# Patient Record
Sex: MALE | Race: WHITE | Employment: OTHER | URBAN - NONMETROPOLITAN AREA
[De-identification: names, ages, dates, MRNs, and addresses within clinical notes are randomized per-mention and may not be internally consistent; named-entity substitution may affect disease eponyms.]

---

## 2017-03-16 ENCOUNTER — HOSPITAL ENCOUNTER (OUTPATIENT)
Dept: CARDIAC REHAB | Age: 82
Setting detail: THERAPIES SERIES
Discharge: HOME | End: 2017-03-16
Payer: MEDICARE

## 2017-09-15 ENCOUNTER — HOSPITAL ENCOUNTER (EMERGENCY)
Age: 82
Discharge: HOME | End: 2017-09-15
Attending: EMERGENCY MEDICINE
Payer: MEDICARE

## 2017-09-15 ENCOUNTER — APPOINTMENT (OUTPATIENT)
Dept: GENERAL RADIOLOGY | Age: 82
End: 2017-09-15
Payer: MEDICARE

## 2017-09-15 VITALS
HEIGHT: 71 IN | WEIGHT: 250 LBS | RESPIRATION RATE: 16 BRPM | DIASTOLIC BLOOD PRESSURE: 76 MMHG | BODY MASS INDEX: 35 KG/M2 | HEART RATE: 61 BPM | SYSTOLIC BLOOD PRESSURE: 121 MMHG | OXYGEN SATURATION: 90 % | TEMPERATURE: 99.2 F

## 2017-09-15 DIAGNOSIS — J40 BRONCHITIS: Primary | ICD-10-CM

## 2017-09-15 LAB
-: ABNORMAL
ABSOLUTE EOS #: 0.5 K/UL (ref 0–0.4)
ABSOLUTE LYMPH #: 1.5 K/UL (ref 1–4.8)
ABSOLUTE MONO #: 1.5 K/UL (ref 0–1)
ALLEN TEST: NORMAL
AMORPHOUS: ABNORMAL
ANION GAP SERPL CALCULATED.3IONS-SCNC: 12 MMOL/L (ref 9–17)
BACTERIA: ABNORMAL
BASOPHILS # BLD: 0 %
BASOPHILS ABSOLUTE: 0 K/UL (ref 0–0.2)
BILIRUBIN URINE: NEGATIVE
BNP INTERPRETATION: NORMAL
BUN BLDV-MCNC: 17 MG/DL (ref 8–23)
BUN/CREAT BLD: 17 (ref 9–20)
CALCIUM SERPL-MCNC: 9.2 MG/DL (ref 8.6–10.4)
CARBOXYHEMOGLOBIN: NORMAL % (ref 0–5)
CASTS UA: ABNORMAL /LPF
CHLORIDE BLD-SCNC: 95 MMOL/L (ref 98–107)
CO2: 25 MMOL/L (ref 20–31)
COLOR: YELLOW
COMMENT UA: ABNORMAL
CREAT SERPL-MCNC: 1 MG/DL (ref 0.7–1.2)
CRYSTALS, UA: ABNORMAL /HPF
DIFFERENTIAL TYPE: ABNORMAL
EOSINOPHILS RELATIVE PERCENT: 5 %
EPITHELIAL CELLS UA: ABNORMAL /HPF (ref 0–5)
FIO2: NORMAL
GFR AFRICAN AMERICAN: >60 ML/MIN
GFR NON-AFRICAN AMERICAN: >60 ML/MIN
GFR SERPL CREATININE-BSD FRML MDRD: ABNORMAL ML/MIN/{1.73_M2}
GFR SERPL CREATININE-BSD FRML MDRD: ABNORMAL ML/MIN/{1.73_M2}
GLUCOSE BLD-MCNC: 179 MG/DL (ref 70–99)
GLUCOSE URINE: NEGATIVE
HCO3 VENOUS: 24.4 MMOL/L (ref 24–30)
HCT VFR BLD CALC: 39.2 % (ref 41–53)
HEMOGLOBIN: 12.9 G/DL (ref 13.5–17)
KETONES, URINE: NEGATIVE
LEUKOCYTE ESTERASE, URINE: NEGATIVE
LYMPHOCYTES # BLD: 15 %
MCH RBC QN AUTO: 29.2 PG (ref 26–34)
MCHC RBC AUTO-ENTMCNC: 32.8 G/DL (ref 31–37)
MCV RBC AUTO: 89.1 FL (ref 80–100)
METHEMOGLOBIN: NORMAL % (ref 0–1.9)
MODE: NORMAL
MONOCYTES # BLD: 15 %
MUCUS: ABNORMAL
NEGATIVE BASE EXCESS, VEN: 1.2 MMOL/L (ref 0–2)
NITRITE, URINE: NEGATIVE
NOTIFICATION TIME: NORMAL
NOTIFICATION: NORMAL
O2 DEVICE/FLOW/%: NORMAL
O2 SAT, VEN: 73.3 % (ref 60–85)
OTHER OBSERVATIONS UA: ABNORMAL
OXYHEMOGLOBIN: NORMAL % (ref 95–98)
PATIENT TEMP: 37
PCO2, VEN, TEMP ADJ: NORMAL MMHG (ref 39–55)
PCO2, VEN: 44 (ref 39–55)
PDW BLD-RTO: 14.4 % (ref 12.1–15.2)
PEEP/CPAP: NORMAL
PH UA: 6 (ref 5–9)
PH VENOUS: 7.36 (ref 7.32–7.42)
PH, VEN, TEMP ADJ: NORMAL (ref 7.32–7.42)
PLATELET # BLD: 206 K/UL (ref 140–450)
PLATELET ESTIMATE: ABNORMAL
PMV BLD AUTO: 9 FL (ref 6–12)
PO2, VEN, TEMP ADJ: NORMAL MMHG (ref 30–50)
PO2, VEN: 40.3 (ref 30–50)
POSITIVE BASE EXCESS, VEN: NORMAL MMOL/L (ref 0–2)
POTASSIUM SERPL-SCNC: 4.8 MMOL/L (ref 3.7–5.3)
PRO-BNP: 216 PG/ML
PROTEIN UA: ABNORMAL
PSV: NORMAL
PT. POSITION: NORMAL
RBC # BLD: 4.4 M/UL (ref 4.5–5.9)
RBC # BLD: ABNORMAL 10*6/UL
RBC UA: ABNORMAL /HPF (ref 0–2)
RENAL EPITHELIAL, UA: ABNORMAL /HPF
RESPIRATORY RATE: NORMAL
SAMPLE SITE: NORMAL
SEG NEUTROPHILS: 65 %
SEGMENTED NEUTROPHILS ABSOLUTE COUNT: 6.5 K/UL (ref 1.8–7.7)
SET RATE: NORMAL
SODIUM BLD-SCNC: 132 MMOL/L (ref 135–144)
SPECIFIC GRAVITY UA: 1.02 (ref 1.01–1.02)
TEXT FOR RESPIRATORY: NORMAL
TOTAL HB: NORMAL G/DL (ref 12–16)
TOTAL RATE: NORMAL
TRICHOMONAS: ABNORMAL
TROPONIN INTERP: NORMAL
TROPONIN T: <0.03 NG/ML
TURBIDITY: CLEAR
URINE HGB: NEGATIVE
UROBILINOGEN, URINE: NORMAL
VT: NORMAL
WBC # BLD: 9.9 K/UL (ref 3.5–11)
WBC # BLD: ABNORMAL 10*3/UL
WBC UA: ABNORMAL /HPF (ref 0–5)
YEAST: ABNORMAL

## 2017-09-15 PROCEDURE — 6370000000 HC RX 637 (ALT 250 FOR IP): Performed by: EMERGENCY MEDICINE

## 2017-09-15 PROCEDURE — 83880 ASSAY OF NATRIURETIC PEPTIDE: CPT

## 2017-09-15 PROCEDURE — 84484 ASSAY OF TROPONIN QUANT: CPT

## 2017-09-15 PROCEDURE — 71020 XR CHEST STANDARD TWO VW: CPT

## 2017-09-15 PROCEDURE — 93005 ELECTROCARDIOGRAM TRACING: CPT

## 2017-09-15 PROCEDURE — 82805 BLOOD GASES W/O2 SATURATION: CPT

## 2017-09-15 PROCEDURE — 99285 EMERGENCY DEPT VISIT HI MDM: CPT

## 2017-09-15 PROCEDURE — 81001 URINALYSIS AUTO W/SCOPE: CPT

## 2017-09-15 PROCEDURE — 80048 BASIC METABOLIC PNL TOTAL CA: CPT

## 2017-09-15 PROCEDURE — 6360000002 HC RX W HCPCS: Performed by: EMERGENCY MEDICINE

## 2017-09-15 PROCEDURE — 36415 COLL VENOUS BLD VENIPUNCTURE: CPT

## 2017-09-15 PROCEDURE — 85025 COMPLETE CBC W/AUTO DIFF WBC: CPT

## 2017-09-15 PROCEDURE — 87040 BLOOD CULTURE FOR BACTERIA: CPT

## 2017-09-15 PROCEDURE — 96374 THER/PROPH/DIAG INJ IV PUSH: CPT

## 2017-09-15 PROCEDURE — 94640 AIRWAY INHALATION TREATMENT: CPT

## 2017-09-15 RX ORDER — CETIRIZINE HYDROCHLORIDE 10 MG/1
10 TABLET ORAL DAILY
COMMUNITY
End: 2018-01-03

## 2017-09-15 RX ORDER — BENZONATATE 100 MG/1
200 CAPSULE ORAL ONCE
Status: COMPLETED | OUTPATIENT
Start: 2017-09-15 | End: 2017-09-15

## 2017-09-15 RX ORDER — IPRATROPIUM BROMIDE AND ALBUTEROL SULFATE 2.5; .5 MG/3ML; MG/3ML
1 SOLUTION RESPIRATORY (INHALATION) EVERY 4 HOURS PRN
Qty: 30 VIAL | Refills: 1 | Status: SHIPPED | OUTPATIENT
Start: 2017-09-15 | End: 2017-10-05 | Stop reason: SDUPTHER

## 2017-09-15 RX ORDER — DEXTROMETHORPHAN POLISTIREX 30 MG/5ML
30 SUSPENSION ORAL 2 TIMES DAILY PRN
COMMUNITY
End: 2018-04-18 | Stop reason: ALTCHOICE

## 2017-09-15 RX ORDER — METHYLPREDNISOLONE SODIUM SUCCINATE 125 MG/2ML
125 INJECTION, POWDER, LYOPHILIZED, FOR SOLUTION INTRAMUSCULAR; INTRAVENOUS ONCE
Status: COMPLETED | OUTPATIENT
Start: 2017-09-15 | End: 2017-09-15

## 2017-09-15 RX ORDER — BENZONATATE 100 MG/1
100 CAPSULE ORAL EVERY 4 HOURS PRN
Qty: 30 CAPSULE | Refills: 0 | Status: SHIPPED | OUTPATIENT
Start: 2017-09-15 | End: 2017-09-22

## 2017-09-15 RX ORDER — ACETAMINOPHEN AND CODEINE PHOSPHATE 300; 30 MG/1; MG/1
1 TABLET ORAL EVERY 12 HOURS PRN
Qty: 30 TABLET | Refills: 0 | Status: SHIPPED | OUTPATIENT
Start: 2017-09-15 | End: 2017-10-03 | Stop reason: ALTCHOICE

## 2017-09-15 RX ORDER — M-VIT,TX,IRON,MINS/CALC/FOLIC 27MG-0.4MG
1 TABLET ORAL DAILY
COMMUNITY

## 2017-09-15 RX ORDER — IPRATROPIUM BROMIDE AND ALBUTEROL SULFATE 2.5; .5 MG/3ML; MG/3ML
1 SOLUTION RESPIRATORY (INHALATION) ONCE
Status: COMPLETED | OUTPATIENT
Start: 2017-09-15 | End: 2017-09-15

## 2017-09-15 RX ORDER — OMEGA-3 FATTY ACIDS CAP DELAYED RELEASE 1000 MG 1000 MG
1000 CAPSULE DELAYED RELEASE ORAL 2 TIMES DAILY
COMMUNITY
End: 2018-01-03

## 2017-09-15 RX ORDER — ACETAMINOPHEN AND CODEINE PHOSPHATE 300; 30 MG/1; MG/1
1 TABLET ORAL ONCE
Status: COMPLETED | OUTPATIENT
Start: 2017-09-15 | End: 2017-09-15

## 2017-09-15 RX ORDER — CEPHALEXIN 500 MG/1
500 CAPSULE ORAL 3 TIMES DAILY
Qty: 30 CAPSULE | Refills: 0 | Status: SHIPPED | OUTPATIENT
Start: 2017-09-15 | End: 2017-09-19

## 2017-09-15 RX ORDER — LISINOPRIL 2.5 MG/1
2.5 TABLET ORAL DAILY
Status: ON HOLD | COMMUNITY
End: 2017-09-21 | Stop reason: HOSPADM

## 2017-09-15 RX ADMIN — BENZONATATE 200 MG: 100 CAPSULE ORAL at 04:40

## 2017-09-15 RX ADMIN — ACETAMINOPHEN AND CODEINE PHOSPHATE 1 TABLET: 300; 30 TABLET ORAL at 04:40

## 2017-09-15 RX ADMIN — IPRATROPIUM BROMIDE AND ALBUTEROL SULFATE 1 AMPULE: .5; 3 SOLUTION RESPIRATORY (INHALATION) at 04:32

## 2017-09-15 RX ADMIN — METHYLPREDNISOLONE SODIUM SUCCINATE 125 MG: 125 INJECTION, POWDER, FOR SOLUTION INTRAMUSCULAR; INTRAVENOUS at 04:40

## 2017-09-15 ASSESSMENT — ENCOUNTER SYMPTOMS
ABDOMINAL DISTENTION: 0
VOICE CHANGE: 0
ABDOMINAL PAIN: 0
WHEEZING: 1
SINUS PRESSURE: 0
EYE PAIN: 0
CONSTIPATION: 0
DIARRHEA: 0
SHORTNESS OF BREATH: 1
VOMITING: 0
FACIAL SWELLING: 0
COLOR CHANGE: 0
CHEST TIGHTNESS: 0
SORE THROAT: 0
EYE DISCHARGE: 0
TROUBLE SWALLOWING: 0
BACK PAIN: 0
COUGH: 1
NAUSEA: 0
BLOOD IN STOOL: 0

## 2017-09-15 ASSESSMENT — PAIN SCALES - GENERAL: PAINLEVEL_OUTOF10: 4

## 2017-09-18 LAB
EKG ATRIAL RATE: 70 BPM
EKG P AXIS: 27 DEGREES
EKG P-R INTERVAL: 208 MS
EKG Q-T INTERVAL: 396 MS
EKG QRS DURATION: 108 MS
EKG QTC CALCULATION (BAZETT): 427 MS
EKG R AXIS: -9 DEGREES
EKG T AXIS: 50 DEGREES
EKG VENTRICULAR RATE: 70 BPM

## 2017-09-19 ENCOUNTER — HOSPITAL ENCOUNTER (EMERGENCY)
Age: 82
Discharge: HOME | End: 2017-09-19
Payer: MEDICARE

## 2017-09-19 ENCOUNTER — APPOINTMENT (OUTPATIENT)
Dept: CT IMAGING | Age: 82
End: 2017-09-19
Payer: MEDICARE

## 2017-09-19 ENCOUNTER — APPOINTMENT (OUTPATIENT)
Dept: GENERAL RADIOLOGY | Age: 82
End: 2017-09-19
Payer: MEDICARE

## 2017-09-19 ENCOUNTER — OFFICE VISIT (OUTPATIENT)
Dept: PRIMARY CARE CLINIC | Age: 82
End: 2017-09-19
Payer: MEDICARE

## 2017-09-19 ENCOUNTER — HOSPITAL ENCOUNTER (OUTPATIENT)
Age: 82
Setting detail: OBSERVATION
LOS: 1 days | Discharge: HOME | End: 2017-09-21
Attending: EMERGENCY MEDICINE | Admitting: INTERNAL MEDICINE
Payer: MEDICARE

## 2017-09-19 VITALS
HEART RATE: 78 BPM | DIASTOLIC BLOOD PRESSURE: 78 MMHG | OXYGEN SATURATION: 93 % | RESPIRATION RATE: 18 BRPM | TEMPERATURE: 99.6 F | SYSTOLIC BLOOD PRESSURE: 174 MMHG

## 2017-09-19 VITALS
SYSTOLIC BLOOD PRESSURE: 141 MMHG | HEART RATE: 74 BPM | OXYGEN SATURATION: 91 % | WEIGHT: 255.7 LBS | TEMPERATURE: 98.7 F | DIASTOLIC BLOOD PRESSURE: 74 MMHG | RESPIRATION RATE: 22 BRPM | BODY MASS INDEX: 35.66 KG/M2

## 2017-09-19 DIAGNOSIS — R09.89 ABNORMAL LUNG SOUNDS: ICD-10-CM

## 2017-09-19 DIAGNOSIS — J40 BRONCHITIS: Primary | ICD-10-CM

## 2017-09-19 DIAGNOSIS — R06.02 SOB (SHORTNESS OF BREATH): Primary | ICD-10-CM

## 2017-09-19 DIAGNOSIS — R60.9 EDEMA, UNSPECIFIED TYPE: ICD-10-CM

## 2017-09-19 DIAGNOSIS — R05.9 COUGH: Primary | ICD-10-CM

## 2017-09-19 DIAGNOSIS — R91.1 PULMONARY NODULE: ICD-10-CM

## 2017-09-19 DIAGNOSIS — R06.2 WHEEZING: ICD-10-CM

## 2017-09-19 DIAGNOSIS — R09.02 HYPOXIA: ICD-10-CM

## 2017-09-19 DIAGNOSIS — R79.81 LOW OXYGEN SATURATION: ICD-10-CM

## 2017-09-19 LAB
-: NORMAL
ABSOLUTE EOS #: 0.08 K/UL (ref 0–0.4)
ABSOLUTE EOS #: 0.4 K/UL (ref 0–0.4)
ABSOLUTE LYMPH #: 1.2 K/UL (ref 1–4.8)
ABSOLUTE LYMPH #: 1.93 K/UL (ref 1–4.8)
ABSOLUTE MONO #: 1.09 K/UL (ref 0–1)
ABSOLUTE MONO #: 1.3 K/UL (ref 0–1)
ALBUMIN SERPL-MCNC: 4.1 G/DL (ref 3.5–5.2)
ALBUMIN/GLOBULIN RATIO: 1.3 (ref 1–2.5)
ALLEN TEST: ABNORMAL
ALP BLD-CCNC: 98 U/L (ref 40–129)
ALT SERPL-CCNC: 48 U/L (ref 5–41)
AMORPHOUS: NORMAL
ANION GAP SERPL CALCULATED.3IONS-SCNC: 11 MMOL/L (ref 9–17)
ANION GAP SERPL CALCULATED.3IONS-SCNC: 12 MMOL/L (ref 9–17)
AST SERPL-CCNC: 31 U/L
BACTERIA: NORMAL
BASOPHILS # BLD: 0 %
BASOPHILS # BLD: 0 %
BASOPHILS ABSOLUTE: 0 K/UL (ref 0–0.2)
BASOPHILS ABSOLUTE: 0 K/UL (ref 0–0.2)
BILIRUB SERPL-MCNC: 0.26 MG/DL (ref 0.3–1.2)
BILIRUBIN URINE: NEGATIVE
BNP INTERPRETATION: ABNORMAL
BUN BLDV-MCNC: 11 MG/DL (ref 8–23)
BUN BLDV-MCNC: 14 MG/DL (ref 8–23)
BUN/CREAT BLD: 14 (ref 9–20)
BUN/CREAT BLD: 15 (ref 9–20)
CALCIUM SERPL-MCNC: 8.7 MG/DL (ref 8.6–10.4)
CALCIUM SERPL-MCNC: 9 MG/DL (ref 8.6–10.4)
CARBOXYHEMOGLOBIN: ABNORMAL % (ref 0–5)
CASTS UA: NORMAL /LPF
CHLORIDE BLD-SCNC: 92 MMOL/L (ref 98–107)
CHLORIDE BLD-SCNC: 94 MMOL/L (ref 98–107)
CO2: 25 MMOL/L (ref 20–31)
CO2: 27 MMOL/L (ref 20–31)
COLOR: YELLOW
COMMENT UA: ABNORMAL
CREAT SERPL-MCNC: 0.81 MG/DL (ref 0.7–1.2)
CREAT SERPL-MCNC: 0.91 MG/DL (ref 0.7–1.2)
CRYSTALS, UA: NORMAL /HPF
DIFFERENTIAL TYPE: ABNORMAL
DIFFERENTIAL TYPE: ABNORMAL
EOSINOPHILS RELATIVE PERCENT: 1 %
EOSINOPHILS RELATIVE PERCENT: 4 %
EPITHELIAL CELLS UA: NORMAL /HPF (ref 0–5)
FIO2: ABNORMAL
GFR AFRICAN AMERICAN: >60 ML/MIN
GFR AFRICAN AMERICAN: >60 ML/MIN
GFR NON-AFRICAN AMERICAN: >60 ML/MIN
GFR NON-AFRICAN AMERICAN: >60 ML/MIN
GFR SERPL CREATININE-BSD FRML MDRD: ABNORMAL ML/MIN/{1.73_M2}
GLUCOSE BLD-MCNC: 148 MG/DL (ref 70–99)
GLUCOSE BLD-MCNC: 209 MG/DL (ref 70–99)
GLUCOSE URINE: NEGATIVE
HCO3 ARTERIAL: 28.4 MMOL/L (ref 22–26)
HCT VFR BLD CALC: 38.2 % (ref 41–53)
HCT VFR BLD CALC: 38.6 % (ref 41–53)
HEMOGLOBIN: 12.7 G/DL (ref 13.5–17)
HEMOGLOBIN: 12.9 G/DL (ref 13.5–17)
INR BLD: 1.1 (ref 0.9–1.2)
KETONES, URINE: NEGATIVE
LACTIC ACID: 1.6 MMOL/L (ref 0.5–2.2)
LEUKOCYTE ESTERASE, URINE: NEGATIVE
LYMPHOCYTES # BLD: 13 %
LYMPHOCYTES # BLD: 23 %
MCH RBC QN AUTO: 29.6 PG (ref 26–34)
MCH RBC QN AUTO: 29.7 PG (ref 26–34)
MCHC RBC AUTO-ENTMCNC: 33.3 G/DL (ref 31–37)
MCHC RBC AUTO-ENTMCNC: 33.3 G/DL (ref 31–37)
MCV RBC AUTO: 89 FL (ref 80–100)
MCV RBC AUTO: 89.4 FL (ref 80–100)
METHEMOGLOBIN: ABNORMAL % (ref 0–1.9)
MODE: ABNORMAL
MONOCYTES # BLD: 13 %
MONOCYTES # BLD: 14 %
MORPHOLOGY: NORMAL
MUCUS: NORMAL
NEGATIVE BASE EXCESS, ART: ABNORMAL MMOL/L (ref 0–2)
NITRITE, URINE: NEGATIVE
NOTIFICATION TIME: ABNORMAL
NOTIFICATION: ABNORMAL
O2 DEVICE/FLOW/%: ABNORMAL
O2 SAT, ARTERIAL: 90.1 % (ref 95–98)
OTHER OBSERVATIONS UA: NORMAL
OXYHEMOGLOBIN: ABNORMAL % (ref 95–98)
PARTIAL THROMBOPLASTIN TIME: 27.1 SEC (ref 23.2–34.4)
PATIENT TEMP: 37
PCO2 ARTERIAL: 53.8 MMHG (ref 35–45)
PCO2, ART, TEMP ADJ: ABNORMAL
PDW BLD-RTO: 13.8 % (ref 12.1–15.2)
PDW BLD-RTO: 14 % (ref 12.1–15.2)
PEEP/CPAP: ABNORMAL
PH ARTERIAL: 7.34 (ref 7.35–7.45)
PH UA: 6 (ref 5–9)
PH, ART, TEMP ADJ: ABNORMAL (ref 7.35–7.45)
PLATELET # BLD: 223 K/UL (ref 140–450)
PLATELET # BLD: 239 K/UL (ref 140–450)
PLATELET ESTIMATE: ABNORMAL
PLATELET ESTIMATE: ABNORMAL
PMV BLD AUTO: 8.6 FL (ref 6–12)
PMV BLD AUTO: 8.7 FL (ref 6–12)
PO2 ARTERIAL: 62 MMHG (ref 80–100)
PO2, ART, TEMP ADJ: ABNORMAL MMHG (ref 80–100)
POSITIVE BASE EXCESS, ART: 1.4 MMOL/L (ref 0–2)
POTASSIUM SERPL-SCNC: 4.3 MMOL/L (ref 3.7–5.3)
POTASSIUM SERPL-SCNC: 4.5 MMOL/L (ref 3.7–5.3)
PRO-BNP: 302 PG/ML
PROTEIN UA: NEGATIVE
PROTHROMBIN TIME: 10.9 SEC (ref 9.7–12.2)
PSV: ABNORMAL
PT. POSITION: ABNORMAL
RBC # BLD: 4.29 M/UL (ref 4.5–5.9)
RBC # BLD: 4.32 M/UL (ref 4.5–5.9)
RBC # BLD: ABNORMAL 10*6/UL
RBC # BLD: ABNORMAL 10*6/UL
RBC UA: NORMAL /HPF (ref 0–2)
RENAL EPITHELIAL, UA: NORMAL /HPF
RESPIRATORY RATE: 24
SAMPLE SITE: ABNORMAL
SEG NEUTROPHILS: 63 %
SEG NEUTROPHILS: 69 %
SEGMENTED NEUTROPHILS ABSOLUTE COUNT: 5.3 K/UL (ref 1.8–7.7)
SEGMENTED NEUTROPHILS ABSOLUTE COUNT: 6.1 K/UL (ref 1.8–7.7)
SET RATE: ABNORMAL
SODIUM BLD-SCNC: 128 MMOL/L (ref 135–144)
SODIUM BLD-SCNC: 133 MMOL/L (ref 135–144)
SPECIFIC GRAVITY UA: <1.005 (ref 1.01–1.02)
TEXT FOR RESPIRATORY: ABNORMAL
TOTAL HB: ABNORMAL G/DL (ref 12–16)
TOTAL PROTEIN: 7.3 G/DL (ref 6.4–8.3)
TOTAL RATE: ABNORMAL
TRICHOMONAS: NORMAL
TROPONIN INTERP: NORMAL
TROPONIN INTERP: NORMAL
TROPONIN T: <0.03 NG/ML
TROPONIN T: <0.03 NG/ML
TURBIDITY: CLEAR
URINE HGB: ABNORMAL
UROBILINOGEN, URINE: NORMAL
VT: ABNORMAL
WBC # BLD: 8.4 K/UL (ref 3.5–11)
WBC # BLD: 9 K/UL (ref 3.5–11)
WBC # BLD: ABNORMAL 10*3/UL
WBC # BLD: ABNORMAL 10*3/UL
WBC UA: NORMAL /HPF (ref 0–5)
YEAST: NORMAL

## 2017-09-19 PROCEDURE — 84484 ASSAY OF TROPONIN QUANT: CPT

## 2017-09-19 PROCEDURE — 6360000002 HC RX W HCPCS: Performed by: EMERGENCY MEDICINE

## 2017-09-19 PROCEDURE — 6370000000 HC RX 637 (ALT 250 FOR IP): Performed by: PHYSICIAN ASSISTANT

## 2017-09-19 PROCEDURE — 99285 EMERGENCY DEPT VISIT HI MDM: CPT

## 2017-09-19 PROCEDURE — 80048 BASIC METABOLIC PNL TOTAL CA: CPT

## 2017-09-19 PROCEDURE — 71010 XR CHEST PORTABLE: CPT

## 2017-09-19 PROCEDURE — 85610 PROTHROMBIN TIME: CPT

## 2017-09-19 PROCEDURE — 87040 BLOOD CULTURE FOR BACTERIA: CPT

## 2017-09-19 PROCEDURE — 83036 HEMOGLOBIN GLYCOSYLATED A1C: CPT

## 2017-09-19 PROCEDURE — 71020 XR CHEST STANDARD TWO VW: CPT

## 2017-09-19 PROCEDURE — 93005 ELECTROCARDIOGRAM TRACING: CPT

## 2017-09-19 PROCEDURE — 82805 BLOOD GASES W/O2 SATURATION: CPT

## 2017-09-19 PROCEDURE — 83880 ASSAY OF NATRIURETIC PEPTIDE: CPT

## 2017-09-19 PROCEDURE — 94664 DEMO&/EVAL PT USE INHALER: CPT

## 2017-09-19 PROCEDURE — 85730 THROMBOPLASTIN TIME PARTIAL: CPT

## 2017-09-19 PROCEDURE — 83605 ASSAY OF LACTIC ACID: CPT

## 2017-09-19 PROCEDURE — 94640 AIRWAY INHALATION TREATMENT: CPT

## 2017-09-19 PROCEDURE — 36600 WITHDRAWAL OF ARTERIAL BLOOD: CPT

## 2017-09-19 PROCEDURE — 99213 OFFICE O/P EST LOW 20 MIN: CPT | Performed by: NURSE PRACTITIONER

## 2017-09-19 PROCEDURE — 85025 COMPLETE CBC W/AUTO DIFF WBC: CPT

## 2017-09-19 PROCEDURE — 80053 COMPREHEN METABOLIC PANEL: CPT

## 2017-09-19 PROCEDURE — 81001 URINALYSIS AUTO W/SCOPE: CPT

## 2017-09-19 PROCEDURE — 71275 CT ANGIOGRAPHY CHEST: CPT

## 2017-09-19 RX ORDER — IPRATROPIUM BROMIDE AND ALBUTEROL SULFATE 2.5; .5 MG/3ML; MG/3ML
1 SOLUTION RESPIRATORY (INHALATION) ONCE
Status: COMPLETED | OUTPATIENT
Start: 2017-09-19 | End: 2017-09-19

## 2017-09-19 RX ORDER — AZITHROMYCIN 250 MG/1
250 TABLET, FILM COATED ORAL DAILY
Qty: 4 TABLET | Refills: 0 | Status: ON HOLD | OUTPATIENT
Start: 2017-09-19 | End: 2017-09-21 | Stop reason: HOSPADM

## 2017-09-19 RX ORDER — AZITHROMYCIN 250 MG/1
500 TABLET, FILM COATED ORAL ONCE
Status: COMPLETED | OUTPATIENT
Start: 2017-09-19 | End: 2017-09-19

## 2017-09-19 RX ADMIN — AZITHROMYCIN 500 MG: 250 TABLET, FILM COATED ORAL at 20:44

## 2017-09-19 RX ADMIN — IPRATROPIUM BROMIDE AND ALBUTEROL SULFATE 1 AMPULE: .5; 3 SOLUTION RESPIRATORY (INHALATION) at 22:52

## 2017-09-19 RX ADMIN — IPRATROPIUM BROMIDE AND ALBUTEROL SULFATE 1 AMPULE: .5; 3 SOLUTION RESPIRATORY (INHALATION) at 18:37

## 2017-09-19 RX ADMIN — ALBUTEROL SULFATE 5 MG: 2.5 SOLUTION RESPIRATORY (INHALATION) at 23:45

## 2017-09-19 ASSESSMENT — ENCOUNTER SYMPTOMS
TROUBLE SWALLOWING: 0
ABDOMINAL PAIN: 0
NAUSEA: 0
HEMOPTYSIS: 0
EYES NEGATIVE: 1
VOMITING: 0
EYE PAIN: 0
EYE DISCHARGE: 0
VOMITING: 0
RHINORRHEA: 0
WHEEZING: 1
COUGH: 1
RHINORRHEA: 0
SINUS PRESSURE: 0
SINUS PRESSURE: 0
NAUSEA: 0
GASTROINTESTINAL NEGATIVE: 1
SINUS PRESSURE: 0
DIARRHEA: 0
CHEST TIGHTNESS: 0
EYE DISCHARGE: 0
COUGH: 1
TROUBLE SWALLOWING: 0
SHORTNESS OF BREATH: 1
EYE PAIN: 0
ABDOMINAL PAIN: 0
WHEEZING: 1
BACK PAIN: 0
BACK PAIN: 0
COUGH: 1
DIARRHEA: 0
WHEEZING: 0
TROUBLE SWALLOWING: 0

## 2017-09-20 PROBLEM — E87.1 HYPONATREMIA: Status: ACTIVE | Noted: 2017-09-20

## 2017-09-20 PROBLEM — J44.1 COPD EXACERBATION (HCC): Status: ACTIVE | Noted: 2017-09-20

## 2017-09-20 LAB
ABSOLUTE EOS #: 0.1 K/UL (ref 0–0.4)
ABSOLUTE LYMPH #: 0.29 K/UL (ref 1–4.8)
ABSOLUTE MONO #: 0.19 K/UL (ref 0–1)
ANION GAP SERPL CALCULATED.3IONS-SCNC: 11 MMOL/L (ref 9–17)
BASOPHILS # BLD: 0 %
BASOPHILS ABSOLUTE: 0 K/UL (ref 0–0.2)
BUN BLDV-MCNC: 13 MG/DL (ref 8–23)
BUN/CREAT BLD: 17 (ref 9–20)
CALCIUM SERPL-MCNC: 8.3 MG/DL (ref 8.6–10.4)
CHLORIDE BLD-SCNC: 93 MMOL/L (ref 98–107)
CO2: 24 MMOL/L (ref 20–31)
CREAT SERPL-MCNC: 0.76 MG/DL (ref 0.7–1.2)
CULTURE: NORMAL
DIFFERENTIAL TYPE: ABNORMAL
EOSINOPHILS RELATIVE PERCENT: 1 %
ESTIMATED AVERAGE GLUCOSE: 171 MG/DL
GFR AFRICAN AMERICAN: >60 ML/MIN
GFR NON-AFRICAN AMERICAN: >60 ML/MIN
GFR SERPL CREATININE-BSD FRML MDRD: ABNORMAL ML/MIN/{1.73_M2}
GFR SERPL CREATININE-BSD FRML MDRD: ABNORMAL ML/MIN/{1.73_M2}
GLUCOSE BLD-MCNC: 205 MG/DL (ref 74–100)
GLUCOSE BLD-MCNC: 215 MG/DL (ref 74–100)
GLUCOSE BLD-MCNC: 231 MG/DL (ref 70–99)
GLUCOSE BLD-MCNC: 262 MG/DL (ref 74–100)
HBA1C MFR BLD: 7.6 % (ref 4.8–5.9)
HCT VFR BLD CALC: 37.1 % (ref 41–53)
HEMOGLOBIN: 12.1 G/DL (ref 13.5–17)
LV EF: 43 %
LVEF MODALITY: NORMAL
LYMPHOCYTES # BLD: 3 %
Lab: NORMAL
Lab: NORMAL
MCH RBC QN AUTO: 29.2 PG (ref 26–34)
MCHC RBC AUTO-ENTMCNC: 32.6 G/DL (ref 31–37)
MCV RBC AUTO: 89.5 FL (ref 80–100)
MONOCYTES # BLD: 2 %
MORPHOLOGY: ABNORMAL
PDW BLD-RTO: 13.9 % (ref 12.1–15.2)
PLATELET # BLD: 213 K/UL (ref 140–450)
PLATELET ESTIMATE: ABNORMAL
PMV BLD AUTO: 8.9 FL (ref 6–12)
POTASSIUM SERPL-SCNC: 4.9 MMOL/L (ref 3.7–5.3)
RBC # BLD: 4.14 M/UL (ref 4.5–5.9)
RBC # BLD: ABNORMAL 10*6/UL
SEG NEUTROPHILS: 94 %
SEGMENTED NEUTROPHILS ABSOLUTE COUNT: 9.12 K/UL (ref 1.8–7.7)
SODIUM BLD-SCNC: 128 MMOL/L (ref 135–144)
SPECIMEN DESCRIPTION: NORMAL
SPECIMEN DESCRIPTION: NORMAL
STATUS: NORMAL
STATUS: NORMAL
WBC # BLD: 9.7 K/UL (ref 3.5–11)
WBC # BLD: ABNORMAL 10*3/UL

## 2017-09-20 PROCEDURE — G0378 HOSPITAL OBSERVATION PER HR: HCPCS

## 2017-09-20 PROCEDURE — 94640 AIRWAY INHALATION TREATMENT: CPT

## 2017-09-20 PROCEDURE — 96374 THER/PROPH/DIAG INJ IV PUSH: CPT

## 2017-09-20 PROCEDURE — 96372 THER/PROPH/DIAG INJ SC/IM: CPT

## 2017-09-20 PROCEDURE — 94664 DEMO&/EVAL PT USE INHALER: CPT

## 2017-09-20 PROCEDURE — 6370000000 HC RX 637 (ALT 250 FOR IP): Performed by: INTERNAL MEDICINE

## 2017-09-20 PROCEDURE — 93306 TTE W/DOPPLER COMPLETE: CPT

## 2017-09-20 PROCEDURE — 94760 N-INVAS EAR/PLS OXIMETRY 1: CPT

## 2017-09-20 PROCEDURE — 82947 ASSAY GLUCOSE BLOOD QUANT: CPT

## 2017-09-20 PROCEDURE — 2580000003 HC RX 258: Performed by: INTERNAL MEDICINE

## 2017-09-20 PROCEDURE — 36415 COLL VENOUS BLD VENIPUNCTURE: CPT

## 2017-09-20 PROCEDURE — C8929 TTE W OR WO FOL WCON,DOPPLER: HCPCS

## 2017-09-20 PROCEDURE — 80048 BASIC METABOLIC PNL TOTAL CA: CPT

## 2017-09-20 PROCEDURE — 6360000002 HC RX W HCPCS: Performed by: EMERGENCY MEDICINE

## 2017-09-20 PROCEDURE — 71275 CT ANGIOGRAPHY CHEST: CPT

## 2017-09-20 PROCEDURE — 85025 COMPLETE CBC W/AUTO DIFF WBC: CPT

## 2017-09-20 PROCEDURE — 6360000004 HC RX CONTRAST MEDICATION: Performed by: EMERGENCY MEDICINE

## 2017-09-20 PROCEDURE — 94667 MNPJ CHEST WALL 1ST: CPT

## 2017-09-20 PROCEDURE — 6360000002 HC RX W HCPCS: Performed by: INTERNAL MEDICINE

## 2017-09-20 PROCEDURE — 6370000000 HC RX 637 (ALT 250 FOR IP): Performed by: EMERGENCY MEDICINE

## 2017-09-20 PROCEDURE — 1200000000 HC SEMI PRIVATE

## 2017-09-20 PROCEDURE — 99204 OFFICE O/P NEW MOD 45 MIN: CPT | Performed by: INTERNAL MEDICINE

## 2017-09-20 PROCEDURE — 96375 TX/PRO/DX INJ NEW DRUG ADDON: CPT

## 2017-09-20 PROCEDURE — 94668 MNPJ CHEST WALL SBSQ: CPT

## 2017-09-20 RX ORDER — NICOTINE POLACRILEX 4 MG
15 LOZENGE BUCCAL PRN
Status: DISCONTINUED | OUTPATIENT
Start: 2017-09-20 | End: 2017-09-21 | Stop reason: HOSPADM

## 2017-09-20 RX ORDER — CETIRIZINE HYDROCHLORIDE 10 MG/1
10 TABLET ORAL DAILY
Status: DISCONTINUED | OUTPATIENT
Start: 2017-09-20 | End: 2017-09-21 | Stop reason: HOSPADM

## 2017-09-20 RX ORDER — METFORMIN HYDROCHLORIDE 500 MG/1
2000 TABLET, EXTENDED RELEASE ORAL
Status: DISCONTINUED | OUTPATIENT
Start: 2017-09-20 | End: 2017-09-21 | Stop reason: HOSPADM

## 2017-09-20 RX ORDER — ACETAMINOPHEN 325 MG/1
650 TABLET ORAL EVERY 4 HOURS PRN
Status: DISCONTINUED | OUTPATIENT
Start: 2017-09-20 | End: 2017-09-21 | Stop reason: HOSPADM

## 2017-09-20 RX ORDER — SODIUM CHLORIDE 0.9 % (FLUSH) 0.9 %
10 SYRINGE (ML) INJECTION EVERY 12 HOURS SCHEDULED
Status: DISCONTINUED | OUTPATIENT
Start: 2017-09-20 | End: 2017-09-21 | Stop reason: HOSPADM

## 2017-09-20 RX ORDER — METHYLPREDNISOLONE SODIUM SUCCINATE 125 MG/2ML
125 INJECTION, POWDER, LYOPHILIZED, FOR SOLUTION INTRAMUSCULAR; INTRAVENOUS ONCE
Status: COMPLETED | OUTPATIENT
Start: 2017-09-20 | End: 2017-09-20

## 2017-09-20 RX ORDER — M-VIT,TX,IRON,MINS/CALC/FOLIC 27MG-0.4MG
1 TABLET ORAL DAILY
Status: DISCONTINUED | OUTPATIENT
Start: 2017-09-20 | End: 2017-09-21 | Stop reason: HOSPADM

## 2017-09-20 RX ORDER — LOSARTAN POTASSIUM 25 MG/1
25 TABLET ORAL DAILY
Status: DISCONTINUED | OUTPATIENT
Start: 2017-09-20 | End: 2017-09-20

## 2017-09-20 RX ORDER — DEXTROMETHORPHAN POLISTIREX 30 MG/5ML
60 SUSPENSION ORAL 2 TIMES DAILY PRN
Status: DISCONTINUED | OUTPATIENT
Start: 2017-09-20 | End: 2017-09-21 | Stop reason: HOSPADM

## 2017-09-20 RX ORDER — SODIUM CHLORIDE 0.9 % (FLUSH) 0.9 %
10 SYRINGE (ML) INJECTION PRN
Status: DISCONTINUED | OUTPATIENT
Start: 2017-09-20 | End: 2017-09-21 | Stop reason: HOSPADM

## 2017-09-20 RX ORDER — IPRATROPIUM BROMIDE AND ALBUTEROL SULFATE 2.5; .5 MG/3ML; MG/3ML
1 SOLUTION RESPIRATORY (INHALATION) EVERY 4 HOURS PRN
Status: DISCONTINUED | OUTPATIENT
Start: 2017-09-20 | End: 2017-09-20

## 2017-09-20 RX ORDER — PROPRANOLOL HYDROCHLORIDE 10 MG/1
80 TABLET ORAL 3 TIMES DAILY
Status: DISCONTINUED | OUTPATIENT
Start: 2017-09-20 | End: 2017-09-21 | Stop reason: HOSPADM

## 2017-09-20 RX ORDER — ALBUTEROL SULFATE 2.5 MG/3ML
2.5 SOLUTION RESPIRATORY (INHALATION) EVERY 4 HOURS PRN
Status: DISCONTINUED | OUTPATIENT
Start: 2017-09-20 | End: 2017-09-21 | Stop reason: HOSPADM

## 2017-09-20 RX ORDER — CLOPIDOGREL BISULFATE 75 MG/1
75 TABLET ORAL DAILY
Status: DISCONTINUED | OUTPATIENT
Start: 2017-09-20 | End: 2017-09-21 | Stop reason: HOSPADM

## 2017-09-20 RX ORDER — LISINOPRIL 2.5 MG/1
2.5 TABLET ORAL DAILY
Status: DISCONTINUED | OUTPATIENT
Start: 2017-09-20 | End: 2017-09-20

## 2017-09-20 RX ORDER — PREDNISONE 20 MG/1
20 TABLET ORAL 2 TIMES DAILY
Status: DISCONTINUED | OUTPATIENT
Start: 2017-09-20 | End: 2017-09-21 | Stop reason: HOSPADM

## 2017-09-20 RX ORDER — FAMOTIDINE 20 MG/1
20 TABLET, FILM COATED ORAL 2 TIMES DAILY
Status: DISCONTINUED | OUTPATIENT
Start: 2017-09-20 | End: 2017-09-21 | Stop reason: HOSPADM

## 2017-09-20 RX ORDER — ASPIRIN 81 MG/1
81 TABLET, CHEWABLE ORAL DAILY
Status: DISCONTINUED | OUTPATIENT
Start: 2017-09-20 | End: 2017-09-21 | Stop reason: HOSPADM

## 2017-09-20 RX ORDER — BENZONATATE 100 MG/1
100 CAPSULE ORAL EVERY 4 HOURS PRN
Status: DISCONTINUED | OUTPATIENT
Start: 2017-09-20 | End: 2017-09-21 | Stop reason: HOSPADM

## 2017-09-20 RX ORDER — ACETAMINOPHEN AND CODEINE PHOSPHATE 300; 30 MG/1; MG/1
1 TABLET ORAL EVERY 4 HOURS PRN
Status: DISCONTINUED | OUTPATIENT
Start: 2017-09-20 | End: 2017-09-21 | Stop reason: HOSPADM

## 2017-09-20 RX ORDER — ATORVASTATIN CALCIUM 40 MG/1
40 TABLET, FILM COATED ORAL NIGHTLY
Status: DISCONTINUED | OUTPATIENT
Start: 2017-09-20 | End: 2017-09-21 | Stop reason: HOSPADM

## 2017-09-20 RX ORDER — LOSARTAN POTASSIUM 25 MG/1
25 TABLET ORAL DAILY
Status: DISCONTINUED | OUTPATIENT
Start: 2017-09-21 | End: 2017-09-21 | Stop reason: HOSPADM

## 2017-09-20 RX ORDER — DEXTROSE MONOHYDRATE 25 G/50ML
12.5 INJECTION, SOLUTION INTRAVENOUS PRN
Status: DISCONTINUED | OUTPATIENT
Start: 2017-09-20 | End: 2017-09-21 | Stop reason: HOSPADM

## 2017-09-20 RX ORDER — LEVOTHYROXINE SODIUM 0.1 MG/1
100 TABLET ORAL DAILY
Status: DISCONTINUED | OUTPATIENT
Start: 2017-09-20 | End: 2017-09-21 | Stop reason: HOSPADM

## 2017-09-20 RX ORDER — DEXTROSE MONOHYDRATE 50 MG/ML
100 INJECTION, SOLUTION INTRAVENOUS PRN
Status: DISCONTINUED | OUTPATIENT
Start: 2017-09-20 | End: 2017-09-21 | Stop reason: HOSPADM

## 2017-09-20 RX ORDER — PRIMIDONE 50 MG/1
100 TABLET ORAL 3 TIMES DAILY
Status: DISCONTINUED | OUTPATIENT
Start: 2017-09-20 | End: 2017-09-21 | Stop reason: HOSPADM

## 2017-09-20 RX ORDER — ONDANSETRON 2 MG/ML
4 INJECTION INTRAMUSCULAR; INTRAVENOUS EVERY 6 HOURS PRN
Status: DISCONTINUED | OUTPATIENT
Start: 2017-09-20 | End: 2017-09-21 | Stop reason: HOSPADM

## 2017-09-20 RX ORDER — IPRATROPIUM BROMIDE AND ALBUTEROL SULFATE 2.5; .5 MG/3ML; MG/3ML
1 SOLUTION RESPIRATORY (INHALATION) ONCE
Status: COMPLETED | OUTPATIENT
Start: 2017-09-20 | End: 2017-09-20

## 2017-09-20 RX ORDER — SODIUM CHLORIDE 9 MG/ML
INJECTION, SOLUTION INTRAVENOUS CONTINUOUS
Status: DISCONTINUED | OUTPATIENT
Start: 2017-09-20 | End: 2017-09-20

## 2017-09-20 RX ORDER — PANTOPRAZOLE SODIUM 40 MG/1
40 TABLET, DELAYED RELEASE ORAL
Status: DISCONTINUED | OUTPATIENT
Start: 2017-09-21 | End: 2017-09-21 | Stop reason: HOSPADM

## 2017-09-20 RX ORDER — IPRATROPIUM BROMIDE AND ALBUTEROL SULFATE 2.5; .5 MG/3ML; MG/3ML
1 SOLUTION RESPIRATORY (INHALATION) 4 TIMES DAILY
Status: DISCONTINUED | OUTPATIENT
Start: 2017-09-20 | End: 2017-09-21 | Stop reason: HOSPADM

## 2017-09-20 RX ORDER — OMEGA-3-ACID ETHYL ESTERS 1 G/1
1000 CAPSULE, LIQUID FILLED ORAL 2 TIMES DAILY
Status: DISCONTINUED | OUTPATIENT
Start: 2017-09-20 | End: 2017-09-21 | Stop reason: HOSPADM

## 2017-09-20 RX ADMIN — FAMOTIDINE 20 MG: 20 TABLET, FILM COATED ORAL at 22:23

## 2017-09-20 RX ADMIN — LEVOTHYROXINE SODIUM 100 MCG: 100 TABLET ORAL at 07:17

## 2017-09-20 RX ADMIN — OMEGA-3-ACID ETHYL ESTERS 1000 MG: 900 CAPSULE, LIQUID FILLED ORAL at 22:23

## 2017-09-20 RX ADMIN — INSULIN LISPRO 6 UNITS: 100 INJECTION, SOLUTION INTRAVENOUS; SUBCUTANEOUS at 09:25

## 2017-09-20 RX ADMIN — PREDNISONE 20 MG: 20 TABLET ORAL at 02:52

## 2017-09-20 RX ADMIN — ALBUTEROL SULFATE 5 MG: 2.5 SOLUTION RESPIRATORY (INHALATION) at 01:13

## 2017-09-20 RX ADMIN — SODIUM CHLORIDE: 9 INJECTION, SOLUTION INTRAVENOUS at 02:56

## 2017-09-20 RX ADMIN — PRIMIDONE 100 MG: 50 TABLET ORAL at 09:18

## 2017-09-20 RX ADMIN — MULTIPLE VITAMINS W/ MINERALS TAB 1 TABLET: TAB at 09:19

## 2017-09-20 RX ADMIN — BENZONATATE 100 MG: 100 CAPSULE ORAL at 02:52

## 2017-09-20 RX ADMIN — INSULIN LISPRO 2 UNITS: 100 INJECTION, SOLUTION INTRAVENOUS; SUBCUTANEOUS at 22:36

## 2017-09-20 RX ADMIN — CEFTRIAXONE 1 G: 1 INJECTION, POWDER, FOR SOLUTION INTRAMUSCULAR; INTRAVENOUS at 02:52

## 2017-09-20 RX ADMIN — METHYLPREDNISOLONE SODIUM SUCCINATE 125 MG: 125 INJECTION, POWDER, FOR SOLUTION INTRAMUSCULAR; INTRAVENOUS at 01:10

## 2017-09-20 RX ADMIN — IOPAMIDOL 100 ML: 612 INJECTION, SOLUTION INTRAVENOUS at 00:10

## 2017-09-20 RX ADMIN — PRIMIDONE 100 MG: 50 TABLET ORAL at 14:08

## 2017-09-20 RX ADMIN — IPRATROPIUM BROMIDE AND ALBUTEROL SULFATE 3 ML: .5; 3 SOLUTION RESPIRATORY (INHALATION) at 05:33

## 2017-09-20 RX ADMIN — FAMOTIDINE 20 MG: 20 TABLET, FILM COATED ORAL at 09:19

## 2017-09-20 RX ADMIN — ATORVASTATIN CALCIUM 40 MG: 40 TABLET, FILM COATED ORAL at 22:24

## 2017-09-20 RX ADMIN — IPRATROPIUM BROMIDE AND ALBUTEROL SULFATE 1 AMPULE: .5; 3 SOLUTION RESPIRATORY (INHALATION) at 01:13

## 2017-09-20 RX ADMIN — BENZONATATE 100 MG: 100 CAPSULE ORAL at 22:22

## 2017-09-20 RX ADMIN — OMEGA-3-ACID ETHYL ESTERS 1000 MG: 900 CAPSULE, LIQUID FILLED ORAL at 09:19

## 2017-09-20 RX ADMIN — ONDANSETRON 4 MG: 2 INJECTION INTRAMUSCULAR; INTRAVENOUS at 17:25

## 2017-09-20 RX ADMIN — PRIMIDONE 100 MG: 50 TABLET ORAL at 22:22

## 2017-09-20 RX ADMIN — CLOPIDOGREL BISULFATE 75 MG: 75 TABLET ORAL at 22:23

## 2017-09-20 RX ADMIN — IPRATROPIUM BROMIDE AND ALBUTEROL SULFATE 3 ML: .5; 3 SOLUTION RESPIRATORY (INHALATION) at 15:53

## 2017-09-20 RX ADMIN — PREDNISONE 20 MG: 20 TABLET ORAL at 09:19

## 2017-09-20 RX ADMIN — LISINOPRIL 2.5 MG: 2.5 TABLET ORAL at 09:18

## 2017-09-20 RX ADMIN — ENOXAPARIN SODIUM 40 MG: 40 INJECTION SUBCUTANEOUS at 09:19

## 2017-09-20 RX ADMIN — Medication 10 ML: at 22:24

## 2017-09-20 RX ADMIN — PROPRANOLOL HYDROCHLORIDE 80 MG: 10 TABLET ORAL at 09:18

## 2017-09-20 RX ADMIN — ASPIRIN 81 MG 81 MG: 81 TABLET ORAL at 09:19

## 2017-09-20 RX ADMIN — PROPRANOLOL HYDROCHLORIDE 80 MG: 10 TABLET ORAL at 14:08

## 2017-09-20 RX ADMIN — IPRATROPIUM BROMIDE AND ALBUTEROL SULFATE 3 ML: .5; 3 SOLUTION RESPIRATORY (INHALATION) at 19:23

## 2017-09-20 RX ADMIN — SODIUM CHLORIDE: 9 INJECTION, SOLUTION INTRAVENOUS at 14:15

## 2017-09-20 RX ADMIN — DEXTROMETHORPHAN 60 MG: 30 SUSPENSION, EXTENDED RELEASE ORAL at 14:10

## 2017-09-20 RX ADMIN — PROPRANOLOL HYDROCHLORIDE 80 MG: 10 TABLET ORAL at 22:22

## 2017-09-20 RX ADMIN — INSULIN LISPRO 4 UNITS: 100 INJECTION, SOLUTION INTRAVENOUS; SUBCUTANEOUS at 11:54

## 2017-09-20 RX ADMIN — CETIRIZINE HYDROCHLORIDE 10 MG: 10 TABLET, FILM COATED ORAL at 09:19

## 2017-09-20 RX ADMIN — PREDNISONE 20 MG: 20 TABLET ORAL at 22:23

## 2017-09-20 RX ADMIN — IPRATROPIUM BROMIDE AND ALBUTEROL SULFATE 3 ML: .5; 3 SOLUTION RESPIRATORY (INHALATION) at 10:51

## 2017-09-21 ENCOUNTER — TELEPHONE (OUTPATIENT)
Dept: PRIMARY CARE CLINIC | Age: 82
End: 2017-09-21

## 2017-09-21 VITALS
HEIGHT: 71 IN | BODY MASS INDEX: 34.98 KG/M2 | OXYGEN SATURATION: 91 % | HEART RATE: 73 BPM | RESPIRATION RATE: 17 BRPM | TEMPERATURE: 98 F | WEIGHT: 249.9 LBS | DIASTOLIC BLOOD PRESSURE: 68 MMHG | SYSTOLIC BLOOD PRESSURE: 128 MMHG

## 2017-09-21 PROBLEM — I50.42 CHRONIC COMBINED SYSTOLIC AND DIASTOLIC CHF (CONGESTIVE HEART FAILURE) (HCC): Chronic | Status: ACTIVE | Noted: 2017-09-21

## 2017-09-21 PROBLEM — J96.01 ACUTE RESPIRATORY FAILURE WITH HYPOXIA AND HYPERCARBIA (HCC): Status: ACTIVE | Noted: 2017-09-20

## 2017-09-21 PROBLEM — J96.02 ACUTE RESPIRATORY FAILURE WITH HYPOXIA AND HYPERCARBIA (HCC): Status: ACTIVE | Noted: 2017-09-20

## 2017-09-21 LAB
ABSOLUTE EOS #: 0.3 K/UL (ref 0–0.4)
ABSOLUTE LYMPH #: 1.9 K/UL (ref 1–4.8)
ABSOLUTE MONO #: 1.3 K/UL (ref 0–1)
BASOPHILS # BLD: 0 %
BASOPHILS ABSOLUTE: 0 K/UL (ref 0–0.2)
CHOLESTEROL/HDL RATIO: 4
CHOLESTEROL: 184 MG/DL
DIFFERENTIAL TYPE: ABNORMAL
EOSINOPHILS RELATIVE PERCENT: 2 %
GLUCOSE BLD-MCNC: 162 MG/DL (ref 74–100)
GLUCOSE BLD-MCNC: 195 MG/DL (ref 74–100)
HCT VFR BLD CALC: 35.8 % (ref 41–53)
HDLC SERPL-MCNC: 46 MG/DL
HEMOGLOBIN: 12.2 G/DL (ref 13.5–17)
LDL CHOLESTEROL: 113 MG/DL (ref 0–130)
LYMPHOCYTES # BLD: 16 %
MCH RBC QN AUTO: 30.4 PG (ref 26–34)
MCHC RBC AUTO-ENTMCNC: 34 G/DL (ref 31–37)
MCV RBC AUTO: 89.1 FL (ref 80–100)
MONOCYTES # BLD: 12 %
PDW BLD-RTO: 13.8 % (ref 12.1–15.2)
PLATELET # BLD: 235 K/UL (ref 140–450)
PLATELET ESTIMATE: ABNORMAL
PMV BLD AUTO: 8.6 FL (ref 6–12)
RBC # BLD: 4.02 M/UL (ref 4.5–5.9)
RBC # BLD: ABNORMAL 10*6/UL
SEG NEUTROPHILS: 70 %
SEGMENTED NEUTROPHILS ABSOLUTE COUNT: 8.1 K/UL (ref 1.8–7.7)
TRIGL SERPL-MCNC: 127 MG/DL
VLDLC SERPL CALC-MCNC: NORMAL MG/DL (ref 1–30)
WBC # BLD: 11.7 K/UL (ref 3.5–11)
WBC # BLD: ABNORMAL 10*3/UL

## 2017-09-21 PROCEDURE — 6370000000 HC RX 637 (ALT 250 FOR IP): Performed by: INTERNAL MEDICINE

## 2017-09-21 PROCEDURE — G0378 HOSPITAL OBSERVATION PER HR: HCPCS

## 2017-09-21 PROCEDURE — 94640 AIRWAY INHALATION TREATMENT: CPT

## 2017-09-21 PROCEDURE — 36415 COLL VENOUS BLD VENIPUNCTURE: CPT

## 2017-09-21 PROCEDURE — 96376 TX/PRO/DX INJ SAME DRUG ADON: CPT

## 2017-09-21 PROCEDURE — 94668 MNPJ CHEST WALL SBSQ: CPT

## 2017-09-21 PROCEDURE — 2580000003 HC RX 258: Performed by: INTERNAL MEDICINE

## 2017-09-21 PROCEDURE — 85025 COMPLETE CBC W/AUTO DIFF WBC: CPT

## 2017-09-21 PROCEDURE — 80061 LIPID PANEL: CPT

## 2017-09-21 PROCEDURE — 82947 ASSAY GLUCOSE BLOOD QUANT: CPT

## 2017-09-21 PROCEDURE — 96372 THER/PROPH/DIAG INJ SC/IM: CPT

## 2017-09-21 PROCEDURE — 6360000002 HC RX W HCPCS: Performed by: INTERNAL MEDICINE

## 2017-09-21 RX ORDER — PREDNISONE 10 MG/1
TABLET ORAL
Qty: 28 TABLET | Refills: 0 | Status: SHIPPED | OUTPATIENT
Start: 2017-09-21 | End: 2017-10-03 | Stop reason: ALTCHOICE

## 2017-09-21 RX ORDER — ATORVASTATIN CALCIUM 40 MG/1
40 TABLET, FILM COATED ORAL NIGHTLY
Qty: 30 TABLET | Refills: 0 | Status: SHIPPED | OUTPATIENT
Start: 2017-09-21 | End: 2017-10-20 | Stop reason: SDUPTHER

## 2017-09-21 RX ORDER — CLOPIDOGREL BISULFATE 75 MG/1
75 TABLET ORAL DAILY
Qty: 30 TABLET | Refills: 0 | Status: SHIPPED | OUTPATIENT
Start: 2017-09-21 | End: 2018-07-10 | Stop reason: ALTCHOICE

## 2017-09-21 RX ORDER — AMOXICILLIN AND CLAVULANATE POTASSIUM 875; 125 MG/1; MG/1
1 TABLET, FILM COATED ORAL 2 TIMES DAILY
Qty: 14 TABLET | Refills: 0 | Status: SHIPPED | OUTPATIENT
Start: 2017-09-21 | End: 2017-09-28

## 2017-09-21 RX ORDER — LOSARTAN POTASSIUM 25 MG/1
25 TABLET ORAL DAILY
Qty: 30 TABLET | Refills: 0 | Status: SHIPPED | OUTPATIENT
Start: 2017-09-21 | End: 2017-10-20 | Stop reason: SDUPTHER

## 2017-09-21 RX ADMIN — Medication 10 ML: at 10:02

## 2017-09-21 RX ADMIN — PANTOPRAZOLE SODIUM 40 MG: 40 TABLET, DELAYED RELEASE ORAL at 07:28

## 2017-09-21 RX ADMIN — ENOXAPARIN SODIUM 40 MG: 40 INJECTION SUBCUTANEOUS at 10:01

## 2017-09-21 RX ADMIN — INSULIN LISPRO 2 UNITS: 100 INJECTION, SOLUTION INTRAVENOUS; SUBCUTANEOUS at 12:15

## 2017-09-21 RX ADMIN — MULTIPLE VITAMINS W/ MINERALS TAB 1 TABLET: TAB at 10:02

## 2017-09-21 RX ADMIN — CETIRIZINE HYDROCHLORIDE 10 MG: 10 TABLET, FILM COATED ORAL at 10:02

## 2017-09-21 RX ADMIN — PRIMIDONE 100 MG: 50 TABLET ORAL at 10:00

## 2017-09-21 RX ADMIN — IPRATROPIUM BROMIDE AND ALBUTEROL SULFATE 3 ML: .5; 3 SOLUTION RESPIRATORY (INHALATION) at 08:53

## 2017-09-21 RX ADMIN — CEFTRIAXONE 1 G: 1 INJECTION, POWDER, FOR SOLUTION INTRAMUSCULAR; INTRAVENOUS at 10:03

## 2017-09-21 RX ADMIN — INSULIN LISPRO 2 UNITS: 100 INJECTION, SOLUTION INTRAVENOUS; SUBCUTANEOUS at 07:56

## 2017-09-21 RX ADMIN — PREDNISONE 20 MG: 20 TABLET ORAL at 10:02

## 2017-09-21 RX ADMIN — CLOPIDOGREL BISULFATE 75 MG: 75 TABLET ORAL at 10:02

## 2017-09-21 RX ADMIN — PROPRANOLOL HYDROCHLORIDE 80 MG: 10 TABLET ORAL at 10:43

## 2017-09-21 RX ADMIN — OMEGA-3-ACID ETHYL ESTERS 1000 MG: 900 CAPSULE, LIQUID FILLED ORAL at 10:01

## 2017-09-21 RX ADMIN — FAMOTIDINE 20 MG: 20 TABLET, FILM COATED ORAL at 10:01

## 2017-09-21 RX ADMIN — LOSARTAN POTASSIUM 25 MG: 25 TABLET ORAL at 10:02

## 2017-09-21 RX ADMIN — ASPIRIN 81 MG 81 MG: 81 TABLET ORAL at 10:01

## 2017-09-21 RX ADMIN — LEVOTHYROXINE SODIUM 100 MCG: 100 TABLET ORAL at 07:28

## 2017-09-24 LAB
CULTURE: NORMAL
Lab: NORMAL
SPECIMEN DESCRIPTION: NORMAL
STATUS: NORMAL

## 2017-10-03 ENCOUNTER — HOSPITAL ENCOUNTER (EMERGENCY)
Age: 82
Discharge: HOME | End: 2017-10-03
Attending: EMERGENCY MEDICINE
Payer: MEDICARE

## 2017-10-03 ENCOUNTER — APPOINTMENT (OUTPATIENT)
Dept: GENERAL RADIOLOGY | Age: 82
End: 2017-10-03
Payer: MEDICARE

## 2017-10-03 VITALS
TEMPERATURE: 97.4 F | BODY MASS INDEX: 34.3 KG/M2 | OXYGEN SATURATION: 99 % | HEIGHT: 71 IN | DIASTOLIC BLOOD PRESSURE: 86 MMHG | WEIGHT: 245 LBS | RESPIRATION RATE: 24 BRPM | HEART RATE: 68 BPM | SYSTOLIC BLOOD PRESSURE: 148 MMHG

## 2017-10-03 DIAGNOSIS — J44.1 CHRONIC OBSTRUCTIVE PULMONARY DISEASE WITH ACUTE EXACERBATION (HCC): ICD-10-CM

## 2017-10-03 DIAGNOSIS — J40 BRONCHITIS: Primary | ICD-10-CM

## 2017-10-03 LAB
ABSOLUTE EOS #: 1.4 K/UL (ref 0–0.4)
ABSOLUTE LYMPH #: 1.3 K/UL (ref 1–4.8)
ABSOLUTE MONO #: 1.4 K/UL (ref 0–1)
ALBUMIN SERPL-MCNC: 3.8 G/DL (ref 3.5–5.2)
ALBUMIN/GLOBULIN RATIO: 1.2 (ref 1–2.5)
ALP BLD-CCNC: 147 U/L (ref 40–129)
ALT SERPL-CCNC: 101 U/L (ref 5–41)
ANION GAP SERPL CALCULATED.3IONS-SCNC: 10 MMOL/L (ref 9–17)
AST SERPL-CCNC: 53 U/L
BASOPHILS # BLD: 1 %
BASOPHILS ABSOLUTE: 0.1 K/UL (ref 0–0.2)
BILIRUB SERPL-MCNC: 0.43 MG/DL (ref 0.3–1.2)
BNP INTERPRETATION: ABNORMAL
BUN BLDV-MCNC: 13 MG/DL (ref 8–23)
BUN/CREAT BLD: 16 (ref 9–20)
CALCIUM SERPL-MCNC: 8.6 MG/DL (ref 8.6–10.4)
CHLORIDE BLD-SCNC: 94 MMOL/L (ref 98–107)
CO2: 27 MMOL/L (ref 20–31)
CREAT SERPL-MCNC: 0.8 MG/DL (ref 0.7–1.2)
DIFFERENTIAL TYPE: ABNORMAL
EOSINOPHILS RELATIVE PERCENT: 11 %
GFR AFRICAN AMERICAN: >60 ML/MIN
GFR NON-AFRICAN AMERICAN: >60 ML/MIN
GFR SERPL CREATININE-BSD FRML MDRD: ABNORMAL ML/MIN/{1.73_M2}
GFR SERPL CREATININE-BSD FRML MDRD: ABNORMAL ML/MIN/{1.73_M2}
GLUCOSE BLD-MCNC: 194 MG/DL (ref 70–99)
HCT VFR BLD CALC: 39.7 % (ref 41–53)
HEMOGLOBIN: 12.8 G/DL (ref 13.5–17)
LYMPHOCYTES # BLD: 10 %
MCH RBC QN AUTO: 29.4 PG (ref 26–34)
MCHC RBC AUTO-ENTMCNC: 32.3 G/DL (ref 31–37)
MCV RBC AUTO: 91.2 FL (ref 80–100)
MONOCYTES # BLD: 11 %
PDW BLD-RTO: 14 % (ref 12.1–15.2)
PLATELET # BLD: 262 K/UL (ref 140–450)
PLATELET ESTIMATE: ABNORMAL
PMV BLD AUTO: 8.6 FL (ref 6–12)
POTASSIUM SERPL-SCNC: 4.7 MMOL/L (ref 3.7–5.3)
PRO-BNP: 339 PG/ML
RBC # BLD: 4.35 M/UL (ref 4.5–5.9)
RBC # BLD: ABNORMAL 10*6/UL
SEG NEUTROPHILS: 67 %
SEGMENTED NEUTROPHILS ABSOLUTE COUNT: 8.9 K/UL (ref 1.8–7.7)
SODIUM BLD-SCNC: 131 MMOL/L (ref 135–144)
TOTAL PROTEIN: 7 G/DL (ref 6.4–8.3)
TROPONIN INTERP: NORMAL
TROPONIN T: <0.03 NG/ML
WBC # BLD: 13.1 K/UL (ref 3.5–11)
WBC # BLD: ABNORMAL 10*3/UL

## 2017-10-03 PROCEDURE — 94664 DEMO&/EVAL PT USE INHALER: CPT

## 2017-10-03 PROCEDURE — 93005 ELECTROCARDIOGRAM TRACING: CPT

## 2017-10-03 PROCEDURE — 99285 EMERGENCY DEPT VISIT HI MDM: CPT

## 2017-10-03 PROCEDURE — 71020 XR CHEST STANDARD TWO VW: CPT

## 2017-10-03 PROCEDURE — 84484 ASSAY OF TROPONIN QUANT: CPT

## 2017-10-03 PROCEDURE — 85025 COMPLETE CBC W/AUTO DIFF WBC: CPT

## 2017-10-03 PROCEDURE — 80053 COMPREHEN METABOLIC PANEL: CPT

## 2017-10-03 PROCEDURE — 96374 THER/PROPH/DIAG INJ IV PUSH: CPT

## 2017-10-03 PROCEDURE — 36415 COLL VENOUS BLD VENIPUNCTURE: CPT

## 2017-10-03 PROCEDURE — 94640 AIRWAY INHALATION TREATMENT: CPT

## 2017-10-03 PROCEDURE — 6370000000 HC RX 637 (ALT 250 FOR IP): Performed by: EMERGENCY MEDICINE

## 2017-10-03 PROCEDURE — 6360000002 HC RX W HCPCS: Performed by: EMERGENCY MEDICINE

## 2017-10-03 PROCEDURE — 83880 ASSAY OF NATRIURETIC PEPTIDE: CPT

## 2017-10-03 RX ORDER — IPRATROPIUM BROMIDE AND ALBUTEROL SULFATE 2.5; .5 MG/3ML; MG/3ML
1 SOLUTION RESPIRATORY (INHALATION) ONCE
Status: COMPLETED | OUTPATIENT
Start: 2017-10-03 | End: 2017-10-03

## 2017-10-03 RX ORDER — AMOXICILLIN AND CLAVULANATE POTASSIUM 562.5; 437.5; 62.5 MG/1; MG/1; MG/1
1 TABLET, FILM COATED, EXTENDED RELEASE ORAL 2 TIMES DAILY
Qty: 20 TABLET | Refills: 0 | Status: SHIPPED | OUTPATIENT
Start: 2017-10-03 | End: 2017-10-06 | Stop reason: ALTCHOICE

## 2017-10-03 RX ORDER — METHYLPREDNISOLONE SODIUM SUCCINATE 125 MG/2ML
125 INJECTION, POWDER, LYOPHILIZED, FOR SOLUTION INTRAMUSCULAR; INTRAVENOUS ONCE
Status: COMPLETED | OUTPATIENT
Start: 2017-10-03 | End: 2017-10-03

## 2017-10-03 RX ORDER — ALBUTEROL SULFATE 90 UG/1
2 AEROSOL, METERED RESPIRATORY (INHALATION) EVERY 4 HOURS PRN
COMMUNITY
End: 2017-10-06 | Stop reason: SDUPTHER

## 2017-10-03 RX ORDER — BENZONATATE 100 MG/1
100 CAPSULE ORAL 3 TIMES DAILY PRN
Qty: 30 CAPSULE | Refills: 0 | Status: SHIPPED | OUTPATIENT
Start: 2017-10-03 | End: 2017-10-10

## 2017-10-03 RX ORDER — PREDNISONE 50 MG/1
50 TABLET ORAL DAILY
Qty: 4 TABLET | Refills: 0 | Status: SHIPPED | OUTPATIENT
Start: 2017-10-03 | End: 2017-10-12 | Stop reason: ALTCHOICE

## 2017-10-03 RX ADMIN — IPRATROPIUM BROMIDE AND ALBUTEROL SULFATE 1 AMPULE: .5; 3 SOLUTION RESPIRATORY (INHALATION) at 09:57

## 2017-10-03 RX ADMIN — METHYLPREDNISOLONE SODIUM SUCCINATE 125 MG: 125 INJECTION, POWDER, FOR SOLUTION INTRAMUSCULAR; INTRAVENOUS at 09:56

## 2017-10-03 RX ADMIN — IPRATROPIUM BROMIDE AND ALBUTEROL SULFATE 1 AMPULE: .5; 3 SOLUTION RESPIRATORY (INHALATION) at 11:05

## 2017-10-03 ASSESSMENT — ENCOUNTER SYMPTOMS
ABDOMINAL PAIN: 0
DIARRHEA: 0
VOICE CHANGE: 0
BLOOD IN STOOL: 0
SORE THROAT: 0
VOMITING: 0
NAUSEA: 0
BACK PAIN: 0
CHEST TIGHTNESS: 0
PHOTOPHOBIA: 0
FACIAL SWELLING: 0
COUGH: 1
WHEEZING: 0
TROUBLE SWALLOWING: 0
SHORTNESS OF BREATH: 1

## 2017-10-03 NOTE — ED NOTES
Pt sitting on side of bed for comfort. Has been updated on plan of care. Respirations are easy and unlabored.        Gus Echeverria RN  10/03/17 1011

## 2017-10-03 NOTE — ED PROVIDER NOTES
by mouth nightly    CETIRIZINE (ZYRTEC) 10 MG TABLET    Take 10 mg by mouth daily    CLOPIDOGREL (PLAVIX) 75 MG TABLET    Take 1 tablet by mouth daily    DEXTROMETHORPHAN (DELSYM) 30 MG/5ML EXTENDED RELEASE LIQUID    Take 30 mg by mouth 2 times daily as needed for Cough     IPRATROPIUM-ALBUTEROL (DUONEB) 0.5-2.5 (3) MG/3ML SOLN NEBULIZER SOLUTION    Inhale 3 mLs into the lungs every 4 hours as needed for Shortness of Breath    LEVOTHYROXINE (SYNTHROID) 75 MCG TABLET    Take 100 mcg by mouth Daily     LOSARTAN (COZAAR) 25 MG TABLET    Take 1 tablet by mouth daily    METFORMIN (GLUCOPHAGE) 500 MG TABLET    Take 2,000 mg by mouth daily (with breakfast)     MULTIPLE VITAMINS-MINERALS (THERAPEUTIC MULTIVITAMIN-MINERALS) TABLET    Take 1 tablet by mouth daily    OMEGA-3 FATTY ACIDS (FISH OIL) 1000 MG CPDR    Take 1,000 mg by mouth 2 times daily    PRIMIDONE (MYSOLINE) 50 MG TABLET    Take 100 mg by mouth 3 times daily     PROPRANOLOL (INDERAL) 80 MG TABLET    Take 80 mg by mouth 2 times daily     TIOTROPIUM (SPIRIVA) 18 MCG INHALATION CAPSULE    Inhale 18 mcg into the lungs daily       ALLERGIES     Review of patient's allergies indicates no known allergies. FAMILY HISTORY       Family History   Problem Relation Age of Onset    Cancer Mother 47     liver ca    Diabetes Father     Heart Disease Father         SOCIAL HISTORY       Social History     Social History    Marital status:      Spouse name: N/A    Number of children: N/A    Years of education: N/A     Social History Main Topics    Smoking status: Former Smoker    Smokeless tobacco: Never Used    Alcohol use Yes      Comment: occ    Drug use: No    Sexual activity: Not Asked     Other Topics Concern    None     Social History Narrative       REVIEW OF SYSTEMS       Review of Systems   Constitutional: Negative for chills, diaphoresis and fever. HENT: Negative for facial swelling, sore throat, trouble swallowing and voice change.     Eyes: Negative for photophobia and visual disturbance. Respiratory: Positive for cough and shortness of breath. Negative for chest tightness and wheezing. Cardiovascular: Negative for chest pain, palpitations and leg swelling. Gastrointestinal: Negative for abdominal pain, blood in stool, diarrhea, nausea and vomiting. Endocrine: Negative for polydipsia and polyuria. Genitourinary: Negative for dysuria, frequency, hematuria and urgency. Musculoskeletal: Negative for back pain, neck pain and neck stiffness. Skin: Negative for pallor and rash. Neurological: Negative for seizures, speech difficulty, weakness, numbness and headaches. Hematological: Does not bruise/bleed easily. Psychiatric/Behavioral: Negative for confusion. The patient is not nervous/anxious. Except as noted above the remainder of the review of systems was reviewed and is negative. PHYSICAL EXAM    (up to 7 for level 4, 8 or more for level 5)   ED Triage Vitals   BP Temp Temp Source Pulse Resp SpO2 Height Weight   10/03/17 0847 10/03/17 0847 10/03/17 0847 10/03/17 0847 10/03/17 0847 10/03/17 0847 10/03/17 0847 10/03/17 0847   191/106 97.4 °F (36.3 °C) Tympanic 76 24 92 % 5' 11\" (1.803 m) 245 lb (111.1 kg)       Physical Exam   Constitutional: He is oriented to person, place, and time. Vital signs are normal. He appears well-developed and well-nourished. Non-toxic appearance. He does not appear ill. No distress. HENT:   Head: Normocephalic and atraumatic. Mouth/Throat: Oropharynx is clear and moist.   Eyes: Conjunctivae and EOM are normal. Pupils are equal, round, and reactive to light. Right conjunctiva is not injected. Left conjunctiva is not injected. No scleral icterus. Neck: Normal range of motion. Neck supple. No tracheal deviation present. No thyromegaly present. Cardiovascular: Normal rate, regular rhythm, normal heart sounds and intact distal pulses. Exam reveals no gallop and no friction rub.     No murmur Sodium 131 (*)     Chloride 94 (*)     Alkaline Phosphatase 147 (*)      (*)     AST 53 (*)     All other components within normal limits   BRAIN NATRIURETIC PEPTIDE - Abnormal; Notable for the following:     Pro- (*)     All other components within normal limits   TROPONIN       All other labs were within normal range or not returned as of this dictation. EMERGENCY DEPARTMENT COURSE and Medical Decision Making:     [facility]/  ED Course     10:45 patient states he feels markedly better, however on exam continues to have fairly significant wheezing and minimal tachypnea. We'll continue with bronchodilators  Patient's previous workup reviewed. He had a CTA chest that was done week and a half ago, and therefore I doubt a development of a PE in the meantime. Patient was monitored in emergency department for several hours. During that time, he stated he felt significantly better from her surgery perspective. He did have scattered wheezes but sounded much improved. He was ambulated around the emergency room several times, sat was 90%, he was not significantly tachypneic. Discussed admission to the hospital for continued pulmonary toilet but the patient stated he wants to be discharged home and does not want to be admitted. We'll therefore start him on antibiotics, cough medications, as per his request, as well as prednisone, refer him to pulmonology for follow-up as well as primary care provider. Strict return precautions and follow up instructions were discussed with the patient with which the patient agrees    CONSULTS: (None if blank)  None    Procedures: (None if blank)       CLINICAL IMPRESSION      1. Bronchitis    2.  Chronic obstructive pulmonary disease with acute exacerbation [facility])          DISPOSITION/PLAN   DISPOSITION Decision to Discharge    PATIENT REFERRED TO:  Anny Menjivar NP  [address] [place] [address]  [phone]    In 2 days      Mirna Dsouza MD  [address] [place] [address]

## 2017-10-03 NOTE — ED AVS SNAPSHOT
Commonly known as:  SYNTHROID   Take 100 mcg by mouth Daily       losartan 25 MG tablet   Commonly known as:  COZAAR   Take 1 tablet by mouth daily       metFORMIN 500 MG tablet   Commonly known as:  GLUCOPHAGE   Take 2,000 mg by mouth daily (with breakfast)       primidone 50 MG tablet   Commonly known as: MYSOLINE   Take 100 mg by mouth 3 times daily       propranolol 80 MG tablet   Commonly known as:  INDERAL   Take 80 mg by mouth 2 times daily       therapeutic multivitamin-minerals tablet   Take 1 tablet by mouth daily       tiotropium 18 MCG inhalation capsule   Commonly known as:  SPIRIVA   Inhale 18 mcg into the lungs daily            Where to Get Your Medications      You can get these medications from any pharmacy     Bring a paper prescription for each of these medications     amoxicillin-clavulanate 1000-62.5 MG per extended release tablet    benzonatate 100 MG capsule    predniSONE 50 MG tablet               Allergies as of 10/3/2017     No Known Allergies      Immunizations as of 10/3/2017     No immunizations on file. After Visit Summary    This summary was created for you. Thank you for entrusting your care to us. The following information includes details about your hospital/visit stay along with steps you should take to help with your recovery once you leave the hospital.  In this packet, you will find information about the topics listed below:    · Instructions about your medications including a list of your home medications  · A summary of your hospital visit  · Follow-up appointments once you have left the hospital  · Your care plan at home      You may receive a survey regarding the care you received during your stay. Your input is valuable to us. We encourage you to complete and return your survey in the envelope provided. We hope you will choose us in the future for your healthcare needs.           Patient Information     Patient Name ANATOLIY Lazo 1935 Care Provided at:     Name Address Phone       [address] [place] [phone]            Your Visit    Here you will find information about your visit, including the reason for your visit. Please take this sheet with you when you visit your doctor or other health care provider in the future. It will help determine the best possible medical care for you at that time. If you have any questions once you leave the hospital, please call the department phone number listed below. Diagnoses this visit     Your diagnoses were BRONCHITIS and CHRONIC OBSTRUCTIVE PULMONARY DISEASE WITH ACUTE EXACERBATION ([address] Visit Information     Date of Visit Department Dept Phone    10/3/2017 [facility] [phone]      You were seen by     You were seen by Malena Edwards DO. Follow-up Appointments    Below is a list of your follow-up and future appointments. This may not be a complete list as you may have made appointments directly with providers that we are not aware of or your providers may have made some for you. Please call your providers to confirm appointments. It is important to keep your appointments. Please bring your current insurance card, photo ID, co-pay, and all medication bottles to your appointment. If self-pay, payment is expected at the time of service. Follow-up Information     Follow up with Paco Ortiz NP In 2 days. Specialty:  Certified Nurse Practitioner    Contact information:    [address] [place] [address]  [phone]          Follow up with Yany Braun MD In 2 days.     Specialties:  Pulmonology, Pulmonary Disease, Critical Care    Contact information:    [address] [address]  [phone]        Future Appointments     10/6/2017 9:00 AM     Appointment with Jackie Best MD at [address] [place] [address] ([phone]) Please follow up with your primary care and lung  doctor in 1-2 days.

## 2017-10-03 NOTE — ED NOTES
Patient ambulated in [place] SpO2 dropped to 90% on room air. Patient did have some labored breathing but was able to talk. Did complain of shortness of breath during walk. When returned to room patient back up to 92% on room air.      Francoise Danielle RN  10/03/17 8552

## 2017-10-04 ENCOUNTER — CARE COORDINATION (OUTPATIENT)
Dept: CARE COORDINATION | Age: 82
End: 2017-10-04

## 2017-10-04 LAB
EKG ATRIAL RATE: 72 BPM
EKG P AXIS: 36 DEGREES
EKG P-R INTERVAL: 236 MS
EKG Q-T INTERVAL: 384 MS
EKG QRS DURATION: 108 MS
EKG QTC CALCULATION (BAZETT): 420 MS
EKG R AXIS: 3 DEGREES
EKG T AXIS: 48 DEGREES
EKG VENTRICULAR RATE: 72 BPM

## 2017-10-05 ENCOUNTER — OFFICE VISIT (OUTPATIENT)
Dept: PULMONOLOGY | Age: 82
End: 2017-10-05
Payer: MEDICARE

## 2017-10-05 VITALS
WEIGHT: 243 LBS | HEART RATE: 73 BPM | SYSTOLIC BLOOD PRESSURE: 142 MMHG | TEMPERATURE: 98.3 F | RESPIRATION RATE: 20 BRPM | DIASTOLIC BLOOD PRESSURE: 84 MMHG | HEIGHT: 71 IN | BODY MASS INDEX: 34.02 KG/M2 | OXYGEN SATURATION: 92 %

## 2017-10-05 DIAGNOSIS — R05.9 COUGH: ICD-10-CM

## 2017-10-05 DIAGNOSIS — J41.8 MIXED SIMPLE AND MUCOPURULENT CHRONIC BRONCHITIS (HCC): Primary | ICD-10-CM

## 2017-10-05 DIAGNOSIS — J47.9 BRONCHIECTASIS WITHOUT COMPLICATION (HCC): ICD-10-CM

## 2017-10-05 DIAGNOSIS — I50.42 CHRONIC COMBINED SYSTOLIC AND DIASTOLIC CONGESTIVE HEART FAILURE (HCC): ICD-10-CM

## 2017-10-05 PROCEDURE — 99205 OFFICE O/P NEW HI 60 MIN: CPT | Performed by: INTERNAL MEDICINE

## 2017-10-05 RX ORDER — AZITHROMYCIN 250 MG/1
TABLET, FILM COATED ORAL
Qty: 1 PACKET | Refills: 0 | Status: SHIPPED | OUTPATIENT
Start: 2017-10-05 | End: 2017-10-12 | Stop reason: ALTCHOICE

## 2017-10-05 RX ORDER — IPRATROPIUM BROMIDE AND ALBUTEROL SULFATE 2.5; .5 MG/3ML; MG/3ML
1 SOLUTION RESPIRATORY (INHALATION) 4 TIMES DAILY
Qty: 30 VIAL | Refills: 5 | Status: SHIPPED | OUTPATIENT
Start: 2017-10-05 | End: 2018-01-05 | Stop reason: SDUPTHER

## 2017-10-05 NOTE — PATIENT INSTRUCTIONS
SURVEY:    You may be receiving a survey from [employer] regarding your visit today. Please complete the survey to enable us to provide the highest quality of care to you and your family. If you cannot score us a very good on any question, please call the office to discuss how we could of made your experience a very good one. Thank you. Learning About COPD and Clearing Your Lungs  How does clearing your lungs affect COPD? When you have COPD, you may have too much mucus in your lungs. Learning to clear your lungs may help you save energy and improves your breathing. It may also help prevent lung infections. There are three ways to clear your lungs:  · Controlled coughing  · Postural drainage  · Chest percussion  How do you do controlled coughing? Coughing is how your body tries to get rid of mucus. But the kind of coughing you cannot control makes things worse. It causes your airways to close. It also traps the mucus in your lungs. Controlled coughing comes from deep in your lungs. It loosens mucus and moves it though your airways. It is best to do it after you use your inhaler or other medicine. 1. Sit on the edge of a chair. Keep both feet on the floor. 2. Lean forward a little. Relax. 3. Breathe in slowly through your nose. Fold your arms over your belly. 4. Lean forward as you breathe out. Push your arms against your belly. 5. Cough 2 or 3 times as you exhale with your mouth slightly open. Make the coughs short and sharp. Push on your belly with your arms as you cough. The first cough brings the mucus through the lung airways. The next coughs bring it up and out. 6. Inhale again, but do it slowly and gently through your nose. Do not take quick or deep breaths through your mouth. It can block the mucus coming out of the lungs. It also can cause uncontrolled coughing. 7. Rest, and repeat if you need to. How do you do postural drainage?   Postural drainage means lying down in different

## 2017-10-05 NOTE — MR AVS SNAPSHOT
After Visit Summary             Siddhartha Carrington   10/5/2017 10:15 AM   Office Visit    Description:  Male : 1935   Provider:  Yany Braun MD   Department:  [facility] [place]              Your Follow-Up and Future Appointments         Below is a list of your follow-up and future appointments. This may not be a complete list as you may have made appointments directly with providers that we are not aware of or your providers may have made some for you. Please call your providers to confirm appointments. It is important to keep your appointments. Please bring your current insurance card, photo ID, co-pay, and all medication bottles to your appointment. If self-pay, payment is expected at the time of service. Your To-Do List     Future Appointments Provider Department Dept Phone    10/6/2017 9:00 AM Jackie Best MD [facility] CARDIOLOGY [phone]    Please arrive 15 minutes prior to appointment time, bring insurance card and photo ID.     10/12/2017 3:00 PM Paco Ortiz NP [facility] [phone]    Please arrive 15 minutes prior to scheduled appointment time to complete paper work, bring photo ID and insurance cards. 2017 12:15 PM Yany Braun MD [facility] [place] [phone]    Please arrive 15 minutes prior to appointment time, bring insurance card and photo ID. Follow-Up    Return in about 5 weeks (around 2017).          Information from Your Visit        Department     Name Address Phone Fax    [address]ist [address] [place], [place] [address]e [facility]ly [facility], [facility] [phone]      You Were Seen for:         Comments    Mixed simple and mucopurulent chronic bronchitis [facility])   [[id number]]         Vital Signs     Blood Pressure Pulse Temperature Respirations Height Weight    142/84 73 98.3 °F (36.8 °C) 20 5' 11\" (1.803 m) 243 lb (110.2 kg)    Oxygen Saturation Body Mass Index Smoking Status These medications were sent to [facility] ([facility]), [address] [place] [address]     Phone:  [phone]     azithromycin 250 MG tablet               Your Current Medications Are              azithromycin (ZITHROMAX) 250 MG tablet Take 2 tabs (500 mg) on Day 1, and take 1 tab (250 mg) on days 2 through 5.    tiotropium (SPIRIVA) 18 MCG inhalation capsule Inhale 18 mcg into the lungs daily    albuterol sulfate  (90 Base) MCG/ACT inhaler Inhale 2 puffs into the lungs every 4 hours as needed for Wheezing or Shortness of Breath    predniSONE (DELTASONE) 50 MG tablet Take 1 tablet by mouth daily    amoxicillin-clavulanate (AUGMENTIN XR) 1000-62.5 MG per extended release tablet Take 1 tablet by mouth 2 times daily for 10 days    benzonatate (TESSALON PERLES) 100 MG capsule Take 1 capsule by mouth 3 times daily as needed for Cough    atorvastatin (LIPITOR) 40 MG tablet Take 1 tablet by mouth nightly    losartan (COZAAR) 25 MG tablet Take 1 tablet by mouth daily    clopidogrel (PLAVIX) 75 MG tablet Take 1 tablet by mouth daily    Omega-3 Fatty Acids (FISH OIL) 1000 MG CPDR Take 1,000 mg by mouth 2 times daily    dextromethorphan (DELSYM) 30 MG/5ML extended release liquid Take 30 mg by mouth 2 times daily as needed for Cough     aspirin 81 MG tablet Take 81 mg by mouth daily    Multiple Vitamins-Minerals (THERAPEUTIC MULTIVITAMIN-MINERALS) tablet Take 1 tablet by mouth daily    cetirizine (ZYRTEC) 10 MG tablet Take 10 mg by mouth daily    ipratropium-albuterol (DUONEB) 0.5-2.5 (3) MG/3ML SOLN nebulizer solution Inhale 3 mLs into the lungs every 4 hours as needed for Shortness of Breath    metFORMIN (GLUCOPHAGE) 500 MG tablet Take 2,000 mg by mouth daily (with breakfast)     levothyroxine (SYNTHROID) 75 MCG tablet Take 100 mcg by mouth Daily     propranolol (INDERAL) 80 MG tablet Take 80 mg by mouth 2 times daily (alissa/barby/yyyy) as indicated and click Submit. You will be taken to the next sign-up page. 5. Create a [employer] ID. This will be your [employer] login ID and cannot be changed, so think of one that is secure and easy to remember. 6. Create a [employer] password. You can change your password at any time. 7. Enter your Password Reset Question and Answer. This can be used at a later time if you forget your password. 8. Enter your e-mail address. You will receive e-mail notification when new information is available in [address] 9. Click Sign Up. You can now view your medical record. Additional Information  If you have questions, please contact the physician practice where you receive care. Remember, [employer] is NOT to be used for urgent needs. For medical emergencies, dial 911. For questions regarding your [employer] account call [phone]. If you have a clinical question, please call your doctor's office.

## 2017-10-05 NOTE — PROGRESS NOTES
OUTPATIENT PULMONARY CONSULT NOTE      Patient:  Eri Rodriguez  MRN: [id number]    Consulting Physician: Seth Beasley  Reason for Consult: Acute cough  Primacy Care Physician: Louisa Hsu NP    HISTORY OF PRESENT ILLNESS:   The patient is a 80 y.o. male     C/O Cough for 2-3 weeks started sep 14 and mostly dry and sometime clear sputum  He is wheezing also intermittently but no daily wheezing  Seen in ER  Initially and presented again then admitted to hospital and discharged and then presented to ER again for the same with cough which is persistent and non resolving  He is currently on Augmentin for last 7-10 days since he was discharged from the hospital   He was also started on ARBs but per patient after cough started  He is taking duoneb 4-6 hours and on prednisone 50 mg for 4 days since he is discahrged from hospital and does not think made any difference and also started on spiriva  Coughing all the time and denies fever or chills, difficult to sleep because of cough  He is on cough medicines and per patient prednisone or cough medicines does not help but does gets some relief with duoneb  Denies reflux and dysphagia and chocking  No h/o asthma, or copd and no chronic cough and no post nasal drip but does have allergy and on Zyrtec  No fever no loss of apettite, no night sweats  Smoked 40 years and stopped 25 yaers ago and worked as   He had CXR no acute changes and CTA chest negative for PE and showed LLL bronchiectasis    He has h/o CHF, CAD and h/o stent placement, positive orthopnea and PND and also pedal edema not on any diuretics    Past Medical History:        Diagnosis Date    COPD (chronic obstructive pulmonary disease) ([address]     [facility] [facility]     [facility]    Hyperlipidemia     Hypothyroidism     [facility]myocardial infarction) [facility] [address] [facility]     Thyroid disease     Type II or unspecified type diabetes mellitus without mention of complication, not stated as uncontrolled        Past Surgical History:        Procedure Laterality Date    CARDIAC SURGERY  02/24/2017    Stent placed  Dr Estefanía Cruz         Allergies:    No Known Allergies      Home Meds:   Outpatient Encounter Prescriptions as of 10/5/2017   Medication Sig Dispense Refill    azithromycin (ZITHROMAX) 250 MG tablet Take 2 tabs (500 mg) on Day 1, and take 1 tab (250 mg) on days 2 through 5. 1 packet 0    tiotropium (SPIRIVA) 18 MCG inhalation capsule Inhale 18 mcg into the lungs daily      albuterol sulfate  (90 Base) MCG/ACT inhaler Inhale 2 puffs into the lungs every 4 hours as needed for Wheezing or Shortness of Breath      predniSONE (DELTASONE) 50 MG tablet Take 1 tablet by mouth daily 4 tablet 0    amoxicillin-clavulanate (AUGMENTIN XR) 1000-62.5 MG per extended release tablet Take 1 tablet by mouth 2 times daily for 10 days 20 tablet 0    benzonatate (TESSALON PERLES) 100 MG capsule Take 1 capsule by mouth 3 times daily as needed for Cough 30 capsule 0    atorvastatin (LIPITOR) 40 MG tablet Take 1 tablet by mouth nightly 30 tablet 0    losartan (COZAAR) 25 MG tablet Take 1 tablet by mouth daily 30 tablet 0    clopidogrel (PLAVIX) 75 MG tablet Take 1 tablet by mouth daily 30 tablet 0    Omega-3 Fatty Acids (FISH OIL) 1000 MG CPDR Take 1,000 mg by mouth 2 times daily      dextromethorphan (DELSYM) 30 MG/5ML extended release liquid Take 30 mg by mouth 2 times daily as needed for Cough       aspirin 81 MG tablet Take 81 mg by mouth daily      Multiple Vitamins-Minerals (THERAPEUTIC MULTIVITAMIN-MINERALS) tablet Take 1 tablet by mouth daily      cetirizine (ZYRTEC) 10 MG tablet Take 10 mg by mouth daily      ipratropium-albuterol (DUONEB) 0.5-2.5 (3) MG/3ML SOLN nebulizer solution Inhale 3 mLs into the lungs every 4 hours as needed for Shortness of Breath 30 vial 1    metFORMIN (GLUCOPHAGE) 500 MG tablet Take 2,000 mg by mouth daily (with breakfast)       levothyroxine (SYNTHROID) 75 MCG tablet Take 100 mcg by mouth Daily       propranolol (INDERAL) 80 MG tablet Take 80 mg by mouth 2 times daily       primidone (MYSOLINE) 50 MG tablet Take 100 mg by mouth 3 times daily        No facility-administered encounter medications on file as of 10/5/2017. Social History:   TOBACCO:   reports that he has quit smoking. He has never used smokeless tobacco.  ETOH:   reports that he drinks alcohol.   OCCUPATION:      Family History:       Problem Relation Age of Onset    Cancer Mother 47     liver ca    Diabetes Father     Heart Disease Father        Immunizations:    Immunization History   Administered Date(s) Administered    Influenza Virus Vaccine 09/25/2017         REVIEW OF SYSTEMS:  CONSTITUTIONAL:  negative  EYES:  negative  HEENT:  negative for  hearing loss, tinnitus, ear drainage, earaches, nasal congestion, epistaxis, sore mouth, sore throat, hoarseness and voice change  RESPIRATORY:  positive for  dry cough, cough with sputum, dyspnea and wheezing  negative for  hemoptysis, chest pain, pleuritic pain and cyanosis  CARDIOVASCULAR:  positive for  dyspnea, orthopnea, PND, edema  negative for  palpitations, exertional chest pressure/discomfort, fatigue, early saiety, syncope  GASTROINTESTINAL:  negative for vomiting, change in bowel habits, diarrhea, constipation, abdominal pain, pruritus, abdominal mass, abdominal distention, jaundice, dysphagia, reflux, regurgitation, odynophagia, hematemesis and hemtochezia  GENITOURINARY:  negative for frequency, dysuria, nocturia, urinary incontinence, hesitancy, decreased stream and hematuria  HEMATOLOGIC/LYMPHATIC:  negative for easy bruising, bleeding, lymphadenopathy and petechiae  ALLERGIC/IMMUNOLOGIC:  negative for recurrent infections, urticaria, hay fever, angioedema, anaphylaxis and drug reactions  ENDOCRINE:  negative for heat intolerance, cold intolerance, tremor, weight Hemoglobin   Date Value Ref Range Status   10/03/2017 12.8 (L) 13.5 - 17.0 g/dL Final   09/21/2017 12.2 (L) 13.5 - 17.0 g/dL Final   09/20/2017 12.1 (L) 13.5 - 17.0 g/dL Final     Platelets   Date Value Ref Range Status   10/03/2017 262 140 - 450 k/uL Final   09/21/2017 235 140 - 450 k/uL Final   09/20/2017 213 140 - 450 k/uL Final     BMP:   Sodium   Date Value Ref Range Status   10/03/2017 131 (L) 135 - 144 mmol/L Final   09/20/2017 128 (L) 135 - 144 mmol/L Final   09/19/2017 128 (L) 135 - 144 mmol/L Final     Potassium   Date Value Ref Range Status   10/03/2017 4.7 3.7 - 5.3 mmol/L Final   09/20/2017 4.9 3.7 - 5.3 mmol/L Final   09/19/2017 4.5 3.7 - 5.3 mmol/L Final     Chloride   Date Value Ref Range Status   10/03/2017 94 (L) 98 - 107 mmol/L Final   09/20/2017 93 (L) 98 - 107 mmol/L Final   09/19/2017 92 (L) 98 - 107 mmol/L Final     CO2   Date Value Ref Range Status   10/03/2017 27 20 - 31 mmol/L Final   09/20/2017 24 20 - 31 mmol/L Final   09/19/2017 25 20 - 31 mmol/L Final     BUN   Date Value Ref Range Status   10/03/2017 13 8 - 23 mg/dL Final   09/20/2017 13 8 - 23 mg/dL Final   09/19/2017 11 8 - 23 mg/dL Final     CREATININE   Date Value Ref Range Status   10/03/2017 0.80 0.70 - 1.20 mg/dL Final   09/20/2017 0.76 0.70 - 1.20 mg/dL Final   09/19/2017 0.81 0.70 - 1.20 mg/dL Final     Glucose   Date Value Ref Range Status   10/03/2017 194 (H) 70 - 99 mg/dL Final   09/20/2017 231 (H) 70 - 99 mg/dL Final   09/19/2017 209 (H) 70 - 99 mg/dL Final     Hepatic:   AST   Date Value Ref Range Status   10/03/2017 53 (H) <40 U/L Final   09/19/2017 31 <40 U/L Final   10/15/2014 16 <40 U/L Final     ALT   Date Value Ref Range Status   10/03/2017 101 (H) 5 - 41 U/L Final   09/19/2017 48 (H) 5 - 41 U/L Final   10/15/2014 23 5 - 41 U/L Final     Total Bilirubin   Date Value Ref Range Status   10/03/2017 0.43 0.3 - 1.2 mg/dL Final   09/19/2017 0.26 (L) 0.3 - 1.2 mg/dL Final   10/15/2014 0.18 (L) 0.3 - 1.2 mg/dL Final

## 2017-10-06 ENCOUNTER — OFFICE VISIT (OUTPATIENT)
Dept: CARDIOLOGY | Age: 82
End: 2017-10-06
Payer: MEDICARE

## 2017-10-06 ENCOUNTER — HOSPITAL ENCOUNTER (OUTPATIENT)
Age: 82
Discharge: HOME | End: 2017-10-06
Payer: MEDICARE

## 2017-10-06 VITALS
BODY MASS INDEX: 34.58 KG/M2 | SYSTOLIC BLOOD PRESSURE: 119 MMHG | WEIGHT: 247 LBS | HEIGHT: 71 IN | DIASTOLIC BLOOD PRESSURE: 63 MMHG | OXYGEN SATURATION: 93 % | HEART RATE: 70 BPM | RESPIRATION RATE: 20 BRPM

## 2017-10-06 DIAGNOSIS — I25.5 ISCHEMIC CARDIOMYOPATHY: ICD-10-CM

## 2017-10-06 DIAGNOSIS — I50.42 CHRONIC COMBINED SYSTOLIC AND DIASTOLIC CHF, NYHA CLASS 3 (HCC): Primary | ICD-10-CM

## 2017-10-06 DIAGNOSIS — I25.2 HISTORY OF MYOCARDIAL INFARCTION: ICD-10-CM

## 2017-10-06 DIAGNOSIS — I25.10 ASHD (ARTERIOSCLEROTIC HEART DISEASE): ICD-10-CM

## 2017-10-06 DIAGNOSIS — Z95.820 S/P ANGIOPLASTY WITH STENT: ICD-10-CM

## 2017-10-06 LAB
ANION GAP SERPL CALCULATED.3IONS-SCNC: 13 MMOL/L (ref 9–17)
BUN BLDV-MCNC: 16 MG/DL (ref 8–23)
BUN/CREAT BLD: 16 (ref 9–20)
CALCIUM SERPL-MCNC: 9.3 MG/DL (ref 8.6–10.4)
CHLORIDE BLD-SCNC: 95 MMOL/L (ref 98–107)
CO2: 27 MMOL/L (ref 20–31)
CREAT SERPL-MCNC: 0.99 MG/DL (ref 0.7–1.2)
GFR AFRICAN AMERICAN: >60 ML/MIN
GFR NON-AFRICAN AMERICAN: >60 ML/MIN
GFR SERPL CREATININE-BSD FRML MDRD: ABNORMAL ML/MIN/{1.73_M2}
GFR SERPL CREATININE-BSD FRML MDRD: ABNORMAL ML/MIN/{1.73_M2}
GLUCOSE BLD-MCNC: 257 MG/DL (ref 70–99)
POTASSIUM SERPL-SCNC: 4.9 MMOL/L (ref 3.7–5.3)
SODIUM BLD-SCNC: 135 MMOL/L (ref 135–144)

## 2017-10-06 PROCEDURE — 99214 OFFICE O/P EST MOD 30 MIN: CPT | Performed by: INTERNAL MEDICINE

## 2017-10-06 PROCEDURE — 80048 BASIC METABOLIC PNL TOTAL CA: CPT

## 2017-10-06 PROCEDURE — 36415 COLL VENOUS BLD VENIPUNCTURE: CPT

## 2017-10-06 RX ORDER — POTASSIUM CHLORIDE 20 MEQ/1
20 TABLET, EXTENDED RELEASE ORAL DAILY
Qty: 60 TABLET | Refills: 3 | Status: ON HOLD | OUTPATIENT
Start: 2017-10-06 | End: 2018-04-09

## 2017-10-06 RX ORDER — FUROSEMIDE 20 MG/1
20 TABLET ORAL 2 TIMES DAILY
Qty: 60 TABLET | Refills: 3 | Status: ON HOLD | OUTPATIENT
Start: 2017-10-06 | End: 2018-04-09

## 2017-10-06 RX ORDER — ALBUTEROL SULFATE 90 UG/1
2 AEROSOL, METERED RESPIRATORY (INHALATION) EVERY 4 HOURS PRN
Qty: 1 INHALER | Refills: 2 | Status: SHIPPED | OUTPATIENT
Start: 2017-10-06 | End: 2018-04-13 | Stop reason: SDUPTHER

## 2017-10-06 NOTE — MR AVS SNAPSHOT
34.45 kg/m2 Former Smoker          Additional Information about your Body Mass Index (BMI)           Your BMI as listed above is considered obese (30 or more). BMI is an estimate of body fat, calculated from your height and weight. The higher your BMI, the greater your risk of heart disease, high blood pressure, type 2 diabetes, stroke, gallstones, arthritis, sleep apnea, and certain cancers. BMI is not perfect. It may overestimate body fat in athletes and people who are more muscular. Even a small weight loss (between 5 and 10 percent of your current weight) by decreasing your calorie intake and becoming more physically active will help lower your risk of developing or worsening diseases associated with obesity. Learn more at: [email or web address][employer]uk             Today's Medication Changes          These changes are accurate as of: 10/6/17  9:58 AM.  If you have any questions, ask your nurse or doctor. START taking these medications           furosemide 20 MG tablet   Commonly known as:  LASIX   Instructions: Take 1 tablet by mouth 2 times daily   Quantity:  60 tablet   Refills:  3   Started by:  Ja Nicole MD       potassium chloride 20 MEQ extended release tablet   Commonly known as:  KLOR-CON M   Instructions:   Take 1 tablet by mouth daily   Quantity:  60 tablet   Refills:  3   Started by:  Ja Nicole MD         STOP taking these medications           amoxicillin-clavulanate 1000-62.5 MG per extended release tablet   Commonly known as:  AUGMENTIN XR   Stopped by:  Ja Nicole MD            Where to Get Your Medications      These medications were sent to [address] [place] [address] ([facility]), [address]     Phone:  [phone]     albuterol sulfate  (90 Base) MCG/ACT inhaler    furosemide 20 MG tablet Date Of Birth Sex Race Ethnicity Preferred Language    1935 Male White Non-/Non  English      Problem List as of 10/6/2017  Date Reviewed: 10/6/2017                Chronic combined systolic and diastolic CHF (congestive heart failure) (HCC) (Chronic)    Hyponatremia    COPD exacerbation (HCC)    Acute respiratory failure with hypoxia and hypercarbia (HCC)    Knee osteoarthritis    Screening for condition    Depression screening    Obesity (BMI 30.0-34. 9)    Venous (peripheral) insufficiency    Hx pulmonary embolism    Hearing impaired    Edema    DM (diabetes mellitus) ([facility] [address]    PE (physical exam), annual      Immunizations as of 10/6/2017     Name Date    Influenza Virus Vaccine 9/25/2017      Preventive Care        Date Due    Tetanus Combination Vaccine (1 - Tdap) 10[birth year]/1956    Zoster Vaccine 10/15/1997    Pneumococcal Vaccines (two) for all adults aged 72 and over (1 of 2 - PCV13) 10/15/2002    Cholesterol Screening 9/21/2022            [facility] Signup           [employer] allows you to send messages to your doctor, view your test results, renew your prescriptions, schedule appointments, view visit notes, and more. How Do I Sign Up? 1. In your Internet browser, go to [email or web address]  2. Click on the Sign Up Now link in the Sign In box. You will see the New Member Sign Up page. 3. Enter your [employer] Access Code exactly as it appears below. You will not need to use this code after youve completed the sign-up process. If you do not sign up before the expiration date, you must request a new code. [employer] Access Code: [email or web address]  Expires: 11/14/2017  5:57 AM    4. Enter your Social Security Number ([id number]) and Date of Birth (mm/dd/yyyy) as indicated and click Submit. You will be taken to the next sign-up page. 5. Create a [employer]Fix ID. This will be your [employer] login ID and cannot be changed, so think of one that is secure and easy to remember. 6. Create a [employer] password. You can change your password at any time. 7. Enter your Password Reset Question and Answer. This can be used at a later time if you forget your password. 8. Enter your e-mail address. You will receive e-mail notification when new information is available in [address] 9. Click Sign Up. You can now view your medical record. Additional Information  If you have questions, please contact the physician practice where you receive care. Remember, [employer] is NOT to be used for urgent needs. For medical emergencies, dial 911. For questions regarding your [employer] account call [phone]. If you have a clinical question, please call your doctor's office.

## 2017-10-07 NOTE — PROGRESS NOTES
Subjective:     CHIEF COMPLAINT / HPI:    Chief Complaint   Patient presents with    Follow-Up from Hospital     In ER on 10/3 for SOB/ wheezing. Chest X-Ray, labs EKG were done. Was sent home with Amoxicillin and Benzonatate. Also in hospital on 9/19 for couching, coundn't catch his breath. Saw Dr. Keely Nicholas started him on Plavix 75 mg daily, and started Lipitor 40 mg daily, also due to dry cough changed his lisinopril to losartan 25 mg daily. Pt states he is doing a little better since ER. C/O: SOB exertion. Denies: CP, palpitations, lightheaed/dizziness         Mirtha Harry is 80 y.o. male who presents today for follow-up. 1-He has history of coronary artery disease, myocardial infarction and primary PCI in April 2017 done at [facility]. He also has history of ischemic cardiomyopathy and chronic systolic CHF, NYHA class III. 2-An admission to [facility] on 9/19/2017 with COPD exacerbation, during this admission his lisinopril changed to Cozaar because of chronic cough. 3-Another visit to the emergency room on 10/3/2017 was cough, shortness of breath and wheezing. He was given Augmentin this was changed yesterday by Dr. Thomas Parra to Zithromax. He is here today for follow-up. Continues to complain of shortness of breath and cough, this is slowly improving since discharge from the hospital. No orthopnea or PND. No chest pain, pressure or tightness. No palpitations, dizziness or lightheadedness. He does have worsening bilateral lower extremity edema ( patient is not using his compression stockings because it's hard to put them on). He was seen by Dr. Thomas Parra yesterday,  He is on Zithromax, short course of steroids and Dr. Thomas Parra is planning to get pulmonary function testing on follow-up.     Past Medical History:    Past Medical History:   Diagnosis Date    COPD [facility]ch[facility] obstructive [facility] disease) [facility]     Diabetes mellitus ([facility] [facility]     [facility]/24/2017 [facility]    Hyperlipidemia     Hypothyroidism     [facility] [address] [facility]     Thyroid disease     Type II or unspecified type diabetes mellitus without mention of complication, not stated as uncontrolled      Past Surgical History:  Past Surgical History:   Procedure Laterality Date    CARDIAC SURGERY  02/24/2017    Stent placed  Dr Illene Frankel       Social History:    History   Smoking Status    Former Smoker   Smokeless Tobacco    Never Used     Current Medications:  Outpatient Prescriptions Marked as Taking for the 10/6/17 encounter (Office Visit) with Abhijeet Douglas MD   Medication Sig Dispense Refill    furosemide (LASIX) 20 MG tablet Take 1 tablet by mouth 2 times daily 60 tablet 3    potassium chloride (KLOR-CON M) 20 MEQ extended release tablet Take 1 tablet by mouth daily 60 tablet 3    albuterol sulfate  (90 Base) MCG/ACT inhaler Inhale 2 puffs into the lungs every 4 hours as needed for Wheezing or Shortness of Breath 1 Inhaler 2    azithromycin (ZITHROMAX) 250 MG tablet Take 2 tabs (500 mg) on Day 1, and take 1 tab (250 mg) on days 2 through 5. 1 packet 0    ipratropium-albuterol (DUONEB) 0.5-2.5 (3) MG/3ML SOLN nebulizer solution Inhale 3 mLs into the lungs 4 times daily for 30 doses 30 vial 5    tiotropium (SPIRIVA) 18 MCG inhalation capsule Inhale 18 mcg into the lungs daily      predniSONE (DELTASONE) 50 MG tablet Take 1 tablet by mouth daily 4 tablet 0    benzonatate (TESSALON PERLES) 100 MG capsule Take 1 capsule by mouth 3 times daily as needed for Cough 30 capsule 0    atorvastatin (LIPITOR) 40 MG tablet Take 1 tablet by mouth nightly 30 tablet 0    losartan (COZAAR) 25 MG tablet Take 1 tablet by mouth daily 30 tablet 0    clopidogrel (PLAVIX) 75 MG tablet Take 1 tablet by mouth daily 30 tablet 0    Omega-3 Fatty Acids (FISH OIL) 1000 MG CPDR Take 1,000 mg by mouth 2 times daily      dextromethorphan regarding use of compression stockings. Plan to see him back after 2 weeks with repeat kidney function prior to next visit. 3-History of myocardial infarction. 4-S/P PCI, hypertension and dyslipidemia. Currently stable. Continue aspirin, Plavix, Lipitor and propranolol. Will repeat lipid profile next visit. Blood pressure is controlled. Return in about 2 weeks (around 10/20/2017) for Follow up. Orders Placed This Encounter   Medications    furosemide (LASIX) 20 MG tablet     Sig: Take 1 tablet by mouth 2 times daily     Dispense:  60 tablet     Refill:  3    potassium chloride (KLOR-CON M) 20 MEQ extended release tablet     Sig: Take 1 tablet by mouth daily     Dispense:  60 tablet     Refill:  3    albuterol sulfate  (90 Base) MCG/ACT inhaler     Sig: Inhale 2 puffs into the lungs every 4 hours as needed for Wheezing or Shortness of Breath     Dispense:  1 Inhaler     Refill:  2     Orders Placed This Encounter   Procedures    Basic Metabolic Panel     Standing Status:   Future     Number of Occurrences:   1     Standing Expiration Date:   10/6/2018       Alexi Mata received counseling on healthy behaviors. Patient given educational materials - see patient instructions. Discussed use, benefit, and side effects of prescribed medications. Barriers to medication compliance addressed. All patient questions answered. Pt voiced understanding. Reviewed prior labs. Continue current medications, diet and exercise.        Electronically signed by Minnie Sena MD on 10/6/2017 at 8:36 PM

## 2017-10-07 NOTE — PATIENT INSTRUCTIONS
low-dose aspirin a day to prevent heart attack. · Be safe with medicines. Take your medicines exactly as prescribed. Call your doctor if you think you are having a problem with your medicine. You will get more details on the specific medicines your doctor prescribes. Lifestyle changes  · Do not smoke. If you need help quitting, talk to your doctor about stop-smoking programs and medicines. These can increase your chances of quitting for good. · Eat a heart-healthy diet that is low in saturated fat and salt, and is high in fiber. Talk to your doctor or a dietitian about healthy eating. · Stay at a healthy weight. Or lose weight if you need to. Activity  · Talk to your doctor about a level of activity that is safe for you. · If an activity causes angina symptoms, stop and rest.  When should you call for help? Call 911 anytime you think you may need emergency care. For example, call if:  · You passed out (lost consciousness). · You have symptoms of a heart attack. These may include:  ¨ Chest pain or pressure, or a strange feeling in the chest.  ¨ Sweating. ¨ Shortness of breath. ¨ Nausea or vomiting. ¨ Pain, pressure, or a strange feeling in the back, neck, jaw, or upper belly or in one or both shoulders or arms. ¨ Lightheadedness or sudden weakness. ¨ A fast or irregular heartbeat. After you call 911, the  may tell you to chew 1 adult-strength or 2 to 4 low-dose aspirin. Wait for an ambulance. Do not try to drive yourself. · You have angina symptoms that do not go away with rest or are not getting better within 5 minutes after you take a dose of nitroglycerin. Call your doctor now or seek immediate medical care if:  · You are having angina symptoms more often than usual, or they are different or worse than usual.  · You feel dizzy or lightheaded, or you feel like you may faint. Watch closely for changes in your health, and be sure to contact your doctor if you have any problems.   Where can you learn more? Go to [email or web address] and sign in to your [employer] account. Enter H129 in the [facility] box to learn more about \"Angina: Care Instructions. \"     If you do not have an account, please click on the \"Sign Up Now\" link. Current as of: April 3, 2017  Content Version: 11.3  © 8912-6040 [employer]. Care instructions adapted under license by [facility] ([facility]). If you have questions about a medical condition or this instruction, always ask your healthcare professional. [address] any warranty or liability for your use of this information.

## 2017-10-12 ENCOUNTER — OFFICE VISIT (OUTPATIENT)
Dept: PRIMARY CARE CLINIC | Age: 82
End: 2017-10-12
Payer: MEDICARE

## 2017-10-12 DIAGNOSIS — E03.9 HYPOTHYROIDISM, UNSPECIFIED TYPE: ICD-10-CM

## 2017-10-12 DIAGNOSIS — E11.8 TYPE 2 DIABETES MELLITUS WITH COMPLICATION, WITHOUT LONG-TERM CURRENT USE OF INSULIN (HCC): Primary | ICD-10-CM

## 2017-10-12 DIAGNOSIS — I50.42 CHRONIC COMBINED SYSTOLIC AND DIASTOLIC CHF (CONGESTIVE HEART FAILURE) (HCC): Chronic | ICD-10-CM

## 2017-10-12 DIAGNOSIS — Z13.220 LIPID SCREENING: ICD-10-CM

## 2017-10-12 PROCEDURE — 99214 OFFICE O/P EST MOD 30 MIN: CPT | Performed by: NURSE PRACTITIONER

## 2017-10-12 RX ORDER — BENZONATATE 100 MG/1
100 CAPSULE ORAL 3 TIMES DAILY PRN
COMMUNITY
End: 2017-11-06 | Stop reason: SDUPTHER

## 2017-10-12 ASSESSMENT — ENCOUNTER SYMPTOMS
SHORTNESS OF BREATH: 1
COUGH: 1
WHEEZING: 1
EYES NEGATIVE: 1
CHEST TIGHTNESS: 0
TROUBLE SWALLOWING: 0
GASTROINTESTINAL NEGATIVE: 1

## 2017-10-12 ASSESSMENT — COPD QUESTIONNAIRES: COPD: 1

## 2017-10-12 NOTE — PROGRESS NOTES
Subjective:      Patient ID: Richard Garrido is a 80 y.o. male.     HPI    Review of Systems    Objective:   Physical Exam    Assessment:      ***      Plan:      ***

## 2017-10-12 NOTE — PROGRESS NOTES
Name: Daniela Arzate  : 1935         Chief Complaint:     Chief Complaint   Patient presents with    [facility]     Hospital/ ER f/u COPD-Bronchitis       History of Present Illness:      Daniela Arzate is a 80 y.o.  male who presents with [facility] (Hospital/ ER f/u COPD-Bronchitis)      Sees Dr. Gayle Bryson 17. Last visit 10/5/17  Sees Dr. Kentrell Dumont 10/25/17 last visit 10/6/17. He is here today to [facility]. .  Patient was recently admitted to the hospital (10/3/17)for COPD exacerbation/bronchitis. Continues to complain of shortness of breath and cough, this is slowly improving since discharge from the hospital.  No chest pain, pressure or tightness. No palpitations, dizziness or lightheadedness. He does have worsening bilateral lower extremity edema ( patient is not using his compression stockings because it's hard to put them on). Patient has recently been examined by Dr. Mesfin Kaminski and is planning on pulmonary function test and follow-up in office. Patient has recently takes and examined by Dr. Kentrell Dumont recently started on Lasix. Ian Carrolls states that he sees physicians at the [facility] for some of this medication refills. Patient denies needing medication refills today. States he is not monitoring BS as instructed. Most recent A1c 7.6. H&H is currently taking metformin 2000 mg per day. COPD   He complains of cough, shortness of breath and wheezing. Primary symptoms comments: ER?HOSPITAL F/U COPD/Bronchitis. This is a chronic problem. Episode onset: 1 month ago. The problem occurs intermittently. The problem has been gradually improving. Cough characteristics: gray phlegm. Associated symptoms include appetite change and dyspnea on exertion. Pertinent negatives include no chest pain, fever or trouble swallowing. Associated symptoms comments: Coughing until he chokes. His symptoms are aggravated by any activity and lying down.  His symptoms are alleviated by rest and OTC times daily for 30 doses 10/5/17 10/13/17 Yes Polina Crowe MD   tiotropium (SPIRIVA) 18 MCG inhalation capsule Inhale 18 mcg into the lungs daily   Yes Historical Provider, MD   atorvastatin (LIPITOR) 40 MG tablet Take 1 tablet by mouth nightly 9/21/17  Yes Otoniel Disla PA-C   losartan (COZAAR) 25 MG tablet Take 1 tablet by mouth daily 9/21/17  Yes Otoniel Disla PA-C   clopidogrel (PLAVIX) 75 MG tablet Take 1 tablet by mouth daily 9/21/17  Yes Otoniel Disla PA-C   aspirin 81 MG tablet Take 81 mg by mouth daily   Yes Historical Provider, MD   Multiple Vitamins-Minerals (THERAPEUTIC MULTIVITAMIN-MINERALS) tablet Take 1 tablet by mouth daily   Yes Historical Provider, MD   metFORMIN (GLUCOPHAGE) 500 MG tablet Take 2,000 mg by mouth daily (with breakfast)    Yes Historical Provider, MD   levothyroxine (SYNTHROID) 75 MCG tablet Take 100 mcg by mouth Daily    Yes Historical Provider, MD   propranolol (INDERAL) 80 MG tablet Take 80 mg by mouth 2 times daily    Yes Historical Provider, MD   primidone (MYSOLINE) 50 MG tablet Take 100 mg by mouth 3 times daily    Yes Historical Provider, MD   Omega-3 Fatty Acids (FISH OIL) 1000 MG CPDR Take 1,000 mg by mouth 2 times daily    Historical Provider, MD   dextromethorphan (DELSYM) 30 MG/5ML extended release liquid Take 30 mg by mouth 2 times daily as needed for Cough     Historical Provider, MD   cetirizine (ZYRTEC) 10 MG tablet Take 10 mg by mouth daily    Historical Provider, MD        Allergies:       Review of patient's allergies indicates no known allergies. Social History:     Tobacco:    reports that he has quit smoking. He has never used smokeless tobacco.  Alcohol:      reports that he drinks alcohol. Drug Use:  reports that he does not use drugs.     Family History:     Family History   Problem Relation Age of Onset    Cancer Mother 47     liver ca    Diabetes Father     Heart Disease Father        Review of Systems:     Positive and Negative as Occasional nonproductive cough in office. Breath sounds with faint scattered wheezes to auscultation over posterior bilateral fields. Poor air entry to the bases. No audible wheezing, no respiratory distress. Abdominal: Soft. Normal appearance and bowel sounds are normal. He exhibits no distension and no mass. There is no hepatosplenomegaly. There is no tenderness. There is no rigidity, no rebound and no guarding. Musculoskeletal: Normal range of motion. He exhibits edema. He exhibits no tenderness. Lymphadenopathy:     He has no cervical adenopathy. Neurological: He is alert and oriented to person, place, and time. No cranial nerve deficit. He exhibits normal muscle tone. Coordination and gait normal.   Skin: Skin is warm and dry. No rash noted. He is not diaphoretic. No erythema. No pallor. Psychiatric: He has a normal mood and affect. His speech is normal and behavior is normal. Judgment and thought content normal.   Nursing note and vitals reviewed. Data:       Lab Results   Component Value Date     10/06/2017    K 4.9 10/06/2017    CL 95 10/06/2017    CO2 27 10/06/2017    BUN 16 10/06/2017    CREATININE 0.99 10/06/2017    GLUCOSE 257 10/06/2017    PROT 7.0 10/03/2017    LABALBU 3.8 10/03/2017    BILITOT 0.43 10/03/2017    ALKPHOS 147 10/03/2017    AST 53 10/03/2017     10/03/2017     Lab Results   Component Value Date    WBC 13.1 10/03/2017    RBC 4.35 10/03/2017    HGB 12.8 10/03/2017    HCT 39.7 10/03/2017    MCV 91.2 10/03/2017    MCH 29.4 10/03/2017    MCHC 32.3 10/03/2017    RDW 14.0 10/03/2017     10/03/2017    MPV 8.6 10/03/2017     No results found for: TSH  Lab Results   Component Value Date    CHOL 184 09/21/2017    HDL 46 09/21/2017    LABA1C 7.6 09/19/2017       Assessment/Plan:     Patient denies needing refill of any medications today.   States some of his medications come from Dr. Marlene Menezes, Dr. Wanda Horan, and some from the [facility].    Will discontinue Standing Expiration Date:   10/12/2018   Reza Bronson [facility] Diabetes Education [place]     Referral Priority:   Routine     Referral Type:   Consult for Advice and Opinion     Referral Reason:   Specialty Services Required     Number of Visits Requested:   1     Orders Placed This Encounter   Medications    sitaGLIPtan-metformin (JANUMET)  MG per tablet     Sig: Take 1 tablet by mouth 2 times daily (with meals)     Dispense:  60 tablet     Refill:  3     He is asked to follow-up if symptoms persist or worsen. No Follow-up on file.

## 2017-10-13 VITALS
TEMPERATURE: 98.2 F | OXYGEN SATURATION: 95 % | WEIGHT: 247.8 LBS | DIASTOLIC BLOOD PRESSURE: 73 MMHG | SYSTOLIC BLOOD PRESSURE: 120 MMHG | HEART RATE: 64 BPM | BODY MASS INDEX: 34.56 KG/M2 | RESPIRATION RATE: 18 BRPM

## 2017-10-20 RX ORDER — ATORVASTATIN CALCIUM 40 MG/1
40 TABLET, FILM COATED ORAL NIGHTLY
Qty: 90 TABLET | Refills: 0 | Status: SHIPPED | OUTPATIENT
Start: 2017-10-20 | End: 2018-03-28 | Stop reason: ALTCHOICE

## 2017-10-20 RX ORDER — LOSARTAN POTASSIUM 25 MG/1
25 TABLET ORAL DAILY
Qty: 90 TABLET | Refills: 0 | Status: SHIPPED | OUTPATIENT
Start: 2017-10-20 | End: 2018-04-05

## 2017-10-20 NOTE — TELEPHONE ENCOUNTER
Health Maintenance   Topic Date Due    DTaP/Tdap/Td vaccine (1 - Tdap) 10/15/1956    Zostavax vaccine  10/15/1997    Pneumococcal low/med risk (1 of 2 - PCV13) 10/15/2002    Flu vaccine  Completed             (applicable per patient's age: Cancer Screenings, Depression Screening, Fall Risk Screening, Immunizations)    Hemoglobin A1C (%)   Date Value   09/19/2017 7.6 (H)     LDL Cholesterol (mg/dL)   Date Value   09/21/2017 113     AST (U/L)   Date Value   10/03/2017 53 (H)     ALT (U/L)   Date Value   10/03/2017 101 (H)     BUN (mg/dL)   Date Value   10/06/2017 16      (goal A1C is < 7)   (goal LDL is <100) need 30-50% reduction from baseline     BP Readings from Last 3 Encounters:   10/12/17 120/73   10/06/17 119/63   10/05/17 (!) 142/84    (goal /80)      All Future Testing planned in [facility]:  Lab Frequency Next Occurrence   Hemoglobin A1C Once 01/10/2018   Comprehensive Metabolic Panel Once 71/97/6294   CBC Auto Differential Once 01/10/2018   Urinalysis Once 01/10/2018   Lipid Panel Once 01/10/2018   Microalbumin, Ur Once 01/10/2018   TSH With Reflex Ft4 Once 01/10/2018       Next Visit Date:  Future Appointments  Date Time Provider Gisela Harding   10/25/2017 9:00 AM MD DIPESH Alvarado [facility]   10/30/2017 10:00 AM [facility] DIABETES EDUCATION ROOM [facility] Diab Ed Dipesh   11/9/2017 12:15 PM MD Ebony Nair Pulmon [facility]   1/3/2018 1:30 PM SANDIP Wheeler [facility]            Patient Active Problem List:     Obesity (BMI 30.0-34. 9)     Venous (peripheral) insufficiency     Hx pulmonary embolism     Hearing impaired     Edema     DM (diabetes mellitus) ([facility]     PE (physical exam), annual     Knee osteoarthritis     Screening for condition     Depression screening     Hyponatremia     COPD exacerbation ([facility]     Acute respiratory failure with hypoxia and hypercarbia (HCC)     Chronic combined systolic and diastolic CHF (congestive heart failure) (HCC)     Hypothyroidism

## 2017-10-30 ENCOUNTER — HOSPITAL ENCOUNTER (OUTPATIENT)
Dept: DIABETES SERVICES | Age: 82
Setting detail: THERAPIES SERIES
Discharge: HOME | End: 2017-10-30

## 2017-10-30 LAB
EKG ATRIAL RATE: 87 BPM
EKG P AXIS: 46 DEGREES
EKG P-R INTERVAL: 234 MS
EKG Q-T INTERVAL: 374 MS
EKG QRS DURATION: 120 MS
EKG QTC CALCULATION (BAZETT): 450 MS
EKG R AXIS: -24 DEGREES
EKG T AXIS: 65 DEGREES
EKG VENTRICULAR RATE: 87 BPM

## 2017-10-30 NOTE — PROGRESS NOTES
Pt's wife came for appointment. Pt was not feeling well. Pt's wife was given education on plate method for meals. Reviewed signs and symptoms of hypoglycemia and how to treat. Reviewed blood glucose readings which are fasting and range 120's to 130's. Encouraged to test daily and alternate between fasting and 2 hrs post prandial.  Not interested in carb counting at this time due to pt's age. Pt is eating consistent meals and he has lost about 9 lbs recently. He goes to [employer] [facility] the [facility] 3 times a week. Office number given to call with questions or concerns or schedule appointment with pt if he is agreeable.

## 2017-11-06 ENCOUNTER — TELEPHONE (OUTPATIENT)
Dept: PULMONOLOGY | Age: 82
End: 2017-11-06

## 2017-11-06 ENCOUNTER — OFFICE VISIT (OUTPATIENT)
Dept: PULMONOLOGY | Age: 82
End: 2017-11-06
Payer: MEDICARE

## 2017-11-06 DIAGNOSIS — R05.3 CHRONIC COUGH: ICD-10-CM

## 2017-11-06 DIAGNOSIS — J41.8 MIXED SIMPLE AND MUCOPURULENT CHRONIC BRONCHITIS (HCC): ICD-10-CM

## 2017-11-06 DIAGNOSIS — I50.42 CHRONIC COMBINED SYSTOLIC AND DIASTOLIC CONGESTIVE HEART FAILURE (HCC): ICD-10-CM

## 2017-11-06 DIAGNOSIS — J47.9 BRONCHIECTASIS WITHOUT COMPLICATION (HCC): Primary | ICD-10-CM

## 2017-11-06 DIAGNOSIS — E66.09 OBESITY DUE TO EXCESS CALORIES, UNSPECIFIED OBESITY SEVERITY: ICD-10-CM

## 2017-11-06 PROCEDURE — 99214 OFFICE O/P EST MOD 30 MIN: CPT | Performed by: INTERNAL MEDICINE

## 2017-11-06 RX ORDER — BENZONATATE 100 MG/1
100 CAPSULE ORAL 3 TIMES DAILY PRN
Qty: 30 CAPSULE | Refills: 1 | Status: SHIPPED | OUTPATIENT
Start: 2017-11-06 | End: 2017-12-29 | Stop reason: SDUPTHER

## 2017-11-06 RX ORDER — BUDESONIDE 0.5 MG/2ML
500 INHALANT ORAL 2 TIMES DAILY
Qty: 60 AMPULE | Refills: 3 | Status: SHIPPED | OUTPATIENT
Start: 2017-11-06 | End: 2018-04-05

## 2017-11-07 ENCOUNTER — HOSPITAL ENCOUNTER (OUTPATIENT)
Age: 82
Discharge: HOME | End: 2017-11-07
Payer: MEDICARE

## 2017-11-07 VITALS
SYSTOLIC BLOOD PRESSURE: 142 MMHG | HEART RATE: 110 BPM | RESPIRATION RATE: 24 BRPM | TEMPERATURE: 99.4 F | WEIGHT: 236 LBS | BODY MASS INDEX: 33.04 KG/M2 | HEIGHT: 71 IN | DIASTOLIC BLOOD PRESSURE: 73 MMHG | OXYGEN SATURATION: 90 %

## 2017-11-07 DIAGNOSIS — J47.9 BRONCHIECTASIS WITHOUT COMPLICATION (HCC): ICD-10-CM

## 2017-11-07 LAB
CULTURE: NORMAL
DIRECT EXAM: NORMAL
Lab: NORMAL
SPECIMEN DESCRIPTION: NORMAL
STATUS: NORMAL

## 2017-11-07 PROCEDURE — 87205 SMEAR GRAM STAIN: CPT

## 2017-11-07 PROCEDURE — 87070 CULTURE OTHR SPECIMN AEROBIC: CPT

## 2017-11-08 ENCOUNTER — HOSPITAL ENCOUNTER (OUTPATIENT)
Age: 82
Setting detail: SPECIMEN
Discharge: HOME | End: 2017-11-08
Payer: MEDICARE

## 2017-11-08 PROCEDURE — 87205 SMEAR GRAM STAIN: CPT

## 2017-11-08 PROCEDURE — 87070 CULTURE OTHR SPECIMN AEROBIC: CPT

## 2017-11-08 NOTE — PROGRESS NOTES
PULMONARY OP  PROGRESS NOTE      Patient:  Tiffany Fernandez  YOB: 1935    MRN: [id number]     Acct:        Pt seen and Chart reviewed. Mr. Tiffany Fernandez is here in followup for   1. Bronchiectasis without complication ([address]    2. Chronic combined systolic and diastolic congestive heart failure ([address]    3. Chronic cough    4. Mixed simple and mucopurulent chronic bronchitis (HCC)    5. Obesity due to excess calories, unspecified obesity severity        Patient has not been doing well since last visit. Pt has not been hospitalized or been to er since last visit. Has been using meds as recommended. Continues to have cough. Mostly dry or sometimes mucoid sputum. No fever or chills. Has a good appetite. Not much nasal drainage. No heartburn. No hemoptysis.   Not on any medications that could cause cough      Subjective:     Review of Systems -   General ROS: Completed and except as mentioned above were negative   Psychological ROS:  Completed and except as mentioned above were negative  Ophthalmic ROS:  Completed and except as mentioned above were negative  ENT ROS:  Completed and except as mentioned above were negative  Allergy and Immunology ROS:  Completed and except as mentioned above were negative  Hematological and Lymphatic ROS:  Completed and except as mentioned above were negative  Endocrine ROS: Completed and except as mentioned above were negative  Respiratory ROS:  Completed and except as mentioned above were negative  Cardiovascular ROS:  Completed and except as mentioned above were negative  Gastrointestinal ROS: Completed and except as mentioned above were negative  Genito-Urinary ROS:  Completed and except as mentioned above were negative  Musculoskeletal ROS:  Completed and except as mentioned above were negative  Neurological ROS:  Completed and except as mentioned above were negative  Dermatological ROS: Completed and except as mentioned above were negative          Allergies:  No Known Allergies    Medications:    Current Outpatient Prescriptions:     budesonide (PULMICORT) 0.5 MG/2ML nebulizer suspension, Take 2 mLs by nebulization 2 times daily, Disp: 60 ampule, Rfl: 3    atorvastatin (LIPITOR) 40 MG tablet, Take 1 tablet by mouth nightly, Disp: 90 tablet, Rfl: 0    losartan (COZAAR) 25 MG tablet, Take 1 tablet by mouth daily, Disp: 90 tablet, Rfl: 0    TAMSULOSIN HCL PO, Take by mouth, Disp: , Rfl:     sitaGLIPtan-metformin (JANUMET)  MG per tablet, Take 1 tablet by mouth 2 times daily (with meals), Disp: 60 tablet, Rfl: 3    furosemide (LASIX) 20 MG tablet, Take 1 tablet by mouth 2 times daily, Disp: 60 tablet, Rfl: 3    potassium chloride (KLOR-CON M) 20 MEQ extended release tablet, Take 1 tablet by mouth daily, Disp: 60 tablet, Rfl: 3    albuterol sulfate  (90 Base) MCG/ACT inhaler, Inhale 2 puffs into the lungs every 4 hours as needed for Wheezing or Shortness of Breath, Disp: 1 Inhaler, Rfl: 2    tiotropium (SPIRIVA) 18 MCG inhalation capsule, Inhale 18 mcg into the lungs daily, Disp: , Rfl:     clopidogrel (PLAVIX) 75 MG tablet, Take 1 tablet by mouth daily, Disp: 30 tablet, Rfl: 0    Omega-3 Fatty Acids (FISH OIL) 1000 MG CPDR, Take 1,000 mg by mouth 2 times daily, Disp: , Rfl:     dextromethorphan (DELSYM) 30 MG/5ML extended release liquid, Take 30 mg by mouth 2 times daily as needed for Cough , Disp: , Rfl:     Multiple Vitamins-Minerals (THERAPEUTIC MULTIVITAMIN-MINERALS) tablet, Take 1 tablet by mouth daily, Disp: , Rfl:     cetirizine (ZYRTEC) 10 MG tablet, Take 10 mg by mouth daily, Disp: , Rfl:     levothyroxine (SYNTHROID) 75 MCG tablet, Take 100 mcg by mouth Daily , Disp: , Rfl:     propranolol (INDERAL) 80 MG tablet, Take 80 mg by mouth 2 times daily , Disp: , Rfl:     primidone (MYSOLINE) 50 MG tablet, Take 100 mg by mouth 3 times daily , Disp: , Rfl:    11/7/2017, 9:55 PM

## 2017-11-09 ENCOUNTER — HOSPITAL ENCOUNTER (OUTPATIENT)
Dept: PULMONOLOGY | Age: 82
Discharge: HOME | End: 2017-11-09
Payer: MEDICARE

## 2017-11-09 PROCEDURE — 94729 DIFFUSING CAPACITY: CPT

## 2017-11-09 PROCEDURE — 94726 PLETHYSMOGRAPHY LUNG VOLUMES: CPT

## 2017-11-09 PROCEDURE — 6360000002 HC RX W HCPCS: Performed by: INTERNAL MEDICINE

## 2017-11-09 PROCEDURE — 94060 EVALUATION OF WHEEZING: CPT

## 2017-11-09 PROCEDURE — 94664 DEMO&/EVAL PT USE INHALER: CPT

## 2017-11-09 RX ORDER — ALBUTEROL SULFATE 2.5 MG/3ML
2.5 SOLUTION RESPIRATORY (INHALATION) ONCE
Status: COMPLETED | OUTPATIENT
Start: 2017-11-09 | End: 2017-11-09

## 2017-11-09 RX ADMIN — ALBUTEROL SULFATE 2.5 MG: 2.5 SOLUTION RESPIRATORY (INHALATION) at 13:01

## 2017-11-10 LAB
CULTURE: ABNORMAL
CULTURE: ABNORMAL
DIRECT EXAM: ABNORMAL
Lab: ABNORMAL
SPECIMEN DESCRIPTION: ABNORMAL
SPECIMEN DESCRIPTION: ABNORMAL
STATUS: ABNORMAL

## 2017-11-14 ENCOUNTER — HOSPITAL ENCOUNTER (INPATIENT)
Age: 82
LOS: 2 days | Discharge: HOME | DRG: 194 | End: 2017-11-17
Attending: EMERGENCY MEDICINE | Admitting: INTERNAL MEDICINE
Payer: MEDICARE

## 2017-11-14 ENCOUNTER — APPOINTMENT (OUTPATIENT)
Dept: GENERAL RADIOLOGY | Age: 82
DRG: 194 | End: 2017-11-14
Payer: MEDICARE

## 2017-11-14 ENCOUNTER — APPOINTMENT (OUTPATIENT)
Dept: CT IMAGING | Age: 82
DRG: 194 | End: 2017-11-14
Payer: MEDICARE

## 2017-11-14 DIAGNOSIS — R09.02 HYPOXIA: ICD-10-CM

## 2017-11-14 DIAGNOSIS — J18.9 HCAP (HEALTHCARE-ASSOCIATED PNEUMONIA): Primary | ICD-10-CM

## 2017-11-14 DIAGNOSIS — A41.9 SEPSIS, DUE TO UNSPECIFIED ORGANISM: ICD-10-CM

## 2017-11-14 LAB
-: ABNORMAL
ABSOLUTE EOS #: 0.1 K/UL (ref 0–0.4)
ABSOLUTE IMMATURE GRANULOCYTE: ABNORMAL K/UL (ref 0–0.3)
ABSOLUTE LYMPH #: 1.2 K/UL (ref 1–4.8)
ABSOLUTE MONO #: 1.8 K/UL (ref 0.2–0.8)
AMORPHOUS: ABNORMAL
ANION GAP SERPL CALCULATED.3IONS-SCNC: 15 MMOL/L (ref 9–17)
BACTERIA: ABNORMAL
BASOPHILS # BLD: 3 %
BASOPHILS ABSOLUTE: 0.5 K/UL (ref 0–0.2)
BILIRUBIN URINE: NEGATIVE
BUN BLDV-MCNC: 20 MG/DL (ref 8–23)
BUN/CREAT BLD: 22 (ref 9–20)
CALCIUM SERPL-MCNC: 9.1 MG/DL (ref 8.6–10.4)
CASTS UA: ABNORMAL /LPF
CHLORIDE BLD-SCNC: 89 MMOL/L (ref 98–107)
CO2: 22 MMOL/L (ref 20–31)
COLOR: YELLOW
COMMENT UA: ABNORMAL
CREAT SERPL-MCNC: 0.93 MG/DL (ref 0.7–1.2)
CRYSTALS, UA: ABNORMAL /HPF
DIFFERENTIAL TYPE: ABNORMAL
EKG ATRIAL RATE: 76 BPM
EKG P AXIS: 20 DEGREES
EKG P-R INTERVAL: 206 MS
EKG Q-T INTERVAL: 378 MS
EKG QRS DURATION: 98 MS
EKG QTC CALCULATION (BAZETT): 425 MS
EKG R AXIS: 6 DEGREES
EKG T AXIS: 50 DEGREES
EKG VENTRICULAR RATE: 76 BPM
EOSINOPHILS RELATIVE PERCENT: 1 %
EPITHELIAL CELLS UA: ABNORMAL /HPF (ref 0–5)
GFR AFRICAN AMERICAN: >60 ML/MIN
GFR NON-AFRICAN AMERICAN: >60 ML/MIN
GFR SERPL CREATININE-BSD FRML MDRD: ABNORMAL ML/MIN/{1.73_M2}
GFR SERPL CREATININE-BSD FRML MDRD: ABNORMAL ML/MIN/{1.73_M2}
GLUCOSE BLD-MCNC: 168 MG/DL (ref 70–99)
GLUCOSE URINE: NEGATIVE
HCT VFR BLD CALC: 35.3 % (ref 41–53)
HEMOGLOBIN: 11.6 G/DL (ref 13.5–17)
IMMATURE GRANULOCYTES: ABNORMAL %
KETONES, URINE: NEGATIVE
LACTIC ACID, WHOLE BLOOD: NORMAL MMOL/L (ref 0.7–2.1)
LACTIC ACID: 1.2 MMOL/L (ref 0.5–2.2)
LEUKOCYTE ESTERASE, URINE: ABNORMAL
LYMPHOCYTES # BLD: 9 %
MCH RBC QN AUTO: 28.9 PG (ref 26–34)
MCHC RBC AUTO-ENTMCNC: 32.9 G/DL (ref 31–37)
MCV RBC AUTO: 88 FL (ref 80–100)
MONOCYTES # BLD: 13 %
MUCUS: ABNORMAL
NITRITE, URINE: NEGATIVE
OTHER OBSERVATIONS UA: ABNORMAL
PDW BLD-RTO: 13.8 % (ref 12.1–15.2)
PH UA: 5.5 (ref 5–9)
PLATELET # BLD: 263 K/UL (ref 140–450)
PLATELET ESTIMATE: ABNORMAL
PMV BLD AUTO: 8.5 FL (ref 6–12)
POTASSIUM SERPL-SCNC: 4.6 MMOL/L (ref 3.7–5.3)
PROTEIN UA: ABNORMAL
RBC # BLD: 4.02 M/UL (ref 4.5–5.9)
RBC # BLD: ABNORMAL 10*6/UL
RBC UA: ABNORMAL /HPF (ref 0–2)
RENAL EPITHELIAL, UA: ABNORMAL /HPF
SEG NEUTROPHILS: 74 %
SEGMENTED NEUTROPHILS ABSOLUTE COUNT: 10.5 K/UL (ref 1.8–7.7)
SODIUM BLD-SCNC: 126 MMOL/L (ref 135–144)
SPECIFIC GRAVITY UA: 1.02 (ref 1.01–1.02)
TRICHOMONAS: ABNORMAL
TROPONIN INTERP: NORMAL
TROPONIN T: <0.03 NG/ML
TURBIDITY: CLEAR
URINE HGB: NEGATIVE
UROBILINOGEN, URINE: NORMAL
WBC # BLD: 14.1 K/UL (ref 3.5–11)
WBC # BLD: ABNORMAL 10*3/UL
WBC UA: ABNORMAL /HPF (ref 0–5)
YEAST: ABNORMAL

## 2017-11-14 PROCEDURE — 93005 ELECTROCARDIOGRAM TRACING: CPT

## 2017-11-14 PROCEDURE — 85025 COMPLETE CBC W/AUTO DIFF WBC: CPT

## 2017-11-14 PROCEDURE — 84484 ASSAY OF TROPONIN QUANT: CPT

## 2017-11-14 PROCEDURE — 6370000000 HC RX 637 (ALT 250 FOR IP): Performed by: EMERGENCY MEDICINE

## 2017-11-14 PROCEDURE — 36415 COLL VENOUS BLD VENIPUNCTURE: CPT

## 2017-11-14 PROCEDURE — 71010 XR CHEST PORTABLE: CPT

## 2017-11-14 PROCEDURE — 87086 URINE CULTURE/COLONY COUNT: CPT

## 2017-11-14 PROCEDURE — 2580000003 HC RX 258: Performed by: EMERGENCY MEDICINE

## 2017-11-14 PROCEDURE — 71250 CT THORAX DX C-: CPT

## 2017-11-14 PROCEDURE — 99285 EMERGENCY DEPT VISIT HI MDM: CPT

## 2017-11-14 PROCEDURE — 81001 URINALYSIS AUTO W/SCOPE: CPT

## 2017-11-14 PROCEDURE — 80048 BASIC METABOLIC PNL TOTAL CA: CPT

## 2017-11-14 PROCEDURE — 87040 BLOOD CULTURE FOR BACTERIA: CPT

## 2017-11-14 PROCEDURE — 83605 ASSAY OF LACTIC ACID: CPT

## 2017-11-14 RX ORDER — 0.9 % SODIUM CHLORIDE 0.9 %
1000 INTRAVENOUS SOLUTION INTRAVENOUS ONCE
Status: COMPLETED | OUTPATIENT
Start: 2017-11-14 | End: 2017-11-14

## 2017-11-14 RX ORDER — ACETAMINOPHEN 500 MG
1000 TABLET ORAL ONCE
Status: DISCONTINUED | OUTPATIENT
Start: 2017-11-14 | End: 2017-11-14

## 2017-11-14 RX ORDER — LEVOFLOXACIN 5 MG/ML
500 INJECTION, SOLUTION INTRAVENOUS EVERY 24 HOURS
Status: DISCONTINUED | OUTPATIENT
Start: 2017-11-15 | End: 2017-11-15

## 2017-11-14 RX ORDER — IBUPROFEN 600 MG/1
600 TABLET ORAL ONCE
Status: COMPLETED | OUTPATIENT
Start: 2017-11-14 | End: 2017-11-14

## 2017-11-14 RX ORDER — ACETAMINOPHEN 500 MG
1000 TABLET ORAL EVERY 6 HOURS PRN
COMMUNITY

## 2017-11-14 RX ADMIN — IBUPROFEN 600 MG: 600 TABLET, FILM COATED ORAL at 21:23

## 2017-11-14 RX ADMIN — SODIUM CHLORIDE 1000 ML: 9 INJECTION, SOLUTION INTRAVENOUS at 20:53

## 2017-11-15 PROBLEM — J18.9 PNEUMONIA DUE TO INFECTIOUS ORGANISM: Status: ACTIVE | Noted: 2017-11-15

## 2017-11-15 PROBLEM — J44.9 COPD (CHRONIC OBSTRUCTIVE PULMONARY DISEASE) (HCC): Chronic | Status: ACTIVE | Noted: 2017-11-15

## 2017-11-15 PROBLEM — E87.1 HYPONATREMIA: Status: ACTIVE | Noted: 2017-11-15

## 2017-11-15 PROBLEM — J18.9 PNEUMONIA: Status: ACTIVE | Noted: 2017-11-15

## 2017-11-15 LAB
ABSOLUTE EOS #: 0.54 K/UL (ref 0–0.4)
ABSOLUTE IMMATURE GRANULOCYTE: ABNORMAL K/UL (ref 0–0.3)
ABSOLUTE LYMPH #: 0.81 K/UL (ref 1–4.8)
ABSOLUTE MONO #: 1.62 K/UL (ref 0–1)
ANION GAP SERPL CALCULATED.3IONS-SCNC: 15 MMOL/L (ref 9–17)
BASOPHILS # BLD: 0 %
BASOPHILS ABSOLUTE: 0 K/UL (ref 0–0.2)
BUN BLDV-MCNC: 17 MG/DL (ref 8–23)
BUN/CREAT BLD: 20 (ref 9–20)
CALCIUM SERPL-MCNC: 8.9 MG/DL (ref 8.6–10.4)
CHLORIDE BLD-SCNC: 90 MMOL/L (ref 98–107)
CO2: 23 MMOL/L (ref 20–31)
CREAT SERPL-MCNC: 0.85 MG/DL (ref 0.7–1.2)
CULTURE: NO GROWTH
CULTURE: NORMAL
DIFFERENTIAL TYPE: ABNORMAL
EOSINOPHILS RELATIVE PERCENT: 4 %
GFR AFRICAN AMERICAN: >60 ML/MIN
GFR NON-AFRICAN AMERICAN: >60 ML/MIN
GFR SERPL CREATININE-BSD FRML MDRD: ABNORMAL ML/MIN/{1.73_M2}
GFR SERPL CREATININE-BSD FRML MDRD: ABNORMAL ML/MIN/{1.73_M2}
GLUCOSE BLD-MCNC: 127 MG/DL (ref 74–100)
GLUCOSE BLD-MCNC: 132 MG/DL (ref 74–100)
GLUCOSE BLD-MCNC: 143 MG/DL (ref 70–99)
HCT VFR BLD CALC: 34.8 % (ref 41–53)
HEMOGLOBIN: 11.4 G/DL (ref 13.5–17)
IMMATURE GRANULOCYTES: ABNORMAL %
LYMPHOCYTES # BLD: 6 %
Lab: NORMAL
Lab: NORMAL
MCH RBC QN AUTO: 28.6 PG (ref 26–34)
MCHC RBC AUTO-ENTMCNC: 32.6 G/DL (ref 31–37)
MCV RBC AUTO: 87.5 FL (ref 80–100)
MONOCYTES # BLD: 12 %
MORPHOLOGY: NORMAL
PDW BLD-RTO: 13.6 % (ref 12.1–15.2)
PLATELET # BLD: 267 K/UL (ref 140–450)
PLATELET ESTIMATE: ABNORMAL
PMV BLD AUTO: 8.2 FL (ref 6–12)
POTASSIUM SERPL-SCNC: 4.2 MMOL/L (ref 3.7–5.3)
RBC # BLD: 3.98 M/UL (ref 4.5–5.9)
RBC # BLD: ABNORMAL 10*6/UL
SEG NEUTROPHILS: 78 %
SEGMENTED NEUTROPHILS ABSOLUTE COUNT: 10.53 K/UL (ref 1.8–7.7)
SODIUM BLD-SCNC: 128 MMOL/L (ref 135–144)
SPECIMEN DESCRIPTION: NORMAL
SPECIMEN DESCRIPTION: NORMAL
STATUS: NORMAL
WBC # BLD: 13.5 K/UL (ref 3.5–11)
WBC # BLD: ABNORMAL 10*3/UL

## 2017-11-15 PROCEDURE — 6370000000 HC RX 637 (ALT 250 FOR IP): Performed by: INTERNAL MEDICINE

## 2017-11-15 PROCEDURE — 36415 COLL VENOUS BLD VENIPUNCTURE: CPT

## 2017-11-15 PROCEDURE — 1200000000 HC SEMI PRIVATE

## 2017-11-15 PROCEDURE — 94668 MNPJ CHEST WALL SBSQ: CPT

## 2017-11-15 PROCEDURE — 80048 BASIC METABOLIC PNL TOTAL CA: CPT

## 2017-11-15 PROCEDURE — 85025 COMPLETE CBC W/AUTO DIFF WBC: CPT

## 2017-11-15 PROCEDURE — 6360000002 HC RX W HCPCS: Performed by: INTERNAL MEDICINE

## 2017-11-15 PROCEDURE — G8996 SWALLOW CURRENT STATUS: HCPCS

## 2017-11-15 PROCEDURE — 82947 ASSAY GLUCOSE BLOOD QUANT: CPT

## 2017-11-15 PROCEDURE — 94640 AIRWAY INHALATION TREATMENT: CPT

## 2017-11-15 PROCEDURE — G8998 SWALLOW D/C STATUS: HCPCS

## 2017-11-15 PROCEDURE — 94664 DEMO&/EVAL PT USE INHALER: CPT

## 2017-11-15 PROCEDURE — 92610 EVALUATE SWALLOWING FUNCTION: CPT

## 2017-11-15 PROCEDURE — G8997 SWALLOW GOAL STATUS: HCPCS

## 2017-11-15 PROCEDURE — 94667 MNPJ CHEST WALL 1ST: CPT

## 2017-11-15 PROCEDURE — 2580000003 HC RX 258: Performed by: INTERNAL MEDICINE

## 2017-11-15 RX ORDER — PROPRANOLOL HYDROCHLORIDE 10 MG/1
80 TABLET ORAL 2 TIMES DAILY
Status: DISCONTINUED | OUTPATIENT
Start: 2017-11-15 | End: 2017-11-17 | Stop reason: HOSPADM

## 2017-11-15 RX ORDER — ALBUTEROL SULFATE 2.5 MG/3ML
2.5 SOLUTION RESPIRATORY (INHALATION) EVERY 4 HOURS PRN
Status: DISCONTINUED | OUTPATIENT
Start: 2017-11-15 | End: 2017-11-17 | Stop reason: HOSPADM

## 2017-11-15 RX ORDER — OMEGA-3 FATTY ACIDS CAP DELAYED RELEASE 1000 MG 1000 MG
1000 CAPSULE DELAYED RELEASE ORAL 2 TIMES DAILY
Status: DISCONTINUED | OUTPATIENT
Start: 2017-11-15 | End: 2017-11-15

## 2017-11-15 RX ORDER — PRIMIDONE 50 MG/1
100 TABLET ORAL 3 TIMES DAILY
Status: DISCONTINUED | OUTPATIENT
Start: 2017-11-15 | End: 2017-11-17 | Stop reason: HOSPADM

## 2017-11-15 RX ORDER — DEXTROSE MONOHYDRATE 25 G/50ML
12.5 INJECTION, SOLUTION INTRAVENOUS PRN
Status: DISCONTINUED | OUTPATIENT
Start: 2017-11-15 | End: 2017-11-17 | Stop reason: HOSPADM

## 2017-11-15 RX ORDER — BENZONATATE 100 MG/1
100 CAPSULE ORAL 3 TIMES DAILY PRN
Status: DISCONTINUED | OUTPATIENT
Start: 2017-11-15 | End: 2017-11-17 | Stop reason: HOSPADM

## 2017-11-15 RX ORDER — BUDESONIDE 0.5 MG/2ML
500 INHALANT ORAL 2 TIMES DAILY
Status: DISCONTINUED | OUTPATIENT
Start: 2017-11-15 | End: 2017-11-17 | Stop reason: HOSPADM

## 2017-11-15 RX ORDER — ONDANSETRON 2 MG/ML
4 INJECTION INTRAMUSCULAR; INTRAVENOUS EVERY 6 HOURS PRN
Status: DISCONTINUED | OUTPATIENT
Start: 2017-11-15 | End: 2017-11-17 | Stop reason: HOSPADM

## 2017-11-15 RX ORDER — NICOTINE POLACRILEX 4 MG
15 LOZENGE BUCCAL PRN
Status: DISCONTINUED | OUTPATIENT
Start: 2017-11-15 | End: 2017-11-17 | Stop reason: HOSPADM

## 2017-11-15 RX ORDER — SODIUM CHLORIDE 0.9 % (FLUSH) 0.9 %
10 SYRINGE (ML) INJECTION EVERY 12 HOURS SCHEDULED
Status: DISCONTINUED | OUTPATIENT
Start: 2017-11-15 | End: 2017-11-17 | Stop reason: HOSPADM

## 2017-11-15 RX ORDER — FAMOTIDINE 20 MG/1
20 TABLET, FILM COATED ORAL 2 TIMES DAILY
Status: DISCONTINUED | OUTPATIENT
Start: 2017-11-15 | End: 2017-11-17 | Stop reason: HOSPADM

## 2017-11-15 RX ORDER — ATORVASTATIN CALCIUM 40 MG/1
40 TABLET, FILM COATED ORAL NIGHTLY
Status: DISCONTINUED | OUTPATIENT
Start: 2017-11-15 | End: 2017-11-17 | Stop reason: HOSPADM

## 2017-11-15 RX ORDER — DEXTROSE MONOHYDRATE 50 MG/ML
100 INJECTION, SOLUTION INTRAVENOUS PRN
Status: DISCONTINUED | OUTPATIENT
Start: 2017-11-15 | End: 2017-11-17 | Stop reason: HOSPADM

## 2017-11-15 RX ORDER — LOSARTAN POTASSIUM 25 MG/1
25 TABLET ORAL DAILY
Status: DISCONTINUED | OUTPATIENT
Start: 2017-11-15 | End: 2017-11-17 | Stop reason: HOSPADM

## 2017-11-15 RX ORDER — SODIUM CHLORIDE 9 MG/ML
INJECTION, SOLUTION INTRAVENOUS CONTINUOUS
Status: DISCONTINUED | OUTPATIENT
Start: 2017-11-15 | End: 2017-11-16

## 2017-11-15 RX ORDER — TAMSULOSIN HYDROCHLORIDE 0.4 MG/1
0.4 CAPSULE ORAL DAILY
Status: DISCONTINUED | OUTPATIENT
Start: 2017-11-15 | End: 2017-11-17 | Stop reason: HOSPADM

## 2017-11-15 RX ORDER — SODIUM CHLORIDE 0.9 % (FLUSH) 0.9 %
10 SYRINGE (ML) INJECTION PRN
Status: DISCONTINUED | OUTPATIENT
Start: 2017-11-15 | End: 2017-11-17 | Stop reason: HOSPADM

## 2017-11-15 RX ORDER — IPRATROPIUM BROMIDE AND ALBUTEROL SULFATE 2.5; .5 MG/3ML; MG/3ML
1 SOLUTION RESPIRATORY (INHALATION) 4 TIMES DAILY
Status: DISCONTINUED | OUTPATIENT
Start: 2017-11-15 | End: 2017-11-17 | Stop reason: HOSPADM

## 2017-11-15 RX ORDER — ACETAMINOPHEN 325 MG/1
650 TABLET ORAL EVERY 4 HOURS PRN
Status: DISCONTINUED | OUTPATIENT
Start: 2017-11-15 | End: 2017-11-17 | Stop reason: HOSPADM

## 2017-11-15 RX ORDER — DEXTROMETHORPHAN POLISTIREX 30 MG/5ML
30 SUSPENSION ORAL 2 TIMES DAILY PRN
Status: DISCONTINUED | OUTPATIENT
Start: 2017-11-15 | End: 2017-11-17 | Stop reason: HOSPADM

## 2017-11-15 RX ORDER — METFORMIN HYDROCHLORIDE 500 MG/1
2000 TABLET, EXTENDED RELEASE ORAL
Status: DISCONTINUED | OUTPATIENT
Start: 2017-11-15 | End: 2017-11-17 | Stop reason: HOSPADM

## 2017-11-15 RX ORDER — LEVOTHYROXINE SODIUM 0.1 MG/1
100 TABLET ORAL DAILY
Status: DISCONTINUED | OUTPATIENT
Start: 2017-11-15 | End: 2017-11-17 | Stop reason: HOSPADM

## 2017-11-15 RX ORDER — FUROSEMIDE 10 MG/ML
40 INJECTION INTRAMUSCULAR; INTRAVENOUS 2 TIMES DAILY
Status: DISCONTINUED | OUTPATIENT
Start: 2017-11-15 | End: 2017-11-17 | Stop reason: HOSPADM

## 2017-11-15 RX ORDER — CLOPIDOGREL BISULFATE 75 MG/1
75 TABLET ORAL DAILY
Status: DISCONTINUED | OUTPATIENT
Start: 2017-11-15 | End: 2017-11-17 | Stop reason: HOSPADM

## 2017-11-15 RX ORDER — ASPIRIN 81 MG/1
81 TABLET ORAL DAILY
Status: DISCONTINUED | OUTPATIENT
Start: 2017-11-15 | End: 2017-11-15

## 2017-11-15 RX ADMIN — IPRATROPIUM BROMIDE AND ALBUTEROL SULFATE 3 ML: .5; 3 SOLUTION RESPIRATORY (INHALATION) at 11:25

## 2017-11-15 RX ADMIN — BUDESONIDE 500 MCG: 0.5 SUSPENSION RESPIRATORY (INHALATION) at 07:54

## 2017-11-15 RX ADMIN — TAMSULOSIN HYDROCHLORIDE 0.4 MG: 0.4 CAPSULE ORAL at 09:09

## 2017-11-15 RX ADMIN — PRIMIDONE 100 MG: 50 TABLET ORAL at 14:02

## 2017-11-15 RX ADMIN — BENZONATATE 100 MG: 100 CAPSULE ORAL at 20:33

## 2017-11-15 RX ADMIN — LOSARTAN POTASSIUM 25 MG: 25 TABLET ORAL at 09:09

## 2017-11-15 RX ADMIN — FAMOTIDINE 20 MG: 20 TABLET, FILM COATED ORAL at 20:28

## 2017-11-15 RX ADMIN — PROPRANOLOL HYDROCHLORIDE 80 MG: 10 TABLET ORAL at 20:28

## 2017-11-15 RX ADMIN — METFORMIN HYDROCHLORIDE 2000 MG: 500 TABLET, EXTENDED RELEASE ORAL at 07:15

## 2017-11-15 RX ADMIN — PRIMIDONE 100 MG: 50 TABLET ORAL at 09:09

## 2017-11-15 RX ADMIN — ATORVASTATIN CALCIUM 40 MG: 40 TABLET, FILM COATED ORAL at 20:29

## 2017-11-15 RX ADMIN — DEXTROMETHORPHAN 30 MG: 30 SUSPENSION, EXTENDED RELEASE ORAL at 09:10

## 2017-11-15 RX ADMIN — IPRATROPIUM BROMIDE AND ALBUTEROL SULFATE 3 ML: .5; 3 SOLUTION RESPIRATORY (INHALATION) at 21:36

## 2017-11-15 RX ADMIN — CEFTRIAXONE 1 G: 1 INJECTION, POWDER, FOR SOLUTION INTRAMUSCULAR; INTRAVENOUS at 01:25

## 2017-11-15 RX ADMIN — FAMOTIDINE 20 MG: 20 TABLET, FILM COATED ORAL at 09:09

## 2017-11-15 RX ADMIN — ACETAMINOPHEN 650 MG: 325 TABLET, FILM COATED ORAL at 15:39

## 2017-11-15 RX ADMIN — IPRATROPIUM BROMIDE AND ALBUTEROL SULFATE 3 ML: .5; 3 SOLUTION RESPIRATORY (INHALATION) at 16:38

## 2017-11-15 RX ADMIN — PROPRANOLOL HYDROCHLORIDE 80 MG: 10 TABLET ORAL at 09:09

## 2017-11-15 RX ADMIN — SODIUM CHLORIDE: 9 INJECTION, SOLUTION INTRAVENOUS at 15:40

## 2017-11-15 RX ADMIN — BUDESONIDE 500 MCG: 0.5 SUSPENSION RESPIRATORY (INHALATION) at 21:37

## 2017-11-15 RX ADMIN — ENOXAPARIN SODIUM 40 MG: 40 INJECTION SUBCUTANEOUS at 09:10

## 2017-11-15 RX ADMIN — AZITHROMYCIN MONOHYDRATE 500 MG: 500 INJECTION, POWDER, LYOPHILIZED, FOR SOLUTION INTRAVENOUS at 01:32

## 2017-11-15 RX ADMIN — FUROSEMIDE 40 MG: 10 INJECTION, SOLUTION INTRAMUSCULAR; INTRAVENOUS at 17:16

## 2017-11-15 RX ADMIN — FUROSEMIDE 40 MG: 10 INJECTION, SOLUTION INTRAMUSCULAR; INTRAVENOUS at 09:10

## 2017-11-15 RX ADMIN — CLOPIDOGREL BISULFATE 75 MG: 75 TABLET ORAL at 09:09

## 2017-11-15 RX ADMIN — LEVOTHYROXINE SODIUM 100 MCG: 100 TABLET ORAL at 07:15

## 2017-11-15 RX ADMIN — SODIUM CHLORIDE: 9 INJECTION, SOLUTION INTRAVENOUS at 01:25

## 2017-11-15 RX ADMIN — PRIMIDONE 100 MG: 50 TABLET ORAL at 20:28

## 2017-11-15 RX ADMIN — IPRATROPIUM BROMIDE AND ALBUTEROL SULFATE 3 ML: .5; 3 SOLUTION RESPIRATORY (INHALATION) at 07:53

## 2017-11-15 ASSESSMENT — ENCOUNTER SYMPTOMS
FACIAL SWELLING: 0
BLOOD IN STOOL: 0
EYE PAIN: 0
WHEEZING: 0
COUGH: 1
DIARRHEA: 0
ABDOMINAL PAIN: 0
SINUS PRESSURE: 0
EYE REDNESS: 0
VOMITING: 0
GASTROINTESTINAL NEGATIVE: 1
SHORTNESS OF BREATH: 0
CHEST TIGHTNESS: 0
CONSTIPATION: 0
EYES NEGATIVE: 1

## 2017-11-15 ASSESSMENT — PAIN SCALES - GENERAL
PAINLEVEL_OUTOF10: 0

## 2017-11-15 NOTE — ED NOTES
Patient has not been able to obtain urine specimen, patient drinking PO fluids. Verbalized understanding to notify staff when one can be obtained.       Martha Kurtz RN  11/14/17 [id number]

## 2017-11-15 NOTE — PROGRESS NOTES
PHARMACY NOTE  Eri Rodriguez was ordered Fish oil. Per the [address], this medication is non-formulary and not stocked by pharmacy. The medication can be reordered at discharge.      Fiona Bustillos [facility] 11/15/2017, 6:58 AM

## 2017-11-15 NOTE — PROGRESS NOTES
Pt is an [de-identified]year old male admitted with pneumonia. He is alert and oriented. He lives with his spouse in a single story home with 3 steps to enter. He is independent with ADL's at home, using no DME. He manages meals well and medications with a med planner. He does not drive but his spouse is able to - he has 3 children who live out of the area. His PCP is Alejandro Mendez, he has no financial barriers to medications.      [facility] CORINA Palacios  11/15/2017

## 2017-11-15 NOTE — PROGRESS NOTES
Speech Language Pathology  Facility/Department: [facility] MED SURG   BEDSIDE SWALLOW EVALUATION    NAME: Richard Garrido  : 1935  MRN: [id number]    ADMISSION DATE: 2017  ADMITTING DIAGNOSIS: has Obesity (BMI 30.0-34.9); Venous (peripheral) insufficiency; Hx pulmonary embolism; Hearing impaired; Edema; DM (diabetes mellitus) ([address] PE (physical exam), annual; Knee osteoarthritis; Screening for condition; Depression screening; Hyponatremia; COPD exacerbation ([address] Acute respiratory failure with hypoxia and hypercarbia ([address] Chronic combined systolic and diastolic CHF (congestive heart failure) ([facility] Hypothyroidism; Pneumonia due to infectious organism; and Pneumonia on his problem list.  ONSET DATE:  BSE ordered on 11/15/2017    Recent Chest Xray/CT of Chest: 2017  Impression:     Borderline cardiomegaly, nonspecific pulmonary interstitial changes, and mild bibasilar atelectasis. Date of Eval: 11/15/2017  Evaluating Therapist: Nola Eisenmenger    Current Diet level:  Current Diet : Regular  Current Liquid Diet : Thin      Primary Complaint  Patient Complaint: Pt w/no c/o swallowing fxn. Pain: Administered by PHYLICIA Lu  Pain Assessment  Patient Currently in Pain: No  Pain Assessment: 0-10  Pain Level: 0    Reason for Referral  Chantell Vargas was referred for a bedside swallow evaluation to assess the efficiency of his swallow function, rule out aspiration and make recommendations regarding safe dietary consistencies, effective compensatory strategies, and safe eating environment. Impression  Dysphagia Diagnosis: Swallow function appears grossly intact  Dysphagia Outcome Severity Scale: Level 6: Within functional limits/Modified independence     Treatment Plan  Requires SLP Intervention: No  Duration/Frequency of Treatment: N/A  D/C Recommendations:  To be determined  Referral To: OT    Recommended Diet and Intervention  Diet Solids Recommendation: Regular  Liquid Consistency (): At least 1 percent but less than 20 percent impaired, limited or restricted  Swallow Goal Status (): At least 1 percent but less than 20 percent impaired, limited or restricted  Swallow Discharge Status (): At least 1 percent but less than 20 percent impaired, limited or restricted         Therapy Time  SLP Individual Minutes  Time In: 9379  Time Out: 3746  Minutes: 30        BSE completed this date. Pt in chair, pleasant/compliant and A&O x4. Wife present for eval. Pt presents w/tremors, however wife said it is baseline. Pt also c/o cough that has been occurring for \"2 months. \" Demo'd adequate oral-motor structures and fxn. Consumed thin via straw x5 w/no overt s/s of aspiration/penetration. Consumed regular solids (chicken breast), soft solids (pears, green beans) and thin dessert (jell-o cup) w/no overt s/s of aspiration/penetration. Pt presenting w/difficulty getting utensils to mouth d/t increased tremors. Referral to OT recommended. Rec pt consumes reg solid/thin liquid diet. Compensatory strategies reviewed include: upright for all intake and 30-45 mins post meal, eat/drink slowly, alternating foods and liquids, small bites/sips in order to reduce aspiration risk. Pt and spouse aware of SLP recs.       Giovani Lai M.S., CF-SLP  11/15/2017 1:19 PM

## 2017-11-15 NOTE — PROGRESS NOTES
Nutrition Assessment    Type and Reason for Visit: Initial    Nutrition Recommendations:  Encourage PO >75%    Malnutrition Assessment:  · Malnutrition Status: At risk for malnutrition  · Context: Acute illness or injury  · Findings of the 6 clinical characteristics of malnutrition (Minimum of 2 out of 6 clinical characteristics is required to make the diagnosis of moderate or severe Protein Calorie Malnutrition based on AND/ASPEN Guidelines):  1. Energy Intake-Greater than 75% (per interview),      2. Weight Loss-No significant weight loss,    3. Fat Loss-No significant subcutaneous fat loss,    4. Muscle Loss-No significant muscle mass loss,    5. Fluid Accumulation-Severe fluid accumulation, Extremities  6.  Strength-Not measured    Nutrition Diagnosis:   · Problem: Altered nutrition-related lab values  · Etiology: related to Endocrine dysfunction     Signs and symptoms:  as evidenced by Lab values (glucose, with diabetes and stressed state)    Nutrition Assessment:  · Subjective Assessment: Reports that his weight has been coming down from 260# with eating smaller meals and having snack in between.    · Wound Type:  None  · Current Nutrition Therapies:  · Oral Diet Orders: Carb Control 4 Carbs/Meal   · Oral Diet intake: Unable to assess (No record of intakes, denies appetite concerns)  · Oral Nutrition Supplement (ONS) Orders: None  · Anthropometric Measures:  · Ht: 5' 11\" (180.3 cm)   · Current Body Wt: 244 lb 14.4 oz (111.1 kg)  · Admission Body Wt: 237 lb (107.5 kg)  · Usual Body Wt:  (236-255# in last 5 months)  · BMI Classification: BMI 30.0 - 34.9 Obese Class I    Lab Results   Component Value Date     (L) 11/14/2017    K 4.6 11/14/2017    CL 89 (L) 11/14/2017    CO2 22 11/14/2017    BUN 20 11/14/2017    CREATININE 0.93 11/14/2017    GLUCOSE 168 (H) 11/14/2017    CALCIUM 9.1 11/14/2017    PROT 7.0 10/03/2017    LABALBU 3.8 10/03/2017    BILITOT 0.43 10/03/2017    ALKPHOS 147 (H) 10/03/2017    AST 53 (H) 10/03/2017     (H) 10/03/2017    LABGLOM >60 11/14/2017    GFRAA >60 11/14/2017     Lab Results   Component Value Date    LABA1C 7.6 (H) 09/19/2017     Lab Results   Component Value Date     09/19/2017     No results for input(s): POCGLU in the last 72 hours. No results found for: VITD25    Estimated Intake vs Estimated Needs: Insufficient Data    Nutrition Risk Level: Moderate    Reportedly intentional weight declines over time, currently elevated above prior lows. Fair glycemia chronically, which can be appropriate for his age. Denies education needs for diabetes. Discussed that he could order snacks for between meals with kitchen staff, when taking his order, to mimic his home diet.      Nutrition Interventions:   Continue current diet  Continued Inpatient Monitoring, Coordination of Care, Education declined    Nutrition Evaluation:   · Evaluation: Goals set   · Goals: PO >75% meals     · Monitoring: Meal Intake, Pertinent Labs, Ascites/Edema, Weight    Electronically signed by Mat Vasquez RD, LD on 11/15/17 at 9:34 AM    Contact Number: [phone]

## 2017-11-15 NOTE — H&P
Hospital Medicine  History and Physical    Patient:  Peter Hanks  MRN: [id number]    Chief Complaint:  Cough, fever    History Obtained From:  patient  PCP: Meg Hood NP    History of Present Illness: The patient is a [de-identified] y.o. male who presents with a 6 week history of cough, SOB, . Has apparently had several courses of antibiotics. Over the last 6 days has had increased cough, fever, chills, increased SOB. Presented to the ER and had a LLL infiltrate on CT of chest. Admitted for therapy of pneumonia. Past Medical History:        Diagnosis Date    [facility]     Diabetes mellitus ([facility]     Hx of echocardi[facility] 02/24/2017    [facility]    Hyperlipidemia     Hypothyroidism     MI (myocardial infarction)     Thyroid disease     Type II or unspecified type diabetes mellitus without mention of complication, not stated as uncontrolled        Past Surgical History:        Procedure Laterality Date    CARDIAC SURGERY  02/24/2017    Stent placed  Dr Rafat Meneses         Medications Prior to Admission:    Prior to Admission medications    Medication Sig Start Date End Date Taking?  Authorizing Provider   metFORMIN (GLUCOPHAGE) 500 MG tablet Take 2,000 mg by mouth daily (with breakfast)   Yes Historical Provider, MD   acetaminophen (TYLENOL) 500 MG tablet Take 1,000 mg by mouth every 6 hours as needed for Pain   Yes Historical Provider, MD   budesonide (PULMICORT) 0.5 MG/2ML nebulizer suspension Take 2 mLs by nebulization 2 times daily 11/6/17  Yes Gregorio Ibarra MD   benzonatate (TESSALON) 100 MG capsule Take 1 capsule by mouth 3 times daily as needed for Cough 11/6/17  Yes Ivelisse Jacobo MD   atorvastatin (LIPITOR) 40 MG tablet Take 1 tablet by mouth nightly 10/20/17  Yes Brie Snider CNP   losartan (COZAAR) 25 MG tablet Take 1 tablet by mouth daily 10/20/17  Yes Brie Snider CNP   TAMSULOSIN HCL PO Take by mouth   Yes Historical Provider, MD   furosemide (LASIX) 20 MG tablet Take 1 tablet by mouth 2 times daily 10/6/17  Yes Uriel Malin MD   potassium chloride (KLOR-CON M) 20 MEQ extended release tablet Take 1 tablet by mouth daily 10/6/17  Yes Uriel Malin MD   albuterol sulfate  (90 Base) MCG/ACT inhaler Inhale 2 puffs into the lungs every 4 hours as needed for Wheezing or Shortness of Breath 10/6/17  Yes Uriel Malin MD   tiotropium (SPIRIVA) 18 MCG inhalation capsule Inhale 18 mcg into the lungs daily   Yes Historical Provider, MD   clopidogrel (PLAVIX) 75 MG tablet Take 1 tablet by mouth daily 9/21/17  Yes Otoniel Disla PA-C   Omega-3 Fatty Acids (FISH OIL) 1000 MG CPDR Take 1,000 mg by mouth 2 times daily   Yes Historical Provider, MD   dextromethorphan (DELSYM) 30 MG/5ML extended release liquid Take 30 mg by mouth 2 times daily as needed for Cough    Yes Historical Provider, MD   Multiple Vitamins-Minerals (THERAPEUTIC MULTIVITAMIN-MINERALS) tablet Take 1 tablet by mouth daily   Yes Historical Provider, MD   cetirizine (ZYRTEC) 10 MG tablet Take 10 mg by mouth daily   Yes Historical Provider, MD   levothyroxine (SYNTHROID) 75 MCG tablet Take 100 mcg by mouth Daily    Yes Historical Provider, MD   propranolol (INDERAL) 80 MG tablet Take 80 mg by mouth 2 times daily    Yes Historical Provider, MD   primidone (MYSOLINE) 50 MG tablet Take 100 mg by mouth 3 times daily    Yes Historical Provider, MD   sitaGLIPtan-metformin (JANUMET)  MG per tablet Take 1 tablet by mouth 2 times daily (with meals) 10/12/17   Ghulam Johnson NP   ipratropium-albuterol (DUONEB) 0.5-2.5 (3) MG/3ML SOLN nebulizer solution Inhale 3 mLs into the lungs 4 times daily for 30 doses 10/5/17 10/13/17  Mirna Dsouza MD   aspirin 81 MG tablet Take 81 mg by mouth daily    Historical Provider, MD       Allergies:  Review of patient's allergies indicates no known allergies.     Social History:   TOBACCO:   reports that he

## 2017-11-15 NOTE — PROGRESS NOTES
[]  Persistentwheezes and, or absent BBS 3   Cough []  Strong, effective, & non-prod. [x]  Effective & prod. Less than 25 ml (2 TBSP) over past 24 hrs []  Ineffective & non-prod to less than 25 ML over past 24 hrs []  Ineffective and, or greater than 25 ml sputum prod. past 24 hrs. []  Nonspon- taneous; Requires suctioning 1   Pulmonary History  (PULM HX) []  No smoking and no chronic pulmonaryhistory []  Former smoker. Quit over 12 mos. ago []  Current smoker or quit w/ in 12 mos []  Pulm. History and, or 20 pk/yr smoking hx [x]  Admitted w/ acute pulm. dx and, or has been admitted w/ pulm. dx 2 or more times over past 12 mos 4   Surgical History this Admit  (SURG HX) [x]  No surgery []  General surgery []  Lower abdominal []  Thoracic or upper abdominal   []  Thoracic w/ pulm. disease 0   Chest X-Ray (CXR)/CT Scan []  Clear or not applicable []  Not available []  Atelect- asis or pleural effusions [x]  Localized infiltrate or pulm. edema []  Con-solidated Infiltrates, bilateral, or in more than 1 lobe 3   Slow or Forced VC, FEV1 OR PEFR (PULM FXN)  [x]  80% or greater, or not indicated []  Pt. unable to perform []  FEV1 or PEFR or VC 51-79%. []  FEV1 or PEFR or VC  30-49%   []  FEV1 or PEFR or VC less than 30%   0   TOTAL ACUITY: 11       CARE PLAN    If Acuity Level is 2, 3, or 4 in any of the following:    [x] BILATERAL BREATH SOUNDS (BBS)     [x] PULMONARY HISTORY (PULM HX)  [] PULMONARY FUNCTION (PULM FX)    Goal: Improve respiratory functions in patients with airway disease and decrease WOB    [x] AEROSOL PROTOCOL    Total Acuity:   16-32  []  Secondary Assessment in 24 hrs Total Acuity:  9-15  [x]  Secondary Assessment in 24 hrs Total Acuity:  4-8  []  Secondary Assessment in 48 hrs Total Acuity:  0-3  []  Secondary Assessment in 72 hrs   HHN AEROSOL THERAPY with  [physician-ordered bronchodilator(s)] q 4 & Albuterol PRN q2 hrs. Breath-Actuated Neb if BBS Acuity = 4, and pt. can use MP.  Notify physician if condition deteriorates. HHN AEROSOL THERAPY with  [physician-ordered bronchodilator(s)]  QID and Albuterol PRN q4 hrs. Breath-Actuated Neb if BBS Acuity = 4, and pt. can use MP. Notify physician if condition deteriorates. MDI THERAPY with  2 actuations of [physician-ordered bronchodilator(s)] via spacer TID Albuterol and PRNq4 hrs. If unable to utilize MDI: HHN [physician-ordered bronchodilator(s)] TID and Albuterol PRN q4 hrs. Notify physician if condition deteriorates. MDI THERAPY with  [physician-ordered bronchodilator(s)] via spacer TID PRN. If unable to utilize MDI: HHN [physician-ordered bronchodilator(s)] TID PRN. Notify physician if condition deteriorates. If Acuity Level is 2, 3, or 4 in any of the following:    [] COUGH     [] SURGICAL HISTORY (SURG HX)  [x] CHEST XRAY (CXR)    Goal: Improvement in sputum mobilization in patients with ineffective airway clearance. Reverse atelectasis. [x] Bronchopulmonary Hygiene Protocol    Total Acuity:   16-32  []  Secondary Assessment in 24 hrs Total Acuity:  9-15  [x]  Secondary Assessment in 24 hrs Total Acuity:  4-8  []  Secondary Assessment in 48 hrs Total Acuity:  0-3  []  Secondary Assessment in 72 hrs   METANEB QID with [physician-ordered bronchodilator(s)] if CXR Acuity = 4; otherwise:  PD&P, PEP, or Vest QID & PRN  NT Sxn PRN for ineffective cough  METANEB QID with [physician-ordered bronchodilator(s)] if CXR Acuity = 4; otherwise:  PD&P, PEP, or Vest TID & PRN  NT Sxn PRN for ineffective cough  Instruct patient to self-perform IS q1hr WA   Directed Cough self-performed q1hr WA     If Acuity Level is 2 or above in the following:    [] PULMONARY HISTORY (PULM HX)    Goal: Assist patient in quitting smoking to slow or stop the progression of lung disease.     [] Smoking Cessation Protocol    SMOKING CESSATION EDUCATION provided according to policy VS_952: (karson with an X)  ____Yes    ____ No     ____ NA    Smoking Cessation Booklet given:  ____Yes  ____No ____Patient Glennda Lennox

## 2017-11-15 NOTE — ED PROVIDER NOTES
[facility] ED  eMERGENCY dEPARTMENT eNCOUnter      Pt Name: Fe Miranda  MRN: [id number]  [place] 1935  Date of evaluation: 11/14/2017  [facility] N[facility] Santa MD[facility]       Chief Complaint   Patient presents with    Fever     onset this afternoon, 102 at home    Cough     ongoing x1 month       HISTORY OF PRESENT ILLNESS    Fe Miranda is a [de-identified] y.o. male who presents to the emergency department From home for cough and fever. States this cough has been persistent for the past couple of months. He has been to the ED for times, with one admission for COPD exacerbation, and discharged every time with antibiotics. However, his cough has continued. Today he developed a fever of 102F at home. He has been feeling weak. His appetite is poor. The cough is dry. Patient denies headache, visual changes, neck pain, chest pain, lightheadedness, shortness of breath, abdominal pain, nausea, vomiting, diarrhea, or dysuria. Triage notes and Nursing notes were reviewed by myself. Any discrepancies are addressed above.     PAST MEDICAL HISTORY     Past Medical History:   Diagnosis Date    [facility]) [facility] [address] [facility]     Diabetes mellitus ([facility] [address]     [facility] of [facility]24[facility]2017    [facility]    Hyperlipidemia     Hypothyroidism     MI (myocardial infarction)     Thyroid disease     Type II or unspecified type diabetes mellitus without mention of complication, not stated as uncontrolled        SURGICAL HISTORY       Past Surgical History:   Procedure Laterality Date    CARDIAC SURGERY  02/24/2017    Stent placed  Dr Jacquie Singh       Previous Medications    ACETAMINOPHEN (TYLENOL) 500 MG TABLET    Take 1,000 mg by mouth every 6 hours as needed for Pain    ALBUTEROL SULFATE  (90 BASE) MCG/ACT INHALER    Inhale 2 puffs into the lungs every 4 hours as needed for Wheezing or Shortness of Breath    ASPIRIN 81 MG TABLET    Take 81 mg by mouth daily    ATORVASTATIN (LIPITOR) 40 MG TABLET    Take 1 tablet by mouth nightly    BENZONATATE (TESSALON) 100 MG CAPSULE    Take 1 capsule by mouth 3 times daily as needed for Cough    BUDESONIDE (PULMICORT) 0.5 MG/2ML NEBULIZER SUSPENSION    Take 2 mLs by nebulization 2 times daily    CETIRIZINE (ZYRTEC) 10 MG TABLET    Take 10 mg by mouth daily    CLOPIDOGREL (PLAVIX) 75 MG TABLET    Take 1 tablet by mouth daily    DEXTROMETHORPHAN (DELSYM) 30 MG/5ML EXTENDED RELEASE LIQUID    Take 30 mg by mouth 2 times daily as needed for Cough     FUROSEMIDE (LASIX) 20 MG TABLET    Take 1 tablet by mouth 2 times daily    IPRATROPIUM-ALBUTEROL (DUONEB) 0.5-2.5 (3) MG/3ML SOLN NEBULIZER SOLUTION    Inhale 3 mLs into the lungs 4 times daily for 30 doses    LEVOTHYROXINE (SYNTHROID) 75 MCG TABLET    Take 100 mcg by mouth Daily     LOSARTAN (COZAAR) 25 MG TABLET    Take 1 tablet by mouth daily    METFORMIN (GLUCOPHAGE) 500 MG TABLET    Take 2,000 mg by mouth daily (with breakfast)    MULTIPLE VITAMINS-MINERALS (THERAPEUTIC MULTIVITAMIN-MINERALS) TABLET    Take 1 tablet by mouth daily    OMEGA-3 FATTY ACIDS (FISH OIL) 1000 MG CPDR    Take 1,000 mg by mouth 2 times daily    POTASSIUM CHLORIDE (KLOR-CON M) 20 MEQ EXTENDED RELEASE TABLET    Take 1 tablet by mouth daily    PRIMIDONE (MYSOLINE) 50 MG TABLET    Take 100 mg by mouth 3 times daily     PROPRANOLOL (INDERAL) 80 MG TABLET    Take 80 mg by mouth 2 times daily     SITAGLIPTAN-METFORMIN (JANUMET)  MG PER TABLET    Take 1 tablet by mouth 2 times daily (with meals)    TAMSULOSIN HCL PO    Take by mouth    TIOTROPIUM (SPIRIVA) 18 MCG INHALATION CAPSULE    Inhale 18 mcg into the lungs daily       ALLERGIES     Review of patient's allergies indicates no known allergies.     FAMILY HISTORY       Family History   Problem Relation Age of Onset    Cancer Mother 47     liver ca    Diabetes Father    [facility] Heart Disease Father         SOCIAL HISTORY       Social History     Social History    Marital status:      Spouse name: N/A    Number of children: N/A    Years of education: N/A     Social History Main Topics    Smoking status: Former Smoker    Smokeless tobacco: Never Used    Alcohol use Yes      Comment: occ    Drug use: No    Sexual activity: Not Asked     Other Topics Concern    None     Social History Narrative    None       REVIEW OF SYSTEMS       Review of Systems   Constitutional: Positive for chills and fever. HENT: Negative. Negative for congestion, facial swelling and sinus pressure. Eyes: Negative. Negative for pain and redness. Respiratory: Positive for cough. Negative for chest tightness, shortness of breath and wheezing. Cardiovascular: Positive for leg swelling. Negative for chest pain. Gastrointestinal: Negative. Negative for abdominal pain, blood in stool, constipation, diarrhea and vomiting. Genitourinary: Negative. Negative for dysuria and hematuria. Musculoskeletal: Negative. Negative for arthralgias and neck pain. Skin: Negative. Negative for pallor and wound. Neurological: Positive for weakness. Negative for dizziness, light-headedness, numbness and headaches. Psychiatric/Behavioral: Negative. Negative for suicidal ideas. All other systems reviewed and are negative. Except as noted above the remainder of the review of systems was reviewed and is negative. PHYSICAL EXAM    (up to 7 for level 4, 8 or more for level 5)     ED Triage Vitals [11/14/17 2001]   BP Temp Temp Source Pulse Resp SpO2 Height Weight   (!) 123/58 101.1 °F (38.4 °C) Tympanic 79 20 91 % 5' 11\" (1.803 m) 237 lb (107.5 kg)       Physical Exam   Constitutional: He is oriented to person, place, and time. He appears well-developed and well-nourished. No distress. HENT:   Head: Normocephalic and atraumatic.    Mouth/Throat: Oropharynx is clear and moist.   Eyes: Conjunctivae and EOM are normal. Pupils are equal, round, and reactive to light. Neck: Normal range of motion. Neck supple. No meningismus   Cardiovascular: Normal rate, regular rhythm and normal heart sounds. No murmur heard. Pulmonary/Chest: Effort normal and breath sounds normal. No respiratory distress. He has no wheezes. He exhibits no tenderness. Abdominal: Soft. Bowel sounds are normal. There is no tenderness. There is no rebound and no guarding. Musculoskeletal: Normal range of motion. He exhibits edema (chronic 2+ pitting edema lower extremities). He exhibits no tenderness or deformity. Neurological: He is alert and oriented to person, place, and time. No cranial nerve deficit. No focal neurological deficits   Skin:   Warm to touch   Nursing note and vitals reviewed. DIAGNOSTIC RESULTS     EKG: (none if blank)  All EKG's are interpreted by the Emergency Department Physician who either signs or Co-signs this chart in the absence of a cardiologist.    Sinus rhythm at 76 bpm, normal axis, occasional PVC, no intervals, no acute ischemic findings. RADIOLOGY: (none if blank)   Interpretation per the Radiologist below, if available at the time of this note:    CT CHEST WO CONTRAST   Final Result   Impression:     Left lower lobe pulmonary infiltrate most consistent with pneumonia          Electronically Signed By: Yessenia Quiroga   on  11/14/2017  23:41      XR Chest Portable   Final Result   Impression:     Borderline cardiomegaly, nonspecific pulmonary interstitial changes, and mild bibasilar atelectasis.          Electronically Signed By: Francisco Javier Greer   on  11/14/2017  20:57          LABS:  Labs Reviewed   CBC WITH AUTO DIFFERENTIAL - Abnormal; Notable for the following:        Result Value    WBC 14.1 (*)     RBC 4.02 (*)     Hemoglobin 11.6 (*)     Hematocrit 35.3 (*)     Segs Absolute 10.50 (*)     Absolute Mono # 1.80 (*)     Basophils # 0.50 (*)     All other components within normal limits BASIC METABOLIC PANEL - Abnormal; Notable for the following:     Glucose 168 (*)     Bun/Cre Ratio 22 (*)     Sodium 126 (*)     Chloride 89 (*)     All other components within normal limits   URINALYSIS - Abnormal; Notable for the following:     Protein, UA TRACE (*)     Leukocyte Esterase, Urine TRACE (*)     All other components within normal limits   MICROSCOPIC URINALYSIS - Abnormal; Notable for the following:     Bacteria, UA TRACE (*)     Mucus, UA 1+ (*)     All other components within normal limits   CULTURE BLOOD #1   CULTURE BLOOD #1   URINE CULTURE   TROPONIN   LACTIC ACID, PLASMA       All other labs were within normal range or not returned as of this dictation. EMERGENCY DEPARTMENT COURSE and Medical Decision Making:     MDM/  ED Course      Patient presents for cough and fever. The patient is febrile, Slightly hypoxic at 91% on room air, but otherwise hemodynamically stable. Placed on supplemental oxygen. Physical exam above with no signs of distress. Patient was worked up for his symptoms. He was given Motrin for the fever. Laboratory studies remarkable for leukocytosis of 14, normal renal function, normal lactate, negative troponin. Blood and urine cultures pending. Chest x-ray unremarkable. However, given the persistence of his cough and fever, CT of the chest was obtained which was positive for left lower lobe pneumonia. At this time, given failure of outpatient antibiotics, the patient will be admitted. He was last admitted within the past 2 months, so he'll be treated as healthcare associated pneumonia with vancomycin, Zosyn, Levaquin. Patient is stable for the medical floor. Spoke to Dr. Isaac Cartwright who accepted admission. Strict return precautions and follow up instructions were discussed with the patient with which the patient agrees    CONSULTS: (None if blank)  None    Procedures: (None if blank)       CLINICAL IMPRESSION      1. HCAP (healthcare-associated pneumonia)    2. Sepsis, due to unspecified organism ([facility] [address]    3. Hypoxia          DISPOSITION/PLAN   DISPOSITION admission    PATIENT REFERRED TO:  No follow-up provider specified.     DISCHARGE MEDICATIONS:  New Prescriptions    No medications on file              (Please note that portions of this note were completed with a voice recognition program.  Efforts were made to edit the dictations but occasionally words are mis-transcribed.)      Cooper Livingston MD, [facility] (electronically signed)  Attending Emergency Physician          Cooper Livingston MD  11/15/17 1671       Cooper Livingston MD  11/15/17 0028

## 2017-11-16 LAB
ABSOLUTE BANDS #: 0.49 K/UL (ref 0–1)
ABSOLUTE EOS #: 0 K/UL (ref 0–0.4)
ABSOLUTE IMMATURE GRANULOCYTE: ABNORMAL K/UL (ref 0–0.3)
ABSOLUTE LYMPH #: 1.23 K/UL (ref 1–4.8)
ABSOLUTE MONO #: 0.86 K/UL (ref 0–1)
ANION GAP SERPL CALCULATED.3IONS-SCNC: 16 MMOL/L (ref 9–17)
BANDS: 4 %
BASOPHILS # BLD: 0 %
BASOPHILS ABSOLUTE: 0 K/UL (ref 0–0.2)
BUN BLDV-MCNC: 15 MG/DL (ref 8–23)
BUN/CREAT BLD: 17 (ref 9–20)
CALCIUM SERPL-MCNC: 8.4 MG/DL (ref 8.6–10.4)
CHLORIDE BLD-SCNC: 92 MMOL/L (ref 98–107)
CO2: 21 MMOL/L (ref 20–31)
CREAT SERPL-MCNC: 0.87 MG/DL (ref 0.7–1.2)
DATE, STOOL #1: NORMAL
DATE, STOOL #2: NORMAL
DATE, STOOL #3: NORMAL
DIFFERENTIAL TYPE: ABNORMAL
EOSINOPHILS RELATIVE PERCENT: 0 %
GFR AFRICAN AMERICAN: >60 ML/MIN
GFR NON-AFRICAN AMERICAN: >60 ML/MIN
GFR SERPL CREATININE-BSD FRML MDRD: ABNORMAL ML/MIN/{1.73_M2}
GFR SERPL CREATININE-BSD FRML MDRD: ABNORMAL ML/MIN/{1.73_M2}
GLUCOSE BLD-MCNC: 117 MG/DL (ref 74–100)
GLUCOSE BLD-MCNC: 120 MG/DL (ref 70–99)
GLUCOSE BLD-MCNC: 127 MG/DL (ref 74–100)
GLUCOSE BLD-MCNC: 131 MG/DL (ref 74–100)
GLUCOSE BLD-MCNC: 173 MG/DL (ref 74–100)
HCT VFR BLD CALC: 32.7 % (ref 41–53)
HEMOCCULT SP1 STL QL: NEGATIVE
HEMOCCULT SP2 STL QL: NORMAL
HEMOCCULT SP3 STL QL: NORMAL
HEMOGLOBIN: 10.6 G/DL (ref 13.5–17)
IMMATURE GRANULOCYTES: ABNORMAL %
LYMPHOCYTES # BLD: 10 %
MCH RBC QN AUTO: 28.6 PG (ref 26–34)
MCHC RBC AUTO-ENTMCNC: 32.5 G/DL (ref 31–37)
MCV RBC AUTO: 87.7 FL (ref 80–100)
MONOCYTES # BLD: 7 %
MORPHOLOGY: NORMAL
PDW BLD-RTO: 14.7 % (ref 12.1–15.2)
PLATELET # BLD: 275 K/UL (ref 140–450)
PLATELET ESTIMATE: ABNORMAL
PMV BLD AUTO: 8.6 FL (ref 6–12)
POTASSIUM SERPL-SCNC: 3.8 MMOL/L (ref 3.7–5.3)
RBC # BLD: 3.73 M/UL (ref 4.5–5.9)
RBC # BLD: ABNORMAL 10*6/UL
SEG NEUTROPHILS: 79 %
SEGMENTED NEUTROPHILS ABSOLUTE COUNT: 9.72 K/UL (ref 1.8–7.7)
SODIUM BLD-SCNC: 129 MMOL/L (ref 135–144)
TIME, STOOL #1: 1020
TIME, STOOL #2: NORMAL
TIME, STOOL #3: NORMAL
WBC # BLD: 12.3 K/UL (ref 3.5–11)
WBC # BLD: ABNORMAL 10*3/UL

## 2017-11-16 PROCEDURE — 94761 N-INVAS EAR/PLS OXIMETRY MLT: CPT

## 2017-11-16 PROCEDURE — 80048 BASIC METABOLIC PNL TOTAL CA: CPT

## 2017-11-16 PROCEDURE — 6370000000 HC RX 637 (ALT 250 FOR IP): Performed by: INTERNAL MEDICINE

## 2017-11-16 PROCEDURE — 6360000002 HC RX W HCPCS: Performed by: INTERNAL MEDICINE

## 2017-11-16 PROCEDURE — G8979 MOBILITY GOAL STATUS: HCPCS

## 2017-11-16 PROCEDURE — 36415 COLL VENOUS BLD VENIPUNCTURE: CPT

## 2017-11-16 PROCEDURE — 82272 OCCULT BLD FECES 1-3 TESTS: CPT

## 2017-11-16 PROCEDURE — 94668 MNPJ CHEST WALL SBSQ: CPT

## 2017-11-16 PROCEDURE — G8987 SELF CARE CURRENT STATUS: HCPCS

## 2017-11-16 PROCEDURE — 6370000000 HC RX 637 (ALT 250 FOR IP): Performed by: PHYSICIAN ASSISTANT

## 2017-11-16 PROCEDURE — 2580000003 HC RX 258: Performed by: INTERNAL MEDICINE

## 2017-11-16 PROCEDURE — 94640 AIRWAY INHALATION TREATMENT: CPT

## 2017-11-16 PROCEDURE — 82947 ASSAY GLUCOSE BLOOD QUANT: CPT

## 2017-11-16 PROCEDURE — 94664 DEMO&/EVAL PT USE INHALER: CPT

## 2017-11-16 PROCEDURE — 1200000000 HC SEMI PRIVATE

## 2017-11-16 PROCEDURE — G8988 SELF CARE GOAL STATUS: HCPCS

## 2017-11-16 PROCEDURE — 97161 PT EVAL LOW COMPLEX 20 MIN: CPT

## 2017-11-16 PROCEDURE — 85025 COMPLETE CBC W/AUTO DIFF WBC: CPT

## 2017-11-16 PROCEDURE — G8978 MOBILITY CURRENT STATUS: HCPCS

## 2017-11-16 RX ORDER — POLYETHYLENE GLYCOL 3350 17 G/17G
17 POWDER, FOR SOLUTION ORAL DAILY PRN
Status: DISCONTINUED | OUTPATIENT
Start: 2017-11-16 | End: 2017-11-17 | Stop reason: HOSPADM

## 2017-11-16 RX ADMIN — FUROSEMIDE 40 MG: 10 INJECTION, SOLUTION INTRAMUSCULAR; INTRAVENOUS at 10:55

## 2017-11-16 RX ADMIN — AZITHROMYCIN MONOHYDRATE 500 MG: 500 INJECTION, POWDER, LYOPHILIZED, FOR SOLUTION INTRAVENOUS at 00:59

## 2017-11-16 RX ADMIN — PRIMIDONE 100 MG: 50 TABLET ORAL at 13:40

## 2017-11-16 RX ADMIN — DEXTROMETHORPHAN 30 MG: 30 SUSPENSION, EXTENDED RELEASE ORAL at 06:58

## 2017-11-16 RX ADMIN — CLOPIDOGREL BISULFATE 75 MG: 75 TABLET ORAL at 09:25

## 2017-11-16 RX ADMIN — PRIMIDONE 100 MG: 50 TABLET ORAL at 09:25

## 2017-11-16 RX ADMIN — PROPRANOLOL HYDROCHLORIDE 80 MG: 10 TABLET ORAL at 09:24

## 2017-11-16 RX ADMIN — SODIUM CHLORIDE: 9 INJECTION, SOLUTION INTRAVENOUS at 06:43

## 2017-11-16 RX ADMIN — PROPRANOLOL HYDROCHLORIDE 80 MG: 10 TABLET ORAL at 21:18

## 2017-11-16 RX ADMIN — FAMOTIDINE 20 MG: 20 TABLET, FILM COATED ORAL at 09:25

## 2017-11-16 RX ADMIN — IPRATROPIUM BROMIDE AND ALBUTEROL SULFATE 3 ML: .5; 3 SOLUTION RESPIRATORY (INHALATION) at 06:25

## 2017-11-16 RX ADMIN — LOSARTAN POTASSIUM 25 MG: 25 TABLET ORAL at 09:25

## 2017-11-16 RX ADMIN — IPRATROPIUM BROMIDE AND ALBUTEROL SULFATE 3 ML: .5; 3 SOLUTION RESPIRATORY (INHALATION) at 16:50

## 2017-11-16 RX ADMIN — METFORMIN HYDROCHLORIDE 2000 MG: 500 TABLET, EXTENDED RELEASE ORAL at 09:30

## 2017-11-16 RX ADMIN — CEFTRIAXONE 1 G: 1 INJECTION, POWDER, FOR SOLUTION INTRAMUSCULAR; INTRAVENOUS at 02:01

## 2017-11-16 RX ADMIN — ENOXAPARIN SODIUM 40 MG: 40 INJECTION SUBCUTANEOUS at 09:25

## 2017-11-16 RX ADMIN — PRIMIDONE 100 MG: 50 TABLET ORAL at 21:18

## 2017-11-16 RX ADMIN — IPRATROPIUM BROMIDE AND ALBUTEROL SULFATE 3 ML: .5; 3 SOLUTION RESPIRATORY (INHALATION) at 12:12

## 2017-11-16 RX ADMIN — IPRATROPIUM BROMIDE AND ALBUTEROL SULFATE 3 ML: .5; 3 SOLUTION RESPIRATORY (INHALATION) at 20:11

## 2017-11-16 RX ADMIN — TAMSULOSIN HYDROCHLORIDE 0.4 MG: 0.4 CAPSULE ORAL at 09:25

## 2017-11-16 RX ADMIN — FUROSEMIDE 40 MG: 10 INJECTION, SOLUTION INTRAMUSCULAR; INTRAVENOUS at 17:23

## 2017-11-16 RX ADMIN — INSULIN LISPRO 1 UNITS: 100 INJECTION, SOLUTION INTRAVENOUS; SUBCUTANEOUS at 12:08

## 2017-11-16 RX ADMIN — LEVOTHYROXINE SODIUM 100 MCG: 100 TABLET ORAL at 06:56

## 2017-11-16 RX ADMIN — BUDESONIDE 500 MCG: 0.5 SUSPENSION RESPIRATORY (INHALATION) at 12:12

## 2017-11-16 RX ADMIN — BUDESONIDE 500 MCG: 0.5 SUSPENSION RESPIRATORY (INHALATION) at 20:11

## 2017-11-16 RX ADMIN — FAMOTIDINE 20 MG: 20 TABLET, FILM COATED ORAL at 21:18

## 2017-11-16 RX ADMIN — ATORVASTATIN CALCIUM 40 MG: 40 TABLET, FILM COATED ORAL at 21:18

## 2017-11-16 ASSESSMENT — PAIN SCALES - GENERAL
PAINLEVEL_OUTOF10: 0
PAINLEVEL_OUTOF10: 0

## 2017-11-16 NOTE — PROGRESS NOTES
Physical Therapy    Facility/Department: [facility] AT THE [facility] MED SURG  Initial Assessment    NAME: Daniela Arzate  : 1935  MRN: [id number]    Date of Service: 2017    Patient Diagnosis(es): The primary encounter diagnosis was HCAP (healthcare-associated pneumonia). Diagnoses of Sepsis, due to unspecified organism [facility]) and Hypoxia were also pertinent to this visit. has a past medical history of COPD (chronic obstructive pulmonary disease) ([facility] [address] Diabetes mellitus ([facility] [address] Hx of echocardiogram; Hyperlipidemia; Hypothyroidism; MI (myocardial infarction); Thyroid disease; and Type II or unspecified type diabetes mellitus without mention of complication, not stated as uncontrolled. has a past surgical history that includes hernia repair; fracture surgery; and Cardiac surgery (2017).     Restrictions  Restrictions/Precautions  Restrictions/Precautions: General Precautions, Fall Risk  Vision/Hearing  Vision: Within Functional Limits  Hearing: Within functional limits     Subjective  General  Chart Reviewed: Yes  Patient assessed for rehabilitation services?: Yes  Pain Screening  Patient Currently in Pain: Denies  Vital Signs  Patient Currently in Pain: Denies     Social/Functional History  Social/Functional History  Lives With: Spouse  Type of Home: House  Home Layout: One level  Home Access: Stairs to enter with rails  Entrance Stairs - Number of Steps: 3  Entrance Stairs - Rails: Both  Bathroom Shower/Tub: Walk-in shower  Bathroom Toilet: Handicap height  Bathroom Equipment: Grab bars in shower  Bathroom Accessibility: Accessible  Home Equipment: [employer] Help From: Family  ADL Assistance: Independent  Homemaking Assistance: Independent  Homemaking Responsibilities: Yes  Ambulation Assistance: Independent  Transfer Assistance: Independent  Active : Yes  Mode of Transportation: Car  Occupation: Retired  Leisure & Hobbies: TV  Additional Comments: patient stated he is independent with ADLs and utilizes SC only when outdoors   Objective  PROM RLE (degrees)  RLE PROM: WFL  AROM RLE (degrees)  RLE AROM: WFL  PROM LLE (degrees)  LLE PROM: WFL  AROM LLE (degrees)  LLE AROM : WFL  Strength RLE  Comment: grossly 4-/5   Strength LLE  Comment: grossly 4-/5            Transfers  Sit to Stand: Stand by assistance  Stand to sit: Stand by assistance  Ambulation  Ambulation?: Yes  Ambulation 1  Surface: level tile  Device: No Device  Assistance: Contact guard assistance  Quality of Gait: patient demonstrated flexed forward posture and antalgic gait   Distance: 10ftx2   Comments: pt refused to ambulate in hallway this session but agreed to walk in patient's room due to being tired      Balance  Sitting - Static: Good  Sitting - Dynamic: Good  Standing - Static: Fair;+  Standing - Dynamic: Fair        Assessment   Body structures, Functions, Activity limitations: Decreased functional mobility ; Decreased strength;Decreased endurance;Decreased balance  Treatment Diagnosis: general debility   Prognosis: Good  Decision Making: Low Complexity  Patient Education: educated patient on PT POC with good understanding   REQUIRES PT FOLLOW UP: Yes  Activity Tolerance  Activity Tolerance: Patient Tolerated treatment well     Discharge Recommendations:  Continue to assess pending progress      Plan   Plan  Times per week: 7x/week   Times per day: Twice a day  Current Treatment Recommendations: Strengthening, Balance Training, Functional Mobility Training, Transfer Training, Endurance Training, Gait Training, Neuromuscular Re-education, Stair training, Home Exercise Program, Safety Education & Training, Patient/Caregiver Education & Training, Equipment Evaluation, Education, & procurement  Safety Devices  Type of devices: Left in chair, Call light within reach (pt was not on chair alarm prior to therapist arriving )    G-Code  PT G-Codes  Functional Limitation: Mobility: Walking and moving around  Mobility: Walking and Moving Around Current Status (): At least 40 percent but less than 60 percent impaired, limited or restricted  Mobility: Walking and Moving Around Goal Status ():  At least 1 percent but less than 20 percent impaired, limited or restricted    Goals  Short term goals  Time Frame for Short term goals: 14 visits   Short term goal 1: Patient will demonstrate the ability to ambulate community distances with LRAD with supervision assistance in order to return to PLOF   Short term goal 2: Patient will demonstrate fair (+) dynamic standing balance in order to reduce fall risk   Short term goal 3: Patient will demonstrate B LE strength grossly 4+/5 in order to ease ambulation   Short term goal 4: Patient will tolerate 35-45' ther-ex in order to increase endurance and ease ADLs        Therapy Time   Individual Concurrent Group Co-treatment   Time In 1459         Time Out 1510         Minutes 11         Timed Code Treatment Minutes: [address], PT, DPT

## 2017-11-16 NOTE — PROGRESS NOTES
Limits  Hearing: Within functional limits    Orientation  Overall Orientation Status: Within Normal Limits     Balance  Sitting Balance: Independent  Standing Balance: Stand by assistance  Standing Balance  Sit to stand: Stand by assistance  Stand to sit: Stand by assistance  Functional Mobility  Functional - Mobility Device: No device  Toilet Transfers  Toilet Transfer: Stand by assistance  Shower Transfers  Shower Transfers: Not tested  ADL  Feeding: Independent  Grooming: Setup  UE Bathing: Setup  LE Bathing: Setup  UE Dressing: Setup  LE Dressing: Setup  Toileting: Supervision           Transfers  Sit to stand: Stand by assistance  Stand to sit: Stand by assistance  Vision - Basic Assessment  Prior Vision: Wears glasses all the time  Cognition  Overall Cognitive Status: WNL  Perception  Overall Perceptual Status: WFL     Sensation  Overall Sensation Status: WFL        LUE AROM (degrees)  LUE AROM : WFL  RUE AROM (degrees)  RUE AROM : WFL  LUE Strength  Gross LUE Strength: WFL  RUE Strength  Gross RUE Strength: WFL                  Assessment   Performance deficits / Impairments: Decreased functional mobility ; Decreased ADL status; Decreased safe awareness;Decreased endurance  Treatment Diagnosis: pneumonia  Prognosis: Good  Decision Making: Medium Complexity  Assistance / Modification: SBA  Discharge Recommendations: Home with assist PRN  REQUIRES OT FOLLOW UP: Yes  Activity Tolerance  Activity Tolerance: Patient Tolerated treatment well  Safety Devices  Safety Devices in place: Yes  Type of devices: Call light within reach  OT Equipment Recommendations  Equipment Needed: No        Discharge Recommendations:  Home with assist PRN     Plan   Plan  Times per day: Daily  Current Treatment Recommendations: Strengthening, Functional Mobility Training, Endurance Training, Safety Education & Training, Self-Care / ADL    G-Code  OT G-codes  Functional Limitation: Self care  Self Care Current Status ():  At least 20

## 2017-11-16 NOTE — PROGRESS NOTES
RESPIRATORY ASSESSMENT PROTOCOL                                                                                              Patient Name: Radha Oconnor Room#: 8387/3400-07 : 1935     Admitting diagnosis: Pneumonia [J18.9]       Medical History:   Past Medical History:   Diagnosis Date    [facility]     Diabetes mellitus ([facility]     [facility] of [facility]ogram 2017    [facility]    Hyperlipidemia     Hypothyroidism     MI (myocardial infarction)     Thyroid disease     Type II or unspecified type diabetes mellitus without mention of complication, not stated as uncontrolled        PATIENT ASSESSMENT    LABORATORY DATA  Hematology:   Lab Results   Component Value Date    WBC 12.3 2017    RBC 3.73 2017    HGB 10.6 2017    HCT 32.7 2017     2017     Chemistry:    Lab Results   Component Value Date    PHART 7.340 2017    GLG6XJB 53.8 2017    PO2ART 62.0 2017    U3WQFJBC 90.1 2017    ADZ8XOK 28.4 2017    PBEA 1.4 2017       VITALS  Pulse: 77   Resp: 18  BP: 133/85  SpO2: 96 % O2 Device: None (Room air)  Temp: 98.3 °F (36.8 °C)    SKIN COLOR  [x] Normal  [] Pale  [] Dusky  [] Cyanotic    RESPIRATORY PATTERN  [x] Normal  [] Dyspnea  [] Cheyne-Veras  [] Kussmaul  [] Biots    AMBULATORY  [x] Yes  [] No  [] With Assistance      Patient Acuity 0 1 2 3 4 Score   Level of Concious (LOC) [x]  Alert & Oriented or Pt normal LOC []  Confused;follows directions []  Confused & uncooper-ative []  Obtunded []  Comatose 0   Respiratory Rate  (RR) [x]  Reg. rate & pattern. 12 - 20 bpm  []  Increased RR.  Greater than 20 bpm   []  SOB w/ exertion or RR greater than 24 bpm []  Access- ory muscle use at rest. Abn.  resp. []  SOB at rest.   0   Bilateral Breath Sounds (BBS) []  Clear []  Diminish-ed bases  [x]  Diminish-ed t/o, or rales   []  Sporadic, scattered wheezes or rhonchi []  Persistentwheezes and, or absent BBS 2   Cough []  Strong, effective, & non-prod. [x]  Effective & prod. Less than 25 ml (2 TBSP) over past 24 hrs []  Ineffective & non-prod to less than 25 ML over past 24 hrs []  Ineffective and, or greater than 25 ml sputum prod. past 24 hrs. []  Nonspon- taneous; Requires suctioning 1   Pulmonary History  (PULM HX) []  No smoking and no chronic pulmonaryhistory []  Former smoker. Quit over 12 mos. ago []  Current smoker or quit w/ in 12 mos []  Pulm. History and, or 20 pk/yr smoking hx [x]  Admitted w/ acute pulm. dx and, or has been admitted w/ pulm. dx 2 or more times over past 12 mos 4   Surgical History this Admit  (SURG HX) [x]  No surgery []  General surgery []  Lower abdominal []  Thoracic or upper abdominal   []  Thoracic w/ pulm. disease 0   Chest X-Ray (CXR)/CT Scan []  Clear or not applicable []  Not available []  Atelect- asis or pleural effusions [x]  Localized infiltrate or pulm. edema []  Con-solidated Infiltrates, bilateral, or in more than 1 lobe 3   Slow or Forced VC, FEV1 OR PEFR (PULM FXN)  [x]  80% or greater, or not indicated []  Pt. unable to perform []  FEV1 or PEFR or VC 51-79%. []  FEV1 or PEFR or VC  30-49%   []  FEV1 or PEFR or VC less than 30%   0   TOTAL ACUITY: 10       CARE PLAN    If Acuity Level is 2, 3, or 4 in any of the following:    [] BILATERAL BREATH SOUNDS (BBS)     [] PULMONARY HISTORY (PULM HX)  [] PULMONARY FUNCTION (PULM FX)    Goal: Improve respiratory functions in patients with airway disease and decrease WOB    [x] AEROSOL PROTOCOL    Total Acuity:   16-32  []  Secondary Assessment in 24 hrs Total Acuity:  9-15  [x]  Secondary Assessment in 24 hrs Total Acuity:  4-8  []  Secondary Assessment in 48 hrs Total Acuity:  0-3  []  Secondary Assessment in 72 hrs   HHN AEROSOL THERAPY with  [physician-ordered bronchodilator(s)] q 4 & Albuterol PRN q2 hrs. Breath-Actuated Neb if BBS Acuity = 4, and pt. can use MP.  Notify physician if condition deteriorates. HHN AEROSOL THERAPY with  [physician-ordered bronchodilator(s)]  QID and Albuterol PRN q4 hrs. Breath-Actuated Neb if BBS Acuity = 4, and pt. can use MP. Notify physician if condition deteriorates. MDI THERAPY with  2 actuations of [physician-ordered bronchodilator(s)] via spacer TID Albuterol and PRNq4 hrs. If unable to utilize MDI: HHN [physician-ordered bronchodilator(s)] TID and Albuterol PRN q4 hrs. Notify physician if condition deteriorates. MDI THERAPY with  [physician-ordered bronchodilator(s)] via spacer TID PRN. If unable to utilize MDI: HHN [physician-ordered bronchodilator(s)] TID PRN. Notify physician if condition deteriorates. If Acuity Level is 2, 3, or 4 in any of the following:    [] COUGH     [] SURGICAL HISTORY (SURG HX)  [] CHEST XRAY (CXR)    Goal: Improvement in sputum mobilization in patients with ineffective airway clearance. Reverse atelectasis. [] Bronchopulmonary Hygiene Protocol    Total Acuity:   16-32  []  Secondary Assessment in 24 hrs Total Acuity:  9-15  []  Secondary Assessment in 24 hrs Total Acuity:  4-8  []  Secondary Assessment in 48 hrs Total Acuity:  0-3  []  Secondary Assessment in 72 hrs   METANEB QID with [physician-ordered bronchodilator(s)] if CXR Acuity = 4; otherwise:  PD&P, PEP, or Vest QID & PRN  NT Sxn PRN for ineffective cough  METANEB QID with [physician-ordered bronchodilator(s)] if CXR Acuity = 4; otherwise:  PD&P, PEP, or Vest TID & PRN  NT Sxn PRN for ineffective cough  Instruct patient to self-perform IS q1hr WA   Directed Cough self-performed q1hr WA     If Acuity Level is 2 or above in the following:    [] PULMONARY HISTORY (PULM HX)    Goal: Assist patient in quitting smoking to slow or stop the progression of lung disease.     [] Smoking Cessation Protocol    SMOKING CESSATION EDUCATION provided according to policy [id number]_184: (karson with an X)  ____Yes    ____ No     ____ NA    Smoking Cessation Booklet

## 2017-11-16 NOTE — PLAN OF CARE
Problem: Falls - Risk of  Goal: Absence of falls  Outcome: Ongoing  Patient is alert and oriented and has demonstrated the use of using the call light for assistance before getting up. Education has been provided to defer the use of the bed alarm and/or restraint free alarm as the patient has shown competency in calling for assistance and verbalizing the risk. Problem: Airway Clearance - Ineffective:  Goal: Clear lung sounds  Clear lung sounds   Outcome: Ongoing  Lungs sound clear with some expiratory wheeze. Will continue to monitor    Problem: Fluid Volume - Deficit:  Goal: Achieves intake and output within specified parameters  Achieves intake and output within specified parameters   Outcome: Ongoing  Monitoring intake and output. Normal saline infusing at 75 ml/hr. Will continue to monitor. Problem: Hyperthermia:  Goal: Ability to maintain a body temperature in the normal range will improve  Ability to maintain a body temperature in the normal range will improve   Outcome: Ongoing  Vitals are stable. Will continue to monitor.

## 2017-11-16 NOTE — PROGRESS NOTES
Hospitalist Progress Note    SUBJECTIVE/INTERVAL HISTORY:    Patient seen in follow up for CHF, hyponatremia, COPD, LLL PNA, DM. He is feeling better than on admission. Breathing easier, but still SOB. Has productive cough, but states he \"doesn't look at it. \" Denies fever and chills. T max 100.6F. Hb decreased to 10.6. Heme stool negative. OBJECTIVE:    Vitals:   Temp: 98.3 °F (36.8 °C)  BP: 133/85  Resp: 18  Pulse: 77  SpO2: 96 %  24HR INTAKE/OUTPUT:    Intake/Output Summary (Last 24 hours) at 11/16/17 1207  Last data filed at 11/16/17 0658   Gross per 24 hour   Intake          3124.71 ml   Output             2350 ml   Net           774.71 ml       -----------------------------------------------------------------  Review of Systems:  Constitutional:negative  for fevers, and negative for chills. Eyes: negative for visual disturbance   ENT: negative for sore throat, negative nasal congestion, and negative for earache  Respiratory: positive for shortness of breath, positive for cough, and negative for wheezing  Cardiovascular: negative for chest pain, negative for palpitations, and negative for syncope  Gastrointestinal: negative for abdominal pain, negative for nausea,negative for vomiting, negative for diarrhea, negative for constipation, and negative for hematochezia or melena  Genitourinary: negative for dysuria, negative for urinary urgency, negative for urinary frequency, and negative for hematuria  Skin: negative for skin rash, and negative for skin lesions  Neurological: negative for unilateral weakness, numbness or tingling.     Exam:  GEN:  alert and oriented to person, place and time, well-developed and well-nourished, in no acute distress  EYES: PERRL  NECK: normal, supple,  no carotid bruits  PULM: diminished bilaterally- no wheezes, rales or rhonchi,  no respiratory distress  COR: regular rate & rhythm and no murmurs  ABD:  Obese, soft, non-tender, non-distended, normal bowel sounds, no masses or Pneumonia due to infectious organism   IV abx    Obesity (BMI 30.0-34. 9)    DM    Home med   SSI    Chronic combined systolic and diastolic CHF     Pneumonia    Hyponatremia   Improved   Trend labs    IVF    COPD    Nebs   spiriva    PT/OT    Discharge planning    · High risk medication: none    JOSE J Wells PA-C  11/16/2017, 12:07 PM

## 2017-11-17 VITALS
DIASTOLIC BLOOD PRESSURE: 78 MMHG | HEART RATE: 66 BPM | OXYGEN SATURATION: 97 % | WEIGHT: 244.9 LBS | TEMPERATURE: 98.3 F | HEIGHT: 71 IN | BODY MASS INDEX: 34.28 KG/M2 | RESPIRATION RATE: 15 BRPM | SYSTOLIC BLOOD PRESSURE: 129 MMHG

## 2017-11-17 LAB
ABSOLUTE EOS #: 0.1 K/UL (ref 0–0.4)
ABSOLUTE IMMATURE GRANULOCYTE: ABNORMAL K/UL (ref 0–0.3)
ABSOLUTE LYMPH #: 1.55 K/UL (ref 1–4.8)
ABSOLUTE MONO #: 1.46 K/UL (ref 0–1)
ANION GAP SERPL CALCULATED.3IONS-SCNC: 15 MMOL/L (ref 9–17)
BASOPHILS # BLD: 1 %
BASOPHILS ABSOLUTE: 0.1 K/UL (ref 0–0.2)
BUN BLDV-MCNC: 15 MG/DL (ref 8–23)
BUN/CREAT BLD: 18 (ref 9–20)
CALCIUM SERPL-MCNC: 8.4 MG/DL (ref 8.6–10.4)
CHLORIDE BLD-SCNC: 94 MMOL/L (ref 98–107)
CO2: 23 MMOL/L (ref 20–31)
CREAT SERPL-MCNC: 0.83 MG/DL (ref 0.7–1.2)
DIFFERENTIAL TYPE: ABNORMAL
EOSINOPHILS RELATIVE PERCENT: 1 %
GFR AFRICAN AMERICAN: >60 ML/MIN
GFR NON-AFRICAN AMERICAN: >60 ML/MIN
GFR SERPL CREATININE-BSD FRML MDRD: ABNORMAL ML/MIN/{1.73_M2}
GFR SERPL CREATININE-BSD FRML MDRD: ABNORMAL ML/MIN/{1.73_M2}
GLUCOSE BLD-MCNC: 102 MG/DL (ref 74–100)
GLUCOSE BLD-MCNC: 112 MG/DL (ref 70–99)
HCT VFR BLD CALC: 32.7 % (ref 41–53)
HEMOGLOBIN: 10.9 G/DL (ref 13.5–17)
IMMATURE GRANULOCYTES: ABNORMAL %
LYMPHOCYTES # BLD: 16 %
MCH RBC QN AUTO: 28.9 PG (ref 26–34)
MCHC RBC AUTO-ENTMCNC: 33.3 G/DL (ref 31–37)
MCV RBC AUTO: 86.8 FL (ref 80–100)
MONOCYTES # BLD: 15 %
MORPHOLOGY: NORMAL
PDW BLD-RTO: 14.7 % (ref 12.1–15.2)
PLATELET # BLD: 277 K/UL (ref 140–450)
PLATELET ESTIMATE: ABNORMAL
PMV BLD AUTO: 9 FL (ref 6–12)
POTASSIUM SERPL-SCNC: 3.5 MMOL/L (ref 3.7–5.3)
RBC # BLD: 3.77 M/UL (ref 4.5–5.9)
RBC # BLD: ABNORMAL 10*6/UL
SEG NEUTROPHILS: 67 %
SEGMENTED NEUTROPHILS ABSOLUTE COUNT: 6.49 K/UL (ref 1.8–7.7)
SODIUM BLD-SCNC: 132 MMOL/L (ref 135–144)
WBC # BLD: 9.7 K/UL (ref 3.5–11)
WBC # BLD: ABNORMAL 10*3/UL

## 2017-11-17 PROCEDURE — 2580000003 HC RX 258: Performed by: INTERNAL MEDICINE

## 2017-11-17 PROCEDURE — 6370000000 HC RX 637 (ALT 250 FOR IP): Performed by: INTERNAL MEDICINE

## 2017-11-17 PROCEDURE — 36415 COLL VENOUS BLD VENIPUNCTURE: CPT

## 2017-11-17 PROCEDURE — 85025 COMPLETE CBC W/AUTO DIFF WBC: CPT

## 2017-11-17 PROCEDURE — 94640 AIRWAY INHALATION TREATMENT: CPT

## 2017-11-17 PROCEDURE — 80048 BASIC METABOLIC PNL TOTAL CA: CPT

## 2017-11-17 PROCEDURE — 82947 ASSAY GLUCOSE BLOOD QUANT: CPT

## 2017-11-17 PROCEDURE — 6360000002 HC RX W HCPCS: Performed by: INTERNAL MEDICINE

## 2017-11-17 RX ORDER — LEVOFLOXACIN 500 MG/1
500 TABLET, FILM COATED ORAL DAILY
Qty: 7 TABLET | Refills: 0 | Status: SHIPPED | OUTPATIENT
Start: 2017-11-17 | End: 2017-11-24

## 2017-11-17 RX ADMIN — IPRATROPIUM BROMIDE AND ALBUTEROL SULFATE 3 ML: .5; 3 SOLUTION RESPIRATORY (INHALATION) at 06:12

## 2017-11-17 RX ADMIN — PROPRANOLOL HYDROCHLORIDE 80 MG: 10 TABLET ORAL at 08:37

## 2017-11-17 RX ADMIN — CEFTRIAXONE 1 G: 1 INJECTION, POWDER, FOR SOLUTION INTRAMUSCULAR; INTRAVENOUS at 01:18

## 2017-11-17 RX ADMIN — CLOPIDOGREL BISULFATE 75 MG: 75 TABLET ORAL at 08:37

## 2017-11-17 RX ADMIN — Medication 10 ML: at 08:38

## 2017-11-17 RX ADMIN — LOSARTAN POTASSIUM 25 MG: 25 TABLET ORAL at 08:37

## 2017-11-17 RX ADMIN — LEVOTHYROXINE SODIUM 100 MCG: 100 TABLET ORAL at 07:25

## 2017-11-17 RX ADMIN — FUROSEMIDE 40 MG: 10 INJECTION, SOLUTION INTRAMUSCULAR; INTRAVENOUS at 08:37

## 2017-11-17 RX ADMIN — METFORMIN HYDROCHLORIDE 2000 MG: 500 TABLET, EXTENDED RELEASE ORAL at 07:25

## 2017-11-17 RX ADMIN — ENOXAPARIN SODIUM 40 MG: 40 INJECTION SUBCUTANEOUS at 08:37

## 2017-11-17 RX ADMIN — FAMOTIDINE 20 MG: 20 TABLET, FILM COATED ORAL at 08:37

## 2017-11-17 RX ADMIN — PRIMIDONE 100 MG: 50 TABLET ORAL at 08:37

## 2017-11-17 RX ADMIN — AZITHROMYCIN MONOHYDRATE 500 MG: 500 INJECTION, POWDER, LYOPHILIZED, FOR SOLUTION INTRAVENOUS at 01:18

## 2017-11-17 RX ADMIN — TAMSULOSIN HYDROCHLORIDE 0.4 MG: 0.4 CAPSULE ORAL at 08:37

## 2017-11-17 ASSESSMENT — PAIN SCALES - GENERAL: PAINLEVEL_OUTOF10: 0

## 2017-11-17 NOTE — PLAN OF CARE
Discharge instructions reviewed with the patient. Patient agrees with discharge and follow up care. Patient denies further needs and is stable for discharge. Patient waiting for ride home. Will continue to monitor patient.   Electronically signed by Sherri Nelson RN on 11/17/2017 at 9:41 AM

## 2017-11-17 NOTE — PROGRESS NOTES
[facility]  OCCUPATIONAL THERAPY  No Visit Note    [] ICU    [x] Acute   Patient: Richard Garrido  Room: 7678/4749-29      Chantell Vargas not seen on 11/17/2017. Pt not seen this date d/t pt discharged per MD's note.       Signature: SHAHRAM Oliveira/MIAN

## 2017-11-17 NOTE — PLAN OF CARE
Wife arrives to transport patient home. Patient stable for discharge.   Electronically signed by Serenity Livingston RN on 11/17/2017 at 10:47 AM

## 2017-11-17 NOTE — DISCHARGE SUMMARY
Physician Discharge Summary       Patient ID:  Dakota Hsu  [id number]  1935    Admission date: 11/14/2017    Discharge date: 11/17/2017     Admitting Physician: Amanda Yoon MD     Primary Care Physician: Barbra Vyas NP     Primary Discharge Diagnoses:   Patient Active Problem List    Diagnosis Date Noted    Pneumonia due to infectious organism 11/15/2017     Priority: High     Class: Acute    Pneumonia 11/15/2017    Hyponatremia 11/15/2017    [facility] 11/15/2017    Hypothyroidism 10/12/2017    Chronic combined systolic and diastolic CHF (congestive heart failure) ([facility] 09/21/2017    Hyponatremia 09/20/2017    COPD exacerbation ([facility] 09/20/2017    Acute respiratory failure with hypoxia and hypercarbia ([facility] 09/20/2017    Knee osteoarthritis 06/11/2015    Screening for condition 06/11/2015    Depression screening 06/11/2015    Obesity (BMI 30.0-34.9) 02/05/2015    Venous (peripheral) insufficiency 02/05/2015    Hx pulmonary embolism 02/05/2015    Hearing impaired 02/05/2015    Edema 02/05/2015    DM (diabetes mellitus) ([facility] 02/05/2015    PE (physical exam), annual 02/05/2015       Additional Diagnoses:       Diagnosis Date    [facility]) ([facility]     Diabetes mellitus ([facility]     [facility]24/2017    [facility]    Hyperlipidemia     Hypothyroidism     MI (myocardial infarction)     Thyroid disease     Type II or unspecified type diabetes mellitus without mention of complication, not stated as uncontrolled        Physical exam:      -----------------------------------------------------------------  Exam:  GEN:   A & O x3, no apparent distress  EYES: No gross abnormalities.   NECK: normal,  no carotid bruits  PULM: clear to auscultation bilaterally- no wheezes, rales or rhonchi, normal air movement, no respiratory distress  COR: regular rate & rhythm and no murmurs  ABD:  soft, >60 >60 mL/min    GFR African American >60 >60 mL/min    GFR Comment          GFR Staging         Lactic Acid, Plasma    Collection Time: 11/14/17  8:47 PM   Result Value Ref Range    Lactic Acid 1.2 0.5 - 2.2 mmol/L    Lactic Acid, Whole Blood NOT REPORTED 0.7 - 2.1 mmol/L   Culture Blood #1    Collection Time: 11/14/17  8:47 PM   Result Value Ref Range    Specimen Description . BLOOD     Special Requests R AC 20 ML     Culture NO GROWTH 10 HOURS     Culture       Performed at [address]    Culture  [phone]     Status Pending    Culture Blood #1    Collection Time: 11/14/17  9:27 PM   Result Value Ref Range    Specimen Description . BLOOD     Special Requests  L HAND 20 ML     Culture NO GROWTH 9 HOURS     Culture       Performed at [address]    Culture  [phone]     Status Pending    Urinalysis    Collection Time: 11/14/17 10:03 PM   Result Value Ref Range    Color, UA YELLOW YEL    Turbidity UA CLEAR CLEAR    Glucose, Ur NEGATIVE NEG    Bilirubin Urine NEGATIVE NEG    Ketones, Urine NEGATIVE NEG    Specific Gravity, UA 1.020 1.010 - 1.020    Urine Hgb NEGATIVE NEG    pH, UA 5.5 5.0 - 9.0    Protein, UA TRACE (A) NEG    Urobilinogen, Urine Normal NORM    Nitrite, Urine NEGATIVE NEG    Leukocyte Esterase, Urine TRACE (A) NEG    Urinalysis Comments NOT REPORTED    Urine Culture    Collection Time: 11/14/17 10:03 PM   Result Value Ref Range    Specimen Description       . URINE Performed at [facility] [address]    Specimen Description  [id number]  [phone]     Special Requests       URINAL Performed at [facility] [address]    Special Requests  [id number]  [phone]     Culture NO GROWTH     Culture       Performed at [facility] [id number] [place] [address], [address] [place] [address] [phone]    Status FINAL 11/15/2017    Microscopic Morphology Normal    Glucose, Whole Blood    Collection Time: 11/15/17  3:55 PM   Result Value Ref Range    POC Glucose 127 (H) 74 - 100 mg/dL   Glucose, Whole Blood    Collection Time: 11/15/17  8:16 PM   Result Value Ref Range    POC Glucose 132 (H) 74 - 100 mg/dL   Basic metabolic panel    Collection Time: 11/16/17  6:05 AM   Result Value Ref Range    Glucose 120 (H) 70 - 99 mg/dL    BUN 15 8 - 23 mg/dL    CREATININE 0.87 0.70 - 1.20 mg/dL    Bun/Cre Ratio 17 9 - 20    Calcium 8.4 (L) 8.6 - 10.4 mg/dL    Sodium 129 (L) 135 - 144 mmol/L    Potassium 3.8 3.7 - 5.3 mmol/L    Chloride 92 (L) 98 - 107 mmol/L    CO2 21 20 - 31 mmol/L    Anion Gap 16 9 - 17 mmol/L    GFR Non-African American >60 >60 mL/min    GFR African American >60 >60 mL/min    GFR Comment          GFR Staging         CBC auto differential    Collection Time: 11/16/17  6:05 AM   Result Value Ref Range    WBC 12.3 (H) 3.5 - 11.0 k/uL    RBC 3.73 (L) 4.5 - 5.9 m/uL    Hemoglobin 10.6 (L) 13.5 - 17.0 g/dL    Hematocrit 32.7 (L) 41 - 53 %    MCV 87.7 80 - 100 fL    MCH 28.6 26 - 34 pg    MCHC 32.5 31 - 37 g/dL    RDW 14.7 12.1 - 15.2 %    Platelets 090 239 - 510 k/uL    MPV 8.6 6.0 - 12.0 fL    Differential Type NOT REPORTED     Immature Granulocytes NOT REPORTED 0 %    Absolute Immature Granulocyte NOT REPORTED 0.00 - 0.30 k/uL    WBC Morphology NOT REPORTED     RBC Morphology NOT REPORTED     Platelet Estimate NOT REPORTED     Seg Neutrophils 79 %    Lymphocytes 10 %    Monocytes 7 %    Eosinophils % 0 %    Basophils 0 %    Bands 4 %    Segs Absolute 9.72 (H) 1.8 - 7.7 k/uL    Absolute Lymph # 1.23 1.0 - 4.8 k/uL    Absolute Mono # 0.86 0.0 - 1.0 k/uL    Absolute Eos # 0.00 0.0 - 0.4 k/uL    Basophils # 0.00 0.0 - 0.2 k/uL    Absolute Bands # 0.49 0.0 - 1.0 k/uL    Morphology Normal    Glucose, Whole Blood    Collection Time: 11/16/17  7:06 AM   Result Value Ref Range    POC Glucose 127 (H) 74 - 100 mg/dL   Blood occult stool #1    Collection Time: 11/16/17 10:20 AM   Result Value Ref Range    Occult Blood, Stool #1 NEGATIVE NEG    Date, Stool #1 11,162,017     Time, Stool #1 1,020     Occult Blood, Stool #2 NOT REPORTED NEG    Date, Stool #2 NOT REPORTED     Time, Stool #2 NOT REPORTED     Occult Blood, Stool #3 NOT REPORTED NEG    Date, Stool #3 NOT REPORTED     Time, Stool #3 NOT REPORTED    Glucose, Whole Blood    Collection Time: 11/16/17 11:16 AM   Result Value Ref Range    POC Glucose 173 (H) 74 - 100 mg/dL   Glucose, Whole Blood    Collection Time: 11/16/17  4:39 PM   Result Value Ref Range    POC Glucose 117 (H) 74 - 100 mg/dL   Glucose, Whole Blood    Collection Time: 11/16/17  9:22 PM   Result Value Ref Range    POC Glucose 131 (H) 74 - 100 mg/dL     ·     Discharge Condition:   · good    Disposition:   · home    Discharge Medications:     Nathalie Kimball   Home Medication Instructions [id number]    Printed on:11/17/17 6745   Medication Information                      acetaminophen (TYLENOL) 500 MG tablet  Take 1,000 mg by mouth every 6 hours as needed for Pain             albuterol sulfate  (90 Base) MCG/ACT inhaler  Inhale 2 puffs into the lungs every 4 hours as needed for Wheezing or Shortness of Breath             aspirin 81 MG tablet  Take 81 mg by mouth daily             atorvastatin (LIPITOR) 40 MG tablet  Take 1 tablet by mouth nightly             benzonatate (TESSALON) 100 MG capsule  Take 1 capsule by mouth 3 times daily as needed for Cough             budesonide (PULMICORT) 0.5 MG/2ML nebulizer suspension  Take 2 mLs by nebulization 2 times daily             cetirizine (ZYRTEC) 10 MG tablet  Take 10 mg by mouth daily             clopidogrel (PLAVIX) 75 MG tablet  Take 1 tablet by mouth daily             dextromethorphan (DELSYM) 30 MG/5ML extended release liquid  Take 30 mg by mouth 2 times daily as needed for Cough              furosemide (LASIX) 20 MG tablet  Take 1 tablet by mouth 2 times daily ipratropium-albuterol (DUONEB) 0.5-2.5 (3) MG/3ML SOLN nebulizer solution  Inhale 3 mLs into the lungs 4 times daily for 30 doses             levofloxacin (LEVAQUIN) 500 MG tablet  Take 1 tablet by mouth daily for 7 days             levothyroxine (SYNTHROID) 75 MCG tablet  Take 100 mcg by mouth Daily              losartan (COZAAR) 25 MG tablet  Take 1 tablet by mouth daily             metFORMIN (GLUCOPHAGE) 500 MG tablet  Take 2,000 mg by mouth daily (with breakfast)             Multiple Vitamins-Minerals (THERAPEUTIC MULTIVITAMIN-MINERALS) tablet  Take 1 tablet by mouth daily             Omega-3 Fatty Acids (FISH OIL) 1000 MG CPDR  Take 1,000 mg by mouth 2 times daily             potassium chloride (KLOR-CON M) 20 MEQ extended release tablet  Take 1 tablet by mouth daily             primidone (MYSOLINE) 50 MG tablet  Take 100 mg by mouth 3 times daily              propranolol (INDERAL) 80 MG tablet  Take 80 mg by mouth 2 times daily              TAMSULOSIN HCL PO  Take by mouth             tiotropium (SPIRIVA) 18 MCG inhalation capsule  Inhale 18 mcg into the lungs daily                 · Resume all home medications unless otherwise directed  · Add levaquin  ·     Patient Instructions:   · Activity: activity as tolerated  · Diet: cardiac diet and diabetic diet  · Wound Care: none needed  · Other:   · Follow up with Dr Kentrell Dumont and Thaddeus Serna in 1 week as directed    CORE MEASURES on Discharge (if applicable)      Total time spent on discharge services: 25 minutes  Including the following activities:  · Evaluation and Management of patient  · Discussion with patient and/or surrogate about current care plan  · Coordination with Case Management and/or   · Coordination of care with Consultants (if applicable)   · Coordination of care with Receiving Facility Physician (if applicable)  · Completion of DME forms (if applicable)  · Preparation of Discharge Summary  · Preparation of Medication Reconciliation  · Preparation of Discharge Prescriptions        Signed:  Jaycee Romero M.D.  11/17/2017  6:33 AM

## 2017-11-17 NOTE — PLAN OF CARE
Problem: Falls - Risk of  Goal: Absence of falls  Outcome: Ongoing  Patient is alert and oriented and has demonstrated the use of using the call light for assistance before getting up. Education has been provided to defer the use of the bed alarm and/or restraint free alarm as the patient has shown competency in calling for assistance and verbalizing the risk.         Problem: Airway Clearance - Ineffective:  Goal: Clear lung sounds  Clear lung sounds   Outcome: Ongoing  Lungs diminished    Problem: Fluid Volume - Deficit:  Goal: Achieves intake and output within specified parameters  Achieves intake and output within specified parameters   Outcome: Ongoing  Currently tracking I/0    Problem: Hyperthermia:  Goal: Ability to maintain a body temperature in the normal range will improve  Ability to maintain a body temperature in the normal range will improve   Outcome: Ongoing  Temperature stable

## 2017-11-19 LAB
CULTURE: NORMAL
Lab: NORMAL
Lab: NORMAL
SPECIMEN DESCRIPTION: NORMAL
SPECIMEN DESCRIPTION: NORMAL
STATUS: NORMAL
STATUS: NORMAL

## 2017-11-20 ENCOUNTER — CARE COORDINATION (OUTPATIENT)
Dept: CARE COORDINATION | Age: 82
End: 2017-11-20

## 2017-11-21 ENCOUNTER — OFFICE VISIT (OUTPATIENT)
Dept: PRIMARY CARE CLINIC | Age: 82
End: 2017-11-21
Payer: MEDICARE

## 2017-11-21 ENCOUNTER — TELEPHONE (OUTPATIENT)
Dept: PRIMARY CARE CLINIC | Age: 82
End: 2017-11-21

## 2017-11-21 VITALS
BODY MASS INDEX: 33.72 KG/M2 | OXYGEN SATURATION: 93 % | TEMPERATURE: 98.4 F | WEIGHT: 241.8 LBS | DIASTOLIC BLOOD PRESSURE: 68 MMHG | SYSTOLIC BLOOD PRESSURE: 118 MMHG | RESPIRATION RATE: 20 BRPM | HEART RATE: 72 BPM

## 2017-11-21 DIAGNOSIS — E11.8 TYPE 2 DIABETES MELLITUS WITH COMPLICATION, WITHOUT LONG-TERM CURRENT USE OF INSULIN (HCC): ICD-10-CM

## 2017-11-21 DIAGNOSIS — D50.9 IRON DEFICIENCY ANEMIA, UNSPECIFIED IRON DEFICIENCY ANEMIA TYPE: ICD-10-CM

## 2017-11-21 DIAGNOSIS — J18.9 PNEUMONIA DUE TO INFECTIOUS ORGANISM, UNSPECIFIED LATERALITY, UNSPECIFIED PART OF LUNG: Primary | ICD-10-CM

## 2017-11-21 LAB — HBA1C MFR BLD: 7.6 %

## 2017-11-21 PROCEDURE — 83036 HEMOGLOBIN GLYCOSYLATED A1C: CPT | Performed by: NURSE PRACTITIONER

## 2017-11-21 PROCEDURE — 99213 OFFICE O/P EST LOW 20 MIN: CPT | Performed by: NURSE PRACTITIONER

## 2017-11-21 ASSESSMENT — ENCOUNTER SYMPTOMS
WHEEZING: 0
SHORTNESS OF BREATH: 1
COUGH: 1
HEMOPTYSIS: 0
CHEST TIGHTNESS: 0

## 2017-11-21 NOTE — TELEPHONE ENCOUNTER
Please contact Philippe Nicole to make sure he has an appointment with Dr. Jaime Cordon. He was to return to see him after his last appointment in 2 weeks and did not schedule.

## 2017-11-21 NOTE — PROGRESS NOTES
Name: Matthew Arnold  : 1935         Chief Complaint:     Chief Complaint   Patient presents with    Follow-Up from Hospital    Pneumonia       History of Present Illness:      Matthew Arnold is a [de-identified] y.o.  male who presents with Follow-Up from Hospital and Pneumonia    Transition Care Office Visit    Date of Face-to-Face: 2017    Persons at visit: spouse    Here for follow after hospitalization for: Pneumonia    Activity: activity as tolerated    Any medication changes since post-hospitalization phone call? Yes antibiotic, medications changed by pulmonologist    Any treatment changes since post-hospitalization phone call? Yes     Any further education needed on medications/treatment plan?  No      All Active Meds in Chart - may or may not be currently taking post-hospital  Current Outpatient Prescriptions   Medication Sig Dispense Refill    levofloxacin (LEVAQUIN) 500 MG tablet Take 1 tablet by mouth daily for 7 days 7 tablet 0    metFORMIN (GLUCOPHAGE) 500 MG tablet Take 2,000 mg by mouth daily (with breakfast)      acetaminophen (TYLENOL) 500 MG tablet Take 1,000 mg by mouth every 6 hours as needed for Pain      budesonide (PULMICORT) 0.5 MG/2ML nebulizer suspension Take 2 mLs by nebulization 2 times daily 60 ampule 3    benzonatate (TESSALON) 100 MG capsule Take 1 capsule by mouth 3 times daily as needed for Cough 30 capsule 1    atorvastatin (LIPITOR) 40 MG tablet Take 1 tablet by mouth nightly 90 tablet 0    losartan (COZAAR) 25 MG tablet Take 1 tablet by mouth daily 90 tablet 0    TAMSULOSIN HCL PO Take by mouth      furosemide (LASIX) 20 MG tablet Take 1 tablet by mouth 2 times daily 60 tablet 3    potassium chloride (KLOR-CON M) 20 MEQ extended release tablet Take 1 tablet by mouth daily 60 tablet 3    albuterol sulfate  (90 Base) MCG/ACT inhaler Inhale 2 puffs into the lungs every 4 hours as needed for Wheezing or Shortness of Breath 1 Inhaler 2    ipratropium-albuterol (DUONEB) 0.5-2.5 (3) MG/3ML SOLN nebulizer solution Inhale 3 mLs into the lungs 4 times daily for 30 doses 30 vial 5    tiotropium (SPIRIVA) 18 MCG inhalation capsule Inhale 18 mcg into the lungs daily      clopidogrel (PLAVIX) 75 MG tablet Take 1 tablet by mouth daily 30 tablet 0    Omega-3 Fatty Acids (FISH OIL) 1000 MG CPDR Take 1,000 mg by mouth 2 times daily      dextromethorphan (DELSYM) 30 MG/5ML extended release liquid Take 30 mg by mouth 2 times daily as needed for Cough       aspirin 81 MG tablet Take 81 mg by mouth daily      Multiple Vitamins-Minerals (THERAPEUTIC MULTIVITAMIN-MINERALS) tablet Take 1 tablet by mouth daily      cetirizine (ZYRTEC) 10 MG tablet Take 10 mg by mouth daily      levothyroxine (SYNTHROID) 75 MCG tablet Take 100 mcg by mouth Daily       propranolol (INDERAL) 80 MG tablet Take 80 mg by mouth 2 times daily       primidone (MYSOLINE) 50 MG tablet Take 100 mg by mouth 3 times daily        No current facility-administered medications for this visit. Current Medications (meds in record marked as taking per [facility] phone call)  No outpatient prescriptions have been marked as taking for the 11/21/17 encounter (Appointment) with Mono Glez NP. Medication Reconciliation  Provider has reviewed medication record and it has been modified as necessary to accurately depict current medications since hospitalization. Philippe Nicole has been instructed on these changes. See transitional care telephone note.      Social History     Social History    Marital status:      Spouse name: N/A    Number of children: N/A    Years of education: N/A     Social History Main Topics    Smoking status: Former Smoker    Smokeless tobacco: Never Used    Alcohol use Yes      Comment: occ    Drug use: No    Sexual activity: Not on file     Other Topics Concern    Not on file     Social History Narrative    No narrative on file       Past Medical History: requirements and ordered appropriate tests  Health Maintenance Due   Topic Date Due    DTaP/Tdap/Td vaccine (1 - Tdap) 01/02/2003       Past Surgical History:     Past Surgical History:   Procedure Laterality Date    CARDIAC SURGERY  02/24/2017    Stent placed  Dr Adenike Lee          Medications:       Prior to Admission medications    Medication Sig Start Date End Date Taking?  Authorizing Provider   levofloxacin (LEVAQUIN) 500 MG tablet Take 1 tablet by mouth daily for 7 days 11/17/17 11/24/17  Topher Dean MD   metFORMIN (GLUCOPHAGE) 500 MG tablet Take 2,000 mg by mouth daily (with breakfast)    Historical Provider, MD   acetaminophen (TYLENOL) 500 MG tablet Take 1,000 mg by mouth every 6 hours as needed for Pain    Historical Provider, MD   budesonide (PULMICORT) 0.5 MG/2ML nebulizer suspension Take 2 mLs by nebulization 2 times daily 11/6/17   Laura Calderon MD   benzonatate (TESSALON) 100 MG capsule Take 1 capsule by mouth 3 times daily as needed for Cough 11/6/17   Mirna Dsouza MD   atorvastatin (LIPITOR) 40 MG tablet Take 1 tablet by mouth nightly 10/20/17   Bessy Snider CNP   losartan (COZAAR) 25 MG tablet Take 1 tablet by mouth daily 10/20/17   Bessy Snider CNP   TAMSULOSIN HCL PO Take by mouth    Historical Provider, MD   furosemide (LASIX) 20 MG tablet Take 1 tablet by mouth 2 times daily 10/6/17   Uriel Malin MD   potassium chloride (KLOR-CON M) 20 MEQ extended release tablet Take 1 tablet by mouth daily 10/6/17   Uriel Malin MD   albuterol sulfate  (90 Base) MCG/ACT inhaler Inhale 2 puffs into the lungs every 4 hours as needed for Wheezing or Shortness of Breath 10/6/17   Uriel Malin MD   ipratropium-albuterol (DUONEB) 0.5-2.5 (3) MG/3ML SOLN nebulizer solution Inhale 3 mLs into the lungs 4 times daily for 30 doses 10/5/17 10/13/17  Mirna Dsouza MD   tiotropium (SPIRIVA) 18 MCG inhalation capsule Inhale 18 mcg into the lungs daily (chronic). Negative for chest pain and palpitations. Gastrointestinal: Negative. Negative for abdominal distention, abdominal pain, blood in stool and constipation. Genitourinary: Negative. Skin: Negative. Neurological: Negative. Negative for dizziness, light-headedness and headaches. Psychiatric/Behavioral: Negative. Physical Exam:   Vitals: There were no vitals taken for this visit. Physical Exam   Constitutional: He is oriented to person, place, and time. Vital signs are normal. He appears well-developed and well-nourished. He is cooperative. Non-toxic appearance. He does not appear ill. No distress. Appears well hydrated and non toxic. Sitting upright chair without distress. Respirations are regular, non labored and quiet. HENT:   Head: Normocephalic and atraumatic. Nose: Nose normal.   Mouth/Throat: Uvula is midline, oropharynx is clear and moist and mucous membranes are normal. No oropharyngeal exudate. Eyes: Conjunctivae and EOM are normal. Pupils are equal, round, and reactive to light. Neck: Normal range of motion. Neck supple. No tracheal deviation present. No thyromegaly present. Cardiovascular: Normal rate, regular rhythm, normal heart sounds and intact distal pulses. Exam reveals no gallop and no friction rub. No murmur heard. Pulses:       Radial pulses are 2+ on the left side. Bilateral 2-3+ pitting edema to both lower extremities   Pulmonary/Chest: Effort normal and breath sounds normal. No accessory muscle usage. No tachypnea. No respiratory distress. He has no decreased breath sounds. He has no wheezes. He has no rhonchi. He has no rales. No cough, no wheeze, no distress  Breath sounds are clear to auscultation over posterior bilateral fields. Chest expansion is symmetrical with non-restricted air movement. Abdominal: He exhibits no distension and no mass. Musculoskeletal: Normal range of motion. He exhibits edema.  He exhibits no tenderness. Lymphadenopathy:     He has no cervical adenopathy. Neurological: He is alert and oriented to person, place, and time. No cranial nerve deficit. He exhibits normal muscle tone. Coordination normal.   Skin: Skin is warm and dry. No rash noted. He is not diaphoretic. No erythema. Psychiatric: He has a normal mood and affect. His speech is normal and behavior is normal. Judgment and thought content normal.   Nursing note and vitals reviewed. Data:     1. Pneumonia due to infectious organism, unspecified laterality, unspecified part of lung      hospital follow up 11/14/17   2. Iron deficiency anemia, unspecified iron deficiency anemia type  [address] MD, Hematology/Oncology [place]   3. Type 2 diabetes mellitus with complication, without long-term current use of insulin ([facility])  POCT glycosylated hemoglobin (Hb A1C)    SITagliptin (JANUVIA) 100 MG tablet   POCT A1C 7.6    Lab Results   Component Value Date     11/17/2017    K 3.5 11/17/2017    CL 94 11/17/2017    CO2 23 11/17/2017    BUN 15 11/17/2017    CREATININE 0.83 11/17/2017    GLUCOSE 112 11/17/2017    PROT 7.0 10/03/2017    LABALBU 3.8 10/03/2017    BILITOT 0.43 10/03/2017    ALKPHOS 147 10/03/2017    AST 53 10/03/2017     10/03/2017     Lab Results   Component Value Date    WBC 9.7 11/17/2017    RBC 3.77 11/17/2017    HGB 10.9 11/17/2017    HCT 32.7 11/17/2017    MCV 86.8 11/17/2017    MCH 28.9 11/17/2017    MCHC 33.3 11/17/2017    RDW 14.7 11/17/2017     11/17/2017    MPV 9.0 11/17/2017     No results found for: TSH  Lab Results   Component Value Date    CHOL 184 09/21/2017    HDL 46 09/21/2017    LABA1C 7.6 09/19/2017       Assessment/Plan:   A1C in office today 7.6. Will start Januvia.       Orders Placed This Encounter   Procedures   [address] Mo MD, Hematology/Oncology [place]     Referral Priority:   Routine     Referral Type:   Consult for Advice and Opinion     Referral Reason:   Specialty Services Required     Referred to Provider:   Angelika Pagan MD     Requested Specialty:   Oncology     Number of Visits Requested:   1    POCT glycosylated hemoglobin (Hb A1C)     Orders Placed This Encounter   Medications    SITagliptin (JANUVIA) 100 MG tablet     Sig: Take 1 tablet by mouth daily     Dispense:  30 tablet     Refill:  3     Caregiver informed today if any severe or worsening of symptoms, spikes in fever, difficulty swallowing, respiratory distress to go to ED. Caregiver verbalizes understanding. He is asked to follow-up if symptoms persist or worsen. No diagnosis found. 1.  Kyra Wiley received counseling on the following healthy behaviors: nutrition, exercise and medication adherence  2. Patient given educational materials - see patient instructions  3. Was a self-tracking handout given in paper form or via Health: [place]? No  If yes, see orders or list here. 4.  Discussed use, benefit, and side effects of prescribed medications. Barriers to medication compliance addressed. All patient questions answered. Pt voiced understanding. 5.  Reviewed prior labs and health maintenance  6. Continue current medications, diet and exercise. Completed Refills   Requested Prescriptions      No prescriptions requested or ordered in this encounter         No Follow-up on file.

## 2017-11-22 ENCOUNTER — CARE COORDINATION (OUTPATIENT)
Dept: CARE COORDINATION | Age: 82
End: 2017-11-22

## 2017-11-22 ASSESSMENT — ENCOUNTER SYMPTOMS
EYES NEGATIVE: 1
SORE THROAT: 0
CHEST TIGHTNESS: 0
ABDOMINAL PAIN: 0
BLOOD IN STOOL: 0
GASTROINTESTINAL NEGATIVE: 1
CONSTIPATION: 0
ABDOMINAL DISTENTION: 0
TROUBLE SWALLOWING: 0

## 2017-11-27 ENCOUNTER — CARE COORDINATION (OUTPATIENT)
Dept: CARE COORDINATION | Age: 82
End: 2017-11-27

## 2017-11-27 NOTE — CARE COORDINATION
[facility] Transitions Follow Up Call    2017    Patient: Emily Hayes  Patient : 1935   MRN: [id number]  Reason for Admission: There are no discharge diagnoses documented for the most recent discharge. Discharge Date: 17 RARS: Risk Score: 24.75       Care Transitions Subsequent and Final Call    Subsequent and Final Calls  Care Transitions Interventions  Other Interventions: Follow Up  Unsuccessful attempt to reach this patient. Left contact information on Voice Mail and requested that the patient return my call at his earliest convenience. Will continue to follow this patient.     Delio Feliciano RN BSN  Ambulatory Care Coordinator  [phone]  Future Appointments  Date Time Provider Gisela Harding   2017 2:15 PM MD Ebony Garcia TPP   1/3/2018 1:30 PM SANDIP [facility]   2018 2:00 PM Martín Hatfield MD [facility]       Lisa Maria RN

## 2017-11-28 ENCOUNTER — CARE COORDINATION (OUTPATIENT)
Dept: MEDSURG UNIT | Age: 82
End: 2017-11-28

## 2017-11-29 ENCOUNTER — CARE COORDINATION (OUTPATIENT)
Dept: CARE COORDINATION | Age: 82
End: 2017-11-29

## 2017-11-29 NOTE — CARE COORDINATION
[facility] Transitions Follow Up Call    2017    Patient: Sayda Moe  Patient : 1935   MRN: [id number]  Reason for Admission: There are no discharge diagnoses documented for the most recent discharge. Discharge Date: 17 RARS: Risk Score: 24.75         Care Transitions Subsequent and Final Call    Subsequent and Final Calls  Care Transitions Interventions  Other Interventions: Follow Up  Attempt to reach this patient unsuccessful. Contact information left on the patient's voicemail with request for the patient to return my call at his earliest convenience.      Stacy Brock RN BSN  Ambulatory Care Coordinator  [phone]  Future Appointments  Date Time Provider Gisela Harding   2017 2:15 PM MD Ebony Jenkins [facility]   1/3/2018 1:30 PM Arsh Ortiz NP [place] [facility]   2018 2:00 PM Fred Maloney [facility] [place] [facility]       Binh Alberto RN

## 2017-12-01 ENCOUNTER — CARE COORDINATION (OUTPATIENT)
Dept: CARE COORDINATION | Age: 82
End: 2017-12-01

## 2017-12-04 ENCOUNTER — CARE COORDINATION (OUTPATIENT)
Dept: CARE COORDINATION | Age: 82
End: 2017-12-04

## 2017-12-04 NOTE — CARE COORDINATION
[facility] Transitions Follow Up Call    2017    Patient: Emily Hayes  Patient : 1935   MRN: [id number]  Reason for Admission: There are no discharge diagnoses documented for the most recent discharge. Discharge Date: 17 RARS: Risk Score: 24.75         Care Transitions Subsequent and Final Call    Subsequent and Final Calls  Care Transitions Interventions  Other Interventions: Follow Up  Unsuccessful attempt to reach patient for final transitions call. Left contact information on his voicemail and asked him to return my call at his earliest convenience today. Left instructions to contact his PCP for any further concerns, issues, or problems.     Delio Feliciano RN BSN  Ambulatory Care Coordinator  [phone]    Future Appointments  Date Time Provider Gisela Harding   2017 2:15 PM MD Ebony Garcia TPP   1/3/2018 1:30 PM Quique Elena NP [facility]   2018 2:00 PM Martín Hatfield MD [facility]       Lisa Maria RN

## 2017-12-11 ENCOUNTER — OFFICE VISIT (OUTPATIENT)
Dept: PULMONOLOGY | Age: 82
End: 2017-12-11
Payer: MEDICARE

## 2017-12-11 VITALS
SYSTOLIC BLOOD PRESSURE: 113 MMHG | DIASTOLIC BLOOD PRESSURE: 52 MMHG | OXYGEN SATURATION: 98 % | RESPIRATION RATE: 16 BRPM | HEIGHT: 71 IN | WEIGHT: 229 LBS | HEART RATE: 70 BPM | TEMPERATURE: 97.8 F | BODY MASS INDEX: 32.06 KG/M2

## 2017-12-11 DIAGNOSIS — J41.8 MIXED SIMPLE AND MUCOPURULENT CHRONIC BRONCHITIS (HCC): ICD-10-CM

## 2017-12-11 DIAGNOSIS — E66.09 OBESITY DUE TO EXCESS CALORIES, UNSPECIFIED OBESITY SEVERITY: ICD-10-CM

## 2017-12-11 DIAGNOSIS — J47.9 BRONCHIECTASIS WITHOUT COMPLICATION (HCC): Primary | ICD-10-CM

## 2017-12-11 DIAGNOSIS — I50.42 CHRONIC COMBINED SYSTOLIC AND DIASTOLIC CONGESTIVE HEART FAILURE (HCC): ICD-10-CM

## 2017-12-11 DIAGNOSIS — R05.3 CHRONIC COUGH: ICD-10-CM

## 2017-12-11 PROCEDURE — 99215 OFFICE O/P EST HI 40 MIN: CPT | Performed by: INTERNAL MEDICINE

## 2017-12-12 NOTE — PROGRESS NOTES
PULMONARY OP  PROGRESS NOTE      Patient:  Fe Miranad  YOB: 1935    MRN: [id number]     Acct:        Pt seen and Chart reviewed. . Fe Miranda is here in followup for   1. Bronchiectasis without complication ([address]    2. Chronic combined systolic and diastolic congestive heart failure ([address]    3. Chronic cough    4. Mixed simple and mucopurulent chronic bronchitis (HCC)    5. Obesity due to excess calories, unspecified obesity severity        Patient has been doing well since last visit. Pt has not been hospitalized or been to er since last visit. Has been using meds as recommended. Cough is improved. Mostly dry or sometimes mucoid sputum. No fever or chills. Has a good appetite. Not much nasal drainage. No heartburn. No hemoptysis.   Not on any medications that could cause cough      Subjective:     Review of Systems -   General ROS: Completed and except as mentioned above were negative   Psychological ROS:  Completed and except as mentioned above were negative  Ophthalmic ROS:  Completed and except as mentioned above were negative  ENT ROS:  Completed and except as mentioned above were negative  Allergy and Immunology ROS:  Completed and except as mentioned above were negative  Hematological and Lymphatic ROS:  Completed and except as mentioned above were negative  Endocrine ROS: Completed and except as mentioned above were negative  Respiratory ROS:  Completed and except as mentioned above were negative  Cardiovascular ROS:  Completed and except as mentioned above were negative  Gastrointestinal ROS: Completed and except as mentioned above were negative  Genito-Urinary ROS:  Completed and except as mentioned above were negative  Musculoskeletal ROS:  Completed and except as mentioned above were negative  Neurological ROS:  Completed and except as mentioned above were negative  Dermatological ROS: Disp: , Rfl:     cetirizine (ZYRTEC) 10 MG tablet, Take 10 mg by mouth daily, Disp: , Rfl:     levothyroxine (SYNTHROID) 75 MCG tablet, Take 100 mcg by mouth Daily , Disp: , Rfl:     propranolol (INDERAL) 80 MG tablet, Take 80 mg by mouth 2 times daily , Disp: , Rfl:     primidone (MYSOLINE) 50 MG tablet, Take 100 mg by mouth 3 times daily , Disp: , Rfl:     ipratropium-albuterol (DUONEB) 0.5-2.5 (3) MG/3ML SOLN nebulizer solution, Inhale 3 mLs into the lungs 4 times daily for 30 doses, Disp: 30 vial, Rfl: 5      Objective:    Physical Exam:  Vitals: BP (!) 113/52   Pulse 70   Temp 97.8 °F (36.6 °C)   Resp 16   Ht 5' 11\" (1.803 m)   Wt 229 lb (103.9 kg)   SpO2 98%   BMI 31.94 kg/m²   Last 3 weights: Wt Readings from Last 3 Encounters:   12/11/17 229 lb (103.9 kg)   11/21/17 241 lb 12.8 oz (109.7 kg)   11/17/17 244 lb 14.4 oz (111.1 kg)     Body mass index is 31.94 kg/m². Physical Examination:   PHYSICAL EXAMINATION:  Vitals:    12/11/17 1413   BP: (!) 113/52   Pulse: 70   Resp: 16   Temp: 97.8 °F (36.6 °C)   SpO2: 98%   Weight: 229 lb (103.9 kg)   Height: 5' 11\" (1.803 m)     Constitutional: This is a well developed, well nourished, 30-34.9 - Obesity Grade I [de-identified]y.o. year old male who is alert, oriented, cooperative and in no apparent distress. Head:normocephalic and atraumatic. EENT:   SINTIA. No conjunctival injections. Septum was midline, mucosa was without erythema, exudates or cobblestoning. No thrush was noted. Mallampati II (soft palate, uvula, fauces visible)  Neck: Supple without thyromegaly. No elevated JVP. Trachea was midline. Respiratory: Chest was symmetrical without dullness to percussion. Breath sounds bilaterally were clear to auscultation. There were no wheezes, rhonchi or rales. There is no intercostal retraction or use of accessory muscles. No egophony noted. Cardiovascular: Regular without murmur, clicks, gallops or rubs.    Abdomen: Slightly rounded and soft without organomegaly. No rebound tenderness, rigidity or guarding was appreciated. Lymphatic: No lymphadenopathy. Musculoskeletal: Normal curvature of the spine. No gross muscle weakness. Extremities:  Has bilateral lower extremity edema, no ulcerations, tenderness, varicosities or erythema. Muscle size, tone and strength are normal.  No involuntary movements are noted. Skin:  Warm and dry. Good color, turgor and pigmentation. No lesions or scars. Neurological/Psychiatric: The patient's general behavior, level of consciousness, thought content and emotional status is normal.       Labs:    Sputum Gram stain culture report was not available    Radiological reports:  CT of the chest showed evidence of bronchiectasis bilaterally and a right lower lobe lung nodule measuring 5 mm. There was also diverticulosis and horseshoe kidney, seen and mild atelectasis of both bases of the lungs        Assessment:    1. Bronchiectasis without complication ([address]    2. Chronic combined systolic and diastolic congestive heart failure ([address]    3. Chronic cough    4. Mixed simple and mucopurulent chronic bronchitis (HCC)    5. Obesity due to excess calories, unspecified obesity severity          PLAN:    Recommended increasing activity as tolerated and push himself slightly more by about 15 seconds  Refills were provided -None  Educated and clarified the medication use. Recommended using cough medications containing dextromethorphan for cough suppression during sleep. Recommend flu vaccination in the fall annually. Patient had flu vaccination for the season  Recommendations given regarding pneumococcal vaccinations. Patient is up-to-date with vaccinations from pulmonary perspective. Maintain an active lifestyle. Questions  Patient had were answered to his satisfaction. Home O2 evaluation to be done.   Supplemental oxygen was not needed as patient's oxygen saturation was more than 88%  Smoking cessation was to be continued. 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 Please call us if you have any questions or concerns.       Salma Lopez MD             12/11/2017, 10:36 PM

## 2017-12-29 RX ORDER — BENZONATATE 100 MG/1
100 CAPSULE ORAL 3 TIMES DAILY PRN
Qty: 15 CAPSULE | Refills: 0 | Status: SHIPPED | OUTPATIENT
Start: 2017-12-29 | End: 2018-01-12 | Stop reason: SDUPTHER

## 2018-01-03 ENCOUNTER — OFFICE VISIT (OUTPATIENT)
Dept: PRIMARY CARE CLINIC | Age: 83
End: 2018-01-03
Payer: MEDICARE

## 2018-01-03 VITALS
HEART RATE: 70 BPM | TEMPERATURE: 98.4 F | DIASTOLIC BLOOD PRESSURE: 69 MMHG | RESPIRATION RATE: 20 BRPM | OXYGEN SATURATION: 90 % | WEIGHT: 234.5 LBS | SYSTOLIC BLOOD PRESSURE: 125 MMHG | BODY MASS INDEX: 32.71 KG/M2

## 2018-01-03 DIAGNOSIS — J44.1 COPD EXACERBATION (HCC): ICD-10-CM

## 2018-01-03 DIAGNOSIS — E11.8 TYPE 2 DIABETES MELLITUS WITH COMPLICATION, WITHOUT LONG-TERM CURRENT USE OF INSULIN (HCC): Primary | ICD-10-CM

## 2018-01-03 DIAGNOSIS — E03.9 HYPOTHYROIDISM, UNSPECIFIED TYPE: ICD-10-CM

## 2018-01-03 PROCEDURE — 99213 OFFICE O/P EST LOW 20 MIN: CPT | Performed by: NURSE PRACTITIONER

## 2018-01-03 RX ORDER — AZITHROMYCIN 250 MG/1
TABLET, FILM COATED ORAL
Qty: 1 PACKET | Refills: 0 | Status: SHIPPED | OUTPATIENT
Start: 2018-01-03 | End: 2018-01-13

## 2018-01-03 RX ORDER — PREDNISONE 20 MG/1
40 TABLET ORAL DAILY
Qty: 10 TABLET | Refills: 0 | Status: SHIPPED | OUTPATIENT
Start: 2018-01-03 | End: 2018-01-08

## 2018-01-03 ASSESSMENT — PATIENT HEALTH QUESTIONNAIRE - PHQ9
2. FEELING DOWN, DEPRESSED OR HOPELESS: 0
SUM OF ALL RESPONSES TO PHQ QUESTIONS 1-9: 0
SUM OF ALL RESPONSES TO PHQ9 QUESTIONS 1 & 2: 0
1. LITTLE INTEREST OR PLEASURE IN DOING THINGS: 0

## 2018-01-03 ASSESSMENT — ENCOUNTER SYMPTOMS
BLURRED VISION: 0
SHORTNESS OF BREATH: 1
COUGH: 1
GASTROINTESTINAL NEGATIVE: 1
TROUBLE SWALLOWING: 0
WHEEZING: 1

## 2018-01-03 ASSESSMENT — COPD QUESTIONNAIRES: COPD: 1

## 2018-01-03 NOTE — PROGRESS NOTES
mouth 2 times daily as needed for Cough    Yes Historical Provider, MD   Multiple Vitamins-Minerals (THERAPEUTIC MULTIVITAMIN-MINERALS) tablet Take 1 tablet by mouth daily   Yes Historical Provider, MD   levothyroxine (SYNTHROID) 75 MCG tablet Take 100 mcg by mouth Daily    Yes Historical Provider, MD   propranolol (INDERAL) 80 MG tablet Take 80 mg by mouth 2 times daily    Yes Historical Provider, MD   primidone (MYSOLINE) 50 MG tablet Take 100 mg by mouth 3 times daily    Yes Historical Provider, MD   SITagliptin (JANUVIA) 100 MG tablet Take 1 tablet by mouth daily 11/21/17   Ulyses Lesches Rosipko, NP   acetaminophen (TYLENOL) 500 MG tablet Take 1,000 mg by mouth every 6 hours as needed for Pain    Historical Provider, MD   ipratropium-albuterol (DUONEB) 0.5-2.5 (3) MG/3ML SOLN nebulizer solution Inhale 3 mLs into the lungs 4 times daily for 30 doses 10/5/17 10/13/17  Yossi Johansen MD   Omega-3 Fatty Acids (FISH OIL) 1000 MG CPDR Take 1,000 mg by mouth 2 times daily    Historical Provider, MD   aspirin 81 MG tablet Take 81 mg by mouth daily    Historical Provider, MD   cetirizine (ZYRTEC) 10 MG tablet Take 10 mg by mouth daily    Historical Provider, MD        Allergies:       Review of patient's allergies indicates no known allergies. Social History:     Tobacco:    reports that he has quit smoking. He has never used smokeless tobacco.  Alcohol:      reports that he drinks alcohol. Drug Use:  reports that he does not use drugs. Family History:     Family History   Problem Relation Age of Onset    Cancer Mother 47     liver ca    Diabetes Father     Heart Disease Father        Review of Systems:     Positive and Negative as described in HPI    Review of Systems   Constitutional: Negative. Negative for activity change, appetite change, fever and unexpected weight change. HENT: Negative. Negative for trouble swallowing. Eyes: Negative for blurred vision and visual disturbance.    Respiratory: Positive for movement. No rhonchi, no rales  Speaks full sentences without SOB, no distress    Abdominal: Soft. Bowel sounds are normal.   Musculoskeletal: Normal range of motion. He exhibits no edema or tenderness. Lymphadenopathy:     He has no cervical adenopathy. Neurological: He is alert and oriented to person, place, and time. No cranial nerve deficit. He exhibits normal muscle tone. Coordination normal.   Skin: Skin is warm and dry. No rash noted. He is not diaphoretic. No erythema. Psychiatric: He has a normal mood and affect. His speech is normal and behavior is normal. Judgment and thought content normal.   Nursing note and vitals reviewed. Data:     1. Type 2 diabetes mellitus with complication, without long-term current use of insulin ([facility])  Comprehensive Metabolic Panel    Hemoglobin A1C    CBC Auto Differential    Urinalysis    Microalbumin, Ur   2. Hypothyroidism, unspecified type  TSH With Reflex Ft4   3.  COPD exacerbation ([facility])  azithromycin (ZITHROMAX) 250 MG tablet    predniSONE (DELTASONE) 20 MG tablet       Lab Results   Component Value Date     11/17/2017    K 3.5 11/17/2017    CL 94 11/17/2017    CO2 23 11/17/2017    BUN 15 11/17/2017    CREATININE 0.83 11/17/2017    GLUCOSE 112 11/17/2017    PROT 7.0 10/03/2017    LABALBU 3.8 10/03/2017    BILITOT 0.43 10/03/2017    ALKPHOS 147 10/03/2017    AST 53 10/03/2017     10/03/2017     Lab Results   Component Value Date    WBC 9.7 11/17/2017    RBC 3.77 11/17/2017    HGB 10.9 11/17/2017    HCT 32.7 11/17/2017    MCV 86.8 11/17/2017    MCH 28.9 11/17/2017    MCHC 33.3 11/17/2017    RDW 14.7 11/17/2017     11/17/2017    MPV 9.0 11/17/2017     No results found for: TSH  Lab Results   Component Value Date    CHOL 184 09/21/2017    HDL 46 09/21/2017    LABA1C 7.6 11/21/2017       Assessment/Plan:     Patient informed to continue follow-up as directed with hematology, pulmonology, cardiology and specialty areas at the [facility]. adherence  2. Patient given educational materials - see patient instructions  3. Was a self-tracking handout given in paper form or via [employer]? No  If yes, see orders or list here. 4.  Discussed use, benefit, and side effects of prescribed medications. Barriers to medication compliance addressed. All patient questions answered. Pt voiced understanding. 5.  Reviewed prior labs and health maintenance  6. Continue current medications, diet and exercise. Completed Refills   Requested Prescriptions      No prescriptions requested or ordered in this encounter         No Follow-up on file.

## 2018-01-05 RX ORDER — IPRATROPIUM BROMIDE AND ALBUTEROL SULFATE 2.5; .5 MG/3ML; MG/3ML
1 SOLUTION RESPIRATORY (INHALATION) 4 TIMES DAILY
Qty: 30 VIAL | Refills: 5 | OUTPATIENT
Start: 2018-01-05 | End: 2018-01-13

## 2018-01-05 RX ORDER — IPRATROPIUM BROMIDE AND ALBUTEROL SULFATE 2.5; .5 MG/3ML; MG/3ML
1 SOLUTION RESPIRATORY (INHALATION) 4 TIMES DAILY
Qty: 30 VIAL | Refills: 0 | Status: ON HOLD | OUTPATIENT
Start: 2018-01-05 | End: 2018-04-09

## 2018-01-05 NOTE — TELEPHONE ENCOUNTER
Patient will run out over the weekend and Dr. Feli Jay office is not in to authorize a refill today. Can some be called in to hold the patient over.
(BMI 30.0-34. 9)     Venous (peripheral) insufficiency     Hx pulmonary embolism     Hearing impaired     Edema     DM (diabetes mellitus) ([facility]     PE (physical exam), annual     Knee osteoarthritis     Screening for condition     Depression screening     Hyponatremia     COPD exacerbation ([facility]     Acute respiratory failure with hypoxia and hypercarbia (HCC)     Chronic combined systolic and diastolic CHF (congestive heart failure) ([facility]     Hypothyroidism     Pneumonia due to infectious organism     Pneumonia     Hyponatremia     COPD (chronic obstructive pulmonary disease) ([facility]

## 2018-01-11 ENCOUNTER — OFFICE VISIT (OUTPATIENT)
Dept: PULMONOLOGY | Age: 83
End: 2018-01-11
Payer: MEDICARE

## 2018-01-11 VITALS
TEMPERATURE: 98.9 F | DIASTOLIC BLOOD PRESSURE: 61 MMHG | RESPIRATION RATE: 16 BRPM | WEIGHT: 230 LBS | SYSTOLIC BLOOD PRESSURE: 106 MMHG | HEART RATE: 72 BPM | HEIGHT: 71 IN | BODY MASS INDEX: 32.2 KG/M2 | OXYGEN SATURATION: 94 %

## 2018-01-11 DIAGNOSIS — I50.42 CHRONIC COMBINED SYSTOLIC AND DIASTOLIC CHF (CONGESTIVE HEART FAILURE) (HCC): ICD-10-CM

## 2018-01-11 DIAGNOSIS — J41.8 MIXED SIMPLE AND MUCOPURULENT CHRONIC BRONCHITIS (HCC): ICD-10-CM

## 2018-01-11 DIAGNOSIS — R05.3 CHRONIC COUGH: ICD-10-CM

## 2018-01-11 DIAGNOSIS — J47.9 BRONCHIECTASIS WITHOUT COMPLICATION (HCC): Primary | ICD-10-CM

## 2018-01-11 DIAGNOSIS — E66.09 OBESITY DUE TO EXCESS CALORIES WITHOUT SERIOUS COMORBIDITY, UNSPECIFIED CLASSIFICATION: ICD-10-CM

## 2018-01-11 PROCEDURE — 99213 OFFICE O/P EST LOW 20 MIN: CPT | Performed by: NURSE PRACTITIONER

## 2018-01-11 NOTE — PROGRESS NOTES
Readings from Last 3 Encounters:   01/11/18 230 lb (104.3 kg)   01/03/18 234 lb 8 oz (106.4 kg)   12/11/17 229 lb (103.9 kg)     Body mass index is 32.08 kg/m². Physical Examination:   Constitutional: Appears well, no distress; generalized tremor evident   EENT: PERRLA,  sclera clear, anicteric, oropharynx clear, no lesions, neck supple with midline trachea. Neck: Supple, symmetrical, trachea midline, no adenopathy, no goiter, no jvd skin normal  Respiratory: clear to auscultation, no wheezes or rales and unlabored breathing. No intercostal tenderness  Cardiovascular: regular rate and rhythm, normal S1, S2, no murmur noted  Abdomen: soft, nontender, nondistended, no masses or organomegaly  Extremities:  no pedal edema, no clubbing or cyanosis    Assessment:    1. Bronchiectasis without complication ([address]     2. Chronic combined systolic and diastolic CHF (congestive heart failure) ([address]     3. Chronic cough     4. Mixed simple and mucopurulent chronic bronchitis (HCC)     5. Obesity due to excess calories without serious comorbidity, unspecified classification           PLAN:    Pt instructed to complete antibiotic and steroid taper as prescribed. Call the office if symptoms do not completely resolve or return. Refills were provided - none  Educated and clarified the medication use. Recommend flu vaccination in the fall annually. Recommendations given regarding pneumococcal vaccinations. Patient is up-to-date with vaccinations from pulmonary perspective. Maintain an active lifestyle. Questions  Patient had were answered to his/her satisfaction. We'll see the patient back in June for previously scheduled appt with Dr. Damon Calderon.        Evens Dorado, GUERITA            1/11/2018, 4:45 PM

## 2018-01-12 RX ORDER — BENZONATATE 100 MG/1
100 CAPSULE ORAL 3 TIMES DAILY PRN
Qty: 15 CAPSULE | Refills: 0 | Status: SHIPPED | OUTPATIENT
Start: 2018-01-12 | End: 2018-03-28 | Stop reason: ALTCHOICE

## 2018-01-24 ENCOUNTER — TELEPHONE (OUTPATIENT)
Dept: PRIMARY CARE CLINIC | Age: 83
End: 2018-01-24

## 2018-01-24 ENCOUNTER — TELEPHONE (OUTPATIENT)
Dept: ONCOLOGY | Age: 83
End: 2018-01-24

## 2018-03-28 ENCOUNTER — OFFICE VISIT (OUTPATIENT)
Dept: PRIMARY CARE CLINIC | Age: 83
End: 2018-03-28
Payer: MEDICARE

## 2018-03-28 VITALS
TEMPERATURE: 98.7 F | SYSTOLIC BLOOD PRESSURE: 125 MMHG | DIASTOLIC BLOOD PRESSURE: 65 MMHG | HEART RATE: 66 BPM | BODY MASS INDEX: 34.28 KG/M2 | WEIGHT: 245.8 LBS | RESPIRATION RATE: 16 BRPM

## 2018-03-28 DIAGNOSIS — J30.2 SEASONAL ALLERGIC RHINITIS, UNSPECIFIED CHRONICITY, UNSPECIFIED TRIGGER: ICD-10-CM

## 2018-03-28 DIAGNOSIS — J44.0 ACUTE BRONCHITIS WITH CHRONIC OBSTRUCTIVE PULMONARY DISEASE (COPD) (HCC): Primary | ICD-10-CM

## 2018-03-28 DIAGNOSIS — J20.9 ACUTE BRONCHITIS WITH CHRONIC OBSTRUCTIVE PULMONARY DISEASE (COPD) (HCC): Primary | ICD-10-CM

## 2018-03-28 PROCEDURE — 99213 OFFICE O/P EST LOW 20 MIN: CPT | Performed by: NURSE PRACTITIONER

## 2018-03-28 RX ORDER — GUAIFENESIN AND CODEINE PHOSPHATE 100; 10 MG/5ML; MG/5ML
5 SOLUTION ORAL EVERY 4 HOURS PRN
Qty: 120 ML | Refills: 0 | Status: SHIPPED | OUTPATIENT
Start: 2018-03-28 | End: 2018-04-04

## 2018-03-28 RX ORDER — PREDNISONE 20 MG/1
40 TABLET ORAL DAILY
Qty: 10 TABLET | Refills: 0 | Status: SHIPPED | OUTPATIENT
Start: 2018-03-28 | End: 2018-04-02

## 2018-03-28 RX ORDER — AZITHROMYCIN 250 MG/1
TABLET, FILM COATED ORAL
Qty: 1 PACKET | Refills: 0 | Status: SHIPPED | OUTPATIENT
Start: 2018-03-28 | End: 2018-04-05

## 2018-03-28 RX ORDER — PRAVASTATIN SODIUM 20 MG
20 TABLET ORAL DAILY
COMMUNITY
End: 2018-08-09 | Stop reason: SDUPTHER

## 2018-03-28 ASSESSMENT — ENCOUNTER SYMPTOMS
EYE PAIN: 0
DIARRHEA: 0
WHEEZING: 0
VOMITING: 0
SINUS PRESSURE: 1
SORE THROAT: 1
EYE ITCHING: 1
NAUSEA: 0
COUGH: 1
RHINORRHEA: 1
SHORTNESS OF BREATH: 0
CHEST TIGHTNESS: 0
SINUS PAIN: 0

## 2018-03-28 NOTE — PROGRESS NOTES
[facility] PHYSIC[facility]ANS  [facility]  [id number]  Dept: [phone]  Dept Fax: [phone]    Gregorio Nicholson is a [de-identified] y.o. male who presents to the [facility] in Care today for his medical conditions/complaints as noted below. Gregorio Nicholson is c/o of URI (X 2-3 days. )      HPI:     Tamiko Tellez is a [de-identified]year old male her for a walk in visit     Patient states that over the past 2 days his cough has gotten worse and he has increased the use of his albuterol inhaler. His cough is waking him up in the middle of the night. He has tried using over-the-counter Mucinex with no relief. His cough is associated with yellow productive sputum. He also notes that he has had increased congestion and itchy eyes. URI    This is a new problem. The current episode started in the past 7 days. The problem has been gradually worsening. There has been no fever. Associated symptoms include congestion, coughing, rhinorrhea, sneezing and a sore throat. Pertinent negatives include no chest pain, diarrhea, ear pain, headaches, nausea, sinus pain, vomiting or wheezing. He has tried antihistamine, inhaler use and decongestant ([address]) for the symptoms. The treatment provided no relief.        Past Medical History:   Diagnosis Date    COPD [facility] obstructive [facility] disease) [facility]     Diabetes mellitus ([facility]     [facility] of echocardi[facility]/24/2017    [facility]    Hyperlipidemia     Hypothyroidism     MI (myocardial infarction)     Thyroid disease     Type II or unspecified type diabetes mellitus without mention of complication, not stated as uncontrolled         Current Outpatient Prescriptions   Medication Sig Dispense Refill    pravastatin (PRAVACHOL) 20 MG tablet Take 20 mg by mouth daily      predniSONE (DELTASONE) 20 MG tablet Take 2 tablets by mouth daily for 5 days 10 tablet 0    azithromycin (ZITHROMAX) 250 MG tablet Take 2 tabs (500 mg) on Day 1, and take 1 tab (250 mg) on days 2 through 5. 1 packet 0    guaiFENesin-codeine (CHERATUSSIN AC) 100-10 MG/5ML syrup Take 5 mLs by mouth every 4 hours as needed for Cough for up to 7 days. 120 mL 0    metFORMIN (GLUCOPHAGE) 500 MG tablet Take 2,000 mg by mouth daily (with breakfast)      acetaminophen (TYLENOL) 500 MG tablet Take 1,000 mg by mouth every 6 hours as needed for Pain      budesonide (PULMICORT) 0.5 MG/2ML nebulizer suspension Take 2 mLs by nebulization 2 times daily 60 ampule 3    TAMSULOSIN HCL PO Take by mouth      furosemide (LASIX) 20 MG tablet Take 1 tablet by mouth 2 times daily 60 tablet 3    albuterol sulfate  (90 Base) MCG/ACT inhaler Inhale 2 puffs into the lungs every 4 hours as needed for Wheezing or Shortness of Breath 1 Inhaler 2    tiotropium (SPIRIVA) 18 MCG inhalation capsule Inhale 18 mcg into the lungs daily      clopidogrel (PLAVIX) 75 MG tablet Take 1 tablet by mouth daily 30 tablet 0    Multiple Vitamins-Minerals (THERAPEUTIC MULTIVITAMIN-MINERALS) tablet Take 1 tablet by mouth daily      levothyroxine (SYNTHROID) 75 MCG tablet Take 100 mcg by mouth Daily       propranolol (INDERAL) 80 MG tablet Take 80 mg by mouth 2 times daily       primidone (MYSOLINE) 50 MG tablet Take 100 mg by mouth 3 times daily       ipratropium-albuterol (DUONEB) 0.5-2.5 (3) MG/3ML SOLN nebulizer solution Inhale 3 mLs into the lungs 4 times daily for 30 doses 30 vial 0    SITagliptin (JANUVIA) 100 MG tablet Take 1 tablet by mouth daily 30 tablet 3    losartan (COZAAR) 25 MG tablet Take 1 tablet by mouth daily 90 tablet 0    potassium chloride (KLOR-CON M) 20 MEQ extended release tablet Take 1 tablet by mouth daily 60 tablet 3    dextromethorphan (DELSYM) 30 MG/5ML extended release liquid Take 30 mg by mouth 2 times daily as needed for Cough       aspirin 81 MG tablet Take 81 mg by mouth daily       No current facility-administered medications for this visit.       No Known Plan:             Discussed exam, POCT findings, plan of care (including prescriptive and supportive as listed below) and follow-up at length with patient and or Patient. Reviewed all prescribed and recommended medications, administration and side effects. Encouraged to return to [address] [place] [address] for no improvement and or worsening of symptoms. To ER or call 911 if any difficulty breathing, shortness of breath, inability to swallow, hives or temp greater than 103 degrees. Questions answered. They verbalized understanding and agreement. Return if symptoms worsen or fail to improve. Orders Placed This Encounter   Medications    predniSONE (DELTASONE) 20 MG tablet     Sig: Take 2 tablets by mouth daily for 5 days     Dispense:  10 tablet     Refill:  0    azithromycin (ZITHROMAX) 250 MG tablet     Sig: Take 2 tabs (500 mg) on Day 1, and take 1 tab (250 mg) on days 2 through 5. Dispense:  1 packet     Refill:  0    guaiFENesin-codeine (CHERATUSSIN AC) 100-10 MG/5ML syrup     Sig: Take 5 mLs by mouth every 4 hours as needed for Cough for up to 7 days. Dispense:  120 mL     Refill:  0          All patient questions answered. Pt voiced understanding.       Electronically signed by [address] [place] [address] Road, CNP on 3/28/2018 at 4:20 PM

## 2018-04-05 ENCOUNTER — OFFICE VISIT (OUTPATIENT)
Dept: PULMONOLOGY | Age: 83
End: 2018-04-05
Payer: MEDICARE

## 2018-04-05 ENCOUNTER — HOSPITAL ENCOUNTER (EMERGENCY)
Age: 83
Discharge: HOME | End: 2018-04-05
Payer: MEDICARE

## 2018-04-05 ENCOUNTER — APPOINTMENT (OUTPATIENT)
Dept: GENERAL RADIOLOGY | Age: 83
End: 2018-04-05
Payer: MEDICARE

## 2018-04-05 VITALS
RESPIRATION RATE: 20 BRPM | WEIGHT: 240 LBS | HEIGHT: 71 IN | SYSTOLIC BLOOD PRESSURE: 136 MMHG | BODY MASS INDEX: 33.6 KG/M2 | TEMPERATURE: 98.3 F | DIASTOLIC BLOOD PRESSURE: 78 MMHG | OXYGEN SATURATION: 89 % | HEART RATE: 69 BPM

## 2018-04-05 VITALS
RESPIRATION RATE: 16 BRPM | HEIGHT: 71 IN | OXYGEN SATURATION: 81 % | TEMPERATURE: 98.2 F | BODY MASS INDEX: 33.6 KG/M2 | HEART RATE: 77 BPM | DIASTOLIC BLOOD PRESSURE: 88 MMHG | SYSTOLIC BLOOD PRESSURE: 149 MMHG | WEIGHT: 240 LBS

## 2018-04-05 DIAGNOSIS — J44.1 COPD EXACERBATION (HCC): ICD-10-CM

## 2018-04-05 DIAGNOSIS — J43.2 CENTRILOBULAR EMPHYSEMA (HCC): Primary | ICD-10-CM

## 2018-04-05 DIAGNOSIS — J45.40 MODERATE PERSISTENT ASTHMATIC BRONCHITIS WITHOUT COMPLICATION: ICD-10-CM

## 2018-04-05 DIAGNOSIS — R60.0 PEDAL EDEMA: ICD-10-CM

## 2018-04-05 DIAGNOSIS — J44.1 COPD EXACERBATION (HCC): Primary | ICD-10-CM

## 2018-04-05 DIAGNOSIS — R09.02 HYPOXIA: ICD-10-CM

## 2018-04-05 DIAGNOSIS — I50.42 CHRONIC COMBINED SYSTOLIC AND DIASTOLIC CONGESTIVE HEART FAILURE (HCC): ICD-10-CM

## 2018-04-05 LAB
ABSOLUTE EOS #: 1.06 K/UL (ref 0–0.44)
ABSOLUTE IMMATURE GRANULOCYTE: 0.07 K/UL (ref 0–0.3)
ABSOLUTE LYMPH #: 1.45 K/UL (ref 1.1–3.7)
ABSOLUTE MONO #: 1.27 K/UL (ref 0.1–1.2)
ALBUMIN SERPL-MCNC: 4.1 G/DL (ref 3.5–5.2)
ALBUMIN/GLOBULIN RATIO: 1.4 (ref 1–2.5)
ALP BLD-CCNC: 108 U/L (ref 40–129)
ALT SERPL-CCNC: 33 U/L (ref 5–41)
ANION GAP SERPL CALCULATED.3IONS-SCNC: 10 MMOL/L (ref 9–17)
AST SERPL-CCNC: 20 U/L
BASOPHILS # BLD: 1 % (ref 0–2)
BASOPHILS ABSOLUTE: 0.05 K/UL (ref 0–0.2)
BILIRUB SERPL-MCNC: 0.4 MG/DL (ref 0.3–1.2)
BNP INTERPRETATION: ABNORMAL
BUN BLDV-MCNC: 11 MG/DL (ref 8–23)
BUN/CREAT BLD: 16 (ref 9–20)
CALCIUM SERPL-MCNC: 9.3 MG/DL (ref 8.6–10.4)
CHLORIDE BLD-SCNC: 88 MMOL/L (ref 98–107)
CO2: 29 MMOL/L (ref 20–31)
CREAT SERPL-MCNC: 0.7 MG/DL (ref 0.7–1.2)
DIFFERENTIAL TYPE: ABNORMAL
EKG ATRIAL RATE: 66 BPM
EKG P AXIS: 0 DEGREES
EKG P-R INTERVAL: 206 MS
EKG Q-T INTERVAL: 412 MS
EKG QRS DURATION: 96 MS
EKG QTC CALCULATION (BAZETT): 431 MS
EKG R AXIS: 12 DEGREES
EKG T AXIS: 63 DEGREES
EKG VENTRICULAR RATE: 66 BPM
EOSINOPHILS RELATIVE PERCENT: 10 % (ref 1–4)
GFR AFRICAN AMERICAN: >60 ML/MIN
GFR NON-AFRICAN AMERICAN: >60 ML/MIN
GFR SERPL CREATININE-BSD FRML MDRD: ABNORMAL ML/MIN/{1.73_M2}
GFR SERPL CREATININE-BSD FRML MDRD: ABNORMAL ML/MIN/{1.73_M2}
GLUCOSE BLD-MCNC: 143 MG/DL (ref 70–99)
HCT VFR BLD CALC: 44.5 % (ref 40.7–50.3)
HEMOGLOBIN: 14.4 G/DL (ref 13–17)
IMMATURE GRANULOCYTES: 1 %
LYMPHOCYTES # BLD: 14 % (ref 24–43)
MCH RBC QN AUTO: 29.3 PG (ref 25.2–33.5)
MCHC RBC AUTO-ENTMCNC: 32.4 G/DL (ref 28.4–34.8)
MCV RBC AUTO: 90.4 FL (ref 82.6–102.9)
MONOCYTES # BLD: 13 % (ref 3–12)
NRBC AUTOMATED: 0 PER 100 WBC
PDW BLD-RTO: 13.8 % (ref 11.8–14.4)
PLATELET # BLD: 248 K/UL (ref 138–453)
PLATELET ESTIMATE: ABNORMAL
PMV BLD AUTO: 9.8 FL (ref 8.1–13.5)
POTASSIUM SERPL-SCNC: 4.5 MMOL/L (ref 3.7–5.3)
PRO-BNP: 517 PG/ML
RBC # BLD: 4.92 M/UL (ref 4.21–5.77)
RBC # BLD: ABNORMAL 10*6/UL
SEG NEUTROPHILS: 62 % (ref 36–65)
SEGMENTED NEUTROPHILS ABSOLUTE COUNT: 6.29 K/UL (ref 1.5–8.1)
SODIUM BLD-SCNC: 127 MMOL/L (ref 135–144)
TOTAL PROTEIN: 7 G/DL (ref 6.4–8.3)
TROPONIN INTERP: NORMAL
TROPONIN T: <0.03 NG/ML
WBC # BLD: 10.2 K/UL (ref 3.5–11.3)
WBC # BLD: ABNORMAL 10*3/UL

## 2018-04-05 PROCEDURE — 83880 ASSAY OF NATRIURETIC PEPTIDE: CPT

## 2018-04-05 PROCEDURE — 84484 ASSAY OF TROPONIN QUANT: CPT

## 2018-04-05 PROCEDURE — 85025 COMPLETE CBC W/AUTO DIFF WBC: CPT

## 2018-04-05 PROCEDURE — 80053 COMPREHEN METABOLIC PANEL: CPT

## 2018-04-05 PROCEDURE — 36415 COLL VENOUS BLD VENIPUNCTURE: CPT

## 2018-04-05 PROCEDURE — 99215 OFFICE O/P EST HI 40 MIN: CPT | Performed by: INTERNAL MEDICINE

## 2018-04-05 PROCEDURE — 99285 EMERGENCY DEPT VISIT HI MDM: CPT

## 2018-04-05 PROCEDURE — 6370000000 HC RX 637 (ALT 250 FOR IP)

## 2018-04-05 PROCEDURE — 71045 X-RAY EXAM CHEST 1 VIEW: CPT

## 2018-04-05 PROCEDURE — 6370000000 HC RX 637 (ALT 250 FOR IP): Performed by: PHYSICIAN ASSISTANT

## 2018-04-05 PROCEDURE — 6360000002 HC RX W HCPCS: Performed by: PHYSICIAN ASSISTANT

## 2018-04-05 PROCEDURE — 96374 THER/PROPH/DIAG INJ IV PUSH: CPT

## 2018-04-05 PROCEDURE — 94664 DEMO&/EVAL PT USE INHALER: CPT

## 2018-04-05 PROCEDURE — 93005 ELECTROCARDIOGRAM TRACING: CPT

## 2018-04-05 RX ORDER — IPRATROPIUM BROMIDE AND ALBUTEROL SULFATE 2.5; .5 MG/3ML; MG/3ML
1 SOLUTION RESPIRATORY (INHALATION)
Status: DISCONTINUED | OUTPATIENT
Start: 2018-04-05 | End: 2018-04-05 | Stop reason: HOSPADM

## 2018-04-05 RX ORDER — PREDNISONE 10 MG/1
TABLET ORAL
Qty: 30 TABLET | Refills: 0 | Status: ON HOLD | OUTPATIENT
Start: 2018-04-05 | End: 2018-04-09 | Stop reason: HOSPADM

## 2018-04-05 RX ORDER — IPRATROPIUM BROMIDE AND ALBUTEROL SULFATE 2.5; .5 MG/3ML; MG/3ML
SOLUTION RESPIRATORY (INHALATION)
Status: COMPLETED
Start: 2018-04-05 | End: 2018-04-05

## 2018-04-05 RX ORDER — FUROSEMIDE 10 MG/ML
40 INJECTION INTRAMUSCULAR; INTRAVENOUS ONCE
Status: COMPLETED | OUTPATIENT
Start: 2018-04-05 | End: 2018-04-05

## 2018-04-05 RX ADMIN — NITROGLYCERIN 1 INCH: 20 OINTMENT TOPICAL at 15:26

## 2018-04-05 RX ADMIN — IPRATROPIUM BROMIDE AND ALBUTEROL SULFATE 3 ML: .5; 3 SOLUTION RESPIRATORY (INHALATION) at 17:02

## 2018-04-05 RX ADMIN — FUROSEMIDE 40 MG: 10 INJECTION, SOLUTION INTRAMUSCULAR; INTRAVENOUS at 15:26

## 2018-04-05 ASSESSMENT — ENCOUNTER SYMPTOMS
SHORTNESS OF BREATH: 1
VOMITING: 0
WHEEZING: 1
COUGH: 1
NAUSEA: 0

## 2018-04-06 ENCOUNTER — APPOINTMENT (OUTPATIENT)
Dept: NON INVASIVE DIAGNOSTICS | Age: 83
DRG: 190 | End: 2018-04-06
Attending: FAMILY MEDICINE
Payer: MEDICARE

## 2018-04-06 ENCOUNTER — APPOINTMENT (OUTPATIENT)
Dept: CT IMAGING | Age: 83
DRG: 190 | End: 2018-04-06
Attending: FAMILY MEDICINE
Payer: MEDICARE

## 2018-04-06 ENCOUNTER — OFFICE VISIT (OUTPATIENT)
Dept: CARDIOLOGY | Age: 83
End: 2018-04-06
Payer: MEDICARE

## 2018-04-06 ENCOUNTER — APPOINTMENT (OUTPATIENT)
Dept: GENERAL RADIOLOGY | Age: 83
DRG: 190 | End: 2018-04-06
Attending: FAMILY MEDICINE
Payer: MEDICARE

## 2018-04-06 ENCOUNTER — HOSPITAL ENCOUNTER (INPATIENT)
Age: 83
LOS: 3 days | Discharge: HOME | DRG: 190 | End: 2018-04-09
Attending: FAMILY MEDICINE | Admitting: FAMILY MEDICINE
Payer: MEDICARE

## 2018-04-06 VITALS
HEART RATE: 66 BPM | BODY MASS INDEX: 33.6 KG/M2 | WEIGHT: 240 LBS | HEIGHT: 71 IN | DIASTOLIC BLOOD PRESSURE: 81 MMHG | OXYGEN SATURATION: 92 % | SYSTOLIC BLOOD PRESSURE: 149 MMHG

## 2018-04-06 DIAGNOSIS — E87.1 HYPONATREMIA: ICD-10-CM

## 2018-04-06 DIAGNOSIS — I25.2 HISTORY OF MYOCARDIAL INFARCTION: ICD-10-CM

## 2018-04-06 DIAGNOSIS — I50.43 ACUTE ON CHRONIC COMBINED SYSTOLIC AND DIASTOLIC CHF, NYHA CLASS 4 (HCC): Primary | ICD-10-CM

## 2018-04-06 DIAGNOSIS — J44.9 CHRONIC OBSTRUCTIVE PULMONARY DISEASE, UNSPECIFIED COPD TYPE (HCC): Primary | Chronic | ICD-10-CM

## 2018-04-06 DIAGNOSIS — I50.41 ACUTE COMBINED SYSTOLIC AND DIASTOLIC CHF, NYHA CLASS 3 (HCC): ICD-10-CM

## 2018-04-06 DIAGNOSIS — J44.1 COPD EXACERBATION (HCC): ICD-10-CM

## 2018-04-06 DIAGNOSIS — E78.5 DYSLIPIDEMIA: ICD-10-CM

## 2018-04-06 DIAGNOSIS — I25.5 ISCHEMIC CARDIOMYOPATHY: ICD-10-CM

## 2018-04-06 DIAGNOSIS — I10 ESSENTIAL HYPERTENSION: ICD-10-CM

## 2018-04-06 DIAGNOSIS — Z95.820 S/P ANGIOPLASTY WITH STENT: ICD-10-CM

## 2018-04-06 LAB
-: ABNORMAL
ALBUMIN SERPL-MCNC: 3.9 G/DL (ref 3.5–5.2)
ALBUMIN/GLOBULIN RATIO: 1.4 (ref 1–2.5)
ALP BLD-CCNC: 97 U/L (ref 40–129)
ALT SERPL-CCNC: 25 U/L (ref 5–41)
AMORPHOUS: ABNORMAL
ANION GAP SERPL CALCULATED.3IONS-SCNC: 10 MMOL/L (ref 9–17)
AST SERPL-CCNC: 17 U/L
BACTERIA: ABNORMAL
BILIRUB SERPL-MCNC: 0.49 MG/DL (ref 0.3–1.2)
BILIRUBIN URINE: NEGATIVE
BNP INTERPRETATION: ABNORMAL
BUN BLDV-MCNC: 12 MG/DL (ref 8–23)
BUN/CREAT BLD: 18 (ref 9–20)
CALCIUM SERPL-MCNC: 8.8 MG/DL (ref 8.6–10.4)
CASTS UA: ABNORMAL /LPF
CHLORIDE BLD-SCNC: 83 MMOL/L (ref 98–107)
CO2: 29 MMOL/L (ref 20–31)
COLOR: YELLOW
COMMENT UA: ABNORMAL
CREAT SERPL-MCNC: 0.68 MG/DL (ref 0.7–1.2)
CRYSTALS, UA: ABNORMAL /HPF
DIRECT EXAM: NORMAL
EKG ATRIAL RATE: 66 BPM
EKG P AXIS: 3 DEGREES
EKG P-R INTERVAL: 208 MS
EKG Q-T INTERVAL: 408 MS
EKG QRS DURATION: 104 MS
EKG QTC CALCULATION (BAZETT): 427 MS
EKG R AXIS: -4 DEGREES
EKG T AXIS: 46 DEGREES
EKG VENTRICULAR RATE: 66 BPM
EPITHELIAL CELLS UA: ABNORMAL /HPF (ref 0–5)
GFR AFRICAN AMERICAN: >60 ML/MIN
GFR NON-AFRICAN AMERICAN: >60 ML/MIN
GFR SERPL CREATININE-BSD FRML MDRD: ABNORMAL ML/MIN/{1.73_M2}
GFR SERPL CREATININE-BSD FRML MDRD: ABNORMAL ML/MIN/{1.73_M2}
GLUCOSE BLD-MCNC: 145 MG/DL (ref 70–99)
GLUCOSE BLD-MCNC: 164 MG/DL (ref 74–100)
GLUCOSE BLD-MCNC: 202 MG/DL (ref 74–100)
GLUCOSE URINE: NEGATIVE
HCT VFR BLD CALC: 42.3 % (ref 40.7–50.3)
HEMOGLOBIN: 13.6 G/DL (ref 13–17)
KETONES, URINE: NEGATIVE
LEUKOCYTE ESTERASE, URINE: NEGATIVE
LV EF: 40 %
LVEF MODALITY: NORMAL
Lab: NORMAL
MAGNESIUM: 1.6 MG/DL (ref 1.6–2.6)
MCH RBC QN AUTO: 29.1 PG (ref 25.2–33.5)
MCHC RBC AUTO-ENTMCNC: 32.2 G/DL (ref 28.4–34.8)
MCV RBC AUTO: 90.4 FL (ref 82.6–102.9)
MUCUS: ABNORMAL
NITRITE, URINE: NEGATIVE
NRBC AUTOMATED: 0 PER 100 WBC
OTHER OBSERVATIONS UA: ABNORMAL
PDW BLD-RTO: 13.4 % (ref 11.8–14.4)
PH UA: 6.5 (ref 5–9)
PLATELET # BLD: 238 K/UL (ref 138–453)
PMV BLD AUTO: 10.1 FL (ref 8.1–13.5)
POTASSIUM SERPL-SCNC: 4.2 MMOL/L (ref 3.7–5.3)
PRO-BNP: 827 PG/ML
PROTEIN UA: ABNORMAL
RBC # BLD: 4.68 M/UL (ref 4.21–5.77)
RBC UA: ABNORMAL /HPF (ref 0–2)
RENAL EPITHELIAL, UA: ABNORMAL /HPF
SODIUM BLD-SCNC: 122 MMOL/L (ref 135–144)
SPECIFIC GRAVITY UA: 1.01 (ref 1.01–1.02)
SPECIMEN DESCRIPTION: NORMAL
STATUS: NORMAL
TOTAL PROTEIN: 6.7 G/DL (ref 6.4–8.3)
TRICHOMONAS: ABNORMAL
TROPONIN INTERP: NORMAL
TROPONIN INTERP: NORMAL
TROPONIN T: <0.03 NG/ML
TROPONIN T: <0.03 NG/ML
TURBIDITY: CLEAR
URINE HGB: NEGATIVE
UROBILINOGEN, URINE: NORMAL
WBC # BLD: 9.2 K/UL (ref 3.5–11.3)
WBC UA: ABNORMAL /HPF (ref 0–5)
YEAST: ABNORMAL

## 2018-04-06 PROCEDURE — 6370000000 HC RX 637 (ALT 250 FOR IP): Performed by: PHYSICIAN ASSISTANT

## 2018-04-06 PROCEDURE — 80053 COMPREHEN METABOLIC PANEL: CPT

## 2018-04-06 PROCEDURE — G8988 SELF CARE GOAL STATUS: HCPCS

## 2018-04-06 PROCEDURE — 1200000000 HC SEMI PRIVATE

## 2018-04-06 PROCEDURE — G8987 SELF CARE CURRENT STATUS: HCPCS

## 2018-04-06 PROCEDURE — 93005 ELECTROCARDIOGRAM TRACING: CPT

## 2018-04-06 PROCEDURE — 84484 ASSAY OF TROPONIN QUANT: CPT

## 2018-04-06 PROCEDURE — 94664 DEMO&/EVAL PT USE INHALER: CPT

## 2018-04-06 PROCEDURE — 93306 TTE W/DOPPLER COMPLETE: CPT

## 2018-04-06 PROCEDURE — 83880 ASSAY OF NATRIURETIC PEPTIDE: CPT

## 2018-04-06 PROCEDURE — 81001 URINALYSIS AUTO W/SCOPE: CPT

## 2018-04-06 PROCEDURE — 87804 INFLUENZA ASSAY W/OPTIC: CPT

## 2018-04-06 PROCEDURE — 85027 COMPLETE CBC AUTOMATED: CPT

## 2018-04-06 PROCEDURE — 6360000002 HC RX W HCPCS: Performed by: PHYSICIAN ASSISTANT

## 2018-04-06 PROCEDURE — 6370000000 HC RX 637 (ALT 250 FOR IP): Performed by: FAMILY MEDICINE

## 2018-04-06 PROCEDURE — 94640 AIRWAY INHALATION TREATMENT: CPT

## 2018-04-06 PROCEDURE — 87205 SMEAR GRAM STAIN: CPT

## 2018-04-06 PROCEDURE — 97166 OT EVAL MOD COMPLEX 45 MIN: CPT

## 2018-04-06 PROCEDURE — 2580000003 HC RX 258: Performed by: PHYSICIAN ASSISTANT

## 2018-04-06 PROCEDURE — 82947 ASSAY GLUCOSE BLOOD QUANT: CPT

## 2018-04-06 PROCEDURE — 99214 OFFICE O/P EST MOD 30 MIN: CPT | Performed by: INTERNAL MEDICINE

## 2018-04-06 PROCEDURE — 83735 ASSAY OF MAGNESIUM: CPT

## 2018-04-06 PROCEDURE — 87040 BLOOD CULTURE FOR BACTERIA: CPT

## 2018-04-06 PROCEDURE — 71250 CT THORAX DX C-: CPT

## 2018-04-06 PROCEDURE — 71046 X-RAY EXAM CHEST 2 VIEWS: CPT

## 2018-04-06 PROCEDURE — 36415 COLL VENOUS BLD VENIPUNCTURE: CPT

## 2018-04-06 RX ORDER — PROPRANOLOL HYDROCHLORIDE 10 MG/1
80 TABLET ORAL 2 TIMES DAILY
Status: DISCONTINUED | OUTPATIENT
Start: 2018-04-06 | End: 2018-04-06

## 2018-04-06 RX ORDER — PROPRANOLOL HYDROCHLORIDE 10 MG/1
80 TABLET ORAL
Status: DISCONTINUED | OUTPATIENT
Start: 2018-04-07 | End: 2018-04-10 | Stop reason: HOSPADM

## 2018-04-06 RX ORDER — DEXTROSE MONOHYDRATE 50 MG/ML
100 INJECTION, SOLUTION INTRAVENOUS PRN
Status: DISCONTINUED | OUTPATIENT
Start: 2018-04-06 | End: 2018-04-10 | Stop reason: HOSPADM

## 2018-04-06 RX ORDER — IPRATROPIUM BROMIDE AND ALBUTEROL SULFATE 2.5; .5 MG/3ML; MG/3ML
1 SOLUTION RESPIRATORY (INHALATION) 4 TIMES DAILY
Status: DISCONTINUED | OUTPATIENT
Start: 2018-04-06 | End: 2018-04-06

## 2018-04-06 RX ORDER — TAMSULOSIN HYDROCHLORIDE 0.4 MG/1
0.4 CAPSULE ORAL
Status: DISCONTINUED | OUTPATIENT
Start: 2018-04-06 | End: 2018-04-10 | Stop reason: HOSPADM

## 2018-04-06 RX ORDER — LEVOTHYROXINE SODIUM 0.1 MG/1
100 TABLET ORAL DAILY
Status: DISCONTINUED | OUTPATIENT
Start: 2018-04-07 | End: 2018-04-10 | Stop reason: HOSPADM

## 2018-04-06 RX ORDER — SODIUM CHLORIDE 0.9 % (FLUSH) 0.9 %
10 SYRINGE (ML) INJECTION PRN
Status: DISCONTINUED | OUTPATIENT
Start: 2018-04-06 | End: 2018-04-10 | Stop reason: HOSPADM

## 2018-04-06 RX ORDER — ACETAMINOPHEN 325 MG/1
650 TABLET ORAL EVERY 6 HOURS PRN
Status: DISCONTINUED | OUTPATIENT
Start: 2018-04-06 | End: 2018-04-10 | Stop reason: HOSPADM

## 2018-04-06 RX ORDER — IPRATROPIUM BROMIDE AND ALBUTEROL SULFATE 2.5; .5 MG/3ML; MG/3ML
1 SOLUTION RESPIRATORY (INHALATION) 3 TIMES DAILY
Status: DISCONTINUED | OUTPATIENT
Start: 2018-04-06 | End: 2018-04-10 | Stop reason: HOSPADM

## 2018-04-06 RX ORDER — DEXTROMETHORPHAN POLISTIREX 30 MG/5ML
30 SUSPENSION ORAL 2 TIMES DAILY PRN
Status: DISCONTINUED | OUTPATIENT
Start: 2018-04-06 | End: 2018-04-06

## 2018-04-06 RX ORDER — CLOPIDOGREL BISULFATE 75 MG/1
75 TABLET ORAL DAILY
Status: DISCONTINUED | OUTPATIENT
Start: 2018-04-07 | End: 2018-04-10 | Stop reason: HOSPADM

## 2018-04-06 RX ORDER — PRIMIDONE 50 MG/1
100 TABLET ORAL 2 TIMES DAILY
Status: DISCONTINUED | OUTPATIENT
Start: 2018-04-06 | End: 2018-04-10 | Stop reason: HOSPADM

## 2018-04-06 RX ORDER — FUROSEMIDE 10 MG/ML
40 INJECTION INTRAMUSCULAR; INTRAVENOUS 2 TIMES DAILY
Status: DISCONTINUED | OUTPATIENT
Start: 2018-04-06 | End: 2018-04-10 | Stop reason: HOSPADM

## 2018-04-06 RX ORDER — POTASSIUM CHLORIDE 20 MEQ/1
20 TABLET, EXTENDED RELEASE ORAL DAILY
Status: DISCONTINUED | OUTPATIENT
Start: 2018-04-06 | End: 2018-04-10 | Stop reason: HOSPADM

## 2018-04-06 RX ORDER — METHYLPREDNISOLONE SODIUM SUCCINATE 40 MG/ML
40 INJECTION, POWDER, LYOPHILIZED, FOR SOLUTION INTRAMUSCULAR; INTRAVENOUS EVERY 12 HOURS
Status: DISCONTINUED | OUTPATIENT
Start: 2018-04-06 | End: 2018-04-10 | Stop reason: HOSPADM

## 2018-04-06 RX ORDER — M-VIT,TX,IRON,MINS/CALC/FOLIC 27MG-0.4MG
1 TABLET ORAL DAILY
Status: DISCONTINUED | OUTPATIENT
Start: 2018-04-07 | End: 2018-04-10 | Stop reason: HOSPADM

## 2018-04-06 RX ORDER — ASPIRIN 81 MG/1
81 TABLET ORAL DAILY
Status: DISCONTINUED | OUTPATIENT
Start: 2018-04-06 | End: 2018-04-06

## 2018-04-06 RX ORDER — IPRATROPIUM BROMIDE AND ALBUTEROL SULFATE 2.5; .5 MG/3ML; MG/3ML
SOLUTION RESPIRATORY (INHALATION)
Status: DISPENSED
Start: 2018-04-06 | End: 2018-04-07

## 2018-04-06 RX ORDER — PRIMIDONE 50 MG/1
100 TABLET ORAL 3 TIMES DAILY
Status: DISCONTINUED | OUTPATIENT
Start: 2018-04-06 | End: 2018-04-06

## 2018-04-06 RX ORDER — SODIUM CHLORIDE 0.9 % (FLUSH) 0.9 %
10 SYRINGE (ML) INJECTION EVERY 12 HOURS SCHEDULED
Status: DISCONTINUED | OUTPATIENT
Start: 2018-04-06 | End: 2018-04-10 | Stop reason: HOSPADM

## 2018-04-06 RX ORDER — DEXTROSE MONOHYDRATE 25 G/50ML
12.5 INJECTION, SOLUTION INTRAVENOUS PRN
Status: DISCONTINUED | OUTPATIENT
Start: 2018-04-06 | End: 2018-04-10 | Stop reason: HOSPADM

## 2018-04-06 RX ORDER — ONDANSETRON 2 MG/ML
4 INJECTION INTRAMUSCULAR; INTRAVENOUS EVERY 6 HOURS PRN
Status: DISCONTINUED | OUTPATIENT
Start: 2018-04-06 | End: 2018-04-10 | Stop reason: HOSPADM

## 2018-04-06 RX ORDER — IPRATROPIUM BROMIDE AND ALBUTEROL SULFATE 2.5; .5 MG/3ML; MG/3ML
1 SOLUTION RESPIRATORY (INHALATION) EVERY 4 HOURS PRN
Status: DISCONTINUED | OUTPATIENT
Start: 2018-04-06 | End: 2018-04-07

## 2018-04-06 RX ORDER — NICOTINE POLACRILEX 4 MG
15 LOZENGE BUCCAL PRN
Status: DISCONTINUED | OUTPATIENT
Start: 2018-04-06 | End: 2018-04-10 | Stop reason: HOSPADM

## 2018-04-06 RX ORDER — PRAVASTATIN SODIUM 20 MG
20 TABLET ORAL DAILY
Status: DISCONTINUED | OUTPATIENT
Start: 2018-04-07 | End: 2018-04-10 | Stop reason: HOSPADM

## 2018-04-06 RX ORDER — FAMOTIDINE 20 MG/1
20 TABLET, FILM COATED ORAL 2 TIMES DAILY
Status: DISCONTINUED | OUTPATIENT
Start: 2018-04-06 | End: 2018-04-10 | Stop reason: HOSPADM

## 2018-04-06 RX ADMIN — ENOXAPARIN SODIUM 40 MG: 40 INJECTION SUBCUTANEOUS at 15:10

## 2018-04-06 RX ADMIN — METHYLPREDNISOLONE SODIUM SUCCINATE 40 MG: 40 INJECTION, POWDER, FOR SOLUTION INTRAMUSCULAR; INTRAVENOUS at 15:11

## 2018-04-06 RX ADMIN — Medication 10 ML: at 20:05

## 2018-04-06 RX ADMIN — PRIMIDONE 100 MG: 50 TABLET ORAL at 20:04

## 2018-04-06 RX ADMIN — TAMSULOSIN HYDROCHLORIDE 0.4 MG: 0.4 CAPSULE ORAL at 17:33

## 2018-04-06 RX ADMIN — INSULIN LISPRO 2 UNITS: 100 INJECTION, SOLUTION INTRAVENOUS; SUBCUTANEOUS at 20:10

## 2018-04-06 RX ADMIN — IPRATROPIUM BROMIDE AND ALBUTEROL SULFATE 3 ML: .5; 3 SOLUTION RESPIRATORY (INHALATION) at 17:23

## 2018-04-06 RX ADMIN — IPRATROPIUM BROMIDE AND ALBUTEROL SULFATE 3 ML: .5; 3 SOLUTION RESPIRATORY (INHALATION) at 19:34

## 2018-04-06 RX ADMIN — FUROSEMIDE 40 MG: 10 INJECTION, SOLUTION INTRAMUSCULAR; INTRAVENOUS at 20:03

## 2018-04-06 RX ADMIN — FAMOTIDINE 20 MG: 20 TABLET, FILM COATED ORAL at 15:11

## 2018-04-06 RX ADMIN — FUROSEMIDE 40 MG: 10 INJECTION, SOLUTION INTRAMUSCULAR; INTRAVENOUS at 15:11

## 2018-04-06 RX ADMIN — POTASSIUM CHLORIDE 20 MEQ: 1500 TABLET, EXTENDED RELEASE ORAL at 15:10

## 2018-04-06 RX ADMIN — INSULIN LISPRO 2 UNITS: 100 INJECTION, SOLUTION INTRAVENOUS; SUBCUTANEOUS at 17:38

## 2018-04-06 RX ADMIN — FAMOTIDINE 20 MG: 20 TABLET, FILM COATED ORAL at 20:01

## 2018-04-06 RX ADMIN — Medication 10 ML: at 15:11

## 2018-04-07 LAB
ALBUMIN SERPL-MCNC: 3.5 G/DL (ref 3.5–5.2)
ALBUMIN/GLOBULIN RATIO: 1.3 (ref 1–2.5)
ALP BLD-CCNC: 89 U/L (ref 40–129)
ALT SERPL-CCNC: 22 U/L (ref 5–41)
ANION GAP SERPL CALCULATED.3IONS-SCNC: 10 MMOL/L (ref 9–17)
AST SERPL-CCNC: 17 U/L
BILIRUB SERPL-MCNC: 0.41 MG/DL (ref 0.3–1.2)
BUN BLDV-MCNC: 15 MG/DL (ref 8–23)
BUN/CREAT BLD: 17 (ref 9–20)
CALCIUM SERPL-MCNC: 8.5 MG/DL (ref 8.6–10.4)
CHLORIDE BLD-SCNC: 82 MMOL/L (ref 98–107)
CO2: 29 MMOL/L (ref 20–31)
CREAT SERPL-MCNC: 0.88 MG/DL (ref 0.7–1.2)
GFR AFRICAN AMERICAN: >60 ML/MIN
GFR NON-AFRICAN AMERICAN: >60 ML/MIN
GFR SERPL CREATININE-BSD FRML MDRD: ABNORMAL ML/MIN/{1.73_M2}
GFR SERPL CREATININE-BSD FRML MDRD: ABNORMAL ML/MIN/{1.73_M2}
GLUCOSE BLD-MCNC: 141 MG/DL (ref 74–100)
GLUCOSE BLD-MCNC: 147 MG/DL (ref 74–100)
GLUCOSE BLD-MCNC: 152 MG/DL (ref 74–100)
GLUCOSE BLD-MCNC: 156 MG/DL (ref 70–99)
GLUCOSE BLD-MCNC: 217 MG/DL (ref 74–100)
HCT VFR BLD CALC: 39.5 % (ref 40.7–50.3)
HEMOGLOBIN: 13.1 G/DL (ref 13–17)
MCH RBC QN AUTO: 29.1 PG (ref 25.2–33.5)
MCHC RBC AUTO-ENTMCNC: 33.2 G/DL (ref 28.4–34.8)
MCV RBC AUTO: 87.8 FL (ref 82.6–102.9)
NRBC AUTOMATED: 0 PER 100 WBC
PDW BLD-RTO: 13.2 % (ref 11.8–14.4)
PLATELET # BLD: 213 K/UL (ref 138–453)
PMV BLD AUTO: 9.9 FL (ref 8.1–13.5)
POTASSIUM SERPL-SCNC: 4 MMOL/L (ref 3.7–5.3)
RBC # BLD: 4.5 M/UL (ref 4.21–5.77)
SODIUM BLD-SCNC: 121 MMOL/L (ref 135–144)
TOTAL PROTEIN: 6.3 G/DL (ref 6.4–8.3)
TROPONIN INTERP: NORMAL
TROPONIN T: <0.03 NG/ML
WBC # BLD: 8.2 K/UL (ref 3.5–11.3)

## 2018-04-07 PROCEDURE — 97530 THERAPEUTIC ACTIVITIES: CPT

## 2018-04-07 PROCEDURE — 1200000000 HC SEMI PRIVATE

## 2018-04-07 PROCEDURE — 2580000003 HC RX 258: Performed by: PHYSICIAN ASSISTANT

## 2018-04-07 PROCEDURE — G8979 MOBILITY GOAL STATUS: HCPCS

## 2018-04-07 PROCEDURE — 87070 CULTURE OTHR SPECIMN AEROBIC: CPT

## 2018-04-07 PROCEDURE — 6370000000 HC RX 637 (ALT 250 FOR IP): Performed by: FAMILY MEDICINE

## 2018-04-07 PROCEDURE — 97535 SELF CARE MNGMENT TRAINING: CPT

## 2018-04-07 PROCEDURE — 85027 COMPLETE CBC AUTOMATED: CPT

## 2018-04-07 PROCEDURE — 84484 ASSAY OF TROPONIN QUANT: CPT

## 2018-04-07 PROCEDURE — 94640 AIRWAY INHALATION TREATMENT: CPT

## 2018-04-07 PROCEDURE — 6360000002 HC RX W HCPCS: Performed by: PHYSICIAN ASSISTANT

## 2018-04-07 PROCEDURE — 6370000000 HC RX 637 (ALT 250 FOR IP): Performed by: PHYSICIAN ASSISTANT

## 2018-04-07 PROCEDURE — 80053 COMPREHEN METABOLIC PANEL: CPT

## 2018-04-07 PROCEDURE — 87205 SMEAR GRAM STAIN: CPT

## 2018-04-07 PROCEDURE — 6360000002 HC RX W HCPCS: Performed by: FAMILY MEDICINE

## 2018-04-07 PROCEDURE — G8978 MOBILITY CURRENT STATUS: HCPCS

## 2018-04-07 PROCEDURE — 36415 COLL VENOUS BLD VENIPUNCTURE: CPT

## 2018-04-07 PROCEDURE — 82947 ASSAY GLUCOSE BLOOD QUANT: CPT

## 2018-04-07 PROCEDURE — 2580000003 HC RX 258: Performed by: FAMILY MEDICINE

## 2018-04-07 RX ORDER — ALBUTEROL SULFATE 2.5 MG/3ML
2.5 SOLUTION RESPIRATORY (INHALATION) EVERY 4 HOURS PRN
Status: DISCONTINUED | OUTPATIENT
Start: 2018-04-07 | End: 2018-04-10 | Stop reason: HOSPADM

## 2018-04-07 RX ADMIN — PRIMIDONE 100 MG: 50 TABLET ORAL at 20:25

## 2018-04-07 RX ADMIN — INSULIN LISPRO 1 UNITS: 100 INJECTION, SOLUTION INTRAVENOUS; SUBCUTANEOUS at 20:26

## 2018-04-07 RX ADMIN — POTASSIUM CHLORIDE 20 MEQ: 1500 TABLET, EXTENDED RELEASE ORAL at 08:16

## 2018-04-07 RX ADMIN — IPRATROPIUM BROMIDE AND ALBUTEROL SULFATE 3 ML: .5; 3 SOLUTION RESPIRATORY (INHALATION) at 09:45

## 2018-04-07 RX ADMIN — IPRATROPIUM BROMIDE AND ALBUTEROL SULFATE 3 ML: .5; 3 SOLUTION RESPIRATORY (INHALATION) at 20:06

## 2018-04-07 RX ADMIN — METHYLPREDNISOLONE SODIUM SUCCINATE 40 MG: 40 INJECTION, POWDER, FOR SOLUTION INTRAMUSCULAR; INTRAVENOUS at 00:31

## 2018-04-07 RX ADMIN — CEFTRIAXONE 1 G: 1 INJECTION, POWDER, FOR SOLUTION INTRAMUSCULAR; INTRAVENOUS at 11:59

## 2018-04-07 RX ADMIN — Medication 1 SPRAY: at 20:26

## 2018-04-07 RX ADMIN — INSULIN LISPRO 2 UNITS: 100 INJECTION, SOLUTION INTRAVENOUS; SUBCUTANEOUS at 08:18

## 2018-04-07 RX ADMIN — FAMOTIDINE 20 MG: 20 TABLET, FILM COATED ORAL at 20:25

## 2018-04-07 RX ADMIN — Medication 10 ML: at 20:26

## 2018-04-07 RX ADMIN — PRAVASTATIN SODIUM 20 MG: 20 TABLET ORAL at 08:16

## 2018-04-07 RX ADMIN — METHYLPREDNISOLONE SODIUM SUCCINATE 40 MG: 40 INJECTION, POWDER, FOR SOLUTION INTRAMUSCULAR; INTRAVENOUS at 11:59

## 2018-04-07 RX ADMIN — Medication 10 ML: at 08:16

## 2018-04-07 RX ADMIN — PROPRANOLOL HYDROCHLORIDE 80 MG: 10 TABLET ORAL at 17:32

## 2018-04-07 RX ADMIN — ENOXAPARIN SODIUM 40 MG: 40 INJECTION SUBCUTANEOUS at 08:16

## 2018-04-07 RX ADMIN — METFORMIN HYDROCHLORIDE 2000 MG: 500 TABLET, FILM COATED ORAL at 08:15

## 2018-04-07 RX ADMIN — PRIMIDONE 100 MG: 50 TABLET ORAL at 08:15

## 2018-04-07 RX ADMIN — IPRATROPIUM BROMIDE AND ALBUTEROL SULFATE 3 ML: .5; 3 SOLUTION RESPIRATORY (INHALATION) at 14:29

## 2018-04-07 RX ADMIN — LEVOTHYROXINE SODIUM 100 MCG: 100 TABLET ORAL at 07:06

## 2018-04-07 RX ADMIN — INSULIN LISPRO 4 UNITS: 100 INJECTION, SOLUTION INTRAVENOUS; SUBCUTANEOUS at 12:05

## 2018-04-07 RX ADMIN — FUROSEMIDE 40 MG: 10 INJECTION, SOLUTION INTRAMUSCULAR; INTRAVENOUS at 17:32

## 2018-04-07 RX ADMIN — FAMOTIDINE 20 MG: 20 TABLET, FILM COATED ORAL at 08:16

## 2018-04-07 RX ADMIN — TAMSULOSIN HYDROCHLORIDE 0.4 MG: 0.4 CAPSULE ORAL at 17:32

## 2018-04-07 RX ADMIN — CLOPIDOGREL 75 MG: 75 TABLET, FILM COATED ORAL at 08:16

## 2018-04-07 RX ADMIN — FUROSEMIDE 40 MG: 10 INJECTION, SOLUTION INTRAMUSCULAR; INTRAVENOUS at 08:16

## 2018-04-07 RX ADMIN — INSULIN LISPRO 2 UNITS: 100 INJECTION, SOLUTION INTRAVENOUS; SUBCUTANEOUS at 17:31

## 2018-04-08 LAB
ALBUMIN SERPL-MCNC: 3.6 G/DL (ref 3.5–5.2)
ALBUMIN/GLOBULIN RATIO: 1.4 (ref 1–2.5)
ALP BLD-CCNC: 91 U/L (ref 40–129)
ALT SERPL-CCNC: 22 U/L (ref 5–41)
ANION GAP SERPL CALCULATED.3IONS-SCNC: 10 MMOL/L (ref 9–17)
AST SERPL-CCNC: 17 U/L
BILIRUB SERPL-MCNC: 0.33 MG/DL (ref 0.3–1.2)
BUN BLDV-MCNC: 21 MG/DL (ref 8–23)
BUN/CREAT BLD: 22 (ref 9–20)
CALCIUM SERPL-MCNC: 8.6 MG/DL (ref 8.6–10.4)
CHLORIDE BLD-SCNC: 86 MMOL/L (ref 98–107)
CO2: 30 MMOL/L (ref 20–31)
CREAT SERPL-MCNC: 0.94 MG/DL (ref 0.7–1.2)
CULTURE: 340
CULTURE: ABNORMAL
GFR AFRICAN AMERICAN: >60 ML/MIN
GFR NON-AFRICAN AMERICAN: >60 ML/MIN
GFR SERPL CREATININE-BSD FRML MDRD: ABNORMAL ML/MIN/{1.73_M2}
GFR SERPL CREATININE-BSD FRML MDRD: ABNORMAL ML/MIN/{1.73_M2}
GLUCOSE BLD-MCNC: 122 MG/DL (ref 74–100)
GLUCOSE BLD-MCNC: 127 MG/DL (ref 74–100)
GLUCOSE BLD-MCNC: 134 MG/DL (ref 70–99)
GLUCOSE BLD-MCNC: 139 MG/DL (ref 74–100)
GLUCOSE BLD-MCNC: 183 MG/DL (ref 74–100)
HCT VFR BLD CALC: 39.7 % (ref 40.7–50.3)
HEMOGLOBIN: 13.1 G/DL (ref 13–17)
Lab: ABNORMAL
Lab: ABNORMAL
MCH RBC QN AUTO: 29 PG (ref 25.2–33.5)
MCHC RBC AUTO-ENTMCNC: 33 G/DL (ref 28.4–34.8)
MCV RBC AUTO: 87.8 FL (ref 82.6–102.9)
NRBC AUTOMATED: 0 PER 100 WBC
PDW BLD-RTO: 13.5 % (ref 11.8–14.4)
PLATELET # BLD: 226 K/UL (ref 138–453)
PMV BLD AUTO: 10.1 FL (ref 8.1–13.5)
POTASSIUM SERPL-SCNC: 4.2 MMOL/L (ref 3.7–5.3)
RBC # BLD: 4.52 M/UL (ref 4.21–5.77)
SODIUM BLD-SCNC: 126 MMOL/L (ref 135–144)
SPECIMEN DESCRIPTION: ABNORMAL
SPECIMEN DESCRIPTION: ABNORMAL
STATUS: ABNORMAL
TOTAL PROTEIN: 6.2 G/DL (ref 6.4–8.3)
WBC # BLD: 10.8 K/UL (ref 3.5–11.3)

## 2018-04-08 PROCEDURE — 6360000002 HC RX W HCPCS: Performed by: PHYSICIAN ASSISTANT

## 2018-04-08 PROCEDURE — 1200000000 HC SEMI PRIVATE

## 2018-04-08 PROCEDURE — 2580000003 HC RX 258: Performed by: PHYSICIAN ASSISTANT

## 2018-04-08 PROCEDURE — 6370000000 HC RX 637 (ALT 250 FOR IP): Performed by: PHYSICIAN ASSISTANT

## 2018-04-08 PROCEDURE — 97116 GAIT TRAINING THERAPY: CPT

## 2018-04-08 PROCEDURE — 82947 ASSAY GLUCOSE BLOOD QUANT: CPT

## 2018-04-08 PROCEDURE — 94760 N-INVAS EAR/PLS OXIMETRY 1: CPT

## 2018-04-08 PROCEDURE — 94664 DEMO&/EVAL PT USE INHALER: CPT

## 2018-04-08 PROCEDURE — 36415 COLL VENOUS BLD VENIPUNCTURE: CPT

## 2018-04-08 PROCEDURE — 97110 THERAPEUTIC EXERCISES: CPT

## 2018-04-08 PROCEDURE — 6360000002 HC RX W HCPCS: Performed by: FAMILY MEDICINE

## 2018-04-08 PROCEDURE — 80053 COMPREHEN METABOLIC PANEL: CPT

## 2018-04-08 PROCEDURE — 6370000000 HC RX 637 (ALT 250 FOR IP): Performed by: FAMILY MEDICINE

## 2018-04-08 PROCEDURE — 2580000003 HC RX 258: Performed by: FAMILY MEDICINE

## 2018-04-08 PROCEDURE — 85027 COMPLETE CBC AUTOMATED: CPT

## 2018-04-08 PROCEDURE — 94640 AIRWAY INHALATION TREATMENT: CPT

## 2018-04-08 RX ADMIN — TAMSULOSIN HYDROCHLORIDE 0.4 MG: 0.4 CAPSULE ORAL at 16:37

## 2018-04-08 RX ADMIN — Medication 1 SPRAY: at 21:02

## 2018-04-08 RX ADMIN — FAMOTIDINE 20 MG: 20 TABLET, FILM COATED ORAL at 07:54

## 2018-04-08 RX ADMIN — INSULIN LISPRO 1 UNITS: 100 INJECTION, SOLUTION INTRAVENOUS; SUBCUTANEOUS at 20:59

## 2018-04-08 RX ADMIN — FUROSEMIDE 40 MG: 10 INJECTION, SOLUTION INTRAMUSCULAR; INTRAVENOUS at 16:38

## 2018-04-08 RX ADMIN — ENOXAPARIN SODIUM 40 MG: 40 INJECTION SUBCUTANEOUS at 07:54

## 2018-04-08 RX ADMIN — MULTIPLE VITAMINS W/ MINERALS TAB 1 TABLET: TAB at 07:54

## 2018-04-08 RX ADMIN — PRAVASTATIN SODIUM 20 MG: 20 TABLET ORAL at 07:54

## 2018-04-08 RX ADMIN — LEVOTHYROXINE SODIUM 100 MCG: 100 TABLET ORAL at 07:17

## 2018-04-08 RX ADMIN — POTASSIUM CHLORIDE 20 MEQ: 1500 TABLET, EXTENDED RELEASE ORAL at 07:54

## 2018-04-08 RX ADMIN — IPRATROPIUM BROMIDE AND ALBUTEROL SULFATE 3 ML: .5; 3 SOLUTION RESPIRATORY (INHALATION) at 14:30

## 2018-04-08 RX ADMIN — METHYLPREDNISOLONE SODIUM SUCCINATE 40 MG: 40 INJECTION, POWDER, FOR SOLUTION INTRAMUSCULAR; INTRAVENOUS at 11:54

## 2018-04-08 RX ADMIN — Medication 10 ML: at 16:38

## 2018-04-08 RX ADMIN — CLOPIDOGREL 75 MG: 75 TABLET, FILM COATED ORAL at 07:54

## 2018-04-08 RX ADMIN — Medication 10 ML: at 11:54

## 2018-04-08 RX ADMIN — Medication 10 ML: at 07:54

## 2018-04-08 RX ADMIN — FUROSEMIDE 40 MG: 10 INJECTION, SOLUTION INTRAMUSCULAR; INTRAVENOUS at 07:54

## 2018-04-08 RX ADMIN — METHYLPREDNISOLONE SODIUM SUCCINATE 40 MG: 40 INJECTION, POWDER, FOR SOLUTION INTRAMUSCULAR; INTRAVENOUS at 00:33

## 2018-04-08 RX ADMIN — PROPRANOLOL HYDROCHLORIDE 80 MG: 10 TABLET ORAL at 16:37

## 2018-04-08 RX ADMIN — FAMOTIDINE 20 MG: 20 TABLET, FILM COATED ORAL at 20:58

## 2018-04-08 RX ADMIN — CEFTRIAXONE 1 G: 1 INJECTION, POWDER, FOR SOLUTION INTRAMUSCULAR; INTRAVENOUS at 11:54

## 2018-04-08 RX ADMIN — ACETAMINOPHEN 650 MG: 325 TABLET ORAL at 22:50

## 2018-04-08 RX ADMIN — IPRATROPIUM BROMIDE AND ALBUTEROL SULFATE 3 ML: .5; 3 SOLUTION RESPIRATORY (INHALATION) at 07:02

## 2018-04-08 RX ADMIN — Medication 10 ML: at 20:59

## 2018-04-08 RX ADMIN — IPRATROPIUM BROMIDE AND ALBUTEROL SULFATE 3 ML: .5; 3 SOLUTION RESPIRATORY (INHALATION) at 20:45

## 2018-04-08 RX ADMIN — PRIMIDONE 100 MG: 50 TABLET ORAL at 07:54

## 2018-04-08 RX ADMIN — PRIMIDONE 100 MG: 50 TABLET ORAL at 20:58

## 2018-04-08 RX ADMIN — METFORMIN HYDROCHLORIDE 2000 MG: 500 TABLET, FILM COATED ORAL at 07:54

## 2018-04-08 ASSESSMENT — PAIN SCALES - GENERAL: PAINLEVEL_OUTOF10: 0

## 2018-04-09 VITALS
HEIGHT: 71 IN | OXYGEN SATURATION: 96 % | TEMPERATURE: 97.2 F | SYSTOLIC BLOOD PRESSURE: 121 MMHG | HEART RATE: 67 BPM | DIASTOLIC BLOOD PRESSURE: 63 MMHG | BODY MASS INDEX: 32.2 KG/M2 | RESPIRATION RATE: 16 BRPM | WEIGHT: 230 LBS

## 2018-04-09 DIAGNOSIS — I50.42 CHRONIC COMBINED SYSTOLIC AND DIASTOLIC CHF (CONGESTIVE HEART FAILURE) (HCC): Primary | Chronic | ICD-10-CM

## 2018-04-09 PROBLEM — J44.1 COPD EXACERBATION (HCC): Status: ACTIVE | Noted: 2018-04-06

## 2018-04-09 LAB
ALBUMIN SERPL-MCNC: 3.8 G/DL (ref 3.5–5.2)
ALBUMIN/GLOBULIN RATIO: 1.4 (ref 1–2.5)
ALP BLD-CCNC: 90 U/L (ref 40–129)
ALT SERPL-CCNC: 26 U/L (ref 5–41)
ANION GAP SERPL CALCULATED.3IONS-SCNC: 10 MMOL/L (ref 9–17)
AST SERPL-CCNC: 18 U/L
BILIRUB SERPL-MCNC: 0.33 MG/DL (ref 0.3–1.2)
BUN BLDV-MCNC: 23 MG/DL (ref 8–23)
BUN/CREAT BLD: 24 (ref 9–20)
CALCIUM SERPL-MCNC: 9.1 MG/DL (ref 8.6–10.4)
CHLORIDE BLD-SCNC: 89 MMOL/L (ref 98–107)
CO2: 33 MMOL/L (ref 20–31)
CREAT SERPL-MCNC: 0.97 MG/DL (ref 0.7–1.2)
CULTURE: ABNORMAL
CULTURE: ABNORMAL
DIRECT EXAM: ABNORMAL
GFR AFRICAN AMERICAN: >60 ML/MIN
GFR NON-AFRICAN AMERICAN: >60 ML/MIN
GFR SERPL CREATININE-BSD FRML MDRD: ABNORMAL ML/MIN/{1.73_M2}
GFR SERPL CREATININE-BSD FRML MDRD: ABNORMAL ML/MIN/{1.73_M2}
GLUCOSE BLD-MCNC: 136 MG/DL (ref 74–100)
GLUCOSE BLD-MCNC: 145 MG/DL (ref 74–100)
GLUCOSE BLD-MCNC: 147 MG/DL (ref 74–100)
GLUCOSE BLD-MCNC: 162 MG/DL (ref 70–99)
GLUCOSE BLD-MCNC: 164 MG/DL (ref 74–100)
HCT VFR BLD CALC: 41.9 % (ref 40.7–50.3)
HEMOGLOBIN: 13.5 G/DL (ref 13–17)
Lab: ABNORMAL
MCH RBC QN AUTO: 28.8 PG (ref 25.2–33.5)
MCHC RBC AUTO-ENTMCNC: 32.2 G/DL (ref 28.4–34.8)
MCV RBC AUTO: 89.3 FL (ref 82.6–102.9)
NRBC AUTOMATED: 0 PER 100 WBC
PDW BLD-RTO: 13.8 % (ref 11.8–14.4)
PLATELET # BLD: 230 K/UL (ref 138–453)
PMV BLD AUTO: 10.1 FL (ref 8.1–13.5)
POTASSIUM SERPL-SCNC: 4.3 MMOL/L (ref 3.7–5.3)
RBC # BLD: 4.69 M/UL (ref 4.21–5.77)
SODIUM BLD-SCNC: 132 MMOL/L (ref 135–144)
SPECIMEN DESCRIPTION: ABNORMAL
SPECIMEN DESCRIPTION: ABNORMAL
STATUS: ABNORMAL
TOTAL PROTEIN: 6.6 G/DL (ref 6.4–8.3)
WBC # BLD: 9.4 K/UL (ref 3.5–11.3)

## 2018-04-09 PROCEDURE — 80053 COMPREHEN METABOLIC PANEL: CPT

## 2018-04-09 PROCEDURE — 6370000000 HC RX 637 (ALT 250 FOR IP): Performed by: PHYSICIAN ASSISTANT

## 2018-04-09 PROCEDURE — 36415 COLL VENOUS BLD VENIPUNCTURE: CPT

## 2018-04-09 PROCEDURE — 85027 COMPLETE CBC AUTOMATED: CPT

## 2018-04-09 PROCEDURE — 6360000002 HC RX W HCPCS: Performed by: FAMILY MEDICINE

## 2018-04-09 PROCEDURE — 2580000003 HC RX 258: Performed by: FAMILY MEDICINE

## 2018-04-09 PROCEDURE — 94618 PULMONARY STRESS TESTING: CPT

## 2018-04-09 PROCEDURE — 6360000002 HC RX W HCPCS: Performed by: PHYSICIAN ASSISTANT

## 2018-04-09 PROCEDURE — 6370000000 HC RX 637 (ALT 250 FOR IP): Performed by: FAMILY MEDICINE

## 2018-04-09 PROCEDURE — 2580000003 HC RX 258: Performed by: PHYSICIAN ASSISTANT

## 2018-04-09 PROCEDURE — 99221 1ST HOSP IP/OBS SF/LOW 40: CPT | Performed by: INTERNAL MEDICINE

## 2018-04-09 PROCEDURE — 94640 AIRWAY INHALATION TREATMENT: CPT

## 2018-04-09 PROCEDURE — 82947 ASSAY GLUCOSE BLOOD QUANT: CPT

## 2018-04-09 RX ORDER — POTASSIUM CHLORIDE 20 MEQ/1
20 TABLET, EXTENDED RELEASE ORAL DAILY
Qty: 30 TABLET | Refills: 0 | Status: SHIPPED | OUTPATIENT
Start: 2018-04-09 | End: 2018-10-16

## 2018-04-09 RX ORDER — IPRATROPIUM BROMIDE AND ALBUTEROL SULFATE 2.5; .5 MG/3ML; MG/3ML
1 SOLUTION RESPIRATORY (INHALATION) 4 TIMES DAILY
Qty: 120 VIAL | Refills: 0 | Status: SHIPPED | OUTPATIENT
Start: 2018-04-09 | End: 2018-07-18 | Stop reason: ALTCHOICE

## 2018-04-09 RX ORDER — PREDNISONE 10 MG/1
TABLET ORAL
Qty: 30 TABLET | Refills: 0 | Status: SHIPPED | OUTPATIENT
Start: 2018-04-09 | End: 2018-07-10 | Stop reason: ALTCHOICE

## 2018-04-09 RX ORDER — BUDESONIDE AND FORMOTEROL FUMARATE DIHYDRATE 160; 4.5 UG/1; UG/1
2 AEROSOL RESPIRATORY (INHALATION) 2 TIMES DAILY
COMMUNITY
End: 2022-08-26

## 2018-04-09 RX ORDER — CEFDINIR 300 MG/1
300 CAPSULE ORAL 2 TIMES DAILY
Qty: 20 CAPSULE | Refills: 0 | Status: SHIPPED | OUTPATIENT
Start: 2018-04-09 | End: 2018-04-19

## 2018-04-09 RX ORDER — FUROSEMIDE 40 MG/1
40 TABLET ORAL 2 TIMES DAILY
Qty: 60 TABLET | Refills: 0 | Status: SHIPPED | OUTPATIENT
Start: 2018-04-09 | End: 2018-05-24 | Stop reason: SDUPTHER

## 2018-04-09 RX ADMIN — MULTIPLE VITAMINS W/ MINERALS TAB 1 TABLET: TAB at 11:40

## 2018-04-09 RX ADMIN — FAMOTIDINE 20 MG: 20 TABLET, FILM COATED ORAL at 09:34

## 2018-04-09 RX ADMIN — Medication 10 ML: at 01:03

## 2018-04-09 RX ADMIN — POTASSIUM CHLORIDE 20 MEQ: 1500 TABLET, EXTENDED RELEASE ORAL at 09:34

## 2018-04-09 RX ADMIN — PROPRANOLOL HYDROCHLORIDE 80 MG: 10 TABLET ORAL at 17:19

## 2018-04-09 RX ADMIN — IPRATROPIUM BROMIDE AND ALBUTEROL SULFATE 3 ML: .5; 3 SOLUTION RESPIRATORY (INHALATION) at 20:41

## 2018-04-09 RX ADMIN — METFORMIN HYDROCHLORIDE 2000 MG: 500 TABLET, FILM COATED ORAL at 07:20

## 2018-04-09 RX ADMIN — INSULIN LISPRO 1 UNITS: 100 INJECTION, SOLUTION INTRAVENOUS; SUBCUTANEOUS at 21:32

## 2018-04-09 RX ADMIN — LEVOTHYROXINE SODIUM 100 MCG: 100 TABLET ORAL at 07:20

## 2018-04-09 RX ADMIN — CLOPIDOGREL 75 MG: 75 TABLET, FILM COATED ORAL at 09:34

## 2018-04-09 RX ADMIN — INSULIN LISPRO 2 UNITS: 100 INJECTION, SOLUTION INTRAVENOUS; SUBCUTANEOUS at 11:34

## 2018-04-09 RX ADMIN — TAMSULOSIN HYDROCHLORIDE 0.4 MG: 0.4 CAPSULE ORAL at 17:21

## 2018-04-09 RX ADMIN — IPRATROPIUM BROMIDE AND ALBUTEROL SULFATE 3 ML: .5; 3 SOLUTION RESPIRATORY (INHALATION) at 07:23

## 2018-04-09 RX ADMIN — PRAVASTATIN SODIUM 20 MG: 20 TABLET ORAL at 09:34

## 2018-04-09 RX ADMIN — FUROSEMIDE 40 MG: 10 INJECTION, SOLUTION INTRAMUSCULAR; INTRAVENOUS at 09:33

## 2018-04-09 RX ADMIN — METHYLPREDNISOLONE SODIUM SUCCINATE 40 MG: 40 INJECTION, POWDER, FOR SOLUTION INTRAMUSCULAR; INTRAVENOUS at 01:03

## 2018-04-09 RX ADMIN — CEFTRIAXONE 1 G: 1 INJECTION, POWDER, FOR SOLUTION INTRAMUSCULAR; INTRAVENOUS at 09:34

## 2018-04-09 RX ADMIN — FAMOTIDINE 20 MG: 20 TABLET, FILM COATED ORAL at 21:31

## 2018-04-09 RX ADMIN — Medication 10 ML: at 09:35

## 2018-04-09 RX ADMIN — ENOXAPARIN SODIUM 40 MG: 40 INJECTION SUBCUTANEOUS at 09:33

## 2018-04-09 RX ADMIN — PRIMIDONE 100 MG: 50 TABLET ORAL at 21:31

## 2018-04-09 RX ADMIN — PRIMIDONE 100 MG: 50 TABLET ORAL at 09:34

## 2018-04-09 ASSESSMENT — PAIN SCALES - GENERAL
PAINLEVEL_OUTOF10: 0

## 2018-04-10 ENCOUNTER — CARE COORDINATION (OUTPATIENT)
Dept: CASE MANAGEMENT | Age: 83
End: 2018-04-10

## 2018-04-10 DIAGNOSIS — J18.9 PNEUMONIA DUE TO INFECTIOUS ORGANISM, UNSPECIFIED LATERALITY, UNSPECIFIED PART OF LUNG: Primary | ICD-10-CM

## 2018-04-10 PROCEDURE — 1111F DSCHRG MED/CURRENT MED MERGE: CPT | Performed by: NURSE PRACTITIONER

## 2018-04-10 RX ORDER — ALBUTEROL SULFATE 2.5 MG/3ML
2.5 SOLUTION RESPIRATORY (INHALATION) EVERY 6 HOURS PRN
Qty: 120 EACH | Refills: 3 | Status: SHIPPED | OUTPATIENT
Start: 2018-04-10 | End: 2019-12-05

## 2018-04-11 LAB
CULTURE: NORMAL
Lab: NORMAL
SPECIMEN DESCRIPTION: NORMAL
STATUS: NORMAL

## 2018-04-13 ENCOUNTER — OFFICE VISIT (OUTPATIENT)
Dept: PRIMARY CARE CLINIC | Age: 83
End: 2018-04-13
Payer: MEDICARE

## 2018-04-13 ENCOUNTER — HOSPITAL ENCOUNTER (OUTPATIENT)
Age: 83
Discharge: HOME | End: 2018-04-13
Payer: MEDICARE

## 2018-04-13 VITALS
HEART RATE: 69 BPM | RESPIRATION RATE: 22 BRPM | OXYGEN SATURATION: 93 % | BODY MASS INDEX: 32.8 KG/M2 | DIASTOLIC BLOOD PRESSURE: 58 MMHG | WEIGHT: 235.2 LBS | SYSTOLIC BLOOD PRESSURE: 110 MMHG | TEMPERATURE: 99.4 F

## 2018-04-13 DIAGNOSIS — I50.41 ACUTE COMBINED SYSTOLIC AND DIASTOLIC CHF, NYHA CLASS 3 (HCC): ICD-10-CM

## 2018-04-13 DIAGNOSIS — E87.1 HYPONATREMIA: ICD-10-CM

## 2018-04-13 DIAGNOSIS — J44.9 CHRONIC OBSTRUCTIVE PULMONARY DISEASE, UNSPECIFIED COPD TYPE (HCC): Chronic | ICD-10-CM

## 2018-04-13 DIAGNOSIS — E11.8 TYPE 2 DIABETES MELLITUS WITH COMPLICATION, WITHOUT LONG-TERM CURRENT USE OF INSULIN (HCC): ICD-10-CM

## 2018-04-13 DIAGNOSIS — R73.09 ELEVATED GLUCOSE: Primary | ICD-10-CM

## 2018-04-13 LAB
ANION GAP SERPL CALCULATED.3IONS-SCNC: 11 MMOL/L (ref 9–17)
BUN BLDV-MCNC: 24 MG/DL (ref 8–23)
BUN/CREAT BLD: 23 (ref 9–20)
CALCIUM SERPL-MCNC: 8.9 MG/DL (ref 8.6–10.4)
CHLORIDE BLD-SCNC: 92 MMOL/L (ref 98–107)
CO2: 28 MMOL/L (ref 20–31)
CREAT SERPL-MCNC: 1.05 MG/DL (ref 0.7–1.2)
GFR AFRICAN AMERICAN: >60 ML/MIN
GFR NON-AFRICAN AMERICAN: >60 ML/MIN
GFR SERPL CREATININE-BSD FRML MDRD: ABNORMAL ML/MIN/{1.73_M2}
GFR SERPL CREATININE-BSD FRML MDRD: ABNORMAL ML/MIN/{1.73_M2}
GLUCOSE BLD-MCNC: 167 MG/DL (ref 70–99)
HBA1C MFR BLD: 7.1 %
POTASSIUM SERPL-SCNC: 4.8 MMOL/L (ref 3.7–5.3)
SODIUM BLD-SCNC: 131 MMOL/L (ref 135–144)

## 2018-04-13 PROCEDURE — 83036 HEMOGLOBIN GLYCOSYLATED A1C: CPT | Performed by: NURSE PRACTITIONER

## 2018-04-13 PROCEDURE — 99213 OFFICE O/P EST LOW 20 MIN: CPT | Performed by: NURSE PRACTITIONER

## 2018-04-13 PROCEDURE — 80048 BASIC METABOLIC PNL TOTAL CA: CPT

## 2018-04-13 PROCEDURE — 36415 COLL VENOUS BLD VENIPUNCTURE: CPT

## 2018-04-13 RX ORDER — ALBUTEROL SULFATE 90 UG/1
2 AEROSOL, METERED RESPIRATORY (INHALATION) EVERY 4 HOURS PRN
Qty: 1 INHALER | Refills: 2 | Status: SHIPPED | OUTPATIENT
Start: 2018-04-13

## 2018-04-13 ASSESSMENT — ENCOUNTER SYMPTOMS: SHORTNESS OF BREATH: 1

## 2018-04-18 ENCOUNTER — CARE COORDINATION (OUTPATIENT)
Dept: CASE MANAGEMENT | Age: 83
End: 2018-04-18

## 2018-04-18 ENCOUNTER — OFFICE VISIT (OUTPATIENT)
Dept: CARDIOLOGY | Age: 83
End: 2018-04-18
Payer: MEDICARE

## 2018-04-18 ENCOUNTER — COMMUNITY OUTREACH (OUTPATIENT)
Dept: CASE MANAGEMENT | Age: 83
End: 2018-04-18

## 2018-04-18 VITALS
HEIGHT: 71 IN | OXYGEN SATURATION: 94 % | SYSTOLIC BLOOD PRESSURE: 106 MMHG | WEIGHT: 229.6 LBS | HEART RATE: 68 BPM | DIASTOLIC BLOOD PRESSURE: 64 MMHG | BODY MASS INDEX: 32.14 KG/M2

## 2018-04-18 DIAGNOSIS — I25.5 ISCHEMIC CARDIOMYOPATHY: ICD-10-CM

## 2018-04-18 DIAGNOSIS — I50.42 CHRONIC COMBINED SYSTOLIC AND DIASTOLIC CHF, NYHA CLASS 3 (HCC): Primary | ICD-10-CM

## 2018-04-18 DIAGNOSIS — Z95.820 S/P ANGIOPLASTY WITH STENT: ICD-10-CM

## 2018-04-18 DIAGNOSIS — I25.10 ASHD (ARTERIOSCLEROTIC HEART DISEASE): ICD-10-CM

## 2018-04-18 DIAGNOSIS — J96.11 CHRONIC HYPOXEMIC RESPIRATORY FAILURE (HCC): ICD-10-CM

## 2018-04-18 PROCEDURE — 99214 OFFICE O/P EST MOD 30 MIN: CPT | Performed by: INTERNAL MEDICINE

## 2018-04-19 ASSESSMENT — ENCOUNTER SYMPTOMS
TROUBLE SWALLOWING: 0
CHEST TIGHTNESS: 0
WHEEZING: 0
ABDOMINAL PAIN: 0
GASTROINTESTINAL NEGATIVE: 1
EYES NEGATIVE: 1

## 2018-04-25 ENCOUNTER — HOSPITAL ENCOUNTER (OUTPATIENT)
Age: 83
Discharge: HOME | End: 2018-04-25
Payer: MEDICARE

## 2018-04-25 DIAGNOSIS — I50.42 CHRONIC COMBINED SYSTOLIC AND DIASTOLIC CHF, NYHA CLASS 3 (HCC): ICD-10-CM

## 2018-04-25 LAB
ANION GAP SERPL CALCULATED.3IONS-SCNC: 9 MMOL/L (ref 9–17)
BUN BLDV-MCNC: 15 MG/DL (ref 8–23)
BUN/CREAT BLD: 15 (ref 9–20)
CALCIUM SERPL-MCNC: 9.3 MG/DL (ref 8.6–10.4)
CHLORIDE BLD-SCNC: 98 MMOL/L (ref 98–107)
CO2: 33 MMOL/L (ref 20–31)
CREAT SERPL-MCNC: 1.03 MG/DL (ref 0.7–1.2)
GFR AFRICAN AMERICAN: >60 ML/MIN
GFR NON-AFRICAN AMERICAN: >60 ML/MIN
GFR SERPL CREATININE-BSD FRML MDRD: ABNORMAL ML/MIN/{1.73_M2}
GFR SERPL CREATININE-BSD FRML MDRD: ABNORMAL ML/MIN/{1.73_M2}
GLUCOSE BLD-MCNC: 113 MG/DL (ref 70–99)
POTASSIUM SERPL-SCNC: 4.9 MMOL/L (ref 3.7–5.3)
SODIUM BLD-SCNC: 140 MMOL/L (ref 135–144)

## 2018-04-25 PROCEDURE — 36415 COLL VENOUS BLD VENIPUNCTURE: CPT

## 2018-04-25 PROCEDURE — 80048 BASIC METABOLIC PNL TOTAL CA: CPT

## 2018-04-26 ENCOUNTER — TELEPHONE (OUTPATIENT)
Dept: CARDIOLOGY | Age: 83
End: 2018-04-26

## 2018-05-10 ENCOUNTER — HOSPITAL ENCOUNTER (OUTPATIENT)
Age: 83
Setting detail: SPECIMEN
Discharge: HOME | End: 2018-05-10
Payer: MEDICARE

## 2018-05-10 ENCOUNTER — OFFICE VISIT (OUTPATIENT)
Dept: PRIMARY CARE CLINIC | Age: 83
End: 2018-05-10
Payer: MEDICARE

## 2018-05-10 VITALS
RESPIRATION RATE: 22 BRPM | DIASTOLIC BLOOD PRESSURE: 63 MMHG | BODY MASS INDEX: 33.14 KG/M2 | HEART RATE: 60 BPM | WEIGHT: 237.6 LBS | OXYGEN SATURATION: 94 % | SYSTOLIC BLOOD PRESSURE: 116 MMHG | TEMPERATURE: 98.2 F

## 2018-05-10 DIAGNOSIS — R10.9 FLANK PAIN: Primary | ICD-10-CM

## 2018-05-10 DIAGNOSIS — R10.9 FLANK PAIN: ICD-10-CM

## 2018-05-10 LAB
BILIRUBIN URINE: NEGATIVE
BILIRUBIN, POC: ABNORMAL
BLOOD URINE, POC: ABNORMAL
CLARITY, POC: CLEAR
COLOR, POC: ABNORMAL
COLOR: YELLOW
COMMENT UA: NORMAL
GLUCOSE URINE, POC: ABNORMAL
GLUCOSE URINE: NEGATIVE
KETONES, POC: ABNORMAL
KETONES, URINE: NEGATIVE
LEUKOCYTE EST, POC: ABNORMAL
LEUKOCYTE ESTERASE, URINE: NEGATIVE
NITRITE, POC: ABNORMAL
NITRITE, URINE: NEGATIVE
PH UA: 6.5 (ref 5–9)
PH, POC: 6.5
PROTEIN UA: NEGATIVE
PROTEIN, POC: ABNORMAL
SPECIFIC GRAVITY UA: 1.01 (ref 1.01–1.02)
SPECIFIC GRAVITY, POC: 1.01
TURBIDITY: CLEAR
URINE HGB: NEGATIVE
UROBILINOGEN, POC: ABNORMAL
UROBILINOGEN, URINE: NORMAL

## 2018-05-10 PROCEDURE — 81003 URINALYSIS AUTO W/O SCOPE: CPT

## 2018-05-10 PROCEDURE — 81003 URINALYSIS AUTO W/O SCOPE: CPT | Performed by: NURSE PRACTITIONER

## 2018-05-10 PROCEDURE — 99213 OFFICE O/P EST LOW 20 MIN: CPT | Performed by: NURSE PRACTITIONER

## 2018-05-10 ASSESSMENT — ENCOUNTER SYMPTOMS
ABDOMINAL PAIN: 0
GASTROINTESTINAL NEGATIVE: 1
CONSTIPATION: 0
EYES NEGATIVE: 1
RESPIRATORY NEGATIVE: 1
TROUBLE SWALLOWING: 0

## 2018-05-11 ENCOUNTER — TELEPHONE (OUTPATIENT)
Dept: PRIMARY CARE CLINIC | Age: 83
End: 2018-05-11

## 2018-05-11 ASSESSMENT — ENCOUNTER SYMPTOMS: BOWEL INCONTINENCE: 0

## 2018-05-14 ENCOUNTER — TELEPHONE (OUTPATIENT)
Dept: PRIMARY CARE CLINIC | Age: 83
End: 2018-05-14

## 2018-05-15 ENCOUNTER — HOSPITAL ENCOUNTER (OUTPATIENT)
Age: 83
Discharge: HOME | End: 2018-05-15
Payer: MEDICARE

## 2018-05-15 ENCOUNTER — HOSPITAL ENCOUNTER (OUTPATIENT)
Dept: CT IMAGING | Age: 83
Discharge: HOME | End: 2018-05-17
Payer: MEDICARE

## 2018-05-15 ENCOUNTER — TELEPHONE (OUTPATIENT)
Dept: PRIMARY CARE CLINIC | Age: 83
End: 2018-05-15

## 2018-05-15 DIAGNOSIS — R10.9 FLANK PAIN: ICD-10-CM

## 2018-05-15 DIAGNOSIS — R10.9 RIGHT FLANK PAIN: Primary | ICD-10-CM

## 2018-05-15 DIAGNOSIS — R74.01 ELEVATED ALT MEASUREMENT: Primary | ICD-10-CM

## 2018-05-15 LAB
ABSOLUTE EOS #: 0.61 K/UL (ref 0–0.44)
ABSOLUTE IMMATURE GRANULOCYTE: 0.1 K/UL (ref 0–0.3)
ABSOLUTE LYMPH #: 1.32 K/UL (ref 1.1–3.7)
ABSOLUTE MONO #: 1.2 K/UL (ref 0.1–1.2)
ALBUMIN SERPL-MCNC: 3.9 G/DL (ref 3.5–5.2)
ALBUMIN/GLOBULIN RATIO: 1.4 (ref 1–2.5)
ALP BLD-CCNC: 98 U/L (ref 40–129)
ALT SERPL-CCNC: 50 U/L (ref 5–41)
ANION GAP SERPL CALCULATED.3IONS-SCNC: 12 MMOL/L (ref 9–17)
AST SERPL-CCNC: 28 U/L
BASOPHILS # BLD: 0 % (ref 0–2)
BASOPHILS ABSOLUTE: 0.03 K/UL (ref 0–0.2)
BILIRUB SERPL-MCNC: 0.28 MG/DL (ref 0.3–1.2)
BUN BLDV-MCNC: 16 MG/DL (ref 8–23)
BUN/CREAT BLD: 17 (ref 9–20)
CALCIUM SERPL-MCNC: 9 MG/DL (ref 8.6–10.4)
CHLORIDE BLD-SCNC: 95 MMOL/L (ref 98–107)
CO2: 29 MMOL/L (ref 20–31)
CREAT SERPL-MCNC: 0.94 MG/DL (ref 0.7–1.2)
DIFFERENTIAL TYPE: ABNORMAL
EOSINOPHILS RELATIVE PERCENT: 7 % (ref 1–4)
GFR AFRICAN AMERICAN: >60 ML/MIN
GFR NON-AFRICAN AMERICAN: >60 ML/MIN
GFR SERPL CREATININE-BSD FRML MDRD: ABNORMAL ML/MIN/{1.73_M2}
GFR SERPL CREATININE-BSD FRML MDRD: ABNORMAL ML/MIN/{1.73_M2}
GLUCOSE BLD-MCNC: 199 MG/DL (ref 70–99)
HCT VFR BLD CALC: 40.2 % (ref 40.7–50.3)
HEMOGLOBIN: 12.4 G/DL (ref 13–17)
IMMATURE GRANULOCYTES: 1 %
LYMPHOCYTES # BLD: 16 % (ref 24–43)
MCH RBC QN AUTO: 29.5 PG (ref 25.2–33.5)
MCHC RBC AUTO-ENTMCNC: 30.8 G/DL (ref 28.4–34.8)
MCV RBC AUTO: 95.7 FL (ref 82.6–102.9)
MONOCYTES # BLD: 15 % (ref 3–12)
NRBC AUTOMATED: 0 PER 100 WBC
PDW BLD-RTO: 14.5 % (ref 11.8–14.4)
PLATELET # BLD: 244 K/UL (ref 138–453)
PLATELET ESTIMATE: ABNORMAL
PMV BLD AUTO: 10.8 FL (ref 8.1–13.5)
POTASSIUM SERPL-SCNC: 4.9 MMOL/L (ref 3.7–5.3)
RBC # BLD: 4.2 M/UL (ref 4.21–5.77)
RBC # BLD: ABNORMAL 10*6/UL
SEG NEUTROPHILS: 61 % (ref 36–65)
SEGMENTED NEUTROPHILS ABSOLUTE COUNT: 4.95 K/UL (ref 1.5–8.1)
SODIUM BLD-SCNC: 136 MMOL/L (ref 135–144)
TOTAL PROTEIN: 6.6 G/DL (ref 6.4–8.3)
WBC # BLD: 8.2 K/UL (ref 3.5–11.3)
WBC # BLD: ABNORMAL 10*3/UL

## 2018-05-15 PROCEDURE — 36415 COLL VENOUS BLD VENIPUNCTURE: CPT

## 2018-05-15 PROCEDURE — 80053 COMPREHEN METABOLIC PANEL: CPT

## 2018-05-15 PROCEDURE — 74176 CT ABD & PELVIS W/O CONTRAST: CPT

## 2018-05-15 PROCEDURE — 85025 COMPLETE CBC W/AUTO DIFF WBC: CPT

## 2018-05-24 RX ORDER — FUROSEMIDE 40 MG/1
40 TABLET ORAL 2 TIMES DAILY
Qty: 60 TABLET | Refills: 3 | Status: SHIPPED | OUTPATIENT
Start: 2018-05-24 | End: 2018-09-24 | Stop reason: SDUPTHER

## 2018-06-18 ENCOUNTER — OFFICE VISIT (OUTPATIENT)
Dept: PULMONOLOGY | Age: 83
End: 2018-06-18
Payer: MEDICARE

## 2018-06-18 VITALS
TEMPERATURE: 98.9 F | HEART RATE: 68 BPM | OXYGEN SATURATION: 94 % | BODY MASS INDEX: 32.76 KG/M2 | DIASTOLIC BLOOD PRESSURE: 55 MMHG | SYSTOLIC BLOOD PRESSURE: 114 MMHG | HEIGHT: 71 IN | RESPIRATION RATE: 16 BRPM | WEIGHT: 234 LBS

## 2018-06-18 DIAGNOSIS — E66.09 OBESITY DUE TO EXCESS CALORIES WITHOUT SERIOUS COMORBIDITY, UNSPECIFIED CLASSIFICATION: ICD-10-CM

## 2018-06-18 DIAGNOSIS — J45.40 MODERATE PERSISTENT ASTHMATIC BRONCHITIS WITHOUT COMPLICATION: ICD-10-CM

## 2018-06-18 DIAGNOSIS — I50.42 CHRONIC COMBINED SYSTOLIC AND DIASTOLIC CONGESTIVE HEART FAILURE (HCC): ICD-10-CM

## 2018-06-18 DIAGNOSIS — J47.9 BRONCHIECTASIS WITHOUT COMPLICATION (HCC): ICD-10-CM

## 2018-06-18 DIAGNOSIS — J43.2 CENTRILOBULAR EMPHYSEMA (HCC): Primary | ICD-10-CM

## 2018-06-18 PROCEDURE — 99214 OFFICE O/P EST MOD 30 MIN: CPT | Performed by: INTERNAL MEDICINE

## 2018-07-10 ENCOUNTER — OFFICE VISIT (OUTPATIENT)
Dept: PRIMARY CARE CLINIC | Age: 83
End: 2018-07-10
Payer: MEDICARE

## 2018-07-10 VITALS
TEMPERATURE: 99.6 F | DIASTOLIC BLOOD PRESSURE: 59 MMHG | SYSTOLIC BLOOD PRESSURE: 103 MMHG | RESPIRATION RATE: 20 BRPM | HEIGHT: 71 IN | HEART RATE: 72 BPM | WEIGHT: 237.2 LBS | BODY MASS INDEX: 33.21 KG/M2

## 2018-07-10 DIAGNOSIS — J20.9 ACUTE BRONCHITIS WITH COPD (HCC): ICD-10-CM

## 2018-07-10 DIAGNOSIS — J01.00 ACUTE NON-RECURRENT MAXILLARY SINUSITIS: Primary | ICD-10-CM

## 2018-07-10 DIAGNOSIS — J44.0 ACUTE BRONCHITIS WITH COPD (HCC): ICD-10-CM

## 2018-07-10 PROCEDURE — 99213 OFFICE O/P EST LOW 20 MIN: CPT | Performed by: NURSE PRACTITIONER

## 2018-07-10 RX ORDER — AMOXICILLIN AND CLAVULANATE POTASSIUM 875; 125 MG/1; MG/1
1 TABLET, FILM COATED ORAL 2 TIMES DAILY
Qty: 20 TABLET | Refills: 0 | Status: SHIPPED | OUTPATIENT
Start: 2018-07-10 | End: 2018-07-20

## 2018-07-10 RX ORDER — PREDNISONE 20 MG/1
40 TABLET ORAL DAILY
Qty: 10 TABLET | Refills: 0 | Status: SHIPPED | OUTPATIENT
Start: 2018-07-10 | End: 2018-07-15

## 2018-07-10 ASSESSMENT — ENCOUNTER SYMPTOMS
RHINORRHEA: 1
SORE THROAT: 1
ABDOMINAL PAIN: 0
COUGH: 1
DIARRHEA: 0
VOMITING: 0
WHEEZING: 0
SWOLLEN GLANDS: 0
NAUSEA: 0
SINUS PAIN: 1

## 2018-07-10 NOTE — PATIENT INSTRUCTIONS
hot, wet towel or a warm gel pack on your face 3 or 4 times a day for 5 to 10 minutes each time. · Try a decongestant nasal spray like oxymetazoline (Afrin). Do not use it for more than 3 days in a row. Using it for more than 3 days can make your congestion worse. When should you call for help? Call your doctor now or seek immediate medical care if:    · You have new or worse swelling or redness in your face or around your eyes.     · You have a new or higher fever.    Watch closely for changes in your health, and be sure to contact your doctor if:    · You have new or worse facial pain.     · The mucus from your nose becomes thicker (like pus) or has new blood in it.     · You are not getting better as expected. Where can you learn more? Go to https://"[employer]"pe[employer]fish Instrumentseb.Bokee. org and sign in to your [employer] account. Enter H297 in the [employer] box to learn more about \"Sinusitis: Care Instructions. \"     If you do not have an account, please click on the \"Sign Up Now\" link. Current as of: May 12, 2017  Content Version: 11.6  © 0249-6935 [employer]. Care instructions adapted under license by [facility] ([facility]). If you have questions about a medical condition or this instruction, always ask your healthcare professional. [address] any warranty or liability for your use of this information.

## 2018-07-10 NOTE — PROGRESS NOTES
normal. No middle ear effusion. Left Ear: Tympanic membrane and ear canal normal.  No middle ear effusion. Nose: Mucosal edema present. No rhinorrhea. Right sinus exhibits maxillary sinus tenderness. Left sinus exhibits maxillary sinus tenderness. Mouth/Throat: Mucous membranes are normal. Mucous membranes are not dry. Posterior oropharyngeal erythema present. Eyes: Conjunctivae are normal.   Neck: Normal range of motion. Neck supple. Cardiovascular: Normal rate, regular rhythm and normal heart sounds. Pulmonary/Chest: Effort normal. He has no wheezes. Lymphadenopathy:     He has no cervical adenopathy. Neurological: He is alert. Skin: Skin is warm and dry. No rash noted. Nursing note and vitals reviewed. BP (!) 103/59 (Site: Left Arm, Position: Sitting, Cuff Size: Medium Adult)   Pulse 72   Temp 99.6 °F (37.6 °C) (Temporal)   Resp 20   Ht 5' 11\" (1.803 m)   Wt 237 lb 3.2 oz (107.6 kg)   BMI 33.08 kg/m²     Assessment:      Diagnosis Orders   1. Acute non-recurrent maxillary sinusitis  amoxicillin-clavulanate (AUGMENTIN) 875-125 MG per tablet   2. Acute bronchitis with COPD ([facility]  predniSONE (DELTASONE) 20 MG tablet       Plan:             Discussed exam, POCT findings, plan of care (including prescriptive and supportive as listed below) and follow-up at length with patient and or Patient. Reviewed all prescribed and recommended medications, administration and side effects. Encouraged to return to [address] [place] [address] for no improvement and or worsening of symptoms. To ER or call 911 if any difficulty breathing, shortness of breath, inability to swallow, hives or temp greater than 103 degrees. Questions answered. They verbalized understanding and agreement. No Follow-up on file.     Orders Placed This Encounter   Medications    amoxicillin-clavulanate (AUGMENTIN) 875-125 MG per tablet     Sig: Take 1 tablet by mouth 2 times daily for 10 days     Dispense:  20 tablet

## 2018-07-11 ENCOUNTER — HOSPITAL ENCOUNTER (OUTPATIENT)
Age: 83
Discharge: HOME | End: 2018-07-11
Payer: MEDICARE

## 2018-07-11 DIAGNOSIS — E11.8 TYPE 2 DIABETES MELLITUS WITH COMPLICATION, WITHOUT LONG-TERM CURRENT USE OF INSULIN (HCC): ICD-10-CM

## 2018-07-11 LAB
ABSOLUTE EOS #: 0.86 K/UL (ref 0–0.44)
ABSOLUTE IMMATURE GRANULOCYTE: 0.1 K/UL (ref 0–0.3)
ABSOLUTE LYMPH #: 1.55 K/UL (ref 1.1–3.7)
ABSOLUTE MONO #: 1.14 K/UL (ref 0.1–1.2)
ALBUMIN SERPL-MCNC: 3.8 G/DL (ref 3.5–5.2)
ALBUMIN/GLOBULIN RATIO: 1.1 (ref 1–2.5)
ALP BLD-CCNC: 105 U/L (ref 40–129)
ALT SERPL-CCNC: 42 U/L (ref 5–41)
ANION GAP SERPL CALCULATED.3IONS-SCNC: 15 MMOL/L (ref 9–17)
AST SERPL-CCNC: 29 U/L
BASOPHILS # BLD: 1 % (ref 0–2)
BASOPHILS ABSOLUTE: 0.06 K/UL (ref 0–0.2)
BILIRUB SERPL-MCNC: 0.21 MG/DL (ref 0.3–1.2)
BUN BLDV-MCNC: 16 MG/DL (ref 8–23)
BUN/CREAT BLD: 16 (ref 9–20)
CALCIUM SERPL-MCNC: 9.5 MG/DL (ref 8.6–10.4)
CHLORIDE BLD-SCNC: 95 MMOL/L (ref 98–107)
CO2: 29 MMOL/L (ref 20–31)
CREAT SERPL-MCNC: 1.03 MG/DL (ref 0.7–1.2)
DIFFERENTIAL TYPE: ABNORMAL
EOSINOPHILS RELATIVE PERCENT: 11 % (ref 1–4)
GFR AFRICAN AMERICAN: >60 ML/MIN
GFR NON-AFRICAN AMERICAN: >60 ML/MIN
GFR SERPL CREATININE-BSD FRML MDRD: ABNORMAL ML/MIN/{1.73_M2}
GFR SERPL CREATININE-BSD FRML MDRD: ABNORMAL ML/MIN/{1.73_M2}
GLUCOSE BLD-MCNC: 139 MG/DL (ref 70–99)
HCT VFR BLD CALC: 40.5 % (ref 40.7–50.3)
HEMOGLOBIN: 12.7 G/DL (ref 13–17)
IMMATURE GRANULOCYTES: 1 %
LYMPHOCYTES # BLD: 20 % (ref 24–43)
MCH RBC QN AUTO: 30.3 PG (ref 25.2–33.5)
MCHC RBC AUTO-ENTMCNC: 31.4 G/DL (ref 28.4–34.8)
MCV RBC AUTO: 96.7 FL (ref 82.6–102.9)
MONOCYTES # BLD: 14 % (ref 3–12)
NRBC AUTOMATED: 0 PER 100 WBC
PDW BLD-RTO: 13.4 % (ref 11.8–14.4)
PLATELET # BLD: 229 K/UL (ref 138–453)
PLATELET ESTIMATE: ABNORMAL
PMV BLD AUTO: 10.8 FL (ref 8.1–13.5)
POTASSIUM SERPL-SCNC: 4.3 MMOL/L (ref 3.7–5.3)
RBC # BLD: 4.19 M/UL (ref 4.21–5.77)
RBC # BLD: ABNORMAL 10*6/UL
SEG NEUTROPHILS: 53 % (ref 36–65)
SEGMENTED NEUTROPHILS ABSOLUTE COUNT: 4.18 K/UL (ref 1.5–8.1)
SODIUM BLD-SCNC: 139 MMOL/L (ref 135–144)
TOTAL PROTEIN: 7.4 G/DL (ref 6.4–8.3)
WBC # BLD: 7.9 K/UL (ref 3.5–11.3)
WBC # BLD: ABNORMAL 10*3/UL

## 2018-07-11 PROCEDURE — 85025 COMPLETE CBC W/AUTO DIFF WBC: CPT

## 2018-07-11 PROCEDURE — 80053 COMPREHEN METABOLIC PANEL: CPT

## 2018-07-11 PROCEDURE — 36415 COLL VENOUS BLD VENIPUNCTURE: CPT

## 2018-07-18 ENCOUNTER — OFFICE VISIT (OUTPATIENT)
Dept: PRIMARY CARE CLINIC | Age: 83
End: 2018-07-18
Payer: MEDICARE

## 2018-07-18 VITALS
WEIGHT: 236.6 LBS | TEMPERATURE: 97.9 F | OXYGEN SATURATION: 94 % | SYSTOLIC BLOOD PRESSURE: 98 MMHG | HEART RATE: 66 BPM | BODY MASS INDEX: 33 KG/M2 | DIASTOLIC BLOOD PRESSURE: 51 MMHG | RESPIRATION RATE: 20 BRPM

## 2018-07-18 DIAGNOSIS — E11.8 TYPE 2 DIABETES MELLITUS WITH COMPLICATION, WITHOUT LONG-TERM CURRENT USE OF INSULIN (HCC): Primary | ICD-10-CM

## 2018-07-18 DIAGNOSIS — Z13.220 LIPID SCREENING: ICD-10-CM

## 2018-07-18 DIAGNOSIS — E03.9 HYPOTHYROIDISM, UNSPECIFIED TYPE: ICD-10-CM

## 2018-07-18 DIAGNOSIS — I95.9 HYPOTENSION, UNSPECIFIED HYPOTENSION TYPE: ICD-10-CM

## 2018-07-18 PROCEDURE — 99213 OFFICE O/P EST LOW 20 MIN: CPT | Performed by: NURSE PRACTITIONER

## 2018-07-18 RX ORDER — GLUCOSAMINE HCL/CHONDROITIN SU 500-400 MG
CAPSULE ORAL
Qty: 100 STRIP | Refills: 3 | Status: SHIPPED | OUTPATIENT
Start: 2018-07-18

## 2018-07-18 RX ORDER — FUROSEMIDE 40 MG/1
40 TABLET ORAL 2 TIMES DAILY
Qty: 180 TABLET | Refills: 1 | Status: CANCELLED | OUTPATIENT
Start: 2018-07-18

## 2018-07-18 ASSESSMENT — ENCOUNTER SYMPTOMS: BLURRED VISION: 0

## 2018-07-18 NOTE — PROGRESS NOTES
[facility]) [facility]    [facility]    Name: Mary Kay Gaxiola  : 1935         Chief Complaint:     Chief Complaint   Patient presents with    Diabetes    Hypertension       History of Present Illness:      Mary Kay Gaxiola is a [de-identified] y.o.  male who presents with Diabetes and Hypertension    DIABETES and HYPERTENSION visit    BP Readings from Last 3 Encounters:   18 (!) 98/51   07/10/18 (!) 103/59   18 (!) 114/55        Hemoglobin A1C (%)   Date Value   2018 7.1   2017 7.6   2017 7.6 (H)     LDL Cholesterol (mg/dL)   Date Value   2017 113     HDL (mg/dL)   Date Value   2017 46     BUN (mg/dL)   Date Value   2018 16     CREATININE (mg/dL)   Date Value   2018 1.03     Glucose (mg/dL)   Date Value   2018 139 (H)            Have you changed or started any medications since your last visit including any over-the-counter medicines, vitamins, or herbal medicines? yes - antibiotic   Have you stopped taking any of your medications? Is so, why? -  no  Are you having any side effects from any of your medications? - no    Have you seen any other physician or provider since your last visit? yes -    Have you had any other diagnostic tests since your last visit? yes -    Have you been seen in the emergency room and/or had an admission in a hospital since we last saw you?  no   Have you had your routine dental cleaning in the past 6 months?  yes - dentures     Have you had your annual diabetic retinal (eye) exam? Yes, [facility]  (ensure copy of exam is in the chart)    Do you have an active [employer] account? If no, what is the barrier?   No: declined    Patient Care Team:  GENA Boo CNP as PCP - General (Certified Nurse Practitioner)  GENA Boo CNP as PCP - [facility] Attributed Provider    Medical History Review  Past Medical, Family, and Social History reviewed and does contribute to the patient presenting condition    Health Maintenance   Topic Date Due    TSH testing  1935    Shingles Vaccine (1 of 2 - 2 Dose Series) 10/15/1987    DTaP/Tdap/Td vaccine (1 - Tdap) 01/02/2003    Flu vaccine (1) 09/01/2018    Potassium monitoring  07/11/2019    Creatinine monitoring  07/11/2019    Pneumococcal low/med risk  Completed       27-year-old male presents to the office for a 3 month follow-up diabetes, hypertension. A1c 7.1 verbalizes he is monitoring his glucose at home and they are stable. States he's feeling well today. Patient sees Dr. Austin Urena cardiologist.    Patient has a history of COPD, patient follows with pulmonary. Patient is no longer wearing nasal O2 in office today. States he is doing well and breathing well. [facility]  Contnures with PT at the [place] for back and left hip pain       Diabetes   He presents for his follow-up diabetic visit. He has type 2 diabetes mellitus. No MedicAlert identification noted. His disease course has been stable. There are no hypoglycemic associated symptoms. Pertinent negatives for hypoglycemia include no dizziness or headaches. Pertinent negatives for diabetes include no blurred vision, no chest pain and no foot ulcerations. There are no hypoglycemic complications. Symptoms are stable. Risk factors for coronary artery disease include diabetes mellitus. Current diabetic treatment includes diet and oral agent (monotherapy). His weight is stable. He is following a generally healthy diet. Meal planning includes avoidance of concentrated sweets. He never participates in exercise. There is no change in his home blood glucose trend. (States blood sugars ranging from 104-130) ACE inhibitor/angiotensin II receptor blocker: Patient was informed to start low*  By cardiologist, patient did not start this medication. According to cardiologist notes, will hold off due to low blood pressure. Eye exam is current. glucose monitor strips 100 strip 3     Sig: accu check     He is asked to follow-up if symptoms persist or worsen. Return in about 3 months (around 10/18/2018) for DM, HTN.     Electronically signed by Electronically signed by Anisa Goodwin CNP on 7/18/18 at 10:57 AM    (Please note that portions of this note were completed with a voice recognition program.  Efforts were made to edit the dictations but occasionally words are mis-transcribed.)

## 2018-07-20 ENCOUNTER — TELEPHONE (OUTPATIENT)
Dept: PRIMARY CARE CLINIC | Age: 83
End: 2018-07-20

## 2018-07-20 NOTE — TELEPHONE ENCOUNTER
Wife called in in the afternoon after appt and states when they got home she took [facility] BP on their home machine and if was good, 120/70, she states maybe your machine needs check. Informed wife that our BP was working well other patients. Wife reminded to call cardiologist and inform of patients BP per Reny Balderrama CNP.

## 2018-07-25 ASSESSMENT — ENCOUNTER SYMPTOMS
EYES NEGATIVE: 1
GASTROINTESTINAL NEGATIVE: 1
RESPIRATORY NEGATIVE: 1
TROUBLE SWALLOWING: 0

## 2018-08-09 ENCOUNTER — OFFICE VISIT (OUTPATIENT)
Dept: CARDIOLOGY | Age: 83
End: 2018-08-09
Payer: MEDICARE

## 2018-08-09 VITALS
SYSTOLIC BLOOD PRESSURE: 97 MMHG | OXYGEN SATURATION: 96 % | WEIGHT: 236.4 LBS | BODY MASS INDEX: 32.97 KG/M2 | RESPIRATION RATE: 20 BRPM | TEMPERATURE: 97.3 F | DIASTOLIC BLOOD PRESSURE: 56 MMHG | HEART RATE: 71 BPM

## 2018-08-09 DIAGNOSIS — I25.10 CORONARY ARTERY DISEASE INVOLVING NATIVE CORONARY ARTERY OF NATIVE HEART WITHOUT ANGINA PECTORIS: ICD-10-CM

## 2018-08-09 DIAGNOSIS — I10 ESSENTIAL HYPERTENSION: ICD-10-CM

## 2018-08-09 DIAGNOSIS — G25.0 ESSENTIAL TREMOR: ICD-10-CM

## 2018-08-09 DIAGNOSIS — J44.1 COPD EXACERBATION (HCC): ICD-10-CM

## 2018-08-09 DIAGNOSIS — E78.5 DYSLIPIDEMIA: ICD-10-CM

## 2018-08-09 DIAGNOSIS — I50.42 CHRONIC COMBINED SYSTOLIC AND DIASTOLIC CHF (CONGESTIVE HEART FAILURE) (HCC): Primary | Chronic | ICD-10-CM

## 2018-08-09 PROCEDURE — 99214 OFFICE O/P EST MOD 30 MIN: CPT | Performed by: INTERNAL MEDICINE

## 2018-08-09 RX ORDER — PRAVASTATIN SODIUM 20 MG
20 TABLET ORAL DAILY
Qty: 90 TABLET | Refills: 3 | Status: SHIPPED | OUTPATIENT
Start: 2018-08-09 | End: 2019-12-05

## 2018-08-09 RX ORDER — EZETIMIBE 10 MG/1
10 TABLET ORAL DAILY
Qty: 90 TABLET | Refills: 3 | Status: CANCELLED | OUTPATIENT
Start: 2018-08-09

## 2018-08-09 NOTE — PROGRESS NOTES
palpation. EXT: Mild ankle edema. Compression stockings in place. DATA:    Lab Results   Component Value Date    ALT 42 (H) 07/11/2018    AST 29 07/11/2018    ALKPHOS 105 07/11/2018    BILITOT 0.21 (L) 07/11/2018     Lab Results   Component Value Date    CREATININE 1.03 07/11/2018    BUN 16 07/11/2018     07/11/2018    K 4.3 07/11/2018    CL 95 (L) 07/11/2018    CO2 29 07/11/2018       Lab Results   Component Value Date    WBC 7.9 07/11/2018    HGB 12.7 (L) 07/11/2018    HCT 40.5 (L) 07/11/2018    MCV 96.7 07/11/2018     07/11/2018       Lab Results   Component Value Date    TRIG 127 09/21/2017     Lab Results   Component Value Date    HDL 46 09/21/2017     Lab Results   Component Value Date    LDLCHOLESTEROL 113 09/21/2017         Assessment:      Diagnosis Orders   1. Chronic combined systolic and diastolic CHF (congestive heart failure) (HCC)  Lipid Panel   2. Coronary artery disease involving native coronary artery of native heart without angina pectoris  Lipid Panel   3. Essential hypertension  Lipid Panel   4. Dyslipidemia  Lipid Panel   5. COPD exacerbation ([address]  Lipid Panel   6. Essential tremor       Plan:     Chronic Systolic and Diastolic Heart Failure:   Beta Blocker: Continue Propranolol 120 mg in the morning and 80 mg in the evening. I also discussed the potential side effects of this medication including lightheadedness and dizziness and told him to stop the medication of this occurs and call our office if this occurs. Diuretics: Continue furosemide (lasix) 40 mg twice daily. I also discussed the potential side effects of this medication including lightheadedness and dizziness and told him to call the office if this occurs. Nonpharmacologic management of Heart Failure: I told him to continue wearing lower extremity compression stockings and I advised him to try and keep his legs up whenever possible and to limit salt in his diet.      I would like to start him on low-dose Cozaar but his blood pressure is low sometimes his systolic blood pressure is in the high 80s. Atherosclerotic Heart Disease: S/P Stent   Antiplatelet Agent: Continue aspirin 81 mg daily. I also reminded him to watch for signs of blood in his stool or black tarry stools and stop the medication immediately if this develops as this could be life threatening. Beta Blocker: Continue Propranolol 120 mg in the morning and 80 mg in the evening. Statin Therapy: Continue pravastatin 20 mg nightly. Laboratory testing: Ordered a Lipid Panel to get done. Essential Hypertension: Controlled  Diuretics: Continue furosemide (lasix) 40 mg    Beta Blocker: Continue Propranolol 120 mg in the morning and 80 mg in the evening. · Hyperlipidemia: Mixed  · Statin Therapy: Continue pravastatin 20 mg nightly. I discussed the potential benefits of statin therapy as well as the potential risks including myalgia as well as the rare but potentially serious complication of liver or kidney damage. Although rare, I told him that this could be serious and therefore told him to stop the medication immediately and call if he developed any severe muscle aches or pains and he agreed to do so. He did mention that he was not taking this medication due to his leg cramps. I gave him the choice to start Zetia however he would rather try the statin again instead. · Follow-up repeat lipid profile. Mr. Tee Raines received counseling on healthy behaviors. Discussed use, benefit, and side effects of prescribed medications. Barriers to medication compliance addressed. All patient questions answered. Pt voiced understanding. Reviewed prior labs. Continue current medications, diet and exercise. Return in about 6 months (around 2/9/2019) for Follow up. I believe that the risk of significant morbidity and mortality related to the patient's current medical conditions are: Intermediate.       The documentation recorded by the scribe,

## 2018-09-24 RX ORDER — FUROSEMIDE 40 MG/1
40 TABLET ORAL 2 TIMES DAILY
Qty: 180 TABLET | Refills: 1 | Status: SHIPPED | OUTPATIENT
Start: 2018-09-24 | End: 2019-03-25 | Stop reason: SDUPTHER

## 2018-09-24 NOTE — TELEPHONE ENCOUNTER
Patient wants a 90 day supply sent to the pharmacy. Order pended for 90 day supply.                Health Maintenance   Topic Date Due    TSH testing  1935    Shingles Vaccine (1 of 2 - 2 Dose Series) 10/15/1987    DTaP/Tdap/Td vaccine (1 - Tdap) 01/02/2003    Flu vaccine (1) 09/01/2018    Potassium monitoring  07/11/2019    Creatinine monitoring  07/11/2019    Pneumococcal low/med risk  Completed             (applicable per patient's age: Cancer Screenings, Depression Screening, Fall Risk Screening, Immunizations)    Hemoglobin A1C (%)   Date Value   04/13/2018 7.1   11/21/2017 7.6   09/19/2017 7.6 (H)     LDL Cholesterol (mg/dL)   Date Value   09/21/2017 113     AST (U/L)   Date Value   07/11/2018 29     ALT (U/L)   Date Value   07/11/2018 42 (H)     BUN (mg/dL)   Date Value   07/11/2018 16      (goal A1C is < 7)   (goal LDL is <100) need 30-50% reduction from baseline     BP Readings from Last 3 Encounters:   08/09/18 (!) 97/56   07/18/18 (!) 98/51   07/10/18 (!) 103/59    (goal /80)      All Future Testing planned in [facility]:  Lab Frequency Next Occurrence   Hemoglobin A1C Once 10/12/2018   Comprehensive Metabolic Panel Once 30/97/2984   CBC Auto Differential Once 10/12/2018   Urinalysis Once 10/12/2018   Lipid Panel Once 10/12/2018   Microalbumin, Ur Once 10/12/2018   TSH With Reflex Ft4 Once 10/12/2018   Comprehensive Metabolic Panel Once 25/43/0664   Hemoglobin A1C Once 07/18/2018   CBC Auto Differential Once 04/03/2018   Urinalysis Once 04/03/2018   Microalbumin, Ur Once 10/11/2018   XR CHEST STANDARD (2 VW) Once 07/55/6216   Basic Metabolic Panel Once 25/51/6858   Comprehensive Metabolic Panel Once 73/85/1002   CBC Auto Differential Once 10/16/2018   Hemoglobin A1C Once 10/16/2018   Lipid Panel Once 10/16/2018   TSH With Reflex Ft4 Once 10/17/2018   Lipid Panel Once 08/09/2018       Next Visit Date:  Future Appointments  Date Time Provider Gisela Harding   10/24/2018 10:00 AM [facility] MIKA JohnsonN - CNP T[facility] Prim [facility]   12/17/2018 2:15 PM MD Ebony [facility]   2/18/2019 2:00 PM MD DIPESH Headley [facility]            Patient Active Problem List:     Obesity (BMI 30.0-34. 9)     Venous (peripheral) insufficiency     Hx pulmonary embolism     Hearing impaired     Edema     Type 2 diabetes mellitus with complication, without long-term current use of insulin (HCC)     PE (physical exam), annual     Knee osteoarthritis     Screening for condition     Depression screening     Hyponatremia     COPD exacerbation ([address]     Acute respiratory failure with hypoxia and hypercarbia (HCC)     Chronic combined systolic and diastolic CHF (congestive heart failure) (HCC)     Hypothyroidism     Pneumonia due to infectious organism     Pneumonia     Hyponatremia     COPD (chronic obstructive pulmonary disease) (HCC)     Acute combined systolic and diastolic CHF, NYHA class 3 ([address]     Hyponatremia     COPD exacerbation ([address]

## 2018-10-09 ENCOUNTER — HOSPITAL ENCOUNTER (OUTPATIENT)
Age: 83
Discharge: HOME | End: 2018-10-09
Payer: MEDICARE

## 2018-10-09 DIAGNOSIS — Z13.220 LIPID SCREENING: ICD-10-CM

## 2018-10-09 DIAGNOSIS — E03.9 HYPOTHYROIDISM, UNSPECIFIED TYPE: ICD-10-CM

## 2018-10-09 DIAGNOSIS — E11.8 TYPE 2 DIABETES MELLITUS WITH COMPLICATION, WITHOUT LONG-TERM CURRENT USE OF INSULIN (HCC): ICD-10-CM

## 2018-10-09 LAB
ABSOLUTE EOS #: 0.46 K/UL (ref 0–0.44)
ABSOLUTE IMMATURE GRANULOCYTE: 0.07 K/UL (ref 0–0.3)
ABSOLUTE LYMPH #: 1.89 K/UL (ref 1.1–3.7)
ABSOLUTE MONO #: 1.15 K/UL (ref 0.1–1.2)
ALBUMIN SERPL-MCNC: 3.7 G/DL (ref 3.5–5.2)
ALBUMIN/GLOBULIN RATIO: 1.3 (ref 1–2.5)
ALP BLD-CCNC: 92 U/L (ref 40–129)
ALT SERPL-CCNC: 51 U/L (ref 5–41)
ANION GAP SERPL CALCULATED.3IONS-SCNC: 9 MMOL/L (ref 9–17)
AST SERPL-CCNC: 28 U/L
BASOPHILS # BLD: 1 % (ref 0–2)
BASOPHILS ABSOLUTE: 0.07 K/UL (ref 0–0.2)
BILIRUB SERPL-MCNC: 0.29 MG/DL (ref 0.3–1.2)
BILIRUBIN URINE: NEGATIVE
BUN BLDV-MCNC: 19 MG/DL (ref 8–23)
BUN/CREAT BLD: 15 (ref 9–20)
CALCIUM SERPL-MCNC: 9.2 MG/DL (ref 8.6–10.4)
CHLORIDE BLD-SCNC: 101 MMOL/L (ref 98–107)
CHOLESTEROL/HDL RATIO: 3.4
CHOLESTEROL: 182 MG/DL
CO2: 28 MMOL/L (ref 20–31)
COLOR: YELLOW
COMMENT UA: NORMAL
CREAT SERPL-MCNC: 1.24 MG/DL (ref 0.7–1.2)
CREATININE URINE: 24.5 MG/DL (ref 39–259)
DIFFERENTIAL TYPE: ABNORMAL
EOSINOPHILS RELATIVE PERCENT: 6 % (ref 1–4)
ESTIMATED AVERAGE GLUCOSE: 157 MG/DL
GFR AFRICAN AMERICAN: >60 ML/MIN
GFR NON-AFRICAN AMERICAN: 56 ML/MIN
GFR SERPL CREATININE-BSD FRML MDRD: ABNORMAL ML/MIN/{1.73_M2}
GFR SERPL CREATININE-BSD FRML MDRD: ABNORMAL ML/MIN/{1.73_M2}
GLUCOSE BLD-MCNC: 154 MG/DL (ref 70–99)
GLUCOSE URINE: NEGATIVE
HBA1C MFR BLD: 7.1 % (ref 4.8–5.9)
HCT VFR BLD CALC: 41.3 % (ref 40.7–50.3)
HDLC SERPL-MCNC: 54 MG/DL
HEMOGLOBIN: 13.2 G/DL (ref 13–17)
IMMATURE GRANULOCYTES: 1 %
KETONES, URINE: NEGATIVE
LDL CHOLESTEROL: 96 MG/DL (ref 0–130)
LEUKOCYTE ESTERASE, URINE: NEGATIVE
LYMPHOCYTES # BLD: 23 % (ref 24–43)
MCH RBC QN AUTO: 30.6 PG (ref 25.2–33.5)
MCHC RBC AUTO-ENTMCNC: 32 G/DL (ref 28.4–34.8)
MCV RBC AUTO: 95.6 FL (ref 82.6–102.9)
MICROALBUMIN/CREAT 24H UR: <12 MG/L
MICROALBUMIN/CREAT UR-RTO: ABNORMAL MCG/MG CREAT
MONOCYTES # BLD: 14 % (ref 3–12)
NITRITE, URINE: NEGATIVE
NRBC AUTOMATED: 0 PER 100 WBC
PDW BLD-RTO: 13.2 % (ref 11.8–14.4)
PH UA: 6.5 (ref 5–9)
PLATELET # BLD: 210 K/UL (ref 138–453)
PLATELET ESTIMATE: ABNORMAL
PMV BLD AUTO: 10 FL (ref 8.1–13.5)
POTASSIUM SERPL-SCNC: 4.3 MMOL/L (ref 3.7–5.3)
PROTEIN UA: NEGATIVE
RBC # BLD: 4.32 M/UL (ref 4.21–5.77)
RBC # BLD: ABNORMAL 10*6/UL
SEG NEUTROPHILS: 55 % (ref 36–65)
SEGMENTED NEUTROPHILS ABSOLUTE COUNT: 4.7 K/UL (ref 1.5–8.1)
SODIUM BLD-SCNC: 138 MMOL/L (ref 135–144)
SPECIFIC GRAVITY UA: 1.01 (ref 1.01–1.02)
THYROXINE, FREE: 1.6 NG/DL (ref 0.93–1.7)
TOTAL PROTEIN: 6.5 G/DL (ref 6.4–8.3)
TRIGL SERPL-MCNC: 158 MG/DL
TSH SERPL DL<=0.05 MIU/L-ACNC: 8.55 MIU/L (ref 0.3–5)
TURBIDITY: CLEAR
URINE HGB: NEGATIVE
UROBILINOGEN, URINE: NORMAL
VLDLC SERPL CALC-MCNC: ABNORMAL MG/DL (ref 1–30)
WBC # BLD: 8.3 K/UL (ref 3.5–11.3)
WBC # BLD: ABNORMAL 10*3/UL

## 2018-10-09 PROCEDURE — 85025 COMPLETE CBC W/AUTO DIFF WBC: CPT

## 2018-10-09 PROCEDURE — 83036 HEMOGLOBIN GLYCOSYLATED A1C: CPT

## 2018-10-09 PROCEDURE — 82043 UR ALBUMIN QUANTITATIVE: CPT

## 2018-10-09 PROCEDURE — 80053 COMPREHEN METABOLIC PANEL: CPT

## 2018-10-09 PROCEDURE — 81003 URINALYSIS AUTO W/O SCOPE: CPT

## 2018-10-09 PROCEDURE — 84443 ASSAY THYROID STIM HORMONE: CPT

## 2018-10-09 PROCEDURE — 82570 ASSAY OF URINE CREATININE: CPT

## 2018-10-09 PROCEDURE — 36415 COLL VENOUS BLD VENIPUNCTURE: CPT

## 2018-10-09 PROCEDURE — 80061 LIPID PANEL: CPT

## 2018-10-09 PROCEDURE — 84439 ASSAY OF FREE THYROXINE: CPT

## 2018-10-15 ENCOUNTER — TELEPHONE (OUTPATIENT)
Dept: PRIMARY CARE CLINIC | Age: 83
End: 2018-10-15

## 2018-10-15 DIAGNOSIS — R74.01 ELEVATED ALT MEASUREMENT: Primary | ICD-10-CM

## 2018-10-15 DIAGNOSIS — R74.8 ELEVATED CREATINE KINASE: ICD-10-CM

## 2018-10-15 NOTE — TELEPHONE ENCOUNTER
----- Message from GENA Merrill CNP sent at 10/15/2018  8:49 AM EDT -----  Results of testing abormal.   Elevation in thyroid. Please verify that patient sees the [facility] for thyroid regulation and medications. Mild elevation noted in liver enzymes. Ultrasound of the liver has been ordered. Mild elevation in creatinine otherwise known as declining kidney function. Repeat labs ordered for today. A1c remains stable at 7.1. Please relate to patient/parent. Thank you.    Karey Mantilla

## 2018-10-16 ENCOUNTER — OFFICE VISIT (OUTPATIENT)
Dept: PRIMARY CARE CLINIC | Age: 83
End: 2018-10-16
Payer: MEDICARE

## 2018-10-16 VITALS
SYSTOLIC BLOOD PRESSURE: 115 MMHG | BODY MASS INDEX: 33.96 KG/M2 | WEIGHT: 243.5 LBS | RESPIRATION RATE: 20 BRPM | HEART RATE: 64 BPM | DIASTOLIC BLOOD PRESSURE: 69 MMHG | TEMPERATURE: 98.1 F

## 2018-10-16 DIAGNOSIS — D35.02 ADRENAL ADENOMA, LEFT: ICD-10-CM

## 2018-10-16 DIAGNOSIS — E03.8 HYPOTHYROIDISM DUE TO HASHIMOTO'S THYROIDITIS: ICD-10-CM

## 2018-10-16 DIAGNOSIS — E06.3 HYPOTHYROIDISM DUE TO HASHIMOTO'S THYROIDITIS: ICD-10-CM

## 2018-10-16 DIAGNOSIS — E11.8 TYPE 2 DIABETES MELLITUS WITH COMPLICATION, WITHOUT LONG-TERM CURRENT USE OF INSULIN (HCC): Primary | ICD-10-CM

## 2018-10-16 DIAGNOSIS — R74.8 ELEVATED LIVER ENZYMES: ICD-10-CM

## 2018-10-16 PROCEDURE — 99214 OFFICE O/P EST MOD 30 MIN: CPT | Performed by: NURSE PRACTITIONER

## 2018-10-16 ASSESSMENT — ENCOUNTER SYMPTOMS: BLURRED VISION: 0

## 2018-10-16 NOTE — PROGRESS NOTES
Provider    Medical History Review  Past Medical, Family, and Social History reviewed and does contribute to the patient presenting condition    Health Maintenance   Topic Date Due    Shingles Vaccine (1 of 2 - 2 Dose Series) 03/01/2012    DTaP/Tdap/Td vaccine (1 - Tdap) 12/31/2018 (Originally 1/2/2003)    TSH testing  10/09/2019    Potassium monitoring  10/09/2019    Creatinine monitoring  10/09/2019    Flu vaccine  Completed    Pneumococcal low/med risk  Completed       82year old male presents to office for 3 month follow up DM and HTN. Patient states his glucose has been running between 120-140. Denies signs of low BS. B/P is controlled in office denies chest pain or dizziness. Continues follow up care with cardiology Dr. King Aquino. Elevation noted in TSH. Patient is taking levothyroxine 100 mcg managed per the [facility].    Incidentally noted benign left adrenal adenoma on CT abd 5/18.    10/15/18 noted elevated liver enzymes. US of liver ordered. Patient aware. Diabetes   He presents for his follow-up diabetic visit. He has type 2 diabetes mellitus. His disease course has been stable. There are no hypoglycemic associated symptoms. Pertinent negatives for hypoglycemia include no dizziness or headaches. There are no diabetic associated symptoms. Pertinent negatives for diabetes include no blurred vision and no chest pain. There are no hypoglycemic complications. Symptoms are stable. Risk factors for coronary artery disease include dyslipidemia, male sex and hypertension. He is compliant with treatment all of the time. His weight is stable. He is following a generally healthy diet. Meal planning includes avoidance of concentrated sweets. He has had a previous visit with a dietitian. He rarely participates in exercise. There is no change in his home blood glucose trend. (Ranging from 120-140) He does not see a podiatrist.Eye exam is current. Hypertension   This is a chronic problem.  The

## 2018-11-02 PROBLEM — R74.8 ELEVATED LIVER ENZYMES: Status: ACTIVE | Noted: 2018-11-02

## 2018-11-02 ASSESSMENT — ENCOUNTER SYMPTOMS
GASTROINTESTINAL NEGATIVE: 1
EYES NEGATIVE: 1
WHEEZING: 0
SHORTNESS OF BREATH: 0
RESPIRATORY NEGATIVE: 1
TROUBLE SWALLOWING: 0
COUGH: 0

## 2018-11-05 ENCOUNTER — TELEPHONE (OUTPATIENT)
Dept: PRIMARY CARE CLINIC | Age: 83
End: 2018-11-05

## 2018-11-05 DIAGNOSIS — E03.8 HYPOTHYROIDISM DUE TO HASHIMOTO'S THYROIDITIS: Primary | ICD-10-CM

## 2018-11-05 DIAGNOSIS — E06.3 HYPOTHYROIDISM DUE TO HASHIMOTO'S THYROIDITIS: Primary | ICD-10-CM

## 2018-11-05 PROBLEM — E03.9 HYPOTHYROIDISM: Status: RESOLVED | Noted: 2017-10-12 | Resolved: 2018-11-05

## 2018-11-05 RX ORDER — LEVOTHYROXINE SODIUM 112 UG/1
112 TABLET ORAL DAILY
Qty: 90 TABLET | Refills: 1 | Status: SHIPPED | OUTPATIENT
Start: 2018-11-05 | End: 2019-07-08 | Stop reason: SDUPTHER

## 2018-11-14 ENCOUNTER — HOSPITAL ENCOUNTER (OUTPATIENT)
Dept: LAB | Age: 83
Discharge: HOME | End: 2018-11-14
Payer: MEDICARE

## 2018-11-14 ENCOUNTER — HOSPITAL ENCOUNTER (OUTPATIENT)
Dept: ULTRASOUND IMAGING | Age: 83
Discharge: HOME | End: 2018-11-16
Payer: MEDICARE

## 2018-11-14 ENCOUNTER — TELEPHONE (OUTPATIENT)
Dept: PRIMARY CARE CLINIC | Age: 83
End: 2018-11-14

## 2018-11-14 DIAGNOSIS — R74.8 ELEVATED CREATINE KINASE: Primary | ICD-10-CM

## 2018-11-14 DIAGNOSIS — R74.8 ELEVATED CREATINE KINASE: ICD-10-CM

## 2018-11-14 DIAGNOSIS — R74.01 ELEVATED ALT MEASUREMENT: ICD-10-CM

## 2018-11-14 LAB
ALBUMIN SERPL-MCNC: 3.9 G/DL (ref 3.5–5.2)
ALBUMIN/GLOBULIN RATIO: 1.3 (ref 1–2.5)
ALP BLD-CCNC: 93 U/L (ref 40–129)
ALT SERPL-CCNC: 40 U/L (ref 5–41)
ANION GAP SERPL CALCULATED.3IONS-SCNC: 12 MMOL/L (ref 9–17)
AST SERPL-CCNC: 29 U/L
BILIRUB SERPL-MCNC: 0.26 MG/DL (ref 0.3–1.2)
BUN BLDV-MCNC: 20 MG/DL (ref 8–23)
BUN/CREAT BLD: 18 (ref 9–20)
CALCIUM SERPL-MCNC: 8.9 MG/DL (ref 8.6–10.4)
CHLORIDE BLD-SCNC: 99 MMOL/L (ref 98–107)
CO2: 28 MMOL/L (ref 20–31)
CREAT SERPL-MCNC: 1.14 MG/DL (ref 0.7–1.2)
GFR AFRICAN AMERICAN: >60 ML/MIN
GFR NON-AFRICAN AMERICAN: >60 ML/MIN
GFR SERPL CREATININE-BSD FRML MDRD: ABNORMAL ML/MIN/{1.73_M2}
GFR SERPL CREATININE-BSD FRML MDRD: ABNORMAL ML/MIN/{1.73_M2}
GLUCOSE BLD-MCNC: 175 MG/DL (ref 70–99)
POTASSIUM SERPL-SCNC: 4.7 MMOL/L (ref 3.7–5.3)
SODIUM BLD-SCNC: 139 MMOL/L (ref 135–144)
TOTAL PROTEIN: 6.9 G/DL (ref 6.4–8.3)

## 2018-11-14 PROCEDURE — 76705 ECHO EXAM OF ABDOMEN: CPT

## 2018-11-14 PROCEDURE — 80053 COMPREHEN METABOLIC PANEL: CPT

## 2018-11-14 PROCEDURE — 36415 COLL VENOUS BLD VENIPUNCTURE: CPT

## 2018-11-14 NOTE — TELEPHONE ENCOUNTER
Called pt and spoke with wife and informed that labs show possible fatter liver, no specific abnormality seen, liver enzymes normal. Verbalizes understanding. Asks about results of liver US, informed pt no results back at this time and this office will call her with results.

## 2018-11-29 ENCOUNTER — TELEPHONE (OUTPATIENT)
Dept: PRIMARY CARE CLINIC | Age: 83
End: 2018-11-29

## 2018-11-29 NOTE — TELEPHONE ENCOUNTER
Called and spoke with Shravan Khalil and informed that US liver showed possible fatty liver, no abnormalities seen. Verbalizes understanding.

## 2019-01-17 ENCOUNTER — HOSPITAL ENCOUNTER (OUTPATIENT)
Age: 84
Discharge: HOME | End: 2019-01-17
Payer: MEDICARE

## 2019-01-17 DIAGNOSIS — E06.3 HYPOTHYROIDISM DUE TO HASHIMOTO'S THYROIDITIS: ICD-10-CM

## 2019-01-17 DIAGNOSIS — E03.8 HYPOTHYROIDISM DUE TO HASHIMOTO'S THYROIDITIS: ICD-10-CM

## 2019-01-17 DIAGNOSIS — E11.8 TYPE 2 DIABETES MELLITUS WITH COMPLICATION, WITHOUT LONG-TERM CURRENT USE OF INSULIN (HCC): ICD-10-CM

## 2019-01-17 LAB
ABSOLUTE EOS #: 0.66 K/UL (ref 0–0.44)
ABSOLUTE IMMATURE GRANULOCYTE: 0 K/UL (ref 0–0.3)
ABSOLUTE LYMPH #: 2.38 K/UL (ref 1.1–3.7)
ABSOLUTE MONO #: 1.07 K/UL (ref 0.1–1.2)
ALBUMIN SERPL-MCNC: 3.5 G/DL (ref 3.5–5.2)
ALBUMIN/GLOBULIN RATIO: 1.2 (ref 1–2.5)
ALP BLD-CCNC: 94 U/L (ref 40–129)
ALT SERPL-CCNC: 38 U/L (ref 5–41)
ANION GAP SERPL CALCULATED.3IONS-SCNC: 12 MMOL/L (ref 9–17)
AST SERPL-CCNC: 20 U/L
BASOPHILS # BLD: 0 % (ref 0–2)
BASOPHILS ABSOLUTE: 0 K/UL (ref 0–0.2)
BILIRUB SERPL-MCNC: 0.18 MG/DL (ref 0.3–1.2)
BUN BLDV-MCNC: 22 MG/DL (ref 8–23)
BUN/CREAT BLD: 17 (ref 9–20)
CALCIUM SERPL-MCNC: 9.3 MG/DL (ref 8.6–10.4)
CHLORIDE BLD-SCNC: 96 MMOL/L (ref 98–107)
CO2: 28 MMOL/L (ref 20–31)
CREAT SERPL-MCNC: 1.32 MG/DL (ref 0.7–1.2)
DIFFERENTIAL TYPE: ABNORMAL
EOSINOPHILS RELATIVE PERCENT: 8 % (ref 1–4)
GFR AFRICAN AMERICAN: >60 ML/MIN
GFR NON-AFRICAN AMERICAN: 52 ML/MIN
GFR SERPL CREATININE-BSD FRML MDRD: ABNORMAL ML/MIN/{1.73_M2}
GFR SERPL CREATININE-BSD FRML MDRD: ABNORMAL ML/MIN/{1.73_M2}
GLUCOSE BLD-MCNC: 200 MG/DL (ref 70–99)
HCT VFR BLD CALC: 41.1 % (ref 40.7–50.3)
HEMOGLOBIN: 12.6 G/DL (ref 13–17)
IMMATURE GRANULOCYTES: 0 %
LYMPHOCYTES # BLD: 29 % (ref 24–43)
MCH RBC QN AUTO: 30.1 PG (ref 25.2–33.5)
MCHC RBC AUTO-ENTMCNC: 30.7 G/DL (ref 28.4–34.8)
MCV RBC AUTO: 98.1 FL (ref 82.6–102.9)
MONOCYTES # BLD: 13 % (ref 3–12)
MORPHOLOGY: NORMAL
NRBC AUTOMATED: 0 PER 100 WBC
PDW BLD-RTO: 13.8 % (ref 11.8–14.4)
PLATELET # BLD: 234 K/UL (ref 138–453)
PLATELET ESTIMATE: ABNORMAL
PMV BLD AUTO: 10.7 FL (ref 8.1–13.5)
POTASSIUM SERPL-SCNC: 4.9 MMOL/L (ref 3.7–5.3)
RBC # BLD: 4.19 M/UL (ref 4.21–5.77)
RBC # BLD: ABNORMAL 10*6/UL
SEG NEUTROPHILS: 50 % (ref 36–65)
SEGMENTED NEUTROPHILS ABSOLUTE COUNT: 4.09 K/UL (ref 1.5–8.1)
SODIUM BLD-SCNC: 136 MMOL/L (ref 135–144)
THYROXINE, FREE: 1.36 NG/DL (ref 0.93–1.7)
TOTAL PROTEIN: 6.4 G/DL (ref 6.4–8.3)
TSH SERPL DL<=0.05 MIU/L-ACNC: 7.3 MIU/L (ref 0.3–5)
WBC # BLD: 8.2 K/UL (ref 3.5–11.3)
WBC # BLD: ABNORMAL 10*3/UL

## 2019-01-17 PROCEDURE — 36415 COLL VENOUS BLD VENIPUNCTURE: CPT

## 2019-01-17 PROCEDURE — 80053 COMPREHEN METABOLIC PANEL: CPT

## 2019-01-17 PROCEDURE — 84443 ASSAY THYROID STIM HORMONE: CPT

## 2019-01-17 PROCEDURE — 85025 COMPLETE CBC W/AUTO DIFF WBC: CPT

## 2019-01-17 PROCEDURE — 84439 ASSAY OF FREE THYROXINE: CPT

## 2019-01-29 ENCOUNTER — OFFICE VISIT (OUTPATIENT)
Dept: PRIMARY CARE CLINIC | Age: 84
End: 2019-01-29
Payer: MEDICARE

## 2019-01-29 VITALS
WEIGHT: 247.3 LBS | HEIGHT: 71 IN | HEART RATE: 61 BPM | BODY MASS INDEX: 34.62 KG/M2 | TEMPERATURE: 98 F | SYSTOLIC BLOOD PRESSURE: 135 MMHG | DIASTOLIC BLOOD PRESSURE: 71 MMHG | RESPIRATION RATE: 16 BRPM

## 2019-01-29 DIAGNOSIS — J44.9 CHRONIC OBSTRUCTIVE PULMONARY DISEASE, UNSPECIFIED COPD TYPE (HCC): ICD-10-CM

## 2019-01-29 DIAGNOSIS — R59.0 SUBMANDIBULAR LYMPHADENOPATHY: ICD-10-CM

## 2019-01-29 DIAGNOSIS — E11.8 TYPE 2 DIABETES MELLITUS WITH COMPLICATION, WITHOUT LONG-TERM CURRENT USE OF INSULIN (HCC): Primary | ICD-10-CM

## 2019-01-29 DIAGNOSIS — E06.3 HYPOTHYROIDISM DUE TO HASHIMOTO'S THYROIDITIS: ICD-10-CM

## 2019-01-29 DIAGNOSIS — E03.8 HYPOTHYROIDISM DUE TO HASHIMOTO'S THYROIDITIS: ICD-10-CM

## 2019-01-29 DIAGNOSIS — I50.42 CHRONIC COMBINED SYSTOLIC AND DIASTOLIC CHF (CONGESTIVE HEART FAILURE) (HCC): ICD-10-CM

## 2019-01-29 DIAGNOSIS — K64.4 EXTERNAL HEMORRHOID: ICD-10-CM

## 2019-01-29 PROBLEM — J18.9 PNEUMONIA DUE TO INFECTIOUS ORGANISM: Status: RESOLVED | Noted: 2017-11-15 | Resolved: 2019-01-29

## 2019-01-29 PROBLEM — J96.02 ACUTE RESPIRATORY FAILURE WITH HYPOXIA AND HYPERCARBIA (HCC): Status: RESOLVED | Noted: 2017-09-20 | Resolved: 2019-01-29

## 2019-01-29 PROBLEM — E87.1 HYPONATREMIA: Status: RESOLVED | Noted: 2018-04-06 | Resolved: 2019-01-29

## 2019-01-29 PROBLEM — I50.41 ACUTE COMBINED SYSTOLIC AND DIASTOLIC CHF, NYHA CLASS 3 (HCC): Status: RESOLVED | Noted: 2018-04-06 | Resolved: 2019-01-29

## 2019-01-29 PROBLEM — J96.01 ACUTE RESPIRATORY FAILURE WITH HYPOXIA AND HYPERCARBIA (HCC): Status: RESOLVED | Noted: 2017-09-20 | Resolved: 2019-01-29

## 2019-01-29 PROBLEM — E87.1 HYPONATREMIA: Status: RESOLVED | Noted: 2017-09-20 | Resolved: 2019-01-29

## 2019-01-29 PROBLEM — E87.1 HYPONATREMIA: Status: RESOLVED | Noted: 2017-11-15 | Resolved: 2019-01-29

## 2019-01-29 PROBLEM — J44.1 COPD EXACERBATION (HCC): Status: RESOLVED | Noted: 2018-04-06 | Resolved: 2019-01-29

## 2019-01-29 PROBLEM — J18.9 PNEUMONIA: Status: RESOLVED | Noted: 2017-11-15 | Resolved: 2019-01-29

## 2019-01-29 PROBLEM — J44.1 COPD EXACERBATION (HCC): Status: RESOLVED | Noted: 2017-09-20 | Resolved: 2019-01-29

## 2019-01-29 LAB — HBA1C MFR BLD: 7.4 %

## 2019-01-29 PROCEDURE — 83036 HEMOGLOBIN GLYCOSYLATED A1C: CPT | Performed by: NURSE PRACTITIONER

## 2019-01-29 PROCEDURE — 99214 OFFICE O/P EST MOD 30 MIN: CPT | Performed by: NURSE PRACTITIONER

## 2019-01-29 ASSESSMENT — ENCOUNTER SYMPTOMS
SORE THROAT: 0
SPUTUM PRODUCTION: 0
HOARSE VOICE: 0
CONSTIPATION: 0
NAUSEA: 0
SHORTNESS OF BREATH: 1
VOMITING: 0
TROUBLE SWALLOWING: 0
DIARRHEA: 0
BLURRED VISION: 0
CHEST TIGHTNESS: 0
HEMOPTYSIS: 0
ABDOMINAL PAIN: 0
FREQUENT THROAT CLEARING: 0
COUGH: 0
WHEEZING: 0
DIFFICULTY BREATHING: 0
RHINORRHEA: 0

## 2019-01-29 ASSESSMENT — PATIENT HEALTH QUESTIONNAIRE - PHQ9
SUM OF ALL RESPONSES TO PHQ QUESTIONS 1-9: 0
1. LITTLE INTEREST OR PLEASURE IN DOING THINGS: 0
SUM OF ALL RESPONSES TO PHQ9 QUESTIONS 1 & 2: 0
2. FEELING DOWN, DEPRESSED OR HOPELESS: 0
SUM OF ALL RESPONSES TO PHQ QUESTIONS 1-9: 0

## 2019-01-29 ASSESSMENT — COPD QUESTIONNAIRES: COPD: 1

## 2019-02-19 ENCOUNTER — OFFICE VISIT (OUTPATIENT)
Dept: CARDIOLOGY | Age: 84
End: 2019-02-19
Payer: MEDICARE

## 2019-02-19 VITALS
WEIGHT: 250 LBS | OXYGEN SATURATION: 94 % | RESPIRATION RATE: 20 BRPM | DIASTOLIC BLOOD PRESSURE: 68 MMHG | HEIGHT: 71 IN | HEART RATE: 65 BPM | BODY MASS INDEX: 35 KG/M2 | SYSTOLIC BLOOD PRESSURE: 130 MMHG

## 2019-02-19 DIAGNOSIS — I10 ESSENTIAL HYPERTENSION: ICD-10-CM

## 2019-02-19 DIAGNOSIS — I25.10 CORONARY ARTERY DISEASE INVOLVING NATIVE CORONARY ARTERY OF NATIVE HEART WITHOUT ANGINA PECTORIS: ICD-10-CM

## 2019-02-19 DIAGNOSIS — I50.42 CHRONIC COMBINED SYSTOLIC AND DIASTOLIC CHF (CONGESTIVE HEART FAILURE) (HCC): Primary | Chronic | ICD-10-CM

## 2019-02-19 DIAGNOSIS — E78.2 MIXED HYPERLIPIDEMIA: ICD-10-CM

## 2019-02-19 DIAGNOSIS — J44.9 CHRONIC OBSTRUCTIVE PULMONARY DISEASE, UNSPECIFIED COPD TYPE (HCC): Chronic | ICD-10-CM

## 2019-02-19 PROCEDURE — 99214 OFFICE O/P EST MOD 30 MIN: CPT | Performed by: INTERNAL MEDICINE

## 2019-03-14 ENCOUNTER — TELEPHONE (OUTPATIENT)
Dept: PHARMACY | Facility: CLINIC | Age: 84
End: 2019-03-14

## 2019-03-18 ENCOUNTER — TELEPHONE (OUTPATIENT)
Dept: PRIMARY CARE CLINIC | Age: 84
End: 2019-03-18

## 2019-03-21 ENCOUNTER — OFFICE VISIT (OUTPATIENT)
Dept: PRIMARY CARE CLINIC | Age: 84
End: 2019-03-21
Payer: MEDICARE

## 2019-03-21 VITALS
TEMPERATURE: 97.9 F | DIASTOLIC BLOOD PRESSURE: 70 MMHG | BODY MASS INDEX: 35.34 KG/M2 | HEIGHT: 71 IN | WEIGHT: 252.4 LBS | RESPIRATION RATE: 20 BRPM | SYSTOLIC BLOOD PRESSURE: 130 MMHG | HEART RATE: 71 BPM

## 2019-03-21 DIAGNOSIS — J01.00 ACUTE NON-RECURRENT MAXILLARY SINUSITIS: Primary | ICD-10-CM

## 2019-03-21 PROCEDURE — 99213 OFFICE O/P EST LOW 20 MIN: CPT | Performed by: NURSE PRACTITIONER

## 2019-03-21 RX ORDER — AMOXICILLIN AND CLAVULANATE POTASSIUM 875; 125 MG/1; MG/1
1 TABLET, FILM COATED ORAL 2 TIMES DAILY
Qty: 14 TABLET | Refills: 0 | Status: SHIPPED | OUTPATIENT
Start: 2019-03-21 | End: 2019-03-28

## 2019-03-21 RX ORDER — PREDNISONE 20 MG/1
40 TABLET ORAL DAILY
Qty: 10 TABLET | Refills: 0 | Status: SHIPPED | OUTPATIENT
Start: 2019-03-21 | End: 2019-03-26

## 2019-03-21 ASSESSMENT — ENCOUNTER SYMPTOMS
SWOLLEN GLANDS: 0
DIARRHEA: 0
ABDOMINAL PAIN: 0
RHINORRHEA: 1
SINUS PAIN: 1
COUGH: 1
NAUSEA: 0
VOMITING: 0
WHEEZING: 0
SORE THROAT: 0

## 2019-03-25 RX ORDER — FUROSEMIDE 40 MG/1
40 TABLET ORAL 2 TIMES DAILY
Qty: 180 TABLET | Refills: 1 | Status: SHIPPED | OUTPATIENT
Start: 2019-03-25 | End: 2019-10-07 | Stop reason: SDUPTHER

## 2019-06-11 ENCOUNTER — OFFICE VISIT (OUTPATIENT)
Dept: PRIMARY CARE CLINIC | Age: 84
End: 2019-06-11
Payer: MEDICARE

## 2019-06-11 VITALS
RESPIRATION RATE: 16 BRPM | HEIGHT: 71 IN | TEMPERATURE: 98.6 F | DIASTOLIC BLOOD PRESSURE: 63 MMHG | HEART RATE: 70 BPM | BODY MASS INDEX: 35.39 KG/M2 | SYSTOLIC BLOOD PRESSURE: 112 MMHG | WEIGHT: 252.8 LBS

## 2019-06-11 DIAGNOSIS — J20.9 ACUTE BRONCHITIS WITH COPD (HCC): Primary | ICD-10-CM

## 2019-06-11 DIAGNOSIS — J44.0 ACUTE BRONCHITIS WITH COPD (HCC): Primary | ICD-10-CM

## 2019-06-11 PROCEDURE — 99213 OFFICE O/P EST LOW 20 MIN: CPT | Performed by: NURSE PRACTITIONER

## 2019-06-11 RX ORDER — PREDNISONE 20 MG/1
40 TABLET ORAL DAILY
Qty: 10 TABLET | Refills: 0 | Status: SHIPPED | OUTPATIENT
Start: 2019-06-11 | End: 2019-06-16

## 2019-06-11 RX ORDER — GUAIFENESIN AND CODEINE PHOSPHATE 100; 10 MG/5ML; MG/5ML
10 SOLUTION ORAL 4 TIMES DAILY PRN
Qty: 120 ML | Refills: 0 | Status: SHIPPED | OUTPATIENT
Start: 2019-06-11 | End: 2019-06-14

## 2019-06-11 RX ORDER — DOXYCYCLINE HYCLATE 100 MG/1
100 CAPSULE ORAL 2 TIMES DAILY
Qty: 20 CAPSULE | Refills: 0 | Status: SHIPPED | OUTPATIENT
Start: 2019-06-11 | End: 2019-06-21

## 2019-06-11 ASSESSMENT — ENCOUNTER SYMPTOMS
RHINORRHEA: 0
SHORTNESS OF BREATH: 0
COUGH: 1
SORE THROAT: 0
WHEEZING: 1

## 2019-06-11 NOTE — PATIENT INSTRUCTIONS
SURVEY:    You may be receiving a survey from [employer] regarding your visit today. Please complete the survey to enable us to provide the highest quality of care to you and your family. If you cannot score us a very good on any question, please call the office to discuss how we could have made your experience a very good one. Thank you. Patient Education        Bronchitis: Care Instructions  Your Care Instructions    Bronchitis is inflammation of the bronchial tubes, which carry air to the lungs. The tubes swell and produce mucus, or phlegm. The mucus and inflamed bronchial tubes make you cough. You may have trouble breathing. Most cases of bronchitis are caused by viruses like those that cause colds. Antibiotics usually do not help and they may be harmful. Bronchitis usually develops rapidly and lasts about 2 to 3 weeks in otherwise healthy people. Follow-up care is a key part of your treatment and safety. Be sure to make and go to all appointments, and call your doctor if you are having problems. It's also a good idea to know your test results and keep a list of the medicines you take. How can you care for yourself at home? · Take all medicines exactly as prescribed. Call your doctor if you think you are having a problem with your medicine. · Get some extra rest.  · Take an over-the-counter pain medicine, such as acetaminophen (Tylenol), ibuprofen (Advil, Motrin), or naproxen (Aleve) to reduce fever and relieve body aches. Read and follow all instructions on the label. · Do not take two or more pain medicines at the same time unless the doctor told you to. Many pain medicines have acetaminophen, which is Tylenol. Too much acetaminophen (Tylenol) can be harmful. · Take an over-the-counter cough medicine that contains dextromethorphan to help quiet a dry, hacking cough so that you can sleep. Avoid cough medicines that have more than one active ingredient.  Read and follow all instructions on the label. · Breathe moist air from a humidifier, hot shower, or sink filled with hot water. The heat and moisture will thin mucus so you can cough it out. · Do not smoke. Smoking can make bronchitis worse. If you need help quitting, talk to your doctor about stop-smoking programs and medicines. These can increase your chances of quitting for good. When should you call for help? Call 911 anytime you think you may need emergency care. For example, call if:    · You have severe trouble breathing.    Call your doctor now or seek immediate medical care if:    · You have new or worse trouble breathing.     · You cough up dark brown or bloody mucus (sputum).     · You have a new or higher fever.     · You have a new rash.    Watch closely for changes in your health, and be sure to contact your doctor if:    · You cough more deeply or more often, especially if you notice more mucus or a change in the color of your mucus.     · You are not getting better as expected. Where can you learn more? Go to https://[employer] org and sign in to your [employer] account. Enter H333 in the [employer] box to learn more about \"Bronchitis: Care Instructions. \"     If you do not have an account, please click on the \"Sign Up Now\" link. Current as of: September 5, 2018  Content Version: 12.0  © 9352-4121 Healthwise, Inc[employer]. Care instructions adapted under license by [facility] ([facility]). If you have questions about a medical condition or this instruction, always ask your healthcare professional. [place] [address] any warranty or liability for your use of this information.

## 2019-06-11 NOTE — PROGRESS NOTES
[facility] PHYSIC[facility]ANS  [facility]  [address] [address]  Dept: [phone]  Dept Fax: [phone]    Rodrigo Garcia is a 82 y.o. male who [facility]sto the [facility] today for his medical conditions/complaints as noted below. Alisa Vargas is c/o of URI (x 2 days. )      HPI:     Dm Martin is here today for a walk in care visit. Cough   This is a new problem. The current episode started in the past 7 days. The problem has been gradually worsening. The problem occurs constantly. The cough is productive of sputum. Associated symptoms include nasal congestion, postnasal drip and wheezing. Pertinent negatives include no chest pain, chills, fever, headaches, rash, rhinorrhea, sore throat or shortness of breath. The symptoms are aggravated by lying down. He has tried a beta-agonist inhaler, rest and steroid inhaler for the symptoms. The treatment provided moderate relief. His past medical history is significant for bronchitis, COPD, environmental allergies and pneumonia. Past Medical History:   Diagnosis Date    COPD (chronic obstructive [facility] disease) [facility]     Diabetes mellitus ([facility]     [facility]2017    [facility]    Hyperlipidemia     Hypothyroidism     MI (myocardial infarction) ([facility]     Thyroid disease     Type II or unspecified type diabetes mellitus without mention of complication, not stated as uncontrolled         Current Outpatient Medications   Medication Sig Dispense Refill    doxycycline hyclate (VIBRAMYCIN) 100 MG capsule Take 1 capsule by mouth 2 times daily for 10 days 20 capsule 0    predniSONE (DELTASONE) 20 MG tablet Take 2 tablets by mouth daily for 5 days 10 tablet 0    guaiFENesin-codeine (CHERATUSSIN AC) 100-10 MG/5ML syrup Take 10 mLs by mouth 4 times daily as needed for Cough for up to 3 days.  120 mL 0    furosemide (LASIX) 40 MG tablet Take 1 tablet by mouth 2 times daily 180 tablet 1  levothyroxine (SYNTHROID) 112 MCG tablet Take 1 tablet by mouth Daily 90 tablet 1    pravastatin (PRAVACHOL) 20 MG tablet Take 1 tablet by mouth daily 90 tablet 3    blood glucose monitor strips accu check 100 strip 3    aspirin 81 MG tablet Take 81 mg by mouth daily      albuterol sulfate  (90 Base) MCG/ACT inhaler Inhale 2 puffs into the lungs every 4 hours as needed for Wheezing or Shortness of Breath 1 Inhaler 2    albuterol (PROVENTIL) (2.5 MG/3ML) 0.083% nebulizer solution Take 3 mLs by nebulization every 6 hours as needed for Wheezing 120 each 3    budesonide-formoterol (SYMBICORT) 160-4.5 MCG/ACT AERO Inhale 2 puffs into the lungs 2 times daily      metFORMIN (GLUCOPHAGE) 500 MG tablet Take 2,000 mg by mouth daily (with breakfast)      acetaminophen (TYLENOL) 500 MG tablet Take 1,000 mg by mouth every 6 hours as needed for Pain      TAMSULOSIN HCL PO Take 0.4 mg by mouth Daily with supper       tiotropium (SPIRIVA) 18 MCG inhalation capsule Inhale 18 mcg into the lungs daily      Multiple Vitamins-Minerals (THERAPEUTIC MULTIVITAMIN-MINERALS) tablet Take 1 tablet by mouth daily      propranolol (INDERAL) 80 MG tablet Take 80 mg by mouth 2 times daily Takes 120 mg twice daily      primidone (MYSOLINE) 50 MG tablet Take 50 mg by mouth 2 times daily Takes 250 mg twice daily      hydrocortisone (ANUSOL-HC) 2.5 % rectal cream Place rectally 2 times daily. 1 Tube 3     No current facility-administered medications for this visit. No Known Allergies    Subjective:      Review of Systems   Constitutional: Negative for chills and fever. HENT: Positive for postnasal drip. Negative for rhinorrhea and sore throat. Respiratory: Positive for cough and wheezing. Negative for shortness of breath. Cardiovascular: Negative for chest pain. Skin: Negative for rash. Allergic/Immunologic: Positive for environmental allergies. Neurological: Negative for headaches.        Objective: Physical Exam   Constitutional: He is oriented to person, place, and time. He appears well-developed and well-nourished. No distress. HENT:   Nose: Mucosal edema present. Mouth/Throat: Mucous membranes are normal. Mucous membranes are not dry. Posterior oropharyngeal erythema present. Neck: Normal range of motion. Neck supple. Cardiovascular: Normal rate and regular rhythm. Pulmonary/Chest: Effort normal. No respiratory distress. He has decreased breath sounds. He has no wheezes. He has no rales. Lymphadenopathy:     He has no cervical adenopathy. Neurological: He is alert and oriented to person, place, and time. He has normal reflexes. Skin: Skin is warm and dry. No rash noted. /63 (Site: Left Upper Arm, Position: Sitting, Cuff Size: Large Adult)   Pulse 70   Temp 98.6 °F (37 °C) (Temporal)   Resp 16   Ht 5' 11\" (1.803 m)   Wt 252 lb 12.8 oz (114.7 kg)   BMI 35.26 kg/m²     Assessment:      Diagnosis Orders   1. Acute bronchitis with COPD (HCC)  doxycycline hyclate (VIBRAMYCIN) 100 MG capsule    predniSONE (DELTASONE) 20 MG tablet    guaiFENesin-codeine (CHERATUSSIN AC) 100-10 MG/5ML syrup       Plan:             Discussed exam, POCT findings, plan of care (including prescriptive and supportive as listed below) and follow-up atlength with patient and or Patient. Reviewed all prescribed and recommended medications, administration and side effects. Encouraged to return to [address] [place] [address] for noimprovement and or worsening of symptoms. To ER or call 911 if any difficulty breathing, shortness of breath, inability to swallow, hives or temp greater than 103 degrees. Questions answered. They verbalized understandingand agreement. Return if symptoms worsen or fail to improve.     Orders Placed This Encounter   Medications    doxycycline hyclate (VIBRAMYCIN) 100 MG capsule     Sig: Take 1 capsule by mouth 2 times daily for 10 days     Dispense:  20 capsule     Refill:  0  predniSONE (DELTASONE) 20 MG tablet     Sig: Take 2 tablets by mouth daily for 5 days     Dispense:  10 tablet     Refill:  0    guaiFENesin-codeine (CHERATUSSIN AC) 100-10 MG/5ML syrup     Sig: Take 10 mLs by mouth 4 times daily as needed for Cough for up to 3 days. Dispense:  120 mL     Refill:  0     Reduce doses taken as pain becomes manageable          All patient questions answered. Pt voiced understanding.       Electronically signed by GENA Morton CNP on 6/11/2019 at 5:04 PM

## 2019-07-03 ENCOUNTER — TELEPHONE (OUTPATIENT)
Dept: PRIMARY CARE CLINIC | Age: 84
End: 2019-07-03

## 2019-07-08 DIAGNOSIS — E03.8 HYPOTHYROIDISM DUE TO HASHIMOTO'S THYROIDITIS: ICD-10-CM

## 2019-07-08 DIAGNOSIS — E06.3 HYPOTHYROIDISM DUE TO HASHIMOTO'S THYROIDITIS: ICD-10-CM

## 2019-07-08 RX ORDER — LEVOTHYROXINE SODIUM 112 UG/1
112 TABLET ORAL DAILY
Qty: 90 TABLET | Refills: 1 | Status: SHIPPED | OUTPATIENT
Start: 2019-07-08 | End: 2019-12-31 | Stop reason: SDUPTHER

## 2019-07-08 NOTE — TELEPHONE ENCOUNTER
diastolic CHF (congestive heart failure) (HCC)     COPD (chronic obstructive pulmonary disease) (HCC)     Adrenal adenoma, left     Elevated liver enzymes     Hypothyroidism due to Hashimoto's thyroiditis

## 2019-07-24 ENCOUNTER — TELEPHONE (OUTPATIENT)
Dept: PRIMARY CARE CLINIC | Age: 84
End: 2019-07-24

## 2019-07-25 ENCOUNTER — HOSPITAL ENCOUNTER (OUTPATIENT)
Age: 84
Discharge: HOME | End: 2019-07-25
Payer: MEDICARE

## 2019-07-25 DIAGNOSIS — E03.8 HYPOTHYROIDISM DUE TO HASHIMOTO'S THYROIDITIS: ICD-10-CM

## 2019-07-25 DIAGNOSIS — E06.3 HYPOTHYROIDISM DUE TO HASHIMOTO'S THYROIDITIS: ICD-10-CM

## 2019-07-25 DIAGNOSIS — E11.8 TYPE 2 DIABETES MELLITUS WITH COMPLICATION, WITHOUT LONG-TERM CURRENT USE OF INSULIN (HCC): ICD-10-CM

## 2019-07-25 LAB
ABSOLUTE EOS #: 0.25 K/UL (ref 0–0.44)
ABSOLUTE IMMATURE GRANULOCYTE: 0.06 K/UL (ref 0–0.3)
ABSOLUTE LYMPH #: 1.7 K/UL (ref 1.1–3.7)
ABSOLUTE MONO #: 1.13 K/UL (ref 0.1–1.2)
ANION GAP SERPL CALCULATED.3IONS-SCNC: 12 MMOL/L (ref 9–17)
BASOPHILS # BLD: 1 % (ref 0–2)
BASOPHILS ABSOLUTE: 0.04 K/UL (ref 0–0.2)
BUN BLDV-MCNC: 19 MG/DL (ref 8–23)
BUN/CREAT BLD: 17 (ref 9–20)
CALCIUM SERPL-MCNC: 9.3 MG/DL (ref 8.6–10.4)
CHLORIDE BLD-SCNC: 99 MMOL/L (ref 98–107)
CHOLESTEROL/HDL RATIO: 4.5
CHOLESTEROL: 240 MG/DL
CO2: 28 MMOL/L (ref 20–31)
CREAT SERPL-MCNC: 1.11 MG/DL (ref 0.7–1.2)
CREATININE URINE: 115.8 MG/DL (ref 39–259)
DIFFERENTIAL TYPE: ABNORMAL
EOSINOPHILS RELATIVE PERCENT: 4 % (ref 1–4)
ESTIMATED AVERAGE GLUCOSE: 177 MG/DL
GFR AFRICAN AMERICAN: >60 ML/MIN
GFR NON-AFRICAN AMERICAN: >60 ML/MIN
GFR SERPL CREATININE-BSD FRML MDRD: ABNORMAL ML/MIN/{1.73_M2}
GFR SERPL CREATININE-BSD FRML MDRD: ABNORMAL ML/MIN/{1.73_M2}
GLUCOSE BLD-MCNC: 173 MG/DL (ref 70–99)
HBA1C MFR BLD: 7.8 % (ref 4.8–5.9)
HCT VFR BLD CALC: 41 % (ref 40.7–50.3)
HDLC SERPL-MCNC: 53 MG/DL
HEMOGLOBIN: 12.9 G/DL (ref 13–17)
IMMATURE GRANULOCYTES: 1 %
LDL CHOLESTEROL: 155 MG/DL (ref 0–130)
LYMPHOCYTES # BLD: 24 % (ref 24–43)
MCH RBC QN AUTO: 30 PG (ref 25.2–33.5)
MCHC RBC AUTO-ENTMCNC: 31.5 G/DL (ref 28.4–34.8)
MCV RBC AUTO: 95.3 FL (ref 82.6–102.9)
MICROALBUMIN/CREAT 24H UR: 23 MG/L
MICROALBUMIN/CREAT UR-RTO: 20 MCG/MG CREAT
MONOCYTES # BLD: 16 % (ref 3–12)
NRBC AUTOMATED: 0 PER 100 WBC
PDW BLD-RTO: 13.5 % (ref 11.8–14.4)
PLATELET # BLD: 222 K/UL (ref 138–453)
PLATELET ESTIMATE: ABNORMAL
PMV BLD AUTO: 10.3 FL (ref 8.1–13.5)
POTASSIUM SERPL-SCNC: 4.3 MMOL/L (ref 3.7–5.3)
RBC # BLD: 4.3 M/UL (ref 4.21–5.77)
RBC # BLD: ABNORMAL 10*6/UL
SEG NEUTROPHILS: 54 % (ref 36–65)
SEGMENTED NEUTROPHILS ABSOLUTE COUNT: 3.81 K/UL (ref 1.5–8.1)
SODIUM BLD-SCNC: 139 MMOL/L (ref 135–144)
THYROXINE, FREE: 1.43 NG/DL (ref 0.93–1.7)
TRIGL SERPL-MCNC: 158 MG/DL
TSH SERPL DL<=0.05 MIU/L-ACNC: 6.73 MIU/L (ref 0.3–5)
VLDLC SERPL CALC-MCNC: ABNORMAL MG/DL (ref 1–30)
WBC # BLD: 7 K/UL (ref 3.5–11.3)
WBC # BLD: ABNORMAL 10*3/UL

## 2019-07-25 PROCEDURE — 84439 ASSAY OF FREE THYROXINE: CPT

## 2019-07-25 PROCEDURE — 82043 UR ALBUMIN QUANTITATIVE: CPT

## 2019-07-25 PROCEDURE — 80048 BASIC METABOLIC PNL TOTAL CA: CPT

## 2019-07-25 PROCEDURE — 82570 ASSAY OF URINE CREATININE: CPT

## 2019-07-25 PROCEDURE — 80061 LIPID PANEL: CPT

## 2019-07-25 PROCEDURE — 84443 ASSAY THYROID STIM HORMONE: CPT

## 2019-07-25 PROCEDURE — 83036 HEMOGLOBIN GLYCOSYLATED A1C: CPT

## 2019-07-25 PROCEDURE — 36415 COLL VENOUS BLD VENIPUNCTURE: CPT

## 2019-07-25 PROCEDURE — 85025 COMPLETE CBC W/AUTO DIFF WBC: CPT

## 2019-07-29 ENCOUNTER — OFFICE VISIT (OUTPATIENT)
Dept: PRIMARY CARE CLINIC | Age: 84
End: 2019-07-29
Payer: MEDICARE

## 2019-07-29 VITALS
WEIGHT: 254.8 LBS | RESPIRATION RATE: 16 BRPM | DIASTOLIC BLOOD PRESSURE: 58 MMHG | BODY MASS INDEX: 35.67 KG/M2 | TEMPERATURE: 98.5 F | HEART RATE: 70 BPM | SYSTOLIC BLOOD PRESSURE: 116 MMHG | HEIGHT: 71 IN

## 2019-07-29 DIAGNOSIS — E06.3 HYPOTHYROIDISM DUE TO HASHIMOTO'S THYROIDITIS: ICD-10-CM

## 2019-07-29 DIAGNOSIS — E11.8 TYPE 2 DIABETES MELLITUS WITH COMPLICATION, WITHOUT LONG-TERM CURRENT USE OF INSULIN (HCC): Primary | ICD-10-CM

## 2019-07-29 DIAGNOSIS — L98.9 LEG SKIN LESION, RIGHT: ICD-10-CM

## 2019-07-29 DIAGNOSIS — E78.5 DYSLIPIDEMIA: ICD-10-CM

## 2019-07-29 DIAGNOSIS — E03.8 HYPOTHYROIDISM DUE TO HASHIMOTO'S THYROIDITIS: ICD-10-CM

## 2019-07-29 PROCEDURE — 99214 OFFICE O/P EST MOD 30 MIN: CPT | Performed by: NURSE PRACTITIONER

## 2019-07-29 RX ORDER — ATORVASTATIN CALCIUM 20 MG/1
20 TABLET, FILM COATED ORAL DAILY
Qty: 90 TABLET | Refills: 3 | Status: SHIPPED | OUTPATIENT
Start: 2019-07-29 | End: 2019-08-20

## 2019-07-29 ASSESSMENT — ENCOUNTER SYMPTOMS
RHINORRHEA: 0
DIFFICULTY BREATHING: 0
WHEEZING: 0
HOARSE VOICE: 0
HEMOPTYSIS: 0
FREQUENT THROAT CLEARING: 0
DIARRHEA: 0
CHEST TIGHTNESS: 0
SPUTUM PRODUCTION: 0
CONSTIPATION: 0
SORE THROAT: 0
TROUBLE SWALLOWING: 0
BLURRED VISION: 0
ABDOMINAL PAIN: 0
SHORTNESS OF BREATH: 1
COLOR CHANGE: 1
VOMITING: 0
COUGH: 0
NAUSEA: 0

## 2019-07-29 ASSESSMENT — COPD QUESTIONNAIRES: COPD: 1

## 2019-07-29 NOTE — PROGRESS NOTES
Name: Violet Grace  : 1935         Chief Complaint:     Chief Complaint   Patient presents with    Diabetes     Routine office visit.  Thyroid Problem       History of Present Illness:      Violet Grace is a 82 y.o.  male who presents with Diabetes (Routine office visit. ) and Thyroid Problem      Serafin Rothman is here today for a routine office visit. Hypothyroid- stable, TSH improving, feels well on current dose of levothyroxine, administering correctly    Leg lesion-patient noticed a lesion to the right posterior thigh a while ago and states it is now gotten larger. It does interfere with him sitting at times. Dyslipidemia- worsening, patient states he took himself off pravastatin due to muscle cramps. Diabetes   He presents for his follow-up diabetic visit. He has type 2 diabetes mellitus. His disease course has been stable. There are no hypoglycemic associated symptoms. Pertinent negatives for hypoglycemia include no dizziness or headaches. Associated symptoms include polyuria. Pertinent negatives for diabetes include no blurred vision, no chest pain, no fatigue, no polydipsia and no polyphagia. There are no hypoglycemic complications. Symptoms are stable. Diabetic complications include heart disease and nephropathy. Pertinent negatives for diabetic complications include no CVA or peripheral neuropathy. Risk factors for coronary artery disease include diabetes mellitus, male sex and sedentary lifestyle. Current diabetic treatment includes oral agent (monotherapy). He is compliant with treatment all of the time. His weight is stable. He is following a generally healthy diet. When asked about meal planning, he reported none. He has had a previous visit with a dietitian. He rarely participates in exercise. There is no change in his home blood glucose trend. His breakfast blood glucose range is generally 140-180 mg/dl. An ACE inhibitor/angiotensin II receptor blocker is being taken.  He does daily 3/25/19  Yes GENA Jimenez CNP   hydrocortisone (ANUSOL-HC) 2.5 % rectal cream Place rectally 2 times daily. 1/29/19  Yes GENA Jimenez CNP   blood glucose monitor strips accu check 7/18/18  Yes GENA Ashby CNP   aspirin 81 MG tablet Take 81 mg by mouth daily   Yes Historical Provider, MD   albuterol sulfate  (90 Base) MCG/ACT inhaler Inhale 2 puffs into the lungs every 4 hours as needed for Wheezing or Shortness of Breath 4/13/18  Yes GENA Ashby CNP   albuterol (PROVENTIL) (2.5 MG/3ML) 0.083% nebulizer solution Take 3 mLs by nebulization every 6 hours as needed for Wheezing 4/10/18  Yes Julio Sandoval MD   budesonide-formoterol (SYMBICORT) 160-4.5 MCG/ACT AERO Inhale 2 puffs into the lungs 2 times daily   Yes Historical Provider, MD   metFORMIN (GLUCOPHAGE) 500 MG tablet Take 2,000 mg by mouth daily (with breakfast)   Yes Historical Provider, MD   acetaminophen (TYLENOL) 500 MG tablet Take 1,000 mg by mouth every 6 hours as needed for Pain   Yes Historical Provider, MD   TAMSULOSIN HCL PO Take 0.4 mg by mouth Daily with supper    Yes Historical Provider, MD   tiotropium (SPIRIVA) 18 MCG inhalation capsule Inhale 18 mcg into the lungs daily   Yes Historical Provider, MD   Multiple Vitamins-Minerals (THERAPEUTIC MULTIVITAMIN-MINERALS) tablet Take 1 tablet by mouth daily   Yes Historical Provider, MD   propranolol (INDERAL) 80 MG tablet Take 80 mg by mouth 2 times daily Takes 120 mg twice daily   Yes Historical Provider, MD   primidone (MYSOLINE) 50 MG tablet Take 50 mg by mouth 2 times daily Takes 250 mg twice daily   Yes Historical Provider, MD   pravastatin (PRAVACHOL) 20 MG tablet Take 1 tablet by mouth daily  Patient not taking: Reported on 7/29/2019 8/9/18   Ingrid Lambert MD        Allergies:       Patient has no known allergies. Social History:     Tobacco:    reports that he quit smoking about 26 years ago.  He has never used smokeless tenderness. Obese abdomen   Musculoskeletal: He exhibits no edema (support stocking in place). Lymphadenopathy:     He has no cervical adenopathy. Neurological: He is alert and oriented to person, place, and time. Skin: Skin is warm and dry. Lesion noted. Psychiatric: He has a normal mood and affect. His behavior is normal.   Nursing note and vitals reviewed. Data:     Lab Results   Component Value Date     07/25/2019    K 4.3 07/25/2019    CL 99 07/25/2019    CO2 28 07/25/2019    BUN 19 07/25/2019    CREATININE 1.11 07/25/2019    GLUCOSE 173 07/25/2019    PROT 6.4 01/17/2019    LABALBU 3.5 01/17/2019    BILITOT 0.18 01/17/2019    ALKPHOS 94 01/17/2019    AST 20 01/17/2019    ALT 38 01/17/2019     Lab Results   Component Value Date    WBC 7.0 07/25/2019    RBC 4.30 07/25/2019    HGB 12.9 07/25/2019    HCT 41.0 07/25/2019    MCV 95.3 07/25/2019    MCH 30.0 07/25/2019    MCHC 31.5 07/25/2019    RDW 13.5 07/25/2019     07/25/2019    MPV 10.3 07/25/2019     Lab Results   Component Value Date    TSH 6.73 07/25/2019     Lab Results   Component Value Date    CHOL 240 07/25/2019    HDL 53 07/25/2019    LABA1C 7.8 07/25/2019       Assessment/Plan:      Diagnosis Orders   1. Type 2 diabetes mellitus with complication, without long-term current use of insulin (HCC)  atorvastatin (LIPITOR) 20 MG tablet    CBC Auto Differential    ALT    AST    Basic Metabolic Panel    Hemoglobin A1C   2. Dyslipidemia  atorvastatin (LIPITOR) 20 MG tablet    Lipid Panel   3. Hypothyroidism due to Hashimoto's thyroiditis  T4, Free    TSH without Reflex   4. Leg skin lesion, right  [facility] [place], , General Surgery, [place]     He will reinitiate atorvastatin at 20 mg daily. He can take half tablet if necessary. I also explained he could take 20 mg every other day if that helped. He will report progress.     1.  Lorie Lord received counseling on the following healthy behaviors: nutrition, exercise and

## 2019-07-29 NOTE — PATIENT INSTRUCTIONS
SURVEY:    You may be receiving a survey from [employer] regarding your visit today. Please complete the survey to enable us to provide the highest quality of care to you and your family. If you cannot score us a very good on any question, please call the office to discuss how we could have made your experience a very good one. Thank you. Patient Education        Counting Carbohydrates: Care Instructions  Your Care Instructions    You don't have to eat special foods when you have diabetes. You just have to be careful to eat healthy foods. Carbohydrates (carbs) raise blood sugar higher and quicker than any other nutrient. Carbs are found in desserts, breads and cereals, and fruit. They're also in starchy vegetables. These include potatoes, corn, and grains such as rice and pasta. Carbs are also in milk and yogurt. The more carbs you eat at one time, the higher your blood sugar will rise. Spreading carbs all through the day helps keep your blood sugar levels within your target range. Counting carbs is one of the best ways to keep your blood sugar under control. If you use insulin, counting carbs helps you match the right amount of insulin to the number of grams of carbs in a meal. Then you can change your diet and insulin dose as needed. Testing your blood sugar several times a day can help you learn how carbs affect your blood sugar. A registered dietitian or certified diabetes educator can help you plan meals and snacks. Follow-up care is a key part of your treatment and safety. Be sure to make and go to all appointments, and call your doctor if you are having problems. It's also a good idea to know your test results and keep a list of the medicines you take. How can you care for yourself at home?   Know your daily amount of carbohydrates  Your daily amount depends on several things, such as your weight, how active you are, which diabetes medicines you take, and what your goals are for your blood sugar levels. A registered dietitian or certified diabetes educator can help you plan how many carbs to include in each meal and snack. For most adults, a guideline for the daily amount of carbs is:  · 45 to 60 grams at each meal. That's about the same as 3 to 4 carbohydrate servings. · 15 to 20 grams at each snack. That's about the same as 1 carbohydrate serving. Count carbs  Counting carbs lets you know how much rapid-acting insulin to take before you eat. If you use an insulin pump, you get a constant rate of insulin during the day. So the pump must be programmed at meals. This gives you extra insulin to cover the rise in blood sugar after meals. If you take insulin:  · Learn your own insulin-to-carb ratio. You and your diabetes health professional will figure out the ratio. You can do this by testing your blood sugar after meals. For example, you may need a certain amount of insulin for every 15 grams of carbs. · Add up the carb grams in a meal. Then you can figure out how many units of insulin to take based on your insulin-to-carb ratio. · Exercise lowers blood sugar. You can use less insulin than you would if you were not doing exercise. Keep in mind that timing matters. If you exercise within 1 hour after a meal, your body may need less insulin for that meal than it would if you exercised 3 hours after the meal. Test your blood sugar to find out how exercise affects your need for insulin. If you do or don't take insulin:  · Look at labels on packaged foods. This can tell you how many carbs are in a serving. You can also use guides from the American Diabetes Association. · Be aware of portions, or serving sizes. If a package has two servings and you eat the whole package, you need to double the number of grams of carbohydrate listed for one serving. · Protein, fat, and fiber do not raise blood sugar as much as carbs do.  If you eat a lot of these nutrients in a meal, your blood sugar will rise more

## 2019-08-07 ENCOUNTER — OFFICE VISIT (OUTPATIENT)
Dept: SURGERY | Age: 84
End: 2019-08-07
Payer: MEDICARE

## 2019-08-07 ENCOUNTER — TELEPHONE (OUTPATIENT)
Dept: SURGERY | Age: 84
End: 2019-08-07

## 2019-08-07 VITALS
BODY MASS INDEX: 35.84 KG/M2 | SYSTOLIC BLOOD PRESSURE: 133 MMHG | RESPIRATION RATE: 22 BRPM | WEIGHT: 256 LBS | DIASTOLIC BLOOD PRESSURE: 68 MMHG | HEIGHT: 71 IN | HEART RATE: 70 BPM | TEMPERATURE: 99 F

## 2019-08-07 DIAGNOSIS — L98.9 SKIN LESION OF RIGHT LOWER EXTREMITY: Primary | ICD-10-CM

## 2019-08-07 PROCEDURE — 99201 PR OFFICE OUTPATIENT NEW 10 MINUTES: CPT | Performed by: SURGERY

## 2019-08-07 SDOH — SOCIAL STABILITY: SOCIAL NETWORK: ARE YOU MARRIED, WIDOWED, DIVORCED, SEPARATED, NEVER MARRIED, OR LIVING WITH A PARTNER?: MARRIED

## 2019-08-07 NOTE — TELEPHONE ENCOUNTER
Dr. Harvey Alarcon, Dr. Kaye Vail is planning a shave biopsy of a skin lesion to South's right upper posterior thigh near the buttock and is asking if he can be off his Aspirin 7 days prior to the procedure. Blanca Duckworth states he has an appointment scheduled with you on Aug 20.

## 2019-08-07 NOTE — PROGRESS NOTES
levothyroxine (SYNTHROID) 112 MCG tablet, Take 1 tablet by mouth Daily, Disp: 90 tablet, Rfl: 1    furosemide (LASIX) 40 MG tablet, Take 1 tablet by mouth 2 times daily, Disp: 180 tablet, Rfl: 1    pravastatin (PRAVACHOL) 20 MG tablet, Take 1 tablet by mouth daily, Disp: 90 tablet, Rfl: 3    aspirin 81 MG tablet, Take 81 mg by mouth daily, Disp: , Rfl:     albuterol sulfate  (90 Base) MCG/ACT inhaler, Inhale 2 puffs into the lungs every 4 hours as needed for Wheezing or Shortness of Breath, Disp: 1 Inhaler, Rfl: 2    albuterol (PROVENTIL) (2.5 MG/3ML) 0.083% nebulizer solution, Take 3 mLs by nebulization every 6 hours as needed for Wheezing, Disp: 120 each, Rfl: 3    budesonide-formoterol (SYMBICORT) 160-4.5 MCG/ACT AERO, Inhale 2 puffs into the lungs 2 times daily, Disp: , Rfl:     metFORMIN (GLUCOPHAGE) 500 MG tablet, Take 2,000 mg by mouth daily (with breakfast), Disp: , Rfl:     acetaminophen (TYLENOL) 500 MG tablet, Take 1,000 mg by mouth every 6 hours as needed for Pain, Disp: , Rfl:     TAMSULOSIN HCL PO, Take 0.4 mg by mouth Daily with supper , Disp: , Rfl:     Multiple Vitamins-Minerals (THERAPEUTIC MULTIVITAMIN-MINERALS) tablet, Take 1 tablet by mouth daily, Disp: , Rfl:     propranolol (INDERAL) 80 MG tablet, Take 80 mg by mouth 2 times daily Takes 120 mg twice daily, Disp: , Rfl:     primidone (MYSOLINE) 50 MG tablet, Take 50 mg by mouth 2 times daily Takes 250 mg twice daily, Disp: , Rfl:     hydrocortisone (ANUSOL-HC) 2.5 % rectal cream, Place rectally 2 times daily. (Patient not taking: Reported on 8/7/2019), Disp: 1 Tube, Rfl: 3    blood glucose monitor strips, accu check, Disp: 100 strip, Rfl: 3    tiotropium (SPIRIVA) 18 MCG inhalation capsule, Inhale 18 mcg into the lungs daily, Disp: , Rfl:     Physical Exam:  Vital signs and Nurse's note reviewed  Gen:  A&Ox3, NAD. Pleasant and cooperative.   HEENT: CHRISTIANNE EOMI, no scleral icterus  CVS: Regular rate  Resp: Good bilateral

## 2019-08-16 ENCOUNTER — PROCEDURE VISIT (OUTPATIENT)
Dept: SURGERY | Age: 84
End: 2019-08-16
Payer: MEDICARE

## 2019-08-16 ENCOUNTER — HOSPITAL ENCOUNTER (OUTPATIENT)
Age: 84
Setting detail: SPECIMEN
Discharge: HOME | End: 2019-08-16
Payer: MEDICARE

## 2019-08-16 VITALS
RESPIRATION RATE: 22 BRPM | BODY MASS INDEX: 35.84 KG/M2 | DIASTOLIC BLOOD PRESSURE: 67 MMHG | TEMPERATURE: 98.5 F | HEART RATE: 71 BPM | HEIGHT: 71 IN | SYSTOLIC BLOOD PRESSURE: 120 MMHG | WEIGHT: 256 LBS

## 2019-08-16 DIAGNOSIS — L98.9 SKIN LESION OF RIGHT LOWER EXTREMITY: Primary | ICD-10-CM

## 2019-08-16 PROCEDURE — 88305 TISSUE EXAM BY PATHOLOGIST: CPT

## 2019-08-16 PROCEDURE — 11302 SHAVE SKIN LESION 1.1-2.0 CM: CPT | Performed by: SURGERY

## 2019-08-20 ENCOUNTER — OFFICE VISIT (OUTPATIENT)
Dept: CARDIOLOGY | Age: 84
End: 2019-08-20
Payer: MEDICARE

## 2019-08-20 VITALS
DIASTOLIC BLOOD PRESSURE: 69 MMHG | HEART RATE: 73 BPM | WEIGHT: 251 LBS | RESPIRATION RATE: 18 BRPM | SYSTOLIC BLOOD PRESSURE: 129 MMHG | BODY MASS INDEX: 35.14 KG/M2 | HEIGHT: 71 IN | OXYGEN SATURATION: 94 %

## 2019-08-20 DIAGNOSIS — I10 ESSENTIAL HYPERTENSION: ICD-10-CM

## 2019-08-20 DIAGNOSIS — E78.2 MIXED HYPERLIPIDEMIA: ICD-10-CM

## 2019-08-20 DIAGNOSIS — Z95.820 S/P ANGIOPLASTY WITH STENT: ICD-10-CM

## 2019-08-20 DIAGNOSIS — I25.10 CORONARY ARTERY DISEASE INVOLVING NATIVE CORONARY ARTERY OF NATIVE HEART WITHOUT ANGINA PECTORIS: ICD-10-CM

## 2019-08-20 DIAGNOSIS — E66.9 OBESITY, CLASS II, BMI 35-39.9: ICD-10-CM

## 2019-08-20 DIAGNOSIS — I50.42 CHRONIC COMBINED SYSTOLIC AND DIASTOLIC CHF (CONGESTIVE HEART FAILURE) (HCC): Primary | Chronic | ICD-10-CM

## 2019-08-20 LAB — DERMATOLOGY PATHOLOGY REPORT: NORMAL

## 2019-08-20 PROCEDURE — 93000 ELECTROCARDIOGRAM COMPLETE: CPT | Performed by: INTERNAL MEDICINE

## 2019-08-20 PROCEDURE — 99214 OFFICE O/P EST MOD 30 MIN: CPT | Performed by: INTERNAL MEDICINE

## 2019-08-20 NOTE — PROGRESS NOTES
Giorgi Stoddard am scribing for and in the presence of Tierney Cuello MD, F.A.C.C..    Subjective:     [facility] / HPI:    Chief Complaint   Patient presents with    6 Month Follow-Up     HX: CHF, CAD, HTN, Hyplipidemia. Pt states he is doing ok. C/o: SOB with over doing something. [place]: CP, Palpitiatons, Lighteaded/dizziness. Darvin Clarke is 82 y.o. male who presents today for follow-up. 1-He has history of coronary artery disease, myocardial infarction and primary PCI in 2/2017 done at [facility],  [address] [place] [address]. He also has history of ischemic cardiomyopathy and chronic systolic CHF, NYHA class III. 2-An admission to [facility] on 9/19/2017 with COPD exacerbation, during this admission his lisinopril changed to Cozaar because of chronic cough. 3-Another visit to the emergency room on 10/3/2017 with cough, shortness of breath and wheezing. 4- He saw Dr. Nicole Morris at [facility] in November 2018, no changes in medication done. 5-An admission to [facility] on 4/6/2018 with acute on chronic CHF. 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    Mr. Mariajose Davis is here for follow up today. He is feeling pretty good. Chronic, stable shortness of breath but without orthopnea or PND. No fever. Uses compression stockings. No significant chest pain, pressure or tightness. No palpitations, dizziness or lightheadedness. His exercises is limited due to his tremors and he uses a cane to walk around. He is sleeping well at night. He is trying to get back into exercises with the machines. He stopped taking his cholesterol medicine because of muscle pain, recently restarted on Pravachol. His LDL on 7/25/2019 was 153 mg/dl. His most recent hemoglobin A1c 7.8%    EKG done in office (8/20/2019)- Sinus Rhythm with 1st degree AV block.  71 bpm.    Past Medical History:    Past Medical History:   Diagnosis Date    [facility]     [facility]     H[facility] of echocardiogram 2017    [facility]    Hyperlipidemia     Hypothyroidism     [facility] infarction[facility] ([facility]     Thyroid disease     Type II or unspecified type diabetes mellitus without mention of complication, not stated as uncontrolled      Past Surgical History:  Past Surgical History:   Procedure Laterality Date    CARDIAC SURGERY  2017    Stent placed  Dr Miguelito Dickson      right ankle    HERNIA REPAIR       Social History:    Social History     Tobacco Use   Smoking Status Former Smoker    Last attempt to quit:     Years since quittin.6   Smokeless Tobacco Never Used     Current Medications:  Outpatient Medications Marked as Taking for the 19 encounter (Office Visit) with Keesha Rosa MD   Medication Sig Dispense Refill    levothyroxine (SYNTHROID) 112 MCG tablet Take 1 tablet by mouth Daily 90 tablet 1    furosemide (LASIX) 40 MG tablet Take 1 tablet by mouth 2 times daily 180 tablet 1    pravastatin (PRAVACHOL) 20 MG tablet Take 1 tablet by mouth daily 90 tablet 3    blood glucose monitor strips accu check 100 strip 3    aspirin 81 MG tablet Take 81 mg by mouth daily      albuterol sulfate  (90 Base) MCG/ACT inhaler Inhale 2 puffs into the lungs every 4 hours as needed for Wheezing or Shortness of Breath 1 Inhaler 2    albuterol (PROVENTIL) (2.5 MG/3ML) 0.083% nebulizer solution Take 3 mLs by nebulization every 6 hours as needed for Wheezing 120 each 3    budesonide-formoterol (SYMBICORT) 160-4.5 MCG/ACT AERO Inhale 2 puffs into the lungs 2 times daily      metFORMIN (GLUCOPHAGE) 500 MG tablet Take 2,000 mg by mouth daily (with breakfast)      acetaminophen (TYLENOL) 500 MG tablet Take 1,000 mg by mouth every 6 hours as needed for Pain      TAMSULOSIN HCL PO Take 0.4 mg by mouth Daily with supper       tiotropium (SPIRIVA) 18

## 2019-08-20 NOTE — PATIENT INSTRUCTIONS
SURVEY:    You may be receiving a survey from [employer] regarding your visit today. Please complete the survey to enable us to provide the highest quality of care to you and your family. If you cannot score us a very good on any question, please call the office to discuss how we could have made your experience a very good one. Thank you.

## 2019-08-23 ENCOUNTER — OFFICE VISIT (OUTPATIENT)
Dept: SURGERY | Age: 84
End: 2019-08-23
Payer: MEDICARE

## 2019-08-23 VITALS
DIASTOLIC BLOOD PRESSURE: 70 MMHG | HEIGHT: 71 IN | BODY MASS INDEX: 35.14 KG/M2 | SYSTOLIC BLOOD PRESSURE: 122 MMHG | WEIGHT: 251 LBS | TEMPERATURE: 98.4 F | HEART RATE: 68 BPM | RESPIRATION RATE: 22 BRPM

## 2019-08-23 DIAGNOSIS — L98.9 SKIN LESION OF RIGHT LOWER EXTREMITY: Primary | ICD-10-CM

## 2019-08-23 PROCEDURE — 99212 OFFICE O/P EST SF 10 MIN: CPT | Performed by: SURGERY

## 2019-08-23 NOTE — Clinical Note
Jeffy Wilson- I changed my mind after thinking about things. Please call him or his wife. Try the Compound W for the finger wart. If no improvement, I am happy to try a local anesthetic procedure to shave it off with silver nitrate cautery sticks. No need to hold any blood thinners or aspirin. He should try the Compound W as directed. He needs to understand it is usually 3 months of treatment recommended. If he wants something done sooner then ok to set up office procedure under local after trying at least 3 weeks of compound W.

## 2019-08-27 ENCOUNTER — TELEPHONE (OUTPATIENT)
Dept: SURGERY | Age: 84
End: 2019-08-27

## 2019-09-09 ENCOUNTER — HOSPITAL ENCOUNTER (OUTPATIENT)
Dept: NON INVASIVE DIAGNOSTICS | Age: 84
Discharge: HOME | End: 2019-09-09
Payer: MEDICARE

## 2019-09-09 DIAGNOSIS — I25.10 CORONARY ARTERY DISEASE INVOLVING NATIVE CORONARY ARTERY OF NATIVE HEART WITHOUT ANGINA PECTORIS: ICD-10-CM

## 2019-09-09 DIAGNOSIS — I50.42 CHRONIC COMBINED SYSTOLIC AND DIASTOLIC CHF (CONGESTIVE HEART FAILURE) (HCC): Chronic | ICD-10-CM

## 2019-09-09 LAB
LV EF: 53 %
LVEF MODALITY: NORMAL

## 2019-09-09 PROCEDURE — 93306 TTE W/DOPPLER COMPLETE: CPT

## 2019-09-10 ENCOUNTER — TELEPHONE (OUTPATIENT)
Dept: CARDIOLOGY | Age: 84
End: 2019-09-10

## 2019-10-07 RX ORDER — FUROSEMIDE 40 MG/1
TABLET ORAL
Qty: 180 TABLET | Refills: 1 | Status: SHIPPED | OUTPATIENT
Start: 2019-10-07 | End: 2020-03-31 | Stop reason: SDUPTHER

## 2019-11-05 ENCOUNTER — OFFICE VISIT (OUTPATIENT)
Dept: PRIMARY CARE CLINIC | Age: 84
End: 2019-11-05
Payer: MEDICARE

## 2019-11-05 VITALS
OXYGEN SATURATION: 97 % | SYSTOLIC BLOOD PRESSURE: 112 MMHG | TEMPERATURE: 98.7 F | BODY MASS INDEX: 34.52 KG/M2 | RESPIRATION RATE: 20 BRPM | HEART RATE: 67 BPM | WEIGHT: 247.5 LBS | DIASTOLIC BLOOD PRESSURE: 68 MMHG

## 2019-11-05 DIAGNOSIS — J44.1 COPD EXACERBATION (HCC): Primary | ICD-10-CM

## 2019-11-05 PROCEDURE — 99213 OFFICE O/P EST LOW 20 MIN: CPT | Performed by: NURSE PRACTITIONER

## 2019-11-05 RX ORDER — AZITHROMYCIN 250 MG/1
250 TABLET, FILM COATED ORAL SEE ADMIN INSTRUCTIONS
Qty: 6 TABLET | Refills: 0 | Status: SHIPPED | OUTPATIENT
Start: 2019-11-05 | End: 2019-11-10

## 2019-11-05 RX ORDER — BENZONATATE 100 MG/1
100 CAPSULE ORAL 3 TIMES DAILY PRN
Qty: 15 CAPSULE | Refills: 0 | Status: SHIPPED | OUTPATIENT
Start: 2019-11-05 | End: 2019-11-10

## 2019-11-05 RX ORDER — PREDNISONE 20 MG/1
40 TABLET ORAL DAILY
Qty: 10 TABLET | Refills: 0 | Status: SHIPPED | OUTPATIENT
Start: 2019-11-05 | End: 2019-11-10

## 2019-11-05 ASSESSMENT — ENCOUNTER SYMPTOMS
RHINORRHEA: 1
SHORTNESS OF BREATH: 1
HEARTBURN: 0
SORE THROAT: 0
DIARRHEA: 0
SINUS PRESSURE: 0
SINUS PAIN: 0
PHOTOPHOBIA: 0
EYE REDNESS: 0
VOMITING: 0
NAUSEA: 0
HEMOPTYSIS: 0
WHEEZING: 1
COUGH: 1

## 2019-12-09 ENCOUNTER — OFFICE VISIT (OUTPATIENT)
Dept: PULMONOLOGY | Age: 84
End: 2019-12-09
Payer: MEDICARE

## 2019-12-09 VITALS
SYSTOLIC BLOOD PRESSURE: 131 MMHG | BODY MASS INDEX: 34.3 KG/M2 | RESPIRATION RATE: 16 BRPM | HEART RATE: 62 BPM | DIASTOLIC BLOOD PRESSURE: 71 MMHG | TEMPERATURE: 98.7 F | WEIGHT: 245 LBS | HEIGHT: 71 IN | OXYGEN SATURATION: 94 %

## 2019-12-09 DIAGNOSIS — J44.9 CHRONIC OBSTRUCTIVE PULMONARY DISEASE, UNSPECIFIED COPD TYPE (HCC): Primary | ICD-10-CM

## 2019-12-09 DIAGNOSIS — J45.40 MODERATE PERSISTENT ASTHMA WITHOUT COMPLICATION: ICD-10-CM

## 2019-12-09 DIAGNOSIS — J47.9 BRONCHIECTASIS WITHOUT COMPLICATION (HCC): ICD-10-CM

## 2019-12-09 DIAGNOSIS — I50.42 CHRONIC COMBINED SYSTOLIC AND DIASTOLIC CHF (CONGESTIVE HEART FAILURE) (HCC): ICD-10-CM

## 2019-12-09 DIAGNOSIS — E66.09 OBESITY DUE TO EXCESS CALORIES, UNSPECIFIED OBESITY SEVERITY: ICD-10-CM

## 2019-12-09 PROCEDURE — 99214 OFFICE O/P EST MOD 30 MIN: CPT | Performed by: INTERNAL MEDICINE

## 2019-12-31 DIAGNOSIS — E03.8 HYPOTHYROIDISM DUE TO HASHIMOTO'S THYROIDITIS: ICD-10-CM

## 2019-12-31 DIAGNOSIS — E06.3 HYPOTHYROIDISM DUE TO HASHIMOTO'S THYROIDITIS: ICD-10-CM

## 2019-12-31 RX ORDER — LEVOTHYROXINE SODIUM 112 UG/1
112 TABLET ORAL DAILY
Qty: 90 TABLET | Refills: 1 | Status: SHIPPED | OUTPATIENT
Start: 2019-12-31 | End: 2020-07-06 | Stop reason: SDUPTHER

## 2020-01-20 ENCOUNTER — TELEPHONE (OUTPATIENT)
Dept: PRIMARY CARE CLINIC | Age: 85
End: 2020-01-20

## 2020-02-24 ENCOUNTER — OFFICE VISIT (OUTPATIENT)
Dept: PRIMARY CARE CLINIC | Age: 85
End: 2020-02-24
Payer: MEDICARE

## 2020-02-24 VITALS
TEMPERATURE: 97.6 F | HEART RATE: 68 BPM | SYSTOLIC BLOOD PRESSURE: 99 MMHG | BODY MASS INDEX: 34.53 KG/M2 | WEIGHT: 247.6 LBS | RESPIRATION RATE: 24 BRPM | DIASTOLIC BLOOD PRESSURE: 65 MMHG

## 2020-02-24 PROCEDURE — 99213 OFFICE O/P EST LOW 20 MIN: CPT | Performed by: NURSE PRACTITIONER

## 2020-02-24 RX ORDER — BROMPHENIRAMINE MALEATE, PSEUDOEPHEDRINE HYDROCHLORIDE, AND DEXTROMETHORPHAN HYDROBROMIDE 2; 30; 10 MG/5ML; MG/5ML; MG/5ML
5 SYRUP ORAL 4 TIMES DAILY PRN
Qty: 120 ML | Refills: 1 | Status: SHIPPED | OUTPATIENT
Start: 2020-02-24 | End: 2020-02-28

## 2020-02-24 RX ORDER — ALOGLIPTIN 25 MG/1
25 TABLET, FILM COATED ORAL DAILY
COMMUNITY

## 2020-02-24 ASSESSMENT — ENCOUNTER SYMPTOMS
WHEEZING: 1
GASTROINTESTINAL NEGATIVE: 1
COUGH: 1
RHINORRHEA: 1
EYES NEGATIVE: 1
SHORTNESS OF BREATH: 1
CHEST TIGHTNESS: 0

## 2020-02-24 ASSESSMENT — PATIENT HEALTH QUESTIONNAIRE - PHQ9
SUM OF ALL RESPONSES TO PHQ9 QUESTIONS 1 & 2: 0
2. FEELING DOWN, DEPRESSED OR HOPELESS: 0
SUM OF ALL RESPONSES TO PHQ QUESTIONS 1-9: 0
SUM OF ALL RESPONSES TO PHQ QUESTIONS 1-9: 0
1. LITTLE INTEREST OR PLEASURE IN DOING THINGS: 0

## 2020-02-24 NOTE — PROGRESS NOTES
[facility] PHYSICIANS  [facility] PRIMARY CARE [place] [address] [address] [facility] [place]  Dept: [phone]  Dept Fax: [phone]    Jorge Luis Khan is a 82 y.o. male who presents to the [facility] in Care today for his medical conditions/complaints as noted below. Jorge Luis Khan is c/o of Cough (X 2 days) and Wheezing      HPI:    Jorge Luis Khan is a 82 y.o. male who presents with  Cough   This is a new problem. Episode onset: 3 days  The problem has been gradually worsening. The cough is productive of sputum (states \"bringing a little bit up\"). Associated symptoms include nasal congestion, rhinorrhea, shortness of breath (\"little bit\") and wheezing. Pertinent negatives include no ear pain or fever. He has tried nothing (Using his Albuterol and Symbicort) for the symptoms. His past medical history is significant for COPD and environmental allergies. Wheezing    Associated symptoms include coughing, rhinorrhea and shortness of breath (\"little bit\"). Pertinent negatives include no ear pain or fever. His past medical history is significant for COPD. Wife states they are going out of town today and she wants him to have something.     Past Medical History:   Diagnosis Date    [facility]     [facility]     [facility]24/2017    [facility]    Hyperlipidemia     Hypothyroidism     [facility]myocardial infarction) [facility]     Thyroid disease     Type II or unspecified type diabetes mellitus without mention of complication, not stated as uncontrolled         Current Outpatient Medications   Medication Sig Dispense Refill    alogliptin (NESINA) 25 MG TABS tablet Take 25 mg by mouth daily      brompheniramine-pseudoephedrine-DM 2-30-10 MG/5ML syrup Take 5 mLs by mouth 4 times daily as needed for Congestion or Cough 120 mL 1    levothyroxine (SYNTHROID) 112 MCG tablet Take 1 tablet by mouth Daily 90 tablet 1    cough and sore throat PRN  · Patient instructions given for upper respiratory infection. · To ER or call 911 if any difficulty breathing, shortness of breath, inability to swallow, hives, rash, facial/tongue swelling or temp greater than 103 degrees. · Follow up with PCP or Walk in Care as needed if symptoms worsen or do not improve. No follow-ups on file.     Orders Placed This Encounter   Medications    brompheniramine-pseudoephedrine-DM 2-30-10 MG/5ML syrup     Sig: Take 5 mLs by mouth 4 times daily as needed for Congestion or Cough     Dispense:  120 mL     Refill:  1        Electronically signed by GENA Hutchinson CNP on 2/24/2020 at 12:21 PM

## 2020-02-28 ENCOUNTER — OFFICE VISIT (OUTPATIENT)
Dept: PRIMARY CARE CLINIC | Age: 85
End: 2020-02-28
Payer: MEDICARE

## 2020-02-28 VITALS
TEMPERATURE: 97.8 F | SYSTOLIC BLOOD PRESSURE: 138 MMHG | DIASTOLIC BLOOD PRESSURE: 76 MMHG | OXYGEN SATURATION: 96 % | HEART RATE: 76 BPM | RESPIRATION RATE: 24 BRPM

## 2020-02-28 PROCEDURE — 99213 OFFICE O/P EST LOW 20 MIN: CPT | Performed by: NURSE PRACTITIONER

## 2020-02-28 RX ORDER — PREDNISONE 20 MG/1
40 TABLET ORAL DAILY
Qty: 10 TABLET | Refills: 0 | Status: SHIPPED | OUTPATIENT
Start: 2020-02-28 | End: 2020-03-04

## 2020-02-28 RX ORDER — GUAIFENESIN AND CODEINE PHOSPHATE 100; 10 MG/5ML; MG/5ML
5 SOLUTION ORAL EVERY 4 HOURS PRN
Qty: 100 ML | Refills: 0 | Status: SHIPPED | OUTPATIENT
Start: 2020-02-28 | End: 2020-03-04

## 2020-02-28 RX ORDER — AZITHROMYCIN 250 MG/1
250 TABLET, FILM COATED ORAL SEE ADMIN INSTRUCTIONS
Qty: 6 TABLET | Refills: 0 | Status: SHIPPED | OUTPATIENT
Start: 2020-02-28 | End: 2020-03-04

## 2020-02-28 RX ORDER — ALBUTEROL SULFATE 2.5 MG/3ML
2.5 SOLUTION RESPIRATORY (INHALATION) EVERY 4 HOURS PRN
Qty: 120 EACH | Refills: 3 | Status: SHIPPED | OUTPATIENT
Start: 2020-02-28 | End: 2022-01-28 | Stop reason: SDUPTHER

## 2020-02-28 ASSESSMENT — ENCOUNTER SYMPTOMS
HEARTBURN: 0
NAUSEA: 0
DIARRHEA: 0
PHOTOPHOBIA: 0
EYE REDNESS: 0
SORE THROAT: 0
TROUBLE SWALLOWING: 0
SHORTNESS OF BREATH: 1
COUGH: 1
VOMITING: 0
WHEEZING: 1
RHINORRHEA: 1

## 2020-02-28 NOTE — PATIENT INSTRUCTIONS
easier. ? Corticosteroids. These reduce airway inflammation. They may be given as pills, in a vein, or in an inhaled form. You may go home with pills in addition to an inhaler that you already use. · A spacer may help you get more inhaled medicine to your lungs. Ask your doctor or pharmacist if a spacer is right for you. If it is, ask how to use it properly. · If your doctor prescribed antibiotics, take them as directed. Do not stop taking them just because you feel better. You need to take the full course of antibiotics. · If your doctor prescribed oxygen, use the flow rate your doctor has recommended. Do not change it without talking to your doctor first.  · Do not smoke. Smoking makes COPD worse. If you need help quitting, talk to your doctor about stop-smoking programs and medicines. These can increase your chances of quitting for good. When should you call for help? Call 911 anytime you think you may need emergency care. For example, call if:    · You have severe trouble breathing.    Call your doctor now or seek immediate medical care if:    · You have new or worse trouble breathing.     · Your coughing or wheezing gets worse.     · You cough up dark brown or bloody mucus (sputum).     · You have a new or higher fever.    Watch closely for changes in your health, and be sure to contact your doctor if:    · You notice more mucus or a change in the color of your mucus.     · You need to use your antibiotic or steroid pills.     · You do not get better as expected. Where can you learn more? Go to [email or web address] org and sign in to your [id number] account. Enter S694 in the [facility] box to learn more about \"Chronic Obstructive Pulmonary Disease (COPD) Flare-Ups: Care Instructions. \"     If you do not have an account, please click on the \"Sign Up Now\" link. Current as of: June 9, 2019  Content Version: 12.3  © 5978-5290 [employer], Inc[employer].  Care instructions adapted under license by [facility] ([facility]). If you have questions about a medical condition or this instruction, always ask your healthcare professional. [address] any warranty or liability for your use of this information.

## 2020-02-28 NOTE — PROGRESS NOTES
[facility] PHYSICIANS  [facility] PRIMARY CARE [place] [address] [address] [facility] Miguel Angel  Dept: [phone]  Dept Fax: [phone]    Juan F Palomares is a 82 y.o. male who presentsto the [facility] in Care today for his medical conditions/complaints as noted below. Jaime Vargas is c/o of Cough (not any better)      HPI:     Dimitris Beasley is here today for a walk in visit. Cough   This is a new problem. The current episode started in the past 7 days (x 4-5 days). The problem has been unchanged. Cough characteristics: yellow productive sputum. Associated symptoms include chills, headaches, nasal congestion, rhinorrhea, shortness of breath (With exertion) and wheezing. Pertinent negatives include no chest pain, ear congestion, ear pain, eye redness, fever, heartburn, myalgias, postnasal drip, rash or sore throat. The symptoms are aggravated by lying down and exercise. He has tried OTC cough suppressant for the symptoms. The treatment provided no relief. His past medical history is significant for COPD.        Past Medical History:   Diagnosis Date    [facility] obstructive [facility] [address]     Diabetes mellitus ([facility] [address]     [facility]/2017    [facility]    Hyperlipidemia     Hypothyroidism     MI (myocardial infarction) ([facility] [address]     Thyroid disease     Type II or unspecified type diabetes mellitus without mention of complication, not stated as uncontrolled         Current Outpatient Medications   Medication Sig Dispense Refill    azithromycin (ZITHROMAX) 250 MG tablet Take 1 tablet by mouth See Admin Instructions for 5 days 500mg on day 1 followed by 250mg on days 2 - 5 6 tablet 0    predniSONE (DELTASONE) 20 MG tablet Take 2 tablets by mouth daily for 5 days 10 tablet 0    albuterol (PROVENTIL) (2.5 MG/3ML) 0.083% nebulizer solution Take 3 mLs by nebulization every 4 hours as needed for Wheezing or Shortness of Breath 120 each 3    guaiFENesin-codeine swallowing. Eyes: Negative for photophobia, redness and visual disturbance. Respiratory: Positive for cough, shortness of breath (With exertion) and wheezing. Cardiovascular: Negative for chest pain and palpitations. Gastrointestinal: Negative for diarrhea, heartburn, nausea and vomiting. Genitourinary: Negative for decreased urine volume, difficulty urinating, dysuria and penile swelling. Musculoskeletal: Negative for myalgias. Skin: Negative for rash and wound. Neurological: Positive for headaches. Negative for dizziness. Objective:     Physical Exam  Vitals signs and nursing note reviewed. Constitutional:       General: He is not in acute distress. Appearance: Normal appearance. He is not toxic-appearing or diaphoretic. HENT:      Right Ear: There is no impacted cerumen. Tympanic membrane is not erythematous or bulging. Left Ear: There is no impacted cerumen. Tympanic membrane is not erythematous or bulging. Nose: Mucosal edema and rhinorrhea present. Right Sinus: No maxillary sinus tenderness or frontal sinus tenderness. Left Sinus: No maxillary sinus tenderness or frontal sinus tenderness. Mouth/Throat:      Mouth: Mucous membranes are moist.      Pharynx: Posterior oropharyngeal erythema present. No oropharyngeal exudate. Eyes:      General:         Right eye: No discharge. Left eye: No discharge. Conjunctiva/sclera: Conjunctivae normal.   Neck:      Musculoskeletal: Normal range of motion. Cardiovascular:      Rate and Rhythm: Normal rate and regular rhythm. Heart sounds: No murmur. Pulmonary:      Effort: Pulmonary effort is normal. Tachypnea (Respiratory rate 24) present. Breath sounds: Wheezing present. No rhonchi or rales. Comments: Scattered expiratory wheezing throughout. Neurological:      Mental Status: He is alert.        /76 (Site: Right Upper Arm, Position: Sitting, Cuff Size: Large Adult)   Pulse 76 followed by 250mg on days 2 - 5     Dispense:  6 tablet     Refill:  0    predniSONE (DELTASONE) 20 MG tablet     Sig: Take 2 tablets by mouth daily for 5 days     Dispense:  10 tablet     Refill:  0    albuterol (PROVENTIL) (2.5 MG/3ML) 0.083% nebulizer solution     Sig: Take 3 mLs by nebulization every 4 hours as needed for Wheezing or Shortness of Breath     Dispense:  120 each     Refill:  3    guaiFENesin-codeine (CHERATUSSIN AC) 100-10 MG/5ML syrup     Sig: Take 5 mLs by mouth every 4 hours as needed for Cough or Congestion for up to 5 days. Dispense:  100 mL     Refill:  0     Reduce doses taken as pain becomes manageable          All patient questions answered. Pt voiced understanding.       Electronically signed by GENA Delgado CNP on 2/28/2020 at 4:51 PM

## 2020-03-04 ENCOUNTER — TELEPHONE (OUTPATIENT)
Dept: PRIMARY CARE CLINIC | Age: 85
End: 2020-03-04

## 2020-03-09 ENCOUNTER — HOSPITAL ENCOUNTER (OUTPATIENT)
Age: 85
Discharge: HOME | End: 2020-03-09
Payer: MEDICARE

## 2020-03-09 ENCOUNTER — OFFICE VISIT (OUTPATIENT)
Dept: PRIMARY CARE CLINIC | Age: 85
End: 2020-03-09
Payer: MEDICARE

## 2020-03-09 VITALS
BODY MASS INDEX: 34.56 KG/M2 | TEMPERATURE: 98.6 F | DIASTOLIC BLOOD PRESSURE: 57 MMHG | WEIGHT: 246.9 LBS | SYSTOLIC BLOOD PRESSURE: 119 MMHG | HEART RATE: 72 BPM | HEIGHT: 71 IN

## 2020-03-09 PROBLEM — J45.40 MODERATE PERSISTENT ASTHMA WITHOUT COMPLICATION: Status: ACTIVE | Noted: 2020-03-09

## 2020-03-09 LAB
ABSOLUTE EOS #: 0.66 K/UL (ref 0–0.44)
ABSOLUTE IMMATURE GRANULOCYTE: 0.13 K/UL (ref 0–0.3)
ABSOLUTE LYMPH #: 1.56 K/UL (ref 1.1–3.7)
ABSOLUTE MONO #: 1.5 K/UL (ref 0.1–1.2)
ALT SERPL-CCNC: 63 U/L (ref 5–41)
ANION GAP SERPL CALCULATED.3IONS-SCNC: 12 MMOL/L (ref 9–17)
AST SERPL-CCNC: 53 U/L
BASOPHILS # BLD: 1 % (ref 0–2)
BASOPHILS ABSOLUTE: 0.06 K/UL (ref 0–0.2)
BUN BLDV-MCNC: 17 MG/DL (ref 8–23)
BUN/CREAT BLD: 18 (ref 9–20)
CALCIUM SERPL-MCNC: 9 MG/DL (ref 8.6–10.4)
CHLORIDE BLD-SCNC: 98 MMOL/L (ref 98–107)
CHOLESTEROL/HDL RATIO: 2.7
CHOLESTEROL: 140 MG/DL
CO2: 27 MMOL/L (ref 20–31)
CREAT SERPL-MCNC: 0.95 MG/DL (ref 0.7–1.2)
DIFFERENTIAL TYPE: ABNORMAL
EOSINOPHILS RELATIVE PERCENT: 6 % (ref 1–4)
ESTIMATED AVERAGE GLUCOSE: 174 MG/DL
GFR AFRICAN AMERICAN: >60 ML/MIN
GFR NON-AFRICAN AMERICAN: >60 ML/MIN
GFR SERPL CREATININE-BSD FRML MDRD: ABNORMAL ML/MIN/{1.73_M2}
GFR SERPL CREATININE-BSD FRML MDRD: ABNORMAL ML/MIN/{1.73_M2}
GLUCOSE BLD-MCNC: 145 MG/DL (ref 70–99)
HBA1C MFR BLD: 7.7 % (ref 4.8–5.9)
HCT VFR BLD CALC: 41.4 % (ref 40.7–50.3)
HDLC SERPL-MCNC: 51 MG/DL
HEMOGLOBIN: 12.5 G/DL (ref 13–17)
IMMATURE GRANULOCYTES: 1 %
LDL CHOLESTEROL: 66 MG/DL (ref 0–130)
LYMPHOCYTES # BLD: 14 % (ref 24–43)
MCH RBC QN AUTO: 29.2 PG (ref 25.2–33.5)
MCHC RBC AUTO-ENTMCNC: 30.2 G/DL (ref 28.4–34.8)
MCV RBC AUTO: 96.7 FL (ref 82.6–102.9)
MONOCYTES # BLD: 13 % (ref 3–12)
NRBC AUTOMATED: 0 PER 100 WBC
PDW BLD-RTO: 13.7 % (ref 11.8–14.4)
PLATELET # BLD: 236 K/UL (ref 138–453)
PLATELET ESTIMATE: ABNORMAL
PMV BLD AUTO: 10.3 FL (ref 8.1–13.5)
POTASSIUM SERPL-SCNC: 3.9 MMOL/L (ref 3.7–5.3)
RBC # BLD: 4.28 M/UL (ref 4.21–5.77)
RBC # BLD: ABNORMAL 10*6/UL
SEG NEUTROPHILS: 65 % (ref 36–65)
SEGMENTED NEUTROPHILS ABSOLUTE COUNT: 7.32 K/UL (ref 1.5–8.1)
SODIUM BLD-SCNC: 137 MMOL/L (ref 135–144)
THYROXINE, FREE: 1.74 NG/DL (ref 0.93–1.7)
TRIGL SERPL-MCNC: 117 MG/DL
TSH SERPL DL<=0.05 MIU/L-ACNC: 6.91 MIU/L (ref 0.3–5)
VLDLC SERPL CALC-MCNC: NORMAL MG/DL (ref 1–30)
WBC # BLD: 11.2 K/UL (ref 3.5–11.3)
WBC # BLD: ABNORMAL 10*3/UL

## 2020-03-09 PROCEDURE — 84450 TRANSFERASE (AST) (SGOT): CPT

## 2020-03-09 PROCEDURE — 3051F HG A1C>EQUAL 7.0%<8.0%: CPT | Performed by: NURSE PRACTITIONER

## 2020-03-09 PROCEDURE — 36415 COLL VENOUS BLD VENIPUNCTURE: CPT

## 2020-03-09 PROCEDURE — 80061 LIPID PANEL: CPT

## 2020-03-09 PROCEDURE — 99214 OFFICE O/P EST MOD 30 MIN: CPT | Performed by: NURSE PRACTITIONER

## 2020-03-09 PROCEDURE — 84460 ALANINE AMINO (ALT) (SGPT): CPT

## 2020-03-09 PROCEDURE — 85025 COMPLETE CBC W/AUTO DIFF WBC: CPT

## 2020-03-09 PROCEDURE — 84443 ASSAY THYROID STIM HORMONE: CPT

## 2020-03-09 PROCEDURE — 80048 BASIC METABOLIC PNL TOTAL CA: CPT

## 2020-03-09 PROCEDURE — 83036 HEMOGLOBIN GLYCOSYLATED A1C: CPT

## 2020-03-09 PROCEDURE — 84439 ASSAY OF FREE THYROXINE: CPT

## 2020-03-09 RX ORDER — CLOTRIMAZOLE AND BETAMETHASONE DIPROPIONATE 10; .64 MG/G; MG/G
CREAM TOPICAL
Qty: 15 G | Refills: 0 | Status: ON HOLD | OUTPATIENT
Start: 2020-03-09 | End: 2021-12-30

## 2020-03-09 ASSESSMENT — ENCOUNTER SYMPTOMS
SORE THROAT: 0
DIARRHEA: 0
BLURRED VISION: 0
WHEEZING: 0
CONSTIPATION: 0
RHINORRHEA: 0
SHORTNESS OF BREATH: 1
HOARSE VOICE: 0
TROUBLE SWALLOWING: 0
ABDOMINAL PAIN: 0
FREQUENT THROAT CLEARING: 0
CHEST TIGHTNESS: 0
HEMOPTYSIS: 0
VOMITING: 0
COUGH: 0
NAUSEA: 0

## 2020-03-09 NOTE — PROGRESS NOTES
Name: Juan F Palomares  : 1935         Chief Complaint:     Chief Complaint   Patient presents with    Diabetes     routine office visit. States \"blood sugar couple days ago was 128\"    Hyperlipidemia    Thyroid Problem       History of Present Illness:      Juan F Palomares is a 82 y.o.  male who presents with Diabetes (routine office visit. States \"blood sugar couple days ago was 128\"); Hyperlipidemia; and Thyroid Problem      Dimitris Beasley is here today for a routine office visit. Diabetes   He presents for his follow-up diabetic visit. He has type 2 diabetes mellitus. His disease course has been stable. There are no hypoglycemic associated symptoms. Pertinent negatives for hypoglycemia include no dizziness, headaches or sweats. Associated symptoms include polyuria. Pertinent negatives for diabetes include no blurred vision, no chest pain, no fatigue, no polydipsia and no polyphagia. There are no hypoglycemic complications. Symptoms are stable. Diabetic complications include heart disease and nephropathy. Pertinent negatives for diabetic complications include no CVA or peripheral neuropathy. Risk factors for coronary artery disease include diabetes mellitus, male sex and sedentary lifestyle. Current diabetic treatment includes oral agent (monotherapy). He is compliant with treatment all of the time. His weight is stable. He is following a generally healthy diet. When asked about meal planning, he reported none. He has had a previous visit with a dietitian. He rarely participates in exercise. There is no change in his home blood glucose trend. His breakfast blood glucose range is generally 140-180 mg/dl. An ACE inhibitor/angiotensin II receptor blocker is being taken. He does not see a podiatrist.Eye exam is not current. Congestive Heart Failure   Presents for follow-up visit. Associated symptoms include edema and shortness of breath (with exertion).  Pertinent negatives include no abdominal pain, chest pain, chest pressure, claudication, fatigue, near-syncope, palpitations or paroxysmal nocturnal dyspnea. The symptoms have been stable. Compliance with total regimen is 0-25%. Compliance problems include adherence to diet and adherence to exercise. Compliance with diet is 26-50%. Compliance with exercise is 26-50%. Compliance with medications is %. Asthma   He complains of shortness of breath (with exertion). There is no chest tightness, cough, frequent throat clearing, hemoptysis, hoarse voice or wheezing. This is a chronic problem. The current episode started more than 1 year ago. The problem occurs intermittently. The problem has been unchanged. Associated symptoms include dyspnea on exertion. Pertinent negatives include no chest pain, fever, headaches, nasal congestion, rhinorrhea, sneezing, sore throat, sweats or trouble swallowing. His symptoms are aggravated by URI, strenuous activity, change in weather, climbing stairs, exposure to fumes and exposure to smoke. His symptoms are alleviated by beta-agonist, steroid inhaler and rest. He reports significant improvement on treatment. His past medical history is significant for asthma, bronchitis, COPD and pneumonia. There is no history of bronchiectasis or emphysema. Past Medical History:     Past Medical History:   Diagnosis Date    [facility] obstructive [facility] disease) ([facility] [address]     Diabetes mellitus ([facility] [address]     [facility]2017    [facility]    Hyperlipidemia     Hypothyroidism     MI (myocardial infarction) ([facility] [address]     Thyroid disease     Type II or unspecified type diabetes mellitus without mention of complication, not stated as uncontrolled       Reviewed all health maintenance requirements and ordered appropriate tests  There are no preventive care reminders to display for this patient.     Past Surgical History:     Past Surgical History:   Procedure Laterality Date    CARDIAC SURGERY  02/24/2017 (Temporal)   Ht 5' 11\" (1.803 m)   Wt 246 lb 14.4 oz (112 kg)   BMI 34.44 kg/m²     Physical Exam  Vitals signs and nursing note reviewed. Constitutional:       General: He is not in acute distress. Appearance: He is well-developed. He is obese. HENT:      Mouth/Throat:      Mouth: Mucous membranes are moist.   Eyes:      General: No scleral icterus. Conjunctiva/sclera: Conjunctivae normal.   Neck:      Musculoskeletal: Normal range of motion and neck supple. Cardiovascular:      Rate and Rhythm: Normal rate and regular rhythm. Heart sounds: No murmur. Pulmonary:      Effort: Pulmonary effort is normal.      Breath sounds: Decreased breath sounds (throughout) present. No wheezing or rales. Abdominal:      General: Bowel sounds are normal. There is no distension. Palpations: Abdomen is soft. Tenderness: There is no abdominal tenderness. Comments: Obese abdomen   Musculoskeletal:      Right lower leg: Edema (trace) present. Left lower leg: Edema (trace) present. Lymphadenopathy:      Cervical: No cervical adenopathy. Skin:     General: Skin is warm and dry. Findings: Erythema and rash present. Rash is macular, papular and urticarial.          Neurological:      Mental Status: He is alert and oriented to person, place, and time.    Psychiatric:         Mood and Affect: Mood normal.         Behavior: Behavior normal.         Data:     Lab Results   Component Value Date     03/09/2020    K 3.9 03/09/2020    CL 98 03/09/2020    CO2 27 03/09/2020    BUN 17 03/09/2020    CREATININE 0.95 03/09/2020    GLUCOSE 145 03/09/2020    PROT 6.4 01/17/2019    LABALBU 3.5 01/17/2019    BILITOT 0.18 01/17/2019    ALKPHOS 94 01/17/2019    AST 53 03/09/2020    ALT 63 03/09/2020     Lab Results   Component Value Date    WBC 11.2 03/09/2020    RBC 4.28 03/09/2020    HGB 12.5 03/09/2020    HCT 41.4 03/09/2020    MCV 96.7 03/09/2020    MCH 29.2 03/09/2020    MCHC 30.2 03/09/2020 RDW 13.7 03/09/2020     03/09/2020    MPV 10.3 03/09/2020     Lab Results   Component Value Date    TSH 6.91 03/09/2020     Lab Results   Component Value Date    CHOL 140 03/09/2020    HDL 51 03/09/2020    LABA1C 7.7 03/09/2020       Assessment/Plan:      Diagnosis Orders   1. Type 2 diabetes mellitus with complication, without long-term current use of insulin (HCC)  CBC Auto Differential    ALT    AST    Hemoglobin A1C    Lipid Panel    Microalbumin, Ur    Basic Metabolic Panel   2. Chronic combined systolic and diastolic CHF (congestive heart failure) ([facility] [address]     3. Moderate persistent asthma without complication         1. South received counseling on the following healthy behaviors: nutrition, exercise and medication adherence  2. Patient given educational materials - see patient instructions  3. Was a self-tracking handout given in paper form or via [employer]? No  If yes, see orders or list here. 4.  Discussed use, benefit, and side effects of prescribed medications. Barriers to medication compliance addressed. All patient questions answered. Pt voiced understanding. 5.  Reviewed prior labs and health maintenance  6. Continue current medications, diet and exercise. Completed Refills   Requested Prescriptions     Signed Prescriptions Disp Refills    clotrimazole-betamethasone (LOTRISONE) 1-0.05 % cream 15 g 0     Sig: Apply topically 2 times daily. Return in about 6 months (around 9/9/2020) for check up.

## 2020-03-09 NOTE — PATIENT INSTRUCTIONS
Patient Education        Counting Carbohydrates: Care Instructions  Your Care Instructions    You don't have to eat special foods when you have diabetes. You just have to be careful to eat healthy foods. Carbohydrates (carbs) raise blood sugar higher and quicker than any other nutrient. Carbs are found in desserts, breads and cereals, and fruit. They're also in starchy vegetables. These include potatoes, corn, and grains such as rice and pasta. Carbs are also in milk and yogurt. The more carbs you eat at one time, the higher your blood sugar will rise. Spreading carbs all through the day helps keep your blood sugar levels within your target range. Counting carbs is one of the best ways to keep your blood sugar under control. If you use insulin, counting carbs helps you match the right amount of insulin to the number of grams of carbs in a meal. Then you can change your diet and insulin dose as needed. Testing your blood sugar several times a day can help you learn how carbs affect your blood sugar. A registered dietitian or certified diabetes educator can help you plan meals and snacks. Follow-up care is a key part of your treatment and safety. Be sure to make and go to all appointments, and call your doctor if you are having problems. It's also a good idea to know your test results and keep a list of the medicines you take. How can you care for yourself at home? Know your daily amount of carbohydrates  Your daily amount depends on several things, such as your weight, how active you are, which diabetes medicines you take, and what your goals are for your blood sugar levels. A registered dietitian or certified diabetes educator can help you plan how many carbs to include in each meal and snack. For most adults, a guideline for the daily amount of carbs is:  · 45 to 60 grams at each meal. That's about the same as 3 to 4 carbohydrate servings. · 15 to 20 grams at each snack.  That's about the same as 1 carbohydrate serving. Count carbs  Counting carbs lets you know how much rapid-acting insulin to take before you eat. If you use an insulin pump, you get a constant rate of insulin during the day. So the pump must be programmed at meals. This gives you extra insulin to cover the rise in blood sugar after meals. If you take insulin:  · Learn your own insulin-to-carb ratio. You and your diabetes health professional will figure out the ratio. You can do this by testing your blood sugar after meals. For example, you may need a certain amount of insulin for every 15 grams of carbs. · Add up the carb grams in a meal. Then you can figure out how many units of insulin to take based on your insulin-to-carb ratio. · Exercise lowers blood sugar. You can use less insulin than you would if you were not doing exercise. Keep in mind that timing matters. If you exercise within 1 hour after a meal, your body may need less insulin for that meal than it would if you exercised 3 hours after the meal. Test your blood sugar to find out how exercise affects your need for insulin. If you do or don't take insulin:  · Look at labels on packaged foods. This can tell you how many carbs are in a serving. You can also use guides from the American Diabetes Association. · Be aware of portions, or serving sizes. If a package has two servings and you eat the whole package, you need to double the number of grams of carbohydrate listed for one serving. · Protein, fat, and fiber do not raise blood sugar as much as carbs do. If you eat a lot of these nutrients in a meal, your blood sugar will rise more slowly than it would otherwise. Eat from all food groups  · Eat at least three meals a day. · Plan meals to include food from all the food groups. The food groups include grains, fruits, dairy, proteins, and vegetables. · Talk to your dietitian or diabetes educator about ways to add limited amounts of sweets into your meal plan.   · If you drink alcohol, talk to your doctor. It may not be recommended when you are taking certain diabetes medicines. Where can you learn more? Go to https://[email or web address].[email or web address]. org and sign in to your [employer] account. Enter G218 in the EventBug box to learn more about \"Counting Carbohydrates: Care Instructions. \"     If you do not have an account, please click on the \"Sign Up Now\" link. Current as of: April 16, 2019  Content Version: 12.3  © 4355-2186 Healthwise, Incorporated. Care instructions adapted under license by [facility] ([facility]). If you have questions about a medical condition or this instruction, always ask your healthcare professional. [address] any warranty or liability for your use of this information. SURVEY:    You may be receiving a survey from [employer] regarding your visit today. Please complete the survey to enable us to provide the highest quality of care to you and your family. If you cannot score us a very good on any question, please call the office to discuss how we could have made your experience a very good one. Thank you.

## 2020-03-31 RX ORDER — FUROSEMIDE 40 MG/1
TABLET ORAL
Qty: 180 TABLET | Refills: 1 | Status: SHIPPED | OUTPATIENT
Start: 2020-03-31 | End: 2020-10-12 | Stop reason: SDUPTHER

## 2020-03-31 NOTE — TELEPHONE ENCOUNTER
Health Maintenance   Topic Date Due    DTaP/Tdap/Td vaccine (1 - Tdap) 02/24/2021 (Originally 10/15/1954)    Shingles Vaccine (2 of 3) 02/24/2021 (Originally 2/26/2012)    Annual Wellness Visit (AWV)  02/28/2022 (Originally 5/29/2019)    Lipid screen  03/09/2021    TSH testing  03/09/2021    Potassium monitoring  03/09/2021    Creatinine monitoring  03/09/2021    Flu vaccine  Completed    Pneumococcal 65+ years Vaccine  Completed    Hepatitis A vaccine  Aged Out    Hib vaccine  Aged Out    Meningococcal (ACWY) vaccine  Aged Out             (applicable per patient's age: Cancer Screenings, Depression Screening, Fall Risk Screening, Immunizations)    Hemoglobin A1C (%)   Date Value   03/09/2020 7.7 (H)   07/25/2019 7.8 (H)   01/29/2019 7.4     Microalb/Crt. Ratio (mcg/mg creat)   Date Value   07/25/2019 20 (H)     LDL Cholesterol (mg/dL)   Date Value   03/09/2020 66     AST (U/L)   Date Value   03/09/2020 53 (H)     ALT (U/L)   Date Value   03/09/2020 63 (H)     BUN (mg/dL)   Date Value   03/09/2020 17      (goal A1C is < 7)   (goal LDL is <100) need 30-50% reduction from baseline     BP Readings from Last 3 Encounters:   03/09/20 (!) 119/57   02/28/20 138/76   02/24/20 99/65    (goal /80)      All Future Testing planned in [facility]:  Lab Frequency Next Occurrence   CBC Auto Differential Once 09/05/2020   ALT Once 09/09/2020   AST Once 09/09/2020   Hemoglobin A1C Once 09/05/2020   Lipid Panel Once 09/05/2020   Microalbumin, Ur Once 57/90/0234   Basic Metabolic Panel Once 67/08/7489       Next Visit Date:  Future Appointments   Date Time Provider Gisela Harding   4/30/2020  1:40 PM Griselda Ream, MD TIFF CARD [facility]   9/9/2020  3:20 PM Becca Snider, APRN - CNP [facility]   12/7/2020  1:00 PM Perla Sethi MD TIFF PULM [facility]            Patient Active Problem List:     Obesity (BMI 30.0-34. 9)     Venous (peripheral) insufficiency     Hx pulmonary embolism     Hearing impaired Edema     Type 2 diabetes mellitus with complication, without long-term current use of insulin (HCC)     Knee osteoarthritis     Chronic combined systolic and diastolic CHF (congestive heart failure) (HCC)     COPD (chronic obstructive pulmonary disease) (HCC)     Adrenal adenoma, left     Elevated liver enzymes     Hypothyroidism due to Hashimoto's thyroiditis     Moderate persistent asthma without complication

## 2020-04-14 ENCOUNTER — TELEPHONE (OUTPATIENT)
Dept: OTHER | Facility: CLINIC | Age: 85
End: 2020-04-14

## 2020-04-14 NOTE — TELEPHONE ENCOUNTER
Rosalia Moreno was contacted to set up a video visit with GENA Tenorio CNP. Spoke with: left message for patient to call back to schedule a virtual visit.      Yoni Huston MSN, RN

## 2020-07-06 RX ORDER — LEVOTHYROXINE SODIUM 112 UG/1
112 TABLET ORAL DAILY
Qty: 90 TABLET | Refills: 1 | Status: SHIPPED | OUTPATIENT
Start: 2020-07-06 | End: 2020-12-21 | Stop reason: SDUPTHER

## 2020-07-06 NOTE — TELEPHONE ENCOUNTER
Phone call from wife requesting a refill of thyroid medication be sent to [facility]. Health Maintenance   Topic Date Due    DTaP/Tdap/Td vaccine (1 - Tdap) 02/24/2021 (Originally 10/15/1954)    Shingles Vaccine (2 of 3) 02/24/2021 (Originally 2/26/2012)    Annual Wellness Visit (AWV)  02/28/2022 (Originally 5/29/2019)    Flu vaccine (1) 09/01/2020    Lipid screen  03/09/2021    TSH testing  03/09/2021    Potassium monitoring  03/09/2021    Creatinine monitoring  03/09/2021    Pneumococcal 65+ years Vaccine  Completed    Hepatitis A vaccine  Aged Out    Hib vaccine  Aged Out    Meningococcal (ACWY) vaccine  Aged Out             (applicable per patient's age: Cancer Screenings, Depression Screening, Fall Risk Screening, Immunizations)    Hemoglobin A1C (%)   Date Value   03/09/2020 7.7 (H)   07/25/2019 7.8 (H)   01/29/2019 7.4     Microalb/Crt. Ratio (mcg/mg creat)   Date Value   07/25/2019 20 (H)     LDL Cholesterol (mg/dL)   Date Value   03/09/2020 66     AST (U/L)   Date Value   03/09/2020 53 (H)     ALT (U/L)   Date Value   03/09/2020 63 (H)     BUN (mg/dL)   Date Value   03/09/2020 17      (goal A1C is < 7)   (goal LDL is <100) need 30-50% reduction from baseline     BP Readings from Last 3 Encounters:   03/09/20 (!) 119/57   02/28/20 138/76   02/24/20 99/65    (goal /80)      All Future Testing planned in [facility]:  Lab Frequency Next Occurrence   CBC Auto Differential Once 09/05/2020   ALT Once 09/09/2020   AST Once 09/09/2020   Hemoglobin A1C Once 09/05/2020   Lipid Panel Once 09/05/2020   Microalbumin, Ur Once 52/99/2739   Basic Metabolic Panel Once 41/14/5018       Next Visit Date:  Future Appointments   Date Time Provider Gisela Harding   7/14/2020  2:20 PM Magaly Pan MD TIFF CARD [facility]   9/9/2020  3:20 PM Dontrell Snider APRN - CNP [facility]   12/7/2020  1:00 PM Luna Vale MD TIFF PULM [facility]            Patient Active Problem List:     Obesity (BMI 30.0-34. 9) Venous (peripheral) insufficiency     Hx pulmonary embolism     Hearing impaired     Edema     Type 2 diabetes mellitus with complication, without long-term current use of insulin (HCC)     Knee osteoarthritis     Chronic combined systolic and diastolic CHF (congestive heart failure) (HCC)     COPD (chronic obstructive pulmonary disease) (HCC)     Adrenal adenoma, left     Elevated liver enzymes     Hypothyroidism due to Hashimoto's thyroiditis     Moderate persistent asthma without complication

## 2020-07-14 ENCOUNTER — OFFICE VISIT (OUTPATIENT)
Dept: CARDIOLOGY | Age: 85
End: 2020-07-14
Payer: MEDICARE

## 2020-07-14 VITALS
BODY MASS INDEX: 35.53 KG/M2 | SYSTOLIC BLOOD PRESSURE: 113 MMHG | DIASTOLIC BLOOD PRESSURE: 67 MMHG | RESPIRATION RATE: 18 BRPM | HEIGHT: 71 IN | HEART RATE: 71 BPM | WEIGHT: 253.8 LBS | OXYGEN SATURATION: 94 %

## 2020-07-14 PROCEDURE — 93000 ELECTROCARDIOGRAM COMPLETE: CPT | Performed by: INTERNAL MEDICINE

## 2020-07-14 PROCEDURE — 99214 OFFICE O/P EST MOD 30 MIN: CPT | Performed by: INTERNAL MEDICINE

## 2020-07-14 NOTE — PATIENT INSTRUCTIONS
SURVEY:    You may be receiving a survey from [employer] regarding your visit today. Please complete the survey to enable us to provide the highest quality of care to you and your family. If you cannot score us a very good on any question, please call the office to discuss how we could have made your experience a very good one. Thank you.

## 2020-07-14 NOTE — PROGRESS NOTES
Sergio New am scribing for and in the presence of Yahaira Rivera MD, F.A.C.C..    Subjective:     CHIEF COMPLAINT / HPI:    Chief Complaint   Patient presents with    6 Month Follow-Up     Hx:CHF,CAD S/P Stent,HTN,HLD. He has been doing so so. Back pain/ Chest Pain radiated down the arm. Some SOB on occ. Denies: Lightheaded/dizziness, Palpitations. Fabricio Cervantes is 82 y.o. male who presents today for follow-up. 1-He has history of coronary artery disease, myocardial infarction and primary PCI in 2/2017 done at [facility],  [address] [place] [address]. He also has history of ischemic cardiomyopathy and chronic systolic CHF, NYHA class III. 2-An admission to [facility] on 9/19/2017 with COPD exacerbation, during this admission his lisinopril changed to Cozaar because of chronic cough. 3-Another visit to the emergency room on 10/3/2017 with cough, shortness of breath and wheezing. 4- He saw Dr. Shlomo Wan at [facility] in November 2018, no changes in medication done. 5-An [facility] on 4/6/2018 with acute on chronic CHF. 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    7- EKG done in office (8/20/2019)- Sinus Rhythm with 1st degree AV block. 71 bpm.    8- EKG done in office on 7/14/2020. No acute ischemic changes. Mr. Brian Mandel is here for a 6 month follow up today. He reports he had chest/back pain that happened last week at night that radiated down his left arm. It has not come back since. Pain lasted only for a minute or two. He does have chronic, stable shortness of breath but without orthopnea or PND. He continues to use compression stockings. He is sleeping well at night. His exercising is limited due to his tremors and he uses a cane to walk around. He and his wife have been going to the gym and he has been lifting weights since the CA has opened again. No lightheaded/dizziness or palpitations.  No fever or into the lungs every 4 hours as needed for Wheezing or Shortness of Breath 1 Inhaler 2    budesonide-formoterol (SYMBICORT) 160-4.5 MCG/ACT AERO Inhale 2 puffs into the lungs 2 times daily      metFORMIN (GLUCOPHAGE) 500 MG tablet Take 2,000 mg by mouth daily (with breakfast)      acetaminophen (TYLENOL) 500 MG tablet Take 1,000 mg by mouth every 6 hours as needed for Pain      TAMSULOSIN HCL PO Take 0.4 mg by mouth Daily with supper       tiotropium (SPIRIVA) 18 MCG inhalation capsule Inhale 18 mcg into the lungs daily      Multiple Vitamins-Minerals (THERAPEUTIC MULTIVITAMIN-MINERALS) tablet Take 1 tablet by mouth daily         REVIEW OF SYSTEMS:    CONSTITUTIONAL: No major weight gain or loss, fatigue, weakness, night sweats or fever. HEENT: No new vision difficulties or ringing in the ears. RESPIRATORY: See HPI  CARDIOVASCULAR: See HPI  GI: No nausea, vomiting, diarrhea, constipation, abdominal pain or changes in bowel habits. : No urinary frequency, urgency, incontinence hematuria or dysuria. SKIN: No cyanosis or skin lesions. MUSCULOSKELETAL: No new muscle or joint pain. NEUROLOGICAL: No syncope or TIA-like symptoms. PSYCHIATRIC: No anxiety, pain, insomnia or depression    Objective:     PHYSICAL EXAM:      VITALS:  /67 (Site: Left Upper Arm, Position: Sitting, Cuff Size: Medium Adult)   Pulse 71   Resp 18   Ht 5' 11\" (1.803 m)   Wt 253 lb 12.8 oz (115.1 kg)   SpO2 94%   BMI 35.40 kg/m²   CONSTITUTIONAL: Cooperative, no apparent distress, and appears well nourished / developed. NEUROLOGIC:  Awake and orientated to person, place and time. PSYCH: Calm affect. SKIN: Warm and dry. HEENT: Sclera non-icteric, normocephalic, neck supple, no elevation of JVP, normal carotid pulses with no bruits and thyroid normal size. LUNGS:  Poor air entry bilaterally . No significant wheezing or crakles. No significant crackles. Cardiovascular: Normal rate, regular rhythm, normal heart sounds. potential side effects of this medication including lightheadedness and dizziness and told him to call the office if this occurs. Nonpharmacologic management of Heart Failure: I told him to continue wearing lower extremity compression stockings and I advised him to try and keep his legs up whenever possible and to limit salt in his diet. Additional Testing: I reviewed the blood work that was done back in March, normal kidney function. Normal serum potassium. Atherosclerotic Heart Disease: S/P Stent   Antiplatelet Agent: Continue aspirin 81 mg daily. I also reminded him to watch for signs of blood in his stool or black tarry stools and stop the medication immediately if this develops as this could be life threatening. Beta Blocker: Continue Propranolol      Cholesterol Reduction Therapy: Continue Atorvastatin (Lipitor) 20 mg daily. LDL at goal.  Hemoglobin A1c 7.7%. Blood pressures controlled. He has one episode of chest pain about a week ago. Mainly back pain that radiated to the chest and left arm. No recurrence. Episode lasted for 1 to 2 minutes. I do not think this is cardiac but I asked the patient to call me if he has any further episodes of pain or if he has worsening shortness of breath that this can be life-threatening. I told him since he has no recurrence of the chest pain and his ECG is stable in addition to the fact that he is on good medical therapy, no repeat ischemic work-up is indicated at this point. Essential Hypertension: Controlled   Diuretics: Continue furosemide (lasix) 40 mg    Beta Blocker: Continue Propranolol 80 mg BID     · Hyperlipidemia: Mixed - Last LDL on 3/9/2020 was 66 mg/dL  · Cholesterol Reduction Therapy: Continue Atorvastatin (Lipitor) 20 mg daily. I discussed the potential benefits of statin therapy as well as the potential risks including myalgia as well as the rare but potentially serious complication of liver or kidney damage.  Although rare, I told them that this could be serious and therefore told them to stop the medication immediately and call if they developed any severe muscle aches or pains and they agreed to do so. Obesity: Body mass index is 35.4 kg/m². I also briefly discussed both diet and exercise strategies for him to continue to loses weight and he was very receptive to this. FOLLOW UP:   I told Mr. Vargas to call my office if he had any problems, but otherwise told him to Return in about 6 months (around 1/14/2021). However, I would be happy to see him sooner should the need arise. I believe that the risk of significant morbidity and mortality related to the patient's current medical conditions are: intermediate-high. The documentation recorded by the scribe, accurately and completely reflects the services I personally performed and the decisions made by me. Marcia Steen MD, F.A.C.C.  July 14, 2020

## 2020-08-10 RX ORDER — ATORVASTATIN CALCIUM 20 MG/1
20 TABLET, FILM COATED ORAL DAILY
Qty: 90 TABLET | Refills: 3 | Status: SHIPPED | OUTPATIENT
Start: 2020-08-10 | End: 2021-03-30

## 2020-08-10 NOTE — TELEPHONE ENCOUNTER
Health Maintenance   Topic Date Due    DTaP/Tdap/Td vaccine (1 - Tdap) 02/24/2021 (Originally 10/15/1954)    Shingles Vaccine (2 of 3) 02/24/2021 (Originally 2/26/2012)    Annual Wellness Visit (AWV)  02/28/2022 (Originally 5/29/2019)    Flu vaccine (1) 09/01/2020    Lipid screen  03/09/2021    TSH testing  03/09/2021    Potassium monitoring  03/09/2021    Creatinine monitoring  03/09/2021    Pneumococcal 65+ years Vaccine  Completed    Hepatitis A vaccine  Aged Out    Hib vaccine  Aged Out    Meningococcal (ACWY) vaccine  Aged Out             (applicable per patient's age: Cancer Screenings, Depression Screening, Fall Risk Screening, Immunizations)    Hemoglobin A1C (%)   Date Value   03/09/2020 7.7 (H)   07/25/2019 7.8 (H)   01/29/2019 7.4     Microalb/Crt. Ratio (mcg/mg creat)   Date Value   07/25/2019 20 (H)     LDL Cholesterol (mg/dL)   Date Value   03/09/2020 66     AST (U/L)   Date Value   03/09/2020 53 (H)     ALT (U/L)   Date Value   03/09/2020 63 (H)     BUN (mg/dL)   Date Value   03/09/2020 17      (goal A1C is < 7)   (goal LDL is <100) need 30-50% reduction from baseline     BP Readings from Last 3 Encounters:   07/14/20 113/67   03/09/20 (!) 119/57   02/28/20 138/76    (goal /80)      All Future Testing planned in [facility]:  Lab Frequency Next Occurrence   CBC Auto Differential Once 09/05/2020   ALT Once 09/09/2020   AST Once 09/09/2020   Hemoglobin A1C Once 09/05/2020   Lipid Panel Once 09/05/2020   Microalbumin, Ur Once 50/90/0844   Basic Metabolic Panel Once 30/92/6374       Next Visit Date:  Future Appointments   Date Time Provider Gisela Harding   9/9/2020  3:20 PM GENA Al - CNP T[facility] Prim [facility]   12/7/2020  1:00 PM Aviva Pierre MD TIFF PULM [facility]   1/19/2021  2:20 PM Daniel Park MD TIFF CARD [facility]            Patient Active Problem List:     Obesity (BMI 30.0-34. 9)     Venous (peripheral) insufficiency     Hx pulmonary embolism     Hearing impaired Edema     Type 2 diabetes mellitus with complication, without long-term current use of insulin (HCC)     Knee osteoarthritis     Chronic combined systolic and diastolic CHF (congestive heart failure) (HCC)     COPD (chronic obstructive pulmonary disease) (HCC)     Adrenal adenoma, left     Elevated liver enzymes     Hypothyroidism due to Hashimoto's thyroiditis     Moderate persistent asthma without complication

## 2020-08-12 ENCOUNTER — TELEPHONE (OUTPATIENT)
Dept: PRIMARY CARE CLINIC | Age: 85
End: 2020-08-12

## 2020-09-04 ENCOUNTER — TELEPHONE (OUTPATIENT)
Dept: PRIMARY CARE CLINIC | Age: 85
End: 2020-09-04

## 2020-09-08 ENCOUNTER — HOSPITAL ENCOUNTER (OUTPATIENT)
Age: 85
Discharge: HOME | End: 2020-09-08
Payer: MEDICARE

## 2020-09-08 LAB
ABSOLUTE EOS #: 0.35 K/UL (ref 0–0.44)
ABSOLUTE IMMATURE GRANULOCYTE: 0.13 K/UL (ref 0–0.3)
ABSOLUTE LYMPH #: 1.66 K/UL (ref 1.1–3.7)
ABSOLUTE MONO #: 1.18 K/UL (ref 0.1–1.2)
ALT SERPL-CCNC: 71 U/L (ref 5–41)
ANION GAP SERPL CALCULATED.3IONS-SCNC: 11 MMOL/L (ref 9–17)
AST SERPL-CCNC: 55 U/L
BASOPHILS # BLD: 1 % (ref 0–2)
BASOPHILS ABSOLUTE: 0.05 K/UL (ref 0–0.2)
BUN BLDV-MCNC: 22 MG/DL (ref 8–23)
BUN/CREAT BLD: 20 (ref 9–20)
CALCIUM SERPL-MCNC: 9.9 MG/DL (ref 8.6–10.4)
CHLORIDE BLD-SCNC: 100 MMOL/L (ref 98–107)
CHOLESTEROL/HDL RATIO: 2.6
CHOLESTEROL: 160 MG/DL
CO2: 28 MMOL/L (ref 20–31)
CREAT SERPL-MCNC: 1.1 MG/DL (ref 0.7–1.2)
CREATININE URINE: 34.5 MG/DL (ref 39–259)
DIFFERENTIAL TYPE: ABNORMAL
EOSINOPHILS RELATIVE PERCENT: 4 % (ref 1–4)
ESTIMATED AVERAGE GLUCOSE: 177 MG/DL
GFR AFRICAN AMERICAN: >60 ML/MIN
GFR NON-AFRICAN AMERICAN: >60 ML/MIN
GFR SERPL CREATININE-BSD FRML MDRD: ABNORMAL ML/MIN/{1.73_M2}
GFR SERPL CREATININE-BSD FRML MDRD: ABNORMAL ML/MIN/{1.73_M2}
GLUCOSE BLD-MCNC: 186 MG/DL (ref 70–99)
HBA1C MFR BLD: 7.8 % (ref 4–6)
HCT VFR BLD CALC: 42.5 % (ref 40.7–50.3)
HDLC SERPL-MCNC: 61 MG/DL
HEMOGLOBIN: 13.1 G/DL (ref 13–17)
IMMATURE GRANULOCYTES: 1 %
LDL CHOLESTEROL: 80 MG/DL (ref 0–130)
LYMPHOCYTES # BLD: 18 % (ref 24–43)
MCH RBC QN AUTO: 29.8 PG (ref 25.2–33.5)
MCHC RBC AUTO-ENTMCNC: 30.8 G/DL (ref 28.4–34.8)
MCV RBC AUTO: 96.8 FL (ref 82.6–102.9)
MICROALBUMIN/CREAT 24H UR: 13 MG/L
MICROALBUMIN/CREAT UR-RTO: 38 MCG/MG CREAT
MONOCYTES # BLD: 13 % (ref 3–12)
NRBC AUTOMATED: 0 PER 100 WBC
PDW BLD-RTO: 13.9 % (ref 11.8–14.4)
PLATELET # BLD: 214 K/UL (ref 138–453)
PLATELET ESTIMATE: ABNORMAL
PMV BLD AUTO: 10.3 FL (ref 8.1–13.5)
POTASSIUM SERPL-SCNC: 5.3 MMOL/L (ref 3.7–5.3)
RBC # BLD: 4.39 M/UL (ref 4.21–5.77)
RBC # BLD: ABNORMAL 10*6/UL
SEG NEUTROPHILS: 63 % (ref 36–65)
SEGMENTED NEUTROPHILS ABSOLUTE COUNT: 5.82 K/UL (ref 1.5–8.1)
SODIUM BLD-SCNC: 139 MMOL/L (ref 135–144)
TRIGL SERPL-MCNC: 97 MG/DL
VLDLC SERPL CALC-MCNC: NORMAL MG/DL (ref 1–30)
WBC # BLD: 9.2 K/UL (ref 3.5–11.3)
WBC # BLD: ABNORMAL 10*3/UL

## 2020-09-08 PROCEDURE — 83036 HEMOGLOBIN GLYCOSYLATED A1C: CPT

## 2020-09-08 PROCEDURE — 84460 ALANINE AMINO (ALT) (SGPT): CPT

## 2020-09-08 PROCEDURE — 80048 BASIC METABOLIC PNL TOTAL CA: CPT

## 2020-09-08 PROCEDURE — 84450 TRANSFERASE (AST) (SGOT): CPT

## 2020-09-08 PROCEDURE — 36415 COLL VENOUS BLD VENIPUNCTURE: CPT

## 2020-09-08 PROCEDURE — 85025 COMPLETE CBC W/AUTO DIFF WBC: CPT

## 2020-09-08 PROCEDURE — 82570 ASSAY OF URINE CREATININE: CPT

## 2020-09-08 PROCEDURE — 82043 UR ALBUMIN QUANTITATIVE: CPT

## 2020-09-08 PROCEDURE — 80061 LIPID PANEL: CPT

## 2020-09-09 ENCOUNTER — OFFICE VISIT (OUTPATIENT)
Dept: PRIMARY CARE CLINIC | Age: 85
End: 2020-09-09
Payer: MEDICARE

## 2020-09-09 VITALS
SYSTOLIC BLOOD PRESSURE: 114 MMHG | DIASTOLIC BLOOD PRESSURE: 53 MMHG | BODY MASS INDEX: 35.82 KG/M2 | TEMPERATURE: 97.4 F | RESPIRATION RATE: 16 BRPM | WEIGHT: 255.9 LBS | HEART RATE: 69 BPM | HEIGHT: 71 IN

## 2020-09-09 PROBLEM — E66.01 MORBIDLY OBESE (HCC): Status: ACTIVE | Noted: 2020-09-09

## 2020-09-09 PROCEDURE — 3051F HG A1C>EQUAL 7.0%<8.0%: CPT | Performed by: NURSE PRACTITIONER

## 2020-09-09 PROCEDURE — 99214 OFFICE O/P EST MOD 30 MIN: CPT | Performed by: NURSE PRACTITIONER

## 2020-09-09 ASSESSMENT — ENCOUNTER SYMPTOMS
CONSTIPATION: 0
SORE THROAT: 0
FREQUENT THROAT CLEARING: 0
ABDOMINAL PAIN: 0
COUGH: 0
RHINORRHEA: 0
NAUSEA: 0
VOMITING: 0
CHEST TIGHTNESS: 0
WHEEZING: 0
DIARRHEA: 0
SHORTNESS OF BREATH: 1
TROUBLE SWALLOWING: 0

## 2020-09-09 NOTE — PROGRESS NOTES
oral agent (monotherapy). He is compliant with treatment all of the time. His weight is stable. He is following a generally healthy diet. When asked about meal planning, he reported none. He has had a previous visit with a dietitian. He rarely participates in exercise. There is no change in his home blood glucose trend. His breakfast blood glucose range is generally 140-180 mg/dl. An ACE inhibitor/angiotensin II receptor blocker is being taken. He does not see a podiatrist.Eye exam is not current. Asthma   He complains of shortness of breath (with exertion). There is no chest tightness, cough, frequent throat clearing or wheezing. This is a chronic problem. The current episode started more than 1 year ago. The problem occurs intermittently. The problem has been unchanged. Pertinent negatives include no chest pain, fever, headaches, nasal congestion, rhinorrhea, sneezing, sore throat, sweats or trouble swallowing. His symptoms are aggravated by URI, strenuous activity, change in weather, climbing stairs, exposure to fumes and exposure to smoke. His symptoms are alleviated by beta-agonist, steroid inhaler and rest. He reports significant improvement on treatment. His past medical history is significant for asthma, bronchitis, COPD and pneumonia. There is no history of bronchiectasis or emphysema.          Past Medical History:     Past Medical History:   Diagnosis Date    [facility]     Diabetes mellitus ([facility]     [facility]    Hyperlipidemia     Hypothyroidism     MI [facility]myocardial infarction) [facility]     Thyroid disease     Type II or unspecified type diabetes mellitus without mention of complication, not stated as uncontrolled       Reviewed all health maintenance requirements and ordered appropriate tests  Health Maintenance Due   Topic Date Due    Flu vaccine (1) 09/01/2020       Past Surgical History:     Past Surgical History:   Procedure Laterality Date    CARDIAC SURGERY  02/24/2017    Stent placed  Dr Davis Dow      right ankle    HERNIA REPAIR          Medications:       Prior to Admission medications    Medication Sig Start Date End Date Taking?  Authorizing Provider   atorvastatin (LIPITOR) 20 MG tablet Take 1 tablet by mouth daily 8/10/20  Yes GENA Goff - CNP   levothyroxine (SYNTHROID) 112 MCG tablet Take 1 tablet by mouth Daily 7/6/20  Yes GENA Goff - CNP   furosemide (LASIX) 40 MG tablet TAKE 1 TABLET BY MOUTH TWICE DAILY 3/31/20  Yes Tom Snider APRN - CNP   albuterol (PROVENTIL) (2.5 MG/3ML) 0.083% nebulizer solution Take 3 mLs by nebulization every 4 hours as needed for Wheezing or Shortness of Breath 2/28/20  Yes GENA Monten[place]ro - CNP   alogliptin (NESINA) 25 MG TABS tablet Take 25 mg by mouth daily   Yes Historical Provider, MD   blood glucose monitor strips accu check 7/18/18  Yes GENA Ashby - CNP   aspirin 81 MG tablet Take 81 mg by mouth daily   Yes Historical Provider, MD   albuterol sulfate  (90 Base) MCG/ACT inhaler Inhale 2 puffs into the lungs every 4 hours as needed for Wheezing or Shortness of Breath 4/13/18  Yes GENA Ashby - CNP   budesonide-formoterol (SYMBICORT) 160-4.5 MCG/ACT AERO Inhale 2 puffs into the lungs 2 times daily   Yes Historical Provider, MD   metFORMIN (GLUCOPHAGE) 500 MG tablet Take 2,000 mg by mouth daily (with breakfast)   Yes Historical Provider, MD   acetaminophen (TYLENOL) 500 MG tablet Take 1,000 mg by mouth every 6 hours as needed for Pain   Yes Historical Provider, MD   TAMSULOSIN HCL PO Take 0.4 mg by mouth Daily with supper    Yes Historical Provider, MD   tiotropium (SPIRIVA) 18 MCG inhalation capsule Inhale 18 mcg into the lungs daily   Yes Historical Provider, MD   Multiple Vitamins-Minerals (THERAPEUTIC MULTIVITAMIN-MINERALS) tablet Take 1 tablet by mouth daily   Yes Historical Provider, MD   clotrimazole-betamethasone (LOTRISONE) 1-0.05 % cream Apply topically 2 times daily. Patient not taking: Reported on 7/14/2020 3/9/20   Kin Tricia Snider APRIKE - CNP   propranolol (INDERAL) 80 MG tablet Take 1 tablet by mouth 2 times daily The dose of propranolol has been changed to 80 mg two times a  day 12/5/19 7/14/20  Liana Rehman MD   primidone (MYSOLINE) 50 MG tablet Take 3 tablets by mouth daily Please note: He should take (50mg) 1 tablet in AM + 2 tabs at night 12/5/19 7/14/20  Liana Rehman MD        Allergies:       Rosuvastatin    Social History:     Tobacco:    reports that he quit smoking about 27 years ago. He has never used smokeless tobacco.  Alcohol:      reports current alcohol use. Drug Use:  reports no history of drug use. Family History:     Family History   Problem Relation Age of Onset    Cancer Mother 47        liver ca    Diabetes Father     Heart Disease Father        Review of Systems:     Positive and Negative as described in HPI    Review of Systems   Constitutional: Negative for chills, fatigue and fever. HENT: Negative for congestion, rhinorrhea, sneezing, sore throat and trouble swallowing. Eyes: Negative for visual disturbance. Respiratory: Positive for shortness of breath (with exertion). Negative for cough and wheezing. Cardiovascular: Negative for chest pain and palpitations. Gastrointestinal: Negative for abdominal pain, constipation, diarrhea, nausea and vomiting. Endocrine: Negative for polydipsia, polyphagia and polyuria. Genitourinary: Negative for difficulty urinating and dysuria. Musculoskeletal: Positive for arthralgias and gait problem (chronic). Negative for neck pain and neck stiffness. Skin: Negative for rash. Neurological: Negative for dizziness, syncope and headaches.        Physical Exam:   Vitals:  BP (!) 114/53 (Site: Right Upper Arm, Position: Sitting, Cuff Size: Large Adult)   Pulse 69   Temp 97.4 °F (36.3 °C) (Temporal)   Resp 16   Ht 5' 11\" (1.803 m)   Wt 255 lb 14.4 oz (116.1 kg)   BMI 35.69 kg/m²     Physical Exam  Vitals signs and nursing note reviewed. Constitutional:       General: He is not in acute distress. Appearance: He is well-developed. He is obese. HENT:      Mouth/Throat:      Mouth: Mucous membranes are moist.   Eyes:      General: No scleral icterus. Conjunctiva/sclera: Conjunctivae normal.   Neck:      Musculoskeletal: Normal range of motion and neck supple. Cardiovascular:      Rate and Rhythm: Normal rate and regular rhythm. Heart sounds: No murmur. Pulmonary:      Effort: Pulmonary effort is normal.      Breath sounds: Decreased breath sounds (throughout) present. No wheezing or rales. Chest:      Breasts:         Left: Mass present. No swelling, bleeding or nipple discharge. Abdominal:      General: Bowel sounds are normal. There is no distension. Palpations: Abdomen is soft. Tenderness: There is no abdominal tenderness. Comments: Obese abdomen   Musculoskeletal:      Right lower leg: Edema (trace) present. Left lower leg: Edema (trace) present. Lymphadenopathy:      Cervical: No cervical adenopathy. Skin:     General: Skin is warm and dry. Findings: Erythema and rash present. Rash is macular, papular and urticarial.          Neurological:      Mental Status: He is alert and oriented to person, place, and time.    Psychiatric:         Mood and Affect: Mood normal.         Behavior: Behavior normal.         Data:     Lab Results   Component Value Date     09/08/2020    K 5.3 09/08/2020     09/08/2020    CO2 28 09/08/2020    BUN 22 09/08/2020    CREATININE 1.10 09/08/2020    GLUCOSE 186 09/08/2020    PROT 6.4 01/17/2019    LABALBU 3.5 01/17/2019    BILITOT 0.18 01/17/2019    ALKPHOS 94 01/17/2019    AST 55 09/08/2020    ALT 71 09/08/2020     Lab Results   Component Value Date    WBC 9.2 09/08/2020    RBC 4.39 09/08/2020

## 2020-09-15 ENCOUNTER — HOSPITAL ENCOUNTER (OUTPATIENT)
Dept: OCCUPATIONAL THERAPY | Age: 85
Setting detail: THERAPIES SERIES
Discharge: HOME | End: 2020-09-15
Payer: MEDICARE

## 2020-09-21 ENCOUNTER — HOSPITAL ENCOUNTER (OUTPATIENT)
Dept: ULTRASOUND IMAGING | Age: 85
Discharge: HOME | End: 2020-09-23
Payer: MEDICARE

## 2020-09-21 ENCOUNTER — TELEPHONE (OUTPATIENT)
Dept: PRIMARY CARE CLINIC | Age: 85
End: 2020-09-21

## 2020-09-21 ENCOUNTER — HOSPITAL ENCOUNTER (OUTPATIENT)
Dept: WOMENS IMAGING | Age: 85
Discharge: HOME | End: 2020-09-23
Payer: MEDICARE

## 2020-09-21 PROCEDURE — 76641 ULTRASOUND BREAST COMPLETE: CPT

## 2020-09-21 PROCEDURE — G0279 TOMOSYNTHESIS, MAMMO: HCPCS

## 2020-09-21 NOTE — TELEPHONE ENCOUNTER
----- Message from [facility], APRN - CNP sent at 9/21/2020  4:39 PM EDT -----  Probably benign finding however radiologist does recommend MRI. Ordered.   Thank you

## 2020-09-21 NOTE — TELEPHONE ENCOUNTER
Called patient and informed him that Michelle Causey received his mammogram results and that the radiologist is recommending an MRI but the results are probably benign. Verbalizes understanding. MRI order faxed to centralized scheduling.

## 2020-09-23 ENCOUNTER — TELEPHONE (OUTPATIENT)
Dept: PRIMARY CARE CLINIC | Age: 85
End: 2020-09-23

## 2020-09-23 NOTE — TELEPHONE ENCOUNTER
Received fax from [facility], requesting MRI breast to be ordered as MRI Bilat Breast with Contrast per MRI. Order pended. Health Maintenance   Topic Date Due    Flu vaccine (1) 09/01/2020    DTaP/Tdap/Td vaccine (1 - Tdap) 02/24/2021 (Originally 10/15/1954)    Shingles Vaccine (2 of 3) 02/24/2021 (Originally 2/26/2012)    Annual Wellness Visit (AWV)  02/28/2022 (Originally 5/29/2019)    TSH testing  03/09/2021    Lipid screen  09/08/2021    Potassium monitoring  09/08/2021    Creatinine monitoring  09/08/2021    Pneumococcal 65+ years Vaccine  Completed    Hepatitis A vaccine  Aged Out    Hib vaccine  Aged Out    Meningococcal (ACWY) vaccine  Aged Out             (applicable per patient's age: Cancer Screenings, Depression Screening, Fall Risk Screening, Immunizations)    Hemoglobin A1C (%)   Date Value   09/08/2020 7.8 (H)   03/09/2020 7.7 (H)   07/25/2019 7.8 (H)     Microalb/Crt.  Ratio (mcg/mg creat)   Date Value   09/08/2020 38 (H)     LDL Cholesterol (mg/dL)   Date Value   09/08/2020 80     AST (U/L)   Date Value   09/08/2020 55 (H)     ALT (U/L)   Date Value   09/08/2020 71 (H)     BUN (mg/dL)   Date Value   09/08/2020 22      (goal A1C is < 7)   (goal LDL is <100) need 30-50% reduction from baseline     BP Readings from Last 3 Encounters:   09/09/20 (!) 114/53   07/14/20 113/67   03/09/20 (!) 119/57    (goal /80)      All Future Testing planned in [facility]:  Lab Frequency Next Occurrence   ALT Once 03/09/2021   AST Once 87/14/4504   Basic Metabolic Panel Once 91/02/1081   Hemoglobin A1C Once 03/08/2021   MRI BREAST LEFT WO CONTRAST Once 09/21/2020       Next Visit Date:  Future Appointments   Date Time Provider Gisela Harding   12/7/2020  1:00 [facility]   1/19/2021  2:20 PM Mario Berry MD TIFF CARD [facility]   3/11/2021  2:00 PM GENA Cormier - CNP [facility]            Patient Active Problem List:     Obesity (BMI 30.0-34. 9)     Venous (peripheral) insufficiency     Hx pulmonary embolism     Hearing impaired     Edema     Type 2 diabetes mellitus with complication, without long-term current use of insulin (HCC)     Knee osteoarthritis     Chronic combined systolic and diastolic CHF (congestive heart failure) (HCC)     COPD (chronic obstructive pulmonary disease) (HCC)     Adrenal adenoma, left     Elevated liver enzymes     Hypothyroidism due to Hashimoto's thyroiditis     Moderate persistent asthma without complication     Morbidly obese ([address]

## 2020-10-08 ENCOUNTER — TELEPHONE (OUTPATIENT)
Dept: PRIMARY CARE CLINIC | Age: 85
End: 2020-10-08

## 2020-10-08 ENCOUNTER — HOSPITAL ENCOUNTER (OUTPATIENT)
Dept: MRI IMAGING | Age: 85
Discharge: HOME | End: 2020-10-10
Payer: OTHER GOVERNMENT

## 2020-10-08 PROCEDURE — 77049 MRI BREAST C-+ W/CAD BI: CPT

## 2020-10-08 PROCEDURE — 6360000004 HC RX CONTRAST MEDICATION: Performed by: FAMILY MEDICINE

## 2020-10-08 PROCEDURE — A9579 GAD-BASE MR CONTRAST NOS,1ML: HCPCS | Performed by: FAMILY MEDICINE

## 2020-10-08 RX ADMIN — GADOTERIDOL 20 ML: 279.3 INJECTION, SOLUTION INTRAVENOUS at 11:31

## 2020-10-08 NOTE — TELEPHONE ENCOUNTER
----- Message from [address] [place] [address], GENA - CNP sent at 10/8/2020 12:30 PM EDT -----  Results are normal, please call patient and make them aware. 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

## 2020-10-08 NOTE — TELEPHONE ENCOUNTER
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 Wife verbalized understanding.

## 2020-10-12 RX ORDER — FUROSEMIDE 40 MG/1
TABLET ORAL
Qty: 180 TABLET | Refills: 1 | Status: SHIPPED | OUTPATIENT
Start: 2020-10-12 | End: 2021-04-14 | Stop reason: SDUPTHER

## 2020-10-12 NOTE — TELEPHONE ENCOUNTER
Health Maintenance   Topic Date Due    Flu vaccine (1) 09/01/2020    DTaP/Tdap/Td vaccine (1 - Tdap) 02/24/2021 (Originally 10/15/1954)    Shingles Vaccine (2 of 3) 02/24/2021 (Originally 2/26/2012)    TSH testing  03/09/2021    Lipid screen  09/08/2021    Potassium monitoring  09/08/2021    Creatinine monitoring  09/08/2021    Pneumococcal 65+ years Vaccine  Completed    Hepatitis A vaccine  Aged Out    Hib vaccine  Aged Out    Meningococcal (ACWY) vaccine  Aged Out             (applicable per patient's age: Cancer Screenings, Depression Screening, Fall Risk Screening, Immunizations)    Hemoglobin A1C (%)   Date Value   09/08/2020 7.8 (H)   03/09/2020 7.7 (H)   07/25/2019 7.8 (H)     Microalb/Crt. Ratio (mcg/mg creat)   Date Value   09/08/2020 38 (H)     LDL Cholesterol (mg/dL)   Date Value   09/08/2020 80     AST (U/L)   Date Value   09/08/2020 55 (H)     ALT (U/L)   Date Value   09/08/2020 71 (H)     BUN (mg/dL)   Date Value   09/08/2020 22      (goal A1C is < 7)   (goal LDL is <100) need 30-50% reduction from baseline     BP Readings from Last 3 Encounters:   09/09/20 (!) 114/53   07/14/20 113/67   03/09/20 (!) 119/57    (goal /80)      All Future Testing planned in [facility]:  Lab Frequency Next Occurrence   ALT Once 03/09/2021   AST Once 09/65/4221   Basic Metabolic Panel Once 56/78/2510   Hemoglobin A1C Once 03/08/2021   MRI BREAST LEFT WO CONTRAST Once 10/09/2020       Next Visit Date:  Future Appointments   Date Time Provider Gisela Harding   12/7/2020  1:00 PM [facility]   1/19/2021  2:20 PM Ki Shore MD TIFF CARD [facility]   3/11/2021  2:00 PM Khushbu Snider, APRN - CNP [facility]            Patient Active Problem List:     Obesity (BMI 30.0-34. 9)     Venous (peripheral) insufficiency     Hx pulmonary embolism     Hearing impaired     Edema     Type 2 diabetes mellitus with complication, without long-term current use of insulin (HCC)     Knee osteoarthritis     Chronic combined systolic and diastolic CHF (congestive heart failure) (HCC)     COPD (chronic obstructive pulmonary disease) (HCC)     Adrenal adenoma, left     Elevated liver enzymes     Hypothyroidism due to Hashimoto's thyroiditis     Moderate persistent asthma without complication     Morbidly obese ([address]

## 2020-11-03 ENCOUNTER — NURSE ONLY (OUTPATIENT)
Dept: PRIMARY CARE CLINIC | Age: 85
End: 2020-11-03
Payer: OTHER GOVERNMENT

## 2020-11-03 VITALS
WEIGHT: 256.2 LBS | SYSTOLIC BLOOD PRESSURE: 120 MMHG | RESPIRATION RATE: 20 BRPM | DIASTOLIC BLOOD PRESSURE: 82 MMHG | TEMPERATURE: 97.2 F | BODY MASS INDEX: 35.73 KG/M2

## 2020-11-03 PROCEDURE — 90694 VACC AIIV4 NO PRSRV 0.5ML IM: CPT | Performed by: NURSE PRACTITIONER

## 2020-11-03 PROCEDURE — 90471 IMMUNIZATION ADMIN: CPT | Performed by: NURSE PRACTITIONER

## 2020-11-03 NOTE — PROGRESS NOTES
After obtaining consent, and per orders of Flaco Snider CNP, injection of influenzia given in Right deltoid by Calixto Romano. Patient instructed to remain in clinic for 20 minutes afterwards, and to report any adverse reaction to me immediately. Vaccine Information Sheet, \"Influenza - Inactivated\"  given to Mary Jane iMramontes, or parent/legal guardian of  Mary Jane Miramontes and verbalized understanding. Patient responses:    Have you ever had a reaction to a flu vaccine? No  Are you able to eat eggs without adverse effects? Yes  Do you have any current illness? No  Have you ever had [place]illian [place] Syndrome? No    Flu vaccine given per order. Please see immunization tab.

## 2020-11-03 NOTE — PATIENT INSTRUCTIONS
SURVEY:    You may be receiving a survey from [employer] regarding your visit today. Please complete the survey to enable us to provide the highest quality of care to you and your family. If you cannot score us a very good on any question, please call the office to discuss how we could have made your experience a very good one. Thank you. Patient Education        Influenza (Flu) Vaccine: Care Instructions  Your Care Instructions     Influenza (flu) is an infection in the lungs and breathing passages. It is caused by the influenza virus. There are different strains, or types, of the flu virus every year. The flu comes on quickly. It can cause a cough, stuffy nose, fever, chills, tiredness, and aches and pains. These symptoms may last up to 10 days. The flu can make you feel very sick, but most of the time it doesn't lead to other problems. But it can cause serious problems in people who are older or who have a long-term illness, such as heart disease or diabetes. You can help prevent the flu by getting a flu vaccine every year, as soon as it is available. You cannot get the flu from the vaccine. The vaccine prevents most cases of the flu. But even when the vaccine doesn't prevent the flu, it can make symptoms less severe and reduce the chance of problems from the flu. Sometimes, young children and people who have an immune system problem may have a slight fever or muscle aches or pains 6 to 12 hours after getting the shot. These symptoms usually last 1 or 2 days. Follow-up care is a key part of your treatment and safety. Be sure to make and go to all appointments, and call your doctor if you are having problems. It's also a good idea to know your test results and keep a list of the medicines you take. Who should get the flu vaccine? Everyone age 7 months or older should get a flu vaccine each year. It lowers the chance of getting and spreading the flu.  The vaccine is very important for people who are at high risk for getting other health problems from the flu. This includes:  · Anyone 48years of age or older. · People who live in a long-term care center, such as a nursing home. · All children 6 months through 25years of age. · Adults and children 6 months and older who have long-term heart or lung problems, such as asthma. · Adults and children 6 months and older who needed medical care or were in a hospital during the past year because of diabetes, chronic kidney disease, or a weak immune system (including HIV or AIDS). · Women who will be pregnant during the flu season. · People who have any condition that can make it hard to breathe or swallow (such as a brain injury or muscle disorders). · People who can give the flu to others who are at high risk for problems from the flu. This includes all health care workers and close contacts of people age 72 or older. Who should not get the flu vaccine? The person who gives the vaccine may tell you not to get it if you:  · Have a severe allergy to eggs or any part of the vaccine. · Have had a severe reaction to a flu vaccine in the past.  · Have had Guillain-Barré syndrome (GBS). · Are sick with a fever. (Get the vaccine when symptoms are gone.)  How can you care for yourself at home? · If you or your child has a sore arm or a slight fever after the shot, take an over-the-counter pain medicine, such as acetaminophen (Tylenol) or ibuprofen (Advil, Motrin). Read and follow all instructions on the label. Do not give aspirin to anyone younger than 20. It has been linked to Reye syndrome, a serious illness. · Do not take two or more pain medicines at the same time unless the doctor told you to. Many pain medicines have acetaminophen, which is Tylenol. Too much acetaminophen (Tylenol) can be harmful. When should you call for help? Call 911 anytime you think you may need emergency care.  For example, call if after getting the flu vaccine:    · You have symptoms of a severe reaction to the flu vaccine. Symptoms of a severe reaction may include:  ? Severe difficulty breathing. ? Sudden raised, red areas (hives) all over your body. ? Severe lightheadedness. Call your doctor now or seek immediate medical care if after getting the flu vaccine:    · You think you are having a reaction to the flu vaccine, such as a new fever. Watch closely for changes in your health, and be sure to contact your doctor if you have any problems. Where can you learn more? Go to https://Sportistic.Ratio. org and sign in to your [employer] account. Enter F284 in the [employer] box to learn more about \"Influenza (Flu) Vaccine: Care Instructions. \"     If you do not have an account, please click on the \"Sign Up Now\" link. Current as of: December 9, 2019               Content Version: 12.6  © 2450-2284 [employer]. Care instructions adapted under license by [facility] ([facility]). If you have questions about a medical condition or this instruction, always ask your healthcare professional. [address] any warranty or liability for your use of this information.

## 2020-12-03 NOTE — PATIENT INSTRUCTIONS
SURVEY:    You may be receiving a survey from [employer] regarding your visit today. Please complete the survey to enable us to provide the highest quality of care to you and your family. If you cannot score us a very good on any question, please call the office to discuss how we could have made your experience a very good one. Thank you. Patient Education        Learning About Coronavirus [phone])  Coronavirus [phone]): Overview  What is coronavirus ([id number])? The coronavirus disease (COVID-19) is caused by a virus. It is an illness that was first found in December 2019. It has since spread worldwide. The virus can cause fever, cough, and trouble breathing. In severe cases, it can cause pneumonia and make it hard to breathe without help. It can cause death. This virus spreads person-to-person through droplets from coughing and sneezing. It can also spread when you are close to someone who is infected. And it can spread when you touch something that has the virus on it, such as a doorknob or a tabletop. Coronaviruses are a large group of viruses. They cause the common cold. They also cause more serious illnesses like Middle East respiratory syndrome (MERS) and severe acute respiratory syndrome (SARS). COVID-19 is caused by a novel coronavirus. That means it's a new type that has not been seen in people before. How is COVID-19 treated? Mild illness can be treated at home, but more serious illness needs to be treated in the hospital. Treatment may include medicines to reduce symptoms, plus breathing support such as oxygen therapy or a ventilator. Other treatments, such as antiviral medicines, may help people who have COVID-19. What can you do to protect yourself from COVID-19? The best way to protect yourself from getting sick is to:  · Avoid areas where there is an outbreak. · Avoid contact with people who may be infected.   · Avoid crowds and try to stay at least 6 feet away from other people. · Wash your hands often, especially after you cough or sneeze. Use soap and water, and scrub for at least 20 seconds. If soap and water aren't available, use an alcohol-based hand . · Avoid touching your mouth, nose, and eyes. What can you do to avoid spreading the virus to others? To help avoid spreading the virus to others:  · Wash your hands often with soap or alcohol-based hand sanitizers. · Cover your mouth with a tissue when you cough or sneeze. Then throw the tissue in the trash. · Use a disinfectant to clean things that you touch often. These include doorknobs, remote controls, phones, and handles on your refrigerator and microwave. And don't forget countertops, tabletops, bathrooms, and computer keyboards. · Wear a cloth face cover if you have to go to public areas. If you know or suspect that you have COVID-19:  · Stay home. Don't go to school, work, or public areas. And don't use public transportation, ride-shares, or taxis unless you have no choice. · Leave your home only if you need to get medical care or testing. But call the doctor's office first so they know you're coming. And wear a face cover. · Limit contact with people in your home. If possible, stay in a separate bedroom and use a separate bathroom. · Wear a face cover whenever you're around other people. It can help stop the spread of the virus when you cough or sneeze. · Clean and disinfect your home every day. Use household  and disinfectant wipes or sprays. Take special care to clean things that you grab with your hands. · Self-isolate until it's safe to be around others again. ? If you have symptoms, it's safe when you haven't had a fever for 3 days and your symptoms have improved and it's been at least 10 days since your symptoms started. ? If you were exposed to the virus but don't have symptoms, it's safe to be around others 14 days after exposure.   ? Talk to your doctor about whether you also need testing, especially if you have a weakened immune system. When to call for help  Call 911 anytime you think you may need emergency care. For example, call if:  · You have severe trouble breathing. (You can't talk at all.)  · You have constant chest pain or pressure. · You are severely dizzy or lightheaded. · You are confused or can't think clearly. · Your face and lips have a blue color. · You passed out (lost consciousness) or are very hard to wake up. Call your doctor now if you develop symptoms such as:  · Shortness of breath. · Fever. · Cough. If you need to get care, call ahead to the doctor's office for instructions before you go. Make sure you wear a face cover to prevent exposing other people to the virus. Where can you get the latest information? The following health organizations are tracking and studying this virus. Their websites contain the most up-to-date information. Magalis Gutierrez also learn what to do if you think you may have been exposed to the virus. · U.S. Centers for Disease Control and Prevention (CDC): The CDC provides updated news about the disease and travel advice. The website also tells you how to prevent the spread of infection. www.cdc.gov  · World Health Organization [facility]): WHO offers information about the virus outbreaks. WHO also has travel advice. www.who.int  Current as of: July 10, 2020               Content Version: 12.6  © 5783-2448 [employer]. Care instructions adapted under license by [facility] ([facility]). If you have questions about a medical condition or this instruction, always ask your healthcare professional. [place] [address] any warranty or liability for your use of this information.

## 2020-12-07 ENCOUNTER — VIRTUAL VISIT (OUTPATIENT)
Dept: PULMONOLOGY | Age: 85
End: 2020-12-07
Payer: OTHER GOVERNMENT

## 2020-12-07 PROCEDURE — 99442 PR PHYS/QHP TELEPHONE EVALUATION 11-20 MIN: CPT | Performed by: INTERNAL MEDICINE

## 2020-12-13 NOTE — PROGRESS NOTES
PULMONARY OP  PROGRESS NOTE  FOR TELEPHONE VISITS PLEASE COMPLETE THE FOLLOWING    Consent:  She and/or health care decision maker is aware that that she may receive a bill for this telephone service, depending on her insurance coverage, and has provided verbal consent to proceed: Yes      I affirm this is a Patient Initiated Episode with an Established Patient who has not had a related appointment within my department in the past 7 days or scheduled within the next 24 hours. Total Time: minutes: 11-20 minutes    Note: not billable if this call serves to triage the patient into an appointment for the relevant concern      Patient:  Carla Mondragon  YOB: 1935    MRN: [id number]     Acct:        Pt seen and Chart reviewed. Mr. Carla Mondragon is here in followup for   1. Chronic obstructive pulmonary disease, unspecified COPD type ([address]    2. Chronic combined systolic and diastolic CHF (congestive heart failure) ([facility]    3. Moderate persistent asthma without complication    4. Obesity due to excess calories, unspecified obesity severity    5. Bronchiectasis without complication (HCC)          Pt has not been hospitalized or been to er since last visitFor lung problems. Has been using meds as recommended. Has occasional cough without any sputum production  No shortness of breath or wheezing  He does use albuterol twice a day  He has not been using Symbicort twice a day  Trying to exercise as much as he can  No fever or chills. Has a good appetite. Not much nasal drainage. No heartburn. No hemoptysis.   No orthopnea or PND  No chest pain or pressure  Making an effort to lose weight      Subjective:     Review of Systems -   General ROS: Completed and except as mentioned above were negative   Psychological ROS:  Completed and except as mentioned above were negative  Ophthalmic ROS:  Completed and except as mentioned above were 108 (90 Base) MCG/ACT inhaler, Inhale 2 puffs into the lungs every 4 hours as needed for Wheezing or Shortness of Breath, Disp: 1 Inhaler, Rfl: 2    budesonide-formoterol (SYMBICORT) 160-4.5 MCG/ACT AERO, Inhale 2 puffs into the lungs 2 times daily, Disp: , Rfl:     metFORMIN (GLUCOPHAGE) 500 MG tablet, Take 2,000 mg by mouth daily (with breakfast), Disp: , Rfl:     acetaminophen (TYLENOL) 500 MG tablet, Take 1,000 mg by mouth every 6 hours as needed for Pain, Disp: , Rfl:     TAMSULOSIN HCL PO, Take 0.4 mg by mouth Daily with supper , Disp: , Rfl:     Multiple Vitamins-Minerals (THERAPEUTIC MULTIVITAMIN-MINERALS) tablet, Take 1 tablet by mouth daily, Disp: , Rfl:     clotrimazole-betamethasone (LOTRISONE) 1-0.05 % cream, Apply topically 2 times daily. (Patient not taking: Reported on 7/14/2020), Disp: 15 g, Rfl: 0    blood glucose monitor strips, accu check, Disp: 100 strip, Rfl: 3      Objective:    Physical Exam:  Vitals: There were no vitals taken for this visit. Last 3 weights: Wt Readings from Last 3 Encounters:   11/03/20 256 lb 3.2 oz (116.2 kg)   09/09/20 255 lb 14.4 oz (116.1 kg)   07/14/20 253 lb 12.8 oz (115.1 kg)     There is no height or weight on file to calculate BMI. Physical Examination:   PHYSICAL EXAMINATION:  There were no vitals filed for this visit. As this was a telephone visit, physical examination could not be performed. Patient appeared comfortable on the telephone and was able to speak in complete sentences and answering questions appropriately. Labs:    None        Assessment:    1. Chronic obstructive pulmonary disease, unspecified COPD type ([address]    2. Chronic combined systolic and diastolic CHF (congestive heart failure) ([address]    3. Moderate persistent asthma without complication    4. Obesity due to excess calories, unspecified obesity severity    5.  Bronchiectasis without complication ([address]    Stage II COPD      PLAN:    Recommended increasing activity as tolerated and push himself slightly more by about 15 seconds  Refills were provided -None  Educated and clarified the medication use. Recommended using Symbicort 2 puffs twice a day to help decrease the need for albuterol  Recommend flu vaccination in the fall annually. Patient had flu vaccination for the season  Recommendations given regarding pneumococcal vaccinations. Patient is up-to-date with vaccinations from pulmonary perspective. Maintain an active lifestyle. Questions  Patient had were answered to his satisfaction. Home O2 evaluation to be done. Supplemental oxygen was not needed as patient's oxygen saturation was more than 88%  Smoking cessation was to be continued. Not a candidate for lung cancer screening  We'll see the patient back in 12 months or earlier if needed  Patient will call us if he is sick and need to be seen sooner  Thank you for having us involved in the care of your patient. Please call us if you have any questions or concerns.       Bree Nicole MD             12/13/2020, 6:52 AM

## 2020-12-21 RX ORDER — LEVOTHYROXINE SODIUM 112 UG/1
112 TABLET ORAL DAILY
Qty: 90 TABLET | Refills: 1 | Status: SHIPPED | OUTPATIENT
Start: 2020-12-21 | End: 2021-07-06

## 2020-12-21 NOTE — TELEPHONE ENCOUNTER
Health Maintenance   Topic Date Due    DTaP/Tdap/Td vaccine (1 - Tdap) [birth year]/24/2021 (Originally 10/15/1954)    Shingles Vaccine (2 of 3) 02/24/2021 (Originally 2/26/2012)    TSH testing  03/09/2021    Lipid screen  09/08/2021    Potassium monitoring  09/08/2021    Creatinine monitoring  09/08/2021    Flu vaccine  Completed    Pneumococcal 65+ years Vaccine  Completed    Hepatitis A vaccine  Aged Out    Hib vaccine  Aged Out    Meningococcal (ACWY) vaccine  Aged Out             (applicable per patient's age: Cancer Screenings, Depression Screening, Fall Risk Screening, Immunizations)    Hemoglobin A1C (%)   Date Value   09/08/2020 7.8 (H)   03/09/2020 7.7 (H)   07/25/2019 7.8 (H)     Microalb/Crt. Ratio (mcg/mg creat)   Date Value   09/08/2020 38 (H)     LDL Cholesterol (mg/dL)   Date Value   09/08/2020 80     AST (U/L)   Date Value   09/08/2020 55 (H)     ALT (U/L)   Date Value   09/08/2020 71 (H)     BUN (mg/dL)   Date Value   09/08/2020 22      (goal A1C is < 7)   (goal LDL is <100) need 30-50% reduction from baseline     BP Readings from Last 3 Encounters:   11/03/20 120/82   09/09/20 (!) 114/53   07/14/20 113/67    (goal /80)      All Future Testing planned in [facility]:  Lab Frequency Next Occurrence   ALT Once 03/09/2021   AST Once 97/06/8516   Basic Metabolic Panel Once 73/39/8059   Hemoglobin A1C Once 03/08/2021       Next Visit Date:  Future Appointments   Date Time Provider Gisela Harding   1/19/2021  2:20 PM [facility]   3/11/2021  2:00 PM GENA Hadley - CNP [facility]   12/6/2021  2:00 PM Citlalli Montero MD [facility]FF PULM [facility]            Patient Active Problem List:     Obesity (BMI 30.0-34. 9)     Venous (peripheral) insufficiency     Hx pulmonary embolism     Hearing impaired     Edema     Type 2 diabetes mellitus with complication, without long-term current use of insulin (HCC)     Knee osteoarthritis     Chronic combined systolic and diastolic CHF (congestive heart failure) (HCC)     COPD (chronic obstructive pulmonary disease) (HCC)     Adrenal adenoma, left     Elevated liver enzymes     Hypothyroidism due to Hashimoto's thyroiditis     Moderate persistent asthma without complication     Morbidly obese ([address]

## 2021-01-05 ENCOUNTER — OFFICE VISIT (OUTPATIENT)
Dept: PRIMARY CARE CLINIC | Age: 86
End: 2021-01-05
Payer: MEDICARE

## 2021-01-05 VITALS
TEMPERATURE: 97.1 F | DIASTOLIC BLOOD PRESSURE: 84 MMHG | BODY MASS INDEX: 35.62 KG/M2 | OXYGEN SATURATION: 97 % | HEART RATE: 74 BPM | RESPIRATION RATE: 20 BRPM | WEIGHT: 255.4 LBS | SYSTOLIC BLOOD PRESSURE: 134 MMHG

## 2021-01-05 DIAGNOSIS — J44.0 ACUTE BRONCHITIS WITH COPD (HCC): ICD-10-CM

## 2021-01-05 DIAGNOSIS — J20.9 ACUTE BRONCHITIS WITH COPD (HCC): ICD-10-CM

## 2021-01-05 DIAGNOSIS — J01.40 ACUTE NON-RECURRENT PANSINUSITIS: Primary | ICD-10-CM

## 2021-01-05 PROCEDURE — 99214 OFFICE O/P EST MOD 30 MIN: CPT | Performed by: NURSE PRACTITIONER

## 2021-01-05 RX ORDER — PREDNISONE 20 MG/1
40 TABLET ORAL DAILY
Qty: 10 TABLET | Refills: 0 | Status: SHIPPED | OUTPATIENT
Start: 2021-01-05 | End: 2021-01-10

## 2021-01-05 RX ORDER — BENZONATATE 200 MG/1
200 CAPSULE ORAL 3 TIMES DAILY PRN
Qty: 20 CAPSULE | Refills: 0 | Status: SHIPPED | OUTPATIENT
Start: 2021-01-05 | End: 2021-01-12

## 2021-01-05 RX ORDER — DOXYCYCLINE HYCLATE 100 MG/1
100 CAPSULE ORAL 2 TIMES DAILY
Qty: 20 CAPSULE | Refills: 0 | Status: SHIPPED | OUTPATIENT
Start: 2021-01-05 | End: 2021-01-15

## 2021-01-05 ASSESSMENT — PATIENT HEALTH QUESTIONNAIRE - PHQ9
SUM OF ALL RESPONSES TO PHQ QUESTIONS 1-9: 0
SUM OF ALL RESPONSES TO PHQ9 QUESTIONS 1 & 2: 0
1. LITTLE INTEREST OR PLEASURE IN DOING THINGS: 0

## 2021-01-05 ASSESSMENT — ENCOUNTER SYMPTOMS
RHINORRHEA: 1
SORE THROAT: 1
DIARRHEA: 0
SWOLLEN GLANDS: 0
SHORTNESS OF BREATH: 0
ABDOMINAL PAIN: 0
COUGH: 1
NAUSEA: 0
SINUS PAIN: 1
WHEEZING: 1
HEARTBURN: 0
VOMITING: 0
HEMOPTYSIS: 0

## 2021-01-05 NOTE — PATIENT INSTRUCTIONS
SURVEY:    You may be receiving a survey from [employer] regarding your visit today. Please complete the survey to enable us to provide the highest quality of care to you and your family. If you cannot score us a very good on any question, please call the office to discuss how we could have made your experience a very good one. Thank you. Patient Education        Chronic Obstructive Pulmonary Disease (COPD): Care Instructions  Your Care Instructions     Chronic obstructive pulmonary disease (COPD) is a general term for a group of lung diseases, including emphysema and chronic bronchitis. People with COPD have decreased airflow in and out of the lungs, which makes it hard to breathe. The airways also can get clogged with thick mucus. Cigarette smoking is a major cause of COPD. Although there is no cure for COPD, you can slow its progress. Following your treatment plan and taking care of yourself can help you feel better and live longer. Follow-up care is a key part of your treatment and safety. Be sure to make and go to all appointments, and call your doctor if you are having problems. It's also a good idea to know your test results and keep a list of the medicines you take. How can you care for yourself at home? Staying healthy    · Do not smoke. This is the most important step you can take to prevent more damage to your lungs. If you need help quitting, talk to your doctor about stop-smoking programs and medicines. These can increase your chances of quitting for good.     · Avoid colds and flu. Get a pneumococcal vaccine shot. If you have had one before, ask your doctor whether you need a second dose. Get the flu vaccine every fall. If you must be around people with colds or the flu, wash your hands often.     · Avoid secondhand smoke, air pollution, and high altitudes. Also avoid cold, dry air and hot, humid air. Stay at home with your windows closed when air pollution is bad.    Medicines and oxygen therapy    · Take your medicines exactly as prescribed. Call your doctor if you think you are having a problem with your medicine. You may be taking medicines such as:  ? Bronchodilators. These help open your airways and make breathing easier. They are either short-acting (work for 6 to 9 hours) or long-acting (work for 24 hours). You inhale most bronchodilators, so they start to act quickly. Always carry your quick-relief inhaler with you in case you need it while you are away from home. ? Corticosteroids (prednisone, budesonide). These reduce airway inflammation. They come in pill or inhaled form. You must take these medicines every day for them to work well.     · Ask your doctor or pharmacist if a spacer is right for you. A spacer may help you get more inhaled medicine to your lungs. If you use one, ask how to use it properly.     · Do not take any vitamins, over-the-counter medicine, or herbal products without talking to your doctor first.     · If your doctor prescribed antibiotics, take them as directed. Do not stop taking them just because you feel better. You need to take the full course of antibiotics.     · If you use oxygen therapy, use the flow rate your doctor has recommended. Don't change it without talking to your doctor first. Oxygen therapy boosts the amount of oxygen in your blood and helps you breathe easier. Activity    · Get regular exercise. Walking is an easy way to get exercise. Start out slowly, and walk a little more each day.     · Pay attention to your breathing. You are exercising too hard if you can't talk while you exercise.     · Take short rest breaks when doing household chores and other activities.     · Learn breathing methods--such as breathing through pursed lips--to help you become less short of breath.     · If your doctor has not set you up with a pulmonary rehabilitation program, ask if rehab is right for you.  Rehab includes exercise programs, education about your disease and how to manage it, help with diet and other changes, and emotional support. Diet    · Eat regular, healthy meals. Use bronchodilators about 1 hour before you eat to make it easier to eat. Eat several small meals instead of three large ones. Drink beverages at the end of the meal. Avoid foods that are hard to chew.     · Eat foods that contain protein so you don't lose muscle mass.     · Talk with your doctor if you gain too much weight or if you lose weight without trying. Mental health    · Talk to your family, friends, or a therapist about your feelings. Some people feel frightened, angry, hopeless, helpless, and even guilty. Talking openly about bad feelings can help you cope. If these feelings last, talk to your doctor. When should you call for help? Call 911 anytime you think you may need emergency care. For example, call if:    · You have severe trouble breathing. Call your doctor now or seek immediate medical care if:    · You have new or worse trouble breathing.     · You cough up blood.     · You have a fever. Watch closely for changes in your health, and be sure to contact your doctor if:    · You cough more deeply or more often, especially if you notice more mucus or a change in the color of your mucus.     · You have new or worse swelling in your legs or belly.     · You are not getting better as expected. Where can you learn more? Go to [email or web address]://[email or web address] Pharmaceuticalsshae.VIOlife. org and sign in to your [employer] account. Enter H068 in the [facility] box to learn more about \"Chronic Obstructive Pulmonary Disease (COPD): Care Instructions. \"     If you do not have an account, please click on the \"Sign Up Now\" link. Current as of: February 24, 2020               Content Version: 12.6  © 3823-1459 "[employer]orporated. Care instructions adapted under license by [facility] ([facility]).  If you have questions about a medical condition or this instruction, always ask your healthcare professional. [address] any warranty or liability for your use of this information.

## 2021-01-05 NOTE — PROGRESS NOTES
[facility]    Hyperlipidemia     Hypothyroidism     MI (myocardial infarction) ([facility] [address]     Thyroid disease     Type II or unspecified type diabetes mellitus without mention of complication, not stated as uncontrolled       Reviewed all health maintenance requirements and ordered appropriate tests  There are no preventive care reminders to display for this patient. Past Surgical History:     Past Surgical History:   Procedure Laterality Date    CARDIAC SURGERY  02/24/2017    Stent placed  Dr Mabel Red      right ankle    HERNIA REPAIR          Medications:       Prior to Admission medications    Medication Sig Start Date End Date Taking?  Authorizing Provider   doxycycline hyclate (VIBRAMYCIN) 100 MG capsule Take 1 capsule by mouth 2 times daily for 10 days 1/5/21 1/15/21 Yes Milderd Ege Might, APRN - CNP   predniSONE (DELTASONE) 20 MG tablet Take 2 tablets by mouth daily for 5 days 1/5/21 1/10/21 Yes Milderd Ege Might, APRN - CNP   benzonatate (TESSALON) 200 MG capsule Take 1 capsule by mouth 3 times daily as needed for Cough 1/5/21 1/12/21 Yes Milderd Ege Might, APRN - CNP   levothyroxine (SYNTHROID) 112 MCG tablet Take 1 tablet by mouth Daily 12/21/20  Yes Milderd Ege Might, APRN - CNP   furosemide (LASIX) 40 MG tablet TAKE 1 TABLET BY MOUTH TWICE DAILY 10/12/20  Yes Milderd Ege Might, APRN - CNP   atorvastatin (LIPITOR) 20 MG tablet Take 1 tablet by mouth daily 8/10/20  Yes Milderd Ege Might, APRN - CNP   albuterol (PROVENTIL) (2.5 MG/3ML) 0.083% nebulizer solution Take 3 mLs by nebulization every 4 hours as needed for Wheezing or Shortness of Breath 2/28/20  Yes [de-identified] M Deanorth, APRN - GUERITA   alogliptin (NESINA) 25 MG TABS tablet Take 25 mg by mouth daily   Yes Historical Provider, MD   propranolol (INDERAL) 80 MG tablet Take 1 tablet by mouth 2 times daily The dose of propranolol has been changed to 80 mg two times a  day 12/5/19 1/5/21 Yes Yumiko Frazier MD   primidone Respiratory: Positive for cough and wheezing. Negative for hemoptysis and shortness of breath. Cardiovascular: Negative for chest pain and leg swelling. Gastrointestinal: Negative for abdominal pain, diarrhea, heartburn, nausea and vomiting. Genitourinary: Negative for decreased urine volume, difficulty urinating and dysuria. Musculoskeletal: Negative for joint pain, myalgias and neck pain. Skin: Negative for rash. Allergic/Immunologic: Negative for environmental allergies. Neurological: Positive for headaches. Negative for dizziness, syncope and light-headedness. Physical Exam:   Vitals:  /84 (Site: Left Upper Arm)   Pulse 74   Temp 97.1 °F (36.2 °C) (Temporal)   Resp 20   Wt 255 lb 6.4 oz (115.8 kg)   SpO2 97%   BMI 35.62 kg/m²     Physical Exam  Vitals signs and nursing note reviewed. Constitutional:       General: He is not in acute distress. Appearance: He is well-developed. He is ill-appearing. HENT:      Right Ear: Tympanic membrane and ear canal normal. No middle ear effusion. Left Ear: Tympanic membrane and ear canal normal.  No middle ear effusion. Nose: Mucosal edema and rhinorrhea present. Right Sinus: Maxillary sinus tenderness and frontal sinus tenderness present. Left Sinus: Maxillary sinus tenderness and frontal sinus tenderness present. Mouth/Throat:      Mouth: Mucous membranes are not dry. Pharynx: Posterior oropharyngeal erythema present. Eyes:      Conjunctiva/sclera: Conjunctivae normal.   Neck:      Musculoskeletal: Normal range of motion and neck supple. Cardiovascular:      Rate and Rhythm: Normal rate and regular rhythm. Heart sounds: Normal heart sounds. Pulmonary:      Effort: Pulmonary effort is normal.      Breath sounds: Normal breath sounds. No wheezing. Abdominal:      General: Bowel sounds are normal. There is no distension. Palpations: Abdomen is soft. Tenderness:  There is no abdominal maintenance  6. Continue current medications, diet and exercise. Completed Refills   Requested Prescriptions     Signed Prescriptions Disp Refills    doxycycline hyclate (VIBRAMYCIN) 100 MG capsule 20 capsule 0     Sig: Take 1 capsule by mouth 2 times daily for 10 days    predniSONE (DELTASONE) 20 MG tablet 10 tablet 0     Sig: Take 2 tablets by mouth daily for 5 days    benzonatate (TESSALON) 200 MG capsule 20 capsule 0     Sig: Take 1 capsule by mouth 3 times daily as needed for Cough         Return if symptoms worsen or fail to improve.

## 2021-03-08 ENCOUNTER — TELEPHONE (OUTPATIENT)
Dept: PRIMARY CARE CLINIC | Age: 86
End: 2021-03-08

## 2021-03-08 ENCOUNTER — HOSPITAL ENCOUNTER (OUTPATIENT)
Dept: CT IMAGING | Age: 86
Discharge: HOME | End: 2021-03-10
Payer: MEDICARE

## 2021-03-08 ENCOUNTER — OFFICE VISIT (OUTPATIENT)
Dept: PRIMARY CARE CLINIC | Age: 86
End: 2021-03-08
Payer: OTHER GOVERNMENT

## 2021-03-08 VITALS
SYSTOLIC BLOOD PRESSURE: 118 MMHG | TEMPERATURE: 97.7 F | WEIGHT: 255.3 LBS | BODY MASS INDEX: 35.61 KG/M2 | RESPIRATION RATE: 22 BRPM | HEART RATE: 68 BPM | DIASTOLIC BLOOD PRESSURE: 70 MMHG | OXYGEN SATURATION: 99 %

## 2021-03-08 DIAGNOSIS — H53.451 PERIPHERAL VISION LOSS, RIGHT: ICD-10-CM

## 2021-03-08 DIAGNOSIS — G44.52 NEW DAILY PERSISTENT HEADACHE: ICD-10-CM

## 2021-03-08 DIAGNOSIS — H53.451 PERIPHERAL VISION LOSS, RIGHT: Primary | ICD-10-CM

## 2021-03-08 PROCEDURE — 99214 OFFICE O/P EST MOD 30 MIN: CPT | Performed by: NURSE PRACTITIONER

## 2021-03-08 PROCEDURE — 70450 CT HEAD/BRAIN W/O DYE: CPT

## 2021-03-08 ASSESSMENT — ENCOUNTER SYMPTOMS
COUGH: 0
ABDOMINAL PAIN: 0
WHEEZING: 0
VOMITING: 0
SORE THROAT: 0
NAUSEA: 0
BLURRED VISION: 1
EYE PAIN: 0
DIARRHEA: 0
RHINORRHEA: 0
CONSTIPATION: 0

## 2021-03-08 NOTE — PROGRESS NOTES
Name: Joleen Chen  : 1935         Chief Complaint:     Chief Complaint   Patient presents with    Transient Ischemic Attack     headache, lost peripheral vision right side X 1 week       History of Present Illness:      Joleen Chen is a 82 y.o.  male who presents with Transient Ischemic Attack (headache, lost peripheral vision right side X 1 week)      Cecilia Rouse is here today for routine office visit. He states he lost peripheral vision in the right eye approximately a week or so ago. He made an appointment with an ophthalmologist who told him that he had a stroke. He states he has had a dull headache for the past week as well. He states this headache is resolving. Vision has not worsened. Headache   This is a recurrent problem. The current episode started in the past 7 days. The problem occurs daily. The problem has been gradually improving. The pain is located in the bilateral and temporal region. The pain does not radiate. The pain quality is similar to prior headaches. The quality of the pain is described as aching and dull. The pain is at a severity of 3/10. The pain is mild. Associated symptoms include blurred vision. Pertinent negatives include no abdominal pain, coughing, dizziness, eye pain, fever, nausea, neck pain, numbness, rhinorrhea, seizures, sore throat, vomiting or weakness. Nothing aggravates the symptoms. He has tried acetaminophen for the symptoms. The treatment provided mild relief. His past medical history is significant for hypertension and obesity. There is no history of migraine headaches or migraines in the family.          Past Medical History:     Past Medical History:   Diagnosis Date    COPD (ch[facility] obstructive pulmonary disease) ([facility] [address]     [facility]     [facility]    Hyperlipidemia     Hypothyroidism     [facility]     Thyroid disease     Type II or unspecified type diabetes mellitus without mention of complication, not stated as uncontrolled       Reviewed all health maintenance requirements and ordered appropriate tests  Health Maintenance Due   Topic Date Due    Shingles Vaccine (2 of 3) 02/26/2012    TSH testing  03/09/2021       Past Surgical History:     Past Surgical History:   Procedure Laterality Date    CARDIAC SURGERY  02/24/2017    Stent placed  Dr Kaycee Quinones      right ankle    HERNIA REPAIR          Medications:       Prior to Admission medications    Medication Sig Start Date End Date Taking?  Authorizing Provider   levothyroxine (SYNTHROID) 112 MCG tablet Take 1 tablet by mouth Daily 12/21/20  Yes Abdon Snider APRN - CNP   furosemide (LASIX) 40 MG tablet TAKE 1 TABLET BY MOUTH TWICE DAILY 10/12/20  Yes Abdon Snider, APRN - CNP   atorvastatin (LIPITOR) 20 MG tablet Take 1 tablet by mouth daily 8/10/20  Yes Abdon Snider APRN - CNP   albuterol (PROVENTIL) (2.5 MG/3ML) 0.083% nebulizer solution Take 3 mLs by nebulization every 4 hours as needed for Wheezing or Shortness of Breath 2/28/20  Yes Haider Todd APRN - GUERITA   alogliptin (NESINA) 25 MG TABS tablet Take 25 mg by mouth daily   Yes Historical Provider, MD   propranolol (INDERAL) 80 MG tablet Take 1 tablet by mouth 2 times daily The dose of propranolol has been changed to 80 mg two times a  day 12/5/19 3/8/21 Yes Stefanie Lomax MD   primidone (MYSOLINE) 50 MG tablet Take 3 tablets by mouth daily Please note: He should take (50mg) 1 tablet in AM + 2 tabs at night 12/5/19 3/8/21 Yes Stefanie Lomax MD   blood glucose monitor strips accu check 7/18/18  Yes Lenny Johnson APRN - CNP   aspirin 81 MG tablet Take 81 mg by mouth daily   Yes Historical Provider, MD   albuterol sulfate  (90 Base) MCG/ACT inhaler Inhale 2 puffs into the lungs every 4 hours as needed for Wheezing or Shortness of Breath 4/13/18  Yes Radha Mckeon APRN - CNP facial asymmetry, speech difficulty, weakness, light-headedness and numbness. Psychiatric/Behavioral: Negative for confusion and decreased concentration. Physical Exam:   Vitals:  /70   Pulse 68   Temp 97.7 °F (36.5 °C) (Temporal)   Resp 22   Wt 255 lb 4.8 oz (115.8 kg)   SpO2 99%   BMI 35.61 kg/m²     Physical Exam  Vitals signs and nursing note reviewed. Constitutional:       General: He is not in acute distress. Appearance: Normal appearance. He is obese. He is not ill-appearing. HENT:      Mouth/Throat:      Mouth: Mucous membranes are moist.   Eyes:      General: No scleral icterus. Conjunctiva/sclera: Conjunctivae normal.   Neck:      Musculoskeletal: Normal range of motion and neck supple. Vascular: No carotid bruit. Cardiovascular:      Rate and Rhythm: Normal rate and regular rhythm. Heart sounds: No murmur. Pulmonary:      Effort: Pulmonary effort is normal.      Breath sounds: Decreased breath sounds present. No wheezing or rales. Abdominal:      General: Bowel sounds are normal. There is no distension. Palpations: Abdomen is soft. Musculoskeletal:      Right lower leg: Edema (trace) present. Left lower leg: Edema (trace) present. Skin:     General: Skin is warm and dry. Findings: No rash. Neurological:      Mental Status: He is alert and oriented to person, place, and time.    Psychiatric:         Mood and Affect: Mood normal.         Behavior: Behavior normal.         Data:     Lab Results   Component Value Date     09/08/2020    K 5.3 09/08/2020     09/08/2020    CO2 28 09/08/2020    BUN 22 09/08/2020    CREATININE 1.10 09/08/2020    GLUCOSE 186 09/08/2020    PROT 6.4 01/17/2019    LABALBU 3.5 01/17/2019    BILITOT 0.18 01/17/2019    ALKPHOS 94 01/17/2019    AST 55 09/08/2020    ALT 71 09/08/2020     Lab Results   Component Value Date    WBC 9.2 09/08/2020    RBC 4.39 09/08/2020    HGB 13.1 09/08/2020    HCT 42.5 09/08/2020 MCV 96.8 09/08/2020    MCH 29.8 09/08/2020    MCHC 30.8 09/08/2020    RDW 13.9 09/08/2020     09/08/2020    MPV 10.3 09/08/2020     Lab Results   Component Value Date    TSH 6.91 03/09/2020     Lab Results   Component Value Date    CHOL 160 09/08/2020    HDL 61 09/08/2020    LABA1C 7.8 09/08/2020       Assessment/Plan:      Diagnosis Orders   1. Peripheral vision loss, right  CT HEAD [address] [facility] Nicole MD, Neurology, [place]   2. New daily persistent headache  CT HEAD WO CONTRAST    US CAROTID ARTERY BILATERAL    Gia Michaud MD, Neurology, [place]     We will send for stat CT. Studies ordered. Referral to neurology for evaluation. 1.  Awa Zuniga received counseling on the following healthy behaviors: nutrition, exercise and medication adherence  2. Patient given educational materials - see patient instructions  3. Was a self-tracking handout given in paper form or via [employer]? No  If yes, see orders or list here. 4.  Discussed use, benefit, and side effects of prescribed medications. Barriers to medication compliance addressed. All patient questions answered. Pt voiced understanding. 5.  Reviewed prior labs and health maintenance  6. Continue current medications, diet and exercise. Completed Refills   Requested Prescriptions      No prescriptions requested or ordered in this encounter         Return if symptoms worsen or fail to improve.

## 2021-03-08 NOTE — TELEPHONE ENCOUNTER
Called and spoke with wife and informed her that CT scan showed that Catrina Carranza probably had a recent stroke on the left side and this would explain his right sided vision loss. Reminded to keep appt with neurologist on 4/8/21. Informed that Kris Lewis would yomi Catrina Carranza to continue with the baby aspirin as currently taking. Verbalizes understanding.

## 2021-03-08 NOTE — TELEPHONE ENCOUNTER
----- Message from [address] [place] [address], GENA - CNP sent at 3/8/2021  1:42 PM EST -----  CT does show that he probably had a recent stroke on the left side. This would explain his right-sided vision loss. Follow-up with neurology as planned. Continue baby aspirin like you are taking currently.

## 2021-03-08 NOTE — PATIENT INSTRUCTIONS
SURVEY:    You may be receiving a survey from [employer] regarding your visit today. Please complete the survey to enable us to provide the highest quality of care to you and your family. If you cannot score us a very good on any question, please call the office to discuss how we could have made your experience a very good one. Thank you. Patient Education        Transient Ischemic Attack: Care Instructions  Your Care Instructions     A transient ischemic attack (TIA) is when blood flow to a part of your brain is blocked for a short time. A TIA is like a stroke but usually lasts only a few minutes. A TIA does not cause lasting brain damage. Any vision problems, slurred speech, or other symptoms usually go away in 10 to 20 minutes. But they may last for up to 24 hours. TIAs are often warning signs of a stroke. Some people who have a TIA may have a stroke in the future. A stroke can cause symptoms like those of a TIA. But a stroke causes lasting damage to your brain. You can take steps to help prevent a stroke. One thing you can do is get early treatment. If you have other new symptoms, or if your symptoms do not get better, go back to the emergency room or call your doctor right away. Getting treatment right away may prevent long-term brain damage caused by a stroke. The doctor has checked you carefully, but problems can develop later. If you notice any problems or new symptoms, get medical treatment right away. Follow-up care is a key part of your treatment and safety. Be sure to make and go to all appointments, and call your doctor if you are having problems. It's also a good idea to know your test results and keep a list of the medicines you take. How can you care for yourself at home? Medicines    · Be safe with medicines. Take your medicines exactly as prescribed.  Call your doctor if you think you are having a problem with your medicine.     · If you take a blood thinner, such as aspirin, be sure you get instructions about how to take your medicine safely. Blood thinners can cause serious bleeding problems.     · Call your doctor if you are not able to take your medicines for any reason.     · Do not take any over-the-counter medicines or herbal products without talking to your doctor first.     · If you take birth control pills or hormone therapy, talk to your doctor. Ask if these treatments are right for you. Lifestyle changes    · Do not smoke. If you need help quitting, talk to your doctor about stop-smoking programs and medicines.     · Be active. If your doctor recommends it, get more exercise. Walking is a good choice. Bit by bit, increase the amount you walk every day. Try for at least 30 minutes on most days of the week. You also may want to swim, bike, or do other activities.     · Eat heart-healthy foods. These include fruits, vegetables, high-fiber foods, lean meats, beans, peas, nuts, seeds, and soy products, and foods that are low in sodium, saturated fat, and trans fat.     · Stay at a healthy weight. Lose weight if you need to.     · Limit alcohol to 2 drinks a day for men and 1 drink a day for women. Staying healthy    · Manage other health problems such as diabetes, high blood pressure, and high cholesterol.     · Get the flu vaccine every year. When should you call for help? Call 911 anytime you think you may need emergency care. For example, call if:    · You have new or worse symptoms of a stroke. These may include:  ? Sudden numbness, tingling, weakness, or loss of movement in your face, arm, or leg, especially on only one side of your body. ? Sudden vision changes. ? Sudden trouble speaking. ? Sudden confusion or trouble understanding simple statements. ? Sudden problems with walking or balance. ? A sudden, severe headache that is different from past headaches.   Call 911 even if these symptoms go away in a few minutes.     · You feel like you are having another TIA.   Watch closely for changes in your health, and be sure to contact your doctor if you have any problems. Where can you learn more? Go to [email or web address] and sign in to your [employer] account. Enter [phone] in the [facility] box to learn more about \"Transient Ischemic Attack: Care Instructions. \"     If you do not have an account, please click on the \"Sign Up Now\" link. Current as of: March 4, 2020               Content Version: 12.6  © 7563-4524 [employer]. Care instructions adapted under license by [facility] ([facility]). If you have questions about a medical condition or this instruction, always ask your healthcare professional. [address] any warranty or liability for your use of this information.

## 2021-03-09 ENCOUNTER — HOSPITAL ENCOUNTER (OUTPATIENT)
Dept: VASCULAR LAB | Age: 86
Discharge: HOME | End: 2021-03-11
Payer: MEDICARE

## 2021-03-09 DIAGNOSIS — G44.52 NEW DAILY PERSISTENT HEADACHE: ICD-10-CM

## 2021-03-09 DIAGNOSIS — H53.451 PERIPHERAL VISION LOSS, RIGHT: ICD-10-CM

## 2021-03-09 PROCEDURE — 93880 EXTRACRANIAL BILAT STUDY: CPT

## 2021-03-10 ENCOUNTER — TELEPHONE (OUTPATIENT)
Dept: PRIMARY CARE CLINIC | Age: 86
End: 2021-03-10

## 2021-03-10 NOTE — TELEPHONE ENCOUNTER
Called and spoke with wife and informed her that testing showed mild plaque in the arteries and this is normal for his age and Linden Friend would like him to continue his current medications. Verbalizes understanding.

## 2021-03-10 NOTE — TELEPHONE ENCOUNTER
----- Message from GENA Mckeon CNP sent at 3/10/2021  8:24 AM EST -----  Mild plaque in the arteries is noted. This is normal for his age. Continue current medications.

## 2021-03-22 ENCOUNTER — TELEPHONE (OUTPATIENT)
Dept: PRIMARY CARE CLINIC | Age: 86
End: 2021-03-22

## 2021-03-23 ENCOUNTER — HOSPITAL ENCOUNTER (OUTPATIENT)
Age: 86
Discharge: HOME | End: 2021-03-23
Payer: MEDICARE

## 2021-03-23 DIAGNOSIS — E11.8 TYPE 2 DIABETES MELLITUS WITH COMPLICATION, WITHOUT LONG-TERM CURRENT USE OF INSULIN (HCC): ICD-10-CM

## 2021-03-23 LAB
ALT SERPL-CCNC: 66 U/L (ref 5–41)
ANION GAP SERPL CALCULATED.3IONS-SCNC: 8 MMOL/L (ref 9–17)
AST SERPL-CCNC: 49 U/L
BUN BLDV-MCNC: 20 MG/DL (ref 8–23)
BUN/CREAT BLD: 19 (ref 9–20)
CALCIUM SERPL-MCNC: 9.8 MG/DL (ref 8.6–10.4)
CHLORIDE BLD-SCNC: 97 MMOL/L (ref 98–107)
CO2: 28 MMOL/L (ref 20–31)
CREAT SERPL-MCNC: 1.07 MG/DL (ref 0.7–1.2)
ESTIMATED AVERAGE GLUCOSE: 171 MG/DL
GFR AFRICAN AMERICAN: >60 ML/MIN
GFR NON-AFRICAN AMERICAN: >60 ML/MIN
GFR SERPL CREATININE-BSD FRML MDRD: ABNORMAL ML/MIN/{1.73_M2}
GFR SERPL CREATININE-BSD FRML MDRD: ABNORMAL ML/MIN/{1.73_M2}
GLUCOSE BLD-MCNC: 168 MG/DL (ref 70–99)
HBA1C MFR BLD: 7.6 % (ref 4–6)
POTASSIUM SERPL-SCNC: 4.8 MMOL/L (ref 3.7–5.3)
SODIUM BLD-SCNC: 133 MMOL/L (ref 135–144)

## 2021-03-23 PROCEDURE — 80048 BASIC METABOLIC PNL TOTAL CA: CPT

## 2021-03-23 PROCEDURE — 36415 COLL VENOUS BLD VENIPUNCTURE: CPT

## 2021-03-23 PROCEDURE — 84460 ALANINE AMINO (ALT) (SGPT): CPT

## 2021-03-23 PROCEDURE — 83036 HEMOGLOBIN GLYCOSYLATED A1C: CPT

## 2021-03-23 PROCEDURE — 84450 TRANSFERASE (AST) (SGOT): CPT

## 2021-03-30 ENCOUNTER — OFFICE VISIT (OUTPATIENT)
Dept: CARDIOLOGY | Age: 86
End: 2021-03-30
Payer: MEDICARE

## 2021-03-30 VITALS
BODY MASS INDEX: 35.78 KG/M2 | OXYGEN SATURATION: 95 % | RESPIRATION RATE: 18 BRPM | HEART RATE: 68 BPM | WEIGHT: 255.6 LBS | SYSTOLIC BLOOD PRESSURE: 118 MMHG | HEIGHT: 71 IN | DIASTOLIC BLOOD PRESSURE: 69 MMHG

## 2021-03-30 DIAGNOSIS — Z86.73 HISTORY OF STROKE: ICD-10-CM

## 2021-03-30 DIAGNOSIS — I25.10 ASHD (ARTERIOSCLEROTIC HEART DISEASE): Primary | ICD-10-CM

## 2021-03-30 DIAGNOSIS — I10 ESSENTIAL HYPERTENSION: ICD-10-CM

## 2021-03-30 DIAGNOSIS — E66.09 CLASS 2 OBESITY DUE TO EXCESS CALORIES WITH BODY MASS INDEX (BMI) OF 35.0 TO 35.9 IN ADULT, UNSPECIFIED WHETHER SERIOUS COMORBIDITY PRESENT: ICD-10-CM

## 2021-03-30 DIAGNOSIS — E78.2 MIXED HYPERLIPIDEMIA: ICD-10-CM

## 2021-03-30 DIAGNOSIS — I50.42 CHRONIC COMBINED SYSTOLIC AND DIASTOLIC CONGESTIVE HEART FAILURE (HCC): ICD-10-CM

## 2021-03-30 DIAGNOSIS — Z95.820 S/P ANGIOPLASTY WITH STENT: ICD-10-CM

## 2021-03-30 PROCEDURE — 99214 OFFICE O/P EST MOD 30 MIN: CPT | Performed by: INTERNAL MEDICINE

## 2021-03-30 PROCEDURE — 99211 OFF/OP EST MAY X REQ PHY/QHP: CPT | Performed by: INTERNAL MEDICINE

## 2021-03-30 RX ORDER — ATORVASTATIN CALCIUM 40 MG/1
40 TABLET, FILM COATED ORAL DAILY
Qty: 30 TABLET | Refills: 3 | Status: SHIPPED | OUTPATIENT
Start: 2021-03-30 | End: 2021-08-17 | Stop reason: SDUPTHER

## 2021-03-30 RX ORDER — CLOPIDOGREL BISULFATE 75 MG/1
75 TABLET ORAL DAILY
Qty: 90 TABLET | Refills: 1 | Status: SHIPPED | OUTPATIENT
Start: 2021-03-30 | End: 2021-11-02 | Stop reason: SDUPTHER

## 2021-03-30 NOTE — PATIENT INSTRUCTIONS
SURVEY:    You may be receiving a survey from [employer] regarding your visit today. Please complete the survey to enable us to provide the highest quality of care to you and your family. If you cannot score us a very good on any question, please call the office to discuss how we could have made your experience a very good one. Thank you.

## 2021-03-30 NOTE — PROGRESS NOTES
Jared Magallanes am scribing for and in the presence of Charlene Rodarte MD, F.A.C.C..    Subjective:     CHIEF COMPLAINT / HPI:    Chief Complaint   Patient presents with    6 Month Follow-Up     Hx:CHF,CAD S/P Stent,HTN,HLD. He has been doing okay - had a slight stroke about a month. Pheripheral vision to the right went out - did come to the hospital and had testing. Little SOB. Denies: CP, Lightheaded/dizziness, Palpitations       Aubree Bartlett is 82 y.o. male who presents today for follow-up. 1-He has history of coronary artery disease, myocardial infarction and primary PCI in 2/2017 done at [facility],  [address] [place] [address]. He also has history of ischemic cardiomyopathy and chronic systolic CHF, NYHA class III. 2-An admission to [facility] on 9/19/2017 with COPD exacerbation, during this admission his lisinopril changed to Cozaar because of chronic cough. 3-Another visit to the emergency room on 10/3/2017 with cough, shortness of breath and wheezing. 4- He saw Dr. Elisa Diana at [facility] in November 2018, no changes in medication done. 5-An admission to [facility] on 4/6/2018 with acute on chronic CHF. 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    7- EKG done in office (8/20/2019)- Sinus Rhythm with 1st degree AV block. 71 bpm.    8- EKG done in office on 7/14/2020. No acute ischemic changes. Mr. Alejandra Barnes is here for a 6 month follow up today. He reports he has been okay other than possibly having a mini stroke about a month ago. He lost his peripheral vision in his right eye and it still isn't completely normal yet. He is following with Neurology in the middle of April. He does have some shortness of breath but this is not worsening. He continues to sleep sound at night with no issues. He denies any chest pain, pressure or tightness. He denies any lightheaded/dizziness or palpitations. No cough, fever or chills.  No abdominal tablet Take 3 tablets by mouth daily Please note: He should take (50mg) 1 tablet in AM + 2 tabs at night 90 tablet 2    blood glucose monitor strips accu check 100 strip 3    aspirin 81 MG tablet Take 81 mg by mouth 2 times daily       albuterol sulfate  (90 Base) MCG/ACT inhaler Inhale 2 puffs into the lungs every 4 hours as needed for Wheezing or Shortness of Breath 1 Inhaler 2    budesonide-formoterol (SYMBICORT) 160-4.5 MCG/ACT AERO Inhale 2 puffs into the lungs 2 times daily      metFORMIN (GLUCOPHAGE) 1000 MG tablet Take 1,000 mg by mouth 2 times daily       acetaminophen (TYLENOL) 500 MG tablet Take 1,000 mg by mouth every 6 hours as needed for Pain      TAMSULOSIN HCL PO Take 0.4 mg by mouth Daily with supper       Multiple Vitamins-Minerals (THERAPEUTIC MULTIVITAMIN-MINERALS) tablet Take 1 tablet by mouth daily         REVIEW OF SYSTEMS:    CONSTITUTIONAL: No major weight gain or loss, fatigue, weakness, night sweats or fever. HEENT: No new vision difficulties or ringing in the ears. RESPIRATORY: See HPI  CARDIOVASCULAR: See HPI  GI: No nausea, vomiting, diarrhea, constipation, abdominal pain or changes in bowel habits. : No urinary frequency, urgency, incontinence hematuria or dysuria. SKIN: No cyanosis or skin lesions. MUSCULOSKELETAL: No new muscle or joint pain. NEUROLOGICAL: No syncope or TIA-like symptoms. PSYCHIATRIC: No anxiety, pain, insomnia or depression    Objective:     PHYSICAL EXAM:      VITALS:  /69 (Site: Left Upper Arm, Position: Sitting, Cuff Size: Large Adult)   Pulse 68   Resp 18   Ht 5' 11\" (1.803 m)   Wt 255 lb 9.6 oz (115.9 kg)   SpO2 95%   BMI 35.65 kg/m²   CONSTITUTIONAL: Cooperative, no apparent distress, and appears well nourished / developed. NEUROLOGIC:  Awake and orientated to person, place and time. PSYCH: Calm affect. SKIN: Warm and dry.   HEENT: Sclera non-icteric, normocephalic, neck supple, no elevation of JVP, normal carotid pulses Plan:     Chronic Systolic and Diastolic Heart Failure: EF 50-55% of echo done on 9/9/2019  · Beta Blocker: Continue Propranolol 80 mg BID I also discussed the potential side effects of this medication including lightheadedness and dizziness and told him to stop the medication of this occurs and call our office if this occurs. Diuretics: Continue furosemide (lasix) 40 mg twice daily. I also discussed the potential side effects of this medication including lightheadedness and dizziness and told him to call the office if this occurs. Nonpharmacologic management of Heart Failure: I told him to continue wearing lower extremity compression stockings and I advised him to try and keep his legs up whenever possible and to limit salt in his diet. Follow-up repeat echo giving mildly worsening shortness of breath. Follow-up BNP. Continue using compression stockings. He has 2+ bilateral lower extremity edema that is chronic. Atherosclerotic Heart Disease: S/P Stent   Antiplatelet Agent: Continue aspirin 81 mg daily. I also reminded him to watch for signs of blood in his stool or black tarry stools and stop the medication immediately if this develops as this could be life threatening. Restart Plavix 75 mg daily giving his recent history of ischemic stroke. Beta Blocker: Continue Propranolol 80 mg BID   Cholesterol Reduction Therapy: INCREASE to Atorvastatin (Lipitor) 40 mg daily. 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 Antiplatelet Agent: Continue Aspirin 81 mg daily. Antiplatelet Agent: START clopidogrel (Plavix): Plavix 75 mg daily. I also reminded them to watch for signs of bloody or black tarry stools and stop the medication immediately if this develops as this could be life threatening. Cholesterol Reduction Therapy: INCREASE to Atorvastatin (Lipitor) 40 mg daily.      Additional Testing List: Additional Testing List: I ordered a pro BNP level to better assess for the Enma Alfaro MD, MS, F.A.C.C. [facility], [address], [address]  Phone: [phone], Fax: [phone]    I believe that the risk of significant morbidity and mortality related to the patient's current medical conditions are: high. >30 minutes were spent during prep work, discussion and exam of the patient, and follow up documentation and all of their questions were answered. The documentation recorded by the scribe, accurately and completely reflects the services I personally performed and the decisions made by me. Yaz Mason MD, F.A.C.C.  March 30, 2021

## 2021-04-06 ENCOUNTER — HOSPITAL ENCOUNTER (OUTPATIENT)
Dept: NON INVASIVE DIAGNOSTICS | Age: 86
Discharge: HOME | End: 2021-04-06
Payer: MEDICARE

## 2021-04-06 DIAGNOSIS — I25.10 ASHD (ARTERIOSCLEROTIC HEART DISEASE): ICD-10-CM

## 2021-04-06 DIAGNOSIS — Z86.73 HISTORY OF STROKE: ICD-10-CM

## 2021-04-06 LAB
LV EF: 55 %
LVEF MODALITY: NORMAL

## 2021-04-06 PROCEDURE — 93247 EXT ECG>7D<15D SCAN A/R: CPT

## 2021-04-06 PROCEDURE — 93246 EXT ECG>7D<15D RECORDING: CPT

## 2021-04-06 PROCEDURE — C8929 TTE W OR WO FOL WCON,DOPPLER: HCPCS

## 2021-04-06 PROCEDURE — 93306 TTE W/DOPPLER COMPLETE: CPT

## 2021-04-06 NOTE — PROGRESS NOTES
Explained policies and procedures of an echocardiogram/Doppler with Bubble study. Patient instructed on extended cardiac monitor indications and use. Diary sent with patient.

## 2021-04-07 ENCOUNTER — OFFICE VISIT (OUTPATIENT)
Dept: PRIMARY CARE CLINIC | Age: 86
End: 2021-04-07
Payer: OTHER GOVERNMENT

## 2021-04-07 VITALS
WEIGHT: 257 LBS | OXYGEN SATURATION: 98 % | SYSTOLIC BLOOD PRESSURE: 118 MMHG | BODY MASS INDEX: 35.84 KG/M2 | DIASTOLIC BLOOD PRESSURE: 70 MMHG | TEMPERATURE: 96.6 F | RESPIRATION RATE: 20 BRPM | HEART RATE: 62 BPM

## 2021-04-07 DIAGNOSIS — E06.3 HYPOTHYROIDISM DUE TO HASHIMOTO'S THYROIDITIS: ICD-10-CM

## 2021-04-07 DIAGNOSIS — Z23 NEED FOR SHINGLES VACCINE: ICD-10-CM

## 2021-04-07 DIAGNOSIS — E11.8 TYPE 2 DIABETES MELLITUS WITH COMPLICATION, WITHOUT LONG-TERM CURRENT USE OF INSULIN (HCC): Primary | ICD-10-CM

## 2021-04-07 DIAGNOSIS — E03.8 HYPOTHYROIDISM DUE TO HASHIMOTO'S THYROIDITIS: ICD-10-CM

## 2021-04-07 PROBLEM — E66.01 MORBIDLY OBESE (HCC): Status: RESOLVED | Noted: 2020-09-09 | Resolved: 2021-04-07

## 2021-04-07 PROCEDURE — 3051F HG A1C>EQUAL 7.0%<8.0%: CPT | Performed by: NURSE PRACTITIONER

## 2021-04-07 PROCEDURE — 99214 OFFICE O/P EST MOD 30 MIN: CPT | Performed by: NURSE PRACTITIONER

## 2021-04-07 RX ORDER — ZOSTER VACCINE RECOMBINANT, ADJUVANTED 50 MCG/0.5
0.5 KIT INTRAMUSCULAR SEE ADMIN INSTRUCTIONS
Qty: 0.5 ML | Refills: 0 | Status: SHIPPED | OUTPATIENT
Start: 2021-04-07 | End: 2021-07-23 | Stop reason: ALTCHOICE

## 2021-04-07 ASSESSMENT — ENCOUNTER SYMPTOMS
CONSTIPATION: 0
CHEST TIGHTNESS: 0
DIARRHEA: 0
HEMOPTYSIS: 0
FREQUENT THROAT CLEARING: 0
VOMITING: 0
NAUSEA: 0
HOARSE VOICE: 0
SHORTNESS OF BREATH: 1
RHINORRHEA: 0
TROUBLE SWALLOWING: 0
WHEEZING: 0
ABDOMINAL PAIN: 0
COUGH: 0
BLURRED VISION: 0
SORE THROAT: 0

## 2021-04-07 NOTE — PATIENT INSTRUCTIONS
SURVEY:    You may be receiving a survey from [facility] regarding your visit today. Please complete the survey to enable us to provide the highest quality of care to you and your family. If you cannot score us a very good on any question, please call the office to discuss how we could have made your experience a very good one. Thank you. Patient Education        Live Zoster (Shingles) Vaccine: What You Need to Know  Why get vaccinated? Live zoster (shingles) vaccine can prevent shingles. Shingles (also called herpes zoster, or just zoster) is a painful skin rash, usually with blisters. In addition to the rash, shingles can cause fever, headache, chills, or upset stomach. More rarely, shingles can lead to pneumonia, hearing problems, blindness, brain inflammation (encephalitis), or death. The most common complication of shingles is long-term nerve pain called postherpetic neuralgia (PHN). PHN occurs in the areas where the shingles rash was, even after the rash clears up. It can last for months or years after the rash goes away. The pain from PHN can be severe and debilitating. About 10 to 18% of people who get shingles will experience PHN. The risk of PHN increases with age. An older adult with shingles is more likely to develop PHN and have longer lasting and more severe pain than a younger person with shingles. Shingles is caused by the varicella zoster virus, the same virus that causes chickenpox. After you have chickenpox, the virus stays in your body and can cause shingles later in life. Shingles cannot be passed from one person to another, but the virus that causes shingles can spread and cause chickenpox in someone who had never had chickenpox or received chickenpox vaccine. Live shingles vaccine  Live shingles vaccine can provide protection against shingles and PHN. Another type of shingles vaccine, recombinant shingles vaccine, is the preferred vaccine for the prevention of shingles. However, live shingles vaccine may be used in some circumstances (for example if a person is allergic to recombinant shingles vaccine or prefers live shingles vaccine, or if recombinant shingles vaccine is not available). Adults 60 years and older who get live shingles vaccine should receive 1 dose, administered by injection. Shingles vaccine may be given at the same time as other vaccines. Talk with your health care provider  Tell your vaccine provider if the person getting the vaccine:  · Has had an allergic reaction after a previous dose of live shingles vaccine or varicella vaccine, or has any severe, life-threatening allergies. · Has a weakened immune system. · Is pregnant or thinks she might be pregnant. · Is currently experiencing an episode of shingles. In some cases, your health care provider may decide to postpone shingles vaccination to a future visit. People with minor illnesses, such as a cold, may be vaccinated. People who are moderately or severely ill should usually wait until they recover before getting live shingles vaccine. Your health care provider can give you more information. Risks of a vaccine reaction  · Redness, soreness, swelling, or itching at the site of the injection and headache can happen after live shingles vaccine. Rarely, live shingles vaccine can cause rash or shingles. People sometimes faint after medical procedures, including vaccination. Tell your provider if you feel dizzy or have vision changes or ringing in the ears. As with any medicine, there is a very remote chance of a vaccine causing a severe allergic reaction, other serious injury, or death. What if there is a serious problem? An allergic reaction could occur after the vaccinated person leaves the clinic.  If you see signs of a severe allergic reaction (hives, swelling of the face and throat, difficulty breathing, a fast heartbeat, dizziness, or weakness), call 9-1-1 and get the person to the nearest hospital.  For other signs that concern you, call your health care provider. Adverse reactions should be reported to the Vaccine Adverse Event Reporting System (VAERS). Your health care provider will usually file this report, or you can do it yourself. Visit the VAERS website at www.vaers. [facility].gov or call 9-769.487.8496. VAERS is only for reporting reactions, and VAERS staff do not give medical advice. How can I learn more? · Ask your health care provider. · Call your local or state health department. · Contact the Centers for Disease Control and Prevention (CDC):  ? Call 3-331.476.2279 (1-800-CDC-INFO) or  ? Visit CDC's website at www.cdc.gov/vaccines  Vaccine Information Statement  Live Zoster Vaccine  10/30/2019  [facility] and [facility] for Disease Control and Prevention  Many Vaccine Information Statements are available in Irish and other languages. See www.immunize.org/vis   Hojas de Información Sobre Vacunas están disponibles en Español y en muchos otros idiomas. Visite Mer.si   Care instructions adapted under license by [facility] ([facility]). If you have questions about a medical condition or this instruction, always ask your healthcare professional. [place] [address] any warranty or liability for your use of this information.

## 2021-04-08 ENCOUNTER — HOSPITAL ENCOUNTER (OUTPATIENT)
Age: 86
Discharge: HOME | End: 2021-04-08
Payer: MEDICARE

## 2021-04-08 DIAGNOSIS — I50.42 CHRONIC COMBINED SYSTOLIC AND DIASTOLIC CONGESTIVE HEART FAILURE (HCC): ICD-10-CM

## 2021-04-08 DIAGNOSIS — E66.09 CLASS 2 OBESITY DUE TO EXCESS CALORIES WITH BODY MASS INDEX (BMI) OF 35.0 TO 35.9 IN ADULT, UNSPECIFIED WHETHER SERIOUS COMORBIDITY PRESENT: ICD-10-CM

## 2021-04-08 DIAGNOSIS — I10 ESSENTIAL HYPERTENSION: ICD-10-CM

## 2021-04-08 DIAGNOSIS — E78.2 MIXED HYPERLIPIDEMIA: ICD-10-CM

## 2021-04-08 DIAGNOSIS — Z95.820 S/P ANGIOPLASTY WITH STENT: ICD-10-CM

## 2021-04-08 DIAGNOSIS — I25.10 ASHD (ARTERIOSCLEROTIC HEART DISEASE): ICD-10-CM

## 2021-04-08 DIAGNOSIS — Z86.73 HISTORY OF STROKE: ICD-10-CM

## 2021-04-08 LAB
BNP INTERPRETATION: NORMAL
PRO-BNP: 164 PG/ML

## 2021-04-08 PROCEDURE — 83880 ASSAY OF NATRIURETIC PEPTIDE: CPT

## 2021-04-08 PROCEDURE — 36415 COLL VENOUS BLD VENIPUNCTURE: CPT

## 2021-04-08 PROCEDURE — 80061 LIPID PANEL: CPT

## 2021-04-09 LAB
CHOLESTEROL/HDL RATIO: 3.1
CHOLESTEROL: 141 MG/DL
HDLC SERPL-MCNC: 46 MG/DL
LDL CHOLESTEROL: 57 MG/DL (ref 0–130)
TRIGL SERPL-MCNC: 190 MG/DL
VLDLC SERPL CALC-MCNC: ABNORMAL MG/DL (ref 1–30)

## 2021-04-12 ENCOUNTER — TELEPHONE (OUTPATIENT)
Dept: CARDIOLOGY | Age: 86
End: 2021-04-12

## 2021-04-12 NOTE — TELEPHONE ENCOUNTER
----- Message from Anni Aponte MD sent at 4/10/2021 11:06 AM EDT -----  Blood work is good.   Thank you

## 2021-04-14 RX ORDER — FUROSEMIDE 40 MG/1
TABLET ORAL
Qty: 180 TABLET | Refills: 1 | Status: SHIPPED | OUTPATIENT
Start: 2021-04-14 | End: 2021-10-06

## 2021-04-14 NOTE — TELEPHONE ENCOUNTER
Phone call from wife requesting refill. Health Maintenance   Topic Date Due    Shingles Vaccine (2 of 3) 02/26/2012    TSH testing  03/09/2021    DTaP/Tdap/Td vaccine (1 - Tdap) 03/08/2022 (Originally 10/15/1954)    Potassium monitoring  03/23/2022    Creatinine monitoring  03/23/2022    Lipid screen  04/08/2022    Flu vaccine  Completed    Pneumococcal 65+ years Vaccine  Completed    COVID-19 Vaccine  Completed    Hepatitis A vaccine  Aged Out    Hib vaccine  Aged Out    Meningococcal (ACWY) vaccine  Aged Out             (applicable per patient's age: Cancer Screenings, Depression Screening, Fall Risk Screening, Immunizations)    Hemoglobin A1C (%)   Date Value   03/23/2021 7.6 (H)   09/08/2020 7.8 (H)   03/09/2020 7.7 (H)     Microalb/Crt.  Ratio (mcg/mg creat)   Date Value   09/08/2020 38 (H)     LDL Cholesterol (mg/dL)   Date Value   04/08/2021 57     AST (U/L)   Date Value   03/23/2021 49 (H)     ALT (U/L)   Date Value   03/23/2021 66 (H)     BUN (mg/dL)   Date Value   03/23/2021 20      (goal A1C is < 7)   (goal LDL is <100) need 30-50% reduction from baseline     BP Readings from Last 3 Encounters:   04/07/21 118/70   03/30/21 118/69   03/08/21 118/70    (goal /80)      All Future Testing planned in [facility]:  Lab Frequency Next Occurrence   US CAROTID ARTERY BILATERAL Once 03/08/2021   CBC Auto Differential Once 10/04/2021   ALT Once 10/07/2021   AST Once 27/01/4614   Basic Metabolic Panel Once 64/15/6375   Lipid Panel Once 10/04/2021   Microalbumin, Ur Once 10/04/2021   T4, Free Once 10/04/2021   TSH without Reflex Once 10/04/2021       Next Visit Date:  Future Appointments   Date Time Provider Gisela Harding   4/15/2021 10:20 AM Sonja Aviles MD TIFF NEURO [facility]   7/6/2021  2:20 PM Lanre Lainez MD TIFF CARD [facility]   10/7/2021  2:00 PM GENA Pereira - CNP Tiff Prim Ca [facility]   12/6/2021  2:00 PM Ruy Traore MD TIFF PULM [facility]            Patient Active Problem List:     Obesity (BMI 30.0-34. 9)     Venous (peripheral) insufficiency     Hx pulmonary embolism     Hearing impaired     Edema     Type 2 diabetes mellitus with complication, without long-term current use of insulin (HCC)     Knee osteoarthritis     Chronic combined systolic and diastolic CHF (congestive heart failure) (HCC)     COPD (chronic obstructive pulmonary disease) (HCC)     Adrenal adenoma, left     Elevated liver enzymes     Hypothyroidism due to Hashimoto's thyroiditis     Moderate persistent asthma without complication

## 2021-04-15 ENCOUNTER — TELEPHONE (OUTPATIENT)
Dept: NEUROLOGY | Age: 86
End: 2021-04-15

## 2021-04-15 ENCOUNTER — OFFICE VISIT (OUTPATIENT)
Dept: NEUROLOGY | Age: 86
End: 2021-04-15
Payer: MEDICARE

## 2021-04-15 VITALS
HEIGHT: 71 IN | WEIGHT: 254.3 LBS | RESPIRATION RATE: 18 BRPM | BODY MASS INDEX: 35.6 KG/M2 | DIASTOLIC BLOOD PRESSURE: 62 MMHG | HEART RATE: 83 BPM | TEMPERATURE: 97.5 F | SYSTOLIC BLOOD PRESSURE: 122 MMHG

## 2021-04-15 DIAGNOSIS — H53.461 RIGHT HOMONYMOUS HEMIANOPSIA: ICD-10-CM

## 2021-04-15 DIAGNOSIS — I63.9 OCCIPITAL CEREBRAL INFARCTION (HCC): Primary | ICD-10-CM

## 2021-04-15 DIAGNOSIS — G25.0 ESSENTIAL TREMOR: ICD-10-CM

## 2021-04-15 PROCEDURE — 99213 OFFICE O/P EST LOW 20 MIN: CPT | Performed by: NEUROMUSCULOSKELETAL MEDICINE, SPORTS MEDICINE

## 2021-04-15 NOTE — PROGRESS NOTES
NEUROLOGY Follow up    Patient Name:  Mack Weeks  :   1935  Clinic Visit Date: 4/15/2021    I saw Mr. Mack Weeks  in the neurology clinic today for evaluation of right-sided visual field defect which he developed suddenly in the early part of March. Patient is not clear as to the exact date of onset of the symptoms . He did not think much of it for a few days and later on, work-up revealed a left occipital ischemic stroke, on a  CT scan of the brain. Cardiac work-up included an echocardiogram which was negative for intramural thrombi. Carotid Doppler studies were unremarkable. He is currently on  prolonged cardiac monitoring, probably  to rule out paroxysmal fibrillation, that may have triggered the stroke. He denies having had headache, double vision, facial numbness, slurred speech difficulty swallowing or weakness in extremities, when he developed the visual field defect. The other neurological  problem is chronic essential tremors for which he is on combination of propranolol 80 mg twice daily and Mysoline. He says that he takes only 50 mg of Mysoline twice daily. He continues to have tremors, with difficulty in eating his food, writing and other such simple activities of daily living. Other medical problems include diabetes, COPD, CAD with stent placement, hypothyroidism,    REVIEW OF SYSTEMS    Constitutional Weight changes: absent, change in appetite: absent Fatigue: absent; Fevers : absent, Any recent hospitalizations:  absent   HEENT Ears: normal,  Visual disturbance: absent   Respiratory Shortness of breath: absent, choking:  absent, Cough: absent, Snoring : absent   Cardiovascular Chest pain: absent, Leg swelling :present, palpitations : absent, fainting : absent   GI Constipation: absent, Diarrhea: absent, Swallowing change: absent    Urinary frequency: present, Urinary urgency: absent, Urinary incontinence: absent   Musculoskeletal Neck pain: absent, Back pain: present, Activity    Alcohol use: Yes     Comment: occasional- maybe every other day    Drug use: No    Sexual activity: Not on file   Lifestyle    Physical activity     Days per week: Not on file     Minutes per session: Not on file    Stress: Not on file   Relationships    Social connections     Talks on phone: Not on file     Gets together: Not on file     Attends [employer] service: Not on file     Active member of club or organization: Not on file     Attends meetings of clubs or organizations: Not on file     Relationship status:     Intimate partner violence     Fear of current or ex partner: Not on file     Emotionally abused: Not on file     Physically abused: Not on file     Forced sexual activity: Not on file   Other Topics Concern    Not on file   Social History Narrative    Not on file       Family History   Problem Relation Age of Onset    Cancer Mother 47        liver ca    Diabetes Father     Heart Disease Father        Current Outpatient Medications   Medication Sig Dispense Refill    furosemide (LASIX) 40 MG tablet TAKE 1 TABLET BY MOUTH TWICE DAILY 180 tablet 1    atorvastatin (LIPITOR) 40 MG tablet Take 1 tablet by mouth daily 30 tablet 3    clopidogrel (PLAVIX) 75 MG tablet Take 1 tablet by mouth daily 90 tablet 1    levothyroxine (SYNTHROID) 112 MCG tablet Take 1 tablet by mouth Daily 90 tablet 1    clotrimazole-betamethasone (LOTRISONE) 1-0.05 % cream Apply topically 2 times daily.  15 g 0    albuterol (PROVENTIL) (2.5 MG/3ML) 0.083% nebulizer solution Take 3 mLs by nebulization every 4 hours as needed for Wheezing or Shortness of Breath 120 each 3    alogliptin (NESINA) 25 MG TABS tablet Take 25 mg by mouth daily      propranolol (INDERAL) 80 MG tablet Take 1 tablet by mouth 2 times daily The dose of propranolol has been changed to 80 mg two times a  day 60 tablet 1    primidone (MYSOLINE) 50 MG tablet Take 3 tablets by mouth daily Please note: He should take (50mg) 1 tablet in AM + 2 tabs at night 90 tablet 2    blood glucose monitor strips accu check 100 strip 3    aspirin 81 MG tablet Take 81 mg by mouth daily       albuterol sulfate  (90 Base) MCG/ACT inhaler Inhale 2 puffs into the lungs every 4 hours as needed for Wheezing or Shortness of Breath 1 Inhaler 2    budesonide-formoterol (SYMBICORT) 160-4.5 MCG/ACT AERO Inhale 2 puffs into the lungs 2 times daily      metFORMIN (GLUCOPHAGE) 1000 MG tablet Take 1,000 mg by mouth 2 times daily       acetaminophen (TYLENOL) 500 MG tablet Take 1,000 mg by mouth every 6 hours as needed for Pain      TAMSULOSIN HCL PO Take 0.4 mg by mouth Daily with supper       Multiple Vitamins-Minerals (THERAPEUTIC MULTIVITAMIN-MINERALS) tablet Take 1 tablet by mouth daily      zoster recombinant adjuvanted vaccine (SHINGRIX) 50 MCG/0.5ML SUSR injection Inject 0.5 mLs into the muscle See Admin Instructions 1 dose now and repeat in 2-6 months 0.5 mL 0     No current facility-administered medications for this visit. DATA:  Lab Results   Component Value Date    WBC 9.2 09/08/2020    HGB 13.1 09/08/2020     09/08/2020    CHOL 141 04/08/2021    TRIG 190 (H) 04/08/2021    HDL 46 04/08/2021    ALT 66 (H) 03/23/2021    AST 49 (H) 03/23/2021     (L) 03/23/2021    K 4.8 03/23/2021    CL 97 (L) 03/23/2021    CREATININE 1.07 03/23/2021    BUN 20 03/23/2021    CO2 28 03/23/2021    TSH 6.91 (H) 03/09/2020    INR 1.1 09/19/2017    LABA1C 7.6 (H) 03/23/2021    LABMICR 38 (H) 09/08/2020       /62 (Site: Left Upper Arm, Position: Sitting, Cuff Size: Medium Adult)   Pulse 83   Temp 97.5 °F (36.4 °C) (Tympanic)   Resp 18   Ht 5' 11\" (1.803 m)   Wt 254 lb 4.8 oz (115.3 kg)   BMI 35.47 kg/m²     NEUROLOGICAL EXAMINATION:     MENTAL STATUS: Patient is alert and oriented. There is no confusion or aphasia. Memory is normal.     CRANIAL NERVES: Pupils are equal and reactive. EOMS are equal in all directions.   There is no

## 2021-04-15 NOTE — TELEPHONE ENCOUNTER
Star wife called to inform us Iman Perry takes 1 primidone twice a day. Do you want him to continue as is or increase to 2 tabs at night?    Please advise

## 2021-04-15 NOTE — PATIENT INSTRUCTIONS
SURVEY:    You may be receiving a survey from [employer] regarding your visit today. Please complete the survey to enable us to provide the highest quality of care to you and your family. If you cannot score us a very good on any question, please call the office to discuss how we could have made your experience a very good one. Thank you.

## 2021-04-20 ENCOUNTER — OFFICE VISIT (OUTPATIENT)
Dept: PRIMARY CARE CLINIC | Age: 86
End: 2021-04-20
Payer: OTHER GOVERNMENT

## 2021-04-20 VITALS
BODY MASS INDEX: 36.06 KG/M2 | RESPIRATION RATE: 18 BRPM | TEMPERATURE: 97.6 F | OXYGEN SATURATION: 94 % | HEIGHT: 71 IN | SYSTOLIC BLOOD PRESSURE: 132 MMHG | HEART RATE: 62 BPM | DIASTOLIC BLOOD PRESSURE: 78 MMHG | WEIGHT: 257.6 LBS

## 2021-04-20 DIAGNOSIS — J44.1 COPD EXACERBATION (HCC): Primary | ICD-10-CM

## 2021-04-20 PROCEDURE — 99213 OFFICE O/P EST LOW 20 MIN: CPT | Performed by: NURSE PRACTITIONER

## 2021-04-20 RX ORDER — AZITHROMYCIN 250 MG/1
250 TABLET, FILM COATED ORAL SEE ADMIN INSTRUCTIONS
Qty: 6 TABLET | Refills: 0 | Status: SHIPPED | OUTPATIENT
Start: 2021-04-20 | End: 2021-04-25

## 2021-04-20 RX ORDER — PREDNISONE 20 MG/1
40 TABLET ORAL DAILY
Qty: 10 TABLET | Refills: 0 | Status: SHIPPED | OUTPATIENT
Start: 2021-04-20 | End: 2021-04-25

## 2021-04-20 ASSESSMENT — ENCOUNTER SYMPTOMS
TROUBLE SWALLOWING: 0
SHORTNESS OF BREATH: 1
NAUSEA: 0
COUGH: 1
RHINORRHEA: 1
SORE THROAT: 0
SINUS PAIN: 0
WHEEZING: 1
VOMITING: 0

## 2021-04-20 NOTE — PATIENT INSTRUCTIONS
SURVEY:    You may be receiving a survey from [facility] regarding your visit today. Please complete the survey to enable us to provide the highest quality of care to you and your family. If you cannot score us a very good on any question, please call the office to discuss how we could have made your experience a very good one. Thank you. Patient Education        Chronic Obstructive Pulmonary Disease (COPD) Flare-Ups: Care Instructions  Your Care Instructions     Chronic obstructive pulmonary disease (COPD) is a lung disease that makes it hard to breathe. It is caused by damage to the lungs over many years, usually from smoking. COPD is often a mix of two diseases:  · Chronic bronchitis: The airways that carry air to the lungs (bronchial tubes) get inflamed and make a lot of mucus. This can narrow or block the airways. · Emphysema: In a healthy person, the tiny air sacs in the lungs are like balloons. As you breathe in and out, they get bigger and smaller to move air through your lungs. But with emphysema, these air sacs are damaged and lose their stretch. Less air gets in and out of the lungs. Many people with COPD have attacks called flare-ups or exacerbations. This is when your usual symptoms quickly get worse and stay worse. The doctor has checked you carefully. But problems can develop later. If you notice any problems or new symptoms, get medical treatment right away. Follow-up care is a key part of your treatment and safety. Be sure to make and go to all appointments, and call your doctor if you are having problems. It's also a good idea to know your test results and keep a list of the medicines you take. How can you care for yourself at home? · Be safe with medicines. Take your medicines exactly as prescribed. Call your doctor if you think you are having a problem with your medicine. You may be taking medicines such as:  ? Bronchodilators.  These help open your airways and make breathing instructions adapted under license by [facility] ([facility]). If you have questions about a medical condition or this instruction, always ask your healthcare professional. [address] any warranty or liability for your use of this information.

## 2021-04-20 NOTE — PROGRESS NOTES
[facility] WALK-IN CARE  [address] [facility] [address] [facility]  Dept: [phone]  Dept Fax: [phone]    Britton Guo is a 82 y.o. male who presentsto the [facility] in Care today for his medical conditions/complaints as noted below. Britton uGo is c/o of Other (x 4 days. c/o cough, wheezing, SOB, drainage. )      HPI:     Earnest Delaney is here today for a walk-in visit with his wife. Cough  This is a new problem. The current episode started in the past 7 days. The problem has been unchanged. The cough is productive of sputum and productive of purulent sputum (yellow produtive sputum). Associated symptoms include nasal congestion, rhinorrhea, shortness of breath and wheezing. Pertinent negatives include no chest pain, ear pain, fever, postnasal drip or sore throat. The symptoms are aggravated by lying down. He has tried a beta-agonist inhaler (has been using nebulizer and albuterol) for the symptoms. His past medical history is significant for COPD.        Past Medical History:   Diagnosis Date    COPD [facility]chronic obstructive [facility] disease) [facility] [facility]     Diabetes mellitus ([facility] [address] [facility]     Hx of echocardi[facility] 02/24/2017    [facility]    Hyperlipidemia     Hypothyroidism     MI (myocardial infarction) ([facility] [address]     Thyroid disease     Type II or unspecified type diabetes mellitus without mention of complication, not stated as uncontrolled         Current Outpatient Medications   Medication Sig Dispense Refill    predniSONE (DELTASONE) 20 MG tablet Take 2 tablets by mouth daily for 5 days 10 tablet 0    azithromycin (ZITHROMAX) 250 MG tablet Take 1 tablet by mouth See Admin Instructions for 5 days 500mg on day 1 followed by 250mg on days 2 - 5 6 tablet 0    furosemide (LASIX) 40 MG tablet TAKE 1 TABLET BY MOUTH TWICE DAILY 180 tablet 1    atorvastatin (LIPITOR) 40 MG tablet Take 1 tablet by mouth daily 30 tablet 3    clopidogrel (PLAVIX) 75 MG tablet Take 1 tablet by mouth daily 90 tablet 1    levothyroxine (SYNTHROID) 112 MCG tablet Take 1 tablet by mouth Daily 90 tablet 1    albuterol (PROVENTIL) (2.5 MG/3ML) 0.083% nebulizer solution Take 3 mLs by nebulization every 4 hours as needed for Wheezing or Shortness of Breath 120 each 3    alogliptin (NESINA) 25 MG TABS tablet Take 25 mg by mouth daily      blood glucose monitor strips accu check 100 strip 3    aspirin 81 MG tablet Take 81 mg by mouth daily       albuterol sulfate  (90 Base) MCG/ACT inhaler Inhale 2 puffs into the lungs every 4 hours as needed for Wheezing or Shortness of Breath 1 Inhaler 2    budesonide-formoterol (SYMBICORT) 160-4.5 MCG/ACT AERO Inhale 2 puffs into the lungs 2 times daily      metFORMIN (GLUCOPHAGE) 1000 MG tablet Take 1,000 mg by mouth 2 times daily       acetaminophen (TYLENOL) 500 MG tablet Take 1,000 mg by mouth every 6 hours as needed for Pain      TAMSULOSIN HCL PO Take 0.4 mg by mouth Daily with supper       Multiple Vitamins-Minerals (THERAPEUTIC MULTIVITAMIN-MINERALS) tablet Take 1 tablet by mouth daily      zoster recombinant adjuvanted vaccine [facility]) 50 MCG/0.5ML SUSR injection Inject 0.5 mLs into the muscle See Admin Instructions 1 dose now and repeat in 2-6 months (Patient not taking: Reported on 4/20/2021) 0.5 mL 0    clotrimazole-betamethasone (LOTRISONE) 1-0.05 % cream Apply topically 2 times daily. (Patient not taking: Reported on 4/20/2021) 15 g 0    propranolol (INDERAL) 80 MG tablet Take 1 tablet by mouth 2 times daily The dose of propranolol has been changed to 80 mg two times a  day 60 tablet 1    primidone (MYSOLINE) 50 MG tablet Take 3 tablets by mouth daily Please note: He should take (50mg) 1 tablet in AM + 2 tabs at night 90 tablet 2     No current facility-administered medications for this visit. Allergies   Allergen Reactions    Rosuvastatin        Subjective:      Review of Systems   Constitutional: Positive for fatigue.

## 2021-04-27 ENCOUNTER — TELEPHONE (OUTPATIENT)
Dept: CARDIOLOGY | Age: 86
End: 2021-04-27

## 2021-04-27 NOTE — TELEPHONE ENCOUNTER
----- Message from Jesse Goddard MD sent at 4/27/2021  8:12 AM EDT -----  Heart monitor is good. Continue current therapy and follow-up.   Thank you

## 2021-05-03 NOTE — TELEPHONE ENCOUNTER
Writer called Faith Dior to confirm dosage of primidone . She stated he gets it from the [place] and he is taking 250 mg twice daily. She states reading it off label on bottle.  Please advise

## 2021-05-03 NOTE — TELEPHONE ENCOUNTER
Writer called [facility] and was told they havent  Filled script since 2019. Called patient and he states he gets primidone from doctor at [place] . States he takes 250 mg twice a day.  Does not know [place] doctors name

## 2021-07-06 ENCOUNTER — OFFICE VISIT (OUTPATIENT)
Dept: CARDIOLOGY | Age: 86
End: 2021-07-06
Payer: OTHER GOVERNMENT

## 2021-07-06 VITALS
SYSTOLIC BLOOD PRESSURE: 119 MMHG | RESPIRATION RATE: 18 BRPM | BODY MASS INDEX: 35.98 KG/M2 | OXYGEN SATURATION: 93 % | HEIGHT: 71 IN | DIASTOLIC BLOOD PRESSURE: 73 MMHG | WEIGHT: 257 LBS | HEART RATE: 65 BPM

## 2021-07-06 DIAGNOSIS — I25.10 ASHD (ARTERIOSCLEROTIC HEART DISEASE): ICD-10-CM

## 2021-07-06 DIAGNOSIS — E66.09 CLASS 2 OBESITY DUE TO EXCESS CALORIES WITH BODY MASS INDEX (BMI) OF 35.0 TO 35.9 IN ADULT, UNSPECIFIED WHETHER SERIOUS COMORBIDITY PRESENT: ICD-10-CM

## 2021-07-06 DIAGNOSIS — E03.8 HYPOTHYROIDISM DUE TO HASHIMOTO'S THYROIDITIS: ICD-10-CM

## 2021-07-06 DIAGNOSIS — E66.01 SEVERE OBESITY (BMI 35.0-35.9 WITH COMORBIDITY) (HCC): ICD-10-CM

## 2021-07-06 DIAGNOSIS — E06.3 HYPOTHYROIDISM DUE TO HASHIMOTO'S THYROIDITIS: ICD-10-CM

## 2021-07-06 DIAGNOSIS — I50.42 CHRONIC COMBINED SYSTOLIC AND DIASTOLIC CONGESTIVE HEART FAILURE (HCC): Primary | ICD-10-CM

## 2021-07-06 DIAGNOSIS — Z95.820 S/P ANGIOPLASTY WITH STENT: ICD-10-CM

## 2021-07-06 DIAGNOSIS — I10 ESSENTIAL HYPERTENSION: ICD-10-CM

## 2021-07-06 DIAGNOSIS — Z86.73 HISTORY OF STROKE: ICD-10-CM

## 2021-07-06 DIAGNOSIS — E78.2 MIXED HYPERLIPIDEMIA: ICD-10-CM

## 2021-07-06 PROCEDURE — 99214 OFFICE O/P EST MOD 30 MIN: CPT | Performed by: INTERNAL MEDICINE

## 2021-07-06 PROCEDURE — 93005 ELECTROCARDIOGRAM TRACING: CPT | Performed by: INTERNAL MEDICINE

## 2021-07-06 PROCEDURE — 93000 ELECTROCARDIOGRAM COMPLETE: CPT | Performed by: INTERNAL MEDICINE

## 2021-07-06 PROCEDURE — 99211 OFF/OP EST MAY X REQ PHY/QHP: CPT | Performed by: INTERNAL MEDICINE

## 2021-07-06 RX ORDER — LEVOTHYROXINE SODIUM 112 UG/1
112 TABLET ORAL DAILY
Qty: 90 TABLET | Refills: 1 | Status: SHIPPED | OUTPATIENT
Start: 2021-07-06 | End: 2022-01-24

## 2021-07-06 RX ORDER — OMEPRAZOLE 20 MG/1
20 CAPSULE, DELAYED RELEASE ORAL DAILY
COMMUNITY

## 2021-07-06 NOTE — PROGRESS NOTES
Cyrus Ware am scribing for and in the presence of Veda Pascal MD, F.A.C.C..    Subjective:     [address] [facility] / [facility]":    Chief Complaint   Patient presents with    Follow-up     Hx: CAD s/p stent, CHF, stroke, HTN, HLD, obesity. pt is here for 3 month f/u. Pt is doing good. SOB on exertion Denies: CP, dizziness, lightheaded, palps       Susan Cottoer is 82 y.o. male who presents today for follow-up. 1-He has history of coronary artery disease, myocardial infarction and primary PCI in 2/2017 done at [facility],  [address] [place] [address]. He also has history of ischemic cardiomyopathy and chronic systolic CHF, NYHA class III. 2-An admission to [facility] on 9/19/2017 with COPD exacerbation, during this admission his lisinopril changed to Cozaar because of chronic cough. 3-Another visit to the emergency room on 10/3/2017 with cough, shortness of breath and wheezing. 4- He saw Dr. Bolivar Richards at [facility] in November 2018, no changes in medication done. 5-An admission to [facility] on 4/6/2018 with acute on chronic CHF. 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    7- EKG done in office (8/20/2019)- Sinus Rhythm with 1st degree AV block. 71 bpm.    8- EKG done in office on 7/14/2020. No acute ischemic changes. Mr. Rosenda Rivera is here for a 3 month follow up. He is doing well today. He has had no further mini strokes. He does have some shortness of breath on exertion. He does use a cane for ambulation, but not at home. He does some stretching exercises at home. He denies any chest pain, pressure or tightness. He denies any lightheaded/dizziness or palpitations. No cough, fever or chills. No abdominal pain, nausea or vomiting. No bleeding problems, bowel issues, problems with medications or any other concerns at this time. He sleeps very easily at night. His appetite is okay. He is drinking enough fluids.      EKG done in office today 2021- no acute ischemic changes    Past Medical History:    Past Medical History:   Diagnosis Date    [facility] obstructive [facility] disease) ([facility] [address]     Diabetes mellitus ([facility] [address]     [facility] 2017    [facility]    Hyperlipidemia     Hypothyroidism     MI (myocardial infarction) ([facility] [address]     Thyroid disease     Type II or unspecified type diabetes mellitus without mention of complication, not stated as uncontrolled      Past Surgical History:  Past Surgical History:   Procedure Laterality Date    CARDIAC SURGERY  2017    Stent placed  Dr Emmanuel Shell      right ankle    HERNIA REPAIR       Social History:    Social History     Tobacco Use   Smoking Status Former Smoker    Quit date:     Years since quittin.5   Smokeless Tobacco Never Used     Current Medications:  Outpatient Medications Marked as Taking for the 21 encounter (Office Visit) with Lindsay Ramirez MD   Medication Sig Dispense Refill    omeprazole (PRILOSEC) 20 MG delayed release capsule Take 20 mg by mouth daily      furosemide (LASIX) 40 MG tablet TAKE 1 TABLET BY MOUTH TWICE DAILY 180 tablet 1    zoster recombinant adjuvanted vaccine (SHINGRIX) 50 MCG/0.5ML SUSR injection Inject 0.5 mLs into the muscle See Admin Instructions 1 dose now and repeat in 2-6 months 0.5 mL 0    atorvastatin (LIPITOR) 40 MG tablet Take 1 tablet by mouth daily 30 tablet 3    clopidogrel (PLAVIX) 75 MG tablet Take 1 tablet by mouth daily 90 tablet 1    [DISCONTINUED] levothyroxine (SYNTHROID) 112 MCG tablet Take 1 tablet by mouth Daily 90 tablet 1    clotrimazole-betamethasone (LOTRISONE) 1-0.05 % cream Apply topically 2 times daily.  15 g 0    albuterol (PROVENTIL) (2.5 MG/3ML) 0.083% nebulizer solution Take 3 mLs by nebulization every 4 hours as needed for Wheezing or Shortness of Breath 120 each 3    alogliptin (NESINA) 25 MG TABS tablet Take 25 mg by mouth daily  propranolol (INDERAL) 80 MG tablet Take 1 tablet by mouth 2 times daily The dose of propranolol has been changed to 80 mg two times a  day (Patient taking differently: Take 80 mg by mouth 2 times daily The dose of propranolol has been changed to 120 mg two times a  day) 60 tablet 1    blood glucose monitor strips accu check 100 strip 3    aspirin 81 MG tablet Take 81 mg by mouth daily       albuterol sulfate  (90 Base) MCG/ACT inhaler Inhale 2 puffs into the lungs every 4 hours as needed for Wheezing or Shortness of Breath 1 Inhaler 2    budesonide-formoterol (SYMBICORT) 160-4.5 MCG/ACT AERO Inhale 2 puffs into the lungs 2 times daily      metFORMIN (GLUCOPHAGE) 1000 MG tablet Take 1,000 mg by mouth 2 times daily       acetaminophen (TYLENOL) 500 MG tablet Take 1,000 mg by mouth every 6 hours as needed for Pain      TAMSULOSIN HCL PO Take 0.8 mg by mouth Daily with supper       Multiple Vitamins-Minerals (THERAPEUTIC MULTIVITAMIN-MINERALS) tablet Take 1 tablet by mouth daily         REVIEW OF SYSTEMS:    CONSTITUTIONAL: No major weight gain or loss, fatigue, weakness, night sweats or fever. HEENT: No new vision difficulties or ringing in the ears. RESPIRATORY: See HPI  CARDIOVASCULAR: See HPI  GI: No nausea, vomiting, diarrhea, constipation, abdominal pain or changes in bowel habits. : No urinary frequency, urgency, incontinence hematuria or dysuria. SKIN: No cyanosis or skin lesions. MUSCULOSKELETAL: No new muscle or joint pain. NEUROLOGICAL: No syncope or TIA-like symptoms. PSYCHIATRIC: No anxiety, pain, insomnia or depression    Objective:     PHYSICAL EXAM:      VITALS:  /73 (Site: Left Upper Arm, Position: Sitting, Cuff Size: Large Adult)   Pulse 65   Resp 18   Ht 5' 10.98\" (1.803 m)   Wt 257 lb (116.6 kg)   SpO2 93%   BMI 35.86 kg/m²   CONSTITUTIONAL: Cooperative, no apparent distress, and appears well nourished / developed.   NEUROLOGIC:  Awake and orientated to person, place and time. PSYCH: Calm affect. SKIN: Warm and dry. HEENT: Sclera non-icteric, normocephalic, neck supple, no elevation of JVP, normal carotid pulses with no bruits and thyroid normal size. LUNGS:  Poor air entry bilaterally . No significant wheezing or crakles. No significant crackles. Cardiovascular: Normal rate, regular rhythm, normal heart sounds. Exam reveals no gallop and no friction rubs. 1/6 systolic murmur, 4th intercostal space on the LEFT must lateral to the sternal border. ABDOMEN:  Normal bowel sounds, non-distended and non-tender to palpation. Extremities: 2+ bilateral leg edema. no cyanosis or clubbing. 2+ radial and carotid pulses. Distal extremity pulses: 2+ bilaterally. Compression stockings in place. DATA:    Lab Results   Component Value Date    ALT 66 (H) 03/23/2021    AST 49 (H) 03/23/2021    ALKPHOS 94 01/17/2019    BILITOT 0.18 (L) 01/17/2019     Lab Results   Component Value Date    CREATININE 1.07 03/23/2021    BUN 20 03/23/2021     (L) 03/23/2021    K 4.8 03/23/2021    CL 97 (L) 03/23/2021    CO2 28 03/23/2021       Lab Results   Component Value Date    WBC 9.2 09/08/2020    HGB 13.1 09/08/2020    HCT 42.5 09/08/2020    MCV 96.8 09/08/2020     09/08/2020       Lab Results   Component Value Date    TRIG 190 (H) 04/08/2021    TRIG 97 09/08/2020    TRIG 117 03/09/2020     Lab Results   Component Value Date    HDL 46 04/08/2021    HDL 61 09/08/2020    HDL 51 03/09/2020     Lab Results   Component Value Date    LDLCHOLESTEROL 57 04/08/2021    LDLCHOLESTEROL 80 09/08/2020    LDLCHOLESTEROL 66 03/09/2020         Assessment:      Diagnosis Orders   1. Chronic combined systolic and diastolic congestive heart failure ([address]     2. ASHD (arteriosclerotic heart disease)     3. S/P angioplasty with stent     4. History of stroke     5. Essential hypertension     6. Mixed hyperlipidemia     7.  Class 2 obesity due to excess calories with body mass index (BMI) of 35.0 to 35.9 in adult, unspecified whether serious comorbidity present       Plan:     Chronic Systolic and Diastolic Heart Failure: EF 50-55% of echo done on 9/9/2019  · Beta Blocker: Continue Propranolol 120 mg BID I also discussed the potential side effects of this medication including lightheadedness and dizziness and told him to stop the medication of this occurs and call our office if this occurs. Diuretics: Continue furosemide (lasix) 40 mg twice daily. I also discussed the potential side effects of this medication including lightheadedness and dizziness and told him to call the office if this occurs. Nonpharmacologic management of Heart Failure: I told him to continue wearing lower extremity compression stockings and I advised him to try and keep his legs up whenever possible and to limit salt in his diet. I will have our heart failure nurse Jackie call him every other week to inquire about his weight, breathing, and leg swelling, medication compliance/problems. Atherosclerotic Heart Disease: S/P Stent   Antiplatelet Agent: Continue aspirin 81 mg daily and Plavix 75 mg daily. Beta Blocker: Continue Propranolol 80 mg BID   Cholesterol Reduction Therapy: Continue Atorvastatin (Lipitor) 40 mg daily. 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 Has complete resolution of his neurological dysfunction. Antiplatelet Agent: Continue aspirin 81 mg daily and Plavix 75 mg daily. Antiplatelet Agent: Continue clopidogrel (Plavix): Plavix 75 mg daily. Cholesterol Reduction Therapy: Continue Atorvastatin (Lipitor) 40 mg daily. Essential Hypertension: Controlled   · Diuretics: Continue furosemide (lasix) 40 mg twice daily. I also discussed the potential side effects of this medication including lightheadedness and dizziness and told him to call the office if this occurs.    · Beta Blocker: Continue Propranolol 120 mg BID     · Hyperlipidemia: Mixed - Last LDL on 4/8/2021 was 57 mg/dL  · Cholesterol Reduction Therapy: Continue Atorvastatin (Lipitor) 40 mg daily. I discussed the potential benefits of statin therapy as well as the potential risks including myalgia as well as the rare but potentially serious complication of liver or kidney damage. Although rare, I told them that this could be serious and therefore told them to stop the medication immediately and call if they developed any severe muscle aches or pains and they agreed to do so. Obesity: Body mass index is 35.86 kg/m². I also briefly discussed both diet and exercise strategies for him to continue to loses weight and he was very receptive to this. FOLLOW UP:   I told Mr. Vargas to call my office if he had any problems, but otherwise told him to Return in about 6 months (around 1/6/2022). However, I would be happy to see him sooner should the need arise. Jaziel Christianson MD, MS, F.A.C.C. [facility]) [facility], [address] [place] [address], [address]  Phone: [phone], Fax: [phone]    I believe that the risk of significant morbidity and mortality related to the patient's current medical conditions are: intermediate-high. >30 minutes were spent during prep work, discussion and exam of the patient, and follow up documentation and all of their questions were answered. The documentation recorded by the scribe, accurately and completely reflects the services I personally performed and the decisions made by me. Jaziel Christianson MD, F.A.C.C.  July 6, 2021

## 2021-07-06 NOTE — PATIENT INSTRUCTIONS
SURVEY:    You may be receiving a survey from [employer] regarding your visit today. Please complete the survey to enable us to provide the highest quality of care to you and your family. If you cannot score us a very good on any question, please call the office to discuss how we could have made your experience a very good one. Thank you.

## 2021-07-06 NOTE — TELEPHONE ENCOUNTER
Health Maintenance   Topic Date Due    Shingles Vaccine (2 of 3) 02/26/2012    TSH testing  03/09/2021    DTaP/Tdap/Td vaccine (1 - Tdap) 03/08/2022 (Originally 10/15/1954)    Flu vaccine (1) 09/01/2021    Potassium monitoring  03/23/2022    Creatinine monitoring  03/23/2022    Lipid screen  04/08/2022    Pneumococcal 65+ years Vaccine  Completed    COVID-19 Vaccine  Completed    Hepatitis A vaccine  Aged Out    Hib vaccine  Aged Out    Meningococcal (ACWY) vaccine  Aged Out             (applicable per patient's age: Cancer Screenings, Depression Screening, Fall Risk Screening, Immunizations)    Hemoglobin A1C (%)   Date Value   03/23/2021 7.6 (H)   09/08/2020 7.8 (H)   03/09/2020 7.7 (H)     Microalb/Crt. Ratio (mcg/mg creat)   Date Value   09/08/2020 38 (H)     LDL Cholesterol (mg/dL)   Date Value   04/08/2021 57     AST (U/L)   Date Value   03/23/2021 49 (H)     ALT (U/L)   Date Value   03/23/2021 66 (H)     BUN (mg/dL)   Date Value   03/23/2021 20      (goal A1C is < 7)   (goal LDL is <100) need 30-50% reduction from baseline     BP Readings from Last 3 Encounters:   07/06/21 119/73   04/20/21 132/78   04/15/21 122/62    (goal /80)      All Future Testing planned in [facility]:  Lab Frequency Next Occurrence   US CAROTID ARTERY BILATERAL Once 03/08/2021   CBC Auto Differential Once 10/04/2021   ALT Once 10/07/2021   AST Once 71/87/0151   Basic Metabolic Panel Once 39/68/2273   Lipid Panel Once 10/04/2021   Microalbumin, Ur Once 10/04/2021   T4, Free Once 10/04/2021   TSH without Reflex Once 10/04/2021       Next Visit Date:  Future Appointments   Date Time Provider Gisela Harding   7/15/2021  2:20 PM Usha Guevara MD [facility]   10/7/2021  2:00 PM Espsally Snider APRN - CNP [facility]   12/6/2021  2:00 PM Nay Altamirano MD [facility]            Patient Active Problem List:     Obesity (BMI 30.0-34. 9)     Venous (peripheral) insufficiency     Hx pulmonary embolism     Hearing impaired     Edema     Type 2 diabetes mellitus with complication, without long-term current use of insulin (HCC)     Knee osteoarthritis     Chronic combined systolic and diastolic CHF (congestive heart failure) (HCC)     COPD (chronic obstructive pulmonary disease) (HCC)     Adrenal adenoma, left     Elevated liver enzymes     Hypothyroidism due to Hashimoto's thyroiditis     Moderate persistent asthma without complication

## 2021-07-14 ENCOUNTER — TELEPHONE (OUTPATIENT)
Dept: CARDIOLOGY | Age: 86
End: 2021-07-14

## 2021-07-14 NOTE — TELEPHONE ENCOUNTER
Heart failure follow up call: Called patient to see how he is feeling and doing including symptoms, weight, vitals, and medication compliance-    Weight last visit was on 7/7/2021: 257 pounds  Weight today was: on 7/13/2021:251 pounds     BP: 112/64    HR: 66    Symptoms: He denies any shortness of breath, chest pain, palpitations. Please advise.  Thanks so much

## 2021-07-15 ENCOUNTER — OFFICE VISIT (OUTPATIENT)
Dept: NEUROLOGY | Age: 86
End: 2021-07-15
Payer: MEDICARE

## 2021-07-15 VITALS
HEART RATE: 72 BPM | RESPIRATION RATE: 20 BRPM | WEIGHT: 250.8 LBS | TEMPERATURE: 98 F | HEIGHT: 71 IN | BODY MASS INDEX: 35.11 KG/M2 | DIASTOLIC BLOOD PRESSURE: 74 MMHG | SYSTOLIC BLOOD PRESSURE: 146 MMHG

## 2021-07-15 DIAGNOSIS — G25.0 ESSENTIAL TREMOR: Primary | ICD-10-CM

## 2021-07-15 PROCEDURE — 99212 OFFICE O/P EST SF 10 MIN: CPT | Performed by: NEUROMUSCULOSKELETAL MEDICINE, SPORTS MEDICINE

## 2021-07-15 PROCEDURE — 99214 OFFICE O/P EST MOD 30 MIN: CPT | Performed by: NEUROMUSCULOSKELETAL MEDICINE, SPORTS MEDICINE

## 2021-07-15 RX ORDER — PROPRANOLOL HYDROCHLORIDE 120 MG/1
120 CAPSULE, EXTENDED RELEASE ORAL 2 TIMES DAILY
COMMUNITY

## 2021-07-15 RX ORDER — PRIMIDONE 250 MG/1
250 TABLET ORAL 2 TIMES DAILY
COMMUNITY

## 2021-07-15 NOTE — PROGRESS NOTES
NEUROLOGY Follow up    Patient Name:  Edvin Baird  :   1935  Clinic Visit Date: 7/15/2021    I saw Mr. Edvin Baird  in the neurology clinic today for essential tremors. Tremors have been under fairly good control but continues to have intermittent tremulousness while using his hands for simple day-to-day activities. No new neurological symptoms. He is  on primidone to 250 mg twice daily and propranolol  mg twice daily, prescribed through the [facility] system. History of stroke in the past with residual chronic right-sided hemianopsia, with unchanged symptoms. REVIEW OF SYSTEMS    Constitutional Weight changes: absent, change in appetite: absent Fatigue: absent; Fevers : absent, Any recent hospitalizations:  absent   HEENT Ears: normal,  Visual disturbance: absent   Respiratory Shortness of breath: present, choking:  absent, Cough: present, Snoring : absent   Cardiovascular Chest pain: absent, Leg swelling :present, palpitations : absent, fainting : absent   GI Constipation: absent, Diarrhea: absent, Swallowing change: absent    Urinary frequency: present, Urinary urgency: present, Urinary incontinence: absent   Musculoskeletal Neck pain: absent, Back pain: absent, Stiffness: present, Muscle pain: present, Joint pain: present, restless leg : present   Dermatological Hair loss: absent, Skin changes: absent   Neurological Confusion: absent, Trouble concentrating: present, Seizures: absent;  Memory loss: present, balance problem: absent, Dizziness: absent, vertigo: absent, Weakness: absent, Numbness absent, Tremor: present, Spasm: absent, involuntary movement: absent, Speech difficulty: absent, Headache: absent, Light sensitivity: absent   Psychiatric Anxiety: absent, Depression  absent, drug abuse: absent, Hallucination: absent, mood disorder: absent, Suicidal ideations absent   Hematologic Abnormal bleeding: absent, Anemia: absent, Lymph gland changes: absent Clotting disorder: absent     Past Medical History:   Diagnosis Date    [facility]     Diabetes mellitus ([facility]     [facility] of [facility]    Hyperlipidemia     Hypothyroidism     MI (myocardial infarction) ([facility]     Thyroid disease     Type II or unspecified type diabetes mellitus without mention of complication, not stated as uncontrolled        Past Surgical History:   Procedure Laterality Date    CARDIAC SURGERY  2017    Stent placed  Dr Breann Vo      right ankle    HERNIA REPAIR         Social History     Socioeconomic History    Marital status:      Spouse name: Not on file    Number of children: Not on file    Years of education: Not on file    Highest education level: Not on file   Occupational History    Not on file   Tobacco Use    Smoking status: Former Smoker     Quit date:      Years since quittin.5    Smokeless tobacco: Never Used   Vaping Use    Vaping Use: Never used   Substance and Sexual Activity    Alcohol use: Yes     Comment: occasional- maybe every other day    Drug use: No    Sexual activity: Not on file   Other Topics Concern    Not on file   Social History Narrative    Not on file     Social Determinants of Health     Financial Resource Strain:     Difficulty of Paying Living Expenses:    Food Insecurity:     Worried About [address] in the Last Year:     [address] in the Last Year:    Transportation Needs:     Lack of Transportation (Medical):      Lack of Transportation (Non-Medical):    Physical Activity:     Days of Exercise per Week:     Minutes of Exercise per Session:    Stress:     Feeling of Stress :    Social Connections:     Frequency of Communication with Friends and Family:     Frequency of Social Gatherings with Friends and Family:     Attends [employer] Services:     Active Member of Clubs or Organizations:     daily       acetaminophen (TYLENOL) 500 MG tablet Take 1,000 mg by mouth every 6 hours as needed for Pain      TAMSULOSIN HCL PO Take 0.8 mg by mouth Daily with supper       Multiple Vitamins-Minerals (THERAPEUTIC MULTIVITAMIN-MINERALS) tablet Take 1 tablet by mouth daily       No current facility-administered medications for this visit. DATA:  Lab Results   Component Value Date    WBC 9.2 09/08/2020    HGB 13.1 09/08/2020     09/08/2020    CHOL 141 04/08/2021    TRIG 190 (H) 04/08/2021    HDL 46 04/08/2021    ALT 66 (H) 03/23/2021    AST 49 (H) 03/23/2021     (L) 03/23/2021    K 4.8 03/23/2021    CL 97 (L) 03/23/2021    CREATININE 1.07 03/23/2021    BUN 20 03/23/2021    CO2 28 03/23/2021    TSH 6.91 (H) 03/09/2020    INR 1.1 09/19/2017    LABA1C 7.6 (H) 03/23/2021    LABMICR 38 (H) 09/08/2020       BP (!) 146/74 (Site: Left Upper Arm, Position: Sitting, Cuff Size: Medium Adult)   Pulse 72   Temp 98 °F (36.7 °C) (Temporal)   Resp 20   Ht 5' 11\" (1.803 m)   Wt 250 lb 12.8 oz (113.8 kg)   BMI 34.98 kg/m²     NEUROLOGICAL EXAMINATION:     MENTAL STATUS: Patient is alert and oriented x3. No language dysfunction. CRANIAL NERVES: Pupils are equal and reactive. EOMS are equal in all directions. There is no nystagmus or any other abnormal eye movements. Facial sensation is normal.  There is no facial weakness. Right sided dense homonymous hemianopsia. Hearing appears to be normal.  Palate and tongue movements are normal.     MOTOR EXAMINATION: Muscle tone is normal in all the limbs. There is no focal weakness. Muscle strength is 5/5 in both upper and lower limbs. Persistent intermittent mild to moderate resting tremor of both upper extremities and also has a mild head tremor. SENSORY EXAMINATION: Normal.     STRETCH REFLEXES:.  Symmetrical in both the upper and lower limbs. GAIT: There is no ataxia. IMPRESSION:    1. Essential tremors.   Stable on combination of propranolol and primidone  2. 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 3.  Diabetes. 4.  Hyperlipidemia  5. Hypothyroidism      PLAN:    1. Continue primidone 250 mg twice daily + propranolol  mg twice daily [prescribed by the [facility]]  2. Follow-up in 6 months    NOTE: This neurology evaluation is part of outpatient coverage at [facility]  1-2 days per week. Patients requiring frequent evaluations or uncomfortable with potential 3-4 day turnaround on questions or calls  may be better served by a neurologist in the area full time. [facility] at [facility]. [facility] is available for outpatient visits and procedures including EMG/NCS. Non-[facility] neurologists also practice in [facility] (Dr. Swati Hutchinson) and [facility] (Jazmín Rojas).        Kamilla Tran MD   7/15/2021  4:47 PM

## 2021-07-15 NOTE — PATIENT INSTRUCTIONS
SURVEY:    You may be receiving a survey from [employer] regarding your visit today. Please complete the survey to enable us to provide the highest quality of care to you and your family. If you cannot score us a very good on any question, please call the office to discuss how we could have made your experience a very good one. Thank you.

## 2021-07-22 ENCOUNTER — TELEPHONE (OUTPATIENT)
Dept: PRIMARY CARE CLINIC | Age: 86
End: 2021-07-22

## 2021-07-22 NOTE — TELEPHONE ENCOUNTER
Mj Sotelo, patients wife called the office in regards to needing an appointment. Mj Sotelo informed me Moriah Johnson fell in [place] 7/21/21 and they went to the ER in [place]. While in the ER they did a CT scan and it came back normal per Mj Sotelo. Mj Sotelo is wanting him to be seen for a follow up but no time slots open until August 2nd. Please advise.

## 2021-07-22 NOTE — TELEPHONE ENCOUNTER
[facility] Trans[facility] Initial Follow Up Call    Outreach made within 2 business days of discharge: Yes    Patient: Edward Razo Patient : 1935   MRN: [id number]  Reason for Admission: There are no discharge diagnoses documented for the most recent discharge. Discharge Date: 18       Spoke with: Marielena Blackburn    Discharge department/facility: [facility] Interactive Patient Contact:  Was patient able to fill all prescriptions: Yes  Was patient instructed to bring all medications to the follow-up visit: Yes  Is patient taking all medications as directed in the discharge summary?  Yes  Does patient understand their discharge instructions: Yes  Does patient have questions or concerns that need addressed prior to 7-14 day follow up office visit: no    Scheduled appointment with PCP within 7-14 days    Follow Up  Future Appointments   Date Time Provider Gisela Harding   2021 10:20 AM Helen Hammans Might, APRN - CNP [facility]   10/7/2021  2:00 PM Helen Hammans Might, APRN - CNP [facility]   2021  2:00 PM Iliana Trevino MD [facility] P[facility]   2022  2:20 PM Bren Oliver MD [facility] CARD [facility]   2022  2:40 PM Chetna Ochoa MD TIFF NEURO [facility]       Manoj Chi CMA

## 2021-07-23 ENCOUNTER — OFFICE VISIT (OUTPATIENT)
Dept: PRIMARY CARE CLINIC | Age: 86
End: 2021-07-23
Payer: MEDICARE

## 2021-07-23 VITALS
SYSTOLIC BLOOD PRESSURE: 132 MMHG | WEIGHT: 255.8 LBS | OXYGEN SATURATION: 98 % | TEMPERATURE: 97.8 F | DIASTOLIC BLOOD PRESSURE: 82 MMHG | RESPIRATION RATE: 22 BRPM | HEART RATE: 62 BPM | BODY MASS INDEX: 35.68 KG/M2

## 2021-07-23 DIAGNOSIS — S20.211S CONTUSION OF RIGHT CHEST WALL, SEQUELA: ICD-10-CM

## 2021-07-23 DIAGNOSIS — S00.83XS: Primary | ICD-10-CM
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
## 2021-07-23 PROCEDURE — 99214 OFFICE O/P EST MOD 30 MIN: CPT | Performed by: NURSE PRACTITIONER

## 2021-07-23 SDOH — ECONOMIC STABILITY: FOOD INSECURITY: WITHIN THE PAST 12 MONTHS, YOU WORRIED THAT YOUR FOOD WOULD RUN OUT BEFORE YOU GOT MONEY TO BUY MORE.: NEVER TRUE

## 2021-07-23 SDOH — ECONOMIC STABILITY: FOOD INSECURITY: WITHIN THE PAST 12 MONTHS, THE FOOD YOU BOUGHT JUST DIDN'T LAST AND YOU DIDN'T HAVE MONEY TO GET MORE.: NEVER TRUE

## 2021-07-23 ASSESSMENT — SOCIAL DETERMINANTS OF HEALTH (SDOH): HOW HARD IS IT FOR YOU TO PAY FOR THE VERY BASICS LIKE FOOD, HOUSING, MEDICAL CARE, AND HEATING?: NOT HARD AT ALL

## 2021-07-23 NOTE — PATIENT INSTRUCTIONS
SURVEY:    You may be receiving a survey from [employer] regarding your visit today. Please complete the survey to enable us to provide the highest quality of care to you and your family. If you cannot score us a very good on any question, please call the office to discuss how we could have made your experience a very good one. Thank you.   Marta Snider, APRN-GUERITA Todd, APRN-CNP  GELY Ruby LPN [place], [place] Latrelle Buttner, MA Pam, PCA

## 2021-07-23 NOTE — PROGRESS NOTES
Name: Alejandro Johnson  : 1935         Chief Complaint:     Chief Complaint   Patient presents with    ED Follow-up     fell 2 days ago, fell down 2 steps,        History of Present Illness:      Alejandro Johnson is a 82 y.o.  male who presents with ED Follow-up (fell 2 days ago, fell down 2 steps, )      Jamel Rhodes is here today for an ER follow up visit. Fall/facial injury/chest contusion- states was waling down a few steps into carpeted room and was carrying cane in one hand and bottle of water in the other. Lost balance and fell forward striking face first on the floor. Went to ER in [facility] where he was evaluated, CT of neck, head, and chest/abd were obtained. Nothing acute. Feeling fine today. See below for further. Fall  The accident occurred 5 to 7 days ago. The fall occurred while walking. He fell from a height of 1 to 2 ft. He landed on carpet. There was no blood loss. The point of impact was the head. The pain is present in the face. The pain is at a severity of 1/10. The pain is mild. The symptoms are aggravated by pressure on injury. Pertinent negatives include no abdominal pain, bowel incontinence, fever, headaches, hearing loss, hematuria, loss of consciousness, nausea, numbness, tingling, visual change or vomiting. He has tried rest and ice for the symptoms. The treatment provided significant relief. Facial Injury   The incident occurred 5 to 7 days ago. The injury mechanism was a fall. There was no loss of consciousness. There was no blood loss. The quality of the pain is described as aching. The pain is at a severity of 1/10. The pain is mild. The pain has been improving since the injury. Pertinent negatives include no blurred vision, disorientation, headaches, memory loss, numbness, tinnitus, vomiting or weakness. He has tried ice for the symptoms. The treatment provided significant relief.          Past Medical History:     Past Medical History:   Diagnosis Date    [facility]     [facility] mellitus ([facility]     [facility]    Hyperlipidemia     Hypothyroidism     MI (myocardial infarction) ([facility]     Thyroid disease     Type II or unspecified type diabetes mellitus without mention of complication, not stated as uncontrolled       Reviewed all health maintenance requirements and ordered appropriate tests  Health Maintenance Due   Topic Date Due    TSH testing  03/09/2021       Past Surgical History:     Past Surgical History:   Procedure Laterality Date    CARDIAC SURGERY  02/24/2017    Stent placed  Dr Karel Hebert      right ankle    HERNIA REPAIR          Medications:       Prior to Admission medications    Medication Sig Start Date End Date Taking?  Authorizing Provider   primidone (MYSOLINE) 250 MG tablet Take 250 mg by mouth 2 times daily   Yes Historical Provider, MD   propranolol (INDERAL LA) 120 MG extended release capsule Take 120 mg by mouth 2 times daily   Yes Historical Provider, MD   omeprazole (PRILOSEC) 20 MG delayed release capsule Take 20 mg by mouth daily   Yes Historical Provider, MD   levothyroxine (SYNTHROID) 112 MCG tablet Take 1 tablet by mouth Daily 7/6/21  Yes Gia Snider APRN - CNP   furosemide (LASIX) 40 MG tablet TAKE 1 TABLET BY MOUTH TWICE DAILY 4/14/21  Yes GENA Maurice - CNP   atorvastatin (LIPITOR) 40 MG tablet Take 1 tablet by mouth daily 3/30/21  Yes Kip Sever, MD   clopidogrel (PLAVIX) 75 MG tablet Take 1 tablet by mouth daily 3/30/21  Yes Kip Sever, MD   alogliptin (NESINA) 25 MG TABS tablet Take 25 mg by mouth daily   Yes Historical Provider, MD   blood glucose monitor strips accu check 7/18/18  Yes GENA Ashby - CNP   aspirin 81 MG tablet Take 81 mg by mouth daily    Yes Historical Provider, MD   albuterol sulfate  (90 Base) MCG/ACT inhaler Inhale 2 puffs into the lungs every 4 hours as needed for Wheezing or Shortness of Breath 4/13/18  Yes GENA Ashby CNP   budesonide-formoterol (SYMBICORT) 160-4.5 MCG/ACT AERO Inhale 2 puffs into the lungs 2 times daily   Yes Historical Provider, MD   metFORMIN (GLUCOPHAGE) 1000 MG tablet Take 1,000 mg by mouth 2 times daily    Yes Historical Provider, MD   acetaminophen (TYLENOL) 500 MG tablet Take 1,000 mg by mouth every 6 hours as needed for Pain   Yes Historical Provider, MD   TAMSULOSIN HCL PO Take 0.8 mg by mouth Daily with supper    Yes Historical Provider, MD   Multiple Vitamins-Minerals (THERAPEUTIC MULTIVITAMIN-MINERALS) tablet Take 1 tablet by mouth daily   Yes Historical Provider, MD   clotrimazole-betamethasone (LOTRISONE) 1-0.05 % cream Apply topically 2 times daily. Patient not taking: Reported on 7/23/2021 3/9/20   EGNA Mulligan CNP   albuterol (PROVENTIL) (2.5 MG/3ML) 0.083% nebulizer solution Take 3 mLs by nebulization every 4 hours as needed for Wheezing or Shortness of Breath  Patient not taking: Reported on 7/23/2021 2/28/20   GENA Vera CNP        Allergies:       Rosuvastatin    Social History:     Tobacco:    reports that he quit smoking about 28 years ago. He has never used smokeless tobacco.  Alcohol:      reports current alcohol use. Drug Use:  reports no history of drug use. Family History:     Family History   Problem Relation Age of Onset    Cancer Mother 47        liver ca    Diabetes Father     Heart Disease Father        Review of Systems:     Positive and Negative as described in HPI    Review of Systems   Constitutional: Negative for chills, fatigue and fever. HENT: Negative for congestion, rhinorrhea, sore throat and tinnitus. Eyes: Negative for blurred vision and visual disturbance. Respiratory: Negative for cough, shortness of breath and wheezing. Cardiovascular: Negative for chest pain and palpitations.    Gastrointestinal: Negative for abdominal pain, bowel incontinence, constipation, diarrhea, nausea and vomiting. Genitourinary: Negative for difficulty urinating, dysuria and hematuria. Musculoskeletal: Positive for myalgias. Negative for gait problem, neck pain and neck stiffness. Skin: Positive for color change (bruising). Negative for rash. Neurological: Negative for dizziness, tingling, loss of consciousness, syncope, weakness, light-headedness, numbness and headaches. Psychiatric/Behavioral: Negative for memory loss. Physical Exam:   Vitals:  /82 (Position: Sitting)   Pulse 62   Temp 97.8 °F (36.6 °C) (Temporal)   Resp 22   Wt 255 lb 12.8 oz (116 kg)   SpO2 98%   BMI 35.68 kg/m²     Physical Exam  Vitals and nursing note reviewed. Constitutional:       General: He is not in acute distress. Appearance: He is well-developed. He is obese. HENT:      Head: Abrasion and contusion present. No raccoon eyes or laceration. Jaw: There is normal jaw occlusion. Right Ear: No hemotympanum. Tympanic membrane is not perforated. Left Ear: No hemotympanum. Tympanic membrane is not perforated. Nose: Nose normal.      Mouth/Throat:      Mouth: Mucous membranes are moist.   Eyes:      General: No scleral icterus. Conjunctiva/sclera: Conjunctivae normal.   Cardiovascular:      Rate and Rhythm: Normal rate and regular rhythm. Heart sounds: No murmur heard. Pulmonary:      Effort: Pulmonary effort is normal.      Breath sounds: Decreased breath sounds (throughout) present. No wheezing or rales. Chest:      Chest wall: Tenderness present. Abdominal:      General: Bowel sounds are normal. There is no distension. Palpations: Abdomen is soft. Tenderness: There is no abdominal tenderness. Comments: Obese abdomen   Musculoskeletal:      Cervical back: Normal range of motion and neck supple. Right lower leg: Edema (trace) present. Left lower leg: Edema (trace) present.    Lymphadenopathy:      Cervical: No if symptoms worsen or fail to improve.

## 2021-07-24 ASSESSMENT — ENCOUNTER SYMPTOMS
WHEEZING: 0
BOWEL INCONTINENCE: 0
BLURRED VISION: 0
ABDOMINAL PAIN: 0
SORE THROAT: 0
COLOR CHANGE: 1
RHINORRHEA: 0
CONSTIPATION: 0
DIARRHEA: 0
VISUAL CHANGE: 0
COUGH: 0
VOMITING: 0
SHORTNESS OF BREATH: 0
NAUSEA: 0

## 2021-07-30 ENCOUNTER — TELEPHONE (OUTPATIENT)
Dept: CARDIOLOGY | Age: 86
End: 2021-07-30

## 2021-07-30 NOTE — TELEPHONE ENCOUNTER
Heart Failure follow up call: Called patient to see how he is feeling and doing including weight, blood pressure, heart rate and symptoms if any but no answer. LVM for him to call office back.

## 2021-08-13 ENCOUNTER — TELEPHONE (OUTPATIENT)
Dept: CARDIOLOGY | Age: 86
End: 2021-08-13

## 2021-08-13 NOTE — TELEPHONE ENCOUNTER
[facility] [place] [facility] [place]: ADHERENCE REVIEW  Identified care gap per [employer]; fills at [facility]: Diabetes and Statin adherence    Last Visit: 7/23/21    DIABETES ADHERENCE    Per Insurance Records through 7/15/21  YTD John Young = Filled only once; Potential Fail Date: 8/19/21):   METFORMIN    TAB 1000MG last filled on 6/11/21 for 30 day supply. Next refill due: 7/11/21      Lab Results   Component Value Date    LABA1C 7.6 (H) 03/23/2021    LABA1C 7.8 (H) 09/08/2020    LABA1C 7.7 (H) 03/09/2020     NOTE A1c >9%    STATIN ADHERENCE    Per Insurance Records through 7/15/21 ; Prior YTD PDC = 94%; Potential Fail Date: 9/12/21):   ATORVASTATIN TAB 40MG last filled on 6/19/21 for 30 day supply. Next refill due: 9/12/21    Per Reconciled Dispense Report:   last filled on 7/26/21 for 30 day supply. Lab Results   Component Value Date    CHOL 141 04/08/2021    TRIG 190 (H) 04/08/2021    HDL 46 04/08/2021    LDLCHOLESTEROL 57 04/08/2021     ALT   Date Value Ref Range Status   03/23/2021 66 (H) 5 - 41 U/L Final     AST   Date Value Ref Range Status   03/23/2021 49 (H) <40 U/L Final     The ASCVD Risk score (Lisa Remy., et al., 2013) failed to calculate for the following reasons: The 2013 ASCVD risk score is only valid for ages 36 to 78     PLAN  The following are interventions that have been identified:   - Patient overdue refilling METFORMIN    TAB 1000MG and active on home medication list.   - Patient eligible for 90 day supply of METFORMIN    TAB 1000MG & ATORVASTATIN TAB 40MG    Attempting to reach patient to review.  Left message asking for return call. Future Appointments   Date Time Provider Gisela Harding   10/7/2021  2:00 PM GENA Rico - CNP [facility]   12/6/2021  2:00 PM Rojas Breaux MD TIFF PULM [facility]   1/11/2022  2:20 PM Abbie Heimlich, MD TIFF CARD [facility]   1/17/2022  2:40 PM Sara Hoang MD TIFF NEURO [facility]       Rika Angela CPhT.    Population [facility] [address] free: [phone]

## 2021-08-17 RX ORDER — ATORVASTATIN CALCIUM 40 MG/1
40 TABLET, FILM COATED ORAL DAILY
Qty: 90 TABLET | Refills: 2 | Status: SHIPPED | OUTPATIENT
Start: 2021-08-17 | End: 2021-08-23 | Stop reason: SDUPTHER

## 2021-08-23 DIAGNOSIS — I25.10 ASHD (ARTERIOSCLEROTIC HEART DISEASE): Primary | ICD-10-CM

## 2021-08-23 DIAGNOSIS — E78.2 MIXED HYPERLIPIDEMIA: ICD-10-CM

## 2021-08-23 RX ORDER — ATORVASTATIN CALCIUM 40 MG/1
40 TABLET, FILM COATED ORAL DAILY
Qty: 90 TABLET | Refills: 3 | Status: SHIPPED | OUTPATIENT
Start: 2021-08-23 | End: 2022-05-31

## 2021-08-23 NOTE — TELEPHONE ENCOUNTER
Mr. Love Aguilar called back and stated he has been doing well. His weight is stable. This AM it was 244 lbs. His blood pressure was 120/78 with a HR of 62 bpm.    Thanks!

## 2021-08-25 ENCOUNTER — TELEPHONE (OUTPATIENT)
Dept: CARDIOLOGY | Age: 86
End: 2021-08-25

## 2021-09-07 ENCOUNTER — OFFICE VISIT (OUTPATIENT)
Dept: PRIMARY CARE CLINIC | Age: 86
End: 2021-09-07
Payer: MEDICARE

## 2021-09-07 ENCOUNTER — TELEPHONE (OUTPATIENT)
Dept: PRIMARY CARE CLINIC | Age: 86
End: 2021-09-07

## 2021-09-07 VITALS
RESPIRATION RATE: 22 BRPM | WEIGHT: 253.8 LBS | DIASTOLIC BLOOD PRESSURE: 74 MMHG | SYSTOLIC BLOOD PRESSURE: 122 MMHG | HEART RATE: 68 BPM | OXYGEN SATURATION: 97 % | TEMPERATURE: 97.2 F | BODY MASS INDEX: 35.4 KG/M2

## 2021-09-07 DIAGNOSIS — J44.0 ACUTE BRONCHITIS WITH COPD (HCC): Primary | ICD-10-CM

## 2021-09-07 DIAGNOSIS — J20.9 ACUTE BRONCHITIS WITH COPD (HCC): Primary | ICD-10-CM

## 2021-09-07 DIAGNOSIS — J30.1 SEASONAL ALLERGIC RHINITIS DUE TO POLLEN: ICD-10-CM

## 2021-09-07 PROCEDURE — 99214 OFFICE O/P EST MOD 30 MIN: CPT | Performed by: NURSE PRACTITIONER

## 2021-09-07 RX ORDER — AZITHROMYCIN 250 MG/1
250 TABLET, FILM COATED ORAL SEE ADMIN INSTRUCTIONS
Qty: 6 TABLET | Refills: 0 | Status: SHIPPED | OUTPATIENT
Start: 2021-09-07 | End: 2021-09-12

## 2021-09-07 RX ORDER — PREDNISONE 20 MG/1
40 TABLET ORAL DAILY
Qty: 10 TABLET | Refills: 0 | Status: SHIPPED | OUTPATIENT
Start: 2021-09-07 | End: 2021-09-12

## 2021-09-07 RX ORDER — BENZONATATE 200 MG/1
200 CAPSULE ORAL 3 TIMES DAILY PRN
Qty: 20 CAPSULE | Refills: 0 | Status: SHIPPED | OUTPATIENT
Start: 2021-09-07 | End: 2021-09-14

## 2021-09-07 RX ORDER — MONTELUKAST SODIUM 10 MG/1
10 TABLET ORAL DAILY
Qty: 90 TABLET | Refills: 1 | Status: ON HOLD | OUTPATIENT
Start: 2021-09-07 | End: 2021-12-30

## 2021-09-07 ASSESSMENT — ENCOUNTER SYMPTOMS
DIARRHEA: 0
WHEEZING: 1
HEMOPTYSIS: 0
SWOLLEN GLANDS: 0
HEARTBURN: 0
CONSTIPATION: 0
NAUSEA: 0
SINUS PAIN: 0
COUGH: 1
ABDOMINAL PAIN: 0
VOMITING: 0
RHINORRHEA: 1
SORE THROAT: 1
SINUS PRESSURE: 0
SHORTNESS OF BREATH: 0

## 2021-09-07 NOTE — PATIENT INSTRUCTIONS
SURVEY:    You may be receiving a survey from [email or web address] regarding your visit today. Please complete the survey to enable us to provide the highest quality of care to you and your family. If you cannot score us a very good on any question, please call the office to discuss how we could have made your experience a very good one. Thank you.   Denise Snider, APRN-CNP  Carlito Todd, APRN-CNP  GELY Robles LPN [place], [place] Jari Popp, MA Pam, PCA

## 2021-09-07 NOTE — LETTER
[facility]  [address]  [place] [address]  Phone: [phone]  Fax: [phone] [facility], APRN - CNP         September 7, 2021     Patient: Carla Scott   YOB: 1935   Date of Visit: 9/7/2021       To Whom It May Concern: It is my medical opinion that Chante Landers requires a disability parking placard for the following reasons:  He cannot walk without assistance from another person or the use of an assistance device (cane, crutch, prosthetic device, wheelchair, etc.). He cannot walk 200 feet without stopping to rest.  Duration of need: permanent    If you have any questions or concerns, please don't hesitate to call.     Sincerely,        Horace Snider, APRN - CNP

## 2021-09-07 NOTE — PROGRESS NOTES
Name: Myrtle Atkins  : 1935         Chief Complaint:     Chief Complaint   Patient presents with    Cough     X 3 days, no fever       History of Present Illness:      Myrtle Atkins is a 82 y.o.  male who presents with Cough (X 3 days, no fever)      Renato Hartmann is here today for a same day office visit. Cough  This is a new problem. The current episode started in the past 7 days. The problem has been waxing and waning. The problem occurs every few minutes. The cough is productive of sputum. Associated symptoms include ear congestion, nasal congestion, postnasal drip, rhinorrhea, a sore throat and wheezing. Pertinent negatives include no chest pain, chills, ear pain, fever, headaches, heartburn, hemoptysis, myalgias, rash, shortness of breath, sweats or weight loss. Nothing aggravates the symptoms. He has tried a beta-agonist inhaler, rest and steroid inhaler for the symptoms. The treatment provided mild relief. His past medical history is significant for bronchitis, COPD, environmental allergies and pneumonia. There is no history of asthma, bronchiectasis or emphysema. URI   This is a chronic problem. The current episode started more than 1 month ago. The problem has been waxing and waning. There has been no fever. Associated symptoms include congestion, coughing, a plugged ear sensation, rhinorrhea, sneezing, a sore throat and wheezing. Pertinent negatives include no abdominal pain, chest pain, diarrhea, dysuria, ear pain, headaches, joint pain, joint swelling, nausea, neck pain, rash, sinus pain, swollen glands or vomiting. He has tried nothing for the symptoms. The treatment provided no relief.          Past Medical History:     Past Medical History:   Diagnosis Date    [facility] [address]     Diabetes mellitus ([facility] [address]     [facility]    Hyperlipidemia     Hypothyroidism     MI [facility]myocardial infarction) ([facility] [address]     Thyroid disease     Type II or unspecified type diabetes mellitus without mention of complication, not stated as uncontrolled       Reviewed all health maintenance requirements and ordered appropriate tests  Health Maintenance Due   Topic Date Due    TSH testing  03/09/2021       Past Surgical History:     Past Surgical History:   Procedure Laterality Date    CARDIAC SURGERY  02/24/2017    Stent placed  Dr Rodriguez Esparza      right ankle    HERNIA REPAIR          Medications:       Prior to Admission medications    Medication Sig Start Date End Date Taking?  Authorizing Provider   azithromycin (ZITHROMAX) 250 MG tablet Take 1 tablet by mouth See Admin Instructions for 5 days 500mg on day 1 followed by 250mg on days 2 - 5 9/7/21 9/12/21 Yes Gilma Blind Might, APRN - CNP   predniSONE (DELTASONE) 20 MG tablet Take 2 tablets by mouth daily for 5 days 9/7/21 9/12/21 Yes Gilma Blind Might, APRN - CNP   benzonatate (TESSALON) 200 MG capsule Take 1 capsule by mouth 3 times daily as needed for Cough 9/7/21 9/14/21 Yes Gilma Blind Might, APRN - CNP   montelukast (SINGULAIR) 10 MG tablet Take 1 tablet by mouth daily 9/7/21  Yes Gilma Blind Might, APRN - CNP   atorvastatin (LIPITOR) 40 MG tablet Take 1 tablet by mouth daily 8/23/21  Yes Hugo Fontenot MD   primidone (MYSOLINE) 250 MG tablet Take 250 mg by mouth 2 times daily   Yes Historical Provider, MD   propranolol (INDERAL LA) 120 MG extended release capsule Take 120 mg by mouth 2 times daily   Yes Historical Provider, MD   omeprazole (PRILOSEC) 20 MG delayed release capsule Take 20 mg by mouth daily   Yes Historical Provider, MD   levothyroxine (SYNTHROID) 112 MCG tablet Take 1 tablet by mouth Daily 7/6/21  Yes Gilma Blind Might, APRN - CNP   furosemide (LASIX) 40 MG tablet TAKE 1 TABLET BY MOUTH TWICE DAILY 4/14/21  Yes Gilma Blind Might, APRN - CNP   clopidogrel (PLAVIX) 75 MG tablet Take 1 tablet by mouth daily 3/30/21  Yes Hugo Fontenot MD   albuterol (PROVENTIL) (2.5 MG/3ML) 0.083% nebulizer solution Take 3 mLs by nebulization every 4 hours as needed for Wheezing or Shortness of Breath 2/28/20  Yes GENA Rodgers CNP   alogliptin (NESINA) 25 MG TABS tablet Take 25 mg by mouth daily   Yes Historical Provider, MD   blood glucose monitor strips accu check 7/18/18  Yes GENA Ashby CNP   aspirin 81 MG tablet Take 81 mg by mouth daily    Yes Historical Provider, MD   albuterol sulfate  (90 Base) MCG/ACT inhaler Inhale 2 puffs into the lungs every 4 hours as needed for Wheezing or Shortness of Breath 4/13/18  Yes GENA Ashby CNP   budesonide-formoterol (SYMBICORT) 160-4.5 MCG/ACT AERO Inhale 2 puffs into the lungs 2 times daily   Yes Historical Provider, MD   metFORMIN (GLUCOPHAGE) 1000 MG tablet Take 1,000 mg by mouth 2 times daily    Yes Historical Provider, MD   acetaminophen (TYLENOL) 500 MG tablet Take 1,000 mg by mouth every 6 hours as needed for Pain   Yes Historical Provider, MD   TAMSULOSIN HCL PO Take 0.8 mg by mouth Daily with supper    Yes Historical Provider, MD   Multiple Vitamins-Minerals (THERAPEUTIC MULTIVITAMIN-MINERALS) tablet Take 1 tablet by mouth daily   Yes Historical Provider, MD   clotrimazole-betamethasone (LOTRISONE) 1-0.05 % cream Apply topically 2 times daily. Patient not taking: Reported on 7/23/2021 3/9/20   GENA Maldonado CNP        Allergies:       Rosuvastatin    Social History:     Tobacco:    reports that he quit smoking about 28 years ago. He has never used smokeless tobacco.  Alcohol:      reports current alcohol use. Drug Use:  reports no history of drug use. Family History:     Family History   Problem Relation Age of Onset    Cancer Mother 47        liver ca    Diabetes Father     Heart Disease Father        Review of Systems:     Positive and Negative as described in HPI    Review of Systems   Constitutional: Negative for chills, fatigue, fever and weight loss.    HENT: Positive for congestion, postnasal drip, rhinorrhea, sneezing and sore throat. Negative for ear pain, sinus pressure and sinus pain. Eyes: Negative for visual disturbance. Respiratory: Positive for cough and wheezing. Negative for hemoptysis and shortness of breath. Cardiovascular: Positive for leg swelling. Negative for chest pain and palpitations. Gastrointestinal: Negative for abdominal pain, constipation, diarrhea, heartburn, nausea and vomiting. Genitourinary: Negative for difficulty urinating and dysuria. Musculoskeletal: Negative for gait problem, joint pain, myalgias, neck pain and neck stiffness. Skin: Negative for rash. Allergic/Immunologic: Positive for environmental allergies. Neurological: Negative for dizziness, syncope, light-headedness and headaches. Physical Exam:   Vitals:  /74 (Position: Sitting)   Pulse 68   Temp 97.2 °F (36.2 °C) (Temporal)   Resp 22   Wt 253 lb 12.8 oz (115.1 kg)   SpO2 97%   BMI 35.40 kg/m²     Physical Exam  Vitals and nursing note reviewed. Constitutional:       General: He is not in acute distress. Appearance: Normal appearance. He is well-developed. He is obese. He is not ill-appearing. HENT:      Nose: Mucosal edema and congestion present. Right Turbinates: Swollen and pale. Left Turbinates: Swollen and pale. Mouth/Throat:      Mouth: Mucous membranes are not dry. Pharynx: Posterior oropharyngeal erythema present. Eyes:      General: No scleral icterus. Conjunctiva/sclera: Conjunctivae normal.   Cardiovascular:      Rate and Rhythm: Normal rate and regular rhythm. Heart sounds: No murmur heard. Pulmonary:      Effort: Pulmonary effort is normal. No respiratory distress. Breath sounds: Decreased breath sounds and wheezing present. No rales. Abdominal:      General: Bowel sounds are normal. There is no distension. Palpations: Abdomen is soft. Tenderness:  There is no abdominal tenderness. Musculoskeletal:      Cervical back: Normal range of motion and neck supple. Right lower leg: Edema present. Left lower leg: Edema present. Comments: Bilateral support hose in place   Lymphadenopathy:      Cervical: No cervical adenopathy. Skin:     General: Skin is warm and dry. Findings: No rash. Neurological:      Mental Status: He is alert and oriented to person, place, and time. Deep Tendon Reflexes: Reflexes are normal and symmetric. Psychiatric:         Mood and Affect: Mood normal.         Behavior: Behavior normal.         Data:     Lab Results   Component Value Date     03/23/2021    K 4.8 03/23/2021    CL 97 03/23/2021    CO2 28 03/23/2021    BUN 20 03/23/2021    CREATININE 1.07 03/23/2021    GLUCOSE 168 03/23/2021    PROT 6.4 01/17/2019    LABALBU 3.5 01/17/2019    BILITOT 0.18 01/17/2019    ALKPHOS 94 01/17/2019    AST 49 03/23/2021    ALT 66 03/23/2021     Lab Results   Component Value Date    WBC 9.2 09/08/2020    RBC 4.39 09/08/2020    HGB 13.1 09/08/2020    HCT 42.5 09/08/2020    MCV 96.8 09/08/2020    MCH 29.8 09/08/2020    MCHC 30.8 09/08/2020    RDW 13.9 09/08/2020     09/08/2020    MPV 10.3 09/08/2020     Lab Results   Component Value Date    TSH 6.91 03/09/2020     Lab Results   Component Value Date    CHOL 141 04/08/2021    HDL 46 04/08/2021    LABA1C 7.6 03/23/2021       Assessment/Plan:      Diagnosis Orders   1. Acute bronchitis with COPD ([facility] [address]  azithromycin (ZITHROMAX) 250 MG tablet    predniSONE (DELTASONE) 20 MG tablet    benzonatate (TESSALON) 200 MG capsule   2. Seasonal allergic rhinitis due to pollen  montelukast (SINGULAIR) 10 MG tablet     We will have him start Z-Sabino, prednisone, Tessalon for acute bronchitis with COPD. Rest increase fluids. Start Singulair daily for allergy. Call right away if worsening, or call if not improved in a week or so.       1.  Moriah Erickwin received counseling on the following healthy behaviors: nutrition, exercise and medication adherence  2. Patient given educational materials - see patient instructions  3. Was a self-tracking handout given in paper form or via [employer]? No  If yes, see orders or list here. 4.  Discussed use, benefit, and side effects of prescribed medications. Barriers to medication compliance addressed. All patient questions answered. Pt voiced understanding. 5.  Reviewed prior labs and health maintenance  6. Continue current medications, diet and exercise. Completed Refills   Requested Prescriptions     Signed Prescriptions Disp Refills    azithromycin (ZITHROMAX) 250 MG tablet 6 tablet 0     Sig: Take 1 tablet by mouth See Admin Instructions for 5 days 500mg on day 1 followed by 250mg on days 2 - 5    predniSONE (DELTASONE) 20 MG tablet 10 tablet 0     Sig: Take 2 tablets by mouth daily for 5 days    benzonatate (TESSALON) 200 MG capsule 20 capsule 0     Sig: Take 1 capsule by mouth 3 times daily as needed for Cough    montelukast (SINGULAIR) 10 MG tablet 90 tablet 1     Sig: Take 1 tablet by mouth daily         Return if symptoms worsen or fail to improve.

## 2021-09-07 NOTE — TELEPHONE ENCOUNTER
Phone call from wife, Cushing, stating that she picked up [facility] and that the montelukast sodium medicaiton 10mg was ordered. States that she concerned about giving him this medication because of the sodium. States he has issues with edema and is concerned about an increase in swelling. Please advise. Health Maintenance   Topic Date Due    TSH testing  03/09/2021    DTaP/Tdap/Td vaccine (1 - Tdap) [birth year]08/2022 (Originally 10/15/1954)    Flu vaccine (1) 09/07/2022 (Originally 9/1/2021)    Shingles Vaccine (3 of 3) 09/07/2022 (Originally 8/31/2021)    Potassium monitoring  03/23/2022    Creatinine monitoring  03/23/2022    Lipid screen  04/08/2022    Pneumococcal 65+ years Vaccine  Completed    COVID-19 Vaccine  Completed    Hepatitis A vaccine  Aged Out    Hib vaccine  Aged Out    Meningococcal (ACWY) vaccine  Aged Out             (applicable per patient's age: Cancer Screenings, Depression Screening, Fall Risk Screening, Immunizations)    Hemoglobin A1C (%)   Date Value   03/23/2021 7.6 (H)   09/08/2020 7.8 (H)   03/09/2020 7.7 (H)     Microalb/Crt.  Ratio (mcg/mg creat)   Date Value   09/08/2020 38 (H)     LDL Cholesterol (mg/dL)   Date Value   04/08/2021 57     AST (U/L)   Date Value   03/23/2021 49 (H)     ALT (U/L)   Date Value   03/23/2021 66 (H)     BUN (mg/dL)   Date Value   03/23/2021 20      (goal A1C is < 7)   (goal LDL is <100) need 30-50% reduction from baseline     BP Readings from Last 3 Encounters:   09/07/21 122/74   07/23/21 132/82   07/15/21 (!) 146/74    (goal /80)      All Future Testing planned in [facility]:  Lab Frequency Next Occurrence   US CAROTID ARTERY BILATERAL Once 03/08/2021   CBC Auto Differential Once 10/04/2021   ALT Once 10/07/2021   AST Once 97/85/3727   Basic Metabolic Panel Once 55/89/5782   Lipid Panel Once 10/04/2021   Microalbumin, Ur Once 10/04/2021   T4, Free Once 10/04/2021   TSH without Reflex Once 10/04/2021       Next Visit Date:  Future Appointments   Date Time Provider Gisela Marinellii   10/7/2021  2:00 PM Zoey Snider, APRN - CNP [facility]   12/6/2021  2:00 PM Soco Bosch MD TIFF PULM [facility]   1/11/2022  2:20 PM [facility]   1/17/2022  2:40 PM Grisel Sorenson MD TIFF NEURO [facility]            Patient Active Problem List:     Obesity (BMI 30.0-34. 9)     Venous (peripheral) insufficiency     Hx pulmonary embolism     Hearing impaired     Edema     Type 2 diabetes mellitus with complication, without long-term current use of insulin (HCC)     Knee osteoarthritis     Chronic combined systolic and diastolic CHF (congestive heart failure) (HCC)     COPD (chronic obstructive pulmonary disease) (HCC)     Adrenal adenoma, left     Elevated liver enzymes     Hypothyroidism due to Hashimoto's thyroiditis     Moderate persistent asthma without complication

## 2021-09-29 ENCOUNTER — HOSPITAL ENCOUNTER (OUTPATIENT)
Age: 86
Discharge: HOME | End: 2021-09-29
Payer: OTHER GOVERNMENT

## 2021-09-29 DIAGNOSIS — E11.8 TYPE 2 DIABETES MELLITUS WITH COMPLICATION, WITHOUT LONG-TERM CURRENT USE OF INSULIN (HCC): ICD-10-CM

## 2021-09-29 DIAGNOSIS — E06.3 HYPOTHYROIDISM DUE TO HASHIMOTO'S THYROIDITIS: ICD-10-CM

## 2021-09-29 DIAGNOSIS — E03.8 HYPOTHYROIDISM DUE TO HASHIMOTO'S THYROIDITIS: ICD-10-CM

## 2021-09-29 LAB
ABSOLUTE EOS #: 0.34 K/UL (ref 0–0.44)
ABSOLUTE IMMATURE GRANULOCYTE: 0.07 K/UL (ref 0–0.3)
ABSOLUTE LYMPH #: 1.18 K/UL (ref 1.1–3.7)
ABSOLUTE MONO #: 1.05 K/UL (ref 0.1–1.2)
ALT SERPL-CCNC: 51 U/L (ref 5–41)
ANION GAP SERPL CALCULATED.3IONS-SCNC: 13 MMOL/L (ref 9–17)
AST SERPL-CCNC: 35 U/L
BASOPHILS # BLD: 0 % (ref 0–2)
BASOPHILS ABSOLUTE: 0.03 K/UL (ref 0–0.2)
BUN BLDV-MCNC: 18 MG/DL (ref 8–23)
BUN/CREAT BLD: 15 (ref 9–20)
CALCIUM SERPL-MCNC: 9.2 MG/DL (ref 8.6–10.4)
CHLORIDE BLD-SCNC: 96 MMOL/L (ref 98–107)
CHOLESTEROL/HDL RATIO: 3
CHOLESTEROL: 136 MG/DL
CO2: 25 MMOL/L (ref 20–31)
CREAT SERPL-MCNC: 1.17 MG/DL (ref 0.7–1.2)
CREATININE URINE: 52.7 MG/DL (ref 39–259)
DIFFERENTIAL TYPE: ABNORMAL
EOSINOPHILS RELATIVE PERCENT: 5 % (ref 1–4)
GFR AFRICAN AMERICAN: >60 ML/MIN
GFR NON-AFRICAN AMERICAN: 60 ML/MIN
GFR SERPL CREATININE-BSD FRML MDRD: ABNORMAL ML/MIN/{1.73_M2}
GFR SERPL CREATININE-BSD FRML MDRD: ABNORMAL ML/MIN/{1.73_M2}
GLUCOSE BLD-MCNC: 204 MG/DL (ref 70–99)
HCT VFR BLD CALC: 37.7 % (ref 40.7–50.3)
HDLC SERPL-MCNC: 46 MG/DL
HEMOGLOBIN: 11.7 G/DL (ref 13–17)
IMMATURE GRANULOCYTES: 1 %
LDL CHOLESTEROL: 61 MG/DL (ref 0–130)
LYMPHOCYTES # BLD: 18 % (ref 24–43)
MCH RBC QN AUTO: 29.2 PG (ref 25.2–33.5)
MCHC RBC AUTO-ENTMCNC: 31 G/DL (ref 28.4–34.8)
MCV RBC AUTO: 94 FL (ref 82.6–102.9)
MICROALBUMIN/CREAT 24H UR: 30 MG/L
MICROALBUMIN/CREAT UR-RTO: 57 MCG/MG CREAT
MONOCYTES # BLD: 16 % (ref 3–12)
NRBC AUTOMATED: 0 PER 100 WBC
PDW BLD-RTO: 14.6 % (ref 11.8–14.4)
PLATELET # BLD: 217 K/UL (ref 138–453)
PLATELET ESTIMATE: ABNORMAL
PMV BLD AUTO: 10.3 FL (ref 8.1–13.5)
POTASSIUM SERPL-SCNC: 4.7 MMOL/L (ref 3.7–5.3)
RBC # BLD: 4.01 M/UL (ref 4.21–5.77)
RBC # BLD: ABNORMAL 10*6/UL
SEG NEUTROPHILS: 60 % (ref 36–65)
SEGMENTED NEUTROPHILS ABSOLUTE COUNT: 4.08 K/UL (ref 1.5–8.1)
SODIUM BLD-SCNC: 134 MMOL/L (ref 135–144)
TRIGL SERPL-MCNC: 147 MG/DL
TSH SERPL DL<=0.05 MIU/L-ACNC: 6.36 MIU/L (ref 0.3–5)
VLDLC SERPL CALC-MCNC: NORMAL MG/DL (ref 1–30)
WBC # BLD: 6.8 K/UL (ref 3.5–11.3)
WBC # BLD: ABNORMAL 10*3/UL

## 2021-09-29 PROCEDURE — 80061 LIPID PANEL: CPT

## 2021-09-29 PROCEDURE — 36415 COLL VENOUS BLD VENIPUNCTURE: CPT

## 2021-09-29 PROCEDURE — 84443 ASSAY THYROID STIM HORMONE: CPT

## 2021-09-29 PROCEDURE — 84460 ALANINE AMINO (ALT) (SGPT): CPT

## 2021-09-29 PROCEDURE — 84450 TRANSFERASE (AST) (SGOT): CPT

## 2021-09-29 PROCEDURE — 80048 BASIC METABOLIC PNL TOTAL CA: CPT

## 2021-09-29 PROCEDURE — 82043 UR ALBUMIN QUANTITATIVE: CPT

## 2021-09-29 PROCEDURE — 84439 ASSAY OF FREE THYROXINE: CPT

## 2021-09-29 PROCEDURE — 85025 COMPLETE CBC W/AUTO DIFF WBC: CPT

## 2021-09-29 PROCEDURE — 82570 ASSAY OF URINE CREATININE: CPT

## 2021-09-30 LAB — THYROXINE, FREE: 1.48 NG/DL (ref 0.93–1.7)

## 2021-10-07 ENCOUNTER — OFFICE VISIT (OUTPATIENT)
Dept: PRIMARY CARE CLINIC | Age: 86
End: 2021-10-07
Payer: MEDICARE

## 2021-10-07 VITALS
WEIGHT: 248.7 LBS | TEMPERATURE: 97.8 F | RESPIRATION RATE: 24 BRPM | DIASTOLIC BLOOD PRESSURE: 78 MMHG | OXYGEN SATURATION: 95 % | SYSTOLIC BLOOD PRESSURE: 132 MMHG | BODY MASS INDEX: 34.69 KG/M2 | HEART RATE: 74 BPM

## 2021-10-07 DIAGNOSIS — E06.3 HYPOTHYROIDISM DUE TO HASHIMOTO'S THYROIDITIS: ICD-10-CM

## 2021-10-07 DIAGNOSIS — I50.42 CHRONIC COMBINED SYSTOLIC AND DIASTOLIC CHF (CONGESTIVE HEART FAILURE) (HCC): ICD-10-CM

## 2021-10-07 DIAGNOSIS — E03.8 HYPOTHYROIDISM DUE TO HASHIMOTO'S THYROIDITIS: ICD-10-CM

## 2021-10-07 DIAGNOSIS — E11.8 TYPE 2 DIABETES MELLITUS WITH COMPLICATION, WITHOUT LONG-TERM CURRENT USE OF INSULIN (HCC): Primary | ICD-10-CM

## 2021-10-07 LAB — HBA1C MFR BLD: 7.2 %

## 2021-10-07 PROCEDURE — 99214 OFFICE O/P EST MOD 30 MIN: CPT | Performed by: NURSE PRACTITIONER

## 2021-10-07 PROCEDURE — 3051F HG A1C>EQUAL 7.0%<8.0%: CPT | Performed by: NURSE PRACTITIONER

## 2021-10-07 PROCEDURE — 83036 HEMOGLOBIN GLYCOSYLATED A1C: CPT | Performed by: NURSE PRACTITIONER

## 2021-10-07 ASSESSMENT — ENCOUNTER SYMPTOMS
CONSTIPATION: 0
SORE THROAT: 0
WHEEZING: 0
BLURRED VISION: 0
TROUBLE SWALLOWING: 0
ABDOMINAL PAIN: 0
DIARRHEA: 0
NAUSEA: 0
RHINORRHEA: 0
SHORTNESS OF BREATH: 1
VOMITING: 0
COUGH: 0

## 2021-10-07 NOTE — PATIENT INSTRUCTIONS
your weight, how active you are, which diabetes medicines you take, and what your goals are for your blood sugar levels. A registered dietitian or certified diabetes educator can help you plan how many carbs to include in each meal and snack. For most adults, a guideline for the daily amount of carbs is:  · 45 to 60 grams at each meal. That's about the same as 3 to 4 carbohydrate servings. · 15 to 20 grams at each snack. That's about the same as 1 carbohydrate serving. Count carbs  Counting carbs lets you know how much rapid-acting insulin to take before you eat. If you use an insulin pump, you get a constant rate of insulin during the day. So the pump must be programmed at meals. This gives you extra insulin to cover the rise in blood sugar after meals. If you take insulin:  · Learn your own insulin-to-carb ratio. You and your diabetes health professional will figure out the ratio. You can do this by testing your blood sugar after meals. For example, you may need a certain amount of insulin for every 15 grams of carbs. · Add up the carb grams in a meal. Then you can figure out how many units of insulin to take based on your insulin-to-carb ratio. · Exercise lowers blood sugar. You can use less insulin than you would if you were not doing exercise. Keep in mind that timing matters. If you exercise within 1 hour after a meal, your body may need less insulin for that meal than it would if you exercised 3 hours after the meal. Test your blood sugar to find out how exercise affects your need for insulin. If you do or don't take insulin:  · Look at labels on packaged foods. This can tell you how many carbs are in a serving. You can also use guides from the American Diabetes Association. · Be aware of portions, or serving sizes. If a package has two servings and you eat the whole package, you need to double the number of grams of carbohydrate listed for one serving.   · Protein, fat, and fiber do not raise blood sugar as much as carbs do. If you eat a lot of these nutrients in a meal, your blood sugar will rise more slowly than it would otherwise. Eat from all food groups  · Eat at least three meals a day. · Plan meals to include food from all the food groups. The food groups include grains, fruits, dairy, proteins, and vegetables. · Talk to your dietitian or diabetes educator about ways to add limited amounts of sweets into your meal plan. · If you drink alcohol, talk to your doctor. It may not be recommended when you are taking certain diabetes medicines. Where can you learn more? Go to [email or web address] and sign in to your [employer] account. Enter F986 in the [place] box to learn more about \"Counting Carbohydrates for Diabetes: Care Instructions. \"     If you do not have an account, please click on the \"Sign Up Now\" link. Current as of: August 31, 2020               Content Version: 13.0  © 2006-2021 Healthwise[employer] Inc[employer]. Care instructions adapted under license by [facility] ([facility]). If you have questions about a medical condition or this instruction, always ask your healthcare professional. [address] any warranty or liability for your use of this information.

## 2021-10-07 NOTE — PROGRESS NOTES
Name: Reynaldo Prader  : 1935         Chief Complaint:     Chief Complaint   Patient presents with    Diabetes     routine check       History of Present Illness:      Reynaldo Prader is a 82 y.o.  male who presents with Diabetes (routine check)      Karley Jaffe is here today for a routine office visit. Hypothyroidism -stable, patient is taking his medication correctly on an intact stomach with water only and waiting 30 minutes to eat. He denies any excessive daytime fatigue or sleepiness. He denies any insomnia or agitation. Diabetes  He presents for his follow-up diabetic visit. He has type 2 diabetes mellitus. His disease course has been stable. There are no hypoglycemic associated symptoms. Pertinent negatives for hypoglycemia include no dizziness, headaches or sweats. Pertinent negatives for diabetes include no blurred vision, no chest pain, no fatigue, no polydipsia, no polyphagia and no polyuria. There are no hypoglycemic complications. Symptoms are stable. Diabetic complications include heart disease and nephropathy. Pertinent negatives for diabetic complications include no CVA or peripheral neuropathy. Risk factors for coronary artery disease include diabetes mellitus, male sex and sedentary lifestyle. Current diabetic treatment includes oral agent (monotherapy). He is compliant with treatment all of the time. His weight is stable. He is following a generally healthy diet. When asked about meal planning, he reported none. He has had a previous visit with a dietitian. He rarely participates in exercise. There is no change in his home blood glucose trend. His breakfast blood glucose range is generally 140-180 mg/dl. An ACE inhibitor/angiotensin II receptor blocker is being taken. He does not see a podiatrist.Eye exam is not current. Congestive Heart Failure  Presents for follow-up visit. Associated symptoms include shortness of breath (with exertion).  Pertinent negatives include no abdominal APRN - CNP   clopidogrel (PLAVIX) 75 MG tablet Take 1 tablet by mouth daily 3/30/21  Yes Pastor Meyer MD   albuterol (PROVENTIL) (2.5 MG/3ML) 0.083% nebulizer solution Take 3 mLs by nebulization every 4 hours as needed for Wheezing or Shortness of Breath 2/28/20  Yes [de-identified] M MoiraGENA kat CNP   alogliptin (NESINA) 25 MG TABS tablet Take 25 mg by mouth daily   Yes Historical Provider, MD   blood glucose monitor strips accu check 7/18/18  Yes GENA Ashby CNP   aspirin 81 MG tablet Take 81 mg by mouth daily    Yes Historical Provider, MD   albuterol sulfate  (90 Base) MCG/ACT inhaler Inhale 2 puffs into the lungs every 4 hours as needed for Wheezing or Shortness of Breath 4/13/18  Yes GENA Ashby CNP   budesonide-formoterol (SYMBICORT) 160-4.5 MCG/ACT AERO Inhale 2 puffs into the lungs 2 times daily   Yes Historical Provider, MD   metFORMIN (GLUCOPHAGE) 1000 MG tablet Take 1,000 mg by mouth 2 times daily    Yes Historical Provider, MD   acetaminophen (TYLENOL) 500 MG tablet Take 1,000 mg by mouth every 6 hours as needed for Pain   Yes Historical Provider, MD   TAMSULOSIN HCL PO Take 0.8 mg by mouth Daily with supper    Yes Historical Provider, MD   Multiple Vitamins-Minerals (THERAPEUTIC MULTIVITAMIN-MINERALS) tablet Take 1 tablet by mouth daily   Yes Historical Provider, MD   clotrimazole-betamethasone (LOTRISONE) 1-0.05 % cream Apply topically 2 times daily. Patient not taking: Reported on 7/23/2021 3/9/20   Alexandra GENA Mahan CNP        Allergies:       Rosuvastatin    Social History:     Tobacco:    reports that he quit smoking about 28 years ago. He has never used smokeless tobacco.  Alcohol:      reports current alcohol use. Drug Use:  reports no history of drug use.     Family History:     Family History   Problem Relation Age of Onset    Cancer Mother 47        liver ca    Diabetes Father     Heart Disease Father        Review of Systems:     Positive and Negative as described in HPI    Review of Systems   Constitutional: Negative for chills, fatigue and fever. HENT: Negative for congestion, rhinorrhea, sneezing, sore throat and trouble swallowing. Eyes: Negative for blurred vision and visual disturbance. Respiratory: Positive for shortness of breath (with exertion). Negative for cough and wheezing. Cardiovascular: Positive for leg swelling (intermittent). Negative for chest pain and palpitations. Gastrointestinal: Negative for abdominal pain, constipation, diarrhea, nausea and vomiting. Endocrine: Negative for polydipsia, polyphagia and polyuria. Genitourinary: Negative for difficulty urinating and dysuria. Musculoskeletal: Positive for gait problem (chronic). Negative for neck pain and neck stiffness. Skin: Negative for rash. Neurological: Negative for dizziness, syncope and headaches. Physical Exam:   Vitals:  /78 (Site: Left Upper Arm, Position: Sitting)   Pulse 74   Temp 97.8 °F (36.6 °C) (Temporal)   Resp 24   Wt 248 lb 11.2 oz (112.8 kg)   SpO2 95%   BMI 34.69 kg/m²     Physical Exam  Vitals and nursing note reviewed. Constitutional:       General: He is not in acute distress. Appearance: He is well-developed. He is obese. HENT:      Mouth/Throat:      Mouth: Mucous membranes are moist.   Eyes:      General: No scleral icterus. Conjunctiva/sclera: Conjunctivae normal.   Cardiovascular:      Rate and Rhythm: Normal rate and regular rhythm. Heart sounds: No murmur heard. Pulmonary:      Effort: Pulmonary effort is normal.      Breath sounds: Decreased breath sounds (throughout) present. No wheezing or rales. Abdominal:      General: Bowel sounds are normal. There is no distension. Palpations: Abdomen is soft. Tenderness: There is no abdominal tenderness. Comments: Obese abdomen   Musculoskeletal:      Cervical back: Normal range of motion and neck supple.       Right lower leg: Edema (trace) present. Left lower leg: Edema (trace) present. Comments: Compression stockings in place   Lymphadenopathy:      Cervical: No cervical adenopathy. Skin:     General: Skin is warm and dry. Findings: No rash. Neurological:      Mental Status: He is alert and oriented to person, place, and time. Psychiatric:         Mood and Affect: Mood normal.         Behavior: Behavior normal.         Data:     Lab Results   Component Value Date     09/29/2021    K 4.7 09/29/2021    CL 96 09/29/2021    CO2 25 09/29/2021    BUN 18 09/29/2021    CREATININE 1.17 09/29/2021    GLUCOSE 204 09/29/2021    PROT 6.4 01/17/2019    LABALBU 3.5 01/17/2019    BILITOT 0.18 01/17/2019    ALKPHOS 94 01/17/2019    AST 35 09/29/2021    ALT 51 09/29/2021     Lab Results   Component Value Date    WBC 6.8 09/29/2021    RBC 4.01 09/29/2021    HGB 11.7 09/29/2021    HCT 37.7 09/29/2021    MCV 94.0 09/29/2021    MCH 29.2 09/29/2021    MCHC 31.0 09/29/2021    RDW 14.6 09/29/2021     09/29/2021    MPV 10.3 09/29/2021     Lab Results   Component Value Date    TSH 6.36 09/29/2021     Lab Results   Component Value Date    CHOL 136 09/29/2021    HDL 46 09/29/2021    LABA1C 7.2 10/07/2021       Assessment/Plan:      Diagnosis Orders   1. Type 2 diabetes mellitus with complication, without long-term current use of insulin (HCC)  POCT glycosylated hemoglobin (Hb A1C)    CBC Auto Differential    ALT    AST    Basic Metabolic Panel    Hemoglobin A1C    Lipid Panel    Microalbumin, Ur   2. Chronic combined systolic and diastolic CHF (congestive heart failure) ([facility] [address]     3. Hypothyroidism due to Hashimoto's thyroiditis  T4, Free    TSH without Reflex     Continue all current medications. Labs are stable. Encourage daily activity. We will see him back in 6 months with repeat labs, sooner if any problems. 1.  [facility] received counseling on the following healthy behaviors: nutrition, exercise and medication adherence  2.   Patient given educational materials - see patient instructions  3. Was a self-tracking handout given in paper form or via [employer]? No  If yes, see orders or list here. 4.  Discussed use, benefit, and side effects of prescribed medications. Barriers to medication compliance addressed. All patient questions answered. Pt voiced understanding. 5.  Reviewed prior labs and health maintenance  6. Continue current medications, diet and exercise. Completed Refills   Requested Prescriptions      No prescriptions requested or ordered in this encounter         Return in about 6 months (around 4/7/2022) for Check up.

## 2021-11-02 DIAGNOSIS — Z95.820 S/P ANGIOPLASTY WITH STENT: ICD-10-CM

## 2021-11-02 DIAGNOSIS — I25.10 ASHD (ARTERIOSCLEROTIC HEART DISEASE): Primary | ICD-10-CM

## 2021-11-02 DIAGNOSIS — I10 ESSENTIAL HYPERTENSION: ICD-10-CM

## 2021-11-02 DIAGNOSIS — E66.09 CLASS 2 OBESITY DUE TO EXCESS CALORIES WITH BODY MASS INDEX (BMI) OF 35.0 TO 35.9 IN ADULT, UNSPECIFIED WHETHER SERIOUS COMORBIDITY PRESENT: ICD-10-CM

## 2021-11-02 DIAGNOSIS — Z86.73 HISTORY OF STROKE: ICD-10-CM

## 2021-11-02 DIAGNOSIS — I50.42 CHRONIC COMBINED SYSTOLIC AND DIASTOLIC CONGESTIVE HEART FAILURE (HCC): ICD-10-CM

## 2021-11-02 RX ORDER — CLOPIDOGREL BISULFATE 75 MG/1
75 TABLET ORAL DAILY
Qty: 90 TABLET | Refills: 3 | Status: SHIPPED | OUTPATIENT
Start: 2021-11-02 | End: 2022-07-25

## 2021-12-29 ENCOUNTER — HOSPITAL ENCOUNTER (INPATIENT)
Age: 86
LOS: 1 days | Discharge: HOME | DRG: 194 | End: 2021-12-31
Attending: EMERGENCY MEDICINE | Admitting: FAMILY MEDICINE
Payer: OTHER GOVERNMENT

## 2021-12-29 ENCOUNTER — APPOINTMENT (OUTPATIENT)
Dept: GENERAL RADIOLOGY | Age: 86
DRG: 194 | End: 2021-12-29
Payer: OTHER GOVERNMENT

## 2021-12-29 DIAGNOSIS — J18.9 PNEUMONIA DUE TO INFECTIOUS ORGANISM, UNSPECIFIED LATERALITY, UNSPECIFIED PART OF LUNG: Primary | ICD-10-CM

## 2021-12-29 DIAGNOSIS — R09.02 HYPOXIA: ICD-10-CM

## 2021-12-29 LAB
-: ABNORMAL
ABSOLUTE EOS #: 0.1 K/UL (ref 0–0.44)
ABSOLUTE IMMATURE GRANULOCYTE: 0.06 K/UL (ref 0–0.3)
ABSOLUTE LYMPH #: 0.74 K/UL (ref 1.1–3.7)
ABSOLUTE MONO #: 1.25 K/UL (ref 0.1–1.2)
ALBUMIN SERPL-MCNC: 4 G/DL (ref 3.5–5.2)
ALBUMIN/GLOBULIN RATIO: 1.1 (ref 1–2.5)
ALP BLD-CCNC: 163 U/L (ref 40–129)
ALT SERPL-CCNC: 54 U/L (ref 5–41)
AMORPHOUS: ABNORMAL
ANION GAP SERPL CALCULATED.3IONS-SCNC: 15 MMOL/L (ref 9–17)
AST SERPL-CCNC: 41 U/L
BACTERIA: ABNORMAL
BASOPHILS # BLD: 0 % (ref 0–2)
BASOPHILS ABSOLUTE: 0.03 K/UL (ref 0–0.2)
BILIRUB SERPL-MCNC: 0.47 MG/DL (ref 0.3–1.2)
BILIRUBIN URINE: NEGATIVE
BNP INTERPRETATION: ABNORMAL
BUN BLDV-MCNC: 19 MG/DL (ref 8–23)
BUN/CREAT BLD: 19 (ref 9–20)
CALCIUM SERPL-MCNC: 9.5 MG/DL (ref 8.6–10.4)
CASTS UA: ABNORMAL /LPF
CHLORIDE BLD-SCNC: 94 MMOL/L (ref 98–107)
CO2: 23 MMOL/L (ref 20–31)
COLOR: YELLOW
COMMENT UA: ABNORMAL
CREAT SERPL-MCNC: 0.98 MG/DL (ref 0.7–1.2)
CRYSTALS, UA: ABNORMAL /HPF
DIFFERENTIAL TYPE: ABNORMAL
DIRECT EXAM: NORMAL
EOSINOPHILS RELATIVE PERCENT: 1 % (ref 1–4)
EPITHELIAL CELLS UA: ABNORMAL /HPF (ref 0–5)
GFR AFRICAN AMERICAN: >60 ML/MIN
GFR NON-AFRICAN AMERICAN: >60 ML/MIN
GFR SERPL CREATININE-BSD FRML MDRD: ABNORMAL ML/MIN/{1.73_M2}
GFR SERPL CREATININE-BSD FRML MDRD: ABNORMAL ML/MIN/{1.73_M2}
GLUCOSE BLD-MCNC: 180 MG/DL (ref 70–99)
GLUCOSE URINE: NEGATIVE
HCT VFR BLD CALC: 38.6 % (ref 40.7–50.3)
HEMOGLOBIN: 12.4 G/DL (ref 13–17)
IMMATURE GRANULOCYTES: 1 %
KETONES, URINE: NEGATIVE
LACTIC ACID, WHOLE BLOOD: NORMAL MMOL/L (ref 0.7–2.1)
LACTIC ACID: 2.2 MMOL/L (ref 0.5–2.2)
LEUKOCYTE ESTERASE, URINE: NEGATIVE
LYMPHOCYTES # BLD: 6 % (ref 24–43)
Lab: NORMAL
MCH RBC QN AUTO: 29.2 PG (ref 25.2–33.5)
MCHC RBC AUTO-ENTMCNC: 32.1 G/DL (ref 28.4–34.8)
MCV RBC AUTO: 91 FL (ref 82.6–102.9)
MONOCYTES # BLD: 10 % (ref 3–12)
MUCUS: ABNORMAL
NITRITE, URINE: NEGATIVE
NRBC AUTOMATED: 0 PER 100 WBC
OTHER OBSERVATIONS UA: ABNORMAL
PDW BLD-RTO: 14.8 % (ref 11.8–14.4)
PH UA: 6.5 (ref 5–9)
PLATELET # BLD: 224 K/UL (ref 138–453)
PLATELET ESTIMATE: ABNORMAL
PMV BLD AUTO: 10.2 FL (ref 8.1–13.5)
POTASSIUM SERPL-SCNC: 4.4 MMOL/L (ref 3.7–5.3)
PRO-BNP: 359 PG/ML
PROTEIN UA: NEGATIVE
RBC # BLD: 4.24 M/UL (ref 4.21–5.77)
RBC # BLD: ABNORMAL 10*6/UL
RBC UA: ABNORMAL /HPF (ref 0–2)
RENAL EPITHELIAL, UA: ABNORMAL /HPF
SARS-COV-2, RAPID: NOT DETECTED
SEG NEUTROPHILS: 82 % (ref 36–65)
SEGMENTED NEUTROPHILS ABSOLUTE COUNT: 10.37 K/UL (ref 1.5–8.1)
SODIUM BLD-SCNC: 132 MMOL/L (ref 135–144)
SPECIFIC GRAVITY UA: 1.01 (ref 1.01–1.02)
SPECIMEN DESCRIPTION: NORMAL
SPECIMEN DESCRIPTION: NORMAL
TOTAL PROTEIN: 7.5 G/DL (ref 6.4–8.3)
TRICHOMONAS: ABNORMAL
TURBIDITY: CLEAR
URINE HGB: NEGATIVE
UROBILINOGEN, URINE: NORMAL
WBC # BLD: 12.6 K/UL (ref 3.5–11.3)
WBC # BLD: ABNORMAL 10*3/UL
WBC UA: ABNORMAL /HPF (ref 0–5)
YEAST: ABNORMAL

## 2021-12-29 PROCEDURE — 83880 ASSAY OF NATRIURETIC PEPTIDE: CPT

## 2021-12-29 PROCEDURE — 6360000002 HC RX W HCPCS: Performed by: EMERGENCY MEDICINE

## 2021-12-29 PROCEDURE — 87635 SARS-COV-2 COVID-19 AMP PRB: CPT

## 2021-12-29 PROCEDURE — 80053 COMPREHEN METABOLIC PANEL: CPT

## 2021-12-29 PROCEDURE — 87040 BLOOD CULTURE FOR BACTERIA: CPT

## 2021-12-29 PROCEDURE — 81001 URINALYSIS AUTO W/SCOPE: CPT

## 2021-12-29 PROCEDURE — 96367 TX/PROPH/DG ADDL SEQ IV INF: CPT

## 2021-12-29 PROCEDURE — 71045 X-RAY EXAM CHEST 1 VIEW: CPT

## 2021-12-29 PROCEDURE — 83605 ASSAY OF LACTIC ACID: CPT

## 2021-12-29 PROCEDURE — 87804 INFLUENZA ASSAY W/OPTIC: CPT

## 2021-12-29 PROCEDURE — 99285 EMERGENCY DEPT VISIT HI MDM: CPT

## 2021-12-29 PROCEDURE — 6370000000 HC RX 637 (ALT 250 FOR IP): Performed by: EMERGENCY MEDICINE

## 2021-12-29 PROCEDURE — 85025 COMPLETE CBC W/AUTO DIFF WBC: CPT

## 2021-12-29 PROCEDURE — 36415 COLL VENOUS BLD VENIPUNCTURE: CPT

## 2021-12-29 PROCEDURE — 96365 THER/PROPH/DIAG IV INF INIT: CPT

## 2021-12-29 PROCEDURE — 2580000003 HC RX 258: Performed by: EMERGENCY MEDICINE

## 2021-12-29 RX ORDER — ACETAMINOPHEN 500 MG
1000 TABLET ORAL ONCE
Status: COMPLETED | OUTPATIENT
Start: 2021-12-29 | End: 2021-12-29

## 2021-12-29 RX ADMIN — CEFTRIAXONE 1000 MG: 1 INJECTION, POWDER, FOR SOLUTION INTRAMUSCULAR; INTRAVENOUS at 23:09

## 2021-12-29 RX ADMIN — ACETAMINOPHEN 1000 MG: 500 TABLET ORAL at 21:48

## 2021-12-29 RX ADMIN — AZITHROMYCIN MONOHYDRATE 500 MG: 500 INJECTION, POWDER, LYOPHILIZED, FOR SOLUTION INTRAVENOUS at 23:48

## 2021-12-29 ASSESSMENT — PAIN SCALES - GENERAL: PAINLEVEL_OUTOF10: 0

## 2021-12-30 PROBLEM — R09.02 HYPOXIA: Status: ACTIVE | Noted: 2021-12-30

## 2021-12-30 PROBLEM — J15.9 COMMUNITY ACQUIRED BACTERIAL PNEUMONIA: Status: ACTIVE | Noted: 2021-12-30

## 2021-12-30 PROCEDURE — 2700000000 HC OXYGEN THERAPY PER DAY

## 2021-12-30 PROCEDURE — 6370000000 HC RX 637 (ALT 250 FOR IP): Performed by: NURSE PRACTITIONER

## 2021-12-30 PROCEDURE — 2580000003 HC RX 258: Performed by: EMERGENCY MEDICINE

## 2021-12-30 PROCEDURE — 87070 CULTURE OTHR SPECIMN AEROBIC: CPT

## 2021-12-30 PROCEDURE — 97161 PT EVAL LOW COMPLEX 20 MIN: CPT

## 2021-12-30 PROCEDURE — 97166 OT EVAL MOD COMPLEX 45 MIN: CPT

## 2021-12-30 PROCEDURE — 6360000002 HC RX W HCPCS: Performed by: NURSE PRACTITIONER

## 2021-12-30 PROCEDURE — 1200000000 HC SEMI PRIVATE

## 2021-12-30 PROCEDURE — 6370000000 HC RX 637 (ALT 250 FOR IP): Performed by: EMERGENCY MEDICINE

## 2021-12-30 PROCEDURE — 96361 HYDRATE IV INFUSION ADD-ON: CPT

## 2021-12-30 PROCEDURE — 36415 COLL VENOUS BLD VENIPUNCTURE: CPT

## 2021-12-30 PROCEDURE — 92610 EVALUATE SWALLOWING FUNCTION: CPT

## 2021-12-30 PROCEDURE — 87040 BLOOD CULTURE FOR BACTERIA: CPT

## 2021-12-30 PROCEDURE — 2580000003 HC RX 258: Performed by: NURSE PRACTITIONER

## 2021-12-30 PROCEDURE — 87205 SMEAR GRAM STAIN: CPT

## 2021-12-30 PROCEDURE — 94761 N-INVAS EAR/PLS OXIMETRY MLT: CPT

## 2021-12-30 RX ORDER — ATORVASTATIN CALCIUM 40 MG/1
40 TABLET, FILM COATED ORAL DAILY
Status: DISCONTINUED | OUTPATIENT
Start: 2021-12-30 | End: 2021-12-31 | Stop reason: HOSPADM

## 2021-12-30 RX ORDER — ALBUTEROL SULFATE 2.5 MG/3ML
2.5 SOLUTION RESPIRATORY (INHALATION)
Status: DISCONTINUED | OUTPATIENT
Start: 2021-12-30 | End: 2021-12-31

## 2021-12-30 RX ORDER — ALOGLIPTIN 25 MG/1
25 TABLET, FILM COATED ORAL DAILY
Status: DISCONTINUED | OUTPATIENT
Start: 2021-12-30 | End: 2021-12-31 | Stop reason: HOSPADM

## 2021-12-30 RX ORDER — LEVOTHYROXINE SODIUM 112 UG/1
112 TABLET ORAL DAILY
Status: DISCONTINUED | OUTPATIENT
Start: 2021-12-30 | End: 2021-12-31 | Stop reason: HOSPADM

## 2021-12-30 RX ORDER — CLOPIDOGREL BISULFATE 75 MG/1
75 TABLET ORAL DAILY
Status: DISCONTINUED | OUTPATIENT
Start: 2021-12-30 | End: 2021-12-31 | Stop reason: HOSPADM

## 2021-12-30 RX ORDER — ASPIRIN 81 MG/1
81 TABLET, CHEWABLE ORAL DAILY
Status: DISCONTINUED | OUTPATIENT
Start: 2021-12-30 | End: 2021-12-31 | Stop reason: HOSPADM

## 2021-12-30 RX ORDER — PANTOPRAZOLE SODIUM 40 MG/1
40 TABLET, DELAYED RELEASE ORAL
Status: DISCONTINUED | OUTPATIENT
Start: 2021-12-30 | End: 2021-12-31 | Stop reason: HOSPADM

## 2021-12-30 RX ORDER — 0.9 % SODIUM CHLORIDE 0.9 %
500 INTRAVENOUS SOLUTION INTRAVENOUS ONCE
Status: COMPLETED | OUTPATIENT
Start: 2021-12-30 | End: 2021-12-30

## 2021-12-30 RX ORDER — ONDANSETRON 4 MG/1
4 TABLET, ORALLY DISINTEGRATING ORAL EVERY 8 HOURS PRN
Status: DISCONTINUED | OUTPATIENT
Start: 2021-12-30 | End: 2021-12-31 | Stop reason: HOSPADM

## 2021-12-30 RX ORDER — ACETAMINOPHEN 325 MG/1
650 TABLET ORAL EVERY 6 HOURS PRN
Status: DISCONTINUED | OUTPATIENT
Start: 2021-12-30 | End: 2021-12-31 | Stop reason: HOSPADM

## 2021-12-30 RX ORDER — SODIUM CHLORIDE 0.9 % (FLUSH) 0.9 %
5-40 SYRINGE (ML) INJECTION EVERY 12 HOURS SCHEDULED
Status: DISCONTINUED | OUTPATIENT
Start: 2021-12-30 | End: 2021-12-31 | Stop reason: HOSPADM

## 2021-12-30 RX ORDER — PRIMIDONE 250 MG/1
250 TABLET ORAL 2 TIMES DAILY
Status: DISCONTINUED | OUTPATIENT
Start: 2021-12-30 | End: 2021-12-31 | Stop reason: HOSPADM

## 2021-12-30 RX ORDER — ACETAMINOPHEN 650 MG/1
650 SUPPOSITORY RECTAL EVERY 6 HOURS PRN
Status: DISCONTINUED | OUTPATIENT
Start: 2021-12-30 | End: 2021-12-31 | Stop reason: HOSPADM

## 2021-12-30 RX ORDER — SODIUM CHLORIDE 9 MG/ML
25 INJECTION, SOLUTION INTRAVENOUS PRN
Status: DISCONTINUED | OUTPATIENT
Start: 2021-12-30 | End: 2021-12-31 | Stop reason: HOSPADM

## 2021-12-30 RX ORDER — POLYETHYLENE GLYCOL 3350 17 G/17G
17 POWDER, FOR SOLUTION ORAL DAILY PRN
Status: DISCONTINUED | OUTPATIENT
Start: 2021-12-30 | End: 2021-12-31 | Stop reason: HOSPADM

## 2021-12-30 RX ORDER — ONDANSETRON 2 MG/ML
4 INJECTION INTRAMUSCULAR; INTRAVENOUS EVERY 6 HOURS PRN
Status: DISCONTINUED | OUTPATIENT
Start: 2021-12-30 | End: 2021-12-31 | Stop reason: HOSPADM

## 2021-12-30 RX ORDER — TAMSULOSIN HYDROCHLORIDE 0.4 MG/1
0.8 CAPSULE ORAL
Status: DISCONTINUED | OUTPATIENT
Start: 2021-12-30 | End: 2021-12-31 | Stop reason: HOSPADM

## 2021-12-30 RX ORDER — PROPRANOLOL HCL 60 MG
120 CAPSULE, EXTENDED RELEASE 24HR ORAL 2 TIMES DAILY
Status: DISCONTINUED | OUTPATIENT
Start: 2021-12-30 | End: 2021-12-31 | Stop reason: HOSPADM

## 2021-12-30 RX ORDER — ALBUTEROL SULFATE 2.5 MG/3ML
2.5 SOLUTION RESPIRATORY (INHALATION) EVERY 6 HOURS PRN
Status: DISCONTINUED | OUTPATIENT
Start: 2021-12-30 | End: 2021-12-31 | Stop reason: HOSPADM

## 2021-12-30 RX ORDER — ACETAMINOPHEN 325 MG/1
650 TABLET ORAL ONCE
Status: COMPLETED | OUTPATIENT
Start: 2021-12-30 | End: 2021-12-30

## 2021-12-30 RX ORDER — SODIUM CHLORIDE 0.9 % (FLUSH) 0.9 %
5-40 SYRINGE (ML) INJECTION PRN
Status: DISCONTINUED | OUTPATIENT
Start: 2021-12-30 | End: 2021-12-31 | Stop reason: HOSPADM

## 2021-12-30 RX ORDER — FUROSEMIDE 40 MG/1
40 TABLET ORAL 2 TIMES DAILY
Status: DISCONTINUED | OUTPATIENT
Start: 2021-12-30 | End: 2021-12-31 | Stop reason: HOSPADM

## 2021-12-30 RX ADMIN — TAMSULOSIN HYDROCHLORIDE 0.8 MG: 0.4 CAPSULE ORAL at 16:47

## 2021-12-30 RX ADMIN — AZITHROMYCIN MONOHYDRATE 500 MG: 500 INJECTION, POWDER, LYOPHILIZED, FOR SOLUTION INTRAVENOUS at 23:10

## 2021-12-30 RX ADMIN — METFORMIN HYDROCHLORIDE 1000 MG: 500 TABLET ORAL at 16:47

## 2021-12-30 RX ADMIN — ASPIRIN 81 MG: 81 TABLET, CHEWABLE ORAL at 08:17

## 2021-12-30 RX ADMIN — PROPRANOLOL HYDROCHLORIDE 120 MG: 60 CAPSULE, EXTENDED RELEASE ORAL at 20:33

## 2021-12-30 RX ADMIN — FUROSEMIDE 40 MG: 40 TABLET ORAL at 16:47

## 2021-12-30 RX ADMIN — PROPRANOLOL HYDROCHLORIDE 120 MG: 60 CAPSULE, EXTENDED RELEASE ORAL at 08:18

## 2021-12-30 RX ADMIN — CLOPIDOGREL BISULFATE 75 MG: 75 TABLET ORAL at 08:18

## 2021-12-30 RX ADMIN — SODIUM CHLORIDE, PRESERVATIVE FREE 10 ML: 5 INJECTION INTRAVENOUS at 20:33

## 2021-12-30 RX ADMIN — ENOXAPARIN SODIUM 40 MG: 100 INJECTION SUBCUTANEOUS at 14:24

## 2021-12-30 RX ADMIN — PANTOPRAZOLE SODIUM 40 MG: 40 TABLET, DELAYED RELEASE ORAL at 08:15

## 2021-12-30 RX ADMIN — SODIUM CHLORIDE 500 ML: 9 INJECTION, SOLUTION INTRAVENOUS at 02:30

## 2021-12-30 RX ADMIN — LEVOTHYROXINE SODIUM 112 MCG: 112 TABLET ORAL at 08:15

## 2021-12-30 RX ADMIN — PRIMIDONE 250 MG: 250 TABLET ORAL at 20:33

## 2021-12-30 RX ADMIN — PRIMIDONE 250 MG: 250 TABLET ORAL at 08:18

## 2021-12-30 RX ADMIN — CEFTRIAXONE 1000 MG: 1 INJECTION, POWDER, FOR SOLUTION INTRAMUSCULAR; INTRAVENOUS at 22:33

## 2021-12-30 RX ADMIN — ATORVASTATIN CALCIUM 40 MG: 40 TABLET, FILM COATED ORAL at 08:17

## 2021-12-30 RX ADMIN — ALOGLIPTIN 25 MG: 25 TABLET, FILM COATED ORAL at 08:18

## 2021-12-30 RX ADMIN — ACETAMINOPHEN 650 MG: 325 TABLET ORAL at 09:38

## 2021-12-30 RX ADMIN — FUROSEMIDE 40 MG: 40 TABLET ORAL at 08:18

## 2021-12-30 RX ADMIN — METFORMIN HYDROCHLORIDE 1000 MG: 500 TABLET ORAL at 08:18

## 2021-12-30 ASSESSMENT — PAIN SCALES - GENERAL
PAINLEVEL_OUTOF10: 0

## 2021-12-30 ASSESSMENT — ENCOUNTER SYMPTOMS
COUGH: 1
COLOR CHANGE: 0
EYE REDNESS: 0
RHINORRHEA: 0
SHORTNESS OF BREATH: 0
BACK PAIN: 0
VOMITING: 0
DIARRHEA: 0

## 2021-12-30 ASSESSMENT — PAIN - FUNCTIONAL ASSESSMENT: PAIN_FUNCTIONAL_ASSESSMENT: 0-10

## 2021-12-30 NOTE — H&P
History and Physical    Patient:  Bettina Servin  MRN: [id number]    Chief Complaint: Fever    History Obtained From:  patient, electronic medical record    PCP: GENA Najera CNP    History of Present Illness: The patient is a 82 y.o. male who presented to the emergency room with complaints of fever. Patient complained of chronic cough. He denied chest pain or palpitations. He denied nausea vomiting diarrhea. Patient is vaccinated against COVID-19. He does have history of diabetes,COPD and CHF. During patient's evaluation he was febrile at 102.9. Patient was slightly tachycardic at 91 and tachypneic at 22. SPO2 was 92% however at one point patient was 88% on room air. Chest x-ray showed bilateral pneumonia. BBC count was elevated at 12. 6. proBNP was 359. He was negative for Covid as well as influenza. Past Medical History:        Diagnosis Date    [facility] obstructive [facility] disease) [facility] [address]     Diabetes mellitus ([facility] [address]     [facility]    Hyperlipidemia     Hypothyroidism     MI (myocardial infarction) ([facility] [address]     Thyroid disease     Type II or unspecified type diabetes mellitus without mention of complication, not stated as uncontrolled        Past Surgical History:        Procedure Laterality Date    CARDIAC SURGERY  02/24/2017    Stent placed  Dr June Rodriguez      right ankle    HERNIA REPAIR         Medications Prior to Admission:    Prior to Admission medications    Medication Sig Start Date End Date Taking?  Authorizing Provider   clopidogrel (PLAVIX) 75 MG tablet Take 1 tablet by mouth daily 11/2/21   Fabrice Byrne MD   furosemide (LASIX) 40 MG tablet Take 1 tablet by mouth twice daily 10/6/21   GENA Najera CNP   atorvastatin (LIPITOR) 40 MG tablet Take 1 tablet by mouth daily 8/23/21   Fabrice Byrne MD   primidone (MYSOLINE) 250 MG tablet Take 250 mg by mouth 2 times daily Historical Provider, MD   propranolol (INDERAL LA) 120 MG extended release capsule Take 120 mg by mouth 2 times daily    Historical Provider, MD   omeprazole (PRILOSEC) 20 MG delayed release capsule Take 20 mg by mouth daily    Historical Provider, MD   levothyroxine (SYNTHROID) 112 MCG tablet Take 1 tablet by mouth Daily 7/6/21   Serina Snider APRN - GUERITA   albuterol (PROVENTIL) (2.5 MG/3ML) 0.083% nebulizer solution Take 3 mLs by nebulization every 4 hours as needed for Wheezing or Shortness of Breath 2/28/20   Carlito Todd APRN - CNP   alogliptin (NESINA) 25 MG TABS tablet Take 25 mg by mouth daily    Historical Provider, MD   blood glucose monitor strips accu check 7/18/18   GENA Etienne - CNP   aspirin 81 MG tablet Take 81 mg by mouth daily     Historical Provider, MD   albuterol sulfate  (90 Base) MCG/ACT inhaler Inhale 2 puffs into the lungs every 4 hours as needed for Wheezing or Shortness of Breath 4/13/18   Lenny Johnson APRN - CNP   budesonide-formoterol (SYMBICORT) 160-4.5 MCG/ACT AERO Inhale 2 puffs into the lungs 2 times daily    Historical Provider, MD   metFORMIN (GLUCOPHAGE) 1000 MG tablet Take 1,000 mg by mouth 2 times daily     Historical Provider, MD   acetaminophen (TYLENOL) 500 MG tablet Take 1,000 mg by mouth every 6 hours as needed for Pain    Historical Provider, MD   TAMSULOSIN HCL PO Take 0.8 mg by mouth Daily with supper     Historical Provider, MD   Multiple Vitamins-Minerals (THERAPEUTIC MULTIVITAMIN-MINERALS) tablet Take 1 tablet by mouth daily    Historical Provider, MD       Allergies:  Rosuvastatin    Social History:   TOBACCO:   reports that he quit smoking about 29 years ago. He has never used smokeless tobacco.  ETOH:   reports current alcohol use.     Family History:       Problem Relation Age of Onset    Cancer Mother 47        liver ca    Diabetes Father     Heart Disease Father        Allergies:  Rosuvastatin    Medications Prior to Admission:    Prior to Admission medications    Medication Sig Start Date End Date Taking?  Authorizing Provider   clopidogrel (PLAVIX) 75 MG tablet Take 1 tablet by mouth daily 11/2/21   Mildred Knutson MD   furosemide (LASIX) 40 MG tablet Take 1 tablet by mouth twice daily 10/6/21   GENA Scales CNP   atorvastatin (LIPITOR) 40 MG tablet Take 1 tablet by mouth daily 8/23/21   Mildred Knutson MD   primidone (MYSOLINE) 250 MG tablet Take 250 mg by mouth 2 times daily    Historical Provider, MD   propranolol (INDERAL LA) 120 MG extended release capsule Take 120 mg by mouth 2 times daily    Historical Provider, MD   omeprazole (PRILOSEC) 20 MG delayed release capsule Take 20 mg by mouth daily    Historical Provider, MD   levothyroxine (SYNTHROID) 112 MCG tablet Take 1 tablet by mouth Daily 7/6/21   Elisha Snider APRN - CNP   albuterol (PROVENTIL) (2.5 MG/3ML) 0.083% nebulizer solution Take 3 mLs by nebulization every 4 hours as needed for Wheezing or Shortness of Breath 2/28/20   Carlito Todd APRN - CNP   alogliptin (NESINA) 25 MG TABS tablet Take 25 mg by mouth daily    Historical Provider, MD   blood glucose monitor strips accu check 7/18/18   GENA Laguna - CNP   aspirin 81 MG tablet Take 81 mg by mouth daily     Historical Provider, MD   albuterol sulfate  (90 Base) MCG/ACT inhaler Inhale 2 puffs into the lungs every 4 hours as needed for Wheezing or Shortness of Breath 4/13/18   Lenny Johnson APRN - CNP   budesonide-formoterol (SYMBICORT) 160-4.5 MCG/ACT AERO Inhale 2 puffs into the lungs 2 times daily    Historical Provider, MD   metFORMIN (GLUCOPHAGE) 1000 MG tablet Take 1,000 mg by mouth 2 times daily     Historical Provider, MD   acetaminophen (TYLENOL) 500 MG tablet Take 1,000 mg by mouth every 6 hours as needed for Pain    Historical Provider, MD   TAMSULOSIN HCL PO Take 0.8 mg by mouth Daily with supper     Historical Provider, MD   Multiple Vitamins-Minerals (THERAPEUTIC MULTIVITAMIN-MINERALS) tablet Take 1 tablet by mouth daily    Historical Provider, MD       Review of Systems:  Constitutional:positive  for fevers, and negative for chills. Eyes: negative for visual disturbance   ENT: negative for sore throat, negative nasal congestion, and negative for earache  Respiratory: negative for shortness of breath, positive for cough, and negative for wheezing  Cardiovascular: negative for chest pain, negative for palpitations, and negative for syncope  Gastrointestinal: negative for abdominal pain, negative for nausea,negative for vomiting, negative for diarrhea, negative for constipation, and negative for hematochezia or melena  Genitourinary: negative for dysuria, negative for urinary urgency, negative for urinary frequency, and negative for hematuria  Skin: negative for skin rash, and negative for skin lesions  Neurological: negative for unilateral weakness, numbness or tingling. Physical Exam:    Vitals:   Temp: 97.8 °F (36.6 °C)  BP: 135/68  Resp: 18  Pulse: 70  SpO2: 95 %  24HR INTAKE/OUTPUT:      Intake/Output Summary (Last 24 hours) at 12/30/2021 1445  Last data filed at 12/30/2021 0330  Gross per 24 hour   Intake 750 ml   Output --   Net 750 ml       Weight    Body mass index is 34.87 kg/m². Exam:  GEN:    Awake, alert and oriented x3. EYES:  EOMI, pupils equal   NECK: Supple. No lymphadenopathy. No carotid bruit  CVS:    regular rate and rhythm, systolic murmur  PULM:  diminished with scattered rhonchi, no acute respiratory distress  ABD:    Bowels sounds normal.  Abdomen is soft. No distention. no tenderness to palpation. EXT:   no edema bilaterally . No calf tenderness. NEURO: Moves all extremities. Motor and sensory are grossly intact  SKIN:  No rashes.   No skin lesions.    -----------------------------------------------------------------  Diagnostic Data:     DATA:    CBC:   Lab Results   Component Value Date    WBC 12.6 (H) 12/29/2021    RBC 4.24 12/29/2021    HGB 12.4 (L) 12/29/2021    HCT 38.6 (L) 12/29/2021    MCV 91.0 12/29/2021     12/29/2021        CMP:   Lab Results   Component Value Date    GLUCOSE 180 (H) 12/29/2021    BUN 19 12/29/2021    CREATININE 0.98 12/29/2021     (L) 12/29/2021    K 4.4 12/29/2021    CALCIUM 9.5 12/29/2021    CL 94 (L) 12/29/2021    CO2 23 12/29/2021    PROT 7.5 12/29/2021    LABALBU 4.0 12/29/2021    BILITOT 0.47 12/29/2021    ALKPHOS 163 (H) 12/29/2021    ALT 54 (H) 12/29/2021    AST 41 (H) 12/29/2021       UA:   Lab Results   Component Value Date    COLORU Yellow 12/29/2021    CLARITYU clear 05/10/2018    SPECGRAV 1.015 12/29/2021    WBCUA None 12/29/2021    RBCUA 0 TO 2 12/29/2021    EPITHUA 0 TO 2 12/29/2021    LEUKOCYTESUR NEGATIVE 12/29/2021    GLUCOSEU NEGATIVE 12/29/2021    BLOODU neg 05/10/2018    KETUA NEGATIVE 12/29/2021    PROTEINU NEGATIVE 12/29/2021    HGBUR NEGATIVE 12/29/2021    CASTUA NOT REPORTED 12/29/2021    CRYSTUA NOT REPORTED 12/29/2021    BACTERIA TRACE (A) 12/29/2021    YEAST NOT REPORTED 12/29/2021       Lactic Acid:   Lab Results   Component Value Date    LACTA 2.2 12/29/2021       D-Dimer:  No results found for: DDIMER    PT/INR:  Lab Results   Component Value Date    PROTIME 10.9 09/19/2017    INR 1.1 09/19/2017       High Sensitivity Troponin:  No results for input(s): TROPHS in the last 72 hours. ABGs:   Lab Results   Component Value Date    PHART 7.340 09/19/2017    FOE6FXY 53.8 09/19/2017    PO2ART 62.0 09/19/2017    IGL5AFR 28.4 09/19/2017    Y2EKEEAT 90.1 09/19/2017    FIO2 NOT REPORTED 09/19/2017           XR CHEST PORTABLE   Final Result   Patchy bilateral interstitial infiltrates greater in the lung bases   suggesting an acute infectious/inflammatory process. EKG reviewed    Assessment:    Principal Problem:    Community acquired bacterial pneumonia  Active Problems:    Obesity (BMI 30.0-34. 9)    Type 2 diabetes mellitus with complication, without long-term current use of insulin ([facility]    Chronic combined systolic and diastolic CHF (congestive heart failure) (HCC)    COPD (chronic obstructive pulmonary disease) ([facility] [address]    Hypoxia  Resolved Problems:    * No resolved hospital problems. *      Patient Active Problem List    Diagnosis Date Noted    Community acquired bacterial pneumonia 12/30/2021    Hypoxia 12/30/2021    Moderate persistent asthma without complication 15/99/6456    Hypothyroidism due to Hashimoto's thyroiditis 11/05/2018    Elevated liver enzymes 11/02/2018    Adrenal adenoma, left 10/16/2018    COPD (chronic obstructive pulmonary disease) ([facility] [address] 11/15/2017    Chronic combined systolic and diastolic CHF (congestive heart failure) ([facility] 09/21/2017    Knee osteoarthritis 06/11/2015    Obesity (BMI 30.0-34.9) 02/05/2015    Venous (peripheral) insufficiency 02/05/2015    Hx pulmonary embolism 02/05/2015    Hearing impaired 02/05/2015    Edema 02/05/2015    Type 2 diabetes mellitus with complication, without long-term current use of insulin ([facility] 02/05/2015       Plan:     · This patient requires inpatient admission because of community-acquired pneumonia  · Factors affecting the medical complexity of this patient include chronic CHF, COPD, hypoxia, type 2 diabetes  · Estimated length of stay is 4 days  · Community acquired pneumonia  · IV Rocephin/IV Zithromax  · BC x 2  · PT/OT  · Chronic CHF   · Continue Lasix  · COPD  · Continue   · Hypoxia  · Supplemental O2 -- maintain SPO2 > 90%  · Type 2 diabetes  · Continue Glucophage  · Trend labs  · DVT prophylaxis: Lovenox  · Peptic ulcer prophylaxis: Pepcid  · High risk medications: none  · Social Service and Case Management consults for DC planning  · Dietician consult initiated    CORE MEASURES  DVT prophylaxis: Lovenox  Decubitus ulcer present on admission: No  CODE STATUS: FULL CODE  Nutrition Status: good   Physical therapy: Yes   Old Charts reviewed: Yes  EKG Reviewed:  Yes  Advance Directive Addressed: Yes    Fawn Colindres Ginny Lewis, GENA - CNP, GENA, NP-C  12/30/2021, 2:45 PM

## 2021-12-30 NOTE — ED PROVIDER NOTES
 Thyroid disease     Type II or unspecified type diabetes mellitus without mention of complication, not stated as uncontrolled          SURGICAL HISTORY       Past Surgical History:   Procedure Laterality Date    CARDIAC SURGERY  02/24/2017    Stent placed  Dr Ev Vale      right ankle    HERNIA REPAIR           CURRENT MEDICATIONS       Previous Medications    ACETAMINOPHEN (TYLENOL) 500 MG TABLET    Take 1,000 mg by mouth every 6 hours as needed for Pain    ALBUTEROL (PROVENTIL) (2.5 MG/3ML) 0.083% NEBULIZER SOLUTION    Take 3 mLs by nebulization every 4 hours as needed for Wheezing or Shortness of Breath    ALBUTEROL SULFATE  (90 BASE) MCG/ACT INHALER    Inhale 2 puffs into the lungs every 4 hours as needed for Wheezing or Shortness of Breath    ALOGLIPTIN (NESINA) 25 MG TABS TABLET    Take 25 mg by mouth daily    ASPIRIN 81 MG TABLET    Take 81 mg by mouth daily     ATORVASTATIN (LIPITOR) 40 MG TABLET    Take 1 tablet by mouth daily    BLOOD GLUCOSE MONITOR STRIPS    accu check    BUDESONIDE-FORMOTEROL (SYMBICORT) 160-4.5 MCG/ACT AERO    Inhale 2 puffs into the lungs 2 times daily    CLOPIDOGREL (PLAVIX) 75 MG TABLET    Take 1 tablet by mouth daily    CLOTRIMAZOLE-BETAMETHASONE (LOTRISONE) 1-0.05 % CREAM    Apply topically 2 times daily.     FUROSEMIDE (LASIX) 40 MG TABLET    Take 1 tablet by mouth twice daily    LEVOTHYROXINE (SYNTHROID) 112 MCG TABLET    Take 1 tablet by mouth Daily    METFORMIN (GLUCOPHAGE) 1000 MG TABLET    Take 1,000 mg by mouth 2 times daily     MONTELUKAST (SINGULAIR) 10 MG TABLET    Take 1 tablet by mouth daily    MULTIPLE VITAMINS-MINERALS (THERAPEUTIC MULTIVITAMIN-MINERALS) TABLET    Take 1 tablet by mouth daily    OMEPRAZOLE (PRILOSEC) 20 MG DELAYED RELEASE CAPSULE    Take 20 mg by mouth daily    PRIMIDONE (MYSOLINE) 250 MG TABLET    Take 250 mg by mouth 2 times daily    PROPRANOLOL (INDERAL LA) 120 MG EXTENDED RELEASE CAPSULE Take 120 mg by mouth 2 times daily    TAMSULOSIN HCL PO    Take 0.8 mg by mouth Daily with supper        ALLERGIES     Rosuvastatin    FAMILY HISTORY       Family History   Problem Relation Age of Onset    Cancer Mother 47        liver ca    Diabetes Father     Heart Disease Father           SOCIAL HISTORY       Social History     Socioeconomic History    Marital status:      Spouse name: Not on file    Number of children: Not on file    Years of education: Not on file    Highest education level: Not on file   Occupational History    Not on file   Tobacco Use    Smoking status: Former Smoker     Quit date:      Years since quittin.0    Smokeless tobacco: Never Used   Vaping Use    Vaping Use: Never used   Substance and Sexual Activity    Alcohol use: Yes     Comment: occasional- maybe every other day    Drug use: No    Sexual activity: Not on file   Other Topics Concern    Not on file   Social History Narrative    Not on file     Social Determinants of Health     Financial Resource Strain: Low Risk     Difficulty of Paying Living Expenses: Not hard at all   Food Insecurity: No Food Insecurity    Worried About [address] in the Last Year: Never true    [address] in the Last Year: Never true   Transportation Needs:     Lack of Transportation (Medical): Not on file    Lack of Transportation (Non-Medical):  Not on file   Physical Activity:     Days of Exercise per Week: Not on file    Minutes of Exercise per Session: Not on file   Stress:     Feeling of Stress : Not on file   Social Connections:     Frequency of Communication with Friends and Family: Not on file    Frequency of Social Gatherings with Friends and Family: Not on file    Attends Gnosticist Services: Not on file    Active Member of Clubs or Organizations: Not on file    Attends Club or Organization Meetings: Not on file    Marital Status: Not on file   Intimate Partner Violence:     Fear of Current or Ex-Partner: Not on file    Emotionally Abused: Not on file    Physically Abused: Not on file    Sexually Abused: Not on file   Housing Stability:     Unable to Pay for Housing in the Last Year: Not on file    Number of [place] in the Last Year: Not on file    Unstable Housing in the Last Year: Not on file       SCREENINGS        Carrie Coma Scale  Eye Opening: Spontaneous  Best Verbal Response: Oriented  Best Motor Response: Obeys commands  Carrie Coma Scale Score: 15               PHYSICAL EXAM    (up to 7 for level 4, 8 or more for level 5)     ED Triage Vitals   BP Temp Temp Source Pulse Resp SpO2 Height Weight   12/29/21 2100 12/29/21 2100 12/29/21 2100 12/29/21 2100 12/29/21 2100 12/29/21 2104 -- 12/29/21 2100   (!) 196/82 102.9 °F (39.4 °C) Tympanic 91 22 92 %  248 lb (112.5 kg)       Physical Exam  Vitals and nursing note reviewed. Constitutional:       General: He is not in acute distress. Appearance: He is obese. He is not toxic-appearing. Comments: Patient is sitting in bed comfortably upon exam.  He answers questions appropriately in full sentences. HENT:      Head: Atraumatic. Eyes:      Extraocular Movements: Extraocular movements intact. Pupils: Pupils are equal, round, and reactive to light. Cardiovascular:      Rate and Rhythm: Regular rhythm. Tachycardia present. Pulmonary:      Effort: Pulmonary effort is normal.      Breath sounds: Normal breath sounds. Comments: Tachypnea  Abdominal:      General: Bowel sounds are normal.      Palpations: Abdomen is soft. Musculoskeletal:         General: Normal range of motion. Cervical back: Normal range of motion and neck supple. Comments: Peripheral edema bilateral lower extremities. Skin:     General: Skin is warm and dry. Capillary Refill: Capillary refill takes less than 2 seconds. Neurological:      General: No focal deficit present. Mental Status: He is alert.    Psychiatric: Mood and Affect: Mood normal.         DIAGNOSTIC RESULTS     EKG: All EKG's are interpreted by the Emergency Department Physician who either signs or Co-signs this chart in the absence of a cardiologist.        RADIOLOGY:   Non-plain film images such as CT, Ultrasound and MRI are read by the radiologist. Plain radiographic images are visualized and preliminarily interpreted by the emergency physician with the below findings:        Interpretation per the Radiologist below, if available at the time of this note:    XR CHEST PORTABLE   Final Result   Patchy bilateral interstitial infiltrates greater in the lung bases   suggesting an acute infectious/inflammatory process. ED BEDSIDE ULTRASOUND:   Performed by ED Physician - none    LABS:  Labs Reviewed   CBC WITH AUTO DIFFERENTIAL - Abnormal; Notable for the following components:       Result Value    WBC 12.6 (*)     Hemoglobin 12.4 (*)     Hematocrit 38.6 (*)     RDW 14.8 (*)     Seg Neutrophils 82 (*)     Lymphocytes 6 (*)     Immature Granulocytes 1 (*)     Segs Absolute 10.37 (*)     Absolute Lymph # 0.74 (*)     Absolute Mono # 1.25 (*)     All other components within normal limits   COMPREHENSIVE METABOLIC PANEL - Abnormal; Notable for the following components:    Glucose 180 (*)     Sodium 132 (*)     Chloride 94 (*)     Alkaline Phosphatase 163 (*)     ALT 54 (*)     AST 41 (*)     All other components within normal limits   URINALYSIS WITH MICROSCOPIC - Abnormal; Notable for the following components:    Bacteria, UA TRACE (*)     All other components within normal limits   BRAIN NATRIURETIC PEPTIDE - Abnormal; Notable for the following components:    Pro- (*)     All other components within normal limits   RAPID INFLUENZA A/B ANTIGENS   COVID-19, RAPID   CULTURE, BLOOD 1   LACTIC ACID, PLASMA       All other labs were within normal range or not returned as of this dictation.     EMERGENCY DEPARTMENT COURSE and DIFFERENTIAL DIAGNOSIS/MDM: Vitals:    Vitals:    12/30/21 0100 12/30/21 0200 12/30/21 0400 12/30/21 0529   BP: (!) 92/46 (!) 90/51 (!) 121/97    Pulse: 80 88 87 73   Resp: 11 24 16 19   Temp:   98 °F (36.7 °C)    TempSrc:   Tympanic    SpO2: 95% 94% 95% 95%   Weight:               MDM  Number of Diagnoses or Management Options  Hypoxia: new and requires workup  Pneumonia due to infectious organism, unspecified laterality, unspecified part of lung: new and requires workup     Amount and/or Complexity of Data Reviewed  Clinical lab tests: reviewed and ordered  Tests in the radiology section of CPT®: reviewed and ordered  Tests in the medicine section of CPT®: reviewed and ordered  Decide to obtain previous medical records or to obtain history from someone other than the patient: yes  Obtain history from someone other than the patient: yes  Review and summarize past medical records: yes  Independent visualization of images, tracings, or specimens: yes    Risk of Complications, Morbidity, and/or Mortality  Presenting problems: low  Diagnostic procedures: low  Management options: moderate    Critical Care  Total time providing critical care: < 30 minutes    Patient Progress  Patient progress: improved        REASSESSMENT     ED Course as of 12/30/21 0628   Wed Dec 29, 2021   2115 Alirio Kohil is an 61-year-old male with history of COPD who presents with a fever that began today. He reports a chronic cough but denies any other symptoms. He denies any runny nose, chest pain, vomiting, abdominal pain or diarrhea. Review of systems is otherwise negative. Upon arrival the patient is hypertensive, tachycardic, tachypneic and febrile. Physical exam shows bilateral peripheral edema.   Appropriate labs and imaging are ordered to assess for possible underlying etiology including but not limited to pneumonia, Covid and UTI. [AB]   2151 White count is 12.6. [AB]   2152 Chest x-ray shows: Patchy bilateral interstitial infiltrates greater in the lung bases  suggesting an acute infectious/inflammatory process. [AB]   2159 Covid and flu negative. [AB]   2200 No recent hospitalizations are noted in the patient's chart. Antibiotics ordered to cover community-acquired pneumonia. [AB]   7537 Patient is currently 88% on room air while sitting. He will need to be admitted. [AB]   2349 At reevaluation the patient and his wife are updated on the work-up results and need for admission due to his low oxygen saturation. Patient does not have oxygen at home. They agree with plan all questions were answered. They are counseled there are no beds upstairs and he will board in the emergency department until one becomes available. We will wait to call the hospitalist until the morning. [AB]   u Dec 30, 2021   0149 Blood pressure has become low. Fluid bolus ordered. [AB]   0407 Repeat blood pressure is 683 systolic. [AB]   3775 I spoke with Dr. Ezequiel Vernon, hospitalist, who accepts the patient for admission. [AB]   0622 Orders for home meds are placed until a bed becomes available upstairs and the hospice team can place them. [AB]      ED Course User Index  [AB] Rudell File, DO         CRITICAL CARE TIME   Total Critical Care time was 0 minutes, excluding separately reportable procedures. There was a high probability of clinically significant/life threatening deterioration in the patient's condition which required my urgent intervention. CONSULTS:  None    PROCEDURES:  Unless otherwise noted below, none     Procedures        FINAL IMPRESSION      1. Pneumonia due to infectious organism, unspecified laterality, unspecified part of lung    2. Hypoxia          DISPOSITION/PLAN   DISPOSITION Decision To Admit 12/30/2021 06:14:56 AM      PATIENT REFERRED TO:  No follow-up provider specified. DISCHARGE MEDICATIONS:  New Prescriptions    No medications on file     Controlled Substances Monitoring:     No flowsheet data found.     (Please note that portions of this note were completed with a voice recognition program.  Efforts were made to edit the dictations but occasionally words are mis-transcribed.)    Xiomara Soares DO (electronically signed)  Attending Emergency Physician            Xiomara Soares DO  12/30/21 9204

## 2021-12-30 NOTE — PROGRESS NOTES
Physical Therapy    Facility/Department: [facility] AT THE [facility] MED SURG  Initial Assessment    NAME: Valeria Bravo  : 1935  MRN: [id number]    Date of Service: 2021    Discharge Recommendations:  Continue to assess pending progress        Assessment   Body structures, Functions, Activity limitations: Decreased functional mobility ; Decreased strength;Decreased endurance;Decreased coordination;Decreased balance;Decreased posture  Assessment: PT evaluation completed. Patient was able to perform sit to stand transfers with CGAx1. Patient was able to perform sit to supine transfer with CGAx1. Patient ambulated without assistive device 15 feetx2 with CGAX1 and patient unsteady often reaching out for stable surfaces to regain balance. Patient demonstrated no shortness of breath with ambulation. PT would recommend continue usage of SC for safe ambulation. Patient would benefit from continued therapy in order to address deficits in strength, balance and functional mobility  Treatment Diagnosis: general debility  Prognosis: Good  Decision Making: Low Complexity  PT Education: PT Role  REQUIRES PT FOLLOW UP: Yes  Activity Tolerance  Activity Tolerance: Patient Tolerated treatment well       Patient Diagnosis(es): The primary encounter diagnosis was Pneumonia due to infectious organism, unspecified laterality, unspecified part of lung. A diagnosis of Hypoxia was also pertinent to this visit. has a past medical history of COPD (chronic obstructive pulmonary disease) ([address] Diabetes mellitus ([address] Hx of echocardiogram, Hyperlipidemia, Hypothyroidism, MI (myocardial infarction) ([facility] [address] Thyroid disease, and Type II or unspecified type diabetes mellitus without mention of complication, not stated as uncontrolled. has a past surgical history that includes hernia repair; Cardiac surgery (2017); fracture surgery; and Colonoscopy.     Restrictions  Restrictions/Precautions  Restrictions/Precautions: Fall Risk,General Precautions  Vision/Hearing  Vision Exceptions: Wears glasses at all times  Hearing: Within functional limits     Subjective  General  Chart Reviewed: Yes  Patient assessed for rehabilitation services?: Yes  Subjective  Subjective: patient denies pain  Pain Screening  Patient Currently in Pain: Denies    Orientation  Orientation  Overall Orientation Status: Within Functional Limits  Social/Functional History  Social/Functional History  Lives With: Spouse  Type of Home: House  Home Layout: One level  Home Access: Stairs to enter with rails  Entrance Stairs - Number of Steps: 2  Bathroom Toilet: Handicap height  Home Equipment: Cane  ADL Assistance: Independent  Ambulation Assistance: Independent  Transfer Assistance: Independent  Additional Comments: patient states independence with ADLs PTA and uses a cane from time to time based on the day    Objective     Observation/Palpation  Observation: patient on oxygen, 1 compression stocking on and per patient the other one was wet and it is drying    AROM RLE (degrees)  RLE AROM: WFL  AROM LLE (degrees)  LLE AROM : WFL  Strength RLE  Comment: grossly 4-/5  Strength LLE  Comment: grossly 4-/5        Bed mobility  Sit to Supine: Contact guard assistance  Transfers  Sit to Stand: Contact guard assistance  Stand to sit: Contact guard assistance  Ambulation  Ambulation?: Yes  Ambulation 1  Surface: level tile  Device: No Device  Assistance: Contact guard assistance  Gait Deviations: Shuffles; Slow Geovanna;Staggers  Distance: 15 feetx2  Comments: patient seeking out stable surfaces for support when ambulating.  recommend using SC to ambulate with     Balance  Sitting - Static: Good  Sitting - Dynamic: Good  Standing - Static: Fair  Standing - Dynamic: Fair;-        Plan   Plan  Times per week: 7 times per week  Times per day: Twice a day (1 time per day on weekends)  Current Treatment Recommendations: Karen Fink Mobility Training,Transfer Training,Stair training,Gait Training,Neuromuscular Re-education,Manual Therapy - Soft Tissue Mobilization,Positioning,Equipment Evaluation, Education, & procurement,Patient/Caregiver Education & Training,Safety Education & Training,Home Exercise Program  Safety Devices  Type of devices: Bed alarm in place,Call light within reach,Left in bed    AM-PAC Score     AM-PAC Inpatient Mobility without Stair Climbing Raw Score : 15 (12/30/21 1537)  AM-PAC Inpatient without Stair Climbing T-Scale Score : 43.03 (12/30/21 1537)  Mobility Inpatient CMS 0-100% Score: 47.43 (12/30/21 1537)  Mobility Inpatient without Stair CMS G-Code Modifier : CK (12/30/21 1537)       Goals  Short term goals  Time Frame for Short term goals: 20 visits  Short term goal 1: Patient will demonstrate the ability to ascend/descend 2 steps with bilateral handrail and supervision assistance in order to safely enter/exit the home  Short term goal 2: Patient will tolerate 35-45' ther-ex in order to increase endurance and ease ADLs  Short term goal 3: Patient will complete bed mobility and transfers independently in order to return to [facility]  Short term goal 4: Patient will ambulate 150 feetx1 with SC and supervision assistance in order to ease functional mobility       Therapy Time   Individual Concurrent Group Co-treatment   Time In 1515         Time Out 1525         Minutes 10                 Sarita Spivey, PT , DPT

## 2021-12-30 NOTE — ED PROVIDER NOTES
I have discussed the patient's presentation evaluation anticipated admission with Dr. Gosia Corona 7 AM.  The patient remained hemodynamically stable nontoxic-appearing during my attendance in the emergency department     Laura Iraheta MD  12/31/21 6319

## 2021-12-30 NOTE — PROGRESS NOTES
Speech Language Pathology  Facility/Department: [facility] AT THE [facility] MED SURG   CLINICAL BEDSIDE SWALLOW EVALUATION    NAME: Lisa Rordiguez  : 1935  MRN: [id number]    ADMISSION DATE: 2021  ADMITTING DIAGNOSIS: has Obesity (BMI 30.0-34.9); Venous (peripheral) insufficiency; Hx pulmonary embolism; Hearing impaired; Edema; Type 2 diabetes mellitus with complication, without long-term current use of insulin ([facility] [address] Knee osteoarthritis; Chronic combined systolic and diastolic CHF (congestive heart failure) ([facility] [address] COPD (chronic obstructive pulmonary disease) ([facility] [address] Adrenal adenoma, left; Elevated liver enzymes; Hypothyroidism due to Hashimoto's thyroiditis; Moderate persistent asthma without complication; Community acquired bacterial pneumonia; and Hypoxia on their problem list.  ONSET DATE: 21    Recent Chest Xray/CT of Chest: (21)     Impression   Patchy bilateral interstitial infiltrates greater in the lung bases   suggesting an acute infectious/inflammatory process.             Date of Eval: 2021  Evaluating Therapist: JOSELUIS Mai    Current Diet level:  Current Diet : Regular  Current Liquid Diet : Thin      Pain:  Pain Assessment  Pain Assessment: 0-10  Pain Level: 0    Reason for Referral  Rhea Vargas was referred for a bedside swallow evaluation to assess the efficiency of his swallow function, identify signs and symptoms of aspiration and make recommendations regarding safe dietary consistencies, effective compensatory strategies, and safe eating environment. Impression  Dysphagia Diagnosis: Swallow function appears grossly intact  Dysphagia Outcome Severity Scale: Level 6: Within functional limits/Modified independence     Treatment Plan  Requires SLP Intervention: No  Duration/Frequency of Treatment: N/A          Recommended Diet and Intervention  Diet Solids Recommendation: Regular  Liquid Consistency Recommendation:  Thin  Recommended Form of Meds: Whole with water Compensatory Swallowing Strategies  Compensatory Swallowing Strategies: Alternate solids and liquids;Eat/Feed slowly;Upright as possible for all oral intake;Small bites/sips      General  Chart Reviewed: Yes  Behavior/Cognition: Alert; Cooperative  Respiratory Status: O2 via nasual cannula  O2 Device: Nasal cannula  Communication Observation: Functional  Follows Directions: Complex  Dentition: Dentures top;Dentures bottom  Patient Positioning: Upright in bed  Baseline Vocal Quality: Normal  Volitional Cough: Strong  Prior Dysphagia History: Patient reports he has had \"food go down the wrong pipe\" in the past, but has had no concerns lately. Consistencies Administered: Reg solid; Dysphagia Pureed (Dysphagia I); Thin - straw      Pain Level: 0    Vision/Hearing  Vision  Vision: Impaired  Vision Exceptions: Wears glasses at all times  Hearing  Hearing: Within functional limits    Oral Motor Deficits  Oral/Motor  Oral Motor: Within functional limits    Oral Phase Dysfunction  Oral Phase  Oral Phase: WFL  Oral Phase  Oral Phase - Comment: Patient presents with WFL oral phase characterized by adequate strength and ROM of labial and lingual musculauture, adeqatue bolus preparation and propulsion, and no oral residues present post-swallow. Indicators of Pharyngeal Phase Dysfunction   Pharyngeal Phase  Pharyngeal Phase: WFL  Pharyngeal Phase   Pharyngeal: Patient presents with suspected WFL pharyngeal phase of swallowing characterized by prompt swallow initiation, adequate laryngeal elevation upon palpation, and no immediate s/sx with any consistencies. Patient elicited a cough prior to swallowing small piece of alyson cracker. Cough also elicited after all PO trials completed. ST does not suspect cough is related to swallowing function due to COPD and PNA diagnoses.     Prognosis  Prognosis  Prognosis for safe diet advancement: good  Individuals consulted  Consulted and agree with results and recommendations: Patient;RN    Education  Patient Education: ST educated patient re: rationale for BSE, diet recommendations, compensatory strategies, and plan of care. Patient Education Response: Verbalizes understanding             Therapy Time  SLP Individual Minutes  Time In: 0841  Time Out: 3596  Minutes: 17        Patient seen in room for bedside swallow evaluation. Patient alert and oriented and follows directions appropriately. Patient reports he has had \"food go down the wrong pipe\" in the past, but has had no concerns lately. Patient presents with [facility] oral phase characterized by adequate strength and ROM of labial and lingual musculauture, adeqatue bolus preparation and propulsion, and no oral residues present post-swallow. Patient presents with suspected WFL pharyngeal phase of swallowing characterized by prompt swallow initiation, adequate laryngeal elevation upon palpation, and no immediate s/sx with any consistencies. Patient elicited a cough prior to swallowing small piece of alyson cracker. Cough also elicited after all PO trials completed. ST does not suspect cough is related to swallowing function due to COPD and PNA diagnoses. ST recommends diet of regular solids and thin liquids at this time. Compensatory swallowing strategies should include: small bites/sips, pacing/slow rate, alternate solids/liquids, upright during PO intake. Further dysphagia therapy not warranted at this time. Re-consult ST if change in status occurs.          Rober Castaneda M.S. [id number] [facility]  12/30/2021 2:58 PM

## 2021-12-30 NOTE — ED NOTES
Pt assisted to commode x 1 assist. Pt had large bowel movement. Assisted back to bed and call light within reach. denies any needs at this time.       Ely Vasquez RN  12/30/21 4877

## 2021-12-30 NOTE — PROGRESS NOTES
Occupational Therapy   Occupational Therapy Initial Assessment  Date: 2021   Patient Name: Dinorah Rodríguez  MRN: [id number]     : 1935    Date of Service: 2021    Discharge Recommendations:  Continue to assess pending progress       Assessment   Performance deficits / Impairments: Decreased functional mobility ; Decreased endurance;Decreased coordination;Decreased balance;Decreased ADL status; Decreased safe awareness;Decreased strength  Assessment: Pt is an 82year old male admitted for hypoxia. CGA for bed mobility, Patricio for toileting with use of urinal. Pt currently on 1L O2, but is not on oxygen at home. Pt would benefit from skilled occupational therapy services to address deficits and improve safety and independence with ADL and functional tasks. Treatment Diagnosis: generalized weakness  Prognosis: Good  Decision Making: Medium Complexity  OT Education: OT Role;Plan of Care  Barriers to Learning: none  REQUIRES OT FOLLOW UP: Yes  Safety Devices  Safety Devices in place: Yes  Type of devices: Left in bed;Call light within reach; Bed alarm in place           Patient Diagnosis(es): The primary encounter diagnosis was Pneumonia due to infectious organism, unspecified laterality, unspecified part of lung. A diagnosis of Hypoxia was also pertinent to this visit. has a past medical history of COPD (chronic obstructive pulmonary disease) ([address] Diabetes mellitus ([address] Hx of echocardiogram, Hyperlipidemia, Hypothyroidism, MI (myocardial infarction) ([address] Thyroid disease, and Type II or unspecified type diabetes mellitus without mention of complication, not stated as uncontrolled. has a past surgical history that includes hernia repair; Cardiac surgery (2017); fracture surgery; and Colonoscopy.     Treatment Diagnosis: generalized weakness      Restrictions  Restrictions/Precautions  Restrictions/Precautions: Fall Risk,General Precautions    Subjective   General  Patient assessed for rehabilitation services?: Yes  Referring Practitioner: GILMAR Nicole  Diagnosis: hypoxia  Subjective  Subjective: Pt reports 0/10 pain at time of evaluation. General Comment  Comments: Pt sitting upright in hospital bed with HOB elevated, agreeable to therapy evaluation. Height and Weight  Height: 5' 11\" (180.3 cm)  Oxygen Therapy  SpO2: 96 %  Pulse Oximeter Device Mode: Continuous  Pulse Oximeter Device Location: Finger  O2 Device: Nasal cannula  O2 Flow Rate (L/min): 1 L/min  Social/Functional History  Social/Functional History  Lives With: Spouse  Type of Home: House  Home Layout: One level  Home Access: Stairs to enter with rails  Entrance Stairs - Number of Steps: 2  Bathroom Shower/Tub: Walk-in shower  Bathroom Toilet: Handicap height  Home Equipment: Cane  ADL Assistance: Independent  Ambulation Assistance: Independent  Transfer Assistance: Independent  Active : No  Additional Comments: patient states independence with ADLs PTA and uses a cane from time to time based on the day       Objective   Vision: Impaired  Vision Exceptions: Wears glasses at all times  Hearing: Exceptions to [facility]  Hearing Exceptions: Hard of hearing/hearing concerns       Observation/Palpation  Observation: patient on oxygen, 1 compression stocking on and per patient the other one was wet and it is drying  Toilet Transfers  Toilet - Technique: Stand pivot  Equipment Used:  (urinal at bedside)  Toilet Transfer: Minimal assistance  ADL  Feeding: Independent  Grooming: Contact guard assistance  UE Bathing: Contact guard assistance  LE Bathing: Minimal assistance; Moderate assistance  LE Dressing: Minimal assistance  Toileting: Minimal assistance  Additional Comments: Pt completed toileting at bedside with urinal. Pt required assistance to clean up after and to change gown due to small amount of incontinence once standing.         Bed mobility  Supine to Sit: Contact guard assistance  Sit to Supine: Contact guard assistance  Scooting: Contact guard assistance  Transfers  Sit to stand: Contact guard assistance  Stand to sit: Contact guard assistance      LUE AROM : WFL  Left Hand AROM: WFL  RUE AROM : WFL  Right Hand AROM: WFL  LUE Strength  Gross LUE Strength: WFL  LUE Strength Comment: Grossly 4-/5  RUE Strength  Gross RUE Strength: WFL  RUE Strength Comment: Grossly 4-/5        Plan   Plan  Times per week: 7x/week  Times per day: Daily  Current Treatment Recommendations: Strengthening,Patient/Caregiver Education & Training,Balance Training,Functional Mobility Training,Endurance Training,Safety Education & Training,Self-Care / ADL    AM-PAC Score     AM-PAC Inpatient Daily Activity Raw Score: 19 (12/30/21 1722)  AM-PAC Inpatient ADL T-Scale Score : 40.22 (12/30/21 1722)  ADL Inpatient CMS 0-100% Score: 42.8 (12/30/21 1722)  ADL Inpatient CMS G-Code Modifier : CK (12/30/21 1722)    Goals  Short term goals  Time Frame for Short term goals: 21 visits  Short term goal 1: Pt will comlpete TB ADL Ibrahima with AE PRN to imrpove safety and independence with ADL tasks. Short term goal 2: Pt will engage in greater than 15 minutes BUE therex/theract to improve UB strength for increased ease and independence with ADL and functional transfers. Short term goal 3: Pt will tolerate standing for greater than 5 minutes without LOB or RB to improve endurance and safety with ADL and functional mobility. Short term goal 4: Pt will complete toileting routine Ibrahima to ensure safe return home.        Therapy Time   Individual Concurrent Group Co-treatment   Time In 1610         Time Out 1630         Minutes MAHESH Cobb 2 Brendan Solorio

## 2021-12-30 NOTE — PROGRESS NOTES
Comprehensive Nutrition Assessment    Type and Reason for Visit:  Initial (missing malnutrition screen )    Nutrition Recommendations/Plan: continue current diet    Nutrition Assessment:  Altered nutrition related labs r/t acute injury/trauma aeb A1c 7.2. Glucose 180. Admitted with pneumonia, seen by SLP, Pt c/o food going down the wrong pipe. Regular with thin recommended with small bites and alternating sips and bites. Pt did not awaken to name. Malnutrition Assessment:  Malnutrition Status: At risk for malnutrition (Comment)    Context:  Acute Illness     Findings of the 6 clinical characteristics of malnutrition:  Energy Intake:  Unable to assess  Weight Loss:  No significant weight loss     Body Fat Loss:  No significant body fat loss     Muscle Mass Loss:  No significant muscle mass loss    Fluid Accumulation:  7 - Moderate to Severe Extremities (+ 3 pitting R/L LE)   Strength:  Not Performed    Estimated Daily Nutrient Needs:  Energy (kcal):  1990-0495 (15-18/kg); Weight Used for Energy Requirements:  Current     Protein (g):  94-109g (1.2-1.4g/kg); Weight Used for Protein Requirements:  Ideal        Fluid (ml/day):  2260 ml (20/kg); Method Used for Fluid Requirements:  ml/Kg      Nutrition Related Findings:  appears well nourished      Wounds:  None       Current Nutrition Therapies:    ADULT DIET; Regular; 4 carb choices (60 gm/meal); Low Fat/Low Chol/High Fiber/2 gm Na    Anthropometric Measures:  · Height: 5' 11\" (180.3 cm)  · Current Body Weight: 250 lb (113.4 kg)   · Admission Body Weight: 250 lb (113.4 kg)    · Usual Body Weight: 248 lb (112.5 kg) (10/7/21)     · Ideal Body Weight: 172 lbs; % Ideal Body Weight 145.3 %   · BMI: 34.9  · BMI Categories: Obese Class 1 (BMI 30.0-34. 9)       Nutrition Diagnosis:   · Altered nutrition-related lab values related to endocrine dysfuntion as evidenced by lab values      Nutrition Interventions:   Food and/or Nutrient Delivery:  Continue Current Diet  Nutrition Education/Counseling:  No recommendation at this time   Coordination of Nutrition Care:  Continue to monitor while inpatient    Goals:  PO > 75% of meals        Lab Results   Component Value Date    LABA1C 7.2 10/07/2021     Lab Results   Component Value Date    TRIG 147 09/29/2021    HDL 46 09/29/2021     Recent Labs     12/29/21 2124   *   K 4.4   CL 94*   CO2 23   BUN 19   CREATININE 0.98   GLUCOSE 180*   ALT 54*   ALKPHOS 163*   GFR                 Lab Results   Component Value Date    LABALBU 4.0 12/29/2021    Nutrition Monitoring and Evaluation:   Behavioral-Environmental Outcomes:  None Identified   Food/Nutrient Intake Outcomes:  Food and Nutrient Intake  Physical Signs/Symptoms Outcomes:  Biochemical Data,Weight,Fluid Status or Edema     Discharge Planning:    Continue current diet     Electronically signed by Diana Andrews RD, LD on 12/30/21 at 4:03 PM EST    Contact: [phone]

## 2021-12-31 VITALS
HEIGHT: 71 IN | HEART RATE: 66 BPM | BODY MASS INDEX: 35.04 KG/M2 | DIASTOLIC BLOOD PRESSURE: 50 MMHG | OXYGEN SATURATION: 96 % | RESPIRATION RATE: 20 BRPM | SYSTOLIC BLOOD PRESSURE: 103 MMHG | WEIGHT: 250.3 LBS | TEMPERATURE: 97.3 F

## 2021-12-31 LAB
ABSOLUTE EOS #: 0.3 K/UL (ref 0–0.44)
ABSOLUTE IMMATURE GRANULOCYTE: 0 K/UL (ref 0–0.3)
ABSOLUTE LYMPH #: 1.7 K/UL (ref 1.1–3.7)
ABSOLUTE MONO #: 0.7 K/UL (ref 0.1–1.2)
ANION GAP SERPL CALCULATED.3IONS-SCNC: 11 MMOL/L (ref 9–17)
BASOPHILS # BLD: 0 % (ref 0–2)
BASOPHILS ABSOLUTE: 0 K/UL (ref 0–0.2)
BUN BLDV-MCNC: 19 MG/DL (ref 8–23)
BUN/CREAT BLD: 16 (ref 9–20)
CALCIUM SERPL-MCNC: 8.5 MG/DL (ref 8.6–10.4)
CHLORIDE BLD-SCNC: 99 MMOL/L (ref 98–107)
CO2: 23 MMOL/L (ref 20–31)
CREAT SERPL-MCNC: 1.21 MG/DL (ref 0.7–1.2)
DIFFERENTIAL TYPE: ABNORMAL
EOSINOPHILS RELATIVE PERCENT: 3 % (ref 1–4)
GFR AFRICAN AMERICAN: >60 ML/MIN
GFR NON-AFRICAN AMERICAN: 57 ML/MIN
GFR SERPL CREATININE-BSD FRML MDRD: ABNORMAL ML/MIN/{1.73_M2}
GFR SERPL CREATININE-BSD FRML MDRD: ABNORMAL ML/MIN/{1.73_M2}
GLUCOSE BLD-MCNC: 124 MG/DL (ref 70–99)
HCT VFR BLD CALC: 32.4 % (ref 40.7–50.3)
HEMOGLOBIN: 10.1 G/DL (ref 13–17)
IMMATURE GRANULOCYTES: 0 %
LYMPHOCYTES # BLD: 17 % (ref 24–43)
MCH RBC QN AUTO: 28.6 PG (ref 25.2–33.5)
MCHC RBC AUTO-ENTMCNC: 31.2 G/DL (ref 28.4–34.8)
MCV RBC AUTO: 91.8 FL (ref 82.6–102.9)
MONOCYTES # BLD: 7 % (ref 3–12)
MORPHOLOGY: NORMAL
NRBC AUTOMATED: 0 PER 100 WBC
PDW BLD-RTO: 14.9 % (ref 11.8–14.4)
PLATELET # BLD: 182 K/UL (ref 138–453)
PLATELET ESTIMATE: ABNORMAL
PMV BLD AUTO: 10.3 FL (ref 8.1–13.5)
POTASSIUM SERPL-SCNC: 3.7 MMOL/L (ref 3.7–5.3)
RBC # BLD: 3.53 M/UL (ref 4.21–5.77)
RBC # BLD: ABNORMAL 10*6/UL
SEG NEUTROPHILS: 73 % (ref 36–65)
SEGMENTED NEUTROPHILS ABSOLUTE COUNT: 7.3 K/UL (ref 1.5–8.1)
SODIUM BLD-SCNC: 133 MMOL/L (ref 135–144)
WBC # BLD: 10 K/UL (ref 3.5–11.3)
WBC # BLD: ABNORMAL 10*3/UL

## 2021-12-31 PROCEDURE — 80048 BASIC METABOLIC PNL TOTAL CA: CPT

## 2021-12-31 PROCEDURE — 97110 THERAPEUTIC EXERCISES: CPT

## 2021-12-31 PROCEDURE — 85025 COMPLETE CBC W/AUTO DIFF WBC: CPT

## 2021-12-31 PROCEDURE — 2580000003 HC RX 258: Performed by: NURSE PRACTITIONER

## 2021-12-31 PROCEDURE — 94761 N-INVAS EAR/PLS OXIMETRY MLT: CPT

## 2021-12-31 PROCEDURE — 6370000000 HC RX 637 (ALT 250 FOR IP): Performed by: NURSE PRACTITIONER

## 2021-12-31 PROCEDURE — 97530 THERAPEUTIC ACTIVITIES: CPT

## 2021-12-31 PROCEDURE — 6360000002 HC RX W HCPCS: Performed by: NURSE PRACTITIONER

## 2021-12-31 PROCEDURE — 2700000000 HC OXYGEN THERAPY PER DAY

## 2021-12-31 PROCEDURE — 36415 COLL VENOUS BLD VENIPUNCTURE: CPT

## 2021-12-31 RX ORDER — AMOXICILLIN AND CLAVULANATE POTASSIUM 875; 125 MG/1; MG/1
1 TABLET, FILM COATED ORAL 2 TIMES DAILY
Qty: 20 TABLET | Refills: 0 | Status: SHIPPED | OUTPATIENT
Start: 2021-12-31 | End: 2022-01-10

## 2021-12-31 RX ADMIN — SODIUM CHLORIDE, PRESERVATIVE FREE 10 ML: 5 INJECTION INTRAVENOUS at 08:05

## 2021-12-31 RX ADMIN — ALOGLIPTIN 25 MG: 25 TABLET, FILM COATED ORAL at 08:02

## 2021-12-31 RX ADMIN — PRIMIDONE 250 MG: 250 TABLET ORAL at 08:03

## 2021-12-31 RX ADMIN — METFORMIN HYDROCHLORIDE 1000 MG: 500 TABLET ORAL at 08:03

## 2021-12-31 RX ADMIN — PROPRANOLOL HYDROCHLORIDE 120 MG: 60 CAPSULE, EXTENDED RELEASE ORAL at 08:02

## 2021-12-31 RX ADMIN — LEVOTHYROXINE SODIUM 112 MCG: 112 TABLET ORAL at 07:08

## 2021-12-31 RX ADMIN — PANTOPRAZOLE SODIUM 40 MG: 40 TABLET, DELAYED RELEASE ORAL at 07:08

## 2021-12-31 RX ADMIN — ACETAMINOPHEN 650 MG: 325 TABLET ORAL at 08:44

## 2021-12-31 RX ADMIN — ATORVASTATIN CALCIUM 40 MG: 40 TABLET, FILM COATED ORAL at 08:02

## 2021-12-31 RX ADMIN — FUROSEMIDE 40 MG: 40 TABLET ORAL at 08:02

## 2021-12-31 RX ADMIN — ASPIRIN 81 MG: 81 TABLET, CHEWABLE ORAL at 08:02

## 2021-12-31 RX ADMIN — CLOPIDOGREL BISULFATE 75 MG: 75 TABLET ORAL at 08:03

## 2021-12-31 ASSESSMENT — PAIN DESCRIPTION - ONSET: ONSET: ON-GOING

## 2021-12-31 ASSESSMENT — PAIN DESCRIPTION - ORIENTATION: ORIENTATION: LOWER

## 2021-12-31 ASSESSMENT — PAIN DESCRIPTION - DESCRIPTORS: DESCRIPTORS: ACHING

## 2021-12-31 ASSESSMENT — PAIN DESCRIPTION - LOCATION: LOCATION: BACK

## 2021-12-31 ASSESSMENT — PAIN SCALES - GENERAL
PAINLEVEL_OUTOF10: 5
PAINLEVEL_OUTOF10: 0
PAINLEVEL_OUTOF10: 2

## 2021-12-31 ASSESSMENT — PAIN DESCRIPTION - PAIN TYPE: TYPE: CHRONIC PAIN

## 2021-12-31 ASSESSMENT — PAIN DESCRIPTION - FREQUENCY: FREQUENCY: INTERMITTENT

## 2021-12-31 NOTE — DISCHARGE SUMMARY
Discharge Summary    Sulma Che  :  1935  MRN:  [id number]    Admit date:  2021      Discharge date: 2021     Admitting Physician:  Janelle Rivera MD    Discharge Diagnoses:    Principal Problem:    Community acquired bacterial pneumonia  Active Problems:    Obesity (BMI 30.0-34. 9)    Type 2 diabetes mellitus with complication, without long-term current use of insulin ([facility])    Chronic combined systolic and diastolic CHF (congestive heart failure) ([facility])    COPD (chronic obstructive pulmonary disease) ([facility]    Hypoxia  Resolved Problems:    * No resolved hospital problems. *      Hospital Course:   Sulma Che is a 82 y.o. male admitted with community-acquired pneumonia. He presented to the emergency room with complaints of fever. Patient complained of chronic cough. He denied chest pain or palpitations. He denied nausea vomiting diarrhea. Patient is vaccinated against COVID-19. He does have history of diabetes,COPD and CHF. During patient's evaluation he was febrile at 102.9. Patient was slightly tachycardic at 91 and tachypneic at 22. SPO2 was 92% however at one point patient was 88% on room air. Chest x-ray showed bilateral pneumonia. BBC count was elevated at 12. 6. proBNP was 359. He was negative for Covid as well as influenza. Patient was placed on IV antibiotics and provided IV fluids. Patient tolerated well. Patient's hypoxia is resolved. Patient states he feels much better. He is tolerating activity and diet well. PT and OT were consulted. Patient will be discharged home today he will be given a prescription for Augmentin to complete for 10 days. Patient is agreeable to this plan of care. Consultants:  none    Procedures: none    Complications: none    Discharge Condition: fair    Exam:  GEN:    Awake, alert and oriented x3. EYES:   EOMI, pupils equal   NECK: Supple.  No lymphadenopathy.  No carotid bruit  CVS:     regular rate and rhythm, systolic murmur  PULM:  clear to ausculation,  no acute respiratory distress  ABD:     Bowels sounds normal.  Abdomen is soft.  No distention.  no tenderness to palpation. EXT:     no edema bilaterally .  No calf tenderness. NEURO: Moves all extremities.  Motor and sensory are grossly intact  SKIN:    No rashes.  No skin lesions    Significant Diagnostic Studies:   Lab Results   Component Value Date    WBC 10.0 12/31/2021    HGB 10.1 (L) 12/31/2021     12/31/2021       Lab Results   Component Value Date    BUN 19 12/31/2021    CREATININE 1.21 (H) 12/31/2021     (L) 12/31/2021    K 3.7 12/31/2021    CALCIUM 8.5 (L) 12/31/2021    CL 99 12/31/2021    CO2 23 12/31/2021    LABGLOM 57 (L) 12/31/2021       Lab Results   Component Value Date    WBCUA None 12/29/2021    RBCUA 0 TO 2 12/29/2021    EPITHUA 0 TO 2 12/29/2021    LEUKOCYTESUR NEGATIVE 12/29/2021    SPECGRAV 1.015 12/29/2021    GLUCOSEU NEGATIVE 12/29/2021    KETUA NEGATIVE 12/29/2021    PROTEINU NEGATIVE 12/29/2021    HGBUR NEGATIVE 12/29/2021    CASTUA NOT REPORTED 12/29/2021    CRYSTUA NOT REPORTED 12/29/2021    BACTERIA TRACE (A) 12/29/2021    YEAST NOT REPORTED 12/29/2021       XR CHEST PORTABLE    Result Date: 12/29/2021  EXAMINATION: ONE XRAY VIEW OF THE CHEST 12/29/2021 6:23 pm COMPARISON: 04/06/2018 HISTORY: ORDERING SYSTEM PROVIDED HISTORY: cough, fever TECHNOLOGIST PROVIDED HISTORY: cough, fever FINDINGS: Patchy bilateral interstitial infiltrates greater  in the lung bases. .The cardiac size is normal. No pleural effusions are seen. Pulmonary vascularity appears mildly accentuated. There is mild ectasia of the thoracic aorta. There are degenerative changes in the spine . No acute bony abnormalities. Patchy bilateral interstitial infiltrates greater in the lung bases suggesting an acute infectious/inflammatory process.        Assessment and Plan:  Patient Active Problem List    Diagnosis Date Noted    Community acquired bacterial pneumonia 12/30/2021    Hypoxia 12/30/2021    Moderate persistent asthma without complication 93/03/2455    Hypothyroidism due to Hashimoto's thyroiditis 11/05/2018    Elevated liver enzymes 11/02/2018    Adrenal adenoma, left 10/16/2018    COPD (chronic obstructive pulmonary disease) ([facility] 11/15/2017    Chronic combined systolic and diastolic CHF (congestive heart failure) ([facility] 09/21/2017    Knee osteoarthritis 06/11/2015    Obesity (BMI 30.0-34.9) 02/05/2015    Venous (peripheral) insufficiency 02/05/2015    Hx pulmonary embolism 02/05/2015    Hearing impaired 02/05/2015    Edema 02/05/2015    Type 2 diabetes mellitus with complication, without long-term current use of insulin ([facility] 02/05/2015        Discharge Medications:         Medication List      START taking these medications    amoxicillin-clavulanate 875-125 MG per tablet  Commonly known as: AUGMENTIN  Take 1 tablet by mouth 2 times daily for 10 days        CONTINUE taking these medications    acetaminophen 500 MG tablet  Commonly known as: TYLENOL     * albuterol sulfate  (90 Base) MCG/ACT inhaler  Inhale 2 puffs into the lungs every 4 hours as needed for Wheezing or Shortness of Breath     * albuterol (2.5 MG/3ML) 0.083% nebulizer solution  Commonly known as: PROVENTIL  Take 3 mLs by nebulization every 4 hours as needed for Wheezing or Shortness of Breath     alogliptin 25 MG Tabs tablet  Commonly known as: NESINA     aspirin 81 MG tablet     atorvastatin 40 MG tablet  Commonly known as: LIPITOR  Take 1 tablet by mouth daily     blood glucose test strips  accu check     clopidogrel 75 MG tablet  Commonly known as: PLAVIX  Take 1 tablet by mouth daily     furosemide 40 MG tablet  Commonly known as: LASIX  Take 1 tablet by mouth twice daily     levothyroxine 112 MCG tablet  Commonly known as: SYNTHROID  Take 1 tablet by mouth Daily     metFORMIN 1000 MG tablet  Commonly known as: GLUCOPHAGE     omeprazole 20 MG delayed release capsule  Commonly known as: PRILOSEC     primidone 250 MG tablet  Commonly known as: MYSOLINE     propranolol 120 MG extended release capsule  Commonly known as: INDERAL LA     Symbicort 160-4.5 MCG/ACT Aero  Generic drug: budesonide-formoterol     TAMSULOSIN HCL PO     therapeutic multivitamin-minerals tablet         * This list has 2 medication(s) that are the same as other medications prescribed for you. Read the directions carefully, and ask your doctor or other care provider to review them with you. Where to Get Your Medications      These medications were sent to [address] [place] [address]    Phone: [phone]   · amoxicillin-clavulanate 875-125 MG per tablet         Patient Instructions:    Activity: activity as tolerated  Diet: cardiac diet  Wound Care: none needed  Other: None    Disposition:   Discharge to Home    Follow up:  Patient will be followed by GENA Li CNP in 1-2 weeks    CORE MEASURES on Discharge (if applicable)  ACE/ARB in CHF: NA  Statin in MI: NA  ASA in MI: NA  Statin in CVA: NA  Antiplatelet in CVA: NA    Total time spent on discharge services: 40 minutes    Including the following activities:  Evaluation and Management of patient  Discussion with patient and/or surrogate about current care plan  Coordination with Case Management and/or   Coordination of care with Consultants (if applicable)   Coordination of care with Receiving Facility Physician (if applicable)  Completion of DME forms (if applicable)  Preparation of Discharge Summary  Preparation of Medication Reconciliation  Preparation of Discharge Prescriptions    Signed:  GENA Olmstead CNP, GENA, NP-C  12/31/2021, 10:51 AM

## 2021-12-31 NOTE — PROGRESS NOTES
Occupational Therapy  Facility/Department: [facility] AT THE [facility] MED SURG  Daily Treatment Note  NAME: Dinorah Rodríguez  : 1935  MRN: [id number]    Date of Service: 2021    Discharge Recommendations:  Continue to assess pending progress       Assessment      OT Education: OT Role;Plan of Care  Patient Education: Educated on difference between OT and PT  Safety Devices  Safety Devices in place:  (No chair alarm per RN)  Type of devices: Left in chair;Call light within reach         Patient Diagnosis(es): The primary encounter diagnosis was Pneumonia due to infectious organism, unspecified laterality, unspecified part of lung. A diagnosis of Hypoxia was also pertinent to this visit. has a past medical history of COPD (chronic obstructive pulmonary disease) ([facility] Diabetes mellitus ([facility] Hx of echocardiogram, Hyperlipidemia, Hypothyroidism, MI (myocardial infarction) ([facility] Thyroid disease, and Type II or unspecified type diabetes mellitus without mention of complication, not stated as uncontrolled. has a past surgical history that includes hernia repair; Cardiac surgery (2017); fracture surgery; and Colonoscopy. Restrictions  Restrictions/Precautions  Restrictions/Precautions: Fall Risk,General Precautions  Subjective   General  Patient assessed for rehabilitation services?: Yes  Referring Practitioner: GILMAR Benjamin  Diagnosis: hypoxia  Subjective  Subjective: Pt reported 8/10 back pain. Reported that he just took pain meds so \"should be better. \" Pt attempted to refuse therapy and stated \"I just did therapy. \" Edcuated on difference between OT and PT and patient agreed to complete sitting in bedside chair. General Comment  Comments: Pt sitting upright in hospital bed with HOB elevated, agreeable to therapy evaluation.       Orientation     Objective    ADL  Additional Comments: Pt reported no concerns with ADL or IADL tasks at home and reported that his wife can help as needed Type of ROM/Therapeutic Exercise  Type of ROM/Therapeutic Exercise: Free weights  Comment: Patient completed Bilateral Exer to increase strength and endurance for ADL and IADL tasks and transfers. Ther ex x6 with use of 2# weight 15-20 reps each. Edcuated patient on pacing due to rushing through exercises. No additional pain reported                    Plan   Plan  Times per week: 7x/week  Times per day: Daily  Current Treatment Recommendations: Strengthening,Patient/Caregiver Education & Training,Balance Training,Functional Mobility Training,Endurance Training,Safety Education & Training,Self-Care / ADL  G-Code     OutComes Score                                                  AM-PAC Score             Goals  Short term goals  Time Frame for Short term goals: 21 visits  Short term goal 1: Pt will comlpete TB ADL Ibrahima with AE PRN to imrpove safety and independence with ADL tasks. Short term goal 2: Pt will engage in greater than 15 minutes BUE therex/theract to improve UB strength for increased ease and independence with ADL and functional transfers. Short term goal 3: Pt will tolerate standing for greater than 5 minutes without LOB or RB to improve endurance and safety with ADL and functional mobility. Short term goal 4: Pt will complete toileting routine Ibrahima to ensure safe return home.        Therapy Time   Individual Concurrent Group Co-treatment   Time In  900         Time Out 923         Minutes [address] [place] [address] Dr OMALLEY/L

## 2021-12-31 NOTE — PROGRESS NOTES
Physical Therapy  Facility/Department: [facility] AT THE [facility] MED SURG  Daily Treatment Note  NAME: Cyrus Boston  : 1935  MRN: [id number]    Date of Service: 2021    Discharge Recommendations:  Continue to assess pending progress        Assessment   Treatment Diagnosis: general debility  Prognosis: Good  Decision Making: Low Complexity  PT Education: PT Role;General Safety  Activity Tolerance  Activity Tolerance: Patient Tolerated treatment well     Patient Diagnosis(es): The primary encounter diagnosis was Pneumonia due to infectious organism, unspecified laterality, unspecified part of lung. A diagnosis of Hypoxia was also pertinent to this visit. has a past medical history of COPD (chronic obstructive pulmonary disease) ([facility] Diabetes mellitus ([facility] Hx of echocardiogram, Hyperlipidemia, Hypothyroidism, MI (myocardial infarction) ([facility] Thyroid disease, and Type II or unspecified type diabetes mellitus without mention of complication, not stated as uncontrolled. has a past surgical history that includes hernia repair; Cardiac surgery (2017); fracture surgery; and Colonoscopy.     Restrictions  Restrictions/Precautions  Restrictions/Precautions: Fall Risk,General Precautions  Subjective   General  Chart Reviewed: Yes  Subjective  Subjective: patient denies pain  Pain Screening  Patient Currently in Pain: Denies  Vital Signs  Patient Currently in Pain: Denies       Orientation  Orientation  Overall Orientation Status: Within Functional Limits  Cognition      Objective   Bed mobility  Supine to Sit: Modified independent  Sit to Supine: Modified independent  Scooting: Modified independent  Transfers  Sit to Stand: Contact guard assistance  Stand to sit: Contact guard assistance  Ambulation  Ambulation?: No     Balance  Sitting - Static: Good  Sitting - Dynamic: Good  Standing - Static: Fair  Standing - Dynamic: Fair;-  Exercises  Hip Flexion: x20  Hip Abduction: x20  Knee Long Arc Quad: x20  Ankle Pumps: x20  Comments: Pt completed all exercises in sitting EOB.      Goals  Short term goals  Time Frame for Short term goals: 20 visits  Short term goal 1: Patient will demonstrate the ability to ascend/descend 2 steps with bilateral handrail and supervision assistance in order to safely enter/exit the home  Short term goal 2: Patient will tolerate 35-45' ther-ex in order to increase endurance and ease ADLs  Short term goal 3: Patient will complete bed mobility and transfers independently in order to return to [facility]  Short term goal 4: Patient will ambulate 150 feetx1 with SC and supervision assistance in order to ease functional mobility    Plan    Plan  Times per week: 7 times per week  Times per day: Twice a day  Current Treatment Recommendations: Strengthening,Balance Training,Functional Mobility Training,Transfer Training,Stair training,Gait Training,Neuromuscular Re-education,Manual Therapy - Soft Tissue Mobilization,Positioning,Equipment Evaluation, Education, & procurement,Patient/Caregiver Education & Training,Safety Education & Training,Home Exercise Program  Safety Devices  Type of devices: Call light within reach,Left in bed,Bed alarm in place     Therapy Time   Individual Concurrent Group Co-treatment   Time In 0703         Time Out 0728         Minutes [address]

## 2021-12-31 NOTE — PROGRESS NOTES
Assessment and vitals completed as charted. Patient resting in bed with wife at bedside when writer entered room. Patient denies any pain at this time. Patient is on 0.5L of oxygen via nasal cannula at this time and tolerating well. Patient denies any needs. Call light remains within reach.

## 2021-12-31 NOTE — PROGRESS NOTES
[facility] - [facility] -Progress Note    SUBJECTIVE:    Patient seen for f/u of Community acquired bacterial pneumonia. He sitting up in chair alert and in no distress. Afebrile. States he feels much better     ROS:   Constitutional: negative  for fevers, and negative for chills. Respiratory: negative for shortness of breath, negative for cough, and negative for wheezing  Cardiovascular: negative for chest pain, and negative for palpitations  Gastrointestinal: negative for abdominal pain, negative for nausea,negative for vomiting, negative for diarrhea, and negative for constipation     All other systems were reviewed with the patient and are negative unless otherwise stated in HPI      OBJECTIVE:        VITAL SIGNS:  Patient Vitals for the past 8 hrs:   BP Temp Temp src Pulse Resp SpO2 Weight   21 0707 (!) 103/50 97.3 °F (36.3 °C) Oral 66 20 96 % --   21 0515 -- -- -- -- -- -- 250 lb 4.8 oz (113.5 kg)         Temp: 97.3 °F (36.3 °C)  Temp range:    Temp  Av.2 °F (36.8 °C)  Min: 97.3 °F (36.3 °C)  Max: 100 °F (37.8 °C)    BP: (!) 103/50  BP Range:      Systolic (54MVE), LORIN:709 , Min:101 , HSF:180      Diastolic (57JYL), NQW:47, Min:41, Max:72    Pulse: 66  Pulse Range:    Pulse  Av.8  Min: 65  Max: 70    Resp: 20  Resp Range:   Resp  Av.2  Min: 14  Max: 20    SpO2: 96 % on room air  SpO2 range:   SpO2  Av.5 %  Min: 93 %  Max: 97 %      PaO2/FiO2 RATIO:  No results for input(s): POCPO2 in the last 72 hours. Exam:    GEN:    Awake, alert and oriented x3. EYES:   EOMI, pupils equal   NECK: Supple. No lymphadenopathy. No carotid bruit  CVS:     regular rate and rhythm, systolic murmur  PULM:  clear to ausculation,  no acute respiratory distress  ABD:     Bowels sounds normal.  Abdomen is soft. No distention. no tenderness to palpation. EXT:     no edema bilaterally . No calf tenderness. NEURO: Moves all extremities.   Motor and sensory are grossly intact  SKIN:    No rashes. No skin lesions    Diagnostic Data:      Complete Blood Count:   Recent Labs     12/29/21 2124 12/31/21  0540   WBC 12.6* 10.0   RBC 4.24 3.53*   HGB 12.4* 10.1*   HCT 38.6* 32.4*   MCV 91.0 91.8   MCH 29.2 28.6   MCHC 32.1 31.2   RDW 14.8* 14.9*    182   MPV 10.2 10.3        Last 3 Blood Glucose:   Recent Labs     12/29/21 2124 12/31/21  0540   GLUCOSE 180* 124*        Comprehensive Metabolic Profile:   Recent Labs     12/29/21 2124 12/31/21  0540   * 133*   K 4.4 3.7   CL 94* 99   CO2 23 23   BUN 19 19   CREATININE 0.98 1.21*   GLUCOSE 180* 124*   CALCIUM 9.5 8.5*   PROT 7.5  --    LABALBU 4.0  --    BILITOT 0.47  --    ALKPHOS 163*  --    AST 41*  --    ALT 54*  --         Urinalysis:   Lab Results   Component Value Date    NITRU NEGATIVE 12/29/2021    COLORU Yellow 12/29/2021    PHUR 6.5 12/29/2021    WBCUA None 12/29/2021    RBCUA 0 TO 2 12/29/2021    MUCUS NOT REPORTED 12/29/2021    TRICHOMONAS NOT REPORTED 12/29/2021    YEAST NOT REPORTED 12/29/2021    BACTERIA TRACE 12/29/2021    CLARITYU clear 05/10/2018    SPECGRAV 1.015 12/29/2021    LEUKOCYTESUR NEGATIVE 12/29/2021    UROBILINOGEN Normal 12/29/2021    BILIRUBINUR NEGATIVE 12/29/2021    BILIRUBINUR neg 05/10/2018    BLOODU neg 05/10/2018    GLUCOSEU NEGATIVE 12/29/2021    KETUA NEGATIVE 12/29/2021    AMORPHOUS NOT REPORTED 12/29/2021       HgBA1c:    Lab Results   Component Value Date    LABA1C 7.2 10/07/2021       Lactic Acid:   Lab Results   Component Value Date    LACTA 2.2 12/29/2021    LACTA 1.2 11/14/2017    LACTA 1.6 09/19/2017        Troponin: No results for input(s): TROPONINI in the last 72 hours. CRP:  No results for input(s): CRP in the last 72 hours. Radiology/Imaging:  XR CHEST PORTABLE   Final Result   Patchy bilateral interstitial infiltrates greater in the lung bases   suggesting an acute infectious/inflammatory process.                ASSESSMENT / PLAN:  Community acquired bacterial pneumonia  · Continue current therapy   · IV Rocephin/Zithromax  · BC x 2  · PT/OT  · Chronic CHF  · Continue Lasix  · Hypoxia  · resolved  · Nutrition status:   · Well developed, well nourished with no malnutrition  · Dietician consult initiated  · Hospital Prophylaxis:   · DVT: Lovenox   · Stress Ulcer: H2 Blocker   · High risk medications: none   · Disposition:    · Discharge plan is home today      GENA Banks CNP , GENA, NP-C  Hospitalist Medicine        12/31/2021, 9:17 AM

## 2021-12-31 NOTE — PROGRESS NOTES
Patient assisted with washing up at this time. Patient complains of lower chronic back pain.  Tylenol PRN given for this per patients request.

## 2021-12-31 NOTE — DISCHARGE INSTR - DIET
Good nutrition is important when healing from an illness, injury, or surgery. Follow any nutrition recommendations given to you during your hospital stay. If you were given an oral nutrition supplement while in the hospital, continue to take this supplement at home. You can take it with meals, in-between meals, and/or before bedtime. These supplements can be purchased at most local grocery stores, pharmacies, and chain [employer]-stores. If you have any questions about your diet or nutrition, call the hospital and ask for the dietitian. General diet as tolerated.

## 2022-01-01 LAB
CULTURE: ABNORMAL
DIRECT EXAM: ABNORMAL
Lab: ABNORMAL
SPECIMEN DESCRIPTION: ABNORMAL

## 2022-01-03 ENCOUNTER — CARE COORDINATION (OUTPATIENT)
Dept: CASE MANAGEMENT | Age: 87
End: 2022-01-03

## 2022-01-03 DIAGNOSIS — J15.9 COMMUNITY ACQUIRED BACTERIAL PNEUMONIA: Primary | ICD-10-CM

## 2022-01-03 LAB
CULTURE: NORMAL
Lab: NORMAL
SPECIMEN DESCRIPTION: NORMAL

## 2022-01-03 NOTE — CARE COORDINATION
[facility] 45 Transitions Initial Follow Up Call    Call within 2 business days of discharge: Yes    Patient: Moody Geronimo Patient : 1935   MRN: <[id number]>  Reason for Admission: pneumonia  Discharge Date: 21 RARS: Readmission Risk Score: 9.7 ( )      Last Discharge [facility]       Complaint Diagnosis Description Type Department Provider    21 Fever Pneumonia due to infectious organism, unspecified laterality, unspecified part of lung . .. ED to Hosp-Admission (Discharged) (ADMITTED) James Rose MD; Yrn Duke. .. Spoke with: 24 HOUR INITIAL CALL. Spoke to wife Mitzy Ramos. Mitzy Ramos states Alirio Kohli is \"doing quite well\". No pain or discomfort. Denies shortness of breath. No use of oxygen at home. Chronic cough related to COPD. Uses inhalers and nebulizer as directed. Appetite is good. Checks fasting glucose levels twice a week. Normal elimination patterns. Uses a cane to ambulate. Denies need for [facility] or [facility] [place] [place] at this time. 1111F medication reconciliation completed. Pt, is taking Augmentin. Educated on taking antibiotic until gone. Reviewed up coming appts. Mitzy Ramos will call PCP's office to schedule within 1 week. Pt. Has scheduled visits with Cardiology and Neurology this month. Mitzy Ramos to transport to app. No new issues or concerns. Will continue to follow. Transitions of Care Initial Call    Was this an external facility discharge? No Discharge Facility: n/a    Challenges to be reviewed by the provider   Additional needs identified to be addressed with provider: No  none             Method of communication with provider : none      Advance Care Planning:   Does patient have an Advance Directive: reviewed and current. Was this a readmission?  No  Patient stated reason for admission: n/a  Patients top risk factors for readmission: medical condition-pneumonia'    Care Transition Nurse (CTN) contacted the family by telephone to perform post hospital discharge assessment. Verified name and  with family as identifiers. Provided introduction to self, and explanation of the CTN role. CTN reviewed discharge instructions, medical action plan and red flags with family who verbalized understanding. Family given an opportunity to ask questions and does not have any further questions or concerns at this time. Were discharge instructions available to patient? Yes. Reviewed appropriate site of care based on symptoms and resources available to patient including: PCP, Specialist and When to call 911. The family agrees to contact the PCP office for questions related to their healthcare. Medication reconciliation was performed with family, who verbalizes understanding of administration of home medications. Advised obtaining a 90-day supply of all daily and as-needed medications. Covid Risk Education     Educated patient about risk for severe COVID-19 due to risk factors according to CDC guidelines. LPN CC reviewed discharge instructions, medical action plan and red flag symptoms with the family who verbalized understanding. Discussed COVID vaccination status: Yes. Education provided on COVID-19 vaccination as appropriate. Discussed exposure protocols and quarantine with CDC Guidelines. Family was given an opportunity to verbalize any questions and concerns and agrees to contact LPN CC or health care provider for questions related to their healthcare. Reviewed and educated family on any new and changed medications related to discharge diagnosis. Was patient discharged with a pulse oximeter? No Discussed and confirmed pulse oximeter discharge instructions and when to notify provider or seek emergency care. LPN CC provided contact information. Plan for follow-up call in 5-7 days based on severity of symptoms and risk factors.   Plan for next call: symptom management-dyspnea  cough  follow up appointment-with PCP and specialists  medication management-take augmentin until gone        Facility: [facility]    Non-face-to-face services provided:  Obtained and reviewed discharge summary and/or continuity of care documents    Care Transitions 24 Hour Call    Do you have all of your prescriptions and are they filled?: Yes  Care Transitions Interventions         Follow Up  Future Appointments   Date Time Provider Gisela Harding   1/11/2022  2:20 PM [facility]   1/17/2022  2:40 PM Bisi Loja MD TIFF NEURO [facility]   3/7/2022  2:00 PM R[facility] Cherry [facility]   4/7/2022  2:40 PM Loreta Snider, APRN - CNP [facility]       Nicanor Burciaga, [address] [place] [address] Care Transitions Nurse   [phone]

## 2022-01-04 LAB
CULTURE: NORMAL
Lab: NORMAL
SPECIMEN DESCRIPTION: NORMAL

## 2022-01-05 ENCOUNTER — TELEPHONE (OUTPATIENT)
Dept: PRIMARY CARE CLINIC | Age: 87
End: 2022-01-05

## 2022-01-05 NOTE — TELEPHONE ENCOUNTER
----- Message from Avila Jay sent at 1/5/2022 10:52 AM EST -----  Subject: Appointment Request    Reason for Call: Routine Hospital Follow Up    QUESTIONS  Type of Appointment? Established Patient  Reason for appointment request? Available appointments did not meet   patient need  Additional Information for Provider? Pt was discharged from [facility] on 12/31/2021 for pneumonia. Pt tested Neg for Covid on 12/30/2021. Please call pt to schedule hosp f/u appt.  ---------------------------------------------------------------------------  --------------  CALL BACK INFO  What is the best way for the office to contact you? OK to leave message on   voicemail  Preferred Call Back Phone Number? [phone]  ---------------------------------------------------------------------------  --------------  SCRIPT ANSWERS  Relationship to Patient? Other  Representative Name? Wife  Additional information verified (besides Name and Date of Birth)? Address  (Patient requests to see provider urgently. )? No  (Has the patient been discharged from the hospital within 2 business days   AND does not have a Telephone Encounter  Follow Up From [address] [place] [address]   documented in [address]?)? No  Do you have any questions for your primary care provider that need to be   answered prior to your appointment? (Use RN Triage if question pertains to   anything on the red flag list)? No  (Patient needs follow up visit after hospital discharge) Book first   available appointment within 7 days OF DISCHARGE, if no appt, proceed to   book the next available time slot within 14 days OF DISCHARGE AND Send   Message to Provider. [facility] Follow Up appointment cannot be booked   beyond 14 Days and should result in a Message to Provider. ? Yes   Have you been diagnosed with, awaiting test results for, or told that you   are suspected of having COVID-19 (Coronavirus)?  (If patient has tested   negative or was tested as a requirement for work, school, or travel and   not based on symptoms, answer no)? No  Within the past two weeks have you developed any of the following symptoms   (answer no if symptoms have been present longer than 2 weeks or began   more than 2 weeks ago)? Fever or Chills, Cough, Shortness of breath or   difficulty breathing, Loss of taste or smell, Sore throat, Nasal   congestion, Sneezing or runny nose, Fatigue or generalized body aches   (answer no if pain is specific to a body part e.g. back pain), Diarrhea,   Headache?  Yes

## 2022-01-11 ENCOUNTER — OFFICE VISIT (OUTPATIENT)
Dept: PRIMARY CARE CLINIC | Age: 87
End: 2022-01-11
Payer: MEDICARE

## 2022-01-11 VITALS
TEMPERATURE: 97.1 F | DIASTOLIC BLOOD PRESSURE: 68 MMHG | BODY MASS INDEX: 34.71 KG/M2 | HEART RATE: 58 BPM | OXYGEN SATURATION: 99 % | RESPIRATION RATE: 22 BRPM | SYSTOLIC BLOOD PRESSURE: 122 MMHG | WEIGHT: 248.9 LBS

## 2022-01-11 DIAGNOSIS — J15.9 COMMUNITY ACQUIRED BACTERIAL PNEUMONIA: Primary | ICD-10-CM

## 2022-01-11 PROCEDURE — 1111F DSCHRG MED/CURRENT MED MERGE: CPT | Performed by: NURSE PRACTITIONER

## 2022-01-11 PROCEDURE — 99495 TRANSJ CARE MGMT MOD F2F 14D: CPT | Performed by: NURSE PRACTITIONER

## 2022-01-11 RX ORDER — MONTELUKAST SODIUM 10 MG/1
TABLET ORAL
COMMUNITY
Start: 2022-01-06 | End: 2022-04-27

## 2022-01-11 ASSESSMENT — PATIENT HEALTH QUESTIONNAIRE - PHQ9
SUM OF ALL RESPONSES TO PHQ QUESTIONS 1-9: 0
2. FEELING DOWN, DEPRESSED OR HOPELESS: 0
SUM OF ALL RESPONSES TO PHQ9 QUESTIONS 1 & 2: 0
SUM OF ALL RESPONSES TO PHQ QUESTIONS 1-9: 0
1. LITTLE INTEREST OR PLEASURE IN DOING THINGS: 0

## 2022-01-11 NOTE — PATIENT INSTRUCTIONS
SURVEY:    You may be receiving a survey from [employer] regarding your visit today. Please complete the survey to enable us to provide the highest quality of care to you and your family. If you cannot score us a very good on any question, please call the office to discuss how we could have made your experience a very good one. Thank you.

## 2022-01-11 NOTE — PROGRESS NOTES
Post-Discharge Transitional Care Management Services or Hospital Follow Up      Sully Vargas   YOB: 1935    Date of Office Visit:  1/11/2022  Date of Hospital Admission: 12/29/21  Date of Hospital Discharge: 12/31/21  Risk of hospital readmission (high >=14%. Medium >=10%) :Readmission Risk Score: 9.7 ( )      Care management risk score Rising risk (score 2-5) and Complex Care (Scores >=6): 8     Non face to face  following discharge, date last encounter closed (first attempt may have been earlier): 1/3/2022  9:21 AM    Call initiated 2 business days of discharge: Yes    Patient Active Problem List   Diagnosis    Obesity (BMI 30.0-34. 9)    Venous (peripheral) insufficiency    Hx pulmonary embolism    Hearing impaired    Edema    Type 2 diabetes mellitus with complication, without long-term current use of insulin (HCC)    Knee osteoarthritis    Chronic combined systolic and diastolic CHF (congestive heart failure) (HCC)    COPD (chronic obstructive pulmonary disease) (HCC)    Adrenal adenoma, left    Elevated liver enzymes    Hypothyroidism due to Hashimoto's thyroiditis    Moderate persistent asthma without complication    Community acquired bacterial pneumonia    Hypoxia       Allergies   Allergen Reactions    Rosuvastatin        Medications listed as ordered at the time of discharge from hospital     Medication List          Accurate as of January 11, 2022 12:12 PM. If you have any questions, ask your nurse or doctor.             CONTINUE taking these medications    acetaminophen 500 MG tablet  Commonly known as: TYLENOL     * albuterol sulfate  (90 Base) MCG/ACT inhaler  Inhale 2 puffs into the lungs every 4 hours as needed for Wheezing or Shortness of Breath     * albuterol (2.5 MG/3ML) 0.083% nebulizer solution  Commonly known as: PROVENTIL  Take 3 mLs by nebulization every 4 hours as needed for Wheezing or Shortness of Breath     alogliptin 25 MG Tabs tablet  Commonly known as: NESINA     aspirin 81 MG tablet     atorvastatin 40 MG tablet  Commonly known as: LIPITOR  Take 1 tablet by mouth daily     blood glucose test strips  accu check     clopidogrel 75 MG tablet  Commonly known as: PLAVIX  Take 1 tablet by mouth daily     furosemide 40 MG tablet  Commonly known as: LASIX  Take 1 tablet by mouth twice daily     levothyroxine 112 MCG tablet  Commonly known as: SYNTHROID  Take 1 tablet by mouth Daily     metFORMIN 1000 MG tablet  Commonly known as: GLUCOPHAGE     montelukast 10 MG tablet  Commonly known as: SINGULAIR     omeprazole 20 MG delayed release capsule  Commonly known as: PRILOSEC     primidone 250 MG tablet  Commonly known as: MYSOLINE     propranolol 120 MG extended release capsule  Commonly known as: INDERAL LA     Symbicort 160-4.5 MCG/ACT Aero  Generic drug: budesonide-formoterol     TAMSULOSIN HCL PO     therapeutic multivitamin-minerals tablet         * This list has 2 medication(s) that are the same as other medications prescribed for you. Read the directions carefully, and ask your doctor or other care provider to review them with you.                   Medications marked \"taking\" at this time  Outpatient Medications Marked as Taking for the 1/11/22 encounter (Office Visit) with Valeen Snellen Might, APRN - CNP   Medication Sig Dispense Refill    montelukast (SINGULAIR) 10 MG tablet TAKE 1 TABLET BY MOUTH ONCE DAILY      clopidogrel (PLAVIX) 75 MG tablet Take 1 tablet by mouth daily 90 tablet 3    furosemide (LASIX) 40 MG tablet Take 1 tablet by mouth twice daily 180 tablet 3    atorvastatin (LIPITOR) 40 MG tablet Take 1 tablet by mouth daily 90 tablet 3    primidone (MYSOLINE) 250 MG tablet Take 250 mg by mouth 2 times daily      propranolol (INDERAL LA) 120 MG extended release capsule Take 120 mg by mouth 2 times daily      omeprazole (PRILOSEC) 20 MG delayed release capsule Take 20 mg by mouth daily      levothyroxine (SYNTHROID) 112 MCG tablet Take 1 tablet by mouth Daily 90 tablet 1    albuterol (PROVENTIL) (2.5 MG/3ML) 0.083% nebulizer solution Take 3 mLs by nebulization every 4 hours as needed for Wheezing or Shortness of Breath 120 each 3    alogliptin (NESINA) 25 MG TABS tablet Take 25 mg by mouth daily      blood glucose monitor strips accu check 100 strip 3    aspirin 81 MG tablet Take 81 mg by mouth daily       albuterol sulfate  (90 Base) MCG/ACT inhaler Inhale 2 puffs into the lungs every 4 hours as needed for Wheezing or Shortness of Breath 1 Inhaler 2    budesonide-formoterol (SYMBICORT) 160-4.5 MCG/ACT AERO Inhale 2 puffs into the lungs 2 times daily      metFORMIN (GLUCOPHAGE) 1000 MG tablet Take 1,000 mg by mouth 2 times daily       acetaminophen (TYLENOL) 500 MG tablet Take 1,000 mg by mouth every 6 hours as needed for Pain      TAMSULOSIN HCL PO Take 0.8 mg by mouth Daily with supper       Multiple Vitamins-Minerals (THERAPEUTIC MULTIVITAMIN-MINERALS) tablet Take 1 tablet by mouth daily          Medications patient taking as of now reconciled against medications ordered at time of hospital discharge: Yes    Chief Complaint   Patient presents with    Follow-Up from Hospital     pneumonia, discharged 12/31/2021. feeling better       History of Present illness - Follow up of Hospital diagnosis(es):      Hospital Course:   Zahraa Cherry is a 82 y.o. male admitted with community-acquired pneumonia. He presented to the emergency room with complaints of fever.  Patient complained of chronic cough.  He denied chest pain or palpitations.  He denied nausea vomiting diarrhea.  Patient is vaccinated against COVID-19.  He does have history of diabetes,COPD and CHF.  During patient's evaluation he was febrile at 102.9.  Patient was slightly tachycardic at 91 and tachypneic at 22.  SPO2 was 92% however at one point patient was 88% on room air.  Chest x-ray showed bilateral pneumonia.  BBC count was elevated at 12. 6.  proBNP was 359.  He was negative for Covid as well as influenza. Patient was placed on IV antibiotics and provided IV fluids. Patient tolerated well. Patient's hypoxia is resolved. Patient states he feels much better. He is tolerating activity and diet well. PT and OT were consulted. Patient will be discharged home today he will be given a prescription for Augmentin to complete for 10 days. Patient is agreeable to this plan of care    Inpatient course: Discharge summary reviewed- see chart. Interval history/Current status:     Iman Perry presents today and states he is feeling very well. He states he has no residual cough or shortness of breath. He finished antibiotics a few days ago. SPO2 is 99% on room air. A comprehensive review of systems was negative except for what was noted in the HPI. Vitals:    01/11/22 1203   BP: 122/68   Pulse: 58   Resp: 22   Temp: 97.1 °F (36.2 °C)   TempSrc: Temporal   SpO2: 99%   Weight: 248 lb 14.4 oz (112.9 kg)     Body mass index is 34.71 kg/m². Wt Readings from Last 3 Encounters:   01/11/22 248 lb 14.4 oz (112.9 kg)   12/31/21 250 lb 4.8 oz (113.5 kg)   10/07/21 248 lb 11.2 oz (112.8 kg)     BP Readings from Last 3 Encounters:   01/11/22 122/68   12/31/21 (!) 103/50   10/07/21 132/78        Physical Exam:  General Appearance: alert and oriented to person, place and time  Skin: warm and dry, no rash or erythema  Head: normocephalic and atraumatic  Eyes: sclera anicteric  ENT: oropharynx clear and moist with normal mucous membranes  Neck: neck supple and non tender without mass   Pulmonary/Chest: clear, mildly diminished bases  Cardiovascular: normal rate and regular rhythm  Abdomen: soft, non-tender  Extremities: trace + edema-  bilateral lower legs  Neurologic: speech normal    Assessment/Plan:  1.  Community acquired bacterial pneumonia    - OR DISCHARGE MEDS RECONCILED W/ CURRENT OUTPATIENT MED LIST        Medical Decision Making: moderate complexity

## 2022-01-12 ENCOUNTER — CARE COORDINATION (OUTPATIENT)
Dept: CASE MANAGEMENT | Age: 87
End: 2022-01-12

## 2022-01-14 ENCOUNTER — CARE COORDINATION (OUTPATIENT)
Dept: CASE MANAGEMENT | Age: 87
End: 2022-01-14

## 2022-01-14 NOTE — CARE COORDINATION
[facility]    2022    Patient: Author Bullock  Patient : 1935   MRN: <[id number]>  Reason for Admission: pneumonia    Discharge Date: 21 RARS: Readmission Risk Score: 9.7 ( )         Spoke with: subsequent call. Spoke to wife Isak Berger. Isak Berger states Amy Dunn is doing \"very well\" pt. Has no pain no short of breath. Chronic cough r/t COPD. No use of oxygen at home. Uses nebulizer and inhalers as directed. Uses CPAP at HS. Appetite is good. Does not check FBS daily. Last check 116 on Wednesday. Pt seen by PCP  On 22. Next appt in 3 months. Next appt with cardiology on 22. No new issues or concerns. No HHC or DME needs . Final call. Care Transitions Follow Up Call    Needs to be reviewed by the provider   Additional needs identified to be addressed with provider: No  none             Method of communication with provider : none      Care Transition Nurse (CTN) contacted the family by telephone to follow up after admission on 22 Verified name and  with family as identifiers. Addressed changes since last contact: none  Discussed follow-up appointments. If no appointment was previously scheduled, appointment scheduling offered: No.   Is follow up appointment scheduled within 7 days of discharge? No.    Advance Care Planning:   Does patient have an Advance Directive: reviewed and current. CTN reviewed discharge instructions, medical action plan and red flags with family and discussed any barriers to care and/or understanding of plan of care after discharge. Discussed appropriate site of care based on symptoms and resources available to patient including: PCP, Specialist, When to call 911 and [address]. The family agrees to contact the PCP office for questions related to their healthcare.      Patients top risk factors for readmission: medical condition-pneumonia   COPD  Interventions to address risk factors: Obtained and reviewed discharge summary and/or continuity of care documents      Non-[facility] follow up appointment(s): N/A    CTN provided contact information for future needs. No further follow-up call indicated based on severity of symptoms and risk factors. Plan for next call: N/A          Care Transitions Subsequent and Final Call    Subsequent and Final Calls  Care Transitions Interventions  Other Interventions:            Follow Up  Future Appointments   Date Time Provider Gisela Harding   2/1/2022  1:00 PM Waleska Martinez MD TIFF CARD [facility]   3/7/2022  2:00 PM Hilda Delacruz MD TIFF PULM [facility]   4/7/2022  2:40 PM Stephanie Snider, APRN - CNP [facility]       Antonia To, [address] [place] [address] Care Transitions Nurse   [phone]

## 2022-01-24 DIAGNOSIS — E06.3 HYPOTHYROIDISM DUE TO HASHIMOTO'S THYROIDITIS: ICD-10-CM

## 2022-01-24 DIAGNOSIS — E03.8 HYPOTHYROIDISM DUE TO HASHIMOTO'S THYROIDITIS: ICD-10-CM

## 2022-01-24 RX ORDER — LEVOTHYROXINE SODIUM 112 UG/1
TABLET ORAL
Qty: 90 TABLET | Refills: 0 | Status: SHIPPED | OUTPATIENT
Start: 2022-01-24 | End: 2022-03-15 | Stop reason: DRUGHIGH

## 2022-01-24 RX ORDER — LEVOTHYROXINE SODIUM 112 UG/1
112 TABLET ORAL DAILY
Qty: 90 TABLET | Refills: 3 | OUTPATIENT
Start: 2022-01-24

## 2022-01-24 NOTE — TELEPHONE ENCOUNTER
Health Maintenance   Topic Date Due    DTaP/Tdap/Td vaccine (1 - Tdap) [birth year]08/2022 (Originally 10/15/1954)    Lipid screen  09/29/2022    TSH testing  09/29/2022    Potassium monitoring  12/31/2022    Creatinine monitoring  12/31/2022    Depression Screen  01/11/2023    Flu vaccine  Completed    Shingles Vaccine  Completed    Pneumococcal 65+ years Vaccine  Completed    COVID-19 Vaccine  Completed    Hepatitis A vaccine  Aged Out    Hib vaccine  Aged Out    Meningococcal (ACWY) vaccine  Aged Out             (applicable per patient's age: Cancer Screenings, Depression Screening, Fall Risk Screening, Immunizations)    Hemoglobin A1C (%)   Date Value   10/07/2021 7.2   03/23/2021 7.6 (H)   09/08/2020 7.8 (H)     Microalb/Crt. Ratio (mcg/mg creat)   Date Value   09/29/2021 57 (H)     LDL Cholesterol (mg/dL)   Date Value   09/29/2021 61     AST (U/L)   Date Value   12/29/2021 41 (H)     ALT (U/L)   Date Value   12/29/2021 54 (H)     BUN (mg/dL)   Date Value   12/31/2021 19      (goal A1C is < 7)   (goal LDL is <100) need 30-50% reduction from baseline     BP Readings from Last 3 Encounters:   01/11/22 122/68   12/31/21 (!) 103/50   10/07/21 132/78    (goal /80)      All Future Testing planned in [facility]:  Lab Frequency Next Occurrence   US CAROTID ARTERY BILATERAL Once 03/08/2021   CBC Auto Differential Once 04/05/2022   ALT Once 04/07/2022   AST Once 41/96/1291   Basic Metabolic Panel Once 79/77/8792   Hemoglobin A1C Once 04/05/2022   Lipid Panel Once 04/05/2022   Microalbumin, Ur Once 04/05/2022   T4, Free Once 04/05/2022   TSH without Reflex Once 04/05/2022       Next Visit Date:  Future Appointments   Date Time Provider Gisela Harding   2/1/2022  1:00 PM [facility]   3/7/2022  2:00 PM Heidy Penn MD TIFF PULM [facility]   4/7/2022  2:40 PM Israel Snider APRN - CNP [facility]            Patient Active Problem List:     Obesity (BMI 30.0-34. 9)     Venous (peripheral) insufficiency     Hx pulmonary embolism     Hearing impaired     Edema     Type 2 diabetes mellitus with complication, without long-term current use of insulin (HCC)     Knee osteoarthritis     Chronic combined systolic and diastolic CHF (congestive heart failure) (HCC)     COPD (chronic obstructive pulmonary disease) (HCC)     Adrenal adenoma, left     Elevated liver enzymes     Hypothyroidism due to Hashimoto's thyroiditis     Moderate persistent asthma without complication     Community acquired bacterial pneumonia     Hypoxia

## 2022-01-28 DIAGNOSIS — J44.1 COPD EXACERBATION (HCC): ICD-10-CM

## 2022-01-28 RX ORDER — ALBUTEROL SULFATE 2.5 MG/3ML
2.5 SOLUTION RESPIRATORY (INHALATION) EVERY 4 HOURS PRN
Qty: 120 EACH | Refills: 3 | Status: SHIPPED | OUTPATIENT
Start: 2022-01-28

## 2022-01-28 NOTE — TELEPHONE ENCOUNTER
Health Maintenance   Topic Date Due    DTaP/Tdap/Td vaccine (1 - Tdap) 03/08/2022 (Originally 10/15/1954)    Lipid screen  09/29/2022    TSH testing  09/29/2022    Potassium monitoring  12/31/2022    Creatinine monitoring  12/31/2022    Depression Screen  01/11/2023    Flu vaccine  Completed    Shingles Vaccine  Completed    Pneumococcal 65+ years Vaccine  Completed    COVID-19 Vaccine  Completed    Hepatitis A vaccine  Aged Out    Hib vaccine  Aged Out    Meningococcal (ACWY) vaccine  Aged Out             (applicable per patient's age: Cancer Screenings, Depression Screening, Fall Risk Screening, Immunizations)    Hemoglobin A1C (%)   Date Value   10/07/2021 7.2   03/23/2021 7.6 (H)   09/08/2020 7.8 (H)     Microalb/Crt. Ratio (mcg/mg creat)   Date Value   09/29/2021 57 (H)     LDL Cholesterol (mg/dL)   Date Value   09/29/2021 61     AST (U/L)   Date Value   12/29/2021 41 (H)     ALT (U/L)   Date Value   12/29/2021 54 (H)     BUN (mg/dL)   Date Value   12/31/2021 19      (goal A1C is < 7)   (goal LDL is <100) need 30-50% reduction from baseline     BP Readings from Last 3 Encounters:   01/11/22 122/68   12/31/21 (!) 103/50   10/07/21 132/78    (goal /80)      All Future Testing planned in [facility]:  Lab Frequency Next Occurrence   US CAROTID ARTERY BILATERAL Once 03/08/2021   CBC Auto Differential Once 04/05/2022   ALT Once 04/07/2022   AST Once 72/54/4407   Basic Metabolic Panel Once 85/18/5198   Hemoglobin A1C Once 04/05/2022   Lipid Panel Once 04/05/2022   Microalbumin, Ur Once 04/05/2022   T4, Free Once 04/05/2022   TSH without Reflex Once 04/05/2022       Next Visit Date:  Future Appointments   Date Time Provider Gisela Harding   2/1/2022  1:00 PM Waleska Martinez MD TIFF CARD [facility]   3/7/2022  2:00 PM Hilda Delacruz MD TIFF PULM [facility]   4/7/2022  2:40 PM Stephanie Snider, APRN - CNP [facility]            Patient Active Problem List:     Obesity (BMI 30.0-34. 9)     Venous (peripheral) insufficiency     Hx pulmonary embolism     Hearing impaired     Edema     Type 2 diabetes mellitus with complication, without long-term current use of insulin (HCC)     Knee osteoarthritis     Chronic combined systolic and diastolic CHF (congestive heart failure) (HCC)     COPD (chronic obstructive pulmonary disease) (HCC)     Adrenal adenoma, left     Elevated liver enzymes     Hypothyroidism due to Hashimoto's thyroiditis     Moderate persistent asthma without complication     Community acquired bacterial pneumonia     Hypoxia

## 2022-03-04 NOTE — PROGRESS NOTES
Subjective:      Patient ID: Caro Irizarry is a 82 y.o. male. HPI  Patient here for follow-up for COPD, combined congestive heart failure, asthma, obesity and bronchiectasis. Patient was last seen in the office in December 2020 as a virtual visit per Dr. Patrica Ross. Patient endorses exertional dyspnea. Occasional cough productive of light tan/yellow secretions. Denies needing any current refills. He does have an Acapella valve at home but has not been using it. Encourage patient to make sure that he is using it regularly to help prevent infection. This will help mobilize secretions. Patient was also provided with an empiric prescription for an antibiotic and steroid to have on hand for a purulent exacerbation. He is up-to-date on his COVID vaccine and booster. Medications:   Albuterol neb:  Singulair 10 mg:  Symbicort 160-4.5 mcg:    PRIOR WORKUP:  PFT:  PFT 11/9/2017: FVC of 2.81 (66% of predicted), FEV1 of 1.51 (50% of predicted), postbronchodilator no improvement in FEV1 or FVC. Residual volume 4.47 (164% of predicted), TLC 7.15 (98% of predicted), diffusion capacity 13.42 (39% of predicted). Final impression: Moderate obstructive ventilatory impairment without significant response to bronchodilator. Lung volumes consistent with hyperinflation and airway trapping. Severe reduction in diffusion capacity secondary to emphysema/pulmonary vascular disease/anemia. CT Imaging:  CT chest 4/6/2018: No focal consolidation, pleural effusion or pneumothorax. Mild chronic bronchial wall thickening throughout. No mediastinal or hilar adenopathy. CT chest 11/14/2017: Confluent increased opacity within the posterior left lower lobe with air bronchograms present most likely pneumonia. No evidence of mediastinal mass or pathologic lymph node enlargement. No pleural effusion or pneumothorax.     Sleep Study:    Laboratory Evaluation:    Immunizations:   Immunization History   Administered Date(s) Administered    COVID-19, Moderna, Primary or Immunocompromised, PF, 100mcg/0.5mL 01/21/2021, 02/18/2021, 12/01/2021    Influenza Virus Vaccine 09/25/2017, 10/01/2018    Influenza, High Dose (Fluzone 65 yrs and older) 09/30/2015, 10/01/2018, 09/16/2019, 10/07/2020    Influenza, High-dose, Aleja Haddad, 65 yrs +, IM (Fluzone) 10/07/2021    Influenza, Quadv, IM, (6 mo and older Fluzone, Flulaval, Fluarix and 3 yrs and older Afluria) 09/25/2017    Influenza, Quadv, adjuvanted, 65 yrs +, IM, PF (Fluad) 11/03/2020    Pneumococcal Conjugate 13-valent ([id number]) 03/12/2015    Pneumococcal Polysaccharide ([id number]) 01/01/2000, 02/15/2006    Tetanus 01/01/2003    Zoster Live (Zostavax) 01/01/2008, 01/01/2012    Zoster Recombinant (Shingrix) 07/06/2021, 09/23/2021        No flowsheet data found.     /73   Pulse 66   Temp 98 °F (36.7 °C)   Resp 16   Ht 5' 11\" (1.803 m)   Wt 254 lb (115.2 kg)   SpO2 97%   BMI 35.43 kg/m²     Past Medical History:   Diagnosis Date    [facility]     Diabetes mellitus ([facility]     [facility]    Hyperlipidemia     Hypothyroidism     MI (myocardial infarction) ([facility]     Thyroid disease     Type II or unspecified type diabetes mellitus without mention of complication, not stated as uncontrolled      Past Surgical History:   Procedure Laterality Date    CARDIAC SURGERY  02/24/2017    Stent placed  Dr Ya Chowdhury      right ankle    HERNIA REPAIR       Family History   Problem Relation Age of Onset    Cancer Mother 47        liver ca    Diabetes Father     Heart Disease Father        Social History     Socioeconomic History    Marital status:      Spouse name: Not on file    Number of children: Not on file    Years of education: Not on file    Highest education level: Not on file   Occupational History    Not on file   Tobacco Use    Smoking status: Former Smoker     Quit date:      Years since quittin.2    Smokeless tobacco: Never Used   Vaping Use    Vaping Use: Never used   Substance and Sexual Activity    Alcohol use: Yes     Comment: occasional- maybe every other day    Drug use: No    Sexual activity: Not on file   Other Topics Concern    Not on file   Social History Narrative    Not on file     Social Determinants of Health     Financial Resource Strain: Low Risk     Difficulty of Paying Living Expenses: Not hard at all   Food Insecurity: No Food Insecurity    Worried About [address] in the Last Year: Never true    [facility] in the Last Year: Never true   Transportation Needs:     Lack of Transportation (Medical): Not on file    Lack of Transportation (Non-Medical): Not on file   Physical Activity:     Days of Exercise per Week: Not on file    Minutes of Exercise per Session: Not on file   Stress:     Feeling of Stress : Not on file   Social Connections:     Frequency of Communication with Friends and Family: Not on file    Frequency of Social Gatherings with Friends and Family: Not on file    Attends Sikhism Services: Not on file    Active Member of [address] [place] [address] or Organizations: Not on file    Attends Club or Organization Meetings: Not on file    Marital Status: Not on file   Intimate Partner Violence:     Fear of Current or Ex-Partner: Not on file    Emotionally Abused: Not on file    Physically Abused: Not on file    Sexually Abused: Not on file   Housing Stability:     Unable to Pay for Housing in the Last Year: Not on file    Number of [place] in the Last Year: Not on file    Unstable Housing in the Last Year: Not on file       Review of Systems   Constitutional: Negative. HENT: Negative. Eyes: Negative. Respiratory:        Exertional dyspnea, cough productive of pale yellow/tan secretions. Denies purulent secretions or hemoptysis. Cardiovascular: Negative. Gastrointestinal: Negative. Endocrine: Negative. Genitourinary: Negative. Musculoskeletal: Negative. Skin: Negative. Allergic/Immunologic: Negative. Neurological: Negative. Hematological: Negative. Psychiatric/Behavioral: Negative. Objective:       Physical Exam  General appearance - alert, well appearing, and in no distress, oriented to person, place, and time and overweight  Mental status - alert, oriented to person, place, and time, normal mood, behavior, speech, dress, motor activity, and thought processes  Eyes - pupils equal and reactive, extraocular eye movements intact  Ears - not examined  Nose - normal and patent, no erythema, discharge or polyps  Mouth - mucous membranes moist, pharynx normal without lesions  Neck - supple, no significant adenopathy  Chest -lung sounds generally diminished throughout. Few scattered rhonchi no appreciated wheezes or rales. Harsh nonproductive cough witnessed  Heart -normal rate, regular rhythm, normal S1, S2, no murmurs, rubs, clicks or gallops  Abdomen - soft, nontender, nondistended, no masses or organomegaly  Neuro- alert, oriented, normal speech, no focal findings or movement disorder noted}  Extremities - peripheral pulses normal, no pedal edema, no clubbing or cyanosis  Skin - normal coloration and turgor, no rashes, no suspicious skin lesions noted     Wt Readings from Last 3 Encounters:   03/10/22 254 lb (115.2 kg)   01/11/22 248 lb 14.4 oz (112.9 kg)   12/31/21 250 lb 4.8 oz (113.5 kg)       Results for orders placed or performed during the hospital encounter of 12/29/21   Culture, Blood 1    Specimen: Blood   Result Value Ref Range    Specimen Description . BLOOD     Special Requests LA 10ML     Culture NO GROWTH 5 DAYS    Rapid influenza A/B antigens    Specimen: Nasopharyngeal Swab   Result Value Ref Range    Specimen Description . NASOPHARYNGEAL SWAB     Special Requests NOT REPORTED     Direct Exam       NEGATIVE for Influenza A + B antigens. PCR testing to confirm this result is available upon request.  Specimen will be saved in the laboratory for 7 days. Please call [phone] if PCR testing is indicated. COVID-19, Rapid    Specimen: Nasopharyngeal Swab   Result Value Ref Range    Specimen Description . NASOPHARYNGEAL SWAB     SARS-CoV-2, Rapid Not Detected Not Detected   Culture, Blood 1    Specimen: Blood   Result Value Ref Range    Specimen Description . BLOOD     Special Requests 3 ML RAC     Culture NO GROWTH 5 DAYS    Culture, Respiratory    Specimen: Sputum Expectorated   Result Value Ref Range    Specimen Description . EXPECTORATED SPUTUM     Special Requests NOT REPORTED     Direct Exam < 10 EPITHELIAL CELLS/LPF (A)     Direct Exam Few WBC seen     Direct Exam (A)      FEW MIXED BACTERIAL MORPHOTYPES SEEN ON GRAM STAIN.     Culture NORMAL RESPIRATORY FELI HEAVY GROWTH    CBC auto differential   Result Value Ref Range    WBC 12.6 (H) 3.5 - 11.3 k/uL    RBC 4.24 4.21 - 5.77 m/uL    Hemoglobin 12.4 (L) 13.0 - 17.0 g/dL    Hematocrit 38.6 (L) 40.7 - 50.3 %    MCV 91.0 82.6 - 102.9 fL    MCH 29.2 25.2 - 33.5 pg    MCHC 32.1 28.4 - 34.8 g/dL    RDW 14.8 (H) 11.8 - 14.4 %    Platelets 077 547 - 517 k/uL    MPV 10.2 8.1 - 13.5 fL    NRBC Automated 0.0 0.0 per 100 WBC    Differential Type NOT REPORTED     Seg Neutrophils 82 (H) 36 - 65 %    Lymphocytes 6 (L) 24 - 43 %    Monocytes 10 3 - 12 %    Eosinophils % 1 1 - 4 %    Basophils 0 0 - 2 %    Immature Granulocytes 1 (H) 0 %    Segs Absolute 10.37 (H) 1.50 - 8.10 k/uL    Absolute Lymph # 0.74 (L) 1.10 - 3.70 k/uL    Absolute Mono # 1.25 (H) 0.10 - 1.20 k/uL    Absolute Eos # 0.10 0.00 - 0.44 k/uL    Basophils Absolute 0.03 0.00 - 0.20 k/uL    Absolute Immature Granulocyte 0.06 0.00 - 0.30 k/uL    WBC Morphology NOT REPORTED     RBC Morphology NOT REPORTED     Platelet Estimate NOT REPORTED    Comprehensive Metabolic Panel   Result Value Ref Range Glucose 180 (H) 70 - 99 mg/dL    BUN 19 8 - 23 mg/dL    CREATININE 0.98 0.70 - 1.20 mg/dL    Bun/Cre Ratio 19 9 - 20    Calcium 9.5 8.6 - 10.4 mg/dL    Sodium 132 (L) 135 - 144 mmol/L    Potassium 4.4 3.7 - 5.3 mmol/L    Chloride 94 (L) 98 - 107 mmol/L    CO2 23 20 - 31 mmol/L    Anion Gap 15 9 - 17 mmol/L    Alkaline Phosphatase 163 (H) 40 - 129 U/L    ALT 54 (H) 5 - 41 U/L    AST 41 (H) <40 U/L    Total Bilirubin 0.47 0.3 - 1.2 mg/dL    Total Protein 7.5 6.4 - 8.3 g/dL    Albumin 4.0 3.5 - 5.2 g/dL    Albumin/Globulin Ratio 1.1 1.0 - 2.5    GFR Non-African American >60 >60 mL/min    GFR African American >60 >60 mL/min    GFR Comment          GFR Staging         Lactic Acid, Plasma   Result Value Ref Range    Lactic Acid 2.2 0.5 - 2.2 mmol/L    Lactic Acid, Whole Blood NOT REPORTED 0.7 - 2.1 mmol/L   Urinalysis with Microscopic   Result Value Ref Range    Color, UA Yellow Yellow    Turbidity UA Clear Clear    Glucose, Ur NEGATIVE NEGATIVE    Bilirubin Urine NEGATIVE NEGATIVE    Ketones, Urine NEGATIVE NEGATIVE    Specific Gravity, UA 1.015 1.010 - 1.020    Urine Hgb NEGATIVE NEGATIVE    pH, UA 6.5 5.0 - 9.0    Protein, UA NEGATIVE NEGATIVE    Urobilinogen, Urine Normal Normal    Nitrite, Urine NEGATIVE NEGATIVE    Leukocyte Esterase, Urine NEGATIVE NEGATIVE    Urinalysis Comments NOT REPORTED     -          WBC, UA None 0 - 5 /HPF    RBC, UA 0 TO 2 0 - 2 /HPF    Casts UA NOT REPORTED /LPF    Crystals, UA NOT REPORTED None /HPF    Epithelial Cells UA 0 TO 2 0 - 5 /HPF    Renal Epithelial, UA NOT REPORTED 0 /HPF    Bacteria, UA TRACE (A) None    Mucus, UA NOT REPORTED None    Trichomonas, UA NOT REPORTED None    Amorphous, UA NOT REPORTED None    Other Observations UA NOT REPORTED NOT REQ.     Yeast, UA NOT REPORTED None   Brain Natriuretic Peptide   Result Value Ref Range    Pro- (H) <300 pg/mL    BNP Interpretation NOT REPORTED    Basic Metabolic Panel w/ Reflex to MG   Result Value Ref Range    Glucose 124 (H) 70 - 99 mg/dL    BUN 19 8 - 23 mg/dL    CREATININE 1.21 (H) 0.70 - 1.20 mg/dL    Bun/Cre Ratio 16 9 - 20    Calcium 8.5 (L) 8.6 - 10.4 mg/dL    Sodium 133 (L) 135 - 144 mmol/L    Potassium 3.7 3.7 - 5.3 mmol/L    Chloride 99 98 - 107 mmol/L    CO2 23 20 - 31 mmol/L    Anion Gap 11 9 - 17 mmol/L    GFR Non-African American 57 (L) >60 mL/min    GFR African American >60 >60 mL/min    GFR Comment          GFR Staging         CBC auto differential   Result Value Ref Range    WBC 10.0 3.5 - 11.3 k/uL    RBC 3.53 (L) 4.21 - 5.77 m/uL    Hemoglobin 10.1 (L) 13.0 - 17.0 g/dL    Hematocrit 32.4 (L) 40.7 - 50.3 %    MCV 91.8 82.6 - 102.9 fL    MCH 28.6 25.2 - 33.5 pg    MCHC 31.2 28.4 - 34.8 g/dL    RDW 14.9 (H) 11.8 - 14.4 %    Platelets 616 876 - 231 k/uL    MPV 10.3 8.1 - 13.5 fL    NRBC Automated 0.0 0.0 per 100 WBC    Differential Type NOT REPORTED     WBC Morphology NOT REPORTED     RBC Morphology NOT REPORTED     Platelet Estimate NOT REPORTED     Seg Neutrophils 73 (H) 36 - 65 %    Lymphocytes 17 (L) 24 - 43 %    Monocytes 7 3 - 12 %    Eosinophils % 3 1 - 4 %    Immature Granulocytes 0 0 %    Basophils 0 0 - 2 %    Segs Absolute 7.30 1.50 - 8.10 k/uL    Absolute Lymph # 1.70 1.10 - 3.70 k/uL    Absolute Mono # 0.70 0.10 - 1.20 k/uL    Absolute Eos # 0.30 0.00 - 0.44 k/uL    Absolute Immature Granulocyte 0.00 0.00 - 0.30 k/uL    Basophils Absolute 0.00 0.0 - 0.2 k/uL    Morphology Normal        No results found. Assessment:      1. Chronic obstructive pulmonary disease, unspecified COPD type ([facility]    2. Obesity due to excess calories, unspecified obesity severity    3. Bronchiectasis without complication ([address]    4. Moderate persistent asthma without complication    5. Chronic combined systolic and diastolic CHF (congestive heart failure) ([facility]    6. Severe obesity (BMI 35.0-39. 9) with comorbidity ([facility]          Plan:      1. Medications reviewed, continue as ordered.   2. Educated and clarified the medication use.  3. Patient has received his COVID vaccine and booster. Strategies to avoid Covid are discussed. 4. Patient's questions were answered to his satisfaction. 5. Recommended patient use Acapella valve regularly to help mobilize secretions and prevent infection. 6. Pulmonary function tests were reviewed   7. CT scan of the chest was reviewed  8. Antibiotic and prednisone taper to have on hand.   9. We'll see the patient back in 12 months          Electronically signed by GENA Olivo CNP on 3/10/2022 at 2:34 PM

## 2022-03-10 ENCOUNTER — OFFICE VISIT (OUTPATIENT)
Dept: PULMONOLOGY | Age: 87
End: 2022-03-10
Payer: MEDICARE

## 2022-03-10 VITALS
OXYGEN SATURATION: 97 % | HEART RATE: 66 BPM | SYSTOLIC BLOOD PRESSURE: 131 MMHG | RESPIRATION RATE: 16 BRPM | WEIGHT: 254 LBS | TEMPERATURE: 98 F | HEIGHT: 71 IN | BODY MASS INDEX: 35.56 KG/M2 | DIASTOLIC BLOOD PRESSURE: 73 MMHG

## 2022-03-10 DIAGNOSIS — J45.40 MODERATE PERSISTENT ASTHMA WITHOUT COMPLICATION: ICD-10-CM

## 2022-03-10 DIAGNOSIS — J44.9 CHRONIC OBSTRUCTIVE PULMONARY DISEASE, UNSPECIFIED COPD TYPE (HCC): Primary | ICD-10-CM

## 2022-03-10 DIAGNOSIS — I50.42 CHRONIC COMBINED SYSTOLIC AND DIASTOLIC CHF (CONGESTIVE HEART FAILURE) (HCC): ICD-10-CM

## 2022-03-10 DIAGNOSIS — E66.09 OBESITY DUE TO EXCESS CALORIES, UNSPECIFIED OBESITY SEVERITY: ICD-10-CM

## 2022-03-10 DIAGNOSIS — E66.01 SEVERE OBESITY (BMI 35.0-39.9) WITH COMORBIDITY (HCC): ICD-10-CM

## 2022-03-10 DIAGNOSIS — J47.9 BRONCHIECTASIS WITHOUT COMPLICATION (HCC): ICD-10-CM

## 2022-03-10 PROCEDURE — 99214 OFFICE O/P EST MOD 30 MIN: CPT | Performed by: NURSE PRACTITIONER

## 2022-03-10 RX ORDER — PREDNISONE 10 MG/1
TABLET ORAL
Qty: 30 TABLET | Refills: 0 | Status: SHIPPED | OUTPATIENT
Start: 2022-03-10 | End: 2022-05-16

## 2022-03-10 RX ORDER — AZITHROMYCIN 250 MG/1
250 TABLET, FILM COATED ORAL SEE ADMIN INSTRUCTIONS
Qty: 6 TABLET | Refills: 0 | Status: SHIPPED | OUTPATIENT
Start: 2022-03-10 | End: 2022-03-15

## 2022-03-10 ASSESSMENT — ENCOUNTER SYMPTOMS
GASTROINTESTINAL NEGATIVE: 1
ALLERGIC/IMMUNOLOGIC NEGATIVE: 1
EYES NEGATIVE: 1

## 2022-03-10 NOTE — PATIENT INSTRUCTIONS
SURVEY:    You may be receiving a survey from [employer] regarding your visit today. Please complete the survey to enable us to provide the highest quality of care to you and your family. If you cannot score us a very good on any question, please call the office to discuss how we could have made your experience a very good one. Thank you. Please recheck your blood pressure when you go home and make sure you take your prescribed medication if applicable . Please follow up with your PCP or ER if needed.

## 2022-03-14 ENCOUNTER — HOSPITAL ENCOUNTER (OUTPATIENT)
Age: 87
Discharge: HOME | End: 2022-03-14
Payer: MEDICARE

## 2022-03-14 ENCOUNTER — OFFICE VISIT (OUTPATIENT)
Dept: CARDIOLOGY | Age: 87
End: 2022-03-14
Payer: MEDICARE

## 2022-03-14 VITALS
HEIGHT: 71 IN | OXYGEN SATURATION: 100 % | BODY MASS INDEX: 35.42 KG/M2 | HEART RATE: 66 BPM | SYSTOLIC BLOOD PRESSURE: 127 MMHG | DIASTOLIC BLOOD PRESSURE: 67 MMHG | RESPIRATION RATE: 18 BRPM | WEIGHT: 253 LBS

## 2022-03-14 DIAGNOSIS — Z95.820 S/P ANGIOPLASTY WITH STENT: ICD-10-CM

## 2022-03-14 DIAGNOSIS — E11.69 TYPE 2 DIABETES MELLITUS WITH OTHER SPECIFIED COMPLICATION, UNSPECIFIED WHETHER LONG TERM INSULIN USE (HCC): ICD-10-CM

## 2022-03-14 DIAGNOSIS — I10 ESSENTIAL HYPERTENSION: ICD-10-CM

## 2022-03-14 DIAGNOSIS — E13.69 OTHER SPECIFIED DIABETES MELLITUS WITH OTHER SPECIFIED COMPLICATION, UNSPECIFIED WHETHER LONG TERM INSULIN USE (HCC): ICD-10-CM

## 2022-03-14 DIAGNOSIS — E78.2 MIXED HYPERLIPIDEMIA: ICD-10-CM

## 2022-03-14 DIAGNOSIS — I50.42 CHRONIC COMBINED SYSTOLIC AND DIASTOLIC CONGESTIVE HEART FAILURE (HCC): Primary | ICD-10-CM

## 2022-03-14 DIAGNOSIS — I25.10 ASHD (ARTERIOSCLEROTIC HEART DISEASE): ICD-10-CM

## 2022-03-14 DIAGNOSIS — I50.42 CHRONIC COMBINED SYSTOLIC AND DIASTOLIC CONGESTIVE HEART FAILURE (HCC): ICD-10-CM

## 2022-03-14 LAB
ALT SERPL-CCNC: 52 U/L (ref 5–41)
ANION GAP SERPL CALCULATED.3IONS-SCNC: 9 MMOL/L (ref 9–17)
AST SERPL-CCNC: 34 U/L
BUN BLDV-MCNC: 23 MG/DL (ref 8–23)
BUN/CREAT BLD: 20 (ref 9–20)
CALCIUM SERPL-MCNC: 9 MG/DL (ref 8.6–10.4)
CHLORIDE BLD-SCNC: 98 MMOL/L (ref 98–107)
CO2: 27 MMOL/L (ref 20–31)
CREAT SERPL-MCNC: 1.13 MG/DL (ref 0.7–1.2)
GFR AFRICAN AMERICAN: >60 ML/MIN
GFR NON-AFRICAN AMERICAN: >60 ML/MIN
GFR SERPL CREATININE-BSD FRML MDRD: ABNORMAL ML/MIN/{1.73_M2}
GFR SERPL CREATININE-BSD FRML MDRD: ABNORMAL ML/MIN/{1.73_M2}
GLUCOSE BLD-MCNC: 154 MG/DL (ref 70–99)
HCT VFR BLD CALC: 40.2 % (ref 40.7–50.3)
HEMOGLOBIN: 12.3 G/DL (ref 13–17)
MCH RBC QN AUTO: 28.7 PG (ref 25.2–33.5)
MCHC RBC AUTO-ENTMCNC: 30.6 G/DL (ref 28.4–34.8)
MCV RBC AUTO: 93.9 FL (ref 82.6–102.9)
NRBC AUTOMATED: 0 PER 100 WBC
PDW BLD-RTO: 14.6 % (ref 11.8–14.4)
PLATELET # BLD: 263 K/UL (ref 138–453)
PMV BLD AUTO: 10.4 FL (ref 8.1–13.5)
POTASSIUM SERPL-SCNC: 4.5 MMOL/L (ref 3.7–5.3)
PRO-BNP: 154 PG/ML
RBC # BLD: 4.28 M/UL (ref 4.21–5.77)
SODIUM BLD-SCNC: 134 MMOL/L (ref 135–144)
TSH SERPL DL<=0.05 MIU/L-ACNC: 9.22 MIU/L (ref 0.3–5)
WBC # BLD: 9.8 K/UL (ref 3.5–11.3)

## 2022-03-14 PROCEDURE — 84450 TRANSFERASE (AST) (SGOT): CPT

## 2022-03-14 PROCEDURE — 83036 HEMOGLOBIN GLYCOSYLATED A1C: CPT | Performed by: INTERNAL MEDICINE

## 2022-03-14 PROCEDURE — 85027 COMPLETE CBC AUTOMATED: CPT

## 2022-03-14 PROCEDURE — 80061 LIPID PANEL: CPT

## 2022-03-14 PROCEDURE — 99214 OFFICE O/P EST MOD 30 MIN: CPT | Performed by: INTERNAL MEDICINE

## 2022-03-14 PROCEDURE — 83036 HEMOGLOBIN GLYCOSYLATED A1C: CPT

## 2022-03-14 PROCEDURE — 83880 ASSAY OF NATRIURETIC PEPTIDE: CPT

## 2022-03-14 PROCEDURE — 84443 ASSAY THYROID STIM HORMONE: CPT

## 2022-03-14 PROCEDURE — 36415 COLL VENOUS BLD VENIPUNCTURE: CPT

## 2022-03-14 PROCEDURE — 80048 BASIC METABOLIC PNL TOTAL CA: CPT

## 2022-03-14 PROCEDURE — 84439 ASSAY OF FREE THYROXINE: CPT

## 2022-03-14 PROCEDURE — 84460 ALANINE AMINO (ALT) (SGPT): CPT

## 2022-03-14 NOTE — PATIENT INSTRUCTIONS
SURVEY:    You may be receiving a survey from [employer] regarding your visit today. Please complete the survey to enable us to provide the highest quality of care to you and your family. If you cannot score us a very good on any question, please call the office to discuss how we could have made your experience a very good one. Thank you.

## 2022-03-14 NOTE — PROGRESS NOTES
Hayden Tracy RN am scribing for and in the presence of Mehrdad Vanessa MD, F.A.C.C..    Subjective:     CHIEF COMPLAINT / HPI:    Chief Complaint   Patient presents with    6 Month Follow-Up     Hx: CHF, cad, stent Down four pounds  Pt reports doing so so today. denies CP, lightheadedness, dizziness, falls or near falls. Denies Palpitations or SOB. pt says that he can walk with a cane from the office to the car without getting short of breath. Bettina Servin is 82 y.o. male who presents today for follow-up. 1-He has history of coronary artery disease, myocardial infarction and primary PCI in 2/2017 done at [facility],  [address] [place] [address]. He also has history of ischemic cardiomyopathy and chronic systolic CHF, NYHA class III. 2-An admission to [facility] on 9/19/2017 with COPD exacerbation, during this admission his lisinopril changed to Cozaar because of chronic cough. 3-Another visit to the emergency room on 10/3/2017 with cough, shortness of breath and wheezing. 4- He saw Dr. Jorge A Bailey at [facility] in November 2018, no changes in medication done. 5-An admission to [facility] on 4/6/2018 with acute on chronic CHF. 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    7- EKG done in office (8/20/2019)- Sinus Rhythm with 1st degree AV block. 71 bpm.    8- EKG done in office on 7/14/2020. No acute ischemic changes. 9-  Echocardiogram on 4/2021: Global left ventricular systolic function appears preserved with an estimated ejection fraction of 55%. Mildly increased left ventricular wall thickness with a normal left ventricular cavity size. The patient has a sigmoid interventricular septum. The inferoseptal wall is abnormal in its motion which is not unusual status post open heart surgery. Mild aortic stenosis. Mild mitral and tricuspid regurgitation. Evidence of mild diastolic dysfunction is seen.  Atrial septal aneurysm cannot rule-out shunt. A saline contrast study was performed and cannot rule out interatrial communication. 10-EKG done in office today 7/6/2021- no acute ischemic changes    11- Admission to [facility] on 12/31/2021-1/1/2022: Admitted for Pneumonia    Mr. Vargas is here today for a 6 month follow up. He says he is doing fairly well since last. He was admitted to the hospital for pneumonia in January 2022. Mr. Lani Gaines reports that his breathing has stayed the same but says it is still lousy. He says he cannot walk very far with his cane. He does bring up phlegm when he coughs and at times it is white and other times it is brown. He also says that he does intermittently get bloody noses but says this is not extensive or excessive. He denies any chest pain, pressure or tightness. He denies any lightheaded/dizziness or palpitations. No fever or chills. No abdominal pain, nausea or vomiting. No bleeding problems, bowel issues, problems with medications or any other concerns at this time. He sleeps very easily at night. His appetite is okay. He is drinking enough fluids. Weight is down 4 pounds since last visit. No worsening symptoms. Shortness of breeath has stayed about the same since last visit.  Swelling in his lower extremities however no worse than before with compression socks (zipper type) in place  Wt Readings from Last 3 Encounters:   03/14/22 253 lb (114.8 kg)   03/10/22 254 lb (115.2 kg)   01/11/22 248 lb 14.4 oz (112.9 kg)       Past Medical History:    Past Medical History:   Diagnosis Date    [facility]     Diabetes mellitus [facility]     Hx of echocardiogram 02/24/2017    [facility]    Hyperlipidemia     Hypothyroidism     MI (myocardial infarction) [facility]     Thyroid disease     Type II or unspecified type diabetes mellitus without mention of complication, not stated as uncontrolled      Past Surgical History:  Past Surgical History: Procedure Laterality Date    CARDIAC SURGERY  2017    Stent placed  Dr Mago Bal      right ankle    HERNIA REPAIR       Social History:    Social History     Tobacco Use   Smoking Status Former Smoker    Quit date:     Years since quittin.2   Smokeless Tobacco Never Used     Current Medications:  Outpatient Medications Marked as Taking for the 3/14/22 encounter (Office Visit) with Shereen Kraft MD   Medication Sig Dispense Refill    fluticasone-salmeterol (ADVAIR) 250-50 MCG/DOSE AEPB Inhale 1 puff into the lungs every 12 hours      albuterol (PROVENTIL) (2.5 MG/3ML) 0.083% nebulizer solution Take 3 mLs by nebulization every 4 hours as needed for Wheezing or Shortness of Breath 120 each 3    levothyroxine (SYNTHROID) 112 MCG tablet Take 1 tablet by mouth once daily 90 tablet 0    montelukast (SINGULAIR) 10 MG tablet TAKE 1 TABLET BY MOUTH ONCE DAILY      clopidogrel (PLAVIX) 75 MG tablet Take 1 tablet by mouth daily 90 tablet 3    furosemide (LASIX) 40 MG tablet Take 1 tablet by mouth twice daily 180 tablet 3    atorvastatin (LIPITOR) 40 MG tablet Take 1 tablet by mouth daily 90 tablet 3    primidone (MYSOLINE) 250 MG tablet Take 250 mg by mouth 2 times daily      propranolol (INDERAL LA) 120 MG extended release capsule Take 120 mg by mouth 2 times daily      omeprazole (PRILOSEC) 20 MG delayed release capsule Take 20 mg by mouth daily      alogliptin (NESINA) 25 MG TABS tablet Take 25 mg by mouth daily      blood glucose monitor strips accu check 100 strip 3    aspirin 81 MG tablet Take 81 mg by mouth daily       albuterol sulfate  (90 Base) MCG/ACT inhaler Inhale 2 puffs into the lungs every 4 hours as needed for Wheezing or Shortness of Breath 1 Inhaler 2    metFORMIN (GLUCOPHAGE) 1000 MG tablet Take 1,000 mg by mouth 2 times daily       acetaminophen (TYLENOL) 500 MG tablet Take 1,000 mg by mouth every 6 hours as needed for Pain  TAMSULOSIN HCL PO Take 0.8 mg by mouth Daily with supper       Multiple Vitamins-Minerals (THERAPEUTIC MULTIVITAMIN-MINERALS) tablet Take 1 tablet by mouth daily         REVIEW OF SYSTEMS:    CONSTITUTIONAL: No major weight gain or loss, fatigue, weakness, night sweats or fever. HEENT: No new vision difficulties or ringing in the ears. RESPIRATORY: See HPI  CARDIOVASCULAR: See HPI  GI: No nausea, vomiting, diarrhea, constipation, abdominal pain or changes in bowel habits. : No urinary frequency, urgency, incontinence hematuria or dysuria. SKIN: No cyanosis or skin lesions. MUSCULOSKELETAL: No new muscle or joint pain. NEUROLOGICAL: No syncope or TIA-like symptoms. PSYCHIATRIC: No anxiety, pain, insomnia or depression    Objective:     PHYSICAL EXAM:      VITALS:  /67 (Site: Left Upper Arm, Position: Sitting, Cuff Size: Large Adult)   Pulse 66   Resp 18   Ht 5' 11\" (1.803 m)   Wt 253 lb (114.8 kg)   SpO2 100%   BMI 35.29 kg/m²   CONSTITUTIONAL: Cooperative, no apparent distress, and appears well nourished / developed. NEUROLOGIC:  Awake and orientated to person, place and time. PSYCH: Calm affect. SKIN: Warm and dry. HEENT: Sclera non-icteric, normocephalic, neck supple, no elevation of JVP, normal carotid pulses with no bruits and thyroid normal size. LUNGS:  Poor air entry bilaterally . No significant wheezing or crakles. No significant crackles. Cardiovascular: Normal rate, regular rhythm, normal heart sounds. Exam reveals no gallop and no friction rubs. 1/6 systolic murmur, 4th intercostal space on the LEFT must lateral to the sternal border. ABDOMEN:  Normal bowel sounds, non-distended and non-tender to palpation. Extremities: 2+ bilateral leg edema. no cyanosis or clubbing. 2+ radial and carotid pulses. Distal extremity pulses: 2+ bilaterally. Compression stockings in place.     DATA:    Lab Results   Component Value Date    ALT 54 (H) 12/29/2021    AST 41 (H) 12/29/2021 ALKPHOS 163 (H) 12/29/2021    BILITOT 0.47 12/29/2021     Lab Results   Component Value Date    CREATININE 1.21 (H) 12/31/2021    BUN 19 12/31/2021     (L) 12/31/2021    K 3.7 12/31/2021    CL 99 12/31/2021    CO2 23 12/31/2021       Lab Results   Component Value Date    WBC 10.0 12/31/2021    HGB 10.1 (L) 12/31/2021    HCT 32.4 (L) 12/31/2021    MCV 91.8 12/31/2021     12/31/2021       Lab Results   Component Value Date    TRIG 147 09/29/2021    TRIG 190 (H) 04/08/2021    TRIG 97 09/08/2020     Lab Results   Component Value Date    HDL 46 09/29/2021    HDL 46 04/08/2021    HDL 61 09/08/2020     Lab Results   Component Value Date    LDLCHOLESTEROL 61 09/29/2021    LDLCHOLESTEROL 57 04/08/2021    LDLCHOLESTEROL 80 09/08/2020         Assessment:      Diagnosis Orders   1. Chronic combined systolic and diastolic congestive heart failure ([address]     2. ASHD (arteriosclerotic heart disease)     3. S/P angioplasty with stent     4. Essential hypertension     5. Mixed hyperlipidemia       Plan:     Chronic Systolic and Diastolic Heart Failure: EF 50-55% of echo done on 9/9/2019  · Beta Blocker: Continue Propranolol 120 mg BID  Diuretics: Continue furosemide (lasix) 40 mg twice daily. Nonpharmacologic management of Heart Failure: I told him to continue wearing lower extremity compression stockings and I advised him to try and keep his legs up whenever possible and to limit salt in his diet. I took the liberty of ordering a BMP for today to assess their potassium and renal function. CBC ordered to assess hemoglobin and hematocrit. BNP to assess fluid status. ALT and AST ordered to assess liver function. TSH with reflex to assess thyroid function. I will copy Iain Alt Might, APRN - CNP with the blood work result. Atherosclerotic Heart Disease: S/P Stent   Antiplatelet Agent: Continue aspirin 81 mg daily and Plavix 75 mg daily.   Beta Blocker: Continue Propranolol 80 mg BID   Cholesterol Reduction Therapy: Continue Atorvastatin (Lipitor) 40 mg daily. 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 Has complete resolution of his neurological dysfunction. Antiplatelet Agent: Continue aspirin 81 mg daily and Plavix 75 mg daily. Antiplatelet Agent: Continue clopidogrel (Plavix): Plavix 75 mg daily. Cholesterol Reduction Therapy: Continue Atorvastatin (Lipitor) 40 mg daily. Lipid panel to assess LDL. Essential Hypertension: Controlled   · Diuretics: Continue furosemide (lasix) 40 mg twice daily. · Beta Blocker: Continue Propranolol 120 mg BID     · Hyperlipidemia: Mixed - Last LDL on 4/8/2021 was 57 mg/dL  · Cholesterol Reduction Therapy: Continue Atorvastatin (Lipitor) 40 mg daily. Obesity: Body mass index is 35.29 kg/m². I also briefly discussed both diet and exercise strategies for him to continue to loses weight and he was very receptive to this. · Diabetes Mellitus  · Ordered Hemoglobin a1c    FOLLOW UP:   I told Mr. Vargas to call my office if he had any problems, but otherwise told him to Return in about 6 months (around 9/14/2022). However, I would be happy to see him sooner should the need arise. Ranjana Faith MD, MS, F.A.C.C. [facility]) [facility], [address] [facility], [address]  Phone: [phone], Fax: [phone]    I believe that the risk of significant morbidity and mortality related to the patient's current medical conditions are: intermediate-high. The documentation recorded by the scribe, accurately and completely reflects the services I personally performed and the decisions made by me. Ranjana Faith MD, F.A.C.C.  March 14, 2022

## 2022-03-15 ENCOUNTER — TELEPHONE (OUTPATIENT)
Dept: CARDIOLOGY | Age: 87
End: 2022-03-15

## 2022-03-15 ENCOUNTER — TELEPHONE (OUTPATIENT)
Dept: PRIMARY CARE CLINIC | Age: 87
End: 2022-03-15

## 2022-03-15 DIAGNOSIS — E03.8 HYPOTHYROIDISM DUE TO HASHIMOTO'S THYROIDITIS: Primary | ICD-10-CM

## 2022-03-15 DIAGNOSIS — E06.3 HYPOTHYROIDISM DUE TO HASHIMOTO'S THYROIDITIS: Primary | ICD-10-CM

## 2022-03-15 LAB
CHOLESTEROL/HDL RATIO: 2.8
CHOLESTEROL: 144 MG/DL
ESTIMATED AVERAGE GLUCOSE: 160 MG/DL
HBA1C MFR BLD: 7.2 % (ref 4–6)
HDLC SERPL-MCNC: 52 MG/DL
LDL CHOLESTEROL: 65 MG/DL (ref 0–130)
THYROXINE, FREE: 1.68 NG/DL (ref 0.93–1.7)
TRIGL SERPL-MCNC: 134 MG/DL

## 2022-03-15 RX ORDER — LEVOTHYROXINE SODIUM 137 UG/1
137 TABLET ORAL DAILY
Qty: 30 TABLET | Refills: 0 | Status: SHIPPED | OUTPATIENT
Start: 2022-03-15 | End: 2022-04-27

## 2022-03-15 NOTE — TELEPHONE ENCOUNTER
Called and spoke with patient's wife, Rosa Isela Glover, and informed her that TSH level was elevated ad that the dose of his Levothyroxine is being increased to 137mcg daily and new script was sent to the pharmacy. Instructed to repeat labs in 3 months. Verbalizes understanding.

## 2022-03-15 NOTE — TELEPHONE ENCOUNTER
----- Message from GENA Diaz CNP sent at 3/15/2022  8:36 AM EDT -----  Please notify patient that we have increased his Synthroid dose. He needs to discontinue old medication and start new one as soon as possible. Then repeat his TSH labs 3 months. Orders in.   Thanks Chacorta Barcenas

## 2022-03-15 NOTE — TELEPHONE ENCOUNTER
----- Message from Suma Henderson MD sent at 3/14/2022  8:40 PM EDT -----  Blood work is stable. Please follow up with GENA Huitron CNP for the thyroid and diabetes as previously scheduled.  Thank you

## 2022-04-07 NOTE — TELEPHONE ENCOUNTER
Office Visit - General Surgery  Makenzie Oneill MRN: [id number]  Encounter: [id number]    Assessment and Plan  Problem List Items Addressed This Visit        Digestive    Liver contusion     - no abdominal pain   - tolerating a diet  - light activity as tolerated            Respiratory    Traumatic pneumothorax     - obtain f/u CXR to evaluate for resolution of PTX  - asymptomatic  - trauma will f/u results of CXR  - encouraged use of IS            Musculoskeletal and Integument    Closed fracture of multiple ribs of right side     - Right 10-12th rib fractures  - Continued to have R rib pain: recommend continued scheduled tylenol, start flexeril 5 mg TID and d/c methocarbamol (due to GI intolerance), increase gabapentin to 300 mg BID and continue to wean oxycodone to 2 5 mg Q8h and then decrease frequency over the next week to two  Continue chronic MSIR 15 mg Q12h for chronic pain  - f/u with trauma in 3 weeks for re-evaluation  Closed fracture of transverse process of lumbar vertebra ([address] )     - wean pain medications as prescribed  - continue PT/OT  - f/u with trauma in 3 weeks           Other Visit Diagnoses     Rib fractures    -  Primary    Relevant Medications    cyclobenzaprine (FLEXERIL) 5 mg tablet    oxyCODONE (Roxicodone) 5 immediate release tablet    gabapentin (Neurontin) 300 mg capsule    naloxone (NARCAN) 4 mg/0 1 mL nasal spray          Chief Complaint:  Makenzie Oneill is a 81 y o  female who presents for Fall (f/u fall  rib fx   still significantly sore )    Subjective  82 y/o female presents for trauma f/u s/p fall on 3/1 when she sustained R 10-12th rib fractures, a small R PTX, a grade 1 liver contusion and R L1-L2 TP fractures  She is seen with her daughter present  She is progressing but still has right chest wall pain  Sometimes with movement the pain is more sharp in nature  She has been taking methocarbamol but it upsets her stomach   She also takes gabapentin before bed and Patient informed. Thank you. oxycodone 5 mg which she is weaning and is now down to 3 tabs daily  She has a h/o chronic pain and is prescribed MSIR 15 mg Q12 h from her pain specialist  She would like to try and wean off her oxycodone but sometimes the pain is still so severe and oxycodone is the only thing that will bring the pain down  She would also like to try a different muscle relaxant  She denies SOB or MARTE       Past Medical History:   Diagnosis Date    Anxiety     Asthma     Chronic pain     Depression     Enterovirus heart infection        Past Surgical History:   Procedure Laterality Date    FIXATION KYPHOPLASTY LUMBAR SPINE      HYSTERECTOMY      REPAIR RECTOCELE         Family History   Problem Relation Age of Onset    No Known Problems Mother     No Known Problems Father        Social History     Tobacco Use    Smoking status: Never Smoker    Smokeless tobacco: Never Used   Vaping Use    Vaping Use: Never used   Substance Use Topics    Alcohol use: Yes     Comment: social    Drug use: Yes     Types: Marijuana     Comment: has medical card        Medications  Current Outpatient Medications on File Prior to Visit   Medication Sig Dispense Refill    acetaminophen (TYLENOL) 325 mg tablet Take 3 tablets (975 mg total) by mouth every 8 (eight) hours      albuterol (PROVENTIL HFA,VENTOLIN HFA) 90 mcg/act inhaler Inhale 2 puffs every 6 (six) hours as needed for wheezing      buPROPion (WELLBUTRIN) 100 mg tablet Take 100 mg by mouth 2 (two) times a day      carvedilol (COREG) 25 mg tablet Take 25 mg by mouth 2 (two) times a day with meals      gabapentin (NEURONTIN) 300 mg capsule Take 1 capsule (300 mg total) by mouth 2 (two) times a day 60 capsule 0    hydroxychloroquine (PLAQUENIL) 200 mg tablet Take 1 tablet (200 mg total) by mouth 2 (two) times a day 180 tablet 1    LamoTRIgine (LAMICTAL PO) Take 1 tablet by mouth daily at bedtime      lidocaine (Lidoderm) 5 % Apply 1 patch topically daily Remove & Discard patch within 12 hours or as directed by MD      neomycin-bacitracin-polymyxin (NEOSPORIN) 5-400-5,000 ointment Apply topically 3 (three) times a day 453 9 g 0    pantoprazole (PROTONIX) 40 mg tablet Take 1 tablet (40 mg total) by mouth daily in the early morning 30 tablet 0    polyethylene glycol (MIRALAX) 17 g packet Take 17 g by mouth daily as needed (Constipation)      senna-docusate sodium (SENOKOT S) 8 6-50 mg per tablet Take 1 tablet by mouth 2 (two) times a day      sodium chloride (OCEAN) 0 65 % nasal spray 1 spray into each nostril as needed for congestion      [DISCONTINUED] methocarbamol (ROBAXIN) 500 mg tablet Take 1 tablet (500 mg total) by mouth every 6 (six) hours  0    LORazepam (ATIVAN) 1 mg tablet Take 1 tablet (1 mg total) by mouth daily at bedtime for 3 days 3 tablet 0     No current facility-administered medications on file prior to visit  Allergies  Allergies   Allergen Reactions    Penicillins        Review of Systems   Constitutional: Negative  HENT: Negative  Eyes: Negative  Respiratory: Negative for chest tightness and shortness of breath  Cardiovascular: Negative  Gastrointestinal: Negative  Negative for abdominal pain, nausea and vomiting  Genitourinary: Negative  Musculoskeletal:        + R rib pain  + pain in low back   Skin: Negative  Neurological: Negative  Negative for headaches  Hematological: Negative  Psychiatric/Behavioral: Negative  Negative for self-injury  The patient is not nervous/anxious  Objective  Vitals:    04/07/22 1510   Temp: (!) 97 4 °F (36 3 °C)       Physical Exam  HENT:      Head: Normocephalic  Nose: Nose normal       Mouth/Throat:      Mouth: Mucous membranes are moist    Eyes:      Pupils: Pupils are equal, round, and reactive to light  Cardiovascular:      Rate and Rhythm: Normal rate and regular rhythm  Pulses: Normal pulses     Pulmonary:      Effort: Pulmonary effort is normal  No respiratory distress  Breath sounds: Normal breath sounds  Comments: + SpO2 97% on room air  Abdominal:      General: Abdomen is flat  There is no distension  Palpations: Abdomen is soft  Tenderness: There is no abdominal tenderness  Musculoskeletal:         General: No deformity  Normal range of motion  Cervical back: Neck supple  Comments: + R lateral chest wall tenderness  + Lumbar spine tenderness   Skin:     General: Skin is warm and dry  Neurological:      General: No focal deficit present  Mental Status: She is alert and oriented to person, place, and time  Sensory: No sensory deficit  Motor: No weakness     Psychiatric:         Mood and Affect: Mood normal

## 2022-04-13 ENCOUNTER — TELEPHONE (OUTPATIENT)
Dept: PRIMARY CARE CLINIC | Age: 87
End: 2022-04-13

## 2022-04-13 NOTE — TELEPHONE ENCOUNTER
Called and left message that Ba Cox said Leslie Salinasjimi does not need any blood work at this time. To call office with any further questions.

## 2022-04-27 ENCOUNTER — TELEPHONE (OUTPATIENT)
Dept: PRIMARY CARE CLINIC | Age: 87
End: 2022-04-27

## 2022-04-27 DIAGNOSIS — E03.8 HYPOTHYROIDISM DUE TO HASHIMOTO'S THYROIDITIS: ICD-10-CM

## 2022-04-27 DIAGNOSIS — E06.3 HYPOTHYROIDISM DUE TO HASHIMOTO'S THYROIDITIS: ICD-10-CM

## 2022-04-27 RX ORDER — LEVOTHYROXINE SODIUM 137 UG/1
TABLET ORAL
Qty: 90 TABLET | Refills: 0 | Status: SHIPPED | OUTPATIENT
Start: 2022-04-27 | End: 2022-07-14

## 2022-04-27 RX ORDER — MONTELUKAST SODIUM 10 MG/1
TABLET ORAL
Qty: 90 TABLET | Refills: 1 | Status: SHIPPED | OUTPATIENT
Start: 2022-04-27

## 2022-04-27 NOTE — TELEPHONE ENCOUNTER
Health Maintenance   Topic Date Due    DTaP/Tdap/Td vaccine (1 - Tdap) Never done   [employer] Visit (AWV)  Never done    Depression Screen  01/11/2023    Lipids  03/14/2023    TSH  03/14/2023    Potassium  03/14/2023    Creatinine  03/14/2023    Flu vaccine  Completed    Shingles Vaccine  Completed    Pneumococcal 65+ years Vaccine  Completed    COVID-19 Vaccine  Completed    Hepatitis A vaccine  Aged Out    Hib vaccine  Aged Out    Meningococcal (ACWY) vaccine  Aged Out             (applicable per patient's age: Cancer Screenings, Depression Screening, Fall Risk Screening, Immunizations)    Hemoglobin A1C (%)   Date Value   03/14/2022 7.2 (H)   10/07/2021 7.2   03/23/2021 7.6 (H)     Microalb/Crt. Ratio (mcg/mg creat)   Date Value   09/29/2021 57 (H)     LDL Cholesterol (mg/dL)   Date Value   03/14/2022 65     AST (U/L)   Date Value   03/14/2022 34     ALT (U/L)   Date Value   03/14/2022 52 (H)     BUN (mg/dL)   Date Value   03/14/2022 23      (goal A1C is < 7)   (goal LDL is <100) need 30-50% reduction from baseline     BP Readings from Last 3 Encounters:   03/14/22 127/67   03/10/22 131/73   01/11/22 122/68    (goal /80)      All Future Testing planned in [facility]:  Lab Frequency Next Occurrence   TSH With Reflex Ft4 Once 06/15/2022       Next Visit Date:  Future Appointments   Date Time Provider Gisela Harding   5/16/2022  2:40 PM GENA Nicholson - CNP [facility]   9/14/2022  9:20 AM Lindsay Ramirez [facility]   3/20/2023  1:00 PM Lucero Christensen MD TIFF PUL[facility]            Patient Active Problem List:     Obesity (BMI 30.0-34. 9)     Venous (peripheral) insufficiency     Hx pulmonary embolism     Hearing impaired     Edema     Type 2 diabetes mellitus with complication, without long-term current use of insulin (HCC)     Knee osteoarthritis     Chronic combined systolic and diastolic CHF (congestive heart failure) (HCC)     [facility]     Adrenal adenoma, left     Elevated liver enzymes     Hypothyroidism due to Hashimoto's thyroiditis     Moderate persistent asthma without complication     Community acquired bacterial pneumonia     Hypoxia

## 2022-04-27 NOTE — TELEPHONE ENCOUNTER
Called to let patient know that he needed thyroid labs done around mid June. He voiced understanding.

## 2022-04-27 NOTE — TELEPHONE ENCOUNTER
Health Maintenance   Topic Date Due    DTaP/Tdap/Td vaccine (1 - Tdap) Never done   [employer] Visit (AWV)  Never done    Depression Screen  01/11/2023    Lipids  03/14/2023    TSH  03/14/2023    Potassium  03/14/2023    Creatinine  03/14/2023    Flu vaccine  Completed    Shingles Vaccine  Completed    Pneumococcal 65+ years Vaccine  Completed    COVID-19 Vaccine  Completed    Hepatitis A vaccine  Aged Out    Hib vaccine  Aged Out    Meningococcal (ACWY) vaccine  Aged Out             (applicable per patient's age: Cancer Screenings, Depression Screening, Fall Risk Screening, Immunizations)    Hemoglobin A1C (%)   Date Value   03/14/2022 7.2 (H)   10/07/2021 7.2   03/23/2021 7.6 (H)     Microalb/Crt. Ratio (mcg/mg creat)   Date Value   09/29/2021 57 (H)     LDL Cholesterol (mg/dL)   Date Value   03/14/2022 65     AST (U/L)   Date Value   03/14/2022 34     ALT (U/L)   Date Value   03/14/2022 52 (H)     BUN (mg/dL)   Date Value   03/14/2022 23      (goal A1C is < 7)   (goal LDL is <100) need 30-50% reduction from baseline     BP Readings from Last 3 Encounters:   03/14/22 127/67   03/10/22 131/73   01/11/22 122/68    (goal /80)      All Future Testing planned in [facility]:  Lab Frequency Next Occurrence   TSH With Reflex Ft4 Once 06/15/2022       Next Visit Date:  Future Appointments   Date Time Provider Gisela Harding   5/16/2022  2:40 PM GENA Palacio - CNP [facility]   9/14/2022  9:20 AM Kristin Dias MD TIFF CARD [facility]   3/20/2023  1:00 PM Bree Nicole MD TIFF PULM [facility]            Patient Active Problem List:     Obesity (BMI 30.0-34. 9)     Venous (peripheral) insufficiency     Hx pulmonary embolism     Hearing impaired     Edema     Type 2 diabetes mellitus with complication, without long-term current use of insulin (HCC)     Knee osteoarthritis     Chronic combined systolic and diastolic CHF (congestive heart failure) (HCC)     [facility]     Adrenal adenoma, left     Elevated liver enzymes     Hypothyroidism due to Hashimoto's thyroiditis     Moderate persistent asthma without complication     Community acquired bacterial pneumonia     Hypoxia

## 2022-04-29 DIAGNOSIS — E03.8 HYPOTHYROIDISM DUE TO HASHIMOTO'S THYROIDITIS: ICD-10-CM

## 2022-04-29 DIAGNOSIS — E06.3 HYPOTHYROIDISM DUE TO HASHIMOTO'S THYROIDITIS: ICD-10-CM

## 2022-04-29 RX ORDER — LEVOTHYROXINE SODIUM 137 UG/1
TABLET ORAL
Qty: 90 TABLET | Refills: 0 | OUTPATIENT
Start: 2022-04-29

## 2022-04-29 RX ORDER — MONTELUKAST SODIUM 10 MG/1
TABLET ORAL
Qty: 90 TABLET | Refills: 0 | OUTPATIENT
Start: 2022-04-29

## 2022-05-16 ENCOUNTER — OFFICE VISIT (OUTPATIENT)
Dept: PRIMARY CARE CLINIC | Age: 87
End: 2022-05-16
Payer: MEDICARE

## 2022-05-16 VITALS
OXYGEN SATURATION: 99 % | SYSTOLIC BLOOD PRESSURE: 122 MMHG | HEART RATE: 66 BPM | TEMPERATURE: 97.9 F | BODY MASS INDEX: 35.32 KG/M2 | RESPIRATION RATE: 18 BRPM | HEIGHT: 71 IN | DIASTOLIC BLOOD PRESSURE: 68 MMHG | WEIGHT: 252.3 LBS

## 2022-05-16 DIAGNOSIS — J44.9 CHRONIC OBSTRUCTIVE PULMONARY DISEASE, UNSPECIFIED COPD TYPE (HCC): ICD-10-CM

## 2022-05-16 DIAGNOSIS — E11.8 TYPE 2 DIABETES MELLITUS WITH COMPLICATION, WITHOUT LONG-TERM CURRENT USE OF INSULIN (HCC): Primary | ICD-10-CM

## 2022-05-16 DIAGNOSIS — I50.42 CHRONIC COMBINED SYSTOLIC AND DIASTOLIC CHF (CONGESTIVE HEART FAILURE) (HCC): ICD-10-CM

## 2022-05-16 PROCEDURE — 3051F HG A1C>EQUAL 7.0%<8.0%: CPT | Performed by: NURSE PRACTITIONER

## 2022-05-16 PROCEDURE — 99214 OFFICE O/P EST MOD 30 MIN: CPT | Performed by: NURSE PRACTITIONER

## 2022-05-16 RX ORDER — PREDNISONE 20 MG/1
40 TABLET ORAL DAILY
Qty: 10 TABLET | Refills: 1 | Status: SHIPPED | OUTPATIENT
Start: 2022-05-16 | End: 2022-05-21

## 2022-05-16 RX ORDER — AZITHROMYCIN 250 MG/1
250 TABLET, FILM COATED ORAL SEE ADMIN INSTRUCTIONS
Qty: 6 TABLET | Refills: 1 | Status: SHIPPED | OUTPATIENT
Start: 2022-05-16 | End: 2022-05-21

## 2022-05-16 ASSESSMENT — ENCOUNTER SYMPTOMS
TROUBLE SWALLOWING: 0
SHORTNESS OF BREATH: 1
SPUTUM PRODUCTION: 1
DIFFICULTY BREATHING: 0
SORE THROAT: 0
HEARTBURN: 0
HEMOPTYSIS: 0
HOARSE VOICE: 0
CHEST TIGHTNESS: 0
COUGH: 1
BLURRED VISION: 0
VOMITING: 0
FREQUENT THROAT CLEARING: 0
DIARRHEA: 0
WHEEZING: 0
ABDOMINAL PAIN: 0
RHINORRHEA: 0
NAUSEA: 0
CONSTIPATION: 0

## 2022-05-16 ASSESSMENT — PATIENT HEALTH QUESTIONNAIRE - PHQ9
SUM OF ALL RESPONSES TO PHQ QUESTIONS 1-9: 0
SUM OF ALL RESPONSES TO PHQ QUESTIONS 1-9: 0
2. FEELING DOWN, DEPRESSED OR HOPELESS: 0
SUM OF ALL RESPONSES TO PHQ QUESTIONS 1-9: 0
SUM OF ALL RESPONSES TO PHQ QUESTIONS 1-9: 0
SUM OF ALL RESPONSES TO PHQ9 QUESTIONS 1 & 2: 0
1. LITTLE INTEREST OR PLEASURE IN DOING THINGS: 0

## 2022-05-16 ASSESSMENT — COPD QUESTIONNAIRES: COPD: 1

## 2022-05-16 NOTE — PATIENT INSTRUCTIONS
SURVEY:     You may be receiving a survey from [employer] regarding your visit today. Please complete the survey to enable us to provide the highest quality of care to you and your family. If you cannot score us a very good on any question, please call the office to discuss how we could have made your experience a very good one. Thank you.   Rafael Snider, APRN-GUERITA Coates, CNP Caldwell Buerger, GELY Frank, CMA  Christos Donohue, MUKUND Meléndez, CMA  Haily, PCA

## 2022-05-16 NOTE — PROGRESS NOTES
Name: Tabitha Hood  : 1935         Chief Complaint:     Chief Complaint   Patient presents with    Diabetes     routine no concerns     Congestive Heart Failure    COPD       History of Present Illness:      Tabitha Hood is a 82 y.o.  male who presents with Diabetes (routine no concerns ), Congestive Heart Failure, and COPD      Clarissa Hook is here today for a routine office visit. Overall he states he is feeling very well and has no immediate medical issues or concerns at this time. Diabetes  He presents for his follow-up diabetic visit. He has type 2 diabetes mellitus. His disease course has been stable. There are no hypoglycemic associated symptoms. Pertinent negatives for hypoglycemia include no dizziness, headaches or sweats. Pertinent negatives for diabetes include no blurred vision, no chest pain, no fatigue, no polydipsia, no polyphagia, no polyuria and no weight loss. There are no hypoglycemic complications. Symptoms are stable. Diabetic complications include heart disease and nephropathy. Pertinent negatives for diabetic complications include no CVA or peripheral neuropathy. Risk factors for coronary artery disease include diabetes mellitus, male sex and sedentary lifestyle. Current diabetic treatment includes oral agent (monotherapy). He is compliant with treatment all of the time. His weight is stable. He is following a generally healthy diet. When asked about meal planning, he reported none. He has had a previous visit with a dietitian. He rarely participates in exercise. There is no change in his home blood glucose trend. His breakfast blood glucose range is generally 140-180 mg/dl. An ACE inhibitor/angiotensin II receptor blocker is being taken. He does not see a podiatrist.Eye exam is not current. Congestive Heart Failure  Presents for follow-up visit. Associated symptoms include shortness of breath (with exertion).  Pertinent negatives include no abdominal pain, chest pain, fatigue, orthopnea or palpitations. The symptoms have been stable. Compliance with total regimen is 51-75%. Compliance problems include adherence to exercise. Compliance with diet is %. Compliance with exercise is 26-50%. Compliance with medications is %. COPD  He complains of cough, shortness of breath (with exertion) and sputum production. There is no chest tightness, difficulty breathing, frequent throat clearing, hemoptysis, hoarse voice or wheezing. This is a chronic problem. The current episode started more than 1 year ago. The problem occurs intermittently. The problem has been unchanged. The cough is productive of sputum. Associated symptoms include dyspnea on exertion. Pertinent negatives include no appetite change, chest pain, ear congestion, ear pain, fever, headaches, heartburn, malaise/fatigue, myalgias, nasal congestion, orthopnea, PND, postnasal drip, rhinorrhea, sneezing, sore throat, sweats, trouble swallowing or weight loss. His symptoms are aggravated by URI, strenuous activity, climbing stairs and change in weather. His symptoms are alleviated by beta-agonist, steroid inhaler, rest and oral steroids. He reports significant improvement on treatment. His symptoms are not alleviated by rest. His past medical history is significant for asthma, bronchitis, COPD and pneumonia.          Past Medical History:     Past Medical History:   Diagnosis Date    [facility] [address]     Diabetes mellitus ([facility] [address]     [facility]    Hyperlipidemia     Hypothyroidism     MI (myocardial infarction) ([facility] [address]     Thyroid disease     Type II or unspecified type diabetes mellitus without mention of complication, not stated as uncontrolled       Reviewed all health maintenance requirements and ordered appropriate tests  Health Maintenance Due   Topic Date Due    Annual Wellness Visit (AWV)  Never done       Past Surgical History:     Past Surgical History:   Procedure Laterality Date    CARDIAC SURGERY  02/24/2017    Stent placed  Dr Bolivar Anton      right ankle    HERNIA REPAIR          Medications:       Prior to Admission medications    Medication Sig Start Date End Date Taking?  Authorizing Provider   azithromycin (ZITHROMAX) 250 MG tablet Take 1 tablet by mouth See Admin Instructions for 5 days 500mg on day 1 followed by 250mg on days 2 - 5 5/16/22 5/21/22 Yes GNEA Sosa CNP   predniSONE (DELTASONE) 20 MG tablet Take 2 tablets by mouth daily for 5 days 5/16/22 5/21/22 Yes GENA Sosa - CNP   levothyroxine (SYNTHROID) 137 MCG tablet Take 1 tablet by mouth once daily 4/27/22 5/27/22 Yes GENA Sosa CNP   montelukast (SINGULAIR) 10 MG tablet Take 1 tablet by mouth once daily 4/27/22  Yes GENA Sosa CNP   fluticasone-salmeterol (ADVAIR) 250-50 MCG/DOSE AEPB Inhale 1 puff into the lungs every 12 hours   Yes Historical Provider, MD   albuterol (PROVENTIL) (2.5 MG/3ML) 0.083% nebulizer solution Take 3 mLs by nebulization every 4 hours as needed for Wheezing or Shortness of Breath 1/28/22  Yes GENA Sosa CNP   clopidogrel (PLAVIX) 75 MG tablet Take 1 tablet by mouth daily 11/2/21  Yes Beatriz Maravilla MD   furosemide (LASIX) 40 MG tablet Take 1 tablet by mouth twice daily 10/6/21  Yes GENA Sosa CNP   atorvastatin (LIPITOR) 40 MG tablet Take 1 tablet by mouth daily 8/23/21  Yes Beatriz Maravilla MD   primidone (MYSOLINE) 250 MG tablet Take 250 mg by mouth 2 times daily   Yes Historical Provider, MD   propranolol (INDERAL LA) 120 MG extended release capsule Take 120 mg by mouth 2 times daily   Yes Historical Provider, MD   omeprazole (PRILOSEC) 20 MG delayed release capsule Take 20 mg by mouth daily   Yes Historical Provider, MD   alogliptin (NESINA) 25 MG TABS tablet Take 25 mg by mouth daily   Yes Historical Provider, MD   blood glucose monitor strips accu check 7/18/18  Yes GENA Ashby CNP   aspirin 81 MG tablet Take 81 mg by mouth daily    Yes Historical Provider, MD   albuterol sulfate  (90 Base) MCG/ACT inhaler Inhale 2 puffs into the lungs every 4 hours as needed for Wheezing or Shortness of Breath 4/13/18  Yes GENA Ashby CNP   metFORMIN (GLUCOPHAGE) 1000 MG tablet Take 1,000 mg by mouth 2 times daily    Yes Historical Provider, MD   acetaminophen (TYLENOL) 500 MG tablet Take 1,000 mg by mouth every 6 hours as needed for Pain   Yes Historical Provider, MD   TAMSULOSIN HCL PO Take 0.8 mg by mouth Daily with supper    Yes Historical Provider, MD   Multiple Vitamins-Minerals (THERAPEUTIC MULTIVITAMIN-MINERALS) tablet Take 1 tablet by mouth daily   Yes Historical Provider, MD   budesonide-formoterol (SYMBICORT) 160-4.5 MCG/ACT AERO Inhale 2 puffs into the lungs 2 times daily  Patient not taking: Reported on 3/14/2022    Historical Provider, MD        Allergies:       Rosuvastatin    Social History:     Tobacco:    reports that he quit smoking about 29 years ago. He has never used smokeless tobacco.  Alcohol:      reports current alcohol use. Drug Use:  reports no history of drug use. Family History:     Family History   Problem Relation Age of Onset    Cancer Mother 47        liver ca    Diabetes Father     Heart Disease Father        Review of Systems:     Positive and Negative as described in HPI    Review of Systems   Constitutional: Negative for appetite change, chills, fatigue, fever, malaise/fatigue and weight loss. HENT: Negative for congestion, ear pain, hoarse voice, postnasal drip, rhinorrhea, sneezing, sore throat and trouble swallowing. Eyes: Negative for blurred vision and visual disturbance. Respiratory: Positive for cough, sputum production and shortness of breath (with exertion). Negative for hemoptysis and wheezing.     Cardiovascular: Positive for dyspnea on exertion and leg swelling (intermittent). Negative for chest pain, palpitations and PND. Gastrointestinal: Negative for abdominal pain, constipation, diarrhea, heartburn, nausea and vomiting. Endocrine: Negative for polydipsia, polyphagia and polyuria. Genitourinary: Negative for difficulty urinating and dysuria. Musculoskeletal: Positive for gait problem (chronic). Negative for myalgias, neck pain and neck stiffness. Skin: Negative for rash. Neurological: Negative for dizziness, syncope and headaches. Physical Exam:   Vitals:  /68 (Site: Left Upper Arm, Position: Sitting)   Pulse 66   Temp 97.9 °F (36.6 °C) (Temporal)   Resp 18   Ht 5' 11\" (1.803 m)   Wt 252 lb 4.8 oz (114.4 kg)   SpO2 99%   BMI 35.19 kg/m²     Physical Exam  Vitals and nursing note reviewed. Constitutional:       General: He is not in acute distress. Appearance: He is well-developed. He is obese. HENT:      Mouth/Throat:      Mouth: Mucous membranes are moist.   Eyes:      General: No scleral icterus. Conjunctiva/sclera: Conjunctivae normal.   Cardiovascular:      Rate and Rhythm: Normal rate and regular rhythm. Heart sounds: Murmur heard. Pulmonary:      Effort: Pulmonary effort is normal.      Breath sounds: Decreased breath sounds (throughout) present. No wheezing or rales. Abdominal:      General: Bowel sounds are normal. There is no distension. Palpations: Abdomen is soft. Tenderness: There is no abdominal tenderness. Musculoskeletal:      Cervical back: Normal range of motion and neck supple. Right lower leg: Edema (trace) present. Left lower leg: Edema (trace) present. Comments: Compression stockings in place   Lymphadenopathy:      Cervical: No cervical adenopathy. Skin:     General: Skin is warm and dry. Findings: No rash. Neurological:      Mental Status: He is alert and oriented to person, place, and time.    Psychiatric:         Mood and Affect: Mood normal. Behavior: Behavior normal.         Data:     Lab Results   Component Value Date     03/14/2022    K 4.5 03/14/2022    CL 98 03/14/2022    CO2 27 03/14/2022    BUN 23 03/14/2022    CREATININE 1.13 03/14/2022    GLUCOSE 154 03/14/2022    PROT 7.5 12/29/2021    LABALBU 4.0 12/29/2021    BILITOT 0.47 12/29/2021    ALKPHOS 163 12/29/2021    AST 34 03/14/2022    ALT 52 03/14/2022     Lab Results   Component Value Date    WBC 9.8 03/14/2022    RBC 4.28 03/14/2022    HGB 12.3 03/14/2022    HCT 40.2 03/14/2022    MCV 93.9 03/14/2022    MCH 28.7 03/14/2022    MCHC 30.6 03/14/2022    RDW 14.6 03/14/2022     03/14/2022    MPV 10.4 03/14/2022     Lab Results   Component Value Date    TSH 9.22 03/14/2022     Lab Results   Component Value Date    CHOL 144 03/14/2022    HDL 52 03/14/2022    LABA1C 7.2 03/14/2022       Assessment/Plan:      Diagnosis Orders   1. Type 2 diabetes mellitus with complication, without long-term current use of insulin ([facility])  Basic Metabolic Panel    Hemoglobin A1C   2. Chronic combined systolic and diastolic CHF (congestive heart failure) ([facility] [address]     3. Chronic obstructive pulmonary disease, unspecified COPD type ([facility])  azithromycin (ZITHROMAX) 250 MG tablet     We will continue all current medications. Labs and blood pressure are stable. He will continue visits with cardiology and pulmonology as planned. We will see him back in 6 months with routine labs, sooner if any issues. 1.  Mila Guzman received counseling on the following healthy behaviors: nutrition, exercise and medication adherence  2. Patient given educational materials - see patient instructions  3. Was a self-tracking handout given in paper form or via [employer]? No  If yes, see orders or list here. 4.  Discussed use, benefit, and side effects of prescribed medications. Barriers to medication compliance addressed. All patient questions answered. Pt voiced understanding. 5.  Reviewed prior labs and health maintenance  6. Continue current medications, diet and exercise. Completed Refills   Requested Prescriptions     Signed Prescriptions Disp Refills    azithromycin (ZITHROMAX) 250 MG tablet 6 tablet 1     Sig: Take 1 tablet by mouth See Admin Instructions for 5 days 500mg on day 1 followed by 250mg on days 2 - 5    predniSONE (DELTASONE) 20 MG tablet 10 tablet 1     Sig: Take 2 tablets by mouth daily for 5 days         Return in about 6 months (around 11/16/2022), or if symptoms worsen or fail to improve.

## 2022-05-29 DIAGNOSIS — E78.2 MIXED HYPERLIPIDEMIA: ICD-10-CM

## 2022-05-29 DIAGNOSIS — I25.10 ASHD (ARTERIOSCLEROTIC HEART DISEASE): ICD-10-CM

## 2022-05-31 RX ORDER — ATORVASTATIN CALCIUM 40 MG/1
TABLET, FILM COATED ORAL
Qty: 90 TABLET | Refills: 3 | Status: SHIPPED | OUTPATIENT
Start: 2022-05-31

## 2022-07-14 DIAGNOSIS — E03.8 HYPOTHYROIDISM DUE TO HASHIMOTO'S THYROIDITIS: ICD-10-CM

## 2022-07-14 DIAGNOSIS — E06.3 HYPOTHYROIDISM DUE TO HASHIMOTO'S THYROIDITIS: ICD-10-CM

## 2022-07-14 RX ORDER — LEVOTHYROXINE SODIUM 137 UG/1
TABLET ORAL
Qty: 90 TABLET | Refills: 3 | Status: SHIPPED | OUTPATIENT
Start: 2022-07-14

## 2022-07-14 NOTE — TELEPHONE ENCOUNTER
Health Maintenance   Topic Date Due    Annual Wellness Visit (AWV)  Never done    DTaP/Tdap/Td vaccine (1 - Tdap) [birth year]/16/2023 (Originally 10/15/1954)    Flu vaccine (1) 09/01/2022    Lipids  03/14/2023    Depression Screen  05/16/2023    Shingles vaccine  Completed    Pneumococcal 65+ years Vaccine  Completed    COVID-19 Vaccine  Completed    Hepatitis A vaccine  Aged Out    Hib vaccine  Aged Out    Meningococcal (ACWY) vaccine  Aged Out             (applicable per patient's age: Cancer Screenings, Depression Screening, Fall Risk Screening, Immunizations)    Hemoglobin A1C (%)   Date Value   03/14/2022 7.2 (H)   10/07/2021 7.2   03/23/2021 7.6 (H)     Microalb/Crt. Ratio (mcg/mg creat)   Date Value   09/29/2021 57 (H)     LDL Cholesterol (mg/dL)   Date Value   03/14/2022 65     AST (U/L)   Date Value   03/14/2022 34     ALT (U/L)   Date Value   03/14/2022 52 (H)     BUN (mg/dL)   Date Value   03/14/2022 23      (goal A1C is < 7)   (goal LDL is <100) need 30-50% reduction from baseline     BP Readings from Last 3 Encounters:   05/16/22 122/68   03/14/22 127/67   03/10/22 131/73    (goal /80)      All Future Testing planned in [facility]:  Lab Frequency Next Occurrence   TSH Once 06/27/2022   T4, Free Once 85/88/8991   Basic Metabolic Panel Once 04/69/9017   Hemoglobin A1C Once 11/12/2022       Next Visit Date:  Future Appointments   Date Time Provider Gisela Harding   9/14/2022  9:20 AM Sever[facility] Stable[facility]   11/16/2022  2:00 PM GENA Patel - CNP [facility]   3/6/2023  1:30 PM Nik De Jesus MD [facility] PULM [facility]            Patient Active Problem List:     Obesity (BMI 30.0-34. 9)     Venous (peripheral) insufficiency     Hx pulmonary embolism     Hearing impaired     Edema     Type 2 diabetes mellitus with complication, without long-term current use of insulin (HCC)     Knee osteoarthritis     Chronic combined systolic and diastolic CHF (congestive heart failure) ([facility] [address]     COPD (chronic obstructive pulmonary disease) (HCC)     Adrenal adenoma, left     Elevated liver enzymes     Hypothyroidism due to Hashimoto's thyroiditis     Moderate persistent asthma without complication     Community acquired bacterial pneumonia     Hypoxia

## 2022-07-24 DIAGNOSIS — E66.09 CLASS 2 OBESITY DUE TO EXCESS CALORIES WITH BODY MASS INDEX (BMI) OF 35.0 TO 35.9 IN ADULT, UNSPECIFIED WHETHER SERIOUS COMORBIDITY PRESENT: ICD-10-CM

## 2022-07-24 DIAGNOSIS — Z86.73 HISTORY OF STROKE: ICD-10-CM

## 2022-07-24 DIAGNOSIS — Z95.820 S/P ANGIOPLASTY WITH STENT: ICD-10-CM

## 2022-07-24 DIAGNOSIS — I10 ESSENTIAL HYPERTENSION: ICD-10-CM

## 2022-07-24 DIAGNOSIS — I25.10 ASHD (ARTERIOSCLEROTIC HEART DISEASE): ICD-10-CM

## 2022-07-24 DIAGNOSIS — I50.42 CHRONIC COMBINED SYSTOLIC AND DIASTOLIC CONGESTIVE HEART FAILURE (HCC): ICD-10-CM

## 2022-07-25 RX ORDER — CLOPIDOGREL BISULFATE 75 MG/1
TABLET ORAL
Qty: 90 TABLET | Refills: 3 | Status: SHIPPED | OUTPATIENT
Start: 2022-07-25 | End: 2022-10-17 | Stop reason: ALTCHOICE

## 2022-07-25 RX ORDER — FUROSEMIDE 40 MG/1
TABLET ORAL
Qty: 180 TABLET | Refills: 0 | OUTPATIENT
Start: 2022-07-25

## 2022-07-25 RX ORDER — MONTELUKAST SODIUM 10 MG/1
TABLET ORAL
Qty: 90 TABLET | Refills: 0 | OUTPATIENT
Start: 2022-07-25

## 2022-07-28 RX ORDER — MONTELUKAST SODIUM 10 MG/1
TABLET ORAL
Qty: 90 TABLET | Refills: 0 | OUTPATIENT
Start: 2022-07-28

## 2022-08-12 NOTE — PLAN OF CARE
Problem: Falls - Risk of  Goal: Absence of falls  Outcome: Ongoing  Bed in low position. Wheels locked. Bed alarm on. 2/4 side rails are up. Fall band on. Call light within reach. Problem: Airway Clearance - Ineffective:  Goal: Clear lung sounds  Clear lung sounds  Outcome: Ongoing  Lungs are clear and diminished, will continue to monitor. Problem: Fluid Volume - Deficit:  Goal: Achieves intake and output within specified parameters  Achieves intake and output within specified parameters  Outcome: Ongoing  Monitoring I/O, IVF infusing as ordered, will continue to monitor. Problem: Gas Exchange - Impaired:  Goal: Levels of oxygenation will improve  Levels of oxygenation will improve  Outcome: Ongoing  SpO2 was 93% on RA, will continue to monitor. Problem: Hyperthermia:  Goal: Ability to maintain a body temperature in the normal range will improve  Ability to maintain a body temperature in the normal range will improve  Outcome: Ongoing  Temperature WNL, will continue to monitor. Pt titrated to 3L NC.

## 2022-08-19 ENCOUNTER — APPOINTMENT (OUTPATIENT)
Dept: GENERAL RADIOLOGY | Age: 87
End: 2022-08-19
Payer: OTHER GOVERNMENT

## 2022-08-19 ENCOUNTER — APPOINTMENT (OUTPATIENT)
Dept: CT IMAGING | Age: 87
End: 2022-08-19
Payer: OTHER GOVERNMENT

## 2022-08-19 ENCOUNTER — HOSPITAL ENCOUNTER (EMERGENCY)
Age: 87
Discharge: HOME | End: 2022-08-19
Payer: OTHER GOVERNMENT

## 2022-08-19 VITALS
RESPIRATION RATE: 18 BRPM | OXYGEN SATURATION: 95 % | WEIGHT: 247 LBS | TEMPERATURE: 102.2 F | DIASTOLIC BLOOD PRESSURE: 75 MMHG | HEART RATE: 91 BPM | SYSTOLIC BLOOD PRESSURE: 125 MMHG | BODY MASS INDEX: 34.45 KG/M2

## 2022-08-19 DIAGNOSIS — J44.1 COPD EXACERBATION (HCC): Primary | ICD-10-CM

## 2022-08-19 DIAGNOSIS — R50.9 FEVER, UNSPECIFIED FEVER CAUSE: ICD-10-CM

## 2022-08-19 LAB
FLU A ANTIGEN: NEGATIVE
FLU B ANTIGEN: NEGATIVE
SARS-COV-2, RAPID: NOT DETECTED
SPECIMEN DESCRIPTION: NORMAL

## 2022-08-19 PROCEDURE — 99284 EMERGENCY DEPT VISIT MOD MDM: CPT

## 2022-08-19 PROCEDURE — 87804 INFLUENZA ASSAY W/OPTIC: CPT

## 2022-08-19 PROCEDURE — C9803 HOPD COVID-19 SPEC COLLECT: HCPCS

## 2022-08-19 PROCEDURE — 6370000000 HC RX 637 (ALT 250 FOR IP): Performed by: EMERGENCY MEDICINE

## 2022-08-19 PROCEDURE — 87635 SARS-COV-2 COVID-19 AMP PRB: CPT

## 2022-08-19 PROCEDURE — 71250 CT THORAX DX C-: CPT

## 2022-08-19 PROCEDURE — 71045 X-RAY EXAM CHEST 1 VIEW: CPT

## 2022-08-19 RX ORDER — ACETAMINOPHEN 325 MG/1
650 TABLET ORAL ONCE
Status: COMPLETED | OUTPATIENT
Start: 2022-08-19 | End: 2022-08-19

## 2022-08-19 RX ORDER — PREDNISONE 10 MG/1
TABLET ORAL
Qty: 20 TABLET | Refills: 0 | Status: SHIPPED | OUTPATIENT
Start: 2022-08-19 | End: 2022-08-26 | Stop reason: ALTCHOICE

## 2022-08-19 RX ORDER — AZITHROMYCIN 250 MG/1
TABLET, FILM COATED ORAL
Qty: 6 TABLET | Refills: 0 | Status: SHIPPED | OUTPATIENT
Start: 2022-08-19 | End: 2022-08-24

## 2022-08-19 RX ADMIN — ACETAMINOPHEN 650 MG: 325 TABLET ORAL at 10:48

## 2022-08-19 ASSESSMENT — PAIN - FUNCTIONAL ASSESSMENT: PAIN_FUNCTIONAL_ASSESSMENT: NONE - DENIES PAIN

## 2022-08-19 ASSESSMENT — PAIN SCALES - GENERAL: PAINLEVEL_OUTOF10: 0

## 2022-08-19 NOTE — ED PROVIDER NOTES
[facility] ED  EMERGENCY DEPARTMENT ENCOUNTER      Pt Name: Edgard Montoya  MRN: [id number]  [place] 1935  Date of evaluation: 8/19/2022  Provider: GENA Bond CNP    CHIEF COMPLAINT       Chief Complaint   Patient presents with    Cough     Ongoing for 2 weeks, history of COPD         HISTORY OF PRESENT ILLNESS   (Location/Symptom, Timing/Onset, Context/Setting, Quality, Duration, Modifying Factors, Severity)  Note limiting factors. Edgard Montoya is a 82 y.o. male who presents to the emergency department with reports of fever which started yesterday. Has hx of COPD and has had an increase in cough for the last 2 weeks. Denies N/V/D. Nursing Notes were reviewed. REVIEW OF SYSTEMS    (2-9 systems for level 4, 10 or more for level 5)     Review of Systems   Constitutional:  Positive for fever. HENT: Negative. Eyes: Negative. Respiratory:  Positive for cough. Cardiovascular: Negative. Gastrointestinal: Negative. Genitourinary: Negative. Musculoskeletal: Negative. Skin: Negative. Neurological: Negative. Psychiatric/Behavioral: Negative. Except as noted above the remainder of the review of systems was reviewed and negative.        PAST MEDICAL HISTORY     Past Medical History:   Diagnosis Date    COPD (chronic obstructive pulmonary disease) ([facility] [address]     Diabetes mellitus ([facility] [address]     Hx of echocardiogram 02/24/2017    [facility]    Hyperlipidemia     Hypothyroidism     MI (myocardial infarction) [facility])     Thyroid disease     Type II or unspecified type diabetes mellitus without mention of complication, not stated as uncontrolled          SURGICAL HISTORY       Past Surgical History:   Procedure Laterality Date    CARDIAC SURGERY  02/24/2017    Stent placed  Dr Arpita Castañeda      right ankle    [address]       Discharge Medication List as of 8/19/2022 12:37 PM CONTINUE these medications which have NOT CHANGED    Details   clopidogrel (PLAVIX) 75 MG tablet Take 1 tablet by mouth once daily, Disp-90 tablet, R-3Normal      EUTHYROX 137 MCG tablet Take 1 tablet by mouth once daily, Disp-90 tablet, R-3Normal      atorvastatin (LIPITOR) 40 MG tablet Take 1 tablet by mouth once daily, Disp-90 tablet, R-3Normal      montelukast (SINGULAIR) 10 MG tablet Take 1 tablet by mouth once daily, Disp-90 tablet, R-1Normal      fluticasone-salmeterol (ADVAIR) 250-50 MCG/DOSE AEPB Inhale 1 puff into the lungs every 12 hoursHistorical Med      albuterol (PROVENTIL) (2.5 MG/3ML) 0.083% nebulizer solution Take 3 mLs by nebulization every 4 hours as needed for Wheezing or Shortness of Breath, Disp-120 each, R-3Normal      furosemide (LASIX) 40 MG tablet Take 1 tablet by mouth twice daily, Disp-180 tablet, R-3Normal      primidone (MYSOLINE) 250 MG tablet Take 250 mg by mouth 2 times dailyHistorical Med      propranolol (INDERAL LA) 120 MG extended release capsule Take 120 mg by mouth 2 times dailyHistorical Med      omeprazole (PRILOSEC) 20 MG delayed release capsule Take 20 mg by mouth dailyHistorical Med      alogliptin (NESINA) 25 MG TABS tablet Take 25 mg by mouth dailyHistorical Med      blood glucose monitor strips accu check, Disp-100 strip, R-3, Normal      aspirin 81 MG tablet Take 81 mg by mouth daily Historical Med      albuterol sulfate  (90 Base) MCG/ACT inhaler Inhale 2 puffs into the lungs every 4 hours as needed for Wheezing or Shortness of Breath, Disp-1 Inhaler, R-2Normal      budesonide-formoterol (SYMBICORT) 160-4.5 MCG/ACT AERO Inhale 2 puffs into the lungs 2 times dailyHistorical Med      metFORMIN (GLUCOPHAGE) 1000 MG tablet Take 1,000 mg by mouth 2 times daily Historical Med      acetaminophen (TYLENOL) 500 MG tablet Take 1,000 mg by mouth every 6 hours as needed for PainHistorical Med      TAMSULOSIN HCL PO Take 0.8 mg by mouth Daily with supper Historical Med Multiple Vitamins-Minerals (THERAPEUTIC MULTIVITAMIN-MINERALS) tablet Take 1 tablet by mouth dailyHistorical Med             ALLERGIES     Rosuvastatin    FAMILY HISTORY       Family History   Problem Relation Age of Onset    Cancer Mother 47        liver ca    Diabetes Father     Heart Disease Father           SOCIAL HISTORY       Social History     Socioeconomic History    Marital status:      Spouse name: None    Number of children: None    Years of education: None    Highest education level: None   Tobacco Use    Smoking status: Former     Types: Cigarettes     Quit date:      Years since quittin.6    Smokeless tobacco: Never   Vaping Use    Vaping Use: Never used   Substance and Sexual Activity    Alcohol use: Yes     Comment: occasional- maybe every other day    Drug use: No       SCREENINGS         Carrie Coma Scale  Eye Opening: Spontaneous  Best Verbal Response: Oriented  Best Motor Response: Obeys commands  Carrie Coma Scale Score: 15                     CIWA Assessment  BP: 125/75  Heart Rate: 91                 PHYSICAL EXAM    (up to 7 for level 4, 8 or more for level 5)     ED Triage Vitals [22 1032]   BP Temp Temp Source Heart Rate Resp SpO2 Height Weight   125/75 (!) 102.2 °F (39 °C) Tympanic 91 18 95 % -- 247 lb (112 kg)       Physical Exam  Constitutional:       General: He is not in acute distress. Appearance: Normal appearance. He is not toxic-appearing. Cardiovascular:      Rate and Rhythm: Normal rate and regular rhythm. Pulmonary:      Effort: Pulmonary effort is normal. No respiratory distress. Abdominal:      General: Abdomen is flat. There is no distension. Musculoskeletal:         General: No swelling or deformity. Skin:     General: Skin is warm and dry. Findings: No lesion or rash. Neurological:      General: No focal deficit present. Mental Status: He is alert and oriented to person, place, and time.    Psychiatric:         Mood and Affect: Mood normal.         Behavior: Behavior normal.       DIAGNOSTIC RESULTS         RADIOLOGY:   Non-plain film images such as CT, Ultrasound and MRI are read by the radiologist. Plain radiographic images are visualized and preliminarily interpreted by the emergency physician with the below findings:        Interpretation per the Radiologist below, if available at the time of this note:    CT CHEST WO CONTRAST   Final Result   Basilar atelectasis. Lower lobe bronchial wall thickening, appearance   favoring bronchitis without identifiable mucous plug. Mild cardiomegaly. Atherosclerotic disease including coronary artery calcification. XR CHEST PORTABLE   Final Result   No acute cardiopulmonary process. Stable chronic changes at the lung bases   similar to 2018. Lungs appear hyperinflated. ED BEDSIDE ULTRASOUND:   Performed by ED Physician - none    LABS:  Labs Reviewed   RAPID INFLUENZA A/B ANTIGENS   COVID-19, RAPID   COVID-19       All other labs were within normal range or not returned as of this dictation. EMERGENCY DEPARTMENT COURSE and DIFFERENTIAL DIAGNOSIS/MDM:   Vitals:    Vitals:    08/19/22 1032   BP: 125/75   Pulse: 91   Resp: 18   Temp: (!) 102.2 °F (39 °C)   TempSrc: Tympanic   SpO2: 95%   Weight: 247 lb (112 kg)           MDM  Number of Diagnoses or Management Options  COPD exacerbation (HCC)  Fever, unspecified fever cause  Diagnosis management comments: Patient presents with fever and cough. Rapid covid negative. CT chest did not reveal any signs of PNA but consistent with bronchitis. Will treat for COPD exacerbation but will also send covid PCR as initial covid was a rapid test.  Advised to quarantine at home until he receives results. FINAL IMPRESSION      1. COPD exacerbation ([address]    2.  Fever, unspecified fever cause          DISPOSITION/PLAN   DISPOSITION Decision To Discharge 08/19/2022 12:33:40 PM      PATIENT REFERRED TO:  GENA Gomes - CNP  [address] [facility],[address] [address]  [phone]    In 1 week      DISCHARGE MEDICATIONS:  Discharge Medication List as of 8/19/2022 12:37 PM        START taking these medications    Details   azithromycin (ZITHROMAX) 250 MG tablet Take 2 tablets by mouth daily for 1 day, THEN 1 tablet daily for 4 days. , Disp-6 tablet, R-0Normal      predniSONE (DELTASONE) 10 MG tablet 4 tabs days 1-2, 3 tabs days 3-4, 2 tabs days 5-6, 1 tab days 7-8, Disp-20 tablet, R-0Normal           Controlled Substances Monitoring:     No flowsheet data found.     (Please note that portions of this note were completed with a voice recognition program.  Efforts were made to edit the dictations but occasionally words are mis-transcribed.)    GENA Self CNP (electronically signed)  Attending Emergency Physician           GENA Self CNP  08/20/22 [id number]

## 2022-08-20 ASSESSMENT — ENCOUNTER SYMPTOMS
EYES NEGATIVE: 1
GASTROINTESTINAL NEGATIVE: 1
COUGH: 1

## 2022-08-26 ENCOUNTER — OFFICE VISIT (OUTPATIENT)
Dept: PRIMARY CARE CLINIC | Age: 87
End: 2022-08-26
Payer: MEDICARE

## 2022-08-26 VITALS
SYSTOLIC BLOOD PRESSURE: 116 MMHG | HEART RATE: 67 BPM | WEIGHT: 247.8 LBS | HEIGHT: 71 IN | BODY MASS INDEX: 34.69 KG/M2 | DIASTOLIC BLOOD PRESSURE: 68 MMHG | TEMPERATURE: 97.9 F | OXYGEN SATURATION: 95 % | RESPIRATION RATE: 18 BRPM

## 2022-08-26 DIAGNOSIS — H65.191 ACUTE MEE (MIDDLE EAR EFFUSION), RIGHT: Primary | ICD-10-CM

## 2022-08-26 DIAGNOSIS — N39.41 URGENCY INCONTINENCE: ICD-10-CM

## 2022-08-26 PROCEDURE — 1123F ACP DISCUSS/DSCN MKR DOCD: CPT | Performed by: NURSE PRACTITIONER

## 2022-08-26 PROCEDURE — 99214 OFFICE O/P EST MOD 30 MIN: CPT | Performed by: NURSE PRACTITIONER

## 2022-08-26 RX ORDER — OXYBUTYNIN CHLORIDE 5 MG/1
5 TABLET, EXTENDED RELEASE ORAL DAILY
Qty: 90 TABLET | Refills: 0 | Status: SHIPPED | OUTPATIENT
Start: 2022-08-26

## 2022-08-26 SDOH — ECONOMIC STABILITY: FOOD INSECURITY: WITHIN THE PAST 12 MONTHS, THE FOOD YOU BOUGHT JUST DIDN'T LAST AND YOU DIDN'T HAVE MONEY TO GET MORE.: PATIENT DECLINED

## 2022-08-26 SDOH — ECONOMIC STABILITY: FOOD INSECURITY: WITHIN THE PAST 12 MONTHS, YOU WORRIED THAT YOUR FOOD WOULD RUN OUT BEFORE YOU GOT MONEY TO BUY MORE.: PATIENT DECLINED

## 2022-08-26 ASSESSMENT — ENCOUNTER SYMPTOMS
SORE THROAT: 0
NAUSEA: 0
DIARRHEA: 0
COUGH: 0
RHINORRHEA: 0
CONSTIPATION: 0
SHORTNESS OF BREATH: 0
ABDOMINAL PAIN: 0
WHEEZING: 0
VOMITING: 0

## 2022-08-26 ASSESSMENT — SOCIAL DETERMINANTS OF HEALTH (SDOH): HOW HARD IS IT FOR YOU TO PAY FOR THE VERY BASICS LIKE FOOD, HOUSING, MEDICAL CARE, AND HEATING?: PATIENT DECLINED

## 2022-08-26 NOTE — PROGRESS NOTES
Name: Chioma Thomas  : 1935         Chief Complaint:     Chief Complaint   Patient presents with    Ear Fullness     Right ear for last 6 days        History of Present Illness:      Chioma Thomas is a 82 y.o.  male who presents with Ear Fullness (Right ear for last 6 days )      Anita Vallejo is here today for a routine office visit. Urinary urgency/incontinence-patient states he is having trouble making it to the bathroom. He states he has urgency and then has incontinence. Patient states this is been happening for quite some time. He is using incontinence pads at home. He is wondering if there is any medication to help. Ear Fullness   There is pain in the right ear. This is a recurrent problem. The current episode started 1 to 4 weeks ago. The problem occurs constantly. The problem has been unchanged. There has been no fever. The pain is at a severity of 0/10. The patient is experiencing no pain. Associated symptoms include hearing loss. Pertinent negatives include no abdominal pain, coughing, diarrhea, ear discharge, headaches, neck pain, rash, rhinorrhea, sore throat or vomiting. He has tried nothing for the symptoms. The treatment provided no relief. There is no history of a chronic ear infection, hearing loss or a tympanostomy tube.        Past Medical History:     Past Medical History:   Diagnosis Date    COPD (chronic obstructive pulmonary disease) ([facility] [address]     Diabetes mellitus ([facility] [address]     Hx of echocardiogram 2017    [facility]    Hyperlipidemia     Hypothyroidism     MI (myocardial infarction) [facility])     Thyroid disease     Type II or unspecified type diabetes mellitus without mention of complication, not stated as uncontrolled       Reviewed all health maintenance requirements and ordered appropriate tests  Health Maintenance Due   Topic Date Due    Annual Wellness Visit (AWV)  Never done       Past Surgical History:     Past Surgical History:   Procedure Laterality Date CARDIAC SURGERY  02/24/2017    Stent placed  Dr Lionel Garrison      right ankle    HERNIA REPAIR          Medications:       Prior to Admission medications    Medication Sig Start Date End Date Taking?  Authorizing Provider   Misc Natural Products (OSTEO BI-FLEX ADV JOINT SHIELD PO) Take by mouth   Yes Historical Provider, MD   oxybutynin (DITROPAN XL) 5 MG extended release tablet Take 1 tablet by mouth daily 8/26/22  Yes Evelia Snider APRN - CNP   clopidogrel (PLAVIX) 75 MG tablet Take 1 tablet by mouth once daily 7/25/22  Yes Fatimah Remy MD   EUTHYROX 137 MCG tablet Take 1 tablet by mouth once daily 7/14/22  Yes Evelia Snider APRN - CNP   atorvastatin (LIPITOR) 40 MG tablet Take 1 tablet by mouth once daily 5/31/22  Yes Fatimah Remy MD   montelukast (SINGULAIR) 10 MG tablet Take 1 tablet by mouth once daily 4/27/22  Yes Evelia Snider APRN - CNP   albuterol (PROVENTIL) (2.5 MG/3ML) 0.083% nebulizer solution Take 3 mLs by nebulization every 4 hours as needed for Wheezing or Shortness of Breath 1/28/22  Yes Evelia Snider APRN - CNP   furosemide (LASIX) 40 MG tablet Take 1 tablet by mouth twice daily 10/6/21  Yes Evelia Snider APRN - CNP   primidone (MYSOLINE) 250 MG tablet Take 250 mg by mouth 2 times daily   Yes Historical Provider, MD   propranolol (INDERAL LA) 120 MG extended release capsule Take 120 mg by mouth 2 times daily   Yes Historical Provider, MD   omeprazole (PRILOSEC) 20 MG delayed release capsule Take 20 mg by mouth daily   Yes Historical Provider, MD   alogliptin (NESINA) 25 MG TABS tablet Take 25 mg by mouth daily   Yes Historical Provider, MD   aspirin 81 MG tablet Take 81 mg by mouth daily    Yes Historical Provider, MD   albuterol sulfate  (90 Base) MCG/ACT inhaler Inhale 2 puffs into the lungs every 4 hours as needed for Wheezing or Shortness of Breath 4/13/18  Yes Lenny Johnson APRN - CNP   metFORMIN (GLUCOPHAGE) 1000 MG tablet Take 1,000 mg by mouth 2 times daily    Yes Historical Provider, MD   TAMSULOSIN HCL PO Take 0.8 mg by mouth Daily with supper    Yes Historical Provider, MD   Multiple Vitamins-Minerals (THERAPEUTIC MULTIVITAMIN-MINERALS) tablet Take 1 tablet by mouth daily   Yes Historical Provider, MD   predniSONE (DELTASONE) 10 MG tablet 4 tabs days 1-2, 3 tabs days 3-4, 2 tabs days 5-6, 1 tab days 7-8  Patient not taking: Reported on 8/26/2022 8/19/22   GENA Stewart CNP   fluticasone-salmeterol (ADVAIR) 250-50 MCG/DOSE AEPB Inhale 1 puff into the lungs every 12 hours    Historical Provider, MD   blood glucose monitor strips accu check 7/18/18   GENA Ashby CNP   budesonide-formoterol (SYMBICORT) 160-4.5 MCG/ACT AERO Inhale 2 puffs into the lungs 2 times daily    Historical Provider, MD   acetaminophen (TYLENOL) 500 MG tablet Take 1,000 mg by mouth every 6 hours as needed for Pain    Historical Provider, MD        Allergies:       Rosuvastatin    Social History:     Tobacco:    reports that he quit smoking about 29 years ago. He has never used smokeless tobacco.  Alcohol:      reports current alcohol use. Drug Use:  reports no history of drug use. Family History:     Family History   Problem Relation Age of Onset    Cancer Mother 47        liver ca    Diabetes Father     Heart Disease Father        Review of Systems:     Positive and Negative as described in HPI    Review of Systems   Constitutional:  Negative for chills, fatigue and fever. HENT:  Positive for congestion, ear pain (MUFFLED) and hearing loss. Negative for ear discharge, rhinorrhea and sore throat. Eyes:  Negative for visual disturbance. Respiratory:  Negative for cough, shortness of breath and wheezing. Cardiovascular:  Negative for chest pain and palpitations. Gastrointestinal:  Negative for abdominal pain, constipation, diarrhea, nausea and vomiting. Genitourinary:  Positive for frequency and urgency.  Negative for decreased urine volume, difficulty urinating, dysuria, enuresis, flank pain, genital sores, hematuria, penile discharge, penile pain, penile swelling, scrotal swelling and testicular pain. Musculoskeletal:  Negative for gait problem, neck pain and neck stiffness. Skin:  Negative for rash. Neurological:  Negative for dizziness, syncope, light-headedness and headaches. Physical Exam:   Vitals:  /68 (Site: Right Upper Arm, Position: Sitting, Cuff Size: Medium Adult)   Pulse 67   Temp 97.9 °F (36.6 °C)   Resp 18   Ht 5' 11\" (1.803 m)   Wt 247 lb 12.8 oz (112.4 kg)   SpO2 95%   BMI 34.56 kg/m²     Physical Exam  Vitals and nursing note reviewed. Constitutional:       General: He is not in acute distress. Appearance: Normal appearance. He is well-developed. He is obese. He is not ill-appearing. HENT:      Right Ear: Ear canal normal. A middle ear effusion is present. Left Ear: Tympanic membrane and ear canal normal.  No middle ear effusion. Mouth/Throat:      Mouth: Mucous membranes are moist.      Pharynx: Oropharynx is clear. Eyes:      General: No scleral icterus. Conjunctiva/sclera: Conjunctivae normal.   Cardiovascular:      Rate and Rhythm: Normal rate and regular rhythm. Heart sounds: Murmur heard. Pulmonary:      Effort: Pulmonary effort is normal.      Breath sounds: Normal breath sounds. No decreased breath sounds, wheezing or rales. Abdominal:      General: Bowel sounds are normal. There is no distension. Palpations: Abdomen is soft. Tenderness: There is no abdominal tenderness. There is no right CVA tenderness or left CVA tenderness. Musculoskeletal:      Cervical back: Normal range of motion and neck supple. Right lower leg: Edema (trace) present. Left lower leg: Edema (trace) present. Comments: Compression stockings in place   Lymphadenopathy:      Cervical: No cervical adenopathy. Skin:     General: Skin is warm and dry.       Findings: No rash.   Neurological:      Mental Status: He is alert and oriented to person, place, and time. Psychiatric:         Mood and Affect: Mood normal.         Behavior: Behavior normal.       Data:     Lab Results   Component Value Date/Time     03/14/2022 11:51 AM    K 4.5 03/14/2022 11:51 AM    CL 98 03/14/2022 11:51 AM    CO2 27 03/14/2022 11:51 AM    BUN 23 03/14/2022 11:51 AM    CREATININE 1.13 03/14/2022 11:51 AM    GLUCOSE 154 03/14/2022 11:51 AM    PROT 7.5 12/29/2021 09:24 PM    LABALBU 4.0 12/29/2021 09:24 PM    BILITOT 0.47 12/29/2021 09:24 PM    ALKPHOS 163 12/29/2021 09:24 PM    AST 34 03/14/2022 11:51 AM    ALT 52 03/14/2022 11:51 AM     Lab Results   Component Value Date/Time    WBC 9.8 03/14/2022 11:51 AM    RBC 4.28 03/14/2022 11:51 AM    HGB 12.3 03/14/2022 11:51 AM    HCT 40.2 03/14/2022 11:51 AM    MCV 93.9 03/14/2022 11:51 AM    MCH 28.7 03/14/2022 11:51 AM    MCHC 30.6 03/14/2022 11:51 AM    RDW 14.6 03/14/2022 11:51 AM     03/14/2022 11:51 AM    MPV 10.4 03/14/2022 11:51 AM     Lab Results   Component Value Date/Time    TSH 9.22 03/14/2022 11:52 AM     Lab Results   Component Value Date/Time    CHOL 144 03/14/2022 11:53 AM    HDL 52 03/14/2022 11:53 AM    LABA1C 7.2 03/14/2022 11:51 AM       Assessment/Plan:      Diagnosis Orders   1. Acute KRISTEN (middle ear effusion), right        2. Urgency incontinence  oxybutynin (DITROPAN XL) 5 MG extended release tablet        Recommend Flonase and Coricidin product decongestant for effusion. Start oxybutynin 5 mg XL daily. Call in a few weeks with progress. 1.  [place] received counseling on the following healthy behaviors: medication adherence  2. Patient given educational materials - see patient instructions  3. Was a self-tracking handout given in paper form or via [employer]? No  If yes, see orders or list here. 4.  Discussed use, benefit, and side effects of prescribed medications. Barriers to medication compliance addressed.   All patient questions answered. Pt voiced understanding. 5.  Reviewed prior labs and health maintenance  6. Continue current medications, diet and exercise. Completed Refills   Requested Prescriptions     Signed Prescriptions Disp Refills    oxybutynin (DITROPAN XL) 5 MG extended release tablet 90 tablet 0     Sig: Take 1 tablet by mouth daily         Return if symptoms worsen or fail to improve.

## 2022-09-28 RX ORDER — FUROSEMIDE 40 MG/1
TABLET ORAL
Qty: 180 TABLET | Refills: 3 | Status: SHIPPED | OUTPATIENT
Start: 2022-09-28 | End: 2022-10-17 | Stop reason: ALTCHOICE

## 2022-09-28 NOTE — TELEPHONE ENCOUNTER
Health Maintenance   Topic Date Due    Annual Wellness Visit (AWV)  Never done    Flu vaccine (1) 08/01/2022    DTaP/Tdap/Td vaccine (1 - Tdap) 05/16/2023 (Originally 10/15/1954)    Lipids  03/14/2023    Depression Screen  05/16/2023    Shingles vaccine  Completed    Pneumococcal 65+ years Vaccine  Completed    COVID-19 Vaccine  Completed    Hepatitis A vaccine  Aged Out    Hib vaccine  Aged Out    Meningococcal (ACWY) vaccine  Aged Out             (applicable per patient's age: Cancer Screenings, Depression Screening, Fall Risk Screening, Immunizations)    Hemoglobin A1C (%)   Date Value   03/14/2022 7.2 (H)   10/07/2021 7.2   03/23/2021 7.6 (H)     Microalb/Crt. Ratio (mcg/mg creat)   Date Value   09/29/2021 57 (H)     LDL Cholesterol (mg/dL)   Date Value   03/14/2022 65     AST (U/L)   Date Value   03/14/2022 34     ALT (U/L)   Date Value   03/14/2022 52 (H)     BUN (mg/dL)   Date Value   03/14/2022 23      (goal A1C is < 7)   (goal LDL is <100) need 30-50% reduction from baseline     BP Readings from Last 3 Encounters:   08/26/22 116/68   08/19/22 125/75   05/16/22 122/68    (goal /80)      All Future Testing planned in [facility]:  Lab Frequency Next Occurrence   TSH Once 06/27/2022   T4, Free Once 46/62/4345   Basic Metabolic Panel Once 80/22/4198   Hemoglobin A1C Once 11/12/2022       Next Visit Date:  Future Appointments   Date Time Provider Gisela Harding   10/17/2022  1:00 PM Altagrac[facility] Dodd [facility]   11/16/2022  2:00 PM Krysta Snider APRN - CNP [facility]   3/6/2023  1:30 PM Justin Kirby MD [facility]FF PUL[facility]            Patient Active Problem List:     Obesity (BMI 30.0-34. 9)     Venous (peripheral) insufficiency     Hx pulmonary embolism     Hearing impaired     Edema     Type 2 diabetes mellitus with complication, without long-term current use of insulin (HCC)     Knee osteoarthritis     Chronic combined systolic and diastolic CHF (congestive heart failure) ([facility] [address]     COPD (chronic obstructive pulmonary disease) (HCC)     Adrenal adenoma, left     Elevated liver enzymes     Hypothyroidism due to Hashimoto's thyroiditis     Moderate persistent asthma without complication     Community acquired bacterial pneumonia     Hypoxia

## 2022-10-17 ENCOUNTER — OFFICE VISIT (OUTPATIENT)
Dept: CARDIOLOGY | Age: 87
End: 2022-10-17
Payer: MEDICARE

## 2022-10-17 VITALS
WEIGHT: 259 LBS | DIASTOLIC BLOOD PRESSURE: 71 MMHG | RESPIRATION RATE: 16 BRPM | HEART RATE: 65 BPM | BODY MASS INDEX: 36.26 KG/M2 | SYSTOLIC BLOOD PRESSURE: 145 MMHG | OXYGEN SATURATION: 98 % | HEIGHT: 71 IN

## 2022-10-17 DIAGNOSIS — Z86.73 HISTORY OF STROKE: ICD-10-CM

## 2022-10-17 DIAGNOSIS — E78.2 MIXED HYPERLIPIDEMIA: ICD-10-CM

## 2022-10-17 DIAGNOSIS — Z95.820 S/P ANGIOPLASTY WITH STENT: ICD-10-CM

## 2022-10-17 DIAGNOSIS — I50.33 ACUTE ON CHRONIC DIASTOLIC HEART FAILURE (HCC): Primary | ICD-10-CM

## 2022-10-17 DIAGNOSIS — I25.10 ASHD (ARTERIOSCLEROTIC HEART DISEASE): ICD-10-CM

## 2022-10-17 DIAGNOSIS — I10 ESSENTIAL HYPERTENSION: ICD-10-CM

## 2022-10-17 DIAGNOSIS — I50.42 CHRONIC COMBINED SYSTOLIC AND DIASTOLIC CONGESTIVE HEART FAILURE (HCC): ICD-10-CM

## 2022-10-17 PROCEDURE — 99214 OFFICE O/P EST MOD 30 MIN: CPT | Performed by: INTERNAL MEDICINE

## 2022-10-17 PROCEDURE — 93000 ELECTROCARDIOGRAM COMPLETE: CPT | Performed by: INTERNAL MEDICINE

## 2022-10-17 PROCEDURE — 1123F ACP DISCUSS/DSCN MKR DOCD: CPT | Performed by: INTERNAL MEDICINE

## 2022-10-17 NOTE — PROGRESS NOTES
Viv Zimmerman RN am scribing for and in the presence of Jennifer George MD, F.A.C.C..    Subjective:     [facility]:    Chief Complaint   Patient presents with    Follow-up     Hx: CHF, ASHD, HTN, HLD, DM. Ekg on 12/29/2021. Pt states that he has been doing good but feet have been swelling. Side pain rib cage on left side. SOB with exertion but has stayed the same. Denies Palpitaitons, dizziness, lightheadedness,falls or near falls. Increased 6 pounds since last visit. Day Ocampo is 82 y.o. male who presents today for follow-up. 1-He has history of coronary artery disease, myocardial infarction and primary PCI in 2/2017 done at [address]. He also has history of ischemic cardiomyopathy and chronic systolic CHF, NYHA class III. 2-An admission to [facility] on 9/19/2017 with COPD exacerbation, during this admission his lisinopril changed to Cozaar because of chronic cough. 3-Another visit to the emergency room on 10/3/2017 with cough, shortness of breath and wheezing. 4- He saw Dr. Yi Putnam at [facility] in November 2018, no changes in medication done. 5-An admission to [facility] on 4/6/2018 with acute on chronic CHF. 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    7- EKG done in office (8/20/2019)- Sinus Rhythm with 1st degree AV block. 71 bpm.    8- EKG done in office on 7/14/2020. No acute ischemic changes. 9-  Echocardiogram on 4/6/2021: Global left ventricular systolic function appears preserved with an estimated ejection fraction of 55%. Mildly increased left ventricular wall thickness with a normal left ventricular cavity size. The patient has a sigmoid interventricular septum. The inferoseptal wall is abnormal in its motion which is not unusual status post open heart surgery. Mild aortic stenosis. Mild mitral and tricuspid regurgitation.  Evidence of mild diastolic dysfunction is seen. Atrial septal aneurysm cannot rule-out shunt. A saline contrast study was performed and cannot rule out interatrial communication. 10-EKG done in office 7/6/2021- no acute ischemic changes    11- Admission to [facility] on 12/31/2021-1/1/2022: Admitted for Pneumonia    12-ER visit on 8/2022: related to cough    13-EKG on 10/17/2022: No ischemic changes from prior ECG    Mr. Chester Rojas reports doing ok since last visit however he has been retaining more fluid in his legs. He says that he does use the salt shaker but will not use this anymore from now on. He says he is no very active at home and sits a lot. He was going to [employer] once a week but he has stopped that for a while now. He does not do any grocery shopping or household chores. He denies any chest pain, pressure or tightness. He denies any lightheaded/dizziness or palpitations. No fever or chills. No abdominal pain, nausea or vomiting. No bleeding problems, bowel issues, problems with medications or any other concerns at this time. He sleeps very easily at night. His appetite is okay. He is drinking enough fluids. Weight is up six pounds since last visit. No worsening symptoms. Shortness of breeath has stayed about the same since last visit however he is not very active and does not move around much.   Swelling in his lower extremities  with compression socks (zipper type) in place  Wt Readings from Last 3 Encounters:   10/17/22 259 lb (117.5 kg)   08/26/22 247 lb 12.8 oz (112.4 kg)   08/19/22 247 lb (112 kg)       Past Medical History:    Past Medical History:   Diagnosis Date    COPD (chronic obstructive pulmonary disease) ([facility]     Diabetes mellitus ([facility]     Hx of echocardiogram 02/24/2017    [facility]    Hyperlipidemia     Hypothyroidism     MI (myocardial infarction) [facility])     Thyroid disease     Type II or unspecified type diabetes mellitus without mention of complication, not stated as uncontrolled      Past Surgical History:  Past Surgical History:   Procedure Laterality Date    CARDIAC SURGERY  2017    Stent placed  Dr Radha Feldman      right ankle    HERNIA REPAIR       Social History:    Social History     Tobacco Use   Smoking Status Former    Types: Cigarettes    Quit date:     Years since quittin.8   Smokeless Tobacco Never     Current Medications:  Outpatient Medications Marked as Taking for the 10/17/22 encounter (Office Visit) with Mary Lou Soria MD   Medication Sig Dispense Refill    Phenazopyridine HCl (AZO-STANDARD PO) Take by mouth      furosemide (LASIX) 40 MG tablet Take 1 tablet by mouth twice daily 180 tablet 3    Misc Natural Products (OSTEO BI-FLEX ADV JOINT SHIELD PO) Take by mouth      clopidogrel (PLAVIX) 75 MG tablet Take 1 tablet by mouth once daily 90 tablet 3    EUTHYROX 137 MCG tablet Take 1 tablet by mouth once daily 90 tablet 3    atorvastatin (LIPITOR) 40 MG tablet Take 1 tablet by mouth once daily 90 tablet 3    montelukast (SINGULAIR) 10 MG tablet Take 1 tablet by mouth once daily 90 tablet 1    fluticasone-salmeterol (ADVAIR) 250-50 MCG/DOSE AEPB Inhale 1 puff into the lungs every 12 hours      albuterol (PROVENTIL) (2.5 MG/3ML) 0.083% nebulizer solution Take 3 mLs by nebulization every 4 hours as needed for Wheezing or Shortness of Breath 120 each 3    primidone (MYSOLINE) 250 MG tablet Take 250 mg by mouth 2 times daily      propranolol (INDERAL LA) 120 MG extended release capsule Take 120 mg by mouth 2 times daily      omeprazole (PRILOSEC) 20 MG delayed release capsule Take 20 mg by mouth daily      alogliptin (NESINA) 25 MG TABS tablet Take 25 mg by mouth daily      blood glucose monitor strips accu check 100 strip 3    aspirin 81 MG tablet Take 81 mg by mouth daily       albuterol sulfate  (90 Base) MCG/ACT inhaler Inhale 2 puffs into the lungs every 4 hours as needed for Wheezing or Shortness of Breath 1 Inhaler 2 metFORMIN (GLUCOPHAGE) 1000 MG tablet Take 1,000 mg by mouth 2 times daily       acetaminophen (TYLENOL) 500 MG tablet Take 1,000 mg by mouth every 6 hours as needed for Pain      TAMSULOSIN HCL PO Take 0.8 mg by mouth Daily with supper       Multiple Vitamins-Minerals (THERAPEUTIC MULTIVITAMIN-MINERALS) tablet Take 1 tablet by mouth daily         REVIEW OF SYSTEMS:    CONSTITUTIONAL: No major weight gain or loss, fatigue, weakness, night sweats or fever. HEENT: No new vision difficulties or ringing in the ears. RESPIRATORY: See HPI  CARDIOVASCULAR: See HPI  GI: No nausea, vomiting, diarrhea, constipation, abdominal pain or changes in bowel habits. : No urinary frequency, urgency, incontinence hematuria or dysuria. SKIN: No cyanosis or skin lesions. MUSCULOSKELETAL: No new muscle or joint pain. NEUROLOGICAL: No syncope or TIA-like symptoms. PSYCHIATRIC: No anxiety, pain, insomnia or depression    Objective:     PHYSICAL EXAM:      VITALS:  BP (!) 153/79 (Site: Left Upper Arm, Position: Sitting, Cuff Size: Large Adult)   Pulse 66   Resp 16   Ht 5' 11\" (1.803 m)   Wt 259 lb (117.5 kg)   SpO2 98%   BMI 36.12 kg/m²   CONSTITUTIONAL: Cooperative, no apparent distress, and appears well nourished / developed. NEUROLOGIC:  Awake and orientated to person, place and time. PSYCH: Calm affect. SKIN: Warm and dry. HEENT: Sclera non-icteric, normocephalic, neck supple, no elevation of JVP, normal carotid pulses with no bruits and thyroid normal size. LUNGS:  Poor air entry bilaterally . No significant wheezing . Mild bilateral crackles present. Cardiovascular: Normal rate, regular rhythm, normal heart sounds. Exam reveals no gallop and no friction rubs. 1/6 systolic murmur, 4th intercostal space on the LEFT must lateral to the sternal border. ABDOMEN:  Normal bowel sounds, non-distended and non-tender to palpation. Extremities: 2+ bilateral leg edema. no cyanosis or clubbing. 2+ radial and carotid pulses. Distal extremity pulses: 2+ bilaterally. Compression stockings in place. DATA:    Lab Results   Component Value Date    ALT 52 (H) 03/14/2022    AST 34 03/14/2022    ALKPHOS 163 (H) 12/29/2021    BILITOT 0.47 12/29/2021     Lab Results   Component Value Date    CREATININE 1.13 03/14/2022    BUN 23 03/14/2022     (L) 03/14/2022    K 4.5 03/14/2022    CL 98 03/14/2022    CO2 27 03/14/2022       Lab Results   Component Value Date    WBC 9.8 03/14/2022    HGB 12.3 (L) 03/14/2022    HCT 40.2 (L) 03/14/2022    MCV 93.9 03/14/2022     03/14/2022       Lab Results   Component Value Date    TRIG 134 03/14/2022    TRIG 147 09/29/2021    TRIG 190 (H) 04/08/2021     Lab Results   Component Value Date    HDL 52 03/14/2022    HDL 46 09/29/2021    HDL 46 04/08/2021     Lab Results   Component Value Date    LDLCHOLESTEROL 65 03/14/2022    LDLCHOLESTEROL 61 09/29/2021    LDLCHOLESTEROL 57 04/08/2021         Assessment:      Diagnosis Orders   1. Acute on chronic diastolic heart failure (HCC)  Brain Natriuretic Peptide    Basic Metabolic Panel    CBC    Echo 2D w doppler w color complete      2. ASHD (arteriosclerotic heart disease)        3. Chronic combined systolic and diastolic congestive heart failure ([address]        4. S/P angioplasty with stent        5. Essential hypertension        6. History of stroke        7. Mixed hyperlipidemia          Plan:     Acute on Chronic Diastolic Heart Failure: EF: 55% with mild diastolic dysfunction Weight is up six pounds since last visit. No worsening symptoms. Shortness of breeath has stayed about the same since last visit however he is not very active and does not move around much. Swelling in his lower extremities  with compression socks (zipper type) in place  Beta Blocker: Continue Propranolol 120 mg BID  Diuretics: STOP furosemide (Lasix) and START torsemide (Demodex) 40 mg  2 times daily. to help with swelling and shortness of breath.    Nonpharmacologic management of Heart Failure: I told him to continue wearing lower extremity compression stockings and I advised him to try and keep his legs up whenever possible and to limit salt in his diet. BMP for today and in 1 week from now to assess their potassium and renal function. CBC for now to assess hemoglobin and hematocrit. BNP to assess fluid status. Chest xray to assess lungs   Additional Testing: I Ordered an Echocardiogram to assess Mr. Vargas's ejection fraction and to look for significant valvular heart disease as a source of Mr. Vargas symptoms      Atherosclerotic Heart Disease: S/P Stent placement on 2/24/2017 by Dr. Nettie Oconnell at [facility]  Antiplatelet Agent: STOP clopidogrel (Plavix) and Continue Aspirin 81 mg daily. I also reminded them to watch for signs of bloody or black tarry stools and stop the medication immediately if this develops as this could be life threatening. Beta Blocker: Continue Propranolol 80 mg BID    Cholesterol Reduction Therapy: Continue Atorvastatin (Lipitor) 40 mg daily. Additional Testing: Because of his current problems therefore I ordered a Lexiscan stress test with SPECT imaging to try and rule out this possibility. History of Stroke: Ischemic stroke with right visual field loss. Has complete resolution of his neurological dysfunction. Antiplatelet Agent: STOP clopidogrel (Plavix) but Continue Aspirin 81 mg daily. Cholesterol Reduction Therapy: Continue Atorvastatin (Lipitor) 40 mg daily. LDL at goal.  Hemoglobin A1c is better controlled. Blood pressure is acceptably controlled according to home blood pressure log. Essential Hypertension: Controlled   Diuretics: Stop furosemide (lasix) 40 mg and start torsemide 40 mg twice daily as above. Beta Blocker:  Continue Propranolol 120 mg BID      Hyperlipidemia: Mixed - Last LDL on 3/14/2022 was 65 mg/dL  Cholesterol Reduction Therapy: Continue Atorvastatin (Lipitor) 40 mg daily.       Obesity: Body mass index is 36.12 kg/m².   I also briefly discussed both diet and exercise strategies for him to continue to loses weight and he was very receptive to this. Diabetes Mellitus 3/14/2022: A1C: 7.2  Continue to follow up with Judi Snider NP    FOLLOW UP:   I told Mr. Vargas to call my office if he had any problems, but otherwise told him to Return in about 3 months (around 1/17/2023). However, I would be happy to see him sooner should the need arise. Robbie Sinha MD, MS, F.A.C.C. [facility] [place] [place]) [facility], [address] [place] [address]  Phone: [phone], Fax: [phone]    I believe that the risk of significant morbidity and mortality related to the patient's current medical conditions are: intermediate-high. The documentation recorded by the scribe, accurately and completely reflects the services I personally performed and the decisions made by me. Robbie Sinha MD, F.A.C.C.  October 17, 2022

## 2022-10-17 NOTE — PATIENT INSTRUCTIONS
SURVEY:    You may be receiving a survey from [employer] regarding your visit today. Please complete the survey to enable us to provide the highest quality of care to you and your family. If you cannot score us a very good on any question, please call the office to discuss how we could have made your experience a very good one. Thank you.

## 2022-10-21 ENCOUNTER — HOSPITAL ENCOUNTER (OUTPATIENT)
Age: 87
Discharge: HOME | End: 2022-10-21
Payer: MEDICARE

## 2022-10-21 DIAGNOSIS — I50.33 ACUTE ON CHRONIC DIASTOLIC HEART FAILURE (HCC): ICD-10-CM

## 2022-10-21 LAB
ANION GAP SERPL CALCULATED.3IONS-SCNC: 9 MMOL/L (ref 9–17)
BUN BLDV-MCNC: 21 MG/DL (ref 8–23)
BUN/CREAT BLD: 19 (ref 9–20)
CALCIUM SERPL-MCNC: 9.3 MG/DL (ref 8.6–10.4)
CHLORIDE BLD-SCNC: 99 MMOL/L (ref 98–107)
CO2: 28 MMOL/L (ref 20–31)
CREAT SERPL-MCNC: 1.08 MG/DL (ref 0.7–1.2)
GFR SERPL CREATININE-BSD FRML MDRD: >60 ML/MIN/1.73M2
GLUCOSE BLD-MCNC: 152 MG/DL (ref 70–99)
HCT VFR BLD CALC: 37.7 % (ref 40.7–50.3)
HEMOGLOBIN: 12 G/DL (ref 13–17)
MCH RBC QN AUTO: 29.7 PG (ref 25.2–33.5)
MCHC RBC AUTO-ENTMCNC: 31.8 G/DL (ref 28.4–34.8)
MCV RBC AUTO: 93.3 FL (ref 82.6–102.9)
NRBC AUTOMATED: 0 PER 100 WBC
PDW BLD-RTO: 15 % (ref 11.8–14.4)
PLATELET # BLD: 244 K/UL (ref 138–453)
PMV BLD AUTO: 10 FL (ref 8.1–13.5)
POTASSIUM SERPL-SCNC: 4.3 MMOL/L (ref 3.7–5.3)
PRO-BNP: 192 PG/ML
RBC # BLD: 4.04 M/UL (ref 4.21–5.77)
SODIUM BLD-SCNC: 136 MMOL/L (ref 135–144)
WBC # BLD: 6.6 K/UL (ref 3.5–11.3)

## 2022-10-21 PROCEDURE — 83880 ASSAY OF NATRIURETIC PEPTIDE: CPT

## 2022-10-21 PROCEDURE — 80048 BASIC METABOLIC PNL TOTAL CA: CPT

## 2022-10-21 PROCEDURE — 85027 COMPLETE CBC AUTOMATED: CPT

## 2022-10-21 PROCEDURE — 36415 COLL VENOUS BLD VENIPUNCTURE: CPT

## 2022-10-24 ENCOUNTER — TELEPHONE (OUTPATIENT)
Dept: CARDIOLOGY | Age: 87
End: 2022-10-24

## 2022-10-24 NOTE — TELEPHONE ENCOUNTER
----- Message from Garfield Loving MD sent at 10/22/2022  6:28 PM EDT -----  Blood work is good. Continue current therapy and follow-up. Please call with questions and/or concerns.   Thank you

## 2022-11-08 ENCOUNTER — NURSE ONLY (OUTPATIENT)
Dept: PRIMARY CARE CLINIC | Age: 87
End: 2022-11-08
Payer: MEDICARE

## 2022-11-08 VITALS
HEIGHT: 71 IN | BODY MASS INDEX: 35.94 KG/M2 | SYSTOLIC BLOOD PRESSURE: 132 MMHG | WEIGHT: 256.7 LBS | RESPIRATION RATE: 18 BRPM | TEMPERATURE: 97.1 F | DIASTOLIC BLOOD PRESSURE: 70 MMHG

## 2022-11-08 DIAGNOSIS — Z23 NEED FOR INFLUENZA VACCINATION: Primary | ICD-10-CM

## 2022-11-08 PROCEDURE — G0008 ADMIN INFLUENZA VIRUS VAC: HCPCS | Performed by: NURSE PRACTITIONER

## 2022-11-08 PROCEDURE — 90694 VACC AIIV4 NO PRSRV 0.5ML IM: CPT | Performed by: NURSE PRACTITIONER

## 2022-11-08 NOTE — PROGRESS NOTES
Vaccine Information Sheet, \"Influenza - Inactivated\"  given to Jessie Vu, or parent/legal guardian of  Jessie Vu and verbalized understanding. Patient responses:    Have you ever had a reaction to a flu vaccine? No  Are you able to eat eggs without adverse effects? Yes  Do you have any current illness? No  Have you ever had [place] Syndrome? No    Flu vaccine given per order. Please see immunization tab.

## 2022-11-16 ENCOUNTER — OFFICE VISIT (OUTPATIENT)
Dept: PRIMARY CARE CLINIC | Age: 87
End: 2022-11-16
Payer: MEDICARE

## 2022-11-16 VITALS
DIASTOLIC BLOOD PRESSURE: 70 MMHG | RESPIRATION RATE: 18 BRPM | WEIGHT: 253.1 LBS | BODY MASS INDEX: 35.43 KG/M2 | HEIGHT: 71 IN | TEMPERATURE: 97.7 F | SYSTOLIC BLOOD PRESSURE: 130 MMHG

## 2022-11-16 DIAGNOSIS — E03.8 HYPOTHYROIDISM DUE TO HASHIMOTO'S THYROIDITIS: ICD-10-CM

## 2022-11-16 DIAGNOSIS — Z00.00 INITIAL MEDICARE ANNUAL WELLNESS VISIT: Primary | ICD-10-CM

## 2022-11-16 DIAGNOSIS — M15.9 GENERALIZED OA: ICD-10-CM

## 2022-11-16 DIAGNOSIS — J44.9 CHRONIC OBSTRUCTIVE PULMONARY DISEASE, UNSPECIFIED COPD TYPE (HCC): ICD-10-CM

## 2022-11-16 DIAGNOSIS — E06.3 HYPOTHYROIDISM DUE TO HASHIMOTO'S THYROIDITIS: ICD-10-CM

## 2022-11-16 DIAGNOSIS — E11.8 TYPE 2 DIABETES MELLITUS WITH COMPLICATION, WITHOUT LONG-TERM CURRENT USE OF INSULIN (HCC): ICD-10-CM

## 2022-11-16 LAB — HBA1C MFR BLD: 7 %

## 2022-11-16 PROCEDURE — G0438 PPPS, INITIAL VISIT: HCPCS | Performed by: NURSE PRACTITIONER

## 2022-11-16 PROCEDURE — 1123F ACP DISCUSS/DSCN MKR DOCD: CPT | Performed by: NURSE PRACTITIONER

## 2022-11-16 PROCEDURE — 83036 HEMOGLOBIN GLYCOSYLATED A1C: CPT | Performed by: NURSE PRACTITIONER

## 2022-11-16 PROCEDURE — 3051F HG A1C>EQUAL 7.0%<8.0%: CPT | Performed by: NURSE PRACTITIONER

## 2022-11-16 RX ORDER — OXYBUTYNIN CHLORIDE 5 MG/1
5 TABLET ORAL 2 TIMES DAILY
Qty: 180 TABLET | Refills: 3 | Status: SHIPPED | OUTPATIENT
Start: 2022-11-16

## 2022-11-16 RX ORDER — MELOXICAM 7.5 MG/1
7.5 TABLET ORAL DAILY
Qty: 90 TABLET | Refills: 1 | Status: SHIPPED | OUTPATIENT
Start: 2022-11-16

## 2022-11-16 RX ORDER — MONTELUKAST SODIUM 10 MG/1
10 TABLET ORAL DAILY
Qty: 90 TABLET | Refills: 3 | Status: SHIPPED | OUTPATIENT
Start: 2022-11-16

## 2022-11-16 ASSESSMENT — PATIENT HEALTH QUESTIONNAIRE - PHQ9
1. LITTLE INTEREST OR PLEASURE IN DOING THINGS: 0
SUM OF ALL RESPONSES TO PHQ QUESTIONS 1-9: 0
SUM OF ALL RESPONSES TO PHQ9 QUESTIONS 1 & 2: 0
SUM OF ALL RESPONSES TO PHQ QUESTIONS 1-9: 0
2. FEELING DOWN, DEPRESSED OR HOPELESS: 0
SUM OF ALL RESPONSES TO PHQ QUESTIONS 1-9: 0
SUM OF ALL RESPONSES TO PHQ QUESTIONS 1-9: 0

## 2022-11-16 ASSESSMENT — ENCOUNTER SYMPTOMS
SPUTUM PRODUCTION: 1
HEARTBURN: 0
HEMOPTYSIS: 0
DIARRHEA: 0
COUGH: 1
CHEST TIGHTNESS: 0
ABDOMINAL PAIN: 0
NAUSEA: 0
CONSTIPATION: 0
WHEEZING: 0
SHORTNESS OF BREATH: 1
BLURRED VISION: 0
HOARSE VOICE: 0
TROUBLE SWALLOWING: 0
DIFFICULTY BREATHING: 0
FREQUENT THROAT CLEARING: 0
SORE THROAT: 0
VOMITING: 0
RHINORRHEA: 0

## 2022-11-16 ASSESSMENT — COPD QUESTIONNAIRES: COPD: 1

## 2022-11-16 NOTE — PATIENT INSTRUCTIONS
SURVEY:     You may be receiving a survey from [employer] regarding your visit today. Please complete the survey to enable us to provide the highest quality of care to you and your family. If you cannot score us a very good on any question, please call the office to discuss how we could have made your experience a very good one. Thank you,    Guillermo Snider, APRN-CNP  Radha Elizondo, APRN-CNP  Fabby Ill, LPN  Annette Zimmer, CMA  Mindy Oscar, CMA  Rika, CMA  Haily, PCA  Vee, PM    Personalized Preventive Plan for [place] Score - 11/16/2022  Medicare offers a range of preventive health benefits. Some of the tests and screenings are paid in full while other may be subject to a deductible, co-insurance, and/or copay. Some of these benefits include a comprehensive review of your medical history including lifestyle, illnesses that may run in your family, and various assessments and screenings as appropriate. After reviewing your medical record and screening and assessments performed today your provider may have ordered immunizations, labs, imaging, and/or referrals for you. A list of these orders (if applicable) as well as your Preventive Care list are included within your After Visit Summary for your review. Other Preventive Recommendations:    A preventive eye exam performed by an eye specialist is recommended every 1-2 years to screen for glaucoma; cataracts, macular degeneration, and other eye disorders. A preventive dental visit is recommended every 6 months. Try to get at least 150 minutes of exercise per week or 10,000 steps per day on a pedometer . Order or download the FREE \"Exercise & Physical Activity: Your Everyday Guide\" from [employer]. Call 8-922.464.3122 or search [employer] online. You need 2482-3055 mg of calcium and 8356-2710 IU of vitamin D per day.  It is possible to meet your calcium requirement with diet alone, but a vitamin D supplement is usually necessary to meet this goal.  When exposed to the sun, use a sunscreen that protects against both UVA and UVB radiation with an SPF of 30 or greater. Reapply every 2 to 3 hours or after sweating, drying off with a towel, or swimming. Always wear a seat belt when traveling in a car. Always wear a helmet when riding a bicycle or motorcycle.

## 2022-11-16 NOTE — PROGRESS NOTES
Medicare Annual Wellness Visit    Agus Bernard is here for Medicare AWV, Diabetes, Congestive Heart Failure, COPD, Arm Pain (Both arms), and Cyst (On lower back for 25 years )    Assessment & Plan   Initial Medicare annual wellness visit  Type 2 diabetes mellitus with complication, without long-term current use of insulin (HCC)  -     POCT glycosylated hemoglobin (Hb A1C)  -     Basic Metabolic Panel; Future  Chronic obstructive pulmonary disease, unspecified COPD type (HCC)  -     montelukast (SINGULAIR) 10 MG tablet; Take 1 tablet by mouth daily, Disp-90 tablet, R-3Normal  Hypothyroidism due to Hashimoto's thyroiditis  -     T4, Free; Future  -     TSH; Future  Generalized OA  -     meloxicam (MOBIC) 7.5 MG tablet; Take 1 tablet by mouth daily, Disp-90 tablet, R-1Normal    Recommendations for Preventive Services Due: see orders and patient instructions/AVS.  Recommended screening schedule for the next 5-10 years is provided to the patient in written form: see Patient Instructions/AVS.     Return in 3 months (on 2/16/2023) for Medicare Annual Wellness Visit in 1 year, Check up. Subjective   The following acute and/or chronic problems were also addressed today:  Gonzalo Malcolm is here today for a Medicare annual wellness visit and routine office visit. Hypothyroidism -stable, patient is taking his medication correctly on an intact stomach with water only and waiting 30 minutes to eat. He denies any excessive daytime fatigue or sleepiness. He denies any insomnia or agitation. General OA- states having increased joint issues. Specifically left shoulder and right arm intermittently. Is using OTC acetaminophen with modest result. Diabetes  He presents for his follow-up diabetic visit. He has type 2 diabetes mellitus. Onset time: YEARS. His disease course has been stable. There are no hypoglycemic associated symptoms. Pertinent negatives for hypoglycemia include no dizziness, headaches or sweats.  Pertinent negatives for diabetes include no blurred vision, no chest pain, no fatigue, no polydipsia, no polyphagia, no polyuria and no weight loss. There are no hypoglycemic complications. Symptoms are stable. Diabetic complications include heart disease and nephropathy. Pertinent negatives for diabetic complications include no CVA or peripheral neuropathy. Risk factors for coronary artery disease include diabetes mellitus, male sex and sedentary lifestyle. Current diabetic treatment includes oral agent (monotherapy). He is compliant with treatment all of the time. His weight is stable. He is following a generally healthy diet. When asked about meal planning, he reported none. He has had a previous visit with a dietitian. He rarely participates in exercise. There is no change in his home blood glucose trend. His breakfast blood glucose range is generally 140-180 mg/dl. An ACE inhibitor/angiotensin II receptor blocker is being taken. He does not see a podiatrist.Eye exam is not current. COPD  He complains of cough, shortness of breath (with exertion) and sputum production. There is no chest tightness, difficulty breathing, frequent throat clearing, hemoptysis, hoarse voice or wheezing. This is a chronic problem. The current episode started more than 1 year ago. The problem occurs intermittently. The problem has been unchanged. The cough is productive of sputum. Associated symptoms include dyspnea on exertion. Pertinent negatives include no appetite change, chest pain, ear congestion, ear pain, fever, headaches, heartburn, malaise/fatigue, myalgias, nasal congestion, orthopnea, PND, postnasal drip, rhinorrhea, sneezing, sore throat, sweats, trouble swallowing or weight loss. His symptoms are aggravated by URI, strenuous activity, climbing stairs and change in weather. His symptoms are alleviated by beta-agonist, steroid inhaler, rest and oral steroids. He reports significant improvement on treatment.  His symptoms are not alleviated by rest. His past medical history is significant for asthma, bronchitis, COPD and pneumonia. Review of Systems   Constitutional:  Negative for appetite change, chills, fatigue, fever, malaise/fatigue and weight loss. HENT:  Negative for congestion, ear pain, hoarse voice, postnasal drip, rhinorrhea, sneezing, sore throat and trouble swallowing. Eyes:  Negative for blurred vision and visual disturbance. Respiratory:  Positive for cough, sputum production and shortness of breath (with exertion). Negative for hemoptysis and wheezing. Cardiovascular:  Positive for dyspnea on exertion and leg swelling (intermittent). Negative for chest pain, palpitations and PND. Gastrointestinal:  Negative for abdominal pain, constipation, diarrhea, heartburn, nausea and vomiting. Endocrine: Negative for polydipsia, polyphagia and polyuria. Genitourinary:  Negative for difficulty urinating and dysuria. Musculoskeletal:  Positive for arthralgias and gait problem (chronic). Negative for myalgias, neck pain and neck stiffness. Skin:  Negative for rash. Neurological:  Negative for dizziness, syncope and headaches. Patient's complete Health Risk Assessment and screening values have been reviewed and are found in Flowsheets. The following problems were reviewed today and where indicated follow up appointments were made and/or referrals ordered.     Positive Risk Factor Screenings with Interventions:    Fall Risk:  Do you feel unsteady or are you worried about falling? : (!) yes  2 or more falls in past year?: no  Fall with injury in past year?: no   Fall Risk Interventions:    Home safety tips provided  Patient declines any further evaluation/treatment for this issue            General Health and ACP:  General  In general, how would you say your health is?: Fair  In the past 7 days, have you experienced any of the following: New or Increased Pain, New or Increased Fatigue, Loneliness, Social Isolation, Stress or Anger?: No  Do you get the social and emotional support that you need?: Yes  Do you have a Living Will?: (!) No    Advance Directives       Power of [address] Will ACP-Advance Directive ACP-Power of     Not on File Not on File Not on File Not on File        General Health Risk Interventions:  No Living Will: Patient declines ACP discussion/assistance    Health Habits/Nutrition:  Physical Activity: Inactive    Days of Exercise per Week: 0 days    Minutes of Exercise per Session: 0 min     Have you lost any weight without trying in the past 3 months?: No  Body mass index: (!) 35.3  Have you seen the dentist within the past year?: N/A - wear dentures  Health Habits/Nutrition Interventions:  Nutritional issues:  educational materials for healthy, well-balanced diet provided     Safety:  Do you have working smoke detectors?: Yes  Do you have any tripping hazards - loose or unsecured carpets or rugs?: No  Do you have any tripping hazards - clutter in doorways, halls, or stairs?: No  Do you have either shower bars, grab bars, non-slip mats or non-slip surfaces in your shower or bathtub?: (!) No  Do all of your stairways have a railing or banister?: Yes  Do you always fasten your seatbelt when you are in a car?: Yes  Safety Interventions:  Home safety tips provided    ADLs:  In the past 7 days, did you need help from others to perform any of the following everyday activities: Eating, dressing, grooming, bathing, toileting, or walking/balance?: No  In the past 7 days, did you need help from others to take care of any of the following: Laundry, housekeeping, banking/finances, shopping, telephone use, food preparation, transportation, or taking medications?: (!) Yes  Select all that apply: [employer], Housekeeping, Banking/Finances, Shopping, Telephone Use, Food Preparation, Transportation, Taking Medications  ADL Interventions:  Patient declines any further evaluation/treatment for this issue Objective   Vitals:    11/16/22 1407   BP: 130/70   Site: Left Upper Arm   Position: Sitting   Resp: 18   Temp: 97.7 °F (36.5 °C)   TempSrc: Temporal   Weight: 253 lb 1.6 oz (114.8 kg)   Height: 5' 11\" (1.803 m)      Body mass index is 35.3 kg/m². General Appearance: alert and oriented to person, place and time, well-developed and well nourished, in no acute distress, and obese  Skin: warm and dry  Head: normocephalic and atraumatic  Eyes: conjunctivae normal and sclera anicteric  ENT: oropharynx clear and moist with normal mucous membranes  Neck: neck supple and non tender without mass   Pulmonary/Chest: CLEAR AND DIMINISHED  Cardiovascular: normal rate and regular rhythm  Abdomen: soft, non-tender  Extremities: TRACE TO 1 + edema-  bilateral LOWER LEGS  Musculoskeletal: MULTIPLE JOINT TENDERNESS  Neurologic: speech normal       Allergies   Allergen Reactions    Rosuvastatin      Prior to Visit Medications    Medication Sig Taking?  Authorizing Provider   oxybutynin (DITROPAN) 5 MG tablet Take 1 tablet by mouth 2 times daily Yes Cannon Goldmann Might, APRN - CNP   montelukast (SINGULAIR) 10 MG tablet Take 1 tablet by mouth daily Yes Cannon Goldmann Might, APRN - CNP   meloxicam (MOBIC) 7.5 MG tablet Take 1 tablet by mouth daily Yes Cannon Goldmann Might, APRN - CNP   Phenazopyridine HCl (AZO-STANDARD PO) Take by mouth Yes Historical Provider, MD   torsemide 40 MG TABS Take 40 mg by mouth 2 times daily Yes Bella Alanis MD   Misc Natural Products (OSTEO BI-FLEX ADV JOINT SHIELD PO) Take by mouth Yes Historical Provider, MD   EUTHYROX 137 MCG tablet Take 1 tablet by mouth once daily Yes Cannon Goldmann Might, APRN - CNP   atorvastatin (LIPITOR) 40 MG tablet Take 1 tablet by mouth once daily Yes Bella Alanis MD   fluticasone-salmeterol (ADVAIR) 250-50 MCG/DOSE AEPB Inhale 1 puff into the lungs every 12 hours Yes Historical Provider, MD   albuterol (PROVENTIL) (2.5 MG/3ML) 0.083% nebulizer solution Take 3 mLs by nebulization every 4 hours as needed for Wheezing or Shortness of Breath Yes GENA Monreal CNP   primidone (MYSOLINE) 250 MG tablet Take 250 mg by mouth 2 times daily Yes Historical Provider, MD   propranolol (INDERAL LA) 120 MG extended release capsule Take 120 mg by mouth 2 times daily Yes Historical Provider, MD   omeprazole (PRILOSEC) 20 MG delayed release capsule Take 20 mg by mouth daily Yes Historical Provider, MD   alogliptin (NESINA) 25 MG TABS tablet Take 25 mg by mouth daily Yes Historical Provider, MD   blood glucose monitor strips accu check Yes GENA Ashby CNP   aspirin 81 MG tablet Take 81 mg by mouth daily  Yes Historical Provider, MD   albuterol sulfate  (90 Base) MCG/ACT inhaler Inhale 2 puffs into the lungs every 4 hours as needed for Wheezing or Shortness of Breath Yes GENA Ashby CNP   metFORMIN (GLUCOPHAGE) 1000 MG tablet Take 1,000 mg by mouth 2 times daily  Yes Historical Provider, MD   acetaminophen (TYLENOL) 500 MG tablet Take 1,000 mg by mouth every 6 hours as needed for Pain Yes Historical Provider, MD   TAMSULOSIN HCL PO Take 0.8 mg by mouth Daily with supper  Yes Historical Provider, MD   Multiple Vitamins-Minerals (THERAPEUTIC MULTIVITAMIN-MINERALS) tablet Take 1 tablet by mouth daily Yes Historical Provider, MD [place] (Including outside providers/suppliers regularly involved in providing care):   Patient Care Team:  [address] [place] [address] APRIKE - CNP as PCP - General (Family Nurse Practitioner)  [address] [place] [address] as PCP - [facility] Empaneled Provider     Reviewed and updated this visit:  Tobacco  Allergies  Meds  Problems  Med Hx  Surg Hx  Soc Hx  Fam Hx

## 2022-12-02 ENCOUNTER — HOSPITAL ENCOUNTER (INPATIENT)
Age: 87
LOS: 3 days | Discharge: HOME | DRG: 194 | End: 2022-12-05
Attending: EMERGENCY MEDICINE | Admitting: FAMILY MEDICINE
Payer: OTHER GOVERNMENT

## 2022-12-02 ENCOUNTER — APPOINTMENT (OUTPATIENT)
Dept: GENERAL RADIOLOGY | Age: 87
DRG: 194 | End: 2022-12-02
Payer: OTHER GOVERNMENT

## 2022-12-02 ENCOUNTER — APPOINTMENT (OUTPATIENT)
Dept: CT IMAGING | Age: 87
DRG: 194 | End: 2022-12-02
Payer: OTHER GOVERNMENT

## 2022-12-02 DIAGNOSIS — J18.9 PNEUMONIA OF BOTH LUNGS DUE TO INFECTIOUS ORGANISM, UNSPECIFIED PART OF LUNG: Primary | ICD-10-CM

## 2022-12-02 LAB
ABSOLUTE EOS #: 0.12 K/UL (ref 0–0.44)
ABSOLUTE IMMATURE GRANULOCYTE: 0.15 K/UL (ref 0–0.3)
ABSOLUTE LYMPH #: 0.72 K/UL (ref 1.1–3.7)
ABSOLUTE MONO #: 1.4 K/UL (ref 0.1–1.2)
ALBUMIN SERPL-MCNC: 3.8 G/DL (ref 3.5–5.2)
ALBUMIN/GLOBULIN RATIO: 1.1 (ref 1–2.5)
ALP BLD-CCNC: 171 U/L (ref 40–129)
ALT SERPL-CCNC: 67 U/L (ref 5–41)
ANION GAP SERPL CALCULATED.3IONS-SCNC: 11 MMOL/L (ref 9–17)
AST SERPL-CCNC: 56 U/L
BACTERIA: ABNORMAL
BASOPHILS # BLD: 0 % (ref 0–2)
BASOPHILS ABSOLUTE: 0.04 K/UL (ref 0–0.2)
BILIRUB SERPL-MCNC: 0.5 MG/DL (ref 0.3–1.2)
BILIRUBIN URINE: NEGATIVE
BUN BLDV-MCNC: 20 MG/DL (ref 8–23)
BUN/CREAT BLD: 17 (ref 9–20)
CALCIUM SERPL-MCNC: 9.2 MG/DL (ref 8.6–10.4)
CHLORIDE BLD-SCNC: 96 MMOL/L (ref 98–107)
CO2: 24 MMOL/L (ref 20–31)
COLOR: YELLOW
CREAT SERPL-MCNC: 1.17 MG/DL (ref 0.7–1.2)
EOSINOPHILS RELATIVE PERCENT: 1 % (ref 1–4)
EPITHELIAL CELLS UA: ABNORMAL /HPF (ref 0–5)
FLU A ANTIGEN: NEGATIVE
FLU B ANTIGEN: NEGATIVE
GFR SERPL CREATININE-BSD FRML MDRD: >60 ML/MIN/1.73M2
GLUCOSE BLD-MCNC: 150 MG/DL (ref 74–100)
GLUCOSE BLD-MCNC: 188 MG/DL (ref 74–100)
GLUCOSE BLD-MCNC: 237 MG/DL (ref 70–99)
GLUCOSE URINE: NEGATIVE
HCO3 VENOUS: 24.1 MMOL/L (ref 24–30)
HCT VFR BLD CALC: 36.2 % (ref 40.7–50.3)
HEMOGLOBIN: 11.9 G/DL (ref 13–17)
IMMATURE GRANULOCYTES: 1 %
KETONES, URINE: NEGATIVE
LACTIC ACID, SEPSIS: 1.8 MMOL/L (ref 0.5–1.9)
LACTIC ACID, SEPSIS: 2.1 MMOL/L (ref 0.5–1.9)
LEUKOCYTE ESTERASE, URINE: ABNORMAL
LYMPHOCYTES # BLD: 5 % (ref 24–43)
MCH RBC QN AUTO: 29.9 PG (ref 25.2–33.5)
MCHC RBC AUTO-ENTMCNC: 32.9 G/DL (ref 28.4–34.8)
MCV RBC AUTO: 91 FL (ref 82.6–102.9)
MONOCYTES # BLD: 9 % (ref 3–12)
MUCUS: ABNORMAL
NEGATIVE BASE EXCESS, VEN: 1.1 MMOL/L (ref 0–2)
NITRITE, URINE: NEGATIVE
NRBC AUTOMATED: 0 PER 100 WBC
O2 SAT, VEN: 79.8 % (ref 60–85)
PATIENT TEMP: 37
PCO2, VEN: 41.8 MM HG (ref 39–55)
PDW BLD-RTO: 14.8 % (ref 11.8–14.4)
PH UA: 6 (ref 5–9)
PH VENOUS: 7.38 (ref 7.32–7.42)
PLATELET # BLD: 244 K/UL (ref 138–453)
PMV BLD AUTO: 10 FL (ref 8.1–13.5)
PO2, VEN: 44.8 MM HG (ref 30–50)
POTASSIUM SERPL-SCNC: 4.5 MMOL/L (ref 3.7–5.3)
PROTEIN UA: ABNORMAL
RBC # BLD: 3.98 M/UL (ref 4.21–5.77)
RBC UA: ABNORMAL /HPF (ref 0–2)
SARS-COV-2, RAPID: NOT DETECTED
SEG NEUTROPHILS: 84 % (ref 36–65)
SEGMENTED NEUTROPHILS ABSOLUTE COUNT: 12.61 K/UL (ref 1.5–8.1)
SODIUM BLD-SCNC: 131 MMOL/L (ref 135–144)
SPECIFIC GRAVITY UA: 1.01 (ref 1.01–1.02)
SPECIMEN DESCRIPTION: NORMAL
TOTAL PROTEIN: 7.2 G/DL (ref 6.4–8.3)
TURBIDITY: CLEAR
URINE HGB: NEGATIVE
UROBILINOGEN, URINE: NORMAL
WBC # BLD: 15 K/UL (ref 3.5–11.3)
WBC UA: ABNORMAL /HPF (ref 0–5)

## 2022-12-02 PROCEDURE — 87040 BLOOD CULTURE FOR BACTERIA: CPT

## 2022-12-02 PROCEDURE — 94761 N-INVAS EAR/PLS OXIMETRY MLT: CPT

## 2022-12-02 PROCEDURE — 96365 THER/PROPH/DIAG IV INF INIT: CPT

## 2022-12-02 PROCEDURE — 6360000002 HC RX W HCPCS: Performed by: EMERGENCY MEDICINE

## 2022-12-02 PROCEDURE — 6370000000 HC RX 637 (ALT 250 FOR IP): Performed by: NURSE PRACTITIONER

## 2022-12-02 PROCEDURE — 87635 SARS-COV-2 COVID-19 AMP PRB: CPT

## 2022-12-02 PROCEDURE — 83036 HEMOGLOBIN GLYCOSYLATED A1C: CPT

## 2022-12-02 PROCEDURE — 82805 BLOOD GASES W/O2 SATURATION: CPT

## 2022-12-02 PROCEDURE — 71260 CT THORAX DX C+: CPT | Performed by: EMERGENCY MEDICINE

## 2022-12-02 PROCEDURE — 2580000003 HC RX 258: Performed by: NURSE PRACTITIONER

## 2022-12-02 PROCEDURE — 97166 OT EVAL MOD COMPLEX 45 MIN: CPT

## 2022-12-02 PROCEDURE — 6370000000 HC RX 637 (ALT 250 FOR IP): Performed by: FAMILY MEDICINE

## 2022-12-02 PROCEDURE — 2700000000 HC OXYGEN THERAPY PER DAY

## 2022-12-02 PROCEDURE — 80053 COMPREHEN METABOLIC PANEL: CPT

## 2022-12-02 PROCEDURE — 82947 ASSAY GLUCOSE BLOOD QUANT: CPT

## 2022-12-02 PROCEDURE — 97162 PT EVAL MOD COMPLEX 30 MIN: CPT

## 2022-12-02 PROCEDURE — 94669 MECHANICAL CHEST WALL OSCILL: CPT

## 2022-12-02 PROCEDURE — 71045 X-RAY EXAM CHEST 1 VIEW: CPT

## 2022-12-02 PROCEDURE — 87804 INFLUENZA ASSAY W/OPTIC: CPT

## 2022-12-02 PROCEDURE — 1200000000 HC SEMI PRIVATE

## 2022-12-02 PROCEDURE — 6360000004 HC RX CONTRAST MEDICATION: Performed by: EMERGENCY MEDICINE

## 2022-12-02 PROCEDURE — 96375 TX/PRO/DX INJ NEW DRUG ADDON: CPT

## 2022-12-02 PROCEDURE — 2580000003 HC RX 258: Performed by: EMERGENCY MEDICINE

## 2022-12-02 PROCEDURE — 94664 DEMO&/EVAL PT USE INHALER: CPT

## 2022-12-02 PROCEDURE — C9803 HOPD COVID-19 SPEC COLLECT: HCPCS

## 2022-12-02 PROCEDURE — 36415 COLL VENOUS BLD VENIPUNCTURE: CPT

## 2022-12-02 PROCEDURE — 6360000002 HC RX W HCPCS: Performed by: NURSE PRACTITIONER

## 2022-12-02 PROCEDURE — 85025 COMPLETE CBC W/AUTO DIFF WBC: CPT

## 2022-12-02 PROCEDURE — 83605 ASSAY OF LACTIC ACID: CPT

## 2022-12-02 PROCEDURE — 99285 EMERGENCY DEPT VISIT HI MDM: CPT

## 2022-12-02 PROCEDURE — 94640 AIRWAY INHALATION TREATMENT: CPT

## 2022-12-02 PROCEDURE — 81001 URINALYSIS AUTO W/SCOPE: CPT

## 2022-12-02 RX ORDER — SODIUM CHLORIDE 0.9 % (FLUSH) 0.9 %
5-40 SYRINGE (ML) INJECTION EVERY 12 HOURS SCHEDULED
Status: DISCONTINUED | OUTPATIENT
Start: 2022-12-02 | End: 2022-12-05 | Stop reason: HOSPADM

## 2022-12-02 RX ORDER — FUROSEMIDE 40 MG/1
40 TABLET ORAL 2 TIMES DAILY
COMMUNITY

## 2022-12-02 RX ORDER — INSULIN LISPRO 100 [IU]/ML
0-4 INJECTION, SOLUTION INTRAVENOUS; SUBCUTANEOUS
Status: DISCONTINUED | OUTPATIENT
Start: 2022-12-02 | End: 2022-12-05 | Stop reason: HOSPADM

## 2022-12-02 RX ORDER — PRIMIDONE 250 MG/1
250 TABLET ORAL 2 TIMES DAILY
Status: DISCONTINUED | OUTPATIENT
Start: 2022-12-02 | End: 2022-12-05 | Stop reason: HOSPADM

## 2022-12-02 RX ORDER — ENOXAPARIN SODIUM 100 MG/ML
30 INJECTION SUBCUTANEOUS 2 TIMES DAILY
Status: DISCONTINUED | OUTPATIENT
Start: 2022-12-02 | End: 2022-12-05 | Stop reason: HOSPADM

## 2022-12-02 RX ORDER — IPRATROPIUM BROMIDE AND ALBUTEROL SULFATE 2.5; .5 MG/3ML; MG/3ML
1 SOLUTION RESPIRATORY (INHALATION) 4 TIMES DAILY
Status: DISCONTINUED | OUTPATIENT
Start: 2022-12-02 | End: 2022-12-02

## 2022-12-02 RX ORDER — PANTOPRAZOLE SODIUM 40 MG/1
40 TABLET, DELAYED RELEASE ORAL
Status: DISCONTINUED | OUTPATIENT
Start: 2022-12-03 | End: 2022-12-05 | Stop reason: HOSPADM

## 2022-12-02 RX ORDER — INSULIN LISPRO 100 [IU]/ML
0-4 INJECTION, SOLUTION INTRAVENOUS; SUBCUTANEOUS NIGHTLY
Status: DISCONTINUED | OUTPATIENT
Start: 2022-12-02 | End: 2022-12-05 | Stop reason: HOSPADM

## 2022-12-02 RX ORDER — ALOGLIPTIN 25 MG/1
25 TABLET, FILM COATED ORAL DAILY
Status: DISCONTINUED | OUTPATIENT
Start: 2022-12-03 | End: 2022-12-05 | Stop reason: HOSPADM

## 2022-12-02 RX ORDER — FUROSEMIDE 40 MG/1
40 TABLET ORAL 2 TIMES DAILY
Status: DISCONTINUED | OUTPATIENT
Start: 2022-12-02 | End: 2022-12-05 | Stop reason: HOSPADM

## 2022-12-02 RX ORDER — POLYETHYLENE GLYCOL 3350 17 G/17G
17 POWDER, FOR SOLUTION ORAL DAILY PRN
Status: DISCONTINUED | OUTPATIENT
Start: 2022-12-02 | End: 2022-12-05 | Stop reason: HOSPADM

## 2022-12-02 RX ORDER — DEXTROSE MONOHYDRATE 100 MG/ML
INJECTION, SOLUTION INTRAVENOUS CONTINUOUS PRN
Status: DISCONTINUED | OUTPATIENT
Start: 2022-12-02 | End: 2022-12-05 | Stop reason: HOSPADM

## 2022-12-02 RX ORDER — M-VIT,TX,IRON,MINS/CALC/FOLIC 27MG-0.4MG
1 TABLET ORAL DAILY
Status: DISCONTINUED | OUTPATIENT
Start: 2022-12-03 | End: 2022-12-05 | Stop reason: HOSPADM

## 2022-12-02 RX ORDER — TAMSULOSIN HYDROCHLORIDE 0.4 MG/1
0.8 CAPSULE ORAL
Status: DISCONTINUED | OUTPATIENT
Start: 2022-12-02 | End: 2022-12-05 | Stop reason: HOSPADM

## 2022-12-02 RX ORDER — MONTELUKAST SODIUM 10 MG/1
10 TABLET ORAL DAILY
Status: DISCONTINUED | OUTPATIENT
Start: 2022-12-03 | End: 2022-12-05 | Stop reason: HOSPADM

## 2022-12-02 RX ORDER — SODIUM CHLORIDE 0.9 % (FLUSH) 0.9 %
5-40 SYRINGE (ML) INJECTION PRN
Status: DISCONTINUED | OUTPATIENT
Start: 2022-12-02 | End: 2022-12-05 | Stop reason: HOSPADM

## 2022-12-02 RX ORDER — SODIUM CHLORIDE 9 MG/ML
INJECTION, SOLUTION INTRAVENOUS CONTINUOUS
Status: DISCONTINUED | OUTPATIENT
Start: 2022-12-02 | End: 2022-12-02

## 2022-12-02 RX ORDER — ONDANSETRON 2 MG/ML
4 INJECTION INTRAMUSCULAR; INTRAVENOUS EVERY 6 HOURS PRN
Status: DISCONTINUED | OUTPATIENT
Start: 2022-12-02 | End: 2022-12-05 | Stop reason: HOSPADM

## 2022-12-02 RX ORDER — IPRATROPIUM BROMIDE AND ALBUTEROL SULFATE 2.5; .5 MG/3ML; MG/3ML
1 SOLUTION RESPIRATORY (INHALATION) 4 TIMES DAILY
Status: DISCONTINUED | OUTPATIENT
Start: 2022-12-02 | End: 2022-12-05 | Stop reason: HOSPADM

## 2022-12-02 RX ORDER — PROPRANOLOL HCL 60 MG
120 CAPSULE, EXTENDED RELEASE 24HR ORAL 2 TIMES DAILY
Status: DISCONTINUED | OUTPATIENT
Start: 2022-12-02 | End: 2022-12-05 | Stop reason: HOSPADM

## 2022-12-02 RX ORDER — ONDANSETRON 4 MG/1
4 TABLET, ORALLY DISINTEGRATING ORAL EVERY 8 HOURS PRN
Status: DISCONTINUED | OUTPATIENT
Start: 2022-12-02 | End: 2022-12-05 | Stop reason: HOSPADM

## 2022-12-02 RX ORDER — ASPIRIN 81 MG/1
81 TABLET, CHEWABLE ORAL DAILY
Status: DISCONTINUED | OUTPATIENT
Start: 2022-12-03 | End: 2022-12-05 | Stop reason: HOSPADM

## 2022-12-02 RX ORDER — AZITHROMYCIN 250 MG/1
500 TABLET, FILM COATED ORAL EVERY 24 HOURS
Status: COMPLETED | OUTPATIENT
Start: 2022-12-02 | End: 2022-12-04

## 2022-12-02 RX ORDER — OXYBUTYNIN CHLORIDE 5 MG/1
5 TABLET ORAL 2 TIMES DAILY
Status: DISCONTINUED | OUTPATIENT
Start: 2022-12-02 | End: 2022-12-05 | Stop reason: HOSPADM

## 2022-12-02 RX ORDER — SODIUM CHLORIDE 9 MG/ML
25 INJECTION, SOLUTION INTRAVENOUS PRN
Status: DISCONTINUED | OUTPATIENT
Start: 2022-12-02 | End: 2022-12-05 | Stop reason: HOSPADM

## 2022-12-02 RX ORDER — KETOROLAC TROMETHAMINE 15 MG/ML
15 INJECTION, SOLUTION INTRAMUSCULAR; INTRAVENOUS ONCE
Status: COMPLETED | OUTPATIENT
Start: 2022-12-02 | End: 2022-12-02

## 2022-12-02 RX ORDER — ALBUTEROL SULFATE 90 UG/1
2 AEROSOL, METERED RESPIRATORY (INHALATION) EVERY 4 HOURS PRN
Status: DISCONTINUED | OUTPATIENT
Start: 2022-12-02 | End: 2022-12-05 | Stop reason: HOSPADM

## 2022-12-02 RX ORDER — ALBUTEROL SULFATE 2.5 MG/3ML
2.5 SOLUTION RESPIRATORY (INHALATION) EVERY 4 HOURS PRN
Status: DISCONTINUED | OUTPATIENT
Start: 2022-12-02 | End: 2022-12-05 | Stop reason: HOSPADM

## 2022-12-02 RX ADMIN — ENOXAPARIN SODIUM 30 MG: 100 INJECTION SUBCUTANEOUS at 13:52

## 2022-12-02 RX ADMIN — IOPAMIDOL 75 ML: 755 INJECTION, SOLUTION INTRAVENOUS at 09:28

## 2022-12-02 RX ADMIN — MOMETASONE FUROATE AND FORMOTEROL FUMARATE DIHYDRATE 2 PUFF: 200; 5 AEROSOL RESPIRATORY (INHALATION) at 19:54

## 2022-12-02 RX ADMIN — METFORMIN HYDROCHLORIDE 1000 MG: 500 TABLET ORAL at 20:41

## 2022-12-02 RX ADMIN — KETOROLAC TROMETHAMINE 15 MG: 15 INJECTION, SOLUTION INTRAMUSCULAR; INTRAVENOUS at 10:04

## 2022-12-02 RX ADMIN — SODIUM CHLORIDE, PRESERVATIVE FREE 10 ML: 5 INJECTION INTRAVENOUS at 20:41

## 2022-12-02 RX ADMIN — IPRATROPIUM BROMIDE AND ALBUTEROL SULFATE 1 AMPULE: .5; 3 SOLUTION RESPIRATORY (INHALATION) at 19:54

## 2022-12-02 RX ADMIN — ENOXAPARIN SODIUM 30 MG: 100 INJECTION SUBCUTANEOUS at 20:41

## 2022-12-02 RX ADMIN — CEFTRIAXONE 1000 MG: 1 INJECTION, POWDER, FOR SOLUTION INTRAMUSCULAR; INTRAVENOUS at 12:10

## 2022-12-02 RX ADMIN — AZITHROMYCIN MONOHYDRATE 500 MG: 250 TABLET ORAL at 13:52

## 2022-12-02 RX ADMIN — IPRATROPIUM BROMIDE AND ALBUTEROL SULFATE 1 AMPULE: .5; 3 SOLUTION RESPIRATORY (INHALATION) at 16:06

## 2022-12-02 RX ADMIN — PRIMIDONE 250 MG: 250 TABLET ORAL at 16:54

## 2022-12-02 RX ADMIN — TAMSULOSIN HYDROCHLORIDE 0.8 MG: 0.4 CAPSULE ORAL at 16:54

## 2022-12-02 RX ADMIN — OXYBUTYNIN CHLORIDE 5 MG: 5 TABLET ORAL at 20:41

## 2022-12-02 RX ADMIN — FUROSEMIDE 40 MG: 40 TABLET ORAL at 16:54

## 2022-12-02 RX ADMIN — PROPRANOLOL HYDROCHLORIDE 120 MG: 60 CAPSULE, EXTENDED RELEASE ORAL at 16:53

## 2022-12-02 RX ADMIN — SODIUM CHLORIDE: 9 INJECTION, SOLUTION INTRAVENOUS at 12:01

## 2022-12-02 ASSESSMENT — PAIN - FUNCTIONAL ASSESSMENT: PAIN_FUNCTIONAL_ASSESSMENT: 0-10

## 2022-12-02 ASSESSMENT — PAIN DESCRIPTION - DESCRIPTORS
DESCRIPTORS: ACHING;DISCOMFORT
DESCRIPTORS: ACHING;DISCOMFORT

## 2022-12-02 ASSESSMENT — PAIN DESCRIPTION - FREQUENCY: FREQUENCY: CONTINUOUS

## 2022-12-02 ASSESSMENT — PAIN SCALES - GENERAL
PAINLEVEL_OUTOF10: 6
PAINLEVEL_OUTOF10: 0
PAINLEVEL_OUTOF10: 6

## 2022-12-02 ASSESSMENT — PAIN DESCRIPTION - LOCATION
LOCATION: HEAD
LOCATION: HEAD

## 2022-12-02 ASSESSMENT — PAIN DESCRIPTION - ORIENTATION: ORIENTATION: RIGHT;LEFT

## 2022-12-02 ASSESSMENT — PAIN DESCRIPTION - PAIN TYPE: TYPE: ACUTE PAIN

## 2022-12-02 NOTE — H&P
History and Physical    Patient:  Martha Velasquez  MRN: [id number]    Chief Complaint: Fever cough and headache    History Obtained From:  patient, electronic medical record    PCP: GENA De León CNP    History of Present Illness: The patient is a 82 y.o. male who presented to the emergency room with complaints of cough and increasing shortness of breath over the past few days. He admitted fever T-max 102 this morning. He reported taken Tylenol prior to coming to the hospital.  He denied chest pain or palpitations. He denied abdominal pain or vomiting but admitted to nausea. He denied diarrhea. He admitted to an ill contact at [facility]. Patient is compliant with medications at home including nebulizer treatments. His SPO2 was dropping to 88 to 89% while in the ER. CT chest showed multifocal pneumonia. WBC count was elevated to 15,000. Liver enzymes slightly elevated with an ALT of 67 and AST of 56. Lactic acid was 2.1. He was flu and COVID-negative. Past Medical History:        Diagnosis Date    COPD (chronic obstructive pulmonary disease) ([facility] [address]     Diabetes mellitus ([facility] [address]     Hx of echocardiogram 02/24/2017    [facility]    Hyperlipidemia     Hypothyroidism     MI (myocardial infarction) [facility])     Thyroid disease     Type II or unspecified type diabetes mellitus without mention of complication, not stated as uncontrolled        Past Surgical History:        Procedure Laterality Date    CARDIAC SURGERY  02/24/2017    Stent placed  Dr Yanni Espinoza      right ankle    HERNIA REPAIR         Medications Prior to Admission:    Prior to Admission medications    Medication Sig Start Date End Date Taking? Authorizing Provider   furosemide (LASIX) 40 MG tablet Take 40 mg by mouth in the morning and 40 mg in the evening. Per wife Claribel Polanco, the torsemide was very expensive so Reed Vanessa continues to take his Lasix 40mg BID.    Yes Historical Provider, MD oxybutynin (DITROPAN) 5 MG tablet Take 1 tablet by mouth 2 times daily 11/16/22   GENA Syed CNP   montelukast (SINGULAIR) 10 MG tablet Take 1 tablet by mouth daily 11/16/22   GENA Syed CNP   meloxicam (MOBIC) 7.5 MG tablet Take 1 tablet by mouth daily 11/16/22   GENA Syed CNP   Phenazopyridine HCl (AZO-STANDARD PO) Take by mouth    Historical Provider, MD   torsemide 40 MG TABS Take 40 mg by mouth 2 times daily  Patient not taking: Reported on 12/2/2022 10/17/22   Marisabel Bobo MD   Misc Natural Products (OSTEO BI-FLEX ADV JOINT SHIELD PO) Take by mouth    Historical Provider, MD   EUTHYROX 137 MCG tablet Take 1 tablet by mouth once daily  Patient taking differently: Take 137 mcg by mouth Daily 7/14/22   GENA Syed CNP   atorvastatin (LIPITOR) 40 MG tablet Take 1 tablet by mouth once daily 5/31/22   Marisabel Bobo MD   fluticasone-salmeterol (ADVAIR) 250-50 MCG/DOSE AEPB Inhale 1 puff into the lungs every 12 hours    Historical Provider, MD   albuterol (PROVENTIL) (2.5 MG/3ML) 0.083% nebulizer solution Take 3 mLs by nebulization every 4 hours as needed for Wheezing or Shortness of Breath 1/28/22   GENA Syed CNP   primidone (MYSOLINE) 250 MG tablet Take 250 mg by mouth 2 times daily    Historical Provider, MD   propranolol (INDERAL LA) 120 MG extended release capsule Take 120 mg by mouth 2 times daily    Historical Provider, MD   omeprazole (PRILOSEC) 20 MG delayed release capsule Take 20 mg by mouth daily    Historical Provider, MD   alogliptin (NESINA) 25 MG TABS tablet Take 25 mg by mouth daily    Historical Provider, MD   blood glucose monitor strips accu check 7/18/18   GENA Coe - CNP   aspirin 81 MG tablet Take 81 mg by mouth daily     Historical Provider, MD   albuterol sulfate  (90 Base) MCG/ACT inhaler Inhale 2 puffs into the lungs every 4 hours as needed for Wheezing or Shortness of Breath 4/13/18   Severa Dark, APRN - CNP metFORMIN (GLUCOPHAGE) 1000 MG tablet Take 1,000 mg by mouth 2 times daily     Historical Provider, MD   acetaminophen (TYLENOL) 500 MG tablet Take 1,000 mg by mouth every 6 hours as needed for Pain    Historical Provider, MD   TAMSULOSIN HCL PO Take 0.8 mg by mouth Daily with supper     Historical Provider, MD   Multiple Vitamins-Minerals (THERAPEUTIC MULTIVITAMIN-MINERALS) tablet Take 1 tablet by mouth daily    Historical Provider, MD       Allergies:  Rosuvastatin    Social History:   TOBACCO:   reports that he quit smoking about 29 years ago. His smoking use included cigarettes. He has never used smokeless tobacco.  ETOH:   reports current alcohol use of about 1.0 standard drink per week. Family History:       Problem Relation Age of Onset    Cancer Mother 47        liver ca    Diabetes Father     Heart Disease Father        Allergies:  Rosuvastatin    Medications Prior to Admission:    Prior to Admission medications    Medication Sig Start Date End Date Taking? Authorizing Provider   furosemide (LASIX) 40 MG tablet Take 40 mg by mouth in the morning and 40 mg in the evening. Per wife Barrett Chan, the torsemide was very expensive so Roro continues to take his Lasix 40mg BID.    Yes Historical Provider, MD   oxybutynin (DITROPAN) 5 MG tablet Take 1 tablet by mouth 2 times daily 11/16/22   GENA Wong CNP   montelukast (SINGULAIR) 10 MG tablet Take 1 tablet by mouth daily 11/16/22   GENA Wong CNP   meloxicam (MOBIC) 7.5 MG tablet Take 1 tablet by mouth daily 11/16/22   GENA Wong - CNP   Phenazopyridine HCl (AZO-STANDARD PO) Take by mouth    Historical Provider, MD   torsemide 40 MG TABS Take 40 mg by mouth 2 times daily  Patient not taking: Reported on 12/2/2022 10/17/22   Rosendo Araujo MD   [facility] Natural Products (OSTEO BI-FLEX ADV JOINT SHIELD PO) Take by mouth    Historical Provider, MD   EUTHYROX 137 MCG tablet Take 1 tablet by mouth once daily  Patient taking differently: Take 137 mcg by mouth Daily 7/14/22   Ma Flurry Might, APRN - CNP   atorvastatin (LIPITOR) 40 MG tablet Take 1 tablet by mouth once daily 5/31/22   Art Given, MD   fluticasone-salmeterol (ADVAIR) 250-50 MCG/DOSE AEPB Inhale 1 puff into the lungs every 12 hours    Historical Provider, MD   albuterol (PROVENTIL) (2.5 MG/3ML) 0.083% nebulizer solution Take 3 mLs by nebulization every 4 hours as needed for Wheezing or Shortness of Breath 1/28/22   Ma Flurry Might, APRN - CNP   primidone (MYSOLINE) 250 MG tablet Take 250 mg by mouth 2 times daily    Historical Provider, MD   propranolol (INDERAL LA) 120 MG extended release capsule Take 120 mg by mouth 2 times daily    Historical Provider, MD   omeprazole (PRILOSEC) 20 MG delayed release capsule Take 20 mg by mouth daily    Historical Provider, MD   alogliptin (NESINA) 25 MG TABS tablet Take 25 mg by mouth daily    Historical Provider, MD   blood glucose monitor strips accu check 7/18/18   Mary Johnson APRN - CNP   aspirin 81 MG tablet Take 81 mg by mouth daily     Historical Provider, MD   albuterol sulfate  (90 Base) MCG/ACT inhaler Inhale 2 puffs into the lungs every 4 hours as needed for Wheezing or Shortness of Breath 4/13/18   Lenny Johnson APRN - CNP   metFORMIN (GLUCOPHAGE) 1000 MG tablet Take 1,000 mg by mouth 2 times daily     Historical Provider, MD   acetaminophen (TYLENOL) 500 MG tablet Take 1,000 mg by mouth every 6 hours as needed for Pain    Historical Provider, MD   TAMSULOSIN HCL PO Take 0.8 mg by mouth Daily with supper     Historical Provider, MD   Multiple Vitamins-Minerals (THERAPEUTIC MULTIVITAMIN-MINERALS) tablet Take 1 tablet by mouth daily    Historical Provider, MD       Review of Systems:  Constitutional:positive  for fevers, and positive for chills.   Eyes: negative for visual disturbance   ENT: negative for sore throat, positive nasal congestion, and negative for earache  Respiratory: positive for shortness of breath, positive for cough, and negative for wheezing  Cardiovascular: negative for chest pain, negative for palpitations, and negative for syncope  Gastrointestinal: negative for abdominal pain, positive for nausea,negative for vomiting, negative for diarrhea, negative for constipation, and negative for hematochezia or melena  Genitourinary: negative for dysuria, negative for urinary urgency, negative for urinary frequency, and negative for hematuria  Skin: negative for skin rash, and negative for skin lesions  Neurological: negative for unilateral weakness, numbness or tingling. Physical Exam:    Vitals:   Temp: 98.5 °F (36.9 °C)  BP: 127/61  Resp: 24  Heart Rate: 81  SpO2: 91 %  24HR INTAKE/OUTPUT:  No intake or output data in the 24 hours ending 12/02/22 1309    Weight    Body mass index is 35.67 kg/m². Exam:  GEN:    Awake, alert and oriented x3. EYES:  EOMI, pupils equal   NECK: Supple. No lymphadenopathy. No carotid bruit  CVS:    regular rate and rhythm, no audible murmur  PULM:   clear to ausculation with scattered rhochi heard with cough , no acute respiratory distress  ABD:    Bowels sounds normal.  Abdomen is soft. No distention. no tenderness to palpation. EXT:   2+ edema bilaterally . No calf tenderness. NEURO: Moves all extremities. Motor and sensory are grossly intact  SKIN:  No rashes.   No skin lesions.    -----------------------------------------------------------------  Diagnostic Data:     DATA:    CBC:   Lab Results   Component Value Date    WBC 15.0 (H) 12/02/2022    RBC 3.98 (L) 12/02/2022    HGB 11.9 (L) 12/02/2022    HCT 36.2 (L) 12/02/2022    MCV 91.0 12/02/2022     12/02/2022        CMP:   Lab Results   Component Value Date    GLUCOSE 237 (H) 12/02/2022    BUN 20 12/02/2022    CREATININE 1.17 12/02/2022     (L) 12/02/2022    K 4.5 12/02/2022    CALCIUM 9.2 12/02/2022    CL 96 (L) 12/02/2022    CO2 24 12/02/2022    PROT 7.2 12/02/2022    LABALBU 3.8 12/02/2022    BILITOT 0.5 12/02/2022    ALKPHOS 171 (H) 12/02/2022    ALT 67 (H) 12/02/2022    AST 56 (H) 12/02/2022       UA:   Lab Results   Component Value Date    COLORU Yellow 12/02/2022    CLARITYU clear 05/10/2018    SPECGRAV 1.015 12/02/2022    WBCUA 5 TO 10 12/02/2022    RBCUA 0 TO 2 12/02/2022    EPITHUA 0 TO 2 12/02/2022    LEUKOCYTESUR SMALL (A) 12/02/2022    GLUCOSEU NEGATIVE 12/02/2022    BLOODU neg 05/10/2018    KETUA NEGATIVE 12/02/2022    PROTEINU TRACE (A) 12/02/2022    HGBUR NEGATIVE 12/02/2022    CASTUA NOT REPORTED 12/29/2021    CRYSTUA NOT REPORTED 12/29/2021    BACTERIA 1+ (A) 12/02/2022    YEAST NOT REPORTED 12/29/2021       Lactic Acid:   Lab Results   Component Value Date    LACTA 2.2 12/29/2021       D-Dimer:  No results found for: DDIMER    PT/INR:  Lab Results   Component Value Date/Time    PROTIME 10.9 09/19/2017 06:27 PM    INR 1.1 09/19/2017 06:27 PM       High Sensitivity Troponin:  No results for input(s): TROPHS in the last 72 hours. ABGs:   Lab Results   Component Value Date/Time    PHART 7.340 09/19/2017 11:25 PM    FCL4BTP 53.8 09/19/2017 11:25 PM    PO2ART 62.0 09/19/2017 11:25 PM    TKK0OLE 28.4 09/19/2017 11:25 PM    J6BXDVKG 90.1 09/19/2017 11:25 PM    FIO2 NOT REPORTED 09/19/2017 11:25 PM           CT CHEST PULMONARY EMBOLISM W CONTRAST   Final Result   1. No clear evidence for central pulmonary embolus within the limitations of   this study. 2. Multifocal airspace disease which could represent a combination of   atelectasis and or infiltrate/multifocal pneumonia. Follow-up is recommended   to document resolution. 3. Mild emphysema. 4. Atheromatous plaque and atherosclerotic calcification as well as   tortuosity of the thoracic aorta. 5. Atrophic thyroid gland. 6. Coronary artery disease. Cardiomegaly. 7. Right hilar lymph node enlargement which may be reactive. 8. Distended gallbladder. Fatty liver.    9. Stable left adrenal masses measuring up to 2 cm, unchanged dating back to 2017 and 2018 exams, likely left adrenal adenomas. EKG reviewed    Assessment:    Principal Problem:    Multifocal pneumonia  Active Problems:    Type 2 diabetes mellitus with complication, without long-term current use of insulin (HCC)    Chronic combined systolic and diastolic CHF (congestive heart failure) (HCC)    COPD (chronic obstructive pulmonary disease) (HCC)    Elevated liver enzymes  Resolved Problems:    * No resolved hospital problems. *      Patient Active Problem List    Diagnosis Date Noted    Multifocal pneumonia 12/02/2022    Community acquired bacterial pneumonia 12/30/2021    Hypoxia 12/30/2021    Moderate persistent asthma without complication 92/35/8301    Hypothyroidism due to Hashimoto's thyroiditis 11/05/2018    Elevated liver enzymes 11/02/2018    Adrenal adenoma, left 10/16/2018    COPD (chronic obstructive pulmonary disease) ([facility] [address] 11/15/2017    Chronic combined systolic and diastolic CHF (congestive heart failure) ([facility] [address] 09/21/2017    Knee osteoarthritis 06/11/2015    Obesity (BMI 30.0-34.9) 02/05/2015    Venous (peripheral) insufficiency 02/05/2015    Hx pulmonary embolism 02/05/2015    Hearing impaired 02/05/2015    Edema 02/05/2015    Type 2 diabetes mellitus with complication, without long-term current use of insulin ([facility] [address] 02/05/2015       Plan:      This patient requires inpatient admission because of multifocal pneumonia  Factors affecting the medical complexity of this patient include chronic combined CHF, COPD, type 2 diabetes, elevated liver enzymes  Estimated length of stay is 3 days  Multifocal pneumonia  IV Rocephin  Oral Zithromax  Blood cultures x2  Nebs  Acapella  PT OT  Out of bed with meals  Chronic combined CHF  Daily weights  Continue oral Lasix  COPD  Continue Dulera, Singulair  Nebs  Type 2 diabetes  Continue Glucophage, Nesina  POCT before meals and at bedtime  Insulin sliding scale  Hypoglycemia protocol  Elevated liver enzymes  Trend labs  Hold Mobic, Lipitor, Tylenol  DVT prophylaxis: Lovenox  Peptic ulcer prophylaxis: Protonix  High risk medications: none  Social Service and Case Management consults for DC planning  Dietician consult initiated    CORE MEASURES  DVT prophylaxis: Lovenox  Decubitus ulcer present on admission: No  CODE STATUS: FULL CODE  Nutrition Status: good   Physical therapy: Yes   Old Charts reviewed: Yes  EKG Reviewed:  Yes  Advance Directive Addressed: Yes    GENA Davalos - CNP, GENA, NP-C  12/2/2022, 1:09 PM

## 2022-12-02 NOTE — PROGRESS NOTES
Occupational Therapy  Facility/Department: [facility] MED SURG  Occupational Therapy Initial Assessment    Name: Celeste Thrasher  : 1935  MRN: [id number]  Date of Service: 2022    Discharge Recommendations:  Continue to assess pending progress, Home with assist PRN        Patient Diagnosis(es): The encounter diagnosis was Pneumonia of both lungs due to infectious organism, unspecified part of lung. Past Medical History:  has a past medical history of COPD (chronic obstructive pulmonary disease) ([facility] [address] Diabetes mellitus ([facility] [address] Hx of echocardiogram, Hyperlipidemia, Hypothyroidism, MI (myocardial infarction) ([facility] [address] Stroke ([facility] [address] Thyroid disease, and Type II or unspecified type diabetes mellitus without mention of complication, not stated as uncontrolled. Past Surgical History:  has a past surgical history that includes hernia repair; Cardiac surgery (2017); fracture surgery; and Colonoscopy. Assessment   Performance deficits / Impairments: Decreased functional mobility ; Decreased ADL status; Decreased strength;Decreased endurance;Decreased high-level IADLs  Prognosis: Good  Decision Making: Medium Complexity  REQUIRES OT FOLLOW-UP: Yes  Activity Tolerance  Activity Tolerance: Patient limited by fatigue        Plan   Occupational Therapy Plan  Times Per Week: 7x/wk  Times Per Day:  Once a day  Current Treatment Recommendations: Strengthening, Functional mobility training, Safety education & training, Self-Care / ADL     Restrictions  Restrictions/Precautions  Restrictions/Precautions: General Precautions, Fall Risk    Subjective   General  Chart Reviewed: Yes  Patient assessed for rehabilitation services?: Yes  Diagnosis: Pneumonia     Social/Functional History  Social/Functional History  Lives With: Spouse  Type of Home: House  Home Layout: One level  Home Access: Stairs to enter with rails  Entrance Stairs - Number of Steps: 2  Entrance Stairs - Rails: Both  Bathroom Shower/Tub: Walk-in shower  Home Equipment: Syncbak  Has the patient had two or more falls in the past year or any fall with injury in the past year?: No  Receives Help From: Family  ADL Assistance: Independent  Homemaking Assistance: Needs assistance  Homemaking Responsibilities: No  Ambulation Assistance: Independent  Transfer Assistance: Independent  Active : No  Additional Comments: Pt reports wife does IADL's and helps only a little bit with ADL's (drying off post showering), he uses a SPC for ambulation       Objective   Heart Rate: 81  Heart Rate Source: Monitor; Apical  BP: 127/61  BP Location: Left upper arm  BP Method: Automatic  Patient Position: Semi fowlers  MAP (Calculated): 83  Resp: 24  SpO2: 94 %  O2 Device: None (Room air)          Safety Devices  Type of Devices: Left in bed;Call light within reach           ADL  Feeding: Independent  Grooming: Contact guard assistance  UE Bathing: Stand by assistance  LE Bathing: Contact guard assistance  UE Dressing: Stand by assistance  LE Dressing: Contact guard assistance  Toileting: Contact guard assistance     Activity Tolerance  Activity Tolerance: Patient tolerated evaluation without incident     Transfers  Stand Pivot Transfers: Contact guard assistance  Sit to stand: Contact guard assistance  Stand to sit: Contact guard assistance  Vision  Vision: Impaired  Vision Exceptions: Wears glasses for reading  Hearing  Hearing: Within functional limits  Cognition  Overall Cognitive Status: WFL  Orientation  Overall Orientation Status: Within Functional Limits           Education Given To: Patient  Education Provided: Role of Therapy;Plan of Care  Education Method: Verbal  Barriers to Learning: None  Education Outcome: Verbalized understanding  LUE AROM (degrees)  LUE AROM : WFL  RUE AROM (degrees)  RUE AROM : WFL        Goals  Short Term Goals  Time Frame for Short Term Goals: 20 visits  Short Term Goal 1: Pt. will tolerate 15 mins of BUE ther ex/act to increase overall strength/activity tolerance for functional tasks. Short Term Goal 2: Pt. will complete ADL routine with mod I with use of EC techs PRN.   Short Term Goal 3: Pt. will complete functional ADL transfers/mobility with mod I and G safety,       Therapy Time   Individual Concurrent Group Co-treatment   Time In [place] [id number]         Time Out 1440         Minutes [facility], [place]

## 2022-12-02 NOTE — CONSULTS
Palliative Care Inpatient Consult    NAME:  Keesha Aguilera  MEDICAL RECORD NUMBER:  [id number]  AGE: 82 y.o. GENDER: male  : 1935  TODAY'S DATE:  2022    Reason for Consult:  advance directives    History of Present Illness     The patient is a 82 y.o. Non- / non  male who presents with Fever (starting at 0530 today, 2x tylenol taken PTA), Cough (increased cough/dyspnea starting this morning as well), and Headache      Referred to Palliative Care by  [] Physician   [x] Nursing  [] Family Request   [] Other:      He was admitted to the med/surg service for Pneumonia of both lungs due to infectious organism, unspecified part of lung [J18.9]  Multifocal pneumonia [J18.9].   The patient has a complicated medical history and has been hospitalized since 2022  7:52 AM.    Active Hospital Problems    Diagnosis Date Noted    Multifocal pneumonia [J18.9] 2022     Priority: Medium    Elevated liver enzymes [R74.8] 2018    [facility] [J44.9] 11/15/2017    Chronic combined systolic and diastolic CHF (congestive heart failure) ([facility] [address] [I50.42] 2017    Type 2 diabetes mellitus with complication, without long-term current use of insulin (HCC) [E11.8] 2015       Data         /61   Pulse 81   Temp 98.5 °F (36.9 °C) (Temporal)   Resp 24   Ht 5' 11\" (1.803 m)   Wt 255 lb 11.7 oz (116 kg)   SpO2 94%   BMI 35.67 kg/m²     Wt Readings from Last 3 Encounters:   22 255 lb 11.7 oz (116 kg)   22 253 lb 1.6 oz (114.8 kg)   22 256 lb 11.2 oz (116.4 kg)        Code Status: Full Code     ADVANCED CARE PLANNING:  Patient has capacity for medical decisions: yes  Health Care Power of : no  Living Will: no     Personal, Social, and Family History  Marital Status:   Living situation:with family:  spouse  Importance of terese/Nondenominational/spiritual beliefs: [] Very [] Somewhat [] Not   Psychological Distress: denies  Does patient understand diagnosis/treatment? yes  Does caregiver understand diagnosis/treatment? yes    Assessment        Symptom management/ pain control   Pain Assessment:  The patient is not having any pain. Anxiety:  none  Dyspnea:  acute dyspnea  Fatigue:  exercise intolerance  Other:    Palliative Performance Scale:     ___100% Full ambulation; normal activity and work; no evidence of disease; able to do own self care; normal intake; fully conscious  ___90% Full ambulation; normal activity and work; some evidence of disease; able to do own self care; normal intake; fully conscious  ___80% Full ambulation; normal activity with effort; some evidence of disease; able to do own self care; normal or reduced intake; fully conscious  _x__70% Ambulation reduced; unable to perform normal job/work; significant disease; able to do own self care; normal or reduced intake; fully conscious  ___60%  Ambulation reduced; cannot do hobbies/housework; significant disease; occasional assist; intake normal or reduced; fully conscious/some confusion  ___50%  Mainly sit/lie; can't do any work; extensive disease; considerable assist; intake normal or reduced; fully conscious/some confusion  ___40%  Mainly in bed; extensive disease; mainly assist; intake normal or reduced; fully conscious/ some confusion   ___30%  Bed bound; extensive disease; total care; intake reduced; fully conscious/some confusion  ___20%  Bed bound; extensive disease; total care; intake minimal; drowsy/coma  ___10%  Bed bound; extensive disease; total care; mouth care only; drowsy/coma  ___0       Death     Readmission Risk Score: 12%    Plan      Palliative Interaction: pt is resting in bed with his wife Justine Cavazos at bedside. Pt is awake and oriented for our conversation. I introduce myself and explain my role in his care. I tell him that I heard he was interested in completing advance directives.   His wife asks questions and I explain what advance directives are. She thought it was for financial decision making, I educate her it is not and I am not able to complete financial power of attorneys. I ask if they would like to complete the [facility] or Living will now. They have 2 children that would be secondary decision makers, they decline to complete them at this time. I give them information to complete them. They deny any further needs or questions. Education/support to family  Education/support to patient  Continue with current plan of care  Code status clarified: Full Code    Principle Problem/Diagnosis:  Multifocal pneumonia    Goals of care evaluation   The patient goals of care are live longer, improve or maintain function/quality of life, remain at home, and preserve independence/autonomy/control   Goals of care discussed with:    [] Patient independently    [x] Patient and Family    [] Family or Healthcare DPOA independently    [] Unable to discuss with patient, family/DPOA not present    Code Status  Full Code     Palliative Care will continue to follow Mr. Vargas's care as needed. Thank you for allowing Palliative Care to participate in the care of Mr. Vargas .      Electronically signed by   Vance Payne RN  Palliative Care Team  on 12/2/2022 at 3:00 PM    Palliative care office: [phone]

## 2022-12-02 NOTE — PROGRESS NOTES
RESPIRATORY ASSESSMENT PROTOCOL                                                                                              Patient Name: Tatum Ernst Room#: [id number] : 1935     Admitting diagnosis: Pneumonia of both lungs due to infectious organism, unspecified part of lung [J18.9]  Multifocal pneumonia [J18.9]       Medical History:   Past Medical History:   Diagnosis Date    COPD (chronic obstructive pulmonary disease) ([facility]     Diabetes mellitus ([facility]     Hx of echocardiogram 2017    [facility]    Hyperlipidemia     Hypothyroidism     MI (myocardial infarction) ([facility]     Stroke [facility])     Thyroid disease     Type II or unspecified type diabetes mellitus without mention of complication, not stated as uncontrolled        PATIENT ASSESSMENT    LABORATORY DATA  Hematology:   Lab Results   Component Value Date/Time    WBC 15.0 2022 08:43 AM    RBC 3.98 2022 08:43 AM    HGB 11.9 2022 08:43 AM    HCT 36.2 2022 08:43 AM     2022 08:43 AM     Chemistry:    Lab Results   Component Value Date/Time    PHART 7.340 2017 11:25 PM    FXP6VTG 53.8 2017 11:25 PM    PO2ART 62.0 2017 11:25 PM    B9QJATQJ 90.1 2017 11:25 PM    BMP0OZY 28.4 2017 11:25 PM    PBEA 1.4 2017 11:25 PM       VITALS  Heart Rate: 81   Resp: 24  BP: 127/61  SpO2: 94 % O2 Device: None (Room air)  Temp: 98.5 °F (36.9 °C)    SKIN COLOR  [x] Normal  [] Pale  [] Dusky  [] Cyanotic    RESPIRATORY PATTERN  [x] Normal  [] Dyspnea  [] Cheyne-Veras  [] Kussmaul  [] Biots    AMBULATORY  [x] Yes  [] No  [] With Assistance    Patient Acuity 0 1 2 3 4 Score   Level of Concious (LOC) [x]  Alert & Oriented or Pt normal LOC []  Confused;follows directions []  Confused & uncooper-ative []  Obtunded []  Comatose 0   Respiratory Rate  (RR) []  Reg. rate & pattern. 12 - 20 bpm  []  Increased RR.  Greater than 20 bpm   [x]  SOB w/ exertion or RR greater than 24 bpm []  Access- ory muscle use at rest. Abn.  resp. []  SOB at rest.   2   Bilateral Breath Sounds (BBS) []  Clear []  Diminish-ed bases  []  Diminish-ed t/o, or rales   [x]  Sporadic, scattered wheezes or rhonchi []  Persistentwheezes and, or absent BBS 3   Cough []  Strong, effective, & non-prod. [x]  Effective & prod. Less than 25 ml (2 TBSP) over past 24 hrs []  Ineffective & non-prod to less than 25 ML over past 24 hrs []  Ineffective and, or greater than 25 ml sputum prod. past 24 hrs. []  Nonspon- taneous; Requires suctioning 1   Pulmonary History  (PULM HX) []  No smoking and no chronic pulmonaryhistory []  Former smoker. Quit over 12 mos. ago []  Current smoker or quit w/ in 12 mos []  Pulm. History and, or 20 pk/yr smoking hx [x]  Admitted w/ acute pulm. dx and, or has been admitted w/ pulm. dx 2 or more times over past 12 mos 4   Surgical History this Admit  (SURG HX) [x]  No surgery []  General surgery []  Lower abdominal []  Thoracic or upper abdominal   []  Thoracic w/ pulm. disease 0   Chest X-Ray (CXR)/CT Scan []  Clear or not applicable []  Not available []  Atelect- asis or pleural effusions []  Localized infiltrate or pulm. edema [x]  Con-solidated Infiltrates, bilateral, or in more than 1 lobe 4   Slow or Forced VC, FEV1 OR PEFR (PULM FXN)  [x]  80% or greater, or not indicated []  Pt. unable to perform []  FEV1 or PEFR or VC 51-79%.   []  FEV1 or PEFR or VC  30-49%   []  FEV1 or PEFR or VC less than 30%   0   TOTAL ACUITY: 14       CARE PLAN    If Acuity Level is 2, 3, or 4 in any of the following:    [x] BILATERAL BREATH SOUNDS (BBS)     [x] PULMONARY HISTORY (PULM HX)  [] PULMONARY FUNCTION (PULM FX)    Goal: Improve respiratory functions in patients with airway disease and decrease WOB    [x] AEROSOL PROTOCOL    Total Acuity:   16-32  []  Secondary Assessment in 24 hrs Total Acuity:  9-15  [x]  Secondary Assessment in 24 hrs Total Acuity:  4-8  []  Secondary Assessment in 48 hrs Total Acuity:  0-3  []  Secondary Assessment in 72 hrs   HHN AEROSOL THERAPY with  [physician-ordered bronchodilator(s)] q 4 & Albuterol PRN q2 hrs. Breath-Actuated Neb if BBS Acuity = 4, and pt. can use MP. Notify physician if condition deteriorates. HHN AEROSOL THERAPY with  [physician-ordered bronchodilator(s)]  QID and Albuterol PRN q4 hrs. Breath-Actuated Neb if BBS Acuity = 4, and pt. can use MP. Notify physician if condition deteriorates. MDI THERAPY with  2 actuations of [physician-ordered bronchodilator(s)] via spacer TID Albuterol and PRNq4 hrs. If unable to utilize MDI: HHN [physician-ordered bronchodilator(s)] TID and Albuterol PRN q4 hrs. Notify physician if condition deteriorates. MDI THERAPY with  [physician-ordered bronchodilator(s)] via spacer TID PRN. If unable to utilize MDI: HHN [physician-ordered bronchodilator(s)] TID PRN. Notify physician if condition deteriorates. If Acuity Level is 2, 3, or 4 in any of the following:    [] COUGH     [] SURGICAL HISTORY (SURG HX)  [x] CHEST XRAY (CXR)    Goal: Improvement in sputum mobilization in patients with ineffective airway clearance. Reverse atelectasis.     [x] Bronchopulmonary Hygiene Protocol    Total Acuity:   16-32  []  Secondary Assessment in 24 hrs Total Acuity:  9-15  [x]  Secondary Assessment in 24 hrs Total Acuity:  4-8  []  Secondary Assessment in 48 hrs Total Acuity:  0-3  []  Secondary Assessment in 72 hrs   METANEB QID with [physician-ordered bronchodilator(s)] if CXR Acuity = 4; otherwise:  PD&P, PEP, or Vest QID & PRN  NT Sxn PRN for ineffective cough  METANEB QID with [physician-ordered bronchodilator(s)] if CXR Acuity = 4; otherwise:  PD&P, PEP, or Vest TID & PRN  NT Sxn PRN for ineffective cough  Instruct patient to self-perform IS q1hr WA   Directed Cough self-performed q1hr WA     If Acuity Level is 2 or above in the following:    [] PULMONARY HISTORY (PULM HX)    Goal: Assist patient in quitting smoking to slow or stop the progression of lung disease.     [] Smoking Cessation Protocol    SMOKING CESSATION EDUCATION provided according to policy LP_454: (karson with an X)  ____Yes    ____ No     ____ NA    Smoking Cessation Booklet given:  ____Yes  ____No ____Patient Rachael Manrique

## 2022-12-02 NOTE — PROGRESS NOTES
Physical Therapy  Facility/Department: [facility] AT THE [facility] MED SURG  Physical Therapy Initial Assessment    Name: Day Ocampo  : 1935  MRN: [id number]  Date of Service: 2022    Discharge Recommendations:  Continue to assess pending progress, Home with assist PRN          Patient Diagnosis(es): The encounter diagnosis was Pneumonia of both lungs due to infectious organism, unspecified part of lung. Past Medical History:  has a past medical history of COPD (chronic obstructive pulmonary disease) ([facility] Diabetes mellitus ([facility] Hx of echocardiogram, Hyperlipidemia, Hypothyroidism, MI (myocardial infarction) ([facility] Stroke ([facility] Thyroid disease, and Type II or unspecified type diabetes mellitus without mention of complication, not stated as uncontrolled. Past Surgical History:  has a past surgical history that includes hernia repair; Cardiac surgery (2017); fracture surgery; and Colonoscopy. Assessment   Assessment: Patient is 82year old male with dx of pneumonia who presents with decreased B LE strength, decreased functional mobility and endurance and decreased safety and balance during transfers and ambulation and would benefit from physical therapy to address all concerns and safely return to [facility]. Treatment Diagnosis: Difficulty walking  Therapy Prognosis: Good  Decision Making: Medium Complexity  Requires PT Follow-Up: Yes  Activity Tolerance  Activity Tolerance: Patient tolerated evaluation without incident     Plan   Physcial Therapy Plan  General Plan: 2 times a day 7 days a week (daily on weekends)  Current Treatment Recommendations: Strengthening, ROM, Balance training, Functional mobility training, Transfer training, Gait training, Stair training, Neuromuscular re-education, Patient/Caregiver education & training, Safety education & training, Home exercise program, Therapeutic activities, Endurance training  Safety Devices  Type of Devices:  All miriam prominences offloaded, Patient at risk for falls, All fall risk precautions in place, Left in bed, Call light within reach, Nurse notified, Gait belt     Restrictions  Restrictions/Precautions  Restrictions/Precautions: General Precautions, Fall Risk     Subjective   General  Chart Reviewed: Yes  Patient assessed for rehabilitation services?: Yes  Response To Previous Treatment: Not applicable  Family / Caregiver Present: Yes  Referring Practitioner: Pallavi Tinajero  Referral Date : 12/02/22  Diagnosis: Pneumonia, J18.9  Follows Commands: Within Functional Limits  Subjective  Subjective: Pt denies pain and agrees to PT eval         Social/Functional History  Social/Functional History  Lives With: Spouse  Type of Home: House  Home Layout: One level  Home Access: Stairs to enter with rails  Entrance Stairs - Number of Steps: 2  Entrance Stairs - Rails: Both  Home Equipment: Cane  Has the patient had two or more falls in the past year or any fall with injury in the past year?: No  Receives Help From: Family  ADL Assistance: Independent  Homemaking Assistance: Needs assistance  Homemaking Responsibilities: No  Ambulation Assistance: Independent  Transfer Assistance: Independent  Active : No  Additional Comments: Pt reports wife does IADL's and helps only a little bit with ADL's, he uses a SPC for ambulation  Vision/Hearing  Vision  Vision: Within Functional Limits  Hearing  Hearing: Within functional limits    Cognition   Orientation  Overall Orientation Status: Within Functional Limits  Cognition  Overall Cognitive Status: WFL     Objective   Heart Rate: 81  Heart Rate Source: Monitor; Apical  BP: 127/61  BP Location: Left upper arm  BP Method: Automatic  Patient Position: Semi fowlers  MAP (Calculated): 83  Resp: 24  SpO2: 94 %  O2 Device: None (Room air)        Gross Assessment  AROM: Within functional limits  Strength: Generally decreased, functional                    Bed mobility  Supine to Sit: Supervision  Sit to Supine: Supervision  Scooting: Supervision  Transfers  Sit to Stand: Contact guard assistance;Stand by assistance  Stand to Sit: Contact guard assistance;Stand by assistance  Ambulation  Surface: Level tile  Device: Single point cane  Assistance: Contact guard assistance;Stand by assistance  Distance: Pt amb 5ft with SPC and CG/SBA x1 with no LOB or fatigue     Balance  Sitting - Static: Good  Sitting - Dynamic: Good  Standing - Static: Fair;+  Standing - Dynamic: Fair           AM-PAC Score     AM-PAC Inpatient Mobility without Stair Climbing Raw Score : 16 (12/02/22 1418)  AM-PAC Inpatient without Stair Climbing T-Scale Score : 45.54 (12/02/22 1418)  Mobility Inpatient CMS 0-100% Score: 40.64 (12/02/22 1418)  Mobility Inpatient without Stair CMS G-Code Modifier : CK (12/02/22 1418)       Goals  Short Term Goals  Time Frame for Short Term Goals: 20 days  Short Term Goal 1: Patient to complete all transfers with LRAD and mod. IND with no LOB to decrease fall risk. Short Term Goal 2: Patient to ambulate [id number] with SPC and SUP with no LOB or fatigue to improve endurance. Short Term Goal 3: Patient to ascend/descend 2 steps with B HR and SUP with no LOB to safely enter his home. Short Term Goal 4: Patient to tolerate 20-30 min of ther ex/act to improve functional strength. Education  Patient Education  Education Given To: Patient; Family  Education Provided: Role of Therapy;Plan of Care  Education Method: Verbal  Barriers to Learning: None  Education Outcome: Verbalized understanding      Therapy Time   Individual Concurrent Group Co-treatment   Time In 1330         Time Out 1345         Minutes 15         Timed Code Treatment Minutes: [address], PT, DPT

## 2022-12-02 NOTE — ED PROVIDER NOTES
[address] [facility] [place] ED  EMERGENCY DEPARTMENT ENCOUNTER      Pt Name: Agus Bernard  MRN: [id number]  [place] 1935  Date of evaluation: 12/2/2022  Provider: Donte Ambrocio MD    CHIEF COMPLAINT       Chief Complaint   Patient presents with    Fever     starting at 0530 today, 2x tylenol taken PTA    Cough     increased cough/dyspnea starting this morning as well    Headache         HISTORY OF PRESENT ILLNESS   (Location/Symptom, Timing/Onset, Context/Setting, Quality, Duration, Modifying Factors, Severity)  Note limiting factors. Agus Bernard is a 82 y.o. male who presents to the emergency department      46-year-old gentleman with history of COPD presenting the emergency department for evaluation of worsening cough and shortness of breath over the past few days. Patient developed a fever of 102 this morning. Take 2 Tylenol prior to arrival.  Any chest pain. No abdominal pain. Occasionally queasy but no vomiting or diarrhea. Around a young family member over [facility] that did have a cough runny nose and sore throat. No known sick contacts. She did take aerosol treatment at home earlier this morning. Oxygen saturation is dropping 88 to 89% conversation during evaluation. Nursing Notes were reviewed. REVIEW OF SYSTEMS    (2-9 systems for level 4, 10 or more for level 5)     Review of Systems   All other systems reviewed and are negative. Except as noted above the remainder of the review of systems was reviewed and negative.        PAST MEDICAL HISTORY     Past Medical History:   Diagnosis Date    COPD (chronic obstructive pulmonary disease) ([facility]     Diabetes mellitus ([facility]     H[facility] of echocardiogram [facility]/24/2017    [facility]    Hyperlipidemia     Hypothyroidism     MI (myocardial infarction) [facility])     Thyroid disease     Type II or unspecified type diabetes mellitus without mention of complication, not stated as uncontrolled          SURGICAL HISTORY       Past Surgical History:   Procedure Laterality Date    CARDIAC SURGERY  02/24/2017    Stent placed  Dr Daryl Tran      right ankle    HERNIA REPAIR           CURRENT MEDICATIONS       Previous Medications    ACETAMINOPHEN (TYLENOL) 500 MG TABLET    Take 1,000 mg by mouth every 6 hours as needed for Pain    ALBUTEROL (PROVENTIL) (2.5 MG/3ML) 0.083% NEBULIZER SOLUTION    Take 3 mLs by nebulization every 4 hours as needed for Wheezing or Shortness of Breath    ALBUTEROL SULFATE  (90 BASE) MCG/ACT INHALER    Inhale 2 puffs into the lungs every 4 hours as needed for Wheezing or Shortness of Breath    ALOGLIPTIN (NESINA) 25 MG TABS TABLET    Take 25 mg by mouth daily    ASPIRIN 81 MG TABLET    Take 81 mg by mouth daily     ATORVASTATIN (LIPITOR) 40 MG TABLET    Take 1 tablet by mouth once daily    BLOOD GLUCOSE MONITOR STRIPS    accu check    EUTHYROX 137 MCG TABLET    Take 1 tablet by mouth once daily    FLUTICASONE-SALMETEROL (ADVAIR) 250-50 MCG/DOSE AEPB    Inhale 1 puff into the lungs every 12 hours    FUROSEMIDE (LASIX) 40 MG TABLET    Take 40 mg by mouth in the morning and 40 mg in the evening. Per wife Barrett Chan, the torsemide was very expensive so Jerri Mix continues to take his Lasix 40mg BID.     MELOXICAM (MOBIC) 7.5 MG TABLET    Take 1 tablet by mouth daily    METFORMIN (GLUCOPHAGE) 1000 MG TABLET    Take 1,000 mg by mouth 2 times daily     MISC NATURAL PRODUCTS (OSTEO BI-FLEX ADV JOINT SHIELD PO)    Take by mouth    MONTELUKAST (SINGULAIR) 10 MG TABLET    Take 1 tablet by mouth daily    MULTIPLE VITAMINS-MINERALS (THERAPEUTIC MULTIVITAMIN-MINERALS) TABLET    Take 1 tablet by mouth daily    OMEPRAZOLE (PRILOSEC) 20 MG DELAYED RELEASE CAPSULE    Take 20 mg by mouth daily    OXYBUTYNIN (DITROPAN) 5 MG TABLET    Take 1 tablet by mouth 2 times daily    PHENAZOPYRIDINE HCL (AZO-STANDARD PO)    Take by mouth    PRIMIDONE (MYSOLINE) 250 MG TABLET    Take 250 mg by mouth 2 times daily PROPRANOLOL (INDERAL LA) 120 MG EXTENDED RELEASE CAPSULE    Take 120 mg by mouth 2 times daily    TAMSULOSIN HCL PO    Take 0.8 mg by mouth Daily with supper     TORSEMIDE 40 MG TABS    Take 40 mg by mouth 2 times daily       ALLERGIES     Rosuvastatin    FAMILY HISTORY       Family History   Problem Relation Age of Onset    Cancer Mother 47        liver ca    Diabetes Father     Heart Disease Father           SOCIAL HISTORY       Social History     Socioeconomic History    Marital status:      Spouse name: None    Number of children: None    Years of education: None    Highest education level: None   Tobacco Use    Smoking status: Former     Types: Cigarettes     Quit date:      Years since quittin.9    Smokeless tobacco: Never   Vaping Use    Vaping Use: Never used   Substance and Sexual Activity    Alcohol use: Yes     Comment: occasional- maybe every other day    Drug use: No     Social Determinants of Health     Financial Resource Strain: Unknown    Difficulty of Paying Living Expenses: Patient refused   Food Insecurity: Unknown    Worried About Running Out of Food in the Last Year: Patient refused    Ran Out of Food in the Last Year: Patient refused   Physical Activity: Inactive    Days of Exercise per Week: 0 days    Minutes of Exercise per Session: 0 min       SCREENINGS        [place] Coma Scale  Eye Opening: Spontaneous  Best Verbal Response: Oriented  Best Motor Response: Obeys commands  Carrie Coma Scale Score: 15               PHYSICAL EXAM    (up to 7 for level 4, 8 or more for level 5)     ED Triage Vitals [22 0742]   BP Temp Temp Source Heart Rate Resp SpO2 Height Weight   133/63 99.6 °F (37.6 °C) Oral 83 24 90 % 5' 11\" (1.803 m) 250 lb (113.4 kg)       Physical Exam  Vitals and nursing note reviewed. Constitutional:       Comments: Ill but nontoxic-appearing. Oxygen saturation low 90s on room air.   Does drop to 88% with conversation   HENT:      Head: Normocephalic and atraumatic. Cardiovascular:      Rate and Rhythm: Normal rate and regular rhythm. Pulmonary:      Comments: Coarse breath sounds bilaterally. No wheezing noted. Respirations are nonlabored. No acute respiratory distress  Abdominal:      General: There is no distension. Palpations: Abdomen is soft. Tenderness: There is no abdominal tenderness. Musculoskeletal:         General: No swelling or tenderness. Skin:     General: Skin is warm and dry. Findings: No rash. Neurological:      General: No focal deficit present. Mental Status: He is alert and oriented to person, place, and time. DIAGNOSTIC RESULTS     EKG: All EKG's are interpreted by the Emergency Department Physician who either signs or Co-signs this chart in the absence of a cardiologist.        RADIOLOGY:   Non-plain film images such as CT, Ultrasound and MRI are read by the radiologist. Plain radiographic images are visualized and preliminarily interpreted by the emergency physician with the below findings:        Interpretation per the Radiologist below, if available at the time of this note:    CT CHEST PULMONARY EMBOLISM W CONTRAST   Final Result   1. No clear evidence for central pulmonary embolus within the limitations of   this study. 2. Multifocal airspace disease which could represent a combination of   atelectasis and or infiltrate/multifocal pneumonia. Follow-up is recommended   to document resolution. 3. Mild emphysema. 4. Atheromatous plaque and atherosclerotic calcification as well as   tortuosity of the thoracic aorta. 5. Atrophic thyroid gland. 6. Coronary artery disease. Cardiomegaly. 7. Right hilar lymph node enlargement which may be reactive. 8. Distended gallbladder. Fatty liver. 9. 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                ED BEDSIDE ULTRASOUND:   Performed by ED Physician - none    LABS:  Labs Reviewed   CBC WITH AUTO DIFFERENTIAL - Abnormal; Notable for the following components:       Result Value    WBC 15.0 (*)     RBC 3.98 (*)     Hemoglobin 11.9 (*)     Hematocrit 36.2 (*)     RDW 14.8 (*)     Seg Neutrophils 84 (*)     Lymphocytes 5 (*)     Immature Granulocytes 1 (*)     Segs Absolute 12.61 (*)     Absolute Lymph # 0.72 (*)     Absolute Mono # 1.40 (*)     All other components within normal limits   COMPREHENSIVE METABOLIC PANEL - Abnormal; Notable for the following components:    Glucose 237 (*)     Sodium 131 (*)     Chloride 96 (*)     Alkaline Phosphatase 171 (*)     ALT 67 (*)     AST 56 (*)     All other components within normal limits   LACTATE, SEPSIS - Abnormal; Notable for the following components:    Lactic Acid, Sepsis 2.1 (*)     All other components within normal limits   URINALYSIS WITH MICROSCOPIC - Abnormal; Notable for the following components:    Protein, UA TRACE (*)     Leukocyte Esterase, Urine SMALL (*)     Bacteria, UA 1+ (*)     Mucus, UA TRACE (*)     All other components within normal limits   COVID-19, RAPID   RAPID INFLUENZA A/B ANTIGENS   CULTURE, BLOOD 1   CULTURE, BLOOD 2   LACTATE, SEPSIS   BLOOD GAS, VENOUS       All other labs were within normal range or not returned as of this dictation. EMERGENCY DEPARTMENT COURSE and DIFFERENTIAL DIAGNOSIS/MDM:   Vitals:    Vitals:    12/02/22 1128 12/02/22 1158 12/02/22 1223 12/02/22 1241   BP: 116/65 (!) 119/52  (!) 132/53   Pulse: 78   78   Resp:   20 25   Temp:       TempSrc:       SpO2: 91%   91%   Weight:       Height:               MDM  Number of Diagnoses or Management Options  Pneumonia of both lungs due to infectious organism, unspecified part of lung  Diagnosis management comments: Results reviewed and discussed with patient. IV rocephin ordered. Patient had mild headache and toradol given. Improved with Toradol. Patient does have chronic elevation of liver enzymes.   Leukocytosis with WBCs 15.1 and a left shift. Patient does not have any right upper quadrant tenderness on exam.  Discussed that he will be admitted for treatment of his pneumonia. Discussed with Dr. Francis Shaver and patient was accepted for admission. MIPS       REASSESSMENT          CRITICAL CARE TIME   Total Critical Care time was  minutes, excluding separately reportable procedures. There was a high probability of clinically significant/life threatening deterioration in the patient's condition which required my urgent intervention. CONSULTS:  None    PROCEDURES:  Unless otherwise noted below, none     Procedures        FINAL IMPRESSION      1. Pneumonia of both lungs due to infectious organism, unspecified part of lung          DISPOSITION/PLAN   DISPOSITION Decision To Admit 12/02/2022 11:26:30 AM      PATIENT REFERRED TO:  No follow-up provider specified. DISCHARGE MEDICATIONS:  New Prescriptions    No medications on file     Controlled Substances Monitoring:     No flowsheet data found.     (Please note that portions of this note were completed with a voice recognition program.  Efforts were made to edit the dictations but occasionally words are mis-transcribed.)    Pasquale Aguilera MD (electronically signed)  Attending Emergency Physician            Pasquale Aguilera MD  12/02/22 [phone]

## 2022-12-02 NOTE — ACP (ADVANCE CARE PLANNING)
Advance Care Planning     Advance Care Planning Activator (Inpatient)  Conversation Note      Date of ACP Conversation: 12/2/2022     Conversation Conducted with: Patient with Decision Making Capacity    ACP Activator: Trisha Thomas RN      Health Care Decision Maker:     Current Designated Health Care Decision Maker:     Primary Decision Maker (Active): Nicole Vargas - [facility] - [phone]    Care Preferences    Ventilation: \"If you were in your present state of health and suddenly became very ill and were unable to breathe on your own, what would your preference be about the use of a ventilator (breathing machine) if it were available to you? \"      Would the patient desire the use of ventilator (breathing machine)?: yes    \"If your health worsens and it becomes clear that your chance of recovery is unlikely, what would your preference be about the use of a ventilator (breathing machine) if it were available to you? \"     Would the patient desire the use of ventilator (breathing machine)?: No      Resuscitation  \"CPR works best to restart the heart when there is a sudden event, like a heart attack, in someone who is otherwise healthy. Unfortunately, CPR does not typically restart the heart for people who have serious health conditions or who are very sick. \"    \"In the event your heart stopped as a result of an underlying serious health condition, would you want attempts to be made to restart your heart (answer \"yes\" for attempt to resuscitate) or would you prefer a natural death (answer \"no\" for do not attempt to resuscitate)? \" no       [x] Yes   [] No   Educated Patient / Rickie Bowling regarding differences between Advance Directives and portable DNR orders.     Length of ACP Conversation in minutes:  20    Conversation Outcomes:  [x] ACP discussion completed  [] Existing advance directive reviewed with patient; no changes to patient's previously recorded wishes  [] New Advance Directive completed  [] Portable Do Not Rescitate prepared for Provider review and signature  [] POLST/POST/MOLST/MOST prepared for Provider review and signature      Follow-up plan:    [] Schedule follow-up conversation to continue planning  [x] Referred individual to Provider for additional questions/concerns   [] Advised patient/agent/surrogate to review completed ACP document and update if needed with changes in condition, patient preferences or care setting    [x] This note routed to one or more involved healthcare providers    [address] [place] [address] and [place] Nurse Coordinator  12/2/2022 2:59 PM

## 2022-12-02 NOTE — PROGRESS NOTES
Vitals and assessment completed at this time as charted. Patient resting in bed with no distress noted. Patient does have an occasional moist cough. Patient denies any SOB while sitting up in bed at this time. Patient denies any needs. Call light remains within reach and bed alarm is engaged for safety.

## 2022-12-02 NOTE — PROGRESS NOTES
Patient arrived to the floor via bed. Able to ambulate to the bathroom and then to the bed with cane. Report received from LOBO NICKERSON [facility]. Vs and assessment completed. Plan of care gone over with patient and family. All questions answered.  Denies any needs at this time

## 2022-12-03 LAB
ABSOLUTE EOS #: 0.14 K/UL (ref 0–0.44)
ABSOLUTE IMMATURE GRANULOCYTE: 0 K/UL (ref 0–0.3)
ABSOLUTE LYMPH #: 1.38 K/UL (ref 1.1–3.7)
ABSOLUTE MONO #: 1.93 K/UL (ref 0.1–1.2)
ALBUMIN SERPL-MCNC: 3.4 G/DL (ref 3.5–5.2)
ALBUMIN/GLOBULIN RATIO: 1.1 (ref 1–2.5)
ALP BLD-CCNC: 137 U/L (ref 40–129)
ALT SERPL-CCNC: 57 U/L (ref 5–41)
ANION GAP SERPL CALCULATED.3IONS-SCNC: 11 MMOL/L (ref 9–17)
AST SERPL-CCNC: 39 U/L
BASOPHILS # BLD: 0 % (ref 0–2)
BASOPHILS ABSOLUTE: 0 K/UL (ref 0–0.2)
BILIRUB SERPL-MCNC: 0.4 MG/DL (ref 0.3–1.2)
BUN BLDV-MCNC: 25 MG/DL (ref 8–23)
BUN/CREAT BLD: 19 (ref 9–20)
CALCIUM SERPL-MCNC: 9 MG/DL (ref 8.6–10.4)
CHLORIDE BLD-SCNC: 94 MMOL/L (ref 98–107)
CO2: 23 MMOL/L (ref 20–31)
CREAT SERPL-MCNC: 1.31 MG/DL (ref 0.7–1.2)
EOSINOPHILS RELATIVE PERCENT: 1 % (ref 1–4)
GFR SERPL CREATININE-BSD FRML MDRD: 53 ML/MIN/1.73M2
GLUCOSE BLD-MCNC: 103 MG/DL (ref 74–100)
GLUCOSE BLD-MCNC: 170 MG/DL (ref 74–100)
GLUCOSE BLD-MCNC: 177 MG/DL (ref 70–99)
GLUCOSE BLD-MCNC: 178 MG/DL (ref 74–100)
GLUCOSE BLD-MCNC: 200 MG/DL (ref 74–100)
HCT VFR BLD CALC: 30.8 % (ref 40.7–50.3)
HEMOGLOBIN: 10.2 G/DL (ref 13–17)
IMMATURE GRANULOCYTES: 0 %
LYMPHOCYTES # BLD: 10 % (ref 24–43)
MCH RBC QN AUTO: 29.9 PG (ref 25.2–33.5)
MCHC RBC AUTO-ENTMCNC: 33.1 G/DL (ref 28.4–34.8)
MCV RBC AUTO: 90.3 FL (ref 82.6–102.9)
MONOCYTES # BLD: 14 % (ref 3–12)
MORPHOLOGY: NORMAL
NRBC AUTOMATED: 0 PER 100 WBC
PDW BLD-RTO: 14.9 % (ref 11.8–14.4)
PLATELET # BLD: 228 K/UL (ref 138–453)
PMV BLD AUTO: 10.7 FL (ref 8.1–13.5)
POTASSIUM SERPL-SCNC: 4.3 MMOL/L (ref 3.7–5.3)
RBC # BLD: 3.41 M/UL (ref 4.21–5.77)
SEG NEUTROPHILS: 75 % (ref 36–65)
SEGMENTED NEUTROPHILS ABSOLUTE COUNT: 10.35 K/UL (ref 1.5–8.1)
SODIUM BLD-SCNC: 128 MMOL/L (ref 135–144)
TOTAL PROTEIN: 6.5 G/DL (ref 6.4–8.3)
WBC # BLD: 13.8 K/UL (ref 3.5–11.3)

## 2022-12-03 PROCEDURE — 94664 DEMO&/EVAL PT USE INHALER: CPT

## 2022-12-03 PROCEDURE — 97110 THERAPEUTIC EXERCISES: CPT

## 2022-12-03 PROCEDURE — 36415 COLL VENOUS BLD VENIPUNCTURE: CPT

## 2022-12-03 PROCEDURE — 97530 THERAPEUTIC ACTIVITIES: CPT

## 2022-12-03 PROCEDURE — 85025 COMPLETE CBC W/AUTO DIFF WBC: CPT

## 2022-12-03 PROCEDURE — 94669 MECHANICAL CHEST WALL OSCILL: CPT

## 2022-12-03 PROCEDURE — 94761 N-INVAS EAR/PLS OXIMETRY MLT: CPT

## 2022-12-03 PROCEDURE — 6360000002 HC RX W HCPCS: Performed by: NURSE PRACTITIONER

## 2022-12-03 PROCEDURE — 2700000000 HC OXYGEN THERAPY PER DAY

## 2022-12-03 PROCEDURE — 94640 AIRWAY INHALATION TREATMENT: CPT

## 2022-12-03 PROCEDURE — 6370000000 HC RX 637 (ALT 250 FOR IP): Performed by: FAMILY MEDICINE

## 2022-12-03 PROCEDURE — 2580000003 HC RX 258: Performed by: NURSE PRACTITIONER

## 2022-12-03 PROCEDURE — 97535 SELF CARE MNGMENT TRAINING: CPT

## 2022-12-03 PROCEDURE — 1200000000 HC SEMI PRIVATE

## 2022-12-03 PROCEDURE — 6370000000 HC RX 637 (ALT 250 FOR IP): Performed by: NURSE PRACTITIONER

## 2022-12-03 PROCEDURE — 82947 ASSAY GLUCOSE BLOOD QUANT: CPT

## 2022-12-03 PROCEDURE — 80053 COMPREHEN METABOLIC PANEL: CPT

## 2022-12-03 RX ADMIN — ALOGLIPTIN 25 MG: 25 TABLET, FILM COATED ORAL at 08:23

## 2022-12-03 RX ADMIN — MOMETASONE FUROATE AND FORMOTEROL FUMARATE DIHYDRATE 2 PUFF: 200; 5 AEROSOL RESPIRATORY (INHALATION) at 19:52

## 2022-12-03 RX ADMIN — MULTIPLE VITAMINS W/ MINERALS TAB 1 TABLET: TAB at 08:23

## 2022-12-03 RX ADMIN — OXYBUTYNIN CHLORIDE 5 MG: 5 TABLET ORAL at 20:37

## 2022-12-03 RX ADMIN — LEVOTHYROXINE SODIUM 137 MCG: 25 TABLET ORAL at 08:23

## 2022-12-03 RX ADMIN — PROPRANOLOL HYDROCHLORIDE 120 MG: 60 CAPSULE, EXTENDED RELEASE ORAL at 08:23

## 2022-12-03 RX ADMIN — IPRATROPIUM BROMIDE AND ALBUTEROL SULFATE 1 AMPULE: .5; 3 SOLUTION RESPIRATORY (INHALATION) at 19:52

## 2022-12-03 RX ADMIN — IPRATROPIUM BROMIDE AND ALBUTEROL SULFATE 1 AMPULE: .5; 3 SOLUTION RESPIRATORY (INHALATION) at 04:37

## 2022-12-03 RX ADMIN — CEFTRIAXONE 1000 MG: 1 INJECTION, POWDER, FOR SOLUTION INTRAMUSCULAR; INTRAVENOUS at 12:40

## 2022-12-03 RX ADMIN — IPRATROPIUM BROMIDE AND ALBUTEROL SULFATE 1 AMPULE: .5; 3 SOLUTION RESPIRATORY (INHALATION) at 15:06

## 2022-12-03 RX ADMIN — PRIMIDONE 250 MG: 250 TABLET ORAL at 17:33

## 2022-12-03 RX ADMIN — INSULIN LISPRO 1 UNITS: 100 INJECTION, SOLUTION INTRAVENOUS; SUBCUTANEOUS at 12:37

## 2022-12-03 RX ADMIN — PANTOPRAZOLE SODIUM 40 MG: 40 TABLET, DELAYED RELEASE ORAL at 08:23

## 2022-12-03 RX ADMIN — FUROSEMIDE 40 MG: 40 TABLET ORAL at 08:23

## 2022-12-03 RX ADMIN — ASPIRIN 81 MG 81 MG: 81 TABLET ORAL at 08:23

## 2022-12-03 RX ADMIN — IPRATROPIUM BROMIDE AND ALBUTEROL SULFATE 1 AMPULE: .5; 3 SOLUTION RESPIRATORY (INHALATION) at 10:17

## 2022-12-03 RX ADMIN — MOMETASONE FUROATE AND FORMOTEROL FUMARATE DIHYDRATE 2 PUFF: 200; 5 AEROSOL RESPIRATORY (INHALATION) at 10:17

## 2022-12-03 RX ADMIN — PROPRANOLOL HYDROCHLORIDE 120 MG: 60 CAPSULE, EXTENDED RELEASE ORAL at 17:33

## 2022-12-03 RX ADMIN — FUROSEMIDE 40 MG: 40 TABLET ORAL at 17:33

## 2022-12-03 RX ADMIN — METFORMIN HYDROCHLORIDE 1000 MG: 500 TABLET ORAL at 08:23

## 2022-12-03 RX ADMIN — OXYBUTYNIN CHLORIDE 5 MG: 5 TABLET ORAL at 08:23

## 2022-12-03 RX ADMIN — AZITHROMYCIN MONOHYDRATE 500 MG: 250 TABLET ORAL at 12:37

## 2022-12-03 RX ADMIN — METFORMIN HYDROCHLORIDE 1000 MG: 500 TABLET ORAL at 20:37

## 2022-12-03 RX ADMIN — MONTELUKAST 10 MG: 10 TABLET, FILM COATED ORAL at 08:23

## 2022-12-03 RX ADMIN — PRIMIDONE 250 MG: 250 TABLET ORAL at 08:23

## 2022-12-03 RX ADMIN — SODIUM CHLORIDE, PRESERVATIVE FREE 10 ML: 5 INJECTION INTRAVENOUS at 08:23

## 2022-12-03 RX ADMIN — SODIUM CHLORIDE, PRESERVATIVE FREE 10 ML: 5 INJECTION INTRAVENOUS at 20:38

## 2022-12-03 RX ADMIN — TAMSULOSIN HYDROCHLORIDE 0.8 MG: 0.4 CAPSULE ORAL at 17:33

## 2022-12-03 RX ADMIN — ENOXAPARIN SODIUM 30 MG: 100 INJECTION SUBCUTANEOUS at 08:23

## 2022-12-03 RX ADMIN — ENOXAPARIN SODIUM 30 MG: 100 INJECTION SUBCUTANEOUS at 20:37

## 2022-12-03 NOTE — PROGRESS NOTES
RESPIRATORY ASSESSMENT PROTOCOL                                                                                              Patient Name: Yusra King Room#: [id number] : 1935     Admitting diagnosis: Pneumonia of both lungs due to infectious organism, unspecified part of lung [J18.9]  Multifocal pneumonia [J18.9]       Medical History:   Past Medical History:   Diagnosis Date    COPD (chronic obstructive pulmonary disease) ([facility]     Diabetes mellitus ([facility]     Hx of echocardiogram 2017    [facility]    Hyperlipidemia     Hypothyroidism     MI (myocardial infarction) ([facility]     Stroke [facility])     Thyroid disease     Type II or unspecified type diabetes mellitus without mention of complication, not stated as uncontrolled        PATIENT ASSESSMENT    LABORATORY DATA  Hematology:   Lab Results   Component Value Date/Time    WBC 13.8 2022 06:15 AM    RBC 3.41 2022 06:15 AM    HGB 10.2 2022 06:15 AM    HCT 30.8 2022 06:15 AM     2022 06:15 AM     Chemistry:    Lab Results   Component Value Date/Time    PHART 7.340 2017 11:25 PM    YDK0VET 53.8 2017 11:25 PM    PO2ART 62.0 2017 11:25 PM    J5PJZBGA 90.1 2017 11:25 PM    XMY7KZP 28.4 2017 11:25 PM    PBEA 1.4 2017 11:25 PM       VITALS  Heart Rate: 64   Resp: 22  BP: (!) 129/57  SpO2: 93 % O2 Device: None (Room air)  Temp: 97.3 °F (36.3 °C)    SKIN COLOR  [x] Normal  [] Pale  [] Dusky  [] Cyanotic    RESPIRATORY PATTERN  [x] Normal  [] Dyspnea  [] Cheyne-Veras  [] Kussmaul  [] Biots    AMBULATORY  [x] Yes  [] No  [x] With Assistance      Patient Acuity 0 1 2 3 4 Score   Level of Concious (LOC) [x]  Alert & Oriented or Pt normal LOC []  Confused;follows directions []  Confused & uncooper-ative []  Obtunded []  Comatose 0   Respiratory Rate  (RR) []  Reg. rate & pattern. 12 - 20 bpm  []  Increased RR.  Greater than 20 bpm   [x]  SOB w/ exertion or RR greater than 24 bpm []  Access- ory muscle use at rest. Abn.  resp. []  SOB at rest.   2   Bilateral Breath Sounds (BBS) []  Clear []  Diminish-ed bases  []  Diminish-ed t/o, or rales   [x]  Sporadic, scattered wheezes or rhonchi []  Persistentwheezes and, or absent BBS 3   Cough []  Strong, effective, & non-prod. [x]  Effective & prod. Less than 25 ml (2 TBSP) over past 24 hrs []  Ineffective & non-prod to less than 25 ML over past 24 hrs []  Ineffective and, or greater than 25 ml sputum prod. past 24 hrs. []  Nonspon- taneous; Requires suctioning 1   Pulmonary History  (PULM HX) []  No smoking and no chronic pulmonaryhistory []  Former smoker. Quit over 12 mos. ago []  Current smoker or quit w/ in 12 mos []  Pulm. History and, or 20 pk/yr smoking hx [x]  Admitted w/ acute pulm. dx and, or has been admitted w/ pulm. dx 2 or more times over past 12 mos 4   Surgical History this Admit  (SURG HX) [x]  No surgery []  General surgery []  Lower abdominal []  Thoracic or upper abdominal   []  Thoracic w/ pulm. disease 0   Chest X-Ray (CXR)/CT Scan []  Clear or not applicable []  Not available []  Atelect- asis or pleural effusions []  Localized infiltrate or pulm. edema [x]  Con-solidated Infiltrates, bilateral, or in more than 1 lobe 4   Slow or Forced VC, FEV1 OR PEFR (PULM FXN)  [x]  80% or greater, or not indicated []  Pt. unable to perform []  FEV1 or PEFR or VC 51-79%.   []  FEV1 or PEFR or VC  30-49%   []  FEV1 or PEFR or VC less than 30%   0   TOTAL ACUITY: 14       CARE PLAN    If Acuity Level is 2, 3, or 4 in any of the following:    [x] BILATERAL BREATH SOUNDS (BBS)     [x] PULMONARY HISTORY (PULM HX)  [] PULMONARY FUNCTION (PULM FX)    Goal: Improve respiratory functions in patients with airway disease and decrease WOB    [x] AEROSOL PROTOCOL    Total Acuity:   16-32  []  Secondary Assessment in 24 hrs Total Acuity:  9-15  [x]  Secondary Assessment in 24 hrs Total Acuity:  4-8  []  Secondary Assessment in 48 hrs Total Acuity:  0-3  []  Secondary Assessment in 72 hrs   HHN AEROSOL THERAPY with  [physician-ordered bronchodilator(s)] q 4 & Albuterol PRN q2 hrs. Breath-Actuated Neb if BBS Acuity = 4, and pt. can use MP. Notify physician if condition deteriorates. HHN AEROSOL THERAPY with  [physician-ordered bronchodilator(s)]  QID and Albuterol PRN q4 hrs. Breath-Actuated Neb if BBS Acuity = 4, and pt. can use MP. Notify physician if condition deteriorates. MDI THERAPY with  2 actuations of [physician-ordered bronchodilator(s)] via spacer TID Albuterol and PRNq4 hrs. If unable to utilize MDI: HHN [physician-ordered bronchodilator(s)] TID and Albuterol PRN q4 hrs. Notify physician if condition deteriorates. MDI THERAPY with  [physician-ordered bronchodilator(s)] via spacer TID PRN. If unable to utilize MDI: HHN [physician-ordered bronchodilator(s)] TID PRN. Notify physician if condition deteriorates. If Acuity Level is 2, 3, or 4 in any of the following:    [] COUGH     [] SURGICAL HISTORY (SURG HX)  [x] CHEST XRAY (CXR)    Goal: Improvement in sputum mobilization in patients with ineffective airway clearance. Reverse atelectasis.     [x] Bronchopulmonary Hygiene Protocol    Total Acuity:   16-32  []  Secondary Assessment in 24 hrs Total Acuity:  9-15  [x]  Secondary Assessment in 24 hrs Total Acuity:  4-8  []  Secondary Assessment in 48 hrs Total Acuity:  0-3  []  Secondary Assessment in 72 hrs   METANEB QID with [physician-ordered bronchodilator(s)] if CXR Acuity = 4; otherwise:  PD&P, PEP, or Vest QID & PRN  NT Sxn PRN for ineffective cough  METANEB QID with [physician-ordered bronchodilator(s)] if CXR Acuity = 4; otherwise:  PD&P, PEP, or Vest TID & PRN  NT Sxn PRN for ineffective cough  Instruct patient to self-perform IS q1hr WA   Directed Cough self-performed q1hr WA     If Acuity Level is 2 or above in the following:    [] PULMONARY HISTORY (PULM HX)    Goal: Assist patient in quitting smoking to slow or stop the progression of lung disease.     [] Smoking Cessation Protocol    SMOKING CESSATION EDUCATION provided according to policy OM_[id number]: (karson with an X)  ____Yes    ____ No     ____ NA    Smoking Cessation Booklet given:  ____Yes  ____No ____Patient Seema Jin

## 2022-12-03 NOTE — PROGRESS NOTES
Progress Note    SUBJECTIVE:  FU related to still some cough. Denies any shortness of breath. Still on oxygen. OBJECTIVE:    Vitals:   TEMPERATURE:  Current - Temp: 98.8 °F (37.1 °C); Max - Temp  Av.9 °F (37.2 °C)  Min: 98.5 °F (36.9 °C)  Max: 99.6 °F (37.6 °C)  RESPIRATIONS RANGE: Resp  Av.4  Min: 20  Max: 25  PULSE RANGE: Pulse  Av.3  Min: 65  Max: 83  BLOOD PRESSURE RANGE:  Systolic (56DID), WWE:135 , Min:112 , ION:428   ; Diastolic (03QUD), TDV:77, Min:51, Max:127    PULSE OXIMETRY RANGE: SpO2  Av.8 %  Min: 88 %  Max: 96 %  24HR INTAKE/OUTPUT:    Intake/Output Summary (Last 24 hours) at 12/3/2022 0732  Last data filed at 12/3/2022 0246  Gross per 24 hour   Intake 860 ml   Output 350 ml   Net 510 ml     -----------------------------------------------------------------  Exam:  General: A & O x3 and alert  HEENT: Supple neck & negative  Heart: Regular  Lungs:  Decreased breath sounds just a trace crackles. No wheeze.   Abdomen: Normal & soft, No tenderness and BS normal  Extremities:  No edema   Neuro: NonFocal     -----------------------------------------------------------------  Diagnostic Data:  Lab Results   Component Value Date    WBC 13.8 (H) 2022    HGB 10.2 (L) 2022     2022       Lab Results   Component Value Date    BUN 25 (H) 2022    CREATININE 1.31 (H) 2022     (L) 2022    K 4.3 2022    CALCIUM 9.0 2022    CL 94 (L) 2022    CO2 23 2022    LABGLOM 53 (L) 2022       Lab Results   Component Value Date    WBCUA 5 TO 10 2022    RBCUA 0 TO 2 2022    EPITHUA 0 TO 2 2022    LEUKOCYTESUR SMALL (A) 2022    SPECGRAV 1.015 2022    GLUCOSEU NEGATIVE 2022    KETUA NEGATIVE 2022    PROTEINU TRACE (A) 2022    HGBUR NEGATIVE 2022    CASTUA NOT REPORTED 2021    CRYSTUA NOT REPORTED 2021    BACTERIA 1+ (A) 2022    YEAST NOT REPORTED 2021 Lab Results   Component Value Date    TROPONINT <0.03 04/07/2018    PROBNP 192 10/21/2022       CT CHEST PULMONARY EMBOLISM W CONTRAST    Result Date: 12/2/2022  EXAMINATION: CTA OF THE CHEST 12/2/2022 9:27 am TECHNIQUE: CTA of the chest was performed after the administration of intravenous contrast.  Multiplanar reformatted images are provided for review. MIP images are provided for review. Automated exposure control, iterative reconstruction, and/or weight based adjustment of the mA/kV was utilized to reduce the radiation dose to as low as reasonably achievable. COMPARISON: 08/19/2022 HISTORY: ORDERING SYSTEM PROVIDED HISTORY: cough, hypoxia TECHNOLOGIST PROVIDED HISTORY: cough, hypoxia Decision Support Exception - unselect if not a suspected or confirmed emergency medical condition->Emergency Medical Condition (MA) 44-year-old male with cough and hypoxia. FINDINGS: Pulmonary Arteries: No obvious filling defect in the central main, right main, or left main pulmonary arteries that meets the criteria for pulmonary embolus. Evaluation of the lobar pulmonary arterial vasculature and beyond is limited due to suboptimal bolus timing, respiratory motion, and streak artifact from the contrast bolus in the SVC. Mediastinum: Atheromatous plaque and atherosclerotic calcification and tortuosity of the thoracic aorta. No dissection flap within the visualized thoracic aorta. Atrophic thyroid gland. Coronary artery disease. Right hilar lymph node enlargement. Cardiomegaly. Trace pericardial fluid. No sizable pericardial effusion. No sizable pleural effusions. No periaortic or mediastinal hemorrhage. Lungs/pleura: Trachea and proximal central airways appear patent. Consolidation in the bilateral lower lobes, left greater than right primarily in the left lower lobe. Mild dependent atelectasis and respiratory motion. Ground-glass opacities in the right parahilar region. No pneumothorax. Mild emphysema.  Upper Abdomen: Atheromatous plaque and atherosclerotic calcification of the upper abdominal aorta and branch vasculature. Scattered calcified granulomata throughout the spleen. Fatty liver. Distended gallbladder. Stable left adrenal mass measuring 2 cm on image 231, series 2 and lateral limb left adrenal mass measuring 1.5 cm on image 244, series 2, the superior left adrenal mass which is visualized, unchanged dating back to 11/14/2017. The lateral limb left adrenal mass is present dating back to 04/06/2018. Soft Tissues/Bones: Mild degenerative changes throughout the spine. Diffuse idiopathic skeletal hyperostosis. 1. No clear evidence for central pulmonary embolus within the limitations of this study. 2. Multifocal airspace disease which could represent a combination of atelectasis and or infiltrate/multifocal pneumonia. Follow-up is recommended to document resolution. 3. Mild emphysema. 4. Atheromatous plaque and atherosclerotic calcification as well as tortuosity of the thoracic aorta. 5. Atrophic thyroid gland. 6. Coronary artery disease. Cardiomegaly. 7. Right hilar lymph node enlargement which may be reactive. 8. Distended gallbladder. Fatty liver. 9. Stable left adrenal masses measuring up to 2 cm, unchanged dating back to 2017 and 2018 exams, likely left adrenal adenomas. ASSESSMENT:    Principal Problem:    Multifocal pneumonia  Active Problems:    Type 2 diabetes mellitus with complication, without long-term current use of insulin (HCC)    Chronic combined systolic and diastolic CHF (congestive heart failure) (HCC)    COPD (chronic obstructive pulmonary disease) (HCC)    Elevated liver enzymes  Resolved Problems:    * No resolved hospital problems.  *      Patient Active Problem List    Diagnosis Date Noted    Multifocal pneumonia 12/02/2022    Community acquired bacterial pneumonia 12/30/2021    Hypoxia 12/30/2021    Moderate persistent asthma without complication 43/82/3345    Hypothyroidism due to Hashimoto's thyroiditis 11/05/2018    Elevated liver enzymes 11/02/2018    Adrenal adenoma, left 10/16/2018    COPD (chronic obstructive pulmonary disease) ([facility] 11/15/2017    Chronic combined systolic and diastolic CHF (congestive heart failure) ([facility] 09/21/2017    Knee osteoarthritis 06/11/2015    Obesity (BMI 30.0-34.9) 02/05/2015    Venous (peripheral) insufficiency 02/05/2015    Hx pulmonary embolism 02/05/2015    Hearing impaired 02/05/2015    Edema 02/05/2015    Type 2 diabetes mellitus with complication, without long-term current use of insulin ([facility] 02/05/2015       PLAN:  Wean oxygen. Antibiotics. Supportive care.   Critical Care Time: Neetu Bocanegra MD , M.D.

## 2022-12-03 NOTE — PROGRESS NOTES
Physical Therapy  Facility/Department: [facility] AT THE [facility] MED SURG  Daily Treatment Note  NAME: Belén Fowler  : 1935  MRN: [id number]    Date of Service: 12/3/2022    Discharge Recommendations:  Continue to assess pending progress, Home with assist PRN        Patient Diagnosis(es): The encounter diagnosis was Pneumonia of both lungs due to infectious organism, unspecified part of lung. Assessment   Assessment: Pt refusing ambulation this date stating it hurts to walk and maybe tomorrow. SBA for sit t stand transfers, BLE seated 2x15  Activity Tolerance: Patient tolerated treatment well;Patient limited by pain     Plan    Physcial Therapy Plan  General Plan: 2 times a day 7 days a week  Current Treatment Recommendations: Strengthening;ROM;Balance training;Functional mobility training;Transfer training;Gait training;Stair training;Neuromuscular re-education;Patient/Caregiver education & training; Safety education & training;Home exercise program;Therapeutic activities; Endurance training     Restrictions  Restrictions/Precautions  Restrictions/Precautions: General Precautions, Fall Risk     Subjective    Subjective  Subjective: Pt in recliner and agreeable to therapy  Pain: 4 or 5/10 BLE during ambulation  Orientation  Overall Orientation Status: Within Functional Limits     Objective   Vitals     Bed Mobility Training  Bed Mobility Training: No  Transfer Training  Transfer Training: Yes  Overall Level of Assistance: Stand-by assistance  Sit to Stand: Stand-by assistance  Stand to Sit: Stand-by assistance  Gait  Overall Level of Assistance: Contact-guard assistance  Assistive Device: Cane, straight     PT Exercises  Exercise Treatment: BLE  seated 2x15     Safety Devices  Type of Devices: Call light within reach; Left in chair;Chair alarm in place       Goals  Short Term Goals  Time Frame for Short Term Goals: 20 days  Short Term Goal 1: Patient to complete all transfers with LRAD and mod.  IND with no LOB to decrease fall risk. Short Term Goal 2: Patient to ambulate [id number] with SPC and SUP with no LOB or fatigue to improve endurance. Short Term Goal 3: Patient to ascend/descend 2 steps with B HR and SUP with no LOB to safely enter his home. Short Term Goal 4: Patient to tolerate 20-30 min of ther ex/act to improve functional strength.     Education  Patient Education  Education Given To: Patient  Education Provided: Role of Therapy;Plan of Care;Home Exercise Program;Transfer Training    Therapy Time   Individual Concurrent Group Co-treatment   Time In 0910         Time Out 0933         Minutes [address] [place], [place]

## 2022-12-03 NOTE — PROGRESS NOTES
Occupational Therapy  Facility/Department: [facility] AT THE [facility] MED SURG  Daily Treatment Note  NAME: Darrick Holliday  : 1935  MRN: [id number]    Date of Service: 12/3/2022    Discharge Recommendations:  Continue to assess pending progress, Home with assist PRN         Patient Diagnosis(es): The encounter diagnosis was Pneumonia of both lungs due to infectious organism, unspecified part of lung. Assessment    Activity Tolerance: Patient tolerated treatment well;Patient limited by fatigue  Discharge Recommendations: Continue to assess pending progress;Home with assist PRN      Plan   Occupational Therapy Plan  Times Per Week: 7x/wk  Times Per Day: Once a day  Current Treatment Recommendations: Strengthening; Functional mobility training; Safety education & training;Self-Care / ADL     Restrictions  Restrictions/Precautions  Restrictions/Precautions: General Precautions; Fall Risk    Subjective   Subjective  Subjective: Pt sitting up in bedside chair upon arrival. Pt agreed to participate in therapy session. Pain: Pt had no complaints of pain. Objective    Vitals     Transfer Training  Transfer Training: Yes  Sit to Stand: Stand-by assistance  Stand to Sit: Stand-by assistance  Gait  Overall Level of Assistance: Contact-guard assistance  Assistive Device: Cane, straight     ADL  Toileting: Stand by assistance  Toileting Skilled Clinical Factors: SBA to complete posterior hygiene and clothing mgmt. OT Exercises  Exercise Treatment: Pt tolerated BUE ther ex with 1# dumbbell x 7 planes x 15 reps x 1 set, yellow flex bar x 3 variations x 15 reps x 1 set to increase UE strength and endurance in order to ease completion of ADL tasks. Pt required RBs as needed secondary to fatigue. Safety Devices  Type of Devices: Call light within reach; Left in chair;Chair alarm in place     Patient Education  Education Given To: Patient  Education Provided: Role of Therapy;Plan of Care  Education Method: Verbal  Barriers to Learning: None  Education Outcome: Verbalized understanding    Goals  Short Term Goals  Time Frame for Short Term Goals: 20 visits  Short Term Goal 1: Pt. will tolerate 15 mins of BUE ther ex/act to increase overall strength/activity tolerance for functional tasks. Short Term Goal 2: Pt. will complete ADL routine with mod I with use of EC techs PRN.   Short Term Goal 3: Pt. will complete functional ADL transfers/mobility with mod I and G safety,       Therapy Time   Individual Concurrent Group Co-treatment   Time In 0831         Time Out 0856         Minutes 25                 SHAHRAM Rodriguez/MIAN

## 2022-12-03 NOTE — PROGRESS NOTES
HS meds given at this time, lovenox 30mg given in RLQ, PO 1000mg metformin, 5mg oxybutynin given and 10ml flush. Computer froze while writer was trying to badge for meds and will not restart, writer now unable to chart meds as given.

## 2022-12-03 NOTE — PLAN OF CARE
Problem: Discharge Planning  Goal: Discharge to home or other facility with appropriate resources  Outcome: Progressing  Flowsheets (Taken 12/2/2022 2144)  Discharge to home or other facility with appropriate resources:   Identify barriers to discharge with patient and caregiver   Arrange for needed discharge resources and transportation as appropriate   Identify discharge learning needs (meds, wound care, etc)     Problem: Safety - Adult  Goal: Free from fall injury  Outcome: Progressing  Flowsheets (Taken 12/2/2022 2144)  Free From Fall Injury: Instruct family/caregiver on patient safety     Problem: Respiratory - Adult  Goal: Achieves optimal ventilation and oxygenation  Outcome: Progressing  Flowsheets (Taken 12/2/2022 2144)  Achieves optimal ventilation and oxygenation:   Assess for changes in respiratory status   Assess for changes in mentation and behavior   Position to facilitate oxygenation and minimize respiratory effort   Oxygen supplementation based on oxygen saturation or arterial blood gases     Problem: Cardiovascular - Adult  Goal: Maintains optimal cardiac output and hemodynamic stability  Outcome: Progressing  Goal: Absence of cardiac dysrhythmias or at baseline  Outcome: Progressing     Problem: Skin/Tissue Integrity - Adult  Goal: Skin integrity remains intact  Outcome: Progressing  Goal: Incisions, wounds, or drain sites healing without S/S of infection  Outcome: Progressing  Goal: Oral mucous membranes remain intact  Outcome: Progressing     Problem: Infection - Adult  Goal: Absence of infection at discharge  Outcome: Progressing  Flowsheets (Taken 12/2/2022 2144)  Absence of infection at discharge:   Assess and monitor for signs and symptoms of infection   Monitor lab/diagnostic results   Monitor all insertion sites i.e., indwelling lines, tubes and drains   Administer medications as ordered  Goal: Absence of infection during hospitalization  Outcome: Progressing  Goal: Absence of fever/infection during anticipated neutropenic period  Outcome: Progressing

## 2022-12-03 NOTE — PROGRESS NOTES
Vitals and assessment completed as charted at this time. Patient sitting up in chair talking with visitor when writer entered room. Patient denies any needs at this time. Patient still has an occasional strong moist cough. Patient denies any further needs at this time. Call light remains within reach.

## 2022-12-03 NOTE — PROGRESS NOTES
Vitals and reassessment completed at this time. Patient was resting in bed with eyes closed when writer entered room and no distress noted. Patient remains on 2L nasal cannula while sleeping and SpO2 remains mid 90's. Patient denies any needs at this time. Call light remains within reach and bed alarm engaged for safety.

## 2022-12-03 NOTE — PROGRESS NOTES
Patient awake and in bed. VS and assessment completed. Patients home TEDs placed back on patient. Patient denies any pain. Patient up to chair. Patient reeducated on acapella. Patient performed x5 for writer.  Denies any other needs at this time

## 2022-12-03 NOTE — PROGRESS NOTES
Patient's SpO2 88-89% while on room air while sleeping. Writer placed 2L nasal cannula on patient, will continue to monitor pulse ox.

## 2022-12-04 LAB
ABSOLUTE EOS #: 0.29 K/UL (ref 0–0.44)
ABSOLUTE IMMATURE GRANULOCYTE: 0.1 K/UL (ref 0–0.3)
ABSOLUTE LYMPH #: 1.06 K/UL (ref 1.1–3.7)
ABSOLUTE MONO #: 1.25 K/UL (ref 0.1–1.2)
ALBUMIN SERPL-MCNC: 3.2 G/DL (ref 3.5–5.2)
ALBUMIN/GLOBULIN RATIO: 1.1 (ref 1–2.5)
ALP BLD-CCNC: 139 U/L (ref 40–129)
ALT SERPL-CCNC: 52 U/L (ref 5–41)
ANION GAP SERPL CALCULATED.3IONS-SCNC: 11 MMOL/L (ref 9–17)
AST SERPL-CCNC: 40 U/L
BASOPHILS # BLD: 0 % (ref 0–2)
BASOPHILS ABSOLUTE: 0 K/UL (ref 0–0.2)
BILIRUB SERPL-MCNC: 0.3 MG/DL (ref 0.3–1.2)
BUN BLDV-MCNC: 23 MG/DL (ref 8–23)
BUN/CREAT BLD: 20 (ref 9–20)
CALCIUM SERPL-MCNC: 8.7 MG/DL (ref 8.6–10.4)
CHLORIDE BLD-SCNC: 95 MMOL/L (ref 98–107)
CO2: 24 MMOL/L (ref 20–31)
CREAT SERPL-MCNC: 1.16 MG/DL (ref 0.7–1.2)
EOSINOPHILS RELATIVE PERCENT: 3 % (ref 1–4)
ESTIMATED AVERAGE GLUCOSE: 160 MG/DL
GFR SERPL CREATININE-BSD FRML MDRD: >60 ML/MIN/1.73M2
GLUCOSE BLD-MCNC: 111 MG/DL (ref 74–100)
GLUCOSE BLD-MCNC: 134 MG/DL (ref 74–100)
GLUCOSE BLD-MCNC: 140 MG/DL (ref 70–99)
GLUCOSE BLD-MCNC: 157 MG/DL (ref 74–100)
GLUCOSE BLD-MCNC: 198 MG/DL (ref 74–100)
HBA1C MFR BLD: 7.2 % (ref 4–6)
HCT VFR BLD CALC: 31 % (ref 40.7–50.3)
HEMOGLOBIN: 9.8 G/DL (ref 13–17)
IMMATURE GRANULOCYTES: 1 %
LYMPHOCYTES # BLD: 11 % (ref 24–43)
MCH RBC QN AUTO: 28.7 PG (ref 25.2–33.5)
MCHC RBC AUTO-ENTMCNC: 31.6 G/DL (ref 28.4–34.8)
MCV RBC AUTO: 90.9 FL (ref 82.6–102.9)
MONOCYTES # BLD: 13 % (ref 3–12)
MORPHOLOGY: NORMAL
NRBC AUTOMATED: 0 PER 100 WBC
PDW BLD-RTO: 14.8 % (ref 11.8–14.4)
PLATELET # BLD: 216 K/UL (ref 138–453)
PMV BLD AUTO: 10.7 FL (ref 8.1–13.5)
POTASSIUM SERPL-SCNC: 4 MMOL/L (ref 3.7–5.3)
RBC # BLD: 3.41 M/UL (ref 4.21–5.77)
SEG NEUTROPHILS: 72 % (ref 36–65)
SEGMENTED NEUTROPHILS ABSOLUTE COUNT: 6.9 K/UL (ref 1.5–8.1)
SODIUM BLD-SCNC: 130 MMOL/L (ref 135–144)
TOTAL PROTEIN: 6.2 G/DL (ref 6.4–8.3)
WBC # BLD: 9.6 K/UL (ref 3.5–11.3)

## 2022-12-04 PROCEDURE — 6370000000 HC RX 637 (ALT 250 FOR IP): Performed by: FAMILY MEDICINE

## 2022-12-04 PROCEDURE — 2580000003 HC RX 258: Performed by: NURSE PRACTITIONER

## 2022-12-04 PROCEDURE — 97110 THERAPEUTIC EXERCISES: CPT

## 2022-12-04 PROCEDURE — 94640 AIRWAY INHALATION TREATMENT: CPT

## 2022-12-04 PROCEDURE — 82947 ASSAY GLUCOSE BLOOD QUANT: CPT

## 2022-12-04 PROCEDURE — 85025 COMPLETE CBC W/AUTO DIFF WBC: CPT

## 2022-12-04 PROCEDURE — 97116 GAIT TRAINING THERAPY: CPT

## 2022-12-04 PROCEDURE — 94664 DEMO&/EVAL PT USE INHALER: CPT

## 2022-12-04 PROCEDURE — 1200000000 HC SEMI PRIVATE

## 2022-12-04 PROCEDURE — 80053 COMPREHEN METABOLIC PANEL: CPT

## 2022-12-04 PROCEDURE — 36415 COLL VENOUS BLD VENIPUNCTURE: CPT

## 2022-12-04 PROCEDURE — 6370000000 HC RX 637 (ALT 250 FOR IP): Performed by: NURSE PRACTITIONER

## 2022-12-04 PROCEDURE — 94669 MECHANICAL CHEST WALL OSCILL: CPT

## 2022-12-04 PROCEDURE — 94761 N-INVAS EAR/PLS OXIMETRY MLT: CPT

## 2022-12-04 PROCEDURE — 6360000002 HC RX W HCPCS: Performed by: NURSE PRACTITIONER

## 2022-12-04 PROCEDURE — 2700000000 HC OXYGEN THERAPY PER DAY

## 2022-12-04 RX ADMIN — METFORMIN HYDROCHLORIDE 1000 MG: 500 TABLET ORAL at 07:50

## 2022-12-04 RX ADMIN — PROPRANOLOL HYDROCHLORIDE 120 MG: 60 CAPSULE, EXTENDED RELEASE ORAL at 07:50

## 2022-12-04 RX ADMIN — CEFTRIAXONE 1000 MG: 1 INJECTION, POWDER, FOR SOLUTION INTRAMUSCULAR; INTRAVENOUS at 12:55

## 2022-12-04 RX ADMIN — OXYBUTYNIN CHLORIDE 5 MG: 5 TABLET ORAL at 07:49

## 2022-12-04 RX ADMIN — OXYBUTYNIN CHLORIDE 5 MG: 5 TABLET ORAL at 20:15

## 2022-12-04 RX ADMIN — LEVOTHYROXINE SODIUM 137 MCG: 25 TABLET ORAL at 07:50

## 2022-12-04 RX ADMIN — IPRATROPIUM BROMIDE AND ALBUTEROL SULFATE 1 AMPULE: .5; 3 SOLUTION RESPIRATORY (INHALATION) at 10:05

## 2022-12-04 RX ADMIN — IPRATROPIUM BROMIDE AND ALBUTEROL SULFATE 1 AMPULE: .5; 3 SOLUTION RESPIRATORY (INHALATION) at 04:57

## 2022-12-04 RX ADMIN — PRIMIDONE 250 MG: 250 TABLET ORAL at 17:13

## 2022-12-04 RX ADMIN — MOMETASONE FUROATE AND FORMOTEROL FUMARATE DIHYDRATE 2 PUFF: 200; 5 AEROSOL RESPIRATORY (INHALATION) at 10:05

## 2022-12-04 RX ADMIN — PANTOPRAZOLE SODIUM 40 MG: 40 TABLET, DELAYED RELEASE ORAL at 07:50

## 2022-12-04 RX ADMIN — ENOXAPARIN SODIUM 30 MG: 100 INJECTION SUBCUTANEOUS at 07:50

## 2022-12-04 RX ADMIN — FUROSEMIDE 40 MG: 40 TABLET ORAL at 17:13

## 2022-12-04 RX ADMIN — IPRATROPIUM BROMIDE AND ALBUTEROL SULFATE 1 AMPULE: .5; 3 SOLUTION RESPIRATORY (INHALATION) at 15:51

## 2022-12-04 RX ADMIN — ASPIRIN 81 MG 81 MG: 81 TABLET ORAL at 07:50

## 2022-12-04 RX ADMIN — ALOGLIPTIN 25 MG: 25 TABLET, FILM COATED ORAL at 07:50

## 2022-12-04 RX ADMIN — METFORMIN HYDROCHLORIDE 1000 MG: 500 TABLET ORAL at 20:15

## 2022-12-04 RX ADMIN — IPRATROPIUM BROMIDE AND ALBUTEROL SULFATE 1 AMPULE: .5; 3 SOLUTION RESPIRATORY (INHALATION) at 20:40

## 2022-12-04 RX ADMIN — FUROSEMIDE 40 MG: 40 TABLET ORAL at 07:50

## 2022-12-04 RX ADMIN — AZITHROMYCIN MONOHYDRATE 500 MG: 250 TABLET ORAL at 12:54

## 2022-12-04 RX ADMIN — MONTELUKAST 10 MG: 10 TABLET, FILM COATED ORAL at 07:49

## 2022-12-04 RX ADMIN — ENOXAPARIN SODIUM 30 MG: 100 INJECTION SUBCUTANEOUS at 20:15

## 2022-12-04 RX ADMIN — MOMETASONE FUROATE AND FORMOTEROL FUMARATE DIHYDRATE 2 PUFF: 200; 5 AEROSOL RESPIRATORY (INHALATION) at 20:44

## 2022-12-04 RX ADMIN — MULTIPLE VITAMINS W/ MINERALS TAB 1 TABLET: TAB at 07:50

## 2022-12-04 RX ADMIN — SODIUM CHLORIDE, PRESERVATIVE FREE 10 ML: 5 INJECTION INTRAVENOUS at 07:50

## 2022-12-04 RX ADMIN — TAMSULOSIN HYDROCHLORIDE 0.8 MG: 0.4 CAPSULE ORAL at 17:13

## 2022-12-04 RX ADMIN — SODIUM CHLORIDE, PRESERVATIVE FREE 10 ML: 5 INJECTION INTRAVENOUS at 20:15

## 2022-12-04 RX ADMIN — PRIMIDONE 250 MG: 250 TABLET ORAL at 07:50

## 2022-12-04 RX ADMIN — PROPRANOLOL HYDROCHLORIDE 120 MG: 60 CAPSULE, EXTENDED RELEASE ORAL at 17:13

## 2022-12-04 NOTE — PROGRESS NOTES
Patient awake and in bed. Denies any pain at this time. VS and assessment completed. Patient up to the chair. Patients oxygen turned off. Patient has a productive cough, acapella encouraged and performed x10 for writer. Coffee provided.  Denies any other needs at this time Sent patient a My Chart message with normal lab/test results. Asked that patient contact the office with any questions or concerns.

## 2022-12-04 NOTE — PROGRESS NOTES
Occupational Therapy  Facility/Department: [facility] AT THE [facility] MED SURG  Daily Treatment Note  NAME: Sultana Belcher  : 1935  MRN: [id number]    Date of Service: 2022    Discharge Recommendations:  Continue to assess pending progress, Home with assist PRN         Patient Diagnosis(es): The encounter diagnosis was Pneumonia of both lungs due to infectious organism, unspecified part of lung. Assessment    Activity Tolerance: Patient tolerated treatment well  Discharge Recommendations: Continue to assess pending progress;Home with assist PRN      Plan   Occupational Therapy Plan  Times Per Week: 7x/wk  Times Per Day: Once a day  Current Treatment Recommendations: Strengthening; Functional mobility training; Safety education & training;Self-Care / ADL     Restrictions  Restrictions/Precautions  Restrictions/Precautions: General Precautions; Fall Risk    Subjective   Subjective  Subjective: Pt sitting up in bedside chair upon arrival. Pt initially declined therapy session and then agreed stating \"I will do a little bit\". Pain: Pt had no complaints of pain. Orientation  Overall Orientation Status: Within Functional Limits  Pain: denies  Cognition  Overall Cognitive Status: WFL        Objective    Vitals             OT Exercises  Exercise Treatment: Pt tolerated BUE ther ex with 2# dumbbell x 7 planes x 15 reps x 1 set to increase UE strength and endurance in order to ease completion of ADL tasks. Pt required RBs as needed secondary to fatigue. Safety Devices  Type of Devices: Call light within reach; Left in chair;Chair alarm in place     Patient Education  Education Given To: Patient  Education Provided: Role of Therapy;Plan of Care  Education Method: Verbal  Barriers to Learning: None  Education Outcome: Verbalized understanding    Goals  Short Term Goals  Time Frame for Short Term Goals: 20 visits  Short Term Goal 1: Pt. will tolerate 15 mins of BUE ther ex/act to increase overall strength/activity tolerance for functional tasks. Short Term Goal 2: Pt. will complete ADL routine with mod I with use of EC techs PRN.   Short Term Goal 3: Pt. will complete functional ADL transfers/mobility with mod I and G safety,       Therapy Time   Individual Concurrent Group Co-treatment   Time In 1226         Time Out 1241         Minutes 15                 SHAHRAM Rodriguez/MIAN

## 2022-12-04 NOTE — PROGRESS NOTES
RESPIRATORY ASSESSMENT PROTOCOL                                                                                              Patient Name: Luba Wahl Room#: 7664/3389-03 : 1935     Admitting diagnosis: Pneumonia of both lungs due to infectious organism, unspecified part of lung [J18.9]  Multifocal pneumonia [J18.9]       Medical History:   Past Medical History:   Diagnosis Date    COPD (chronic obstructive pulmonary disease) ([facility]     Diabetes mellitus ([facility]     Hx of echocardiogram 2017    [facility]    Hyperlipidemia     Hypothyroidism     MI (myocardial infarction) ([facility]     Stroke [facility])     Thyroid disease     Type II or unspecified type diabetes mellitus without mention of complication, not stated as uncontrolled        PATIENT ASSESSMENT    LABORATORY DATA  Hematology:   Lab Results   Component Value Date/Time    WBC 9.6 2022 05:25 AM    RBC 3.41 2022 05:25 AM    HGB 9.8 2022 05:25 AM    HCT 31.0 2022 05:25 AM     2022 05:25 AM     Chemistry:    Lab Results   Component Value Date/Time    PHART 7.340 2017 11:25 PM    YQO2ZRJ 53.8 2017 11:25 PM    PO2ART 62.0 2017 11:25 PM    Y5ELHEHJ 90.1 2017 11:25 PM    YSJ6PKN 28.4 2017 11:25 PM    PBEA 1.4 2017 11:25 PM       VITALS  Heart Rate: 61   Resp: 20  BP: (!) 131/57  SpO2: 95 % O2 Device: None (Room air)  Temp: (!) 96.7 °F (35.9 °C)    SKIN COLOR  [x] Normal  [] Pale  [] Dusky  [] Cyanotic    RESPIRATORY PATTERN  [x] Normal  [] Dyspnea  [] Cheyne-Veras  [] Kussmaul  [] Biots    AMBULATORY  [x] Yes  [] No  [x] With Assistance    Patient Acuity 0 1 2 3 4 Score   Level of Concious (LOC) [x]  Alert & Oriented or Pt normal LOC []  Confused;follows directions []  Confused & uncooper-ative []  Obtunded []  Comatose 0   Respiratory Rate  (RR) []  Reg. rate & pattern. 12 - 20 bpm  []  Increased RR.  Greater than 20 bpm   [x]  SOB w/ exertion or RR greater than 24 bpm []  Access- ory muscle use at rest. Abn.  resp. []  SOB at rest.   2   Bilateral Breath Sounds (BBS) []  Clear []  Diminish-ed bases  []  Diminish-ed t/o, or rales   [x]  Sporadic, scattered wheezes or rhonchi []  Persistentwheezes and, or absent BBS 3   Cough []  Strong, effective, & non-prod. [x]  Effective & prod. Less than 25 ml (2 TBSP) over past 24 hrs []  Ineffective & non-prod to less than 25 ML over past 24 hrs []  Ineffective and, or greater than 25 ml sputum prod. past 24 hrs. []  Nonspon- taneous; Requires suctioning 1   Pulmonary History  (PULM HX) []  No smoking and no chronic pulmonaryhistory []  Former smoker. Quit over 12 mos. ago []  Current smoker or quit w/ in 12 mos []  Pulm. History and, or 20 pk/yr smoking hx [x]  Admitted w/ acute pulm. dx and, or has been admitted w/ pulm. dx 2 or more times over past 12 mos 4   Surgical History this Admit  (SURG HX) [x]  No surgery []  General surgery []  Lower abdominal []  Thoracic or upper abdominal   []  Thoracic w/ pulm. disease 0   Chest X-Ray (CXR)/CT Scan []  Clear or not applicable []  Not available []  Atelect- asis or pleural effusions []  Localized infiltrate or pulm. edema [x]  Con-solidated Infiltrates, bilateral, or in more than 1 lobe 4   Slow or Forced VC, FEV1 OR PEFR (PULM FXN)  [x]  80% or greater, or not indicated []  Pt. unable to perform []  FEV1 or PEFR or VC 51-79%.   []  FEV1 or PEFR or VC  30-49%   []  FEV1 or PEFR or VC less than 30%   0   TOTAL ACUITY: 14       CARE PLAN    If Acuity Level is 2, 3, or 4 in any of the following:    [x] BILATERAL BREATH SOUNDS (BBS)     [x] PULMONARY HISTORY (PULM HX)  [] PULMONARY FUNCTION (PULM FX)    Goal: Improve respiratory functions in patients with airway disease and decrease WOB    [x] AEROSOL PROTOCOL    Total Acuity:   16-32  []  Secondary Assessment in 24 hrs Total Acuity:  9-15  [x]  Secondary Assessment in 24 hrs Total Acuity:  4-8  []  Secondary Assessment in 48 hrs Total Acuity:  0-3  []  Secondary Assessment in 72 hrs   HHN AEROSOL THERAPY with  [physician-ordered bronchodilator(s)] q 4 & Albuterol PRN q2 hrs. Breath-Actuated Neb if BBS Acuity = 4, and pt. can use MP. Notify physician if condition deteriorates. HHN AEROSOL THERAPY with  [physician-ordered bronchodilator(s)]  QID and Albuterol PRN q4 hrs. Breath-Actuated Neb if BBS Acuity = 4, and pt. can use MP. Notify physician if condition deteriorates. MDI THERAPY with  2 actuations of [physician-ordered bronchodilator(s)] via spacer TID Albuterol and PRNq4 hrs. If unable to utilize MDI: HHN [physician-ordered bronchodilator(s)] TID and Albuterol PRN q4 hrs. Notify physician if condition deteriorates. MDI THERAPY with  [physician-ordered bronchodilator(s)] via spacer TID PRN. If unable to utilize MDI: HHN [physician-ordered bronchodilator(s)] TID PRN. Notify physician if condition deteriorates. If Acuity Level is 2, 3, or 4 in any of the following:    [] COUGH     [] SURGICAL HISTORY (SURG HX)  [x] CHEST XRAY (CXR)    Goal: Improvement in sputum mobilization in patients with ineffective airway clearance. Reverse atelectasis.     [x] Bronchopulmonary Hygiene Protocol    Total Acuity:   16-32  []  Secondary Assessment in 24 hrs Total Acuity:  9-15  [x]  Secondary Assessment in 24 hrs Total Acuity:  4-8  []  Secondary Assessment in 48 hrs Total Acuity:  0-3  []  Secondary Assessment in 72 hrs   METANEB QID with [physician-ordered bronchodilator(s)] if CXR Acuity = 4; otherwise:  PD&P, PEP, or Vest QID & PRN  NT Sxn PRN for ineffective cough  METANEB QID with [physician-ordered bronchodilator(s)] if CXR Acuity = 4; otherwise:  PD&P, PEP, or Vest TID & PRN  NT Sxn PRN for ineffective cough  Instruct patient to self-perform IS q1hr WA   Directed Cough self-performed q1hr WA     If Acuity Level is 2 or above in the following:    [] PULMONARY HISTORY (PULM HX)    Goal: Assist patient in quitting smoking to slow or stop the progression of lung disease.     [] Smoking Cessation Protocol    SMOKING CESSATION EDUCATION provided according to policy [id number]_376: (karson with an X)  ____Yes    ____ No     ____ NA    Smoking Cessation Booklet given:  ____Yes  ____No ____Patient Deborrah Apgar

## 2022-12-04 NOTE — PROGRESS NOTES
Patient awake and sitting up in the chair. Denies any pain. VS and assessment completed. Patient feels his breathing is close to being back to normal. Patient still has a moist cough, again encouraged to use the acapella.  Denies any other needs at this time

## 2022-12-04 NOTE — PROGRESS NOTES
Progress Note    SUBJECTIVE:  FU related to still some cough. Denies any shortness of breath. Still on oxygen. Still on oxygen. Still coughing. OBJECTIVE:    Vitals:   TEMPERATURE:  Current - Temp: 96.9 °F (36.1 °C); Max - Temp  Av.4 °F (36.3 °C)  Min: 96.9 °F (36.1 °C)  Max: 97.8 °F (36.6 °C)  RESPIRATIONS RANGE: Resp  Av  Min: 20  Max: 22  PULSE RANGE: Pulse  Av.3  Min: 62  Max: 67  BLOOD PRESSURE RANGE:  Systolic (85YYE), BHE:888 , Min:129 , AXF:517   ; Diastolic (01HIH), OPK:49, Min:54, Max:66    PULSE OXIMETRY RANGE: SpO2  Av %  Min: 88 %  Max: 97 %  24HR INTAKE/OUTPUT:    Intake/Output Summary (Last 24 hours) at 2022 0748  Last data filed at 2022 7136  Gross per 24 hour   Intake 2070 ml   Output 2175 ml   Net -105 ml     -----------------------------------------------------------------  Exam:  General: A & O x3 and alert  HEENT: Supple neck & negative  Heart: Regular  Lungs:  Decreased breath sounds just a trace crackles. No wheeze.   Abdomen: Normal & soft, No tenderness and BS normal  Extremities:  No edema   Neuro: NonFocal     -----------------------------------------------------------------  Diagnostic Data:  Lab Results   Component Value Date    WBC 9.6 2022    HGB 9.8 (L) 2022     2022       Lab Results   Component Value Date    BUN 23 2022    CREATININE 1.16 2022     (L) 2022    K 4.0 2022    CALCIUM 8.7 2022    CL 95 (L) 2022    CO2 24 2022    LABGLOM >60 2022       Lab Results   Component Value Date    WBCUA 5 TO 10 2022    RBCUA 0 TO 2 2022    EPITHUA 0 TO 2 2022    LEUKOCYTESUR SMALL (A) 2022    SPECGRAV 1.015 2022    GLUCOSEU NEGATIVE 2022    KETUA NEGATIVE 2022    PROTEINU TRACE (A) 2022    HGBUR NEGATIVE 2022    CASTUA NOT REPORTED 2021    CRYSTUA NOT REPORTED 2021    BACTERIA 1+ (A) 2022    YEAST NOT REPORTED 12/29/2021       Lab Results   Component Value Date    TROPONINT <0.03 04/07/2018    PROBNP 192 10/21/2022       CT CHEST PULMONARY EMBOLISM W CONTRAST    Result Date: 12/2/2022  EXAMINATION: CTA OF THE CHEST 12/2/2022 9:27 am TECHNIQUE: CTA of the chest was performed after the administration of intravenous contrast.  Multiplanar reformatted images are provided for review. MIP images are provided for review. Automated exposure control, iterative reconstruction, and/or weight based adjustment of the mA/kV was utilized to reduce the radiation dose to as low as reasonably achievable. COMPARISON: 08/19/2022 HISTORY: ORDERING SYSTEM PROVIDED HISTORY: cough, hypoxia TECHNOLOGIST PROVIDED HISTORY: cough, hypoxia Decision Support Exception - unselect if not a suspected or confirmed emergency medical condition->Emergency Medical Condition (MA) 51-year-old male with cough and hypoxia. FINDINGS: Pulmonary Arteries: No obvious filling defect in the central main, right main, or left main pulmonary arteries that meets the criteria for pulmonary embolus. Evaluation of the lobar pulmonary arterial vasculature and beyond is limited due to suboptimal bolus timing, respiratory motion, and streak artifact from the contrast bolus in the SVC. Mediastinum: Atheromatous plaque and atherosclerotic calcification and tortuosity of the thoracic aorta. No dissection flap within the visualized thoracic aorta. Atrophic thyroid gland. Coronary artery disease. Right hilar lymph node enlargement. Cardiomegaly. Trace pericardial fluid. No sizable pericardial effusion. No sizable pleural effusions. No periaortic or mediastinal hemorrhage. Lungs/pleura: Trachea and proximal central airways appear patent. Consolidation in the bilateral lower lobes, left greater than right primarily in the left lower lobe. Mild dependent atelectasis and respiratory motion. Ground-glass opacities in the right parahilar region. No pneumothorax. Mild emphysema. Upper Abdomen: Atheromatous plaque and atherosclerotic calcification of the upper abdominal aorta and branch vasculature. Scattered calcified granulomata throughout the spleen. Fatty liver. Distended gallbladder. Stable left adrenal mass measuring 2 cm on image 231, series 2 and lateral limb left adrenal mass measuring 1.5 cm on image 244, series 2, the superior left adrenal mass which is visualized, unchanged dating back to 11/14/2017. The lateral limb left adrenal mass is present dating back to 04/06/2018. Soft Tissues/Bones: Mild degenerative changes throughout the spine. Diffuse idiopathic skeletal hyperostosis. 1. No clear evidence for central pulmonary embolus within the limitations of this study. 2. Multifocal airspace disease which could represent a combination of atelectasis and or infiltrate/multifocal pneumonia. Follow-up is recommended to document resolution. 3. Mild emphysema. 4. Atheromatous plaque and atherosclerotic calcification as well as tortuosity of the thoracic aorta. 5. Atrophic thyroid gland. 6. Coronary artery disease. Cardiomegaly. 7. Right hilar lymph node enlargement which may be reactive. 8. Distended gallbladder. Fatty liver. 9. Stable left adrenal masses measuring up to 2 cm, unchanged dating back to 2017 and 2018 exams, likely left adrenal adenomas. ASSESSMENT:    Principal Problem:    Multifocal pneumonia  Active Problems:    Type 2 diabetes mellitus with complication, without long-term current use of insulin (HCC)    Chronic combined systolic and diastolic CHF (congestive heart failure) (HCC)    COPD (chronic obstructive pulmonary disease) (HCC)    Elevated liver enzymes  Resolved Problems:    * No resolved hospital problems.  *      Patient Active Problem List    Diagnosis Date Noted    Multifocal pneumonia 12/02/2022    Community acquired bacterial pneumonia 12/30/2021    Hypoxia 12/30/2021    Moderate persistent asthma without complication 87/22/7157 Hypothyroidism due to Hashimoto's thyroiditis 11/05/2018    Elevated liver enzymes 11/02/2018    Adrenal adenoma, left 10/16/2018    COPD (chronic obstructive pulmonary disease) ([facility] 11/15/2017    Chronic combined systolic and diastolic CHF (congestive heart failure) ([facility] 09/21/2017    Knee osteoarthritis 06/11/2015    Obesity (BMI 30.0-34.9) 02/05/2015    Venous (peripheral) insufficiency 02/05/2015    Hx pulmonary embolism 02/05/2015    Hearing impaired 02/05/2015    Edema 02/05/2015    Type 2 diabetes mellitus with complication, without long-term current use of insulin ([facility] 02/05/2015       PLAN:  Wean oxygen. Antibiotics. Supportive care. Slow improvement. Off oxygen most of the day not able to stay off at night.   Critical Care Time: Guy Phipps MD , M.D.

## 2022-12-04 NOTE — PLAN OF CARE
Problem: Discharge Planning  Goal: Discharge to home or other facility with appropriate resources  12/4/2022 0929 by Ki Ritter  Outcome: Progressing  Flowsheets (Taken 12/4/2022 0929)  Discharge to home or other facility with appropriate resources:   Identify barriers to discharge with patient and caregiver   Identify discharge learning needs (meds, wound care, etc)  Note: Continue to attempt to avoid oxygen at night, unsuccessful. Problem: Safety - Adult  Goal: Free from fall injury  12/4/2022 0929 by Ki Ritter  Outcome: Progressing  Flowsheets (Taken 12/2/2022 2144 by Koby Askew RN)  Free From Fall Injury: Instruct family/caregiver on patient safety     Problem: Respiratory - Adult  Goal: Achieves optimal ventilation and oxygenation  12/4/2022 0929 by Ki Ritter  Outcome: Progressing  Flowsheets (Taken 12/4/2022 0929)  Achieves optimal ventilation and oxygenation:   Assess for changes in respiratory status   Assess for changes in mentation and behavior   Position to facilitate oxygenation and minimize respiratory effort   Oxygen supplementation based on oxygen saturation or arterial blood gases   Encourage broncho-pulmonary hygiene including cough, deep breathe, incentive spirometry   Assess and instruct to report shortness of breath or any respiratory difficulty   Respiratory therapy support as indicated  Note: Continuous oxygen monitoring.  Oxygen needs only at night while sleeping     Problem: Skin/Tissue Integrity - Adult  Goal: Skin integrity remains intact  12/4/2022 0929 by Ki Ritter  Outcome: Progressing  Flowsheets (Taken 12/4/2022 0929)  Skin Integrity Remains Intact: Monitor for areas of redness and/or skin breakdown     Problem: Infection - Adult  Goal: Absence of fever/infection during anticipated neutropenic period  12/4/2022 0929 by Ki Ritter  Outcome: Progressing  Flowsheets (Taken 12/4/2022 0929)  Absence of fever/infection during anticipated neutropenic period: Monitor white blood cell count  Note: Monitoring for any fevers

## 2022-12-04 NOTE — PROGRESS NOTES
212Physical Therapy  Facility/Department: [facility] AT THE [facility] MED SURG  Daily Treatment Note  NAME: Terri Scott  : 1935  MRN: [id number]    Date of Service: 2022    Discharge Recommendations:  Continue to assess pending progress, Home with assist PRN        Patient Diagnosis(es): The encounter diagnosis was Pneumonia of both lungs due to infectious organism, unspecified part of lung. Assessment   Assessment: Tolerated well BLE seated 2x20 Transfers SBA, Gait SC 2x25 feet  Activity Tolerance: Patient tolerated treatment well     Plan    Physcial Therapy Plan  General Plan: 2 times a day 7 days a week  Current Treatment Recommendations: Strengthening;ROM;Balance training;Functional mobility training;Transfer training;Gait training;Stair training;Neuromuscular re-education;Patient/Caregiver education & training; Safety education & training;Home exercise program;Therapeutic activities; Endurance training     Restrictions  Restrictions/Precautions  Restrictions/Precautions: General Precautions, Fall Risk     Subjective    Subjective  Subjective: Pt in recliner and agreeable to therapy  Pain: denies  Orientation  Overall Orientation Status: Within Functional Limits  Cognition  Overall Cognitive Status: WFL     Objective   Vitals     Bed Mobility Training  Bed Mobility Training: No (Pt in recliner)  Balance  Sitting: Intact  Transfer Training  Transfer Training: Yes  Overall Level of Assistance: Stand-by assistance  Interventions: Verbal cues  Sit to Stand: Stand-by assistance  Stand to Sit: Stand-by assistance  Gait Training  Gait Training: Yes  Gait  Overall Level of Assistance: Contact-guard assistance  Distance (ft): 25 Feet (2x25 feet)  Assistive Device: Cane, straight     PT Exercises  Exercise Treatment: BLE  seated 2x15     Safety Devices  Type of Devices: Call light within reach; Left in chair;Chair alarm in place       Goals  Short Term Goals  Time Frame for Short Term Goals: 20 days  Short Term Goal 1: Patient to complete all transfers with LRAD and mod. IND with no LOB to decrease fall risk. Short Term Goal 2: Patient to ambulate [id number] with SPC and SUP with no LOB or fatigue to improve endurance. Short Term Goal 3: Patient to ascend/descend 2 steps with B HR and SUP with no LOB to safely enter his home. Short Term Goal 4: Patient to tolerate 20-30 min of ther ex/act to improve functional strength.     Education  Patient Education  Education Given To: Patient  Education Provided: Role of Therapy;Plan of Care;Home Exercise Program;Transfer Training  Education Method: Verbal  Barriers to Learning: None    Therapy Time   Individual Concurrent Group Co-treatment   Time In 1040         Time Out 1110         Minutes [address] [place] [address]

## 2022-12-05 VITALS
RESPIRATION RATE: 16 BRPM | DIASTOLIC BLOOD PRESSURE: 60 MMHG | WEIGHT: 254.41 LBS | HEIGHT: 71 IN | BODY MASS INDEX: 35.62 KG/M2 | HEART RATE: 61 BPM | TEMPERATURE: 97.6 F | OXYGEN SATURATION: 97 % | SYSTOLIC BLOOD PRESSURE: 139 MMHG

## 2022-12-05 LAB
ABSOLUTE EOS #: 0.39 K/UL (ref 0–0.44)
ABSOLUTE IMMATURE GRANULOCYTE: 0.25 K/UL (ref 0–0.3)
ABSOLUTE LYMPH #: 1.29 K/UL (ref 1.1–3.7)
ABSOLUTE MONO #: 1.47 K/UL (ref 0.1–1.2)
ALBUMIN SERPL-MCNC: 3.5 G/DL (ref 3.5–5.2)
ALBUMIN/GLOBULIN RATIO: 1 (ref 1–2.5)
ALP BLD-CCNC: 161 U/L (ref 40–129)
ALT SERPL-CCNC: 53 U/L (ref 5–41)
ANION GAP SERPL CALCULATED.3IONS-SCNC: 12 MMOL/L (ref 9–17)
AST SERPL-CCNC: 40 U/L
BASOPHILS # BLD: 1 % (ref 0–2)
BASOPHILS ABSOLUTE: 0.06 K/UL (ref 0–0.2)
BILIRUB SERPL-MCNC: 0.3 MG/DL (ref 0.3–1.2)
BUN BLDV-MCNC: 18 MG/DL (ref 8–23)
BUN/CREAT BLD: 16 (ref 9–20)
CALCIUM SERPL-MCNC: 9.2 MG/DL (ref 8.6–10.4)
CHLORIDE BLD-SCNC: 93 MMOL/L (ref 98–107)
CO2: 25 MMOL/L (ref 20–31)
CREAT SERPL-MCNC: 1.11 MG/DL (ref 0.7–1.2)
EOSINOPHILS RELATIVE PERCENT: 4 % (ref 1–4)
GFR SERPL CREATININE-BSD FRML MDRD: >60 ML/MIN/1.73M2
GLUCOSE BLD-MCNC: 130 MG/DL (ref 70–99)
GLUCOSE BLD-MCNC: 135 MG/DL (ref 74–100)
GLUCOSE BLD-MCNC: 167 MG/DL (ref 74–100)
HCT VFR BLD CALC: 34.4 % (ref 40.7–50.3)
HEMOGLOBIN: 10.9 G/DL (ref 13–17)
IMMATURE GRANULOCYTES: 2 %
LYMPHOCYTES # BLD: 12 % (ref 24–43)
MCH RBC QN AUTO: 28.7 PG (ref 25.2–33.5)
MCHC RBC AUTO-ENTMCNC: 31.7 G/DL (ref 28.4–34.8)
MCV RBC AUTO: 90.5 FL (ref 82.6–102.9)
MONOCYTES # BLD: 14 % (ref 3–12)
NRBC AUTOMATED: 0 PER 100 WBC
PDW BLD-RTO: 14.6 % (ref 11.8–14.4)
PLATELET # BLD: 272 K/UL (ref 138–453)
PMV BLD AUTO: 10.3 FL (ref 8.1–13.5)
POTASSIUM SERPL-SCNC: 4.4 MMOL/L (ref 3.7–5.3)
RBC # BLD: 3.8 M/UL (ref 4.21–5.77)
SEG NEUTROPHILS: 67 % (ref 36–65)
SEGMENTED NEUTROPHILS ABSOLUTE COUNT: 6.97 K/UL (ref 1.5–8.1)
SODIUM BLD-SCNC: 130 MMOL/L (ref 135–144)
TOTAL PROTEIN: 6.9 G/DL (ref 6.4–8.3)
WBC # BLD: 10.4 K/UL (ref 3.5–11.3)

## 2022-12-05 PROCEDURE — 97110 THERAPEUTIC EXERCISES: CPT

## 2022-12-05 PROCEDURE — 94640 AIRWAY INHALATION TREATMENT: CPT

## 2022-12-05 PROCEDURE — 6360000002 HC RX W HCPCS: Performed by: NURSE PRACTITIONER

## 2022-12-05 PROCEDURE — 94669 MECHANICAL CHEST WALL OSCILL: CPT

## 2022-12-05 PROCEDURE — 6370000000 HC RX 637 (ALT 250 FOR IP): Performed by: NURSE PRACTITIONER

## 2022-12-05 PROCEDURE — 80053 COMPREHEN METABOLIC PANEL: CPT

## 2022-12-05 PROCEDURE — 94761 N-INVAS EAR/PLS OXIMETRY MLT: CPT

## 2022-12-05 PROCEDURE — 6370000000 HC RX 637 (ALT 250 FOR IP): Performed by: FAMILY MEDICINE

## 2022-12-05 PROCEDURE — 82947 ASSAY GLUCOSE BLOOD QUANT: CPT

## 2022-12-05 PROCEDURE — 85025 COMPLETE CBC W/AUTO DIFF WBC: CPT

## 2022-12-05 PROCEDURE — 2580000003 HC RX 258: Performed by: NURSE PRACTITIONER

## 2022-12-05 PROCEDURE — 36415 COLL VENOUS BLD VENIPUNCTURE: CPT

## 2022-12-05 PROCEDURE — 97116 GAIT TRAINING THERAPY: CPT

## 2022-12-05 RX ORDER — AMOXICILLIN AND CLAVULANATE POTASSIUM 875; 125 MG/1; MG/1
1 TABLET, FILM COATED ORAL 2 TIMES DAILY
Qty: 14 TABLET | Refills: 0 | Status: SHIPPED | OUTPATIENT
Start: 2022-12-05 | End: 2022-12-12

## 2022-12-05 RX ADMIN — MONTELUKAST 10 MG: 10 TABLET, FILM COATED ORAL at 09:18

## 2022-12-05 RX ADMIN — METFORMIN HYDROCHLORIDE 1000 MG: 500 TABLET ORAL at 09:18

## 2022-12-05 RX ADMIN — SODIUM CHLORIDE, PRESERVATIVE FREE 10 ML: 5 INJECTION INTRAVENOUS at 09:19

## 2022-12-05 RX ADMIN — MOMETASONE FUROATE AND FORMOTEROL FUMARATE DIHYDRATE 2 PUFF: 200; 5 AEROSOL RESPIRATORY (INHALATION) at 12:01

## 2022-12-05 RX ADMIN — FUROSEMIDE 40 MG: 40 TABLET ORAL at 09:18

## 2022-12-05 RX ADMIN — PANTOPRAZOLE SODIUM 40 MG: 40 TABLET, DELAYED RELEASE ORAL at 09:18

## 2022-12-05 RX ADMIN — ENOXAPARIN SODIUM 30 MG: 100 INJECTION SUBCUTANEOUS at 09:19

## 2022-12-05 RX ADMIN — MULTIPLE VITAMINS W/ MINERALS TAB 1 TABLET: TAB at 09:19

## 2022-12-05 RX ADMIN — LEVOTHYROXINE SODIUM 137 MCG: 25 TABLET ORAL at 09:19

## 2022-12-05 RX ADMIN — OXYBUTYNIN CHLORIDE 5 MG: 5 TABLET ORAL at 09:18

## 2022-12-05 RX ADMIN — PRIMIDONE 250 MG: 250 TABLET ORAL at 09:18

## 2022-12-05 RX ADMIN — ASPIRIN 81 MG 81 MG: 81 TABLET ORAL at 09:19

## 2022-12-05 RX ADMIN — IPRATROPIUM BROMIDE AND ALBUTEROL SULFATE 1 AMPULE: .5; 3 SOLUTION RESPIRATORY (INHALATION) at 05:20

## 2022-12-05 RX ADMIN — PROPRANOLOL HYDROCHLORIDE 120 MG: 60 CAPSULE, EXTENDED RELEASE ORAL at 09:18

## 2022-12-05 RX ADMIN — ALOGLIPTIN 25 MG: 25 TABLET, FILM COATED ORAL at 09:18

## 2022-12-05 RX ADMIN — SODIUM CHLORIDE 25 ML: 9 INJECTION, SOLUTION INTRAVENOUS at 11:46

## 2022-12-05 RX ADMIN — IPRATROPIUM BROMIDE AND ALBUTEROL SULFATE 1 AMPULE: .5; 3 SOLUTION RESPIRATORY (INHALATION) at 12:01

## 2022-12-05 RX ADMIN — CEFTRIAXONE 1000 MG: 1 INJECTION, POWDER, FOR SOLUTION INTRAMUSCULAR; INTRAVENOUS at 11:46

## 2022-12-05 NOTE — PROGRESS NOTES
Physician Progress Note      Felipe Carter  CSN #:                  [id number]  :                       1935  ADMIT DATE:       2022 7:52 AM  DISCH DATE:  RESPONDING  PROVIDER #:        Yomaira Covarrubias MD          QUERY TEXT:    Pt admitted with pneumonia. Pt noted to meet SIRS criteria. If possible,   please document in the progress notes and discharge summary if you are   evaluating and /or treating any of the following: The medical record reflects the following:  Risk Factors: PMH of COPD, HLD, Type II DM, and morbid obesity. Clinical Indicators: ED Provider Note : presenting the emergency   department for evaluation of worsening cough and shortness of breath over the   past few days. Patient developed a fever of 102 this morning. Pneumonia of   both lungs. Leukocytosis with WBCs 15.1 and a left shift. H&P : He   admitted fever T-max 102 this morning. He reported taken Tylenol prior to   coming to the hospital. WBC count was elevated to 15,000. Liver enzymes   slightly elevated with an ALT of 67 and AST of 56. Lactic acid was 2.1. This   patient requires inpatient admission because of multifocal pn  Treatment: IVFs initially at 75 ml/hr. Zithromax & Rocephin IV  Options provided:  -- Sepsis, present on admission  -- Sepsis, present on admission, now resolved  -- Sepsis was ruled out  -- Other - I will add my own diagnosis  -- Disagree - Not applicable / Not valid  -- Disagree - Clinically unable to determine / Unknown  -- Refer to Clinical Documentation Reviewer    PROVIDER RESPONSE TEXT:    After further study, sepsis was ruled out for this patient.     Query created by: Marilin Morris on 2022 9:46 AM      Electronically signed by:  Yomaira Covarrubias MD 2022 11:46 AM

## 2022-12-05 NOTE — PROGRESS NOTES
Progress Note  Yue Bowman MD    OBJECTIVE:    Patient seen for f/u of Multifocal pneumonia. He is improving,cough better,no shortness of breath     ROS:   Constitutional: negative  for fevers, and negative for chills. Respiratory: negative for shortness of breath, positive for cough, and negative for wheezing  Cardiovascular: negative for chest pain, and negative for palpitations  Gastrointestinal: negative for abdominal pain, negative for nausea,negative for vomiting, negative for diarrhea, and negative for constipation     All other systems were reviewed with the patient and are negative unless otherwise stated in HPI    OBJECTIVE:  Vitals:   Temp: 97.6 °F (36.4 °C)  BP: 139/60  Resp: 18  Heart Rate: 69  SpO2: 94 % (pt sleeping)    24HR INTAKE/OUTPUT:    Intake/Output Summary (Last 24 hours) at 12/5/2022 0737  Last data filed at 12/5/2022 0505  Gross per 24 hour   Intake 2320 ml   Output 2300 ml   Net 20 ml     -----------------------------------------------------------------  Exam:  GEN:    Awake, alert and oriented x3. EYES:  EOMI, pupils equal   NECK: Supple. No lymphadenopathy. No carotid bruit  CVS:    regular rate and rhythm, no audible murmur  PULM:  CTA, no wheezes, rales or rhonchi, no acute respiratory distress  ABD:    Bowels sounds normal.  Abdomen is soft. No distention. no tenderness to palpation. EXT:   no edema bilaterally . No calf tenderness. NEURO: Moves all extremities. Motor and sensory are grossly intact  SKIN:  No rashes.   No skin lesions.    -----------------------------------------------------------------  Diagnostic Data:    All available data reviewed  Lab Results   Component Value Date    WBC 10.4 12/05/2022    HGB 10.9 (L) 12/05/2022    MCV 90.5 12/05/2022     12/05/2022      Lab Results   Component Value Date    GLUCOSE 130 (H) 12/05/2022    BUN 18 12/05/2022    CREATININE 1.11 12/05/2022     (L) 12/05/2022    K 4.4 12/05/2022    CALCIUM 9.2 12/05/2022 CL 93 (L) 12/05/2022    CO2 25 12/05/2022       PROBLEM LIST:  Principal Problem:    Multifocal pneumonia  Active Problems:    Type 2 diabetes mellitus with complication, without long-term current use of insulin (HCC)    Chronic combined systolic and diastolic CHF (congestive heart failure) (HCC)    COPD (chronic obstructive pulmonary disease) (HCC)    Elevated liver enzymes  Resolved Problems:    * No resolved hospital problems. *      ASSESSMENT / PLAN:  Multifocal pneumonia  Principal Problem:    Multifocal pneumonia-improved,d/c home on  oral antibiotics  Active Problems:    Type 2 diabetes mellitus with complication, without long-term current use of insulin (HCC)    Chronic combined systolic and diastolic CHF (congestive heart failure) (HCC)    COPD (chronic obstructive pulmonary disease) (HCC)    Elevated liver enzymes  Resolved Problems:    * No resolved hospital problems.  *        Nutrition status: morbid obesity  DVT prophylaxis: Lovenox   High risk medications: none   Disposition:  Discharge plan is home    Cherelle Austin MD , M.D.  12/5/2022  7:37 AM

## 2022-12-05 NOTE — PLAN OF CARE
Problem: Discharge Planning  Goal: Discharge to home or other facility with appropriate resources  Outcome: Completed  Flowsheets (Taken 12/5/2022 2957 by Aranza Madden RN)  Discharge to home or other facility with appropriate resources: Identify barriers to discharge with patient and caregiver     Problem: Safety - Adult  Goal: Free from fall injury  Outcome: Completed     Problem: Respiratory - Adult  Goal: Achieves optimal ventilation and oxygenation  Outcome: Completed  Flowsheets (Taken 12/5/2022 8644 by Aranza Madden RN)  Achieves optimal ventilation and oxygenation: Assess for changes in respiratory status     Problem: Cardiovascular - Adult  Goal: Maintains optimal cardiac output and hemodynamic stability  Outcome: Completed  Flowsheets (Taken 12/5/2022 6343 by Aranza Madden RN)  Maintains optimal cardiac output and hemodynamic stability: Monitor blood pressure and heart rate  Goal: Absence of cardiac dysrhythmias or at baseline  Outcome: Completed  Flowsheets (Taken 12/5/2022 9232 by Aranza Madden RN)  Absence of cardiac dysrhythmias or at baseline: Monitor cardiac rate and rhythm     Problem: Skin/Tissue Integrity - Adult  Goal: Skin integrity remains intact  Outcome: Completed  Flowsheets (Taken 12/5/2022 0643 by Aranza Madden RN)  Skin Integrity Remains Intact: Monitor for areas of redness and/or skin breakdown  Goal: Incisions, wounds, or drain sites healing without S/S of infection  Outcome: Completed  Flowsheets (Taken 12/5/2022 8239 by Aranza Madden RN)  Incisions, Wounds, or Drain Sites Healing Without Sign and Symptoms of Infection: ADMISSION and DAILY: Assess and document risk factors for pressure ulcer development  Goal: Oral mucous membranes remain intact  Outcome: Completed  Flowsheets (Taken 12/5/2022 0643 by Aranza Madden RN)  Oral Mucous Membranes Remain Intact: Assess oral mucosa and hygiene practices     Problem: Infection - Adult  Goal: Absence of infection at discharge  Outcome: Completed  Flowsheets (Taken 12/5/2022 6504 by Chacorta Wilson RN)  Absence of infection at discharge: Assess and monitor for signs and symptoms of infection  Goal: Absence of infection during hospitalization  Outcome: Completed  Flowsheets (Taken 12/5/2022 0643 by Chacorta Wilson RN)  Absence of infection during hospitalization: Assess and monitor for signs and symptoms of infection  Goal: Absence of fever/infection during anticipated neutropenic period  Outcome: Completed  Flowsheets (Taken 12/5/2022 0643 by Chacorta Wilson RN)  Absence of fever/infection during anticipated neutropenic period: Monitor white blood cell count

## 2022-12-05 NOTE — PROGRESS NOTES
Writer reviewed discharge instructions with patient and spouse. Writer also reviewed Pneumonia self-management Plan with patient and spouse. Both verbalized understanding. Denies questions. Copy of discharge instructions given to patient.

## 2022-12-05 NOTE — DISCHARGE INSTR - DIET

## 2022-12-05 NOTE — PROGRESS NOTES
Physical Therapy  Facility/Department: [facility] MED SURG  Daily Treatment Note  NAME: Krzysztof Riggins  : 1935  MRN: [id number]  Date of Service: 2022  Discharge Recommendations:  Continue to assess pending progress, Home with assist PRN   Patient Diagnosis(es): The encounter diagnosis was Pneumonia of both lungs due to infectious organism, unspecified part of lung. Assessment   Assessment: Gait 25ftx1,75ftx1 with SC, CGA/SBA. Transfers: SBA. Bed mobility:SUP. Seated exercises B LE x20  Activity Tolerance: Patient limited by fatigue;Patient limited by endurance; Patient tolerated treatment well   Plan    Physcial Therapy Plan  General Plan: 2 times a day 7 days a week  Current Treatment Recommendations: Strengthening;ROM;Balance training;Functional mobility training;Transfer training;Gait training;Stair training;Neuromuscular re-education;Patient/Caregiver education & training; Safety education & training;Home exercise program;Therapeutic activities; Endurance training   Restrictions  Restrictions/Precautions  Restrictions/Precautions: General Precautions, Fall Risk   Subjective    Subjective  Subjective: Pt. in bed upon arrival, reluctant but agreeable to therapy.   Pain: denies  Orientation  Overall Orientation Status: Within Functional Limits   Objective   Bed Mobility Training  Bed Mobility Training: Yes  Overall Level of Assistance: Supervision  Supine to Sit: Supervision  Sit to Supine: Supervision  Scooting: Supervision  Transfer Training  Transfer Training: Yes  Overall Level of Assistance: Stand-by assistance  Interventions: Verbal cues  Sit to Stand: Stand-by assistance  Stand to Sit: Stand-by assistance  Gait Training  Gait Training: Yes  Gait  Overall Level of Assistance: Stand-by assistance;Contact-guard assistance  Base of Support: Widened  Distance (ft):  (25ftx1,75ftx1)  Assistive Device: Cane, straight  PT Exercises  Exercise Treatment: Seated exercises B LE x20  Other Specialty Interventions  Other Treatments/Modalities: standing commode use  Safety Devices  Type of Devices: Call light within reach; Left in chair;Chair alarm in place;Nurse notified   Goals  Short Term Goals  Time Frame for Short Term Goals: 20 days  Short Term Goal 1: Patient to complete all transfers with LRAD and mod. IND with no LOB to decrease fall risk. Short Term Goal 2: Patient to ambulate [id number] with SPC and SUP with no LOB or fatigue to improve endurance. Short Term Goal 3: Patient to ascend/descend 2 steps with B HR and SUP with no LOB to safely enter his home. Short Term Goal 4: Patient to tolerate 20-30 min of ther ex/act to improve functional strength.   Education  Patient Education  Education Given To: Patient  Education Provided: Role of Therapy;Plan of Care;Home Exercise Program;Transfer Training  Education Method: Verbal  Barriers to Learning: None  Education Outcome: Verbalized understanding  Therapy Time   Individual Concurrent Group Co-treatment   Time In 6647         Time Out 1112         Minutes [address], [place]

## 2022-12-05 NOTE — PROGRESS NOTES
Vitals and assessment done at this time. See flow sheet for more details. Pt denied having any pain at this time. Pt resting in bed. Lungs clear diminished. Pt stated he was not feeling short of breath at all. Pt denied having any dizziness or light headedness. Pt did state he was coughing and bringing sputum up when coughing. Pt denied any other needs at this time. Call light within reach. Will continue to monitor.

## 2022-12-05 NOTE — PROGRESS NOTES
Pt alert and oriented x4 resting comfortably in chair at this time. Vitals and assessment completed as charted. Pt remains on room air with o2 sat of 98% at this time. Pt denies any needs at this time. Call light is within reach, chair alarm. Will continue to monitor.

## 2022-12-05 NOTE — DISCHARGE SUMMARY
Discharge Summary    Jaleel Santos  :  1935  MRN:  [id number]    Admit date:  2022      Discharge date: 2022     Admitting Physician:  Sin Hudson MD    Discharge Diagnoses:    Principal Problem:    Multifocal pneumonia  Active Problems:    Type 2 diabetes mellitus with complication, without long-term current use of insulin (HCC)    Chronic combined systolic and diastolic CHF (congestive heart failure) (HCC)    COPD (chronic obstructive pulmonary disease) (HCC)    Elevated liver enzymes  Resolved Problems:    * No resolved hospital problems. *      Hospital Course:   Jaleel Santos is a 82 y.o. male admitted with multifocal pneumonia. He  presented to the emergency room with complaints of cough and increasing shortness of breath over the past few days. He admitted fever T-max 102 this morning. He reported taken Tylenol prior to coming to the hospital.  He denied chest pain or palpitations. He denied abdominal pain or vomiting but admitted to nausea. He denied diarrhea. He admitted to an ill contact at Thanksgiving time. Patient is compliant with medications at home including nebulizer treatments. His SPO2 was dropping to 88 to 89% while in the ER. CT chest showed multifocal pneumonia. WBC count was elevated to 15,000. Liver enzymes slightly elevated with an ALT of 67 and AST of 56. Lactic acid was 2.1. He was flu and COVID-negative. Patient was admitted and placed on IV antibiotics. Labs were trended. PT and OT were consulted. Patient tolerated this therapy well. Hemodynamically patient stable. His cultures are negative. Patient will be discharged today I will place him on Augmentin for 7 more days to complete a 10-day treatment. He will follow-up with primary care as an outpatient.     Consultants:  none    Procedures: none    Complications: none    Discharge Condition: fair    Exam:  GEN:  alert and oriented to person, place and time, well-developed and well-nourished, in no acute distress  EYES: No gross abnormalities. , PERRL, and EOMI  NECK: normal, supple, no lymphadenopathy,  no carotid bruits  PULM: clear to auscultation bilaterally- no wheezes, rales or rhonchi, normal air movement, no respiratory distress  COR: regular rate & rhythm, no murmurs, no gallops, S1 normal, and S2 normal  ABD:  soft, non-tender, non-distended, normal bowel sounds, no masses or organomegaly  EXT:   no cyanosis, clubbing or edema present    NEURO: follows commands, HYMAN, no deficits  SKIN:  no rashes or significant lesions    Significant Diagnostic Studies:   Lab Results   Component Value Date    WBC 10.4 12/05/2022    HGB 10.9 (L) 12/05/2022     12/05/2022       Lab Results   Component Value Date    BUN 18 12/05/2022    CREATININE 1.11 12/05/2022     (L) 12/05/2022    K 4.4 12/05/2022    CALCIUM 9.2 12/05/2022    CL 93 (L) 12/05/2022    CO2 25 12/05/2022    LABGLOM >60 12/05/2022       Lab Results   Component Value Date    WBCUA 5 TO 10 12/02/2022    RBCUA 0 TO 2 12/02/2022    EPITHUA 0 TO 2 12/02/2022    LEUKOCYTESUR SMALL (A) 12/02/2022    SPECGRAV 1.015 12/02/2022    GLUCOSEU NEGATIVE 12/02/2022    KETUA NEGATIVE 12/02/2022    PROTEINU TRACE (A) 12/02/2022    HGBUR NEGATIVE 12/02/2022    CASTUA NOT REPORTED 12/29/2021    CRYSTUA NOT REPORTED 12/29/2021    BACTERIA 1+ (A) 12/02/2022    YEAST NOT REPORTED 12/29/2021       CT CHEST PULMONARY EMBOLISM W CONTRAST    Result Date: 12/2/2022  EXAMINATION: CTA OF THE CHEST 12/2/2022 9:27 am TECHNIQUE: CTA of the chest was performed after the administration of intravenous contrast.  Multiplanar reformatted images are provided for review. MIP images are provided for review. Automated exposure control, iterative reconstruction, and/or weight based adjustment of the mA/kV was utilized to reduce the radiation dose to as low as reasonably achievable.  COMPARISON: 08/19/2022 HISTORY: ORDERING SYSTEM PROVIDED HISTORY: cough, hypoxia TECHNOLOGIST PROVIDED HISTORY: cough, hypoxia Decision Support Exception - unselect if not a suspected or confirmed emergency medical condition->Emergency Medical Condition (MA) 42-year-old male with cough and hypoxia. FINDINGS: Pulmonary Arteries: No obvious filling defect in the central main, right main, or left main pulmonary arteries that meets the criteria for pulmonary embolus. Evaluation of the lobar pulmonary arterial vasculature and beyond is limited due to suboptimal bolus timing, respiratory motion, and streak artifact from the contrast bolus in the SVC. Mediastinum: Atheromatous plaque and atherosclerotic calcification and tortuosity of the thoracic aorta. No dissection flap within the visualized thoracic aorta. Atrophic thyroid gland. Coronary artery disease. Right hilar lymph node enlargement. Cardiomegaly. Trace pericardial fluid. No sizable pericardial effusion. No sizable pleural effusions. No periaortic or mediastinal hemorrhage. Lungs/pleura: Trachea and proximal central airways appear patent. Consolidation in the bilateral lower lobes, left greater than right primarily in the left lower lobe. Mild dependent atelectasis and respiratory motion. Ground-glass opacities in the right parahilar region. No pneumothorax. Mild emphysema. Upper Abdomen: Atheromatous plaque and atherosclerotic calcification of the upper abdominal aorta and branch vasculature. Scattered calcified granulomata throughout the spleen. Fatty liver. Distended gallbladder. Stable left adrenal mass measuring 2 cm on image 231, series 2 and lateral limb left adrenal mass measuring 1.5 cm on image 244, series 2, the superior left adrenal mass which is visualized, unchanged dating back to 11/14/2017. The lateral limb left adrenal mass is present dating back to 04/06/2018. Soft Tissues/Bones: Mild degenerative changes throughout the spine. Diffuse idiopathic skeletal hyperostosis.      1. No clear evidence for central pulmonary embolus within the limitations of this study. 2. Multifocal airspace disease which could represent a combination of atelectasis and or infiltrate/multifocal pneumonia. Follow-up is recommended to document resolution. 3. Mild emphysema. 4. Atheromatous plaque and atherosclerotic calcification as well as tortuosity of the thoracic aorta. 5. Atrophic thyroid gland. 6. Coronary artery disease. Cardiomegaly. 7. Right hilar lymph node enlargement which may be reactive. 8. Distended gallbladder. Fatty liver. 9. Stable left adrenal masses measuring up to 2 cm, unchanged dating back to 2017 and 2018 exams, likely left adrenal adenomas. Assessment and Plan:  Patient Active Problem List    Diagnosis Date Noted    Multifocal pneumonia 12/02/2022    Community acquired bacterial pneumonia 12/30/2021    Hypoxia 12/30/2021    Moderate persistent asthma without complication 51/39/1703    Hypothyroidism due to Hashimoto's thyroiditis 11/05/2018    Elevated liver enzymes 11/02/2018    Adrenal adenoma, left 10/16/2018    COPD (chronic obstructive pulmonary disease) ([facility] [address] 11/15/2017    Chronic combined systolic and diastolic CHF (congestive heart failure) ([facility] [address] 09/21/2017    Knee osteoarthritis 06/11/2015    Obesity (BMI 30.0-34.9) 02/05/2015    Venous (peripheral) insufficiency 02/05/2015    Hx pulmonary embolism 02/05/2015    Hearing impaired 02/05/2015    Edema 02/05/2015    Type 2 diabetes mellitus with complication, without long-term current use of insulin ([facility] [address] 02/05/2015        Discharge Medications:         Medication List        START taking these medications      amoxicillin-clavulanate 875-125 MG per tablet  Commonly known as: AUGMENTIN  Take 1 tablet by mouth 2 times daily for 7 days            CHANGE how you take these medications      Euthyrox 137 MCG tablet  Generic drug: levothyroxine  Take 1 tablet by mouth once daily  What changed: See the new instructions.             CONTINUE taking these medications acetaminophen 500 MG tablet  Commonly known as: TYLENOL     * albuterol sulfate  (90 Base) MCG/ACT inhaler  Commonly known as: PROVENTIL;VENTOLIN;PROAIR  Inhale 2 puffs into the lungs every 4 hours as needed for Wheezing or Shortness of Breath     * albuterol (2.5 MG/3ML) 0.083% nebulizer solution  Commonly known as: PROVENTIL  Take 3 mLs by nebulization every 4 hours as needed for Wheezing or Shortness of Breath     alogliptin 25 MG Tabs tablet  Commonly known as: NESINA     aspirin 81 MG tablet     atorvastatin 40 MG tablet  Commonly known as: LIPITOR  Take 1 tablet by mouth once daily     blood glucose test strips  accu check     fluticasone-salmeterol 250-50 MCG/DOSE Aepb  Commonly known as: ADVAIR     furosemide 40 MG tablet  Commonly known as: LASIX     meloxicam 7.5 MG tablet  Commonly known as: MOBIC  Take 1 tablet by mouth daily     metFORMIN 1000 MG tablet  Commonly known as: GLUCOPHAGE     montelukast 10 MG tablet  Commonly known as: SINGULAIR  Take 1 tablet by mouth daily     omeprazole 20 MG delayed release capsule  Commonly known as: PRILOSEC     OSTEO BI-FLEX ADV JOINT SHIELD PO     oxybutynin 5 MG tablet  Commonly known as: DITROPAN  Take 1 tablet by mouth 2 times daily     primidone 250 MG tablet  Commonly known as: MYSOLINE     propranolol 120 MG extended release capsule  Commonly known as: INDERAL LA     TAMSULOSIN HCL PO     therapeutic multivitamin-minerals tablet           * This list has 2 medication(s) that are the same as other medications prescribed for you. Read the directions carefully, and ask your doctor or other care provider to review them with you.                 STOP taking these medications      Torsemide 40 MG Tabs               Where to Get Your Medications        These medications were sent to [facility], [address] [address]      Phone: [phone] amoxicillin-clavulanate 875-125 MG per tablet         Patient Instructions:    Activity: activity as tolerated  Diet: cardiac diet  Wound Care: none needed  Other: None    Disposition:   Discharge to Home    Follow up:  Patient will be followed by GENA Duffy CNP in 1-2 weeks    CORE MEASURES on Discharge (if applicable)  ACE/ARB in CHF: NA  Statin in MI: NA  ASA in MI: NA  Statin in CVA: NA  Antiplatelet in CVA: NA    Total time spent on discharge services: 40 minutes    Including the following activities:  Evaluation and Management of patient  Discussion with patient and/or surrogate about current care plan  Coordination with Case Management and/or   Coordination of care with Consultants (if applicable)   Coordination of care with Receiving Facility Physician (if applicable)  Completion of DME forms (if applicable)  Preparation of Discharge Summary  Preparation of Medication Reconciliation  Preparation of Discharge Prescriptions    Signed:  GENA Montilla CNP, GENA, NP-C  12/5/2022, 12:06 PM

## 2022-12-05 NOTE — PROGRESS NOTES
Case Management Assessment  Initial Evaluation    Date/Time of Evaluation: 12/5/2022 11:07 AM  Assessment Completed by: CORINA Lovett    If patient is discharged prior to next notation, then this note serves as note for discharge by case management. Patient Name: Jessie Vu                   YOB: 1935  Diagnosis: Pneumonia of both lungs due to infectious organism, unspecified part of lung [J18.9]  Multifocal pneumonia [J18.9]                   Date / Time: 12/2/2022  7:52 AM    Patient Admission Status: Inpatient   Readmission Risk (Low < 19, Mod (19-27), High > 27): Readmission Risk Score: 11.2    Current PCP: GENA Zhu CNP  PCP verified by CM? Yes    Chart Reviewed: Yes      History Provided by: Patient  Patient Orientation: Alert and Oriented, Person, Place, Situation, Self    Patient Cognition: Alert    Hospitalization in the last 30 days (Readmission):  No    If yes, Readmission Assessment in  Navigator will be completed. Advance Directives:      Code Status: Full Code   Patient's Primary Decision Maker is: Legal Next of Kin    Primary Decision Maker (Active): Nicole Vargas - Spouse - [phone]    Discharge Planning:    Patient lives with: Spouse/Significant Other Type of Home: House  Primary Care Giver: Spouse  Patient Support Systems include: Spouse/Significant Other, Family Members   Current Financial resources: Medicare, [employer] ([place])  Current community resources: Other (Comment) (va)  Current services prior to admission: Durable Medical Equipment            Current DME: Cane            Type of Home Care services:  None    ADLS  Prior functional level: Assistance with the following:, Cooking, Housework, Shopping  Current functional level: Assistance with the following:, Cooking, Housework, Shopping    PT AM-PAC:   /24  OT AM-PAC:   /24    Family can provide assistance at DC:  Yes  Would you like Case Management to discuss the discharge plan with any other family members/significant others, and if so, who? Yes  Plans to Return to Present Housing: Yes  Other Identified Issues/Barriers to RETURNING to current housing: none  Potential Assistance needed at discharge: [address]            Potential DME: Cass Jane  Patient expects to discharge to: [facility] for transportation at discharge: wife     Financial    Payor: Jule Osler / Plan: Jule Osler / Product Type: *No Product type* /     Does insurance require precert for SNF: Yes    Potential assistance Purchasing Medications: No  Meds-to-Beds request:        [address] [facility], [address] [facility] [phone] Venus Hiltons [phone]  [address] [address]  Phone: [phone] Fax: [phone]      Notes:    Factors facilitating achievement of predicted outcomes: Family support, Cooperative, Pleasant, and Sense of humor    Barriers to discharge: na    Additional Case Management Notes: Patient is a  and served in [place]. He receives services from the [place]. The wife does the cooking and the cleaning. She also provides the transportation. Patient manages his own medications. The Plan for Transition of Care is related to the following treatment goals of Pneumonia of both lungs due to infectious organism, unspecified part of lung [J18.9]  Multifocal pneumonia [P39.0]    IF APPLICABLE: The Patient and/or patient representative Yoly Costa and his family were provided with a choice of provider and agrees with the discharge plan.  Freedom of choice list with basic dialogue that supports the patient's individualized plan of care/goals and shares the quality data associated with the providers was provided to: na    Patient Representative Name:       The Patient and/or Patient Representative Agree with the Discharge Plan? yes     [facility]  Case Management Department  Ph: [phone] Fax: [phone]

## 2022-12-05 NOTE — PROGRESS NOTES
Comprehensive Nutrition Assessment    Type and Reason for Visit:  Initial    Nutrition Recommendations/Plan:   Continue current diet. Provided basic CC diet instruction. Malnutrition Assessment:  Malnutrition Status: At risk for malnutrition (Comment) (12/05/22 1139)    Context:  Acute Illness     Findings of the 6 clinical characteristics of malnutrition:  Energy Intake:  No significant decrease in energy intake  Weight Loss:  No significant weight loss     Body Fat Loss:  No significant body fat loss     Muscle Mass Loss:  No significant muscle mass loss    Fluid Accumulation:  Moderate to Severe Extremities (+ 3 pitting BLE)   Strength:  Not Performed    Nutrition Assessment:    Altered nutrition related labs r/t endocrine dysfunctiona aeb A1c 7.2, glucose 130. Pt wife states they have never had CC diet education. Provided basic CC diet information and reviewed serving sizes, portions. Pt wife answered questions, denied any PO or weight changes. Wife states Pt has diverticulosis, questioning dietary approach. Encouraged chewing foods to applesauce consistency before swalloing food and increasing fiber in diet. Pt expected to d/c today. Na 130. Pt admitted with pneumonia. Nutrition Related Findings:    appears well nourished Wound Type: None       Current Nutrition Intake & Therapies:    Average Meal Intake: %  Average Supplements Intake: None Ordered  ADULT DIET; Regular; Low Fat/Low Chol/High Fiber/2 gm Na    Anthropometric Measures:  Height: 5' 11\" (180.3 cm)  Ideal Body Weight (IBW): 172 lbs (78 kg)    Admission Body Weight: 255 lb 12 oz (116 kg)  Current Body Weight: 254 lb 6.6 oz (115.4 kg), 147.9 % IBW.  Weight Source: Bed Scale  Current BMI (kg/m2): 35.5  Usual Body Weight: 253 lb (114.8 kg)  % Weight Change (Calculated): 0.6  Weight Adjustment For: No Adjustment                 BMI Categories: Obese Class 2 (BMI 35.0 -39.9)      Nutrition Diagnosis:   Altered nutrition-related lab values related to endocrine dysfuntion as evidenced by lab values    Nutrition Interventions:   Food and/or Nutrient Delivery: Continue Current Diet  Nutrition Education/Counseling: No recommendation at this time  Coordination of Nutrition Care: Continue to monitor while inpatient  Plan of Care discussed with: Pt wife Missy Silva    Goals:     Goals: PO intake 75% or greater     Lab Results   Component Value Date/Time    LABA1C 7.2 12/02/2022 08:45 AM      Recent Labs     12/03/22  1651 12/03/22  1905 12/04/22  0741 12/04/22  1133 12/04/22  1605 12/04/22  1903 12/05/22  0653 12/05/22  1114   POCGLU 103* 170* 157* 198* 111* 134* 135* 167*      Recent Labs     12/03/22  0615 12/04/22  0525 12/05/22  0545   * 130* 130*   K 4.3 4.0 4.4   CL 94* 95* 93*   CO2 23 24 25   BUN 25* 23 18   CREATININE 1.31* 1.16 1.11   GLUCOSE 177* 140* 130*   ALT 57* 52* 53*   ALKPHOS 137* 139* 161*      Lab Results   Component Value Date/Time    LABALBU 3.5 12/05/2022 05:45 AM       Lab Results   Component Value Date/Time    TRIG 134 03/14/2022 11:53 AM    HDL 52 03/14/2022 11:53 AM        Nutrition Monitoring and Evaluation:   Behavioral-Environmental Outcomes: Knowledge or Skill  Food/Nutrient Intake Outcomes: Food and Nutrient Intake  Physical Signs/Symptoms Outcomes: Biochemical Data, Weight    Discharge Planning:    Continue current diet     [facility], RD, LD  Contact: [phone]

## 2022-12-05 NOTE — PROGRESS NOTES
Occupational Therapy  Facility/Department: [facility] THE [facility] MED SURG  Daily Treatment Note  NAME: Brent Carrasco  : 1935  MRN: [id number]    Date of Service: 2022    Discharge Recommendations:  Continue to assess pending progress, Home with assist PRN         Patient Diagnosis(es): The encounter diagnosis was Pneumonia of both lungs due to infectious organism, unspecified part of lung. Assessment    Activity Tolerance: Patient limited by fatigue;Patient limited by endurance  Discharge Recommendations: Continue to assess pending progress;Home with assist PRN      Plan   Occupational Therapy Plan  Times Per Week: 7x/wk  Times Per Day: Once a day  Current Treatment Recommendations: Strengthening; Functional mobility training; Safety education & training;Self-Care / ADL     Restrictions  Restrictions/Precautions  Restrictions/Precautions: General Precautions; Fall Risk    Subjective   Subjective  Subjective: Pt lying in bed upon arrival. Pt agreed to participate in therapy session. Pain: Pt had no complaints of pain. Objective    Vitals     Bed Mobility Training  Bed Mobility Training: Yes  Overall Level of Assistance: Supervision  Supine to Sit: Supervision  Sit to Supine: Supervision        OT Exercises  Exercise Treatment: Pt tolerated BUE ther ex with 2# dumbbell x 7 planes x 15 reps x 1 set, yellow flex bar x 3 variations x 15 reps x 1 set to increase UE strength and endurance in order to ease completion of ADL tasks. Pt required RBs as needed secondary to fatigue. Safety Devices  Type of Devices: Call light within reach; Left in bed     Patient Education  Education Given To: Patient  Education Provided: Role of Therapy;Plan of Care  Education Method: Verbal  Barriers to Learning: None  Education Outcome: Verbalized understanding    Goals  Short Term Goals  Time Frame for Short Term Goals: 20 visits  Short Term Goal 1: Pt. will tolerate 15 mins of BUE ther ex/act to increase overall strength/activity tolerance for functional tasks. Short Term Goal 2: Pt. will complete ADL routine with mod I with use of EC techs PRN.   Short Term Goal 3: Pt. will complete functional ADL transfers/mobility with mod I and G safety,       Therapy Time   Individual Concurrent Group Co-treatment   Time In 0833         Time Out 0856         Minutes 23                 SHAHRAM Rodriguez/MIAN

## 2022-12-06 ENCOUNTER — CARE COORDINATION (OUTPATIENT)
Dept: CASE MANAGEMENT | Age: 87
End: 2022-12-06

## 2022-12-06 NOTE — CARE COORDINATION
[facility] Care Transitions Initial Follow Up Call #1 -Attempted initial 24 hour transitional call to patient. Left VM to return call directly to CTN. Unable to leave voicemail on spouse's contact # - VM not set up    Noted PCP [facility] f/u is scheduled for     Care Transitions Outreach Attempt - day #1  Attempted to reach patient for transitions of care follow up. Unable to reach patient or spouse    Patient: Tatum Ernst   Patient : 1935   MRN: [id number]    Reason for Admission: Bilateral multifocal PNE, sepsis  Discharge Date: 22   RARS: Readmission Risk Score: 11.4    Last Discharge  [facility]       Date Complaint Diagnosis Description Type Department Provider    22 Fever; Cough; Headache Pneumonia of both lungs due to infectious organism, unspecified part of lung ED to Hosp-Admission (Discharged) (ADMITTED) Colleen Lee MD; Letitia To. .. Noted following upcoming appointments from discharge chart review:   [facility] follow up appointment(s):   Future Appointments   Date Time Provider Gisela Harding   2022  2:00 PM GENA Lozano - CNP [facility]   2023  2:00 PM Deo Brown MD TIFF CARD [facility]   2023  2:20 PM GENA Lozano CNP [facility]   3/6/2023  1:30 PM Odilon Bustos MD TIFF PULM [facility]   2023  2:00 PM GENA Lozano - CNP [facility]         Was this an external facility discharge?  No     Non-face-to-face services provided:  Scheduled appointment with PCP-  Obtained and reviewed discharge summary and/or continuity of care documents        Estephania Wayne RN

## 2022-12-07 ENCOUNTER — CARE COORDINATION (OUTPATIENT)
Dept: CASE MANAGEMENT | Age: 87
End: 2022-12-07

## 2022-12-07 DIAGNOSIS — J18.9 MULTIFOCAL PNEUMONIA: Primary | ICD-10-CM

## 2022-12-07 LAB
CULTURE: NORMAL
CULTURE: NORMAL
Lab: NORMAL
Lab: NORMAL
SPECIMEN DESCRIPTION: NORMAL
SPECIMEN DESCRIPTION: NORMAL

## 2022-12-07 PROCEDURE — 1111F DSCHRG MED/CURRENT MED MERGE: CPT

## 2022-12-07 NOTE — CARE COORDINATION
[facility] Care Transitions Initial Follow Up Call    Call within 2 business days of discharge: Yes    Care Transition Nurse contacted the patient by telephone to perform post hospital discharge assessment. Verified name and  with patient as identifiers. Provided introduction to self, and explanation of the Care Transition Nurse role. Patient: Martha Velasquez Patient : 1935   MRN: [id number]  Reason for Admission: PNA, Hx. COPD, CHF, DM  Discharge Date: 22 RARS: Readmission Risk Score: 11.4      Last Discharge  Street       Date Complaint Diagnosis Description Type Department Provider    22 Fever; Cough; Headache Pneumonia of both lungs due to infectious organism, unspecified part of lung ED to Hosp-Admission (Discharged) (ADMITTED) Gavin Collins MD; Letitia To. .. Was this an external facility discharge? No Discharge Facility: [facility]    Challenges to be reviewed by the provider   Additional needs identified to be addressed with provider: No  none               Method of communication with provider: none. Nigel Virgen states doing OK, is trying to eat his breakfast and states that his phone continues to interrupt him. Writer offered to call back in a few minutes. Called [place] back. His spouse is helping him to answer our questions. States that he is taking, \"too many pills and cannot remember all of them\". Medications reviewed. Encouraged him to bring his medications to his next appointment for further review. V/U  Defers need for HHC. Denies SOB, states occasional cough. Cannot describe what he brings up with coughing. Blood Sugar this am was , \"138\". Denies nausea, CP. Does not measure his BP. Uses prn nebulizer on average 3 times daily. Care Transition Nurse reviewed discharge instructions, medical action plan, and red flags with patient who verbalized understanding.  The patient was given an opportunity to ask questions and does not have any further questions or concerns at this time. Were discharge instructions available to patient? Yes. Reviewed appropriate site of care based on symptoms and resources available to patient including: PCP. The patient agrees to contact the PCP office for questions related to their healthcare. Advance Care Planning:   Does patient have an Advance Directive: reviewed and current. Medication reconciliation was performed with patient, who verbalizes understanding of administration of home medications. Medications reviewed, 1111F entered: yes    Non-face-to-face services provided:  Obtained and reviewed discharge summary and/or continuity of care documents  Reviewed and followed up on pending diagnostic tests and treatments-prior to call  Education of patient/family/caregiver/guardian to support self-management-see note  Assessment and support for treatment adherence and medication management-nurse assessment    Offered patient enrollment in the Remote Patient Monitoring (RPM) program for in-home monitoring:  NP is a non-participant in program. .      Follow Up  Future Appointments   Date Time Provider Gisela Harding   12/13/2022  2:00 PM [facility]   1/16/2023  2:00 PM Porter Farah MD TIFF CARD TPP   2/16/2023  2:20 PM Grzegorz Perez APRN - CNP [facility]   3/6/2023  1:30 PM Alicia Cole MD TIFF PULM [facility]   11/17/2023  2:00 PM GENA Swan - CNP [facility]       Care Transition Nurse provided contact information. Plan for follow-up call in 5-7 days based on severity of symptoms and risk factors. Plan for next call: symptom management-Cardiopulmonary assessment  self management-questions. concerns  follow-up appointment-PCP 12/13/22  medication management-medications brought to NP for review?       Vaishali Delaney, RN

## 2022-12-09 ENCOUNTER — CARE COORDINATION (OUTPATIENT)
Dept: CASE MANAGEMENT | Age: 87
End: 2022-12-09

## 2022-12-09 NOTE — CARE COORDINATION
[facility] Care [facility]itions Follow Up Call    Care Transition Nurse contacted the patient /SPOUSE by telephone to follow up after admission on . Verified name and  with family as identifiers. Patient: Day Ocampo    Patient : 1935   MRN: [id number]    Reason for Admission: Bilateral multifocal PNE  Discharge Date: 22   ARS: Readmission Risk Score: 11.4      Needs to be reviewed by the provider   Additional needs identified to be addressed with provider: No  none             Method of communication with provider: .none    Spoke with patient's spouse, Marichuy Ching - reports patient doing well. Denies any new s/s - baseline COPD expectorant - continues 7 days course of amoxicillin. Pulse ox reading low 90's which is his baseline. Recommended daily weights based on hx CHF - spouse says he can do it. Reviewed upcoming appt - PCP       Plan for next call: symptom management-reassess respiratory s/s, check daily weights  follow-up appointment-review PCP appt, check if amoxicillin is completed      Addressed changes since last contact:  none  Discussed follow-up appointments. If no appointment was previously scheduled, appointment scheduling offered: Yes. Is follow up appointment scheduled within 7 days of discharge? Yes. Follow Up  Future Appointments   Date Time Provider Gisela Harding   2022  2:00 PM GENA Olivares - CNP [facility]PP   2023  2:00 PM MD KIRSTIE Martinez CARD TPP   2023  2:20 PM GENA Olivares - CNP [facility]   3/6/2023  1:30 PM MD POLO MillerF PULM TPP   2023  2:00 PM GENA Olivares - CNP [facility]       Care Transition Nurse reviewed discharge instructions, medical action plan, and red flags with family and discussed any barriers to care and/or understanding of plan of care after discharge.  Discussed appropriate site of care based on symptoms and resources available to patient including: PCP  Specialist  CTN . The family agrees to contact the PCP office for questions related to their healthcare. Patients top risk factors for readmission: medication management PNE  Interventions to address risk factors: Scheduled appointment with PCP-12/13 and Obtained and reviewed discharge summary and/or continuity of care documents    Offered patient enrollment in the Remote Patient Monitoring (RPM) program for in-home monitoring: Patient is not eligible for RPM program. AFL     Care Transitions Subsequent and Final Call    Schedule Follow Up Appointment with PCP: Completed  Subsequent and Final Calls  Do you have any ongoing symptoms?: No  Have your medications changed?: Yes  Patient Reports: continues amoxicillin  Do you have any questions related to your medications?: No  Do you currently have any active services?: No  Do you have any needs or concerns that I can assist you with?: No  Care Transitions Interventions  Other Interventions:             Care Transition Nurse provided contact information for future needs. Plan for follow-up call in 5-7 days based on severity of symptoms and risk factors.     Plan for next call: symptom management-reassess respiratory s/s, check daily weights  follow-up appointment-review PCP appt, check if amoxicillin is completed    Bill Diez RN

## 2022-12-14 ENCOUNTER — CARE COORDINATION (OUTPATIENT)
Dept: CASE MANAGEMENT | Age: 87
End: 2022-12-14

## 2022-12-14 NOTE — CARE COORDINATION
[facility] Care Transitions Follow Up Call    Care Transition Nurse contacted the patient by telephone to follow up after admission on 22. Verified name and  with patient as identifiers. Patient: Moshe Hartmann  Patient : 1935   MRN: [id number]  Reason for Admission: PNA  Discharge Date: 22 RARS: Readmission Risk Score: 11.4      Needs to be reviewed by the provider   Additional needs identified to be addressed with provider: No  none             Method of communication with provider: none. Spoke to Olman Santacruz for transitions follow up call. Stated he is \"about the same\" since discharge on 22. Stated weight has dropped [place] couple of pounds\". Discharge weight was 254 lbs. Stated sats are staying @ 92-95%, sugars are \"good\". Appetite good, staying hydrated. Stated he has an IS at home, encouraged to use to increase stamina, prevent worsening PNA. Stated he uses nebulizer and rescue inhaler prn, no increase in need or worsening SOB. Stated he really doesn't feel that his energy has improved, has plenty of help and support at home. Reviewed upcoming appt on  with PCP. Wife will go to appt with him. Addressed changes since last contact:   Reminder of PCP appt on , completed atb. Discussed follow-up appointments. Follow Up  Future Appointments   Date Time Provider Gisela Harding   2022 10:20 AM GENA Goldman - CNP [facility]   2023  2:00 PM MD SHARLENE ShabazzF CARD TPP   2023  2:20 PM Toby Snider APRN - CNP [facility]   3/6/2023  1:30 PM MD KIRSTIE Dietz PULM TPP   2023  2:00 PM GENA Goldman - CNP [facility]         Care Transition Nurse reviewed discharge instructions with patient and discussed any barriers to care and/or understanding of plan of care after discharge.  Discussed appropriate site of care based on symptoms and resources available to patient including: PCP  Specialist  When to call [address] The patient agrees to contact the PCP office for questions related to their healthcare. Patients top risk factors for readmission: medical condition-COPD, CHF  Interventions to address risk factors: Obtained and reviewed discharge summary and/or continuity of care documents    Offered patient enrollment in the Remote Patient Monitoring (RPM) program for in-home monitoring: Patient is not eligible for RPM program.     Care Transitions Subsequent and Final Call    Subsequent and Final Calls  Do you have any ongoing symptoms?: No  Have your medications changed?: No  Do you have any questions related to your medications?: No  Do you currently have any active services?: No  Do you have any needs or concerns that I can assist you with?: No  Identified Barriers: None  Care Transitions Interventions  Other Interventions:             Care Transition Nurse provided contact information for future needs. Plan for follow-up call in 5-7 days based on severity of symptoms and risk factors.   Plan for next call: symptom management-weight, BS, sats  follow-up appointment-PCP review from appt on 12/16    Chelly Jones RN

## 2022-12-16 ENCOUNTER — OFFICE VISIT (OUTPATIENT)
Dept: PRIMARY CARE CLINIC | Age: 87
End: 2022-12-16

## 2022-12-16 VITALS
HEART RATE: 74 BPM | WEIGHT: 256.5 LBS | OXYGEN SATURATION: 95 % | SYSTOLIC BLOOD PRESSURE: 132 MMHG | TEMPERATURE: 97.7 F | RESPIRATION RATE: 22 BRPM | DIASTOLIC BLOOD PRESSURE: 84 MMHG | BODY MASS INDEX: 35.77 KG/M2

## 2022-12-16 DIAGNOSIS — N39.41 URGENCY INCONTINENCE: ICD-10-CM

## 2022-12-16 DIAGNOSIS — J18.9 MULTIFOCAL PNEUMONIA: Primary | ICD-10-CM

## 2022-12-16 DIAGNOSIS — Z09 HOSPITAL DISCHARGE FOLLOW-UP: ICD-10-CM

## 2022-12-16 RX ORDER — OXYBUTYNIN CHLORIDE 5 MG/1
10 TABLET ORAL 2 TIMES DAILY
Qty: 180 TABLET | Refills: 3 | Status: SHIPPED
Start: 2022-12-16

## 2022-12-16 ASSESSMENT — PATIENT HEALTH QUESTIONNAIRE - PHQ9
2. FEELING DOWN, DEPRESSED OR HOPELESS: 0
SUM OF ALL RESPONSES TO PHQ QUESTIONS 1-9: 0
SUM OF ALL RESPONSES TO PHQ QUESTIONS 1-9: 0
1. LITTLE INTEREST OR PLEASURE IN DOING THINGS: 0
SUM OF ALL RESPONSES TO PHQ9 QUESTIONS 1 & 2: 0
SUM OF ALL RESPONSES TO PHQ QUESTIONS 1-9: 0
SUM OF ALL RESPONSES TO PHQ QUESTIONS 1-9: 0

## 2022-12-16 NOTE — PATIENT INSTRUCTIONS
SURVEY:     You may be receiving a survey from [employer] regarding your visit today. Please complete the survey to enable us to provide the highest quality of care to you and your family. If you cannot score us a very good on any question, please call the office to discuss how we could have made your experience a very good one.      Thank you,    Marylu Snider, APRN-CNP  Camille Mckenna, APRN-CNP  Ibis Ball, GELY Vargas, MUKUND Roman, MUKUND Meléndez, CMA  Haily, ESTER Baxter, PM

## 2022-12-16 NOTE — PROGRESS NOTES
Post-Discharge Transitional Care  Follow Up      Chanel Paulino   YOB: 1935    Date of Office Visit:  12/16/2022  Date of Hospital Admission: 12/2/22  Date of Hospital Discharge: 12/5/22  Risk of hospital readmission (high >=14%. Medium >=10%) :Readmission Risk Score: 11.4      Care management risk score Rising risk (score 2-5) and Complex Care (Scores >=6): No Risk Score On File     Non face to face  following discharge, date last encounter closed (first attempt may have been earlier): 12/07/2022    Call initiated 2 business days of discharge: Yes    ASSESSMENT/PLAN:   Multifocal pneumonia  Urgency incontinence  Hospital discharge follow-up  -     [facility] DISCHARGE MEDS RECONCILED W/ CURRENT OUTPATIENT MED LIST    Medical Decision Making: moderate complexity  Return if symptoms worsen or fail to improve. On this date 12/16/2022 I have spent 42 minutes reviewing previous notes, test results and face to face with the patient discussing the diagnosis and importance of compliance with the treatment plan as well as documenting on the day of the visit. Subjective:   HPI:  Follow up of Hospital problems/diagnosis(es):     Hospital Course: The patient was admitted for the above. Chanel Paulino is a 82 y.o. male admitted with multifocal pneumonia. He  presented to the emergency room with complaints of cough and increasing shortness of breath over the past few days. He admitted fever T-max 102 this morning. He reported taken Tylenol prior to coming to the hospital.  He denied chest pain or palpitations. He denied abdominal pain or vomiting but admitted to nausea. He denied diarrhea. He admitted to an ill contact at Thanksgiving time. Patient is compliant with medications at home including nebulizer treatments. His SPO2 was dropping to 88 to 89% while in the ER. CT chest showed multifocal pneumonia. WBC count was elevated to 15,000.   Liver enzymes slightly elevated with an ALT of 67 and AST of 56. Lactic acid was 2.1. He was flu and COVID-negative. Patient was admitted and placed on IV antibiotics. Labs were trended. PT and OT were consulted. Patient tolerated this therapy well. Hemodynamically patient stable. His cultures are negative. Patient will be discharged today  on Augmentin for 7 more days to complete a 10-day treatment. He will follow-up with primary care as an outpatient. Inpatient course: Discharge summary reviewed- see chart. Interval history/Current status:     Perfecto Villa is feeling much better today. He states his breathing much better and using his incentive spirometer as directed. He does complain of some difficulty with increased urination which was discussed at last visit. We did start oxybutynin 5 mg 3 times daily with some minimal improvement. We will increase at this time to 5 mg 3 times daily. Patient Active Problem List   Diagnosis    Obesity (BMI 30.0-34. 9)    Venous (peripheral) insufficiency    Hx pulmonary embolism    Hearing impaired    Edema    Type 2 diabetes mellitus with complication, without long-term current use of insulin (HCC)    Knee osteoarthritis    Chronic combined systolic and diastolic CHF (congestive heart failure) (HCC)    COPD (chronic obstructive pulmonary disease) (HCC)    Adrenal adenoma, left    Elevated liver enzymes    Hypothyroidism due to Hashimoto's thyroiditis    Moderate persistent asthma without complication    Community acquired bacterial pneumonia    Hypoxia    Multifocal pneumonia       Medications listed as ordered at the time of discharge from hospital     Medication List            Accurate as of December 16, 2022 11:36 AM. If you have any questions, ask your nurse or doctor.                 CHANGE how you take these medications      oxybutynin 5 MG tablet  Commonly known as: DITROPAN  Take 2 tablets by mouth 2 times daily  What changed: how much to take  Changed by: [facility], GENA - [place]ER[place]            CONTINUE taking these medications      acetaminophen 500 MG tablet  Commonly known as: TYLENOL     * albuterol sulfate  (90 Base) MCG/ACT inhaler  Commonly known as: PROVENTIL;VENTOLIN;PROAIR  Inhale 2 puffs into the lungs every 4 hours as needed for Wheezing or Shortness of Breath     * albuterol (2.5 MG/3ML) 0.083% nebulizer solution  Commonly known as: PROVENTIL  Take 3 mLs by nebulization every 4 hours as needed for Wheezing or Shortness of Breath     alogliptin 25 MG Tabs tablet  Commonly known as: NESINA     aspirin 81 MG tablet     atorvastatin 40 MG tablet  Commonly known as: LIPITOR  Take 1 tablet by mouth once daily     blood glucose test strips  accu check     Euthyrox 137 MCG tablet  Generic drug: levothyroxine  Take 1 tablet by mouth once daily     fluticasone-salmeterol 250-50 MCG/DOSE Aepb  Commonly known as: ADVAIR     furosemide 40 MG tablet  Commonly known as: LASIX     meloxicam 7.5 MG tablet  Commonly known as: MOBIC  Take 1 tablet by mouth daily     metFORMIN 1000 MG tablet  Commonly known as: GLUCOPHAGE     montelukast 10 MG tablet  Commonly known as: SINGULAIR  Take 1 tablet by mouth daily     omeprazole 20 MG delayed release capsule  Commonly known as: PRILOSEC     OSTEO BI-FLEX ADV JOINT SHIELD PO     primidone 250 MG tablet  Commonly known as: MYSOLINE     propranolol 120 MG extended release capsule  Commonly known as: INDERAL LA     TAMSULOSIN HCL PO     therapeutic multivitamin-minerals tablet           * This list has 2 medication(s) that are the same as other medications prescribed for you. Read the directions carefully, and ask your doctor or other care provider to review them with you.                    Where to Get Your Medications        Information about where to get these medications is not yet available    Ask your nurse or doctor about these medications  oxybutynin 5 MG tablet           Medications marked \"taking\" at this time  Outpatient Medications Marked as Taking for the 12/16/22 encounter (Office Visit) with Lynnie Lesch Might, APRN - CNP   Medication Sig Dispense Refill    oxybutynin (DITROPAN) 5 MG tablet Take 2 tablets by mouth 2 times daily 180 tablet 3    furosemide (LASIX) 40 MG tablet Take 40 mg by mouth in the morning and 40 mg in the evening. Per wife Missy Silva, the torsemide was very expensive so Ave Busch continues to take his Lasix 40mg BID.       montelukast (SINGULAIR) 10 MG tablet Take 1 tablet by mouth daily 90 tablet 3    meloxicam (MOBIC) 7.5 MG tablet Take 1 tablet by mouth daily 90 tablet 1    EUTHYROX 137 MCG tablet Take 1 tablet by mouth once daily 90 tablet 3    atorvastatin (LIPITOR) 40 MG tablet Take 1 tablet by mouth once daily 90 tablet 3    fluticasone-salmeterol (ADVAIR) 250-50 MCG/DOSE AEPB Inhale 1 puff into the lungs every 12 hours      albuterol (PROVENTIL) (2.5 MG/3ML) 0.083% nebulizer solution Take 3 mLs by nebulization every 4 hours as needed for Wheezing or Shortness of Breath 120 each 3    primidone (MYSOLINE) 250 MG tablet Take 250 mg by mouth 2 times daily      propranolol (INDERAL LA) 120 MG extended release capsule Take 120 mg by mouth 2 times daily      omeprazole (PRILOSEC) 20 MG delayed release capsule Take 20 mg by mouth daily      alogliptin (NESINA) 25 MG TABS tablet Take 25 mg by mouth daily      blood glucose monitor strips accu check 100 strip 3    aspirin 81 MG tablet Take 81 mg by mouth daily       albuterol sulfate  (90 Base) MCG/ACT inhaler Inhale 2 puffs into the lungs every 4 hours as needed for Wheezing or Shortness of Breath 1 Inhaler 2    metFORMIN (GLUCOPHAGE) 1000 MG tablet Take 1,000 mg by mouth 2 times daily       acetaminophen (TYLENOL) 500 MG tablet Take 1,000 mg by mouth every 6 hours as needed for Pain      TAMSULOSIN HCL PO Take 0.8 mg by mouth Daily with supper       Multiple Vitamins-Minerals (THERAPEUTIC MULTIVITAMIN-MINERALS) tablet Take 1 tablet by mouth daily          Medications patient taking as of now reconciled against medications ordered at time of hospital discharge: Yes    A comprehensive review of systems was negative except for what was noted in the HPI. Objective:    /84 (Position: Sitting)   Pulse 74   Temp 97.7 °F (36.5 °C) (Temporal)   Resp 22   Wt 256 lb 8 oz (116.3 kg)   SpO2 95%   BMI 35.77 kg/m²   General Appearance: alert and oriented to person, place and time, well-developed and well nourished, in no acute distress, and obese  Skin: warm and dry  Head: normocephalic and atraumatic  Eyes: conjunctivae normal  ENT: oropharynx clear and moist with normal mucous membranes  Neck: neck supple and non tender without mass   Pulmonary/Chest: rhonchi present- RLL, clear otherwise  Cardiovascular: normal rate and regular rhythm  Abdomen: soft, non-tender  Extremities: trace + edema-  bilateral lower legs  Musculoskeletal: no swollen joints  Neurologic: speech normal      An electronic signature was used to authenticate this note.   --Toby Snider, GENA - CNP

## 2022-12-21 ENCOUNTER — CARE COORDINATION (OUTPATIENT)
Dept: CASE MANAGEMENT | Age: 87
End: 2022-12-21

## 2022-12-21 NOTE — CARE COORDINATION
Care Transitions Outreach Attempt #1    Attempt #1 to reach patient for transitions of care follow up. Unable to reach patient. Left VM requesting call back. Patient: Taiwo Code Patient : 1935 MRN: [id number]    Last Discharge  Street       Date Complaint Diagnosis Description Type Department Provider    22 Fever; Cough; Headache Pneumonia of both lungs due to infectious organism, unspecified part of lung ED to Hosp-Admission (Discharged) (ADMITTED) Mery Zamarripa MD; Letitia To. ..             Noted following upcoming appointments from discharge chart review:   [facility] follow up appointment(s):   Future Appointments   Date Time Provider Gisela Harding   2023  2:00 PM Richard Davis MD [facility] CARD [facility]   2023  2:20 PM GENA Cooper CNP [facility]   3/6/2023  1:30 PM Cari Yang MD TIFF PULM [facility]   2023  2:00 PM GENA Cooper CNPf Prim [facility]     Plan for next call: symptom management-weight, BS, sats  follow-up appointment-PCP review from appt on     Gianluca Jay RN BSN   Care Transitions Nurse  [phone]

## 2022-12-22 ENCOUNTER — CARE COORDINATION (OUTPATIENT)
Dept: CASE MANAGEMENT | Age: 87
End: 2022-12-22

## 2022-12-22 NOTE — CARE COORDINATION
[facility] Care Transitions Follow Up Call Attempted to reach patient for subsequent transitional call.   Rang, picked up, then disconnected - unable to reach on final attempt  CT episode resolved      Patient: Jaleel Santos      Patient : 1935   MRN: [id number]             Reason for Admission: Bilateral multifocal PNE  Discharge Date: 22         ARS: Readmission Risk Score: 11.4      Follow Up  Future Appointments   Date Time Provider Gisela Harding   2023  2:00 PM Marisabel Bobo MD TIFF CARD [facility]   2023  2:20 PM GENA Syed CNP [facility]   3/6/2023  1:30 PM Harvey Boucher MD TIFF PULM [facility]   2023  2:00 PM GENA Syed CNP [facility]         Gustavo Zhao RN

## 2023-01-05 ENCOUNTER — TELEPHONE (OUTPATIENT)
Dept: PRIMARY CARE CLINIC | Age: 88
End: 2023-01-05

## 2023-01-05 DIAGNOSIS — J18.9 MULTIFOCAL PNEUMONIA: Primary | ICD-10-CM

## 2023-01-05 NOTE — TELEPHONE ENCOUNTER
Patients wife called in stating that Zac Kelly is having a bad cough following having pneumonia a couple weeks ago. She is requesting an appointment tomorrow morning. You have no openings, where would you like him or what did you want to do?   Please advise thank you

## 2023-01-06 ENCOUNTER — HOSPITAL ENCOUNTER (OUTPATIENT)
Age: 88
Discharge: HOME | End: 2023-01-06
Payer: COMMERCIAL

## 2023-01-06 ENCOUNTER — HOSPITAL ENCOUNTER (OUTPATIENT)
Dept: GENERAL RADIOLOGY | Age: 88
End: 2023-01-06
Payer: COMMERCIAL

## 2023-01-06 DIAGNOSIS — J18.9 MULTIFOCAL PNEUMONIA: ICD-10-CM

## 2023-01-06 PROCEDURE — 71046 X-RAY EXAM CHEST 2 VIEWS: CPT

## 2023-01-06 NOTE — TELEPHONE ENCOUNTER
Wife advised and verbalized understanding. Wife states patient just finished a Z-pack that was written by pulmonology a while back to have on hand incase he has this issue. Kyler Beckwith advised that patient just completed a Paul Jasen.

## 2023-01-09 ENCOUNTER — TELEPHONE (OUTPATIENT)
Dept: PRIMARY CARE CLINIC | Age: 88
End: 2023-01-09

## 2023-01-09 RX ORDER — AZITHROMYCIN 250 MG/1
TABLET, FILM COATED ORAL
COMMUNITY
Start: 2023-01-07

## 2023-01-09 RX ORDER — PREDNISONE 20 MG/1
TABLET ORAL
COMMUNITY
Start: 2023-01-07

## 2023-01-09 NOTE — TELEPHONE ENCOUNTER
----- Message from [address] [place] [address], GENA - CNP sent at 1/7/2023 11:09 AM EST -----  Results are normal, please call patient and make them aware.

## 2023-01-09 NOTE — TELEPHONE ENCOUNTER
Called and let patients wife know of results. She stated she Yvonne Astudillo is still having a bad cough and wanted to know what you could send in for his cough or what you recommended for his cough OTC?    Please advise thank you

## 2023-01-31 ENCOUNTER — HOSPITAL ENCOUNTER (INPATIENT)
Age: 88
LOS: 10 days | Discharge: NURSING, REHAB OR LONG-TERM CARE | End: 2023-02-10
Attending: EMERGENCY MEDICINE | Admitting: FAMILY MEDICINE
Payer: OTHER GOVERNMENT

## 2023-01-31 ENCOUNTER — APPOINTMENT (OUTPATIENT)
Dept: CT IMAGING | Age: 88
End: 2023-01-31
Payer: OTHER GOVERNMENT

## 2023-01-31 ENCOUNTER — APPOINTMENT (OUTPATIENT)
Dept: GENERAL RADIOLOGY | Age: 88
End: 2023-01-31
Payer: OTHER GOVERNMENT

## 2023-01-31 ENCOUNTER — APPOINTMENT (OUTPATIENT)
Dept: ULTRASOUND IMAGING | Age: 88
End: 2023-01-31
Payer: OTHER GOVERNMENT

## 2023-01-31 DIAGNOSIS — A41.9 SEPTICEMIA (HCC): Primary | ICD-10-CM

## 2023-01-31 DIAGNOSIS — J18.9 PNEUMONIA DUE TO INFECTIOUS ORGANISM, UNSPECIFIED LATERALITY, UNSPECIFIED PART OF LUNG: ICD-10-CM

## 2023-01-31 PROBLEM — E06.3 HYPOTHYROIDISM DUE TO HASHIMOTO'S THYROIDITIS: Chronic | Status: ACTIVE | Noted: 2018-11-05

## 2023-01-31 PROBLEM — R74.8 ELEVATED LIVER ENZYMES: Chronic | Status: ACTIVE | Noted: 2018-11-02

## 2023-01-31 PROBLEM — E03.8 HYPOTHYROIDISM DUE TO HASHIMOTO'S THYROIDITIS: Chronic | Status: ACTIVE | Noted: 2018-11-05

## 2023-01-31 LAB
ABSOLUTE EOS #: 0.15 K/UL (ref 0–0.44)
ABSOLUTE IMMATURE GRANULOCYTE: 0 K/UL (ref 0–0.3)
ABSOLUTE LYMPH #: 2.03 K/UL (ref 1.1–3.7)
ABSOLUTE MONO #: 2.03 K/UL (ref 0.1–1.2)
ALBUMIN SERPL-MCNC: 3.7 G/DL (ref 3.5–5.2)
ALBUMIN/GLOBULIN RATIO: 1 (ref 1–2.5)
ALP BLD-CCNC: 185 U/L (ref 40–129)
ALT SERPL-CCNC: 123 U/L (ref 5–41)
ANION GAP SERPL CALCULATED.3IONS-SCNC: 14 MMOL/L (ref 9–17)
AST SERPL-CCNC: 95 U/L
BACTERIA: ABNORMAL
BASOPHILS # BLD: 0 % (ref 0–2)
BASOPHILS ABSOLUTE: 0 K/UL (ref 0–0.2)
BILIRUB SERPL-MCNC: 0.4 MG/DL (ref 0.3–1.2)
BILIRUBIN URINE: NEGATIVE
BUN BLDV-MCNC: 25 MG/DL (ref 8–23)
BUN/CREAT BLD: 20 (ref 9–20)
CALCIUM SERPL-MCNC: 9.1 MG/DL (ref 8.6–10.4)
CHLORIDE BLD-SCNC: 91 MMOL/L (ref 98–107)
CO2: 25 MMOL/L (ref 20–31)
COLOR: YELLOW
CREAT SERPL-MCNC: 1.26 MG/DL (ref 0.7–1.2)
EKG ATRIAL RATE: 84 BPM
EKG P AXIS: 12 DEGREES
EKG P-R INTERVAL: 250 MS
EKG Q-T INTERVAL: 366 MS
EKG QRS DURATION: 126 MS
EKG QTC CALCULATION (BAZETT): 432 MS
EKG R AXIS: -23 DEGREES
EKG T AXIS: 77 DEGREES
EKG VENTRICULAR RATE: 84 BPM
EOSINOPHILS RELATIVE PERCENT: 1 % (ref 1–4)
EPITHELIAL CELLS UA: ABNORMAL /HPF (ref 0–5)
FLU A ANTIGEN: NEGATIVE
FLU B ANTIGEN: NEGATIVE
GFR SERPL CREATININE-BSD FRML MDRD: 56 ML/MIN/1.73M2
GLUCOSE BLD-MCNC: 220 MG/DL (ref 70–99)
GLUCOSE URINE: NEGATIVE
HCT VFR BLD CALC: 34.3 % (ref 40.7–50.3)
HEMOGLOBIN: 11.4 G/DL (ref 13–17)
IMMATURE GRANULOCYTES: 0 %
KETONES, URINE: NEGATIVE
LACTIC ACID, SEPSIS: 2.4 MMOL/L (ref 0.5–1.9)
LACTIC ACID, SEPSIS: 4.6 MMOL/L (ref 0.5–1.9)
LEUKOCYTE ESTERASE, URINE: NEGATIVE
LIPASE: 22 U/L (ref 13–60)
LYMPHOCYTES # BLD: 14 % (ref 24–43)
MCH RBC QN AUTO: 30.2 PG (ref 25.2–33.5)
MCHC RBC AUTO-ENTMCNC: 33.2 G/DL (ref 28.4–34.8)
MCV RBC AUTO: 90.7 FL (ref 82.6–102.9)
MONOCYTES # BLD: 14 % (ref 3–12)
MORPHOLOGY: NORMAL
MUCUS: ABNORMAL
NITRITE, URINE: NEGATIVE
NRBC AUTOMATED: 0 PER 100 WBC
PDW BLD-RTO: 14.7 % (ref 11.8–14.4)
PH UA: 6 (ref 5–9)
PLATELET # BLD: 243 K/UL (ref 138–453)
PMV BLD AUTO: 10.2 FL (ref 8.1–13.5)
POTASSIUM SERPL-SCNC: 4.5 MMOL/L (ref 3.7–5.3)
PRO-BNP: 1279 PG/ML
PROTEIN UA: ABNORMAL
RBC # BLD: 3.78 M/UL (ref 4.21–5.77)
RBC UA: ABNORMAL /HPF (ref 0–2)
SARS-COV-2, RAPID: NOT DETECTED
SEG NEUTROPHILS: 71 % (ref 36–65)
SEGMENTED NEUTROPHILS ABSOLUTE COUNT: 10.29 K/UL (ref 1.5–8.1)
SODIUM BLD-SCNC: 130 MMOL/L (ref 135–144)
SPECIFIC GRAVITY UA: 1.02 (ref 1.01–1.02)
SPECIMEN DESCRIPTION: NORMAL
TOTAL PROTEIN: 7.4 G/DL (ref 6.4–8.3)
TROPONIN, HIGH SENSITIVITY: 13 NG/L (ref 0–22)
TURBIDITY: CLEAR
URINE HGB: NEGATIVE
UROBILINOGEN, URINE: NORMAL
WBC # BLD: 14.5 K/UL (ref 3.5–11.3)
WBC UA: ABNORMAL /HPF (ref 0–5)

## 2023-01-31 PROCEDURE — 71260 CT THORAX DX C+: CPT | Performed by: EMERGENCY MEDICINE

## 2023-01-31 PROCEDURE — 93005 ELECTROCARDIOGRAM TRACING: CPT | Performed by: EMERGENCY MEDICINE

## 2023-01-31 PROCEDURE — 36415 COLL VENOUS BLD VENIPUNCTURE: CPT

## 2023-01-31 PROCEDURE — 80053 COMPREHEN METABOLIC PANEL: CPT

## 2023-01-31 PROCEDURE — 99285 EMERGENCY DEPT VISIT HI MDM: CPT

## 2023-01-31 PROCEDURE — 93010 ELECTROCARDIOGRAM REPORT: CPT | Performed by: INTERNAL MEDICINE

## 2023-01-31 PROCEDURE — 84484 ASSAY OF TROPONIN QUANT: CPT

## 2023-01-31 PROCEDURE — 83605 ASSAY OF LACTIC ACID: CPT

## 2023-01-31 PROCEDURE — 6370000000 HC RX 637 (ALT 250 FOR IP): Performed by: EMERGENCY MEDICINE

## 2023-01-31 PROCEDURE — 85025 COMPLETE CBC W/AUTO DIFF WBC: CPT

## 2023-01-31 PROCEDURE — 94761 N-INVAS EAR/PLS OXIMETRY MLT: CPT

## 2023-01-31 PROCEDURE — 71045 X-RAY EXAM CHEST 1 VIEW: CPT

## 2023-01-31 PROCEDURE — 6360000004 HC RX CONTRAST MEDICATION: Performed by: EMERGENCY MEDICINE

## 2023-01-31 PROCEDURE — 6370000000 HC RX 637 (ALT 250 FOR IP): Performed by: INTERNAL MEDICINE

## 2023-01-31 PROCEDURE — 74177 CT ABD & PELVIS W/CONTRAST: CPT

## 2023-01-31 PROCEDURE — 87804 INFLUENZA ASSAY W/OPTIC: CPT

## 2023-01-31 PROCEDURE — 2580000003 HC RX 258: Performed by: EMERGENCY MEDICINE

## 2023-01-31 PROCEDURE — 6360000002 HC RX W HCPCS: Performed by: INTERNAL MEDICINE

## 2023-01-31 PROCEDURE — 1200000000 HC SEMI PRIVATE

## 2023-01-31 PROCEDURE — 2580000003 HC RX 258: Performed by: INTERNAL MEDICINE

## 2023-01-31 PROCEDURE — 87040 BLOOD CULTURE FOR BACTERIA: CPT

## 2023-01-31 PROCEDURE — 83690 ASSAY OF LIPASE: CPT

## 2023-01-31 PROCEDURE — 96374 THER/PROPH/DIAG INJ IV PUSH: CPT

## 2023-01-31 PROCEDURE — 81001 URINALYSIS AUTO W/SCOPE: CPT

## 2023-01-31 PROCEDURE — 83880 ASSAY OF NATRIURETIC PEPTIDE: CPT

## 2023-01-31 PROCEDURE — 6360000002 HC RX W HCPCS: Performed by: EMERGENCY MEDICINE

## 2023-01-31 PROCEDURE — 76705 ECHO EXAM OF ABDOMEN: CPT

## 2023-01-31 PROCEDURE — 87635 SARS-COV-2 COVID-19 AMP PRB: CPT

## 2023-01-31 RX ORDER — ACETAMINOPHEN 325 MG/1
650 TABLET ORAL ONCE
Status: COMPLETED | OUTPATIENT
Start: 2023-01-31 | End: 2023-01-31

## 2023-01-31 RX ORDER — PRIMIDONE 250 MG/1
250 TABLET ORAL 2 TIMES DAILY
Status: DISCONTINUED | OUTPATIENT
Start: 2023-01-31 | End: 2023-02-10 | Stop reason: HOSPADM

## 2023-01-31 RX ORDER — PROPRANOLOL HCL 60 MG
120 CAPSULE, EXTENDED RELEASE 24HR ORAL 2 TIMES DAILY
Status: DISCONTINUED | OUTPATIENT
Start: 2023-01-31 | End: 2023-02-10 | Stop reason: HOSPADM

## 2023-01-31 RX ORDER — OXYBUTYNIN CHLORIDE 5 MG/1
10 TABLET ORAL 2 TIMES DAILY
Status: DISCONTINUED | OUTPATIENT
Start: 2023-01-31 | End: 2023-02-10 | Stop reason: HOSPADM

## 2023-01-31 RX ORDER — SODIUM CHLORIDE 0.9 % (FLUSH) 0.9 %
5-40 SYRINGE (ML) INJECTION EVERY 12 HOURS SCHEDULED
Status: DISCONTINUED | OUTPATIENT
Start: 2023-01-31 | End: 2023-02-10 | Stop reason: HOSPADM

## 2023-01-31 RX ORDER — ACETAMINOPHEN 325 MG/1
650 TABLET ORAL EVERY 6 HOURS PRN
Status: DISCONTINUED | OUTPATIENT
Start: 2023-01-31 | End: 2023-02-10 | Stop reason: HOSPADM

## 2023-01-31 RX ORDER — SODIUM CHLORIDE 9 MG/ML
INJECTION, SOLUTION INTRAVENOUS CONTINUOUS
Status: DISCONTINUED | OUTPATIENT
Start: 2023-01-31 | End: 2023-02-02

## 2023-01-31 RX ORDER — ACETAMINOPHEN 650 MG/1
650 SUPPOSITORY RECTAL EVERY 6 HOURS PRN
Status: DISCONTINUED | OUTPATIENT
Start: 2023-01-31 | End: 2023-02-10 | Stop reason: HOSPADM

## 2023-01-31 RX ORDER — TAMSULOSIN HYDROCHLORIDE 0.4 MG/1
0.8 CAPSULE ORAL
Status: DISCONTINUED | OUTPATIENT
Start: 2023-01-31 | End: 2023-02-10 | Stop reason: HOSPADM

## 2023-01-31 RX ORDER — ATORVASTATIN CALCIUM 40 MG/1
40 TABLET, FILM COATED ORAL DAILY
Status: DISCONTINUED | OUTPATIENT
Start: 2023-02-01 | End: 2023-02-01

## 2023-01-31 RX ORDER — SODIUM CHLORIDE 9 MG/ML
25 INJECTION, SOLUTION INTRAVENOUS PRN
Status: DISCONTINUED | OUTPATIENT
Start: 2023-01-31 | End: 2023-02-10 | Stop reason: HOSPADM

## 2023-01-31 RX ORDER — SODIUM CHLORIDE 0.9 % (FLUSH) 0.9 %
5-40 SYRINGE (ML) INJECTION PRN
Status: DISCONTINUED | OUTPATIENT
Start: 2023-01-31 | End: 2023-02-10 | Stop reason: HOSPADM

## 2023-01-31 RX ORDER — MONTELUKAST SODIUM 10 MG/1
10 TABLET ORAL DAILY
Status: DISCONTINUED | OUTPATIENT
Start: 2023-02-01 | End: 2023-02-10 | Stop reason: HOSPADM

## 2023-01-31 RX ORDER — 0.9 % SODIUM CHLORIDE 0.9 %
1000 INTRAVENOUS SOLUTION INTRAVENOUS ONCE
Status: COMPLETED | OUTPATIENT
Start: 2023-01-31 | End: 2023-01-31

## 2023-01-31 RX ORDER — ENOXAPARIN SODIUM 100 MG/ML
40 INJECTION SUBCUTANEOUS DAILY
Status: DISCONTINUED | OUTPATIENT
Start: 2023-02-01 | End: 2023-02-01

## 2023-01-31 RX ORDER — SODIUM CHLORIDE 9 MG/ML
30 INJECTION, SOLUTION INTRAVENOUS ONCE
Status: COMPLETED | OUTPATIENT
Start: 2023-01-31 | End: 2023-01-31

## 2023-01-31 RX ORDER — ASPIRIN 81 MG/1
81 TABLET ORAL DAILY
Status: DISCONTINUED | OUTPATIENT
Start: 2023-02-01 | End: 2023-02-10 | Stop reason: HOSPADM

## 2023-01-31 RX ORDER — FUROSEMIDE 40 MG/1
40 TABLET ORAL 2 TIMES DAILY
Status: DISCONTINUED | OUTPATIENT
Start: 2023-01-31 | End: 2023-02-01

## 2023-01-31 RX ORDER — ALOGLIPTIN 25 MG/1
25 TABLET, FILM COATED ORAL DAILY
Status: DISCONTINUED | OUTPATIENT
Start: 2023-02-01 | End: 2023-02-01

## 2023-01-31 RX ORDER — FAMOTIDINE 20 MG/1
20 TABLET, FILM COATED ORAL 2 TIMES DAILY
Status: DISCONTINUED | OUTPATIENT
Start: 2023-01-31 | End: 2023-02-10 | Stop reason: HOSPADM

## 2023-01-31 RX ADMIN — ACETAMINOPHEN 650 MG: 325 TABLET ORAL at 23:32

## 2023-01-31 RX ADMIN — PIPERACILLIN AND TAZOBACTAM 4500 MG: 4; .5 INJECTION, POWDER, LYOPHILIZED, FOR SOLUTION INTRAVENOUS at 18:49

## 2023-01-31 RX ADMIN — CEFTRIAXONE 1000 MG: 1 INJECTION, POWDER, FOR SOLUTION INTRAMUSCULAR; INTRAVENOUS at 21:50

## 2023-01-31 RX ADMIN — IOPAMIDOL 75 ML: 755 INJECTION, SOLUTION INTRAVENOUS at 18:05

## 2023-01-31 RX ADMIN — SODIUM CHLORIDE 30 ML/KG/HR: 9 INJECTION, SOLUTION INTRAVENOUS at 20:33

## 2023-01-31 RX ADMIN — SODIUM CHLORIDE: 9 INJECTION, SOLUTION INTRAVENOUS at 21:21

## 2023-01-31 RX ADMIN — ACETAMINOPHEN 650 MG: 325 TABLET ORAL at 17:48

## 2023-01-31 RX ADMIN — VANCOMYCIN HYDROCHLORIDE 1000 MG: 1 INJECTION, POWDER, LYOPHILIZED, FOR SOLUTION INTRAVENOUS at 18:44

## 2023-01-31 RX ADMIN — AZITHROMYCIN MONOHYDRATE 500 MG: 500 INJECTION, POWDER, LYOPHILIZED, FOR SOLUTION INTRAVENOUS at 23:31

## 2023-01-31 RX ADMIN — SODIUM CHLORIDE 1000 ML: 9 INJECTION, SOLUTION INTRAVENOUS at 17:51

## 2023-01-31 ASSESSMENT — LIFESTYLE VARIABLES: HOW OFTEN DO YOU HAVE A DRINK CONTAINING ALCOHOL: NEVER

## 2023-01-31 NOTE — ED PROVIDER NOTES
[facility] AT THE [facility] MED SURG  EMERGENCY DEPARTMENT ENCOUNTER      Pt Name: Chip Palacios  MRN: [id number]  [place] 1935  Date of evaluation: 1/31/2023  Provider: Charla Sena MD    CHIEF COMPLAINT       Chief Complaint   Patient presents with    Fall     Fall this am while getting out of the shower. Refused transport by EMS         HISTORY OF PRESENT ILLNESS   (Location/Symptom, Timing/Onset, Context/Setting, Quality, Duration, Modifying Factors, Severity)  Note limiting factors. Chip Palacios is a 82 y.o. male who presents to the emergency department      44-year-old gentleman presented emergency department for evaluation of weakness and feeling poorly. Patient lives at home with his spouse. Merline Clark earlier this morning shower. EMS was called. Patient refused transport at that time. Denies hitting head. Denies presyncope syncope dizziness or lightheadedness prior to the fall. He has become progressively weak. On arrival to the emergency department he has a temperature of 39.7. He does have a history of COPD. He does have a cough which has been persistent and seems of worse over the past few days. His wife did have some leftover azithromycin and she gave him 3 days worth. He has become progressively more weak. With a known history of COPD. He has been using his home medications as prescribed. Does not use oxygen at home. Denies any nausea or vomiting. Denies any dysuria urinary frequency. Any localized pain complaints. No other acute concerns. Nursing Notes were reviewed. REVIEW OF SYSTEMS    (2-9 systems for level 4, 10 or more for level 5)     Review of Systems   All other systems reviewed and are negative. Except as noted above the remainder of the review of systems was reviewed and negative.        PAST MEDICAL HISTORY     Past Medical History:   Diagnosis Date    COPD (chronic obstructive pulmonary disease) ([facility] [address]     Diabetes mellitus ([facility] [address]     Hx of echocardiogram 02/24/2017 [facility]    Hyperlipidemia     Hypothyroidism     MI (myocardial infarction) [facility])     Stroke [facility])     Thyroid disease     Type II or unspecified type diabetes mellitus without mention of complication, not stated as uncontrolled          SURGICAL HISTORY       Past Surgical History:   Procedure Laterality Date    CARDIAC SURGERY  02/24/2017    Stent placed  Dr Zenon Dean      right ankle    HERNIA REPAIR           CURRENT MEDICATIONS       Current Discharge Medication List        CONTINUE these medications which have NOT CHANGED    Details   azithromycin (ZITHROMAX) 250 MG tablet TAKE 2 TABLETS BY MOUTH ON DAY 1, AND THEN TAKE 1 TABLET BY MOUTH ONCE A DAY ON DAY 2 THROUGH DAY 5      predniSONE (DELTASONE) 20 MG tablet TAKE 2 TABLETS BY MOUTH ONCE DAILY FOR 5 DAYS      oxybutynin (DITROPAN) 5 MG tablet Take 2 tablets by mouth 2 times daily  Qty: 180 tablet, Refills: 3      furosemide (LASIX) 40 MG tablet Take 40 mg by mouth in the morning and 40 mg in the evening. Per wife Ananda Thompson, the torsemide was very expensive so Sy James continues to take his Lasix 40mg BID. montelukast (SINGULAIR) 10 MG tablet Take 1 tablet by mouth daily  Qty: 90 tablet, Refills: 3    Associated Diagnoses: Chronic obstructive pulmonary disease, unspecified COPD type (HCC)      meloxicam (MOBIC) 7.5 MG tablet Take 1 tablet by mouth daily  Qty: 90 tablet, Refills: 1    Associated Diagnoses: Generalized OA      Misc Natural Products (OSTEO BI-FLEX ADV JOINT SHIELD PO) Take by mouth as needed      EUTHYROX 137 MCG tablet Take 1 tablet by mouth once daily  Qty: 90 tablet, Refills: 3    Associated Diagnoses: Hypothyroidism due to Hashimoto's thyroiditis      atorvastatin (LIPITOR) 40 MG tablet Take 1 tablet by mouth once daily  Qty: 90 tablet, Refills: 3    Associated Diagnoses: ASHD (arteriosclerotic heart disease);  Mixed hyperlipidemia      fluticasone-salmeterol (ADVAIR) 250-50 MCG/DOSE AEPB Inhale 1 puff into the lungs every 12 hours      albuterol (PROVENTIL) (2.5 MG/3ML) 0.083% nebulizer solution Take 3 mLs by nebulization every 4 hours as needed for Wheezing or Shortness of Breath  Qty: 120 each, Refills: 3    Associated Diagnoses: COPD exacerbation ([facility])      primidone (MYSOLINE) 250 MG tablet Take 250 mg by mouth 2 times daily      propranolol (INDERAL LA) 120 MG extended release capsule Take 120 mg by mouth 2 times daily      omeprazole (PRILOSEC) 20 MG delayed release capsule Take 20 mg by mouth daily      alogliptin (NESINA) 25 MG TABS tablet Take 25 mg by mouth daily      blood glucose monitor strips accu check  Qty: 100 strip, Refills: 3    Associated Diagnoses: Type 2 diabetes mellitus with complication, without long-term current use of insulin ([facility])      aspirin 81 MG tablet Take 81 mg by mouth daily       albuterol sulfate  (90 Base) MCG/ACT inhaler Inhale 2 puffs into the lungs every 4 hours as needed for Wheezing or Shortness of Breath  Qty: 1 Inhaler, Refills: 2    Associated Diagnoses: Chronic obstructive pulmonary disease, unspecified COPD type ([facility])      metFORMIN (GLUCOPHAGE) 1000 MG tablet Take 1,000 mg by mouth 2 times daily       acetaminophen (TYLENOL) 500 MG tablet Take 1,000 mg by mouth every 6 hours as needed for Pain      TAMSULOSIN HCL PO Take 0.8 mg by mouth Daily with supper       Multiple Vitamins-Minerals (THERAPEUTIC MULTIVITAMIN-MINERALS) tablet Take 1 tablet by mouth daily             ALLERGIES     Rosuvastatin    FAMILY HISTORY       Family History   Problem Relation Age of Onset    Cancer Mother 47        liver ca    Diabetes Father     Heart Disease Father           SOCIAL HISTORY       Social History     Socioeconomic History    Marital status:      Spouse name: None    Number of children: None    Years of education: None    Highest education level: None   Tobacco Use    Smoking status: Former     Types: Cigarettes     Quit date: 1993     Years since quittin.1    Smokeless tobacco: Never   Vaping Use    Vaping Use: Never used   Substance and Sexual Activity    Alcohol use: Yes     Alcohol/week: 1.0 standard drink     Types: 1 Drinks containing 0.5 oz of alcohol per week     Comment: occasional    Drug use: No     Social Determinants of Health     Financial Resource Strain: Unknown    Difficulty of Paying Living Expenses: Patient refused   Food Insecurity: Unknown    Worried About Running Out of Food in the Last Year: Patient refused    Ran Out of Food in the Last Year: Patient refused   Physical Activity: Inactive    Days of Exercise per Week: 0 days    Minutes of Exercise per Session: 0 min       SCREENINGS        [place] Coma Scale  Eye Opening: Spontaneous  Best Verbal Response: Oriented  Best Motor Response: Obeys commands  [place] Coma Scale Score: 15               PHYSICAL EXAM    (up to 7 for level 4, 8 or more for level 5)     ED Triage Vitals [23 1618]   BP Temp Temp Source Heart Rate Resp SpO2 Height Weight   130/73 (!) 103.4 °F (39.7 °C) Tympanic (!) 47 20 92 % -- --       Physical Exam  Vitals and nursing note reviewed. Constitutional:       Comments: Febrile, ill but nontoxic-appearing. HENT:      Head: Normocephalic and atraumatic. Eyes:      Conjunctiva/sclera: Conjunctivae normal.   Cardiovascular:      Rate and Rhythm: Normal rate and regular rhythm. Pulmonary:      Effort: Pulmonary effort is normal.      Breath sounds: Normal breath sounds. Comments: Coarse breath sounds that cleared with cough. No wheezing rales or rhonchi were noted on examination. Patient's were nonlabored. Abdominal:      Palpations: Abdomen is soft. Comments: Exam limited by body habitus. Patient does have right upper quadrant tenderness on exam.  No other localized tenderness. No rebound tenderness. No guarding. Skin:     General: Skin is warm. Findings: No rash. Neurological:      General: No focal deficit present. Mental Status: He is alert and oriented to person, place, and time. DIAGNOSTIC RESULTS     EKG: All EKG's are interpreted by the Emergency Department Physician who either signs or Co-signs this chart in the absence of a cardiologist.        RADIOLOGY:   Non-plain film images such as CT, Ultrasound and MRI are read by the radiologist. Plain radiographic images are visualized and preliminarily interpreted by the emergency physician with the below findings:        Interpretation per the Radiologist below, if available at the time of this note:    CT ABDOMEN PELVIS W IV CONTRAST Additional Contrast? None   Final Result   1. Multifocal consolidation in the right lung and to a lesser degree left   lung base, concerning for infection. 2.  Motion degraded evaluation of the pulmonary arteries. No evidence for   central pulmonary embolism. 3.  No acute inflammatory process identified in the abdomen or pelvis. Stable benign and incidental findings, as described. CT CHEST PULMONARY EMBOLISM W CONTRAST   Final Result   1. Multifocal consolidation in the right lung and to a lesser degree left   lung base, concerning for infection. 2.  Motion degraded evaluation of the pulmonary arteries. No evidence for   central pulmonary embolism. 3.  No acute inflammatory process identified in the abdomen or pelvis. Stable benign and incidental findings, as described. US GALLBLADDER RUQ   Final Result   Gallbladder sludge. No imaging evidence for acute cholecystitis or biliary   dilatation. XR CHEST PORTABLE   Final Result   Suspected pneumonia in the right mid lung. Recommend imaging follow-up to   resolution.                ED BEDSIDE ULTRASOUND:   Performed by ED Physician - none    LABS:  Labs Reviewed   CBC WITH AUTO DIFFERENTIAL - Abnormal; Notable for the following components:       Result Value    WBC 14.5 (*)     RBC 3.78 (*)     Hemoglobin 11.4 (*)     Hematocrit 34.3 (*) RDW 14.7 (*)     Seg Neutrophils 71 (*)     Lymphocytes 14 (*)     Monocytes 14 (*)     Segs Absolute 10.29 (*)     Absolute Mono # 2.03 (*)     All other components within normal limits   COMPREHENSIVE METABOLIC PANEL - Abnormal; Notable for the following components:    Glucose 220 (*)     BUN 25 (*)     Creatinine 1.26 (*)     Est, Glom Filt Rate 56 (*)     Sodium 130 (*)     Chloride 91 (*)     Alkaline Phosphatase 185 (*)      (*)     AST 95 (*)     All other components within normal limits   LACTATE, SEPSIS - Abnormal; Notable for the following components:    Lactic Acid, Sepsis 4.6 (*)     All other components within normal limits   LACTATE, SEPSIS - Abnormal; Notable for the following components:    Lactic Acid, Sepsis 2.4 (*)     All other components within normal limits   URINALYSIS WITH REFLEX TO CULTURE - Abnormal; Notable for the following components:    Protein, UA 2+ (*)     All other components within normal limits   BRAIN NATRIURETIC PEPTIDE - Abnormal; Notable for the following components:    Pro-BNP 1,279 (*)     All other components within normal limits   MICROSCOPIC URINALYSIS - Abnormal; Notable for the following components:    Bacteria, UA 1+ (*)     Mucus, UA TRACE (*)     All other components within normal limits   COMPREHENSIVE METABOLIC PANEL W/ REFLEX TO MG FOR LOW K - Abnormal; Notable for the following components:    Glucose 238 (*)     BUN 30 (*)     Creatinine 1.33 (*)     Est, Glom Filt Rate 52 (*)     Bun/Cre Ratio 23 (*)     Calcium 8.3 (*)     Sodium 129 (*)     Chloride 97 (*)     Alkaline Phosphatase 157 (*)      (*)      (*)     Albumin 3.2 (*)     Albumin/Globulin Ratio 0.9 (*)     All other components within normal limits   CBC WITH AUTO DIFFERENTIAL - Abnormal; Notable for the following components:    WBC 17.9 (*)     RBC 3.51 (*)     Hemoglobin 10.1 (*)     Hematocrit 31.6 (*)     RDW 14.9 (*)     Seg Neutrophils 74 (*)     Lymphocytes 8 (*) Monocytes 18 (*)     Eosinophils % 0 (*)     Segs Absolute 13.25 (*)     Absolute Mono # 3.22 (*)     All other components within normal limits   CULTURE, BLOOD 1   CULTURE, BLOOD 2   RAPID INFLUENZA A/B ANTIGENS   COVID-19, RAPID   TROPONIN   LIPASE       All other labs were within normal range or not returned as of this dictation. EMERGENCY DEPARTMENT COURSE and DIFFERENTIAL DIAGNOSIS/MDM:   Vitals:    Vitals:    02/01/23 0430 02/01/23 0716 02/01/23 0717 02/01/23 0726   BP:  137/71     Pulse:  83     Resp:  20     Temp:  100.1 °F (37.8 °C)     TempSrc:  Temporal     SpO2:  (S) (!) 88% (S) 91%    Weight: 260 lb (117.9 kg)      Height:    5' 11\" (1.803 m)           MDM  Number of Diagnoses or Management Options  Pneumonia due to infectious organism, unspecified laterality, unspecified part of lung  Septicemia ([facility] [address]  Diagnosis management comments: 82-year-old male weakness feeling poorly. History of COPD. Patient does have cough. Febrile on presentation to the ED. Ejection saturation 88% on room air. Placed on oxygen nasal cannula. No wheezing noted on exam.  No acute respiratory distress. Laboratory studies were reviewed. Patient did have right upper quadrant tenderness on examination as well. Gallbladder ultrasound was ordered. CT chest abdomen pelvis ordered. Per radiology read multifocal right pneumonia, consolidation possible left base. No acute findings on gallbladder ultrasound. Zosyn was ordered. Patient does have a history of congestive heart failure but no fluid overload noted on chest x-ray. This fluid was ordered. Patient also been started on antibiotics Zosyn and vancomycin given in the ED. He was nontoxic-appearing. Discharged to hospitalist service for continued management. [facility]       REASSESSMENT          CRITICAL CARE TIME   Total Critical Care time was  minutes, excluding separately reportable procedures.   There was a high probability of clinically significant/life threatening deterioration in the patient's condition which required my urgent intervention. CONSULTS:  None    PROCEDURES:  Unless otherwise noted below, none     Procedures        FINAL IMPRESSION      1. Septicemia ([address]    2. Pneumonia due to infectious organism, unspecified laterality, unspecified part of lung          DISPOSITION/PLAN   DISPOSITION Admitted 01/31/2023 07:00:52 PM      PATIENT REFERRED TO:  No follow-up provider specified. DISCHARGE MEDICATIONS:  Current Discharge Medication List        Controlled Substances Monitoring:     No flowsheet data found.     (Please note that portions of this note were completed with a voice recognition program.  Efforts were made to edit the dictations but occasionally words are mis-transcribed.)    Nayeli Mckeon MD (electronically signed)  Attending Emergency Physician             Nayeli Mckeon MD  02/01/23 3518

## 2023-02-01 ENCOUNTER — APPOINTMENT (OUTPATIENT)
Dept: NON INVASIVE DIAGNOSTICS | Age: 88
End: 2023-02-01
Payer: OTHER GOVERNMENT

## 2023-02-01 LAB
ABSOLUTE EOS #: 0 K/UL (ref 0–0.44)
ABSOLUTE IMMATURE GRANULOCYTE: 0 K/UL (ref 0–0.3)
ABSOLUTE LYMPH #: 1.43 K/UL (ref 1.1–3.7)
ABSOLUTE MONO #: 3.22 K/UL (ref 0.1–1.2)
ALBUMIN SERPL-MCNC: 3.2 G/DL (ref 3.5–5.2)
ALBUMIN/GLOBULIN RATIO: 0.9 (ref 1–2.5)
ALP SERPL-CCNC: 157 U/L (ref 40–129)
ALT SERPL-CCNC: 134 U/L (ref 5–41)
ANION GAP SERPL CALCULATED.3IONS-SCNC: 12 MMOL/L (ref 9–17)
AST SERPL-CCNC: 125 U/L
BASOPHILS # BLD: 0 % (ref 0–2)
BASOPHILS ABSOLUTE: 0 K/UL (ref 0–0.2)
BILIRUB SERPL-MCNC: 0.4 MG/DL (ref 0.3–1.2)
BUN SERPL-MCNC: 30 MG/DL (ref 8–23)
BUN/CREAT BLD: 23 (ref 9–20)
CALCIUM SERPL-MCNC: 8.3 MG/DL (ref 8.6–10.4)
CHLORIDE SERPL-SCNC: 97 MMOL/L (ref 98–107)
CO2 SERPL-SCNC: 20 MMOL/L (ref 20–31)
CREAT SERPL-MCNC: 1.33 MG/DL (ref 0.7–1.2)
EOSINOPHILS RELATIVE PERCENT: 0 % (ref 1–4)
GFR SERPL CREATININE-BSD FRML MDRD: 52 ML/MIN/1.73M2
GLUCOSE SERPL-MCNC: 238 MG/DL (ref 70–99)
HCT VFR BLD AUTO: 31.6 % (ref 40.7–50.3)
HGB BLD-MCNC: 10.1 G/DL (ref 13–17)
IMMATURE GRANULOCYTES: 0 %
LV EF: 55 %
LVEF MODALITY: NORMAL
LYMPHOCYTES # BLD: 8 % (ref 24–43)
MCH RBC QN AUTO: 28.8 PG (ref 25.2–33.5)
MCHC RBC AUTO-ENTMCNC: 32 G/DL (ref 28.4–34.8)
MCV RBC AUTO: 90 FL (ref 82.6–102.9)
MONOCYTES # BLD: 18 % (ref 3–12)
MORPHOLOGY: NORMAL
NRBC AUTOMATED: 0 PER 100 WBC
PDW BLD-RTO: 14.9 % (ref 11.8–14.4)
PLATELET # BLD AUTO: 208 K/UL (ref 138–453)
PMV BLD AUTO: 10.9 FL (ref 8.1–13.5)
POTASSIUM SERPL-SCNC: 4 MMOL/L (ref 3.7–5.3)
PROT SERPL-MCNC: 6.6 G/DL (ref 6.4–8.3)
RBC # BLD: 3.51 M/UL (ref 4.21–5.77)
SEG NEUTROPHILS: 74 % (ref 36–65)
SEGMENTED NEUTROPHILS ABSOLUTE COUNT: 13.25 K/UL (ref 1.5–8.1)
SODIUM SERPL-SCNC: 129 MMOL/L (ref 135–144)
WBC # BLD AUTO: 17.9 K/UL (ref 3.5–11.3)

## 2023-02-01 PROCEDURE — 93306 TTE W/DOPPLER COMPLETE: CPT

## 2023-02-01 PROCEDURE — 94761 N-INVAS EAR/PLS OXIMETRY MLT: CPT

## 2023-02-01 PROCEDURE — 6370000000 HC RX 637 (ALT 250 FOR IP): Performed by: INTERNAL MEDICINE

## 2023-02-01 PROCEDURE — 6360000002 HC RX W HCPCS: Performed by: FAMILY MEDICINE

## 2023-02-01 PROCEDURE — 36415 COLL VENOUS BLD VENIPUNCTURE: CPT

## 2023-02-01 PROCEDURE — 97110 THERAPEUTIC EXERCISES: CPT

## 2023-02-01 PROCEDURE — 6360000002 HC RX W HCPCS: Performed by: INTERNAL MEDICINE

## 2023-02-01 PROCEDURE — 2700000000 HC OXYGEN THERAPY PER DAY

## 2023-02-01 PROCEDURE — 97166 OT EVAL MOD COMPLEX 45 MIN: CPT

## 2023-02-01 PROCEDURE — 80053 COMPREHEN METABOLIC PANEL: CPT

## 2023-02-01 PROCEDURE — 94640 AIRWAY INHALATION TREATMENT: CPT

## 2023-02-01 PROCEDURE — 97116 GAIT TRAINING THERAPY: CPT

## 2023-02-01 PROCEDURE — 1200000000 HC SEMI PRIVATE

## 2023-02-01 PROCEDURE — 97162 PT EVAL MOD COMPLEX 30 MIN: CPT

## 2023-02-01 PROCEDURE — 85025 COMPLETE CBC W/AUTO DIFF WBC: CPT

## 2023-02-01 PROCEDURE — 99223 1ST HOSP IP/OBS HIGH 75: CPT | Performed by: INTERNAL MEDICINE

## 2023-02-01 PROCEDURE — 2580000003 HC RX 258: Performed by: INTERNAL MEDICINE

## 2023-02-01 RX ORDER — ENOXAPARIN SODIUM 100 MG/ML
30 INJECTION SUBCUTANEOUS 2 TIMES DAILY
Status: DISCONTINUED | OUTPATIENT
Start: 2023-02-01 | End: 2023-02-10 | Stop reason: HOSPADM

## 2023-02-01 RX ORDER — ONDANSETRON 2 MG/ML
4 INJECTION INTRAMUSCULAR; INTRAVENOUS EVERY 6 HOURS PRN
Status: DISCONTINUED | OUTPATIENT
Start: 2023-02-01 | End: 2023-02-10 | Stop reason: HOSPADM

## 2023-02-01 RX ORDER — ALOGLIPTIN 12.5 MG/1
12.5 TABLET, FILM COATED ORAL DAILY
Status: DISCONTINUED | OUTPATIENT
Start: 2023-02-02 | End: 2023-02-03

## 2023-02-01 RX ADMIN — SODIUM CHLORIDE, PRESERVATIVE FREE 10 ML: 5 INJECTION INTRAVENOUS at 22:04

## 2023-02-01 RX ADMIN — FUROSEMIDE 40 MG: 40 TABLET ORAL at 07:27

## 2023-02-01 RX ADMIN — ENOXAPARIN SODIUM 30 MG: 100 INJECTION SUBCUTANEOUS at 21:23

## 2023-02-01 RX ADMIN — CEFTRIAXONE 1000 MG: 1 INJECTION, POWDER, FOR SOLUTION INTRAMUSCULAR; INTRAVENOUS at 21:30

## 2023-02-01 RX ADMIN — ACETAMINOPHEN 650 MG: 325 TABLET ORAL at 07:27

## 2023-02-01 RX ADMIN — METFORMIN HYDROCHLORIDE 1000 MG: 500 TABLET ORAL at 21:22

## 2023-02-01 RX ADMIN — PRIMIDONE 250 MG: 250 TABLET ORAL at 16:57

## 2023-02-01 RX ADMIN — LEVOTHYROXINE SODIUM 137 MCG: 25 TABLET ORAL at 07:27

## 2023-02-01 RX ADMIN — ASPIRIN 81 MG: 81 TABLET, COATED ORAL at 09:25

## 2023-02-01 RX ADMIN — PROPRANOLOL HYDROCHLORIDE 120 MG: 60 CAPSULE, EXTENDED RELEASE ORAL at 09:25

## 2023-02-01 RX ADMIN — ALOGLIPTIN 25 MG: 25 TABLET, FILM COATED ORAL at 09:25

## 2023-02-01 RX ADMIN — ENOXAPARIN SODIUM 40 MG: 100 INJECTION SUBCUTANEOUS at 09:26

## 2023-02-01 RX ADMIN — OXYBUTYNIN CHLORIDE 10 MG: 5 TABLET ORAL at 09:24

## 2023-02-01 RX ADMIN — ACETAMINOPHEN 650 MG: 325 TABLET ORAL at 21:22

## 2023-02-01 RX ADMIN — ONDANSETRON 4 MG: 2 INJECTION INTRAMUSCULAR; INTRAVENOUS at 22:35

## 2023-02-01 RX ADMIN — TAMSULOSIN HYDROCHLORIDE 0.8 MG: 0.4 CAPSULE ORAL at 16:56

## 2023-02-01 RX ADMIN — FUROSEMIDE 40 MG: 40 TABLET ORAL at 16:56

## 2023-02-01 RX ADMIN — PRIMIDONE 250 MG: 250 TABLET ORAL at 07:27

## 2023-02-01 RX ADMIN — METFORMIN HYDROCHLORIDE 1000 MG: 500 TABLET ORAL at 09:24

## 2023-02-01 RX ADMIN — SODIUM CHLORIDE: 9 INJECTION, SOLUTION INTRAVENOUS at 07:26

## 2023-02-01 RX ADMIN — MONTELUKAST 10 MG: 10 TABLET, FILM COATED ORAL at 09:24

## 2023-02-01 RX ADMIN — MOMETASONE FUROATE AND FORMOTEROL FUMARATE DIHYDRATE 2 PUFF: 200; 5 AEROSOL RESPIRATORY (INHALATION) at 19:45

## 2023-02-01 RX ADMIN — MOMETASONE FUROATE AND FORMOTEROL FUMARATE DIHYDRATE 2 PUFF: 200; 5 AEROSOL RESPIRATORY (INHALATION) at 11:02

## 2023-02-01 RX ADMIN — FAMOTIDINE 20 MG: 20 TABLET, FILM COATED ORAL at 21:21

## 2023-02-01 RX ADMIN — SODIUM CHLORIDE, PRESERVATIVE FREE 10 ML: 5 INJECTION INTRAVENOUS at 09:26

## 2023-02-01 RX ADMIN — OXYBUTYNIN CHLORIDE 10 MG: 5 TABLET ORAL at 21:21

## 2023-02-01 RX ADMIN — FAMOTIDINE 20 MG: 20 TABLET, FILM COATED ORAL at 09:25

## 2023-02-01 RX ADMIN — AZITHROMYCIN MONOHYDRATE 500 MG: 500 INJECTION, POWDER, LYOPHILIZED, FOR SOLUTION INTRAVENOUS at 21:25

## 2023-02-01 RX ADMIN — PROPRANOLOL HYDROCHLORIDE 120 MG: 60 CAPSULE, EXTENDED RELEASE ORAL at 16:56

## 2023-02-01 ASSESSMENT — PAIN DESCRIPTION - ORIENTATION: ORIENTATION: LEFT

## 2023-02-01 ASSESSMENT — PAIN SCALES - GENERAL
PAINLEVEL_OUTOF10: 1
PAINLEVEL_OUTOF10: 4

## 2023-02-01 ASSESSMENT — PAIN DESCRIPTION - FREQUENCY: FREQUENCY: INTERMITTENT

## 2023-02-01 ASSESSMENT — PAIN DESCRIPTION - LOCATION: LOCATION: HEAD

## 2023-02-01 ASSESSMENT — PAIN DESCRIPTION - PAIN TYPE: TYPE: ACUTE PAIN

## 2023-02-01 ASSESSMENT — PAIN DESCRIPTION - ONSET: ONSET: GRADUAL

## 2023-02-01 ASSESSMENT — PAIN - FUNCTIONAL ASSESSMENT: PAIN_FUNCTIONAL_ASSESSMENT: ACTIVITIES ARE NOT PREVENTED

## 2023-02-01 ASSESSMENT — PAIN DESCRIPTION - DESCRIPTORS: DESCRIPTORS: ACHING

## 2023-02-01 NOTE — PROGRESS NOTES
Pharmacist Review and Automatic Dose Adjustment of prophylactic enoxaparin      The reviewing pharmacist has made an adjustment to the ordered enoxaparin dose or converted to UFH per the approved [facility] protocol and table as identified below. German Pradhan is a 82 y.o. male. Recent Labs     01/31/23  1635 02/01/23  0535   CREATININE 1.26* 1.33*       Estimated Creatinine Clearance: 53 mL/min (A) (based on SCr of 1.33 mg/dL (H)). Height:   Ht Readings from Last 1 Encounters:   02/01/23 5' 11\" (1.803 m)     Weight:  Wt Readings from Last 1 Encounters:   02/01/23 260 lb (117.9 kg)         Enoxaparin Indication: prophylaxis          Plan: Based upon renal function, the enoxaparin dose has been changed/converted from enoxaparin 40mg SQ daily to enoxaparin 30mg SQ BID.       Thank you,  Louisa Giles, [facility], 2/1/2023, 9:36 AM

## 2023-02-01 NOTE — PROGRESS NOTES
Physical Therapy  Facility/Department: [facility] AT THE [facility] MED SURG  Physical Therapy Initial Assessment    Name: German Pradhan  : 1935  MRN: [id number]  Date of Service: 2023    Discharge Recommendations:  Continue to assess pending progress, Patient would benefit from continued therapy after discharge, Home with Home health PT, Outpatient PT          Patient Diagnosis(es): The primary encounter diagnosis was Septicemia ([facility] [address] A diagnosis of Pneumonia due to infectious organism, unspecified laterality, unspecified part of lung was also pertinent to this visit. Past Medical History:  has a past medical history of COPD (chronic obstructive pulmonary disease) ([facility] [address] Diabetes mellitus ([facility] [address] Hx of echocardiogram, Hyperlipidemia, Hypothyroidism, MI (myocardial infarction) ([facility] [address] Stroke ([facility] [address] Thyroid disease, and Type II or unspecified type diabetes mellitus without mention of complication, not stated as uncontrolled. Past Surgical History:  has a past surgical history that includes hernia repair; Cardiac surgery (2017); fracture surgery; and Colonoscopy. Assessment   Body Structures, Functions, Activity Limitations Requiring Skilled Therapeutic Intervention: Decreased functional mobility ; Decreased strength;Decreased safe awareness;Decreased endurance;Decreased balance; Increased pain  Assessment: Pt referred for difficulty walking. Pt is currently CGA +1 with St. cane however not unsteadiness and imbalance. May want to initially encourage RW and advance as appropriate. Progress as tolerated.   Treatment Diagnosis: difficulty walking  Therapy Prognosis: Good  Decision Making: Medium Complexity  Barriers to Learning: none  Requires PT Follow-Up: Yes  Activity Tolerance  Activity Tolerance: Patient tolerated evaluation without incident     Plan   Physcial Therapy Plan  General Plan: 2 times a day 7 days a week (with exception of weekends, daily)  Current Treatment Recommendations: Strengthening, Balance training, Functional mobility training, Transfer training, Endurance training, Gait training, Stair training, Neuromuscular re-education, Safety education & training, Therapeutic activities  Safety Devices  Type of Devices: Left in chair, Call light within reach, Nurse notified, Chair alarm in place     Restrictions  Restrictions/Precautions  Restrictions/Precautions: General Precautions, Fall Risk     Subjective   General  Chart Reviewed: Yes  Patient assessed for rehabilitation services?: Yes  Subjective  Subjective: Per pt, he believes his fall was due to a rug slipping. Pt states his pain is 3/10. Pt reports normally using a st. cane. Social/Functional History  Social/Functional History  Lives With: Spouse  Type of Home: House  Home Layout: One level  Home Access: Level entry  Bathroom Shower/Tub: Walk-in shower  Home Equipment: Cane  Has the patient had two or more falls in the past year or any fall with injury in the past year?: Yes (patient with fall getting out of shower prior to arrival)  Solo Help From: Family  ADL Assistance: Needs assistance (wife assists with compression stockings and footwear)  Homemaking Assistance: Needs assistance (wife completes)  Homemaking Responsibilities: No  Ambulation Assistance: Independent  Transfer Assistance: Independent  Active : No  Vision/Hearing  Vision  Vision: Impaired  Vision Exceptions: Wears glasses for reading  Hearing  Hearing: Exceptions to [facility]  Hearing Exceptions: Hard of hearing/hearing concerns    Cognition   Orientation  Overall Orientation Status: Within Functional Limits  Orientation Level: Oriented to place;Oriented to situation;Oriented to person  Cognition  Overall Cognitive Status: WFL     Objective   Heart Rate: 68  Heart Rate Source: Monitor; Apical  BP: (!) 108/95  BP Location: Left upper arm  BP Method: Automatic  Patient Position: Sitting  MAP (Calculated): 99  Resp: 20  SpO2: 97 %  O2 Device: Nasal cannula Observation/Palpation  Posture: Fair  Observation: pt. currently on 2L O2, states he was not on prior at home. Pt required time to recover following mobility  Gross Assessment  AROM: Within functional limits  Strength: Generally decreased, functional        Bed Mobility Training  Bed Mobility Training: Yes  Overall Level of Assistance: Minimum assistance;Assist X1;Contact-guard assistance (definite use of rails)  Supine to Sit: Contact-guard assistance;Stand-by assistance  Scooting: Stand-by assistance  Balance  Sitting: Intact  Standing: Impaired  Standing - Static: Fair  Standing - Dynamic: Fair  Transfer Training  Transfer Training: Yes  Overall Level of Assistance: Contact-guard assistance;Assist X1  Interventions: Verbal cues  Sit to Stand: Contact-guard assistance;Assist X1  Stand to Sit: Contact-guard assistance;Assist X1  Stand Pivot Transfers: Contact-guard assistance;Assist X1  Bed to Chair: Contact-guard assistance;Assist X1  Gait Training  Gait Training: Yes  Right Side Weight Bearing: Full  Left Side Weight Bearing: Full  Gait  Overall Level of Assistance: Contact-guard assistance;Assist X1  Interventions: Safety awareness training;Verbal cues  Base of Support: Widened  Speed/Geovanna: Slow  Distance (ft): 3 Feet  Assistive Device: Cane, straight (imbalance, may benefit from use of RW)    OutComes Score           AM-PAC Score  AM-PAC Inpatient Mobility Raw Score : 17 (02/01/23 1428)  AM-PAC Inpatient T-Scale Score : 42.13 (02/01/23 1428)  Mobility Inpatient CMS 0-100% Score: 50.57 (02/01/23 1428)  Mobility Inpatient CMS G-Code Modifier : CK (02/01/23 1428)     Goals  Short Term Goals  Time Frame for Short Term Goals: 20 days  Short Term Goal 1: Initiate and progress HEP for strength and balance. Short Term Goal 2: Pt will be independent with bed mobility without use of rails for discharge home independently.   Short Term Goal 3: Pt will transfer with +1 supervision with least restrictive device to decrease fall risk. Short Term Goal 4: Pt will ambulate with supervision to SBA +1 with least restrictive device for up to 75 feet to allow patient safe entry and exit into his home  Short Term Goal 5: Pt will negotiate 4 steps with railing +1 SBA for entry and exit into buildings for appointments.   Patient Goals   Patient Goals : return home       Education  Patient Education  Education Given To: Patient  Education Provided: Role of Therapy;Plan of Care  Education Method: Verbal  Barriers to Learning: None  Education Outcome: Verbalized understanding      Therapy Time   Individual Concurrent Group Co-treatment   Time In 1032         Time Out 1100         Minutes [address], PT, DPT

## 2023-02-01 NOTE — PROGRESS NOTES
Entered patient's room for afternoon vital signs and head to toe reassessment. Patient resting in the chair at this time with his wife at the bedside. A&O x 4, calm, and cooperative. Patient denies pain at this time. Vital signs and head to toe reassessment completed at this time, see flowsheets for more details. Patient complains of being tired at this time. Patient denies no more needs at this time. Call light within reach. Chair alarm on. Chair/bed wheels locked. Bed in lowest position.

## 2023-02-01 NOTE — PROGRESS NOTES
Entered patient's room for morning vital signs and head to toe assessment. Patient resting in the bed at this time. A&O x4, calm, and cooperative. Patient denies pain at this time. Vital signs and head to toe assessment completed at this time, see flowsheets for more details. HOB elevated. Patient denies no more needs at this time. Call light within reach. Bed alarm on. Bed wheels locked. Bed in lowest position.

## 2023-02-01 NOTE — PROGRESS NOTES
Physical Therapy  Facility/Department: [facility] AT THE [facility] MED SURG  Daily Treatment Note  NAME: Mauro Bhagat  : 1935  MRN: [id number]    Date of Service: 2023    Discharge Recommendations:  Continue to assess pending progress, Patient would benefit from continued therapy after discharge, Home with Home health PT, Outpatient PT        Patient Diagnosis(es): The primary encounter diagnosis was Septicemia ([address] A diagnosis of Pneumonia due to infectious organism, unspecified laterality, unspecified part of lung was also pertinent to this visit. Assessment   Assessment: Gait 15 ft x2 from chair to bathrom then back to bedside. He used FWW to improve stability and was CGA, no LOB. He displays confusion at times and is impulsive . Pt requested to retrun to bed post tx. Activity Tolerance: Patient tolerated treatment well     Plan    Physcial Therapy Plan  General Plan: 2 times a day 7 days a week  Current Treatment Recommendations: Strengthening;Balance training;Functional mobility training;Transfer training; Endurance training;Gait training;Stair training;Neuromuscular re-education; Safety education & training; Therapeutic activities     Restrictions  Restrictions/Precautions  Restrictions/Precautions: General Precautions, Fall Risk     Subjective    Subjective  Subjective: Upon arrival, pt was sitting up in chair with his [address] in hand attempting to stand up. When questioned on what he was trying to do, pt states he wants to go to the bathroom. Pt was educated on use of call light and not to stand without assistance.   He was very impulsive and required constant cuing from therapist to wait so therapist could situate O2 tubing, Iv pole, personal alarm, etc.  Orientation  Overall Orientation Status: Within Functional Limits  Orientation Level: Oriented to place;Oriented to situation;Oriented to person  Cognition  Overall Cognitive Status: [facility]     Objective   Vitals     Bed Mobility Training  Bed Mobility Training: Yes  Overall Level of Assistance: Contact-guard assistance;Assist X1  Supine to Sit: Contact-guard assistance;Stand-by assistance  Sit to Supine: Stand-by assistance  Scooting: Stand-by assistance  Balance  Sitting: Intact  Standing: Impaired  Standing - Static: Fair  Standing - Dynamic: Fair  Transfer Training  Transfer Training: Yes  Overall Level of Assistance: Assist X1;Contact-guard assistance  Interventions: Verbal cues  Sit to Stand: Contact-guard assistance;Assist X1 (Once pt stood up from chair, there was a large wet spot where he was sitting and his brief was saturated.)  Stand to Sit: Contact-guard assistance;Assist X1  Stand Pivot Transfers: Contact-guard assistance;Assist X1  Bed to Chair: Contact-guard assistance;Assist X1  Toilet Transfer: Contact-guard assistance;Assist X1 (Pt did have a noted small bowel movement in his brief so he required assistance to be wiped and new brief was applied.)  Gait Training  Gait Training: Yes  Right Side Weight Bearing: Full  Left Side Weight Bearing: Full  Gait  Overall Level of Assistance: Contact-guard assistance;Assist X1  Interventions: Safety awareness training;Verbal cues  Base of Support: Widened  Speed/Geovanna: Slow  Distance (ft):  (15 ft x2 (from chair to bathroom then bed.))  Assistive Device: Walker, rolling (Fair stability with FWW.)     PT Exercises  Exercise Treatment: Seated BLE ex x10 at EOB. Safety Devices  Type of Devices: Call light within reach;Nurse notified; Left in bed;Bed alarm in place       Goals  Short Term Goals  Time Frame for Short Term Goals: 20 days  Short Term Goal 1: Initiate and progress HEP for strength and balance. Short Term Goal 2: Pt will be independent with bed mobility without use of rails for discharge home independently. Short Term Goal 3: Pt will transfer with +1 supervision with least restrictive device to decrease fall risk.   Short Term Goal 4: Pt will ambulate with supervision to SBA +1 with least restrictive device for up to 75 feet to allow patient safe entry and exit into his home  Short Term Goal 5: Pt will negotiate 4 steps with railing +1 SBA for entry and exit into buildings for appointments. Patient Goals   Patient Goals : return home    Education  Patient Education  Education Given To: Patient  Education Provided: Role of Therapy;Plan of Care  Education Provided Comments: Use of call light prior to standing.   Education Method: Verbal  Barriers to Learning: None  Education Outcome: Verbalized understanding;Continued education needed    Therapy Time   Individual Concurrent Group Co-treatment   Time In 1523         Time Out [address], PTA

## 2023-02-01 NOTE — H&P
[facility]  History and Physical      Patient: Karlee Rader  Date of Admission: 1/31/2023  4:23 PM  Date of Evaluation: 2/1/2023      Subjective:  Chief Complaint:    Chief Complaint   Patient presents with    Fall     Fall this am while getting out of the shower. Refused transport by EMS       History Obtained From:  patient, electronic medical record  PCP: GENA Noel CNP      History of Present Illness:   Karlee Rader is a 82 y.o. male who  presented emergency department for evaluation of weakness and feeling poorly. Patient lives at home with his spouse. Marilee Cavazosmia earlier this morning shower. EMS was called. Patient refused transport at that time. Denies hitting head. Denies presyncope syncope dizziness or lightheadedness prior to the fall. He has become progressively weak. On arrival to the emergency department he has a temperature of 39.7. He does have a history of COPD. He does have a cough which has been persistent and seems of worse over the past few days. His wife did have some leftover azithromycin and she gave him 3 days worth. He has become progressively more weak. He has history of COPD. He has been using his home medications as prescribed. Does not use oxygen at home. Denies any nausea or vomiting. Denies any dysuria urinary frequency. Any localized pain complaints. No other acute concerns. .  He was hypoxic  in er. His symptoms were first noticed  2 days ago. He denies chest pain, SOB and palpitations. Review of Systems:  Constitutional:positive  for fevers, and positive for chills.   Respiratory: negative for shortness of breath, positive for cough, and positive for wheezing  Cardiovascular: negative for chest pain, negative for palpitations, and negative for syncope  Gastrointestinal: negative for abdominal pain, negative for nausea,negative for vomiting, negative for diarrhea, negative for constipation, and negative for hematochezia or melena  Genitourinary: negative for dysuria, negative for urinary urgency, negative for urinary frequency, and negative for hematuria  Neurological: negative for unilateral weakness, numbness or tingling. All other systems were reviewed with the patient and are negative except as stated above      Past Medical History:        Diagnosis Date    COPD (chronic obstructive pulmonary disease) ([facility] [address]     Diabetes mellitus ([facility] [address]     Hx of echocardiogram 02/24/2017    [facility]    Hyperlipidemia     Hypothyroidism     MI (myocardial infarction) [facility])     Stroke [facility])     Thyroid disease     Type II or unspecified type diabetes mellitus without mention of complication, not stated as uncontrolled        Past Surgical History:        Procedure Laterality Date    CARDIAC SURGERY  02/24/2017    Stent placed  Dr Scott Rao      right ankle    HERNIA REPAIR         Medications Prior to Admission:    Prior to Admission medications    Medication Sig Start Date End Date Taking? Authorizing Provider   azithromycin (ZITHROMAX) 250 MG tablet TAKE 2 TABLETS BY MOUTH ON DAY 1, AND THEN TAKE 1 TABLET BY MOUTH ONCE A DAY ON DAY 2 THROUGH DAY 5 1/7/23   Historical Provider, MD   predniSONE (DELTASONE) 20 MG tablet TAKE 2 TABLETS BY MOUTH ONCE DAILY FOR 5 DAYS 1/7/23   Historical Provider, MD   oxybutynin (DITROPAN) 5 MG tablet Take 2 tablets by mouth 2 times daily 12/16/22   Catharine Bence Might, APRN - CNP   furosemide (LASIX) 40 MG tablet Take 40 mg by mouth in the morning and 40 mg in the evening. Per wife Selwyn Hankins, the torsemide was very expensive so Roro continues to take his Lasix 40mg BID.     Historical Provider, MD   montelukast (SINGULAIR) 10 MG tablet Take 1 tablet by mouth daily 11/16/22   Catharine Bence Might, APRN - CNP   meloxicam (MOBIC) 7.5 MG tablet Take 1 tablet by mouth daily 11/16/22   Catharine Bence Might, APRN - CNP   Misc Natural Products (OSTEO BI-FLEX ADV JOINT SHIELD PO) Take by mouth as needed  Patient not taking: Reported on 12/16/2022    Historical Provider, MD   EUTHYROX 137 MCG tablet Take 1 tablet by mouth once daily 7/14/22   GENA Tilley CNP   atorvastatin (LIPITOR) 40 MG tablet Take 1 tablet by mouth once daily 5/31/22   Musa Padilla MD   fluticasone-salmeterol (ADVAIR) 250-50 MCG/DOSE AEPB Inhale 1 puff into the lungs every 12 hours    Historical Provider, MD   albuterol (PROVENTIL) (2.5 MG/3ML) 0.083% nebulizer solution Take 3 mLs by nebulization every 4 hours as needed for Wheezing or Shortness of Breath 1/28/22   GENA Tilley CNP   primidone (MYSOLINE) 250 MG tablet Take 250 mg by mouth 2 times daily    Historical Provider, MD   propranolol (INDERAL LA) 120 MG extended release capsule Take 120 mg by mouth 2 times daily    Historical Provider, MD   omeprazole (PRILOSEC) 20 MG delayed release capsule Take 20 mg by mouth daily    Historical Provider, MD   alogliptin (NESINA) 25 MG TABS tablet Take 25 mg by mouth daily    Historical Provider, MD   blood glucose monitor strips accu check 7/18/18   GENA Figueredo CNP   aspirin 81 MG tablet Take 81 mg by mouth daily     Historical Provider, MD   albuterol sulfate  (90 Base) MCG/ACT inhaler Inhale 2 puffs into the lungs every 4 hours as needed for Wheezing or Shortness of Breath 4/13/18   GENA Ashby CNP   metFORMIN (GLUCOPHAGE) 1000 MG tablet Take 1,000 mg by mouth 2 times daily     Historical Provider, MD   acetaminophen (TYLENOL) 500 MG tablet Take 1,000 mg by mouth every 6 hours as needed for Pain    Historical Provider, MD   TAMSULOSIN HCL PO Take 0.8 mg by mouth Daily with supper     Historical Provider, MD   Multiple Vitamins-Minerals (THERAPEUTIC MULTIVITAMIN-MINERALS) tablet Take 1 tablet by mouth daily    Historical Provider, MD       Allergies:  Rosuvastatin    Social History:   TOBACCO:   reports that he quit smoking about 30 years ago. His smoking use included cigarettes.  He has never used smokeless tobacco.  ETOH:   reports current alcohol use of about 1.0 standard drink per week. Family History:       Problem Relation Age of Onset    Cancer Mother 47        liver ca    Diabetes Father     Heart Disease Father          VITALS:  Temp: 100.1 °F (37.8 °C)  BP: 137/71  Resp: 20  Heart Rate: 83  SpO2: (S) 91 %    Weight  Wt Readings from Last 3 Encounters:   02/01/23 260 lb (117.9 kg)   12/16/22 256 lb 8 oz (116.3 kg)   12/05/22 254 lb 6.6 oz (115.4 kg)     Body mass index is 36.26 kg/m².  -----------------------------------------------------------------  EXAM:  GEN:    Awake, alert and oriented x 3. On oxygen 2 lit by nasal cannula  EYES:  EOMI, pupils equal   NECK: Supple. No lymphadenopathy. No carotid bruit  CVS:    regular rate and rhythm, 2/6 systolic murmur  PULM:  diminished with rt sided rhonchi, no acute respiratory distress,no wheezing  ABD:    Bowels sounds normal.  Abdomen is soft. No distention. no tenderness to palpation. EXT:   2+ edema bilaterally . No calf tenderness. NEURO: Moves all extremities. Motor and sensory are grossly intact  SKIN:  No rashes.   No skin lesions.    -----------------------------------------------------------------    DATA:  Complete Blood Count:   Recent Labs     01/31/23  1635 02/01/23  0535   WBC 14.5* 17.9*   RBC 3.78* 3.51*   HGB 11.4* 10.1*   HCT 34.3* 31.6*   MCV 90.7 90.0   RDW 14.7* 14.9*    208      Recent Labs     01/31/23  1635 02/01/23  0535   SEGS 71* 74*   NEUTROABS 10.29* 13.25*   LYMPHOPCT 14* 8*   LYMPHSABS 2.03 1.43   MONOPCT 14* 18*   EOSRELPCT 1 0*   BASOPCT 0 0   IMMGRAN 0 0       Recent Blood Glucose:   Recent Labs     01/31/23  1635 02/01/23  0535   GLUCOSE 220* 238*        Comprehensive Metabolic Profile:   Recent Labs     01/31/23  1635 02/01/23  0535   BUN 25* 30*   CREATININE 1.26* 1.33*   * 129*   K 4.5 4.0   CL 91* 97*   CALCIUM 9.1 8.3*   ANIONGAP 14 12   CO2 25 20   PROT 7.4 6.6   LABALBU 3.7 3.2*   BILITOT 0.4 0.4   ALKPHOS 185* 157*   AST 95* 125*   * 134*        UA:   Lab Results   Component Value Date    COLORU Yellow 01/31/2023    CLARITYU clear 05/10/2018    SPECGRAV 1.020 01/31/2023    WBCUA 0 TO 2 01/31/2023    RBCUA 0 TO 2 01/31/2023    EPITHUA 0 TO 2 01/31/2023    LEUKOCYTESUR NEGATIVE 01/31/2023    NITRU NEGATIVE 01/31/2023    GLUCOSEU NEGATIVE 01/31/2023    BLOODU neg 05/10/2018    KETUA NEGATIVE 01/31/2023    PROTEINU 2+ (A) 01/31/2023    HGBUR NEGATIVE 01/31/2023    CASTUA NOT REPORTED 12/29/2021    CRYSTUA NOT REPORTED 12/29/2021    BACTERIA 1+ (A) 01/31/2023    YEAST NOT REPORTED 12/29/2021       Lactic Acid:   Lab Results   Component Value Date/Time    LACTA 2.2 12/29/2021 09:24 PM    LACTA 1.2 11/14/2017 08:47 PM    LACTA 1.6 09/19/2017 06:27 PM        D-Dimer:  No results found for: DDIMER    PT/INR:  Lab Results   Component Value Date/Time    PROTIME 10.9 09/19/2017 06:27 PM    INR 1.1 09/19/2017 06:27 PM       High Sensitivity Troponin:  Recent Labs     01/31/23  1635   TROPHS 13       ABGs:   Lab Results   Component Value Date/Time    PHART 7.340 09/19/2017 11:25 PM    ORN7QOX 53.8 09/19/2017 11:25 PM    PO2ART 62.0 09/19/2017 11:25 PM    FGG3NHR 28.4 09/19/2017 11:25 PM    Q0KEGEQN 90.1 09/19/2017 11:25 PM    FIO2 NOT REPORTED 09/19/2017 11:25 PM       EKG reviewed: my interpretation is: normal sinus rhythm      Imaging Data:  CT ABDOMEN PELVIS W IV CONTRAST Additional Contrast? None   Final Result   1. Multifocal consolidation in the right lung and to a lesser degree left   lung base, concerning for infection. 2.  Motion degraded evaluation of the pulmonary arteries. No evidence for   central pulmonary embolism. 3.  No acute inflammatory process identified in the abdomen or pelvis. Stable benign and incidental findings, as described. CT CHEST PULMONARY EMBOLISM W CONTRAST   Final Result   1.   Multifocal consolidation in the right lung and to a lesser degree left   lung base, concerning for infection. 2.  Motion degraded evaluation of the pulmonary arteries. No evidence for   central pulmonary embolism. 3.  No acute inflammatory process identified in the abdomen or pelvis. Stable benign and incidental findings, as described. US GALLBLADDER RUQ   Final Result   Gallbladder sludge. No imaging evidence for acute cholecystitis or biliary   dilatation. XR CHEST PORTABLE   Final Result   Suspected pneumonia in the right mid lung. Recommend imaging follow-up to   resolution. MEDICAL DECISION MAKING:    Primary Problem(s): Sepsis due to pneumonia ([facility] [address]  Differential diagnoses: copd exaberbation,chf  Condition is an acute or chronic illness or injury that poses threat to life or bodily function  Condition is unchanged  Treatment plan: Continue current treatment  Imaging: no further imaging studies ordered today  Medications: Continue iv rocephin,zithromax,oxygen  Medication Monitoring / High Risk Medications: none      Type 2 dm    Condition is stable  Treatment plan: Continue current treatment  Imaging: no further imaging studies ordered today  Medications: Continue meds and insulin      Chronic combined chf    Condition is stable  Treatment plan: Imaging considered: echo  Imaging: Echo ordered  Medications: Continue lasix    Nutrition status:    Well developed, well nourished with no malnutrition  Dietician consult initiated    Hospital Prophylaxis:   DVT: Lovenox   Stress Ulcer: PPI     MDM Data:   Test interpretation:  My independent EKG interpretation: sinus rhythm,old ant mi  My independent X-ray interpretation:  CT cheat shows multifocal pneumonia  Management and/or test interpretation discussed with ER MD at time of admission  Consults and Nursing notes were personally reviewed, all current labs and imaging were personally reviewed, tests ordered: CBC, BMP, and history obtained by independent historian Disposition:  Shared decision making: All test results, treatment options and disposition options were discussed with the patient today  Social determinants of health that may impact management: none  Code status: Full Code   Disposition: Discharge plan is home        Critical Care Time:  Total critical care time caring for this patient with life threatening, unstable organ failure, including direct patient contact, management of life support systems, review of data including imaging and labs, discussions with other team members and physicians at least 0 minutes so far today, excluding separately billable procedures. [facility] Medication Reconciliation documentation:  [x] I have utilized all available immediate resources to obtain, update, or review the patient's current medications (including all prescriptions, over-the-counter products, herbals, cannabinoid products and bitamin/mineral/dietary/nutritional supplements. Radu 'yes\", Lc Ji     []  The patient is not eligible for medication reconciliation; the patient is in an emergent medical situation where delaying treatment would jeopardize the patient's health    []  I did NOT confirm, update or review the patient's current list of medications today. [DOES NOT SATISFY [facility] PERFORMANCE]        [facility] Advanced Care Planning documentation: (check appropriate boxes [x]  )  [x] I have confirmed that the patient's Advance Care Plan is present, Code Status is documented, or surrogate decision maker is listed in the patient's medical record  [If \"yes\", STOP HERE]     [] The patient's Advance Care Plan is NOT present because:    []  I confirmed today that the patient does not wish or was not able to name a   surrogate decision maker or provide and advance care plan.    [] Hospice care is currently being provided or has been provided within the   calendar year.     []  I did NOT confirm today the presence of an [address] or surrogate   decision maker documented within the patient's medical record.    Pradeep Clancy MD , M.D.  2/1/2023  9:01 AM

## 2023-02-01 NOTE — PLAN OF CARE
Problem: Discharge Planning  Goal: Discharge to home or other facility with appropriate resources  2/1/2023 1039 by Matthew Wells RN  Outcome: Progressing    Discharge plan is back home with wife upon being medically cleared at this time. LSW providing assistance for discharge needs. Problem: Nutrition Deficit:  Goal: Optimize nutritional status  2/1/2023 1039 by Matthew Wells RN  Outcome: Progressing   Diet provided per selective menu as ordered. Appetite is good. Patient able to feed self, PO fluids encouraged and IV fluids tolerated well, see flowsheet for details. Problem: Safety - Adult  Goal: Free from fall injury  2/1/2023 1039 by Matthew Wells RN  Outcome: Progressing   Patient's bed in lowest position. Bed/chair wheel locked. Call light within reach. Non-skid socks worn. Bedside table within reach. Bedrails up x 2. Patient is up with 1 person assistance and a cane. Problem: Respiratory - Adult  Goal: Achieves optimal ventilation and oxygenation  Outcome: Progressing   Patient encouraged to cough and deep breathe. Patient taught how to use Acapella and incentive spirometry and the benefits from it. HOB elevated. Respiratory treatments given per orders.

## 2023-02-01 NOTE — PROGRESS NOTES
Occupational Therapy  Facility/Department: Critical access hospital AT THE [facility] MED SURG  Occupational Therapy Initial Assessment    Name: Diego Martin  : 1935  MRN: [id number]  Date of Service: 2023    Discharge Recommendations:  Continue to assess pending progress, Home with Home health OT        Patient Diagnosis(es): The primary encounter diagnosis was Septicemia ([facility] [address] A diagnosis of Pneumonia due to infectious organism, unspecified laterality, unspecified part of lung was also pertinent to this visit. Past Medical History:  has a past medical history of COPD (chronic obstructive pulmonary disease) ([facility] [address] Diabetes mellitus ([facility] [address] Hx of echocardiogram, Hyperlipidemia, Hypothyroidism, MI (myocardial infarction) ([facility] [address] Stroke ([facility] [address] Thyroid disease, and Type II or unspecified type diabetes mellitus without mention of complication, not stated as uncontrolled. Past Surgical History:  has a past surgical history that includes hernia repair; Cardiac surgery (2017); fracture surgery; and Colonoscopy. Treatment Diagnosis: Generalized Weakness      Assessment   Performance deficits / Impairments: Decreased functional mobility ; Decreased ADL status; Decreased strength;Decreased endurance;Decreased balance;Decreased high-level IADLs  Treatment Diagnosis: Generalized Weakness  Prognosis: Good  Decision Making: Medium Complexity  REQUIRES OT FOLLOW-UP: Yes  Activity Tolerance  Activity Tolerance: Patient Tolerated treatment well  Activity Tolerance Comments: pt. on 2L O2, SpO2 decreased to 87-88% with activity, increases to low 90's after approx 2 mins, vc's for proper breathing tech        Plan   Occupational Therapy Plan  Times Per Week: 7x/wk  Times Per Day:  Once a day  Current Treatment Recommendations: Strengthening, Balance training, Self-Care / ADL, Safety education & training     Restrictions  Restrictions/Precautions  Restrictions/Precautions: General Precautions, Fall Risk    Subjective   General  Chart Reviewed: Yes  Patient assessed for rehabilitation services?: Yes  Diagnosis: Sepsis due to pneumonia  General Comment  Comments: Pt. supine in bed upon arrival, agreeable to OT eval. Pt. reports 3/10 pain in R chest area. Social/Functional History  Social/Functional History  Lives With: Spouse  Type of Home: House  Home Layout: One level  Home Access: Level entry  Bathroom Shower/Tub: Walk-in shower  Home Equipment: Wilder Castrejon  Has the patient had two or more falls in the past year or any fall with injury in the past year?: Yes (patient with fall getting out of shower prior to arrival)  [employer] Help From: Family  ADL Assistance: Needs assistance (wife assists with compression stockings and footwear)  Homemaking Assistance: Needs assistance (wife completes)  Homemaking Responsibilities: No  Ambulation Assistance: Independent  Transfer Assistance: Independent  Active : No       Objective   Heart Rate: 67  Heart Rate Source: Monitor  BP: 122/61  BP Location: Right upper arm  BP Method: Automatic  Patient Position: Semi fowlers  MAP (Calculated): 81  Resp: 18  SpO2: 92 %  O2 Device: Nasal cannula          Observation/Palpation  Observation: pt. currently on 2L O2, states he was not on prior at home  Safety Devices  Type of Devices: Left in chair;Call light within reach; Chair alarm in place;Nurse notified  Bed Mobility Training  Bed Mobility Training: Yes  Overall Level of Assistance: Contact-guard assistance;Minimum assistance;Assist X1  Balance  Sitting: Intact  Standing: Impaired  Transfer Training  Transfer Training: Yes  Overall Level of Assistance: Contact-guard assistance;Assist X1  Sit to Stand: Contact-guard assistance;Assist X1  Stand to Sit: Contact-guard assistance;Assist X1  Stand Pivot Transfers: Contact-guard assistance;Assist X1  Bed to Chair: Contact-guard assistance;Assist X1  Gait  Assistive Device: Cane, straight        ADL  Feeding: Independent  Grooming: Contact guard assistance  UE Bathing: Contact guard assistance  LE Bathing: Minimal assistance  UE Dressing: Stand by assistance  LE Dressing: Minimal assistance  Toileting: Contact guard assistance        Vision  Vision: Impaired  Vision Exceptions: Wears glasses for reading  Hearing  Hearing: Exceptions to [facility]  Hearing Exceptions: Hard of hearing/hearing concerns  Cognition  Overall Cognitive Status: WFL  Orientation  Overall Orientation Status: Within Functional Limits        Education Given To: Patient  Education Provided: Role of Therapy;Plan of Care;Transfer Training  Education Method: Demonstration;Verbal  Barriers to Learning: None  Education Outcome: Verbalized understanding  LUE AROM (degrees)  LUE AROM : WFL  RUE AROM (degrees)  RUE AROM : WFL         Goals  Short Term Goals  Time Frame for Short Term Goals: 20 visits  Short Term Goal 1: Pt. will tolerate 15 mins of BUE ther ex/act while maintaining SpO2 > 90% to increase overall functional activity tolerance. Short Term Goal 2: Pt. will demo standing tolerance x 6-7 mins w/o LOB to increase safety/participation with ADL's. Short Term Goal 3: Pt. will complete self care routine with SUP/mod I (with exception of footwear) to return to PLOF. Short Term Goal 4: Pt. will complete functional ADL transfers/mobility with SUP/mod I and G safety with reduced risk for falls.        Therapy Time   Individual Concurrent Group Co-treatment   Time In 1031         Time Out 1049         Minutes Janet Gaston

## 2023-02-01 NOTE — PROGRESS NOTES
[facility]   Pharmacy Renal Adjustment Note    Tiana Weinberg is a 82 y.o. male. Pharmacist assessment of renally cleared medications. Recent Labs     01/31/23  1635 02/01/23  0535   CREATININE 1.26* 1.33*     Estimated Creatinine Clearance: 53 mL/min (A) (based on SCr of 1.33 mg/dL (H)).     Height:   Ht Readings from Last 1 Encounters:   02/01/23 5' 11\" (1.803 m)     Weight:  Wt Readings from Last 1 Encounters:   02/01/23 260 lb (117.9 kg)       The following medication(s) have been adjusted based upon renal function:             Aloglipitin 25mg po daily decreased to aloglitin 12.5mg po daily for CrCl 30-59 mL/min      Thank you,  Sera Walker [facility],2/1/2023,9:44 AM

## 2023-02-01 NOTE — CONSULTS
Flo Jeans am scribing for and in the presence of Dinah Del Rosario MD, F.A.C.C..    Subjective:     CHIEF COMPLAINT / HPI:    Chief Complaint   Patient presents with    Fall     Fall this am while getting out of the shower. Refused transport by EMS       Radha Abdullahi is 82 y.o. male who presents today for follow-up. 1-He has history of coronary artery disease, myocardial infarction and primary PCI in 2/2017 done at [facility],  [address] [place] [address]. He also has history of ischemic cardiomyopathy and chronic systolic CHF, NYHA class III. 2-An admission to [facility] on 9/19/2017 with COPD exacerbation, during this admission his lisinopril changed to Cozaar because of chronic cough. 3-Another visit to the emergency room on 10/3/2017 with cough, shortness of breath and wheezing. 4- He saw Dr. Ben Bates at [facility] in November 2018, no changes in medication done. 5-An admission to [facility] on 4/6/2018 with acute on chronic CHF. 6-History of intentional tremor. 7- EKG done in office (8/20/2019)- Sinus Rhythm with 1st degree AV block. 71 bpm.    8- EKG done in office on 7/14/2020. No acute ischemic changes. 9-  Echocardiogram on 4/6/2021: Global left ventricular systolic function appears preserved with an estimated ejection fraction of 55%. Mildly increased left ventricular wall thickness with a normal left ventricular cavity size. The patient has a sigmoid interventricular septum. The inferoseptal wall is abnormal in its motion which is not unusual status post open heart surgery. Mild aortic stenosis. Mild mitral and tricuspid regurgitation. Evidence of mild diastolic dysfunction is seen. Atrial septal aneurysm cannot rule-out shunt. A saline contrast study was performed and cannot rule out interatrial communication.     10-EKG done in office 7/6/2021- no acute ischemic changes    11- Admission to [facility] on 12/31/2021-1/1/2022: Admitted for Pneumonia    12-ER visit on 8/2022: related to cough    13-EKG on 10/17/2022: No ischemic changes from prior ECG    14-  Echo done on 1/31/2023- EF >55% The left ventricular cavity size is within normal limits and the left ventricular wall thickness is moderately increased. The patient has a sigmoid interventricular septum without evidence of outflow tract obstruction. The left atrium is mildly dilated (29-33) with a left atrial volume index of 29 ml/m2. 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 The clinical significant of this finding is unknown, but has been associated with an increased risk of TIA's and strokes. The left atrium is mildly dilated (29-33) with a left atrial volume index of 29 ml/m2. The mean aortic valve gradient is 24 mmHg consistent with moderate aortic stenosis. Mild to moderate tricuspid regurgitation. Moderate pulmonary hypertension with an estimated right ventricular systolic pressure of 44 mmHg. Evidence of mild (grade I) diastolic dysfunction is seen. Mr. Justin Moncada was admitted for a fall after getting out of shower. He states he tripped on rug denies losing consciousness. He has history of tremors from before and unsteady gait. Uses walker for ambulation. His wife tried to help him out but she could not. EMS called and patient initially refused to come to the emergency room but later he felt extremely weak and had fever and shivering so he decided to come to the emergency room. He said he was on the floor for about 4 hours. He is feeling much better this afternoon. His initial lactate level was elevated. Currently on antibiotic for the treatment of community-acquired pneumonia. He has acute kidney injury. Denies any chest pain, pressure or tightness. He has some upper respiratory symptoms for a while. COVID and influenza testing are negative. UA is unremarkable.   He said he was able to walk around using his walker today with physical therapy. No nausea, vomiting or abdominal pain. No problems moving his bowel. No problems with current medications. Wt Readings from Last 3 Encounters:   23 260 lb (117.9 kg)   22 256 lb 8 oz (116.3 kg)   22 254 lb 6.6 oz (115.4 kg)       Past Medical History:    Past Medical History:   Diagnosis Date    COPD (chronic obstructive pulmonary disease) ([facility] [address]     Diabetes mellitus ([facility] [address]     Hx of echocardiogram 2017    [facility]    Hyperlipidemia     Hypothyroidism     MI (myocardial infarction) [facility])     Stroke [facility])     Thyroid disease     Type II or unspecified type diabetes mellitus without mention of complication, not stated as uncontrolled      Past Surgical History:  Past Surgical History:   Procedure Laterality Date    CARDIAC SURGERY  2017    Stent placed  Dr Silvia Rivers      right ankle    HERNIA REPAIR       Social History:    Social History     Tobacco Use   Smoking Status Former    Types: Cigarettes    Quit date:     Years since quittin.1   Smokeless Tobacco Never     Current Medications:  No outpatient medications have been marked as taking for the 23 encounter [facility] Encounter). REVIEW OF SYSTEMS:    CONSTITUTIONAL: No major weight gain or loss, fatigue, weakness, night sweats or fever. HEENT: No new vision difficulties or ringing in the ears. RESPIRATORY: See HPI  CARDIOVASCULAR: See HPI  GI: No nausea, vomiting, diarrhea, constipation, abdominal pain or changes in bowel habits. : No urinary frequency, urgency, incontinence hematuria or dysuria. SKIN: No cyanosis or skin lesions. MUSCULOSKELETAL: No new muscle or joint pain. NEUROLOGICAL: No syncope or TIA-like symptoms.   PSYCHIATRIC: No anxiety, pain, insomnia or depression    Objective:     PHYSICAL EXAM:      VITALS:  BP (!) 108/95   Pulse 68   Temp 98 °F (36.7 °C) (Temporal)   Resp 20   Ht 5' 11\" (1.803 m)   Wt 260 lb (117.9 kg)   SpO2 97%   BMI 36.26 kg/m²   CONSTITUTIONAL: Cooperative, no apparent distress, and appears well nourished / developed. NEUROLOGIC:  Awake and orientated to person, place and time. PSYCH: Calm affect. SKIN: Warm and dry. HEENT: Sclera non-icteric, normocephalic, neck supple, no elevation of JVP, normal carotid pulses with no bruits and thyroid normal size. LUNGS:  Poor air entry bilaterally . No significant wheezing . Mild bilateral crackles present. Cardiovascular: Normal rate, regular rhythm, normal heart sounds. Exam reveals no gallop and no friction rubs. 1/6 systolic murmur, 4th intercostal space on the LEFT must lateral to the sternal border. ABDOMEN:  Normal bowel sounds, non-distended and non-tender to palpation. Extremities: 2+ bilateral leg edema. no cyanosis or clubbing. 2+ radial and carotid pulses. Distal extremity pulses: 2+ bilaterally. Compression stockings in place. DATA:    Lab Results   Component Value Date     (H) 02/01/2023     (H) 02/01/2023    ALKPHOS 157 (H) 02/01/2023    BILITOT 0.4 02/01/2023     Lab Results   Component Value Date    CREATININE 1.33 (H) 02/01/2023    BUN 30 (H) 02/01/2023     (L) 02/01/2023    K 4.0 02/01/2023    CL 97 (L) 02/01/2023    CO2 20 02/01/2023       Lab Results   Component Value Date    WBC 17.9 (H) 02/01/2023    HGB 10.1 (L) 02/01/2023    HCT 31.6 (L) 02/01/2023    MCV 90.0 02/01/2023     02/01/2023       Lab Results   Component Value Date    TRIG 134 03/14/2022    TRIG 147 09/29/2021    TRIG 190 (H) 04/08/2021     Lab Results   Component Value Date    HDL 52 03/14/2022    HDL 46 09/29/2021    HDL 46 04/08/2021     Lab Results   Component Value Date    LDLCHOLESTEROL 65 03/14/2022    LDLCHOLESTEROL 61 09/29/2021    LDLCHOLESTEROL 57 04/08/2021         Assessment:   Sepsis secondary to atypical pneumonia. Chronic diastolic CHF. Fall and fever.   Atherosclerotic heart disease, status post PCI back in 2/24/2017. History of essential tremor, unsteady gait, using walker for ambulation. Acute kidney injury. Plan:   Patient admitted after a fall, found to have fever and acute kidney injury and CT scan of the chest and abdomen is suggestive of multifocal pneumonia. Currently treated for community-acquired pneumonia. He has acute kidney injury. I do believe that the elevated BNP is secondary to fluid resuscitation and kidney abnormalities. COVID and influenza are negative. UA is unremarkable. I will consult infectious disease and nephrology for further management. Hold Lasix for now. Continue physical therapy. Monitor blood pressure as per unit protocol. Follow-up repeat ECG, basal metabolic panel, total CK, renal ultrasound to rule out obstructive uropathy and urine electrolytes. Follow-up repeat BNP and Troponin level. Chronic Diastolic Heart Failure: EF: 55%   Moderate aortic stenosis and moderate pulm hypertension noted. Beta Blocker: Continue Propranolol 120 mg BID  Diuretics: Hold Lasix for now. Nonpharmacologic management of Heart Failure: I told him to continue wearing lower extremity compression stockings and I advised him to try and keep his legs up whenever possible and to limit salt in his diet. Follow-up repeat Blood work to be done tomorrow morning as above. Atherosclerotic Heart Disease: S/P Stent placement on 2/24/2017 by Dr. Shyanne Rivera at [facility] [place] operated by [facility]  Antiplatelet Agent: Continue Aspirin 81 mg daily. I also reminded them to watch for signs of bloody or black tarry stools and stop the medication immediately if this develops as this could be life threatening. Beta Blocker: Continue Propranolol 80 mg BID    Cholesterol Reduction Therapy: Continue Atorvastatin (Lipitor) 40 mg daily. Follow-up repeat ECG and troponin level to be done tomorrow morning. History of Stroke: Ischemic stroke with right visual field loss.   Has complete resolution of his neurological dysfunction. Antiplatelet Agent: Continue Aspirin 81 mg daily. Cholesterol Reduction Therapy: Continue Atorvastatin (Lipitor) 40 mg daily. Follow-up repeat lipid panel and liver function testing. Follow-up hemoglobin A1c. Essential Hypertension: Controlled   Continue propranolol. Hold Lasix. Follow-up blood pressure as per unit protocol. Hyperlipidemia: Mixed - Last LDL on 3/14/2022 was 65 mg/dL  Cholesterol Reduction Therapy: Continue Atorvastatin (Lipitor) 40 mg daily. Guera Blake MD, MS, F.A.C.C. [facility], [address] [place] [address]  Phone: [phone], Fax: [phone]    I believe that the risk of significant morbidity and mortality related to the patient's current medical conditions are: intermediate-high. The documentation recorded by the scribe, accurately and completely reflects the services I personally performed and the decisions made by me. Guera Blake MD, F.A.C.C.  February 1, 2023

## 2023-02-01 NOTE — FLOWSHEET NOTE
Patient was unable to maintain an SpO2 of 90% or greater at this time. Patient only able to maintain an SpO2 of 88%. HOB elevated and patient encouraged to take deep breaths in through his nose and out through his mouth. Patient placed on nasal cannula O2 at this time. Patient on 2L NC O2 and able to maintain an SpO2 above 90%.

## 2023-02-01 NOTE — PROGRESS NOTES
Patient admission assessment and vitals completed at this time as charted. Patient is alert and oriented to person place situation but he could not recall the month. Patient denies pain. Patient has a temp of 101F, tylenol given in the ER. Patient navigator completed besides MAR. Patient does not know medications that he takes and states that his wife will bring them in the morning. Patient states no further needs, call light is in reach, will continue to monitor.

## 2023-02-01 NOTE — CARE COORDINATION
Case Management Assessment  Initial Evaluation    Date/Time of Evaluation: 2/1/2023 3:18 PM  Assessment Completed by: CORINA Brandon    If patient is discharged prior to next notation, then this note serves as note for discharge by case management. Patient Name: Héctor Peck                   YOB: 1935  Diagnosis: Septicemia ([facility] [address] [A41.9]  Sepsis due to pneumonia ([facility] [address] [J18.9, A41.9]  Pneumonia due to infectious organism, unspecified laterality, unspecified part of lung [J18.9]                   Date / Time: 1/31/2023  4:23 PM    Patient Admission Status: Inpatient   Readmission Risk (Low < 19, Mod (19-27), High > 27): Readmission Risk Score: 17.9    Current PCP: GENA Tilley CNP  PCP verified by CM? Yes    Chart Reviewed: Yes      History Provided by: Significant Other  Patient Orientation: Alert and Oriented, Person, Place, Situation, Self    Patient Cognition: Alert    Hospitalization in the last 30 days (Readmission):  No    If yes, Readmission Assessment in  Navigator will be completed. Advance Directives:      Code Status: Full Code   Patient's Primary Decision Maker is: Legal Next of Kin    Primary Decision Maker (Active): Nicole Vargas - Spouse - [phone]    Discharge Planning:    Patient lives with: Spouse/Significant Other Type of Home: House  Primary Care Giver: Spouse  Patient Support Systems include: Spouse/Significant Other, Family Members   Current Financial resources: Medicare,  ([place])  Current community resources: None  Current services prior to admission: Durable Medical Equipment            Current DME: Cane            Type of Home Care services:  PT, OT, Nursing Services    ADLS  Prior functional level: Assistance with the following:, Cooking, Housework, Shopping  Current functional level: Assistance with the following:, Cooking, Housework, Shopping    PT AM-PAC: 17 /24  OT AM-PAC:   /24    Family can provide assistance at DC:  Yes  Would you like Case Management to discuss the discharge plan with any other family members/significant others, and if so, who? Yes  Plans to Return to Present Housing: Unknown at present  Other Identified Issues/Barriers to RETURNING to current housing: none  Potential Assistance needed at discharge: [facility], Home Care            Potential DME: Cory Ramirez  Patient expects to discharge to: [address] [place] [address] for transportation at discharge: Family    Financial    Payor: Maxine Heath / Plan: Maxine Heath / Product Type: *No Product type* /     Does insurance require precert for SNF: Yes    Potential assistance Purchasing Medications: No      [address] - [place], [facility] [address] [phone] Dudley Vado [phone]  [address]  Phone: [phone] Fax: [phone]      Notes:    Factors facilitating achievement of predicted outcomes: Family support and Pleasant    Barriers to discharge: Limited safety awareness, Decreased motivation, Decreased endurance, and Lower extremity weakness    Additional Case Management Notes: Patient is  and lives at home with his wife. He uses a cane at home. The patient's wife does the cooking and the cleaning. He requires assistance with his ADL's. They both manages his medication. The wife provides the transportation. Patient served in [place]. The discharge plan is unknown at this time. May need home health or SNF. Will know closer to discharge. Wife not ready to make decision. The Plan for Transition of Care is related to the following treatment goals of Septicemia ([facility] [address] [A41.9]  Sepsis due to pneumonia ([facility] [address] [J18.9, A41.9]  Pneumonia due to infectious organism, unspecified laterality, unspecified part of lung [J18.9]    Transportation/Food Security/Housekeeping Addressed: No issues identified or concerns.     IF APPLICABLE: The Patient and/or patient representative Patria Burns and his family were provided with a choice of provider and agrees with the discharge plan. Freedom of choice list with basic dialogue that supports the patient's individualized plan of care/goals and shares the quality data associated with the providers was provided to: Patient Representative   Patient Representative Name: wife Hector Oconnor     The Patient and/or Patient Representative Agree with the Discharge Plan? Yes     The discharge plan is unknown at this time. May need home health or SNF. Will know closer to discharge. Wife not ready to make decision.     [facility]  Case Management Department  Ph: [phone]

## 2023-02-01 NOTE — PROGRESS NOTES
Patient reassessment and vitals completed at this time as charted. Patient denies pain and states no needs. Call light is in reach, will continue to monitor.

## 2023-02-01 NOTE — PROGRESS NOTES
Comprehensive Nutrition Assessment    Type and Reason for Visit:  Initial    Nutrition Recommendations/Plan:   Carb controlled diet   Encourage oral intakes     Malnutrition Assessment:  Malnutrition Status:  No malnutrition (02/01/23 0741)    Context:  Acute Illness     Findings of the 6 clinical characteristics of malnutrition:  Energy Intake:  No significant decrease in energy intake  Weight Loss:  No significant weight loss     Body Fat Loss:  No significant body fat loss     Muscle Mass Loss:  No significant muscle mass loss    Fluid Accumulation:  Moderate to Severe Extremities   Strength:  Not Performed    Nutrition Assessment:    Obesity r/t excess energy intakes relative to expenditure, AEB BMI >35. States good appetite, avoiding sugar and potatoes in home diet r/t diabetes. Last A1C 7.2 utilizing this strategy, on metformin. Weight increases with BLE edema from usual values. Low serum sodium and iv replacement noted. May benefit from use of probiotic r/t antibiotic tx if GI abberrations occur. Does not appear appropriate for any diabetes education. Nutrition Related Findings:    +2-3 BLE edema. Wound Type: None       Current Nutrition Intake & Therapies:    Average Meal Intake: Unable to assess (PO not recorded)  Average Supplements Intake: None Ordered  ADULT DIET; Regular; 4 carb choices (60 gm/meal)    Anthropometric Measures:  Height: 5' 11\" (180.3 cm)  Ideal Body Weight (IBW): 172 lbs (78 kg)    Admission Body Weight: 250 lb (113.4 kg)  Current Body Weight: 260 lb (117.9 kg), 151.2 % IBW.  Weight Source: Bed Scale  Current BMI (kg/m2): 36.3  Usual Body Weight: 256 lb 8 oz (116.3 kg) (december)  % Weight Change (Calculated): 1.4  Weight Adjustment For: No Adjustment                 BMI Categories: Obese Class 2 (BMI 35.0 -39.9)    Estimated Daily Nutrient Needs:  Energy Requirements Based On: Kcal/kg  Weight Used for Energy Requirements: Current  Energy (kcal/day): 7716-4474 (15-18)  Weight Used for Protein Requirements: Ideal  Protein (g/day):  (1.2-1.3)  Method Used for Fluid Requirements: 1 ml/kcal  Fluid (ml/day): 2100    Nutrition Diagnosis:   Overweight/Obese related to excessive energy intake as evidenced by BMI    Lab Results   Component Value Date     (L) 02/01/2023    K 4.0 02/01/2023    CL 97 (L) 02/01/2023    CO2 20 02/01/2023    BUN 30 (H) 02/01/2023    CREATININE 1.33 (H) 02/01/2023    GLUCOSE 238 (H) 02/01/2023    CALCIUM 8.3 (L) 02/01/2023    PROT 6.6 02/01/2023    LABALBU 3.2 (L) 02/01/2023    BILITOT 0.4 02/01/2023    ALKPHOS 157 (H) 02/01/2023     (H) 02/01/2023     (H) 02/01/2023    LABGLOM 52 (L) 02/01/2023    GFRAA >60 03/14/2022     Hemoglobin A1C   Date Value Ref Range Status   12/02/2022 7.2 (H) 4.0 - 6.0 % Final     No results found for: VITD25    Nutrition Interventions:   Food and/or Nutrient Delivery: Modify Current Diet  Nutrition Education/Counseling: No recommendation at this time  Coordination of Nutrition Care: Continue to monitor while inpatient  Plan of Care discussed with: patient and nursing    Goals:     Goals: Meet at least 75% of estimated needs       Nutrition Monitoring and Evaluation:   Behavioral-Environmental Outcomes: Knowledge or Skill  Food/Nutrient Intake Outcomes: Food and Nutrient Intake  Physical Signs/Symptoms Outcomes: Biochemical Data, Weight, Fluid Status or Edema    Discharge Planning:    No discharge needs at this time     Karely Acosta, [address], [address] [place] [address] Street: [phone]

## 2023-02-02 ENCOUNTER — APPOINTMENT (OUTPATIENT)
Dept: GENERAL RADIOLOGY | Age: 88
End: 2023-02-02
Payer: OTHER GOVERNMENT

## 2023-02-02 PROBLEM — I25.10 ASHD (ARTERIOSCLEROTIC HEART DISEASE): Status: ACTIVE | Noted: 2023-02-02

## 2023-02-02 PROBLEM — A41.9 SEPTICEMIA (HCC): Status: ACTIVE | Noted: 2023-02-02

## 2023-02-02 PROBLEM — E78.5 DYSLIPIDEMIA: Status: ACTIVE | Noted: 2023-02-02

## 2023-02-02 PROBLEM — Z86.73 HISTORY OF STROKE: Status: ACTIVE | Noted: 2023-02-02

## 2023-02-02 PROBLEM — D50.9 IRON DEFICIENCY ANEMIA: Status: ACTIVE | Noted: 2023-02-02

## 2023-02-02 LAB
ABSOLUTE EOS #: 0 K/UL (ref 0–0.44)
ABSOLUTE IMMATURE GRANULOCYTE: 0 K/UL (ref 0–0.3)
ABSOLUTE LYMPH #: 0.99 K/UL (ref 1.1–3.7)
ABSOLUTE MONO #: 2.56 K/UL (ref 0.1–1.2)
ABSOLUTE RETIC #: 0.03 M/UL (ref 0.03–0.08)
ALBUMIN SERPL-MCNC: 3.1 G/DL (ref 3.5–5.2)
ALBUMIN/GLOBULIN RATIO: 0.9 (ref 1–2.5)
ALP SERPL-CCNC: 147 U/L (ref 40–129)
ALT SERPL-CCNC: 97 U/L (ref 5–41)
ANION GAP SERPL CALCULATED.3IONS-SCNC: 12 MMOL/L (ref 9–17)
AST SERPL-CCNC: 69 U/L
BASOPHILS # BLD: 0 % (ref 0–2)
BASOPHILS ABSOLUTE: 0 K/UL (ref 0–0.2)
BILIRUB SERPL-MCNC: 0.3 MG/DL (ref 0.3–1.2)
BNP SERPL-MCNC: 4397 PG/ML
BUN SERPL-MCNC: 31 MG/DL (ref 8–23)
BUN/CREAT BLD: 24 (ref 9–20)
CALCIUM SERPL-MCNC: 8.3 MG/DL (ref 8.6–10.4)
CHLORIDE SERPL-SCNC: 96 MMOL/L (ref 98–107)
CHLORIDE, UR: 58 MMOL/L
CHOLEST SERPL-MCNC: 112 MG/DL
CHOLESTEROL/HDL RATIO: 2.5
CK SERPL-CCNC: 85 U/L (ref 39–308)
CO2 SERPL-SCNC: 20 MMOL/L (ref 20–31)
CREAT SERPL-MCNC: 1.31 MG/DL (ref 0.7–1.2)
CRP SERPL HS-MCNC: 315.8 MG/L (ref 0–5)
EOSINOPHILS RELATIVE PERCENT: 0 % (ref 1–4)
EST. AVERAGE GLUCOSE BLD GHB EST-MCNC: 160 MG/DL
FOLATE SERPL-MCNC: 20 NG/ML
GFR SERPL CREATININE-BSD FRML MDRD: 53 ML/MIN/1.73M2
GLUCOSE SERPL-MCNC: 165 MG/DL (ref 70–99)
HAPTOGLOB SERPL-MCNC: 316 MG/DL (ref 30–200)
HBA1C MFR BLD: 7.2 % (ref 4–6)
HCT VFR BLD AUTO: 30.6 % (ref 40.7–50.3)
HDLC SERPL-MCNC: 44 MG/DL
HGB BLD-MCNC: 9.7 G/DL (ref 13–17)
IMMATURE GRANULOCYTES: 0 %
IMMATURE RETIC FRACT: 10.3 % (ref 2.7–18.3)
IRON SATURATION: 19 % (ref 20–55)
IRON SERPL-MCNC: 27 UG/DL (ref 59–158)
L PNEUMO1 AG UR QL IA.RAPID: NEGATIVE
LDLC SERPL CALC-MCNC: 45 MG/DL (ref 0–130)
LYMPHOCYTES # BLD: 5 % (ref 24–43)
MCH RBC QN AUTO: 29.5 PG (ref 25.2–33.5)
MCHC RBC AUTO-ENTMCNC: 31.7 G/DL (ref 28.4–34.8)
MCV RBC AUTO: 93 FL (ref 82.6–102.9)
MONOCYTES # BLD: 13 % (ref 3–12)
MORPHOLOGY: NORMAL
NRBC AUTOMATED: 0 PER 100 WBC
PDW BLD-RTO: 14.9 % (ref 11.8–14.4)
PLATELET # BLD AUTO: 213 K/UL (ref 138–453)
PMV BLD AUTO: 10.3 FL (ref 8.1–13.5)
POTASSIUM SERPL-SCNC: 4.2 MMOL/L (ref 3.7–5.3)
POTASSIUM, UR: 35 MMOL/L
PROT SERPL-MCNC: 6.5 G/DL (ref 6.4–8.3)
RBC # BLD: 3.29 M/UL (ref 4.21–5.77)
RETIC HEMOGLOBIN: 27.3 PG (ref 28.2–35.7)
RETICS/RBC NFR AUTO: 0.9 % (ref 0.5–1.9)
SEG NEUTROPHILS: 82 % (ref 36–65)
SEGMENTED NEUTROPHILS ABSOLUTE COUNT: 16.15 K/UL (ref 1.5–8.1)
SODIUM SERPL-SCNC: 128 MMOL/L (ref 135–144)
SODIUM,UR: 48 MMOL/L
TIBC SERPL-MCNC: 142 UG/DL (ref 250–450)
TRIGL SERPL-MCNC: 114 MG/DL
TROPONIN I SERPL DL<=0.01 NG/ML-MCNC: 21 NG/L (ref 0–22)
UNSATURATED IRON BINDING CAPACITY: 115 UG/DL (ref 112–347)
VIT B12 SERPL-MCNC: 339 PG/ML (ref 232–1245)
WBC # BLD AUTO: 19.7 K/UL (ref 3.5–11.3)

## 2023-02-02 PROCEDURE — 97535 SELF CARE MNGMENT TRAINING: CPT

## 2023-02-02 PROCEDURE — 6370000000 HC RX 637 (ALT 250 FOR IP): Performed by: INTERNAL MEDICINE

## 2023-02-02 PROCEDURE — 94761 N-INVAS EAR/PLS OXIMETRY MLT: CPT

## 2023-02-02 PROCEDURE — 97110 THERAPEUTIC EXERCISES: CPT

## 2023-02-02 PROCEDURE — 86140 C-REACTIVE PROTEIN: CPT

## 2023-02-02 PROCEDURE — 83880 ASSAY OF NATRIURETIC PEPTIDE: CPT

## 2023-02-02 PROCEDURE — 82550 ASSAY OF CK (CPK): CPT

## 2023-02-02 PROCEDURE — 97530 THERAPEUTIC ACTIVITIES: CPT

## 2023-02-02 PROCEDURE — 6370000000 HC RX 637 (ALT 250 FOR IP): Performed by: FAMILY MEDICINE

## 2023-02-02 PROCEDURE — 2580000003 HC RX 258: Performed by: FAMILY MEDICINE

## 2023-02-02 PROCEDURE — 84300 ASSAY OF URINE SODIUM: CPT

## 2023-02-02 PROCEDURE — 6360000002 HC RX W HCPCS: Performed by: FAMILY MEDICINE

## 2023-02-02 PROCEDURE — 83550 IRON BINDING TEST: CPT

## 2023-02-02 PROCEDURE — 84133 ASSAY OF URINE POTASSIUM: CPT

## 2023-02-02 PROCEDURE — 82607 VITAMIN B-12: CPT

## 2023-02-02 PROCEDURE — 71045 X-RAY EXAM CHEST 1 VIEW: CPT

## 2023-02-02 PROCEDURE — 99232 SBSQ HOSP IP/OBS MODERATE 35: CPT | Performed by: INTERNAL MEDICINE

## 2023-02-02 PROCEDURE — 80061 LIPID PANEL: CPT

## 2023-02-02 PROCEDURE — 83036 HEMOGLOBIN GLYCOSYLATED A1C: CPT

## 2023-02-02 PROCEDURE — 87641 MR-STAPH DNA AMP PROBE: CPT

## 2023-02-02 PROCEDURE — 85025 COMPLETE CBC W/AUTO DIFF WBC: CPT

## 2023-02-02 PROCEDURE — 86738 MYCOPLASMA ANTIBODY: CPT

## 2023-02-02 PROCEDURE — 82436 ASSAY OF URINE CHLORIDE: CPT

## 2023-02-02 PROCEDURE — 2700000000 HC OXYGEN THERAPY PER DAY

## 2023-02-02 PROCEDURE — 83010 ASSAY OF HAPTOGLOBIN QUANT: CPT

## 2023-02-02 PROCEDURE — 36415 COLL VENOUS BLD VENIPUNCTURE: CPT

## 2023-02-02 PROCEDURE — 94640 AIRWAY INHALATION TREATMENT: CPT

## 2023-02-02 PROCEDURE — 97116 GAIT TRAINING THERAPY: CPT

## 2023-02-02 PROCEDURE — 2580000003 HC RX 258: Performed by: INTERNAL MEDICINE

## 2023-02-02 PROCEDURE — 80053 COMPREHEN METABOLIC PANEL: CPT

## 2023-02-02 PROCEDURE — 1200000000 HC SEMI PRIVATE

## 2023-02-02 PROCEDURE — 82746 ASSAY OF FOLIC ACID SERUM: CPT

## 2023-02-02 PROCEDURE — 83540 ASSAY OF IRON: CPT

## 2023-02-02 PROCEDURE — 99254 IP/OBS CNSLTJ NEW/EST MOD 60: CPT | Performed by: INTERNAL MEDICINE

## 2023-02-02 PROCEDURE — 85045 AUTOMATED RETICULOCYTE COUNT: CPT

## 2023-02-02 PROCEDURE — 84484 ASSAY OF TROPONIN QUANT: CPT

## 2023-02-02 PROCEDURE — 87449 NOS EACH ORGANISM AG IA: CPT

## 2023-02-02 RX ORDER — FERROUS SULFATE 325(65) MG
325 TABLET ORAL 2 TIMES DAILY WITH MEALS
Status: DISCONTINUED | OUTPATIENT
Start: 2023-02-03 | End: 2023-02-10 | Stop reason: HOSPADM

## 2023-02-02 RX ORDER — LEVOFLOXACIN 500 MG/1
500 TABLET, FILM COATED ORAL DAILY
Status: DISCONTINUED | OUTPATIENT
Start: 2023-02-02 | End: 2023-02-10 | Stop reason: HOSPADM

## 2023-02-02 RX ORDER — FUROSEMIDE 10 MG/ML
40 INJECTION INTRAMUSCULAR; INTRAVENOUS DAILY
Status: DISCONTINUED | OUTPATIENT
Start: 2023-02-02 | End: 2023-02-06

## 2023-02-02 RX ADMIN — SODIUM CHLORIDE: 9 INJECTION, SOLUTION INTRAVENOUS at 05:41

## 2023-02-02 RX ADMIN — OXYBUTYNIN CHLORIDE 10 MG: 5 TABLET ORAL at 20:26

## 2023-02-02 RX ADMIN — FAMOTIDINE 20 MG: 20 TABLET, FILM COATED ORAL at 20:26

## 2023-02-02 RX ADMIN — METFORMIN HYDROCHLORIDE 1000 MG: 500 TABLET ORAL at 20:25

## 2023-02-02 RX ADMIN — PRIMIDONE 250 MG: 250 TABLET ORAL at 17:18

## 2023-02-02 RX ADMIN — ENOXAPARIN SODIUM 30 MG: 100 INJECTION SUBCUTANEOUS at 09:27

## 2023-02-02 RX ADMIN — OXYBUTYNIN CHLORIDE 10 MG: 5 TABLET ORAL at 09:28

## 2023-02-02 RX ADMIN — LEVOTHYROXINE SODIUM 137 MCG: 25 TABLET ORAL at 07:41

## 2023-02-02 RX ADMIN — ALOGLIPTIN 12.5 MG: 12.5 TABLET, FILM COATED ORAL at 09:28

## 2023-02-02 RX ADMIN — SODIUM CHLORIDE, PRESERVATIVE FREE 10 ML: 5 INJECTION INTRAVENOUS at 09:27

## 2023-02-02 RX ADMIN — MONTELUKAST 10 MG: 10 TABLET, FILM COATED ORAL at 09:28

## 2023-02-02 RX ADMIN — TAMSULOSIN HYDROCHLORIDE 0.8 MG: 0.4 CAPSULE ORAL at 17:18

## 2023-02-02 RX ADMIN — PRIMIDONE 250 MG: 250 TABLET ORAL at 07:41

## 2023-02-02 RX ADMIN — PROPRANOLOL HYDROCHLORIDE 120 MG: 60 CAPSULE, EXTENDED RELEASE ORAL at 17:18

## 2023-02-02 RX ADMIN — PROPRANOLOL HYDROCHLORIDE 120 MG: 60 CAPSULE, EXTENDED RELEASE ORAL at 09:28

## 2023-02-02 RX ADMIN — ASPIRIN 81 MG: 81 TABLET, COATED ORAL at 09:28

## 2023-02-02 RX ADMIN — FUROSEMIDE 40 MG: 10 INJECTION, SOLUTION INTRAMUSCULAR; INTRAVENOUS at 14:36

## 2023-02-02 RX ADMIN — LEVOFLOXACIN 500 MG: 500 TABLET, FILM COATED ORAL at 13:35

## 2023-02-02 RX ADMIN — MOMETASONE FUROATE AND FORMOTEROL FUMARATE DIHYDRATE 2 PUFF: 200; 5 AEROSOL RESPIRATORY (INHALATION) at 20:11

## 2023-02-02 RX ADMIN — FAMOTIDINE 20 MG: 20 TABLET, FILM COATED ORAL at 09:28

## 2023-02-02 RX ADMIN — METFORMIN HYDROCHLORIDE 1000 MG: 500 TABLET ORAL at 09:28

## 2023-02-02 RX ADMIN — SODIUM CHLORIDE, PRESERVATIVE FREE 10 ML: 5 INJECTION INTRAVENOUS at 20:26

## 2023-02-02 RX ADMIN — ENOXAPARIN SODIUM 30 MG: 100 INJECTION SUBCUTANEOUS at 20:26

## 2023-02-02 RX ADMIN — MOMETASONE FUROATE AND FORMOTEROL FUMARATE DIHYDRATE 2 PUFF: 200; 5 AEROSOL RESPIRATORY (INHALATION) at 09:40

## 2023-02-02 ASSESSMENT — PAIN SCALES - GENERAL
PAINLEVEL_OUTOF10: 0
PAINLEVEL_OUTOF10: 0

## 2023-02-02 NOTE — PROGRESS NOTES
Physical Therapy  Facility/Department: [facility] AT THE [facility] MED SURG  Daily Treatment Note  NAME: Rafia Bradford  : 1935  MRN: [id number]    Date of Service: 2023    Discharge Recommendations:  Continue to assess pending progress, Patient would benefit from continued therapy after discharge, Home with Home health PT, Outpatient PT      Patient Diagnosis(es): The primary encounter diagnosis was Septicemia ([address] A diagnosis of Pneumonia due to infectious organism, unspecified laterality, unspecified part of lung was also pertinent to this visit. Assessment   Assessment: Pt required encouragment for participation this afternoon due to fatigue. Seated BLE exercises in all planes 1x 10. Transfers CGA/SBA. Pt ambulated x 20' with WW and CGA. Pt ambulates with WBOS and decreased step height/length. Seated BLE exercises in all planes 1x 10. Activity Tolerance: Patient limited by fatigue;Patient tolerated treatment well;Patient limited by endurance     Plan    Physcial Therapy Plan  General Plan: 2 times a day 7 days a week (1x/day on weekends and holidays)  Current Treatment Recommendations: Strengthening;Balance training;Functional mobility training;Transfer training; Endurance training;Gait training;Stair training;Neuromuscular re-education; Safety education & training; Therapeutic activities     Restrictions  Restrictions/Precautions  Restrictions/Precautions: General Precautions, Fall Risk     Subjective    Subjective  Subjective: Pt in chair upon arrival and agreeable to therapy  Pain: no c/o pain at this time.   Orientation  Overall Orientation Status: Within Functional Limits     Objective   Bed Mobility Training  Bed Mobility Training: No  Overall Level of Assistance: Assist X1;Stand-by assistance  Interventions: Verbal cues  Supine to Sit: Stand-by assistance;Assist X1  Scooting: Stand-by assistance;Assist X1  Balance  Standing - Static: Fair  Transfer Training  Transfer Training: Yes  Overall Level of Assistance: Assist X1;Contact-guard assistance;Stand-by assistance  Interventions: Verbal cues; Safety awareness training  Sit to Stand: Contact-guard assistance;Stand-by assistance;Assist X1  Stand to Sit: Stand-by assistance;Contact-guard assistance;Assist X1  Stand Pivot Transfers: Contact-guard assistance;Assist X1  Toilet Transfer: Contact-guard assistance;Assist X1;Other (comment) (PTA assisted pt in [facility] with new brief and hygiene)  Gait Training  Gait Training: Yes  Gait  Overall Level of Assistance: Contact-guard assistance;Assist X1  Interventions: Safety awareness training;Verbal cues  Base of Support: Widened  Speed/Geovanna: Slow  Gait Abnormalities: Decreased step clearance  Distance (ft): 20 Feet  Assistive Device: Walker, rolling;Gait belt  Neuromuscular Education  Neuromuscular Education: No  PT Exercises  Exercise Treatment: Seated BLE exercises in all planes 1x 10. Static Standing Balance Exercises: Pt completed Static Standing during toilet transfer for [facility] ~ 2 minutes with no LOB, pt had L UE on bar and R on [employer] with periodically single UE support. Dynamic Standing Balance Exercises: Dynamic standing ~ 2 minutes in bathroom with and without UE support and CGA for safety, no LOB noted. Safety Devices  Type of Devices: All fall risk precautions in place;Call light within reach; Left in chair;Chair alarm in place;Gait belt       Goals  Short Term Goals  Time Frame for Short Term Goals: 20 days  Short Term Goal 1: Initiate and progress HEP for strength and balance. Short Term Goal 2: Pt will be independent with bed mobility without use of rails for discharge home independently. Short Term Goal 3: Pt will transfer with +1 supervision with least restrictive device to decrease fall risk.   Short Term Goal 4: Pt will ambulate with supervision to A +1 with least restrictive device for up to 75 feet to allow patient safe entry and exit into his home  Short Term Goal 5: Pt will negotiate 4 steps with railing +1 SBA for entry and exit into buildings for appointments.   Patient Goals   Patient Goals : return home    Education  Patient Education  Education Given To: Patient  Education Provided Comments: Continued education provided in benefits of participation in therapy  Education Method: Verbal  Barriers to Learning: None  Education Outcome: Verbalized understanding;Continued education needed;Demonstrated understanding    Therapy Time   Individual Concurrent Group Co-treatment   Time In 1515         Time Out 1535         Minutes Aretha Cain [address] [place] [address]

## 2023-02-02 NOTE — PROGRESS NOTES
Occupational Therapy  Facility/Department: [facility] AT THE [facility] MED SURG  Daily Treatment Note  NAME: Edvin Carias  : 1935  MRN: [id number]    Date of Service: 2023    Discharge Recommendations:  Continue to assess pending progress, Home with Home health OT         Patient Diagnosis(es): The primary encounter diagnosis was Septicemia ([address] A diagnosis of Pneumonia due to infectious organism, unspecified laterality, unspecified part of lung was also pertinent to this visit. Assessment    Activity Tolerance: Patient tolerated treatment well;Patient limited by fatigue  Discharge Recommendations: Continue to assess pending progress;Home with Home health OT      Plan   Occupational Therapy Plan  Times Per Week: 7x/wk  Times Per Day: Once a day  Current Treatment Recommendations: Strengthening;Balance training;Self-Care / ADL; Safety education & training     Restrictions       Subjective   Subjective  Subjective: pt in bathroom completin self care alone upon arival. Pt agreeable to therapy assistance and ther ex  Pain: Pt had no complaints of pain. Orientation  Overall Orientation Status: Within Functional Limits  Pain: no c/o pain at this time. Objective    Vitals     Transfer Training  Transfer Training: Yes  Overall Level of Assistance: Assist X1;Contact-guard assistance  Interventions: Verbal cues; Safety awareness training  Sit to Stand: Supervision;Stand-by assistance  Stand to Sit: Contact-guard assistance;Assist X1  Gait Training  Gait Training: Yes  Gait  Overall Level of Assistance: Contact-guard assistance;Assist X1  Interventions: Safety awareness training;Verbal cues (cues for O2 line management)  Base of Support: Widened  Speed/Geovanna: Slow  Distance (ft): 16 Feet  Assistive Device: Walker, rolling     ADL  Grooming: Independent;Supervision  Additional Comments: pt completing ADLs in bathroom alone upon arrival  OT Exercises  Exercise Treatment: Pt tolerated BUE ther ex with 2# dumbbell x 7 planes x 15 reps x 1 set, yellow flex bar x 3 variations x 15 reps x 1 set green digi flex x15 reps x1 st  to increase UE strength and endurance in order to ease completion of ADL tasks. Pt required RBs as needed secondary to fatigue. Safety Devices  Type of Devices: All fall risk precautions in place;Call light within reach; Left in chair;Heels elevated for pressure relief     Patient Education  Education Given To: Patient  Education Provided: Role of Therapy;Plan of Care;Transfer Training;Home Exercise Program;Fall Prevention Strategies  Education Method: Demonstration;Verbal  Barriers to Learning: None  Education Outcome: Verbalized understanding    Goals  Short Term Goals  Time Frame for Short Term Goals: 20 visits  Short Term Goal 1: Pt. will tolerate 15 mins of BUE ther ex/act while maintaining SpO2 > 90% to increase overall functional activity tolerance. Short Term Goal 2: Pt. will demo standing tolerance x 6-7 mins w/o LOB to increase safety/participation with ADL's. Short Term Goal 3: Pt. will complete self care routine with SUP/mod I (with exception of footwear) to return to PLOF. Short Term Goal 4: Pt. will complete functional ADL transfers/mobility with SUP/mod I and G safety with reduced risk for falls.        Therapy Time   Individual Concurrent Group Co-treatment   Time In 0906         Time Out 0929         Minutes Rocio Roy [address]/L 2/2/2023

## 2023-02-02 NOTE — PLAN OF CARE
Problem: Discharge Planning  Goal: Discharge to home or other facility with appropriate resources  2/1/2023 1039 by Marimar Matute RN  Outcome: Progressing     Problem: Safety - Adult  Goal: Free from fall injury  2/1/2023 1957 by Lester Sims RN  Outcome: Progressing  Flowsheets (Taken 2/1/2023 1957)  Free From Fall Injury:   Instruct family/caregiver on patient safety   Based on caregiver fall risk screen, instruct family/caregiver to ask for assistance with transferring infant if caregiver noted to have fall risk factors  2/1/2023 1039 by Marimar Matute RN  Outcome: Progressing     Problem: Nutrition Deficit:  Goal: Optimize nutritional status  2/1/2023 1039 by Marimar Matute RN  Outcome: Progressing  2/1/2023 0746 by Jesus Austin RD, LD  Outcome: Progressing  Flowsheets (Taken 2/1/2023 0746)  Nutrient intake appropriate for improving, restoring, or maintaining nutritional needs: Monitor oral intake, labs, and treatment plans  Note: Nutrition Problem #1: Overweight/Obese  Intervention: Food and/or Nutrient Delivery: Modify Current Diet  Carb controlled diet     Problem: Respiratory - Adult  Goal: Achieves optimal ventilation and oxygenation  2/1/2023 1957 by Lester Sims RN  Outcome: Progressing  Flowsheets (Taken 2/1/2023 1957)  Achieves optimal ventilation and oxygenation:   Assess for changes in respiratory status   Assess for changes in mentation and behavior   Position to facilitate oxygenation and minimize respiratory effort   Oxygen supplementation based on oxygen saturation or arterial blood gases   Respiratory therapy support as indicated  2/1/2023 1039 by Marimar Matute RN  Outcome: Progressing

## 2023-02-02 NOTE — PROGRESS NOTES
Patient resting in bed with eyes closed. Awoke for assessment and vs. Denies and complaints at this time, no longer feels nauseated. Patient denies any complaints or concerns at this time. Will continue to monitor and assess as needed.

## 2023-02-02 NOTE — PROGRESS NOTES
Writer to bedside to complete morning assessment. Upon entry to room, pt resting comfortably in bed, respirations even and unlabored while on room air. Vitals obtained and assessment completed by Golden Savage precepting with this nurse, see flow sheet for details. Spo2 68-74% on room air, pt placed back on 2 liters only increased to 85%, increased oxygen to 3 liters. SpO2 still only 86-89%. Increased to 4 liters of oxygen per nasal cannula, SpO2 92%. PT at bedside to get pt up to chair for breakfast. Pt denies needs from writer at this time. Call light in reach. Care ongoing.

## 2023-02-02 NOTE — CONSULTS
[facility] - Initial Consult Note  Today's Date and Time: 2/2/2023, 10:45 AM    Impression :   Community acquired pneumonia  Hypoxic respiratory distress  Hyponatremia  KARO  Chronic cough  Leukocytosis  Elevated BNP  COPD  Chronic systolic CHF  DM II  Hx MI    Recommendations:   Start Levaquin 500 mg po daily. Stop date 2-12-23  Discontinue IV Rocephin  Discontinue azithromycin  Obtain MRSA Nares  Obtain sputum culture if possible  Awaiting Mycoplasma IgM, Legionella antigen (because of hyponatremia)    Medical Decision Making/Summary/Discussion:2/2/2023       Infection Control Recommendations   [place] Precautions    Antimicrobial Stewardship Recommendations     Simplification of therapy  Targeted therapy  PK dosing    Coordination of Outpatient Care:   Estimated Length of IV antimicrobials:TBD  Patient will need Midline Catheter Insertion: TBD  Patient will need PICC line Insertion:BD  Patient will need: Home IV , [place],  SNF,  LTAC: TBD  Patient will need outpatient wound care: No    Chief complaint/reason for consultation:   CAP      History of Present Illness:   Diego Martin is a 82y.o.-year-old male who was initially admitted on 1/31/2023. Patient seen at the request of Dr. Socorro Mock:    This patient presented to [facility] ED on 1/31/23 after falling in his shower at home. He denied hitting his head or experiencing a syncopal episode, but admits to a persistent cough over the past few days. He has taken 3 days of azithromycin that his wife had given him. In the ED, he was found to have a fever of 39.7 C and his SpO2 was 88% on room air. The patient does have a history of COPD but is not on home O2. A CT chest showed multifocal consolidation in the right lung and the base of the left lung. Covid and influenza were not detected.      Abnormal labs at admission included:  Na:130  Cr:1.26  Lactic:2.4  BNP:1279  Alk phos:185  ALT:123  AST:95  Glucose:220  WBC:14.5    There has been no growth on blood or urine cultures. Since admission, the patient's WBC has increased to 19.7. He has also experienced worsening KARO and hyponatremia. His BNP has also increased to 4397. He has been receiving rocephin and azithromycin since 1/31/23. CT Chest/abd/pelvis 1/31/23  Impression   1. Multifocal consolidation in the right lung and to a lesser degree left   lung base, concerning for infection. 2.  Motion degraded evaluation of the pulmonary arteries. No evidence for   central pulmonary embolism. 3.  No acute inflammatory process identified in the abdomen or pelvis. Stable benign and incidental findings, as described. CURRENT EVALUATION 2/2/2023  BP (!) 114/98   Pulse 78   Temp 98.8 °F (37.1 °C) (Temporal)   Resp 20   Ht 5' 11\" (1.803 m)   Wt 259 lb 12.8 oz (117.8 kg)   SpO2 95%   BMI 36.23 kg/m²     Afebrile  VS stable    The patient is on 4 L NC   SpO2: 95%  RR: 20    Has bronchopneumonia in RLL   Presence of hyponatremia raises concern for Legionella  Test requested   Antibiotics changed to [facility] [place], operated by [facility], X rays reviewed: 2/2/2023    BUN:31  Cr:1.3  Na:128    WBC:19.7  Hb:9.7  Plat: 075    HIW:9868    Cultures:  Urine:    Blood:  1/31/23: No growth  Sputum :    Wound:    MRSA Nares:  2/2/23: pending    Imaging:    CT Chest   1/31/23        CXR  1/31/23 1/6/23      Discussed with patient, RN, family. I have personally reviewed the past medical history, past surgical history, medications, social history, and family history, and I have updated the database accordingly.   Past Medical History:     Past Medical History:   Diagnosis Date    COPD (chronic obstructive pulmonary disease) ([facility] [address]     Diabetes mellitus ([facility] [address]     Hx of echocardiogram 02/24/2017    [facility]    Hyperlipidemia     Hypothyroidism     MI (myocardial infarction) ([facility]     Stroke [facility])     Thyroid disease     Type II or unspecified type diabetes mellitus without mention of complication, not stated as uncontrolled        Past Surgical  History:     Past Surgical History:   Procedure Laterality Date    CARDIAC SURGERY  2017    Stent placed  Dr Vance Dickens      right ankle    HERNIA REPAIR         Medications:      enoxaparin  30 mg SubCUTAneous BID    alogliptin  12.5 mg Oral Daily    aspirin  81 mg Oral Daily    levothyroxine  137 mcg Oral Daily    mometasone-formoterol  2 puff Inhalation BID    metFORMIN  1,000 mg Oral BID    montelukast  10 mg Oral Daily    oxybutynin  10 mg Oral BID    primidone  250 mg Oral BID    propranolol  120 mg Oral BID    tamsulosin  0.8 mg Oral Dinner    sodium chloride flush  5-40 mL IntraVENous 2 times per day    famotidine  20 mg Oral BID    cefTRIAXone (ROCEPHIN) IV  1,000 mg IntraVENous Q24H    And    azithromycin  500 mg IntraVENous Q24H       Social History:     Social History     Socioeconomic History    Marital status:      Spouse name: Not on file    Number of children: Not on file    Years of education: Not on file    Highest education level: Not on file   Occupational History    Not on file   Tobacco Use    Smoking status: Former     Types: Cigarettes     Quit date:      Years since quittin.1    Smokeless tobacco: Never   Vaping Use    Vaping Use: Never used   Substance and Sexual Activity    Alcohol use:  Yes     Alcohol/week: 1.0 standard drink     Types: 1 Drinks containing 0.5 oz of alcohol per week     Comment: occasional    Drug use: No    Sexual activity: Not on file   Other Topics Concern    Not on file   Social History Narrative    Not on file     Social Determinants of Health     Financial Resource Strain: Unknown    Difficulty of Paying Living Expenses: Patient refused   Food Insecurity: Unknown    Worried About Running Out of Food in the Last Year: Patient refused    Ran Out of Food in the Last Year: Patient refused   Transportation Needs: Not on file   Physical Activity: Inactive    Days of Exercise per Week: 0 days    Minutes of Exercise per Session: 0 min   Stress: Not on file   Social Connections: Not on file   Intimate Partner Violence: Not on file   Housing Stability: Not on file       Family History:     Family History   Problem Relation Age of Onset    Cancer Mother 47        liver ca    Diabetes Father     Heart Disease Father         Allergies:   Rosuvastatin     Review of Systems:   Constitutional: Generalized weakness  Head: No headaches  Eyes: No double vision or blurry vision. No conjunctival inflammation. ENT: No sore throat or runny nose. . No hearing loss, tinnitus or vertigo. Cardiovascular: No chest pain or palpitations. shortness of breath. CARVER  Lung: shortness of breath with cough. Abdomen: No nausea, vomiting, diarrhea, or abdominal pain. Adalid Lasso No cramps. Genitourinary: No increased urinary frequency, or dysuria. No hematuria. No suprapubic or CVA pain  Musculoskeletal: No muscle aches or pains. No joint effusions, swelling or deformities  Hematologic: No bleeding or bruising. Neurologic: No headache, weakness, numbness, or tingling. Integument: No rash, no ulcers. Psychiatric: No depression. Endocrine: No polyuria, no polydipsia, no polyphagia. Physical Examination :   Patient Vitals for the past 8 hrs:   BP Temp Temp src Pulse Resp SpO2 Weight   02/02/23 0930 -- -- -- -- -- 95 % --   02/02/23 0730 -- -- -- -- -- (!) 85 % --   02/02/23 0715 (!) 114/98 98.8 °F (37.1 °C) Temporal 78 20 -- --   02/02/23 0543 -- -- -- -- -- -- 259 lb 12.8 oz (117.8 kg)   DANNY-televisit  General Appearance: Awake, alert. Head:  Normocephalic, no trauma  Eyes: Pupils equal, round, reactive to light and accommodation; extraocular movements intact; sclera anicteric; conjunctivae pink. No embolic phenomena.   ENT: Oropharynx clear, without erythema, exudate, or thrush. No tenderness of sinuses. Mouth/throat: mucosa pink and moist. No lesions. Dentition in good repair. Neck:Supple, without lymphadenopathy. Thyroid normal, No bruits. Pulmonary/Chest: DANNY-televisit  Cardiovascular: DANNY-televisit   Abdomen: Soft, non tender. Bowel sounds normal. No organomegaly  All four Extremities: No cyanosis, clubbing, edema, or effusions. Neurologic: No gross sensory or motor deficits. Skin: Warm and dry with good turgor. No signs of peripheral arterial or venous insufficiency. No ulcerations. No open wounds. Medical Decision Making -Laboratory:   I have independently reviewed/ordered the following labs:    CBC with Differential:   Recent Labs     02/01/23  0535 02/02/23  0550   WBC 17.9* 19.7*   HGB 10.1* 9.7*   HCT 31.6* 30.6*    213   LYMPHOPCT 8* 5*   MONOPCT 18* 13*     BMP:   Recent Labs     02/01/23  0535 02/02/23  0550   * 128*   K 4.0 4.2   CL 97* 96*   CO2 20 20   BUN 30* 31*   CREATININE 1.33* 1.31*     Hepatic Function Panel:   Recent Labs     02/01/23  0535 02/02/23  0550   PROT 6.6 6.5   LABALBU 3.2* 3.1*   BILITOT 0.4 0.3   ALKPHOS 157* 147*   * 97*   * 69*     No results for input(s): RPR in the last 72 hours. No results for input(s): HIV in the last 72 hours. No results for input(s): BC in the last 72 hours.   Lab Results   Component Value Date/Time    MUCUS TRACE 01/31/2023 07:45 PM    RBC 3.29 02/02/2023 05:50 AM    TRICHOMONAS NOT REPORTED 12/29/2021 11:30 PM    WBC 19.7 02/02/2023 05:50 AM    YEAST NOT REPORTED 12/29/2021 11:30 PM    TURBIDITY Clear 01/31/2023 07:45 PM     Lab Results   Component Value Date/Time    CREATININE 1.31 02/02/2023 05:50 AM    GLUCOSE 165 02/02/2023 05:50 AM       Medical Decision Making-Imaging:     Narrative   EXAMINATION:   CTA OF THE CHEST; CT OF THE ABDOMEN AND PELVIS WITH CONTRAST 1/31/2023 5:58 pm       TECHNIQUE:   CTA of the chest was performed after the administration of intravenous   contrast. Multiplanar reformatted images are provided for review. MIP   images are provided for review. Automated exposure control, iterative   reconstruction, and/or weight based adjustment of the mA/kV was utilized to   reduce the radiation dose to as low as reasonably achievable.; CT of the   abdomen and pelvis was performed with the administration of intravenous   contrast. Multiplanar reformatted images are provided for review. Automated   exposure control, iterative reconstruction, and/or weight based adjustment of   the mA/kV was utilized to reduce the radiation dose to as low as reasonably   achievable. COMPARISON:   Chest radiograph today. Chest CT 12/02/2022. CT chest abdomen pelvis   07/21/2021. HISTORY:   ORDERING SYSTEM PROVIDED HISTORY: Fever, hypoxia, history of PE   TECHNOLOGIST PROVIDED HISTORY:   Fever, hypoxia, history of PE   Decision Support Exception - unselect if not a suspected or confirmed   emergency medical condition->Emergency Medical Condition (MA); ORDERING   SYSTEM PROVIDED HISTORY: Fever elevated liver enzyme   TECHNOLOGIST PROVIDED HISTORY:       Fever elevated liver enzyme   Decision Support Exception - unselect if not a suspected or confirmed   emergency medical condition->Emergency Medical Condition (MA)       FINDINGS:   CHEST CT:       Pulmonary Arteries: Pulmonary arteries are adequately opacified for   evaluation. Motion artifact is noted, degrading evaluation of the segmental   branches. No evidence for central pulmonary embolism. Main pulmonary artery   is normal in caliber. Mediastinum: No evidence of mediastinal lymphadenopathy. The heart and   pericardium demonstrate no acute abnormality. There is no acute abnormality   of the thoracic aorta. Calcified atheromatous plaque. Lungs/pleura: Expiratory phase of imaging and mild motion artifact noted.    Consolidative opacities are present in the posterolateral inferior right   upper lobe and posterior right lower lobe. Patchy airspace disease to a   lesser degree is present in the left lung base. Mild subsegmental   atelectasis is also present. No effusion. The central airway is patent. Soft Tissues/Bones: No acute bone or soft tissue abnormality. ABDOMEN PELVIS:       Organs: The liver, gallbladder, pancreas, spleen, adrenals and kidneys reveal   no acute findings. Horseshoe kidney. Stable left adrenal nodules measuring   up to 1.7 cm. GI/Bowel: There is no bowel dilatation or wall thickening identified. Diverticulosis. Pelvis: No acute findings. Peritoneum/Retroperitoneum: No free air or free fluid. The aorta is normal   in caliber. The visceral branches are patent. No lymphadenopathy. Bones/Soft Tissues: No abnormality identified. *Unless otherwise specified, incidental findings do not require dedicated   imaging follow-up. Impression   1. Multifocal consolidation in the right lung and to a lesser degree left   lung base, concerning for infection. 2.  Motion degraded evaluation of the pulmonary arteries. No evidence for   central pulmonary embolism. 3.  No acute inflammatory process identified in the abdomen or pelvis. Stable benign and incidental findings, as described. Medical Decision [id number]:       Medical Decision Making-Other:     Note:  Labs, medications, radiologic studies were reviewed with personal review of films  Large amounts of data were reviewed  Discussed with nursing Staff, Discharge planner  Infection Control and Prevention measures reviewed  All prior entries were reviewed  Administer medications as ordered  Prognosis: Guarded  Discharge planning reviewed      Thank you for allowing us to participate in the care of this patient. Please call with questions.     GENA Sotelo - CNP    ATTESTATION:    I have discussed the case, including pertinent history and exam findings with the APRN. I have evaluated the  History, physical findings and pictures of the patient and the key elements of the encounter have been performed by me. I have reviewed the laboratory data, other diagnostic studies and discussed them with the APRN. I have updated the medical record where necessary. I agree with the assessment, plan and orders as documented by the APRN.     Thee Guaman MD.    Pager: [phone] - Office: [phone]

## 2023-02-02 NOTE — PROGRESS NOTES
Physical Therapy  Facility/Department: [facility] AT THE [facility] MED SURG  Daily Treatment Note  NAME: Kwesi Fuller  : 1935  MRN: [id number]    Date of Service: 2023    Discharge Recommendations:  Continue to assess pending progress, Patient would benefit from continued therapy after discharge, Home with Home health PT, Outpatient PT      Patient Diagnosis(es): The primary encounter diagnosis was Septicemia ([address] A diagnosis of Pneumonia due to infectious organism, unspecified laterality, unspecified part of lung was also pertinent to this visit. Assessment   Assessment: Bed mobility SBA with additional time for pt to initiate supine -> sit transfer, pt demoed depressed state this am and required encouragement. Pt stated \"I lost my will to live\". Transfers CGA x1.  Pt completed Static Standing during toilet transfer for pericare ~ 2 minutes with no LOB, pt had L UE on bar and R on WW with periodically single UE support. Dynamic standing ~ 2 minutes in bathroom with and without UE support and CGA for safety, no LOB noted. Gait 2x 8' to/from restroom with [address] and CGA, cues to maintain close approximation to AD for safety, fair carryover. Pt mood improved at end of treatment, with patient left in recliner and nursing notified. Activity Tolerance: Patient tolerated treatment well;Patient limited by fatigue     Plan    Physcial Therapy Plan  General Plan: 2 times a day 7 days a week (1x/day on weekends and holidays)  Current Treatment Recommendations: Strengthening;Balance training;Functional mobility training;Transfer training; Endurance training;Gait training;Stair training;Neuromuscular re-education; Safety education & training; Therapeutic activities     Restrictions  Restrictions/Precautions  Restrictions/Precautions: General Precautions, Fall Risk     Subjective    Subjective  Subjective: Pt in bed upon arrival and is agreeable to therapy  Pain: no c/o pain at this time.   Orientation  Overall Orientation Status: Within Functional Limits     Objective   Bed Mobility Training  Bed Mobility Training: Yes  Overall Level of Assistance: Assist X1;Stand-by assistance  Interventions: Verbal cues  Supine to Sit: Stand-by assistance;Assist X1  Scooting: Stand-by assistance;Assist X1  Transfer Training  Transfer Training: Yes  Overall Level of Assistance: Assist X1;Contact-guard assistance  Interventions: Verbal cues; Safety awareness training  Sit to Stand: Contact-guard assistance;Assist X1;Other (comment) (Pt bed/brief soiled upon standing.)  Stand to Sit: Contact-guard assistance;Assist X1  Toilet Transfer: Contact-guard assistance;Assist X1;Other (comment) (PTA assisted pt in pericare with new brief and hygiene)  Gait Training  Gait Training: Yes  Gait  Overall Level of Assistance: Contact-guard assistance;Assist X1  Interventions: Safety awareness training;Verbal cues  Base of Support: Widened  Speed/Geovanna: Slow  Distance (ft): 16 Feet  Assistive Device: Walker, rolling  Neuromuscular Education  Neuromuscular Education: Yes  PT Exercises  Static Standing Balance Exercises: Pt completed Static Standing during toilet transfer for [facility] ~ 2 minutes with no LOB, pt had L UE on bar and R on [employer] with periodically single UE support. Dynamic Standing Balance Exercises: Dynamic standing ~ 2 minutes in bathroom with and without UE support and CGA for safety, no LOB noted. Safety Devices  Type of Devices: All fall risk precautions in place;Call light within reach; Chair alarm in place; Left in chair;Nurse notified;Gait belt       Goals  Short Term Goals  Time Frame for Short Term Goals: 20 days  Short Term Goal 1: Initiate and progress HEP for strength and balance. Short Term Goal 2: Pt will be independent with bed mobility without use of rails for discharge home independently. Short Term Goal 3: Pt will transfer with +1 supervision with least restrictive device to decrease fall risk.   Short Term Goal 4: Pt will ambulate with supervision to SBA +1 with least restrictive device for up to 75 feet to allow patient safe entry and exit into his home  Short Term Goal 5: Pt will negotiate 4 steps with railing +1 SBA for entry and exit into buildings for appointments.   Patient Goals   Patient Goals : return home    Education  Patient Education  Education Given To: Patient  Education Provided Comments: Education provided in benefits of participation in therapy and educated pt in safety during transfers/gait with [employer].  Education Method: Verbal  Barriers to Learning: None  Education Outcome: Verbalized understanding;Continued education needed    Therapy Time   Individual Concurrent Group Co-treatment   Time In 0745         Time Out 0818         Minutes [address] [place] [address]

## 2023-02-02 NOTE — CONSULTS
Yashira Douglas am scribing for and in the presence of Silvia Simons MD, F.A.C.C..    Subjective:     CHIEF COMPLAINT / HPI:    Chief Complaint   Patient presents with    Fall     Fall this am while getting out of the shower. Refused transport by EMS       Héctor Peck is 82 y.o. male who presents today for follow-up. 1-He has history of coronary artery disease, myocardial infarction and primary PCI in 2/2017 done at [facility],  [address] [place] [address]. He also has history of ischemic cardiomyopathy and chronic systolic CHF, NYHA class III. 2-An admission to [facility] on 9/19/2017 with COPD exacerbation, during this admission his lisinopril changed to Cozaar because of chronic cough. 3-Another visit to the emergency room on 10/3/2017 with cough, shortness of breath and wheezing. 4- He saw Dr. Gloria Wilson at [facility] in November 2018, no changes in medication done. 5-An admission to [facility] on 4/6/2018 with acute on chronic CHF. 6-History of intentional tremor. 7- EKG done in office (8/20/2019)- Sinus Rhythm with 1st degree AV block. 71 bpm.    8- EKG done in office on 7/14/2020. No acute ischemic changes. 9-  Echocardiogram on 4/6/2021: Global left ventricular systolic function appears preserved with an estimated ejection fraction of 55%. Mildly increased left ventricular wall thickness with a normal left ventricular cavity size. The patient has a sigmoid interventricular septum. The inferoseptal wall is abnormal in its motion which is not unusual status post open heart surgery. Mild aortic stenosis. Mild mitral and tricuspid regurgitation. Evidence of mild diastolic dysfunction is seen. Atrial septal aneurysm cannot rule-out shunt. A saline contrast study was performed and cannot rule out interatrial communication.     10-EKG done in office 7/6/2021- no acute ischemic changes    11- Admission to [facility] on 12/31/2021-1/1/2022: Admitted for Pneumonia    12-ER visit on 8/2022: related to cough    13-EKG on 10/17/2022: No ischemic changes from prior ECG    14-  Echo done on 1/31/2023- EF >55% The left ventricular cavity size is within normal limits and the left ventricular wall thickness is moderately increased. The patient has a sigmoid interventricular septum without evidence of outflow tract obstruction. The left atrium is mildly dilated (29-33) with a left atrial volume index of 29 ml/m2. 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 The clinical significant of this finding is unknown, but has been associated with an increased risk of TIA's and strokes. The left atrium is mildly dilated (29-33) with a left atrial volume index of 29 ml/m2. The mean aortic valve gradient is 24 mmHg consistent with moderate aortic stenosis. Mild to moderate tricuspid regurgitation. Moderate pulmonary hypertension with an estimated right ventricular systolic pressure of 44 mmHg. Evidence of mild (grade I) diastolic dysfunction is seen. Mr. Mayte Almodovar was admitted for a fall after getting out of shower. He states he tripped on rug denies losing consciousness. He has history of tremors from before and unsteady gait. Uses walker for ambulation. His wife tried to help him out but she could not. EMS called and patient initially refused to come to the emergency room but later he felt extremely weak and had fever and shivering so he decided to come to the emergency room. He said he was on the floor for about 4 hours. His initial lactate level was elevated. Currently on antibiotic for the treatment of community-acquired pneumonia. He has acute kidney injury. Denies any chest pain, pressure or tightness. He has some upper respiratory symptoms for a while. COVID and influenza testing are negative. UA is unremarkable. He said he was able to walk around using his walker today with physical therapy.   No nausea, vomiting or abdominal pain. No problems moving his bowel. No problems with current medications. Wt Readings from Last 3 Encounters:   23 259 lb 12.8 oz (117.8 kg)   22 256 lb 8 oz (116.3 kg)   22 254 lb 6.6 oz (115.4 kg)     He is feeling better today. He told me he was able to walk with rehab using his walker was not a significant problem. ID consultation appreciated, switch to Levaquin. Kidney function improving. no chest pain, pressure or tightness. Continues to complain of shortness of breath and cough. Anemia work-up indicates iron deficiency anemia. I will start him on ferrous sulfate twice daily. Past Medical History:    Past Medical History:   Diagnosis Date    COPD (chronic obstructive pulmonary disease) ([address]     Diabetes mellitus ([address]     Hx of echocardiogram 2017    [facility]    Hyperlipidemia     Hypothyroidism     MI (myocardial infarction) [facility])     Stroke [facility])     Thyroid disease     Type II or unspecified type diabetes mellitus without mention of complication, not stated as uncontrolled      Past Surgical History:  Past Surgical History:   Procedure Laterality Date    CARDIAC SURGERY  2017    Stent placed  Dr James Mcgregor      right ankle    HERNIA REPAIR       Social History:    Social History     Tobacco Use   Smoking Status Former    Types: Cigarettes    Quit date:     Years since quittin.1   Smokeless Tobacco Never     Current Medications:  No outpatient medications have been marked as taking for the 23 encounter [facility] Encounter). REVIEW OF SYSTEMS:    CONSTITUTIONAL: No major weight gain or loss, fatigue, weakness, night sweats or fever. HEENT: No new vision difficulties or ringing in the ears. RESPIRATORY: See HPI  CARDIOVASCULAR: See HPI  GI: No nausea, vomiting, diarrhea, constipation, abdominal pain or changes in bowel habits.   : No urinary frequency, urgency, incontinence hematuria or dysuria. SKIN: No cyanosis or skin lesions. MUSCULOSKELETAL: No new muscle or joint pain. NEUROLOGICAL: No syncope or TIA-like symptoms. PSYCHIATRIC: No anxiety, pain, insomnia or depression    Objective:     PHYSICAL EXAM:      VITALS:  /63   Pulse 64   Temp 98.4 °F (36.9 °C) (Temporal)   Resp 18   Ht 5' 11\" (1.803 m)   Wt 259 lb 12.8 oz (117.8 kg)   SpO2 95%   BMI 36.23 kg/m²   CONSTITUTIONAL: Cooperative, no apparent distress, and appears well nourished / developed. NEUROLOGIC:  Awake and orientated to person, place and time. PSYCH: Calm affect. SKIN: Warm and dry. HEENT: Sclera non-icteric, normocephalic, neck supple, no elevation of JVP, normal carotid pulses with no bruits and thyroid normal size. LUNGS:  Poor air entry bilaterally . No significant wheezing . Mild bilateral crackles present. Cardiovascular: Normal rate, regular rhythm, normal heart sounds. Exam reveals no gallop and no friction rubs. 1/6 systolic murmur, 4th intercostal space on the LEFT must lateral to the sternal border. ABDOMEN:  Normal bowel sounds, non-distended and non-tender to palpation. Extremities: 2+ bilateral leg edema. no cyanosis or clubbing. 2+ radial and carotid pulses. Distal extremity pulses: 2+ bilaterally. Compression stockings in place.     DATA:    Lab Results   Component Value Date    ALT 97 (H) 02/02/2023    AST 69 (H) 02/02/2023    ALKPHOS 147 (H) 02/02/2023    BILITOT 0.3 02/02/2023     Lab Results   Component Value Date    CREATININE 1.31 (H) 02/02/2023    BUN 31 (H) 02/02/2023     (L) 02/02/2023    K 4.2 02/02/2023    CL 96 (L) 02/02/2023    CO2 20 02/02/2023       Lab Results   Component Value Date    WBC 19.7 (H) 02/02/2023    HGB 9.7 (L) 02/02/2023    HCT 30.6 (L) 02/02/2023    MCV 93.0 02/02/2023     02/02/2023       Lab Results   Component Value Date    TRIG 114 02/02/2023    TRIG 134 03/14/2022    TRIG 147 09/29/2021     Lab Results   Component Value Date HDL 44 02/02/2023    HDL 52 03/14/2022    HDL 46 09/29/2021     Lab Results   Component Value Date    LDLCHOLESTEROL 45 02/02/2023    LDLCHOLESTEROL 65 03/14/2022    LDLCHOLESTEROL 61 09/29/2021         Assessment:   Sepsis secondary to atypical pneumonia. Chronic diastolic CHF. Fall and fever. Atherosclerotic heart disease, status post PCI back in 2/24/2017. History of essential tremor, unsteady gait, using walker for ambulation. Acute kidney injury. Plan:   Patient admitted after a fall, found to have fever and acute kidney injury and CT scan of the chest and abdomen is suggestive of multifocal pneumonia. Currently treated for community-acquired pneumonia. He has acute kidney injury. I do believe that the elevated BNP is secondary to fluid resuscitation and kidney abnormalities. COVID and influenza are negative. UA is unremarkable. Currently on Levaquin by ID. Will medicate  Hold Lasix for now. Continue physical therapy. Monitor blood pressure as per unit protocol. Pending Legionella antigen and mycoplasma antibodies as per ID. Chronic Diastolic Heart Failure: EF: 55%   Moderate aortic stenosis and moderate pulm hypertension noted. Beta Blocker: Continue Propranolol 120 mg BID  Diuretics: Hold Lasix for now. Nonpharmacologic management of Heart Failure: I told him to continue wearing lower extremity compression stockings and I advised him to try and keep his legs up whenever possible and to limit salt in his diet. Atherosclerotic Heart Disease: S/P Stent placement on 2/24/2017 by Dr. Tiarar Snow at [facility] [place]  Antiplatelet Agent: Continue Aspirin 81 mg daily. I also reminded them to watch for signs of bloody or black tarry stools and stop the medication immediately if this develops as this could be life threatening. Beta Blocker: Continue Propranolol 80 mg BID    Cholesterol Reduction Therapy: Continue Atorvastatin (Lipitor) 40 mg daily.      High-sensitivity troponin is steady and normal. History of Stroke: Ischemic stroke with right visual field loss. Has complete resolution of his neurological dysfunction. Antiplatelet Agent: Continue Aspirin 81 mg daily. Cholesterol Reduction Therapy: Continue Atorvastatin (Lipitor) 40 mg daily. Lipid panel is good. Hemoglobin A1c 7.2%. Her blood pressure is controlled. Essential Hypertension: Controlled   Continue propranolol. Hold Lasix. Follow-up blood pressure as per unit protocol. Hyperlipidemia: Mixed - Last LDL on 3/14/2022 was 65 mg/dL  Cholesterol Reduction Therapy: Continue Atorvastatin (Lipitor) 40 mg daily. Ramesh Dias MD, MS, F.A.C.C. [facility], [address] [place] [address], [address]  Phone: [phone], Fax: [phone]    I believe that the risk of significant morbidity and mortality related to the patient's current medical conditions are: intermediate-high. The documentation recorded by the scribe, accurately and completely reflects the services I personally performed and the decisions made by me. Ramesh Dias MD, F.A.C.C.  February 2, 2023

## 2023-02-02 NOTE — PROGRESS NOTES
Patient sitting up in recliner watching television. Head to toe assessment completed and vital signs obtained and documented. Patient denies pain or discomfort at this time. Patient alert and oriented x 4. Respirations even and unlabored, currently on O2 at 2L/min per nasal cannula, SPO2 93%. Chair alarm on, all patient needs met as able, will continue to monitor.

## 2023-02-03 PROBLEM — I50.32 CHRONIC DIASTOLIC CONGESTIVE HEART FAILURE (HCC): Status: ACTIVE | Noted: 2023-02-03

## 2023-02-03 PROBLEM — I35.0 NONRHEUMATIC AORTIC VALVE STENOSIS: Status: ACTIVE | Noted: 2023-02-03

## 2023-02-03 LAB
ABSOLUTE EOS #: 0 K/UL (ref 0–0.44)
ABSOLUTE IMMATURE GRANULOCYTE: 0 K/UL (ref 0–0.3)
ABSOLUTE LYMPH #: 1.44 K/UL (ref 1.1–3.7)
ABSOLUTE MONO #: 1.28 K/UL (ref 0.1–1.2)
ALBUMIN SERPL-MCNC: 3 G/DL (ref 3.5–5.2)
ALBUMIN/GLOBULIN RATIO: 0.9 (ref 1–2.5)
ALP SERPL-CCNC: 180 U/L (ref 40–129)
ALT SERPL-CCNC: 82 U/L (ref 5–41)
ANION GAP SERPL CALCULATED.3IONS-SCNC: 13 MMOL/L (ref 9–17)
AST SERPL-CCNC: 57 U/L
BASOPHILS # BLD: 0 % (ref 0–2)
BASOPHILS ABSOLUTE: 0 K/UL (ref 0–0.2)
BILIRUB SERPL-MCNC: 0.3 MG/DL (ref 0.3–1.2)
BUN SERPL-MCNC: 29 MG/DL (ref 8–23)
BUN/CREAT BLD: 27 (ref 9–20)
CALCIUM SERPL-MCNC: 8.7 MG/DL (ref 8.6–10.4)
CHLORIDE SERPL-SCNC: 93 MMOL/L (ref 98–107)
CO2 SERPL-SCNC: 20 MMOL/L (ref 20–31)
CREAT SERPL-MCNC: 1.09 MG/DL (ref 0.7–1.2)
EOSINOPHILS RELATIVE PERCENT: 0 % (ref 1–4)
GFR SERPL CREATININE-BSD FRML MDRD: >60 ML/MIN/1.73M2
GLUCOSE SERPL-MCNC: 190 MG/DL (ref 70–99)
HCT VFR BLD AUTO: 29.2 % (ref 40.7–50.3)
HGB BLD-MCNC: 9.5 G/DL (ref 13–17)
IMMATURE GRANULOCYTES: 0 %
LYMPHOCYTES # BLD: 9 % (ref 24–43)
M PNEUMO IGM SER QL IA: 0.16
MCH RBC QN AUTO: 29.1 PG (ref 25.2–33.5)
MCHC RBC AUTO-ENTMCNC: 32.5 G/DL (ref 28.4–34.8)
MCV RBC AUTO: 89.3 FL (ref 82.6–102.9)
MONOCYTES # BLD: 8 % (ref 3–12)
MORPHOLOGY: NORMAL
MRSA, DNA, NASAL: NEGATIVE
NRBC AUTOMATED: 0 PER 100 WBC
PDW BLD-RTO: 14.7 % (ref 11.8–14.4)
PLATELET # BLD AUTO: 215 K/UL (ref 138–453)
PMV BLD AUTO: 10.4 FL (ref 8.1–13.5)
POTASSIUM SERPL-SCNC: 4.3 MMOL/L (ref 3.7–5.3)
PROT SERPL-MCNC: 6.5 G/DL (ref 6.4–8.3)
RBC # BLD: 3.27 M/UL (ref 4.21–5.77)
SEG NEUTROPHILS: 83 % (ref 36–65)
SEGMENTED NEUTROPHILS ABSOLUTE COUNT: 13.28 K/UL (ref 1.5–8.1)
SODIUM SERPL-SCNC: 126 MMOL/L (ref 135–144)
SPECIMEN DESCRIPTION: NORMAL
WBC # BLD AUTO: 16 K/UL (ref 3.5–11.3)

## 2023-02-03 PROCEDURE — 1200000000 HC SEMI PRIVATE

## 2023-02-03 PROCEDURE — 6370000000 HC RX 637 (ALT 250 FOR IP): Performed by: INTERNAL MEDICINE

## 2023-02-03 PROCEDURE — APPSS30 APP SPLIT SHARED TIME 16-30 MINUTES: Performed by: NURSE PRACTITIONER

## 2023-02-03 PROCEDURE — 6370000000 HC RX 637 (ALT 250 FOR IP): Performed by: FAMILY MEDICINE

## 2023-02-03 PROCEDURE — 85025 COMPLETE CBC W/AUTO DIFF WBC: CPT

## 2023-02-03 PROCEDURE — 94640 AIRWAY INHALATION TREATMENT: CPT

## 2023-02-03 PROCEDURE — 6360000002 HC RX W HCPCS: Performed by: FAMILY MEDICINE

## 2023-02-03 PROCEDURE — 99232 SBSQ HOSP IP/OBS MODERATE 35: CPT | Performed by: INTERNAL MEDICINE

## 2023-02-03 PROCEDURE — 97110 THERAPEUTIC EXERCISES: CPT

## 2023-02-03 PROCEDURE — 94761 N-INVAS EAR/PLS OXIMETRY MLT: CPT

## 2023-02-03 PROCEDURE — 80053 COMPREHEN METABOLIC PANEL: CPT

## 2023-02-03 PROCEDURE — 2580000003 HC RX 258: Performed by: INTERNAL MEDICINE

## 2023-02-03 PROCEDURE — 36415 COLL VENOUS BLD VENIPUNCTURE: CPT

## 2023-02-03 PROCEDURE — 2700000000 HC OXYGEN THERAPY PER DAY

## 2023-02-03 PROCEDURE — 97116 GAIT TRAINING THERAPY: CPT

## 2023-02-03 RX ORDER — ALOGLIPTIN 25 MG/1
25 TABLET, FILM COATED ORAL DAILY
Status: DISCONTINUED | OUTPATIENT
Start: 2023-02-03 | End: 2023-02-10 | Stop reason: HOSPADM

## 2023-02-03 RX ADMIN — SODIUM CHLORIDE, PRESERVATIVE FREE 10 ML: 5 INJECTION INTRAVENOUS at 08:41

## 2023-02-03 RX ADMIN — ENOXAPARIN SODIUM 30 MG: 100 INJECTION SUBCUTANEOUS at 08:41

## 2023-02-03 RX ADMIN — ASPIRIN 81 MG: 81 TABLET, COATED ORAL at 08:40

## 2023-02-03 RX ADMIN — FAMOTIDINE 20 MG: 20 TABLET, FILM COATED ORAL at 20:04

## 2023-02-03 RX ADMIN — PRIMIDONE 250 MG: 250 TABLET ORAL at 08:41

## 2023-02-03 RX ADMIN — ACETAMINOPHEN 650 MG: 325 TABLET ORAL at 19:12

## 2023-02-03 RX ADMIN — ENOXAPARIN SODIUM 30 MG: 100 INJECTION SUBCUTANEOUS at 20:05

## 2023-02-03 RX ADMIN — ALOGLIPTIN 25 MG: 25 TABLET, FILM COATED ORAL at 08:40

## 2023-02-03 RX ADMIN — OXYBUTYNIN CHLORIDE 10 MG: 5 TABLET ORAL at 20:04

## 2023-02-03 RX ADMIN — METFORMIN HYDROCHLORIDE 1000 MG: 500 TABLET ORAL at 20:04

## 2023-02-03 RX ADMIN — TAMSULOSIN HYDROCHLORIDE 0.8 MG: 0.4 CAPSULE ORAL at 17:07

## 2023-02-03 RX ADMIN — MOMETASONE FUROATE AND FORMOTEROL FUMARATE DIHYDRATE 2 PUFF: 200; 5 AEROSOL RESPIRATORY (INHALATION) at 19:53

## 2023-02-03 RX ADMIN — ACETAMINOPHEN 650 MG: 325 TABLET ORAL at 01:35

## 2023-02-03 RX ADMIN — SODIUM CHLORIDE, PRESERVATIVE FREE 10 ML: 5 INJECTION INTRAVENOUS at 20:05

## 2023-02-03 RX ADMIN — FERROUS SULFATE TAB 325 MG (65 MG ELEMENTAL FE) 325 MG: 325 (65 FE) TAB at 08:41

## 2023-02-03 RX ADMIN — MONTELUKAST 10 MG: 10 TABLET, FILM COATED ORAL at 08:41

## 2023-02-03 RX ADMIN — PRIMIDONE 250 MG: 250 TABLET ORAL at 17:07

## 2023-02-03 RX ADMIN — FUROSEMIDE 40 MG: 10 INJECTION, SOLUTION INTRAMUSCULAR; INTRAVENOUS at 08:41

## 2023-02-03 RX ADMIN — LEVOFLOXACIN 500 MG: 500 TABLET, FILM COATED ORAL at 08:40

## 2023-02-03 RX ADMIN — METFORMIN HYDROCHLORIDE 1000 MG: 500 TABLET ORAL at 08:40

## 2023-02-03 RX ADMIN — LEVOTHYROXINE SODIUM 137 MCG: 25 TABLET ORAL at 08:40

## 2023-02-03 RX ADMIN — MOMETASONE FUROATE AND FORMOTEROL FUMARATE DIHYDRATE 2 PUFF: 200; 5 AEROSOL RESPIRATORY (INHALATION) at 07:05

## 2023-02-03 RX ADMIN — PROPRANOLOL HYDROCHLORIDE 120 MG: 60 CAPSULE, EXTENDED RELEASE ORAL at 17:07

## 2023-02-03 RX ADMIN — OXYBUTYNIN CHLORIDE 10 MG: 5 TABLET ORAL at 08:40

## 2023-02-03 RX ADMIN — FAMOTIDINE 20 MG: 20 TABLET, FILM COATED ORAL at 08:40

## 2023-02-03 RX ADMIN — PROPRANOLOL HYDROCHLORIDE 120 MG: 60 CAPSULE, EXTENDED RELEASE ORAL at 08:40

## 2023-02-03 RX ADMIN — FERROUS SULFATE TAB 325 MG (65 MG ELEMENTAL FE) 325 MG: 325 (65 FE) TAB at 17:07

## 2023-02-03 ASSESSMENT — PAIN - FUNCTIONAL ASSESSMENT
PAIN_FUNCTIONAL_ASSESSMENT: ACTIVITIES ARE NOT PREVENTED
PAIN_FUNCTIONAL_ASSESSMENT: ACTIVITIES ARE NOT PREVENTED

## 2023-02-03 ASSESSMENT — PAIN DESCRIPTION - LOCATION
LOCATION: HEAD
LOCATION: HEAD

## 2023-02-03 ASSESSMENT — PAIN SCALES - GENERAL
PAINLEVEL_OUTOF10: 0
PAINLEVEL_OUTOF10: 4
PAINLEVEL_OUTOF10: 4
PAINLEVEL_OUTOF10: 0

## 2023-02-03 ASSESSMENT — PAIN DESCRIPTION - PAIN TYPE: TYPE: ACUTE PAIN

## 2023-02-03 ASSESSMENT — PAIN DESCRIPTION - DESCRIPTORS
DESCRIPTORS: DULL
DESCRIPTORS: ACHING

## 2023-02-03 ASSESSMENT — PAIN DESCRIPTION - FREQUENCY: FREQUENCY: INTERMITTENT

## 2023-02-03 ASSESSMENT — PAIN DESCRIPTION - ONSET: ONSET: GRADUAL

## 2023-02-03 NOTE — PROGRESS NOTES
Vitals and assessment done at this time. See flow sheet for more details. Pt resting in bed at this time. Pt stated he was not having any shortness of breath, numbness or tingling in hands or feet, dizziness, or light headedness. Nurse attempted to get pt on RA. Pt did not tolerate. Nurse used ear lobe for better reading due to pts tremors. Pt left on 1L at 93%. Pt denied any further needs at this time. Call light within reach, will continue to monitor.

## 2023-02-03 NOTE — PROGRESS NOTES
Jason Heredia am scribing for and in the presence of Doug Duncan MD, F.A.C.C..    Subjective:     CHIEF COMPLAINT / HPI:    Chief Complaint   Patient presents with    Fall     Fall this am while getting out of the shower. Refused transport by EMS       Rafia Bradford is 82 y.o. male who presents today for follow-up. 1-He has history of coronary artery disease, myocardial infarction and primary PCI in 2/2017 done at [facility],  [address] [place] [address]. He also has history of ischemic cardiomyopathy and chronic systolic CHF, NYHA class III. 2-An admission to [facility] on 9/19/2017 with COPD exacerbation, during this admission his lisinopril changed to Cozaar because of chronic cough. 3-Another visit to the emergency room on 10/3/2017 with cough, shortness of breath and wheezing. 4- He saw Dr. Reynaldo Campos at [facility] in November 2018, no changes in medication done. 5-An admission to [facility] on 4/6/2018 with acute on chronic CHF. 6-History of intentional tremor. 7- EKG done in office (8/20/2019)- Sinus Rhythm with 1st degree AV block. 71 bpm.    8- EKG done in office on 7/14/2020. No acute ischemic changes. 9-  Echocardiogram on 4/6/2021: Global left ventricular systolic function appears preserved with an estimated ejection fraction of 55%. Mildly increased left ventricular wall thickness with a normal left ventricular cavity size. The patient has a sigmoid interventricular septum. The inferoseptal wall is abnormal in its motion which is not unusual status post open heart surgery. Mild aortic stenosis. Mild mitral and tricuspid regurgitation. Evidence of mild diastolic dysfunction is seen. Atrial septal aneurysm cannot rule-out shunt. A saline contrast study was performed and cannot rule out interatrial communication.     10-EKG done in office 7/6/2021- no acute ischemic changes    11- Admission to [facility] on 12/31/2021-1/1/2022: Admitted for Pneumonia    12-ER visit on 8/2022: related to cough    13-EKG on 10/17/2022: No ischemic changes from prior ECG    14-  Echo done on 1/31/2023- EF >55% The left ventricular cavity size is within normal limits and the left ventricular wall thickness is moderately increased. The patient has a sigmoid interventricular septum without evidence of outflow tract obstruction. The left atrium is mildly dilated (29-33) with a left atrial volume index of 29 ml/m2. 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 The clinical significant of this finding is unknown, but has been associated with an increased risk of TIA's and strokes. The left atrium is mildly dilated (29-33) with a left atrial volume index of 29 ml/m2. The mean aortic valve gradient is 24 mmHg consistent with moderate aortic stenosis. Mild to moderate tricuspid regurgitation. Moderate pulmonary hypertension with an estimated right ventricular systolic pressure of 44 mmHg. Evidence of mild (grade I) diastolic dysfunction is seen. Mr. Odessa Cardona was admitted for a fall after getting out of shower. He states he tripped on rug denies losing consciousness. He has history of tremors from before and unsteady gait. Uses walker for ambulation. His wife tried to help him out but she could not. EMS called and patient initially refused to come to the emergency room but later he felt extremely weak and had fever and shivering so he decided to come to the emergency room. He said he was on the floor for about 4 hours. His initial lactate level was elevated. Currently on antibiotic for the treatment of community-acquired pneumonia. He has acute kidney injury. Denies any chest pain, pressure or tightness. He has some upper respiratory symptoms for a while. COVID and influenza testing are negative. UA is unremarkable. He said he was able to walk around using his walker today with physical therapy.   No nausea, vomiting or abdominal pain. No problems moving his bowel. No problems with current medications. Wt Readings from Last 3 Encounters:   23 260 lb 1.6 oz (118 kg)   22 256 lb 8 oz (116.3 kg)   22 254 lb 6.6 oz (115.4 kg)       Mr. Giovani Morocho is doing ok today. He still has cough. Denies any lightheaded/dizziness. Denies chest pain, pressure or tightness. Denies stomach pain, nausea, constipation or vomiting. He is retaining more fluids and given 1 dose of Lasix by Dr. Gagandeep Gaytan today. Legionella antigen is negative as well as mycoplasma IgM antibodies. He is on Levaquin and responding very well. His kidney function is better. I agree with resuming Lasix therapy. Past Medical History:    Past Medical History:   Diagnosis Date    COPD (chronic obstructive pulmonary disease) ([facility]     Diabetes mellitus ([facility]     Hx of echocardiogram 2017    [facility]    Hyperlipidemia     Hypothyroidism     MI (myocardial infarction) [facility])     Stroke [facility])     Thyroid disease     Type II or unspecified type diabetes mellitus without mention of complication, not stated as uncontrolled      Past Surgical History:  Past Surgical History:   Procedure Laterality Date    CARDIAC SURGERY  2017    Stent placed  Dr Jerman Godoy      right ankle    HERNIA REPAIR       Social History:    Social History     Tobacco Use   Smoking Status Former    Types: Cigarettes    Quit date:     Years since quittin.1   Smokeless Tobacco Never     Current Medications:  Prior to Admission medications    Medication Sig Start Date End Date Taking?  Authorizing Provider   azithromycin (ZITHROMAX) 250 MG tablet TAKE 2 TABLETS BY MOUTH ON DAY 1, AND THEN TAKE 1 TABLET BY MOUTH ONCE A DAY ON DAY 2 THROUGH DAY 5  Patient not taking: No sig reported 23   Historical Provider, MD   predniSONE (DELTASONE) 20 MG tablet TAKE 2 TABLETS BY MOUTH ONCE DAILY FOR 5 DAYS  Patient not taking: No sig reported 1/7/23   Historical Provider, MD   oxybutynin (DITROPAN) 5 MG tablet Take 2 tablets by mouth 2 times daily 12/16/22   GENA Silver CNP   furosemide (LASIX) 40 MG tablet Take 40 mg by mouth in the morning and 40 mg in the evening. Per wife Xavier James, the torsemide was very expensive so Chloé Nava continues to take his Lasix 40mg BID.     Historical Provider, MD   montelukast (SINGULAIR) 10 MG tablet Take 1 tablet by mouth daily 11/16/22   Bryce Freire MightGENA CNP   meloxicam (MOBIC) 7.5 MG tablet Take 1 tablet by mouth daily 11/16/22   Bryce Freire MightGENA CNP   Misc Natural Products (OSTEO BI-FLEX ADV JOINT SHIELD PO) Take by mouth as needed  Patient not taking: No sig reported    Historical Provider, MD   EUTHYROX 137 MCG tablet Take 1 tablet by mouth once daily 7/14/22   GENA Silver CNP   atorvastatin (LIPITOR) 40 MG tablet Take 1 tablet by mouth once daily 5/31/22   Gokul Nielsen MD   fluticasone-salmeterol (ADVAIR) 250-50 MCG/DOSE AEPB Inhale 1 puff into the lungs every 12 hours    Historical Provider, MD   albuterol (PROVENTIL) (2.5 MG/3ML) 0.083% nebulizer solution Take 3 mLs by nebulization every 4 hours as needed for Wheezing or Shortness of Breath 1/28/22   GENA Silver CNP   primidone (MYSOLINE) 250 MG tablet Take 250 mg by mouth 2 times daily    Historical Provider, MD   propranolol (INDERAL LA) 120 MG extended release capsule Take 120 mg by mouth 2 times daily    Historical Provider, MD   omeprazole (PRILOSEC) 20 MG delayed release capsule Take 20 mg by mouth daily    Historical Provider, MD   alogliptin (NESINA) 25 MG TABS tablet Take 25 mg by mouth daily    Historical Provider, MD   blood glucose monitor strips accu check 7/18/18   GENA Seals - CNP   aspirin 81 MG tablet Take 81 mg by mouth daily     Historical Provider, MD   albuterol sulfate  (90 Base) MCG/ACT inhaler Inhale 2 puffs into the lungs every 4 hours as needed for Wheezing or Shortness of Breath 4/13/18   Abbie Johnson, APRN - CNP   metFORMIN (GLUCOPHAGE) 1000 MG tablet Take 1,000 mg by mouth 2 times daily     Historical Provider, MD   acetaminophen (TYLENOL) 500 MG tablet Take 1,000 mg by mouth every 6 hours as needed for Pain    Historical Provider, MD   TAMSULOSIN HCL PO Take 0.8 mg by mouth Daily with supper     Historical Provider, MD   Multiple Vitamins-Minerals (THERAPEUTIC MULTIVITAMIN-MINERALS) tablet Take 1 tablet by mouth daily    Historical Provider, MD      alogliptin  25 mg Oral Daily    levoFLOXacin  500 mg Oral Daily    furosemide  40 mg IntraVENous Daily    ferrous sulfate  325 mg Oral BID WC    enoxaparin  30 mg SubCUTAneous BID    aspirin  81 mg Oral Daily    levothyroxine  137 mcg Oral Daily    mometasone-formoterol  2 puff Inhalation BID    metFORMIN  1,000 mg Oral BID    montelukast  10 mg Oral Daily    oxybutynin  10 mg Oral BID    primidone  250 mg Oral BID    propranolol  120 mg Oral BID    tamsulosin  0.8 mg Oral Dinner    sodium chloride flush  5-40 mL IntraVENous 2 times per day    famotidine  20 mg Oral BID           REVIEW OF SYSTEMS:    CONSTITUTIONAL: No major weight gain or loss, fatigue, weakness, night sweats or fever. HEENT: No new vision difficulties or ringing in the ears. RESPIRATORY: See HPI  CARDIOVASCULAR: See HPI  GI: No nausea, vomiting, diarrhea, constipation, abdominal pain or changes in bowel habits. : No urinary frequency, urgency, incontinence hematuria or dysuria. SKIN: No cyanosis or skin lesions. MUSCULOSKELETAL: No new muscle or joint pain. NEUROLOGICAL: No syncope or TIA-like symptoms. PSYCHIATRIC: No anxiety, pain, insomnia or depression    Objective:     PHYSICAL EXAM:      VITALS:  /72   Pulse 73   Temp 98 °F (36.7 °C) (Oral)   Resp 18   Ht 5' 11\" (1.803 m)   Wt 260 lb 1.6 oz (118 kg)   SpO2 94%   BMI 36.28 kg/m²   CONSTITUTIONAL: Cooperative, no apparent distress, and appears well nourished / developed.   NEUROLOGIC: Awake and orientated to person, place and time. PSYCH: Calm affect. SKIN: Warm and dry. HEENT: Sclera non-icteric, normocephalic, neck supple, no elevation of JVP, normal carotid pulses with no bruits and thyroid normal size. LUNGS:  Poor air entry bilaterally . No significant wheezing . Mild bilateral crackles present. Cardiovascular: Normal rate, regular rhythm, normal heart sounds. Exam reveals no gallop and no friction rubs. 1/6 systolic murmur, 4th intercostal space on the LEFT must lateral to the sternal border. ABDOMEN:  Normal bowel sounds, non-distended and non-tender to palpation. Extremities: 2+ bilateral leg edema. no cyanosis or clubbing. 2+ radial and carotid pulses. Distal extremity pulses: 2+ bilaterally. Compression stockings in place. DATA:    Lab Results   Component Value Date    ALT 82 (H) 02/03/2023    AST 57 (H) 02/03/2023    ALKPHOS 180 (H) 02/03/2023    BILITOT 0.3 02/03/2023     Lab Results   Component Value Date    CREATININE 1.09 02/03/2023    BUN 29 (H) 02/03/2023     (L) 02/03/2023    K 4.3 02/03/2023    CL 93 (L) 02/03/2023    CO2 20 02/03/2023       Lab Results   Component Value Date    WBC 16.0 (H) 02/03/2023    HGB 9.5 (L) 02/03/2023    HCT 29.2 (L) 02/03/2023    MCV 89.3 02/03/2023     02/03/2023       Lab Results   Component Value Date    TRIG 114 02/02/2023    TRIG 134 03/14/2022    TRIG 147 09/29/2021     Lab Results   Component Value Date    HDL 44 02/02/2023    HDL 52 03/14/2022    HDL 46 09/29/2021     Lab Results   Component Value Date    LDLCHOLESTEROL 45 02/02/2023    LDLCHOLESTEROL 65 03/14/2022    LDLCHOLESTEROL 61 09/29/2021         Assessment:   Sepsis secondary to atypical pneumonia. Chronic diastolic CHF. Fall and fever. Atherosclerotic heart disease, status post PCI back in 2/24/2017. History of essential tremor, unsteady gait, using walker for ambulation. Acute kidney injury.     Plan:   Patient admitted after a fall, found to have fever and acute kidney injury and CT scan of the chest and abdomen is suggestive of multifocal pneumonia. Currently treated for community-acquired pneumonia. He has acute kidney injury. I do believe that the elevated BNP is secondary to fluid resuscitation and kidney abnormalities. COVID and influenza are negative. UA is unremarkable. Currently on Levaquin by ID. I agree with restarting Lasix because blood pressure is controlled and patient has more lower extremity edema. Continue physical therapy. Monitor blood pressure as per unit protocol. Chronic Diastolic Heart Failure: EF: 55%   Moderate aortic stenosis and moderate pulm hypertension noted. Beta Blocker: Continue Propranolol 120 mg BID he is on propranolol mainly because of essential tremor. Diuretics: Can be discharged on Lasix 40 mg orally daily. Nonpharmacologic management of Heart Failure: I told him to continue wearing lower extremity compression stockings and I advised him to try and keep his legs up whenever possible and to limit salt in his diet. Atherosclerotic Heart Disease: S/P Stent placement on 2/24/2017 by Dr. Ivanna Alvarez at [facility]  Antiplatelet Agent: Continue Aspirin 81 mg daily. I also reminded them to watch for signs of bloody or black tarry stools and stop the medication immediately if this develops as this could be life threatening. Beta Blocker: Continue Propranolol 120 mg BID    Cholesterol Reduction Therapy: Continue Atorvastatin (Lipitor) 40 mg daily. High-sensitivity troponin is flat and is not suggestive of acute coronary event. History of Stroke: Ischemic stroke with right visual field loss. Has complete resolution of his neurological dysfunction. Antiplatelet Agent: Continue Aspirin 81 mg daily. Cholesterol Reduction Therapy: Continue Atorvastatin (Lipitor) 40 mg daily. Lipid panel is good. Hemoglobin A1c 7.2%. Her blood pressure is controlled.     Essential Hypertension: Controlled   Continue propranolol. I agree with restarting Lasix. Follow-up blood pressure as per unit protocol. Hyperlipidemia: Mixed - Last LDL on 2/2/2023 was 45 mg/dL  Cholesterol Reduction Therapy: Continue Atorvastatin (Lipitor) 40 mg daily. Rona Paulson MD, MS, F.A.C.C. [address] [place] [address] [facility], [address] [place] [address], [place], [address] [place] [address]  Phone: [phone], Fax: [phone]    I believe that the risk of significant morbidity and mortality related to the patient's current medical conditions are: intermediate-high. The documentation recorded by the scribe, accurately and completely reflects the services I personally performed and the decisions made by me. Rona Paulson MD, F.A.C.C.  February 3, 2023

## 2023-02-03 NOTE — PROGRESS NOTES
Patient in chair, watching television. Spouse present at bedside. Patient educated on medication to be given tonight and physician's orders to be completed. Patient stated understanding. Patient encouraged to ask questions. Writer answered questions. Vitals taken and documented. Assessment completed and documented. Patient alert, oriented x4. Calm, pleasant. Speech clear. Noted mild tremors in hands. Lung sounds clear in bilateral upper lobes of lungs. Diminished at bilateral bases of lungs and in right middle lobe. Noted congested, moist, productive cough. Abdomen obese, soft, non tender to palpation. Bowel sounds active in all four quadrants. +3 pitting edema noted in bilateral lower extremities. Scattered ecchymosis noted. Patient denies of pain, chest pain, numbness, tingling, or shortness of breath. Patient denies needs or concerns at this time. Call light and over bed table in reach.

## 2023-02-03 NOTE — PROGRESS NOTES
Progress Note  Yue Bowman MD  Patient: Gladys Alonso  Date of Admission: 1/31/2023  4:23 PM  Hospital Day # 3  Date of Evaluation: 2/3/2023      SUBJECTIVE:    Mr. Theodore Warren  was seen and examined today for f/u of Sepsis due to pneumonia [facility]). He  has cough with expectoration . He in on 1 lit oxygen. He  is hypoxic on RA . Bilat leg edema improving with lasix,renal function improving. ROS:   Constitutional: negative  for fevers, and negative for chills. Respiratory: positive for shortness of breath, positive for cough, and negative for wheezing  Cardiovascular: negative for chest pain, and negative for palpitations  Gastrointestinal: negative for abdominal pain, negative for nausea,negative for vomiting, negative for diarrhea, and negative for constipation     All other systems were reviewed with the patient and are negative unless otherwise stated in HPI    -----------------------------------------------------------------  OBJECTIVE:  Vitals:   Temp: 97.5 °F (36.4 °C)  BP: (!) 162/82  Resp: 16  Heart Rate: 63  SpO2: 93 % on supplemental O2    Weight  Wt Readings from Last 3 Encounters:   02/03/23 260 lb 1.6 oz (118 kg)   12/16/22 256 lb 8 oz (116.3 kg)   12/05/22 254 lb 6.6 oz (115.4 kg)     Body mass index is 36.28 kg/m². 24HR INTAKE/OUTPUT:      Intake/Output Summary (Last 24 hours) at 2/3/2023 1255  Last data filed at 2/3/2023 2923  Gross per 24 hour   Intake 2110 ml   Output 550 ml   Net 1560 ml       Exam:  GEN:    Awake, alert and oriented x 3. EYES:  EOMI, pupils equal   NECK: Supple. No lymphadenopathy. No carotid bruit  CVS:    regular rate and rhythm, 2/6 systolic murmur  PULM:  diminished with bilat rhonchi, no acute respiratory distress  ABD:    Bowels sounds normal.  Abdomen is soft. No distention. no tenderness to palpation. EXT:   1 + edema bilaterally . No calf tenderness. NEURO: Moves all extremities. Motor and sensory are grossly intact  SKIN:  No rashes.   No skin lesions.    -----------------------------------------------------------------  DATA:  Complete Blood Count:   Recent Labs     02/01/23  0535 02/02/23  0550 02/03/23  0559   WBC 17.9* 19.7* 16.0*   RBC 3.51* 3.29* 3.27*   HGB 10.1* 9.7* 9.5*   HCT 31.6* 30.6* 29.2*   MCV 90.0 93.0 89.3   MCH 28.8 29.5 29.1   MCHC 32.0 31.7 32.5   RDW 14.9* 14.9* 14.7*    213 215   MPV 10.9 10.3 10.4        Last 3 Blood Glucose:   Recent Labs     01/31/23  1635 02/01/23  0535 02/02/23  0550 02/03/23  0559   GLUCOSE 220* 238* 165* 190*        Comprehensive Metabolic Profile:   Recent Labs     02/01/23  0535 02/02/23  0550 02/03/23  0559   * 128* 126*   K 4.0 4.2 4.3   CL 97* 96* 93*   CO2 20 20 20   BUN 30* 31* 29*   CREATININE 1.33* 1.31* 1.09   GLUCOSE 238* 165* 190*   CALCIUM 8.3* 8.3* 8.7   PROT 6.6 6.5 6.5   LABALBU 3.2* 3.1* 3.0*   BILITOT 0.4 0.3 0.3   ALKPHOS 157* 147* 180*   * 69* 57*   * 97* 82*        Urinalysis:   Lab Results   Component Value Date/Time    NITRU NEGATIVE 01/31/2023 07:45 PM    COLORU Yellow 01/31/2023 07:45 PM    PHUR 6.0 01/31/2023 07:45 PM    WBCUA 0 TO 2 01/31/2023 07:45 PM    RBCUA 0 TO 2 01/31/2023 07:45 PM    MUCUS TRACE 01/31/2023 07:45 PM    TRICHOMONAS NOT REPORTED 12/29/2021 11:30 PM    YEAST NOT REPORTED 12/29/2021 11:30 PM    BACTERIA 1+ 01/31/2023 07:45 PM    CLARITYU clear 05/10/2018 02:45 PM    SPECGRAV 1.020 01/31/2023 07:45 PM    LEUKOCYTESUR NEGATIVE 01/31/2023 07:45 PM    UROBILINOGEN Normal 01/31/2023 07:45 PM    BILIRUBINUR NEGATIVE 01/31/2023 07:45 PM    BILIRUBINUR neg 05/10/2018 02:45 PM    BLOODU neg 05/10/2018 02:45 PM    GLUCOSEU NEGATIVE 01/31/2023 07:45 PM    KETUA NEGATIVE 01/31/2023 07:45 PM    AMORPHOUS NOT REPORTED 12/29/2021 11:30 PM       HgBA1c:    Lab Results   Component Value Date/Time    LABA1C 7.2 02/02/2023 05:50 AM       Lactic Acid:   Lab Results   Component Value Date/Time    LACTA 2.2 12/29/2021 09:24 PM    LACTA 1.2 11/14/2017 08:47 PM LACTA 1.6 09/19/2017 06:27 PM        High Sensitivity Troponin:  Recent Labs     01/31/23  1635 02/02/23  0550   TROPHS 13 21       Microbiology:  Blood Culture:  No components found for: CBLOOD, CFUNGUSBL    Radiology/Imaging:  XR CHEST PORTABLE   Final Result   Increasing vascular congestion as well as localized airspace disease in the   mid right lung field. Developing pulmonary edema can have this appearance   along with underlying multifocal infection. CT ABDOMEN PELVIS W IV CONTRAST Additional Contrast? None   Final Result   1. Multifocal consolidation in the right lung and to a lesser degree left   lung base, concerning for infection. 2.  Motion degraded evaluation of the pulmonary arteries. No evidence for   central pulmonary embolism. 3.  No acute inflammatory process identified in the abdomen or pelvis. Stable benign and incidental findings, as described. CT CHEST PULMONARY EMBOLISM W CONTRAST   Final Result   1. Multifocal consolidation in the right lung and to a lesser degree left   lung base, concerning for infection. 2.  Motion degraded evaluation of the pulmonary arteries. No evidence for   central pulmonary embolism. 3.  No acute inflammatory process identified in the abdomen or pelvis. Stable benign and incidental findings, as described. US GALLBLADDER RUQ   Final Result   Gallbladder sludge. No imaging evidence for acute cholecystitis or biliary   dilatation. XR CHEST PORTABLE   Final Result   Suspected pneumonia in the right mid lung. Recommend imaging follow-up to   resolution.                  MEDICAL DECISION MAKING:    Primary Problem(s): Sepsis due to pneumonia ([address]  Condition is improving  Treatment plan:  levaquin iv  Imaging: none  Medications: Continue levaquin  Medication Monitoring / High Risk Medications: none      chf    Condition is worsening  Treatment plan:  lasix iv  Imaging: none  Medications:  continue lasix iv      copd    Condition is unchanged  Treatment plan: Continue current treatment  Imaging: no further imaging studies ordered today  Medications: Continue oxygen,aerosols    ckd    Condition is stable  Treatment plan: Continue current treatment  Imaging: no further imaging studies ordered today  Medications: none    Nutrition status: Well developed, well nourished with no malnutrition  Dietician consult initiated    Hospital Prophylaxis:   DVT: Lovenox   Stress Ulcer: PPI     MDM Data:   Test interpretation:    My independent X-ray interpretation:   pneumonia,chf, improvement  Management and/or test interpretation  was reviewed with nursing staff  Consults and Nursing notes were personally reviewed, all current labs and imaging were personally reviewed, tests ordered: CBC, BMP, and history obtained by independent historian       Disposition:  Shared decision making:  All test results, treatment options and disposition options were discussed with the patient today  Social determinants of health that may impact management: none  Code status: Full Code   Disposition: Discharge plan is home            Pankaj Gonzalez MD , M.D.  2/3/2023

## 2023-02-03 NOTE — PROGRESS NOTES
At this time the is still planing on taking him home. Would benefit from home health.   CORINA Andrea

## 2023-02-03 NOTE — PROGRESS NOTES
[facility]   Pharmacy Renal Adjustment Note    Mukesh Bradshaw is a 82 y.o. male. Pharmacist assessment of renally cleared medications. Recent Labs     02/01/23  0535 02/02/23  0550 02/03/23  0559   CREATININE 1.33* 1.31* 1.09     Estimated Creatinine Clearance: 65 mL/min (based on SCr of 1.09 mg/dL). Height:   Ht Readings from Last 1 Encounters:   02/01/23 5' 11\" (1.803 m)     Weight:  Wt Readings from Last 1 Encounters:   02/03/23 260 lb 1.6 oz (118 kg)       The following medication(s) have been adjusted based upon renal function:             Alogliptin 12.5 mg increased to Alogliptin 25 mg daily due to improved renal function. CrCl ~ 65 ml/min.     Thank you,  Cony Rm, PharmD, 2/3/2023 7:48 AM

## 2023-02-03 NOTE — PROGRESS NOTES
Physical Therapy  Facility/Department: [facility] AT THE [facility] MED SURG  Daily Treatment Note  NAME: Odin Hayes  : 1935  MRN: [id number]    Date of Service: 2/3/2023    Discharge Recommendations:  Continue to assess pending progress, Patient would benefit from continued therapy after discharge, Home with Home health PT, Outpatient PT        Patient Diagnosis(es): The primary encounter diagnosis was Septicemia ([address] A diagnosis of Pneumonia due to infectious organism, unspecified laterality, unspecified part of lung was also pertinent to this visit. Assessment   Assessment: Pt required encourment for participation this afternoon due to \"I can walk when I go home\" Transfers SBA/CGA x1 (stand to sit SUP/SBA) Pt ambulated 20' x 2 with [employer] and CGA/SBA. reclined therex BLE (ankle pumps x20, heel slides and SLRs x10)  Activity Tolerance: Patient tolerated treatment well     Plan    Physcial Therapy Plan  General Plan: 2 times a day 7 days a week (1x on weekends)     Restrictions  Restrictions/Precautions  Restrictions/Precautions: General Precautions, Fall Risk     Subjective    Subjective  Subjective: Pt in chair upon arrival. Agreeable to therapy with encouragement from wife  Pain: no c/o pain at this time.      Objective   Vitals     Bed Mobility Training  Bed Mobility Training: No  Transfer Training  Transfer Training: Yes  Overall Level of Assistance: Assist X1;Contact-guard assistance;Stand-by assistance;Supervision  Interventions: Verbal cues  Sit to Stand: Contact-guard assistance;Stand-by assistance;Assist X1  Stand to Sit: Supervision;Stand-by assistance;Assist X1  Stand Pivot Transfers: Contact-guard assistance;Assist X1;Stand-by assistance  Gait Training  Gait Training: Yes  Gait  Overall Level of Assistance: Contact-guard assistance;Assist X1   Interventions: Verbal cues  Distance (ft): 20 Feet (20x2)  Assistive Device: Walker, rolling;Gait belt     PT Exercises  Exercise Treatment: reclined therex BLE (ankle pumps x20, heel slides and SLRs x10)     Safety Devices  Type of Devices: Call light within reach; All fall risk precautions in place; Left in chair (wife present)       Goals  Short Term Goals  Time Frame for Short Term Goals: 20 days  Short Term Goal 1: Initiate and progress HEP for strength and balance. Short Term Goal 2: Pt will be independent with bed mobility without use of rails for discharge home independently. Short Term Goal 3: Pt will transfer with +1 supervision with least restrictive device to decrease fall risk. Short Term Goal 4: Pt will ambulate with supervision to SBA +1 with least restrictive device for up to 75 feet to allow patient safe entry and exit into his home  Short Term Goal 5: Pt will negotiate 4 steps with railing +1 SBA for entry and exit into buildings for appointments.   Patient Goals   Patient Goals : return home    Education  Patient Education  Education Given To: Patient  Education Provided: Role of Therapy  Education Provided Comments: education provided on benefits of participating in therapy to improve mobility and stregnthening  Education Method: Verbal  Barriers to Learning: None  Education Outcome: Verbalized understanding;Continued education needed;Demonstrated understanding    Therapy Time   Individual Concurrent Group Co-treatment   Time In 1234         Time Out 1258         Minutes [place], [place]

## 2023-02-03 NOTE — PROGRESS NOTES
[facility]  Inpatient/Observation/Outpatient Rehabilitation    Date: 2/3/2023  Patient Name: Maite Street       [x] Inpatient Acute/Observation       []  Outpatient  : 1935         [x] Pt refused/declined therapy at this time due to:   pt refused therapy first and second attempt, reports not sleeping well last night, and states \"come back in the afternoon\"          Therapist/Assistant will attempt to see this patient, at our earliest opportunity.        Naeem Corbett Date: 2/3/2023

## 2023-02-03 NOTE — PROGRESS NOTES
Occupational Therapy  Facility/Department: [facility] AT THE [facility] MED SURG  Daily Treatment Note  NAME: Rupal Florentino  : 1935  MRN: [id number]    Date of Service: 2/3/2023    Discharge Recommendations:  Continue to assess pending progress, Home with Home health OT         Patient Diagnosis(es): The primary encounter diagnosis was Septicemia ([address] A diagnosis of Pneumonia due to infectious organism, unspecified laterality, unspecified part of lung was also pertinent to this visit. Assessment    Activity Tolerance: Patient tolerated treatment well  Discharge Recommendations: Continue to assess pending progress;Home with Home health OT      Plan   Occupational Therapy Plan  Times Per Week: 7x/wk  Times Per Day: Once a day  Current Treatment Recommendations: Strengthening;Balance training;Self-Care / ADL; Safety education & training     Restrictions   Fall risk    Subjective   Subjective  Subjective: Pt seated in chair with wife present. Pt reluctant to participate in therapy, agreed after edu and pleasant conversation. Pain: denied, the reported MIN L thigh pain with act. Objective    Vitals     Bed Mobility Training  Bed Mobility Training: No  Balance  Sitting: Intact  Standing: Impaired (~ 3 min on feet with 0 LOB noted)  Standing - Static: Good  Standing - Dynamic: Good  Transfer Training  Transfer Training: Yes  Overall Level of Assistance: Assist X1;Contact-guard assistance;Stand-by assistance;Supervision  Interventions: Verbal cues  Gait Training  Gait Training: Yes  Gait  Overall Level of Assistance: Contact-guard assistance;Assist X1 (normal household distances in room with use of RW and slow pace. Clinician managed 02 tubing, pt does not use 02 in home)  Interventions: Verbal cues          OT Exercises  Exercise Treatment: BUE therex to increase strength/endurance for ease of fxl tasks.  Red digiflex x 20 yellow flex bar x 20 bends and 1# free weight x 20 reps chest press, chest pulls, elbow curls and wrist curls. Pt required Min RBs for recovery and cueing for participation. Safety Devices  Type of Devices: Call light within reach; All fall risk precautions in place; Left in chair (wife present)     Patient Education  Education Given To: Patient  Education Provided: Role of Therapy;Plan of Care;Home Exercise Program;ADL Adaptive Strategies;Transfer Training;Energy Conservation; Fall Prevention Strategies; Equipment;IADL Safety  Education Provided Comments: rehab folder contents, emphasis on daily HEP  Education Method: Demonstration;Verbal;Printed Information/Hand-outs  Barriers to Learning: None  Education Outcome: Verbalized understanding;Demonstrated understanding    Goals  Short Term Goals  Time Frame for Short Term Goals: 20 visits  Short Term Goal 1: Pt. will tolerate 15 mins of BUE ther ex/act while maintaining SpO2 > 90% to increase overall functional activity tolerance. Short Term Goal 2: Pt. will demo standing tolerance x 6-7 mins w/o LOB to increase safety/participation with ADL's. Short Term Goal 3: Pt. will complete self care routine with SUP/mod I (with exception of footwear) to return to PLOF. Short Term Goal 4: Pt. will complete functional ADL transfers/mobility with SUP/mod I and G safety with reduced risk for falls.        Therapy Time   Individual Concurrent Group Co-treatment   Time In 1035         Time Out 1258         Minutes 24                 HAYDEN Guerrero

## 2023-02-03 NOTE — PLAN OF CARE
Problem: Discharge Planning  Goal: Discharge to home or other facility with appropriate resources  Outcome: Progressing  Flowsheets (Taken 2/3/2023 0205)  Discharge to home or other facility with appropriate resources: Identify barriers to discharge with patient and caregiver     Problem: Safety - Adult  Goal: Free from fall injury  Outcome: Progressing  Flowsheets (Taken 2/3/2023 0205)  Free From Fall Injury: Instruct family/caregiver on patient safety     Problem: Nutrition Deficit:  Goal: Optimize nutritional status  Outcome: Progressing  Flowsheets (Taken 2/3/2023 0205)  Nutrient intake appropriate for improving, restoring, or maintaining nutritional needs: Monitor oral intake, labs, and treatment plans     Problem: Respiratory - Adult  Goal: Achieves optimal ventilation and oxygenation  Outcome: Progressing  Flowsheets (Taken 2/3/2023 0205)  Achieves optimal ventilation and oxygenation:   Assess for changes in respiratory status   Position to facilitate oxygenation and minimize respiratory effort   Initiate smoking cessation protocol as indicated   Respiratory therapy support as indicated   Assess for changes in mentation and behavior   Oxygen supplementation based on oxygen saturation or arterial blood gases   Encourage broncho-pulmonary hygiene including cough, deep breathe, incentive spirometry   Assess and instruct to report shortness of breath or any respiratory difficulty     Problem: Pain  Goal: Verbalizes/displays adequate comfort level or baseline comfort level  Outcome: Progressing  Flowsheets (Taken 2/3/2023 0205)  Verbalizes/displays adequate comfort level or baseline comfort level:   Encourage patient to monitor pain and request assistance   Administer analgesics based on type and severity of pain and evaluate response   Consider cultural and social influences on pain and pain management   Assess pain using appropriate pain scale   Implement non-pharmacological measures as appropriate and evaluate response   Notify Licensed Independent Practitioner if interventions unsuccessful or patient reports new pain

## 2023-02-03 NOTE — PROGRESS NOTES
Pt resting in bed watching TV when writer entered the room. Pt is A&O x4. Vitals and assessment as charted. Pt denies pain. Pt denies any further needs at this time. Call light within reach. Bed alarm on.

## 2023-02-03 NOTE — PLAN OF CARE
Problem: Discharge Planning  Goal: Discharge to home or other facility with appropriate resources  2/3/2023 0948 by Harpreet Hanna RN  Outcome: Progressing  Flowsheets (Taken 2/3/2023 0703)  Discharge to home or other facility with appropriate resources: Identify barriers to discharge with patient and caregiver  2/3/2023 0205 by Dariel Trevino RN  Outcome: Progressing  Flowsheets (Taken 2/3/2023 0205)  Discharge to home or other facility with appropriate resources: Identify barriers to discharge with patient and caregiver     Problem: Safety - Adult  Goal: Free from fall injury  2/3/2023 0948 by Harpreet Hanna RN  Outcome: Progressing  Flowsheets (Taken 2/3/2023 0947)  Free From Fall Injury: Instruct family/caregiver on patient safety  2/3/2023 0205 by Dariel Trevino RN  Outcome: Progressing  Flowsheets (Taken 2/3/2023 0205)  Free From Fall Injury: Instruct family/caregiver on patient safety     Problem: Nutrition Deficit:  Goal: Optimize nutritional status  2/3/2023 0948 by Harpreet Hanna RN  Outcome: Progressing  Flowsheets (Taken 2/3/2023 0205 by Dariel Trevino RN)  Nutrient intake appropriate for improving, restoring, or maintaining nutritional needs: Monitor oral intake, labs, and treatment plans  2/3/2023 0205 by Dariel Trevino RN  Outcome: Progressing  Flowsheets (Taken 2/3/2023 0205)  Nutrient intake appropriate for improving, restoring, or maintaining nutritional needs: Monitor oral intake, labs, and treatment plans     Problem: Respiratory - Adult  Goal: Achieves optimal ventilation and oxygenation  2/3/2023 0948 by Harpreet Hanna RN  Outcome: Progressing  Flowsheets (Taken 2/3/2023 0703)  Achieves optimal ventilation and oxygenation: Assess for changes in respiratory status  2/3/2023 0205 by Dariel Trevino RN  Outcome: Progressing  Flowsheets (Taken 2/3/2023 0205)  Achieves optimal ventilation and oxygenation:   Assess for changes in respiratory status   Position to facilitate oxygenation and minimize respiratory effort   Initiate smoking cessation protocol as indicated   Respiratory therapy support as indicated   Assess for changes in mentation and behavior   Oxygen supplementation based on oxygen saturation or arterial blood gases   Encourage broncho-pulmonary hygiene including cough, deep breathe, incentive spirometry   Assess and instruct to report shortness of breath or any respiratory difficulty     Problem: Pain  Goal: Verbalizes/displays adequate comfort level or baseline comfort level  2/3/2023 0948 by Flynn Delgado RN  Outcome: Progressing  Flowsheets (Taken 2/3/2023 0703)  Verbalizes/displays adequate comfort level or baseline comfort level: Encourage patient to monitor pain and request assistance  2/3/2023 0205 by Angelica Gutierrez RN  Outcome: Progressing  Flowsheets (Taken 2/3/2023 0205)  Verbalizes/displays adequate comfort level or baseline comfort level:   Encourage patient to monitor pain and request assistance   Administer analgesics based on type and severity of pain and evaluate response   Consider cultural and social influences on pain and pain management   Assess pain using appropriate pain scale   Implement non-pharmacological measures as appropriate and evaluate response   Notify Licensed Independent Practitioner if interventions unsuccessful or patient reports new pain     Problem: Skin/Tissue Integrity  Goal: Absence of new skin breakdown  Description: 1. Monitor for areas of redness and/or skin breakdown  2. Assess vascular access sites hourly  3. Every 4-6 hours minimum:  Change oxygen saturation probe site  4. Every 4-6 hours:  If on nasal continuous positive airway pressure, respiratory therapy assess nares and determine need for appliance change or resting period.   Outcome: Progressing

## 2023-02-03 NOTE — PROGRESS NOTES
[facility] - Progress Note  Today's Date and Time: 2/3/2023, 10:40 AM    Impression :   Community acquired pneumonia  Hypoxic respiratory distress  Hyponatremia  KARO  Chronic cough  Leukocytosis  Elevated BNP  COPD  Chronic systolic CHF  DM II  Hx MI    Recommendations:   Start Levaquin 500 mg po daily. Stop date 2-12-23  Discontinue IV Rocephin  Discontinue azithromycin  MRSA Nares not detected  Obtain sputum culture if possible  Awaiting Mycoplasma IgM,   Legionella antigen: not detected    Medical Decision Making/Summary/Discussion:2/3/2023       Infection Control Recommendations   [place] Precautions    Antimicrobial Stewardship Recommendations     Simplification of therapy  Targeted therapy  PK dosing    Coordination of Outpatient Care:   Estimated Length of IV antimicrobials:TBD  Patient will need Midline Catheter Insertion: TBD  Patient will need PICC line Insertion:BD  Patient will need: Home IV , [place],  SNF,  LTAC: TBD  Patient will need outpatient wound care: No    Chief complaint/reason for consultation:   CAP      History of Present Illness:   Alondra Paz is a 82y.o.-year-old male who was initially admitted on 1/31/2023. Patient seen at the request of Dr. Livia Acosta:    This patient presented to [facility] ED on 1/31/23 after falling in his shower at home. He denied hitting his head or experiencing a syncopal episode, but admits to a persistent cough over the past few days. He has taken 3 days of azithromycin that his wife had given him. In the ED, he was found to have a fever of 39.7 C and his SpO2 was 88% on room air. The patient does have a history of COPD but is not on home O2. A CT chest showed multifocal consolidation in the right lung and the base of the left lung. Covid and influenza were not detected.      Abnormal labs at admission included:  Na:130  Cr:1.26  Lactic:2.4  BNP:1279  Alk phos:185  ALT:123  AST:95  Glucose:220  WBC:14.5    There has been no growth on blood or urine cultures. Since admission, the patient's WBC has increased to 19.7. He has also experienced worsening KARO and hyponatremia. His BNP has also increased to 4397. He has been receiving rocephin and azithromycin since 1/31/23. CT Chest/abd/pelvis 1/31/23  Impression   1. Multifocal consolidation in the right lung and to a lesser degree left   lung base, concerning for infection. 2.  Motion degraded evaluation of the pulmonary arteries. No evidence for   central pulmonary embolism. 3.  No acute inflammatory process identified in the abdomen or pelvis. Stable benign and incidental findings, as described. CURRENT EVALUATION 2/3/2023  BP (!) 162/82   Pulse 63   Temp 97.5 °F (36.4 °C) (Temporal)   Resp 16   Ht 5' 11\" (1.803 m)   Wt 260 lb 1.6 oz (118 kg)   SpO2 93%   BMI 36.28 kg/m²     Afebrile  VS stable, HTN    The patient is feeling better today  He is on 4-->1 L NC   SpO2: 95-->93%  RR: 20-->16    Has bronchopneumonia in RLL   Presence of hyponatremia raises concern for Legionella but antigen test negative for serotype 1  Antibiotics changed to Levaquin    Hyponatremia continues  Leukocytosis is improving    Labs, X rays reviewed: 2/3/2023    BUN:31-->29  Cr:1.3-->1.0  Na:128-->126    WBC:19.7-->16.0  Hb:9.7-->9.5  Plat: 213-->215    CRP:315.8  DPX:9799    Cultures:  Urine:    Blood:  1/31/23: No growth  Sputum :    Wound:    MRSA Nares:  2/2/23: Not detected    Imaging:    CT Chest   1/31/23        CXR  1/31/23 1/6/23      Discussed with patient, RN, family. I have personally reviewed the past medical history, past surgical history, medications, social history, and family history, and I have updated the database accordingly.   Past Medical History:     Past Medical History:   Diagnosis Date    COPD (chronic obstructive pulmonary disease) ([facility] [address]     Diabetes mellitus ([facility]     Hx of echocardiogram 2017    [facility]    Hyperlipidemia     Hypothyroidism     MI (myocardial infarction) [facility])     Stroke [facility])     Thyroid disease     Type II or unspecified type diabetes mellitus without mention of complication, not stated as uncontrolled        Past Surgical  History:     Past Surgical History:   Procedure Laterality Date    CARDIAC SURGERY  2017    Stent placed  Dr Darling Holding      right ankle    HERNIA REPAIR         Medications:      alogliptin  25 mg Oral Daily    levoFLOXacin  500 mg Oral Daily    furosemide  40 mg IntraVENous Daily    ferrous sulfate  325 mg Oral BID WC    enoxaparin  30 mg SubCUTAneous BID    aspirin  81 mg Oral Daily    levothyroxine  137 mcg Oral Daily    mometasone-formoterol  2 puff Inhalation BID    metFORMIN  1,000 mg Oral BID    montelukast  10 mg Oral Daily    oxybutynin  10 mg Oral BID    primidone  250 mg Oral BID    propranolol  120 mg Oral BID    tamsulosin  0.8 mg Oral Dinner    sodium chloride flush  5-40 mL IntraVENous 2 times per day    famotidine  20 mg Oral BID       Social History:     Social History     Socioeconomic History    Marital status:      Spouse name: Not on file    Number of children: Not on file    Years of education: Not on file    Highest education level: Not on file   Occupational History    Not on file   Tobacco Use    Smoking status: Former     Types: Cigarettes     Quit date:      Years since quittin.1    Smokeless tobacco: Never   Vaping Use    Vaping Use: Never used   Substance and Sexual Activity    Alcohol use:  Yes     Alcohol/week: 1.0 standard drink     Types: 1 Drinks containing 0.5 oz of alcohol per week     Comment: occasional    Drug use: No    Sexual activity: Not on file   Other Topics Concern    Not on file   Social History Narrative    Not on file     Social Determinants of Health Financial Resource Strain: Unknown    Difficulty of Paying Living Expenses: Patient refused   Food Insecurity: Unknown    Worried About Running Out of Food in the Last Year: Patient refused    Ran Out of Food in the Last Year: Patient refused   Transportation Needs: Not on file   Physical Activity: Inactive    Days of Exercise per Week: 0 days    Minutes of Exercise per Session: 0 min   Stress: Not on file   Social Connections: Not on file   Intimate Partner Violence: Not on file   Housing Stability: Not on file       Family History:     Family History   Problem Relation Age of Onset    Cancer Mother 47        liver ca    Diabetes Father     Heart Disease Father         Allergies:   Rosuvastatin     Review of Systems:   Constitutional: Generalized weakness  Head: No headaches  Eyes: No double vision or blurry vision. No conjunctival inflammation. ENT: No sore throat or runny nose. . No hearing loss, tinnitus or vertigo. Cardiovascular: No chest pain or palpitations. shortness of breath. CARVER  Lung: shortness of breath with cough. Abdomen: No nausea, vomiting, diarrhea, or abdominal pain. Alan Barks No cramps. Genitourinary: No increased urinary frequency, or dysuria. No hematuria. No suprapubic or CVA pain  Musculoskeletal: No muscle aches or pains. No joint effusions, swelling or deformities  Hematologic: No bleeding or bruising. Neurologic: No headache, weakness, numbness, or tingling. Integument: No rash, no ulcers. Psychiatric: No depression. Endocrine: No polyuria, no polydipsia, no polyphagia. Physical Examination :   Patient Vitals for the past 8 hrs:   BP Temp Temp src Pulse Resp SpO2 Weight   02/03/23 0707 -- -- -- 63 -- 93 % --   02/03/23 0705 -- -- -- -- -- (!) 87 % --   02/03/23 0703 (!) 162/82 97.5 °F (36.4 °C) Temporal 66 16 93 % --   02/03/23 0505 -- -- -- -- -- -- 260 lb 1.6 oz (118 kg)   DANNY-televisit  General Appearance: Awake, alert.    Head:  Normocephalic, no trauma  Eyes: Pupils equal, round, reactive to light and accommodation; extraocular movements intact; sclera anicteric; conjunctivae pink. No embolic phenomena. ENT: Oropharynx clear, without erythema, exudate, or thrush. No tenderness of sinuses. Mouth/throat: mucosa pink and moist. No lesions. Dentition in good repair. Neck:Supple, without lymphadenopathy. Thyroid normal, No bruits. Pulmonary/Chest: DANNY-televisit  Cardiovascular: DANNY-televisit   Abdomen: Soft, non tender. Bowel sounds normal. No organomegaly  All four Extremities: No cyanosis, clubbing, edema, or effusions. Neurologic: No gross sensory or motor deficits. Skin: Warm and dry with good turgor. No signs of peripheral arterial or venous insufficiency. No ulcerations. No open wounds. Medical Decision Making -Laboratory:   I have independently reviewed/ordered the following labs:    CBC with Differential:   Recent Labs     02/02/23  0550 02/03/23  0559   WBC 19.7* 16.0*   HGB 9.7* 9.5*   HCT 30.6* 29.2*    215   LYMPHOPCT 5* 9*   MONOPCT 13* 8     BMP:   Recent Labs     02/02/23  0550 02/03/23  0559   * 126*   K 4.2 4.3   CL 96* 93*   CO2 20 20   BUN 31* 29*   CREATININE 1.31* 1.09     Hepatic Function Panel:   Recent Labs     02/02/23  0550 02/03/23  0559   PROT 6.5 6.5   LABALBU 3.1* 3.0*   BILITOT 0.3 0.3   ALKPHOS 147* 180*   ALT 97* 82*   AST 69* 57*     No results for input(s): RPR in the last 72 hours. No results for input(s): HIV in the last 72 hours. No results for input(s): BC in the last 72 hours.   Lab Results   Component Value Date/Time    MUCUS TRACE 01/31/2023 07:45 PM    RBC 3.27 02/03/2023 05:59 AM    TRICHOMONAS NOT REPORTED 12/29/2021 11:30 PM    WBC 16.0 02/03/2023 05:59 AM    YEAST NOT REPORTED 12/29/2021 11:30 PM    TURBIDITY Clear 01/31/2023 07:45 PM     Lab Results   Component Value Date/Time    CREATININE 1.09 02/03/2023 05:59 AM    GLUCOSE 190 02/03/2023 05:59 AM       Medical Decision Making-Imaging:     Narrative   EXAMINATION:   CTA OF THE CHEST; CT OF THE ABDOMEN AND PELVIS WITH CONTRAST 1/31/2023 5:58 pm       TECHNIQUE:   CTA of the chest was performed after the administration of intravenous   contrast.  Multiplanar reformatted images are provided for review. MIP   images are provided for review. Automated exposure control, iterative   reconstruction, and/or weight based adjustment of the mA/kV was utilized to   reduce the radiation dose to as low as reasonably achievable.; CT of the   abdomen and pelvis was performed with the administration of intravenous   contrast. Multiplanar reformatted images are provided for review. Automated   exposure control, iterative reconstruction, and/or weight based adjustment of   the mA/kV was utilized to reduce the radiation dose to as low as reasonably   achievable. COMPARISON:   Chest radiograph today. Chest CT 12/02/2022. CT chest abdomen pelvis   07/21/2021. HISTORY:   ORDERING SYSTEM PROVIDED HISTORY: Fever, hypoxia, history of PE   TECHNOLOGIST PROVIDED HISTORY:   Fever, hypoxia, history of PE   Decision Support Exception - unselect if not a suspected or confirmed   emergency medical condition->Emergency Medical Condition (MA); ORDERING   SYSTEM PROVIDED HISTORY: Fever elevated liver enzyme   TECHNOLOGIST PROVIDED HISTORY:       Fever elevated liver enzyme   Decision Support Exception - unselect if not a suspected or confirmed   emergency medical condition->Emergency Medical Condition (MA)       FINDINGS:   CHEST CT:       Pulmonary Arteries: Pulmonary arteries are adequately opacified for   evaluation. Motion artifact is noted, degrading evaluation of the segmental   branches. No evidence for central pulmonary embolism. Main pulmonary artery   is normal in caliber. Mediastinum: No evidence of mediastinal lymphadenopathy. The heart and   pericardium demonstrate no acute abnormality. There is no acute abnormality   of the thoracic aorta. Calcified atheromatous plaque. Lungs/pleura: Expiratory phase of imaging and mild motion artifact noted. Consolidative opacities are present in the posterolateral inferior right   upper lobe and posterior right lower lobe. Patchy airspace disease to a   lesser degree is present in the left lung base. Mild subsegmental   atelectasis is also present. No effusion. The central airway is patent. Soft Tissues/Bones: No acute bone or soft tissue abnormality. ABDOMEN PELVIS:       Organs: The liver, gallbladder, pancreas, spleen, adrenals and kidneys reveal   no acute findings. Horseshoe kidney. Stable left adrenal nodules measuring   up to 1.7 cm. GI/Bowel: There is no bowel dilatation or wall thickening identified. Diverticulosis. Pelvis: No acute findings. Peritoneum/Retroperitoneum: No free air or free fluid. The aorta is normal   in caliber. The visceral branches are patent. No lymphadenopathy. Bones/Soft Tissues: No abnormality identified. *Unless otherwise specified, incidental findings do not require dedicated   imaging follow-up. Impression   1. Multifocal consolidation in the right lung and to a lesser degree left   lung base, concerning for infection. 2.  Motion degraded evaluation of the pulmonary arteries. No evidence for   central pulmonary embolism. 3.  No acute inflammatory process identified in the abdomen or pelvis. Stable benign and incidental findings, as described.                  Medical Decision [id number]:       Medical Decision Making-Other:     Note:  Labs, medications, radiologic studies were reviewed with personal review of films  Large amounts of data were reviewed  Discussed with nursing Staff, Discharge planner  Infection Control and Prevention measures reviewed  All prior entries were reviewed  Administer medications as ordered  Prognosis: Guarded  Discharge planning reviewed      Thank you for allowing us to participate in the care of this patient. Please call with questions. Yashira Emery, GENA - CNP  ATTESTATION:    I have discussed the case, including pertinent history and exam findings with the APRN. I have evaluated the  History, physical findings and pictures of the patient and the key elements of the encounter have been performed by me. I have reviewed the laboratory data, other diagnostic studies and discussed them with the APRN. I have updated the medical record where necessary. I agree with the assessment, plan and orders as documented by the APRN.     Mark Abreu MD.      Pager: [phone] - Office: [phone]

## 2023-02-04 LAB
ABSOLUTE EOS #: 0.14 K/UL (ref 0–0.44)
ABSOLUTE IMMATURE GRANULOCYTE: 0.83 K/UL (ref 0–0.3)
ABSOLUTE LYMPH #: 1.11 K/UL (ref 1.1–3.7)
ABSOLUTE MONO #: 0.97 K/UL (ref 0.1–1.2)
ALBUMIN SERPL-MCNC: 3 G/DL (ref 3.5–5.2)
ALBUMIN/GLOBULIN RATIO: 1 (ref 1–2.5)
ALP SERPL-CCNC: 207 U/L (ref 40–129)
ALT SERPL-CCNC: 67 U/L (ref 5–41)
ANION GAP SERPL CALCULATED.3IONS-SCNC: 12 MMOL/L (ref 9–17)
AST SERPL-CCNC: 49 U/L
BASOPHILS # BLD: 0 % (ref 0–2)
BASOPHILS ABSOLUTE: 0 K/UL (ref 0–0.2)
BILIRUB SERPL-MCNC: 0.4 MG/DL (ref 0.3–1.2)
BUN SERPL-MCNC: 24 MG/DL (ref 8–23)
BUN/CREAT BLD: 25 (ref 9–20)
CALCIUM SERPL-MCNC: 8.6 MG/DL (ref 8.6–10.4)
CHLORIDE SERPL-SCNC: 92 MMOL/L (ref 98–107)
CO2 SERPL-SCNC: 21 MMOL/L (ref 20–31)
CREAT SERPL-MCNC: 0.96 MG/DL (ref 0.7–1.2)
EOSINOPHILS RELATIVE PERCENT: 1 % (ref 1–4)
GFR SERPL CREATININE-BSD FRML MDRD: >60 ML/MIN/1.73M2
GLUCOSE SERPL-MCNC: 157 MG/DL (ref 70–99)
HCT VFR BLD AUTO: 28.5 % (ref 40.7–50.3)
HGB BLD-MCNC: 9.3 G/DL (ref 13–17)
IMMATURE GRANULOCYTES: 6 %
LYMPHOCYTES # BLD: 8 % (ref 24–43)
MCH RBC QN AUTO: 28.8 PG (ref 25.2–33.5)
MCHC RBC AUTO-ENTMCNC: 32.6 G/DL (ref 28.4–34.8)
MCV RBC AUTO: 88.2 FL (ref 82.6–102.9)
MONOCYTES # BLD: 7 % (ref 3–12)
MORPHOLOGY: ABNORMAL
NRBC AUTOMATED: 0 PER 100 WBC
PDW BLD-RTO: 14.7 % (ref 11.8–14.4)
PLATELET # BLD AUTO: 231 K/UL (ref 138–453)
PMV BLD AUTO: 10.5 FL (ref 8.1–13.5)
POTASSIUM SERPL-SCNC: 4.1 MMOL/L (ref 3.7–5.3)
PROT SERPL-MCNC: 6 G/DL (ref 6.4–8.3)
RBC # BLD: 3.23 M/UL (ref 4.21–5.77)
SEG NEUTROPHILS: 78 % (ref 36–65)
SEGMENTED NEUTROPHILS ABSOLUTE COUNT: 10.85 K/UL (ref 1.5–8.1)
SODIUM SERPL-SCNC: 125 MMOL/L (ref 135–144)
WBC # BLD AUTO: 13.9 K/UL (ref 3.5–11.3)

## 2023-02-04 PROCEDURE — 2700000000 HC OXYGEN THERAPY PER DAY

## 2023-02-04 PROCEDURE — 6370000000 HC RX 637 (ALT 250 FOR IP): Performed by: INTERNAL MEDICINE

## 2023-02-04 PROCEDURE — 2580000003 HC RX 258: Performed by: INTERNAL MEDICINE

## 2023-02-04 PROCEDURE — 36415 COLL VENOUS BLD VENIPUNCTURE: CPT

## 2023-02-04 PROCEDURE — 1200000000 HC SEMI PRIVATE

## 2023-02-04 PROCEDURE — 6370000000 HC RX 637 (ALT 250 FOR IP): Performed by: FAMILY MEDICINE

## 2023-02-04 PROCEDURE — 94640 AIRWAY INHALATION TREATMENT: CPT

## 2023-02-04 PROCEDURE — 99232 SBSQ HOSP IP/OBS MODERATE 35: CPT | Performed by: INTERNAL MEDICINE

## 2023-02-04 PROCEDURE — 94761 N-INVAS EAR/PLS OXIMETRY MLT: CPT

## 2023-02-04 PROCEDURE — 97116 GAIT TRAINING THERAPY: CPT

## 2023-02-04 PROCEDURE — 6360000002 HC RX W HCPCS: Performed by: FAMILY MEDICINE

## 2023-02-04 PROCEDURE — 51798 US URINE CAPACITY MEASURE: CPT

## 2023-02-04 PROCEDURE — 94664 DEMO&/EVAL PT USE INHALER: CPT

## 2023-02-04 PROCEDURE — 51702 INSERT TEMP BLADDER CATH: CPT

## 2023-02-04 PROCEDURE — 85025 COMPLETE CBC W/AUTO DIFF WBC: CPT

## 2023-02-04 PROCEDURE — 97110 THERAPEUTIC EXERCISES: CPT

## 2023-02-04 PROCEDURE — 80053 COMPREHEN METABOLIC PANEL: CPT

## 2023-02-04 PROCEDURE — 94669 MECHANICAL CHEST WALL OSCILL: CPT

## 2023-02-04 RX ORDER — IPRATROPIUM BROMIDE AND ALBUTEROL SULFATE 2.5; .5 MG/3ML; MG/3ML
1 SOLUTION RESPIRATORY (INHALATION) 4 TIMES DAILY
Status: DISCONTINUED | OUTPATIENT
Start: 2023-02-05 | End: 2023-02-10 | Stop reason: HOSPADM

## 2023-02-04 RX ORDER — IPRATROPIUM BROMIDE AND ALBUTEROL SULFATE 2.5; .5 MG/3ML; MG/3ML
1 SOLUTION RESPIRATORY (INHALATION) EVERY 6 HOURS PRN
Status: DISCONTINUED | OUTPATIENT
Start: 2023-02-04 | End: 2023-02-04

## 2023-02-04 RX ORDER — ALBUTEROL SULFATE 2.5 MG/3ML
2.5 SOLUTION RESPIRATORY (INHALATION) EVERY 4 HOURS PRN
Status: DISCONTINUED | OUTPATIENT
Start: 2023-02-04 | End: 2023-02-10 | Stop reason: HOSPADM

## 2023-02-04 RX ADMIN — IPRATROPIUM BROMIDE AND ALBUTEROL SULFATE 1 AMPULE: 2.5; .5 SOLUTION RESPIRATORY (INHALATION) at 20:09

## 2023-02-04 RX ADMIN — ASPIRIN 81 MG: 81 TABLET, COATED ORAL at 08:27

## 2023-02-04 RX ADMIN — LEVOTHYROXINE SODIUM 137 MCG: 25 TABLET ORAL at 08:26

## 2023-02-04 RX ADMIN — SODIUM CHLORIDE, PRESERVATIVE FREE 10 ML: 5 INJECTION INTRAVENOUS at 21:34

## 2023-02-04 RX ADMIN — FERROUS SULFATE TAB 325 MG (65 MG ELEMENTAL FE) 325 MG: 325 (65 FE) TAB at 17:32

## 2023-02-04 RX ADMIN — OXYBUTYNIN CHLORIDE 10 MG: 5 TABLET ORAL at 08:26

## 2023-02-04 RX ADMIN — FUROSEMIDE 40 MG: 10 INJECTION, SOLUTION INTRAMUSCULAR; INTRAVENOUS at 08:27

## 2023-02-04 RX ADMIN — LEVOFLOXACIN 500 MG: 500 TABLET, FILM COATED ORAL at 08:26

## 2023-02-04 RX ADMIN — MOMETASONE FUROATE AND FORMOTEROL FUMARATE DIHYDRATE 2 PUFF: 200; 5 AEROSOL RESPIRATORY (INHALATION) at 20:09

## 2023-02-04 RX ADMIN — FERROUS SULFATE TAB 325 MG (65 MG ELEMENTAL FE) 325 MG: 325 (65 FE) TAB at 08:27

## 2023-02-04 RX ADMIN — TAMSULOSIN HYDROCHLORIDE 0.8 MG: 0.4 CAPSULE ORAL at 17:32

## 2023-02-04 RX ADMIN — MONTELUKAST 10 MG: 10 TABLET, FILM COATED ORAL at 08:26

## 2023-02-04 RX ADMIN — OXYBUTYNIN CHLORIDE 10 MG: 5 TABLET ORAL at 21:33

## 2023-02-04 RX ADMIN — FAMOTIDINE 20 MG: 20 TABLET, FILM COATED ORAL at 08:27

## 2023-02-04 RX ADMIN — METFORMIN HYDROCHLORIDE 1000 MG: 500 TABLET ORAL at 21:33

## 2023-02-04 RX ADMIN — ENOXAPARIN SODIUM 30 MG: 100 INJECTION SUBCUTANEOUS at 08:27

## 2023-02-04 RX ADMIN — MOMETASONE FUROATE AND FORMOTEROL FUMARATE DIHYDRATE 2 PUFF: 200; 5 AEROSOL RESPIRATORY (INHALATION) at 08:46

## 2023-02-04 RX ADMIN — ALOGLIPTIN 25 MG: 25 TABLET, FILM COATED ORAL at 08:27

## 2023-02-04 RX ADMIN — ENOXAPARIN SODIUM 30 MG: 100 INJECTION SUBCUTANEOUS at 21:34

## 2023-02-04 RX ADMIN — PRIMIDONE 250 MG: 250 TABLET ORAL at 08:26

## 2023-02-04 RX ADMIN — PROPRANOLOL HYDROCHLORIDE 120 MG: 60 CAPSULE, EXTENDED RELEASE ORAL at 08:27

## 2023-02-04 RX ADMIN — PRIMIDONE 250 MG: 250 TABLET ORAL at 17:32

## 2023-02-04 RX ADMIN — FAMOTIDINE 20 MG: 20 TABLET, FILM COATED ORAL at 21:33

## 2023-02-04 RX ADMIN — METFORMIN HYDROCHLORIDE 1000 MG: 500 TABLET ORAL at 08:26

## 2023-02-04 RX ADMIN — SODIUM CHLORIDE, PRESERVATIVE FREE 10 ML: 5 INJECTION INTRAVENOUS at 08:27

## 2023-02-04 RX ADMIN — PROPRANOLOL HYDROCHLORIDE 120 MG: 60 CAPSULE, EXTENDED RELEASE ORAL at 17:32

## 2023-02-04 NOTE — PROGRESS NOTES
[facility] - Progress Note  Today's Date and Time: 2/4/2023, 7:06 AM    Impression :   Community acquired pneumonia  Hypoxic respiratory distress  Hyponatremia  KARO  Chronic cough  Leukocytosis  Elevated BNP  COPD  Chronic systolic CHF  DM II  Hx MI    Recommendations:   Start Levaquin 500 mg po daily. Stop date 2-12-23  Discontinue IV Rocephin  Discontinue azithromycin    MRSA Nares not detected  Mycoplasma IgM: negative   Legionella antigen: not detected    Medical Decision Making/Summary/Discussion:2/4/2023       Infection Control Recommendations   [place] Precautions    Antimicrobial Stewardship Recommendations     Simplification of therapy  Targeted therapy  PK dosing    Coordination of Outpatient Care:   Estimated Length of IV antimicrobials:TBD  Patient will need Midline Catheter Insertion: TBD  Patient will need PICC line Insertion:BD  Patient will need: Home IV , [place],  SNF,  LTAC: TBD  Patient will need outpatient wound care: No    Chief complaint/reason for consultation:   CAP      History of Present Illness:   Pedro Berg is a 82y.o.-year-old male who was initially admitted on 1/31/2023. Patient seen at the request of Dr. Divya Pascual:    This patient presented to [facility] ED on 1/31/23 after falling in his shower at home. He denied hitting his head or experiencing a syncopal episode, but admits to a persistent cough over the past few days. He has taken 3 days of azithromycin that his wife had given him. In the ED, he was found to have a fever of 39.7 C and his SpO2 was 88% on room air. The patient does have a history of COPD but is not on home O2. A CT chest showed multifocal consolidation in the right lung and the base of the left lung. Covid and influenza were not detected.      Abnormal labs at admission included:  Na:130  Cr:1.26  Lactic:2.4  BNP:1279  Alk phos:185  ALT:123  AST:95  Glucose:220  WBC:14.5    There has been no growth on blood or urine cultures. Since admission, the patient's WBC has increased to 19.7. He has also experienced worsening KARO and hyponatremia. His BNP has also increased to 4397. He has been receiving rocephin and azithromycin since 1/31/23. CT Chest/abd/pelvis 1/31/23  Impression   1. Multifocal consolidation in the right lung and to a lesser degree left   lung base, concerning for infection. 2.  Motion degraded evaluation of the pulmonary arteries. No evidence for   central pulmonary embolism. 3.  No acute inflammatory process identified in the abdomen or pelvis. Stable benign and incidental findings, as described. CURRENT EVALUATION 2/4/2023  BP (!) 116/56   Pulse 74   Temp 98.8 °F (37.1 °C) (Temporal)   Resp 16   Ht 5' 11\" (1.803 m)   Wt 270 lb 6.4 oz (122.7 kg)   SpO2 93%   BMI 37.71 kg/m²     Afebrile  VS stable    Patient feels better  No complaints  No new issues per RN    He is on 4-->1 L NC   SpO2: 95-->93%  RR: 20-->16    Has bronchopneumonia in RLL   Presence of hyponatremia raises concern for Legionella but antigen test negative for serotype 1    Medications reviewed: On Levaquin    Hyponatremia continues  Leukocytosis is improving    Labs, X rays reviewed: 2/4/2023    BUN:31-->29-->24  Cr:1.3-->-->0.96  Na:128-->125    WBC:19.7-->16.0-->13.9  Hb:9.7-->9.3  Plat: 213-->215-->231    CRP:315.8  MIJ:4348    Cultures:  Urine:    Blood:  1/31/23: No growth  Sputum :    Wound:    MRSA Nares:  2/2/23: Not detected    Imaging:    CT Chest   1/31/23        CXR  1/31/23 1/6/23      Discussed with patient, RN, family. I have personally reviewed the past medical history, past surgical history, medications, social history, and family history, and I have updated the database accordingly.   Past Medical History:     Past Medical History:   Diagnosis Date    COPD (chronic obstructive pulmonary disease) ([facility]     Diabetes mellitus ([facility]     Hx of echocardiogram 2017    [facility]    Hyperlipidemia     Hypothyroidism     MI (myocardial infarction) [facility])     Stroke [facility])     Thyroid disease     Type II or unspecified type diabetes mellitus without mention of complication, not stated as uncontrolled        Past Surgical  History:     Past Surgical History:   Procedure Laterality Date    CARDIAC SURGERY  2017    Stent placed  Dr Brandan Yadav      right ankle    HERNIA REPAIR         Medications:      alogliptin  25 mg Oral Daily    levoFLOXacin  500 mg Oral Daily    furosemide  40 mg IntraVENous Daily    ferrous sulfate  325 mg Oral BID WC    enoxaparin  30 mg SubCUTAneous BID    aspirin  81 mg Oral Daily    levothyroxine  137 mcg Oral Daily    mometasone-formoterol  2 puff Inhalation BID    metFORMIN  1,000 mg Oral BID    montelukast  10 mg Oral Daily    oxybutynin  10 mg Oral BID    primidone  250 mg Oral BID    propranolol  120 mg Oral BID    tamsulosin  0.8 mg Oral Dinner    sodium chloride flush  5-40 mL IntraVENous 2 times per day    famotidine  20 mg Oral BID       Social History:     Social History     Socioeconomic History    Marital status:      Spouse name: Not on file    Number of children: Not on file    Years of education: Not on file    Highest education level: Not on file   Occupational History    Not on file   Tobacco Use    Smoking status: Former     Types: Cigarettes     Quit date:      Years since quittin.1    Smokeless tobacco: Never   Vaping Use    Vaping Use: Never used   Substance and Sexual Activity    Alcohol use:  Yes     Alcohol/week: 1.0 standard drink     Types: 1 Drinks containing 0.5 oz of alcohol per week     Comment: occasional    Drug use: No    Sexual activity: Not on file   Other Topics Concern    Not on file   Social History Narrative    Not on file     Social Determinants of Health     Financial Resource Strain: Unknown    Difficulty of Paying Living Expenses: Patient refused   Food Insecurity: Unknown    Worried About Running Out of Food in the Last Year: Patient refused    Ran Out of Food in the Last Year: Patient refused   Transportation Needs: Not on file   Physical Activity: Inactive    Days of Exercise per Week: 0 days    Minutes of Exercise per Session: 0 min   Stress: Not on file   Social Connections: Not on file   Intimate Partner Violence: Not on file   Housing Stability: Not on file       Family History:     Family History   Problem Relation Age of Onset    Cancer Mother 47        liver ca    Diabetes Father     Heart Disease Father         Allergies:   Rosuvastatin     Review of Systems:   Constitutional: Generalized weakness  Head: No headaches  Eyes: No double vision or blurry vision. No conjunctival inflammation. ENT: No sore throat or runny nose. . No hearing loss, tinnitus or vertigo. Cardiovascular: No chest pain or palpitations. shortness of breath. CARVER  Lung: shortness of breath with cough. Abdomen: No nausea, vomiting, diarrhea, or abdominal pain. Yadira Other No cramps. Genitourinary: No increased urinary frequency, or dysuria. No hematuria. No suprapubic or CVA pain  Musculoskeletal: No muscle aches or pains. No joint effusions, swelling or deformities  Hematologic: No bleeding or bruising. Neurologic: No headache, weakness, numbness, or tingling. Integument: No rash, no ulcers. Psychiatric: No depression. Endocrine: No polyuria, no polydipsia, no polyphagia. Physical Examination :   Patient Vitals for the past 8 hrs:   Weight   02/04/23 0433 270 lb 6.4 oz (122.7 kg)     DANNY-televisit  General Appearance: Awake, alert. Head:  Normocephalic, no trauma  Eyes: Pupils equal, round, reactive to light and accommodation; extraocular movements intact; sclera anicteric; conjunctivae pink. No embolic phenomena. ENT: Oropharynx clear, without erythema, exudate, or thrush. No tenderness of sinuses. Mouth/throat: mucosa pink and moist. No lesions. Dentition in good repair. Neck:Supple, without lymphadenopathy. Thyroid normal, No bruits. Pulmonary/Chest: DANNY-televisit  Cardiovascular: DANNY-televisit   Abdomen: Soft, non tender. Bowel sounds normal. No organomegaly  All four Extremities: No cyanosis, clubbing, edema, or effusions. Neurologic: No gross sensory or motor deficits. Skin: Warm and dry with good turgor. No signs of peripheral arterial or venous insufficiency. No ulcerations. No open wounds. Medical Decision Making -Laboratory:   I have independently reviewed/ordered the following labs:    CBC with Differential:   Recent Labs     02/03/23  0559 02/04/23  0545   WBC 16.0* 13.9*   HGB 9.5* 9.3*   HCT 29.2* 28.5*    231   LYMPHOPCT 9* 8*   MONOPCT 8 7       BMP:   Recent Labs     02/03/23  0559 02/04/23  0545   * 125*   K 4.3 4.1   CL 93* 92*   CO2 20 21   BUN 29* 24*   CREATININE 1.09 0.96       Hepatic Function Panel:   Recent Labs     02/03/23  0559 02/04/23  0545   PROT 6.5 6.0*   LABALBU 3.0* 3.0*   BILITOT 0.3 0.4   ALKPHOS 180* 207*   ALT 82* 67*   AST 57* 49*       No results for input(s): RPR in the last 72 hours. No results for input(s): HIV in the last 72 hours. No results for input(s): BC in the last 72 hours.   Lab Results   Component Value Date/Time    MUCUS TRACE 01/31/2023 07:45 PM    RBC 3.23 02/04/2023 05:45 AM    TRICHOMONAS NOT REPORTED 12/29/2021 11:30 PM    WBC 13.9 02/04/2023 05:45 AM    YEAST NOT REPORTED 12/29/2021 11:30 PM    TURBIDITY Clear 01/31/2023 07:45 PM     Lab Results   Component Value Date/Time    CREATININE 0.96 02/04/2023 05:45 AM    GLUCOSE 157 02/04/2023 05:45 AM       Medical Decision Making-Imaging:     Narrative   EXAMINATION:   CTA OF THE CHEST; CT OF THE ABDOMEN AND PELVIS WITH CONTRAST 1/31/2023 5:58 pm       TECHNIQUE:   CTA of the chest was performed after the administration of intravenous   contrast.  Multiplanar reformatted images are provided for review. MIP   images are provided for review. Automated exposure control, iterative   reconstruction, and/or weight based adjustment of the mA/kV was utilized to   reduce the radiation dose to as low as reasonably achievable.; CT of the   abdomen and pelvis was performed with the administration of intravenous   contrast. Multiplanar reformatted images are provided for review. Automated   exposure control, iterative reconstruction, and/or weight based adjustment of   the mA/kV was utilized to reduce the radiation dose to as low as reasonably   achievable. COMPARISON:   Chest radiograph today. Chest CT 12/02/2022. CT chest abdomen pelvis   07/21/2021. HISTORY:   ORDERING SYSTEM PROVIDED HISTORY: Fever, hypoxia, history of PE   TECHNOLOGIST PROVIDED HISTORY:   Fever, hypoxia, history of PE   Decision Support Exception - unselect if not a suspected or confirmed   emergency medical condition->Emergency Medical Condition (MA); ORDERING   SYSTEM PROVIDED HISTORY: Fever elevated liver enzyme   TECHNOLOGIST PROVIDED HISTORY:       Fever elevated liver enzyme   Decision Support Exception - unselect if not a suspected or confirmed   emergency medical condition->Emergency Medical Condition (MA)       FINDINGS:   CHEST CT:       Pulmonary Arteries: Pulmonary arteries are adequately opacified for   evaluation. Motion artifact is noted, degrading evaluation of the segmental   branches. No evidence for central pulmonary embolism. Main pulmonary artery   is normal in caliber. Mediastinum: No evidence of mediastinal lymphadenopathy. The heart and   pericardium demonstrate no acute abnormality. There is no acute abnormality   of the thoracic aorta. Calcified atheromatous plaque. Lungs/pleura: Expiratory phase of imaging and mild motion artifact noted. Consolidative opacities are present in the posterolateral inferior right   upper lobe and posterior right lower lobe.   Patchy airspace disease to a   lesser degree is present in the left lung base. Mild subsegmental   atelectasis is also present. No effusion. The central airway is patent. Soft Tissues/Bones: No acute bone or soft tissue abnormality. ABDOMEN PELVIS:       Organs: The liver, gallbladder, pancreas, spleen, adrenals and kidneys reveal   no acute findings. Horseshoe kidney. Stable left adrenal nodules measuring   up to 1.7 cm. GI/Bowel: There is no bowel dilatation or wall thickening identified. Diverticulosis. Pelvis: No acute findings. Peritoneum/Retroperitoneum: No free air or free fluid. The aorta is normal   in caliber. The visceral branches are patent. No lymphadenopathy. Bones/Soft Tissues: No abnormality identified. *Unless otherwise specified, incidental findings do not require dedicated   imaging follow-up. Impression   1. Multifocal consolidation in the right lung and to a lesser degree left   lung base, concerning for infection. 2.  Motion degraded evaluation of the pulmonary arteries. No evidence for   central pulmonary embolism. 3.  No acute inflammatory process identified in the abdomen or pelvis. Stable benign and incidental findings, as described. Medical Decision [id number]:       Medical Decision Making-Other:     Note:  Labs, medications, radiologic studies were reviewed with personal review of films  Large amounts of data were reviewed  Discussed with nursing Staff, Discharge planner  Infection Control and Prevention measures reviewed  All prior entries were reviewed  Administer medications as ordered  Prognosis: Guarded  Discharge planning reviewed      Thank you for allowing us to participate in the care of this patient. Please call with questions.     Elvia Reed MD        Pager: [phone] - Office: [phone]

## 2023-02-04 NOTE — PROGRESS NOTES
Patient awake and in bed. Denies any pain. VS and assessment completed. Patient up to the chair. Urinal emptied. Plan of care gone over with patient.  Patient still on 1L NC a this time

## 2023-02-04 NOTE — PLAN OF CARE
Problem: Discharge Planning  Goal: Discharge to home or other facility with appropriate resources  Outcome: Progressing  Flowsheets (Taken 2/4/2023 0155)  Discharge to home or other facility with appropriate resources: Identify barriers to discharge with patient and caregiver     Problem: Safety - Adult  Goal: Free from fall injury  Outcome: Progressing  Flowsheets (Taken 2/4/2023 0155)  Free From Fall Injury: Instruct family/caregiver on patient safety     Problem: Nutrition Deficit:  Goal: Optimize nutritional status  Outcome: Progressing  Flowsheets (Taken 2/4/2023 0155)  Nutrient intake appropriate for improving, restoring, or maintaining nutritional needs: Monitor oral intake, labs, and treatment plans     Problem: Respiratory - Adult  Goal: Achieves optimal ventilation and oxygenation  Outcome: Progressing  Flowsheets (Taken 2/4/2023 0155)  Achieves optimal ventilation and oxygenation:   Assess for changes in respiratory status   Position to facilitate oxygenation and minimize respiratory effort   Respiratory therapy support as indicated   Assess for changes in mentation and behavior   Oxygen supplementation based on oxygen saturation or arterial blood gases   Encourage broncho-pulmonary hygiene including cough, deep breathe, incentive spirometry   Assess and instruct to report shortness of breath or any respiratory difficulty     Problem: Pain  Goal: Verbalizes/displays adequate comfort level or baseline comfort level  Outcome: Progressing  Flowsheets (Taken 2/4/2023 0155)  Verbalizes/displays adequate comfort level or baseline comfort level:   Encourage patient to monitor pain and request assistance   Administer analgesics based on type and severity of pain and evaluate response   Consider cultural and social influences on pain and pain management   Assess pain using appropriate pain scale   Implement non-pharmacological measures as appropriate and evaluate response   Notify Licensed Independent Practitioner if interventions unsuccessful or patient reports new pain     Problem: Skin/Tissue Integrity  Goal: Absence of new skin breakdown  Description: 1. Monitor for areas of redness and/or skin breakdown  2. Assess vascular access sites hourly  3. Every 4-6 hours minimum:  Change oxygen saturation probe site  4. Every 4-6 hours:  If on nasal continuous positive airway pressure, respiratory therapy assess nares and determine need for appliance change or resting period. Outcome: Progressing  Note: No new skin issues noted at this time.

## 2023-02-04 NOTE — PROGRESS NOTES
[facility]  Inpatient/Observation/Outpatient Rehabilitation    Date: 2023  Patient Name: Fernando Rodriguez       [x] Inpatient Acute/Observation       []  Outpatient  : 1935       [] Pt no showed for scheduled appointment    [x] Pt refused/declined therapy at this time due to:           [] Pt cancelled due to:  [] No Reason Given   [] Sick/ill   [] Other:    Pt refused therapy session x 2 attempts stating \"I'm too tired to do therapy, I will do them when I get home\". Pt educated on importance of therapy session and pt continued to decline. Therapist/Assistant will attempt to see this patient, at our earliest opportunity.        RAFAEL Rodriguez Date: 2023

## 2023-02-04 NOTE — PROGRESS NOTES
Patient in bed, resting with eyes closed. Vitals taken and documented. Assessment completed and documented. Patient alert, oriented x4. Calm, pleasant. Speech clear. Mild tremors noted in hands. Lung sounds clear in bilateral upper lobes. Diminished at bilateral bases of lungs and right middle lobe. Noted congested, non productive cough. Abdomen obese, soft, non tender to palpation. Bowel sounds active in all four quadrants. Non-pitting edema noted in bilateral lower extremities. Scattered ecchymosis noted. Patient denies of pain, chest pain, numbness, tingling, or shortness of breath. Patient denies needs or concerns at this time. Call light and over bed table in reach. Side rails up times two. Water pitcher refilled.

## 2023-02-04 NOTE — PROGRESS NOTES
2Physical Therapy  Facility/Department: [facility]  Daily Treatment Note  NAME: Jessica Dale  : 1935  MRN: [id number]    Date of Service: 2023    Discharge Recommendations:  Continue to assess pending progress, Patient would benefit from continued therapy after discharge, Home with Home health PT, Outpatient PT      Patient Diagnosis(es): The primary encounter diagnosis was Septicemia ([address] A diagnosis of Pneumonia due to infectious organism, unspecified laterality, unspecified part of lung was also pertinent to this visit. Assessment   Assessment: Pt spouse present during treatment. Seated BLE exercises x15 AP's, LAQ's, Marches. Supine x15 hip abd/add. Transfers CGA/SBA x1. Gait x10' and 20' with WW and CGA. Pt demoed increased fatiue/SOB with ambulation cues provided for purse lip breathing. Pt SpO2 92% post ambulation with pt on 1L of O2. Activity Tolerance: Patient tolerated treatment well;Patient limited by fatigue     Plan    Physcial Therapy Plan  General Plan: 2 times a day 7 days a week (1x/day on weekends and holidays)  Current Treatment Recommendations: Strengthening;Balance training;Functional mobility training;Transfer training; Endurance training;Gait training;Stair training;Neuromuscular re-education; Safety education & training; Therapeutic activities     Restrictions  Restrictions/Precautions  Restrictions/Precautions: General Precautions, Fall Risk     Subjective    Subjective  Subjective: Pt in chair upon arrival with wife present and agreeable to therapy  Pain: no c/o pain at this time  Orientation  Overall Orientation Status: Within Functional Limits     Objective   Bed Mobility Training  Bed Mobility Training: Yes  Overall Level of Assistance: Assist X1;Stand-by assistance  Interventions: Verbal cues  Sit to Supine: Stand-by assistance;Assist X1  Transfer Training  Transfer Training: Yes  Overall Level of Assistance: Assist X1;Contact-guard assistance;Stand-by assistance  Interventions: Verbal cues  Sit to Stand: Contact-guard assistance;Stand-by assistance;Assist X1  Stand to Sit: Stand-by assistance;Assist X1  Stand Pivot Transfers: Contact-guard assistance;Assist X1;Stand-by assistance  Gait Training  Gait Training: Yes  Gait  Overall Level of Assistance: Contact-guard assistance;Assist X1  Interventions: Verbal cues  Base of Support: Widened  Speed/Geovanna: Slow  Gait Abnormalities: Decreased step clearance  Distance (ft): 30 Feet  Assistive Device: Walker, rolling;Gait belt  Neuromuscular Education  Neuromuscular Education: No  PT Exercises  Exercise Treatment: Seated BLE exercises x15 AP's, LAQ's, Marches. Supine x15 hip abd/add. Safety Devices  Type of Devices: All fall risk precautions in place; Bed alarm in place; Left in bed;Nurse notified;Gait belt;Call light within reach       Goals  Short Term Goals  Time Frame for Short Term Goals: 20 days  Short Term Goal 1: Initiate and progress HEP for strength and balance. Short Term Goal 2: Pt will be independent with bed mobility without use of rails for discharge home independently. Short Term Goal 3: Pt will transfer with +1 supervision with least restrictive device to decrease fall risk. Short Term Goal 4: Pt will ambulate with supervision to SBA +1 with least restrictive device for up to 75 feet to allow patient safe entry and exit into his home  Short Term Goal 5: Pt will negotiate 4 steps with railing +1 SBA for entry and exit into buildings for appointments. Patient Goals   Patient Goals : return home    Education  Patient Education  Education Given To: Patient  Education Provided Comments: Continued ed in safety with AD during transfers to [facility] [address] before sitting.   Education Method: Verbal;Demonstration  Barriers to Learning: None  Education Outcome: Verbalized understanding;Continued education needed    Therapy Time   Individual Concurrent Group Co-treatment   Time In 0344         Time [facility] 0863 Minutes [address] [place], [place]

## 2023-02-04 NOTE — PROGRESS NOTES
Grisel Donaldson M.D. Internal Medicine Progress Note    Patient: Jessica Dale  Date of Admission: 1/31/2023  4:23 PM  Hospital Day # 4  Date of Evaluation: 2/4/2023      SUBJECTIVE:    Mr. Misti Shah  was seen and examined today for f/u of Sepsis due to pneumonia [facility]). He is feeling better today but he is pretty stoic per wife and she states he is still coughing quite a bit and gets SOB when ambulating to bathroom. She also states he is still quite weak and is having difficulty ambulating, which he of course down plays. He denies chest pain or palpitations. He denies fever or chills and has been afebrile. He  had been eating well the past couple days but today doesn't have much appetite and didn't eat much for lunch. He denies any nausea or vomiting however. He is moving his bowels regularly with 1 or 2 BMs per day. ROS:   Constitutional: negative  for fevers, and negative for chills. Respiratory: positive for shortness of breath, positive for cough, and negative for wheezing  Cardiovascular: negative for chest pain, and negative for palpitations  Gastrointestinal: negative for abdominal pain, negative for nausea,negative for vomiting, negative for diarrhea, and negative for constipation     All other systems were reviewed with the patient and are negative unless otherwise stated in HPI    -----------------------------------------------------------------  OBJECTIVE:  Vitals:   Temp: 97 °F (36.1 °C)  BP: (!) 169/86  Resp: 19  Heart Rate: 76  SpO2: 93 % on supplemental O2    Weight  Wt Readings from Last 3 Encounters:   02/04/23 270 lb 6.4 oz (122.7 kg)   12/16/22 256 lb 8 oz (116.3 kg)   12/05/22 254 lb 6.6 oz (115.4 kg)     Body mass index is 37.71 kg/m². 24HR INTAKE/OUTPUT:      Intake/Output Summary (Last 24 hours) at 2/4/2023 1206  Last data filed at 2/4/2023 1156  Gross per 24 hour   Intake 2140 ml   Output 1475 ml   Net 665 ml       Exam:  GEN:    Awake, alert and oriented x 3.    EYES: EOMI, pupils equal   NECK: Supple. No lymphadenopathy. No carotid bruit  CVS:    regular rate and rhythm, no audible murmur  PULM:  diminished with  rhonchi mostly in the right base. no acute respiratory distress  ABD:    Bowels sounds normal.  Abdomen is soft. No distention. no tenderness to palpation. EXT:   2+ edema bilaterally with compression stockings on. No calf tenderness. NEURO: Moves all extremities. Motor and sensory are grossly intact  SKIN:  No rashes.   No skin lesions.    -----------------------------------------------------------------  DATA:  Complete Blood Count:   Recent Labs     02/02/23  0550 02/03/23  0559 02/04/23  0545   WBC 19.7* 16.0* 13.9*   RBC 3.29* 3.27* 3.23*   HGB 9.7* 9.5* 9.3*   HCT 30.6* 29.2* 28.5*   MCV 93.0 89.3 88.2   MCH 29.5 29.1 28.8   MCHC 31.7 32.5 32.6   RDW 14.9* 14.7* 14.7*    215 231   MPV 10.3 10.4 10.5        Last 3 Blood Glucose:   Recent Labs     02/02/23  0550 02/03/23  0559 02/04/23  0545   GLUCOSE 165* 190* 157*        Comprehensive Metabolic Profile:   Recent Labs     02/02/23  0550 02/03/23  0559 02/04/23  0545   * 126* 125*   K 4.2 4.3 4.1   CL 96* 93* 92*   CO2 20 20 21   BUN 31* 29* 24*   CREATININE 1.31* 1.09 0.96   GLUCOSE 165* 190* 157*   CALCIUM 8.3* 8.7 8.6   PROT 6.5 6.5 6.0*   LABALBU 3.1* 3.0* 3.0*   BILITOT 0.3 0.3 0.4   ALKPHOS 147* 180* 207*   AST 69* 57* 49*   ALT 97* 82* 67*        Urinalysis:   Lab Results   Component Value Date/Time    NITRU NEGATIVE 01/31/2023 07:45 PM    COLORU Yellow 01/31/2023 07:45 PM    PHUR 6.0 01/31/2023 07:45 PM    WBCUA 0 TO 2 01/31/2023 07:45 PM    RBCUA 0 TO 2 01/31/2023 07:45 PM    MUCUS TRACE 01/31/2023 07:45 PM    BACTERIA 1+ 01/31/2023 07:45 PM    SPECGRAV 1.020 01/31/2023 07:45 PM    LEUKOCYTESUR NEGATIVE 01/31/2023 07:45 PM    UROBILINOGEN Normal 01/31/2023 07:45 PM    BILIRUBINUR NEGATIVE 01/31/2023 07:45 PM    GLUCOSEU NEGATIVE 01/31/2023 07:45 PM    KETUA NEGATIVE 01/31/2023 07:45 PM       HgBA1c:    Lab Results   Component Value Date/Time    LABA1C 7.2 02/02/2023 05:50 AM       Lactic Acid:    Latest Reference Range & Units 1/31/23 16:35 1/31/23 19:28   Lactic Acid, Sepsis 0.5 - 1.9 mmol/L 4.6 (H) 2.4 (H)       High Sensitivity Troponin:  Recent Labs     02/02/23  0550   TROPHS 21         Radiology/Imaging:  XR CHEST PORTABLE   Final Result   Increasing vascular congestion as well as localized airspace disease in the   mid right lung field. Developing pulmonary edema can have this appearance   along with underlying multifocal infection. CT ABDOMEN PELVIS W IV CONTRAST Additional Contrast? None   Final Result   1. Multifocal consolidation in the right lung and to a lesser degree left   lung base, concerning for infection. 2.  Motion degraded evaluation of the pulmonary arteries. No evidence for   central pulmonary embolism. 3.  No acute inflammatory process identified in the abdomen or pelvis. Stable benign and incidental findings, as described. CT CHEST PULMONARY EMBOLISM W CONTRAST   Final Result   1. Multifocal consolidation in the right lung and to a lesser degree left   lung base, concerning for infection. 2.  Motion degraded evaluation of the pulmonary arteries. No evidence for   central pulmonary embolism. 3.  No acute inflammatory process identified in the abdomen or pelvis. Stable benign and incidental findings, as described. US GALLBLADDER RUQ   Final Result   Gallbladder sludge. No imaging evidence for acute cholecystitis or biliary   dilatation. XR CHEST PORTABLE   Final Result   Suspected pneumonia in the right mid lung. Recommend imaging follow-up to   resolution. Echo 2/1/2023  Global left ventricular systolic function appears preserved with an  estimated ejection fraction of >55%.   The left ventricular cavity size is within normal limits and the left  ventricular wall thickness is moderately increased. The patient has a sigmoid interventricular septum without evidence of  outflow tract obstruction. The left atrium is mildly dilated (29-33) with a left atrial volume index of  29 ml/m2. 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 The clinical significant of this finding is unknown, but has been  associated with an increased risk of TIA's and strokes. The left atrium is  mildly dilated (29-33) with a left atrial volume index of 29 ml/m2. The mean aortic valve gradient is 24 mmHg consistent with moderate aortic  stenosis. Mild to moderate tricuspid regurgitation. Moderate pulmonary hypertension with an estimated right ventricular systolic  pressure of 44 mmHg.   Evidence of mild (grade I) diastolic dysfunction is seen      MEDICAL DECISION MAKING:  Primary Problem(s): Sepsis due to community acquired bacterial pneumonia   Condition is improving  Treatment plan:   Continue current treatment  ID consult appreciated - Dr. 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 Donell Lai notes reviewed  Imaging: Echo results from 2/1/23 reviewed  Medications: Continue IV Lasix    Diabetes mellitus    Condition is a chronic stable condition  Treatment plan: Continue current treatment  Medications: Continue Metformin, Nesina    Nutrition status:   obesity, non-morbid  Dietician consult initiated    Hospital Prophylaxis:   DVT: Lovenox   Stress Ulcer: H2 Blocker     MDM Data:   Test interpretation:  My independent EKG interpretation: sinus rhythm with 1st degree AV block  Management and/or test interpretation  discussed with Dr. Cipriano Santiago, nursing staff  Consults and Nursing notes were personally reviewed, all current labs and imaging were personally reviewed, and tests ordered: CBC, BMP       Disposition:  Shared decision making:  All test results, treatment options and disposition options were discussed with the patient today  Social determinants of health that may impact management: none  Code status: Full Code   Disposition: Discharge plan is pending          Katiana Hanson MD , M.D.  2/4/2023  12:06 PM

## 2023-02-04 NOTE — PROGRESS NOTES
Patient coughs/aspirates after rinsing mouth following Dulera. In high-fowlers position. A wet cough is noted.

## 2023-02-05 LAB
ABSOLUTE EOS #: 0.15 K/UL (ref 0–0.44)
ABSOLUTE IMMATURE GRANULOCYTE: 0.15 K/UL (ref 0–0.3)
ABSOLUTE LYMPH #: 1.22 K/UL (ref 1.1–3.7)
ABSOLUTE MONO #: 1.98 K/UL (ref 0.1–1.2)
ALBUMIN SERPL-MCNC: 2.8 G/DL (ref 3.5–5.2)
ALBUMIN/GLOBULIN RATIO: 0.9 (ref 1–2.5)
ALP SERPL-CCNC: 198 U/L (ref 40–129)
ALT SERPL-CCNC: 50 U/L (ref 5–41)
ANION GAP SERPL CALCULATED.3IONS-SCNC: 12 MMOL/L (ref 9–17)
AST SERPL-CCNC: 32 U/L
BASOPHILS # BLD: 0 % (ref 0–2)
BASOPHILS ABSOLUTE: 0 K/UL (ref 0–0.2)
BILIRUB SERPL-MCNC: 0.4 MG/DL (ref 0.3–1.2)
BNP SERPL-MCNC: 5573 PG/ML
BUN SERPL-MCNC: 21 MG/DL (ref 8–23)
BUN/CREAT BLD: 23 (ref 9–20)
CALCIUM SERPL-MCNC: 8.4 MG/DL (ref 8.6–10.4)
CHLORIDE SERPL-SCNC: 90 MMOL/L (ref 98–107)
CO2 SERPL-SCNC: 20 MMOL/L (ref 20–31)
CREAT SERPL-MCNC: 0.92 MG/DL (ref 0.7–1.2)
EOSINOPHILS RELATIVE PERCENT: 1 % (ref 1–4)
GFR SERPL CREATININE-BSD FRML MDRD: >60 ML/MIN/1.73M2
GLUCOSE SERPL-MCNC: 168 MG/DL (ref 70–99)
HCT VFR BLD AUTO: 27.8 % (ref 40.7–50.3)
HGB BLD-MCNC: 9.4 G/DL (ref 13–17)
IMMATURE GRANULOCYTES: 1 %
LYMPHOCYTES # BLD: 8 % (ref 24–43)
MCH RBC QN AUTO: 29.6 PG (ref 25.2–33.5)
MCHC RBC AUTO-ENTMCNC: 33.8 G/DL (ref 28.4–34.8)
MCV RBC AUTO: 87.4 FL (ref 82.6–102.9)
MICROORGANISM SPEC CULT: NORMAL
MICROORGANISM SPEC CULT: NORMAL
MONOCYTES # BLD: 13 % (ref 3–12)
MORPHOLOGY: NORMAL
NRBC AUTOMATED: 0 PER 100 WBC
PDW BLD-RTO: 14.6 % (ref 11.8–14.4)
PLATELET # BLD AUTO: 259 K/UL (ref 138–453)
PMV BLD AUTO: 9.8 FL (ref 8.1–13.5)
POTASSIUM SERPL-SCNC: 4.1 MMOL/L (ref 3.7–5.3)
PROT SERPL-MCNC: 5.9 G/DL (ref 6.4–8.3)
RBC # BLD: 3.18 M/UL (ref 4.21–5.77)
SEG NEUTROPHILS: 77 % (ref 36–65)
SEGMENTED NEUTROPHILS ABSOLUTE COUNT: 11.7 K/UL (ref 1.5–8.1)
SERVICE CMNT-IMP: NORMAL
SERVICE CMNT-IMP: NORMAL
SODIUM SERPL-SCNC: 122 MMOL/L (ref 135–144)
SPECIMEN DESCRIPTION: NORMAL
SPECIMEN DESCRIPTION: NORMAL
WBC # BLD AUTO: 15.2 K/UL (ref 3.5–11.3)

## 2023-02-05 PROCEDURE — 6370000000 HC RX 637 (ALT 250 FOR IP): Performed by: FAMILY MEDICINE

## 2023-02-05 PROCEDURE — 94669 MECHANICAL CHEST WALL OSCILL: CPT

## 2023-02-05 PROCEDURE — 6370000000 HC RX 637 (ALT 250 FOR IP): Performed by: INTERNAL MEDICINE

## 2023-02-05 PROCEDURE — 94640 AIRWAY INHALATION TREATMENT: CPT

## 2023-02-05 PROCEDURE — 97110 THERAPEUTIC EXERCISES: CPT

## 2023-02-05 PROCEDURE — 94664 DEMO&/EVAL PT USE INHALER: CPT

## 2023-02-05 PROCEDURE — 85025 COMPLETE CBC W/AUTO DIFF WBC: CPT

## 2023-02-05 PROCEDURE — 83880 ASSAY OF NATRIURETIC PEPTIDE: CPT

## 2023-02-05 PROCEDURE — 2700000000 HC OXYGEN THERAPY PER DAY

## 2023-02-05 PROCEDURE — 1200000000 HC SEMI PRIVATE

## 2023-02-05 PROCEDURE — 97535 SELF CARE MNGMENT TRAINING: CPT

## 2023-02-05 PROCEDURE — 2580000003 HC RX 258: Performed by: INTERNAL MEDICINE

## 2023-02-05 PROCEDURE — 99232 SBSQ HOSP IP/OBS MODERATE 35: CPT | Performed by: INTERNAL MEDICINE

## 2023-02-05 PROCEDURE — 80053 COMPREHEN METABOLIC PANEL: CPT

## 2023-02-05 PROCEDURE — 94761 N-INVAS EAR/PLS OXIMETRY MLT: CPT

## 2023-02-05 PROCEDURE — 36415 COLL VENOUS BLD VENIPUNCTURE: CPT

## 2023-02-05 PROCEDURE — 97116 GAIT TRAINING THERAPY: CPT

## 2023-02-05 PROCEDURE — 6360000002 HC RX W HCPCS: Performed by: FAMILY MEDICINE

## 2023-02-05 RX ADMIN — METFORMIN HYDROCHLORIDE 1000 MG: 500 TABLET ORAL at 09:39

## 2023-02-05 RX ADMIN — IPRATROPIUM BROMIDE AND ALBUTEROL SULFATE 1 AMPULE: 2.5; .5 SOLUTION RESPIRATORY (INHALATION) at 05:06

## 2023-02-05 RX ADMIN — FAMOTIDINE 20 MG: 20 TABLET, FILM COATED ORAL at 20:19

## 2023-02-05 RX ADMIN — FERROUS SULFATE TAB 325 MG (65 MG ELEMENTAL FE) 325 MG: 325 (65 FE) TAB at 06:54

## 2023-02-05 RX ADMIN — MOMETASONE FUROATE AND FORMOTEROL FUMARATE DIHYDRATE 2 PUFF: 200; 5 AEROSOL RESPIRATORY (INHALATION) at 10:07

## 2023-02-05 RX ADMIN — ALOGLIPTIN 25 MG: 25 TABLET, FILM COATED ORAL at 09:39

## 2023-02-05 RX ADMIN — OXYBUTYNIN CHLORIDE 10 MG: 5 TABLET ORAL at 20:19

## 2023-02-05 RX ADMIN — IPRATROPIUM BROMIDE AND ALBUTEROL SULFATE 1 AMPULE: 2.5; .5 SOLUTION RESPIRATORY (INHALATION) at 19:54

## 2023-02-05 RX ADMIN — LEVOFLOXACIN 500 MG: 500 TABLET, FILM COATED ORAL at 09:39

## 2023-02-05 RX ADMIN — MONTELUKAST 10 MG: 10 TABLET, FILM COATED ORAL at 09:40

## 2023-02-05 RX ADMIN — ENOXAPARIN SODIUM 30 MG: 100 INJECTION SUBCUTANEOUS at 09:40

## 2023-02-05 RX ADMIN — METFORMIN HYDROCHLORIDE 1000 MG: 500 TABLET ORAL at 20:19

## 2023-02-05 RX ADMIN — MOMETASONE FUROATE AND FORMOTEROL FUMARATE DIHYDRATE 2 PUFF: 200; 5 AEROSOL RESPIRATORY (INHALATION) at 19:54

## 2023-02-05 RX ADMIN — IPRATROPIUM BROMIDE AND ALBUTEROL SULFATE 1 AMPULE: 2.5; .5 SOLUTION RESPIRATORY (INHALATION) at 15:44

## 2023-02-05 RX ADMIN — PRIMIDONE 250 MG: 250 TABLET ORAL at 06:54

## 2023-02-05 RX ADMIN — PROPRANOLOL HYDROCHLORIDE 120 MG: 60 CAPSULE, EXTENDED RELEASE ORAL at 17:22

## 2023-02-05 RX ADMIN — LEVOTHYROXINE SODIUM 137 MCG: 25 TABLET ORAL at 06:54

## 2023-02-05 RX ADMIN — FAMOTIDINE 20 MG: 20 TABLET, FILM COATED ORAL at 09:40

## 2023-02-05 RX ADMIN — SODIUM CHLORIDE, PRESERVATIVE FREE 10 ML: 5 INJECTION INTRAVENOUS at 20:20

## 2023-02-05 RX ADMIN — FERROUS SULFATE TAB 325 MG (65 MG ELEMENTAL FE) 325 MG: 325 (65 FE) TAB at 17:22

## 2023-02-05 RX ADMIN — TAMSULOSIN HYDROCHLORIDE 0.8 MG: 0.4 CAPSULE ORAL at 17:22

## 2023-02-05 RX ADMIN — ASPIRIN 81 MG: 81 TABLET, COATED ORAL at 09:39

## 2023-02-05 RX ADMIN — OXYBUTYNIN CHLORIDE 10 MG: 5 TABLET ORAL at 09:39

## 2023-02-05 RX ADMIN — FUROSEMIDE 40 MG: 10 INJECTION, SOLUTION INTRAMUSCULAR; INTRAVENOUS at 09:40

## 2023-02-05 RX ADMIN — IPRATROPIUM BROMIDE AND ALBUTEROL SULFATE 1 AMPULE: 2.5; .5 SOLUTION RESPIRATORY (INHALATION) at 10:07

## 2023-02-05 RX ADMIN — ENOXAPARIN SODIUM 30 MG: 100 INJECTION SUBCUTANEOUS at 20:19

## 2023-02-05 RX ADMIN — SODIUM CHLORIDE, PRESERVATIVE FREE 10 ML: 5 INJECTION INTRAVENOUS at 09:40

## 2023-02-05 RX ADMIN — PRIMIDONE 250 MG: 250 TABLET ORAL at 17:22

## 2023-02-05 RX ADMIN — PROPRANOLOL HYDROCHLORIDE 120 MG: 60 CAPSULE, EXTENDED RELEASE ORAL at 09:39

## 2023-02-05 NOTE — PROGRESS NOTES
Patient resting comfortably at this time. Patient's oxygen was increased to 3L due to patient's saturation being low and patient is having increased respirations. Patient's abdomen is very distended and patient is working harder with breathing. Patient denies any pain and also denies tenderness to abdomen. Patient states bowel movement today and feels as if he is urinating well. wife is at bedside with concerns. Patient is on a continuous oxygen saturation monitor for now and denies any other needs at this time. Patient alert & oriented x4 and able to answer all questions appropriately and follow commands but does fall asleep with conversation. Assessment and vitals as charted. Bed alarm on, bed locked, gripper socks on, call light within reach and able to use appropriately. Will notify Dr. Baldemar Tapia on call and continue to monitor this shift.

## 2023-02-05 NOTE — PROGRESS NOTES
Patient awake and in bed. Stated his breathing feels a lot worse than yesterday. Denies any cough or chest pain. Patient on 3L NC at this time. Patient up to the chair. VS and assessment completed. Patients teds placed on him.  Denies any needs at this time

## 2023-02-05 NOTE — RT PROTOCOL NOTE
RESPIRATORY ASSESSMENT PROTOCOL                                                                                              Patient Name: Jocelyn Dancer Room#: 3633/3457-49 : 1935     Admitting diagnosis: Septicemia ([facility] [A41.9]  Sepsis due to pneumonia ([facility] [J18.9, A41.9]  Pneumonia due to infectious organism, unspecified laterality, unspecified part of lung [J18.9]       Medical History:   Past Medical History:   Diagnosis Date    COPD (chronic obstructive pulmonary disease) ([facility]     Diabetes mellitus ([facility]     Hx of echocardiogram 2017    [facility]    Hyperlipidemia     Hypothyroidism     MI (myocardial infarction) ([facility]     Stroke [facility])     Thyroid disease     Type II or unspecified type diabetes mellitus without mention of complication, not stated as uncontrolled        PATIENT ASSESSMENT    LABORATORY DATA  Hematology:   Lab Results   Component Value Date/Time    WBC 13.9 2023 05:45 AM    RBC 3.23 2023 05:45 AM    HGB 9.3 2023 05:45 AM    HCT 28.5 2023 05:45 AM     2023 05:45 AM     Chemistry:    Lab Results   Component Value Date/Time    PHART 7.340 2017 11:25 PM    CRG7KZJ 53.8 2017 11:25 PM    PO2ART 62.0 2017 11:25 PM    V0JCIHAG 90.1 2017 11:25 PM    [id number] 28.4 2017 11:25 PM    PBEA 1.4 2017 11:25 PM       VITALS  Heart Rate: 72   Resp: 22  BP: (!) 165/74  SpO2: 94 % O2 Device: Nasal cannula 3 L  Temp: 98.6 °F (37 °C)    SKIN COLOR  [x] Normal  [] Pale  [] Dusky  [] Cyanotic    RESPIRATORY PATTERN  [x] Normal  [] Dyspnea  [] Cheyne-Veras  [] Kussmaul  [] Biots    AMBULATORY  [] Yes  [] No  [x] With Assistance    PEAK FLOW  Predicted:     Personal Best:           Patient Acuity 0 1 2 3 4 Score   Level of Consciousness (LOC) [x]  Alert & Oriented or Pt normal LOC []  Confused;follows directions []  Confused & uncooper-ative []  Obtunded []  Comatose 0   Respiratory Rate  (RR) []  Reg. rate & pattern. 12 - 20 bpm  []  Increased RR. Greater than 20 bpm   [x]  SOB w/ exertion or RR greater than 24 bpm []  Access- ory muscle use at rest. Abn.  resp. []  SOB at rest.   2   Bilateral Breath Sounds (BBS) []  Clear []  Diminish-ed bases  []  Diminish-ed t/o, or rales   [x]  Sporadic, scattered wheezes or rhonchi []  Persistentwheezes and, or absent BBS 3   Cough [x]  Strong, effective, & non-prod. []  Effective & prod. Less than 25 ml (2 TBSP) over past 24 hrs []  Ineffective & non-prod to less than 25 ML over past 24 hrs []  Ineffective and, or greater than 25 ml sputum prod. past 24 hrs. []  Nonspon- taneous; Requires suctioning 0   Pulmonary History  (PULM HX) []  No smoking and no chronic pulmonary history []  Former smoker. Quit over 12 mos. ago []  Current smoker or quit w/ in 12 mos []  Pulm. History and, or 20 pk/yr smoking hx [x]  Admitted w/ acute pulm. dx and, or has been admitted w/ pulm. dx 2 or more times over past 12 mos 4   Surgical History this Admit  (SURG HX) [x]  No surgery []  General surgery []  Lower abdominal []  Thoracic or upper abdominal   []  Thoracic w/ pulm. disease 0   Chest X-Ray (CXR)/CT Scan []  Clear or not applicable []  Not available []  Atelectasis or pleural effusions [x]  Localized infiltrate or pulm.  edema []  Con-solidated Infiltrates, bilateral, or in more than 1 lobe 3   TOTAL ACUITY: 12       CARE PLAN    If Acuity Level is 2, 3, or 4 in any of the following:    [x] BILATERAL BREATH SOUNDS (BBS)     [x] PULMONARY HISTORY (PULM HX)  [] Respiratory Rate  (RR)    Goal: Improve respiratory functions in patients with airway disease and decrease WOB    [x] AEROSOL PROTOCOL    Total Acuity:   14-28  []  Secondary Assessment in 24 hrs Total Acuity:  9-13  [x]  Secondary Assessment in 24 hrs Total Acuity:  4-8  []  Secondary Assessment in 24 hrs Total Acuity:  0-3  []  Secondary Assessment in 48 hrs   HHN AEROSOL THERAPY with  [physician-ordered bronchodilator(s)] q 4 & Albuterol PRN q2 hrs. Breath-Actuated Neb if BBS Acuity = 4, and pt. can use MP. Notify physician if condition deteriorates. HHN AEROSOL THERAPY with  [physician-ordered bronchodilator(s)]  QID and Albuterol PRN q4 hrs. Breath-Actuated Neb if BBS Acuity = 4, and pt. can use MP. Notify physician if condition deteriorates. MDI THERAPY with  2 actuations of [physician-ordered bronchodilator(s)] via spacer TID Albuterol and PRNq4 hrs. If unable to utilize MDI: HHN [physician-ordered bronchodilator(s)] TID and Albuterol PRN q4 hrs. Notify physician if condition deteriorates. MDI THERAPY with  [physician-ordered bronchodilator(s)] via spacer TID PRN. If unable to utilize MDI: HHN [physician-ordered bronchodilator(s)] TID PRN. Notify physician if condition deteriorates. If Acuity Level is 2, 3, or 4 in any of the following:    [] COUGH     [] SURGICAL HISTORY (SURG HX)  [x] CHEST XRAY (CXR)    Goal: Improvement in sputum mobilization in patients with ineffective airway clearance. Reverse atelectasis. [x] Bronchopulmonary Hygiene Protocol      Total Acuity:   14-28  []  Secondary Assessment in 24 hrs Total Acuity:  9-13  [x]  Secondary Assessment in 24 hrs Total Acuity:  4-8  []  Secondary Assessment in 24 hrs Total Acuity:  0-3  []  Secondary Assessment in 48 hrs   METANEB QID with [physician-ordered bronchodilator(s)] if CXR Acuity = 4; otherwise:  PD&P, PEP, or Vest QID & PRN  NT Sxn PRN for ineffective cough  METANEB QID with [physician-ordered bronchodilator(s)] if CXR Acuity = 4; otherwise:  PD&P, PEP, or Vest QID & PRN  NT Sxn PRN for ineffective cough  PD&P, PEP, or Vest TID & PRN   Instruct patient to self-perform IS q1hr WA       If Acuity Level is 2 or above in the following:    [] PULMONARY HISTORY (PULM HX)    Goal: Assist patient in quitting smoking to slow or stop the progression of lung disease.     [] Smoking Cessation Protocol    SMOKING CESSATION EDUCATION provided according to policy [id number]_170: (karson with an X)  ____Yes    ____ No     ____ NA    Smoking Cessation Booklet given:  ____Yes  ____No ____Patient Chioma Kelly

## 2023-02-05 NOTE — PROGRESS NOTES
Writer attempted to get patient to urinate in urinal. Patient was unable to void at this time.  Writer will put in philip catheter per Dr. Emma Jin

## 2023-02-05 NOTE — PROGRESS NOTES
Physical Therapy  Facility/Department: [facility] AT THE [facility] MED SURG  Daily Treatment Note  NAME: Karlee Rader  : 1935  MRN: [id number]    Date of Service: 2023    Discharge Recommendations:  Continue to assess pending progress, Patient would benefit from continued therapy after discharge, Home with Home health PT, Outpatient PT      Patient Diagnosis(es): The primary encounter diagnosis was Septicemia ([address] A diagnosis of Pneumonia due to infectious organism, unspecified laterality, unspecified part of lung was also pertinent to this visit. Assessment   Assessment: Pt was co treated with OMALLEY. Seated B LE exercises x15 AP's, LAQ's, Hip abd/add, pt required VC's to stay on task. Transfers SBA/CGA x1. Gait x15' and 10' with WW and CGA x1. Pt continues to require persistant encouragement for participation. Pt was increased from 1L to 3L of O2 and now has philip cath in place. Activity Tolerance: Patient tolerated treatment well;Patient limited by fatigue     Plan    Physcial Therapy Plan  General Plan: 2 times a day 7 days a week (1x/day on weekends and holidays)  Current Treatment Recommendations: Strengthening;Balance training;Functional mobility training;Transfer training; Endurance training;Gait training;Stair training;Neuromuscular re-education; Safety education & training; Therapeutic activities     Restrictions  Restrictions/Precautions  Restrictions/Precautions: General Precautions, Fall Risk     Subjective    Subjective  Subjective: Pt in chair sleeping upon arrival, but agreeable to therapy with encouragement  Pain: no c/o pain  Orientation  Overall Orientation Status: Within Functional Limits     Objective   Bed Mobility Training  Bed Mobility Training: No  Transfer Training  Transfer Training: Yes  Overall Level of Assistance: Assist X1;Contact-guard assistance;Stand-by assistance  Interventions: Verbal cues  Sit to Stand: Contact-guard assistance;Stand-by assistance;Assist X1  Stand to Sit: Stand-by assistance;Assist X1  Stand Pivot Transfers: Contact-guard assistance;Assist X1;Stand-by assistance  Toilet Transfer: Stand-by assistance;Contact-guard assistance;Assist X1;Other (comment) (Pt required use of B UE on L hand rail to achieve stand)  Gait Training  Gait Training: Yes  Right Side Weight Bearing: Full  Left Side Weight Bearing: Full  Gait  Overall Level of Assistance: Contact-guard assistance;Assist X1  Interventions: Verbal cues; Safety awareness training  Base of Support: Widened  Speed/Geovanna: Slow  Gait Abnormalities: Decreased step clearance  Distance (ft): 25 Feet  Assistive Device: Walker, rolling;Gait belt  Neuromuscular Education  Neuromuscular Education: No  PT Exercises  Exercise Treatment: Seated B LE exercises x15 AP's, LAQ's, Hip abd/add, pt required VC's to stay on task. Safety Devices  Type of Devices: All fall risk precautions in place;Call light within reach; Left in chair;Nurse notified       Goals  Short Term Goals  Time Frame for Short Term Goals: 20 days  Short Term Goal 1: Initiate and progress HEP for strength and balance. Short Term Goal 2: Pt will be independent with bed mobility without use of rails for discharge home independently. Short Term Goal 3: Pt will transfer with +1 supervision with least restrictive device to decrease fall risk. Short Term Goal 4: Pt will ambulate with supervision to SBA +1 with least restrictive device for up to 75 feet to allow patient safe entry and exit into his home  Short Term Goal 5: Pt will negotiate 4 steps with railing +1 SBA for entry and exit into buildings for appointments.   Patient Goals   Patient Goals : return home    Education  Patient Education  Education Given To: Patient  Education Provided Comments: Continued ed provided for safety during SPT to square up AD when sitting with reaching back  Education Method: Verbal  Barriers to Learning: None  Education Outcome: Verbalized understanding;Continued education needed    Therapy Time   Individual Concurrent Group Co-treatment   Time In [facility]         Time Out 0928         Minutes [address], [place]

## 2023-02-05 NOTE — PROGRESS NOTES
Rhonda Landers M.D. Internal Medicine Progress Note    Patient: Edvin Carias  Date of Admission: 1/31/2023  4:23 PM  Hospital Day # 5  Date of Evaluation: 2/5/2023      SUBJECTIVE:    Mr. Eboni Pantoja  was seen and examined today for f/u of Sepsis due to pneumonia [facility]). He  is still coughing quite a bit and gets SOB when ambulating to bathroom. He denies chest pain or palpitations. He denies fever or chills and has been afebrile. Yesterday he didn't eat well but he states he ate breakfast pretty well today. He is moving his bowels regularly with 1 or 2 BMs per day. ROS:   Constitutional: negative  for fevers, and negative for chills. Respiratory: positive for shortness of breath, positive for cough, and negative for wheezing  Cardiovascular: negative for chest pain, and negative for palpitations  Gastrointestinal: negative for abdominal pain, negative for nausea,negative for vomiting, negative for diarrhea, and negative for constipation     All other systems were reviewed with the patient and are negative unless otherwise stated in HPI    -----------------------------------------------------------------  OBJECTIVE:  Vitals:   Temp: 98.3 °F (36.8 °C)  BP: (!) 160/92  Resp: 25  Heart Rate: 69  SpO2: 91 % on supplemental O2    Weight  Wt Readings from Last 3 Encounters:   02/05/23 271 lb 14.4 oz (123.3 kg)   12/16/22 256 lb 8 oz (116.3 kg)   12/05/22 254 lb 6.6 oz (115.4 kg)     Body mass index is 37.92 kg/m². 24HR INTAKE/OUTPUT:      Intake/Output Summary (Last 24 hours) at 2/5/2023 1025  Last data filed at 2/5/2023 0945  Gross per 24 hour   Intake 2920 ml   Output 975 ml   Net 1945 ml       Exam:  GEN:    Awake, alert and oriented x 3. EYES:  EOMI, pupils equal   NECK: Supple. No lymphadenopathy. No carotid bruit  CVS:    regular rate and rhythm, no audible murmur  PULM:  diminished with  rhonchi mostly in the right base.    no acute respiratory distress  ABD:    Bowels sounds normal.  Abdomen is soft.  No distention. no tenderness to palpation. EXT:   2+ edema bilaterally with compression stockings on. No calf tenderness. NEURO: Moves all extremities. Motor and sensory are grossly intact  SKIN:  No rashes.   No skin lesions.    -----------------------------------------------------------------  DATA:  Complete Blood Count:   Recent Labs     02/03/23  0559 02/04/23  0545 02/05/23  0530   WBC 16.0* 13.9* 15.2*   RBC 3.27* 3.23* 3.18*   HGB 9.5* 9.3* 9.4*   HCT 29.2* 28.5* 27.8*   MCV 89.3 88.2 87.4   MCH 29.1 28.8 29.6   MCHC 32.5 32.6 33.8   RDW 14.7* 14.7* 14.6*    231 259   MPV 10.4 10.5 9.8        Last 3 Blood Glucose:   Recent Labs     02/03/23  0559 02/04/23  0545 02/05/23  0530   GLUCOSE 190* 157* 168*        Comprehensive Metabolic Profile:   Recent Labs     02/03/23  0559 02/04/23  0545 02/05/23  0530   * 125* 122*   K 4.3 4.1 4.1   CL 93* 92* 90*   CO2 20 21 20   BUN 29* 24* 21   CREATININE 1.09 0.96 0.92   GLUCOSE 190* 157* 168*   CALCIUM 8.7 8.6 8.4*   PROT 6.5 6.0* 5.9*   LABALBU 3.0* 3.0* 2.8*   BILITOT 0.3 0.4 0.4   ALKPHOS 180* 207* 198*   AST 57* 49* 32   ALT 82* 67* 50*        Urinalysis:   Lab Results   Component Value Date/Time    NITRU NEGATIVE 01/31/2023 07:45 PM    COLORU Yellow 01/31/2023 07:45 PM    PHUR 6.0 01/31/2023 07:45 PM    WBCUA 0 TO 2 01/31/2023 07:45 PM    RBCUA 0 TO 2 01/31/2023 07:45 PM    MUCUS TRACE 01/31/2023 07:45 PM    BACTERIA 1+ 01/31/2023 07:45 PM    SPECGRAV 1.020 01/31/2023 07:45 PM    LEUKOCYTESUR NEGATIVE 01/31/2023 07:45 PM    UROBILINOGEN Normal 01/31/2023 07:45 PM    BILIRUBINUR NEGATIVE 01/31/2023 07:45 PM    GLUCOSEU NEGATIVE 01/31/2023 07:45 PM    KETUA NEGATIVE 01/31/2023 07:45 PM       HgBA1c:    Lab Results   Component Value Date/Time    LABA1C 7.2 02/02/2023 05:50 AM       Lactic Acid:    Latest Reference Range & Units 1/31/23 16:35 1/31/23 19:28   Lactic Acid, Sepsis 0.5 - 1.9 mmol/L 4.6 (H) 2.4 (H)         Radiology/Imaging:  XR CHEST PORTABLE   Final Result   Increasing vascular congestion as well as localized airspace disease in the   mid right lung field. Developing pulmonary edema can have this appearance   along with underlying multifocal infection. CT ABDOMEN PELVIS W IV CONTRAST Additional Contrast? None   Final Result   1. Multifocal consolidation in the right lung and to a lesser degree left   lung base, concerning for infection. 2.  Motion degraded evaluation of the pulmonary arteries. No evidence for   central pulmonary embolism. 3.  No acute inflammatory process identified in the abdomen or pelvis. Stable benign and incidental findings, as described. CT CHEST PULMONARY EMBOLISM W CONTRAST   Final Result   1. Multifocal consolidation in the right lung and to a lesser degree left   lung base, concerning for infection. 2.  Motion degraded evaluation of the pulmonary arteries. No evidence for   central pulmonary embolism. 3.  No acute inflammatory process identified in the abdomen or pelvis. Stable benign and incidental findings, as described. US GALLBLADDER RUQ   Final Result   Gallbladder sludge. No imaging evidence for acute cholecystitis or biliary   dilatation. XR CHEST PORTABLE   Final Result   Suspected pneumonia in the right mid lung. Recommend imaging follow-up to   resolution. Echo 2/1/2023  Global left ventricular systolic function appears preserved with an  estimated ejection fraction of >55%. The left ventricular cavity size is within normal limits and the left  ventricular wall thickness is moderately increased. The patient has a sigmoid interventricular septum without evidence of  outflow tract obstruction. The left atrium is mildly dilated (29-33) with a left atrial volume index of  29 ml/m2. 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  The clinical significant of this finding is unknown, but has been  associated with an increased risk of TIA's and strokes. The left atrium is  mildly dilated (29-33) with a left atrial volume index of 29 ml/m2. The mean aortic valve gradient is 24 mmHg consistent with moderate aortic  stenosis. Mild to moderate tricuspid regurgitation. Moderate pulmonary hypertension with an estimated right ventricular systolic  pressure of 44 mmHg.   Evidence of mild (grade I) diastolic dysfunction is seen      MEDICAL DECISION MAKING:  Primary Problem(s): Sepsis due to community acquired bacterial pneumonia   Condition is improving  Treatment plan:   Continue current treatment  ID consult appreciated - Dr. 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 Joanna Chapa notes reviewed  Imaging: Echo results from 2/1/23 reviewed  Medications: Continue IV Lasix    Diabetes mellitus    Condition is a chronic stable condition  Treatment plan: Continue current treatment  Medications: Continue Metformin, Nesina    Nutrition status:   obesity, non-morbid  Dietician consult initiated    Hospital Prophylaxis:   DVT: Lovenox   Stress Ulcer: H2 Blocker     MDM Data:   Test interpretation:  My independent EKG interpretation: sinus rhythm with 1st degree AV block  Management and/or test interpretation  discussed with Dr. Conor Palomo, nursing staff  Consults and Nursing notes were personally reviewed, all current labs and imaging were personally reviewed, and tests ordered: CBC, BMP       Disposition:  Shared decision making:  All test results, treatment options and disposition options were discussed with the patient today  Social determinants of health that may impact management: none  Code status: Full Code   Disposition: Discharge plan is pending          Jorge Alberto Trejo MD , M.D.  2/5/2023  10:25 AM

## 2023-02-05 NOTE — PROGRESS NOTES
Patient awake and in bed. Denies any pain. Stated his breathing has improved since this am but still feels more SOB than normal. VS and assessment completed. Patient set up to bathe at bedside and aware to call out for help when needed.  Denies any other needs at this time

## 2023-02-05 NOTE — PROGRESS NOTES
[facility] - Telemedicine Progress Note  Today's Date and Time: 2/5/2023, 7:49 AM    Impression :   Community acquired pneumonia  Hypoxic respiratory distress  Hyponatremia  KARO  Chronic cough  Leukocytosis  Elevated BNP  COPD  Chronic systolic CHF  DM II  Hx MI    Recommendations:   Start Levaquin 500 mg po daily. Stop date 2-12-23  Discontinue IV Rocephin  Discontinue azithromycin    MRSA Nares not detected  Mycoplasma IgM: negative   Legionella antigen: not detected    Medical Decision Making/Summary/Discussion:2/5/2023       Infection Control Recommendations   [place] Precautions    Antimicrobial Stewardship Recommendations     Simplification of therapy  Targeted therapy  PK dosing    Coordination of Outpatient Care:   Estimated Length of IV antimicrobials:TBD  Patient will need Midline Catheter Insertion: TBD  Patient will need PICC line Insertion:BD  Patient will need: Home IV , [place],  SNF,  LTAC: TBD  Patient will need outpatient wound care: No    Chief complaint/reason for consultation:   CAP      History of Present Illness:   Josesito Martinez is a 82y.o.-year-old male who was initially admitted on 1/31/2023. Patient seen at the request of Dr. Dobbs Counter:    This patient presented to [facility] ED on 1/31/23 after falling in his shower at home. He denied hitting his head or experiencing a syncopal episode, but admits to a persistent cough over the past few days. He has taken 3 days of azithromycin that his wife had given him. In the ED, he was found to have a fever of 39.7 C and his SpO2 was 88% on room air. The patient does have a history of COPD but is not on home O2. A CT chest showed multifocal consolidation in the right lung and the base of the left lung. Covid and influenza were not detected.      Abnormal labs at admission included:  Na:130  Cr:1.26  Lactic:2.4  BNP:1279  Alk phos:185  ALT:123  AST:95  Glucose:220  WBC:14.5    There has been no growth on blood or urine cultures. Since admission, the patient's WBC has increased to 19.7. He has also experienced worsening KARO and hyponatremia. His BNP has also increased to 4397. He has been receiving rocephin and azithromycin since 1/31/23. CT Chest/abd/pelvis 1/31/23  Impression   1. Multifocal consolidation in the right lung and to a lesser degree left   lung base, concerning for infection. 2.  Motion degraded evaluation of the pulmonary arteries. No evidence for   central pulmonary embolism. 3.  No acute inflammatory process identified in the abdomen or pelvis. Stable benign and incidental findings, as described. CURRENT EVALUATION 2/5/2023  BP (!) 160/92   Pulse 69   Temp 98.3 °F (36.8 °C) (Temporal)   Resp 25   Ht 5' 11\" (1.803 m)   Wt 271 lb 14.4 oz (123.3 kg)   SpO2 91%   BMI 37.92 kg/m²     Afebrile  VS stable    Patient stable but requiring higher levels 02  Pro BNP very elevated  No complaints  No new issues per RN    Medications reviewed  On levaquin    He is on 4-->1-->3 L NC   RR: 20-->16-->25  SpO2: 95-->93-->91%    Has bronchopneumonia in RLL   Presence of hyponatremia raises concern for Legionella but antigen test negative for serotype 1    Hyponatremia continues  Leukocytosis is improving    Labs, X rays reviewed: 2/5/2023    BUN:31-->29-->21  Cr:1.3-->-->0.92  Na:128-->125-->122    WBC:19.7-->16.0-->13.9-->15.2  Hb:9.7-->9.4  Plat: 213-->215-->231-->259    CRP: 315.8  Pro BNP:4397--> 5,573    Cultures:  Urine:    Blood:  1/31/23: No growth  Sputum :    Wound:    MRSA Nares:  2/2/23: Not detected    Imaging:    CT Chest   1/31/23        CXR  1/31/23 1/6/23      Discussed with patient, RN, family.     I have personally reviewed the past medical history, past surgical history, medications, social history, and family history, and I have updated the database accordingly. Past Medical History:     Past Medical History:   Diagnosis Date    COPD (chronic obstructive pulmonary disease) ([facility] [address]     Diabetes mellitus ([facility] [address]     Hx of echocardiogram 2017    [facility]    Hyperlipidemia     Hypothyroidism     MI (myocardial infarction) [facility])     Stroke [facility])     Thyroid disease     Type II or unspecified type diabetes mellitus without mention of complication, not stated as uncontrolled        Past Surgical  History:     Past Surgical History:   Procedure Laterality Date    CARDIAC SURGERY  2017    Stent placed  Dr Calista Whitaker      right ankle    HERNIA REPAIR         Medications:      ipratropium-albuterol  1 ampule Inhalation 4x daily    alogliptin  25 mg Oral Daily    levoFLOXacin  500 mg Oral Daily    furosemide  40 mg IntraVENous Daily    ferrous sulfate  325 mg Oral BID WC    enoxaparin  30 mg SubCUTAneous BID    aspirin  81 mg Oral Daily    levothyroxine  137 mcg Oral Daily    mometasone-formoterol  2 puff Inhalation BID    metFORMIN  1,000 mg Oral BID    montelukast  10 mg Oral Daily    oxybutynin  10 mg Oral BID    primidone  250 mg Oral BID    propranolol  120 mg Oral BID    tamsulosin  0.8 mg Oral Dinner    sodium chloride flush  5-40 mL IntraVENous 2 times per day    famotidine  20 mg Oral BID       Social History:     Social History     Socioeconomic History    Marital status:      Spouse name: Not on file    Number of children: Not on file    Years of education: Not on file    Highest education level: Not on file   Occupational History    Not on file   Tobacco Use    Smoking status: Former     Types: Cigarettes     Quit date:      Years since quittin.1    Smokeless tobacco: Never   Vaping Use    Vaping Use: Never used   Substance and Sexual Activity    Alcohol use:  Yes     Alcohol/week: 1.0 standard drink     Types: 1 Drinks containing 0.5 oz of alcohol per week     Comment: occasional    Drug use: No    Sexual activity: Not on file   Other Topics Concern    Not on file   Social History Narrative    Not on file     Social Determinants of Health     Financial Resource Strain: Unknown    Difficulty of Paying Living Expenses: Patient refused   Food Insecurity: Unknown    Worried About Running Out of Food in the Last Year: Patient refused    Ran Out of Food in the Last Year: Patient refused   Transportation Needs: Not on file   Physical Activity: Inactive    Days of Exercise per Week: 0 days    Minutes of Exercise per Session: 0 min   Stress: Not on file   Social Connections: Not on file   Intimate Partner Violence: Not on file   Housing Stability: Not on file       Family History:     Family History   Problem Relation Age of Onset    Cancer Mother 47        liver ca    Diabetes Father     Heart Disease Father         Allergies:   Rosuvastatin     Review of Systems:   Constitutional: Generalized weakness  Head: No headaches  Eyes: No double vision or blurry vision. No conjunctival inflammation. ENT: No sore throat or runny nose. . No hearing loss, tinnitus or vertigo. Cardiovascular: No chest pain or palpitations. shortness of breath. CARVER  Lung: shortness of breath with cough. Abdomen: No nausea, vomiting, diarrhea, or abdominal pain. Graceann Ramírez No cramps. Genitourinary: No increased urinary frequency, or dysuria. No hematuria. No suprapubic or CVA pain  Musculoskeletal: No muscle aches or pains. No joint effusions, swelling or deformities  Hematologic: No bleeding or bruising. Neurologic: No headache, weakness, numbness, or tingling. Integument: No rash, no ulcers. Psychiatric: No depression. Endocrine: No polyuria, no polydipsia, no polyphagia.     Physical Examination :   Patient Vitals for the past 8 hrs:   BP Temp Temp src Pulse Resp SpO2 Weight   02/05/23 0633 (!) 160/92 98.3 °F (36.8 °C) Temporal 69 25 91 % --   02/05/23 0506 -- -- -- -- -- 94 % --   02/05/23 0437 -- -- -- -- -- -- 271 lb 14.4 oz (123.3 kg)     DANNY-televisit  General Appearance: Awake, alert. Head:  Normocephalic, no trauma  Eyes: Pupils equal, round, reactive to light and accommodation; extraocular movements intact; sclera anicteric; conjunctivae pink. No embolic phenomena. ENT: Oropharynx clear, without erythema, exudate, or thrush. No tenderness of sinuses. Mouth/throat: mucosa pink and moist. No lesions. Dentition in good repair. Neck:Supple, without lymphadenopathy. Thyroid normal, No bruits. Pulmonary/Chest: DANNY-televisit  Cardiovascular: DANNY-televisit   Abdomen: Soft, non tender. Bowel sounds normal. No organomegaly  All four Extremities: No cyanosis, clubbing, edema, or effusions. Neurologic: No gross sensory or motor deficits. Skin: Warm and dry with good turgor. No signs of peripheral arterial or venous insufficiency. No ulcerations. No open wounds. Medical Decision Making -Laboratory:   I have independently reviewed/ordered the following labs:    CBC with Differential:   Recent Labs     02/04/23 0545 02/05/23 0530   WBC 13.9* 15.2*   HGB 9.3* 9.4*   HCT 28.5* 27.8*    259   LYMPHOPCT 8* 8*   MONOPCT 7 13*       BMP:   Recent Labs     02/04/23 0545 02/05/23  0530   * 122*   K 4.1 4.1   CL 92* 90*   CO2 21 20   BUN 24* 21   CREATININE 0.96 0.92       Hepatic Function Panel:   Recent Labs     02/04/23 0545 02/05/23  0530   PROT 6.0* 5.9*   LABALBU 3.0* 2.8*   BILITOT 0.4 0.4   ALKPHOS 207* 198*   ALT 67* 50*   AST 49* 32       No results for input(s): RPR in the last 72 hours. No results for input(s): HIV in the last 72 hours. No results for input(s): BC in the last 72 hours.   Lab Results   Component Value Date/Time    MUCUS TRACE 01/31/2023 07:45 PM    RBC 3.18 02/05/2023 05:30 AM    TRICHOMONAS NOT REPORTED 12/29/2021 11:30 PM    WBC 15.2 02/05/2023 05:30 AM    YEAST NOT REPORTED 12/29/2021 11:30 PM    TURBIDITY Clear 01/31/2023 07:45 PM     Lab Results   Component Value Date/Time    CREATININE 0.92 02/05/2023 05:30 AM    GLUCOSE 168 02/05/2023 05:30 AM       Medical Decision Making-Imaging:     Narrative   EXAMINATION:   CTA OF THE CHEST; CT OF THE ABDOMEN AND PELVIS WITH CONTRAST 1/31/2023 5:58 pm       TECHNIQUE:   CTA of the chest was performed after the administration of intravenous   contrast.  Multiplanar reformatted images are provided for review. MIP   images are provided for review. Automated exposure control, iterative   reconstruction, and/or weight based adjustment of the mA/kV was utilized to   reduce the radiation dose to as low as reasonably achievable.; CT of the   abdomen and pelvis was performed with the administration of intravenous   contrast. Multiplanar reformatted images are provided for review. Automated   exposure control, iterative reconstruction, and/or weight based adjustment of   the mA/kV was utilized to reduce the radiation dose to as low as reasonably   achievable. COMPARISON:   Chest radiograph today. Chest CT 12/02/2022. CT chest abdomen pelvis   07/21/2021. HISTORY:   ORDERING SYSTEM PROVIDED HISTORY: Fever, hypoxia, history of PE   TECHNOLOGIST PROVIDED HISTORY:   Fever, hypoxia, history of PE   Decision Support Exception - unselect if not a suspected or confirmed   emergency medical condition->Emergency Medical Condition (MA); ORDERING   SYSTEM PROVIDED HISTORY: Fever elevated liver enzyme   TECHNOLOGIST PROVIDED HISTORY:       Fever elevated liver enzyme   Decision Support Exception - unselect if not a suspected or confirmed   emergency medical condition->Emergency Medical Condition (MA)       FINDINGS:   CHEST CT:       Pulmonary Arteries: Pulmonary arteries are adequately opacified for   evaluation. Motion artifact is noted, degrading evaluation of the segmental   branches. No evidence for central pulmonary embolism. Main pulmonary artery   is normal in caliber. Mediastinum: No evidence of mediastinal lymphadenopathy. The heart and   pericardium demonstrate no acute abnormality. There is no acute abnormality   of the thoracic aorta. Calcified atheromatous plaque. Lungs/pleura: Expiratory phase of imaging and mild motion artifact noted. Consolidative opacities are present in the posterolateral inferior right   upper lobe and posterior right lower lobe. Patchy airspace disease to a   lesser degree is present in the left lung base. Mild subsegmental   atelectasis is also present. No effusion. The central airway is patent. Soft Tissues/Bones: No acute bone or soft tissue abnormality. ABDOMEN PELVIS:       Organs: The liver, gallbladder, pancreas, spleen, adrenals and kidneys reveal   no acute findings. Horseshoe kidney. Stable left adrenal nodules measuring   up to 1.7 cm. GI/Bowel: There is no bowel dilatation or wall thickening identified. Diverticulosis. Pelvis: No acute findings. Peritoneum/Retroperitoneum: No free air or free fluid. The aorta is normal   in caliber. The visceral branches are patent. No lymphadenopathy. Bones/Soft Tissues: No abnormality identified. *Unless otherwise specified, incidental findings do not require dedicated   imaging follow-up. Impression   1. Multifocal consolidation in the right lung and to a lesser degree left   lung base, concerning for infection. 2.  Motion degraded evaluation of the pulmonary arteries. No evidence for   central pulmonary embolism. 3.  No acute inflammatory process identified in the abdomen or pelvis. Stable benign and incidental findings, as described.                  Medical Decision [id number]:       Medical Decision Making-Other:     Note:  Labs, medications, radiologic studies were reviewed with personal review of films  Large amounts of data were reviewed  Discussed with nursing Staff, Discharge planner  Infection Control and Prevention measures reviewed  All prior entries were reviewed  Administer medications as ordered  Prognosis: Guarded  Discharge planning reviewed      Thank you for allowing us to participate in the care of this patient. Please call with questions.     Yaz Keenan MD        Pager: [phone] - Office: [phone]

## 2023-02-05 NOTE — PROGRESS NOTES
Patient shift assessment and vitals completed at this time as charted. Patient is alert and oriented x4 and is resting in the chair watching television. Patient has philip in place that is draining. Patient is on 2L via nasal cannula. Patient states no needs at this time, call light is in reach, will continue to monitor.

## 2023-02-05 NOTE — PROGRESS NOTES
Patient reassessed at this time. Patient appears more comfortable than prior assessment. Patient alert & oriented x4 but forgetful at times as patient was attempting to get up to urinate due to forgetting about philip. Denies any pain and any other needs at this time. Assessment and vitals as charted. Bed alarm on, bed locked, gripper socks on, call light within reach and able to use appropriately. Will continue to monitor this shift.

## 2023-02-05 NOTE — RT PROTOCOL NOTE
RESPIRATORY ASSESSMENT PROTOCOL                                                                                              Patient Name: Karlee Rader Room#: [id number] : 1935     Admitting diagnosis: Septicemia ([facility] [A41.9]  Sepsis due to pneumonia ([facility] [J18.9, A41.9]  Pneumonia due to infectious organism, unspecified laterality, unspecified part of lung [J18.9]       Medical History:   Past Medical History:   Diagnosis Date    COPD (chronic obstructive pulmonary disease) ([facility]     Diabetes mellitus ([facility]     Hx of echocardiogram 2017    [facility]    Hyperlipidemia     Hypothyroidism     MI (myocardial infarction) ([facility]     Stroke [facility])     Thyroid disease     Type II or unspecified type diabetes mellitus without mention of complication, not stated as uncontrolled        PATIENT ASSESSMENT    LABORATORY DATA  Hematology:   Lab Results   Component Value Date/Time    WBC 15.2 2023 05:30 AM    RBC 3.18 2023 05:30 AM    HGB 9.4 2023 05:30 AM    HCT 27.8 2023 05:30 AM     2023 05:30 AM     Chemistry:    Lab Results   Component Value Date/Time    PHART 7.340 2017 11:25 PM    JCB0MAU 53.8 2017 11:25 PM    PO2ART 62.0 2017 11:25 PM    A3UCMAKX 90.1 2017 11:25 PM    LKI6OZD 28.4 2017 11:25 PM    PBEA 1.4 2017 11:25 PM       VITALS  Heart Rate: 68   Resp: 22  BP: (!) 147/77  SpO2: 96 % O2 Device: Nasal cannula 2L  Temp: 98.5 °F (36.9 °C)    SKIN COLOR  [x] Normal  [] Pale  [] Dusky  [] Cyanotic    RESPIRATORY PATTERN  [x] Normal  [] Dyspnea  [] Cheyne-Veras  [] Kussmaul  [] Biots    AMBULATORY  [] Yes  [] No  [x] With Assistance    PEAK FLOW  Predicted:     Personal Best:           Patient Acuity 0 1 2 3 4 Score   Level of Consciousness (LOC) [x]  Alert & Oriented or Pt normal LOC []  Confused;follows directions []  Confused & uncooper-ative []  Obtunded []  Comatose 0   Respiratory Rate  (RR) []  Reg. rate & pattern. 12 - 20 bpm  []  Increased RR. Greater than 20 bpm   [x]  SOB w/ exertion or RR greater than 24 bpm []  Access- ory muscle use at rest. Abn.  resp. []  SOB at rest.   2   Bilateral Breath Sounds (BBS) []  Clear []  Diminish-ed bases  [x]  Diminish-ed t/o, or rales   []  Sporadic, scattered wheezes or rhonchi []  Persistentwheezes and, or absent BBS 2   Cough [x]  Strong, effective, & non-prod. []  Effective & prod. Less than 25 ml (2 TBSP) over past 24 hrs []  Ineffective & non-prod to less than 25 ML over past 24 hrs []  Ineffective and, or greater than 25 ml sputum prod. past 24 hrs. []  Nonspon- taneous; Requires suctioning 0   Pulmonary History  (PULM HX) []  No smoking and no chronic pulmonary history []  Former smoker. Quit over 12 mos. ago []  Current smoker or quit w/ in 12 mos []  Pulm. History and, or 20 pk/yr smoking hx [x]  Admitted w/ acute pulm. dx and, or has been admitted w/ pulm. dx 2 or more times over past 12 mos 4   Surgical History this Admit  (SURG HX) [x]  No surgery []  General surgery []  Lower abdominal []  Thoracic or upper abdominal   []  Thoracic w/ pulm. disease 0   Chest X-Ray (CXR)/CT Scan []  Clear or not applicable []  Not available []  Atelectasis or pleural effusions [x]  Localized infiltrate or pulm.  edema []  Con-solidated Infiltrates, bilateral, or in more than 1 lobe 3   TOTAL ACUITY: 11       CARE PLAN    If Acuity Level is 2, 3, or 4 in any of the following:    [x] BILATERAL BREATH SOUNDS (BBS)     [x] PULMONARY HISTORY (PULM HX)  [x] Respiratory Rate  (RR)    Goal: Improve respiratory functions in patients with airway disease and decrease WOB    [x] AEROSOL PROTOCOL    Total Acuity:   14-28  []  Secondary Assessment in 24 hrs Total Acuity:  9-13  [x]  Secondary Assessment in 24 hrs Total Acuity:  4-8  []  Secondary Assessment in 24 hrs Total Acuity:  0-3  []  Secondary Assessment in 48 hrs   HHN AEROSOL THERAPY with  [physician-ordered bronchodilator(s)] q 4 & Albuterol PRN q2 hrs. Breath-Actuated Neb if BBS Acuity = 4, and pt. can use MP. Notify physician if condition deteriorates. HHN AEROSOL THERAPY with  [physician-ordered bronchodilator(s)]  QID and Albuterol PRN q4 hrs. Breath-Actuated Neb if BBS Acuity = 4, and pt. can use MP. Notify physician if condition deteriorates. MDI THERAPY with  2 actuations of [physician-ordered bronchodilator(s)] via spacer TID Albuterol and PRNq4 hrs. If unable to utilize MDI: HHN [physician-ordered bronchodilator(s)] TID and Albuterol PRN q4 hrs. Notify physician if condition deteriorates. MDI THERAPY with  [physician-ordered bronchodilator(s)] via spacer TID PRN. If unable to utilize MDI: HHN [physician-ordered bronchodilator(s)] TID PRN. Notify physician if condition deteriorates. If Acuity Level is 2, 3, or 4 in any of the following:    [] COUGH     [] SURGICAL HISTORY (SURG HX)  [x] CHEST XRAY (CXR)    Goal: Improvement in sputum mobilization in patients with ineffective airway clearance. Reverse atelectasis. [x] Bronchopulmonary Hygiene Protocol      Total Acuity:   14-28  []  Secondary Assessment in 24 hrs Total Acuity:  9-13  [x]  Secondary Assessment in 24 hrs Total Acuity:  4-8  []  Secondary Assessment in 24 hrs Total Acuity:  0-3  []  Secondary Assessment in 48 hrs   METANEB QID with [physician-ordered bronchodilator(s)] if CXR Acuity = 4; otherwise:  PD&P, PEP, or Vest QID & PRN  NT Sxn PRN for ineffective cough  METANEB QID with [physician-ordered bronchodilator(s)] if CXR Acuity = 4; otherwise:  PD&P, PEP, or Vest QID & PRN  NT Sxn PRN for ineffective cough  PD&P, PEP, or Vest TID & PRN   Instruct patient to self-perform IS q1hr WA       If Acuity Level is 2 or above in the following:    [] PULMONARY HISTORY (PULM HX)    Goal: Assist patient in quitting smoking to slow or stop the progression of lung disease.     [] Smoking Cessation Protocol    SMOKING CESSATION EDUCATION provided according to policy [id number]_826: (karson with an X)  ____Yes    ____ No     ____ NA    Smoking Cessation Booklet given:  ____Yes  ____No ____Patient Kaila Harrison

## 2023-02-05 NOTE — PLAN OF CARE
Problem: Safety - Adult  Goal: Free from fall injury  Outcome: Progressing  Note: Bed alarm on, bed locked, side rails up, gripper socks on, call light within reach and able to use appropriately. Will continue to monitor this shift. Problem: Nutrition Deficit:  Goal: Optimize nutritional status  Outcome: Progressing  Flowsheets (Taken 2/4/2023 2330)  Nutrient intake appropriate for improving, restoring, or maintaining nutritional needs: Assess nutritional status and recommend course of action  Note: Encouraging foods and liquids as tolerated. Will continue to monitor this shift. Problem: Respiratory - Adult  Goal: Achieves optimal ventilation and oxygenation  Outcome: Progressing  Flowsheets  Taken 2/4/2023 2330  Achieves optimal ventilation and oxygenation: Assess for changes in respiratory status  Taken 2/4/2023 2316  Achieves optimal ventilation and oxygenation: Assess for changes in respiratory status  Taken 2/4/2023 1856  Achieves optimal ventilation and oxygenation: Assess for changes in respiratory status     Problem: Pain  Goal: Verbalizes/displays adequate comfort level or baseline comfort level  Outcome: Progressing  Flowsheets  Taken 2/4/2023 2330  Verbalizes/displays adequate comfort level or baseline comfort level:   Encourage patient to monitor pain and request assistance   Assess pain using appropriate pain scale   Administer analgesics based on type and severity of pain and evaluate response   Implement non-pharmacological measures as appropriate and evaluate response  Taken 2/4/2023 2316  Verbalizes/displays adequate comfort level or baseline comfort level: Encourage patient to monitor pain and request assistance  Taken 2/4/2023 1856  Verbalizes/displays adequate comfort level or baseline comfort level: Encourage patient to monitor pain and request assistance  Note: Patient denies any pain at this time. Will continue to monitor this shift.       Problem: Skin/Tissue Integrity  Goal: Absence of new skin breakdown  Description: 1. Monitor for areas of redness and/or skin breakdown  2. Assess vascular access sites hourly  3. Every 4-6 hours minimum:  Change oxygen saturation probe site  4. Every 4-6 hours:  If on nasal continuous positive airway pressure, respiratory therapy assess nares and determine need for appliance change or resting period. Outcome: Progressing  Note: Patient able to reposition self at this time and use call light appropriately for any assistance.  Will continue to monitor this shift

## 2023-02-05 NOTE — PROGRESS NOTES
Occupational Therapy  Facility/Department: [facility] MED SURG  Daily Treatment Note  NAME: Gladys Alonso  : 1935  MRN: [id number]    Date of Service: 2023    Discharge Recommendations:  Continue to assess pending progress, Home with Home health OT         Patient Diagnosis(es): The primary encounter diagnosis was Septicemia ([address] A diagnosis of Pneumonia due to infectious organism, unspecified laterality, unspecified part of lung was also pertinent to this visit. Assessment    Activity Tolerance: Patient tolerated treatment well;Patient limited by fatigue  Discharge Recommendations: Continue to assess pending progress;Home with Home health OT      Plan   Occupational Therapy Plan  Times Per Week: 7x/wk  Times Per Day: Once a day  Current Treatment Recommendations: Strengthening;Balance training;Self-Care / ADL; Safety education & training     Restrictions  Restrictions/Precautions  Restrictions/Precautions: General Precautions; Fall Risk    Subjective   Subjective  Subjective: Pt sitting up in bedside chair upon arrival. Pt agreed to participate in therapy session. Pain: Pt had no complaints of pain this date.   Orientation  Overall Orientation Status: Within Functional Limits  Pain: no c/o pain           Objective    Vitals     Bed Mobility Training  Bed Mobility Training: No  Transfer Training  Transfer Training: Yes  Overall Level of Assistance: Assist X1;Contact-guard assistance;Stand-by assistance  Interventions: Verbal cues  Sit to Stand: Contact-guard assistance;Stand-by assistance;Assist X1  Stand to Sit: Stand-by assistance;Assist X1  Stand Pivot Transfers: Contact-guard assistance;Assist X1;Stand-by assistance  Toilet Transfer: Stand-by assistance;Contact-guard assistance;Assist X1;Other (comment) (Pt required use of B UE on L hand rail to achieve stand)  Gait Training  Gait Training: Yes  Right Side Weight Bearing: Full  Left Side Weight Bearing: Full  Gait  Overall Level of Assistance: Contact-guard assistance;Assist X1  Interventions: Verbal cues; Safety awareness training  Assistive Device: Walker, rolling;Gait belt     ADL  Toileting: Minimal assistance  Toileting Skilled Clinical Factors: Min A to complete posterior hygiene and clothing mgmt. OT Exercises  Exercise Treatment: Pt tolerated BUE AROM x 3 planes x 15 reps x 1 set to increase UE strength and endurance in order to ease completion of ADL tasks. Pt required RBs as needed secondary to fatigue. Safety Devices  Type of Devices: All fall risk precautions in place;Call light within reach; Left in chair     Patient Education  Education Given To: Patient  Education Provided: Role of Therapy;Plan of Care;Transfer Training  Education Method: Demonstration;Verbal;Printed Information/Hand-outs  Barriers to Learning: None  Education Outcome: Verbalized understanding;Demonstrated understanding    Goals  Short Term Goals  Time Frame for Short Term Goals: 20 visits  Short Term Goal 1: Pt. will tolerate 15 mins of BUE ther ex/act while maintaining SpO2 > 90% to increase overall functional activity tolerance. Short Term Goal 2: Pt. will demo standing tolerance x 6-7 mins w/o LOB to increase safety/participation with ADL's. Short Term Goal 3: Pt. will complete self care routine with SUP/mod I (with exception of footwear) to return to PLOF. Short Term Goal 4: Pt. will complete functional ADL transfers/mobility with SUP/mod I and G safety with reduced risk for falls.        Therapy Time   Individual Concurrent Group Co-treatment   Time In 0856         Time Out 0928         Minutes 32                 RAFAEL Rodriguez

## 2023-02-06 PROBLEM — E87.79 OTHER FLUID OVERLOAD: Status: ACTIVE | Noted: 2023-02-06

## 2023-02-06 PROBLEM — I10 ESSENTIAL HYPERTENSION: Status: ACTIVE | Noted: 2023-02-06

## 2023-02-06 LAB
ABSOLUTE EOS #: 0.32 K/UL (ref 0–0.44)
ABSOLUTE IMMATURE GRANULOCYTE: 0.32 K/UL (ref 0–0.3)
ABSOLUTE LYMPH #: 1.27 K/UL (ref 1.1–3.7)
ABSOLUTE MONO #: 2.39 K/UL (ref 0.1–1.2)
ALBUMIN SERPL-MCNC: 2.8 G/DL (ref 3.5–5.2)
ALBUMIN/GLOBULIN RATIO: 0.7 (ref 1–2.5)
ALLEN TEST: ABNORMAL
ALP SERPL-CCNC: 217 U/L (ref 40–129)
ALT SERPL-CCNC: 47 U/L (ref 5–41)
ANION GAP SERPL CALCULATED.3IONS-SCNC: 9 MMOL/L (ref 9–17)
ANION GAP SERPL CALCULATED.3IONS-SCNC: 9 MMOL/L (ref 9–17)
AST SERPL-CCNC: 33 U/L
BASOPHILS # BLD: 0 % (ref 0–2)
BASOPHILS ABSOLUTE: 0 K/UL (ref 0–0.2)
BILIRUB SERPL-MCNC: 0.4 MG/DL (ref 0.3–1.2)
BNP SERPL-MCNC: 3614 PG/ML
BUN SERPL-MCNC: 21 MG/DL (ref 8–23)
BUN SERPL-MCNC: 25 MG/DL (ref 8–23)
BUN/CREAT BLD: 20 (ref 9–20)
BUN/CREAT BLD: 25 (ref 9–20)
CALCIUM SERPL-MCNC: 8.7 MG/DL (ref 8.6–10.4)
CALCIUM SERPL-MCNC: 8.8 MG/DL (ref 8.6–10.4)
CHLORIDE SERPL-SCNC: 85 MMOL/L (ref 98–107)
CHLORIDE SERPL-SCNC: 87 MMOL/L (ref 98–107)
CHLORIDE, UR: 41 MMOL/L
CO2 SERPL-SCNC: 25 MMOL/L (ref 20–31)
CO2 SERPL-SCNC: 26 MMOL/L (ref 20–31)
CREAT SERPL-MCNC: 1.01 MG/DL (ref 0.7–1.2)
CREAT SERPL-MCNC: 1.04 MG/DL (ref 0.7–1.2)
EOSINOPHILS RELATIVE PERCENT: 2 % (ref 1–4)
GFR SERPL CREATININE-BSD FRML MDRD: >60 ML/MIN/1.73M2
GFR SERPL CREATININE-BSD FRML MDRD: >60 ML/MIN/1.73M2
GLUCOSE BLD-MCNC: 126 MG/DL (ref 74–100)
GLUCOSE SERPL-MCNC: 150 MG/DL (ref 70–99)
GLUCOSE SERPL-MCNC: 176 MG/DL (ref 70–99)
HCO3 ARTERIAL: 25.8 MMOL/L (ref 22–26)
HCT VFR BLD AUTO: 30.1 % (ref 40.7–50.3)
HGB BLD-MCNC: 10.1 G/DL (ref 13–17)
IMMATURE GRANULOCYTES: 2 %
LYMPHOCYTES # BLD: 8 % (ref 24–43)
MCH RBC QN AUTO: 29.6 PG (ref 25.2–33.5)
MCHC RBC AUTO-ENTMCNC: 33.6 G/DL (ref 28.4–34.8)
MCV RBC AUTO: 88.3 FL (ref 82.6–102.9)
MONOCYTES # BLD: 15 % (ref 3–12)
MORPHOLOGY: ABNORMAL
NEGATIVE BASE EXCESS, ART: 0.1 MMOL/L (ref 0–2)
NRBC AUTOMATED: 0 PER 100 WBC
O2 DEVICE/FLOW/%: ABNORMAL
O2 SAT, ARTERIAL: 90.3 % (ref 95–98)
PATIENT TEMP: 37
PCO2 ARTERIAL: 46.9 MMHG (ref 35–45)
PDW BLD-RTO: 14.7 % (ref 11.8–14.4)
PH ARTERIAL: 7.36 (ref 7.35–7.45)
PLATELET # BLD AUTO: 299 K/UL (ref 138–453)
PMV BLD AUTO: 9.9 FL (ref 8.1–13.5)
PO2 ARTERIAL: 60.9 MMHG (ref 80–100)
POTASSIUM SERPL-SCNC: 4.2 MMOL/L (ref 3.7–5.3)
POTASSIUM SERPL-SCNC: 4.4 MMOL/L (ref 3.7–5.3)
POTASSIUM, UR: 41.2 MMOL/L
PROT SERPL-MCNC: 6.7 G/DL (ref 6.4–8.3)
RBC # BLD: 3.41 M/UL (ref 4.21–5.77)
SAMPLE SITE: ABNORMAL
SEG NEUTROPHILS: 73 % (ref 36–65)
SEGMENTED NEUTROPHILS ABSOLUTE COUNT: 11.6 K/UL (ref 1.5–8.1)
SODIUM SERPL-SCNC: 119 MMOL/L (ref 135–144)
SODIUM SERPL-SCNC: 120 MMOL/L (ref 135–144)
SODIUM SERPL-SCNC: 121 MMOL/L (ref 135–144)
SODIUM,UR: 21 MMOL/L
TROPONIN I SERPL DL<=0.01 NG/ML-MCNC: 17 NG/L (ref 0–22)
WBC # BLD AUTO: 15.9 K/UL (ref 3.5–11.3)

## 2023-02-06 PROCEDURE — 99232 SBSQ HOSP IP/OBS MODERATE 35: CPT | Performed by: INTERNAL MEDICINE

## 2023-02-06 PROCEDURE — 82436 ASSAY OF URINE CHLORIDE: CPT

## 2023-02-06 PROCEDURE — 6360000002 HC RX W HCPCS: Performed by: FAMILY MEDICINE

## 2023-02-06 PROCEDURE — 6370000000 HC RX 637 (ALT 250 FOR IP): Performed by: INTERNAL MEDICINE

## 2023-02-06 PROCEDURE — 94669 MECHANICAL CHEST WALL OSCILL: CPT

## 2023-02-06 PROCEDURE — 85025 COMPLETE CBC W/AUTO DIFF WBC: CPT

## 2023-02-06 PROCEDURE — 36415 COLL VENOUS BLD VENIPUNCTURE: CPT

## 2023-02-06 PROCEDURE — 94761 N-INVAS EAR/PLS OXIMETRY MLT: CPT

## 2023-02-06 PROCEDURE — 6370000000 HC RX 637 (ALT 250 FOR IP): Performed by: FAMILY MEDICINE

## 2023-02-06 PROCEDURE — 80048 BASIC METABOLIC PNL TOTAL CA: CPT

## 2023-02-06 PROCEDURE — 2580000003 HC RX 258: Performed by: INTERNAL MEDICINE

## 2023-02-06 PROCEDURE — 87449 NOS EACH ORGANISM AG IA: CPT

## 2023-02-06 PROCEDURE — 82947 ASSAY GLUCOSE BLOOD QUANT: CPT

## 2023-02-06 PROCEDURE — 2700000000 HC OXYGEN THERAPY PER DAY

## 2023-02-06 PROCEDURE — 36600 WITHDRAWAL OF ARTERIAL BLOOD: CPT

## 2023-02-06 PROCEDURE — 99254 IP/OBS CNSLTJ NEW/EST MOD 60: CPT | Performed by: INTERNAL MEDICINE

## 2023-02-06 PROCEDURE — 84300 ASSAY OF URINE SODIUM: CPT

## 2023-02-06 PROCEDURE — 84295 ASSAY OF SERUM SODIUM: CPT

## 2023-02-06 PROCEDURE — 94664 DEMO&/EVAL PT USE INHALER: CPT

## 2023-02-06 PROCEDURE — 84484 ASSAY OF TROPONIN QUANT: CPT

## 2023-02-06 PROCEDURE — 80053 COMPREHEN METABOLIC PANEL: CPT

## 2023-02-06 PROCEDURE — 83935 ASSAY OF URINE OSMOLALITY: CPT

## 2023-02-06 PROCEDURE — 82805 BLOOD GASES W/O2 SATURATION: CPT

## 2023-02-06 PROCEDURE — 51702 INSERT TEMP BLADDER CATH: CPT

## 2023-02-06 PROCEDURE — 1200000000 HC SEMI PRIVATE

## 2023-02-06 PROCEDURE — 84133 ASSAY OF URINE POTASSIUM: CPT

## 2023-02-06 PROCEDURE — 94640 AIRWAY INHALATION TREATMENT: CPT

## 2023-02-06 PROCEDURE — 83880 ASSAY OF NATRIURETIC PEPTIDE: CPT

## 2023-02-06 RX ORDER — FUROSEMIDE 10 MG/ML
40 INJECTION INTRAMUSCULAR; INTRAVENOUS ONCE
Status: DISCONTINUED | OUTPATIENT
Start: 2023-02-06 | End: 2023-02-10 | Stop reason: HOSPADM

## 2023-02-06 RX ORDER — CALCIUM CARBONATE 200(500)MG
500 TABLET,CHEWABLE ORAL EVERY 6 HOURS PRN
Status: DISCONTINUED | OUTPATIENT
Start: 2023-02-06 | End: 2023-02-10 | Stop reason: HOSPADM

## 2023-02-06 RX ORDER — SODIUM CHLORIDE 1000 MG
2 TABLET, SOLUBLE MISCELLANEOUS EVERY 6 HOURS
Status: COMPLETED | OUTPATIENT
Start: 2023-02-06 | End: 2023-02-07

## 2023-02-06 RX ADMIN — ENOXAPARIN SODIUM 30 MG: 100 INJECTION SUBCUTANEOUS at 08:36

## 2023-02-06 RX ADMIN — MOMETASONE FUROATE AND FORMOTEROL FUMARATE DIHYDRATE 2 PUFF: 200; 5 AEROSOL RESPIRATORY (INHALATION) at 10:46

## 2023-02-06 RX ADMIN — ANTACID TABLETS 500 MG: 500 TABLET, CHEWABLE ORAL at 15:11

## 2023-02-06 RX ADMIN — IPRATROPIUM BROMIDE AND ALBUTEROL SULFATE 1 AMPULE: 2.5; .5 SOLUTION RESPIRATORY (INHALATION) at 19:33

## 2023-02-06 RX ADMIN — ENOXAPARIN SODIUM 30 MG: 100 INJECTION SUBCUTANEOUS at 21:11

## 2023-02-06 RX ADMIN — ONDANSETRON 4 MG: 2 INJECTION INTRAMUSCULAR; INTRAVENOUS at 17:14

## 2023-02-06 RX ADMIN — SODIUM CHLORIDE, PRESERVATIVE FREE 10 ML: 5 INJECTION INTRAVENOUS at 08:37

## 2023-02-06 RX ADMIN — FAMOTIDINE 20 MG: 20 TABLET, FILM COATED ORAL at 21:11

## 2023-02-06 RX ADMIN — IPRATROPIUM BROMIDE AND ALBUTEROL SULFATE 1 AMPULE: 2.5; .5 SOLUTION RESPIRATORY (INHALATION) at 05:41

## 2023-02-06 RX ADMIN — OXYBUTYNIN CHLORIDE 10 MG: 5 TABLET ORAL at 21:11

## 2023-02-06 RX ADMIN — FERROUS SULFATE TAB 325 MG (65 MG ELEMENTAL FE) 325 MG: 325 (65 FE) TAB at 08:34

## 2023-02-06 RX ADMIN — ASPIRIN 81 MG: 81 TABLET, COATED ORAL at 08:35

## 2023-02-06 RX ADMIN — Medication 2 G: at 21:11

## 2023-02-06 RX ADMIN — LEVOTHYROXINE SODIUM 137 MCG: 25 TABLET ORAL at 08:35

## 2023-02-06 RX ADMIN — IPRATROPIUM BROMIDE AND ALBUTEROL SULFATE 1 AMPULE: 2.5; .5 SOLUTION RESPIRATORY (INHALATION) at 10:46

## 2023-02-06 RX ADMIN — SODIUM CHLORIDE, PRESERVATIVE FREE 10 ML: 5 INJECTION INTRAVENOUS at 21:11

## 2023-02-06 RX ADMIN — TAMSULOSIN HYDROCHLORIDE 0.8 MG: 0.4 CAPSULE ORAL at 16:39

## 2023-02-06 RX ADMIN — MOMETASONE FUROATE AND FORMOTEROL FUMARATE DIHYDRATE 2 PUFF: 200; 5 AEROSOL RESPIRATORY (INHALATION) at 19:38

## 2023-02-06 RX ADMIN — MONTELUKAST 10 MG: 10 TABLET, FILM COATED ORAL at 08:44

## 2023-02-06 RX ADMIN — OXYBUTYNIN CHLORIDE 10 MG: 5 TABLET ORAL at 08:34

## 2023-02-06 RX ADMIN — Medication 30 G: at 16:41

## 2023-02-06 RX ADMIN — METFORMIN HYDROCHLORIDE 1000 MG: 500 TABLET ORAL at 21:11

## 2023-02-06 RX ADMIN — PRIMIDONE 250 MG: 250 TABLET ORAL at 16:39

## 2023-02-06 RX ADMIN — METFORMIN HYDROCHLORIDE 1000 MG: 500 TABLET ORAL at 08:36

## 2023-02-06 RX ADMIN — PROPRANOLOL HYDROCHLORIDE 120 MG: 60 CAPSULE, EXTENDED RELEASE ORAL at 16:39

## 2023-02-06 RX ADMIN — LEVOFLOXACIN 500 MG: 500 TABLET, FILM COATED ORAL at 08:35

## 2023-02-06 RX ADMIN — FAMOTIDINE 20 MG: 20 TABLET, FILM COATED ORAL at 08:35

## 2023-02-06 RX ADMIN — IPRATROPIUM BROMIDE AND ALBUTEROL SULFATE 1 AMPULE: 2.5; .5 SOLUTION RESPIRATORY (INHALATION) at 16:05

## 2023-02-06 RX ADMIN — PRIMIDONE 250 MG: 250 TABLET ORAL at 08:34

## 2023-02-06 RX ADMIN — FERROUS SULFATE TAB 325 MG (65 MG ELEMENTAL FE) 325 MG: 325 (65 FE) TAB at 16:39

## 2023-02-06 RX ADMIN — PROPRANOLOL HYDROCHLORIDE 120 MG: 60 CAPSULE, EXTENDED RELEASE ORAL at 08:36

## 2023-02-06 RX ADMIN — FUROSEMIDE 40 MG: 10 INJECTION, SOLUTION INTRAMUSCULAR; INTRAVENOUS at 08:36

## 2023-02-06 RX ADMIN — ALOGLIPTIN 25 MG: 25 TABLET, FILM COATED ORAL at 08:35

## 2023-02-06 ASSESSMENT — PAIN SCALES - GENERAL
PAINLEVEL_OUTOF10: 0
PAINLEVEL_OUTOF10: 0

## 2023-02-06 ASSESSMENT — ENCOUNTER SYMPTOMS
VOMITING: 0
EYES NEGATIVE: 1
COUGH: 1
SHORTNESS OF BREATH: 0
NAUSEA: 0
DIARRHEA: 0
ABDOMINAL PAIN: 0
BACK PAIN: 0

## 2023-02-06 NOTE — PROGRESS NOTES
I see patient and his wife in his room. Pt is oriented for our conversation, but is drowsy. I discuss DPOAHC and Living Will, and the patient does not currently have one. I give information to him and his wife on how to complete them as an outpatient. I complete the ACP conversation and document his answers. They deny any further needs at this time.   [address] Teresita Brewer Nurse Coordinator  2/6/2023 3:03 PM

## 2023-02-06 NOTE — PROGRESS NOTES
Pts wife come out of room at this time stating her  was wheezing and had not been doing so when he originally came in. Nurse assessed pt at this time. Pts lungs remain the same as this morning. Pt has been refusing to get up out of bed and work with PT/OT. Pt refusing to get into the chair for the nurse. Nurse explained to wife that the pt is refusing and theres not much more we can do. Respiratory called and came to evaluate pt as well. Call light within reach. Will continue to monitor.

## 2023-02-06 NOTE — PROGRESS NOTES
Vitals and assessment done at this time, see flowsheet for more details. Patient resting in bed. Patients upper lungs crackles. Patient denied shortness of breath at this time. Nurse attempted to wean patient off oxygen, but patient did not tolerate. Patient remains on 2L. Call light within reach. Will continue to monitor.

## 2023-02-06 NOTE — PROGRESS NOTES
RESPIRATORY ASSESSMENT PROTOCOL                                                                                              Patient Name: Mary Lou Valdez Room#: [id number] : 1935     Admitting diagnosis: Septicemia ([facility] [A41.9]  Sepsis due to pneumonia ([facility] [J18.9, A41.9]  Pneumonia due to infectious organism, unspecified laterality, unspecified part of lung [J18.9]       Medical History:   Past Medical History:   Diagnosis Date    COPD (chronic obstructive pulmonary disease) ([facility]     Diabetes mellitus ([facility]     Hx of echocardiogram 2017    [facility]    Hyperlipidemia     Hypothyroidism     MI (myocardial infarction) ([facility]     Stroke [facility])     Thyroid disease     Type II or unspecified type diabetes mellitus without mention of complication, not stated as uncontrolled        PATIENT ASSESSMENT    LABORATORY DATA  Hematology:   Lab Results   Component Value Date/Time    WBC 15.9 2023 06:02 AM    RBC 3.41 2023 06:02 AM    HGB 10.1 2023 06:02 AM    HCT 30.1 2023 06:02 AM     2023 06:02 AM     Chemistry:    Lab Results   Component Value Date/Time    PHART 7.340 2017 11:25 PM    DGS6NDO 53.8 2017 11:25 PM    PO2ART 62.0 2017 11:25 PM    T4KSVISL 90.1 2017 11:25 PM    JGN9QJS 28.4 2017 11:25 PM    PBEA 1.4 2017 11:25 PM       VITALS  Heart Rate: 63   Resp: 20  BP: (!) 155/75  SpO2: 93 % O2 Device: Nasal cannula  Temp: 97.8 °F (36.6 °C)    SKIN COLOR  [x] Normal  [] Pale  [] Dusky  [] Cyanotic    RESPIRATORY PATTERN  [x] Normal  [] Dyspnea  [] Cheyne-Veras  [] Kussmaul  [] Biots    AMBULATORY  [] Yes  [] No  [x] With Assistance    PEAK FLOW  Predicted:     Personal Best:            Patient Acuity 0 1 2 3 4 Score   Level of Consciousness (LOC) []  Alert & Oriented or Pt normal LOC [x]  Confused;follows directions []  Confused & uncooper-ative []  Obtunded []  Comatose 1   Respiratory Rate  (RR) [x]  Reg. rate & pattern. 12 - 20 bpm  []  Increased RR. Greater than 20 bpm   []  SOB w/ exertion or RR greater than 24 bpm []  Access- ory muscle use at rest. Abn.  resp. []  SOB at rest.   0   Bilateral Breath Sounds (BBS) []  Clear [x]  Diminish-ed bases  []  Diminish-ed t/o, or rales   []  Sporadic, scattered wheezes or rhonchi []  Persistentwheezes and, or absent BBS 1   Cough [x]  Strong, effective, & non-prod. []  Effective & prod. Less than 25 ml (2 TBSP) over past 24 hrs []  Ineffective & non-prod to less than 25 ML over past 24 hrs []  Ineffective and, or greater than 25 ml sputum prod. past 24 hrs. []  Nonspon- taneous; Requires suctioning 0   Pulmonary History  (PULM HX) []  No smoking and no chronic pulmonary history []  Former smoker. Quit over 12 mos. ago []  Current smoker or quit w/ in 12 mos []  Pulm. History and, or 20 pk/yr smoking hx [x]  Admitted w/ acute pulm. dx and, or has been admitted w/ pulm. dx 2 or more times over past 12 mos 4   Surgical History this Admit  (SURG HX) [x]  No surgery []  General surgery []  Lower abdominal []  Thoracic or upper abdominal   []  Thoracic w/ pulm. disease 0   Chest X-Ray (CXR)/CT Scan []  Clear or not applicable []  Not available []  Atelectasis or pleural effusions [x]  Localized infiltrate or pulm.  edema []  Con-solidated Infiltrates, bilateral, or in more than 1 lobe 3   TOTAL ACUITY: 9       CARE PLAN    If Acuity Level is 2, 3, or 4 in any of the following:    [] BILATERAL BREATH SOUNDS (BBS)     [x] PULMONARY HISTORY (PULM HX)  [] Respiratory Rate  (RR)    Goal: Improve respiratory functions in patients with airway disease and decrease WOB    [x] AEROSOL PROTOCOL    Total Acuity:   14-28  []  Secondary Assessment in 24 hrs Total Acuity:  9-13  [x]  Secondary Assessment in 24 hrs Total Acuity:  4-8  []  Secondary Assessment in 24 hrs Total Acuity:  0-3  []  Secondary Assessment in 48 hrs   HHN AEROSOL THERAPY with  [physician-ordered bronchodilator(s)] q 4 & Albuterol PRN q2 hrs. Breath-Actuated Neb if BBS Acuity = 4, and pt. can use MP. Notify physician if condition deteriorates. HHN AEROSOL THERAPY with  [physician-ordered bronchodilator(s)]  QID and Albuterol PRN q4 hrs. Breath-Actuated Neb if BBS Acuity = 4, and pt. can use MP. Notify physician if condition deteriorates. MDI THERAPY with  2 actuations of [physician-ordered bronchodilator(s)] via spacer TID Albuterol and PRNq4 hrs. If unable to utilize MDI: HHN [physician-ordered bronchodilator(s)] TID and Albuterol PRN q4 hrs. Notify physician if condition deteriorates. MDI THERAPY with  [physician-ordered bronchodilator(s)] via spacer TID PRN. If unable to utilize MDI: HHN [physician-ordered bronchodilator(s)] TID PRN. Notify physician if condition deteriorates. If Acuity Level is 2, 3, or 4 in any of the following:    [] COUGH     [] SURGICAL HISTORY (SURG HX)  [] CHEST XRAY (CXR)    Goal: Improvement in sputum mobilization in patients with ineffective airway clearance. Reverse atelectasis. [] Bronchopulmonary Hygiene Protocol      Total Acuity:   14-28  []  Secondary Assessment in 24 hrs Total Acuity:  9-13  []  Secondary Assessment in 24 hrs Total Acuity:  4-8  []  Secondary Assessment in 24 hrs Total Acuity:  0-3  []  Secondary Assessment in 48 hrs   METANEB QID with [physician-ordered bronchodilator(s)] if CXR Acuity = 4; otherwise:  PD&P, PEP, or Vest QID & PRN  NT Sxn PRN for ineffective cough  METANEB QID with [physician-ordered bronchodilator(s)] if CXR Acuity = 4; otherwise:  PD&P, PEP, or Vest QID & PRN  NT Sxn PRN for ineffective cough  PD&P, PEP, or Vest TID & PRN   Instruct patient to self-perform IS q1hr WA       If Acuity Level is 2 or above in the following:    [] PULMONARY HISTORY (PULM HX)    Goal: Assist patient in quitting smoking to slow or stop the progression of lung disease.     [] Smoking Cessation Protocol    SMOKING CESSATION EDUCATION provided according to policy RT_201: (karson with an X)  ____Yes    ____ No     ____ NA    Smoking Cessation Booklet given:  ____Yes  ____No ____Patient Shelley Sharp

## 2023-02-06 NOTE — PROGRESS NOTES
[facility]  Inpatient/Observation/Outpatient Rehabilitation    Date: 2023  Patient Name: Diego Martin       [x] Inpatient Acute/Observation       []  Outpatient  : 1935           [x] Pt refused/declined therapy at this time due to:           Pt refused 1st and 2nd attempt with encouragement and education on importance of therapy, states \"maybe\" for completing therapy this afternoon    Therapist/Assistant will attempt to see this patient, at our earliest opportunity.        Chasity Gambino [place] Date: 2023

## 2023-02-06 NOTE — PROGRESS NOTES
Patient resting in chair, awakes easily. Vitals and assessment completed at this time, see flowsheet. Patient denies any pain. Call light within reach. Will continue to monitor.

## 2023-02-06 NOTE — PROGRESS NOTES
Patient reassessment and vitals completed at this time as charted. Patient is awake resting in bed watching TV. Patient continues to have philip in place that was emptied at this time. Patient denies pain and was assisted with repositioning in bed. Patient states no further needs, call light is in reach, will continue to monitor.

## 2023-02-06 NOTE — PROGRESS NOTES
Progress Note  Yue Bowman MD  Patient: Karlee Rader  Date of Admission: 1/31/2023  4:23 PM  Hospital Day # 6  Date of Evaluation: 2/6/2023      SUBJECTIVE:    Mr. Hans Wayne  was seen and examined today for f/u of Sepsis due to pneumonia [facility]). He  has cough with expectoration . He in on 1 lit oxygen. He  is hypoxic on RA . Bilat leg edema worse, lasix held,renal function improving, has hyponatremia, has gained 1 lb    ROS:   Constitutional: negative  for fevers, and negative for chills. Respiratory: positive for shortness of breath, positive for cough, and negative for wheezing  Cardiovascular: negative for chest pain, and negative for palpitations  Gastrointestinal: negative for abdominal pain, negative for nausea,negative for vomiting, negative for diarrhea, and negative for constipation     All other systems were reviewed with the patient and are negative unless otherwise stated in HPI    -----------------------------------------------------------------  OBJECTIVE:  Vitals:   Temp: 98.1 °F (36.7 °C)  BP: (!) 166/83  Resp: 20  Heart Rate: 75  SpO2: 93 % on supplemental O2    Weight  Wt Readings from Last 3 Encounters:   02/06/23 272 lb (123.4 kg)   12/16/22 256 lb 8 oz (116.3 kg)   12/05/22 254 lb 6.6 oz (115.4 kg)     Body mass index is 37.94 kg/m². 24HR INTAKE/OUTPUT:      Intake/Output Summary (Last 24 hours) at 2/6/2023 1308  Last data filed at 2/6/2023 1131  Gross per 24 hour   Intake 1160 ml   Output 1300 ml   Net -140 ml       Exam:  GEN:    Awake, alert and oriented x 3. EYES:  EOMI, pupils equal   NECK: Supple. No lymphadenopathy. No carotid bruit  CVS:    regular rate and rhythm, 2/6 systolic murmur  PULM:  diminished with bilat rhonchi, no acute respiratory distress  ABD:    Bowels sounds normal.  Abdomen is soft. No distention. no tenderness to palpation. EXT:   2 + edema bilaterally . No calf tenderness. NEURO: Moves all extremities.   Motor and sensory are grossly intact  SKIN:  No rashes.   No skin lesions.    -----------------------------------------------------------------  DATA:  Complete Blood Count:   Recent Labs     02/04/23  0545 02/05/23  0530 02/06/23  0602   WBC 13.9* 15.2* 15.9*   RBC 3.23* 3.18* 3.41*   HGB 9.3* 9.4* 10.1*   HCT 28.5* 27.8* 30.1*   MCV 88.2 87.4 88.3   MCH 28.8 29.6 29.6   MCHC 32.6 33.8 33.6   RDW 14.7* 14.6* 14.7*    259 299   MPV 10.5 9.8 9.9        Last 3 Blood Glucose:   Recent Labs     02/04/23  0545 02/05/23  0530 02/06/23  0602   GLUCOSE 157* 168* 176*        Comprehensive Metabolic Profile:   Recent Labs     02/04/23  0545 02/05/23  0530 02/06/23  0602   * 122* 121*   K 4.1 4.1 4.2   CL 92* 90* 87*   CO2 21 20 25   BUN 24* 21 21   CREATININE 0.96 0.92 1.04   GLUCOSE 157* 168* 176*   CALCIUM 8.6 8.4* 8.8   PROT 6.0* 5.9* 6.7   LABALBU 3.0* 2.8* 2.8*   BILITOT 0.4 0.4 0.4   ALKPHOS 207* 198* 217*   AST 49* 32 33   ALT 67* 50* 47*        Urinalysis:   Lab Results   Component Value Date/Time    NITRU NEGATIVE 01/31/2023 07:45 PM    COLORU Yellow 01/31/2023 07:45 PM    PHUR 6.0 01/31/2023 07:45 PM    WBCUA 0 TO 2 01/31/2023 07:45 PM    RBCUA 0 TO 2 01/31/2023 07:45 PM    MUCUS TRACE 01/31/2023 07:45 PM    TRICHOMONAS NOT REPORTED 12/29/2021 11:30 PM    YEAST NOT REPORTED 12/29/2021 11:30 PM    BACTERIA 1+ 01/31/2023 07:45 PM    CLARITYU clear 05/10/2018 02:45 PM    SPECGRAV 1.020 01/31/2023 07:45 PM    LEUKOCYTESUR NEGATIVE 01/31/2023 07:45 PM    UROBILINOGEN Normal 01/31/2023 07:45 PM    BILIRUBINUR NEGATIVE 01/31/2023 07:45 PM    BILIRUBINUR neg 05/10/2018 02:45 PM    BLOODU neg 05/10/2018 02:45 PM    GLUCOSEU NEGATIVE 01/31/2023 07:45 PM    KETUA NEGATIVE 01/31/2023 07:45 PM    AMORPHOUS NOT REPORTED 12/29/2021 11:30 PM       HgBA1c:    Lab Results   Component Value Date/Time    LABA1C 7.2 02/02/2023 05:50 AM       Lactic Acid:   Lab Results   Component Value Date/Time    LACTA 2.2 12/29/2021 09:24 PM    LACTA 1.2 11/14/2017 08:47 PM    LACTA 1.6 09/19/2017 06:27 PM        High Sensitivity Troponin:  No results for input(s): TROPHS in the last 72 hours. Microbiology:  Blood Culture:  No components found for: CBLOOD, CFUNGUSBL    Radiology/Imaging:  XR CHEST PORTABLE   Final Result   Increasing vascular congestion as well as localized airspace disease in the   mid right lung field. Developing pulmonary edema can have this appearance   along with underlying multifocal infection. CT ABDOMEN PELVIS W IV CONTRAST Additional Contrast? None   Final Result   1. Multifocal consolidation in the right lung and to a lesser degree left   lung base, concerning for infection. 2.  Motion degraded evaluation of the pulmonary arteries. No evidence for   central pulmonary embolism. 3.  No acute inflammatory process identified in the abdomen or pelvis. Stable benign and incidental findings, as described. CT CHEST PULMONARY EMBOLISM W CONTRAST   Final Result   1. Multifocal consolidation in the right lung and to a lesser degree left   lung base, concerning for infection. 2.  Motion degraded evaluation of the pulmonary arteries. No evidence for   central pulmonary embolism. 3.  No acute inflammatory process identified in the abdomen or pelvis. Stable benign and incidental findings, as described. US GALLBLADDER RUQ   Final Result   Gallbladder sludge. No imaging evidence for acute cholecystitis or biliary   dilatation. XR CHEST PORTABLE   Final Result   Suspected pneumonia in the right mid lung. Recommend imaging follow-up to   resolution.                  MEDICAL DECISION MAKING:    Primary Problem(s): Sepsis due to pneumonia ([address]  Condition is improving, wbc 15 k  Treatment plan:  levaquin iv  Imaging: none  Medications: Continue levaquin  Medication Monitoring / High Risk Medications: none    Id follow up  chf    Condition is worsening  Treatment plan:  lasix iv  Imaging: none  Medications: Cardiology input    Hyponatremia- worsening,Na 121                        - nephrology consult,monitor renal function  copd    Condition is unchanged  Treatment plan: Continue current treatment  Imaging: no further imaging studies ordered today  Medications: Continue oxygen,aerosols    ckd    Condition is improving  Treatment plan: Continue current treatment  Imaging: no further imaging studies ordered today  Medications: none    Nutrition status: Well developed, well nourished with no malnutrition  Dietician consult initiated    Hospital Prophylaxis:   DVT: Lovenox   Stress Ulcer: PPI     MDM Data:   Test interpretation:    My independent X-ray interpretation:   pneumonia,chf, improvement  Management and/or test interpretation  was reviewed with nursing staff  Consults and Nursing notes were personally reviewed, all current labs and imaging were personally reviewed, tests ordered: CBC, BMP, and history obtained by independent historian       Disposition:  Shared decision making:  All test results, treatment options and disposition options were discussed with the patient today  Social determinants of health that may impact management: none  Code status: Full Code   Disposition: Discharge plan is home            Smooth Shepherd MD , M.D.  2/6/2023

## 2023-02-06 NOTE — CONSULTS
[facility]          [facility]        [address]        [address] [facility] [facility]        [phone]           Inpatient Initial consult note         2/6/2023 12:27 PM      Patient Name:   Anatoly Roberson:    1935  Primary Care Physician:  GENA Jj CNP   Admit Date:    1/31/2023  4:23 PM      TELEHEALTH EVALUATION -- Audio/Visual (During [id number] public health emergency)     Telehealth service was provided with the patient at his room [place] [address] and myself the physician in my [place] office and my [address] [place] [address] who has initiated the visit. Pursuant to the emergency declaration under the [facility], [facility] waiver authority and the [employer] Act, this Virtual  Visit was conducted, with patient's consent, to reduce the patient's risk of exposure to COVID-19 and provide continuity of care for an established patient. Services were provided through a video synchronous discussion virtually to substitute for in-person clinic visit. Consultation requested by : Cortes Cortez MD    Reason for Consult : Nephrology following for hyponatremia. History of presenting illness :Tessa Dawn is a 82 y.o.   male with Past Medical History as stated below presented with a chief complaint of Fall (Fall this am while getting out of the shower. Refused transport by EMS)   on 1/31/2023 . Patient presented with chief complaints of having a fall almost a week ago he has been feeling weak and tired denies any hitting of the head initially he refused but gradually started to get worse he had some cough which has been progressively getting worse. Wife tried some leftover Zithromax did not seem to help much no chest pain shortness of breath or palpitation. He was diagnosed with pneumonia and was started on antibiotics. He was also getting Lasix.     Patient presented with a sodium of 130 on 2023 and has steadily been declining and this morning it is 121 and so nephrology has been consulted for further evaluation and management. Patient apparently is not drinking a whole lot of fluids    Past History:  Past Medical History:   Diagnosis Date    COPD (chronic obstructive pulmonary disease) ([facility]     Diabetes mellitus ([facility]     Hx of echocardiogram 2017    [facility]    Hyperlipidemia     Hypothyroidism     MI (myocardial infarction) [facility])     Stroke [facility])     Thyroid disease     Type II or unspecified type diabetes mellitus without mention of complication, not stated as uncontrolled      Past Surgical History:   Procedure Laterality Date    CARDIAC SURGERY  2017    Stent placed  Dr Calista Whitaekr      right ankle    HERNIA REPAIR       Social History     Socioeconomic History    Marital status:      Spouse name: Not on file    Number of children: Not on file    Years of education: Not on file    Highest education level: Not on file   Occupational History    Not on file   Tobacco Use    Smoking status: Former     Types: Cigarettes     Quit date:      Years since quittin.1    Smokeless tobacco: Never   Vaping Use    Vaping Use: Never used   Substance and Sexual Activity    Alcohol use:  Yes     Alcohol/week: 1.0 standard drink     Types: 1 Drinks containing 0.5 oz of alcohol per week     Comment: occasional    Drug use: No    Sexual activity: Not on file   Other Topics Concern    Not on file   Social History Narrative    Not on file     Social Determinants of Health     Financial Resource Strain: Unknown    Difficulty of Paying Living Expenses: Patient refused   Food Insecurity: Unknown    Worried About Running Out of Food in the Last Year: Patient refused    Ran Out of Food in the Last Year: Patient refused   Transportation Needs: Not on file   Physical Activity: Inactive    Days of Exercise per Week: 0 days    Minutes of Exercise per Session: 0 min   Stress: Not on file   Social Connections: Not on file   Intimate Partner Violence: Not on file   Housing Stability: Not on file     Family History   Problem Relation Age of Onset    Cancer Mother 47        liver ca    Diabetes Father     Heart Disease Father        Medications & Allergies :  Prior to Admission medications    Medication Sig Start Date End Date Taking? Authorizing Provider   azithromycin (ZITHROMAX) 250 MG tablet TAKE 2 TABLETS BY MOUTH ON DAY 1, AND THEN TAKE 1 TABLET BY MOUTH ONCE A DAY ON DAY 2 THROUGH DAY 5  Patient not taking: No sig reported 1/7/23   Historical Provider, MD   predniSONE (DELTASONE) 20 MG tablet TAKE 2 TABLETS BY MOUTH ONCE DAILY FOR 5 DAYS  Patient not taking: No sig reported 1/7/23   Historical Provider, MD   oxybutynin (DITROPAN) 5 MG tablet Take 2 tablets by mouth 2 times daily 12/16/22   GENA Irving CNP   furosemide (LASIX) 40 MG tablet Take 40 mg by mouth in the morning and 40 mg in the evening. Per wife Brian Rehman, the torsemide was very expensive so Mago Valdez continues to take his Lasix 40mg BID.     Historical Provider, MD   montelukast (SINGULAIR) 10 MG tablet Take 1 tablet by mouth daily 11/16/22   GENA Irving CNP   meloxicam (MOBIC) 7.5 MG tablet Take 1 tablet by mouth daily 11/16/22   GENA Irving CNP   Misc Natural Products (OSTEO BI-FLEX ADV JOINT SHIELD PO) Take by mouth as needed  Patient not taking: No sig reported    Historical Provider, MD   EUTHYROX 137 MCG tablet Take 1 tablet by mouth once daily 7/14/22   GENA Irving CNP   atorvastatin (LIPITOR) 40 MG tablet Take 1 tablet by mouth once daily 5/31/22   Haris Hurtado MD   fluticasone-salmeterol (ADVAIR) 250-50 MCG/DOSE AEPB Inhale 1 puff into the lungs every 12 hours    Historical Provider, MD   albuterol (PROVENTIL) (2.5 MG/3ML) 0.083% nebulizer solution Take 3 mLs by nebulization every 4 hours as needed for Wheezing or Shortness of Breath 1/28/22   Ary Snider APRN - CNP   primidone (MYSOLINE) 250 MG tablet Take 250 mg by mouth 2 times daily    Historical Provider, MD   propranolol (INDERAL LA) 120 MG extended release capsule Take 120 mg by mouth 2 times daily    Historical Provider, MD   omeprazole (PRILOSEC) 20 MG delayed release capsule Take 20 mg by mouth daily    Historical Provider, MD   alogliptin (NESINA) 25 MG TABS tablet Take 25 mg by mouth daily    Historical Provider, MD   blood glucose monitor strips accu check 7/18/18   GENA Thomas - CNP   aspirin 81 MG tablet Take 81 mg by mouth daily     Historical Provider, MD   albuterol sulfate  (90 Base) MCG/ACT inhaler Inhale 2 puffs into the lungs every 4 hours as needed for Wheezing or Shortness of Breath 4/13/18   GENA Ashyb - CNP   metFORMIN (GLUCOPHAGE) 1000 MG tablet Take 1,000 mg by mouth 2 times daily     Historical Provider, MD   acetaminophen (TYLENOL) 500 MG tablet Take 1,000 mg by mouth every 6 hours as needed for Pain    Historical Provider, MD   TAMSULOSIN HCL PO Take 0.8 mg by mouth Daily with supper     Historical Provider, MD   Multiple Vitamins-Minerals (THERAPEUTIC MULTIVITAMIN-MINERALS) tablet Take 1 tablet by mouth daily    Historical Provider, MD     Allergies: Rosuvastatin  IP meds : Scheduled Meds:   ipratropium-albuterol  1 ampule Inhalation 4x daily    alogliptin  25 mg Oral Daily    levoFLOXacin  500 mg Oral Daily    furosemide  40 mg IntraVENous Daily    ferrous sulfate  325 mg Oral BID WC    enoxaparin  30 mg SubCUTAneous BID    aspirin  81 mg Oral Daily    levothyroxine  137 mcg Oral Daily    mometasone-formoterol  2 puff Inhalation BID    metFORMIN  1,000 mg Oral BID    montelukast  10 mg Oral Daily    oxybutynin  10 mg Oral BID    primidone  250 mg Oral BID    propranolol  120 mg Oral BID    tamsulosin  0.8 mg Oral Dinner    sodium chloride flush  5-40 mL IntraVENous 2 times per day    famotidine  20 mg Oral BID Continuous Infusions:   sodium chloride         Review of Systems  Review of Systems   Constitutional:  Positive for activity change, appetite change and fatigue. HENT: Negative. Eyes: Negative. Respiratory:  Positive for cough. Negative for shortness of breath. Cardiovascular:  Negative for chest pain and leg swelling. Gastrointestinal:  Negative for abdominal pain, diarrhea, nausea and vomiting. Genitourinary:  Negative for difficulty urinating, dysuria, flank pain and frequency. Musculoskeletal:  Negative for back pain and neck pain. Skin:  Negative for rash and wound. Neurological:  Negative for dizziness, light-headedness and headaches. Psychiatric/Behavioral:  Negative for agitation and confusion. Physical Exam  Physical Exam  Constitutional:       Appearance: He is obese. HENT:      Head: Normocephalic. Mouth/Throat:      Mouth: Mucous membranes are moist.   Neck:      Comments: No accessory muscle use  Pulmonary:      Effort: Pulmonary effort is normal.      Breath sounds: Wheezing present. Abdominal:      General: There is distension. Tenderness: There is no abdominal tenderness. Musculoskeletal:         General: Swelling present. Right lower leg: Edema present. Left lower leg: Edema present. Neurological:      General: No focal deficit present. Mental Status: He is alert and oriented to person, place, and time.         BP (!) 166/83   Pulse 75   Temp 98.1 °F (36.7 °C) (Temporal)   Resp 20   Ht 5' 11\" (1.803 m)   Wt 272 lb (123.4 kg)   SpO2 93%   BMI 37.94 kg/m²   Labs, Radiology and Tests :  CBC:   Recent Labs     02/04/23  0545 02/05/23  0530 02/06/23  0602   WBC 13.9* 15.2* 15.9*   HGB 9.3* 9.4* 10.1*   HCT 28.5* 27.8* 30.1*    259 299     CMP:  Recent Labs     02/04/23  0545 02/05/23  0530 02/06/23  0602   * 122* 121*   K 4.1 4.1 4.2   CL 92* 90* 87*   CO2 21 20 25   BUN 24* 21 21   CREATININE 0.96 0.92 1.04   GLUCOSE 157* 168* 176*   CALCIUM 8.6 8.4* 8.8     Hepatic:   Recent Labs     02/04/23  0545 02/05/23  0530 02/06/23  0602   LABALBU 3.0* 2.8* 2.8*   AST 49* 32 33   ALT 67* 50* 47*   BILITOT 0.4 0.4 0.4   ALKPHOS 207* 198* 217*       Radiology : Chest x-ray shows increased vascular congestion    Old lab data have been reviewed and noted patient has some hyponatremia on and off for almost for the last 5 years    Other / Echocardiogram: Ejection fraction greater than 55%. Moderate aortic stenosis and grade 1 diastolic dysfunction    Assessment and Plan:  Renal -renal function appears to be stable did come in with mild acute kidney injury which appears to have improved  This may be due to cardiorenal  Currently volume status appears stable not overtly congested does have some bilateral lower extremity edema though  We will hold the diuretics  Electrolytes -hyponatremia with hypochloremia etiology not completely clear. Possibly hypervolemic hyponatremia for now we will put him on a 1500 mL fluid restriction hold the diuretic send urine electrolytes and urine osmolarity based on that we will adjust his diuretic  Chronic diastolic congestive heart failure as needed diuretics for now  Pneumonia on antibiotics  Essential hypertension running reasonable  Meds reviewed and discussed with patient family and nursing staff    Gertrude Reina MD  [facility]    This report has been created using voice recognition software.  It may contain minor errors which are inherent in voice recognition technology

## 2023-02-06 NOTE — PROGRESS NOTES
Dr. Bienvenido roche served at this time to let know pt has a critical sodium of 119. Also, that pt refused urena even after education on why he needed it and how it was going to help.

## 2023-02-06 NOTE — PLAN OF CARE
Problem: Discharge Planning  Goal: Discharge to home or other facility with appropriate resources  Outcome: Progressing  Flowsheets (Taken 2/6/2023 0654 by Debra Oliver, RN)  Discharge to home or other facility with appropriate resources: Identify barriers to discharge with patient and caregiver     Problem: Safety - Adult  Goal: Free from fall injury  Outcome: Progressing  Flowsheets (Taken 2/4/2023 0155 by Gerard Sun RN)  Free From Fall Injury: Instruct family/caregiver on patient safety     Problem: Nutrition Deficit:  Goal: Optimize nutritional status  Outcome: Progressing  Flowsheets (Taken 2/4/2023 2330 by Clyde Smith RN)  Nutrient intake appropriate for improving, restoring, or maintaining nutritional needs: Assess nutritional status and recommend course of action     Problem: Respiratory - Adult  Goal: Achieves optimal ventilation and oxygenation  Outcome: Progressing  Flowsheets (Taken 2/6/2023 0654 by Debra Oliver RN)  Achieves optimal ventilation and oxygenation: Assess for changes in respiratory status     Problem: Pain  Goal: Verbalizes/displays adequate comfort level or baseline comfort level  Outcome: Progressing  Flowsheets (Taken 2/6/2023 0654)  Verbalizes/displays adequate comfort level or baseline comfort level: Encourage patient to monitor pain and request assistance     Problem: Skin/Tissue Integrity  Goal: Absence of new skin breakdown  Description: 1. Monitor for areas of redness and/or skin breakdown  2. Assess vascular access sites hourly  3. Every 4-6 hours minimum:  Change oxygen saturation probe site  4. Every 4-6 hours:  If on nasal continuous positive airway pressure, respiratory therapy assess nares and determine need for appliance change or resting period.   Outcome: Progressing

## 2023-02-06 NOTE — PROGRESS NOTES
Wife has decided on her  to go to SNF for short term rehab. She chose [facility] from the list.  Referral made and paper work fax.     CORINA Brooke

## 2023-02-06 NOTE — ACP (ADVANCE CARE PLANNING)
Advance Care Planning     Advance Care Planning Activator (Inpatient)  Conversation Note      Date of ACP Conversation: 2/6/2023     Conversation Conducted with: Patient with Decision Making Capacity    ACP Activator: Tre Zaldivar RN        Health Care Decision Maker:     Current Designated Health Care Decision Maker:     Primary Decision Maker (Active): Nicole Vargas - Spouse - [phone]  Care Preferences    Ventilation: \"If you were in your present state of health and suddenly became very ill and were unable to breathe on your own, what would your preference be about the use of a ventilator (breathing machine) if it were available to you? \"      Would the patient desire the use of ventilator (breathing machine)?:  not sure    \"If your health worsens and it becomes clear that your chance of recovery is unlikely, what would your preference be about the use of a ventilator (breathing machine) if it were available to you? \"     Would the patient desire the use of ventilator (breathing machine)?: No      Resuscitation  \"CPR works best to restart the heart when there is a sudden event, like a heart attack, in someone who is otherwise healthy. Unfortunately, CPR does not typically restart the heart for people who have serious health conditions or who are very sick. \"    \"In the event your heart stopped as a result of an underlying serious health condition, would you want attempts to be made to restart your heart (answer \"yes\" for attempt to resuscitate) or would you prefer a natural death (answer \"no\" for do not attempt to resuscitate)? \" yes       [x] Yes   [] No   Educated Patient / Wu Lai regarding differences between Advance Directives and portable DNR orders.     Length of ACP Conversation in minutes:  20    Conversation Outcomes:  [x] ACP discussion completed  [] Existing advance directive reviewed with patient; no changes to patient's previously recorded wishes  [] New Advance Directive completed  [] Portable Do Not Rescitate prepared for Provider review and signature  [] POLST/POST/MOLST/MOST prepared for Provider review and signature      Follow-up plan:    [] Schedule follow-up conversation to continue planning  [x] Referred individual to Provider for additional questions/concerns   [] Advised patient/agent/surrogate to review completed ACP document and update if needed with changes in condition, patient preferences or care setting    [x] This note routed to one or more involved healthcare providers    . [facility]

## 2023-02-06 NOTE — PROGRESS NOTES
[facility]  Inpatient/Observation/Outpatient Rehabilitation    Date: 2023  Patient Name: Elise Kwon       [x] Inpatient Acute/Observation       []  Outpatient  : 1935       [] Pt no showed for scheduled appointment    [x] Pt refused/declined therapy at this time due to: Attempt to see pt, sleeping at the time, pt's wife stated she did not think he would participate in therapy today, nursing notified. [] Pt cancelled due to:  [] No Reason Given   [] Sick/ill   [] Other:    Therapist/Assistant will attempt to see this patient, at our earliest opportunity.        Jennifer Canales, PTA Date: 2023

## 2023-02-06 NOTE — PROGRESS NOTES
[facility]  Inpatient/Observation/Outpatient Rehabilitation    Date: 2023  Patient Name: Baldomero Quinonez       [x] Inpatient Acute/Observation       []  Outpatient  : 1935       [x] Pt refused/declined therapy at this time due to:   3rd attempt was made but pt refused despite encouragement/education. Pt telemetry was beeping saying 88% and pt seemed SOB. Nursing was notified of both refusal and Spo2 .        Kulwinder Story, PTA Date: 2023

## 2023-02-06 NOTE — PROGRESS NOTES
[facility] - Telemedicine Progress Note  Today's Date and Time: 2/6/2023, 10:46 AM    Impression :   Community acquired pneumonia  Hypoxic respiratory distress  Hyponatremia  KARO  Chronic cough  Leukocytosis  Elevated BNP  COPD  Chronic systolic CHF  DM II  Hx MI    Recommendations:   Start Levaquin 500 mg po daily. Stop date 2-12-23  Discontinue IV Rocephin  Discontinue azithromycin    MRSA Nares not detected  Mycoplasma IgM: negative   Legionella antigen: not detected    Medical Decision Making/Summary/Discussion:2/6/2023       Infection Control Recommendations   [place] Precautions    Antimicrobial Stewardship Recommendations     Simplification of therapy  Targeted therapy  PK dosing    Coordination of Outpatient Care:   Estimated Length of IV antimicrobials:TBD  Patient will need Midline Catheter Insertion: TBD  Patient will need PICC line Insertion:BD  Patient will need: Home IV , [place],  SNF,  LTAC: TBD  Patient will need outpatient wound care: No    Chief complaint/reason for consultation:   CAP      History of Present Illness:   Chandler Lo is a 82y.o.-year-old male who was initially admitted on 1/31/2023. Patient seen at the request of Dr. Rajwinder Rojas:    This patient presented to [address] [place] [address] ED on 1/31/23 after falling in his shower at home. He denied hitting his head or experiencing a syncopal episode, but admits to a persistent cough over the past few days. He has taken 3 days of azithromycin that his wife had given him. In the ED, he was found to have a fever of 39.7 C and his SpO2 was 88% on room air. The patient does have a history of COPD but is not on home O2. A CT chest showed multifocal consolidation in the right lung and the base of the left lung. Covid and influenza were not detected.      Abnormal labs at admission included:  Na:130  Cr:1.26  Lactic:2.4  BNP:1279  Alk phos:185  ALT:123  AST:95  Glucose:220  WBC:14.5    There has been no growth on blood or urine cultures. Since admission, the patient's WBC has increased to 19.7. He has also experienced worsening KARO and hyponatremia. His BNP has also increased to 4397. He has been receiving rocephin and azithromycin since 1/31/23. CT Chest/abd/pelvis 1/31/23  Impression   1. Multifocal consolidation in the right lung and to a lesser degree left   lung base, concerning for infection. 2.  Motion degraded evaluation of the pulmonary arteries. No evidence for   central pulmonary embolism. 3.  No acute inflammatory process identified in the abdomen or pelvis. Stable benign and incidental findings, as described. CURRENT EVALUATION 2/6/2023  BP (!) 166/83   Pulse 77   Temp 98.1 °F (36.7 °C) (Temporal)   Resp 20   Ht 5' 11\" (1.803 m)   Wt 272 lb (123.4 kg)   SpO2 (!) 9%   BMI 37.94 kg/m²     Afebrile  VS stable    Patient stable   Pro BNP very elevated  No complaints  No new issues per RN    Medications reviewed  On levaquin    He is on 4-->1-->3-->2 L NC   RR: 16-->25-->20  SpO2: 93-->91-->93%    Has bronchopneumonia in RLL   Presence of hyponatremia raised concern for Legionella but antigen test negative for serotype 1    Hyponatremia continues  Leukocytosis is improving    Labs, X rays reviewed: 2/6/2023    BUN: 29-->21-->25  Cr:1.3-->-->0.92-->1.04  Na:128-->125-->121    WBC:19.7-->16.0-->13.9-->15.9  Hb:9.7-->9.4-->10.1  Plat: 231-->259-->299    CRP: 315.8  Pro BNP:4397--> 5,573-->3,614    Cultures:  Urine:    Blood:  1/31/23: No growth  Sputum :    Wound:    MRSA Nares:  2/2/23: Not detected    Imaging:    CT Chest   1/31/23        CXR  1/31/23 1/6/23      Discussed with patient, RN, family.     I have personally reviewed the past medical history, past surgical history, medications, social history, and family history, and I have updated the database accordingly. Past Medical History:     Past Medical History:   Diagnosis Date    COPD (chronic obstructive pulmonary disease) ([facility] [address]     Diabetes mellitus ([facility] [address]     Hx of echocardiogram 2017    [facility]    Hyperlipidemia     Hypothyroidism     MI (myocardial infarction) [facility]     Stroke [facility])     Thyroid disease     Type II or unspecified type diabetes mellitus without mention of complication, not stated as uncontrolled        Past Surgical  History:     Past Surgical History:   Procedure Laterality Date    CARDIAC SURGERY  2017    Stent placed  Dr James Mcgregor      right ankle    HERNIA REPAIR         Medications:      ipratropium-albuterol  1 ampule Inhalation 4x daily    alogliptin  25 mg Oral Daily    levoFLOXacin  500 mg Oral Daily    furosemide  40 mg IntraVENous Daily    ferrous sulfate  325 mg Oral BID WC    enoxaparin  30 mg SubCUTAneous BID    aspirin  81 mg Oral Daily    levothyroxine  137 mcg Oral Daily    mometasone-formoterol  2 puff Inhalation BID    metFORMIN  1,000 mg Oral BID    montelukast  10 mg Oral Daily    oxybutynin  10 mg Oral BID    primidone  250 mg Oral BID    propranolol  120 mg Oral BID    tamsulosin  0.8 mg Oral Dinner    sodium chloride flush  5-40 mL IntraVENous 2 times per day    famotidine  20 mg Oral BID       Social History:     Social History     Socioeconomic History    Marital status:      Spouse name: Not on file    Number of children: Not on file    Years of education: Not on file    Highest education level: Not on file   Occupational History    Not on file   Tobacco Use    Smoking status: Former     Types: Cigarettes     Quit date:      Years since quittin.1    Smokeless tobacco: Never   Vaping Use    Vaping Use: Never used   Substance and Sexual Activity    Alcohol use:  Yes     Alcohol/week: 1.0 standard drink     Types: 1 Drinks containing 0.5 oz of alcohol per week Comment: occasional    Drug use: No    Sexual activity: Not on file   Other Topics Concern    Not on file   Social History Narrative    Not on file     Social Determinants of Health     Financial Resource Strain: Unknown    Difficulty of Paying Living Expenses: Patient refused   Food Insecurity: Unknown    Worried About Running Out of Food in the Last Year: Patient refused    Ran Out of Food in the Last Year: Patient refused   Transportation Needs: Not on file   Physical Activity: Inactive    Days of Exercise per Week: 0 days    Minutes of Exercise per Session: 0 min   Stress: Not on file   Social Connections: Not on file   Intimate Partner Violence: Not on file   Housing Stability: Not on file       Family History:     Family History   Problem Relation Age of Onset    Cancer Mother 47        liver ca    Diabetes Father     Heart Disease Father         Allergies:   Rosuvastatin     Review of Systems:   Constitutional: Generalized weakness  Head: No headaches  Eyes: No double vision or blurry vision. No conjunctival inflammation. ENT: No sore throat or runny nose. . No hearing loss, tinnitus or vertigo. Cardiovascular: No chest pain or palpitations. shortness of breath. CARVER  Lung: shortness of breath with cough. Abdomen: No nausea, vomiting, diarrhea, or abdominal pain. Charlett Bigger No cramps. Genitourinary: No increased urinary frequency, or dysuria. No hematuria. No suprapubic or CVA pain  Musculoskeletal: No muscle aches or pains. No joint effusions, swelling or deformities  Hematologic: No bleeding or bruising. Neurologic: No headache, weakness, numbness, or tingling. Integument: No rash, no ulcers. Psychiatric: No depression. Endocrine: No polyuria, no polydipsia, no polyphagia.     Physical Examination :   Patient Vitals for the past 8 hrs:   BP Temp Temp src Pulse Resp SpO2 Weight   02/06/23 0654 (!) 166/83 98.1 °F (36.7 °C) Temporal 77 20 (!) 9 % --   02/06/23 0445 -- -- -- -- -- -- 272 lb (123.4 kg) DANNY-televisit  General Appearance: Awake, alert. Head:  Normocephalic, no trauma  Eyes: Pupils equal, round, reactive to light and accommodation; extraocular movements intact; sclera anicteric; conjunctivae pink. No embolic phenomena. ENT: Oropharynx clear, without erythema, exudate, or thrush. No tenderness of sinuses. Mouth/throat: mucosa pink and moist. No lesions. Dentition in good repair. Neck:Supple, without lymphadenopathy. Thyroid normal, No bruits. Pulmonary/Chest: DANNY-televisit  Cardiovascular: DANNY-televisit   Abdomen: Soft, non tender. Bowel sounds normal. No organomegaly  All four Extremities: No cyanosis, clubbing, edema, or effusions. Neurologic: No gross sensory or motor deficits. Skin: Warm and dry with good turgor. No signs of peripheral arterial or venous insufficiency. No ulcerations. No open wounds. Medical Decision Making -Laboratory:   I have independently reviewed/ordered the following labs:    CBC with Differential:   Recent Labs     02/05/23 0530 02/06/23  0602   WBC 15.2* 15.9*   HGB 9.4* 10.1*   HCT 27.8* 30.1*    299   LYMPHOPCT 8* 8*   MONOPCT 13* 15*       BMP:   Recent Labs     02/05/23  0530 02/06/23  0602   * 121*   K 4.1 4.2   CL 90* 87*   CO2 20 25   BUN 21 21   CREATININE 0.92 1.04       Hepatic Function Panel:   Recent Labs     02/05/23  0530 02/06/23  0602   PROT 5.9* 6.7   LABALBU 2.8* 2.8*   BILITOT 0.4 0.4   ALKPHOS 198* 217*   ALT 50* 47*   AST 32 33       No results for input(s): RPR in the last 72 hours. No results for input(s): HIV in the last 72 hours. No results for input(s): BC in the last 72 hours.   Lab Results   Component Value Date/Time    MUCUS TRACE 01/31/2023 07:45 PM    RBC 3.41 02/06/2023 06:02 AM    TRICHOMONAS NOT REPORTED 12/29/2021 11:30 PM    WBC 15.9 02/06/2023 06:02 AM    YEAST NOT REPORTED 12/29/2021 11:30 PM    TURBIDITY Clear 01/31/2023 07:45 PM     Lab Results   Component Value Date/Time    CREATININE 1.04 02/06/2023 06:02 AM    GLUCOSE 176 02/06/2023 06:02 AM       Medical Decision Making-Imaging:     Narrative   EXAMINATION:   CTA OF THE CHEST; CT OF THE ABDOMEN AND PELVIS WITH CONTRAST 1/31/2023 5:58 pm       TECHNIQUE:   CTA of the chest was performed after the administration of intravenous   contrast.  Multiplanar reformatted images are provided for review. MIP   images are provided for review. Automated exposure control, iterative   reconstruction, and/or weight based adjustment of the mA/kV was utilized to   reduce the radiation dose to as low as reasonably achievable.; CT of the   abdomen and pelvis was performed with the administration of intravenous   contrast. Multiplanar reformatted images are provided for review. Automated   exposure control, iterative reconstruction, and/or weight based adjustment of   the mA/kV was utilized to reduce the radiation dose to as low as reasonably   achievable. COMPARISON:   Chest radiograph today. Chest CT 12/02/2022. CT chest abdomen pelvis   07/21/2021. HISTORY:   ORDERING SYSTEM PROVIDED HISTORY: Fever, hypoxia, history of PE   TECHNOLOGIST PROVIDED HISTORY:   Fever, hypoxia, history of PE   Decision Support Exception - unselect if not a suspected or confirmed   emergency medical condition->Emergency Medical Condition (MA); ORDERING   SYSTEM PROVIDED HISTORY: Fever elevated liver enzyme   TECHNOLOGIST PROVIDED HISTORY:       Fever elevated liver enzyme   Decision Support Exception - unselect if not a suspected or confirmed   emergency medical condition->Emergency Medical Condition (MA)       FINDINGS:   CHEST CT:       Pulmonary Arteries: Pulmonary arteries are adequately opacified for   evaluation. Motion artifact is noted, degrading evaluation of the segmental   branches. No evidence for central pulmonary embolism. Main pulmonary artery   is normal in caliber. Mediastinum: No evidence of mediastinal lymphadenopathy.   The heart and   pericardium demonstrate no acute abnormality. There is no acute abnormality   of the thoracic aorta. Calcified atheromatous plaque. Lungs/pleura: Expiratory phase of imaging and mild motion artifact noted. Consolidative opacities are present in the posterolateral inferior right   upper lobe and posterior right lower lobe. Patchy airspace disease to a   lesser degree is present in the left lung base. Mild subsegmental   atelectasis is also present. No effusion. The central airway is patent. Soft Tissues/Bones: No acute bone or soft tissue abnormality. ABDOMEN PELVIS:       Organs: The liver, gallbladder, pancreas, spleen, adrenals and kidneys reveal   no acute findings. Horseshoe kidney. Stable left adrenal nodules measuring   up to 1.7 cm. GI/Bowel: There is no bowel dilatation or wall thickening identified. Diverticulosis. Pelvis: No acute findings. Peritoneum/Retroperitoneum: No free air or free fluid. The aorta is normal   in caliber. The visceral branches are patent. No lymphadenopathy. Bones/Soft Tissues: No abnormality identified. *Unless otherwise specified, incidental findings do not require dedicated   imaging follow-up. Impression   1. Multifocal consolidation in the right lung and to a lesser degree left   lung base, concerning for infection. 2.  Motion degraded evaluation of the pulmonary arteries. No evidence for   central pulmonary embolism. 3.  No acute inflammatory process identified in the abdomen or pelvis. Stable benign and incidental findings, as described.                  Medical Decision [facility]:       Medical Decision Making-Other:     Note:  Labs, medications, radiologic studies were reviewed with personal review of films  Large amounts of data were reviewed  Discussed with nursing Staff, Discharge planner  Infection Control and Prevention measures reviewed  All prior entries were reviewed  Administer medications as ordered  Prognosis: Guarded  Discharge planning reviewed      Thank you for allowing us to participate in the care of this patient. Please call with questions.     Rowena Qureshi MD        Pager: [phone] - Office: [phone]

## 2023-02-06 NOTE — PROGRESS NOTES
Subjective:     CHIEF COMPLAINT / HPI:    Chief Complaint   Patient presents with    Fall     Fall this am while getting out of the shower. Refused transport by EMS       Elise Kwon is 82 y.o. male who was admitted following a fall. 1-He has history of coronary artery disease, myocardial infarction and primary PCI in 2/2017 done at [facility],  [address] [place] [address]. He also has history of ischemic cardiomyopathy and chronic systolic CHF, NYHA class III. 2-An admission to [facility] on 9/19/2017 with COPD exacerbation, during this admission his lisinopril changed to Cozaar because of chronic cough. 3-Another visit to the emergency room on 10/3/2017 with cough, shortness of breath and wheezing. 4- He saw Dr. Cheli Tello at [facility] in November 2018, no changes in medication done. 5-An admission to [facility] on 4/6/2018 with acute on chronic CHF. 6-History of intentional tremor. 7- EKG done in office (8/20/2019)- Sinus Rhythm with 1st degree AV block. 71 bpm.    8- EKG done in office on 7/14/2020. No acute ischemic changes. 9-  Echocardiogram on 4/6/2021: Global left ventricular systolic function appears preserved with an estimated ejection fraction of 55%. Mildly increased left ventricular wall thickness with a normal left ventricular cavity size. The patient has a sigmoid interventricular septum. The inferoseptal wall is abnormal in its motion which is not unusual status post open heart surgery. Mild aortic stenosis. Mild mitral and tricuspid regurgitation. Evidence of mild diastolic dysfunction is seen. Atrial septal aneurysm cannot rule-out shunt. A saline contrast study was performed and cannot rule out interatrial communication.     10-EKG done in office 7/6/2021- no acute ischemic changes    11- Admission to [facility] on 12/31/2021-1/1/2022: Admitted for Pneumonia    12-ER visit on 8/2022: related to cough    13-EKG on 10/17/2022: No ischemic changes from prior ECG    14-  Echo done on 1/31/2023- EF >55% The left ventricular cavity size is within normal limits and the left ventricular wall thickness is moderately increased. The patient has a sigmoid interventricular septum without evidence of outflow tract obstruction. The left atrium is mildly dilated (29-33) with a left atrial volume index of 29 ml/m2. 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 The clinical significant of this finding is unknown, but has been associated with an increased risk of TIA's and strokes. The left atrium is mildly dilated (29-33) with a left atrial volume index of 29 ml/m2. The mean aortic valve gradient is 24 mmHg consistent with moderate aortic stenosis. Mild to moderate tricuspid regurgitation. Moderate pulmonary hypertension with an estimated right ventricular systolic pressure of 44 mmHg. Evidence of mild (grade I) diastolic dysfunction is seen. Mr. Juanita Perkins was admitted for a fall after getting out of shower. He states he tripped on rug denies losing consciousness. He has history of tremors from before and unsteady gait. Uses walker for ambulation. His wife tried to help him out but she could not. EMS called and patient initially refused to come to the emergency room but later he felt extremely weak and had fever and shivering so he decided to come to the emergency room. He said he was on the floor for about 4 hours. His initial lactate level was elevated. Currently on antibiotic for the treatment of community-acquired pneumonia. He has acute kidney injury. Denies any chest pain, pressure or tightness. He has some upper respiratory symptoms for a while. COVID and influenza testing are negative. UA is unremarkable. He said he was able to walk around using his walker today with physical therapy. No nausea, vomiting or abdominal pain. No problems moving his bowel. No problems with current medications.       Wt Readings from Last 3 Encounters:   23 272 lb (123.4 kg)   22 256 lb 8 oz (116.3 kg)   22 254 lb 6.6 oz (115.4 kg)       Mr. Diana Laguerre he initially received a fluid resuscitation and his kidney function improved. ID on board currently on antibiotic for community-acquired pneumonia. Legionella antigen is negative. Mycoplasma antibodies are negative. He does not feel well today. He has more slowed retention. He has gained over 15 pounds during this admission. His serum sodium today is 119 mg/dL. He looks more tired and fatigued and sleepy. I gave him 1 dose of tolvaptan and we are holding his Lasix. Nephrology on board. They agreed to fluid restriction of 1500mL/day. I put him on a free water restriction of 500 mL/day. We will obtain urine electrolytes. Continue telemetry monitoring. Past Medical History:    Past Medical History:   Diagnosis Date    COPD (chronic obstructive pulmonary disease) ([facility]     Diabetes mellitus ([facility]     Hx of echocardiogram 2017    [facility]    Hyperlipidemia     Hypothyroidism     MI (myocardial infarction) [facility])     Stroke [facility])     Thyroid disease     Type II or unspecified type diabetes mellitus without mention of complication, not stated as uncontrolled      Past Surgical History:  Past Surgical History:   Procedure Laterality Date    CARDIAC SURGERY  2017    Stent placed  Dr Genene Cushing      right ankle    HERNIA REPAIR       Social History:    Social History     Tobacco Use   Smoking Status Former    Types: Cigarettes    Quit date:     Years since quittin.1   Smokeless Tobacco Never     Current Medications:  Prior to Admission medications    Medication Sig Start Date End Date Taking?  Authorizing Provider   azithromycin (ZITHROMAX) 250 MG tablet TAKE 2 TABLETS BY MOUTH ON DAY 1, AND THEN TAKE 1 TABLET BY MOUTH ONCE A DAY ON DAY 2 THROUGH DAY 5  Patient not taking: No sig reported 23 Historical Provider, MD   predniSONE (DELTASONE) 20 MG tablet TAKE 2 TABLETS BY MOUTH ONCE DAILY FOR 5 DAYS  Patient not taking: No sig reported 1/7/23   Historical Provider, MD   oxybutynin (DITROPAN) 5 MG tablet Take 2 tablets by mouth 2 times daily 12/16/22   GENA Beyer CNP   furosemide (LASIX) 40 MG tablet Take 40 mg by mouth in the morning and 40 mg in the evening. Per wife Milton Christiansen, the torsemide was very expensive so Keyshawn Ni continues to take his Lasix 40mg BID.     Historical Provider, MD   montelukast (SINGULAIR) 10 MG tablet Take 1 tablet by mouth daily 11/16/22   GENA Beyer CNP   meloxicam (MOBIC) 7.5 MG tablet Take 1 tablet by mouth daily 11/16/22   GENA Beyer CNP   Misc Natural Products (OSTEO BI-FLEX ADV JOINT SHIELD PO) Take by mouth as needed  Patient not taking: No sig reported    Historical Provider, MD   EUTHYROX 137 MCG tablet Take 1 tablet by mouth once daily 7/14/22   GENA Beyer CNP   atorvastatin (LIPITOR) 40 MG tablet Take 1 tablet by mouth once daily 5/31/22   Crys Way MD   fluticasone-salmeterol (ADVAIR) 250-50 MCG/DOSE AEPB Inhale 1 puff into the lungs every 12 hours    Historical Provider, MD   albuterol (PROVENTIL) (2.5 MG/3ML) 0.083% nebulizer solution Take 3 mLs by nebulization every 4 hours as needed for Wheezing or Shortness of Breath 1/28/22   GENA Beyer CNP   primidone (MYSOLINE) 250 MG tablet Take 250 mg by mouth 2 times daily    Historical Provider, MD   propranolol (INDERAL LA) 120 MG extended release capsule Take 120 mg by mouth 2 times daily    Historical Provider, MD   omeprazole (PRILOSEC) 20 MG delayed release capsule Take 20 mg by mouth daily    Historical Provider, MD   alogliptin (NESINA) 25 MG TABS tablet Take 25 mg by mouth daily    Historical Provider, MD   blood glucose monitor strips accu check 7/18/18   GENA Gunderson CNP   aspirin 81 MG tablet Take 81 mg by mouth daily     Historical Provider, MD albuterol sulfate  (90 Base) MCG/ACT inhaler Inhale 2 puffs into the lungs every 4 hours as needed for Wheezing or Shortness of Breath 4/13/18   Lenny Johnson, APRN - CNP   metFORMIN (GLUCOPHAGE) 1000 MG tablet Take 1,000 mg by mouth 2 times daily     Historical Provider, MD   acetaminophen (TYLENOL) 500 MG tablet Take 1,000 mg by mouth every 6 hours as needed for Pain    Historical Provider, MD   TAMSULOSIN HCL PO Take 0.8 mg by mouth Daily with supper     Historical Provider, MD   Multiple Vitamins-Minerals (THERAPEUTIC MULTIVITAMIN-MINERALS) tablet Take 1 tablet by mouth daily    Historical Provider, MD      [facility]] furosemide  40 mg IntraVENous Once    urea  30 g Oral Daily    ipratropium-albuterol  1 ampule Inhalation 4x daily    alogliptin  25 mg Oral Daily    levoFLOXacin  500 mg Oral Daily    ferrous sulfate  325 mg Oral BID WC    enoxaparin  30 mg SubCUTAneous BID    aspirin  81 mg Oral Daily    levothyroxine  137 mcg Oral Daily    mometasone-formoterol  2 puff Inhalation BID    metFORMIN  1,000 mg Oral BID    montelukast  10 mg Oral Daily    oxybutynin  10 mg Oral BID    primidone  250 mg Oral BID    propranolol  120 mg Oral BID    tamsulosin  0.8 mg Oral Dinner    sodium chloride flush  5-40 mL IntraVENous 2 times per day    famotidine  20 mg Oral BID           REVIEW OF SYSTEMS:    CONSTITUTIONAL: No major weight gain or loss, fatigue, weakness, night sweats or fever. HEENT: No new vision difficulties or ringing in the ears. RESPIRATORY: See HPI  CARDIOVASCULAR: See HPI  GI: No nausea, vomiting, diarrhea, constipation, abdominal pain or changes in bowel habits. : No urinary frequency, urgency, incontinence hematuria or dysuria. SKIN: No cyanosis or skin lesions. MUSCULOSKELETAL: No new muscle or joint pain. NEUROLOGICAL: No syncope or TIA-like symptoms.   PSYCHIATRIC: No anxiety, pain, insomnia or depression    Objective:     PHYSICAL EXAM:      VITALS:  BP (!) 152/71 Pulse 67   Temp 97.7 °F (36.5 °C) (Temporal)   Resp 18   Ht 5' 11\" (1.803 m)   Wt 272 lb (123.4 kg)   SpO2 91%   BMI 37.94 kg/m²   CONSTITUTIONAL: Cooperative, no apparent distress, and appears well nourished / developed. NEUROLOGIC:  Awake and orientated to person, place and time. PSYCH: Calm affect. SKIN: Warm and dry. HEENT: Sclera non-icteric, normocephalic, neck supple, no elevation of JVP, normal carotid pulses with no bruits and thyroid normal size. LUNGS:  Poor air entry bilaterally . No significant wheezing . Mild bilateral crackles present. Cardiovascular: Normal rate, regular rhythm, normal heart sounds. Exam reveals no gallop and no friction rubs. 1/6 systolic murmur, 4th intercostal space on the LEFT must lateral to the sternal border. ABDOMEN:  Normal bowel sounds, non-distended and non-tender to palpation. Extremities: 2+ bilateral leg edema. no cyanosis or clubbing. 2+ radial and carotid pulses. Distal extremity pulses: 2+ bilaterally. Compression stockings in place. DATA:    Lab Results   Component Value Date    ALT 47 (H) 02/06/2023    AST 33 02/06/2023    ALKPHOS 217 (H) 02/06/2023    BILITOT 0.4 02/06/2023     Lab Results   Component Value Date    CREATININE 1.04 02/06/2023    BUN 21 02/06/2023     (LL) 02/06/2023    K 4.2 02/06/2023    CL 87 (L) 02/06/2023    CO2 25 02/06/2023       Lab Results   Component Value Date    WBC 15.9 (H) 02/06/2023    HGB 10.1 (L) 02/06/2023    HCT 30.1 (L) 02/06/2023    MCV 88.3 02/06/2023     02/06/2023       Lab Results   Component Value Date    TRIG 114 02/02/2023    TRIG 134 03/14/2022    TRIG 147 09/29/2021     Lab Results   Component Value Date    HDL 44 02/02/2023    HDL 52 03/14/2022    HDL 46 09/29/2021     Lab Results   Component Value Date    LDLCHOLESTEROL 45 02/02/2023    LDLCHOLESTEROL 65 03/14/2022    LDLCHOLESTEROL 61 09/29/2021         Assessment:   Sepsis secondary to atypical pneumonia. Chronic diastolic CHF.   Fall and fever. Atherosclerotic heart disease, status post PCI back in 2/24/2017. History of essential tremor, unsteady gait, using walker for ambulation. Acute kidney injury. Plan:   Patient admitted after a fall, found to have fever and acute kidney injury and CT scan of the chest and abdomen is suggestive of multifocal pneumonia. Currently treated for community-acquired pneumonia. He has acute kidney injury. I do believe that the elevated BNP is secondary to fluid resuscitation. COVID and influenza are negative. UA is unremarkable. Currently on Levaquin by ID. Continue physical therapy. Monitor blood pressure as per unit protocol. Currently with severe hyponatremia. Given one dose of tolvaptan. Free water restriction to 500 ml/day. Fluid restriction to 1500 ml per day. Continue holding Lasix. Follow up urine lytes and blood work tomorrow. Appreciate nephrology consult. Atherosclerotic Heart Disease: S/P Stent placement on 2/24/2017 by Dr. Esther Vegas at [facility]  Antiplatelet Agent: Continue Aspirin 81 mg daily. I also reminded them to watch for signs of bloody or black tarry stools and stop the medication immediately if this develops as this could be life threatening. Beta Blocker: Continue Propranolol 120 mg BID    Cholesterol Reduction Therapy: Continue Atorvastatin (Lipitor) 40 mg daily. High-sensitivity troponin is flat and is not suggestive of acute coronary event. Ordered repeat troponin. History of Stroke: Ischemic stroke with right visual field loss. Has complete resolution of his neurological dysfunction. Antiplatelet Agent: Continue Aspirin 81 mg daily. Cholesterol Reduction Therapy: Continue Atorvastatin (Lipitor) 40 mg daily. Lipid panel is good. Hemoglobin A1c 7.2%. His blood pressure is controlled. Essential Hypertension:  Borderline controlled. Continue propranolol  Follow-up blood pressure as per unit protocol.     Hyperlipidemia: Mixed - Last LDL on 2/2/2023 was 45 mg/dL  Cholesterol Reduction Therapy: Continue Atorvastatin (Lipitor) 40 mg daily. Karolina Hillman PA-C   [facility]) [facility], [address] [facility], [address] [place] [address]  Phone: [phone], Fax: [phone]    I believe that the risk of significant morbidity and mortality related to the patient's current medical conditions are: intermediate-high. February 6, 2023    ATTESTATION:     I have discussed the case, including pertinent history and exam findings with the Katie JESSICA. I have evaluated the  History, physical findings and pictures of the patient and the key elements of the encounter have been performed by me. I have reviewed the laboratory data, other diagnostic studies and discussed them with Kaite JESSICA. I have updated the medical record where necessary. I agree with the assessment, plan and orders as documented by Katie JESSICA. Sincerely,  Wilder Ladd MD, F.A.C.C.   [facility]    [address], [facility], [address] [place] [address]  Phone: [phone], Fax: [phone]

## 2023-02-06 NOTE — DISCHARGE INSTR - COC
Continuity of Care Form    Patient Name: Jerad Ashley   :  1935  MRN:  [id number]    Admit date:  2023  Discharge date:  02-    Code Status Order: Full Code   Advance Directives:     Admitting Physician:  Oscar Alas MD  PCP: Asa Snider, APRN - CNP    Discharging Nurse: [facility] Unit/Room#: 0315/0315-01  Discharging Unit Phone Number: [phone]    Emergency Contact:   Extended Emergency Contact Information  Primary Emergency Contact: Nicole Vargas  Address: [address] [place] [address]  Home Phone: [phone]  Mobile Phone: [phone]  Relation: Spouse   needed? No    Past Surgical History:  Past Surgical History:   Procedure Laterality Date    CARDIAC SURGERY  2017    Stent placed  Dr Lm Gutierrez      right ankle    HERNIA REPAIR         Immunization History:   Immunization History   Administered Date(s) Administered    COVID-19, MODERNA BLUE border, Primary or Immunocompromised, (age 12y+), IM, 100 mcg/0.5mL 2021, 2021, 2021, 2022    Influenza Virus Vaccine 2017, 10/01/2018    Influenza, AFLURIA (age 1 yrs+), FLUZONE, (age 10 mo+), MDV, 0.5mL 2017    Influenza, FLUAD, (age 72 y+), Adjuvanted, 0.5mL 2020, 2022    Influenza, FLUZONE (age 72 y+), High Dose, 0.7mL 10/07/2021    Influenza, High Dose (Fluzone 65 yrs and older) 2015, 10/01/2018, 2019, 10/07/2020    Pneumococcal Conjugate 13-valent ([id number]) 2015    Pneumococcal Polysaccharide ([id number]) 2000, 02/15/2006    Tetanus 2003    Zoster Live (Zostavax) 2008, 2012    Zoster Recombinant (Shingrix) 2021, 2021       Active Problems:  Patient Active Problem List   Diagnosis Code    Obesity (BMI 30.0-34. 9) E66.9    Venous (peripheral) insufficiency I87.2    Hx pulmonary embolism Z86.711    Hearing impaired H91.90    Edema R60.9    Type 2 diabetes mellitus with complication, without long-term current use of insulin (HCC) E11.8    Knee osteoarthritis M17.9    Chronic combined systolic and diastolic CHF (congestive heart failure) (HCC) I50.42    COPD (chronic obstructive pulmonary disease) (HCC) J44.9    Hyponatremia E87.1    Adrenal adenoma, left D35.02    Elevated liver enzymes R74.8    Hypothyroidism due to Hashimoto's thyroiditis E03.8, E06.3    Moderate persistent asthma without complication H92.54    Community acquired bacterial pneumonia J15.9    Hypoxia R09.02    Multifocal pneumonia J18.9    Sepsis due to pneumonia ([address] J18.9, A41.9    Septicemia ([address] A41.9    ASHD (arteriosclerotic heart disease) I25.10    Dyslipidemia E78.5    History of stroke Z86.73    Iron deficiency anemia D50.9    Nonrheumatic aortic valve stenosis I35.0    Chronic diastolic congestive heart failure (HCC) I50.32    Essential hypertension I10    Other fluid overload E87.79       Isolation/Infection:   Isolation            No Isolation          Patient Infection Status       Infection Onset Added Last Indicated Last Indicated By Review Planned Expiration Resolved Resolved By    None active    Resolved    COVID-19 (Rule Out) 01/31/23 01/31/23 01/31/23 COVID-19, Rapid (Ordered)   01/31/23 Rule-Out Test Resulted    COVID-19 (Rule Out) 12/02/22 12/02/22 12/02/22 COVID-19, Rapid (Ordered)   12/02/22 Rule-Out Test Resulted    COVID-19 (Rule Out) 08/19/22 08/19/22 08/19/22 COVID-19 (Ordered)   08/27/22 Rule-Out Test Canceled    COVID-19 (Rule Out) 08/19/22 08/19/22 08/19/22 COVID-19, Rapid (Ordered)   08/19/22 Rule-Out Test Resulted    COVID-19 (Rule Out) 12/29/21 12/29/21 12/29/21 COVID-19, Rapid (Ordered)   12/29/21 Rule-Out Test Resulted            Nurse Assessment:  Last Vital Signs: BP (!) 140/68   Pulse 63   Temp 97.5 °F (36.4 °C) (Temporal)   Resp 16   Ht 5' 11\" (1.803 m)   Wt 255 lb 6.4 oz (115.8 kg)   SpO2 94%   BMI 35.62 kg/m²     Last documented pain score (0-10 scale): Pain Level: 0  Last Weight:   Wt Readings from Last 1 Encounters:   02/10/23 255 lb 6.4 oz (115.8 kg)     Mental Status:  oriented and alert    IV Access:  - None    Nursing Mobility/ADLs:  Walking   Assisted  Transfer  Assisted  Bathing  Assisted  Dressing  Assisted  Toileting  Assisted  Feeding  [facility] Delivery   whole    Wound Care Documentation and Therapy:        Elimination:  Continence: Bowel: Yes  Bladder: Intermittent Incontinence   Urinary Catheter: None   Colostomy/Ileostomy/Ileal Conduit: No       Date of Last BM: 02-    Intake/Output Summary (Last 24 hours) at 2/10/2023 1104  Last data filed at 2/10/2023 1056  Gross per 24 hour   Intake 970 ml   Output 1825 ml   Net -855 ml     I/O last 3 completed shifts: In: 2166 [P.O.:1128; I.V.:20]  Out: 2300 [Urine:2300]    Safety Concerns:      At Risk for Falls    Impairments/Disabilities:      Vision and Hearing    Nutrition Therapy:  Current Nutrition Therapy:   - Oral Diet:  Cardiac    Routes of Feeding: Oral  Liquids: No Restrictions  Daily Fluid Restriction: yes - amount 1500 total (his 500 ml of free water is included in that total  Last Modified Barium Swallow with Video (Video Swallowing Test): not done    Treatments at the Time of Hospital Discharge:   Respiratory Treatments:   Oxygen Therapy:   Wears 2L at night/when he sleeps  Ventilator:    - No ventilator support    Rehab Therapies: Physical Therapy and Occupational Therapy  Weight Bearing Status/Restrictions: No weight bearing restrictions  Other Medical Equipment (for information only, NOT a DME order):  walker  Other Treatments: NA    Patient's personal belongings (please select all that are sent with patient):  Glasses, Dentures upper and lower    RN SIGNATURE:  Electronically signed by Renate Nguyen on 2/10/23 at 10:44 AM EST    CASE MANAGEMENT/SOCIAL WORK SECTION    Inpatient Status Date: 1/31/23    Readmission Risk Assessment Score:  Readmission Risk              Risk of Unplanned Readmission:  22           Discharging to Facility/ Agency   Name: [facility]  XPBBQ:[phone]  Fax:[phone]    Dialysis Facility (if applicable)   Name:  Address:  Dialysis Schedule:  Phone:  Fax:    / signature: Electronically signed by CORINA Mendoza on 2/6/23 at 11:40 AM EST    PHYSICIAN SECTION    Prognosis: Fair    Condition at Discharge: Stable    Rehab Potential (if transferring to Rehab): Fair    Recommended Labs or Other Treatments After Discharge: CBC with differential and CMP in 1 week. 1500 ml fluid restriction total (his 500 ml of free water is included in this total)    Physician Certification: I certify the above information and transfer of [facility]  is necessary for the continuing treatment of the diagnosis listed and that he requires [place] for less 30 days.      Update Admission H&P: No change in H&P    PHYSICIAN SIGNATURE:  Electronically signed by GENA Spangler CNP on 2/10/23 at 9:38 AM EST

## 2023-02-06 NOTE — PROGRESS NOTES
PATIENT INFORMATION    Anticipatory guidance discussed  Add one food at a time every 3-5 days to see if tolerated  Adequate diet for breastfeeding  Avoid cow's milk until 12 months of age  Avoid infant walkers  Avoid potential choking hazards (large, spherical, or coin shaped foods)  Avoid putting to bed with bottle  Avoid small toys (choking hazard)  Car seat issues, including proper placement  Caution with possible poisons (including pills, plants, cosmetics)  Child-proof home with cabinet locks, outlet plugs, window guards and stair reddy  Encouraged that any formula used be iron-fortified  Impossible to \"spoil\" infants at this age  Limit daytime sleep to 3-4 hours at a time  Make middle-of-night feeds \"brief and boring\"  Most babies sleep through night by 6 months of age  Never leave unattended except in crib  Observe while eating; consider CPR classes  Obtain and know how to use thermometer  Place in crib before completely asleep  Poison Control phone number 1-994.564.1836  Risk of falling once learns to roll  Safe sleep furniture  Sleep face up to decrease the chances of SIDS  Smoke detectors  Starting solids gradually at 4-6 months  Use of transitional object (tosin bear, etc.) to help with sleep    Follow-Up  - Return for your 9 month well child visit.    6 months old Health and Safety Tips - The following hyperlinks are available to access via [place]    Parent Education from Healthy Parent    Educación para padres sobre niños sanos    Common dosing for acetaminophen and ibuprofen:   Acetaminophen (Tylenol) can be given every 4 hours.  · Infant or Children's Elixir: 160mg/5ml for  Weight 6-11 lbs 12-17 lbs 18-23 lbs 24-35 lbs   Dose 1.25 ml 2.5 ml 3.75 ml 5 ml      Weight 36-47 lbs 48-59 lbs 60-71 lsb 72-95 lbs   Dose 7.5 ml 10 ml 12.5 ml 15 ml     Ibuprofen (Motrin) can be given every 6 hours.  Safe for children 6 months and older.  · Infant Drops: 50mg/1.25ml  Weight 12-17 lbs 18-23 lbs   Dose 1.25 ml  Formerly [facility]  Inpatient/Observation/Outpatient Rehabilitation    Date: 2023  Patient Name: Baldomero Quinonez       [x] Inpatient Acute/Observation       []  Outpatient  : 1935       [] Pt no showed for scheduled appointment    [x] Pt refused/declined therapy at this time due to: upset stomach. [] Pt cancelled due to:  [] No Reason Given   [] Sick/ill   [] Other:    Therapist/Assistant will attempt to see this patient, at our earliest opportunity.        HAYDEN Tolentino Date: 2023 1.875 ml     · Children's Elixir 100mg/5ml   Weight 12-17 lbs 18-23 lbs 24-35 lbs 36-47 lbs 48-59 lbs   Dose 2.5 ml 3.75 ml 5 ml 7.5 ml 10 ml        Weight 60-71 lbs 72-95 lbs   Dose 12.5 ml 15 ml     Additional Educational Resources:  For additional resources regarding your symptoms, diagnosis, or further health information, please visit tthe Online Health Resources section in [place].

## 2023-02-07 LAB
ABSOLUTE EOS #: 0.14 K/UL (ref 0–0.44)
ABSOLUTE IMMATURE GRANULOCYTE: 1.1 K/UL (ref 0–0.3)
ABSOLUTE LYMPH #: 1.64 K/UL (ref 1.1–3.7)
ABSOLUTE MONO #: 0.41 K/UL (ref 0.1–1.2)
ALBUMIN SERPL-MCNC: 2.6 G/DL (ref 3.5–5.2)
ALBUMIN/GLOBULIN RATIO: 0.7 (ref 1–2.5)
ALP SERPL-CCNC: 225 U/L (ref 40–129)
ALT SERPL-CCNC: 43 U/L (ref 5–41)
ANION GAP SERPL CALCULATED.3IONS-SCNC: 8 MMOL/L (ref 9–17)
ANION GAP SERPL CALCULATED.3IONS-SCNC: 8 MMOL/L (ref 9–17)
AST SERPL-CCNC: 35 U/L
BASOPHILS # BLD: 0 % (ref 0–2)
BASOPHILS ABSOLUTE: 0 K/UL (ref 0–0.2)
BILIRUB SERPL-MCNC: 0.3 MG/DL (ref 0.3–1.2)
BNP SERPL-MCNC: 3460 PG/ML
BUN SERPL-MCNC: 40 MG/DL (ref 8–23)
BUN SERPL-MCNC: 44 MG/DL (ref 8–23)
BUN/CREAT BLD: 37 (ref 9–20)
BUN/CREAT BLD: 43 (ref 9–20)
CALCIUM SERPL-MCNC: 8.9 MG/DL (ref 8.6–10.4)
CALCIUM SERPL-MCNC: 9.1 MG/DL (ref 8.6–10.4)
CHLORIDE SERPL-SCNC: 87 MMOL/L (ref 98–107)
CHLORIDE SERPL-SCNC: 87 MMOL/L (ref 98–107)
CO2 SERPL-SCNC: 25 MMOL/L (ref 20–31)
CO2 SERPL-SCNC: 26 MMOL/L (ref 20–31)
CREAT SERPL-MCNC: 1.02 MG/DL (ref 0.7–1.2)
CREAT SERPL-MCNC: 1.08 MG/DL (ref 0.7–1.2)
EOSINOPHILS RELATIVE PERCENT: 1 % (ref 1–4)
GFR SERPL CREATININE-BSD FRML MDRD: >60 ML/MIN/1.73M2
GFR SERPL CREATININE-BSD FRML MDRD: >60 ML/MIN/1.73M2
GLUCOSE SERPL-MCNC: 158 MG/DL (ref 70–99)
GLUCOSE SERPL-MCNC: 160 MG/DL (ref 70–99)
HCT VFR BLD AUTO: 29.6 % (ref 40.7–50.3)
HGB BLD-MCNC: 9.9 G/DL (ref 13–17)
IMMATURE GRANULOCYTES: 8 %
L PNEUMO1 AG UR QL IA.RAPID: NEGATIVE
LYMPHOCYTES # BLD: 12 % (ref 24–43)
MCH RBC QN AUTO: 29.6 PG (ref 25.2–33.5)
MCHC RBC AUTO-ENTMCNC: 33.4 G/DL (ref 28.4–34.8)
MCV RBC AUTO: 88.6 FL (ref 82.6–102.9)
MONOCYTES # BLD: 3 % (ref 3–12)
MORPHOLOGY: ABNORMAL
NRBC AUTOMATED: 0 PER 100 WBC
PDW BLD-RTO: 14.8 % (ref 11.8–14.4)
PLATELET # BLD AUTO: 343 K/UL (ref 138–453)
PMV BLD AUTO: 9.5 FL (ref 8.1–13.5)
POTASSIUM SERPL-SCNC: 4.5 MMOL/L (ref 3.7–5.3)
POTASSIUM SERPL-SCNC: 4.7 MMOL/L (ref 3.7–5.3)
PROT SERPL-MCNC: 6.3 G/DL (ref 6.4–8.3)
RBC # BLD: 3.34 M/UL (ref 4.21–5.77)
SEG NEUTROPHILS: 76 % (ref 36–65)
SEGMENTED NEUTROPHILS ABSOLUTE COUNT: 10.41 K/UL (ref 1.5–8.1)
SODIUM SERPL-SCNC: 120 MMOL/L (ref 135–144)
SODIUM SERPL-SCNC: 121 MMOL/L (ref 135–144)
WBC # BLD AUTO: 13.7 K/UL (ref 3.5–11.3)

## 2023-02-07 PROCEDURE — 2700000000 HC OXYGEN THERAPY PER DAY

## 2023-02-07 PROCEDURE — 94669 MECHANICAL CHEST WALL OSCILL: CPT

## 2023-02-07 PROCEDURE — 97110 THERAPEUTIC EXERCISES: CPT

## 2023-02-07 PROCEDURE — 80048 BASIC METABOLIC PNL TOTAL CA: CPT

## 2023-02-07 PROCEDURE — 6360000002 HC RX W HCPCS: Performed by: FAMILY MEDICINE

## 2023-02-07 PROCEDURE — 36415 COLL VENOUS BLD VENIPUNCTURE: CPT

## 2023-02-07 PROCEDURE — 85025 COMPLETE CBC W/AUTO DIFF WBC: CPT

## 2023-02-07 PROCEDURE — 6370000000 HC RX 637 (ALT 250 FOR IP): Performed by: INTERNAL MEDICINE

## 2023-02-07 PROCEDURE — 83880 ASSAY OF NATRIURETIC PEPTIDE: CPT

## 2023-02-07 PROCEDURE — 99232 SBSQ HOSP IP/OBS MODERATE 35: CPT | Performed by: INTERNAL MEDICINE

## 2023-02-07 PROCEDURE — 2500000003 HC RX 250 WO HCPCS: Performed by: INTERNAL MEDICINE

## 2023-02-07 PROCEDURE — 97116 GAIT TRAINING THERAPY: CPT

## 2023-02-07 PROCEDURE — 6370000000 HC RX 637 (ALT 250 FOR IP): Performed by: FAMILY MEDICINE

## 2023-02-07 PROCEDURE — 2580000003 HC RX 258: Performed by: INTERNAL MEDICINE

## 2023-02-07 PROCEDURE — 94664 DEMO&/EVAL PT USE INHALER: CPT

## 2023-02-07 PROCEDURE — 1200000000 HC SEMI PRIVATE

## 2023-02-07 PROCEDURE — 94640 AIRWAY INHALATION TREATMENT: CPT

## 2023-02-07 PROCEDURE — 94761 N-INVAS EAR/PLS OXIMETRY MLT: CPT

## 2023-02-07 PROCEDURE — 99232 SBSQ HOSP IP/OBS MODERATE 35: CPT | Performed by: PHYSICIAN ASSISTANT

## 2023-02-07 PROCEDURE — 80053 COMPREHEN METABOLIC PANEL: CPT

## 2023-02-07 PROCEDURE — 97530 THERAPEUTIC ACTIVITIES: CPT

## 2023-02-07 RX ORDER — SODIUM CHLORIDE 1000 MG
2 TABLET, SOLUBLE MISCELLANEOUS EVERY 6 HOURS
Status: COMPLETED | OUTPATIENT
Start: 2023-02-07 | End: 2023-02-08

## 2023-02-07 RX ORDER — TOLVAPTAN 15 MG/1
15 TABLET ORAL ONCE
Status: COMPLETED | OUTPATIENT
Start: 2023-02-07 | End: 2023-02-07

## 2023-02-07 RX ORDER — BUMETANIDE 0.25 MG/ML
1 INJECTION, SOLUTION INTRAMUSCULAR; INTRAVENOUS ONCE
Status: COMPLETED | OUTPATIENT
Start: 2023-02-07 | End: 2023-02-07

## 2023-02-07 RX ADMIN — PRIMIDONE 250 MG: 250 TABLET ORAL at 17:15

## 2023-02-07 RX ADMIN — FAMOTIDINE 20 MG: 20 TABLET, FILM COATED ORAL at 10:11

## 2023-02-07 RX ADMIN — SODIUM CHLORIDE, PRESERVATIVE FREE 10 ML: 5 INJECTION INTRAVENOUS at 10:11

## 2023-02-07 RX ADMIN — FERROUS SULFATE TAB 325 MG (65 MG ELEMENTAL FE) 325 MG: 325 (65 FE) TAB at 10:10

## 2023-02-07 RX ADMIN — Medication 2 G: at 10:10

## 2023-02-07 RX ADMIN — MONTELUKAST 10 MG: 10 TABLET, FILM COATED ORAL at 10:10

## 2023-02-07 RX ADMIN — IPRATROPIUM BROMIDE AND ALBUTEROL SULFATE 1 AMPULE: 2.5; .5 SOLUTION RESPIRATORY (INHALATION) at 05:21

## 2023-02-07 RX ADMIN — LEVOTHYROXINE SODIUM 137 MCG: 25 TABLET ORAL at 10:10

## 2023-02-07 RX ADMIN — TAMSULOSIN HYDROCHLORIDE 0.8 MG: 0.4 CAPSULE ORAL at 17:15

## 2023-02-07 RX ADMIN — IPRATROPIUM BROMIDE AND ALBUTEROL SULFATE 1 AMPULE: 2.5; .5 SOLUTION RESPIRATORY (INHALATION) at 20:09

## 2023-02-07 RX ADMIN — METFORMIN HYDROCHLORIDE 1000 MG: 500 TABLET ORAL at 20:20

## 2023-02-07 RX ADMIN — SODIUM CHLORIDE, PRESERVATIVE FREE 10 ML: 5 INJECTION INTRAVENOUS at 20:18

## 2023-02-07 RX ADMIN — PROPRANOLOL HYDROCHLORIDE 120 MG: 60 CAPSULE, EXTENDED RELEASE ORAL at 10:17

## 2023-02-07 RX ADMIN — Medication 30 G: at 10:11

## 2023-02-07 RX ADMIN — Medication 2 G: at 02:24

## 2023-02-07 RX ADMIN — MOMETASONE FUROATE AND FORMOTEROL FUMARATE DIHYDRATE 2 PUFF: 200; 5 AEROSOL RESPIRATORY (INHALATION) at 10:43

## 2023-02-07 RX ADMIN — IPRATROPIUM BROMIDE AND ALBUTEROL SULFATE 1 AMPULE: 2.5; .5 SOLUTION RESPIRATORY (INHALATION) at 10:42

## 2023-02-07 RX ADMIN — OXYBUTYNIN CHLORIDE 10 MG: 5 TABLET ORAL at 10:10

## 2023-02-07 RX ADMIN — ENOXAPARIN SODIUM 30 MG: 100 INJECTION SUBCUTANEOUS at 20:16

## 2023-02-07 RX ADMIN — MOMETASONE FUROATE AND FORMOTEROL FUMARATE DIHYDRATE 2 PUFF: 200; 5 AEROSOL RESPIRATORY (INHALATION) at 20:10

## 2023-02-07 RX ADMIN — Medication 15 MG: at 10:10

## 2023-02-07 RX ADMIN — OXYBUTYNIN CHLORIDE 10 MG: 5 TABLET ORAL at 20:20

## 2023-02-07 RX ADMIN — Medication 2 G: at 20:20

## 2023-02-07 RX ADMIN — IPRATROPIUM BROMIDE AND ALBUTEROL SULFATE 1 AMPULE: 2.5; .5 SOLUTION RESPIRATORY (INHALATION) at 15:08

## 2023-02-07 RX ADMIN — ASPIRIN 81 MG: 81 TABLET, COATED ORAL at 10:10

## 2023-02-07 RX ADMIN — ENOXAPARIN SODIUM 30 MG: 100 INJECTION SUBCUTANEOUS at 10:11

## 2023-02-07 RX ADMIN — LEVOFLOXACIN 500 MG: 500 TABLET, FILM COATED ORAL at 10:17

## 2023-02-07 RX ADMIN — FERROUS SULFATE TAB 325 MG (65 MG ELEMENTAL FE) 325 MG: 325 (65 FE) TAB at 17:15

## 2023-02-07 RX ADMIN — METFORMIN HYDROCHLORIDE 1000 MG: 500 TABLET ORAL at 10:10

## 2023-02-07 RX ADMIN — PRIMIDONE 250 MG: 250 TABLET ORAL at 10:10

## 2023-02-07 RX ADMIN — PROPRANOLOL HYDROCHLORIDE 120 MG: 60 CAPSULE, EXTENDED RELEASE ORAL at 17:15

## 2023-02-07 RX ADMIN — ALOGLIPTIN 25 MG: 25 TABLET, FILM COATED ORAL at 10:10

## 2023-02-07 RX ADMIN — BUMETANIDE 1 MG: 0.25 INJECTION INTRAMUSCULAR; INTRAVENOUS at 20:18

## 2023-02-07 RX ADMIN — FAMOTIDINE 20 MG: 20 TABLET, FILM COATED ORAL at 20:20

## 2023-02-07 NOTE — PROGRESS NOTES
Vitals and reassessment done at this time. See flow sheet for more details. Call light within reach. Will continue to monitor.

## 2023-02-07 NOTE — PROGRESS NOTES
Patient got back into bed with 2 nurse assist stand pivot to the bed. Patient tolerated fairly well but has difficulty staying awake once sitting or laying down. Patient was repositioned for comfort but is not able to articulate his needs. Patient's wife states patient has been slow to speak and seems slow to process his thoughts. Patient denies needs at this time. Wife has left for the night.

## 2023-02-07 NOTE — PROGRESS NOTES
[facility]   Nephrology progress note  2/7/2023, 12:53 PM      Pt Name:    Tiana Weinberg  MRN:     [id number]     YOB: 1935  Admit Date:    1/31/2023  4:23 PM    TELEHEALTH EVALUATION -- Audio/Visual (During PVNRG-61 public health emergency)     Telehealth service was provided with the patient at his room [place] [id number] and myself the physician in my [place] office and my [address] [place] [address] who has initiated the visit. Pursuant to the emergency declaration under the [facility], [facility] waiver authority and the [employer] Act, this Virtual  Visit was conducted, with patient's consent, to reduce the patient's risk of exposure to COVID-19 and provide continuity of care for an established patient. Services were provided through a video synchronous discussion virtually to substitute for in-person clinic visit. Chief Complaint: Nephrology following for hyponatremia. Subjective:  Patient seen and examined  No chest pain or shortness of breath  Still has a lot of swelling in the lower extremities  Not participating in therapy refused Urena yesterday    Objective:  24HR INTAKE/OUTPUT:    Intake/Output Summary (Last 24 hours) at 2/7/2023 1253  Last data filed at 2/7/2023 1026  Gross per 24 hour   Intake 1310 ml   Output 850 ml   Net 460 ml      Admission weight: 250 lb (113.4 kg)  Wt Readings from Last 3 Encounters:   02/07/23 275 lb 8 oz (125 kg)   12/16/22 256 lb 8 oz (116.3 kg)   12/05/22 254 lb 6.6 oz (115.4 kg)        Vitals :   Vitals:    02/06/23 2340 02/07/23 0415 02/07/23 0640 02/07/23 1043   BP: (!) 157/78  (!) 168/95    Pulse: 71  67    Resp: 18  20    Temp: 98 °F (36.7 °C)  97.6 °F (36.4 °C)    TempSrc: Temporal  Temporal    SpO2: 96%  94% 95%   Weight:  275 lb 8 oz (125 kg)     Height:           Physical examination  General Appearance:  Well developed.  No distress  Mouth/Throat:  Oral mucosa moist  Neck: No accessory muscle use  Lungs:  Breath sounds: Some rhonchi and wheezing per nursing staff  Heart[de-identified]  S1,S2 heard  Abdomen:  Soft, non - tender  Musculoskeletal:  Edema -noted    Medications:  Infusion:    sodium chloride       Meds:    [Held by provider] furosemide  40 mg IntraVENous Once    urea  30 g Oral Daily    ipratropium-albuterol  1 ampule Inhalation 4x daily    alogliptin  25 mg Oral Daily    levoFLOXacin  500 mg Oral Daily    ferrous sulfate  325 mg Oral BID WC    enoxaparin  30 mg SubCUTAneous BID    aspirin  81 mg Oral Daily    levothyroxine  137 mcg Oral Daily    mometasone-formoterol  2 puff Inhalation BID    metFORMIN  1,000 mg Oral BID    montelukast  10 mg Oral Daily    oxybutynin  10 mg Oral BID    primidone  250 mg Oral BID    propranolol  120 mg Oral BID    tamsulosin  0.8 mg Oral Dinner    sodium chloride flush  5-40 mL IntraVENous 2 times per day    famotidine  20 mg Oral BID       Lab Data :  CBC:   Recent Labs     02/05/23  0530 02/06/23  0602 02/07/23  0549   WBC 15.2* 15.9* 13.7*   HGB 9.4* 10.1* 9.9*   HCT 27.8* 30.1* 29.6*    299 343     CMP:  Recent Labs     02/06/23  0602 02/06/23  1312 02/06/23 2000 02/07/23  0549   * 119* 120* 120*   K 4.2  --  4.4 4.5   CL 87*  --  85* 87*   CO2 25  --  26 25   BUN 21  --  25* 40*   CREATININE 1.04  --  1.01 1.08   GLUCOSE 176*  --  150* 158*   CALCIUM 8.8  --  8.7 8.9     Hepatic:   Recent Labs     02/05/23  0530 02/06/23  0602 02/07/23  0549   LABALBU 2.8* 2.8* 2.6*   AST 32 33 35   ALT 50* 47* 43*   BILITOT 0.4 0.4 0.3   ALKPHOS 198* 217* 225*       Assessment and Plan:  Renal -renal function appears to be stable did come in with mild acute kidney injury which appears to have improved  This may be due to cardiorenal  Currently volume status appears stable not overtly congested does have some bilateral lower extremity edema though  Will need diuretics.   Electrolytes -hyponatremia with hypochloremia etiology not completely clear. Possibly hypervolemic hyponatremia did receive some salt tablets and fluid restriction yesterday without much improvement he received a dose of tolvaptan by cardiology this morning ideally should not be on a fluid restriction but will check a sodium level at 3 PM and based on that we will adjust.  We will consider diuretic soon as well  Chronic diastolic congestive heart failure will need diuretics soon  Pneumonia on antibiotics  Essential hypertension running reasonable  Meds reviewed and discussed with patient family and nursing staff  Kirsten Crowder MD  [facility]    This report has been created using voice recognition software.  It may contain minor errors which are inherent in voice recognition technology

## 2023-02-07 NOTE — PROGRESS NOTES
Subjective:     CHIEF COMPLAINT / HPI:    Chief Complaint   Patient presents with    Fall     Fall this am while getting out of the shower. Refused transport by EMS       Hoda Valencia is 82 y.o. male who was admitted following a fall. 1-He has history of coronary artery disease, myocardial infarction and primary PCI in 2/2017 done at [facility],  [address] [place] [address]. He also has history of ischemic cardiomyopathy and chronic systolic CHF, NYHA class III. 2-An admission to [facility] on 9/19/2017 with COPD exacerbation, during this admission his lisinopril changed to Cozaar because of chronic cough. 3-Another visit to the emergency room on 10/3/2017 with cough, shortness of breath and wheezing. 4- He saw Dr. Jamison Amos at [facility] in November 2018, no changes in medication done. 5-An admission to [facility] on 4/6/2018 with acute on chronic CHF. 6-History of intentional tremor. 7- EKG done in office (8/20/2019)- Sinus Rhythm with 1st degree AV block. 71 bpm.    8- EKG done in office on 7/14/2020. No acute ischemic changes. 9-  Echocardiogram on 4/6/2021: Global left ventricular systolic function appears preserved with an estimated ejection fraction of 55%. Mildly increased left ventricular wall thickness with a normal left ventricular cavity size. The patient has a sigmoid interventricular septum. The inferoseptal wall is abnormal in its motion which is not unusual status post open heart surgery. Mild aortic stenosis. Mild mitral and tricuspid regurgitation. Evidence of mild diastolic dysfunction is seen. Atrial septal aneurysm cannot rule-out shunt. A saline contrast study was performed and cannot rule out interatrial communication.     10-EKG done in office 7/6/2021- no acute ischemic changes    11- Admission to [facility] on 12/31/2021-1/1/2022: Admitted for Pneumonia    12-ER visit on 8/2022: related to cough    13-EKG on 10/17/2022: No ischemic changes from prior ECG    14-  Echo done on 1/31/2023- EF >55% The left ventricular cavity size is within normal limits and the left ventricular wall thickness is moderately increased. The patient has a sigmoid interventricular septum without evidence of outflow tract obstruction. The left atrium is mildly dilated (29-33) with a left atrial volume index of 29 ml/m2. 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 The clinical significant of this finding is unknown, but has been associated with an increased risk of TIA's and strokes. The left atrium is mildly dilated (29-33) with a left atrial volume index of 29 ml/m2. The mean aortic valve gradient is 24 mmHg consistent with moderate aortic stenosis. Mild to moderate tricuspid regurgitation. Moderate pulmonary hypertension with an estimated right ventricular systolic pressure of 44 mmHg. Evidence of mild (grade I) diastolic dysfunction is seen. Mr. Carrie Garland was admitted for a fall after getting out of shower. He states he tripped on rug denies losing consciousness. He has history of tremors from before and unsteady gait. Uses walker for ambulation. His wife tried to help him out but she could not. EMS called and patient initially refused to come to the emergency room but later he felt extremely weak and had fever and shivering so he decided to come to the emergency room. He said he was on the floor for about 4 hours. His initial lactate level was elevated. Currently on antibiotic for the treatment of community-acquired pneumonia. He has acute kidney injury. Denies any chest pain, pressure or tightness. He has some upper respiratory symptoms for a while. COVID and influenza testing are negative. UA is unremarkable. He said he was able to walk around using his walker today with physical therapy. No nausea, vomiting or abdominal pain. No problems moving his bowel. No problems with current medications.       Wt Readings from Last 3 Encounters:   23 275 lb 8 oz (125 kg)   22 256 lb 8 oz (116.3 kg)   22 254 lb 6.6 oz (115.4 kg)       Mr. Theodore Warren he initially received a fluid resuscitation and his kidney function improved. ID on board currently on antibiotic for community-acquired pneumonia. Legionella antigen is negative, repeat results pending. Mycoplasma antibodies are negative. He has gained over 15 pounds during this admission. Today Mr. Vargas states he still feels about the same, he is up 3 pounds from yesterday. He received a dose of tolvaptan this morning. He continues to be on free water restriction of 500 mL/day and a fluid restriction of 1500 mL per day. Nephrology is on board, his sodium remains at 120 mg/dL. He did not work with PT/OT today. Urine electrolytes results pending. He was tired and drowsy throughout exam, wife at bedside. Past Medical History:    Past Medical History:   Diagnosis Date    COPD (chronic obstructive pulmonary disease) ([facility] [address]     Diabetes mellitus ([facility] [address]     Hx of echocardiogram 2017    [facility]    Hyperlipidemia     Hypothyroidism     MI (myocardial infarction) [facility])     Stroke [facility])     Thyroid disease     Type II or unspecified type diabetes mellitus without mention of complication, not stated as uncontrolled      Past Surgical History:  Past Surgical History:   Procedure Laterality Date    CARDIAC SURGERY  2017    Stent placed  Dr Dano Arnold      right ankle    HERNIA REPAIR       Social History:    Social History     Tobacco Use   Smoking Status Former    Types: Cigarettes    Quit date:     Years since quittin.1   Smokeless Tobacco Never     Current Medications:  Prior to Admission medications    Medication Sig Start Date End Date Taking?  Authorizing Provider   azithromycin (ZITHROMAX) 250 MG tablet TAKE 2 TABLETS BY MOUTH ON DAY 1, AND THEN TAKE 1 TABLET BY MOUTH ONCE A DAY ON DAY 2 THROUGH DAY 5  Patient not taking: No sig reported 1/7/23   Historical Provider, MD   predniSONE (DELTASONE) 20 MG tablet TAKE 2 TABLETS BY MOUTH ONCE DAILY FOR 5 DAYS  Patient not taking: No sig reported 1/7/23   Historical Provider, MD   oxybutynin (DITROPAN) 5 MG tablet Take 2 tablets by mouth 2 times daily 12/16/22   Khushboo Notice Might, APRN - CNP   furosemide (LASIX) 40 MG tablet Take 40 mg by mouth in the morning and 40 mg in the evening. Per wife Kenisha Delarosa, the torsemide was very expensive so Radhika Dunn continues to take his Lasix 40mg BID.     Historical Provider, MD   montelukast (SINGULAIR) 10 MG tablet Take 1 tablet by mouth daily 11/16/22   Khushboo Notice Might, APRN - GUERITA   meloxicam (MOBIC) 7.5 MG tablet Take 1 tablet by mouth daily 11/16/22   Khushboo Notice Might, APRN - CNP   Misc Natural Products (OSTEO BI-FLEX ADV JOINT SHIELD PO) Take by mouth as needed  Patient not taking: No sig reported    Historical Provider, MD   EUTHYROX 137 MCG tablet Take 1 tablet by mouth once daily 7/14/22   Khushboo Notice Might, APRN - CNP   atorvastatin (LIPITOR) 40 MG tablet Take 1 tablet by mouth once daily 5/31/22   Blair Leal MD   fluticasone-salmeterol (ADVAIR) 250-50 MCG/DOSE AEPB Inhale 1 puff into the lungs every 12 hours    Historical Provider, MD   albuterol (PROVENTIL) (2.5 MG/3ML) 0.083% nebulizer solution Take 3 mLs by nebulization every 4 hours as needed for Wheezing or Shortness of Breath 1/28/22   Khushboo Notice Might, APRN - CNP   primidone (MYSOLINE) 250 MG tablet Take 250 mg by mouth 2 times daily    Historical Provider, MD   propranolol (INDERAL LA) 120 MG extended release capsule Take 120 mg by mouth 2 times daily    Historical Provider, MD   omeprazole (PRILOSEC) 20 MG delayed release capsule Take 20 mg by mouth daily    Historical Provider, MD   alogliptin (NESINA) 25 MG TABS tablet Take 25 mg by mouth daily    Historical Provider, MD   blood glucose monitor strips accu check 7/18/18   Mary Johnson APRN - CNP   aspirin 81 MG tablet Take 81 mg by mouth daily     Historical Provider, MD   albuterol sulfate  (90 Base) MCG/ACT inhaler Inhale 2 puffs into the lungs every 4 hours as needed for Wheezing or Shortness of Breath 4/13/18   Lenny Johnson, APRN - CNP   metFORMIN (GLUCOPHAGE) 1000 MG tablet Take 1,000 mg by mouth 2 times daily     Historical Provider, MD   acetaminophen (TYLENOL) 500 MG tablet Take 1,000 mg by mouth every 6 hours as needed for Pain    Historical Provider, MD   TAMSULOSIN HCL PO Take 0.8 mg by mouth Daily with supper     Historical Provider, MD   Multiple Vitamins-Minerals (THERAPEUTIC MULTIVITAMIN-MINERALS) tablet Take 1 tablet by mouth daily    Historical Provider, MD      [facility] [place] by provider] furosemide  40 mg IntraVENous Once    urea  30 g Oral Daily    ipratropium-albuterol  1 ampule Inhalation 4x daily    alogliptin  25 mg Oral Daily    levoFLOXacin  500 mg Oral Daily    ferrous sulfate  325 mg Oral BID WC    enoxaparin  30 mg SubCUTAneous BID    aspirin  81 mg Oral Daily    levothyroxine  137 mcg Oral Daily    mometasone-formoterol  2 puff Inhalation BID    metFORMIN  1,000 mg Oral BID    montelukast  10 mg Oral Daily    oxybutynin  10 mg Oral BID    primidone  250 mg Oral BID    propranolol  120 mg Oral BID    tamsulosin  0.8 mg Oral Dinner    sodium chloride flush  5-40 mL IntraVENous 2 times per day    famotidine  20 mg Oral BID           REVIEW OF SYSTEMS:    CONSTITUTIONAL: No major weight gain or loss, fatigue, weakness, night sweats or fever. HEENT: No new vision difficulties or ringing in the ears. RESPIRATORY: See HPI  CARDIOVASCULAR: See HPI  GI: No nausea, vomiting, diarrhea, constipation, abdominal pain or changes in bowel habits. : No urinary frequency, urgency, incontinence hematuria or dysuria. SKIN: No cyanosis or skin lesions. MUSCULOSKELETAL: No new muscle or joint pain. NEUROLOGICAL: No syncope or TIA-like symptoms.   PSYCHIATRIC: No anxiety, pain, insomnia or depression    Objective:     PHYSICAL EXAM:      VITALS:  BP (!) 168/95   Pulse 67   Temp 97.6 °F (36.4 °C) (Temporal)   Resp 20   Ht 5' 11\" (1.803 m)   Wt 275 lb 8 oz (125 kg)   SpO2 95%   BMI 38.42 kg/m²   CONSTITUTIONAL: Cooperative, no apparent distress, and appears well nourished / developed. NEUROLOGIC:  Awake and orientated to person, place and time. PSYCH: Calm affect. SKIN: Warm and dry. HEENT: Sclera non-icteric, normocephalic, neck supple, no elevation of JVP, normal carotid pulses with no bruits and thyroid normal size. LUNGS:  Poor air entry bilaterally . No significant wheezing . Mild bilateral crackles present. Cardiovascular: Normal rate, regular rhythm, normal heart sounds. Exam reveals no gallop and no friction rubs. 1/6 systolic murmur, 4th intercostal space on the LEFT must lateral to the sternal border. ABDOMEN:  Normal bowel sounds, non-distended and non-tender to palpation. Extremities: 1+ bilateral leg edema. no cyanosis or clubbing. 2+ radial and carotid pulses. Distal extremity pulses: 2+ bilaterally. Compression stockings in place.     DATA:    Lab Results   Component Value Date    ALT 43 (H) 02/07/2023    AST 35 02/07/2023    ALKPHOS 225 (H) 02/07/2023    BILITOT 0.3 02/07/2023     Lab Results   Component Value Date    CREATININE 1.08 02/07/2023    BUN 40 (H) 02/07/2023     (L) 02/07/2023    K 4.5 02/07/2023    CL 87 (L) 02/07/2023    CO2 25 02/07/2023       Lab Results   Component Value Date    WBC 13.7 (H) 02/07/2023    HGB 9.9 (L) 02/07/2023    HCT 29.6 (L) 02/07/2023    MCV 88.6 02/07/2023     02/07/2023       Lab Results   Component Value Date    TRIG 114 02/02/2023    TRIG 134 03/14/2022    TRIG 147 09/29/2021     Lab Results   Component Value Date    HDL 44 02/02/2023    HDL 52 03/14/2022    HDL 46 09/29/2021     Lab Results   Component Value Date    LDLCHOLESTEROL 45 02/02/2023    LDLCHOLESTEROL 65 03/14/2022    LDLCHOLESTEROL 61 09/29/2021 Assessment:   Sepsis secondary to atypical pneumonia. Chronic diastolic CHF. Fall and fever. Atherosclerotic heart disease, status post PCI back in 2/24/2017. History of essential tremor, unsteady gait, using walker for ambulation. Acute kidney injury. Plan:   Patient admitted after a fall, found to have fever and acute kidney injury and CT scan of the chest and abdomen is suggestive of multifocal pneumonia. Currently treated for community-acquired pneumonia. He has acute kidney injury. I do believe that the elevated BNP is secondary to fluid resuscitation. COVID and influenza are negative. UA is unremarkable. Currently on Levaquin by ID. Continue physical therapy. Monitor blood pressure as per unit protocol. Currently with severe hyponatremia. Given one dose of tolvaptan this morning around 10 am, will get repeat blood work at 4 pm today. Free water restriction to 500 ml/day. Fluid restriction to 1500 ml per day. Continue holding Lasix. Follow up urine lytes tomorrow, results pending  Follow up blood work tomorrow. Weight is up 3 pounds since yesterday, +4,420 mL since admission  Appreciate nephrology consult. Atherosclerotic Heart Disease: S/P Stent placement on 2/24/2017 by Dr. Andrea Liao at [facility]  Antiplatelet Agent: Continue Aspirin 81 mg daily. I also reminded them to watch for signs of bloody or black tarry stools and stop the medication immediately if this develops as this could be life threatening. Beta Blocker: Continue Propranolol 120 mg BID    Cholesterol Reduction Therapy: Continue Atorvastatin (Lipitor) 40 mg daily. High-sensitivity troponin is flat and is not suggestive of acute coronary event. Ordered repeat troponin yesterday, not elevated. History of Stroke: Ischemic stroke with right visual field loss. Has complete resolution of his neurological dysfunction. Antiplatelet Agent: Continue Aspirin 81 mg daily.    Cholesterol Reduction Therapy: Continue Atorvastatin (Lipitor) 40 mg daily. Lipid panel is good. Hemoglobin A1c 7.2%. His blood pressure is controlled. Essential Hypertension:  Borderline controlled. Continue propranolol  Follow-up blood pressure as per unit protocol. Hyperlipidemia: Mixed - Last LDL on 2/2/2023 was 45 mg/dL  Cholesterol Reduction Therapy: Continue Atorvastatin (Lipitor) 40 mg daily. Martita Helm PA-C   [facility]) [facility], [address] [place] [address], [address]  Phone: [phone], Fax: [phone]    I believe that the risk of significant morbidity and mortality related to the patient's current medical conditions are: intermediate-high.       February 7, 2023

## 2023-02-07 NOTE — PROGRESS NOTES
[facility] - Telemedicine Progress Note  Today's Date and Time: 2/7/2023, 10:58 AM    Impression :   Community acquired pneumonia  Hypoxic respiratory distress  Hyponatremia  KARO  Chronic cough  Leukocytosis  Elevated BNP  COPD  Chronic systolic CHF  DM II  Hx MI    Recommendations:   Start Levaquin 500 mg po daily. Stop date 2-12-23  Discontinue IV Rocephin  Discontinue azithromycin    MRSA Nares not detected  Mycoplasma IgM: negative   Legionella antigen: not detected    Medical Decision Making/Summary/Discussion:2/7/2023       Infection Control Recommendations   [place] Precautions    Antimicrobial Stewardship Recommendations     Simplification of therapy  Targeted therapy  PK dosing    Coordination of Outpatient Care:   Estimated Length of IV antimicrobials:TBD  Patient will need Midline Catheter Insertion: TBD  Patient will need PICC line Insertion:BD  Patient will need: Home IV , [place],  SNF,  LTAC: TBD  Patient will need outpatient wound care: No    Chief complaint/reason for consultation:   CAP      History of Present Illness:   Chip Palacios is a 82y.o.-year-old male who was initially admitted on 1/31/2023. Patient seen at the request of Dr. Marilin Winters:    This patient presented to [facility] ED on 1/31/23 after falling in his shower at home. He denied hitting his head or experiencing a syncopal episode, but admits to a persistent cough over the past few days. He has taken 3 days of azithromycin that his wife had given him. In the ED, he was found to have a fever of 39.7 C and his SpO2 was 88% on room air. The patient does have a history of COPD but is not on home O2. A CT chest showed multifocal consolidation in the right lung and the base of the left lung. Covid and influenza were not detected.      Abnormal labs at admission included:  Na:130  Cr:1.26  Lactic:2.4  BNP:1279  Alk phos:185  ALT:123  AST:95  Glucose:220  WBC:14.5    There has been no growth on blood or urine cultures. Since admission, the patient's WBC has increased to 19.7. He has also experienced worsening KARO and hyponatremia. His BNP has also increased to 4397. He has been receiving rocephin and azithromycin since 1/31/23. CT Chest/abd/pelvis 1/31/23  Impression   1. Multifocal consolidation in the right lung and to a lesser degree left   lung base, concerning for infection. 2.  Motion degraded evaluation of the pulmonary arteries. No evidence for   central pulmonary embolism. 3.  No acute inflammatory process identified in the abdomen or pelvis. Stable benign and incidental findings, as described. CURRENT EVALUATION 2/7/2023  BP (!) 168/95   Pulse 67   Temp 97.6 °F (36.4 °C) (Temporal)   Resp 20   Ht 5' 11\" (1.803 m)   Wt 275 lb 8 oz (125 kg)   SpO2 95%   BMI 38.42 kg/m²     Afebrile  VS stable. HTN    Patient stable  No complaints  No new issues per RN  Pro BNP very elevated    Medications reviewed  On levaquin    He is on 4-->1-->3-->2 L NC   RR: 16-->25-->20  SpO2: 93-->91-->94 %    Has bronchopneumonia in RLL   Presence of hyponatremia raised concern for Legionella but antigen test negative for serotype 1    Hyponatremia continues  Leukocytosis is improving    Labs, X rays reviewed: 2/7/2023    BUN: 29-->21-->25-->40  Cr:1.3-->-->0.92-->1.08  Na:128-->125-->120    WBC:19.7-->13.9-->15.9-->13.7  Hb:9.7-->9.4-->10.1-->9.9  Plat: 259-->299-->343    CRP: 315.8  Pro BNP:4397--> 5,573-->3,614    Cultures:  Urine:    Blood:  1/31/23: No growth  Sputum :    Wound:    MRSA Nares:  2/2/23: Not detected    Imaging:    CT Chest   1/31/23        CXR  1/31/23 1/6/23      Discussed with patient, RN, family.     I have personally reviewed the past medical history, past surgical history, medications, social history, and family history, and I have updated the database accordingly. Past Medical History:     Past Medical History:   Diagnosis Date    COPD (chronic obstructive pulmonary disease) ([facility] [address]     Diabetes mellitus ([facility] [address]     Hx of echocardiogram 2017    [facility]    Hyperlipidemia     Hypothyroidism     MI (myocardial infarction) [facility])     Stroke [facility])     Thyroid disease     Type II or unspecified type diabetes mellitus without mention of complication, not stated as uncontrolled        Past Surgical  History:     Past Surgical History:   Procedure Laterality Date    CARDIAC SURGERY  2017    Stent placed  Dr Jessica Sammy      right ankle    HERNIA REPAIR         Medications:      [Held by provider] furosemide  40 mg IntraVENous Once    urea  30 g Oral Daily    ipratropium-albuterol  1 ampule Inhalation 4x daily    alogliptin  25 mg Oral Daily    levoFLOXacin  500 mg Oral Daily    ferrous sulfate  325 mg Oral BID WC    enoxaparin  30 mg SubCUTAneous BID    aspirin  81 mg Oral Daily    levothyroxine  137 mcg Oral Daily    mometasone-formoterol  2 puff Inhalation BID    metFORMIN  1,000 mg Oral BID    montelukast  10 mg Oral Daily    oxybutynin  10 mg Oral BID    primidone  250 mg Oral BID    propranolol  120 mg Oral BID    tamsulosin  0.8 mg Oral Dinner    sodium chloride flush  5-40 mL IntraVENous 2 times per day    famotidine  20 mg Oral BID       Social History:     Social History     Socioeconomic History    Marital status:      Spouse name: Not on file    Number of children: Not on file    Years of education: Not on file    Highest education level: Not on file   Occupational History    Not on file   Tobacco Use    Smoking status: Former     Types: Cigarettes     Quit date:      Years since quittin.1    Smokeless tobacco: Never   Vaping Use    Vaping Use: Never used   Substance and Sexual Activity    Alcohol use:  Yes     Alcohol/week: 1.0 standard drink     Types: 1 Drinks containing 0.5 oz of alcohol per week     Comment: occasional    Drug use: No    Sexual activity: Not on file   Other Topics Concern    Not on file   Social History Narrative    Not on file     Social Determinants of Health     Financial Resource Strain: Unknown    Difficulty of Paying Living Expenses: Patient refused   Food Insecurity: Unknown    Worried About Running Out of Food in the Last Year: Patient refused    Ran Out of Food in the Last Year: Patient refused   Transportation Needs: Not on file   Physical Activity: Inactive    Days of Exercise per Week: 0 days    Minutes of Exercise per Session: 0 min   Stress: Not on file   Social Connections: Not on file   Intimate Partner Violence: Not on file   Housing Stability: Not on file       Family History:     Family History   Problem Relation Age of Onset    Cancer Mother 47        liver ca    Diabetes Father     Heart Disease Father         Allergies:   Rosuvastatin     Review of Systems:   Constitutional: Generalized weakness  Head: No headaches  Eyes: No double vision or blurry vision. No conjunctival inflammation. ENT: No sore throat or runny nose. . No hearing loss, tinnitus or vertigo. Cardiovascular: No chest pain or palpitations. shortness of breath. CARVER  Lung: shortness of breath with cough. Abdomen: No nausea, vomiting, diarrhea, or abdominal pain. Donna Mazariegos No cramps. Genitourinary: No increased urinary frequency, or dysuria. No hematuria. No suprapubic or CVA pain  Musculoskeletal: No muscle aches or pains. No joint effusions, swelling or deformities  Hematologic: No bleeding or bruising. Neurologic: No headache, weakness, numbness, or tingling. Integument: No rash, no ulcers. Psychiatric: No depression. Endocrine: No polyuria, no polydipsia, no polyphagia.     Physical Examination :   Patient Vitals for the past 8 hrs:   BP Temp Temp src Pulse Resp SpO2 Weight   02/07/23 1043 -- -- -- -- -- 95 % --   02/07/23 0640 (!) 168/95 97.6 °F (36.4 °C) Temporal 67 20 94 % --   02/07/23 0415 -- -- -- -- -- -- 275 lb 8 oz (125 kg)     DANNY-televisit  General Appearance: Awake, alert. Head:  Normocephalic, no trauma  Eyes: Pupils equal, round, reactive to light and accommodation; extraocular movements intact; sclera anicteric; conjunctivae pink. No embolic phenomena. ENT: Oropharynx clear, without erythema, exudate, or thrush. No tenderness of sinuses. Mouth/throat: mucosa pink and moist. No lesions. Dentition in good repair. Neck:Supple, without lymphadenopathy. Thyroid normal, No bruits. Pulmonary/Chest: DANNY-televisit  Cardiovascular: DANNY-televisit   Abdomen: Soft, non tender. Bowel sounds normal. No organomegaly  All four Extremities: No cyanosis, clubbing, edema, or effusions. Neurologic: No gross sensory or motor deficits. Skin: Warm and dry with good turgor. No signs of peripheral arterial or venous insufficiency. No ulcerations. No open wounds. Medical Decision Making -Laboratory:   I have independently reviewed/ordered the following labs:    CBC with Differential:   Recent Labs     02/06/23  0602 02/07/23  0549   WBC 15.9* 13.7*   HGB 10.1* 9.9*   HCT 30.1* 29.6*    343   LYMPHOPCT 8* 12*   MONOPCT 15* 3       BMP:   Recent Labs     02/06/23 2000 02/07/23  0549   * 120*   K 4.4 4.5   CL 85* 87*   CO2 26 25   BUN 25* 40*   CREATININE 1.01 1.08       Hepatic Function Panel:   Recent Labs     02/06/23  0602 02/07/23  0549   PROT 6.7 6.3*   LABALBU 2.8* 2.6*   BILITOT 0.4 0.3   ALKPHOS 217* 225*   ALT 47* 43*   AST 33 35       No results for input(s): RPR in the last 72 hours. No results for input(s): HIV in the last 72 hours. No results for input(s): BC in the last 72 hours.   Lab Results   Component Value Date/Time    MUCUS TRACE 01/31/2023 07:45 PM    RBC 3.34 02/07/2023 05:49 AM    TRICHOMONAS NOT REPORTED 12/29/2021 11:30 PM    WBC 13.7 02/07/2023 05:49 AM    YEAST NOT REPORTED 12/29/2021 11:30 PM    TURBIDITY Clear 01/31/2023 07:45 PM Lab Results   Component Value Date/Time    CREATININE 1.08 02/07/2023 05:49 AM    GLUCOSE 158 02/07/2023 05:49 AM       Medical Decision Making-Imaging:     Narrative   EXAMINATION:   CTA OF THE CHEST; CT OF THE ABDOMEN AND PELVIS WITH CONTRAST 1/31/2023 5:58 pm       TECHNIQUE:   CTA of the chest was performed after the administration of intravenous   contrast.  Multiplanar reformatted images are provided for review. MIP   images are provided for review. Automated exposure control, iterative   reconstruction, and/or weight based adjustment of the mA/kV was utilized to   reduce the radiation dose to as low as reasonably achievable.; CT of the   abdomen and pelvis was performed with the administration of intravenous   contrast. Multiplanar reformatted images are provided for review. Automated   exposure control, iterative reconstruction, and/or weight based adjustment of   the mA/kV was utilized to reduce the radiation dose to as low as reasonably   achievable. COMPARISON:   Chest radiograph today. Chest CT 12/02/2022. CT chest abdomen pelvis   07/21/2021. HISTORY:   ORDERING SYSTEM PROVIDED HISTORY: Fever, hypoxia, history of PE   TECHNOLOGIST PROVIDED HISTORY:   Fever, hypoxia, history of PE   Decision Support Exception - unselect if not a suspected or confirmed   emergency medical condition->Emergency Medical Condition (MA); ORDERING   SYSTEM PROVIDED HISTORY: Fever elevated liver enzyme   TECHNOLOGIST PROVIDED HISTORY:       Fever elevated liver enzyme   Decision Support Exception - unselect if not a suspected or confirmed   emergency medical condition->Emergency Medical Condition (MA)       FINDINGS:   CHEST CT:       Pulmonary Arteries: Pulmonary arteries are adequately opacified for   evaluation. Motion artifact is noted, degrading evaluation of the segmental   branches. No evidence for central pulmonary embolism. Main pulmonary artery   is normal in caliber.        Mediastinum: No evidence of mediastinal lymphadenopathy. The heart and   pericardium demonstrate no acute abnormality. There is no acute abnormality   of the thoracic aorta. Calcified atheromatous plaque. Lungs/pleura: Expiratory phase of imaging and mild motion artifact noted. Consolidative opacities are present in the posterolateral inferior right   upper lobe and posterior right lower lobe. Patchy airspace disease to a   lesser degree is present in the left lung base. Mild subsegmental   atelectasis is also present. No effusion. The central airway is patent. Soft Tissues/Bones: No acute bone or soft tissue abnormality. ABDOMEN PELVIS:       Organs: The liver, gallbladder, pancreas, spleen, adrenals and kidneys reveal   no acute findings. Horseshoe kidney. Stable left adrenal nodules measuring   up to 1.7 cm. GI/Bowel: There is no bowel dilatation or wall thickening identified. Diverticulosis. Pelvis: No acute findings. Peritoneum/Retroperitoneum: No free air or free fluid. The aorta is normal   in caliber. The visceral branches are patent. No lymphadenopathy. Bones/Soft Tissues: No abnormality identified. *Unless otherwise specified, incidental findings do not require dedicated   imaging follow-up. Impression   1. Multifocal consolidation in the right lung and to a lesser degree left   lung base, concerning for infection. 2.  Motion degraded evaluation of the pulmonary arteries. No evidence for   central pulmonary embolism. 3.  No acute inflammatory process identified in the abdomen or pelvis. Stable benign and incidental findings, as described.                  Medical Decision Hmggaz-Eyylvibf-Aphxs:       Medical Decision Making-Other:     Note:  Labs, medications, radiologic studies were reviewed with personal review of films  Large amounts of data were reviewed  Discussed with nursing Staff, Discharge planner  Infection Control and Prevention measures reviewed  All prior entries were reviewed  Administer medications as ordered  Prognosis: Guarded  Discharge planning reviewed      Thank you for allowing us to participate in the care of this patient. Please call with questions.     Matt Peterson MD        Pager: [phone] - Office: [phone]

## 2023-02-07 NOTE — PROGRESS NOTES
Occupational Therapy  Facility/Department: [facility] MED SURG  Daily Treatment Note  NAME: Kwesi Fuller  : 1935  MRN: [id number]    Date of Service: 2023    Discharge Recommendations:  Continue to assess pending progress, Home with Home health OT         Patient Diagnosis(es): The primary encounter diagnosis was Septicemia ([address] A diagnosis of Pneumonia due to infectious organism, unspecified laterality, unspecified part of lung was also pertinent to this visit. Assessment    Activity Tolerance: Patient tolerated treatment well;Patient limited by fatigue      Plan   Occupational Therapy Plan  Times Per Week: 7x/wk  Times Per Day: Once a day  Current Treatment Recommendations: Strengthening;Balance training;Self-Care / ADL; Safety education & training     Restrictions  Restrictions/Precautions  Restrictions/Precautions: General Precautions; Fall Risk    Subjective   Subjective  Subjective: Patient sitting EOB upon arrival, agreeable to OT treatment. Pain: Patient c /o pain L knee  Orientation  Overall Orientation Status: Within Functional Limits  Pain: no c/o pain at this time. Objective    Vitals     Bed Mobility Training  Bed Mobility Training: Yes  Overall Level of Assistance: Assist X1;Stand-by assistance; Additional time  Interventions: Verbal cues  Supine to Sit: Stand-by assistance;Assist X1  Scooting: Stand-by assistance;Assist X1  Balance  Sitting: Intact  Standing: Impaired  Standing - Static: Good  Standing - Dynamic: Fair;Good (standing x 3 minutes during mobility c slight shortness of breath noted.)  Transfer Training  Transfer Training: Yes  Overall Level of Assistance: Contact-guard assistance;Minimum assistance  Sit to Stand: Contact-guard assistance;Minimum assistance  Stand to Sit: Contact-guard assistance;Assist X1  Gait  Overall Level of Assistance: Contact-guard assistance;Assist X1  Interventions: Verbal cues; Safety awareness training  Assistive Device: Ena Cowden, rolling;Gait belt        OT Exercises  Exercise Treatment: BUE strengthening x 2# weight, 1 x 12-15 reps x 3 shoulder planes (flexion, punches and triceps) to improve strength for increased indep c ADL and transfers. Patient required RB d/t shortness of breath. Safety Devices  Type of Devices: All fall risk precautions in place;Call light within reach; Left in chair;Chair alarm in place (PTA present)     Patient Education  Education Given To: Patient  Education Provided: Transfer Training;Home Exercise Program  Education Method: Demonstration;Verbal  Barriers to Learning: None  Education Outcome: Verbalized understanding;Demonstrated understanding;Continued education needed    Goals  Short Term Goals  Time Frame for Short Term Goals: 20 visits  Short Term Goal 1: Pt. will tolerate 15 mins of BUE ther ex/act while maintaining SpO2 > 90% to increase overall functional activity tolerance. Short Term Goal 2: Pt. will demo standing tolerance x 6-7 mins w/o LOB to increase safety/participation with ADL's. Short Term Goal 3: Pt. will complete self care routine with SUP/mod I (with exception of footwear) to return to PLOF. Short Term Goal 4: Pt. will complete functional ADL transfers/mobility with SUP/mod I and G safety with reduced risk for falls.        Therapy Time   Individual Concurrent Group Co-treatment   Time In 1410         Time Out 1434         Minutes [address], OTR/L

## 2023-02-07 NOTE — PROGRESS NOTES
[facility] and the wife has to call to get the approval started for him to go to the [place]. The wife called the [place] and because he was not 80% services related that won't cover skilled care. The wife stated he has [facility]. Copy the cards and faxed them to SNF and resignation.     CORINA Van

## 2023-02-07 NOTE — PROGRESS NOTES
Progress Note  Yue Bowman MD  Patient: Mukesh Bradshaw  Date of Admission: 1/31/2023  4:23 PM  Hospital Day # 7  Date of Evaluation: 2/7/2023      SUBJECTIVE:    Mr. Aron Thomas  was seen and examined today for f/u of Sepsis due to pneumonia [facility]). He  continues to have cough with expectoration . He in on 1 lit oxygen. He  is hypoxic on RA . Bilat leg edema same, lasix held,renal function improving, hyponatremia same Na121,     ROS:   Constitutional: negative  for fevers, and negative for chills. Respiratory: positive for shortness of breath, positive for cough, and negative for wheezing  Cardiovascular: negative for chest pain, and negative for palpitations  Gastrointestinal: negative for abdominal pain, negative for nausea,negative for vomiting, negative for diarrhea, and negative for constipation     All other systems were reviewed with the patient and are negative unless otherwise stated in HPI    -----------------------------------------------------------------  OBJECTIVE:  Vitals:   Temp: 97.6 °F (36.4 °C)  BP: (!) 168/95  Resp: 20  Heart Rate: 67  SpO2: 95 % on supplemental O2    Weight  Wt Readings from Last 3 Encounters:   02/07/23 275 lb 8 oz (125 kg)   12/16/22 256 lb 8 oz (116.3 kg)   12/05/22 254 lb 6.6 oz (115.4 kg)     Body mass index is 38.42 kg/m². 24HR INTAKE/OUTPUT:      Intake/Output Summary (Last 24 hours) at 2/7/2023 1351  Last data filed at 2/7/2023 1026  Gross per 24 hour   Intake 1070 ml   Output 850 ml   Net 220 ml       Exam:  GEN:    Awake, alert and oriented x 3. EYES:  EOMI, pupils equal   NECK: Supple. No lymphadenopathy. No carotid bruit  CVS:    regular rate and rhythm, 2/6 systolic murmur  PULM:  diminished with bilat rhonchi, no acute respiratory distress  ABD:    Bowels sounds normal.  Abdomen is soft. No distention. no tenderness to palpation. EXT:   2 + edema bilaterally . No calf tenderness. NEURO: Moves all extremities.   Motor and sensory are grossly intact  SKIN:  No rashes.   No skin lesions.    -----------------------------------------------------------------  DATA:  Complete Blood Count:   Recent Labs     02/05/23 0530 02/06/23 0602 02/07/23  0549   WBC 15.2* 15.9* 13.7*   RBC 3.18* 3.41* 3.34*   HGB 9.4* 10.1* 9.9*   HCT 27.8* 30.1* 29.6*   MCV 87.4 88.3 88.6   MCH 29.6 29.6 29.6   MCHC 33.8 33.6 33.4   RDW 14.6* 14.7* 14.8*    299 343   MPV 9.8 9.9 9.5        Last 3 Blood Glucose:   Recent Labs     02/05/23 0530 02/06/23 0602 02/06/23 2000 02/07/23  0549   GLUCOSE 168* 176* 150* 158*        Comprehensive Metabolic Profile:   Recent Labs     02/05/23  0530 02/06/23 0602 02/06/23 1312 02/06/23 2000 02/07/23  0549   * 121* 119* 120* 120*   K 4.1 4.2  --  4.4 4.5   CL 90* 87*  --  85* 87*   CO2 20 25  --  26 25   BUN 21 21  --  25* 40*   CREATININE 0.92 1.04  --  1.01 1.08   GLUCOSE 168* 176*  --  150* 158*   CALCIUM 8.4* 8.8  --  8.7 8.9   PROT 5.9* 6.7  --   --  6.3*   LABALBU 2.8* 2.8*  --   --  2.6*   BILITOT 0.4 0.4  --   --  0.3   ALKPHOS 198* 217*  --   --  225*   AST 32 33  --   --  35   ALT 50* 47*  --   --  43*        Urinalysis:   Lab Results   Component Value Date/Time    NITRU NEGATIVE 01/31/2023 07:45 PM    COLORU Yellow 01/31/2023 07:45 PM    PHUR 6.0 01/31/2023 07:45 PM    WBCUA 0 TO 2 01/31/2023 07:45 PM    RBCUA 0 TO 2 01/31/2023 07:45 PM    MUCUS TRACE 01/31/2023 07:45 PM    TRICHOMONAS NOT REPORTED 12/29/2021 11:30 PM    YEAST NOT REPORTED 12/29/2021 11:30 PM    BACTERIA 1+ 01/31/2023 07:45 PM    CLARITYU clear 05/10/2018 02:45 PM    SPECGRAV 1.020 01/31/2023 07:45 PM    LEUKOCYTESUR NEGATIVE 01/31/2023 07:45 PM    UROBILINOGEN Normal 01/31/2023 07:45 PM    BILIRUBINUR NEGATIVE 01/31/2023 07:45 PM    BILIRUBINUR neg 05/10/2018 02:45 PM    BLOODU neg 05/10/2018 02:45 PM    GLUCOSEU NEGATIVE 01/31/2023 07:45 PM    KETUA NEGATIVE 01/31/2023 07:45 PM    AMORPHOUS NOT REPORTED 12/29/2021 11:30 PM       HgBA1c:    Lab Results Component Value Date/Time    LABA1C 7.2 02/02/2023 05:50 AM       Lactic Acid:   Lab Results   Component Value Date/Time    LACTA 2.2 12/29/2021 09:24 PM    LACTA 1.2 11/14/2017 08:47 PM    LACTA 1.6 09/19/2017 06:27 PM        High Sensitivity Troponin:  Recent Labs     02/06/23  1312   TROPHS 17         Microbiology:  Blood Culture:  No components found for: CBLOOD, CFUNGUSBL    Radiology/Imaging:  XR CHEST PORTABLE   Final Result   Increasing vascular congestion as well as localized airspace disease in the   mid right lung field. Developing pulmonary edema can have this appearance   along with underlying multifocal infection. CT ABDOMEN PELVIS W IV CONTRAST Additional Contrast? None   Final Result   1. Multifocal consolidation in the right lung and to a lesser degree left   lung base, concerning for infection. 2.  Motion degraded evaluation of the pulmonary arteries. No evidence for   central pulmonary embolism. 3.  No acute inflammatory process identified in the abdomen or pelvis. Stable benign and incidental findings, as described. CT CHEST PULMONARY EMBOLISM W CONTRAST   Final Result   1. Multifocal consolidation in the right lung and to a lesser degree left   lung base, concerning for infection. 2.  Motion degraded evaluation of the pulmonary arteries. No evidence for   central pulmonary embolism. 3.  No acute inflammatory process identified in the abdomen or pelvis. Stable benign and incidental findings, as described. US GALLBLADDER RUQ   Final Result   Gallbladder sludge. No imaging evidence for acute cholecystitis or biliary   dilatation. XR CHEST PORTABLE   Final Result   Suspected pneumonia in the right mid lung. Recommend imaging follow-up to   resolution.                  MEDICAL DECISION MAKING:    Primary Problem(s): Sepsis due to pneumonia ([address]  Condition is improving,   Treatment plan:  levaquin iv  Imaging: none  Medications: Continue levaquin  Medication Monitoring / High Risk Medications: none    Id follow up  chf    Condition is same  Treatment plan: monitor for pulmonary congestion  Imaging: none  Medications: Cardiology input    Hyponatremia- same,Na 121                        - nephrology consult,monitor renal function  copd    Condition is unchanged  Treatment plan: Continue current treatment  Imaging: no further imaging studies ordered today  Medications: Continue oxygen,aerosols    ckd    Condition is improving  Treatment plan: Continue current treatment  Imaging: no further imaging studies ordered today  Medications: none    Nutrition status: Well developed, well nourished with no malnutrition  Dietician consult initiated    Hospital Prophylaxis:   DVT: Lovenox   Stress Ulcer: PPI     MDM Data:   Test interpretation:    My independent X-ray interpretation:   pneumonia,chf, improvement  Management and/or test interpretation  was reviewed with nursing staff  Consults and Nursing notes were personally reviewed, all current labs and imaging were personally reviewed, tests ordered: CBC, BMP, and history obtained by independent historian       Disposition:  Shared decision making:  All test results, treatment options and disposition options were discussed with the patient today  Social determinants of health that may impact management: none  Code status: Full Code   Disposition: Discharge plan is home            Tatum Sanchez MD , M.D.  2/7/2023

## 2023-02-07 NOTE — PROGRESS NOTES
[facility]  Inpatient/Observation/Outpatient Rehabilitation    Date: 2023  Patient Name: Jocelyn Dancer       [] Inpatient Acute/Observation       []  Outpatient  : 1935       [] Pt no showed for scheduled appointment    [x] Pt refused/declined therapy at this time due to:           [] Pt cancelled due to:  [] No Reason Given   [] Sick/ill   [] Other:    OT not seen this date d/t refusal on first attempt. and pt difficult to arouse on second attempt. Pt briefly opened eyes with sternal rub and then fell back to sleep. Pt educated on importance of therapy session and pt continued to decline. Therapist/Assistant will attempt to see this patient, at our earliest opportunity.        RAFAEL Rodriguez Date: 2023

## 2023-02-07 NOTE — RT PROTOCOL NOTE
RESPIRATORY ASSESSMENT PROTOCOL                                                                                              Patient Name: Maite Street Room#: [id number] : 1935     Admitting diagnosis: Septicemia ([facility] [A41.9]  Sepsis due to pneumonia ([facility] [J18.9, A41.9]  Pneumonia due to infectious organism, unspecified laterality, unspecified part of lung [J18.9]       Medical History:   Past Medical History:   Diagnosis Date    COPD (chronic obstructive pulmonary disease) ([facility]     Diabetes mellitus ([facility]     Hx of echocardiogram 2017    [facility]    Hyperlipidemia     Hypothyroidism     MI (myocardial infarction) ([facility]     Stroke [facility])     Thyroid disease     Type II or unspecified type diabetes mellitus without mention of complication, not stated as uncontrolled        PATIENT ASSESSMENT    LABORATORY DATA  Hematology:   Lab Results   Component Value Date/Time    WBC 13.7 2023 05:49 AM    RBC 3.34 2023 05:49 AM    HGB 9.9 2023 05:49 AM    HCT 29.6 2023 05:49 AM     2023 05:49 AM     Chemistry:    Lab Results   Component Value Date/Time    PHART 7.359 2023 10:05 PM    CNC2DAV 46.9 2023 10:05 PM    PO2ART 60.9 2023 10:05 PM    K1VDYCOP 90.3 2023 10:05 PM    FSY3XPI 25.8 2023 10:05 PM    PBEA 1.4 2017 11:25 PM       VITALS  Heart Rate: 67   Resp: 20  BP: (!) 168/95  SpO2: 95 % O2 Device: Nasal cannula  Temp: 97.6 °F (36.4 °C)    SKIN COLOR  [x] Normal  [] Pale  [] Dusky  [] Cyanotic    RESPIRATORY PATTERN  [] Normal  [x] Dyspnea  [] Cheyne-Veras  [] Kussmaul  [] Biots    AMBULATORY  [] Yes  [] No  [x] With Assistance    PEAK FLOW  Predicted:     Personal Best:            Patient Acuity 0 1 2 3 4 Score   Level of Consciousness (LOC) [x]  Alert & Oriented[] or Pt normal LOC   Confused;follows directions []  Confused & uncooper-ative []  Obtunded []  Comatose 0   Respiratory Rate  (RR) []  Reg. rate & pattern. 12 - 20 bpm  []  Increased RR. Greater than 20 bpm   [x]  SOB w/ exertion or RR greater than 24 bpm []  Access- ory muscle use at rest. Abn.  resp. []  SOB at rest.   2   Bilateral Breath Sounds (BBS) []  Clear []  Diminish-ed bases  [x]  Diminish-ed t/o, or rales   []  Sporadic, scattered wheezes or rhonchi []  Persistentwheezes and, or absent BBS 2   Cough []  Strong, effective, & non-prod. [x]  Effective & prod. Less than 25 ml (2 TBSP) over past 24 hrs []  Ineffective & non-prod to less than 25 ML over past 24 hrs []  Ineffective and, or greater than 25 ml sputum prod. past 24 hrs. []  Nonspon- taneous; Requires suctioning 1   Pulmonary History  (PULM HX) []  No smoking and no chronic pulmonary history []  Former smoker. Quit over 12 mos. ago []  Current smoker or quit w/ in 12 mos []  Pulm. History and, or 20 pk/yr smoking hx [x]  Admitted w/ acute pulm. dx and, or has been admitted w/ pulm. dx 2 or more times over past 12 mos 4   Surgical History this Admit  (SURG HX) [x]  No surgery []  General surgery []  Lower abdominal []  Thoracic or upper abdominal   []  Thoracic w/ pulm. disease 0   Chest X-Ray (CXR)/CT Scan []  Clear or not applicable []  Not available []  Atelectasis or pleural effusions [x]  Localized infiltrate or pulm.  edema []  Con-solidated Infiltrates, bilateral, or in more than 1 lobe 3   TOTAL ACUITY: 12       CARE PLAN    If Acuity Level is 2, 3, or 4 in any of the following:    [x] BILATERAL BREATH SOUNDS (BBS)     [x] PULMONARY HISTORY (PULM HX)  [x] Respiratory Rate  (RR)    Goal: Improve respiratory functions in patients with airway disease and decrease WOB    [x] AEROSOL PROTOCOL    Total Acuity:   14-28  []  Secondary Assessment in 24 hrs Total Acuity:  9-13  [x]  Secondary Assessment in 24 hrs Total Acuity:  4-8  []  Secondary Assessment in 24 hrs Total Acuity:  0-3  []  Secondary Assessment in 48 hrs   HHN AEROSOL THERAPY with  [physician-ordered bronchodilator(s)] q 4 & Albuterol PRN q2 hrs. Breath-Actuated Neb if BBS Acuity = 4, and pt. can use MP. Notify physician if condition deteriorates. HHN AEROSOL THERAPY with  [physician-ordered bronchodilator(s)]  QID and Albuterol PRN q4 hrs. Breath-Actuated Neb if BBS Acuity = 4, and pt. can use MP. Notify physician if condition deteriorates. MDI THERAPY with  2 actuations of [physician-ordered bronchodilator(s)] via spacer TID Albuterol and PRNq4 hrs. If unable to utilize MDI: HHN [physician-ordered bronchodilator(s)] TID and Albuterol PRN q4 hrs. Notify physician if condition deteriorates. MDI THERAPY with  [physician-ordered bronchodilator(s)] via spacer TID PRN. If unable to utilize MDI: HHN [physician-ordered bronchodilator(s)] TID PRN. Notify physician if condition deteriorates. If Acuity Level is 2, 3, or 4 in any of the following:    [x] COUGH     [] SURGICAL HISTORY (SURG HX)  [x] CHEST XRAY (CXR)    Goal: Improvement in sputum mobilization in patients with ineffective airway clearance. Reverse atelectasis. [x] Bronchopulmonary Hygiene Protocol      Total Acuity:   14-28  []  Secondary Assessment in 24 hrs Total Acuity:  9-13  [x]  Secondary Assessment in 24 hrs Total Acuity:  4-8  []  Secondary Assessment in 24 hrs Total Acuity:  0-3  []  Secondary Assessment in 48 hrs   METANEB QID with [physician-ordered bronchodilator(s)] if CXR Acuity = 4; otherwise:  PD&P, PEP, or Vest QID & PRN  NT Sxn PRN for ineffective cough  METANEB QID with [physician-ordered bronchodilator(s)] if CXR Acuity = 4; otherwise:  PD&P, PEP, or Vest QID & PRN  NT Sxn PRN for ineffective cough  PD&P, PEP, or Vest TID & PRN   Instruct patient to self-perform IS q1hr WA       If Acuity Level is 2 or above in the following:    [x] PULMONARY HISTORY (PULM HX)    Goal: Assist patient in quitting smoking to slow or stop the progression of lung disease.     [x] Smoking Cessation Protocol    SMOKING CESSATION EDUCATION provided according to policy LX_595: (karson with an X)  ____Yes    ____ No     __x__ NA    Smoking Cessation Booklet given:  ____Yes  ____No ____Patient Nahid Madden

## 2023-02-07 NOTE — PROGRESS NOTES
Pt sitting up in chair watching television with wife visiting. VS checked and assessment done as charted. Pt is A&Ox4. Some lethargy but noticeably improved since writer was in patient's room last night. Pt denies any pain. SPO2 93% on 2 LPM nasal cannula. Lung sounds diminished throughout. Pt and wife dent any needs at this time. Call light and bedside table are within reach. Patient closing eyes while writer exits room. Will monitor.

## 2023-02-07 NOTE — PROGRESS NOTES
Patient in chair, resting with eyes closed. Spouse at bedside. Patient educated on medication to be given tonight and physician's orders to be completed. Patient stated understanding. Patient encouraged to ask questions. Patient denies of any questions at this time. Vitals taken and documented. Assessment completed and documented. Patient responds to voice, oriented to person and situation. Reoriented to place, and time. Calm, pleasant. Speech clear. Patient noted to be lethargic and unable to stay awake. Patient encouraged to take deep breaths. Patient uncooperative. Lung sounds diminished throughout. Noted congested, non-productive cough. Abdomen distended, rounded, obese, soft, non tender to palpation. Bowel sounds active in all four quadrants. +3 pitting edema noted in bilateral lower extremities. Non-pitting generalized edema noted. Scattered ecchymosis noted. Patient denies of pain, chest pain, numbness, tingling, or shortness of breath. Patient denies needs or concerns at this time. Call light and over bed table in reach.

## 2023-02-07 NOTE — PROGRESS NOTES
Vitals and assessment done at this time. See flowsheet for more details. Pt resting in bed. Pt denied any pain. Pt easy to arouse but will fall asleep mid conversation. Pt has been refusing PT/OT, pt refusing to get up into the chair. PT has expiratory wheezes. Call light within reach. Will continue to monitor.

## 2023-02-07 NOTE — PLAN OF CARE
Problem: Discharge Planning  Goal: Discharge to home or other facility with appropriate resources  Outcome: Progressing  Flowsheets (Taken 2/7/2023 0048)  Discharge to home or other facility with appropriate resources: Identify barriers to discharge with patient and caregiver     Problem: Safety - Adult  Goal: Free from fall injury  Outcome: Progressing  Flowsheets (Taken 2/7/2023 0048)  Free From Fall Injury: Instruct family/caregiver on patient safety     Problem: Nutrition Deficit:  Goal: Optimize nutritional status  Outcome: Progressing  Flowsheets (Taken 2/7/2023 0048)  Nutrient intake appropriate for improving, restoring, or maintaining nutritional needs: Monitor oral intake, labs, and treatment plans     Problem: Respiratory - Adult  Goal: Achieves optimal ventilation and oxygenation  Outcome: Progressing  Flowsheets (Taken 2/7/2023 0048)  Achieves optimal ventilation and oxygenation:   Assess for changes in respiratory status   Position to facilitate oxygenation and minimize respiratory effort   Respiratory therapy support as indicated   Assess for changes in mentation and behavior   Oxygen supplementation based on oxygen saturation or arterial blood gases   Encourage broncho-pulmonary hygiene including cough, deep breathe, incentive spirometry   Assess and instruct to report shortness of breath or any respiratory difficulty     Problem: Pain  Goal: Verbalizes/displays adequate comfort level or baseline comfort level  Outcome: Progressing  Flowsheets (Taken 2/7/2023 0048)  Verbalizes/displays adequate comfort level or baseline comfort level:   Encourage patient to monitor pain and request assistance   Administer analgesics based on type and severity of pain and evaluate response   Consider cultural and social influences on pain and pain management   Assess pain using appropriate pain scale   Implement non-pharmacological measures as appropriate and evaluate response   Notify Licensed Independent Practitioner if interventions unsuccessful or patient reports new pain     Problem: Skin/Tissue Integrity  Goal: Absence of new skin breakdown  Description: 1. Monitor for areas of redness and/or skin breakdown  2. Assess vascular access sites hourly  3. Every 4-6 hours minimum:  Change oxygen saturation probe site  4. Every 4-6 hours:  If on nasal continuous positive airway pressure, respiratory therapy assess nares and determine need for appliance change or resting period. Outcome: Progressing  Note: No new skin issues noted.

## 2023-02-07 NOTE — PROGRESS NOTES
[facility]  Inpatient/Observation/Outpatient Rehabilitation    Date: 2023  Patient Name: Diego Martin       [x] Inpatient Acute/Observation       []  Outpatient  : 1935       [] Pt no showed for scheduled appointment    [x][] Pt refused/declined therapy at this time due to:            Pt cancelled due to:  [] No Reason Given   [] Sick/ill   [] Other:      Patient sleeping upon arrival. Multiple attempts made to arouse patient. Patient did open eyes briefly with sternal rub, while falling right back asleep. Will check back with patient this afternoon. Therapist/Assistant will attempt to see this patient, at our earliest opportunity.        Selvin Beltran, LISANDRA Date: 2023

## 2023-02-07 NOTE — PROGRESS NOTES
Physical Therapy  Facility/Department: [address] [place] [address] MED SURG  Daily Treatment Note  NAME: Tessa Dawn  : 1935  MRN: [id number]    Date of Service: 2023    Discharge Recommendations:  Continue to assess pending progress, Patient would benefit from continued therapy after discharge, Home with Home health PT, Outpatient PT      Patient Diagnosis(es): The primary encounter diagnosis was Septicemia ([address] A diagnosis of Pneumonia due to infectious organism, unspecified laterality, unspecified part of lung was also pertinent to this visit. Assessment   Pt co treated with OT. Seated BLE exercises x15 in all planes, pt demoes limited ROM in hip flexion. Bed mobs SBA x1 and additional time with BUE's on bed rail. Transfers Min A x1. Pt ambulated with [employer]  20' with CGA x1. Pt ambulates with WBOS and forward flexed posture. Pt required frequent cues to remain on task. Pt on 2L of O2. Pt c/o L knee pain during ambulation. Plan    Physcial Therapy Plan  General Plan: 2 times a day 7 days a week (1x/day on weekends and holidays)  Current Treatment Recommendations: Strengthening;Balance training;Functional mobility training;Transfer training; Endurance training;Gait training;Stair training;Neuromuscular re-education; Safety education & training; Therapeutic activities     Restrictions  Restrictions/Precautions  Restrictions/Precautions: General Precautions, Fall Risk     Subjective    Subjective  Subjective: Pt in bed upon arrival with nursing present. Pt awake with his spouse present, pt is agreeable to therapy. Pain: no c/o pain at this time. Orientation  Overall Orientation Status: Within Functional Limits     Objective   Bed Mobility Training  Bed Mobility Training: Yes  Overall Level of Assistance: Assist X1;Stand-by assistance; Additional time  Interventions: Verbal cues  Supine to Sit: Stand-by assistance;Assist X1  Scooting: Stand-by assistance;Assist X1  Balance  Sitting: Intact  Standing: Impaired  Standing - Static: Good  Standing - Dynamic: Fair;Good (standing x 3 minutes during mobility c slight shortness of breath noted.)  Transfer Training  Transfer Training: Yes  Overall Level of Assistance: Contact-guard assistance;Minimum assistance  Sit to Stand: Contact-guard assistance;Minimum assistance  Stand to Sit: Contact-guard assistance;Assist X1  Gait  Overall Level of Assistance: Contact-guard assistance;Assist X1  Interventions: Verbal cues; Safety awareness training  Assistive Device: Walker, rolling;Gait belt     PT Exercises  Exercise Treatment: Seated B LE exercises x15 in all planes, Pt demoes limited range in hip flexion. Safety Devices  Type of Devices: All fall risk precautions in place;Call light within reach; Chair alarm in place; Left in chair;Gait belt       Goals  Short Term Goals  Time Frame for Short Term Goals: 20 days  Short Term Goal 1: Initiate and progress HEP for strength and balance. Short Term Goal 2: Pt will be independent with bed mobility without use of rails for discharge home independently. Short Term Goal 3: Pt will transfer with +1 supervision with least restrictive device to decrease fall risk. Short Term Goal 4: Pt will ambulate with supervision to SBA +1 with least restrictive device for up to 75 feet to allow patient safe entry and exit into his home  Short Term Goal 5: Pt will negotiate 4 steps with railing +1 SBA for entry and exit into buildings for appointments. Patient Goals   Patient Goals : return home    Education  Patient Education  Education Given To: Patient  Education Provided Comments: Education provided in benefits of therapy and continued ed in pushing from seat.   Education Method: Verbal  Barriers to Learning: None  Education Outcome: Verbalized understanding;Continued education needed;Demonstrated understanding    Therapy Time   Individual Concurrent Group Co-treatment   Time In 1412         Time Out 1443         Minutes [address] [facility],[address] Kymberly Cody, PTA

## 2023-02-07 NOTE — PROGRESS NOTES
BMP obtained and sent down to lab at this time. Pt is in chair and conversing with writer. Pt denies any other needs at this time. Call light is within reach. Wife in room visiting, denies any questions.

## 2023-02-07 NOTE — PROGRESS NOTES
Comprehensive Nutrition Assessment    Type and Reason for Visit:  Reassess    Nutrition Recommendations/Plan:   Monitor oral fluid intakes  Follow success of urea for hyponatremia     Malnutrition Assessment:  Malnutrition Status:  No malnutrition (02/01/23 0741)    Context:  Acute Illness     Findings of the 6 clinical characteristics of malnutrition:  Energy Intake:  No significant decrease in energy intake  Weight Loss:  No significant weight loss     Body Fat Loss:  No significant body fat loss     Muscle Mass Loss:  No significant muscle mass loss    Fluid Accumulation:  Moderate to Severe Extremities   Strength:  Not Performed    Nutrition Assessment:    Continued obesity and good oral intakes with weight gains r/t worsened BLE edema. Serum Na hovering around 120 mmol/L with new urea (sub for tolvaptan) ordered. Glycemia running 126-176 mg/dl in last 24 hours. I/O indicate >2000 ml intakes last 3 days prior to 2/6. Not currently on a fluid restriction. Nutrition Related Findings:    +3 BLE edema. + formed bm. Wound Type: None       Current Nutrition Intake & Therapies:    Average Meal Intake: %  Average Supplements Intake: None Ordered  ADULT DIET; Regular; 4 carb choices (60 gm/meal)    Anthropometric Measures:  Height: 5' 11\" (180.3 cm)  Ideal Body Weight (IBW): 172 lbs (78 kg)    Admission Body Weight: 250 lb (113.4 kg)  Current Body Weight: 275 lb 8 oz (125 kg), 151.2 % IBW.  Weight Source: Bed Scale  Current BMI (kg/m2): 38.4  Usual Body Weight: 256 lb 8 oz (116.3 kg)  % Weight Change (Calculated): 7.4  Weight Adjustment For: No Adjustment                 BMI Categories: Obese Class 2 (BMI 35.0 -39.9)    Estimated Daily Nutrient Needs:  Energy Requirements Based On: Kcal/kg  Weight Used for Energy Requirements: Current  Energy (kcal/day): 1925-5100 (15-18)  Weight Used for Protein Requirements: Ideal  Protein (g/day):  (1.2-1.3)  Method Used for Fluid Requirements: 1 ml/kcal  Fluid (ml/day): 2100    Nutrition Diagnosis:   Overweight/Obese related to excessive energy intake as evidenced by BMI    Lab Results   Component Value Date     (L) 02/07/2023    K 4.5 02/07/2023    CL 87 (L) 02/07/2023    CO2 25 02/07/2023    BUN 40 (H) 02/07/2023    CREATININE 1.08 02/07/2023    GLUCOSE 158 (H) 02/07/2023    CALCIUM 8.9 02/07/2023    PROT 6.3 (L) 02/07/2023    LABALBU 2.6 (L) 02/07/2023    BILITOT 0.3 02/07/2023    ALKPHOS 225 (H) 02/07/2023    AST 35 02/07/2023    ALT 43 (H) 02/07/2023    LABGLOM >60 02/07/2023    GFRAA >60 03/14/2022     Nutrition Interventions:   Food and/or Nutrient Delivery: Continue Current Diet  Nutrition Education/Counseling: No recommendation at this time  Coordination of Nutrition Care: Continue to monitor while inpatient  Plan of Care discussed with: no one, patient asleep    Goals:  Previous Goal Met: Progressing toward Goal(s)  Goals: Meet at least 75% of estimated needs       Nutrition Monitoring and Evaluation:   Behavioral-Environmental Outcomes: Knowledge or Skill  Food/Nutrient Intake Outcomes: Food and Nutrient Intake  Physical Signs/Symptoms Outcomes: Biochemical Data, Weight, Fluid Status or Edema    Discharge Planning:    No discharge needs at this time     Hardik Yu, [address], [address]: [id number]

## 2023-02-07 NOTE — PLAN OF CARE
Problem: Discharge Planning  Goal: Discharge to home or other facility with appropriate resources  2/7/2023 1208 by Amber Rouse RN  Outcome: Progressing  Flowsheets (Taken 2/7/2023 2615)  Discharge to home or other facility with appropriate resources: Identify barriers to discharge with patient and caregiver  2/7/2023 0928 by Pedro Felipe RD, LD  Outcome: Progressing  Flowsheets (Taken 2/7/2023 0640 by Amber Rouse RN)  Discharge to home or other facility with appropriate resources: Identify barriers to discharge with patient and caregiver  2/7/2023 0048 by Romeo Frazier RN  Outcome: Progressing  Flowsheets (Taken 2/7/2023 0048)  Discharge to home or other facility with appropriate resources: Identify barriers to discharge with patient and caregiver     Problem: Safety - Adult  Goal: Free from fall injury  2/7/2023 1208 by Amber Rouse RN  Outcome: Progressing  Flowsheets (Taken 2/7/2023 1208)  Free From Fall Injury: Instruct family/caregiver on patient safety  2/7/2023 0928 by Pedro Felipe RD, ANGELICA  Outcome: Progressing  2/7/2023 0048 by Romeo Frazier RN  Outcome: Progressing  Flowsheets (Taken 2/7/2023 0048)  Free From Fall Injury: Instruct family/caregiver on patient safety     Problem: Nutrition Deficit:  Goal: Optimize nutritional status  2/7/2023 1208 by Amber Rouse RN  Outcome: Progressing  Flowsheets (Taken 2/7/2023 0928 by Pedro Felipe RD, LD)  Nutrient intake appropriate for improving, restoring, or maintaining nutritional needs:   Monitor oral intake, labs, and treatment plans   Recommend appropriate diets, oral nutritional supplements, and vitamin/mineral supplements  2/7/2023 8464 by Pedro Felipe RD, LD  Outcome: Progressing  Flowsheets (Taken 2/7/2023 0836)  Nutrient intake appropriate for improving, restoring, or maintaining nutritional needs:   Monitor oral intake, labs, and treatment plans   Recommend appropriate diets, oral nutritional supplements, and vitamin/mineral supplements  Note: Nutrition Problem #1: Overweight/Obese  Intervention: Food and/or Nutrient Delivery: Continue Current Diet    2/7/2023 0048 by Waqas Lees RN  Outcome: Progressing  Flowsheets (Taken 2/7/2023 0048)  Nutrient intake appropriate for improving, restoring, or maintaining nutritional needs: Monitor oral intake, labs, and treatment plans     Problem: Respiratory - Adult  Goal: Achieves optimal ventilation and oxygenation  2/7/2023 1208 by Reynaldo Pitts RN  Outcome: Progressing  Flowsheets (Taken 2/7/2023 0640)  Achieves optimal ventilation and oxygenation: Assess for changes in respiratory status  2/7/2023 0928 by Jacky Anton RD, LD  Outcome: Progressing  Flowsheets (Taken 2/7/2023 0640 by Reynaldo Pitts RN)  Achieves optimal ventilation and oxygenation: Assess for changes in respiratory status  2/7/2023 0048 by Waqas Lees RN  Outcome: Progressing  Flowsheets (Taken 2/7/2023 0048)  Achieves optimal ventilation and oxygenation:   Assess for changes in respiratory status   Position to facilitate oxygenation and minimize respiratory effort   Respiratory therapy support as indicated   Assess for changes in mentation and behavior   Oxygen supplementation based on oxygen saturation or arterial blood gases   Encourage broncho-pulmonary hygiene including cough, deep breathe, incentive spirometry   Assess and instruct to report shortness of breath or any respiratory difficulty     Problem: Pain  Goal: Verbalizes/displays adequate comfort level or baseline comfort level  2/7/2023 1208 by Reynaldo Pitts RN  Outcome: Progressing  Flowsheets (Taken 2/7/2023 0640)  Verbalizes/displays adequate comfort level or baseline comfort level: Encourage patient to monitor pain and request assistance  2/7/2023 0928 by Jacky Anton RD, LD  Outcome: Progressing  Flowsheets (Taken 2/7/2023 0640 by Reynaldo Pitts RN)  Verbalizes/displays adequate comfort level or baseline comfort level: Encourage patient to monitor pain and request assistance  2/7/2023 0048 by Jimmy Garcias RN  Outcome: Progressing  Flowsheets (Taken 2/7/2023 9386)  Verbalizes/displays adequate comfort level or baseline comfort level:   Encourage patient to monitor pain and request assistance   Administer analgesics based on type and severity of pain and evaluate response   Consider cultural and social influences on pain and pain management   Assess pain using appropriate pain scale   Implement non-pharmacological measures as appropriate and evaluate response   Notify Licensed Independent Practitioner if interventions unsuccessful or patient reports new pain     Problem: Skin/Tissue Integrity  Goal: Absence of new skin breakdown  Description: 1. Monitor for areas of redness and/or skin breakdown  2. Assess vascular access sites hourly  3. Every 4-6 hours minimum:  Change oxygen saturation probe site  4. Every 4-6 hours:  If on nasal continuous positive airway pressure, respiratory therapy assess nares and determine need for appliance change or resting period. 2/7/2023 1208 by Taran White RN  Outcome: Progressing  2/7/2023 0928 by Fransisca Merrill RD, LD  Outcome: Progressing  2/7/2023 0048 by Jimmy Garcias RN  Outcome: Progressing  Note: No new skin issues noted.      Problem: Chronic Conditions and Co-morbidities  Goal: Patient's chronic conditions and co-morbidity symptoms are monitored and maintained or improved  Outcome: Progressing

## 2023-02-07 NOTE — PROGRESS NOTES
Talked with cardiology  who is going to pass along on  note on tolvaptan. In the urea med order, it is stated that urea can be a substitute for tolvaptan. Nurse asked if Dr. Dion Zayas would still like me to continue urea or to start tolvaptan. Awaiting response.

## 2023-02-08 ENCOUNTER — APPOINTMENT (OUTPATIENT)
Dept: GENERAL RADIOLOGY | Age: 88
End: 2023-02-08
Payer: OTHER GOVERNMENT

## 2023-02-08 LAB
ABSOLUTE EOS #: 0.26 K/UL (ref 0–0.44)
ABSOLUTE IMMATURE GRANULOCYTE: 1.32 K/UL (ref 0–0.3)
ABSOLUTE LYMPH #: 0.53 K/UL (ref 1.1–3.7)
ABSOLUTE MONO #: 1.19 K/UL (ref 0.1–1.2)
ALBUMIN SERPL-MCNC: 2.8 G/DL (ref 3.5–5.2)
ALBUMIN/GLOBULIN RATIO: 0.8 (ref 1–2.5)
ALP SERPL-CCNC: 250 U/L (ref 40–129)
ALT SERPL-CCNC: 40 U/L (ref 5–41)
ANION GAP SERPL CALCULATED.3IONS-SCNC: 11 MMOL/L (ref 9–17)
AST SERPL-CCNC: 35 U/L
BASOPHILS # BLD: 0 % (ref 0–2)
BASOPHILS ABSOLUTE: 0 K/UL (ref 0–0.2)
BILIRUB SERPL-MCNC: 0.2 MG/DL (ref 0.3–1.2)
BUN SERPL-MCNC: 47 MG/DL (ref 8–23)
BUN/CREAT BLD: 51 (ref 9–20)
CALCIUM SERPL-MCNC: 9 MG/DL (ref 8.6–10.4)
CHLORIDE SERPL-SCNC: 92 MMOL/L (ref 98–107)
CO2 SERPL-SCNC: 22 MMOL/L (ref 20–31)
CREAT SERPL-MCNC: 0.93 MG/DL (ref 0.7–1.2)
EOSINOPHILS RELATIVE PERCENT: 2 % (ref 1–4)
GFR SERPL CREATININE-BSD FRML MDRD: >60 ML/MIN/1.73M2
GLUCOSE SERPL-MCNC: 167 MG/DL (ref 70–99)
HCT VFR BLD AUTO: 30.6 % (ref 40.7–50.3)
HGB BLD-MCNC: 10 G/DL (ref 13–17)
IMMATURE GRANULOCYTES: 10 %
LYMPHOCYTES # BLD: 4 % (ref 24–43)
MCH RBC QN AUTO: 29 PG (ref 25.2–33.5)
MCHC RBC AUTO-ENTMCNC: 32.7 G/DL (ref 28.4–34.8)
MCV RBC AUTO: 88.7 FL (ref 82.6–102.9)
MONOCYTES # BLD: 9 % (ref 3–12)
MORPHOLOGY: NORMAL
NRBC AUTOMATED: 0 PER 100 WBC
PDW BLD-RTO: 14.8 % (ref 11.8–14.4)
PLATELET # BLD AUTO: 343 K/UL (ref 138–453)
PMV BLD AUTO: 9.4 FL (ref 8.1–13.5)
POTASSIUM SERPL-SCNC: 4.6 MMOL/L (ref 3.7–5.3)
PROT SERPL-MCNC: 6.1 G/DL (ref 6.4–8.3)
RBC # BLD: 3.45 M/UL (ref 4.21–5.77)
SEG NEUTROPHILS: 75 % (ref 36–65)
SEGMENTED NEUTROPHILS ABSOLUTE COUNT: 9.9 K/UL (ref 1.5–8.1)
SODIUM SERPL-SCNC: 125 MMOL/L (ref 135–144)
WBC # BLD AUTO: 13.2 K/UL (ref 3.5–11.3)

## 2023-02-08 PROCEDURE — 6360000002 HC RX W HCPCS: Performed by: FAMILY MEDICINE

## 2023-02-08 PROCEDURE — 94664 DEMO&/EVAL PT USE INHALER: CPT

## 2023-02-08 PROCEDURE — 94761 N-INVAS EAR/PLS OXIMETRY MLT: CPT

## 2023-02-08 PROCEDURE — 97110 THERAPEUTIC EXERCISES: CPT

## 2023-02-08 PROCEDURE — 36415 COLL VENOUS BLD VENIPUNCTURE: CPT

## 2023-02-08 PROCEDURE — 6370000000 HC RX 637 (ALT 250 FOR IP): Performed by: FAMILY MEDICINE

## 2023-02-08 PROCEDURE — 6370000000 HC RX 637 (ALT 250 FOR IP): Performed by: INTERNAL MEDICINE

## 2023-02-08 PROCEDURE — 99232 SBSQ HOSP IP/OBS MODERATE 35: CPT | Performed by: INTERNAL MEDICINE

## 2023-02-08 PROCEDURE — 2580000003 HC RX 258: Performed by: INTERNAL MEDICINE

## 2023-02-08 PROCEDURE — 1200000000 HC SEMI PRIVATE

## 2023-02-08 PROCEDURE — 94669 MECHANICAL CHEST WALL OSCILL: CPT

## 2023-02-08 PROCEDURE — 97116 GAIT TRAINING THERAPY: CPT

## 2023-02-08 PROCEDURE — 97530 THERAPEUTIC ACTIVITIES: CPT

## 2023-02-08 PROCEDURE — 2700000000 HC OXYGEN THERAPY PER DAY

## 2023-02-08 PROCEDURE — 80053 COMPREHEN METABOLIC PANEL: CPT

## 2023-02-08 PROCEDURE — 2500000003 HC RX 250 WO HCPCS: Performed by: INTERNAL MEDICINE

## 2023-02-08 PROCEDURE — 94640 AIRWAY INHALATION TREATMENT: CPT

## 2023-02-08 PROCEDURE — 51798 US URINE CAPACITY MEASURE: CPT

## 2023-02-08 PROCEDURE — 85025 COMPLETE CBC W/AUTO DIFF WBC: CPT

## 2023-02-08 PROCEDURE — 71046 X-RAY EXAM CHEST 2 VIEWS: CPT

## 2023-02-08 RX ORDER — BUMETANIDE 0.25 MG/ML
1 INJECTION, SOLUTION INTRAMUSCULAR; INTRAVENOUS 2 TIMES DAILY
Status: DISCONTINUED | OUTPATIENT
Start: 2023-02-08 | End: 2023-02-10 | Stop reason: HOSPADM

## 2023-02-08 RX ORDER — SODIUM CHLORIDE 1000 MG
2 TABLET, SOLUBLE MISCELLANEOUS ONCE
Status: COMPLETED | OUTPATIENT
Start: 2023-02-08 | End: 2023-02-08

## 2023-02-08 RX ADMIN — FERROUS SULFATE TAB 325 MG (65 MG ELEMENTAL FE) 325 MG: 325 (65 FE) TAB at 08:50

## 2023-02-08 RX ADMIN — SODIUM CHLORIDE, PRESERVATIVE FREE 10 ML: 5 INJECTION INTRAVENOUS at 20:56

## 2023-02-08 RX ADMIN — BUMETANIDE 1 MG: 0.25 INJECTION INTRAMUSCULAR; INTRAVENOUS at 17:07

## 2023-02-08 RX ADMIN — MOMETASONE FUROATE AND FORMOTEROL FUMARATE DIHYDRATE 2 PUFF: 200; 5 AEROSOL RESPIRATORY (INHALATION) at 21:12

## 2023-02-08 RX ADMIN — ASPIRIN 81 MG: 81 TABLET, COATED ORAL at 08:50

## 2023-02-08 RX ADMIN — PRIMIDONE 250 MG: 250 TABLET ORAL at 08:51

## 2023-02-08 RX ADMIN — FERROUS SULFATE TAB 325 MG (65 MG ELEMENTAL FE) 325 MG: 325 (65 FE) TAB at 17:07

## 2023-02-08 RX ADMIN — LEVOTHYROXINE SODIUM 137 MCG: 25 TABLET ORAL at 08:56

## 2023-02-08 RX ADMIN — METFORMIN HYDROCHLORIDE 1000 MG: 500 TABLET ORAL at 08:50

## 2023-02-08 RX ADMIN — LEVOFLOXACIN 500 MG: 500 TABLET, FILM COATED ORAL at 08:51

## 2023-02-08 RX ADMIN — PROPRANOLOL HYDROCHLORIDE 120 MG: 60 CAPSULE, EXTENDED RELEASE ORAL at 17:07

## 2023-02-08 RX ADMIN — Medication 2 G: at 02:49

## 2023-02-08 RX ADMIN — MONTELUKAST 10 MG: 10 TABLET, FILM COATED ORAL at 08:51

## 2023-02-08 RX ADMIN — ENOXAPARIN SODIUM 30 MG: 100 INJECTION SUBCUTANEOUS at 08:50

## 2023-02-08 RX ADMIN — PRIMIDONE 250 MG: 250 TABLET ORAL at 17:07

## 2023-02-08 RX ADMIN — FAMOTIDINE 20 MG: 20 TABLET, FILM COATED ORAL at 08:51

## 2023-02-08 RX ADMIN — OXYBUTYNIN CHLORIDE 10 MG: 5 TABLET ORAL at 20:53

## 2023-02-08 RX ADMIN — ALOGLIPTIN 25 MG: 25 TABLET, FILM COATED ORAL at 08:50

## 2023-02-08 RX ADMIN — MOMETASONE FUROATE AND FORMOTEROL FUMARATE DIHYDRATE 2 PUFF: 200; 5 AEROSOL RESPIRATORY (INHALATION) at 10:11

## 2023-02-08 RX ADMIN — METFORMIN HYDROCHLORIDE 1000 MG: 500 TABLET ORAL at 20:54

## 2023-02-08 RX ADMIN — IPRATROPIUM BROMIDE AND ALBUTEROL SULFATE 1 AMPULE: 2.5; .5 SOLUTION RESPIRATORY (INHALATION) at 21:12

## 2023-02-08 RX ADMIN — BUMETANIDE 1 MG: 0.25 INJECTION INTRAMUSCULAR; INTRAVENOUS at 12:07

## 2023-02-08 RX ADMIN — SODIUM CHLORIDE, PRESERVATIVE FREE 10 ML: 5 INJECTION INTRAVENOUS at 08:56

## 2023-02-08 RX ADMIN — TAMSULOSIN HYDROCHLORIDE 0.8 MG: 0.4 CAPSULE ORAL at 17:07

## 2023-02-08 RX ADMIN — FAMOTIDINE 20 MG: 20 TABLET, FILM COATED ORAL at 20:54

## 2023-02-08 RX ADMIN — OXYBUTYNIN CHLORIDE 10 MG: 5 TABLET ORAL at 08:51

## 2023-02-08 RX ADMIN — IPRATROPIUM BROMIDE AND ALBUTEROL SULFATE 1 AMPULE: 2.5; .5 SOLUTION RESPIRATORY (INHALATION) at 05:51

## 2023-02-08 RX ADMIN — IPRATROPIUM BROMIDE AND ALBUTEROL SULFATE 1 AMPULE: 2.5; .5 SOLUTION RESPIRATORY (INHALATION) at 17:21

## 2023-02-08 RX ADMIN — PROPRANOLOL HYDROCHLORIDE 120 MG: 60 CAPSULE, EXTENDED RELEASE ORAL at 08:53

## 2023-02-08 RX ADMIN — IPRATROPIUM BROMIDE AND ALBUTEROL SULFATE 1 AMPULE: 2.5; .5 SOLUTION RESPIRATORY (INHALATION) at 10:11

## 2023-02-08 RX ADMIN — ENOXAPARIN SODIUM 30 MG: 100 INJECTION SUBCUTANEOUS at 20:54

## 2023-02-08 RX ADMIN — Medication 2 G: at 08:50

## 2023-02-08 RX ADMIN — Medication 2 G: at 17:18

## 2023-02-08 RX ADMIN — Medication 30 G: at 08:49

## 2023-02-08 NOTE — PROGRESS NOTES
Ford removed per order. Pt tolerated well;. Patient aware to let staff know when need to urinate.  Urinal at bedside

## 2023-02-08 NOTE — PROGRESS NOTES
Progress Note  Yue Bowman MD  Patient: Karlee Rader  Date of Admission: 1/31/2023  4:23 PM  Hospital Day # 8  Date of Evaluation: 2/8/2023      SUBJECTIVE:    Mr. Hans Wayne  was seen and examined today for f/u of Sepsis due to pneumonia [facility]). He  continues to have cough with expectoration . He in on 2 lit oxygen. He  is hypoxic on RA . Bilat leg edema impriving,renal function improving, hyponatremia same Na pending    ROS:   Constitutional: negative  for fevers, and negative for chills. Respiratory: positive for shortness of breath, positive for cough, and negative for wheezing  Cardiovascular: negative for chest pain, and negative for palpitations  Gastrointestinal: negative for abdominal pain, negative for nausea,negative for vomiting, negative for diarrhea, and negative for constipation     All other systems were reviewed with the patient and are negative unless otherwise stated in HPI    -----------------------------------------------------------------  OBJECTIVE:  Vitals:   Temp: 97.9 °F (36.6 °C)  BP: (!) 153/82  Resp: 17  Heart Rate: 80  SpO2: 91 % on supplemental O2    Weight  Wt Readings from Last 3 Encounters:   02/08/23 273 lb 1.6 oz (123.9 kg)   12/16/22 256 lb 8 oz (116.3 kg)   12/05/22 254 lb 6.6 oz (115.4 kg)     Body mass index is 38.09 kg/m². 24HR INTAKE/OUTPUT:      Intake/Output Summary (Last 24 hours) at 2/8/2023 0751  Last data filed at 2/8/2023 0440  Gross per 24 hour   Intake 860 ml   Output 1800 ml   Net -940 ml       Exam:  GEN:    Awake, alert and oriented x 3. EYES:  EOMI, pupils equal   NECK: Supple. No lymphadenopathy. No carotid bruit  CVS:    regular rate and rhythm, 2/6 systolic murmur  PULM:  diminished with bilat rhonchi, no acute respiratory distress  ABD:    Bowels sounds normal.  Abdomen is soft. No distention. no tenderness to palpation. EXT:   2 + edema bilaterally . No calf tenderness. NEURO: Moves all extremities.   Motor and sensory are grossly intact  SKIN:  No rashes. No skin lesions.    -----------------------------------------------------------------  DATA:  Complete Blood Count:   Recent Labs     02/06/23 0602 02/07/23 0549 02/08/23  0711   WBC 15.9* 13.7* 13.2*   RBC 3.41* 3.34* 3.45*   HGB 10.1* 9.9* 10.0*   HCT 30.1* 29.6* 30.6*   MCV 88.3 88.6 88.7   MCH 29.6 29.6 29.0   MCHC 33.6 33.4 32.7   RDW 14.7* 14.8* 14.8*    343 343   MPV 9.9 9.5 9.4        Last 3 Blood Glucose:   Recent Labs     02/06/23 0602 02/06/23 2000 02/07/23 0549 02/07/23  1637   GLUCOSE 176* 150* 158* 160*        Comprehensive Metabolic Profile:   Recent Labs     02/06/23  0602 02/06/23  1312 02/06/23 2000 02/07/23 0549 02/07/23  1637   *   < > 120* 120* 121*   K 4.2  --  4.4 4.5 4.7   CL 87*  --  85* 87* 87*   CO2 25  --  26 25 26   BUN 21  --  25* 40* 44*   CREATININE 1.04  --  1.01 1.08 1.02   GLUCOSE 176*  --  150* 158* 160*   CALCIUM 8.8  --  8.7 8.9 9.1   PROT 6.7  --   --  6.3*  --    LABALBU 2.8*  --   --  2.6*  --    BILITOT 0.4  --   --  0.3  --    ALKPHOS 217*  --   --  225*  --    AST 33  --   --  35  --    ALT 47*  --   --  43*  --     < > = values in this interval not displayed.         Urinalysis:   Lab Results   Component Value Date/Time    NITRU NEGATIVE 01/31/2023 07:45 PM    COLORU Yellow 01/31/2023 07:45 PM    PHUR 6.0 01/31/2023 07:45 PM    WBCUA 0 TO 2 01/31/2023 07:45 PM    RBCUA 0 TO 2 01/31/2023 07:45 PM    MUCUS TRACE 01/31/2023 07:45 PM    TRICHOMONAS NOT REPORTED 12/29/2021 11:30 PM    YEAST NOT REPORTED 12/29/2021 11:30 PM    BACTERIA 1+ 01/31/2023 07:45 PM    CLARITYU clear 05/10/2018 02:45 PM    SPECGRAV 1.020 01/31/2023 07:45 PM    LEUKOCYTESUR NEGATIVE 01/31/2023 07:45 PM    UROBILINOGEN Normal 01/31/2023 07:45 PM    BILIRUBINUR NEGATIVE 01/31/2023 07:45 PM    BILIRUBINUR neg 05/10/2018 02:45 PM    BLOODU neg 05/10/2018 02:45 PM    GLUCOSEU NEGATIVE 01/31/2023 07:45 PM    KETUA NEGATIVE 01/31/2023 07:45 PM    AMORPHOUS NOT REPORTED 12/29/2021 11:30 PM       HgBA1c:    Lab Results   Component Value Date/Time    LABA1C 7.2 02/02/2023 05:50 AM       Lactic Acid:   Lab Results   Component Value Date/Time    LACTA 2.2 12/29/2021 09:24 PM    LACTA 1.2 11/14/2017 08:47 PM    LACTA 1.6 09/19/2017 06:27 PM        High Sensitivity Troponin:  Recent Labs     02/06/23  1312   TROPHS 17         Microbiology:  Blood Culture:  No components found for: CBLOOD, CFUNGUSBL    Radiology/Imaging:  XR CHEST PORTABLE   Final Result   Increasing vascular congestion as well as localized airspace disease in the   mid right lung field. Developing pulmonary edema can have this appearance   along with underlying multifocal infection. CT ABDOMEN PELVIS W IV CONTRAST Additional Contrast? None   Final Result   1. Multifocal consolidation in the right lung and to a lesser degree left   lung base, concerning for infection. 2.  Motion degraded evaluation of the pulmonary arteries. No evidence for   central pulmonary embolism. 3.  No acute inflammatory process identified in the abdomen or pelvis. Stable benign and incidental findings, as described. CT CHEST PULMONARY EMBOLISM W CONTRAST   Final Result   1. Multifocal consolidation in the right lung and to a lesser degree left   lung base, concerning for infection. 2.  Motion degraded evaluation of the pulmonary arteries. No evidence for   central pulmonary embolism. 3.  No acute inflammatory process identified in the abdomen or pelvis. Stable benign and incidental findings, as described. US GALLBLADDER RUQ   Final Result   Gallbladder sludge. No imaging evidence for acute cholecystitis or biliary   dilatation. XR CHEST PORTABLE   Final Result   Suspected pneumonia in the right mid lung. Recommend imaging follow-up to   resolution.          XR CHEST (2 VW)    (Results Pending)           MEDICAL DECISION MAKING:    Primary Problem(s): Sepsis due to pneumonia ([facility] [address]  Condition is improving,   Treatment plan:  levaquin iv  Imaging: none  Medications: Continue levaquin  Medication Monitoring / High Risk Medications: none    Id follow up  chf    Condition is same  Treatment plan: monitor for pulmonary congestion,edema,lost 2 lb with bumex  Imaging: none  Medications: Cardiology input    Hyponatremia- same, check Na                        - nephrology consult,monitor renal function  copd    Condition is unchanged  Treatment plan: Continue current treatment  Imaging: no further imaging studies ordered today  Medications: Continue oxygen,aerosols    ckd    Condition is same  Treatment plan: Continue current treatment,cardiology to follow  Imaging: no further imaging studies ordered today  Medications: none    Nutrition status: Well developed, well nourished with no malnutrition  Dietician consult initiated    Hospital Prophylaxis:   DVT: Lovenox   Stress Ulcer: PPI     MDM Data:   Test interpretation:    My independent X-ray interpretation:  pending  Management and/or test interpretation  was reviewed with nursing staff  Consults and Nursing notes were personally reviewed, all current labs and imaging were personally reviewed, tests ordered: CBC, BMP, and history obtained by independent historian       Disposition:  Shared decision making:  All test results, treatment options and disposition options were discussed with the patient today  Social determinants of health that may impact management: none  Code status: Full Code   Disposition: Discharge plan is snf            Ralph Lei MD , M.D.  2/8/2023

## 2023-02-08 NOTE — PROGRESS NOTES
Writer at bedside at this time to perform initial shift assessment. Patient is lying in bed with wife at bedside. Vital signs taken and assessment completed at this time. Patient is alert and oriented and has no complaints of pain at this time. Patient denies any further needs at this time. Call light within reach, will continue to monitor.

## 2023-02-08 NOTE — PROGRESS NOTES
[facility]  Inpatient/Observation/Outpatient Rehabilitation    Date: 2023  Patient Name: Chandler Lo       [x] Inpatient Acute/Observation       []  Outpatient  : 1935       [] Pt no showed for scheduled appointment    [x] Pt refused/declined therapy at this time due to:           [] Pt cancelled due to:  [] No Reason Given   [] Sick/ill   [] Other:    Pt in chair upon arrival with [facility], nursing just leaving room, stating he has been very fatigued this am. Multiple attempts made to have pt agree to therapy with minimal responsiveness. Pt did open eyes upon request and refused to participate stating he was \"too tired\", however agreed to work with me if I come back at \"1:00\" today. Therapist/Assistant will attempt to see this patient, at our earliest opportunity.        Adrian Blum, LISANDRA Date: 2023

## 2023-02-08 NOTE — PROGRESS NOTES
VS obtained as charted. Pt assisted from chair to bed using 2+ assist with walker, tolerated fairly well. Pt reassessed at this time. SPO2 92% on 2 LPM nasal cannula. Pt A&Ox4. Assessment unchanged from previous. SCD's in place. New stat lock applied for philip. Brief changed. Pt denies any other needs at this time and is given warm blanket. Call light and bedside table are within reach, will monitor.

## 2023-02-08 NOTE — PROGRESS NOTES
Pt voided incont and in toilet, missed the hat. Post residual completed. Noted 368ml in bladder. NP notified. No new orders at this time. Pt denies discomfort. Urinal at bedside.

## 2023-02-08 NOTE — PROGRESS NOTES
[facility]   Nephrology progress note  2/8/2023, 9:52 AM      Pt Name:    Elise Kwon  MRN:     [id number]     YOB: 1935  Admit Date:    1/31/2023  4:23 PM    TELEHEALTH EVALUATION -- Audio/Visual (During ARZTD-04 public health emergency)     Telehealth service was provided with the patient at his room [place] [address] and myself the physician in [address] [place] [address] and my RN Kike Redmond who has initiated the visit. Pursuant to the emergency declaration under the [facility], [facility] waiver authority and the [employer] Act, this Virtual  Visit was conducted, with patient's consent, to reduce the patient's risk of exposure to COVID-19 and provide continuity of care for an established patient. Services were provided through a video synchronous discussion virtually to substitute for in-person clinic visit. Chief Complaint: Nephrology following for hyponatremia. Subjective:  Patient seen and examined  Not much communicative he is somewhat drowsy  Still has a lot of swelling in the lower extremities  Made a lot of UOP with bumex but not much with tolvaptan  Also gave a dose of salt tab last night    Objective:  24HR INTAKE/OUTPUT:    Intake/Output Summary (Last 24 hours) at 2/8/2023 0952  Last data filed at 2/8/2023 0440  Gross per 24 hour   Intake 860 ml   Output 1800 ml   Net -940 ml        Admission weight: 250 lb (113.4 kg)  Wt Readings from Last 3 Encounters:   02/08/23 273 lb 1.6 oz (123.9 kg)   12/16/22 256 lb 8 oz (116.3 kg)   12/05/22 254 lb 6.6 oz (115.4 kg)        Vitals :   Vitals:    02/08/23 0443 02/08/23 0553 02/08/23 0630 02/08/23 0853   BP:   (!) 153/82 (!) 152/82   Pulse:   80 80   Resp:   17    Temp:   97.9 °F (36.6 °C)    TempSrc:   Oral    SpO2:  91% 91%    Weight: 273 lb 1.6 oz (123.9 kg)      Height:           Physical examination  General Appearance:  Well developed. No distress  Mouth/Throat:  Oral mucosa moist  Neck: No accessory muscle use  Lungs:  Breath sounds: Some rhonchi diminished per nursing staff  Heart[de-identified]  S1,S2 heard  Abdomen:  Soft, non - tender  Musculoskeletal:  Edema -noted    Medications:  Infusion:    sodium chloride       Meds:    [Held by provider] furosemide  40 mg IntraVENous Once    urea  30 g Oral Daily    ipratropium-albuterol  1 ampule Inhalation 4x daily    alogliptin  25 mg Oral Daily    levoFLOXacin  500 mg Oral Daily    ferrous sulfate  325 mg Oral BID WC    enoxaparin  30 mg SubCUTAneous BID    aspirin  81 mg Oral Daily    levothyroxine  137 mcg Oral Daily    mometasone-formoterol  2 puff Inhalation BID    metFORMIN  1,000 mg Oral BID    montelukast  10 mg Oral Daily    oxybutynin  10 mg Oral BID    primidone  250 mg Oral BID    propranolol  120 mg Oral BID    tamsulosin  0.8 mg Oral Dinner    sodium chloride flush  5-40 mL IntraVENous 2 times per day    famotidine  20 mg Oral BID       Lab Data :  CBC:   Recent Labs     02/06/23  0602 02/07/23  0549 02/08/23  0711   WBC 15.9* 13.7* 13.2*   HGB 10.1* 9.9* 10.0*   HCT 30.1* 29.6* 30.6*    343 343       CMP:  Recent Labs     02/07/23  0549 02/07/23  1637 02/08/23  0711   * 121* 125*   K 4.5 4.7 4.6   CL 87* 87* 92*   CO2 25 26 22   BUN 40* 44* 47*   CREATININE 1.08 1.02 0.93   GLUCOSE 158* 160* 167*   CALCIUM 8.9 9.1 9.0       Hepatic:   Recent Labs     02/06/23  0602 02/07/23  0549 02/08/23  0711   LABALBU 2.8* 2.6* 2.8*   AST 33 35 35   ALT 47* 43* 40   BILITOT 0.4 0.3 0.2*   ALKPHOS 217* 225* 250*         Assessment and Plan:  Renal -renal function appears to be stable did come in with mild acute kidney injury which appears to have improved  This may be due to cardiorenal  Currently volume status appears slightly worse with bilateral lower extremity edema received a dose of diuretic yesterday  We will continue the diuretics today  Electrolytes -hyponatremia with hypochloremia etiology not completely clear. Possibly hypervolemic hyponatremia did receive some salt tablets and fluid restriction yesterday without much improvement he received a dose of tolvaptan by cardiology . Did not seem to have helped with much however given a dose of Bumex last night and a dose of salt tablets. Sodium much better with another dose of salt tablets this afternoon and continue Bumex  Chronic diastolic congestive heart failure Bumex  Pneumonia on antibiotics  Essential hypertension running reasonable  Meds reviewed and discussed with nursing staff  Carmelo Singh MD  [facility]    This report has been created using voice recognition software.  It may contain minor errors which are inherent in voice recognition technology

## 2023-02-08 NOTE — RT PROTOCOL NOTE
RESPIRATORY ASSESSMENT PROTOCOL                                                                                              Patient Name: Princess Polanco Room#: [id number] : 1935     Admitting diagnosis: Septicemia ([facility] [A41.9]  Sepsis due to pneumonia ([facility] [J18.9, A41.9]  Pneumonia due to infectious organism, unspecified laterality, unspecified part of lung [J18.9]       Medical History:   Past Medical History:   Diagnosis Date    COPD (chronic obstructive pulmonary disease) ([facility]     Diabetes mellitus ([facility]     Hx of echocardiogram 2017    [facility]    Hyperlipidemia     Hypothyroidism     MI (myocardial infarction) ([facility]     Stroke [facility])     Thyroid disease     Type II or unspecified type diabetes mellitus without mention of complication, not stated as uncontrolled        PATIENT ASSESSMENT    LABORATORY DATA  Hematology:   Lab Results   Component Value Date/Time    WBC 13.2 2023 07:11 AM    RBC 3.45 2023 07:11 AM    HGB 10.0 2023 07:11 AM    HCT 30.6 2023 07:11 AM     2023 07:11 AM     Chemistry:    Lab Results   Component Value Date/Time    PHART 7.359 2023 10:05 PM    MDP1RGP 46.9 2023 10:05 PM    PO2ART 60.9 2023 10:05 PM    E2PZCEDQ 90.3 2023 10:05 PM    ULG6KMT 25.8 2023 10:05 PM    PBEA 1.4 2017 11:25 PM       VITALS  Heart Rate: 80   Resp: 17  BP: (!) 147/69  SpO2: 92 % O2 Device: Nasal cannula 2 lpm  Temp: 97.9 °F (36.6 °C)    SKIN COLOR  [x] Normal  [] Pale  [] Dusky  [] Cyanotic    RESPIRATORY PATTERN  [] Normal  [x] Dyspnea  [] Cheyne-Veras  [] Kussmaul  [] Biots    AMBULATORY  [] Yes  [] No  [x] With Assistance    PEAK FLOW  Predicted:     Personal Best:            Patient Acuity 0 1 2 3 4 Score   Level of Consciousness (LOC) [x]  Alert & Oriented or Pt normal LOC []  Confused;follows directions []  Confused & uncooper-ative []  Obtunded []  Comatose 0   Respiratory Rate  (RR) []  Reg. rate & pattern. 12 - 20 bpm  []  Increased RR. Greater than 20 bpm   [x]  SOB w/ exertion or RR greater than 24 bpm []  Access- ory muscle use at rest. Abn.  resp. []  SOB at rest.   2   Bilateral Breath Sounds (BBS) []  Clear []  Diminish-ed bases  [x]  Diminish-ed t/o, or rales   []  Sporadic, scattered wheezes or rhonchi []  Persistentwheezes and, or absent BBS 2   Cough []  Strong, effective, & non-prod. [x]  Effective & prod. Less than 25 ml (2 TBSP) over past 24 hrs []  Ineffective & non-prod to less than 25 ML over past 24 hrs []  Ineffective and, or greater than 25 ml sputum prod. past 24 hrs. []  Nonspon- taneous; Requires suctioning 1   Pulmonary History  (PULM HX) []  No smoking and no chronic pulmonary history []  Former smoker. Quit over 12 mos. ago []  Current smoker or quit w/ in 12 mos []  Pulm. History and, or 20 pk/yr smoking hx [x]  Admitted w/ acute pulm. dx and, or has been admitted w/ pulm. dx 2 or more times over past 12 mos 4   Surgical History this Admit  (SURG HX) [x]  No surgery []  General surgery []  Lower abdominal []  Thoracic or upper abdominal   []  Thoracic w/ pulm. disease 0   Chest X-Ray (CXR)/CT Scan []  Clear or not applicable []  Not available []  Atelectasis or pleural effusions []  Localized infiltrate or pulm.  edema [x]  Con-solidated Infiltrates, bilateral, or in more than 1 lobe 4   TOTAL ACUITY: 13       CARE PLAN    If Acuity Level is 2, 3, or 4 in any of the following:    [x] BILATERAL BREATH SOUNDS (BBS)     [x] PULMONARY HISTORY (PULM HX)  [x] Respiratory Rate  (RR)    Goal: Improve respiratory functions in patients with airway disease and decrease WOB    [x] AEROSOL PROTOCOL    Total Acuity:   14-28  []  Secondary Assessment in 24 hrs Total Acuity:  9-13  [x]  Secondary Assessment in 24 hrs Total Acuity:  4-8  []  Secondary Assessment in 24 hrs Total Acuity:  0-3  []  Secondary Assessment in 48 hrs   HHN AEROSOL THERAPY with  [physician-ordered bronchodilator(s)] q 4 & Albuterol PRN q2 hrs. Breath-Actuated Neb if BBS Acuity = 4, and pt. can use MP. Notify physician if condition deteriorates. HHN AEROSOL THERAPY with  [physician-ordered bronchodilator(s)]  QID and Albuterol PRN q4 hrs. Breath-Actuated Neb if BBS Acuity = 4, and pt. can use MP. Notify physician if condition deteriorates. MDI THERAPY with  2 actuations of [physician-ordered bronchodilator(s)] via spacer TID Albuterol and PRNq4 hrs. If unable to utilize MDI: HHN [physician-ordered bronchodilator(s)] TID and Albuterol PRN q4 hrs. Notify physician if condition deteriorates. MDI THERAPY with  [physician-ordered bronchodilator(s)] via spacer TID PRN. If unable to utilize MDI: HHN [physician-ordered bronchodilator(s)] TID PRN. Notify physician if condition deteriorates. If Acuity Level is 2, 3, or 4 in any of the following:    [] COUGH     [] SURGICAL HISTORY (SURG HX)  [x] CHEST XRAY (CXR)    Goal: Improvement in sputum mobilization in patients with ineffective airway clearance. Reverse atelectasis. [x] Bronchopulmonary Hygiene Protocol      Total Acuity:   14-28  []  Secondary Assessment in 24 hrs Total Acuity:  9-13  [x]  Secondary Assessment in 24 hrs Total Acuity:  4-8  []  Secondary Assessment in 24 hrs Total Acuity:  0-3  []  Secondary Assessment in 48 hrs   METANEB QID with [physician-ordered bronchodilator(s)] if CXR Acuity = 4; otherwise:  PD&P, PEP, or Vest QID & PRN  NT Sxn PRN for ineffective cough  METANEB QID with [physician-ordered bronchodilator(s)] if CXR Acuity = 4; otherwise:  PD&P, PEP, or Vest QID & PRN  NT Sxn PRN for ineffective cough  PD&P, PEP, or Vest TID & PRN   Instruct patient to self-perform IS q1hr WA       If Acuity Level is 2 or above in the following:    [x] PULMONARY HISTORY (PULM HX)    Goal: Assist patient in quitting smoking to slow or stop the progression of lung disease.     [x] Smoking Cessation Protocol    SMOKING CESSATION EDUCATION provided according to policy DI_190: (karson with an X)  ____Yes    ____ No     _x___ NA    Smoking Cessation Booklet given:  ____Yes  ____No ____Patient Tee Ramirez

## 2023-02-08 NOTE — PROGRESS NOTES
[facility] - Telemedicine Progress Note  Today's Date and Time: 2/8/2023, 10:32 AM    Impression :   Community acquired pneumonia  Hypoxic respiratory distress  Hyponatremia  KARO  Chronic cough  Leukocytosis  Elevated BNP  COPD  Chronic systolic CHF  DM II  Hx MI    Recommendations:   Start Levaquin 500 mg po daily. Stop date 2-12-23  Discontinue IV Rocephin  Discontinue azithromycin    MRSA Nares not detected  Mycoplasma IgM: negative   Legionella antigen: not detected    Medical Decision Making/Summary/Discussion:2/8/2023       Infection Control Recommendations   [place] Precautions    Antimicrobial Stewardship Recommendations     Simplification of therapy  Targeted therapy  PK dosing    Coordination of Outpatient Care:   Estimated Length of IV antimicrobials:TBD  Patient will need Midline Catheter Insertion: TBD  Patient will need PICC line Insertion:BD  Patient will need: Home IV , [place],  SNF,  LTAC: TBD  Patient will need outpatient wound care: No    Chief complaint/reason for consultation:   CAP      History of Present Illness:   Neva Ramsey is a 82y.o.-year-old male who was initially admitted on 1/31/2023. Patient seen at the request of Dr. Rodger Callaway:    This patient presented to [facility] ED on 1/31/23 after falling in his shower at home. He denied hitting his head or experiencing a syncopal episode, but admits to a persistent cough over the past few days. He has taken 3 days of azithromycin that his wife had given him. In the ED, he was found to have a fever of 39.7 C and his SpO2 was 88% on room air. The patient does have a history of COPD but is not on home O2. A CT chest showed multifocal consolidation in the right lung and the base of the left lung. Covid and influenza were not detected.      Abnormal labs at admission included:  Na:130  Cr:1.26  Lactic:2.4  BNP:1279  Alk phos:185  ALT:123  AST:95  Glucose:220  WBC:14.5    There has been no growth on blood or urine cultures. Since admission, the patient's WBC has increased to 19.7. He has also experienced worsening KARO and hyponatremia. His BNP has also increased to 4397. He has been receiving rocephin and azithromycin since 1/31/23. CT Chest/abd/pelvis 1/31/23  Impression   1. Multifocal consolidation in the right lung and to a lesser degree left   lung base, concerning for infection. 2.  Motion degraded evaluation of the pulmonary arteries. No evidence for   central pulmonary embolism. 3.  No acute inflammatory process identified in the abdomen or pelvis. Stable benign and incidental findings, as described. CURRENT EVALUATION 2/8/2023  BP (!) 152/82   Pulse 80   Temp 97.9 °F (36.6 °C) (Oral)   Resp 17   Ht 5' 11\" (1.803 m)   Wt 273 lb 1.6 oz (123.9 kg)   SpO2 92%   BMI 38.09 kg/m²     Afebrile  VS stable    Patient stable  No complaints  No new issues per RN    Pro BNP very elevated  On Bumex    Medications reviewed  On levaquin    He is on 4-->1-->3-->2 L NC   RR: 25-->20-->17  SpO2: 91-->94-->92 %    Has bronchopneumonia in RLL   Presence of hyponatremia raised concern for Legionella but antigen test negative for serotype 1    Hyponatremia continues  Leukocytosis is improving    Labs, X rays reviewed: 2/8/2023    BUN: 21-->25-->40-->47  Cr:1.3-->-->0.92-->1.08-->0.93  Na:125-->120-->125    WBC:19.7-->13.9-->15.9-->13.7  Hb:9.7-->9.4-->10.1-->9.9  Plat: 259-->299-->343    CRP: 315.8  Pro BNP:4397--> 5,573-->3,614-->3,460    Cultures:  Urine:    Blood:  1/31/23: No growth  Sputum :    Wound:    MRSA Nares:  2/2/23: Not detected    Imaging:    CT Chest   1/31/23        CXR  1/31/23 1/6/23      Discussed with patient, RN, family.     I have personally reviewed the past medical history, past surgical history, medications, social history, and family history, and I have updated the database accordingly.   Past Medical History:     Past Medical History:   Diagnosis Date    COPD (chronic obstructive pulmonary disease) ([facility]     Diabetes mellitus ([facility]     [facility] of echocardiogram 2017    [facility]    Hyperlipidemia     Hypothyroidism     MI (myocardial infarction) [facility])     Stroke [facility])     Thyroid disease     Type II or unspecified type diabetes mellitus without mention of complication, not stated as uncontrolled        Past Surgical  History:     Past Surgical History:   Procedure Laterality Date    CARDIAC SURGERY  2017    Stent placed  Dr Sylvia Mascorro      right ankle    HERNIA REPAIR         Medications:      sodium chloride  2 g Oral Once    bumetanide  1 mg IntraVENous BID    [Held by provider] furosemide  40 mg IntraVENous Once    urea  30 g Oral Daily    ipratropium-albuterol  1 ampule Inhalation 4x daily    alogliptin  25 mg Oral Daily    levoFLOXacin  500 mg Oral Daily    ferrous sulfate  325 mg Oral BID WC    enoxaparin  30 mg SubCUTAneous BID    aspirin  81 mg Oral Daily    levothyroxine  137 mcg Oral Daily    mometasone-formoterol  2 puff Inhalation BID    metFORMIN  1,000 mg Oral BID    montelukast  10 mg Oral Daily    oxybutynin  10 mg Oral BID    primidone  250 mg Oral BID    propranolol  120 mg Oral BID    tamsulosin  0.8 mg Oral Dinner    sodium chloride flush  5-40 mL IntraVENous 2 times per day    famotidine  20 mg Oral BID       Social History:     Social History     Socioeconomic History    Marital status:      Spouse name: Not on file    Number of children: Not on file    Years of education: Not on file    Highest education level: Not on file   Occupational History    Not on file   Tobacco Use    Smoking status: Former     Types: Cigarettes     Quit date:      Years since quittin.    Smokeless tobacco: Never   Vaping Use    Vaping Use: Never used Substance and Sexual Activity    Alcohol use: Yes     Alcohol/week: 1.0 standard drink     Types: 1 Drinks containing 0.5 oz of alcohol per week     Comment: occasional    Drug use: No    Sexual activity: Not on file   Other Topics Concern    Not on file   Social History Narrative    Not on file     Social Determinants of Health     Financial Resource Strain: Unknown    Difficulty of Paying Living Expenses: Patient refused   Food Insecurity: Unknown    Worried About Running Out of Food in the Last Year: Patient refused    Ran Out of Food in the Last Year: Patient refused   Transportation Needs: Not on file   Physical Activity: Inactive    Days of Exercise per Week: 0 days    Minutes of Exercise per Session: 0 min   Stress: Not on file   Social Connections: Not on file   Intimate Partner Violence: Not on file   Housing Stability: Not on file       Family History:     Family History   Problem Relation Age of Onset    Cancer Mother 47        liver ca    Diabetes Father     Heart Disease Father         Allergies:   Rosuvastatin     Review of Systems:   Constitutional: Generalized weakness  Head: No headaches  Eyes: No double vision or blurry vision. No conjunctival inflammation. ENT: No sore throat or runny nose. . No hearing loss, tinnitus or vertigo. Cardiovascular: No chest pain or palpitations. shortness of breath. CARVER  Lung: shortness of breath with cough. Abdomen: No nausea, vomiting, diarrhea, or abdominal pain. Maria Luisa Guiles No cramps. Genitourinary: No increased urinary frequency, or dysuria. No hematuria. No suprapubic or CVA pain  Musculoskeletal: No muscle aches or pains. No joint effusions, swelling or deformities  Hematologic: No bleeding or bruising. Neurologic: No headache, weakness, numbness, or tingling. Integument: No rash, no ulcers. Psychiatric: No depression. Endocrine: No polyuria, no polydipsia, no polyphagia.     Physical Examination :   Patient Vitals for the past 8 hrs:   BP Temp Temp src Pulse Resp SpO2 Weight   02/08/23 1011 -- -- -- -- -- 92 % --   02/08/23 0853 (!) 152/82 -- -- 80 -- -- --   02/08/23 0630 (!) 153/82 97.9 °F (36.6 °C) Oral 80 17 91 % --   02/08/23 0553 -- -- -- -- -- 91 % --   02/08/23 0443 -- -- -- -- -- -- 273 lb 1.6 oz (123.9 kg)     DANNY-televisit  General Appearance: Awake, alert. Head:  Normocephalic, no trauma  Eyes: Pupils equal, round, reactive to light and accommodation; extraocular movements intact; sclera anicteric; conjunctivae pink. No embolic phenomena. ENT: Oropharynx clear, without erythema, exudate, or thrush. No tenderness of sinuses. Mouth/throat: mucosa pink and moist. No lesions. Dentition in good repair. Neck:Supple, without lymphadenopathy. Thyroid normal, No bruits. Pulmonary/Chest: DANNY-televisit  Cardiovascular: DANNY-televisit   Abdomen: Soft, non tender. Bowel sounds normal. No organomegaly  All four Extremities: No cyanosis, clubbing, edema, or effusions. Neurologic: No gross sensory or motor deficits. Skin: Warm and dry with good turgor. No signs of peripheral arterial or venous insufficiency. No ulcerations. No open wounds. Medical Decision Making -Laboratory:   I have independently reviewed/ordered the following labs:    CBC with Differential:   Recent Labs     02/07/23  0549 02/08/23  0711   WBC 13.7* 13.2*   HGB 9.9* 10.0*   HCT 29.6* 30.6*    343   LYMPHOPCT 12* 4*   MONOPCT 3 9       BMP:   Recent Labs     02/07/23  1637 02/08/23  0711   * 125*   K 4.7 4.6   CL 87* 92*   CO2 26 22   BUN 44* 47*   CREATININE 1.02 0.93       Hepatic Function Panel:   Recent Labs     02/07/23  0549 02/08/23  0711   PROT 6.3* 6.1*   LABALBU 2.6* 2.8*   BILITOT 0.3 0.2*   ALKPHOS 225* 250*   ALT 43* 40   AST 35 35       No results for input(s): RPR in the last 72 hours. No results for input(s): HIV in the last 72 hours. No results for input(s): BC in the last 72 hours.   Lab Results   Component Value Date/Time    MUCUS TRACE 01/31/2023 07:45 PM    RBC 3.45 02/08/2023 07:11 AM    TRICHOMONAS NOT REPORTED 12/29/2021 11:30 PM    WBC 13.2 02/08/2023 07:11 AM    YEAST NOT REPORTED 12/29/2021 11:30 PM    TURBIDITY Clear 01/31/2023 07:45 PM     Lab Results   Component Value Date/Time    CREATININE 0.93 02/08/2023 07:11 AM    GLUCOSE 167 02/08/2023 07:11 AM       Medical Decision Making-Imaging:     Narrative   EXAMINATION:   CTA OF THE CHEST; CT OF THE ABDOMEN AND PELVIS WITH CONTRAST 1/31/2023 5:58 pm       TECHNIQUE:   CTA of the chest was performed after the administration of intravenous   contrast.  Multiplanar reformatted images are provided for review. MIP   images are provided for review. Automated exposure control, iterative   reconstruction, and/or weight based adjustment of the mA/kV was utilized to   reduce the radiation dose to as low as reasonably achievable.; CT of the   abdomen and pelvis was performed with the administration of intravenous   contrast. Multiplanar reformatted images are provided for review. Automated   exposure control, iterative reconstruction, and/or weight based adjustment of   the mA/kV was utilized to reduce the radiation dose to as low as reasonably   achievable. COMPARISON:   Chest radiograph today. Chest CT 12/02/2022. CT chest abdomen pelvis   07/21/2021. HISTORY:   ORDERING SYSTEM PROVIDED HISTORY: Fever, hypoxia, history of PE   TECHNOLOGIST PROVIDED HISTORY:   Fever, hypoxia, history of PE   Decision Support Exception - unselect if not a suspected or confirmed   emergency medical condition->Emergency Medical Condition (MA); ORDERING   SYSTEM PROVIDED HISTORY: Fever elevated liver enzyme   TECHNOLOGIST PROVIDED HISTORY:       Fever elevated liver enzyme   Decision Support Exception - unselect if not a suspected or confirmed   emergency medical condition->Emergency Medical Condition (MA)       FINDINGS:   CHEST CT:       Pulmonary Arteries: Pulmonary arteries are adequately opacified for   evaluation.   Motion artifact is noted, degrading evaluation of the segmental   branches. No evidence for central pulmonary embolism. Main pulmonary artery   is normal in caliber. Mediastinum: No evidence of mediastinal lymphadenopathy. The heart and   pericardium demonstrate no acute abnormality. There is no acute abnormality   of the thoracic aorta. Calcified atheromatous plaque. Lungs/pleura: Expiratory phase of imaging and mild motion artifact noted. Consolidative opacities are present in the posterolateral inferior right   upper lobe and posterior right lower lobe. Patchy airspace disease to a   lesser degree is present in the left lung base. Mild subsegmental   atelectasis is also present. No effusion. The central airway is patent. Soft Tissues/Bones: No acute bone or soft tissue abnormality. ABDOMEN PELVIS:       Organs: The liver, gallbladder, pancreas, spleen, adrenals and kidneys reveal   no acute findings. Horseshoe kidney. Stable left adrenal nodules measuring   up to 1.7 cm. GI/Bowel: There is no bowel dilatation or wall thickening identified. Diverticulosis. Pelvis: No acute findings. Peritoneum/Retroperitoneum: No free air or free fluid. The aorta is normal   in caliber. The visceral branches are patent. No lymphadenopathy. Bones/Soft Tissues: No abnormality identified. *Unless otherwise specified, incidental findings do not require dedicated   imaging follow-up. Impression   1. Multifocal consolidation in the right lung and to a lesser degree left   lung base, concerning for infection. 2.  Motion degraded evaluation of the pulmonary arteries. No evidence for   central pulmonary embolism. 3.  No acute inflammatory process identified in the abdomen or pelvis. Stable benign and incidental findings, as described.                  Medical Decision [id number]:       Medical Decision Making-Other:     Note:  Labs, medications, radiologic studies were reviewed with personal review of films  Large amounts of data were reviewed  Discussed with nursing Staff, Discharge planner  Infection Control and Prevention measures reviewed  All prior entries were reviewed  Administer medications as ordered  Prognosis: Guarded  Discharge planning reviewed      Thank you for allowing us to participate in the care of this patient. Please call with questions.     Joaquin Zavala MD        Pager: [phone] - Office: [phone]

## 2023-02-08 NOTE — PROGRESS NOTES
Physical Therapy  Facility/Department: [facility] AT THE [facility] MED SURG  Daily Treatment Note  NAME: Lise Guzman  : 1935  MRN: [id number]    Date of Service: 2023    Discharge Recommendations:  Continue to assess pending progress, Patient would benefit from continued therapy after discharge, Home with Home health PT, Outpatient PT        Patient Diagnosis(es): The primary encounter diagnosis was Septicemia ([address] A diagnosis of Pneumonia due to infectious organism, unspecified laterality, unspecified part of lung was also pertinent to this visit. Assessment   Assessment: Pt ambulated 30 ft with FWW and CGA. During last 15 ft , pt brief came undone and pt was incontinent, urniating small bouts on floor until he was sat in chair. He required change of brief, compression socks and non skid socks. He was required to stand up to lauryn new brief, and once it was donned, pt states \"well would you let me pee. \"  He then proceeded to ambulate to bathroom. Pt voided again. He took extra time to void urine. He then ambulated slowly back to chair. Spo2 levels avg 84% with ambulation and transfers. Pt displays difficulty understanding pursed lip breathing. after 1 min of deep breathing, Spo2 90%. nursing was notified following conclusion of tx. Activity Tolerance: Other (comment) (incontinence and lethargic.)     Plan    Physcial Therapy Plan  General Plan: 2 times a day 7 days a week     Restrictions  Restrictions/Precautions  Restrictions/Precautions: General Precautions, Fall Risk     Subjective    Subjective  Subjective: Pt in chair upn arrival.  Pt very sleepy. wife present and states pt just ate and she was able to get pt to wake up. Pt has no compliants.      Objective   Vitals     Bed Mobility Training  Bed Mobility Training: No  Transfer Training  Transfer Training: Yes (Multiple sit to stands completed during this PT visit.)  Overall Level of Assistance: Minimum assistance;Assist X1;Assist X2 (Pt wife present assisted on opposite side. Pt had failed his first attempt and unable to extend his trunk and sat down chandler chair. second atempt pt, was able to stand but did require extra time to fully extend trunk.)  Sit to Stand: Assist X1;Moderate assistance;Minimum assistance  Stand to Sit: Contact-guard assistance;Assist X1  Toilet Transfer: Assist X2;Maximum assistance (Cued on proper hand placement, required repeated cues dt [place] and bouts of confusion. He tok 3 attempts to stand from commode using HR x1.)  Gait Training  Gait Training: Yes  Gait  Overall Level of Assistance: Contact-guard assistance;Assist X1  Interventions: Verbal cues; Safety awareness training  Base of Support: Widened  Speed/Geovanna: Slow  Distance (ft): 30 Feet (additional 10 ft x2 (chair <> bathrom))  Assistive Device: Walker, rolling;Gait belt     PT Exercises  Exercise Treatment: Seated B LE exercises x10 in all planes, Frequent cuing d/t pt being lethargic     Safety Devices  Type of Devices: All fall risk precautions in place;Call light within reach; Chair alarm in place; Left in chair;Patient at risk for falls       Goals  Short Term Goals  Time Frame for Short Term Goals: 20 days  Short Term Goal 1: Initiate and progress HEP for strength and balance. Short Term Goal 2: Pt will be independent with bed mobility without use of rails for discharge home independently. Short Term Goal 3: Pt will transfer with +1 supervision with least restrictive device to decrease fall risk. Short Term Goal 4: Pt will ambulate with supervision to SBA +1 with least restrictive device for up to 75 feet to allow patient safe entry and exit into his home  Short Term Goal 5: Pt will negotiate 4 steps with railing +1 SBA for entry and exit into buildings for appointments.   Patient Goals   Patient Goals : return home    Education  Patient Education  Education Given To: Patient  Education Provided: Role of Therapy  Education Provided Comments: Education provided in benefits of therapy and safet with transfers and ambulation  Education Method: Verbal  Barriers to Learning: None  Education Outcome: Verbalized understanding;Continued education needed; Unable to demonstrate understanding    Therapy Time   Individual Concurrent Group Co-treatment   Time In 1304         Time Out 1346         Minutes [address], PTA

## 2023-02-08 NOTE — PROGRESS NOTES
Occupational Therapy  Facility/Department: [facility] AT THE [facility] MED SURG  Daily Treatment Note  NAME: Hoda Valencia  : 1935  MRN: [id number]    Date of Service: 2023    Discharge Recommendations:  Continue to assess pending progress, Home with Home health OT         Patient Diagnosis(es): The primary encounter diagnosis was Septicemia ([address] A diagnosis of Pneumonia due to infectious organism, unspecified laterality, unspecified part of lung was also pertinent to this visit. Assessment    Assessment: pt falling asleep during session on this date  Activity Tolerance: Patient limited by pain; Other (comment) (coughing and falling asleep)  Discharge Recommendations: Continue to assess pending progress;Home with Home health OT      Plan   Occupational Therapy Plan  Times Per Week: 7x/wk  Times Per Day: Once a day  Current Treatment Recommendations: Strengthening;Balance training;Self-Care / ADL; Safety education & training     Restrictions   Restrictions/Precautions  Restrictions/Precautions: General Precautions; Fall Risk    Subjective   Subjective  Subjective: pt seaed in bedside chair upon arrival. pt agreed to therapy tx at this time. coughing throughout noted  Pain: pt denied pain on this date           Objective    Vitals              OT Exercises  Exercise Treatment: BUE strengthening x 2# weight, 1 x 15 reps x 3 shoulder planes with AAROM on LUE  to improve strength for increased fxl tasks  . Patient required RB d/t shortness of breath and coughing. pt fell asleep duing ther ex x3 with tactile and verbal cues to wake pt. pt continued to fall asleep after attempts to wake     Safety Devices  Type of Devices: All fall risk precautions in place;Call light within reach; Chair alarm in place; Left in chair;Patient at risk for falls     Patient Education  Education Given To: Patient  Education Provided: Home Exercise Program;Role of Therapy;Plan of Care  Education Method: Demonstration;Verbal  Barriers to Learning: None  Education Outcome: Verbalized understanding;Demonstrated understanding;Continued education needed    Goals  Short Term Goals  Time Frame for Short Term Goals: 20 visits  Short Term Goal 1: Pt. will tolerate 15 mins of BUE ther ex/act while maintaining SpO2 > 90% to increase overall functional activity tolerance. Short Term Goal 2: Pt. will demo standing tolerance x 6-7 mins w/o LOB to increase safety/participation with ADL's. Short Term Goal 3: Pt. will complete self care routine with SUP/mod I (with exception of footwear) to return to PLOF. Short Term Goal 4: Pt. will complete functional ADL transfers/mobility with SUP/mod I and G safety with reduced risk for falls.        Therapy Time   Individual Concurrent Group Co-treatment   Time In 0910         Time Out 0925         Minutes [address] OM[place]LEY/L 2/8/2023

## 2023-02-08 NOTE — PROGRESS NOTES
Pt resting in chair at this time, alert and oriented. Noted coughing moist non-productive. Denies sob. Resp even/unlabored. Continues on o2 at 2L NC. Tolerated well. Patient 1-2 assist with transfer. Urinal at bedside at this time. Denies urination yet but states \"it feels like it will be anytime. \"

## 2023-02-08 NOTE — PROGRESS NOTES
Curly Cogan am scribing for and in the presence of Rona Paulson MD, F.A.C.C..      Subjective:     CHIEF COMPLAINT / HPI:    Chief Complaint   Patient presents with    Fall     Fall this am while getting out of the shower. Refused transport by EMS       Princess Polanco is 82 y.o. male who was admitted following a fall. 1-He has history of coronary artery disease, myocardial infarction and primary PCI in 2/2017 done at [facility],  [address] [place] [address]. He also has history of ischemic cardiomyopathy and chronic systolic CHF, NYHA class III. 2-An admission to [facility] on 9/19/2017 with COPD exacerbation, during this admission his lisinopril changed to Cozaar because of chronic cough. 3-Another visit to the emergency room on 10/3/2017 with cough, shortness of breath and wheezing. 4- He saw Dr. Asher Crowder at [facility] in November 2018, no changes in medication done. 5-An admission to [facility] on 4/6/2018 with acute on chronic CHF. 6-History of intentional tremor. 7- EKG done in office (8/20/2019)- Sinus Rhythm with 1st degree AV block. 71 bpm.    8- EKG done in office on 7/14/2020. No acute ischemic changes. 9-  Echocardiogram on 4/6/2021: Global left ventricular systolic function appears preserved with an estimated ejection fraction of 55%. Mildly increased left ventricular wall thickness with a normal left ventricular cavity size. The patient has a sigmoid interventricular septum. The inferoseptal wall is abnormal in its motion which is not unusual status post open heart surgery. Mild aortic stenosis. Mild mitral and tricuspid regurgitation. Evidence of mild diastolic dysfunction is seen. Atrial septal aneurysm cannot rule-out shunt. A saline contrast study was performed and cannot rule out interatrial communication.     10-EKG done in office 7/6/2021- no acute ischemic changes    11- Admission to [facility] on 12/31/2021-1/1/2022: Admitted for Pneumonia    12-ER visit on 8/2022: related to cough    13-EKG on 10/17/2022: No ischemic changes from prior ECG    14-  Echo done on 1/31/2023- EF >55% The left ventricular cavity size is within normal limits and the left ventricular wall thickness is moderately increased. The patient has a sigmoid interventricular septum without evidence of outflow tract obstruction. The left atrium is mildly dilated (29-33) with a left atrial volume index of 29 ml/m2. 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 The clinical significant of this finding is unknown, but has been associated with an increased risk of TIA's and strokes. The left atrium is mildly dilated (29-33) with a left atrial volume index of 29 ml/m2. The mean aortic valve gradient is 24 mmHg consistent with moderate aortic stenosis. Mild to moderate tricuspid regurgitation. Moderate pulmonary hypertension with an estimated right ventricular systolic pressure of 44 mmHg. Evidence of mild (grade I) diastolic dysfunction is seen. Mr. Salena Welch was admitted for a fall after getting out of shower. He states he tripped on rug denies losing consciousness. He has history of tremors from before and unsteady gait. Uses walker for ambulation. His wife tried to help him out but she could not. EMS called and patient initially refused to come to the emergency room but later he felt extremely weak and had fever and shivering so he decided to come to the emergency room. He said he was on the floor for about 4 hours. His initial lactate level was elevated. Currently on antibiotic for the treatment of community-acquired pneumonia. He has acute kidney injury. Denies any chest pain, pressure or tightness. He has some upper respiratory symptoms for a while. COVID and influenza testing are negative. UA is unremarkable. He said he was able to walk around using his walker today with physical therapy.   No nausea, vomiting or abdominal pain. No problems moving his bowel. No problems with current medications. Wt Readings from Last 3 Encounters:   23 273 lb 1.6 oz (123.9 kg)   22 256 lb 8 oz (116.3 kg)   22 254 lb 6.6 oz (115.4 kg)       Mr. Oralia Woodard is more sleepy and tired today. His serum sodium has improved to 1.5 mg/dL. He looks more comfortable and making good urine. Denies any chest pain, pressure or tightness. Continue free water restriction and diuresis. Nephrology and ID on board. Past Medical History:    Past Medical History:   Diagnosis Date    COPD (chronic obstructive pulmonary disease) ([facility]     Diabetes mellitus ([facility]     Hx of echocardiogram 2017    [facility]    Hyperlipidemia     Hypothyroidism     MI (myocardial infarction) [facility])     Stroke [facility])     Thyroid disease     Type II or unspecified type diabetes mellitus without mention of complication, not stated as uncontrolled      Past Surgical History:  Past Surgical History:   Procedure Laterality Date    CARDIAC SURGERY  2017    Stent placed  Dr Keyla Au      right ankle    HERNIA REPAIR       Social History:    Social History     Tobacco Use   Smoking Status Former    Types: Cigarettes    Quit date:     Years since quittin.1   Smokeless Tobacco Never     Current Medications:  Prior to Admission medications    Medication Sig Start Date End Date Taking?  Authorizing Provider   azithromycin (ZITHROMAX) 250 MG tablet TAKE 2 TABLETS BY MOUTH ON DAY 1, AND THEN TAKE 1 TABLET BY MOUTH ONCE A DAY ON DAY 2 THROUGH DAY 5  Patient not taking: No sig reported 23   Historical Provider, MD   predniSONE (DELTASONE) 20 MG tablet TAKE 2 TABLETS BY MOUTH ONCE DAILY FOR 5 DAYS  Patient not taking: No sig reported 23   Historical Provider, MD   oxybutynin (DITROPAN) 5 MG tablet Take 2 tablets by mouth 2 times daily 22   Reese Snider, APRN - CNP   furosemide (LASIX) 40 MG tablet Take 40 mg by mouth in the morning and 40 mg in the evening. Per wife Marylou Jo, the torsemide was very expensive so Cheryl Nelson continues to take his Lasix 40mg BID.     Historical Provider, MD   montelukast (SINGULAIR) 10 MG tablet Take 1 tablet by mouth daily 11/16/22   GENA Bo CNP   meloxicam (MOBIC) 7.5 MG tablet Take 1 tablet by mouth daily 11/16/22   GENA Bo CNP   Misc Natural Products (OSTEO BI-FLEX ADV JOINT SHIELD PO) Take by mouth as needed  Patient not taking: No sig reported    Historical Provider, MD   EUTHYROX 137 MCG tablet Take 1 tablet by mouth once daily 7/14/22   GENA Bo CNP   atorvastatin (LIPITOR) 40 MG tablet Take 1 tablet by mouth once daily 5/31/22   Taurus Ynu MD   fluticasone-salmeterol (ADVAIR) 250-50 MCG/DOSE AEPB Inhale 1 puff into the lungs every 12 hours    Historical Provider, MD   albuterol (PROVENTIL) (2.5 MG/3ML) 0.083% nebulizer solution Take 3 mLs by nebulization every 4 hours as needed for Wheezing or Shortness of Breath 1/28/22   GENA Bo CNP   primidone (MYSOLINE) 250 MG tablet Take 250 mg by mouth 2 times daily    Historical Provider, MD   propranolol (INDERAL LA) 120 MG extended release capsule Take 120 mg by mouth 2 times daily    Historical Provider, MD   omeprazole (PRILOSEC) 20 MG delayed release capsule Take 20 mg by mouth daily    Historical Provider, MD   alogliptin (NESINA) 25 MG TABS tablet Take 25 mg by mouth daily    Historical Provider, MD   blood glucose monitor strips accu check 7/18/18   GENA Ji CNP   aspirin 81 MG tablet Take 81 mg by mouth daily     Historical Provider, MD   albuterol sulfate  (90 Base) MCG/ACT inhaler Inhale 2 puffs into the lungs every 4 hours as needed for Wheezing or Shortness of Breath 4/13/18   GENA Ashby CNP   metFORMIN (GLUCOPHAGE) 1000 MG tablet Take 1,000 mg by mouth 2 times daily     Historical Provider, MD   acetaminophen (TYLENOL) 500 MG tablet Take 1,000 mg by mouth every 6 hours as needed for Pain    Historical Provider, MD   TAMSULOSIN HCL PO Take 0.8 mg by mouth Daily with supper     Historical Provider, MD   Multiple Vitamins-Minerals (THERAPEUTIC MULTIVITAMIN-MINERALS) tablet Take 1 tablet by mouth daily    Historical Provider, MD      sodium chloride  2 g Oral Once    bumetanide  1 mg IntraVENous BID    [Held by provider] furosemide  40 mg IntraVENous Once    urea  30 g Oral Daily    ipratropium-albuterol  1 ampule Inhalation 4x daily    alogliptin  25 mg Oral Daily    levoFLOXacin  500 mg Oral Daily    ferrous sulfate  325 mg Oral BID WC    enoxaparin  30 mg SubCUTAneous BID    aspirin  81 mg Oral Daily    levothyroxine  137 mcg Oral Daily    mometasone-formoterol  2 puff Inhalation BID    metFORMIN  1,000 mg Oral BID    montelukast  10 mg Oral Daily    oxybutynin  10 mg Oral BID    primidone  250 mg Oral BID    propranolol  120 mg Oral BID    tamsulosin  0.8 mg Oral Dinner    sodium chloride flush  5-40 mL IntraVENous 2 times per day    famotidine  20 mg Oral BID           REVIEW OF SYSTEMS:    CONSTITUTIONAL: No major weight gain or loss, fatigue, weakness, night sweats or fever. HEENT: No new vision difficulties or ringing in the ears. RESPIRATORY: See HPI  CARDIOVASCULAR: See HPI  GI: No nausea, vomiting, diarrhea, constipation, abdominal pain or changes in bowel habits. : No urinary frequency, urgency, incontinence hematuria or dysuria. SKIN: No cyanosis or skin lesions. MUSCULOSKELETAL: No new muscle or joint pain. NEUROLOGICAL: No syncope or TIA-like symptoms. PSYCHIATRIC: No anxiety, pain, insomnia or depression    Objective:     PHYSICAL EXAM:      VITALS:  BP (!) 147/69   Pulse 80   Temp 97.9 °F (36.6 °C) (Oral)   Resp 17   Ht 5' 11\" (1.803 m)   Wt 273 lb 1.6 oz (123.9 kg)   SpO2 94%   BMI 38.09 kg/m²   CONSTITUTIONAL: Cooperative, no apparent distress, and appears well nourished / developed.   NEUROLOGIC: Awake and orientated to person, place and time. PSYCH: Calm affect. SKIN: Warm and dry. HEENT: Sclera non-icteric, normocephalic, neck supple, no elevation of JVP, normal carotid pulses with no bruits and thyroid normal size. LUNGS:  Poor air entry bilaterally . No significant wheezing . Mild bilateral crackles present. Cardiovascular: Normal rate, regular rhythm, normal heart sounds. Exam reveals no gallop and no friction rubs. 1/6 systolic murmur, 4th intercostal space on the LEFT must lateral to the sternal border. ABDOMEN:  Normal bowel sounds, non-distended and non-tender to palpation. Extremities: 2+ bilateral leg edema. no cyanosis or clubbing. 2+ radial and carotid pulses. Distal extremity pulses: 2+ bilaterally. Compression stockings in place. DATA:    Lab Results   Component Value Date    ALT 40 02/08/2023    AST 35 02/08/2023    ALKPHOS 250 (H) 02/08/2023    BILITOT 0.2 (L) 02/08/2023     Lab Results   Component Value Date    CREATININE 0.93 02/08/2023    BUN 47 (H) 02/08/2023     (L) 02/08/2023    K 4.6 02/08/2023    CL 92 (L) 02/08/2023    CO2 22 02/08/2023       Lab Results   Component Value Date    WBC 13.2 (H) 02/08/2023    HGB 10.0 (L) 02/08/2023    HCT 30.6 (L) 02/08/2023    MCV 88.7 02/08/2023     02/08/2023       Lab Results   Component Value Date    TRIG 114 02/02/2023    TRIG 134 03/14/2022    TRIG 147 09/29/2021     Lab Results   Component Value Date    HDL 44 02/02/2023    HDL 52 03/14/2022    HDL 46 09/29/2021     Lab Results   Component Value Date    LDLCHOLESTEROL 45 02/02/2023    LDLCHOLESTEROL 65 03/14/2022    LDLCHOLESTEROL 61 09/29/2021         Assessment:   Sepsis secondary to atypical pneumonia. Chronic diastolic CHF. Fall and fever. Atherosclerotic heart disease, status post PCI back in 2/24/2017. History of essential tremor, unsteady gait, using walker for ambulation. Acute kidney injury.     Plan:   Patient admitted after a fall, found to have fever and acute kidney injury and CT scan of the chest and abdomen is suggestive of multifocal pneumonia. Currently treated for community-acquired pneumonia. He has acute kidney injury. I do believe that the elevated BNP is secondary to fluid resuscitation. COVID and influenza are negative. UA is unremarkable. Currently on Levaquin by ID. Continue physical therapy. Monitor blood pressure as per unit protocol. Currently with mild hyponatremia. Given one dose of tolvaptan. Free water restriction to 500 ml/day. Fluid restriction to 1500 ml per day. Continue diuretics as per nephrology. Serum sodium today is 125 milligrams per deciliter. Atherosclerotic Heart Disease: S/P Stent placement on 2/24/2017 by Dr. Rodney Bueno at [facility]  Antiplatelet Agent: Continue Aspirin 81 mg daily. I also reminded them to watch for signs of bloody or black tarry stools and stop the medication immediately if this develops as this could be life threatening. Beta Blocker: Continue Propranolol 120 mg BID    Cholesterol Reduction Therapy: Continue Atorvastatin (Lipitor) 40 mg daily. High-sensitivity troponin is flat and is not suggestive of acute coronary event. Ordered repeat troponin. History of Stroke: Ischemic stroke with right visual field loss. Has complete resolution of his neurological dysfunction. Antiplatelet Agent: Continue Aspirin 81 mg daily. Cholesterol Reduction Therapy: Continue Atorvastatin (Lipitor) 40 mg daily. Lipid panel is good. Hemoglobin A1c 7.2%. His blood pressure is controlled. Essential Hypertension:  Borderline controlled. Continue propranolol  Follow-up blood pressure as per unit protocol. Hyperlipidemia: Mixed - Last LDL on 2/2/2023 was 45 mg/dL  Cholesterol Reduction Therapy: Continue Atorvastatin (Lipitor) 40 mg daily. Finally, I recommended that he continue his other medications and follow up with you as previously scheduled.        Sincerely,  Jimena Lucero MD, F. A.C.C. [facility] Cardiology Specialist    [address]  [facility], [address] [place] [address]  Phone: [phone], Fax: [phone]         I believe that the risk of significant morbidity and mortality related to the patient's current medical conditions are: intermediate-high. The documentation recorded by the scribe, accurately and completely reflects the services I personally performed and the decisions made by me. Jimena Lucero MD, F.A.C.C.  February 8, 2023

## 2023-02-08 NOTE — PROGRESS NOTES
Medications administered as ordered. Ford emptied, 450 ml noted in output. Pt and wife updated on new orders regarding Bumex once and sodium chloride tablets q6h. Questions answered. Pt is still sitting up in chair and denies any needs at this time. Call light and bedside table within reach, will continue to monitor.

## 2023-02-09 LAB
ABSOLUTE EOS #: 0.37 K/UL (ref 0–0.44)
ABSOLUTE IMMATURE GRANULOCYTE: 0.73 K/UL (ref 0–0.3)
ABSOLUTE LYMPH #: 0.98 K/UL (ref 1.1–3.7)
ABSOLUTE MONO #: 0.61 K/UL (ref 0.1–1.2)
ALBUMIN SERPL-MCNC: 2.8 G/DL (ref 3.5–5.2)
ALBUMIN/GLOBULIN RATIO: 0.8 (ref 1–2.5)
ALP SERPL-CCNC: 264 U/L (ref 40–129)
ALT SERPL-CCNC: 35 U/L (ref 5–41)
ANION GAP SERPL CALCULATED.3IONS-SCNC: 10 MMOL/L (ref 9–17)
AST SERPL-CCNC: 30 U/L
BASOPHILS # BLD: 0 % (ref 0–2)
BASOPHILS ABSOLUTE: 0 K/UL (ref 0–0.2)
BILIRUB SERPL-MCNC: 0.3 MG/DL (ref 0.3–1.2)
BUN SERPL-MCNC: 49 MG/DL (ref 8–23)
BUN/CREAT BLD: 50 (ref 9–20)
CALCIUM SERPL-MCNC: 9 MG/DL (ref 8.6–10.4)
CHLORIDE SERPL-SCNC: 92 MMOL/L (ref 98–107)
CO2 SERPL-SCNC: 24 MMOL/L (ref 20–31)
CREAT SERPL-MCNC: 0.98 MG/DL (ref 0.7–1.2)
EOSINOPHILS RELATIVE PERCENT: 3 % (ref 1–4)
GFR SERPL CREATININE-BSD FRML MDRD: >60 ML/MIN/1.73M2
GLUCOSE SERPL-MCNC: 147 MG/DL (ref 70–99)
HCT VFR BLD AUTO: 30.4 % (ref 40.7–50.3)
HGB BLD-MCNC: 9.8 G/DL (ref 13–17)
IMMATURE GRANULOCYTES: 6 %
LYMPHOCYTES # BLD: 8 % (ref 24–43)
MCH RBC QN AUTO: 28.9 PG (ref 25.2–33.5)
MCHC RBC AUTO-ENTMCNC: 32.2 G/DL (ref 28.4–34.8)
MCV RBC AUTO: 89.7 FL (ref 82.6–102.9)
MONOCYTES # BLD: 5 % (ref 3–12)
MORPHOLOGY: NORMAL
NRBC AUTOMATED: 0 PER 100 WBC
OSMOLALITY URINE: 469 MOSM/KG (ref 80–1300)
PDW BLD-RTO: 14.9 % (ref 11.8–14.4)
PLATELET # BLD AUTO: 372 K/UL (ref 138–453)
PMV BLD AUTO: 9.3 FL (ref 8.1–13.5)
POTASSIUM SERPL-SCNC: 4.2 MMOL/L (ref 3.7–5.3)
PROT SERPL-MCNC: 6.3 G/DL (ref 6.4–8.3)
RBC # BLD: 3.39 M/UL (ref 4.21–5.77)
SEG NEUTROPHILS: 78 % (ref 36–65)
SEGMENTED NEUTROPHILS ABSOLUTE COUNT: 9.51 K/UL (ref 1.5–8.1)
SODIUM SERPL-SCNC: 126 MMOL/L (ref 135–144)
WBC # BLD AUTO: 12.2 K/UL (ref 3.5–11.3)

## 2023-02-09 PROCEDURE — 97110 THERAPEUTIC EXERCISES: CPT

## 2023-02-09 PROCEDURE — 94761 N-INVAS EAR/PLS OXIMETRY MLT: CPT

## 2023-02-09 PROCEDURE — 97116 GAIT TRAINING THERAPY: CPT

## 2023-02-09 PROCEDURE — 97530 THERAPEUTIC ACTIVITIES: CPT

## 2023-02-09 PROCEDURE — 99232 SBSQ HOSP IP/OBS MODERATE 35: CPT | Performed by: INTERNAL MEDICINE

## 2023-02-09 PROCEDURE — 94664 DEMO&/EVAL PT USE INHALER: CPT

## 2023-02-09 PROCEDURE — 6370000000 HC RX 637 (ALT 250 FOR IP): Performed by: INTERNAL MEDICINE

## 2023-02-09 PROCEDURE — 94640 AIRWAY INHALATION TREATMENT: CPT

## 2023-02-09 PROCEDURE — 85025 COMPLETE CBC W/AUTO DIFF WBC: CPT

## 2023-02-09 PROCEDURE — 1200000000 HC SEMI PRIVATE

## 2023-02-09 PROCEDURE — 97535 SELF CARE MNGMENT TRAINING: CPT

## 2023-02-09 PROCEDURE — 80053 COMPREHEN METABOLIC PANEL: CPT

## 2023-02-09 PROCEDURE — 2500000003 HC RX 250 WO HCPCS: Performed by: INTERNAL MEDICINE

## 2023-02-09 PROCEDURE — 6370000000 HC RX 637 (ALT 250 FOR IP): Performed by: FAMILY MEDICINE

## 2023-02-09 PROCEDURE — 94669 MECHANICAL CHEST WALL OSCILL: CPT

## 2023-02-09 PROCEDURE — 2580000003 HC RX 258: Performed by: INTERNAL MEDICINE

## 2023-02-09 PROCEDURE — 6360000002 HC RX W HCPCS: Performed by: FAMILY MEDICINE

## 2023-02-09 PROCEDURE — 36415 COLL VENOUS BLD VENIPUNCTURE: CPT

## 2023-02-09 PROCEDURE — 2700000000 HC OXYGEN THERAPY PER DAY

## 2023-02-09 RX ORDER — SODIUM CHLORIDE 1000 MG
2 TABLET, SOLUBLE MISCELLANEOUS 2 TIMES DAILY WITH MEALS
Status: DISCONTINUED | OUTPATIENT
Start: 2023-02-09 | End: 2023-02-10 | Stop reason: HOSPADM

## 2023-02-09 RX ADMIN — MOMETASONE FUROATE AND FORMOTEROL FUMARATE DIHYDRATE 2 PUFF: 200; 5 AEROSOL RESPIRATORY (INHALATION) at 19:41

## 2023-02-09 RX ADMIN — FAMOTIDINE 20 MG: 20 TABLET, FILM COATED ORAL at 09:06

## 2023-02-09 RX ADMIN — PROPRANOLOL HYDROCHLORIDE 120 MG: 60 CAPSULE, EXTENDED RELEASE ORAL at 09:05

## 2023-02-09 RX ADMIN — ENOXAPARIN SODIUM 30 MG: 100 INJECTION SUBCUTANEOUS at 09:04

## 2023-02-09 RX ADMIN — ALOGLIPTIN 25 MG: 25 TABLET, FILM COATED ORAL at 09:04

## 2023-02-09 RX ADMIN — FERROUS SULFATE TAB 325 MG (65 MG ELEMENTAL FE) 325 MG: 325 (65 FE) TAB at 17:05

## 2023-02-09 RX ADMIN — SODIUM CHLORIDE, PRESERVATIVE FREE 10 ML: 5 INJECTION INTRAVENOUS at 09:06

## 2023-02-09 RX ADMIN — Medication 2 G: at 12:37

## 2023-02-09 RX ADMIN — ASPIRIN 81 MG: 81 TABLET, COATED ORAL at 09:06

## 2023-02-09 RX ADMIN — METFORMIN HYDROCHLORIDE 1000 MG: 500 TABLET ORAL at 09:05

## 2023-02-09 RX ADMIN — FERROUS SULFATE TAB 325 MG (65 MG ELEMENTAL FE) 325 MG: 325 (65 FE) TAB at 06:34

## 2023-02-09 RX ADMIN — MOMETASONE FUROATE AND FORMOTEROL FUMARATE DIHYDRATE 2 PUFF: 200; 5 AEROSOL RESPIRATORY (INHALATION) at 10:58

## 2023-02-09 RX ADMIN — IPRATROPIUM BROMIDE AND ALBUTEROL SULFATE 1 AMPULE: 2.5; .5 SOLUTION RESPIRATORY (INHALATION) at 05:50

## 2023-02-09 RX ADMIN — FAMOTIDINE 20 MG: 20 TABLET, FILM COATED ORAL at 21:22

## 2023-02-09 RX ADMIN — LEVOFLOXACIN 500 MG: 500 TABLET, FILM COATED ORAL at 09:06

## 2023-02-09 RX ADMIN — OXYBUTYNIN CHLORIDE 10 MG: 5 TABLET ORAL at 09:05

## 2023-02-09 RX ADMIN — IPRATROPIUM BROMIDE AND ALBUTEROL SULFATE 1 AMPULE: 2.5; .5 SOLUTION RESPIRATORY (INHALATION) at 10:58

## 2023-02-09 RX ADMIN — TAMSULOSIN HYDROCHLORIDE 0.8 MG: 0.4 CAPSULE ORAL at 17:05

## 2023-02-09 RX ADMIN — BUMETANIDE 1 MG: 0.25 INJECTION INTRAMUSCULAR; INTRAVENOUS at 09:06

## 2023-02-09 RX ADMIN — Medication 2 G: at 17:05

## 2023-02-09 RX ADMIN — PROPRANOLOL HYDROCHLORIDE 120 MG: 60 CAPSULE, EXTENDED RELEASE ORAL at 17:05

## 2023-02-09 RX ADMIN — IPRATROPIUM BROMIDE AND ALBUTEROL SULFATE 1 AMPULE: 2.5; .5 SOLUTION RESPIRATORY (INHALATION) at 14:57

## 2023-02-09 RX ADMIN — IPRATROPIUM BROMIDE AND ALBUTEROL SULFATE 1 AMPULE: 2.5; .5 SOLUTION RESPIRATORY (INHALATION) at 19:41

## 2023-02-09 RX ADMIN — PRIMIDONE 250 MG: 250 TABLET ORAL at 17:05

## 2023-02-09 RX ADMIN — PRIMIDONE 250 MG: 250 TABLET ORAL at 06:34

## 2023-02-09 RX ADMIN — ENOXAPARIN SODIUM 30 MG: 100 INJECTION SUBCUTANEOUS at 21:22

## 2023-02-09 RX ADMIN — Medication 30 G: at 09:06

## 2023-02-09 RX ADMIN — MONTELUKAST 10 MG: 10 TABLET, FILM COATED ORAL at 09:06

## 2023-02-09 RX ADMIN — OXYBUTYNIN CHLORIDE 10 MG: 5 TABLET ORAL at 21:22

## 2023-02-09 RX ADMIN — BUMETANIDE 1 MG: 0.25 INJECTION INTRAMUSCULAR; INTRAVENOUS at 17:05

## 2023-02-09 RX ADMIN — METFORMIN HYDROCHLORIDE 1000 MG: 500 TABLET ORAL at 21:22

## 2023-02-09 RX ADMIN — SODIUM CHLORIDE, PRESERVATIVE FREE 10 ML: 5 INJECTION INTRAVENOUS at 17:05

## 2023-02-09 RX ADMIN — SODIUM CHLORIDE, PRESERVATIVE FREE 10 ML: 5 INJECTION INTRAVENOUS at 21:22

## 2023-02-09 RX ADMIN — LEVOTHYROXINE SODIUM 137 MCG: 25 TABLET ORAL at 06:33

## 2023-02-09 NOTE — PROGRESS NOTES
Physical Therapy  Facility/Department: [facility] AT THE [facility] MED SURG  Daily Treatment Note  NAME: Lise Guzman  : 1935  MRN: [id number]    Date of Service: 2023    Discharge Recommendations:  Continue to assess pending progress, Patient would benefit from continued therapy after discharge, Home with Home health PT, Outpatient PT      Patient Diagnosis(es): The primary encounter diagnosis was Septicemia ([address] A diagnosis of Pneumonia due to infectious organism, unspecified laterality, unspecified part of lung was also pertinent to this visit. Assessment   Assessment: Pt no longer on O2 today, nursing okday'd ambulation with out use of O2. SpO2 92% prior to STS. Transfers CGA x1. Pt completed dynamic standing while using urinal and  performing hand hygiene with no UE support ~ 2:30 in all, no LOB with CGA x1 for safety. Nursing made aware of pt brief soiled during toilet transfer Nursing made aware of pt brief soiled during toilet transfer as well as additional 125 ml in urinal. Pt ambulated 10' and additional 15' post seated RB with WW and CGA x1, no increased SOB noticed during ambulation, unable to get O2 reading post gait due to pt's hands cold from handwash. Pt demoed improved motivation today during therapy. Wife present at end of treatment. Activity Tolerance: Patient tolerated treatment well;Patient limited by endurance     Plan    Physcial Therapy Plan  General Plan: 2 times a day 7 days a week (1x/day on weekends and holidays)  Current Treatment Recommendations: Strengthening;Balance training;Functional mobility training;Transfer training; Endurance training;Gait training;Stair training;Neuromuscular re-education; Safety education & training; Therapeutic activities     Restrictions  Restrictions/Precautions  Restrictions/Precautions: General Precautions, Fall Risk     Subjective    Subjective  Subjective: Pt in chair upon arrival agreeable to therapy  Pain: no c/o pain at this time.   Orientation  Overall Orientation Status: Within Functional Limits     Objective   Bed Mobility Training  Bed Mobility Training: No  Transfer Training  Transfer Training: Yes  Overall Level of Assistance: Contact-guard assistance;Assist X1  Interventions: Verbal cues; Safety awareness training  Sit to Stand: Contact-guard assistance;Assist X1  Stand to Sit: Contact-guard assistance;Assist X1  Stand Pivot Transfers: Contact-guard assistance;Assist X1;Additional time  Toilet Transfer: Setup;Assist X1  Gait Training  Gait Training: Yes  Gait  Overall Level of Assistance: Contact-guard assistance;Assist X1  Interventions: Verbal cues; Safety awareness training  Base of Support: Widened  Speed/Geovanna: Slow  Distance (ft): 25 Feet  Assistive Device: Walker, rolling;Gait belt  Neuromuscular Education  Neuromuscular Education: No  PT Exercises  Static Standing Balance Exercises: Pt completed Static Standing during toilet transfer for pericare ~ 2 minutes with no LOB, pt had L UE on bar and R on [employer] with periodically single UE support. Dynamic Standing Balance Exercises: Pt completed dynamic standing while using urinal and  performing hand hygiene with no UE support ~ 2:30 in all, no LOB with CGA x1 for safety. Safety Devices  Type of Devices: All fall risk precautions in place;Call light within reach; Chair alarm in place; Left in chair;Patient at risk for falls;Nurse notified;Gait belt       Goals  Short Term Goals  Time Frame for Short Term Goals: 20 days  Short Term Goal 1: Initiate and progress HEP for strength and balance. Short Term Goal 2: Pt will be independent with bed mobility without use of rails for discharge home independently. Short Term Goal 3: Pt will transfer with +1 supervision with least restrictive device to decrease fall risk.   Short Term Goal 4: Pt will ambulate with supervision to SBA +1 with least restrictive device for up to 75 feet to allow patient safe entry and exit into his home  Short Term Goal 5: Pt will negotiate 4 steps with railing +1 SBA for entry and exit into buildings for appointments.   Patient Goals   Patient Goals : return home    Education  Patient Education  Education Given To: Patient  Education Provided Comments: Continued education provided during transfers with Skylar Daly to push up from seat and reach back when sitting, educated pt in importance of participation in therapy and benefits in functional mobility  Education Method: Verbal  Barriers to Learning: None  Education Outcome: Verbalized understanding;Continued education needed;Demonstrated understanding    Therapy Time   Individual Concurrent Group Co-treatment   Time In 0745         Time Out 0810         Minutes [place], [place]

## 2023-02-09 NOTE — PROGRESS NOTES
Vitals and assessment complete, see flowsheet. Patient 1 assist with urinal and then to chair, tolerated fairly well. Patient taken off oxygen. Call light within reach. Will continue to monitor.

## 2023-02-09 NOTE — PROGRESS NOTES
[facility]   Nephrology progress note  2/9/2023, 8:09 AM      Pt Name:    Corrie Farias  MRN:     [id number]     YOB: 1935  Admit Date:    1/31/2023  4:23 PM    TELEHEALTH EVALUATION -- Audio/Visual (During HRCSJ-88 public health emergency)     Telehealth service was provided with the patient at his room [place] [address] and myself the physician in [facility] office and my RN Curtis Aracelis urine output better who has initiated the visit. Pursuant to the emergency declaration under the [address] [place] [address] waiver authority and the [employer] Act, this Virtual  Visit was conducted, with patient's consent, to reduce the patient's risk of exposure to COVID-19 and provide continuity of care for an established patient. Services were provided through a video synchronous discussion virtually to substitute for in-person clinic visit. Chief Complaint: Nephrology following for hyponatremia. Subjective:  Patient seen and examined  Not much communicative he is looking much better today  Still has a lot of swelling in the lower extremities  Urine output better clinically looks so much better    Objective:  24HR INTAKE/OUTPUT:    Intake/Output Summary (Last 24 hours) at 2/9/2023 0809  Last data filed at 2/9/2023 0629  Gross per 24 hour   Intake 510 ml   Output 850 ml   Net -340 ml        Admission weight: 250 lb (113.4 kg)  Wt Readings from Last 3 Encounters:   02/09/23 271 lb 8 oz (123.2 kg)   12/16/22 256 lb 8 oz (116.3 kg)   12/05/22 254 lb 6.6 oz (115.4 kg)        Vitals :   Vitals:    02/09/23 0551 02/09/23 0629 02/09/23 0713 02/09/23 0735   BP:  (!) 150/70     Pulse:  71     Resp:  19     Temp:  97.5 °F (36.4 °C)     TempSrc:  Temporal     SpO2: 92% 97% 94% 96%   Weight:       Height:           Physical examination  General Appearance:  Well developed.  No distress  Mouth/Throat:  Oral mucosa moist  Neck: No accessory muscle use  Lungs:  Breath sounds: Somewhat clear per nursing staff  Heart[de-identified]  S1,S2 heard  Abdomen:  Soft, non - tender  Musculoskeletal:  Edema -noted    Medications:  Infusion:    sodium chloride       Meds:    bumetanide  1 mg IntraVENous BID    [Held by provider] furosemide  40 mg IntraVENous Once    urea  30 g Oral Daily    ipratropium-albuterol  1 ampule Inhalation 4x daily    alogliptin  25 mg Oral Daily    levoFLOXacin  500 mg Oral Daily    ferrous sulfate  325 mg Oral BID WC    enoxaparin  30 mg SubCUTAneous BID    aspirin  81 mg Oral Daily    levothyroxine  137 mcg Oral Daily    mometasone-formoterol  2 puff Inhalation BID    metFORMIN  1,000 mg Oral BID    montelukast  10 mg Oral Daily    oxybutynin  10 mg Oral BID    primidone  250 mg Oral BID    propranolol  120 mg Oral BID    tamsulosin  0.8 mg Oral Dinner    sodium chloride flush  5-40 mL IntraVENous 2 times per day    famotidine  20 mg Oral BID       Lab Data :  CBC:   Recent Labs     02/07/23  0549 02/08/23  0711 02/09/23  0540   WBC 13.7* 13.2* 12.2*   HGB 9.9* 10.0* 9.8*   HCT 29.6* 30.6* 30.4*    343 372       CMP:  Recent Labs     02/07/23  1637 02/08/23  0711 02/09/23  0540   * 125* 126*   K 4.7 4.6 4.2   CL 87* 92* 92*   CO2 26 22 24   BUN 44* 47* 49*   CREATININE 1.02 0.93 0.98   GLUCOSE 160* 167* 147*   CALCIUM 9.1 9.0 9.0       Hepatic:   Recent Labs     02/07/23  0549 02/08/23  0711 02/09/23  0540   LABALBU 2.6* 2.8* 2.8*   AST 35 35 30   ALT 43* 40 35   BILITOT 0.3 0.2* 0.3   ALKPHOS 225* 250* 264*         Assessment and Plan:  Renal -renal function appears to be stable did come in with mild acute kidney injury which appears to have improved  This may be due to cardiorenal  Currently volume status appears to be getting better with the diuretics  We will continue the diuretics today closely monitor electrolytes mainly potassium levels  Electrolytes -hyponatremia with hypochloremia etiology not completely clear. Possibly hypervolemic hyponatremia did receive some salt tablets and fluid restriction yesterday without much improvement he received a dose of tolvaptan by cardiology . Finally improving with Bumex and salt tabs will add salt tablets again today 2 g twice daily   Chronic diastolic congestive heart failure continue Bumex Bumex getting better  Pneumonia on antibiotics  Essential hypertension running reasonable  Meds reviewed and discussed with nursing staff and patient  Jamaal Staples MD  [facility]    This report has been created using voice recognition software.  It may contain minor errors which are inherent in voice recognition technology

## 2023-02-09 NOTE — PROGRESS NOTES
[facility] - Telemedicine Progress Note  Today's Date and Time: 2/9/2023, 10:20 AM    Impression :   Community acquired pneumonia  Hypoxic respiratory distress  Hyponatremia  KARO  Chronic cough  Leukocytosis  Elevated BNP  COPD  Chronic systolic CHF  DM II  Hx MI    Recommendations:   Start Levaquin 500 mg po daily. Stop date 2-12-23  Discontinue IV Rocephin  Discontinue azithromycin    MRSA Nares not detected  Mycoplasma IgM: negative   Legionella antigen: not detected    Medical Decision Making/Summary/Discussion:2/9/2023       Infection Control Recommendations   Blunt Precautions    Antimicrobial Stewardship Recommendations     Simplification of therapy  Targeted therapy  PK dosing    Coordination of Outpatient Care:   Estimated Length of IV antimicrobials:TBD  Patient will need Midline Catheter Insertion: TBD  Patient will need PICC line Insertion:BD  Patient will need: Home IV , [place],  SNF,  LTAC: TBD  Patient will need outpatient wound care: No    Chief complaint/reason for consultation:   CAP      History of Present Illness:   Jerad Ashley is a 82y.o.-year-old male who was initially admitted on 1/31/2023. Patient seen at the request of Dr. Laure Moser:    This patient presented to [facility] ED on 1/31/23 after falling in his shower at home. He denied hitting his head or experiencing a syncopal episode, but admits to a persistent cough over the past few days. He has taken 3 days of azithromycin that his wife had given him. In the ED, he was found to have a fever of 39.7 C and his SpO2 was 88% on room air. The patient does have a history of COPD but is not on home O2. A CT chest showed multifocal consolidation in the right lung and the base of the left lung. Covid and influenza were not detected.      Abnormal labs at admission included:  Na:130  Cr:1.26  Lactic:2.4  BNP:1279  Alk phos:185  ALT:123  AST:95  Glucose:220  WBC:14.5    There has been no growth on blood or urine cultures. Since admission, the patient's WBC has increased to 19.7. He has also experienced worsening KARO and hyponatremia. His BNP has also increased to 4397. He has been receiving rocephin and azithromycin since 1/31/23. CT Chest/abd/pelvis 1/31/23  Impression   1. Multifocal consolidation in the right lung and to a lesser degree left   lung base, concerning for infection. 2.  Motion degraded evaluation of the pulmonary arteries. No evidence for   central pulmonary embolism. 3.  No acute inflammatory process identified in the abdomen or pelvis. Stable benign and incidental findings, as described. CURRENT EVALUATION 2/9/2023  BP (!) 150/70   Pulse 71   Temp 97.5 °F (36.4 °C) (Temporal)   Resp 19   Ht 5' 11\" (1.803 m)   Wt 271 lb 8 oz (123.2 kg)   SpO2 96%   BMI 37.87 kg/m²     Afebrile  VS stable    Patient feels better  No complaints  No new issues per RN    Medications reviewed  On levaquin    Pro BNP very elevated but improving  On Bumex        He is on 4-->1-->3-->2 L NC   RR: 25-->20-->17-->19  SpO2: 91-->94-->92 -->97 %    Has bronchopneumonia in RLL   Presence of hyponatremia raised concern for Legionella but antigen test negative for serotype 1    Hyponatremia continues  Leukocytosis is improving    Labs, X rays reviewed: 2/9/2023    BUN: 21-->25-->40-->49  Cr:1.3-->-->0.92-->1.08-->0.98  Na:125-->120-->126    WBC:19.7-->15.9-->13.7-->12.2  Hb:9.7-->9.4-->10.1-->9.8  Plat: 259-->299-->343-->372    CRP: 315.8  Pro BNP:4397--> 5,573-->3,614-->3,460    Cultures:  Urine:    Blood:  1/31/23: No growth  Sputum :    Wound:    MRSA Nares:  2/2/23: Not detected    Imaging:    CT Chest   1/31/23        CXR  1/31/23 1/6/23      Discussed with patient, RN, family.     I have personally reviewed the past medical history, past surgical history, medications, social history, and family history, and I have updated the database accordingly.   Past Medical History:     Past Medical History:   Diagnosis Date    COPD (chronic obstructive pulmonary disease) ([facility]     Diabetes mellitus ([facility] [address]     Hx of echocardiogram 2017    [facility]    Hyperlipidemia     Hypothyroidism     MI (myocardial infarction) [facility])     Stroke [facility])     Thyroid disease     Type II or unspecified type diabetes mellitus without mention of complication, not stated as uncontrolled        Past Surgical  History:     Past Surgical History:   Procedure Laterality Date    CARDIAC SURGERY  2017    Stent placed  Dr Kendall Dumont      right ankle    HERNIA REPAIR         Medications:      sodium chloride  2 g Oral BID WC    bumetanide  1 mg IntraVENous BID    [Held by provider] furosemide  40 mg IntraVENous Once    urea  30 g Oral Daily    ipratropium-albuterol  1 ampule Inhalation 4x daily    alogliptin  25 mg Oral Daily    levoFLOXacin  500 mg Oral Daily    ferrous sulfate  325 mg Oral BID WC    enoxaparin  30 mg SubCUTAneous BID    aspirin  81 mg Oral Daily    levothyroxine  137 mcg Oral Daily    mometasone-formoterol  2 puff Inhalation BID    metFORMIN  1,000 mg Oral BID    montelukast  10 mg Oral Daily    oxybutynin  10 mg Oral BID    primidone  250 mg Oral BID    propranolol  120 mg Oral BID    tamsulosin  0.8 mg Oral Dinner    sodium chloride flush  5-40 mL IntraVENous 2 times per day    famotidine  20 mg Oral BID       Social History:     Social History     Socioeconomic History    Marital status:      Spouse name: Not on file    Number of children: Not on file    Years of education: Not on file    Highest education level: Not on file   Occupational History    Not on file   Tobacco Use    Smoking status: Former     Types: Cigarettes     Quit date:      Years since quittin.1    Smokeless tobacco: Never Vaping Use    Vaping Use: Never used   Substance and Sexual Activity    Alcohol use: Yes     Alcohol/week: 1.0 standard drink     Types: 1 Drinks containing 0.5 oz of alcohol per week     Comment: occasional    Drug use: No    Sexual activity: Not on file   Other Topics Concern    Not on file   Social History Narrative    Not on file     Social Determinants of Health     Financial Resource Strain: Unknown    Difficulty of Paying Living Expenses: Patient refused   Food Insecurity: Unknown    Worried About Running Out of Food in the Last Year: Patient refused    Ran Out of Food in the Last Year: Patient refused   Transportation Needs: Not on file   Physical Activity: Inactive    Days of Exercise per Week: 0 days    Minutes of Exercise per Session: 0 min   Stress: Not on file   Social Connections: Not on file   Intimate Partner Violence: Not on file   Housing Stability: Not on file       Family History:     Family History   Problem Relation Age of Onset    Cancer Mother 47        liver ca    Diabetes Father     Heart Disease Father         Allergies:   Rosuvastatin     Review of Systems:   Constitutional: Generalized weakness  Head: No headaches  Eyes: No double vision or blurry vision. No conjunctival inflammation. ENT: No sore throat or runny nose. . No hearing loss, tinnitus or vertigo. Cardiovascular: No chest pain or palpitations. shortness of breath. CARVER  Lung: shortness of breath with cough. Abdomen: No nausea, vomiting, diarrhea, or abdominal pain. Karlis Laclede No cramps. Genitourinary: No increased urinary frequency, or dysuria. No hematuria. No suprapubic or CVA pain  Musculoskeletal: No muscle aches or pains. No joint effusions, swelling or deformities  Hematologic: No bleeding or bruising. Neurologic: No headache, weakness, numbness, or tingling. Integument: No rash, no ulcers. Psychiatric: No depression. Endocrine: No polyuria, no polydipsia, no polyphagia.     Physical Examination :   Patient Vitals for the past 8 hrs:   BP Temp Temp src Pulse Resp SpO2 Height Weight   02/09/23 0735 -- -- -- -- -- 96 % -- --   02/09/23 0713 -- -- -- -- -- 94 % -- --   02/09/23 0629 (!) 150/70 97.5 °F (36.4 °C) Temporal 71 19 97 % -- --   02/09/23 0551 -- -- -- -- -- 92 % -- --   02/09/23 0500 -- -- -- -- -- -- 5' 11\" (1.803 m) 271 lb 8 oz (123.2 kg)     DANNY-televisit  General Appearance: Awake, alert. Head:  Normocephalic, no trauma  Eyes: Pupils equal, round, reactive to light and accommodation; extraocular movements intact; sclera anicteric; conjunctivae pink. No embolic phenomena. ENT: Oropharynx clear, without erythema, exudate, or thrush. No tenderness of sinuses. Mouth/throat: mucosa pink and moist. No lesions. Dentition in good repair. Neck:Supple, without lymphadenopathy. Thyroid normal, No bruits. Pulmonary/Chest: DANNY-televisit  Cardiovascular: DANNY-televisit   Abdomen: Soft, non tender. Bowel sounds normal. No organomegaly  All four Extremities: No cyanosis, clubbing, edema, or effusions. Neurologic: No gross sensory or motor deficits. Skin: Warm and dry with good turgor. No signs of peripheral arterial or venous insufficiency. No ulcerations. No open wounds. Medical Decision Making -Laboratory:   I have independently reviewed/ordered the following labs:    CBC with Differential:   Recent Labs     02/08/23  0711 02/09/23  0540   WBC 13.2* 12.2*   HGB 10.0* 9.8*   HCT 30.6* 30.4*    372   LYMPHOPCT 4* 8*   MONOPCT 9 5       BMP:   Recent Labs     02/08/23  0711 02/09/23  0540   * 126*   K 4.6 4.2   CL 92* 92*   CO2 22 24   BUN 47* 49*   CREATININE 0.93 0.98       Hepatic Function Panel:   Recent Labs     02/08/23  0711 02/09/23  0540   PROT 6.1* 6.3*   LABALBU 2.8* 2.8*   BILITOT 0.2* 0.3   ALKPHOS 250* 264*   ALT 40 35   AST 35 30       No results for input(s): RPR in the last 72 hours. No results for input(s): HIV in the last 72 hours. No results for input(s): BC in the last 72 hours.   Lab Results Component Value Date/Time    MUCUS TRACE 01/31/2023 07:45 PM    RBC 3.39 02/09/2023 05:40 AM    TRICHOMONAS NOT REPORTED 12/29/2021 11:30 PM    WBC 12.2 02/09/2023 05:40 AM    YEAST NOT REPORTED 12/29/2021 11:30 PM    TURBIDITY Clear 01/31/2023 07:45 PM     Lab Results   Component Value Date/Time    CREATININE 0.98 02/09/2023 05:40 AM    GLUCOSE 147 02/09/2023 05:40 AM       Medical Decision Making-Imaging:     Narrative   EXAMINATION:   CTA OF THE CHEST; CT OF THE ABDOMEN AND PELVIS WITH CONTRAST 1/31/2023 5:58 pm       TECHNIQUE:   CTA of the chest was performed after the administration of intravenous   contrast.  Multiplanar reformatted images are provided for review. MIP   images are provided for review. Automated exposure control, iterative   reconstruction, and/or weight based adjustment of the mA/kV was utilized to   reduce the radiation dose to as low as reasonably achievable.; CT of the   abdomen and pelvis was performed with the administration of intravenous   contrast. Multiplanar reformatted images are provided for review. Automated   exposure control, iterative reconstruction, and/or weight based adjustment of   the mA/kV was utilized to reduce the radiation dose to as low as reasonably   achievable. COMPARISON:   Chest radiograph today. Chest CT 12/02/2022. CT chest abdomen pelvis   07/21/2021.        HISTORY:   ORDERING SYSTEM PROVIDED HISTORY: Fever, hypoxia, history of PE   TECHNOLOGIST PROVIDED HISTORY:   Fever, hypoxia, history of PE   Decision Support Exception - unselect if not a suspected or confirmed   emergency medical condition->Emergency Medical Condition ([place]); ORDERING   SYSTEM PROVIDED HISTORY: Fever elevated liver enzyme   TECHNOLOGIST PROVIDED HISTORY:       Fever elevated liver enzyme   Decision Support Exception - unselect if not a suspected or confirmed   emergency medical condition->Emergency Medical Condition ([place])       FINDINGS:   CHEST CT:       Pulmonary Arteries: Pulmonary arteries are adequately opacified for   evaluation. Motion artifact is noted, degrading evaluation of the segmental   branches. No evidence for central pulmonary embolism. Main pulmonary artery   is normal in caliber. Mediastinum: No evidence of mediastinal lymphadenopathy. The heart and   pericardium demonstrate no acute abnormality. There is no acute abnormality   of the thoracic aorta. Calcified atheromatous plaque. Lungs/pleura: Expiratory phase of imaging and mild motion artifact noted. Consolidative opacities are present in the posterolateral inferior right   upper lobe and posterior right lower lobe. Patchy airspace disease to a   lesser degree is present in the left lung base. Mild subsegmental   atelectasis is also present. No effusion. The central airway is patent. Soft Tissues/Bones: No acute bone or soft tissue abnormality. ABDOMEN PELVIS:       Organs: The liver, gallbladder, pancreas, spleen, adrenals and kidneys reveal   no acute findings. Horseshoe kidney. Stable left adrenal nodules measuring   up to 1.7 cm. GI/Bowel: There is no bowel dilatation or wall thickening identified. Diverticulosis. Pelvis: No acute findings. Peritoneum/Retroperitoneum: No free air or free fluid. The aorta is normal   in caliber. The visceral branches are patent. No lymphadenopathy. Bones/Soft Tissues: No abnormality identified. *Unless otherwise specified, incidental findings do not require dedicated   imaging follow-up. Impression   1. Multifocal consolidation in the right lung and to a lesser degree left   lung base, concerning for infection. 2.  Motion degraded evaluation of the pulmonary arteries. No evidence for   central pulmonary embolism. 3.  No acute inflammatory process identified in the abdomen or pelvis. Stable benign and incidental findings, as described.                  Medical Decision Eltogs-Gclrcacd-Gmylq:       Medical Decision Making-Other:     Note:  Labs, medications, radiologic studies were reviewed with personal review of films  Large amounts of data were reviewed  Discussed with nursing Staff, Discharge planner  Infection Control and Prevention measures reviewed  All prior entries were reviewed  Administer medications as ordered  Prognosis: Guarded  Discharge planning reviewed      Thank you for allowing us to participate in the care of this patient. Please call with questions.     Altha Duane, MD        Pager: [phone] - Office: [phone]

## 2023-02-09 NOTE — PROGRESS NOTES
Patient is approved to go to [facility] by his insurance. The approval is good until this Saturday the 2/11 @ midnight.     CORINA Cervantes

## 2023-02-09 NOTE — PLAN OF CARE
Problem: Discharge Planning  Goal: Discharge to home or other facility with appropriate resources  Outcome: Progressing  Flowsheets (Taken 2/9/2023 0014)  Discharge to home or other facility with appropriate resources:   Identify barriers to discharge with patient and caregiver   Arrange for needed discharge resources and transportation as appropriate     Problem: Safety - Adult  Goal: Free from fall injury  Outcome: Progressing  Flowsheets (Taken 2/9/2023 0014)  Free From Fall Injury: Instruct family/caregiver on patient safety     Problem: Nutrition Deficit:  Goal: Optimize nutritional status  Outcome: Progressing     Problem: Respiratory - Adult  Goal: Achieves optimal ventilation and oxygenation  Outcome: Progressing     Problem: Pain  Goal: Verbalizes/displays adequate comfort level or baseline comfort level  Outcome: Progressing  Flowsheets (Taken 2/9/2023 0014)  Verbalizes/displays adequate comfort level or baseline comfort level:   Encourage patient to monitor pain and request assistance   Administer analgesics based on type and severity of pain and evaluate response   Assess pain using appropriate pain scale   Implement non-pharmacological measures as appropriate and evaluate response     Problem: Skin/Tissue Integrity  Goal: Absence of new skin breakdown  Description: 1. Monitor for areas of redness and/or skin breakdown  2. Assess vascular access sites hourly  3. Every 4-6 hours minimum:  Change oxygen saturation probe site  4. Every 4-6 hours:  If on nasal continuous positive airway pressure, respiratory therapy assess nares and determine need for appliance change or resting period. Outcome: Progressing  Note: Claudio scale monitoring per protocol. Inspect skin for breakdown frequently. Encourage pt to make frequent large adjustments in position or assist patient with turning. Document all areas of breakdown.         Problem: Chronic Conditions and Co-morbidities  Goal: Patient's chronic conditions and co-morbidity symptoms are monitored and maintained or improved  Outcome: Progressing  Flowsheets (Taken 2/9/2023 0014)  Care Plan - Patient's Chronic Conditions and Co-Morbidity Symptoms are Monitored and Maintained or Improved:   Monitor and assess patient's chronic conditions and comorbid symptoms for stability, deterioration, or improvement   Collaborate with multidisciplinary team to address chronic and comorbid conditions and prevent exacerbation or deterioration     Problem: ABCDS Injury Assessment  Goal: Absence of physical injury  Outcome: Progressing  Flowsheets (Taken 2/9/2023 0014)  Absence of Physical Injury: Implement safety measures based on patient assessment

## 2023-02-09 NOTE — PROGRESS NOTES
Vitals and assessment complete, see flow sheet. Patient sleeping, easy to wake. Patient denies pain. Patient visitor at bedside. Call light within reach, will continue to monitor.

## 2023-02-09 NOTE — PROGRESS NOTES
[facility]--[facility] -Progress Note    SUBJECTIVE:    Patient seen for f/u of Sepsis due to pneumonia ([address] He resting in chair in no distress. No complaints     ROS:   Constitutional: negative  for fevers, and negative for chills. Respiratory: negative for shortness of breath, negative for cough, and negative for wheezing  Cardiovascular: negative for chest pain, and negative for palpitations  Gastrointestinal: negative for abdominal pain, negative for nausea,negative for vomiting, negative for diarrhea, and negative for constipation     All other systems were reviewed with the patient and are negative unless otherwise stated in HPI      OBJECTIVE:        VITAL SIGNS:  Patient Vitals for the past 8 hrs:   BP Temp Temp src Pulse Resp SpO2 Height Weight   23 0713 -- -- -- -- -- 94 % -- --   23 0629 (!) 150/70 97.5 °F (36.4 °C) Temporal 71 19 97 % -- --   23 0551 -- -- -- -- -- 92 % -- --   23 0500 -- -- -- -- -- -- 5' 11\" (1.803 m) 271 lb 8 oz (123.2 kg)         Temp: 97.5 °F (36.4 °C)  Temp range:    Temp  Av °F (36.7 °C)  Min: 97.5 °F (36.4 °C)  Max: 98.2 °F (36.8 °C)    BP: (!) 150/70  BP Range:      Systolic (20JSM), BEVERLEY:245 , Min:144 , NXL:756      Diastolic (28OPI), ZBN:87, Min:69, Max:90    Heart Rate: 71  Pulse Range:    Pulse  Av.5  Min: 68  Max: 80    Resp: 19  Resp Range:   Resp  Av.3  Min: 18  Max: 19    SpO2: 94 % on room air  SpO2 range:   SpO2  Av.9 %  Min: 92 %  Max: 97 %      PaO2/FiO2 RATIO:  No results for input(s): POCPO2 in the last 72 hours. Exam:    GEN:    Awake, alert and oriented x3. EYES:  EOMI, pupils equal   NECK: Supple. No lymphadenopathy. No carotid bruit  CVS:    regular rate and rhythm, 2/6 systolic murmur  PULM:  diminished but clear without wheezing, rales or rhonchi, no acute respiratory distress  ABD:    Bowels sounds normal.  Abdomen is soft. No distention. no tenderness to palpation. EXT:   1+ edema bilaterally .   No calf tenderness. NEURO: Moves all extremities. Motor and sensory are grossly intact  SKIN:  No rashes. No skin lesions.      Diagnostic Data:      Complete Blood Count:   Recent Labs     02/07/23  0549 02/08/23  0711 02/09/23  0540   WBC 13.7* 13.2* 12.2*   RBC 3.34* 3.45* 3.39*   HGB 9.9* 10.0* 9.8*   HCT 29.6* 30.6* 30.4*   MCV 88.6 88.7 89.7   MCH 29.6 29.0 28.9   MCHC 33.4 32.7 32.2   RDW 14.8* 14.8* 14.9*    343 372   MPV 9.5 9.4 9.3        Last 3 Blood Glucose:   Recent Labs     02/06/23 2000 02/07/23  0549 02/07/23  1637 02/08/23  0711 02/09/23  0540   GLUCOSE 150* 158* 160* 167* 147*        Comprehensive Metabolic Profile:   Recent Labs     02/07/23  0549 02/07/23  1637 02/08/23  0711 02/09/23  0540   * 121* 125* 126*   K 4.5 4.7 4.6 4.2   CL 87* 87* 92* 92*   CO2 25 26 22 24   BUN 40* 44* 47* 49*   CREATININE 1.08 1.02 0.93 0.98   GLUCOSE 158* 160* 167* 147*   CALCIUM 8.9 9.1 9.0 9.0   PROT 6.3*  --  6.1* 6.3*   LABALBU 2.6*  --  2.8* 2.8*   BILITOT 0.3  --  0.2* 0.3   ALKPHOS 225*  --  250* 264*   AST 35  --  35 30   ALT 43*  --  40 35        Urinalysis:   Lab Results   Component Value Date/Time    NITRU NEGATIVE 01/31/2023 07:45 PM    COLORU Yellow 01/31/2023 07:45 PM    PHUR 6.0 01/31/2023 07:45 PM    WBCUA 0 TO 2 01/31/2023 07:45 PM    RBCUA 0 TO 2 01/31/2023 07:45 PM    MUCUS TRACE 01/31/2023 07:45 PM    TRICHOMONAS NOT REPORTED 12/29/2021 11:30 PM    YEAST NOT REPORTED 12/29/2021 11:30 PM    BACTERIA 1+ 01/31/2023 07:45 PM    CLARITYU clear 05/10/2018 02:45 PM    SPECGRAV 1.020 01/31/2023 07:45 PM    LEUKOCYTESUR NEGATIVE 01/31/2023 07:45 PM    UROBILINOGEN Normal 01/31/2023 07:45 PM    BILIRUBINUR NEGATIVE 01/31/2023 07:45 PM    BILIRUBINUR neg 05/10/2018 02:45 PM    BLOODU neg 05/10/2018 02:45 PM    GLUCOSEU NEGATIVE 01/31/2023 07:45 PM    KETUA NEGATIVE 01/31/2023 07:45 PM    AMORPHOUS NOT REPORTED 12/29/2021 11:30 PM       HgBA1c:    Lab Results   Component Value Date/Time    LABA1C 7.2 02/02/2023 05:50 AM       Lactic Acid:   Lab Results   Component Value Date/Time    LACTA 2.2 12/29/2021 09:24 PM    LACTA 1.2 11/14/2017 08:47 PM    LACTA 1.6 09/19/2017 06:27 PM        Troponin: No results for input(s): TROPONINI in the last 72 hours. CRP:  No results for input(s): CRP in the last 72 hours. Radiology/Imaging:  XR CHEST (2 VW)   Final Result   Similar patchy right lung infiltrates         XR CHEST PORTABLE   Final Result   Increasing vascular congestion as well as localized airspace disease in the   mid right lung field. Developing pulmonary edema can have this appearance   along with underlying multifocal infection. CT ABDOMEN PELVIS W IV CONTRAST Additional Contrast? None   Final Result   1. Multifocal consolidation in the right lung and to a lesser degree left   lung base, concerning for infection. 2.  Motion degraded evaluation of the pulmonary arteries. No evidence for   central pulmonary embolism. 3.  No acute inflammatory process identified in the abdomen or pelvis. Stable benign and incidental findings, as described. CT CHEST PULMONARY EMBOLISM W CONTRAST   Final Result   1. Multifocal consolidation in the right lung and to a lesser degree left   lung base, concerning for infection. 2.  Motion degraded evaluation of the pulmonary arteries. No evidence for   central pulmonary embolism. 3.  No acute inflammatory process identified in the abdomen or pelvis. Stable benign and incidental findings, as described. US GALLBLADDER RUQ   Final Result   Gallbladder sludge. No imaging evidence for acute cholecystitis or biliary   dilatation. XR CHEST PORTABLE   Final Result   Suspected pneumonia in the right mid lung. Recommend imaging follow-up to   resolution.                ASSESSMENT / PLAN:  Primary Problem(s): Sepsis due to pneumonia [facility])  Condition is an undiagnosed new problem with uncertain prognosis  Condition is improving  Treatment plan: Consultation: Infectious disease  Imaging: no further imaging studies ordered today  Medications:   Continue Levaquin through 2/12/2023  Stop Rocephin and Zithromax  Medication Monitoring / High Risk Medications: none   MRSA nares-negative  Mycoplasma IgM-negative  Legionella antigen-negative    COPD  Condition is a chronic stable condition  Treatment plan: Continue current treatment  Imaging: no further imaging studies ordered today  Medications:   Continue nebs  Continue oxygen  Continue Levaquin through 2/12/2023  Stop Rocephin and Zithromax  CHF  Condition is a chronic stable condition  Treatment plan: Consultation: Cardiology  Fluid restriction 1500 mL daily  Imaging: no further imaging studies ordered today  Medications:   IV Bumex  Hold Lasix    CKD  Condition is a chronic stable condition  Treatment plan: Consultation: Nephrology  Possibly cardiorenal syndrome  Imaging: no further imaging studies ordered today  Medications:   Start salt tablets 2 g twice daily today  Continue urea 30 g daily  Monitor electrolytes daily  Hyponatremia-126 today    Nutrition status: Well developed, well nourished with no malnutrition  Dietician consult initiated    Hospital Prophylaxis:   DVT: Lovenox   Stress Ulcer: H2 Blocker     [facility] Heart failure documentation  [] The patient has a history of heart transplant or Left Ventricular Assist Device (LVAD).   [If yes, STOP HERE] - [facility] PERFORMANCE EXCLUSION    [] The patient has a current or prior documentation of left ventricular ejection fraction (LVEF) less than or equal to 40%, or moderate or severely depressed left ventricular systolic function [if \"no\", STOP HERE]  EF 55%    ACE / ARB:  [] The patient was prescribed or is already taking and ACE or ARB  [x] Reason why ACE or ARB was not prescirbed / taking: contraindicated due to renal failure    BETA-BLOCKERS:  [x] The patient was prescribed or is already taking a Beta-blocker  [] Reason why Beta-blocker not prescribed / taking:  N/A  Disposition:  Shared decision making: All test results, treatment options and disposition options were discussed with the patient today  Social determinants of health that may impact management: none  Code status: Full Code   Disposition: Discharge plan is [place]    MIPS Advanced Care Planning documentation:  [x] I have confirmed that the patient's Advance Care Plan is present, Code Status is documented, or surrogate decision maker is listed in the patient's medical record  [If \"yes\", STOP HERE]     [] The patient's Advance Care Plan is NOT present because:    []  I confirmed today that the patient does not wish or was not able to name a   surrogate decision maker or provide and advance care plan.    [] Hospice care is currently being provided or has been provided within the   calendar year. []  I did NOT confirm today the presence of an [address] or surrogate   decision maker documented within the patient's medical record.    [DOES NOT SATISFY [id number] [facility], GENA - CNP , GENA, NP-C  [facility]        2/9/2023, 7:29 AM

## 2023-02-09 NOTE — PROGRESS NOTES
Physical Therapy  Facility/Department: [facility] AT THE [facility] MED SURG  Daily Treatment Note  NAME: Alondra Paz  : 1935  MRN: [id number]    Date of Service: 2023    Discharge Recommendations:  Continue to assess pending progress, Patient would benefit from continued therapy after discharge, Home with Home health PT, Outpatient PT      Patient Diagnosis(es): The primary encounter diagnosis was Septicemia ([address] A diagnosis of Pneumonia due to infectious organism, unspecified laterality, unspecified part of lung was also pertinent to this visit. Assessment   Assessment: Pt just finishing with OMALLEY upon arrival. Transfers CGAx 1. Pt requested to use the urinal during toilet transfer it was noted brief was soiled, and upon return to chair it was noted chair was soiled as well as gown, PTA assisted pt in gown change and replaced linens in chair. Seated EOB BLE's x15 marches, LAQ's, AP's and hip abd/add. VC/TC's to stay on task. 1x 10 HS's. Pt ambulated 13' x2 with WW and CGA. Cues for upright gaze during ambulation with fair carryover noted. Nursing notified of soiled brief. Activity Tolerance: Patient tolerated treatment well;Patient limited by endurance; Patient limited by fatigue     Plan    Physcial Therapy Plan  General Plan: 2 times a day 7 days a week (1x/day on weekends and holidays)  Current Treatment Recommendations: Strengthening;Balance training;Functional mobility training;Transfer training; Endurance training;Gait training;Stair training;Neuromuscular re-education; Safety education & training; Therapeutic activities     Restrictions  Restrictions/Precautions  Restrictions/Precautions: General Precautions, Fall Risk     Subjective    Subjective  Subjective: Pt in chair finishing with OMALLEY upon arrival, agreeable to PT  Pain: no c/o pain  Orientation  Overall Orientation Status: Within Functional Limits     Objective   Bed Mobility Training  Bed Mobility Training: No  Transfer Training  Transfer Training: Yes  Overall Level of Assistance: Contact-guard assistance;Assist X1  Interventions: Verbal cues; Safety awareness training  Sit to Stand: Contact-guard assistance;Assist X1  Stand to Sit: Contact-guard assistance;Assist X1  Stand Pivot Transfers: Contact-guard assistance;Assist X1;Additional time  Toilet Transfer: Contact-guard assistance;Assist X1;Other (comment) (Pt required pull to stand with B UE's on handrail.)  Gait Training  Gait Training: Yes  Right Side Weight Bearing: Full  Left Side Weight Bearing: Full  Gait  Overall Level of Assistance: Contact-guard assistance;Assist X1  Interventions: Verbal cues; Safety awareness training  Base of Support: Widened  Speed/Geovanna: Slow  Gait Abnormalities: Decreased step clearance;Shuffling gait  Distance (ft): 30 Feet  Assistive Device: Walker, rolling;Gait belt  Neuromuscular Education  Neuromuscular Education: No  PT Exercises  Exercise Treatment: Seated EOB BLE's x15 marches, LAQ's, AP's and hip abd/add. VC/TC's to stay on task. 1x 10 HS's. Static Standing Balance Exercises: Pt completed Static Standing during toilet transfer for pericare ~ 2 minutes with no LOB, pt had L UE on bar and R on [employer] with periodically single UE support. Dynamic Standing Balance Exercises: Pt completed dynamic standing while using urinal and  performing hand hygiene with no UE support ~ 2:30 in all, no LOB with CGA x1 for safety. Safety Devices  Type of Devices: All fall risk precautions in place;Call light within reach; Chair alarm in place; Left in chair;Patient at risk for falls;Gait belt;Nurse notified       Goals  Short Term Goals  Time Frame for Short Term Goals: 20 days  Short Term Goal 1: Initiate and progress HEP for strength and balance. Short Term Goal 2: Pt will be independent with bed mobility without use of rails for discharge home independently. Short Term Goal 3: Pt will transfer with +1 supervision with least restrictive device to decrease fall risk.   Short Term Goal 4: Pt will ambulate with supervision to SBA +1 with least restrictive device for up to 75 feet to allow patient safe entry and exit into his home  Short Term Goal 5: Pt will negotiate 4 steps with railing +1 SBA for entry and exit into buildings for appointments. Patient Goals   Patient Goals : return home    Education  Patient Education  Education Given To: Patient  Education Provided Comments: Education provided during there ex for slowed pace and cues for technique.   Education Method: Verbal;Demonstration  Barriers to Learning: None  Education Outcome: Verbalized understanding;Continued education needed;Demonstrated understanding    Therapy Time   Individual Concurrent Group Co-treatment   Time In 1331         Time Out 1415         Minutes [address] [place] [address]

## 2023-02-09 NOTE — PROGRESS NOTES
Writer at bedside at this time to perform second shift assessment. Patient is lying in bed at this time. Vital signs taken and assessment completed at this time. Patient is alert and oriented and has no complaints of pain at this time. Incontinent care completed. Patient denies any further needs at this time. Call light within reach, bed alarm on for safety, will continue to monitor.

## 2023-02-09 NOTE — RT PROTOCOL NOTE
RESPIRATORY ASSESSMENT PROTOCOL                                                                                              Patient Name: Karlee Rader Room#: 8093/[id number] : 1935     Admitting diagnosis: Septicemia ([facility] [A41.9]  Sepsis due to pneumonia ([facility] [J18.9, A41.9]  Pneumonia due to infectious organism, unspecified laterality, unspecified part of lung [J18.9]       Medical History:   Past Medical History:   Diagnosis Date    COPD (chronic obstructive pulmonary disease) ([facility]     Diabetes mellitus ([facility]     Hx of echocardiogram 2017    [facility]    Hyperlipidemia     Hypothyroidism     MI (myocardial infarction) ([facility]     Stroke [facility])     Thyroid disease     Type II or unspecified type diabetes mellitus without mention of complication, not stated as uncontrolled        PATIENT ASSESSMENT    LABORATORY DATA  Hematology:   Lab Results   Component Value Date/Time    WBC 12.2 2023 05:40 AM    RBC 3.39 2023 05:40 AM    HGB 9.8 2023 05:40 AM    HCT 30.4 2023 05:40 AM     2023 05:40 AM     Chemistry:    Lab Results   Component Value Date/Time    PHART 7.359 2023 10:05 PM    EYR7HKN 46.9 2023 10:05 PM    PO2ART 60.9 2023 10:05 PM    A9IDABPU 90.3 2023 10:05 PM    GOZ1NNN 25.8 2023 10:05 PM    PBEA 1.4 2017 11:25 PM       VITALS  Heart Rate: 67   Resp: 18  BP: (!) 144/59  SpO2: 93 % O2 Device: Nasal cannula  Temp: 97.8 °F (36.6 °C)    SKIN COLOR  [x] Normal  [] Pale  [] Dusky  [] Cyanotic    RESPIRATORY PATTERN  [] Normal  [x] Dyspnea  [] Cheyne-Veras  [] Kussmaul  [] Biots    AMBULATORY  [] Yes  [x] No  [] With Assistance            Patient Acuity 0 1 2 3 4 Score   Level of Consciousness (LOC) [x]  Alert & Oriented or Pt normal LOC []  Confused;follows directions []  Confused & uncooper-ative []  Obtunded []  Comatose 0   Respiratory Rate  (RR) []  Reg. rate & pattern. 12 - 20 bpm  [x]  Increased RR.  Greater than 20 bpm   []  SOB w/ exertion or RR greater than 24 bpm []  Access- ory muscle use at rest. Abn.  resp. []  SOB at rest.   1   Bilateral Breath Sounds (BBS) []  Clear [x]  Diminish-ed bases  []  Diminish-ed t/o, or rales   []  Sporadic, scattered wheezes or rhonchi []  Persistentwheezes and, or absent BBS 1   Cough []  Strong, effective, & non-prod. [x]  Effective & prod. Less than 25 ml (2 TBSP) over past 24 hrs []  Ineffective & non-prod to less than 25 ML over past 24 hrs []  Ineffective and, or greater than 25 ml sputum prod. past 24 hrs. []  Nonspon- taneous; Requires suctioning 1   Pulmonary History  (PULM HX) []  No smoking and no chronic pulmonary history []  Former smoker. Quit over 12 mos. ago []  Current smoker or quit w/ in 12 mos []  Pulm. History and, or 20 pk/yr smoking hx [x]  Admitted w/ acute pulm. dx and, or has been admitted w/ pulm. dx 2 or more times over past 12 mos 4   Surgical History this Admit  (SURG HX) [x]  No surgery []  General surgery []  Lower abdominal []  Thoracic or upper abdominal   []  Thoracic w/ pulm. disease 0   Chest X-Ray (CXR)/CT Scan []  Clear or not applicable []  Not available []  Atelectasis or pleural effusions [x]  Localized infiltrate or pulm. edema []  Con-solidated Infiltrates, bilateral, or in more than 1 lobe 3   TOTAL ACUITY: 10       CARE PLAN    If Acuity Level is 2, 3, or 4 in any of the following:    [] BILATERAL BREATH SOUNDS (BBS)     [x] PULMONARY HISTORY (PULM HX)  [] Respiratory Rate  (RR)    Goal: Improve respiratory functions in patients with airway disease and decrease WOB    [x] AEROSOL PROTOCOL    Total Acuity:   14-28  []  Secondary Assessment in 24 hrs Total Acuity:  9-13  [x]  Secondary Assessment in 24 hrs Total Acuity:  4-8  []  Secondary Assessment in 24 hrs Total Acuity:  0-3  []  Secondary Assessment in 48 hrs   HHN AEROSOL THERAPY with  [physician-ordered bronchodilator(s)] q 4 & Albuterol PRN q2 hrs.    Breath-Actuated Neb if BBS Acuity = 4, and pt. can use MP. Notify physician if condition deteriorates. HHN AEROSOL THERAPY with  [physician-ordered bronchodilator(s)]  QID and Albuterol PRN q4 hrs. Breath-Actuated Neb if BBS Acuity = 4, and pt. can use MP. Notify physician if condition deteriorates. MDI THERAPY with  2 actuations of [physician-ordered bronchodilator(s)] via spacer TID Albuterol and PRN q4 hrs. If unable to utilize MDI: HHN [physician-ordered bronchodilator(s)] TID and Albuterol PRN q4 hrs. Notify physician if condition deteriorates. MDI THERAPY with  [physician-ordered bronchodilator(s)] via spacer TID PRN. If unable to utilize MDI: HHN [physician-ordered bronchodilator(s)] TID PRN. Notify physician if condition deteriorates. If Acuity Level is 2, 3, or 4 in any of the following:    [] COUGH     [] SURGICAL HISTORY (SURG HX)  [x] CHEST XRAY (CXR)    Goal: Improvement in sputum mobilization in patients with ineffective airway clearance. Reverse atelectasis. [x] Bronchopulmonary Hygiene Protocol      Total Acuity:   14-28  []  Secondary Assessment in 24 hrs Total Acuity:  9-13  [x]  Secondary Assessment in 24 hrs Total Acuity:  4-8  []  Secondary Assessment in 24 hrs Total Acuity:  0-3  []  Secondary Assessment in 48 hrs   METANEB QID with [physician-ordered bronchodilator(s)] if CXR Acuity = 4; otherwise:  PD&P, Oscillatory Therapy, or Vest QID & PRN; PEP QID & PRN  NT Sxn PRN for ineffective cough  METANEB QID with [physician-ordered bronchodilator(s)] if CXR Acuity = 4; otherwise:  PD&P, Oscillatory Therapy or Vest QID & PRN; PEP QID & PRN  NT Sxn PRN for ineffective cough  PD&P, Oscillatory Therapy, or Vest TID & PRN; PEP TID & PRN   Instruct patient to self-perform IS q1hr WA       If Acuity Level is 2 or above in the following:    [] PULMONARY HISTORY (PULM HX)    Goal: Assist patient in quitting smoking to slow or stop the progression of lung disease.     [] Smoking Cessation Protocol    SMOKING CESSATION EDUCATION provided according to policy [id number]: (karson with an X)  ____Yes    ____ No     ____ NA    Smoking Cessation Booklet given:  ____Yes  ____No ____Patient Francesco Hyman

## 2023-02-09 NOTE — PROGRESS NOTES
Occupational Therapy  Facility/Department: [facility] AT THE [facility] MED SURG  Daily Treatment Note  NAME: Hoda Valencia  : 1935  MRN: [id number]    Date of Service: 2023    Discharge Recommendations:  Continue to assess pending progress, Home with Home health OT         Patient Diagnosis(es): The primary encounter diagnosis was Septicemia ([address] A diagnosis of Pneumonia due to infectious organism, unspecified laterality, unspecified part of lung was also pertinent to this visit. Assessment    Activity Tolerance: Patient tolerated treatment well;Patient limited by endurance  Discharge Recommendations: Continue to assess pending progress;Home with Home health OT      Plan   Occupational Therapy Plan  Times Per Week: 7x/wk  Times Per Day: Once a day  Current Treatment Recommendations: Strengthening;Balance training;Self-Care / ADL; Safety education & training     Restrictions   Restrictions/Precautions  Restrictions/Precautions: General Precautions, Fall Risk     Subjective   Subjective  Subjective: pt seaed in bedside chair upon arrival. pt agreed to therapy tx at this time. denied ADLs in bathroom, only wanted to wash face while seated this date  Pain: pt denied pain on this date  Orientation  Overall Orientation Status: Within Functional Limits  Pain: no c/o pain           Objective    Vitals        ADL  Grooming: Independent;Supervision;Setup  OT Exercises  Exercise Treatment: BUE strengthening x 2# weight, 1 x 15 reps in all planes of shoulde, elbow and wrist, greeen digi flex x10 reps x1 set and yellow flex bar x 3 variations x10 reps x1 set   to improve strength for increased fxl tasks  . Patient required RB d/t shortness of breath and coughing     Safety Devices  Type of Devices: All fall risk precautions in place;Call light within reach; Chair alarm in place; Left in chair;Patient at risk for falls     Patient Education  Education Given To: Patient  Education Provided: Home Exercise Program;Role of Therapy;Plan of Care  Education Method: Demonstration;Verbal  Barriers to Learning: None  Education Outcome: Verbalized understanding;Demonstrated understanding;Continued education needed    Goals  Short Term Goals  Time Frame for Short Term Goals: 20 visits  Short Term Goal 1: Pt. will tolerate 15 mins of BUE ther ex/act while maintaining SpO2 > 90% to increase overall functional activity tolerance. Short Term Goal 2: Pt. will demo standing tolerance x 6-7 mins w/o LOB to increase safety/participation with ADL's. Short Term Goal 3: Pt. will complete self care routine with SUP/mod I (with exception of footwear) to return to PLOF. Short Term Goal 4: Pt. will complete functional ADL transfers/mobility with SUP/mod I and G safety with reduced risk for falls.        Therapy Time   Individual Concurrent Group Co-treatment   Time In 1315         Time Out 1338         Minutes Rocio Roy [address] OMALLEY/L 2/9/2023

## 2023-02-10 VITALS
WEIGHT: 255.4 LBS | HEIGHT: 71 IN | SYSTOLIC BLOOD PRESSURE: 140 MMHG | HEART RATE: 63 BPM | TEMPERATURE: 97.5 F | DIASTOLIC BLOOD PRESSURE: 68 MMHG | OXYGEN SATURATION: 94 % | RESPIRATION RATE: 16 BRPM | BODY MASS INDEX: 35.76 KG/M2

## 2023-02-10 LAB
ABSOLUTE EOS #: 0.24 K/UL (ref 0–0.44)
ABSOLUTE IMMATURE GRANULOCYTE: 0.24 K/UL (ref 0–0.3)
ABSOLUTE LYMPH #: 1.08 K/UL (ref 1.1–3.7)
ABSOLUTE MONO #: 1.44 K/UL (ref 0.1–1.2)
ALBUMIN SERPL-MCNC: 2.8 G/DL (ref 3.5–5.2)
ALBUMIN/GLOBULIN RATIO: 0.9 (ref 1–2.5)
ALP SERPL-CCNC: 258 U/L (ref 40–129)
ALT SERPL-CCNC: 32 U/L (ref 5–41)
ANION GAP SERPL CALCULATED.3IONS-SCNC: 10 MMOL/L (ref 9–17)
AST SERPL-CCNC: 31 U/L
BASOPHILS # BLD: 0 % (ref 0–2)
BASOPHILS ABSOLUTE: 0 K/UL (ref 0–0.2)
BILIRUB SERPL-MCNC: 0.2 MG/DL (ref 0.3–1.2)
BUN SERPL-MCNC: 50 MG/DL (ref 8–23)
BUN/CREAT BLD: 49 (ref 9–20)
CALCIUM SERPL-MCNC: 8.8 MG/DL (ref 8.6–10.4)
CHLORIDE SERPL-SCNC: 98 MMOL/L (ref 98–107)
CO2 SERPL-SCNC: 24 MMOL/L (ref 20–31)
CREAT SERPL-MCNC: 1.02 MG/DL (ref 0.7–1.2)
EOSINOPHILS RELATIVE PERCENT: 2 % (ref 1–4)
GFR SERPL CREATININE-BSD FRML MDRD: >60 ML/MIN/1.73M2
GLUCOSE SERPL-MCNC: 138 MG/DL (ref 70–99)
HCT VFR BLD AUTO: 29.9 % (ref 40.7–50.3)
HGB BLD-MCNC: 9.6 G/DL (ref 13–17)
IMMATURE GRANULOCYTES: 2 %
LYMPHOCYTES # BLD: 9 % (ref 24–43)
MCH RBC QN AUTO: 28.8 PG (ref 25.2–33.5)
MCHC RBC AUTO-ENTMCNC: 32.1 G/DL (ref 28.4–34.8)
MCV RBC AUTO: 89.8 FL (ref 82.6–102.9)
MONOCYTES # BLD: 12 % (ref 3–12)
MORPHOLOGY: ABNORMAL
NRBC AUTOMATED: 0 PER 100 WBC
PDW BLD-RTO: 15 % (ref 11.8–14.4)
PLATELET # BLD AUTO: 393 K/UL (ref 138–453)
PMV BLD AUTO: 9.6 FL (ref 8.1–13.5)
POTASSIUM SERPL-SCNC: 4.1 MMOL/L (ref 3.7–5.3)
PROT SERPL-MCNC: 6 G/DL (ref 6.4–8.3)
RBC # BLD: 3.33 M/UL (ref 4.21–5.77)
SARS-COV-2 RDRP RESP QL NAA+PROBE: NOT DETECTED
SEG NEUTROPHILS: 75 % (ref 36–65)
SEGMENTED NEUTROPHILS ABSOLUTE COUNT: 9 K/UL (ref 1.5–8.1)
SODIUM SERPL-SCNC: 132 MMOL/L (ref 135–144)
SPECIMEN DESCRIPTION: NORMAL
WBC # BLD AUTO: 12 K/UL (ref 3.5–11.3)

## 2023-02-10 PROCEDURE — 97116 GAIT TRAINING THERAPY: CPT

## 2023-02-10 PROCEDURE — C9803 HOPD COVID-19 SPEC COLLECT: HCPCS

## 2023-02-10 PROCEDURE — 2500000003 HC RX 250 WO HCPCS: Performed by: INTERNAL MEDICINE

## 2023-02-10 PROCEDURE — 6370000000 HC RX 637 (ALT 250 FOR IP): Performed by: INTERNAL MEDICINE

## 2023-02-10 PROCEDURE — 85025 COMPLETE CBC W/AUTO DIFF WBC: CPT

## 2023-02-10 PROCEDURE — 97535 SELF CARE MNGMENT TRAINING: CPT

## 2023-02-10 PROCEDURE — 94640 AIRWAY INHALATION TREATMENT: CPT

## 2023-02-10 PROCEDURE — 36415 COLL VENOUS BLD VENIPUNCTURE: CPT

## 2023-02-10 PROCEDURE — 2580000003 HC RX 258: Performed by: INTERNAL MEDICINE

## 2023-02-10 PROCEDURE — 99232 SBSQ HOSP IP/OBS MODERATE 35: CPT | Performed by: INTERNAL MEDICINE

## 2023-02-10 PROCEDURE — 80053 COMPREHEN METABOLIC PANEL: CPT

## 2023-02-10 PROCEDURE — 94669 MECHANICAL CHEST WALL OSCILL: CPT

## 2023-02-10 PROCEDURE — 97110 THERAPEUTIC EXERCISES: CPT

## 2023-02-10 PROCEDURE — 6370000000 HC RX 637 (ALT 250 FOR IP): Performed by: FAMILY MEDICINE

## 2023-02-10 PROCEDURE — 6360000002 HC RX W HCPCS: Performed by: FAMILY MEDICINE

## 2023-02-10 PROCEDURE — 94761 N-INVAS EAR/PLS OXIMETRY MLT: CPT

## 2023-02-10 PROCEDURE — 2700000000 HC OXYGEN THERAPY PER DAY

## 2023-02-10 PROCEDURE — 87635 SARS-COV-2 COVID-19 AMP PRB: CPT

## 2023-02-10 RX ORDER — BUMETANIDE 1 MG/1
1 TABLET ORAL 2 TIMES DAILY
DISCHARGE
Start: 2023-02-10

## 2023-02-10 RX ORDER — LEVOFLOXACIN 500 MG/1
500 TABLET, FILM COATED ORAL DAILY
Qty: 1 TABLET | Refills: 0 | Status: SHIPPED | OUTPATIENT
Start: 2023-02-11 | End: 2023-02-12

## 2023-02-10 RX ORDER — SODIUM CHLORIDE 1000 MG
2 TABLET, SOLUBLE MISCELLANEOUS 2 TIMES DAILY WITH MEALS
Qty: 90 TABLET | Refills: 3 | DISCHARGE
Start: 2023-02-10

## 2023-02-10 RX ORDER — CALCIUM CARBONATE 200(500)MG
500 TABLET,CHEWABLE ORAL EVERY 6 HOURS PRN
DISCHARGE
Start: 2023-02-10 | End: 2023-03-12

## 2023-02-10 RX ORDER — FERROUS SULFATE 325(65) MG
325 TABLET ORAL 2 TIMES DAILY WITH MEALS
Qty: 30 TABLET | Refills: 3 | DISCHARGE
Start: 2023-02-10

## 2023-02-10 RX ADMIN — FERROUS SULFATE TAB 325 MG (65 MG ELEMENTAL FE) 325 MG: 325 (65 FE) TAB at 06:54

## 2023-02-10 RX ADMIN — IPRATROPIUM BROMIDE AND ALBUTEROL SULFATE 1 AMPULE: 2.5; .5 SOLUTION RESPIRATORY (INHALATION) at 10:55

## 2023-02-10 RX ADMIN — ASPIRIN 81 MG: 81 TABLET, COATED ORAL at 08:42

## 2023-02-10 RX ADMIN — ALOGLIPTIN 25 MG: 25 TABLET, FILM COATED ORAL at 08:42

## 2023-02-10 RX ADMIN — LEVOTHYROXINE SODIUM 137 MCG: 25 TABLET ORAL at 06:54

## 2023-02-10 RX ADMIN — PRIMIDONE 250 MG: 250 TABLET ORAL at 06:55

## 2023-02-10 RX ADMIN — OXYBUTYNIN CHLORIDE 10 MG: 5 TABLET ORAL at 08:42

## 2023-02-10 RX ADMIN — PROPRANOLOL HYDROCHLORIDE 120 MG: 60 CAPSULE, EXTENDED RELEASE ORAL at 08:41

## 2023-02-10 RX ADMIN — LEVOFLOXACIN 500 MG: 500 TABLET, FILM COATED ORAL at 08:42

## 2023-02-10 RX ADMIN — SODIUM CHLORIDE, PRESERVATIVE FREE 10 ML: 5 INJECTION INTRAVENOUS at 08:44

## 2023-02-10 RX ADMIN — ENOXAPARIN SODIUM 30 MG: 100 INJECTION SUBCUTANEOUS at 08:40

## 2023-02-10 RX ADMIN — IPRATROPIUM BROMIDE AND ALBUTEROL SULFATE 1 AMPULE: 2.5; .5 SOLUTION RESPIRATORY (INHALATION) at 05:23

## 2023-02-10 RX ADMIN — BUMETANIDE 1 MG: 0.25 INJECTION INTRAMUSCULAR; INTRAVENOUS at 08:42

## 2023-02-10 RX ADMIN — Medication 2 G: at 06:54

## 2023-02-10 RX ADMIN — METFORMIN HYDROCHLORIDE 1000 MG: 500 TABLET ORAL at 08:41

## 2023-02-10 RX ADMIN — MOMETASONE FUROATE AND FORMOTEROL FUMARATE DIHYDRATE 2 PUFF: 200; 5 AEROSOL RESPIRATORY (INHALATION) at 10:55

## 2023-02-10 RX ADMIN — MONTELUKAST 10 MG: 10 TABLET, FILM COATED ORAL at 08:42

## 2023-02-10 RX ADMIN — FAMOTIDINE 20 MG: 20 TABLET, FILM COATED ORAL at 08:42

## 2023-02-10 ASSESSMENT — PAIN SCALES - GENERAL: PAINLEVEL_OUTOF10: 0

## 2023-02-10 NOTE — PROGRESS NOTES
Occupational Therapy  Facility/Department: [facility] AT THE [facility] MED SURG  Daily Treatment Note  NAME: Eden Rojo  : 1935  MRN: [id number]    Date of Service: 2/10/2023    Discharge Recommendations:  Continue to assess pending progress, Home with Home health OT         Patient Diagnosis(es): The primary encounter diagnosis was Septicemia ([address] A diagnosis of Pneumonia due to infectious organism, unspecified laterality, unspecified part of lung was also pertinent to this visit. Assessment    Activity Tolerance: Patient tolerated treatment well  Discharge Recommendations: Continue to assess pending progress;Home with Home health OT      Plan   Occupational Therapy Plan  Times Per Week: 7x/wk  Times Per Day: Once a day  Current Treatment Recommendations: Strengthening;Balance training;Self-Care / ADL; Safety education & training     Restrictions   Fall risk    Subjective   Subjective  Subjective: Pt seated in chair agreeable to washing up on toilet and changing clothing. Pain: denied, indicated discomfort in BLE with lifting during clothing initiation           Objective    Vitals     Bed Mobility Training  Bed Mobility Training: No  Balance  Sitting: Intact (~ 8 min unsupported dynamic sitting)  Standing: Impaired (~ 4-5 min uni supported dynamic standing, > 1 min unsupported during clothing managment. 0 LOB noted, unstable)  Transfer Training  Transfer Training: Yes  Overall Level of Assistance: Contact-guard assistance;Stand-by assistance;Assist X1 (sit<>stands from recliner and toilet using NILSA support to push/pull self up. Pt unable with unilateral, VCs for safety/tech)  Interventions: Verbal cues; Safety awareness training  Gait  Overall Level of Assistance: Contact-guard assistance;Assist X1 (normal household distances x 3 using RW for F + balance)  Interventions: Safety awareness training;Verbal cues     ADL  Grooming: Stand by assistance  Grooming Skilled Clinical Factors: standing at sink for oral hygiene and hair care ~ 4-5 min  UE Bathing: Stand by assistance  UE Bathing Skilled Clinical Factors: seated  LE Bathing: Minimal assistance  LE Bathing Skilled Clinical Factors: standing  UE Dressing: Stand by assistance  LE Dressing: Minimal assistance  LE Dressing Skilled Clinical Factors: diff reaching LEs above ankle height for initiation  Toileting: Stand by assistance  Toileting Skilled Clinical Factors: seated        Safety Devices  Type of Devices: All fall risk precautions in place;Call light within reach; Left in chair;Chair alarm in place;Nurse notified     Patient Education  Education Given To: Patient  Education Provided: Role of Therapy;Plan of Care;ADL Adaptive Strategies;Transfer Training; Fall Prevention Strategies  Education Method: Demonstration;Verbal  Barriers to Learning: None  Education Outcome: Verbalized understanding;Demonstrated understanding;Continued education needed    Goals  Short Term Goals  Time Frame for Short Term Goals: 20 visits  Short Term Goal 1: Pt. will tolerate 15 mins of BUE ther ex/act while maintaining SpO2 > 90% to increase overall functional activity tolerance. Short Term Goal 2: Pt. will demo standing tolerance x 6-7 mins w/o LOB to increase safety/participation with ADL's. Short Term Goal 3: Pt. will complete self care routine with SUP/mod I (with exception of footwear) to return to PLOF. Short Term Goal 4: Pt. will complete functional ADL transfers/mobility with SUP/mod I and G safety with reduced risk for falls.        Therapy Time   Individual Concurrent Group Co-treatment   Time In 0730         Time Out 0801         Minutes [address] [facility] [facility]

## 2023-02-10 NOTE — PROGRESS NOTES
Patient awoke for vitals/assessment. Second assessment completed at this time. Assessment unchanged from previous shift assessment. Vital signs completed. Patient denies pain. Patient assisted to use urinal at this time. 200ml urine output. Brief is dry. Patient denies any other needs at this time. Call light, bedside table and personal belongings within reach.

## 2023-02-10 NOTE — PROGRESS NOTES
Anand Glover am scribing for and in the presence of Cuco Aguilera PA-C. Subjective:     CHIEF COMPLAINT / HPI:    Chief Complaint   Patient presents with    Fall     Fall this am while getting out of the shower. Refused transport by EMS       Dearl Juan is 82 y.o. male who was admitted following a fall. 1-He has history of coronary artery disease, myocardial infarction and primary PCI in 2/2017 done at [facility],  [address] [place] [address]. He also has history of ischemic cardiomyopathy and chronic systolic CHF, NYHA class III. 2-An admission to [facility] on 9/19/2017 with COPD exacerbation, during this admission his lisinopril changed to Cozaar because of chronic cough. 3-Another visit to the emergency room on 10/3/2017 with cough, shortness of breath and wheezing. 4- He saw Dr. Brittany Oneal at [facility] in November 2018, no changes in medication done. 5-An admission to [facility] on 4/6/2018 with acute on chronic CHF. 6-History of intentional tremor. 7- EKG done in office (8/20/2019)- Sinus Rhythm with 1st degree AV block. 71 bpm.    8- EKG done in office on 7/14/2020. No acute ischemic changes. 9-  Echocardiogram on 4/6/2021: Global left ventricular systolic function appears preserved with an estimated ejection fraction of 55%. Mildly increased left ventricular wall thickness with a normal left ventricular cavity size. The patient has a sigmoid interventricular septum. The inferoseptal wall is abnormal in its motion which is not unusual status post open heart surgery. Mild aortic stenosis. Mild mitral and tricuspid regurgitation. Evidence of mild diastolic dysfunction is seen. Atrial septal aneurysm cannot rule-out shunt. A saline contrast study was performed and cannot rule out interatrial communication.     10-EKG done in office 7/6/2021- no acute ischemic changes    11- Admission to [facility] on 12/31/2021-1/1/2022: Admitted for Pneumonia    12-ER visit on 8/2022: related to cough    13-EKG on 10/17/2022: No ischemic changes from prior ECG    14-  Echo done on 1/31/2023- EF >55% The left ventricular cavity size is within normal limits and the left ventricular wall thickness is moderately increased. The patient has a sigmoid interventricular septum without evidence of outflow tract obstruction. The left atrium is mildly dilated (29-33) with a left atrial volume index of 29 ml/m2. 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 The clinical significant of this finding is unknown, but has been associated with an increased risk of TIA's and strokes. The left atrium is mildly dilated (29-33) with a left atrial volume index of 29 ml/m2. The mean aortic valve gradient is 24 mmHg consistent with moderate aortic stenosis. Mild to moderate tricuspid regurgitation. Moderate pulmonary hypertension with an estimated right ventricular systolic pressure of 44 mmHg. Evidence of mild (grade I) diastolic dysfunction is seen. Mr. Hans Wayne was admitted for a fall after getting out of shower. He states he tripped on rug denies losing consciousness. He has history of tremors from before and unsteady gait. Uses walker for ambulation. His wife tried to help him out but she could not. EMS called and patient initially refused to come to the emergency room but later he felt extremely weak and had fever and shivering so he decided to come to the emergency room. He said he was on the floor for about 4 hours. His initial lactate level was elevated. Currently on antibiotic for the treatment of community-acquired pneumonia. He has acute kidney injury. Denies any chest pain, pressure or tightness. He has some upper respiratory symptoms for a while. COVID and influenza testing are negative. UA is unremarkable. He said he was able to walk around using his walker today with physical therapy.   No nausea, vomiting or abdominal pain. No problems moving his bowel. No problems with current medications. Wt Readings from Last 3 Encounters:   02/10/23 255 lb 6.4 oz (115.8 kg)   22 256 lb 8 oz (116.3 kg)   22 254 lb 6.6 oz (115.4 kg)       Mr Nadya Capps  is feeling better today. He has been able to walk around room without any significant problems. He is eating and drinking ok. He is sleeping well at night. He is sitting comfortably in chair without oxygen. He is down 16 pounds. His sodium has improved to 132 mmol/L. He looks more comfortable and making good urine. He is no longer requiring oxygen. Denies any chest pain, pressure or tightness. Continue free water restriction and diuresis. Nephrology and ID on board. /59  Pulse 67  -267 mL  Weight is down 16 lbs      Past Medical History:    Past Medical History:   Diagnosis Date    COPD (chronic obstructive pulmonary disease) ([facility]     Diabetes mellitus ([facility]     Hx of echocardiogram 2017    [facility]    Hyperlipidemia     Hypothyroidism     MI (myocardial infarction) [facility])     Stroke [facility])     Thyroid disease     Type II or unspecified type diabetes mellitus without mention of complication, not stated as uncontrolled      Past Surgical History:  Past Surgical History:   Procedure Laterality Date    CARDIAC SURGERY  2017    Stent placed  Dr Tomas Bender      right ankle    HERNIA REPAIR       Social History:    Social History     Tobacco Use   Smoking Status Former    Types: Cigarettes    Quit date:     Years since quittin.1   Smokeless Tobacco Never     Current Medications:  Prior to Admission medications    Medication Sig Start Date End Date Taking?  Authorizing Provider   azithromycin (ZITHROMAX) 250 MG tablet TAKE 2 TABLETS BY MOUTH ON DAY 1, AND THEN TAKE 1 TABLET BY MOUTH ONCE A DAY ON DAY 2 THROUGH DAY 5  Patient not taking: No sig reported 23   Historical Provider, MD predniSONE (DELTASONE) 20 MG tablet TAKE 2 TABLETS BY MOUTH ONCE DAILY FOR 5 DAYS  Patient not taking: No sig reported 1/7/23   Historical Provider, MD   oxybutynin (DITROPAN) 5 MG tablet Take 2 tablets by mouth 2 times daily 12/16/22   Sharla Klein Might, APRN - CNP   furosemide (LASIX) 40 MG tablet Take 40 mg by mouth in the morning and 40 mg in the evening. Per wife Ken Almonte, the torsemide was very expensive so Jillene Duane continues to take his Lasix 40mg BID.     Historical Provider, MD   montelukast (SINGULAIR) 10 MG tablet Take 1 tablet by mouth daily 11/16/22   Melda Pale Might, GENA Gibson CNP   meloxicam (MOBIC) 7.5 MG tablet Take 1 tablet by mouth daily 11/16/22   Melda Pale Might, GENA Gibson CNP   Misc Natural Products (OSTEO BI-FLEX ADV JOINT SHIELD PO) Take by mouth as needed  Patient not taking: No sig reported    Historical Provider, MD   EUTHYROX 137 MCG tablet Take 1 tablet by mouth once daily 7/14/22   Sharla Klein Might, GENA Gibson CNP   atorvastatin (LIPITOR) 40 MG tablet Take 1 tablet by mouth once daily 5/31/22   Deanne Hilton MD   fluticasone-salmeterol (ADVAIR) 250-50 MCG/DOSE AEPB Inhale 1 puff into the lungs every 12 hours    Historical Provider, MD   albuterol (PROVENTIL) (2.5 MG/3ML) 0.083% nebulizer solution Take 3 mLs by nebulization every 4 hours as needed for Wheezing or Shortness of Breath 1/28/22   Sharla Snider, APRN - CNP   primidone (MYSOLINE) 250 MG tablet Take 250 mg by mouth 2 times daily    Historical Provider, MD   propranolol (INDERAL LA) 120 MG extended release capsule Take 120 mg by mouth 2 times daily    Historical Provider, MD   omeprazole (PRILOSEC) 20 MG delayed release capsule Take 20 mg by mouth daily    Historical Provider, MD   alogliptin (NESINA) 25 MG TABS tablet Take 25 mg by mouth daily    Historical Provider, MD   blood glucose monitor strips accu check 7/18/18   GENA Ceja CNP   aspirin 81 MG tablet Take 81 mg by mouth daily     Historical Provider, MD   albuterol sulfate HFA 108 (90 Base) MCG/ACT inhaler Inhale 2 puffs into the lungs every 4 hours as needed for Wheezing or Shortness of Breath 4/13/18   Lenny Johnson, APRN - CNP   metFORMIN (GLUCOPHAGE) 1000 MG tablet Take 1,000 mg by mouth 2 times daily     Historical Provider, MD   acetaminophen (TYLENOL) 500 MG tablet Take 1,000 mg by mouth every 6 hours as needed for Pain    Historical Provider, MD   TAMSULOSIN HCL PO Take 0.8 mg by mouth Daily with supper     Historical Provider, MD   Multiple Vitamins-Minerals (THERAPEUTIC MULTIVITAMIN-MINERALS) tablet Take 1 tablet by mouth daily    Historical Provider, MD      sodium chloride  2 g Oral BID WC    bumetanide  1 mg IntraVENous BID    [Held by provider] furosemide  40 mg IntraVENous Once    urea  30 g Oral Daily    ipratropium-albuterol  1 ampule Inhalation 4x daily    alogliptin  25 mg Oral Daily    levoFLOXacin  500 mg Oral Daily    ferrous sulfate  325 mg Oral BID WC    enoxaparin  30 mg SubCUTAneous BID    aspirin  81 mg Oral Daily    levothyroxine  137 mcg Oral Daily    mometasone-formoterol  2 puff Inhalation BID    metFORMIN  1,000 mg Oral BID    montelukast  10 mg Oral Daily    oxybutynin  10 mg Oral BID    primidone  250 mg Oral BID    propranolol  120 mg Oral BID    tamsulosin  0.8 mg Oral Dinner    sodium chloride flush  5-40 mL IntraVENous 2 times per day    famotidine  20 mg Oral BID       REVIEW OF SYSTEMS:    CONSTITUTIONAL: No major weight gain or loss, fatigue, weakness, night sweats or fever. HEENT: No new vision difficulties or ringing in the ears. RESPIRATORY: See HPI  CARDIOVASCULAR: See HPI  GI: No nausea, vomiting, diarrhea, constipation, abdominal pain or changes in bowel habits. : No urinary frequency, urgency, incontinence hematuria or dysuria. SKIN: No cyanosis or skin lesions. MUSCULOSKELETAL: No new muscle or joint pain. NEUROLOGICAL: No syncope or TIA-like symptoms.   PSYCHIATRIC: No anxiety, pain, insomnia or depression    Objective: PHYSICAL EXAM:      VITALS:  BP (!) 140/68   Pulse 63   Temp 97.5 °F (36.4 °C) (Temporal)   Resp 16   Ht 5' 11\" (1.803 m)   Wt 255 lb 6.4 oz (115.8 kg)   SpO2 94%   BMI 35.62 kg/m²   CONSTITUTIONAL: Cooperative, no apparent distress, and appears well nourished / developed. NEUROLOGIC:  Awake and orientated to person, place and time. PSYCH: Calm affect. SKIN: Warm and dry. HEENT: Sclera non-icteric, normocephalic, neck supple, no elevation of JVP, normal carotid pulses with no bruits and thyroid normal size. LUNGS:  Poor air entry bilaterally . No significant wheezing . Mild bilateral crackles present. Cardiovascular: Normal rate, regular rhythm, normal heart sounds. Exam reveals no gallop and no friction rubs. 4/6 systolic murmur, 5th intercostal space on the LEFT in the mid-clavicular line (cardiac apex). ABDOMEN:  Normal bowel sounds, non-distended and non-tender to palpation. Extremities: 1+ bilateral leg edema. no cyanosis or clubbing. 2+ radial and carotid pulses. Distal extremity pulses: 2+ bilaterally. Compression stockings in place.     DATA:    Lab Results   Component Value Date    ALT 32 02/10/2023    AST 31 02/10/2023    ALKPHOS 258 (H) 02/10/2023    BILITOT 0.2 (L) 02/10/2023     Lab Results   Component Value Date    CREATININE 1.02 02/10/2023    BUN 50 (H) 02/10/2023     (L) 02/10/2023    K 4.1 02/10/2023    CL 98 02/10/2023    CO2 24 02/10/2023       Lab Results   Component Value Date    WBC 12.0 (H) 02/10/2023    HGB 9.6 (L) 02/10/2023    HCT 29.9 (L) 02/10/2023    MCV 89.8 02/10/2023     02/10/2023       Lab Results   Component Value Date    TRIG 114 02/02/2023    TRIG 134 03/14/2022    TRIG 147 09/29/2021     Lab Results   Component Value Date    HDL 44 02/02/2023    HDL 52 03/14/2022    HDL 46 09/29/2021     Lab Results   Component Value Date    LDLCHOLESTEROL 45 02/02/2023    LDLCHOLESTEROL 65 03/14/2022    LDLCHOLESTEROL 61 09/29/2021         Assessment:   Sepsis secondary to atypical pneumonia. Chronic diastolic CHF. Fall and fever. Atherosclerotic heart disease, status post PCI back in 2/24/2017. History of essential tremor, unsteady gait, using walker for ambulation. Acute kidney injury. Plan:   Patient admitted after a fall, found to have fever and acute kidney injury and CT scan of the chest and abdomen is suggestive of multifocal pneumonia. Currently treated for community-acquired pneumonia. He has acute kidney injury. I do believe that the elevated BNP is secondary to fluid resuscitation. COVID and influenza are negative. UA is unremarkable. Currently on Levaquin by ID. Continue physical therapy. Monitor blood pressure as per unit protocol. Currently with mild hyponatremia. Given one dose of tolvaptan. Free water restriction to 500 ml/day. Fluid restriction to 1500 ml per day. Continue diuretics as per nephrology. Serum sodium today is 125 milligrams per deciliter. Diuretics: Continue bumetinide (Bumex) 1 mg 2 times daily. I also discussed the potential side effects of this medication including lightheadedness and dizziness and instructed them to stop the medication of this occurs and call our office if this occurs. Will repeat blood work in 1 week and follow up with us in 1-2 week outpatient. Atherosclerotic Heart Disease: S/P Stent placement on 2/24/2017 by Dr. Alejandra Bonilla at [facility]  Antiplatelet Agent: Continue Aspirin 81 mg daily. I also reminded them to watch for signs of bloody or black tarry stools and stop the medication immediately if this develops as this could be life threatening. Beta Blocker: Continue Propranolol 120 mg BID    Cholesterol Reduction Therapy: Continue Atorvastatin (Lipitor) 40 mg daily. High-sensitivity troponin is flat and is not suggestive of acute coronary event. History of Stroke: Ischemic stroke with right visual field loss. Has complete resolution of his neurological dysfunction.   Antiplatelet Agent: Continue Aspirin 81 mg daily. Cholesterol Reduction Therapy: Continue Atorvastatin (Lipitor) 40 mg daily. Lipid panel is good. Hemoglobin A1c 7.2%. His blood pressure is controlled. Essential Hypertension:  Borderline controlled. Continue propranolol  Follow-up blood pressure as per unit protocol. Hyperlipidemia: Mixed - Last LDL on 2/2/2023 was 45 mg/dL  Cholesterol Reduction Therapy: Continue Atorvastatin (Lipitor) 40 mg daily. Finally, I recommended that he continue his other medications and follow up with you as previously scheduled. He is planning on discharge today. Would like to have outpatient follow up in 1-2 weeks and repeat blood work in 1 week. Sincerely,  Mariangel Hills PA-C  [facility] Card[facility] Specialist    [address]  [facility], [address] [place] [address]  Phone: [phone], Fax: [phone]       I believe that the risk of significant morbidity and mortality related to the patient's current medical conditions are: intermediate-high. The documentation recorded by the scribe, accurately and completely reflects the services I personally performed and the decisions made by me.  Mariangel Hills PA-C February 10, 2023

## 2023-02-10 NOTE — PROGRESS NOTES
[facility]   Nephrology progress note  2/10/2023, 10:35 AM      Pt Name:    Radha Abdullahi  MRN:     [id number]     YOB: 1935  Admit Date:    1/31/2023  4:23 PM    TELEHEALTH EVALUATION -- Audio/Visual (During NMCZX-72 public health emergency)     Telehealth service was provided with the patient at his room [place] [id number] and myself the physician in [facility] office and my RN Roberto Arnold urine output better who has initiated the visit. Pursuant to the emergency declaration under the [address] [place] [address] waiver authority and the [place] [employer] Act, this Virtual  Visit was conducted, with patient's consent, to reduce the patient's risk of exposure to COVID-19 and provide continuity of care for an established patient. Services were provided through a video synchronous discussion virtually to substitute for in-person clinic visit. Chief Complaint: Nephrology following for hyponatremia. Subjective:  Patient seen and examined  Patient is looking much better.   Says he has some difficulty urinating  Still has a lot of swelling in the lower extremities  Urine output better clinically looks so much better    Objective:  24HR INTAKE/OUTPUT:    Intake/Output Summary (Last 24 hours) at 2/10/2023 1035  Last data filed at 2/10/2023 0849  Gross per 24 hour   Intake 970 ml   Output 1500 ml   Net -530 ml        Admission weight: 250 lb (113.4 kg)  Wt Readings from Last 3 Encounters:   02/10/23 255 lb 6.4 oz (115.8 kg)   12/16/22 256 lb 8 oz (116.3 kg)   12/05/22 254 lb 6.6 oz (115.4 kg)        Vitals :   Vitals:    02/10/23 0315 02/10/23 0510 02/10/23 0525 02/10/23 0630   BP: 139/68   (!) 140/68   Pulse: 66   63   Resp: 18   16   Temp: 97.2 °F (36.2 °C)   97.5 °F (36.4 °C)   TempSrc: Temporal   Temporal   SpO2: 93%  93% 94%   Weight:  255 lb 6.4 oz (115.8 kg)     Height:           Physical examination  General Appearance:  Well developed.  No distress  Mouth/Throat:  Oral mucosa moist  Neck: No accessory muscle use  Lungs:  Breath sounds: Clear  Heart[de-identified]  S1,S2 heard  Abdomen:  Soft, non - tender  Musculoskeletal:  Edema -noted    Medications:  Infusion:    sodium chloride       Meds:    sodium chloride  2 g Oral BID WC    bumetanide  1 mg IntraVENous BID    [Held by provider] furosemide  40 mg IntraVENous Once    urea  30 g Oral Daily    ipratropium-albuterol  1 ampule Inhalation 4x daily    alogliptin  25 mg Oral Daily    levoFLOXacin  500 mg Oral Daily    ferrous sulfate  325 mg Oral BID WC    enoxaparin  30 mg SubCUTAneous BID    aspirin  81 mg Oral Daily    levothyroxine  137 mcg Oral Daily    mometasone-formoterol  2 puff Inhalation BID    metFORMIN  1,000 mg Oral BID    montelukast  10 mg Oral Daily    oxybutynin  10 mg Oral BID    primidone  250 mg Oral BID    propranolol  120 mg Oral BID    tamsulosin  0.8 mg Oral Dinner    sodium chloride flush  5-40 mL IntraVENous 2 times per day    famotidine  20 mg Oral BID       Lab Data :  CBC:   Recent Labs     02/08/23  0711 02/09/23  0540 02/10/23  0600   WBC 13.2* 12.2* 12.0*   HGB 10.0* 9.8* 9.6*   HCT 30.6* 30.4* 29.9*    372 393       CMP:  Recent Labs     02/08/23  0711 02/09/23  0540 02/10/23  0600   * 126* 132*   K 4.6 4.2 4.1   CL 92* 92* 98   CO2 22 24 24   BUN 47* 49* 50*   CREATININE 0.93 0.98 1.02   GLUCOSE 167* 147* 138*   CALCIUM 9.0 9.0 8.8       Hepatic:   Recent Labs     02/08/23  0711 02/09/23  0540 02/10/23  0600   LABALBU 2.8* 2.8* 2.8*   AST 35 30 31   ALT 40 35 32   BILITOT 0.2* 0.3 0.2*   ALKPHOS 250* 264* 258*         Assessment and Plan:  Renal -renal function appears to be stable did come in with mild acute kidney injury which appears to have improved  This may be due to cardiorenal  Currently volume status appears to be getting better with the diuretics  We will continue the diuretics check a bladder scan  Electrolytes -hyponatremia with hypochloremia etiology not completely clear. Possibly hypervolemic hyponatremia did receive some salt tablets and fluid restriction along with Bumex sodium is improving currently up to 788  Chronic diastolic congestive heart failure continue Bumex getting better  Pneumonia on antibiotics  Essential hypertension running reasonable  Meds reviewed and discussed with nursing staff and patient    Patient is okay from renal standpoint for discharge please send the patient home on Bumex 1 mg p.o. twice daily, 1500 mm fluid restriction 20 mEq of p.o. KCl and salt tablets 1 g twice daily and follow-up in my office in 4 weeks with a BMP prior    Nuria Bryan MD  [facility]    This report has been created using voice recognition software.  It may contain minor errors which are inherent in voice recognition technology

## 2023-02-10 NOTE — PROGRESS NOTES
[facility]--[facility] -Progress Note    SUBJECTIVE:    Patient seen for f/u of Sepsis due to pneumonia ([address] He sitting in chair in no distress. No complaints     ROS:   Constitutional: negative  for fevers, and negative for chills. Respiratory: negative for shortness of breath, negative for cough, and negative for wheezing  Cardiovascular: negative for chest pain, and negative for palpitations  Gastrointestinal: negative for abdominal pain, negative for nausea,negative for vomiting, negative for diarrhea, and negative for constipation     All other systems were reviewed with the patient and are negative unless otherwise stated in HPI      OBJECTIVE:        VITAL SIGNS:  Patient Vitals for the past 8 hrs:   BP Temp Temp src Pulse Resp SpO2 Weight   02/10/23 0630 (!) 140/68 97.5 °F (36.4 °C) Temporal 63 16 94 % --   02/10/23 0525 -- -- -- -- -- 93 % --   02/10/23 0510 -- -- -- -- -- -- 255 lb 6.4 oz (115.8 kg)   02/10/23 0315 139/68 97.2 °F (36.2 °C) Temporal 66 18 93 % --         Temp: 97.5 °F (36.4 °C)  Temp range:    Temp  Av.5 °F (36.4 °C)  Min: 97.2 °F (36.2 °C)  Max: 97.8 °F (36.6 °C)    BP: (!) 140/68  BP Range:      Systolic (82RSP), LPI:409 , Min:139 , YJA:799      Diastolic (57FNM), GKC:88, Min:59, Max:68    Heart Rate: 63  Pulse Range:    Pulse  Av.3  Min: 63  Max: 67    Resp: 16  Resp Range:   Resp  Av.5  Min: 16  Max: 18    SpO2: 94 % on room air  SpO2 range:   SpO2  Av.9 %  Min: 84 %  Max: 96 %      PaO2/FiO2 RATIO:  No results for input(s): POCPO2 in the last 72 hours. Exam:    GEN:    Awake, alert and oriented x3. EYES:  EOMI, pupils equal   NECK: Supple. No lymphadenopathy. No carotid bruit  CVS:    regular rate and rhythm, 2/6 systolic murmur  PULM:  diminished but clear without wheezing, rales or rhonchi, no acute respiratory distress  ABD:    Bowels sounds normal.  Abdomen is soft. No distention. no tenderness to palpation. EXT:   1+ edema bilaterally .   No calf tenderness. NEURO: Moves all extremities. Motor and sensory are grossly intact  SKIN:  No rashes. No skin lesions.      Diagnostic Data:      Complete Blood Count:   Recent Labs     02/08/23  0711 02/09/23  0540 02/10/23  0600   WBC 13.2* 12.2* 12.0*   RBC 3.45* 3.39* 3.33*   HGB 10.0* 9.8* 9.6*   HCT 30.6* 30.4* 29.9*   MCV 88.7 89.7 89.8   MCH 29.0 28.9 28.8   MCHC 32.7 32.2 32.1   RDW 14.8* 14.9* 15.0*    372 393   MPV 9.4 9.3 9.6        Last 3 Blood Glucose:   Recent Labs     02/07/23  1637 02/08/23  0711 02/09/23  0540 02/10/23  0600   GLUCOSE 160* 167* 147* 138*        Comprehensive Metabolic Profile:   Recent Labs     02/08/23  0711 02/09/23  0540 02/10/23  0600   * 126* 132*   K 4.6 4.2 4.1   CL 92* 92* 98   CO2 22 24 24   BUN 47* 49* 50*   CREATININE 0.93 0.98 1.02   GLUCOSE 167* 147* 138*   CALCIUM 9.0 9.0 8.8   PROT 6.1* 6.3* 6.0*   LABALBU 2.8* 2.8* 2.8*   BILITOT 0.2* 0.3 0.2*   ALKPHOS 250* 264* 258*   AST 35 30 31   ALT 40 35 32        Urinalysis:   Lab Results   Component Value Date/Time    NITRU NEGATIVE 01/31/2023 07:45 PM    COLORU Yellow 01/31/2023 07:45 PM    PHUR 6.0 01/31/2023 07:45 PM    WBCUA 0 TO 2 01/31/2023 07:45 PM    RBCUA 0 TO 2 01/31/2023 07:45 PM    MUCUS TRACE 01/31/2023 07:45 PM    TRICHOMONAS NOT REPORTED 12/29/2021 11:30 PM    YEAST NOT REPORTED 12/29/2021 11:30 PM    BACTERIA 1+ 01/31/2023 07:45 PM    CLARITYU clear 05/10/2018 02:45 PM    SPECGRAV 1.020 01/31/2023 07:45 PM    LEUKOCYTESUR NEGATIVE 01/31/2023 07:45 PM    UROBILINOGEN Normal 01/31/2023 07:45 PM    BILIRUBINUR NEGATIVE 01/31/2023 07:45 PM    BILIRUBINUR neg 05/10/2018 02:45 PM    BLOODU neg 05/10/2018 02:45 PM    GLUCOSEU NEGATIVE 01/31/2023 07:45 PM    KETUA NEGATIVE 01/31/2023 07:45 PM    AMORPHOUS NOT REPORTED 12/29/2021 11:30 PM       HgBA1c:    Lab Results   Component Value Date/Time    LABA1C 7.2 02/02/2023 05:50 AM       Lactic Acid:   Lab Results   Component Value Date/Time    LACTA 2.2 12/29/2021 09:24 PM    LACTA 1.2 11/14/2017 08:47 PM    LACTA 1.6 09/19/2017 06:27 PM        Troponin: No results for input(s): TROPONINI in the last 72 hours. CRP:  No results for input(s): CRP in the last 72 hours. Radiology/Imaging:  XR CHEST (2 VW)   Final Result   Similar patchy right lung infiltrates         XR CHEST PORTABLE   Final Result   Increasing vascular congestion as well as localized airspace disease in the   mid right lung field. Developing pulmonary edema can have this appearance   along with underlying multifocal infection. CT ABDOMEN PELVIS W IV CONTRAST Additional Contrast? None   Final Result   1. Multifocal consolidation in the right lung and to a lesser degree left   lung base, concerning for infection. 2.  Motion degraded evaluation of the pulmonary arteries. No evidence for   central pulmonary embolism. 3.  No acute inflammatory process identified in the abdomen or pelvis. Stable benign and incidental findings, as described. CT CHEST PULMONARY EMBOLISM W CONTRAST   Final Result   1. Multifocal consolidation in the right lung and to a lesser degree left   lung base, concerning for infection. 2.  Motion degraded evaluation of the pulmonary arteries. No evidence for   central pulmonary embolism. 3.  No acute inflammatory process identified in the abdomen or pelvis. Stable benign and incidental findings, as described. US GALLBLADDER RUQ   Final Result   Gallbladder sludge. No imaging evidence for acute cholecystitis or biliary   dilatation. XR CHEST PORTABLE   Final Result   Suspected pneumonia in the right mid lung. Recommend imaging follow-up to   resolution.                ASSESSMENT / PLAN:  Primary Problem(s): Sepsis due to pneumonia [facility])  Condition is an undiagnosed new problem with uncertain prognosis  Condition is improving  Treatment plan: Consultation: Infectious disease  Imaging: no further imaging studies ordered today  Medications:   Continue Levaquin through 2/12/2023  Stop Rocephin and Zithromax  Medication Monitoring / High Risk Medications: none   MRSA nares-negative  Mycoplasma IgM-negative  Legionella antigen-negative    COPD  Condition is a chronic stable condition  Treatment plan: Continue current treatment  Imaging: no further imaging studies ordered today  Medications:   Continue nebs  Continue oxygen  Continue Levaquin through 2/12/2023  Stop Rocephin and Zithromax  CHF  Condition is a chronic stable condition  Treatment plan: Consultation: Cardiology  Fluid restriction 1500 mL daily  Imaging: no further imaging studies ordered today  Medications:   IV Bumex  Hold Lasix    CKD  Condition is a chronic stable condition  Treatment plan: Consultation: Nephrology  Possibly cardiorenal syndrome  Imaging: no further imaging studies ordered today  Medications:   Continue salt tablets 2 g twice daily today  Continue urea 30 g daily  Monitor electrolytes daily  Hyponatremia-132 today    Nutrition status: Well developed, well nourished with no malnutrition  Dietician consult initiated    Hospital Prophylaxis:   DVT: Lovenox   Stress Ulcer: H2 Blocker     [facility] Heart failure documentation  [] The patient has a history of heart transplant or Left Ventricular Assist Device (LVAD). [If yes, STOP HERE] - [facility] PERFORMANCE EXCLUSION    [] The patient has a current or prior documentation of left ventricular ejection fraction (LVEF) less than or equal to 40%, or moderate or severely depressed left ventricular systolic function [if \"no\", STOP HERE]  EF 55%    ACE / ARB:  [] The patient was prescribed or is already taking and ACE or ARB  [x] Reason why ACE or ARB was not prescirbed / taking: contraindicated due to renal failure    BETA-BLOCKERS:  [x] The patient was prescribed or is already taking a Beta-blocker  [] Reason why Beta-blocker not prescribed / taking:  N/A  Disposition:  Shared decision making:  All test results, treatment options and disposition options were discussed with the patient today  Social determinants of health that may impact management: none  Code status: Full Code   Disposition: Discharge plan is [place]    MIPS Advanced Care Planning documentation:  [x] I have confirmed that the patient's Advance Care Plan is present, Code Status is documented, or surrogate decision maker is listed in the patient's medical record  [If \"yes\", STOP HERE]     [] The patient's Advance Care Plan is NOT present because:    []  I confirmed today that the patient does not wish or was not able to name a   surrogate decision maker or provide and advance care plan.    [] Hospice care is currently being provided or has been provided within the   calendar year. []  I did NOT confirm today the presence of an [address] or surrogate   decision maker documented within the patient's medical record.    [DOES NOT SATISFY [address] [facility], GENA - CNP , GENA, NP-C  Hospitalist Medicine        2/10/2023, 7:16 AM

## 2023-02-10 NOTE — PROGRESS NOTES
Patient sitting up in chair watching television and visiting with wife. Vital signs and assessment completed. Patient is alert and oriented, awake and communicating with no issues. Patient requested to remain up in chair for a while longer. Writer acknowledge, instructed to let writer know when he is ready to get into bed. Patient denies any other needs at this time. Call light, bedside table and personal belongings within reach.

## 2023-02-10 NOTE — PROGRESS NOTES
Brief changed at this time d/t incontience. Patient repositioned and got comfortable in bed. Patient denies any other needs at this time. Call light, bedside table and personal belongings within reach.

## 2023-02-10 NOTE — PROGRESS NOTES
Assessment and vitals completed at this time. Pt up to chair, tolerated well. Acapella encouraged and used, education provided on usage. Pt denies pain, denies other needs at this time, call light in reach, care ongoing.

## 2023-02-10 NOTE — PLAN OF CARE
Problem: Safety - Adult  Goal: Free from fall injury  Outcome: Progressing  Note: Bed/chair alarms in use, fall risk prevention educated on, pt aware to use call light for any needs     Problem: Nutrition Deficit:  Goal: Optimize nutritional status  Outcome: Progressing  Flowsheets (Taken 2/10/2023 1009)  Nutrient intake appropriate for improving, restoring, or maintaining nutritional needs:   Assess nutritional status and recommend course of action   Monitor oral intake, labs, and treatment plans   Recommend appropriate diets, oral nutritional supplements, and vitamin/mineral supplements   Provide specific nutrition education to patient or family as appropriate     Problem: Respiratory - Adult  Goal: Achieves optimal ventilation and oxygenation  Outcome: Progressing  Flowsheets (Taken 2/10/2023 1009)  Achieves optimal ventilation and oxygenation:   Assess for changes in respiratory status   Assess for changes in mentation and behavior   Oxygen supplementation based on oxygen saturation or arterial blood gases   Encourage broncho-pulmonary hygiene including cough, deep breathe, incentive spirometry   Assess and instruct to report shortness of breath or any respiratory difficulty   Respiratory therapy support as indicated

## 2023-02-10 NOTE — PROGRESS NOTES
Physical Therapy  Facility/Department: [facility] AT THE [facility]  Daily Treatment Note  NAME: Chip Palacios  : 1935  MRN: [id number]    Date of Service: 2/10/2023    Discharge Recommendations:  Continue to assess pending progress, Patient would benefit from continued therapy after discharge, Home with Home health PT, Outpatient PT        Patient Diagnosis(es): The primary encounter diagnosis was Septicemia ([address] A diagnosis of Pneumonia due to infectious organism, unspecified laterality, unspecified part of lung was also pertinent to this visit. Assessment   Assessment: Pt more alert this morning. He was CGA with sit to stand transfer. Once standing, pt states \"i need to pee\". He stood at [facility] while holding the urinal.  balance was fair (-) while standing with UE support ranging between 1 to no UE. Ambulated 30 ft with FWW and CGA. Activity Tolerance: Patient tolerated treatment well     Plan    Physcial Therapy Plan  General Plan: 2 times a day 7 days a week     Restrictions  Restrictions/Precautions  Restrictions/Precautions: General Precautions, Fall Risk     Subjective    Subjective  Subjective: Pt in chair resting with eyes closed upon arrival.  He just finished breakfast.  He doesn 't verbalize any complaints. Pain: no c/o pain     Objective   Vitals     Bed Mobility Training  Bed Mobility Training: No  Balance  Sitting: Intact (~ 8 min unsupported dynamic sitting)  Standing: Impaired (~ 4-5 min uni supported dynamic standing, > 1 min unsupported during clothing managment. 0 LOB noted, unstable)  Transfer Training  Transfer Training: Yes  Overall Level of Assistance: Contact-guard assistance;Stand-by assistance;Assist X1  Interventions: Verbal cues; Safety awareness training  Sit to Stand: Contact-guard assistance;Assist X1  Stand to Sit: Contact-guard assistance;Assist X1 (Once standing, pt states \"i need to pee\".   He stood at [facility] while holding the urinal.  balance was fair (-) while standing with UE support ranging between 1 to no UE.)  Gait Training  Gait Training: Yes  Gait  Overall Level of Assistance: Contact-guard assistance;Assist X1  Interventions: Safety awareness training;Verbal cues  Base of Support: Widened  Speed/Geovanna: Slow  Gait Abnormalities: Decreased step clearance;Shuffling gait  Distance (ft): 30 Feet  Assistive Device: Walker, rolling;Gait belt     PT Exercises  Exercise Treatment: Seated BLE exercises x15. Nursing present during middle of ex to dispense medication. Safety Devices  Type of Devices: All fall risk precautions in place;Call light within reach; Left in chair;Chair alarm in place;Nurse notified       Goals  Short Term Goals  Time Frame for Short Term Goals: 20 days  Short Term Goal 1: Initiate and progress HEP for strength and balance. Short Term Goal 2: Pt will be independent with bed mobility without use of rails for discharge home independently. Short Term Goal 3: Pt will transfer with +1 supervision with least restrictive device to decrease fall risk. Short Term Goal 4: Pt will ambulate with supervision to SBA +1 with least restrictive device for up to 75 feet to allow patient safe entry and exit into his home  Short Term Goal 5: Pt will negotiate 4 steps with railing +1 SBA for entry and exit into buildings for appointments.   Patient Goals   Patient Goals : return home    Education  Patient Education  Education Given To: Patient  Education Method: Verbal;Demonstration  Barriers to Learning: None  Education Outcome: Verbalized understanding;Continued education needed;Demonstrated understanding    Therapy Time   Individual Concurrent Group Co-treatment   Time In 0831         Time Out 0905         Minutes Tom Rubio PTA

## 2023-02-10 NOTE — DISCHARGE SUMMARY
Discharge Summary    Kwesi Fuller  :  1935  MRN:  [id number]    Admit date:  2023      Discharge date: 2/10/2023     Admitting Physician:  Kathy Cohen MD    Discharge Diagnoses:    Principal Problem:    Sepsis due to pneumonia [facility])  Active Problems:    Multifocal pneumonia    Septicemia ([facility] [address]    ASHD (arteriosclerotic heart disease)    Dyslipidemia    History of stroke    Iron deficiency anemia    Nonrheumatic aortic valve stenosis    Chronic diastolic congestive heart failure ([facility]    Essential hypertension    Other fluid overload    Type 2 diabetes mellitus with complication, without long-term current use of insulin (HCC)    Chronic combined systolic and diastolic CHF (congestive heart failure) (HCC)    COPD (chronic obstructive pulmonary disease) ([facility])    Hyponatremia    Elevated liver enzymes    Hypothyroidism due to Hashimoto's thyroiditis  Resolved Problems:    * No resolved hospital problems. *      Hospital Course:   Kwesi Fuller is a 82 y.o. male admitted with sepsis due to pneumonia. He presented to the emergency room for evaluation of weakness and feeling poorly. He lives at home with his wife. He reported falling in the shower prior to arrival.  EMS was called to his residence however he refused transport at that time. He denied any loss of consciousness or hitting of his head. He stated he became progressively weak and agreed to transport. Patient does have history of COPD. He complained of persistent cough that was worsened over the past several days. His wife gave him some Zithromax that she had left over for 3-day supply. He reported he is compliant with home medications. He does not use oxygen at home. He denied nausea vomiting. During patient's admission cardiology, nephrology were consulted. Patient was placed on IV Rocephin and Zithromax for pneumonia but was stopped and placed on Levaquin by infectious disease to be continued through 2023.   Patient's mycoplasma IgM, Legionella antigen and MRSA of the nares were all negative. .  Echocardiogram was ordered and initiated on IV Lasix due to edema. Patient's Lasix was stopped and he was placed on IV Bumex and had better response. Patient was placed on a 1500 mL fluid restriction which included his 500 mL of free water per cardiology and will continue this on discharge as well. Nephrology placed patient on salt tablets and will continue as well. Hemodynamically electrolytes have been replaced and he is tolerating activity well. He is afebrile. He is tolerating diet and activity. Patient will be discharged to [facility]. He will have labs in a week and follow-up with cardiology as an outpatient. Consultants:   Dr. Ike Haque, cardiology; Dr. Rocío Hernandez [facility]; Dr. Katiana Vegas, Nephrology    Procedures: none    Complications: none    Discharge Condition: fair    Exam:  GEN:    Awake, alert and oriented x3. EYES:   EOMI, pupils equal   NECK: Supple. No lymphadenopathy. No carotid bruit  CVS:     regular rate and rhythm, 2/6 systolic murmur  PULM:  diminished but clear without wheezing, rales or rhonchi, no acute respiratory distress  ABD:     Bowels sounds normal.  Abdomen is soft. No distention. no tenderness to palpation. EXT:     1+ edema bilaterally . No calf tenderness. NEURO: Moves all extremities. Motor and sensory are grossly intact  SKIN:    No rashes. No skin lesions.      Significant Diagnostic Studies:   Lab Results   Component Value Date    WBC 12.0 (H) 02/10/2023    HGB 9.6 (L) 02/10/2023     02/10/2023       Lab Results   Component Value Date    BUN 50 (H) 02/10/2023    CREATININE 1.02 02/10/2023     (L) 02/10/2023    K 4.1 02/10/2023    CALCIUM 8.8 02/10/2023    CL 98 02/10/2023    CO2 24 02/10/2023    LABGLOM >60 02/10/2023       Lab Results   Component Value Date    WBCUA 0 TO 2 01/31/2023    RBCUA 0 TO 2 01/31/2023    EPITHUA 0 TO 2 01/31/2023    LEUKOCYTESUR NEGATIVE 01/31/2023 SPECGRAV 1.020 01/31/2023    GLUCOSEU NEGATIVE 01/31/2023    KETUA NEGATIVE 01/31/2023    PROTEINU 2+ (A) 01/31/2023    HGBUR NEGATIVE 01/31/2023    CASTUA NOT REPORTED 12/29/2021    CRYSTUA NOT REPORTED 12/29/2021    BACTERIA 1+ (A) 01/31/2023    YEAST NOT REPORTED 12/29/2021       CT ABDOMEN PELVIS W IV CONTRAST Additional Contrast? None    Result Date: 1/31/2023  EXAMINATION: CTA OF THE CHEST; CT OF THE ABDOMEN AND PELVIS WITH CONTRAST 1/31/2023 5:58 pm TECHNIQUE: CTA of the chest was performed after the administration of intravenous contrast.  Multiplanar reformatted images are provided for review. MIP images are provided for review. Automated exposure control, iterative reconstruction, and/or weight based adjustment of the mA/kV was utilized to reduce the radiation dose to as low as reasonably achievable.; CT of the abdomen and pelvis was performed with the administration of intravenous contrast. Multiplanar reformatted images are provided for review. Automated exposure control, iterative reconstruction, and/or weight based adjustment of the mA/kV was utilized to reduce the radiation dose to as low as reasonably achievable. COMPARISON: Chest radiograph today. Chest CT 12/02/2022. CT chest abdomen pelvis 07/21/2021. HISTORY: ORDERING SYSTEM PROVIDED HISTORY: Fever, hypoxia, history of PE TECHNOLOGIST PROVIDED HISTORY: Fever, hypoxia, history of PE Decision Support Exception - unselect if not a suspected or confirmed emergency medical condition->Emergency Medical Condition (MA); ORDERING SYSTEM PROVIDED HISTORY: Fever elevated liver enzyme TECHNOLOGIST PROVIDED HISTORY: Fever elevated liver enzyme Decision Support Exception - unselect if not a suspected or confirmed emergency medical condition->Emergency Medical Condition (MA) FINDINGS: CHEST CT: Pulmonary Arteries: Pulmonary arteries are adequately opacified for evaluation. Motion artifact is noted, degrading evaluation of the segmental branches.   No evidence for central pulmonary embolism. Main pulmonary artery is normal in caliber. Mediastinum: No evidence of mediastinal lymphadenopathy. The heart and pericardium demonstrate no acute abnormality. There is no acute abnormality of the thoracic aorta. Calcified atheromatous plaque. Lungs/pleura: Expiratory phase of imaging and mild motion artifact noted. Consolidative opacities are present in the posterolateral inferior right upper lobe and posterior right lower lobe. Patchy airspace disease to a lesser degree is present in the left lung base. Mild subsegmental atelectasis is also present. No effusion. The central airway is patent. Soft Tissues/Bones: No acute bone or soft tissue abnormality. ABDOMEN PELVIS: Organs: The liver, gallbladder, pancreas, spleen, adrenals and kidneys reveal no acute findings. Horseshoe kidney. Stable left adrenal nodules measuring up to 1.7 cm. GI/Bowel: There is no bowel dilatation or wall thickening identified. Diverticulosis. Pelvis: No acute findings. Peritoneum/Retroperitoneum: No free air or free fluid. The aorta is normal in caliber. The visceral branches are patent. No lymphadenopathy. Bones/Soft Tissues: No abnormality identified. *Unless otherwise specified, incidental findings do not require dedicated imaging follow-up. 1.  Multifocal consolidation in the right lung and to a lesser degree left lung base, concerning for infection. 2.  Motion degraded evaluation of the pulmonary arteries. No evidence for central pulmonary embolism. 3.  No acute inflammatory process identified in the abdomen or pelvis. Stable benign and incidental findings, as described. US GALLBLADDER RUQ    Result Date: 1/31/2023  EXAMINATION: RIGHT UPPER QUADRANT ULTRASOUND 1/31/2023 5:30 pm COMPARISON: None. Abdominal CT today.  HISTORY: ORDERING SYSTEM PROVIDED HISTORY: fever, RUQ pain TECHNOLOGIST PROVIDED HISTORY: fever, RUQ pain FINDINGS: LIVER:  The liver demonstrates normal echogenicity without evidence of intrahepatic biliary ductal dilatation. BILIARY SYSTEM: Gallbladder sludge is noted without shadowing stone identified. No wall thickening, pericholecystic fluid or Goodwin sign. Common bile duct is within normal limits measuring 5 mm. RIGHT KIDNEY: The right kidney is grossly unremarkable without evidence of hydronephrosis. PANCREAS:  Obscured. OTHER: No evidence of right upper quadrant ascites. Gallbladder sludge. No imaging evidence for acute cholecystitis or biliary dilatation. XR CHEST PORTABLE    Result Date: 1/31/2023  EXAMINATION: ONE XRAY VIEW OF THE CHEST 1/31/2023 4:51 pm COMPARISON: 01/06/2023, 12/02/2022 HISTORY: ORDERING SYSTEM PROVIDED HISTORY: fever, fall TECHNOLOGIST PROVIDED HISTORY: fever, fall FINDINGS: New airspace disease in the right mid lung. Cardiomediastinal silhouette is stable. No pleural effusion or pneumothorax. No gross bony abnormality. Suspected pneumonia in the right mid lung. Recommend imaging follow-up to resolution. CT CHEST PULMONARY EMBOLISM W CONTRAST    Result Date: 1/31/2023  EXAMINATION: CTA OF THE CHEST; CT OF THE ABDOMEN AND PELVIS WITH CONTRAST 1/31/2023 5:58 pm TECHNIQUE: CTA of the chest was performed after the administration of intravenous contrast.  Multiplanar reformatted images are provided for review. MIP images are provided for review. Automated exposure control, iterative reconstruction, and/or weight based adjustment of the mA/kV was utilized to reduce the radiation dose to as low as reasonably achievable.; CT of the abdomen and pelvis was performed with the administration of intravenous contrast. Multiplanar reformatted images are provided for review. Automated exposure control, iterative reconstruction, and/or weight based adjustment of the mA/kV was utilized to reduce the radiation dose to as low as reasonably achievable. COMPARISON: Chest radiograph today. Chest CT 12/02/2022.   CT chest abdomen pelvis 07/21/2021. HISTORY: ORDERING SYSTEM PROVIDED HISTORY: Fever, hypoxia, history of PE TECHNOLOGIST PROVIDED HISTORY: Fever, hypoxia, history of PE Decision Support Exception - unselect if not a suspected or confirmed emergency medical condition->Emergency Medical Condition (MA); ORDERING SYSTEM PROVIDED HISTORY: Fever elevated liver enzyme TECHNOLOGIST PROVIDED HISTORY: Fever elevated liver enzyme Decision Support Exception - unselect if not a suspected or confirmed emergency medical condition->Emergency Medical Condition (MA) FINDINGS: CHEST CT: Pulmonary Arteries: Pulmonary arteries are adequately opacified for evaluation. Motion artifact is noted, degrading evaluation of the segmental branches. No evidence for central pulmonary embolism. Main pulmonary artery is normal in caliber. Mediastinum: No evidence of mediastinal lymphadenopathy. The heart and pericardium demonstrate no acute abnormality. There is no acute abnormality of the thoracic aorta. Calcified atheromatous plaque. Lungs/pleura: Expiratory phase of imaging and mild motion artifact noted. Consolidative opacities are present in the posterolateral inferior right upper lobe and posterior right lower lobe. Patchy airspace disease to a lesser degree is present in the left lung base. Mild subsegmental atelectasis is also present. No effusion. The central airway is patent. Soft Tissues/Bones: No acute bone or soft tissue abnormality. ABDOMEN PELVIS: Organs: The liver, gallbladder, pancreas, spleen, adrenals and kidneys reveal no acute findings. Horseshoe kidney. Stable left adrenal nodules measuring up to 1.7 cm. GI/Bowel: There is no bowel dilatation or wall thickening identified. Diverticulosis. Pelvis: No acute findings. Peritoneum/Retroperitoneum: No free air or free fluid. The aorta is normal in caliber. The visceral branches are patent. No lymphadenopathy. Bones/Soft Tissues: No abnormality identified.  *Unless otherwise specified, incidental findings do not require dedicated imaging follow-up. 1.  Multifocal consolidation in the right lung and to a lesser degree left lung base, concerning for infection. 2.  Motion degraded evaluation of the pulmonary arteries. No evidence for central pulmonary embolism. 3.  No acute inflammatory process identified in the abdomen or pelvis. Stable benign and incidental findings, as described.        Assessment and Plan:  Patient Active Problem List    Diagnosis Date Noted    Essential hypertension 02/06/2023    Other fluid overload 02/06/2023    Nonrheumatic aortic valve stenosis 02/03/2023    Chronic diastolic congestive heart failure ([address] 02/03/2023    Septicemia ([address] 02/02/2023    ASHD (arteriosclerotic heart disease) 02/02/2023    Dyslipidemia 02/02/2023    History of stroke 02/02/2023    Iron deficiency anemia 02/02/2023    Sepsis due to pneumonia ([address] 01/31/2023    Multifocal pneumonia 12/02/2022    Community acquired bacterial pneumonia 12/30/2021    Hypoxia 12/30/2021    Moderate persistent asthma without complication 19/53/0165    Hypothyroidism due to Hashimoto's thyroiditis 11/05/2018    Elevated liver enzymes 11/02/2018    Adrenal adenoma, left 10/16/2018    Hyponatremia 04/06/2018    COPD (chronic obstructive pulmonary disease) ([address] 11/15/2017    Chronic combined systolic and diastolic CHF (congestive heart failure) ([address] 09/21/2017    Knee osteoarthritis 06/11/2015    Obesity (BMI 30.0-34.9) 02/05/2015    Venous (peripheral) insufficiency 02/05/2015    Hx pulmonary embolism 02/05/2015    Hearing impaired 02/05/2015    Edema 02/05/2015    Type 2 diabetes mellitus with complication, without long-term current use of insulin ([address] 02/05/2015        Discharge Medications:         Medication List        START taking these medications      bumetanide 1 MG tablet  Commonly known as: BUMEX  Take 1 tablet by mouth 2 times daily     calcium carbonate 500 MG chewable tablet  Commonly known as: TUMS  Take 1 tablet by mouth every 6 hours as needed for Heartburn     ferrous sulfate 325 (65 Fe) MG tablet  Commonly known as: IRON 325  Take 1 tablet by mouth 2 times daily (with meals)     levoFLOXacin 500 MG tablet  Commonly known as: LEVAQUIN  Take 1 tablet by mouth daily for 1 dose  Start taking on: February 11, 2023     urea 15 g Pack packet  Commonly known as: URE-NA  Take 30 g by mouth daily  Start taking on: February 11, 2023            CONTINUE taking these medications      acetaminophen 500 MG tablet  Commonly known as: TYLENOL     * albuterol sulfate  (90 Base) MCG/ACT inhaler  Commonly known as: PROVENTIL;VENTOLIN;PROAIR  Inhale 2 puffs into the lungs every 4 hours as needed for Wheezing or Shortness of Breath     * albuterol (2.5 MG/3ML) 0.083% nebulizer solution  Commonly known as: PROVENTIL  Take 3 mLs by nebulization every 4 hours as needed for Wheezing or Shortness of Breath     alogliptin 25 MG Tabs tablet  Commonly known as: NESINA     aspirin 81 MG tablet     blood glucose test strips  accu check     Euthyrox 137 MCG tablet  Generic drug: levothyroxine  Take 1 tablet by mouth once daily     fluticasone-salmeterol 250-50 MCG/DOSE Aepb  Commonly known as: ADVAIR     meloxicam 7.5 MG tablet  Commonly known as: MOBIC  Take 1 tablet by mouth daily     metFORMIN 1000 MG tablet  Commonly known as: GLUCOPHAGE     montelukast 10 MG tablet  Commonly known as: SINGULAIR  Take 1 tablet by mouth daily     omeprazole 20 MG delayed release capsule  Commonly known as: PRILOSEC     oxybutynin 5 MG tablet  Commonly known as: DITROPAN  Take 2 tablets by mouth 2 times daily     primidone 250 MG tablet  Commonly known as: MYSOLINE     propranolol 120 MG extended release capsule  Commonly known as: INDERAL LA     TAMSULOSIN HCL PO     therapeutic multivitamin-minerals tablet           * This list has 2 medication(s) that are the same as other medications prescribed for you.  Read the directions carefully, and ask your doctor or other care provider to review them with you. STOP taking these medications      atorvastatin 40 MG tablet  Commonly known as: LIPITOR     azithromycin 250 MG tablet  Commonly known as: ZITHROMAX     furosemide 40 MG tablet  Commonly known as: LASIX     OSTEO BI-FLEX ADV JOINT SHIELD PO     predniSONE 20 MG tablet  Commonly known as: Jim Eufemia               Where to Get Your Medications        You can get these medications from any pharmacy    Bring a paper prescription for each of these medications  levoFLOXacin 500 MG tablet       Information about where to get these medications is not yet available    Ask your nurse or doctor about these medications  bumetanide 1 MG tablet  calcium carbonate 500 MG chewable tablet  ferrous sulfate 325 (65 Fe) MG tablet  urea 15 g Pack packet         Patient Instructions:    Activity: activity as tolerated  Diet: cardiac diet and 1500 mL fluid restriction total which includes 500 mL of free water within the  Wound Care: none needed  Other: CBC with differential and CMP 1 week    Disposition:   [facility] to [facility]    Follow up:  Patient will be followed by GENA Vail CNP in 1-2 weeks    CORE MEASURES on Discharge (if applicable)  ACE/ARB in CHF: Yes  Statin in MI: NA  ASA in [place]: NA  Statin in CVA: NA  Antiplatelet in CVA: NA    Total time spent on discharge services: 40 minutes    Including the following activities:  Evaluation and Management of patient  Discussion with patient and/or surrogate about current care plan  Coordination with Case Management and/or   Coordination of care with Consultants (if applicable)   Coordination of care with Receiving Facility Physician (if applicable)  Completion of DME forms (if applicable)  Preparation of Discharge Summary  Preparation of Medication Reconciliation  Preparation of Discharge Prescriptions    Signed:  GENA Ford CNP, GENA, NP-C  2/10/2023, 10:27 AM

## 2023-02-10 NOTE — PROGRESS NOTES
Patient is discharge to [facility] today. Discharge paper work done and fax to SNF.   CORINA Reyna

## 2023-02-10 NOTE — PROGRESS NOTES
[facility] - Telemedicine Progress Note  Today's Date and Time: 2/10/2023, 7:35 AM    Impression :   Community acquired pneumonia  Hypoxic respiratory distress  Hyponatremia  KARO  Chronic cough  Leukocytosis  Elevated BNP  COPD  Chronic systolic CHF  DM II  Hx MI    Recommendations:   Levaquin 500 mg po daily. Stop date 2-12-23  Discontinued IV Rocephin  Discontinued azithromycin    MRSA Nares not detected  Mycoplasma IgM: negative   Legionella antigen: not detected    Medical Decision Making/Summary/Discussion:2/10/2023       Infection Control Recommendations   [place] Precautions    Antimicrobial Stewardship Recommendations     Simplification of therapy  Targeted therapy  PK dosing    Coordination of Outpatient Care:   Estimated Length of IV antimicrobials:TBD  Patient will need Midline Catheter Insertion: TBD  Patient will need PICC line Insertion:BD  Patient will need: Home IV , [place],  SNF,  LTAC: TBD  Patient will need outpatient wound care: No    Chief complaint/reason for consultation:   CAP      History of Present Illness:   Chandler Lo is a 82y.o.-year-old male who was initially admitted on 1/31/2023. Patient seen at the request of Dr. Rajwinder Rojas:    This patient presented to [facility] ED on 1/31/23 after falling in his shower at home. He denied hitting his head or experiencing a syncopal episode, but admits to a persistent cough over the past few days. He has taken 3 days of azithromycin that his wife had given him. In the ED, he was found to have a fever of 39.7 C and his SpO2 was 88% on room air. The patient does have a history of COPD but is not on home O2. A CT chest showed multifocal consolidation in the right lung and the base of the left lung. Covid and influenza were not detected.      Abnormal labs at admission included:  Na:130  Cr:1.26  Lactic:2.4  BNP:1279  Alk phos:185  ALT:123  AST:95  Glucose:220  WBC:14.5    There has been no growth on blood or urine cultures. Since admission, the patient's WBC has increased to 19.7. He has also experienced worsening KARO and hyponatremia. His BNP has also increased to 4397. He has been receiving rocephin and azithromycin since 1/31/23. CT Chest/abd/pelvis 1/31/23  Impression   1. Multifocal consolidation in the right lung and to a lesser degree left   lung base, concerning for infection. 2.  Motion degraded evaluation of the pulmonary arteries. No evidence for   central pulmonary embolism. 3.  No acute inflammatory process identified in the abdomen or pelvis. Stable benign and incidental findings, as described. CURRENT EVALUATION 2/10/2023  BP (!) 140/68   Pulse 63   Temp 97.5 °F (36.4 °C) (Temporal)   Resp 16   Ht 5' 11\" (1.803 m)   Wt 255 lb 6.4 oz (115.8 kg)   SpO2 94%   BMI 35.62 kg/m²     Afebrile  VS stable    Patient feels better  No complaints  No new issues per RN    Medications reviewed  On levaquin. Stop date 2-12-23    Pro BNP very elevated but improving  On Bumex    He is on 4-->1-->3-->2 L NC   RR: 25-->17-->19-->16  SpO2: 92 -->97-->94 %    Has bronchopneumonia in RLL   Presence of hyponatremia raised concern for Legionella but antigen test negative for serotype 1    Hyponatremia continues  Leukocytosis is improving    Labs, X rays reviewed: 2/10/2023    BUN: 21-->40-->49-->50  Cr:1.3-->-->0.92-->1.08-->0.98-->1.02  Na: 120-->126-->132    WBC:19.7-->15.9-->13.7-->12.0  Hb:9.7-->9.4-->10.1-->9.6  Plat: 343-->372-->3.93    CRP: 315.8  Pro BNP:4397--> 5,573-->3,614-->3,460    Cultures:  Urine:    Blood:  1/31/23: No growth  Sputum :    Wound:    MRSA Nares:  2/2/23: Not detected    Imaging:    CT Chest   1/31/23        CXR  1/31/23 1/6/23      Discussed with patient, RN, family.     I have personally reviewed the past medical history, past surgical history, medications, social history, and family history, and I have updated the database accordingly.   Past Medical History:     Past Medical History:   Diagnosis Date    COPD (chronic obstructive pulmonary disease) ([facility] [address]     Diabetes mellitus ([facility] [address]     Hx of echocardiogram 2017    [facility]    Hyperlipidemia     Hypothyroidism     MI (myocardial infarction) [facility])     Stroke [facility])     Thyroid disease     Type II or unspecified type diabetes mellitus without mention of complication, not stated as uncontrolled        Past Surgical  History:     Past Surgical History:   Procedure Laterality Date    CARDIAC SURGERY  2017    Stent placed  Dr Natasha Guillory      right ankle    HERNIA REPAIR         Medications:      sodium chloride  2 g Oral BID WC    bumetanide  1 mg IntraVENous BID    [Held by provider] furosemide  40 mg IntraVENous Once    urea  30 g Oral Daily    ipratropium-albuterol  1 ampule Inhalation 4x daily    alogliptin  25 mg Oral Daily    levoFLOXacin  500 mg Oral Daily    ferrous sulfate  325 mg Oral BID WC    enoxaparin  30 mg SubCUTAneous BID    aspirin  81 mg Oral Daily    levothyroxine  137 mcg Oral Daily    mometasone-formoterol  2 puff Inhalation BID    metFORMIN  1,000 mg Oral BID    montelukast  10 mg Oral Daily    oxybutynin  10 mg Oral BID    primidone  250 mg Oral BID    propranolol  120 mg Oral BID    tamsulosin  0.8 mg Oral Dinner    sodium chloride flush  5-40 mL IntraVENous 2 times per day    famotidine  20 mg Oral BID       Social History:     Social History     Socioeconomic History    Marital status:      Spouse name: Not on file    Number of children: Not on file    Years of education: Not on file    Highest education level: Not on file   Occupational History    Not on file   Tobacco Use    Smoking status: Former     Types: Cigarettes     Quit date:      Years since quittin.1    Smokeless tobacco: Never   Vaping Use    Vaping Use: Never used   Substance and Sexual Activity    Alcohol use: Yes     Alcohol/week: 1.0 standard drink     Types: 1 Drinks containing 0.5 oz of alcohol per week     Comment: occasional    Drug use: No    Sexual activity: Not on file   Other Topics Concern    Not on file   Social History Narrative    Not on file     Social Determinants of Health     Financial Resource Strain: Unknown    Difficulty of Paying Living Expenses: Patient refused   Food Insecurity: Unknown    Worried About Running Out of Food in the Last Year: Patient refused    Ran Out of Food in the Last Year: Patient refused   Transportation Needs: Not on file   Physical Activity: Inactive    Days of Exercise per Week: 0 days    Minutes of Exercise per Session: 0 min   Stress: Not on file   Social Connections: Not on file   Intimate Partner Violence: Not on file   Housing Stability: Not on file       Family History:     Family History   Problem Relation Age of Onset    Cancer Mother 47        liver ca    Diabetes Father     Heart Disease Father         Allergies:   Rosuvastatin     Review of Systems:   Constitutional: Generalized weakness  Head: No headaches  Eyes: No double vision or blurry vision. No conjunctival inflammation. ENT: No sore throat or runny nose. . No hearing loss, tinnitus or vertigo. Cardiovascular: No chest pain or palpitations. shortness of breath. CARVER  Lung: shortness of breath with cough. Abdomen: No nausea, vomiting, diarrhea, or abdominal pain. Donna Mazariegos No cramps. Genitourinary: No increased urinary frequency, or dysuria. No hematuria. No suprapubic or CVA pain  Musculoskeletal: No muscle aches or pains. No joint effusions, swelling or deformities  Hematologic: No bleeding or bruising. Neurologic: No headache, weakness, numbness, or tingling. Integument: No rash, no ulcers. Psychiatric: No depression. Endocrine: No polyuria, no polydipsia, no polyphagia.     Physical Examination : Patient Vitals for the past 8 hrs:   BP Temp Temp src Pulse Resp SpO2 Weight   02/10/23 0630 (!) 140/68 97.5 °F (36.4 °C) Temporal 63 16 94 % --   02/10/23 0525 -- -- -- -- -- 93 % --   02/10/23 0510 -- -- -- -- -- -- 255 lb 6.4 oz (115.8 kg)   02/10/23 0315 139/68 97.2 °F (36.2 °C) Temporal 66 18 93 % --     DANNY-televisit  General Appearance: Awake, alert. Head:  Normocephalic, no trauma  Eyes: Pupils equal, round, reactive to light and accommodation; extraocular movements intact; sclera anicteric; conjunctivae pink. No embolic phenomena. ENT: Oropharynx clear, without erythema, exudate, or thrush. No tenderness of sinuses. Mouth/throat: mucosa pink and moist. No lesions. Dentition in good repair. Neck:Supple, without lymphadenopathy. Thyroid normal, No bruits. Pulmonary/Chest: DANNY-televisit  Cardiovascular: DANNY-televisit   Abdomen: Soft, non tender. Bowel sounds normal. No organomegaly  All four Extremities: No cyanosis, clubbing, edema, or effusions. Neurologic: No gross sensory or motor deficits. Skin: Warm and dry with good turgor. No signs of peripheral arterial or venous insufficiency. No ulcerations. No open wounds. Medical Decision Making -Laboratory:   I have independently reviewed/ordered the following labs:    CBC with Differential:   Recent Labs     02/09/23  0540 02/10/23  0600   WBC 12.2* 12.0*   HGB 9.8* 9.6*   HCT 30.4* 29.9*    393   LYMPHOPCT 8* PENDING   MONOPCT 5 PENDING       BMP:   Recent Labs     02/09/23  0540 02/10/23  0600   * 132*   K 4.2 4.1   CL 92* 98   CO2 24 24   BUN 49* 50*   CREATININE 0.98 1.02       Hepatic Function Panel:   Recent Labs     02/09/23  0540 02/10/23  0600   PROT 6.3* 6.0*   LABALBU 2.8* 2.8*   BILITOT 0.3 0.2*   ALKPHOS 264* 258*   ALT 35 32   AST 30 31       No results for input(s): RPR in the last 72 hours. No results for input(s): HIV in the last 72 hours. No results for input(s): BC in the last 72 hours.   Lab Results   Component Value Date/Time    MUCUS TRACE 01/31/2023 07:45 PM    RBC 3.33 02/10/2023 06:00 AM    TRICHOMONAS NOT REPORTED 12/29/2021 11:30 PM    WBC 12.0 02/10/2023 06:00 AM    YEAST NOT REPORTED 12/29/2021 11:30 PM    TURBIDITY Clear 01/31/2023 07:45 PM     Lab Results   Component Value Date/Time    CREATININE 1.02 02/10/2023 06:00 AM    GLUCOSE 138 02/10/2023 06:00 AM       Medical Decision Making-Imaging:     Narrative   EXAMINATION:   CTA OF THE CHEST; CT OF THE ABDOMEN AND PELVIS WITH CONTRAST 1/31/2023 5:58 pm       TECHNIQUE:   CTA of the chest was performed after the administration of intravenous   contrast.  Multiplanar reformatted images are provided for review. MIP   images are provided for review. Automated exposure control, iterative   reconstruction, and/or weight based adjustment of the mA/kV was utilized to   reduce the radiation dose to as low as reasonably achievable.; CT of the   abdomen and pelvis was performed with the administration of intravenous   contrast. Multiplanar reformatted images are provided for review. Automated   exposure control, iterative reconstruction, and/or weight based adjustment of   the mA/kV was utilized to reduce the radiation dose to as low as reasonably   achievable. COMPARISON:   Chest radiograph today. Chest CT 12/02/2022. CT chest abdomen pelvis   07/21/2021.        HISTORY:   ORDERING SYSTEM PROVIDED HISTORY: Fever, hypoxia, history of PE   TECHNOLOGIST PROVIDED HISTORY:   Fever, hypoxia, history of PE   Decision Support Exception - unselect if not a suspected or confirmed   emergency medical condition->Emergency Medical Condition (MA); ORDERING   SYSTEM PROVIDED HISTORY: Fever elevated liver enzyme   TECHNOLOGIST PROVIDED HISTORY:       Fever elevated liver enzyme   Decision Support Exception - unselect if not a suspected or confirmed   emergency medical condition->Emergency Medical Condition (MA)       FINDINGS:   CHEST CT:       Pulmonary Arteries: Pulmonary arteries are adequately opacified for   evaluation. Motion artifact is noted, degrading evaluation of the segmental   branches. No evidence for central pulmonary embolism. Main pulmonary artery   is normal in caliber. Mediastinum: No evidence of mediastinal lymphadenopathy. The heart and   pericardium demonstrate no acute abnormality. There is no acute abnormality   of the thoracic aorta. Calcified atheromatous plaque. Lungs/pleura: Expiratory phase of imaging and mild motion artifact noted. Consolidative opacities are present in the posterolateral inferior right   upper lobe and posterior right lower lobe. Patchy airspace disease to a   lesser degree is present in the left lung base. Mild subsegmental   atelectasis is also present. No effusion. The central airway is patent. Soft Tissues/Bones: No acute bone or soft tissue abnormality. ABDOMEN PELVIS:       Organs: The liver, gallbladder, pancreas, spleen, adrenals and kidneys reveal   no acute findings. Horseshoe kidney. Stable left adrenal nodules measuring   up to 1.7 cm. GI/Bowel: There is no bowel dilatation or wall thickening identified. Diverticulosis. Pelvis: No acute findings. Peritoneum/Retroperitoneum: No free air or free fluid. The aorta is normal   in caliber. The visceral branches are patent. No lymphadenopathy. Bones/Soft Tissues: No abnormality identified. *Unless otherwise specified, incidental findings do not require dedicated   imaging follow-up. Impression   1. Multifocal consolidation in the right lung and to a lesser degree left   lung base, concerning for infection. 2.  Motion degraded evaluation of the pulmonary arteries. No evidence for   central pulmonary embolism. 3.  No acute inflammatory process identified in the abdomen or pelvis. Stable benign and incidental findings, as described.                  Medical Decision [id number]:       Medical Decision Making-Other:     Note:  Labs, medications, radiologic studies were reviewed with personal review of films  Large amounts of data were reviewed  Discussed with nursing Staff, Discharge planner  Infection Control and Prevention measures reviewed  All prior entries were reviewed  Administer medications as ordered  Prognosis: Guarded  Discharge planning reviewed      Thank you for allowing us to participate in the care of this patient. Please call with questions.     Catalina Nugent MD        Pager: [phone] - Office: [phone]

## 2023-02-13 ENCOUNTER — HOSPITAL ENCOUNTER (OUTPATIENT)
Age: 88
Setting detail: SPECIMEN
Discharge: HOME | End: 2023-02-13

## 2023-02-13 LAB
ABSOLUTE EOS #: 0.23 K/UL (ref 0–0.44)
ABSOLUTE IMMATURE GRANULOCYTE: 0.23 K/UL (ref 0–0.3)
ABSOLUTE LYMPH #: 1.4 K/UL (ref 1.1–3.7)
ABSOLUTE MONO #: 0.7 K/UL (ref 0.1–1.2)
ALBUMIN SERPL-MCNC: 2.6 G/DL (ref 3.5–5.2)
ALBUMIN/GLOBULIN RATIO: 0.8 (ref 1–2.5)
ALP SERPL-CCNC: 271 U/L (ref 40–129)
ALT SERPL-CCNC: 38 U/L (ref 5–41)
ANION GAP SERPL CALCULATED.3IONS-SCNC: 7 MMOL/L (ref 9–17)
AST SERPL-CCNC: 42 U/L
BASOPHILS # BLD: 0 % (ref 0–2)
BASOPHILS ABSOLUTE: 0 K/UL (ref 0–0.2)
BILIRUB SERPL-MCNC: 0.2 MG/DL (ref 0.3–1.2)
BUN SERPL-MCNC: 18 MG/DL (ref 8–23)
BUN/CREAT BLD: 17 (ref 9–20)
CALCIUM SERPL-MCNC: 8.6 MG/DL (ref 8.6–10.4)
CHLORIDE SERPL-SCNC: 103 MMOL/L (ref 98–107)
CO2 SERPL-SCNC: 26 MMOL/L (ref 20–31)
CREAT SERPL-MCNC: 1.05 MG/DL (ref 0.7–1.2)
EOSINOPHILS RELATIVE PERCENT: 2 % (ref 1–4)
GFR SERPL CREATININE-BSD FRML MDRD: >60 ML/MIN/1.73M2
GLUCOSE SERPL-MCNC: 123 MG/DL (ref 70–99)
HCT VFR BLD AUTO: 29.9 % (ref 40.7–50.3)
HGB BLD-MCNC: 9.5 G/DL (ref 13–17)
IMMATURE GRANULOCYTES: 2 %
LYMPHOCYTES # BLD: 12 % (ref 24–43)
MCH RBC QN AUTO: 28.8 PG (ref 25.2–33.5)
MCHC RBC AUTO-ENTMCNC: 31.8 G/DL (ref 28.4–34.8)
MCV RBC AUTO: 90.6 FL (ref 82.6–102.9)
MONOCYTES # BLD: 6 % (ref 3–12)
MORPHOLOGY: NORMAL
NRBC AUTOMATED: 0 PER 100 WBC
PDW BLD-RTO: 15.4 % (ref 11.8–14.4)
PLATELET # BLD AUTO: 418 K/UL (ref 138–453)
PMV BLD AUTO: 9.4 FL (ref 8.1–13.5)
POTASSIUM SERPL-SCNC: 4.5 MMOL/L (ref 3.7–5.3)
PROT SERPL-MCNC: 5.9 G/DL (ref 6.4–8.3)
RBC # BLD: 3.3 M/UL (ref 4.21–5.77)
SEG NEUTROPHILS: 78 % (ref 36–65)
SEGMENTED NEUTROPHILS ABSOLUTE COUNT: 9.14 K/UL (ref 1.5–8.1)
SODIUM SERPL-SCNC: 136 MMOL/L (ref 135–144)
WBC # BLD AUTO: 11.7 K/UL (ref 3.5–11.3)

## 2023-02-13 PROCEDURE — 85025 COMPLETE CBC W/AUTO DIFF WBC: CPT

## 2023-02-13 PROCEDURE — 80053 COMPREHEN METABOLIC PANEL: CPT

## 2023-02-13 PROCEDURE — 36415 COLL VENOUS BLD VENIPUNCTURE: CPT

## 2023-02-13 PROCEDURE — P9604 ONE-WAY ALLOW PRORATED TRIP: HCPCS

## 2023-02-16 ENCOUNTER — HOSPITAL ENCOUNTER (OUTPATIENT)
Age: 88
Setting detail: SPECIMEN
Discharge: HOME | End: 2023-02-16

## 2023-02-16 LAB
ABSOLUTE EOS #: 0.2 K/UL (ref 0–0.44)
ABSOLUTE IMMATURE GRANULOCYTE: 0 K/UL (ref 0–0.3)
ABSOLUTE LYMPH #: 1.18 K/UL (ref 1.1–3.7)
ABSOLUTE MONO #: 1.57 K/UL (ref 0.1–1.2)
ALBUMIN SERPL-MCNC: 2.7 G/DL (ref 3.5–5.2)
ALBUMIN/GLOBULIN RATIO: 0.8 (ref 1–2.5)
ALP SERPL-CCNC: 225 U/L (ref 40–129)
ALT SERPL-CCNC: 32 U/L (ref 5–41)
ANION GAP SERPL CALCULATED.3IONS-SCNC: 9 MMOL/L (ref 9–17)
AST SERPL-CCNC: 31 U/L
BASOPHILS # BLD: 0 % (ref 0–2)
BASOPHILS ABSOLUTE: 0 K/UL (ref 0–0.2)
BILIRUB SERPL-MCNC: 0.3 MG/DL (ref 0.3–1.2)
BUN SERPL-MCNC: 14 MG/DL (ref 8–23)
BUN/CREAT BLD: 15 (ref 9–20)
CALCIUM SERPL-MCNC: 8.5 MG/DL (ref 8.6–10.4)
CHLORIDE SERPL-SCNC: 100 MMOL/L (ref 98–107)
CO2 SERPL-SCNC: 21 MMOL/L (ref 20–31)
CREAT SERPL-MCNC: 0.94 MG/DL (ref 0.7–1.2)
EOSINOPHILS RELATIVE PERCENT: 2 % (ref 1–4)
GFR SERPL CREATININE-BSD FRML MDRD: >60 ML/MIN/1.73M2
GLUCOSE SERPL-MCNC: 116 MG/DL (ref 70–99)
HCT VFR BLD AUTO: 31.1 % (ref 40.7–50.3)
HGB BLD-MCNC: 9.9 G/DL (ref 13–17)
IMMATURE GRANULOCYTES: 0 %
LYMPHOCYTES # BLD: 12 % (ref 24–43)
MCH RBC QN AUTO: 29.2 PG (ref 25.2–33.5)
MCHC RBC AUTO-ENTMCNC: 31.8 G/DL (ref 28.4–34.8)
MCV RBC AUTO: 91.7 FL (ref 82.6–102.9)
MONOCYTES # BLD: 16 % (ref 3–12)
MORPHOLOGY: NORMAL
NRBC AUTOMATED: 0 PER 100 WBC
PDW BLD-RTO: 15.7 % (ref 11.8–14.4)
PLATELET # BLD AUTO: 434 K/UL (ref 138–453)
PMV BLD AUTO: 9.8 FL (ref 8.1–13.5)
POTASSIUM SERPL-SCNC: 3.9 MMOL/L (ref 3.7–5.3)
PROT SERPL-MCNC: 6.2 G/DL (ref 6.4–8.3)
RBC # BLD: 3.39 M/UL (ref 4.21–5.77)
SEG NEUTROPHILS: 70 % (ref 36–65)
SEGMENTED NEUTROPHILS ABSOLUTE COUNT: 6.85 K/UL (ref 1.5–8.1)
SODIUM SERPL-SCNC: 130 MMOL/L (ref 135–144)
WBC # BLD AUTO: 9.8 K/UL (ref 3.5–11.3)

## 2023-02-16 PROCEDURE — 80053 COMPREHEN METABOLIC PANEL: CPT

## 2023-02-16 PROCEDURE — 36415 COLL VENOUS BLD VENIPUNCTURE: CPT

## 2023-02-16 PROCEDURE — P9604 ONE-WAY ALLOW PRORATED TRIP: HCPCS

## 2023-02-16 PROCEDURE — 85025 COMPLETE CBC W/AUTO DIFF WBC: CPT

## 2023-02-20 ENCOUNTER — HOSPITAL ENCOUNTER (OUTPATIENT)
Age: 88
Setting detail: SPECIMEN
Discharge: HOME | End: 2023-02-20

## 2023-02-20 LAB
ABSOLUTE EOS #: 0.45 K/UL (ref 0–0.44)
ABSOLUTE IMMATURE GRANULOCYTE: 0.07 K/UL (ref 0–0.3)
ABSOLUTE LYMPH #: 1.16 K/UL (ref 1.1–3.7)
ABSOLUTE MONO #: 1.2 K/UL (ref 0.1–1.2)
ALBUMIN SERPL-MCNC: 3 G/DL (ref 3.5–5.2)
ALBUMIN/GLOBULIN RATIO: 1 (ref 1–2.5)
ALP SERPL-CCNC: 185 U/L (ref 40–129)
ALT SERPL-CCNC: 32 U/L (ref 5–41)
ANION GAP SERPL CALCULATED.3IONS-SCNC: 9 MMOL/L (ref 9–17)
AST SERPL-CCNC: 34 U/L
BASOPHILS # BLD: 1 % (ref 0–2)
BASOPHILS ABSOLUTE: 0.05 K/UL (ref 0–0.2)
BILIRUB SERPL-MCNC: 0.3 MG/DL (ref 0.3–1.2)
BUN SERPL-MCNC: 16 MG/DL (ref 8–23)
BUN/CREAT BLD: 20 (ref 9–20)
CALCIUM SERPL-MCNC: 8.5 MG/DL (ref 8.6–10.4)
CHLORIDE SERPL-SCNC: 99 MMOL/L (ref 98–107)
CO2 SERPL-SCNC: 21 MMOL/L (ref 20–31)
CREAT SERPL-MCNC: 0.82 MG/DL (ref 0.7–1.2)
EOSINOPHILS RELATIVE PERCENT: 6 % (ref 1–4)
GFR SERPL CREATININE-BSD FRML MDRD: >60 ML/MIN/1.73M2
GLUCOSE SERPL-MCNC: 101 MG/DL (ref 70–99)
HCT VFR BLD AUTO: 31.6 % (ref 40.7–50.3)
HGB BLD-MCNC: 9.9 G/DL (ref 13–17)
IMMATURE GRANULOCYTES: 1 %
LYMPHOCYTES # BLD: 16 % (ref 24–43)
MCH RBC QN AUTO: 28.8 PG (ref 25.2–33.5)
MCHC RBC AUTO-ENTMCNC: 31.3 G/DL (ref 28.4–34.8)
MCV RBC AUTO: 91.9 FL (ref 82.6–102.9)
MONOCYTES # BLD: 16 % (ref 3–12)
NRBC AUTOMATED: 0 PER 100 WBC
PDW BLD-RTO: 15.9 % (ref 11.8–14.4)
PLATELET # BLD AUTO: 354 K/UL (ref 138–453)
PMV BLD AUTO: 9.6 FL (ref 8.1–13.5)
POTASSIUM SERPL-SCNC: 3.8 MMOL/L (ref 3.7–5.3)
PROT SERPL-MCNC: 6.1 G/DL (ref 6.4–8.3)
RBC # BLD: 3.44 M/UL (ref 4.21–5.77)
SEG NEUTROPHILS: 60 % (ref 36–65)
SEGMENTED NEUTROPHILS ABSOLUTE COUNT: 4.41 K/UL (ref 1.5–8.1)
SODIUM SERPL-SCNC: 129 MMOL/L (ref 135–144)
WBC # BLD AUTO: 7.3 K/UL (ref 3.5–11.3)

## 2023-02-20 PROCEDURE — 85025 COMPLETE CBC W/AUTO DIFF WBC: CPT

## 2023-02-20 PROCEDURE — 36415 COLL VENOUS BLD VENIPUNCTURE: CPT

## 2023-02-20 PROCEDURE — P9603 ONE-WAY ALLOW PRORATED MILES: HCPCS

## 2023-02-20 PROCEDURE — 80053 COMPREHEN METABOLIC PANEL: CPT

## 2023-02-23 ENCOUNTER — HOSPITAL ENCOUNTER (OUTPATIENT)
Age: 88
Setting detail: SPECIMEN
Discharge: HOME | End: 2023-02-23

## 2023-02-24 ENCOUNTER — HOSPITAL ENCOUNTER (OUTPATIENT)
Age: 88
Setting detail: SPECIMEN
Discharge: HOME | End: 2023-02-24

## 2023-02-24 ENCOUNTER — TELEPHONE (OUTPATIENT)
Dept: PRIMARY CARE CLINIC | Age: 88
End: 2023-02-24

## 2023-02-24 LAB
ANION GAP SERPL CALCULATED.3IONS-SCNC: 9 MMOL/L (ref 9–17)
BUN SERPL-MCNC: 26 MG/DL (ref 8–23)
BUN/CREAT BLD: 29 (ref 9–20)
CALCIUM SERPL-MCNC: 9 MG/DL (ref 8.6–10.4)
CHLORIDE SERPL-SCNC: 101 MMOL/L (ref 98–107)
CO2 SERPL-SCNC: 25 MMOL/L (ref 20–31)
CREAT SERPL-MCNC: 0.9 MG/DL (ref 0.7–1.2)
GFR SERPL CREATININE-BSD FRML MDRD: >60 ML/MIN/1.73M2
GLUCOSE SERPL-MCNC: 97 MG/DL (ref 70–99)
POTASSIUM SERPL-SCNC: 3.7 MMOL/L (ref 3.7–5.3)
SODIUM SERPL-SCNC: 135 MMOL/L (ref 135–144)

## 2023-02-24 PROCEDURE — 80048 BASIC METABOLIC PNL TOTAL CA: CPT

## 2023-02-24 PROCEDURE — P9603 ONE-WAY ALLOW PRORATED MILES: HCPCS

## 2023-02-24 PROCEDURE — 36415 COLL VENOUS BLD VENIPUNCTURE: CPT

## 2023-02-24 NOTE — TELEPHONE ENCOUNTER
Patients wife came into the office and stated she just got Thony Ch from [facility] and he has been in there since February 10th they did not putting his compression stockings on today and his feet and legs are really swollen. She is wondering if there was anything that you could send in to help get the swelling down?   Please advise thank you

## 2023-02-27 ENCOUNTER — CARE COORDINATION (OUTPATIENT)
Dept: CARE COORDINATION | Age: 88
End: 2023-02-27

## 2023-02-27 ENCOUNTER — CARE COORDINATION (OUTPATIENT)
Dept: CASE MANAGEMENT | Age: 88
End: 2023-02-27

## 2023-02-27 DIAGNOSIS — A41.9 SEPSIS DUE TO PNEUMONIA (HCC): Primary | ICD-10-CM

## 2023-02-27 DIAGNOSIS — J18.9 SEPSIS DUE TO PNEUMONIA (HCC): Primary | ICD-10-CM

## 2023-02-27 PROCEDURE — 1111F DSCHRG MED/CURRENT MED MERGE: CPT | Performed by: NURSE PRACTITIONER

## 2023-02-27 NOTE — CARE COORDINATION
[facility] Discharge Call    2023    Patient: Eden Rojo Patient : 1935   MRN: <[id number]>  Reason for Admission: Sepsis d/t Pneumonia  Discharge Date: 2/10/23 RARS: Readmission Risk Score: 18.8    Acute Care Course: Admission to [facility] -2/10. He discharged to [facility] 2/10-. HFU made:      Sig Hx: COPD, DM 2,  HLD, hypothyroidism, MI, Stroke, Thyroid disease     DME: Walker    Conversation: Spouse Fred Sanchez states patient is doing good. She placed phone on speaker. Patient states he is doing pretty good. Denies sob, cough, congestion. Patient is using inhalers and nebulizer as prescribed. Oxygen 2L available if saturations drop below 90%. Patient has BLE edema. Fred Sanchez states he wears his compression socks. Discussed elevation. Patient is taking Bumex. Denies nausea. Appetite improving. Patient is on 1500ml daily restriction. Medications reviewed. Elimination regular. Patient states he feels like he is getting stronger everyday. He has not tested his BG since he discharged. He ambulates using a walker. He is able to shower and dress self with Nicole's assistance if needed. [facility] is scheduled to visit today. Assistance to schedule f/u. Request to  to schedule f/u and update patient/spouse.       Follow up plan:     Discharge Facility: [facility]    Post Acute Facility: [facility]  Do you have any ongoing symptoms?: No   Do you have any questions related to your medications?: No   Were you discharged with any Home Care or Post Acute Services or do you currently have any active services?: Yes   Post Acute Services: Home Health (Comment: [facility])         Care Transitions Interventions         Future Appointments   Date Time Provider Gisela Harding   3/14/2023  1:00 PM MD POLO MendezF [facility]   2023 12:30 PM MD KIRSTIE Wright [facility] 2023  2:00 PM Romi Snider, APRN - CNP [facility]   Transitions of Care Initial Call    Was this an external facility discharge? Yes, 23  [facility]    Challenges to be reviewed by the provider   Additional needs identified to be addressed with provider: No  none             Method of communication with provider : none    Advance Care Planning:   Does patient have an Advance Directive: not on file. Care Transition Nurse contacted the  patient/spouse  by telephone to perform post hospital discharge assessment. Verified name and  with  patient/spouse  as identifiers. Provided introduction to self, and explanation of the CTN role. CTN reviewed discharge instructions, medical action plan and red flags with  patient/spouse  who verbalized understanding. Patient/spouse  given an opportunity to ask questions and does not have any further questions or concerns at this time. Were discharge instructions available to patient? Yes. Reviewed appropriate site of care based on symptoms and resources available to patient including: PCP  Specialist  Home health  When to call 911. The  patient/spouse  agrees to contact the PCP office for questions related to their healthcare. Medication reconciliation was performed with  patient/spouse , who verbalizes understanding of administration of home medications. Advised obtaining a 90-day supply of all daily and as-needed medications. Was patient discharged with a pulse oximeter? no    CTN provided contact information. Plan for follow-up call in 3-5 days based on severity of symptoms and risk factors. Plan for next call: symptom management-continued progress, new or worsening symptoms.        Wing Minaya RN

## 2023-03-01 ENCOUNTER — OFFICE VISIT (OUTPATIENT)
Dept: PRIMARY CARE CLINIC | Age: 88
End: 2023-03-01

## 2023-03-01 VITALS
WEIGHT: 245.8 LBS | HEART RATE: 57 BPM | DIASTOLIC BLOOD PRESSURE: 82 MMHG | RESPIRATION RATE: 18 BRPM | SYSTOLIC BLOOD PRESSURE: 122 MMHG | BODY MASS INDEX: 35.19 KG/M2 | HEIGHT: 70 IN | OXYGEN SATURATION: 98 % | TEMPERATURE: 97.9 F

## 2023-03-01 DIAGNOSIS — E87.1 HYPONATREMIA: ICD-10-CM

## 2023-03-01 DIAGNOSIS — Z09 HOSPITAL DISCHARGE FOLLOW-UP: ICD-10-CM

## 2023-03-01 DIAGNOSIS — E66.01 SEVERE OBESITY (BMI 35.0-39.9) WITH COMORBIDITY (HCC): ICD-10-CM

## 2023-03-01 DIAGNOSIS — I50.32 CHRONIC DIASTOLIC CONGESTIVE HEART FAILURE (HCC): Primary | ICD-10-CM

## 2023-03-01 SDOH — ECONOMIC STABILITY: FOOD INSECURITY: WITHIN THE PAST 12 MONTHS, YOU WORRIED THAT YOUR FOOD WOULD RUN OUT BEFORE YOU GOT MONEY TO BUY MORE.: PATIENT DECLINED

## 2023-03-01 SDOH — ECONOMIC STABILITY: INCOME INSECURITY: HOW HARD IS IT FOR YOU TO PAY FOR THE VERY BASICS LIKE FOOD, HOUSING, MEDICAL CARE, AND HEATING?: PATIENT DECLINED

## 2023-03-01 SDOH — ECONOMIC STABILITY: HOUSING INSECURITY
IN THE LAST 12 MONTHS, WAS THERE A TIME WHEN YOU DID NOT HAVE A STEADY PLACE TO SLEEP OR SLEPT IN A SHELTER (INCLUDING NOW)?: PATIENT REFUSED

## 2023-03-01 SDOH — ECONOMIC STABILITY: FOOD INSECURITY: WITHIN THE PAST 12 MONTHS, THE FOOD YOU BOUGHT JUST DIDN'T LAST AND YOU DIDN'T HAVE MONEY TO GET MORE.: PATIENT DECLINED

## 2023-03-01 NOTE — PROGRESS NOTES
Post-Discharge Transitional Care  Follow Up      Travis Hope   YOB: 1935    Date of Office Visit:  3/1/2023  Date of Hospital Admission: 1/31/23  Date of Hospital Discharge: 2/10/23  Risk of hospital readmission (high >=14%. Medium >=10%) :Readmission Risk Score: 18.8      Care management risk score Rising risk (score 2-5) and Complex Care (Scores >=6): No Risk Score On File     Non face to face  following discharge, date last encounter closed (first attempt may have been earlier): *No documented post hospital discharge outreach found in the last 14 days    Call initiated 2 business days of discharge: *No response recorded in the last 14 days    ASSESSMENT/PLAN:   Chronic diastolic congestive heart failure ([facility] [address]  Hyponatremia  -     Basic Metabolic Panel; Future  Hospital discharge follow-up  -     AK DISCHARGE MEDS RECONCILED W/ CURRENT OUTPATIENT MED LIST  Severe obesity (BMI 35.0-39. 9) with comorbidity [facility])    Medical Decision Making: moderate complexity  Return if symptoms worsen or fail to improve. On this date 3/1/2023 I have spent 52 minutes reviewing previous notes, test results and face to face with the patient discussing the diagnosis and importance of compliance with the treatment plan as well as documenting on the day of the visit. Subjective:   HPI:  Follow up of Hospital problems/diagnosis(es):   Hospital Course:   Travis Hope is a 82 y.o. male admitted with sepsis due to pneumonia. He presented to the emergency room for evaluation of weakness and feeling poorly. He lives at home with his wife. He reported falling in the shower prior to arrival.  EMS was called to his residence however he refused transport at that time. He denied any loss of consciousness or hitting of his head. He stated he became progressively weak and agreed to transport. Patient does have history of COPD. He complained of persistent cough that was worsened over the past several days. He reported he is compliant with home medications. He does not use oxygen at home. He denied nausea vomiting. During patient's admission cardiology, nephrology were consulted. Patient was placed on IV Rocephin and Zithromax for pneumonia but was stopped and placed on Levaquin by infectious disease to be continued through 2/12/2023. Patient's mycoplasma IgM, Legionella antigen and MRSA of the nares were all negative. .  Echocardiogram was ordered and initiated on IV Lasix due to edema. Patient's Lasix was stopped and he was placed on IV Bumex and had better response. Patient was placed on a 1500 mL fluid restriction which included his 500 mL of free water per cardiology and will continue this on discharge as well. Nephrology placed patient on salt tablets and will continue as well. Hemodynamically electrolytes have been replaced and he is tolerating activity well. He is afebrile. He is tolerating diet and activity. Patient will be discharged to [facility]. He will have labs in a week and follow-up with cardiology as an outpatient. Inpatient course: Discharge summary reviewed- see chart. Interval history/Current status:     Alaina Hart is feeling much better at this point. He is breathing easier and he is returned to his house. He is having physical therapy come for strength training on a routine basis. He was unable to tolerate the sodium tablets. He states he has not been taking for at least 1 week. We will repeat BMP at this time. I did ask him to use sodium in his diet at the very least.    Patient Active Problem List   Diagnosis    Obesity (BMI 30.0-34. 9)    Venous (peripheral) insufficiency    Hx pulmonary embolism    Hearing impaired    Edema    Type 2 diabetes mellitus with complication, without long-term current use of insulin ([facility])    Knee osteoarthritis    Chronic combined systolic and diastolic CHF (congestive heart failure) ([facility])    COPD (chronic obstructive pulmonary disease) ([facility] [address] Hyponatremia    Adrenal adenoma, left    Elevated liver enzymes    Hypothyroidism due to Hashimoto's thyroiditis    Moderate persistent asthma without complication    Community acquired bacterial pneumonia    Hypoxia    Multifocal pneumonia    Sepsis due to pneumonia ([facility] [address]    Septicemia ([facility] [address]    ASHD (arteriosclerotic heart disease)    Dyslipidemia    History of stroke    Iron deficiency anemia    Nonrheumatic aortic valve stenosis    Chronic diastolic congestive heart failure ([facility] [address]    Essential hypertension    Other fluid overload       Medications listed as ordered at the time of discharge from hospital     Medication List            Accurate as of March 1, 2023  4:00 PM. If you have any questions, ask your nurse or doctor.                 CONTINUE taking these medications      acetaminophen 500 MG tablet  Commonly known as: TYLENOL     * albuterol sulfate  (90 Base) MCG/ACT inhaler  Commonly known as: PROVENTIL;VENTOLIN;PROAIR  Inhale 2 puffs into the lungs every 4 hours as needed for Wheezing or Shortness of Breath     * albuterol (2.5 MG/3ML) 0.083% nebulizer solution  Commonly known as: PROVENTIL  Take 3 mLs by nebulization every 4 hours as needed for Wheezing or Shortness of Breath     alogliptin 25 MG Tabs tablet  Commonly known as: NESINA     aspirin 81 MG tablet     blood glucose test strips  accu check     bumetanide 1 MG tablet  Commonly known as: BUMEX  Take 1 tablet by mouth 2 times daily     calcium carbonate 500 MG chewable tablet  Commonly known as: TUMS  Take 1 tablet by mouth every 6 hours as needed for Heartburn     Euthyrox 137 MCG tablet  Generic drug: levothyroxine  Take 1 tablet by mouth once daily     ferrous sulfate 325 (65 Fe) MG tablet  Commonly known as: IRON 325  Take 1 tablet by mouth 2 times daily (with meals)     fluticasone-salmeterol 250-50 MCG/DOSE Aepb  Commonly known as: ADVAIR     meloxicam 7.5 MG tablet  Commonly known as: MOBIC  Take 1 tablet by mouth daily metFORMIN 1000 MG tablet  Commonly known as: GLUCOPHAGE     montelukast 10 MG tablet  Commonly known as: SINGULAIR  Take 1 tablet by mouth daily     omeprazole 20 MG delayed release capsule  Commonly known as: PRILOSEC     oxybutynin 5 MG tablet  Commonly known as: DITROPAN  Take 2 tablets by mouth 2 times daily     primidone 250 MG tablet  Commonly known as: MYSOLINE     propranolol 120 MG extended release capsule  Commonly known as: INDERAL LA     sodium chloride 1 g tablet  Take 2 tablets by mouth 2 times daily (with meals)     TAMSULOSIN HCL PO     therapeutic multivitamin-minerals tablet     urea 15 g Pack packet  Commonly known as: URE-NA  Take 30 g by mouth daily           * This list has 2 medication(s) that are the same as other medications prescribed for you. Read the directions carefully, and ask your doctor or other care provider to review them with you.                     Medications marked \"taking\" at this time  Outpatient Medications Marked as Taking for the 3/1/23 encounter (Office Visit) with GENA Contreras CNP   Medication Sig Dispense Refill    calcium carbonate (TUMS) 500 MG chewable tablet Take 1 tablet by mouth every 6 hours as needed for Heartburn      ferrous sulfate (IRON 325) 325 (65 Fe) MG tablet Take 1 tablet by mouth 2 times daily (with meals) 30 tablet 3    bumetanide (BUMEX) 1 MG tablet Take 1 tablet by mouth 2 times daily      oxybutynin (DITROPAN) 5 MG tablet Take 2 tablets by mouth 2 times daily 180 tablet 3    montelukast (SINGULAIR) 10 MG tablet Take 1 tablet by mouth daily 90 tablet 3    meloxicam (MOBIC) 7.5 MG tablet Take 1 tablet by mouth daily 90 tablet 1    EUTHYROX 137 MCG tablet Take 1 tablet by mouth once daily 90 tablet 3    fluticasone-salmeterol (ADVAIR) 250-50 MCG/DOSE AEPB Inhale 1 puff into the lungs every 12 hours      albuterol (PROVENTIL) (2.5 MG/3ML) 0.083% nebulizer solution Take 3 mLs by nebulization every 4 hours as needed for Wheezing or Shortness of Breath 120 each 3    primidone (MYSOLINE) 250 MG tablet Take 250 mg by mouth 2 times daily      propranolol (INDERAL LA) 120 MG extended release capsule Take 120 mg by mouth 2 times daily      omeprazole (PRILOSEC) 20 MG delayed release capsule Take 20 mg by mouth daily      alogliptin (NESINA) 25 MG TABS tablet Take 25 mg by mouth daily      blood glucose monitor strips accu check 100 strip 3    aspirin 81 MG tablet Take 81 mg by mouth daily       albuterol sulfate  (90 Base) MCG/ACT inhaler Inhale 2 puffs into the lungs every 4 hours as needed for Wheezing or Shortness of Breath 1 Inhaler 2    metFORMIN (GLUCOPHAGE) 1000 MG tablet Take 1,000 mg by mouth 2 times daily       acetaminophen (TYLENOL) 500 MG tablet Take 1,000 mg by mouth every 6 hours as needed for Pain      TAMSULOSIN HCL PO Take 0.8 mg by mouth Daily with supper       Multiple Vitamins-Minerals (THERAPEUTIC MULTIVITAMIN-MINERALS) tablet Take 1 tablet by mouth daily          Medications patient taking as of now reconciled against medications ordered at time of hospital discharge: No    A comprehensive review of systems was negative except for what was noted in the HPI.     Objective:    /82 (Site: Right Upper Arm, Position: Sitting, Cuff Size: Medium Adult)   Pulse 57   Temp 97.9 °F (36.6 °C)   Resp 18   Ht 5' 10\" (1.778 m)   Wt 245 lb 12.8 oz (111.5 kg)   SpO2 98%   BMI 35.27 kg/m²   General Appearance: alert and oriented to person, place and time, well-developed and well nourished, in no acute distress, and obese  Skin: warm and dry  Head: normocephalic and atraumatic  Eyes: conjunctivae normal and sclera anicteric  ENT: oropharynx clear and moist with normal mucous membranes  Neck: neck supple and non tender without mass   Pulmonary/Chest: clear  Cardiovascular: normal rate and regular rhythm  Abdomen: soft, non-tender  Extremities: trace to 1 + edema-  bilateral ankles  Musculoskeletal: no swollen joints  Neurologic: speech normal      An electronic signature was used to authenticate this note.   --Chuck Snider, APRN - CNP

## 2023-03-02 ENCOUNTER — TELEPHONE (OUTPATIENT)
Dept: PRIMARY CARE CLINIC | Age: 88
End: 2023-03-02

## 2023-03-02 NOTE — TELEPHONE ENCOUNTER
Patient's wife contacted the office in regards to patient having Bumex 1 mg prescribed. Wife also said patient has Lasix prescribed to him. Wife wondering if he is supposed to be taking just the Bumex or both water pills. Please advise.

## 2023-03-03 ENCOUNTER — CARE COORDINATION (OUTPATIENT)
Dept: CARE COORDINATION | Age: 88
End: 2023-03-03

## 2023-03-03 NOTE — CARE COORDINATION
Ambulatory Care Coordination Note  3/3/2023    Patient Current Location:  Home: [place] [address]  [address]     ACM contacted the family by telephone. Verified name and  with family as identifiers. Provided introduction to self, and explanation of the ACM role. Challenges to be reviewed by the provider   Additional needs identified to be addressed with provider: No  none               Method of communication with provider: none. ACM: Mikell [place], RN    CC outreach call placed to patient per CTN referral. Spoke with patients wife Marylou Jo. Reports Patria Burns seems to be doing well since returning home from [employer] [facility] OT is there now for a visit. Has [facility] home [facility]. Declined any new needs today. Offered patient enrollment in the Remote Patient Monitoring (RPM) program for in-home monitoring: Patient is not eligible for RPM program.      Future Appointments   Date Time Provider Gisela Harding   3/14/2023  1:00 PM Leopold Chancy, MD [facility]   2023 12:30 PM Alice Sim MD [facility]   2023  2:00 PM GENA Bo - CNP [facility]     Care Coordination Plan of Care:    This nurse Care Coordinator will  - plan outreach call to patient in 5-7 days   -follow up getting blood work done- BMP   -follow up getting sodium in diet- need LD referral?

## 2023-03-08 ENCOUNTER — TELEPHONE (OUTPATIENT)
Dept: PRIMARY CARE CLINIC | Age: 88
End: 2023-03-08

## 2023-03-08 NOTE — TELEPHONE ENCOUNTER
----- Message from [address] [place] [address], APRN - CNP sent at 3/8/2023  4:35 PM EST -----  Sodium level is ok, continue to use in diet. Thank you.

## 2023-03-09 ENCOUNTER — CARE COORDINATION (OUTPATIENT)
Dept: CARE COORDINATION | Age: 88
End: 2023-03-09

## 2023-03-09 NOTE — CARE COORDINATION
ACC: Mariella Brar, RN    CC outreach call placed to patient. Unable to reach Swathi Fernandes. Left a voicemail message requesting a return phone call back to this [facility] when patient is able to [phone], office hours given. Future Appointments   Date Time Provider Gisela Meaghan   3/14/2023  1:00 PM Rosa De La Cruz MD [facility]   5/1/2023 12:30 PM [facility]   11/17/2023  2:00 PM Lester Snider, APRN - CNP [facility]       Care Coordination Plan of Care:    This nurse Care Coordinator will  - await call back from patient, and if no return call; will attempt to reach patient back again next week   -follow up breathing  -CHF- doing daily weights, swelling   -follow up therapy progression

## 2023-03-14 RX ORDER — OXYBUTYNIN CHLORIDE 5 MG/1
10 TABLET ORAL 2 TIMES DAILY
Qty: 180 TABLET | Refills: 3 | Status: SHIPPED | OUTPATIENT
Start: 2023-03-14

## 2023-03-14 RX ORDER — BUMETANIDE 1 MG/1
1 TABLET ORAL 2 TIMES DAILY
Qty: 90 TABLET | Refills: 3 | Status: SHIPPED | OUTPATIENT
Start: 2023-03-14

## 2023-03-14 NOTE — TELEPHONE ENCOUNTER
Health Maintenance   Topic Date Due    DTaP/Tdap/Td vaccine (1 - Tdap) 05/16/2023 (Originally 10/15/1954)    COVID-19 Vaccine (5 - Booster for Tracy Violeta series) 11/08/2023 (Originally 9/27/2022)    Annual Wellness Visit (AWV)  11/17/2023    Depression Screen  12/16/2023    Flu vaccine  Completed    Shingles vaccine  Completed    Pneumococcal 65+ years Vaccine  Completed    Hepatitis A vaccine  Aged Out    Hib vaccine  Aged Out    Meningococcal (ACWY) vaccine  Aged Out             (applicable per patient's age: Cancer Screenings, Depression Screening, Fall Risk Screening, Immunizations)    Hemoglobin A1C (%)   Date Value   02/02/2023 7.2 (H)   12/02/2022 7.2 (H)   11/16/2022 7.0     Microalb/Crt.  Ratio (mcg/mg creat)   Date Value   09/29/2021 57 (H)     LDL Cholesterol (mg/dL)   Date Value   02/02/2023 45     AST (U/L)   Date Value   02/20/2023 34     ALT (U/L)   Date Value   02/20/2023 32     BUN (mg/dL)   Date Value   03/07/2023 20      (goal A1C is < 7)   (goal LDL is <100) need 30-50% reduction from baseline     BP Readings from Last 3 Encounters:   03/01/23 122/82   02/10/23 (!) 140/68   12/16/22 132/84    (goal /80)      All Future Testing planned in [facility]:  Lab Frequency Next Occurrence   Echo 2D w doppler w color complete Once 82/10/2033   Basic Metabolic Panel Once 84/42/3982   XR CHEST STANDARD (2 VW) Once 10/17/2022   Stress test ([place]) Once 10/17/2022   T4, Free Once 02/14/2023   TSH Once 85/36/8816   Basic Metabolic Panel Once 89/63/3461   Comprehensive Metabolic Panel Once 95/29/4764   CBC with Auto Differential Once 01/50/7481   Basic Metabolic Panel Once 38/23/1256       Next Visit Date:  Future Appointments   Date Time Provider Gisela Harding   4/27/2023  2:40 PM [id number] Terese Craft, DO [facility]   5/1/2023 12:30 PM Odilon Bustos MD [facility]   11/17/2023  2:00 PM GE[facility] Lozano - CNP [facility]            Patient Active Problem List:     Obesity (BMI 30.0-34. 9)     Venous (peripheral) insufficiency     Hx pulmonary embolism     Hearing impaired     Edema     Type 2 diabetes mellitus with complication, without long-term current use of insulin (HCC)     Knee osteoarthritis     Chronic combined systolic and diastolic CHF (congestive heart failure) (HCC)     COPD (chronic obstructive pulmonary disease) (HCC)     Hyponatremia     Adrenal adenoma, left     Elevated liver enzymes     Hypothyroidism due to Hashimoto's thyroiditis     Moderate persistent asthma without complication     Community acquired bacterial pneumonia     Hypoxia     Multifocal pneumonia     Sepsis due to pneumonia ([address]     Septicemia ([address]     ASHD (arteriosclerotic heart disease)     Dyslipidemia     History of stroke     Iron deficiency anemia     Nonrheumatic aortic valve stenosis     Chronic diastolic congestive heart failure (HCC)     Essential hypertension     Other fluid overload

## 2023-03-14 NOTE — TELEPHONE ENCOUNTER
Health Maintenance   Topic Date Due    DTaP/Tdap/Td vaccine (1 - Tdap) 05/16/2023 (Originally 10/15/1954)    COVID-19 Vaccine (5 - Booster for [place]ers series) 11/08/2023 (Originally 9/27/2022)    Annual Wellness Visit (AWV)  11/17/2023    Depression Screen  12/16/2023    Flu vaccine  Completed    Shingles vaccine  Completed    Pneumococcal 65+ years Vaccine  Completed    Hepatitis A vaccine  Aged Out    Hib vaccine  Aged Out    Meningococcal (ACWY) vaccine  Aged Out             (applicable per patient's age: Cancer Screenings, Depression Screening, Fall Risk Screening, Immunizations)    Hemoglobin A1C (%)   Date Value   02/02/2023 7.2 (H)   12/02/2022 7.2 (H)   11/16/2022 7.0     Microalb/Crt.  Ratio (mcg/mg creat)   Date Value   09/29/2021 57 (H)     LDL Cholesterol (mg/dL)   Date Value   02/02/2023 45     AST (U/L)   Date Value   02/20/2023 34     ALT (U/L)   Date Value   02/20/2023 32     BUN (mg/dL)   Date Value   03/07/2023 20      (goal A1C is < 7)   (goal LDL is <100) need 30-50% reduction from baseline     BP Readings from Last 3 Encounters:   03/01/23 122/82   02/10/23 (!) 140/68   12/16/22 132/84    (goal /80)      All Future Testing planned in [facility]:  Lab Frequency Next Occurrence   Echo 2D w doppler w color complete Once 47/88/1258   Basic Metabolic Panel Once 65/49/0332   XR CHEST STANDARD (2 VW) Once 10/17/2022   Stress test ([place]) Once 10/17/2022   T4, Free Once 02/14/2023   TSH Once 90/72/3392   Basic Metabolic Panel Once 22/40/3083   Comprehensive Metabolic Panel Once 56/25/2395   CBC with Auto Differential Once 17/87/3513   Basic Metabolic Panel Once 56/18/6863       Next Visit Date:  Future Appointments   Date Time Provider Gisela Harding   4/27/2023  2:40 PM [id number] Terese Craft DO [facility]   5/1/2023 12:30 PM Saloni Palin, MD TIFF PULM [facility]PP   11/17/2023  2:00 PM [facility] Roman [facility]            Patient Active Problem List:     Obesity (BMI 30.0-34. 9)     Venous (peripheral) insufficiency     Hx pulmonary embolism     Hearing impaired     Edema     Type 2 diabetes mellitus with complication, without long-term current use of insulin (HCC)     Knee osteoarthritis     Chronic combined systolic and diastolic CHF (congestive heart failure) (HCC)     COPD (chronic obstructive pulmonary disease) (HCC)     Hyponatremia     Adrenal adenoma, left     Elevated liver enzymes     Hypothyroidism due to Hashimoto's thyroiditis     Moderate persistent asthma without complication     Community acquired bacterial pneumonia     Hypoxia     Multifocal pneumonia     Sepsis due to pneumonia ([address]     Septicemia ([address]     ASHD (arteriosclerotic heart disease)     Dyslipidemia     History of stroke     Iron deficiency anemia     Nonrheumatic aortic valve stenosis     Chronic diastolic congestive heart failure (HCC)     Essential hypertension     Other fluid overload

## 2023-03-16 ENCOUNTER — CARE COORDINATION (OUTPATIENT)
Dept: CARE COORDINATION | Age: 88
End: 2023-03-16

## 2023-03-16 NOTE — CARE COORDINATION
ACC: Teri Lopez, RN    CC outreach call placed to patient. Unable to reach Bella Maxwell. Left a voicemail message requesting a return phone call back to this [facility] when patient is able to [phone], office hours given. -2nd unsuccessful outreach attempt     Future Appointments   Date Time Provider Gisela Harding   4/27/2023  2:40 PM Salvador Carr DO [facility]   5/1/2023 12:30 PM Ernst Tucker MD [facility]   11/17/2023  2:00 PM Sharan Snider, APRN - CNP [facility]       Care Coordination Plan of Care:    This nurse Care Coordinator will  - await call back from patient, and if no return call; will attempt to reach patient back again in 7-10 days   -follow up sodium in diet- could not tolerate salt tablets   -noted he declined [facility] OT  -follow up breathing

## 2023-03-27 ENCOUNTER — CARE COORDINATION (OUTPATIENT)
Dept: CARE COORDINATION | Age: 88
End: 2023-03-27

## 2023-03-31 DIAGNOSIS — J44.1 COPD EXACERBATION (HCC): ICD-10-CM

## 2023-03-31 RX ORDER — AZITHROMYCIN 250 MG/1
TABLET, FILM COATED ORAL
Qty: 7 TABLET | Refills: 0 | Status: SHIPPED | OUTPATIENT
Start: 2023-03-31

## 2023-03-31 RX ORDER — ALBUTEROL SULFATE 2.5 MG/3ML
2.5 SOLUTION RESPIRATORY (INHALATION) EVERY 4 HOURS PRN
Qty: 120 EACH | Refills: 3 | Status: SHIPPED | OUTPATIENT
Start: 2023-03-31

## 2023-03-31 NOTE — TELEPHONE ENCOUNTER
Patients wife called in and stated that Karley Jaffe is having a bad cough again and is requesting medications just in case it gets worse over the weekend. They are completely out of the albuterol and she would like to know if you can send in azithromycin again for him?   Please advise thank you

## 2023-04-17 DIAGNOSIS — E78.2 MIXED HYPERLIPIDEMIA: ICD-10-CM

## 2023-04-17 DIAGNOSIS — I25.10 ASHD (ARTERIOSCLEROTIC HEART DISEASE): ICD-10-CM

## 2023-04-17 RX ORDER — ATORVASTATIN CALCIUM 40 MG/1
TABLET, FILM COATED ORAL
Qty: 90 TABLET | Refills: 0 | OUTPATIENT
Start: 2023-04-17

## 2023-04-20 ENCOUNTER — OFFICE VISIT (OUTPATIENT)
Dept: PRIMARY CARE CLINIC | Age: 88
End: 2023-04-20

## 2023-04-20 VITALS
DIASTOLIC BLOOD PRESSURE: 62 MMHG | OXYGEN SATURATION: 98 % | WEIGHT: 240 LBS | TEMPERATURE: 98.7 F | RESPIRATION RATE: 18 BRPM | HEART RATE: 64 BPM | BODY MASS INDEX: 34.44 KG/M2 | SYSTOLIC BLOOD PRESSURE: 132 MMHG

## 2023-04-20 DIAGNOSIS — J44.1 COPD WITH ACUTE EXACERBATION (HCC): Primary | ICD-10-CM

## 2023-04-20 DIAGNOSIS — J30.9 ALLERGIC RHINITIS, UNSPECIFIED SEASONALITY, UNSPECIFIED TRIGGER: ICD-10-CM

## 2023-04-20 RX ORDER — PREDNISONE 20 MG/1
20 TABLET ORAL 2 TIMES DAILY
Qty: 10 TABLET | Refills: 0 | Status: SHIPPED | OUTPATIENT
Start: 2023-04-20 | End: 2023-04-25

## 2023-04-20 RX ORDER — FLUTICASONE PROPIONATE 50 MCG
2 SPRAY, SUSPENSION (ML) NASAL DAILY
Qty: 48 G | Refills: 1 | Status: SHIPPED | OUTPATIENT
Start: 2023-04-20

## 2023-04-20 RX ORDER — DOXYCYCLINE HYCLATE 100 MG
100 TABLET ORAL 2 TIMES DAILY
Qty: 20 TABLET | Refills: 0 | Status: SHIPPED | OUTPATIENT
Start: 2023-04-20 | End: 2023-04-30

## 2023-04-20 ASSESSMENT — ENCOUNTER SYMPTOMS
EYES NEGATIVE: 1
RHINORRHEA: 1
ALLERGIC/IMMUNOLOGIC NEGATIVE: 1
SHORTNESS OF BREATH: 1
SINUS PRESSURE: 0
GASTROINTESTINAL NEGATIVE: 1
WHEEZING: 1
COUGH: 1
SINUS PAIN: 0
SORE THROAT: 0

## 2023-04-20 ASSESSMENT — PATIENT HEALTH QUESTIONNAIRE - PHQ9
SUM OF ALL RESPONSES TO PHQ QUESTIONS 1-9: 0
SUM OF ALL RESPONSES TO PHQ QUESTIONS 1-9: 0
2. FEELING DOWN, DEPRESSED OR HOPELESS: 0
SUM OF ALL RESPONSES TO PHQ QUESTIONS 1-9: 0
1. LITTLE INTEREST OR PLEASURE IN DOING THINGS: 0
SUM OF ALL RESPONSES TO PHQ QUESTIONS 1-9: 0
SUM OF ALL RESPONSES TO PHQ9 QUESTIONS 1 & 2: 0

## 2023-04-20 NOTE — PROGRESS NOTES
wheezing. Cardiovascular: Negative. Gastrointestinal: Negative. Endocrine: Negative. Genitourinary: Negative. Musculoskeletal: Negative. Skin: Negative. Allergic/Immunologic: Negative. Neurological: Negative. Hematological: Negative. Psychiatric/Behavioral: Negative. Physical Exam:   Vitals:  /62   Pulse 64   Temp 98.7 °F (37.1 °C) (Temporal)   Resp 18   Wt 240 lb (108.9 kg)   SpO2 98%   BMI 34.44 kg/m²     Physical Exam  Vitals and nursing note reviewed. Constitutional:       General: He is not in acute distress. Appearance: Normal appearance. He is obese. He is not ill-appearing. HENT:      Head: Normocephalic. Right Ear: Tympanic membrane normal.      Left Ear: Tympanic membrane normal.      Nose: Mucosal edema and rhinorrhea present. Rhinorrhea is clear. Right Sinus: No maxillary sinus tenderness or frontal sinus tenderness. Left Sinus: No maxillary sinus tenderness or frontal sinus tenderness. Mouth/Throat:      Lips: Pink. Mouth: Mucous membranes are moist.      Pharynx: Oropharynx is clear. Eyes:      Extraocular Movements: Extraocular movements intact. Conjunctiva/sclera: Conjunctivae normal.      Pupils: Pupils are equal, round, and reactive to light. Cardiovascular:      Rate and Rhythm: Normal rate and regular rhythm. Heart sounds: Normal heart sounds. Pulmonary:      Effort: Pulmonary effort is normal. Prolonged expiration present. Breath sounds: Decreased air movement present. Examination of the right-lower field reveals decreased breath sounds. Examination of the left-lower field reveals decreased breath sounds. Decreased breath sounds present. Musculoskeletal:         General: Normal range of motion. Cervical back: Normal range of motion and neck supple. Comments: Uses a cane     Lymphadenopathy:      Cervical: No cervical adenopathy. Skin:     General: Skin is warm.       Capillary

## 2023-04-20 NOTE — PATIENT INSTRUCTIONS
SURVEY:     You may be receiving a survey from [employer] regarding your visit today. Please complete the survey to enable us to provide the highest quality of care to you and your family. If you cannot score us a very good on any question, please call the office to discuss how we could have made your experience a very good one.      Thank you,    Brent Snider, APRN-CNP  Mathew Rubin, APRN-CNP  Mercy Duque, GELY Boyle, MUKUND Cortez, MUKUND Meléndez, CMA  [place], PCA  Vee, PM

## 2023-04-27 ENCOUNTER — OFFICE VISIT (OUTPATIENT)
Dept: NEPHROLOGY | Age: 88
End: 2023-04-27
Payer: MEDICARE

## 2023-04-27 VITALS
SYSTOLIC BLOOD PRESSURE: 102 MMHG | BODY MASS INDEX: 33.4 KG/M2 | RESPIRATION RATE: 20 BRPM | DIASTOLIC BLOOD PRESSURE: 48 MMHG | HEART RATE: 72 BPM | TEMPERATURE: 98.6 F | WEIGHT: 238.6 LBS | HEIGHT: 71 IN

## 2023-04-27 DIAGNOSIS — E87.1 HYPONATREMIA: Primary | ICD-10-CM

## 2023-04-27 PROCEDURE — 3078F DIAST BP <80 MM HG: CPT | Performed by: INTERNAL MEDICINE

## 2023-04-27 PROCEDURE — 1123F ACP DISCUSS/DSCN MKR DOCD: CPT | Performed by: INTERNAL MEDICINE

## 2023-04-27 PROCEDURE — 3074F SYST BP LT 130 MM HG: CPT | Performed by: INTERNAL MEDICINE

## 2023-04-27 PROCEDURE — 99213 OFFICE O/P EST LOW 20 MIN: CPT | Performed by: INTERNAL MEDICINE

## 2023-04-27 NOTE — PROGRESS NOTES
[facility]  [address] [facility] Genevie Pedraza [place]  [place] [address] [address]  Dept: [phone]  Progress Note  4/27/2023 2:31 PM      Pt Name:    Kristian Schirmer:    1935  Primary Care Physician:  Dino Ibrahim, APRN - CNP     Chief Complaint:   Chief Complaint   Patient presents with    Follow-Up from Hospital     Pt here for hospital follow up. Pt denies kidney pain. Pt states swelling in legs and feet. History of Present Illness: This is a hospital follow up visit for hyponatremia. He was seen by Dr. Ricardo Dumont. He was hospitalized after a fall, had pneumonia ,developed some worsening hyponatremia and swelling, sodium improved with salt tabs and diuretics. Also had some mild KARO. Patient feeling well. Accompanied by his wife. Down almost 30 pounds from when he was hospitalized,  swelling better. On bumex 1 mg bid. Has been off salt tabs since beginning of March per wife. Pertinent items are noted in HPI. Past History:  Past Medical History:   Diagnosis Date    COPD (chronic obstructive pulmonary disease) ([facility]     Diabetes mellitus ([facility]     Hx of echocardiogram 02/24/2017    [facility]    Hyperlipidemia     Hypothyroidism     MI (myocardial infarction) [facility])     Stroke [facility])     Thyroid disease     Type II or unspecified type diabetes mellitus without mention of complication, not stated as uncontrolled      Past Surgical History:   Procedure Laterality Date    CARDIAC SURGERY  02/24/2017    Stent placed  Dr Yanni Espinoza      right ankle    HERNIA REPAIR          VITALS:  Resp 20   Ht 5' 11\" (1.803 m)   Wt 238 lb 9.6 oz (108.2 kg)   BMI 33.28 kg/m²   Wt Readings from Last 3 Encounters:   04/27/23 238 lb 9.6 oz (108.2 kg)   04/20/23 240 lb (108.9 kg)   03/01/23 245 lb 12.8 oz (111.5 kg)     Body mass index is 33.28 kg/m².      General

## 2023-04-27 NOTE — PATIENT INSTRUCTIONS
Obtain repeat sodium level soon. SURVEY:    You may be receiving a survey from [employer] regarding your visit today. Please complete the survey to enable us to provide the highest quality of care to you and your family. If you cannot score us a very good on any question, please call the office to discuss how we could have made your experience a very good one. Thank you.

## 2023-04-28 LAB — SODIUM BLD-SCNC: 138 MEQ/L (ref 133–146)

## 2023-05-01 ENCOUNTER — TELEPHONE (OUTPATIENT)
Dept: NEPHROLOGY | Age: 88
End: 2023-05-01

## 2023-05-01 ENCOUNTER — OFFICE VISIT (OUTPATIENT)
Dept: PULMONOLOGY | Age: 88
End: 2023-05-01
Payer: MEDICARE

## 2023-05-01 VITALS
HEIGHT: 71 IN | SYSTOLIC BLOOD PRESSURE: 120 MMHG | HEART RATE: 63 BPM | WEIGHT: 238 LBS | RESPIRATION RATE: 16 BRPM | BODY MASS INDEX: 33.32 KG/M2 | DIASTOLIC BLOOD PRESSURE: 65 MMHG | OXYGEN SATURATION: 95 % | TEMPERATURE: 97.5 F

## 2023-05-01 DIAGNOSIS — E66.09 OBESITY DUE TO EXCESS CALORIES, UNSPECIFIED OBESITY SEVERITY: ICD-10-CM

## 2023-05-01 DIAGNOSIS — I50.42 CHRONIC COMBINED SYSTOLIC AND DIASTOLIC CHF (CONGESTIVE HEART FAILURE) (HCC): ICD-10-CM

## 2023-05-01 DIAGNOSIS — J18.9 MULTIFOCAL PNEUMONIA: ICD-10-CM

## 2023-05-01 DIAGNOSIS — J44.9 CHRONIC OBSTRUCTIVE PULMONARY DISEASE, UNSPECIFIED COPD TYPE (HCC): Primary | ICD-10-CM

## 2023-05-01 DIAGNOSIS — J45.40 MODERATE PERSISTENT ASTHMA WITHOUT COMPLICATION: ICD-10-CM

## 2023-05-01 DIAGNOSIS — J47.9 BRONCHIECTASIS WITHOUT COMPLICATION (HCC): ICD-10-CM

## 2023-05-01 PROCEDURE — 99214 OFFICE O/P EST MOD 30 MIN: CPT | Performed by: INTERNAL MEDICINE

## 2023-05-01 PROCEDURE — 1123F ACP DISCUSS/DSCN MKR DOCD: CPT | Performed by: INTERNAL MEDICINE

## 2023-05-01 PROCEDURE — 3074F SYST BP LT 130 MM HG: CPT | Performed by: INTERNAL MEDICINE

## 2023-05-01 PROCEDURE — 3078F DIAST BP <80 MM HG: CPT | Performed by: INTERNAL MEDICINE

## 2023-05-01 NOTE — PROGRESS NOTES
PULMONARY OP  PROGRESS NOTE        Patient:  Edvin Carias  YOB: 1935    MRN: [id number]     Acct:        Pt seen and Chart reviewed. Mr. Edvin Carias is here in followup for   1. Chronic obstructive pulmonary disease, unspecified COPD type ([address]    2. Chronic combined systolic and diastolic CHF (congestive heart failure) ([address]    3. Moderate persistent asthma without complication    4. Obesity due to excess calories, unspecified obesity severity    5. Bronchiectasis without complication ([address]    6. Multifocal pneumonia          Pt has being hospitalized for pneumonia, multilobar along with hyponatremia in February 2023  Has been using meds as recommended. Has occasional cough without any sputum production  Has some shortness of breath and wheezing that improves with albuterol use  Patient has been using Advair only once a day  Trying to exercise as much as he can  No fever or chills. Has a good appetite. Not much nasal drainage. No heartburn. No hemoptysis.   No orthopnea or PND  No chest pain or pressure  Making an effort to lose weight      Subjective:     Review of Systems -   General ROS: Completed and except as mentioned above were negative   Psychological ROS:  Completed and except as mentioned above were negative  Ophthalmic ROS:  Completed and except as mentioned above were negative  ENT ROS:  Completed and except as mentioned above were negative  Allergy and Immunology ROS:  Completed and except as mentioned above were negative  Hematological and Lymphatic ROS:  Completed and except as mentioned above were negative  Endocrine ROS: Completed and except as mentioned above were negative  Respiratory ROS:  Completed and except as mentioned above were negative  Cardiovascular ROS:  Completed and except as mentioned above were negative  Gastrointestinal ROS: Completed and except as mentioned above were

## 2023-05-01 NOTE — PATIENT INSTRUCTIONS
SURVEY:    You may be receiving a survey from [employer] regarding your visit today. Please complete the survey to enable us to provide the highest quality of care to you and your family. If you cannot score us a very good on any question, please call the office to discuss how we could have made your experience a very good one. Thank you.

## 2023-05-03 ENCOUNTER — HOSPITAL ENCOUNTER (OUTPATIENT)
Age: 88
Discharge: HOME | End: 2023-05-05
Payer: MEDICARE

## 2023-05-03 ENCOUNTER — HOSPITAL ENCOUNTER (OUTPATIENT)
Dept: GENERAL RADIOLOGY | Age: 88
Discharge: HOME | End: 2023-05-05
Payer: MEDICARE

## 2023-05-03 DIAGNOSIS — J18.9 MULTIFOCAL PNEUMONIA: ICD-10-CM

## 2023-05-03 PROCEDURE — 71046 X-RAY EXAM CHEST 2 VIEWS: CPT

## 2023-05-04 DIAGNOSIS — J44.9 CHRONIC OBSTRUCTIVE PULMONARY DISEASE, UNSPECIFIED COPD TYPE (HCC): Primary | ICD-10-CM

## 2023-05-04 RX ORDER — PREDNISONE 10 MG/1
TABLET ORAL
Qty: 30 TABLET | Refills: 0 | Status: SHIPPED | OUTPATIENT
Start: 2023-05-04

## 2023-05-04 RX ORDER — LEVOFLOXACIN 500 MG/1
500 TABLET, FILM COATED ORAL DAILY
Qty: 7 TABLET | Refills: 0 | Status: SHIPPED | OUTPATIENT
Start: 2023-05-04 | End: 2023-05-11

## 2023-05-25 DIAGNOSIS — M15.9 GENERALIZED OA: ICD-10-CM

## 2023-05-25 RX ORDER — MELOXICAM 7.5 MG/1
7.5 TABLET ORAL DAILY
Qty: 90 TABLET | Refills: 1 | Status: SHIPPED | OUTPATIENT
Start: 2023-05-25 | End: 2023-05-28

## 2023-05-25 NOTE — TELEPHONE ENCOUNTER
Health Maintenance   Topic Date Due    DTaP/Tdap/Td vaccine (1 - Tdap) Never done    COVID-19 Vaccine (5 - Booster for Vertie Novak series) 11/08/2023 (Originally 9/27/2022)    Annual Wellness Visit (AWV)  11/17/2023    Depression Screen  04/20/2024    Flu vaccine  Completed    Shingles vaccine  Completed    Pneumococcal 65+ years Vaccine  Completed    Hepatitis A vaccine  Aged Out    Hib vaccine  Aged Out    Meningococcal (ACWY) vaccine  Aged Out    Lipids  Discontinued             (applicable per patient's age: Cancer Screenings, Depression Screening, Fall Risk Screening, Immunizations)    Hemoglobin A1C (%)   Date Value   02/02/2023 7.2 (H)   12/02/2022 7.2 (H)   11/16/2022 7.0     Microalb/Crt.  Ratio (mcg/mg creat)   Date Value   09/29/2021 57 (H)     LDL Cholesterol (mg/dL)   Date Value   02/02/2023 45     AST (U/L)   Date Value   02/20/2023 34     ALT (U/L)   Date Value   02/20/2023 32     BUN (mg/dL)   Date Value   03/07/2023 20      (goal A1C is < 7)   (goal LDL is <100) need 30-50% reduction from baseline     BP Readings from Last 3 Encounters:   05/01/23 120/65   04/27/23 (!) 102/48   04/20/23 132/62    (goal /80)      All Future Testing planned in [facility]:  Lab Frequency Next Occurrence   Echo 2D w doppler w color complete Once 16/10/3886   Basic Metabolic Panel Once 37/49/7489   Stress test ([place]) Once 10/17/2022   T4, Free Once 02/14/2023   TSH Once 94/25/5989   Basic Metabolic Panel Once 56/19/6445   Comprehensive Metabolic Panel Once 40/85/3312   CBC with Auto Differential Once 19/09/6426   Basic Metabolic Panel Once 90/99/9564   Basic Metabolic Panel Once 85/48/2686       Next Visit Date:  Future Appointments   Date Time Provider Gisela Harding   8/8/2023  3:40 PM Erin Santos MD [facility]   11/17/2023  2:00 PM Marco A Snider APRN - CNP [facility]   5/6/2024  2:00 PM Debbie Tate MD TIFF PULM [facility]            Patient Active Problem List:     Obesity (BMI

## 2023-05-27 DIAGNOSIS — M15.9 GENERALIZED OA: ICD-10-CM

## 2023-05-28 RX ORDER — MELOXICAM 7.5 MG/1
TABLET ORAL
Qty: 90 TABLET | Refills: 1 | Status: SHIPPED | OUTPATIENT
Start: 2023-05-28

## 2023-06-06 DIAGNOSIS — J44.9 CHRONIC OBSTRUCTIVE PULMONARY DISEASE, UNSPECIFIED COPD TYPE (HCC): Primary | ICD-10-CM

## 2023-06-06 RX ORDER — UMECLIDINIUM 62.5 UG/1
1 AEROSOL, POWDER ORAL DAILY
Qty: 1 EACH | Refills: 5 | Status: SHIPPED | OUTPATIENT
Start: 2023-06-06

## 2023-06-09 ENCOUNTER — HOSPITAL ENCOUNTER (OUTPATIENT)
Age: 88
Discharge: HOME | End: 2023-06-09
Payer: MEDICARE

## 2023-06-09 ENCOUNTER — HOSPITAL ENCOUNTER (OUTPATIENT)
Dept: CT IMAGING | Age: 88
End: 2023-06-09
Payer: MEDICARE

## 2023-06-09 ENCOUNTER — OFFICE VISIT (OUTPATIENT)
Dept: CARDIOLOGY | Age: 88
End: 2023-06-09
Payer: MEDICARE

## 2023-06-09 VITALS
SYSTOLIC BLOOD PRESSURE: 135 MMHG | BODY MASS INDEX: 32.87 KG/M2 | OXYGEN SATURATION: 96 % | DIASTOLIC BLOOD PRESSURE: 65 MMHG | WEIGHT: 234.8 LBS | HEART RATE: 60 BPM | HEIGHT: 71 IN | RESPIRATION RATE: 18 BRPM

## 2023-06-09 DIAGNOSIS — I50.33 ACUTE ON CHRONIC DIASTOLIC HEART FAILURE (HCC): ICD-10-CM

## 2023-06-09 DIAGNOSIS — Z86.73 HISTORY OF STROKE: ICD-10-CM

## 2023-06-09 DIAGNOSIS — E78.2 MIXED HYPERLIPIDEMIA: ICD-10-CM

## 2023-06-09 DIAGNOSIS — I25.10 ASHD (ARTERIOSCLEROTIC HEART DISEASE): ICD-10-CM

## 2023-06-09 DIAGNOSIS — R06.02 SOB (SHORTNESS OF BREATH): ICD-10-CM

## 2023-06-09 DIAGNOSIS — E87.1 HYPONATREMIA: ICD-10-CM

## 2023-06-09 DIAGNOSIS — E11.69 TYPE 2 DIABETES MELLITUS WITH OTHER SPECIFIED COMPLICATION, UNSPECIFIED WHETHER LONG TERM INSULIN USE (HCC): ICD-10-CM

## 2023-06-09 DIAGNOSIS — I10 PRIMARY HYPERTENSION: ICD-10-CM

## 2023-06-09 DIAGNOSIS — I50.42 CHRONIC COMBINED SYSTOLIC AND DIASTOLIC CONGESTIVE HEART FAILURE (HCC): ICD-10-CM

## 2023-06-09 DIAGNOSIS — I10 ESSENTIAL HYPERTENSION: ICD-10-CM

## 2023-06-09 DIAGNOSIS — R06.02 SOB (SHORTNESS OF BREATH): Primary | ICD-10-CM

## 2023-06-09 DIAGNOSIS — E66.9 OBESITY (BMI 30.0-34.9): ICD-10-CM

## 2023-06-09 DIAGNOSIS — Z95.820 S/P ANGIOPLASTY WITH STENT: ICD-10-CM

## 2023-06-09 LAB
ANION GAP SERPL CALCULATED.3IONS-SCNC: 10 MMOL/L (ref 9–17)
BNP SERPL-MCNC: 771 PG/ML
BUN SERPL-MCNC: 16 MG/DL (ref 8–23)
BUN/CREAT SERPL: 16 (ref 9–20)
CALCIUM SERPL-MCNC: 9.1 MG/DL (ref 8.6–10.4)
CHLORIDE SERPL-SCNC: 92 MMOL/L (ref 98–107)
CO2 SERPL-SCNC: 27 MMOL/L (ref 20–31)
CREAT SERPL-MCNC: 0.97 MG/DL (ref 0.7–1.2)
ERYTHROCYTE [DISTWIDTH] IN BLOOD BY AUTOMATED COUNT: 14.3 % (ref 11.8–14.4)
GFR SERPL CREATININE-BSD FRML MDRD: >60 ML/MIN/1.73M2
GLUCOSE SERPL-MCNC: 158 MG/DL (ref 70–99)
HCT VFR BLD AUTO: 38 % (ref 40.7–50.3)
HGB BLD-MCNC: 11.9 G/DL (ref 13–17)
MCH RBC QN AUTO: 29 PG (ref 25.2–33.5)
MCHC RBC AUTO-ENTMCNC: 31.3 G/DL (ref 28.4–34.8)
MCV RBC AUTO: 92.5 FL (ref 82.6–102.9)
NRBC AUTOMATED: 0 PER 100 WBC
PLATELET # BLD AUTO: 284 K/UL (ref 138–453)
PMV BLD AUTO: 9.9 FL (ref 8.1–13.5)
POTASSIUM SERPL-SCNC: 4.5 MMOL/L (ref 3.7–5.3)
RBC # BLD AUTO: 4.11 M/UL (ref 4.21–5.77)
SODIUM SERPL-SCNC: 129 MMOL/L (ref 135–144)
WBC OTHER # BLD: 9.6 K/UL (ref 3.5–11.3)

## 2023-06-09 PROCEDURE — 80048 BASIC METABOLIC PNL TOTAL CA: CPT

## 2023-06-09 PROCEDURE — 85027 COMPLETE CBC AUTOMATED: CPT

## 2023-06-09 PROCEDURE — 83880 ASSAY OF NATRIURETIC PEPTIDE: CPT

## 2023-06-09 PROCEDURE — 36415 COLL VENOUS BLD VENIPUNCTURE: CPT

## 2023-06-09 PROCEDURE — 71250 CT THORAX DX C-: CPT

## 2023-06-09 RX ORDER — SPIRONOLACTONE 25 MG/1
25 TABLET ORAL DAILY
Qty: 90 TABLET | Refills: 1 | Status: SHIPPED | OUTPATIENT
Start: 2023-06-09

## 2023-06-09 RX ORDER — ATORVASTATIN CALCIUM 40 MG/1
40 TABLET, FILM COATED ORAL DAILY
COMMUNITY

## 2023-06-09 NOTE — PROGRESS NOTES
I also discussed the potential side effects of this medication including lightheadedness and dizziness and instructed them to stop the medication of this occurs and call our office if this occurs. Beta Blocker:  Continue Propranolol 120 mg BID      Hyperlipidemia: Mixed - Last LDL on 2/2/2023 was 45 mg/dL  Cholesterol Reduction Therapy: Continue Atorvastatin (Lipitor) 40 mg daily. Obesity: Body mass index is 32.75 kg/m². I also briefly discussed both diet and exercise strategies for him to continue to loses weight and he was very receptive to this. Diabetes Mellitus 3/14/2022: A1C: 7.2  Continue to follow up with Keisha Snider NP    FOLLOW UP:   I told Mr. Vargas to call my office if he had any problems, but otherwise told him to No follow-ups on file. However, I would be happy to see him sooner should the need arise. I discussed patient's symptoms and treatment plan with Dr Ita Villareal, he was in agreement with the plan and follow up.     Arthuro Harada PA-C   [facility]) [facility], [address] [place], [place] [address], [address]  Phone: [phone], Fax: [phone]    I believe that the risk of significant morbidity and mortality related to the patient's current medical conditions are: intermediate-high 30 minutes      June 9, 2023

## 2023-06-15 ENCOUNTER — HOSPITAL ENCOUNTER (EMERGENCY)
Age: 88
Discharge: ANOTHER HOSPITAL | End: 2023-06-15
Attending: EMERGENCY MEDICINE
Payer: MEDICARE

## 2023-06-15 ENCOUNTER — APPOINTMENT (OUTPATIENT)
Dept: CT IMAGING | Age: 88
End: 2023-06-15
Attending: EMERGENCY MEDICINE
Payer: MEDICARE

## 2023-06-15 ENCOUNTER — APPOINTMENT (OUTPATIENT)
Dept: CT IMAGING | Age: 88
DRG: 064 | End: 2023-06-15
Payer: MEDICARE

## 2023-06-15 ENCOUNTER — APPOINTMENT (OUTPATIENT)
Dept: INTERVENTIONAL RADIOLOGY/VASCULAR | Age: 88
DRG: 064 | End: 2023-06-15
Payer: MEDICARE

## 2023-06-15 ENCOUNTER — APPOINTMENT (OUTPATIENT)
Dept: GENERAL RADIOLOGY | Age: 88
End: 2023-06-15
Payer: MEDICARE

## 2023-06-15 ENCOUNTER — HOSPITAL ENCOUNTER (INPATIENT)
Age: 88
LOS: 6 days | Discharge: NURSING, REHAB OR LONG-TERM CARE | DRG: 064 | End: 2023-06-21
Attending: EMERGENCY MEDICINE | Admitting: PSYCHIATRY & NEUROLOGY
Payer: MEDICARE

## 2023-06-15 ENCOUNTER — APPOINTMENT (OUTPATIENT)
Dept: GENERAL RADIOLOGY | Age: 88
DRG: 064 | End: 2023-06-15
Payer: MEDICARE

## 2023-06-15 VITALS
SYSTOLIC BLOOD PRESSURE: 216 MMHG | HEART RATE: 88 BPM | RESPIRATION RATE: 22 BRPM | DIASTOLIC BLOOD PRESSURE: 161 MMHG | TEMPERATURE: 100.3 F | OXYGEN SATURATION: 92 %

## 2023-06-15 DIAGNOSIS — R41.82 ALTERED MENTAL STATUS, UNSPECIFIED ALTERED MENTAL STATUS TYPE: Primary | ICD-10-CM

## 2023-06-15 DIAGNOSIS — I63.9 CEREBROVASCULAR ACCIDENT (CVA), UNSPECIFIED MECHANISM (HCC): Primary | ICD-10-CM

## 2023-06-15 DIAGNOSIS — Z91.89 AT RISK FOR ATRIAL FIBRILLATION: ICD-10-CM

## 2023-06-15 PROBLEM — I63.512 ACUTE ISCHEMIC LEFT MIDDLE CEREBRAL ARTERY (MCA) STROKE (HCC): Status: ACTIVE | Noted: 2023-06-15

## 2023-06-15 LAB
ALBUMIN SERPL-MCNC: 3.1 G/DL (ref 3.5–5.2)
ALBUMIN/GLOB SERPL: 0.9 {RATIO} (ref 1–2.5)
ALP SERPL-CCNC: 193 U/L (ref 40–129)
ALT SERPL-CCNC: 75 U/L (ref 5–41)
AMMONIA PLAS-SCNC: 37 UMOL/L (ref 16–60)
ANION GAP SERPL CALCULATED.3IONS-SCNC: 10 MMOL/L (ref 9–17)
ANION GAP SERPL CALCULATED.3IONS-SCNC: 12 MMOL/L (ref 9–17)
ANION GAP: 13 MMOL/L (ref 7–16)
AST SERPL-CCNC: 56 U/L
BACTERIA URNS QL MICRO: ABNORMAL
BASOPHILS # BLD: 0.06 K/UL (ref 0–0.2)
BASOPHILS # BLD: 0.06 K/UL (ref 0–0.2)
BASOPHILS NFR BLD: 1 % (ref 0–2)
BASOPHILS NFR BLD: 1 % (ref 0–2)
BILIRUB SERPL-MCNC: 0.3 MG/DL (ref 0.3–1.2)
BILIRUB UR QL STRIP: NEGATIVE
BNP SERPL-MCNC: 3054 PG/ML
BUN SERPL-MCNC: 15 MG/DL (ref 8–23)
BUN SERPL-MCNC: 17 MG/DL (ref 8–23)
BUN/CREAT SERPL: 20 (ref 9–20)
CALCIUM SERPL-MCNC: 9 MG/DL (ref 8.6–10.4)
CALCIUM SERPL-MCNC: 9.5 MG/DL (ref 8.6–10.4)
CHLORIDE SERPL-SCNC: 96 MMOL/L (ref 98–107)
CHLORIDE SERPL-SCNC: 97 MMOL/L (ref 98–107)
CLARITY UR: CLEAR
CO2 SERPL-SCNC: 21 MMOL/L (ref 20–31)
CO2 SERPL-SCNC: 25 MMOL/L (ref 20–31)
COLOR UR: YELLOW
CREAT SERPL-MCNC: 0.83 MG/DL (ref 0.7–1.2)
CREAT SERPL-MCNC: 0.89 MG/DL (ref 0.7–1.2)
CRP SERPL HS-MCNC: 21.3 MG/L (ref 0–5)
EGFR, POC: >60 ML/MIN/1.73M2
EOSINOPHIL # BLD: 1.06 K/UL (ref 0–0.44)
EOSINOPHIL # BLD: 1.22 K/UL (ref 0–0.44)
EOSINOPHILS RELATIVE PERCENT: 11 % (ref 1–4)
EOSINOPHILS RELATIVE PERCENT: 8 % (ref 1–4)
EPI CELLS #/AREA URNS HPF: ABNORMAL /HPF (ref 0–5)
ERYTHROCYTE [DISTWIDTH] IN BLOOD BY AUTOMATED COUNT: 14.2 % (ref 11.8–14.4)
ERYTHROCYTE [DISTWIDTH] IN BLOOD BY AUTOMATED COUNT: 14.3 % (ref 11.8–14.4)
ERYTHROCYTE [SEDIMENTATION RATE] IN BLOOD BY WESTERGREN METHOD: 38 MM/HR (ref 0–20)
EST. AVERAGE GLUCOSE BLD GHB EST-MCNC: 151 MG/DL
GFR SERPL CREATININE-BSD FRML MDRD: >60 ML/MIN/1.73M2
GFR SERPL CREATININE-BSD FRML MDRD: >60 ML/MIN/1.73M2
GGT SERPL-CCNC: 499 U/L (ref 8–61)
GLUCOSE BLD-MCNC: 155 MG/DL (ref 75–110)
GLUCOSE BLD-MCNC: 173 MG/DL (ref 74–100)
GLUCOSE SERPL-MCNC: 137 MG/DL (ref 70–99)
GLUCOSE SERPL-MCNC: 159 MG/DL (ref 70–99)
GLUCOSE UR STRIP-MCNC: NEGATIVE MG/DL
HBA1C MFR BLD: 6.9 % (ref 4–6)
HCO3 VENOUS: 26.3 MMOL/L (ref 22–29)
HCT VFR BLD AUTO: 37.4 % (ref 40.7–50.3)
HCT VFR BLD AUTO: 40 % (ref 41–53)
HCT VFR BLD AUTO: 41.4 % (ref 40.7–50.3)
HGB BLD-MCNC: 11.7 G/DL (ref 13–17)
HGB BLD-MCNC: 12.9 G/DL (ref 13–17)
HGB UR QL STRIP.AUTO: ABNORMAL
IMM GRANULOCYTES # BLD AUTO: 0.1 K/UL (ref 0–0.3)
IMM GRANULOCYTES # BLD AUTO: 0.13 K/UL (ref 0–0.3)
IMM GRANULOCYTES NFR BLD: 1 %
IMM GRANULOCYTES NFR BLD: 1 %
INR PPP: 1
INR PPP: 1
KETONES UR STRIP-MCNC: NEGATIVE MG/DL
LACTATE BLDV-SCNC: 2.1 MMOL/L (ref 0.5–2.2)
LACTIC ACID, SEPSIS WHOLE BLOOD: 1.9 MMOL/L (ref 0.5–1.9)
LEUKOCYTE ESTERASE UR QL STRIP: NEGATIVE
LYMPHOCYTES # BLD: 11 % (ref 24–43)
LYMPHOCYTES # BLD: 16 % (ref 24–43)
LYMPHOCYTES NFR BLD: 1.48 K/UL (ref 1.1–3.7)
LYMPHOCYTES NFR BLD: 1.81 K/UL (ref 1.1–3.7)
MCH RBC QN AUTO: 29.1 PG (ref 25.2–33.5)
MCH RBC QN AUTO: 29.2 PG (ref 25.2–33.5)
MCHC RBC AUTO-ENTMCNC: 31.2 G/DL (ref 28.4–34.8)
MCHC RBC AUTO-ENTMCNC: 31.3 G/DL (ref 28.4–34.8)
MCV RBC AUTO: 93.3 FL (ref 82.6–102.9)
MCV RBC AUTO: 93.5 FL (ref 82.6–102.9)
MONOCYTES NFR BLD: 1.21 K/UL (ref 0.1–1.2)
MONOCYTES NFR BLD: 1.3 K/UL (ref 0.1–1.2)
MONOCYTES NFR BLD: 10 % (ref 3–12)
MONOCYTES NFR BLD: 11 % (ref 3–12)
NEUTROPHILS NFR BLD: 60 % (ref 36–65)
NEUTROPHILS NFR BLD: 69 % (ref 36–65)
NEUTS SEG NFR BLD: 6.72 K/UL (ref 1.5–8.1)
NEUTS SEG NFR BLD: 9.01 K/UL (ref 1.5–8.1)
NITRITE UR QL STRIP: NEGATIVE
NRBC AUTOMATED: 0 PER 100 WBC
NRBC AUTOMATED: 0 PER 100 WBC
O2 SAT, VEN: 17 % (ref 60–85)
PARTIAL THROMBOPLASTIN TIME: 27.1 SEC (ref 23–36.5)
PARTIAL THROMBOPLASTIN TIME: 30.4 SEC (ref 26.8–34.8)
PCO2, VEN: 41.6 MM HG (ref 41–51)
PH UR STRIP: 7.5 [PH] (ref 5–9)
PH VENOUS: 7.41 (ref 7.32–7.43)
PLATELET # BLD AUTO: 301 K/UL (ref 138–453)
PLATELET # BLD AUTO: 345 K/UL (ref 138–453)
PMV BLD AUTO: 10.1 FL (ref 8.1–13.5)
PMV BLD AUTO: 9.9 FL (ref 8.1–13.5)
PO2, VEN: 13.6 MM HG (ref 30–50)
POC BUN: 18 MG/DL (ref 8–26)
POC CHLORIDE: 96 MMOL/L (ref 98–107)
POC CREATININE: 0.8 MG/DL (ref 0.51–1.19)
POC HEMOGLOBIN: 13.5 G/DL (ref 13.5–17.5)
POC IONIZED CALCIUM: 1.2 MMOL/L (ref 1.15–1.33)
POC LACTIC ACID: 1.33 MMOL/L (ref 0.56–1.39)
POC SODIUM: 134 MMOL/L (ref 138–146)
POC TCO2: 26 MMOL/L (ref 22–30)
POSITIVE BASE EXCESS, VEN: 1 (ref 0–3)
POTASSIUM BLD-SCNC: 4.9 MMOL/L (ref 3.5–4.5)
POTASSIUM SERPL-SCNC: 4.8 MMOL/L (ref 3.7–5.3)
POTASSIUM SERPL-SCNC: 4.8 MMOL/L (ref 3.7–5.3)
PROCALCITONIN SERPL-MCNC: 0.06 NG/ML
PROT SERPL-MCNC: 6.6 G/DL (ref 6.4–8.3)
PROT UR STRIP-MCNC: ABNORMAL MG/DL
PROTHROMBIN TIME: 13.5 SEC (ref 11.7–14.9)
PROTHROMBIN TIME: 13.6 SEC (ref 11.9–14.8)
RBC # BLD AUTO: 4.01 M/UL (ref 4.21–5.77)
RBC # BLD AUTO: 4.43 M/UL (ref 4.21–5.77)
RBC #/AREA URNS HPF: ABNORMAL /HPF (ref 0–2)
SODIUM SERPL-SCNC: 128 MMOL/L (ref 135–144)
SODIUM SERPL-SCNC: 133 MMOL/L (ref 135–144)
SP GR UR STRIP: 1.01 (ref 1.01–1.02)
TROPONIN I SERPL HS-MCNC: 11 NG/L (ref 0–22)
TROPONIN I SERPL HS-MCNC: 27 NG/L (ref 0–22)
TSH SERPL-MCNC: 5.39 UIU/ML (ref 0.3–5)
URATE SERPL-MCNC: 4.6 MG/DL (ref 3.4–7)
UROBILINOGEN UR STRIP-ACNC: NORMAL
WBC #/AREA URNS HPF: ABNORMAL /HPF (ref 0–5)
WBC OTHER # BLD: 11.2 K/UL (ref 3.5–11.3)
WBC OTHER # BLD: 13 K/UL (ref 3.5–11.3)

## 2023-06-15 PROCEDURE — 85652 RBC SED RATE AUTOMATED: CPT

## 2023-06-15 PROCEDURE — 96374 THER/PROPH/DIAG INJ IV PUSH: CPT

## 2023-06-15 PROCEDURE — 6360000002 HC RX W HCPCS: Performed by: STUDENT IN AN ORGANIZED HEALTH CARE EDUCATION/TRAINING PROGRAM

## 2023-06-15 PROCEDURE — 84520 ASSAY OF UREA NITROGEN: CPT

## 2023-06-15 PROCEDURE — 36415 COLL VENOUS BLD VENIPUNCTURE: CPT

## 2023-06-15 PROCEDURE — 84443 ASSAY THYROID STIM HORMONE: CPT

## 2023-06-15 PROCEDURE — C1769 GUIDE WIRE: HCPCS

## 2023-06-15 PROCEDURE — 0042T CT BRAIN PERFUSION: CPT

## 2023-06-15 PROCEDURE — 82947 ASSAY GLUCOSE BLOOD QUANT: CPT

## 2023-06-15 PROCEDURE — 80051 ELECTROLYTE PANEL: CPT

## 2023-06-15 PROCEDURE — 82607 VITAMIN B-12: CPT

## 2023-06-15 PROCEDURE — 82565 ASSAY OF CREATININE: CPT

## 2023-06-15 PROCEDURE — 83605 ASSAY OF LACTIC ACID: CPT

## 2023-06-15 PROCEDURE — 2580000003 HC RX 258: Performed by: STUDENT IN AN ORGANIZED HEALTH CARE EDUCATION/TRAINING PROGRAM

## 2023-06-15 PROCEDURE — 85014 HEMATOCRIT: CPT

## 2023-06-15 PROCEDURE — 2709999900 HC NON-CHARGEABLE SUPPLY

## 2023-06-15 PROCEDURE — 82330 ASSAY OF CALCIUM: CPT

## 2023-06-15 PROCEDURE — 80048 BASIC METABOLIC PNL TOTAL CA: CPT

## 2023-06-15 PROCEDURE — 82977 ASSAY OF GGT: CPT

## 2023-06-15 PROCEDURE — B3141ZZ FLUOROSCOPY OF LEFT COMMON CAROTID ARTERY USING LOW OSMOLAR CONTRAST: ICD-10-PCS | Performed by: PSYCHIATRY & NEUROLOGY

## 2023-06-15 PROCEDURE — 7100000001 HC PACU RECOVERY - ADDTL 15 MIN

## 2023-06-15 PROCEDURE — 71045 X-RAY EXAM CHEST 1 VIEW: CPT

## 2023-06-15 PROCEDURE — 82746 ASSAY OF FOLIC ACID SERUM: CPT

## 2023-06-15 PROCEDURE — 85610 PROTHROMBIN TIME: CPT

## 2023-06-15 PROCEDURE — 6360000004 HC RX CONTRAST MEDICATION: Performed by: PSYCHIATRY & NEUROLOGY

## 2023-06-15 PROCEDURE — 85730 THROMBOPLASTIN TIME PARTIAL: CPT

## 2023-06-15 PROCEDURE — 84484 ASSAY OF TROPONIN QUANT: CPT

## 2023-06-15 PROCEDURE — 83036 HEMOGLOBIN GLYCOSYLATED A1C: CPT

## 2023-06-15 PROCEDURE — 2580000003 HC RX 258: Performed by: EMERGENCY MEDICINE

## 2023-06-15 PROCEDURE — C1894 INTRO/SHEATH, NON-LASER: HCPCS

## 2023-06-15 PROCEDURE — 99285 EMERGENCY DEPT VISIT HI MDM: CPT

## 2023-06-15 PROCEDURE — 80053 COMPREHEN METABOLIC PANEL: CPT

## 2023-06-15 PROCEDURE — 81001 URINALYSIS AUTO W/SCOPE: CPT

## 2023-06-15 PROCEDURE — 7100000000 HC PACU RECOVERY - FIRST 15 MIN

## 2023-06-15 PROCEDURE — 6370000000 HC RX 637 (ALT 250 FOR IP): Performed by: NURSE PRACTITIONER

## 2023-06-15 PROCEDURE — 93005 ELECTROCARDIOGRAM TRACING: CPT | Performed by: EMERGENCY MEDICINE

## 2023-06-15 PROCEDURE — 6360000002 HC RX W HCPCS

## 2023-06-15 PROCEDURE — 87040 BLOOD CULTURE FOR BACTERIA: CPT

## 2023-06-15 PROCEDURE — 6370000000 HC RX 637 (ALT 250 FOR IP): Performed by: EMERGENCY MEDICINE

## 2023-06-15 PROCEDURE — 83880 ASSAY OF NATRIURETIC PEPTIDE: CPT

## 2023-06-15 PROCEDURE — 84550 ASSAY OF BLOOD/URIC ACID: CPT

## 2023-06-15 PROCEDURE — 6360000004 HC RX CONTRAST MEDICATION: Performed by: STUDENT IN AN ORGANIZED HEALTH CARE EDUCATION/TRAINING PROGRAM

## 2023-06-15 PROCEDURE — 82803 BLOOD GASES ANY COMBINATION: CPT

## 2023-06-15 PROCEDURE — 2000000000 HC ICU R&B

## 2023-06-15 PROCEDURE — 70498 CT ANGIOGRAPHY NECK: CPT

## 2023-06-15 PROCEDURE — 73620 X-RAY EXAM OF FOOT: CPT

## 2023-06-15 PROCEDURE — C1887 CATHETER, GUIDING: HCPCS

## 2023-06-15 PROCEDURE — 86140 C-REACTIVE PROTEIN: CPT

## 2023-06-15 PROCEDURE — 3700000000 HC ANESTHESIA ATTENDED CARE

## 2023-06-15 PROCEDURE — 36224 PLACE CATH CAROTD ART: CPT

## 2023-06-15 PROCEDURE — 93005 ELECTROCARDIOGRAM TRACING: CPT | Performed by: STUDENT IN AN ORGANIZED HEALTH CARE EDUCATION/TRAINING PROGRAM

## 2023-06-15 PROCEDURE — 85027 COMPLETE CBC AUTOMATED: CPT

## 2023-06-15 PROCEDURE — 2580000003 HC RX 258: Performed by: NURSE PRACTITIONER

## 2023-06-15 PROCEDURE — 84439 ASSAY OF FREE THYROXINE: CPT

## 2023-06-15 PROCEDURE — C1725 CATH, TRANSLUMIN NON-LASER: HCPCS

## 2023-06-15 PROCEDURE — 84145 PROCALCITONIN (PCT): CPT

## 2023-06-15 PROCEDURE — 70450 CT HEAD/BRAIN W/O DYE: CPT

## 2023-06-15 PROCEDURE — 3700000001 HC ADD 15 MINUTES (ANESTHESIA)

## 2023-06-15 PROCEDURE — 73590 X-RAY EXAM OF LOWER LEG: CPT

## 2023-06-15 PROCEDURE — B3171ZZ FLUOROSCOPY OF LEFT INTERNAL CAROTID ARTERY USING LOW OSMOLAR CONTRAST: ICD-10-PCS | Performed by: PSYCHIATRY & NEUROLOGY

## 2023-06-15 PROCEDURE — 87641 MR-STAPH DNA AMP PROBE: CPT

## 2023-06-15 PROCEDURE — C1760 CLOSURE DEV, VASC: HCPCS

## 2023-06-15 PROCEDURE — 6360000004 HC RX CONTRAST MEDICATION: Performed by: EMERGENCY MEDICINE

## 2023-06-15 PROCEDURE — 82140 ASSAY OF AMMONIA: CPT

## 2023-06-15 RX ORDER — SODIUM CHLORIDE 9 MG/ML
INJECTION, SOLUTION INTRAVENOUS CONTINUOUS
Status: DISCONTINUED | OUTPATIENT
Start: 2023-06-15 | End: 2023-06-17

## 2023-06-15 RX ORDER — SODIUM CHLORIDE 0.9 % (FLUSH) 0.9 %
5-40 SYRINGE (ML) INJECTION PRN
Status: DISCONTINUED | OUTPATIENT
Start: 2023-06-15 | End: 2023-06-21 | Stop reason: HOSPADM

## 2023-06-15 RX ORDER — ONDANSETRON 2 MG/ML
4 INJECTION INTRAMUSCULAR; INTRAVENOUS EVERY 6 HOURS PRN
Status: DISCONTINUED | OUTPATIENT
Start: 2023-06-15 | End: 2023-06-15

## 2023-06-15 RX ORDER — POLYETHYLENE GLYCOL 3350 17 G/17G
17 POWDER, FOR SOLUTION ORAL DAILY PRN
Status: DISCONTINUED | OUTPATIENT
Start: 2023-06-15 | End: 2023-06-21 | Stop reason: HOSPADM

## 2023-06-15 RX ORDER — ONDANSETRON 4 MG/1
4 TABLET, ORALLY DISINTEGRATING ORAL EVERY 8 HOURS PRN
Status: DISCONTINUED | OUTPATIENT
Start: 2023-06-15 | End: 2023-06-21 | Stop reason: HOSPADM

## 2023-06-15 RX ORDER — 0.9 % SODIUM CHLORIDE 0.9 %
1000 INTRAVENOUS SOLUTION INTRAVENOUS ONCE
Status: COMPLETED | OUTPATIENT
Start: 2023-06-15 | End: 2023-06-15

## 2023-06-15 RX ORDER — 0.9 % SODIUM CHLORIDE 0.9 %
500 INTRAVENOUS SOLUTION INTRAVENOUS ONCE
Status: COMPLETED | OUTPATIENT
Start: 2023-06-15 | End: 2023-06-15

## 2023-06-15 RX ORDER — ONDANSETRON 4 MG/1
4 TABLET, ORALLY DISINTEGRATING ORAL EVERY 8 HOURS PRN
Status: DISCONTINUED | OUTPATIENT
Start: 2023-06-15 | End: 2023-06-16 | Stop reason: SDUPTHER

## 2023-06-15 RX ORDER — ROSUVASTATIN CALCIUM 20 MG/1
20 TABLET, COATED ORAL NIGHTLY
Status: DISCONTINUED | OUTPATIENT
Start: 2023-06-15 | End: 2023-06-21 | Stop reason: HOSPADM

## 2023-06-15 RX ORDER — ASPIRIN 81 MG/1
81 TABLET, CHEWABLE ORAL DAILY
Status: DISCONTINUED | OUTPATIENT
Start: 2023-06-16 | End: 2023-06-21 | Stop reason: HOSPADM

## 2023-06-15 RX ORDER — CLOPIDOGREL BISULFATE 75 MG/1
75 TABLET ORAL DAILY
Status: DISCONTINUED | OUTPATIENT
Start: 2023-06-16 | End: 2023-06-21 | Stop reason: HOSPADM

## 2023-06-15 RX ORDER — POLYETHYLENE GLYCOL 3350 17 G/17G
17 POWDER, FOR SOLUTION ORAL DAILY PRN
Status: DISCONTINUED | OUTPATIENT
Start: 2023-06-15 | End: 2023-06-16 | Stop reason: SDUPTHER

## 2023-06-15 RX ORDER — ONDANSETRON 4 MG/1
4 TABLET, ORALLY DISINTEGRATING ORAL EVERY 8 HOURS PRN
Status: DISCONTINUED | OUTPATIENT
Start: 2023-06-15 | End: 2023-06-15

## 2023-06-15 RX ORDER — ACETAMINOPHEN 325 MG/1
650 TABLET ORAL EVERY 4 HOURS PRN
Status: DISCONTINUED | OUTPATIENT
Start: 2023-06-15 | End: 2023-06-21 | Stop reason: HOSPADM

## 2023-06-15 RX ORDER — LORAZEPAM 2 MG/ML
1 INJECTION INTRAMUSCULAR ONCE
Status: COMPLETED | OUTPATIENT
Start: 2023-06-15 | End: 2023-06-15

## 2023-06-15 RX ORDER — LABETALOL HYDROCHLORIDE 5 MG/ML
10 INJECTION, SOLUTION INTRAVENOUS EVERY 4 HOURS PRN
Status: DISCONTINUED | OUTPATIENT
Start: 2023-06-15 | End: 2023-06-21 | Stop reason: HOSPADM

## 2023-06-15 RX ORDER — IODIXANOL 270 MG/ML
100 INJECTION, SOLUTION INTRAVASCULAR
Status: COMPLETED | OUTPATIENT
Start: 2023-06-15 | End: 2023-06-15

## 2023-06-15 RX ORDER — SODIUM CHLORIDE 0.9 % (FLUSH) 0.9 %
5-40 SYRINGE (ML) INJECTION EVERY 12 HOURS SCHEDULED
Status: DISCONTINUED | OUTPATIENT
Start: 2023-06-15 | End: 2023-06-21 | Stop reason: HOSPADM

## 2023-06-15 RX ORDER — SODIUM CHLORIDE 9 MG/ML
INJECTION, SOLUTION INTRAVENOUS PRN
Status: DISCONTINUED | OUTPATIENT
Start: 2023-06-15 | End: 2023-06-21 | Stop reason: HOSPADM

## 2023-06-15 RX ORDER — ONDANSETRON 2 MG/ML
4 INJECTION INTRAMUSCULAR; INTRAVENOUS EVERY 6 HOURS PRN
Status: DISCONTINUED | OUTPATIENT
Start: 2023-06-15 | End: 2023-06-16 | Stop reason: SDUPTHER

## 2023-06-15 RX ORDER — LORAZEPAM 2 MG/ML
INJECTION INTRAMUSCULAR
Status: COMPLETED
Start: 2023-06-15 | End: 2023-06-15

## 2023-06-15 RX ORDER — CLOPIDOGREL BISULFATE 75 MG/1
300 TABLET ORAL ONCE
Status: COMPLETED | OUTPATIENT
Start: 2023-06-15 | End: 2023-06-15

## 2023-06-15 RX ORDER — ONDANSETRON 2 MG/ML
4 INJECTION INTRAMUSCULAR; INTRAVENOUS EVERY 6 HOURS PRN
Status: DISCONTINUED | OUTPATIENT
Start: 2023-06-15 | End: 2023-06-21 | Stop reason: HOSPADM

## 2023-06-15 RX ORDER — 0.9 % SODIUM CHLORIDE 0.9 %
1000 INTRAVENOUS SOLUTION INTRAVENOUS ONCE
Status: DISCONTINUED | OUTPATIENT
Start: 2023-06-15 | End: 2023-06-15

## 2023-06-15 RX ADMIN — IOPAMIDOL 50 ML: 755 INJECTION, SOLUTION INTRAVENOUS at 11:17

## 2023-06-15 RX ADMIN — CLOPIDOGREL BISULFATE 300 MG: 75 TABLET ORAL at 09:21

## 2023-06-15 RX ADMIN — LORAZEPAM 1 MG: 2 INJECTION INTRAMUSCULAR at 11:09

## 2023-06-15 RX ADMIN — VANCOMYCIN HYDROCHLORIDE 2000 MG: 1 INJECTION, POWDER, LYOPHILIZED, FOR SOLUTION INTRAVENOUS at 18:52

## 2023-06-15 RX ADMIN — PIPERACILLIN AND TAZOBACTAM 3375 MG: 3; .375 INJECTION, POWDER, LYOPHILIZED, FOR SOLUTION INTRAVENOUS at 16:41

## 2023-06-15 RX ADMIN — SODIUM CHLORIDE 500 ML: 9 INJECTION, SOLUTION INTRAVENOUS at 09:46

## 2023-06-15 RX ADMIN — SODIUM CHLORIDE, PRESERVATIVE FREE 5 ML: 5 INJECTION INTRAVENOUS at 21:37

## 2023-06-15 RX ADMIN — SODIUM CHLORIDE 1000 ML: 9 INJECTION, SOLUTION INTRAVENOUS at 16:36

## 2023-06-15 RX ADMIN — IOPAMIDOL 75 ML: 755 INJECTION, SOLUTION INTRAVENOUS at 08:33

## 2023-06-15 RX ADMIN — ROSUVASTATIN CALCIUM 20 MG: 20 TABLET, FILM COATED ORAL at 22:52

## 2023-06-15 RX ADMIN — SODIUM CHLORIDE: 9 INJECTION, SOLUTION INTRAVENOUS at 17:49

## 2023-06-15 RX ADMIN — LORAZEPAM 1 MG: 2 INJECTION INTRAMUSCULAR at 11:02

## 2023-06-15 RX ADMIN — IODIXANOL 43 ML: 270 INJECTION, SOLUTION INTRAVASCULAR at 12:59

## 2023-06-15 RX ADMIN — Medication 1 MG: at 11:02

## 2023-06-15 ASSESSMENT — ENCOUNTER SYMPTOMS
ABDOMINAL DISTENTION: 0
SORE THROAT: 0
BACK PAIN: 0
SHORTNESS OF BREATH: 0

## 2023-06-15 NOTE — ED NOTES
Called [facility] for stroke alert, they will page stroke team. Strokebot put in patients room     Aston Cardoso  06/15/23 [id number]

## 2023-06-15 NOTE — ED NOTES
Pt arrived to ED via EMS with c/o of AMS. Per wife pt's LKW was approx 11pm. Pt woke up with slurred speech. Pt has garbled speech. Pt unable to follow commands. Pt is alert and oriented x2. Pt answers to name. Pt poor historian. Pt wife at bedside. PT has hx of TIA and MI. Pt is not on blood thinners. Pt has tremors. Pt is febrile. Pt placed on cardiac monitor.       Urmila Rutherford, RN  06/15/23 [id number]

## 2023-06-15 NOTE — ED PROVIDER NOTES
[address] [facility] ED  EMERGENCY DEPARTMENT ENCOUNTER      Pt Name: Cameron Stockton  MRN: [id number]  [place] 1935  Date of evaluation: 6/15/2023  Provider: Sina Guallpa DO    CHIEF COMPLAINT       Chief Complaint   Patient presents with    Cerebrovascular Accident     Per wife, patient was having issues speaking this morning around 730 AM when they woke together. Wife gave patient 4 81 mg aspirins this AM.         HISTORY OF PRESENT ILLNESS   (Location/Symptom, Timing/Onset, Context/Setting, Quality, Duration, Modifying Factors, Severity)  Note limiting factors. Cameron Stockton is a 82 y.o. male who presents to the emergency department for strokelike symptoms. Per wife patient was having issues speaking this morning around 7:30 AM when they woke up together. Wife gave the patient four 81mg Aspirins this AM. His last well-known was last night around 11 PM when he went to bed. Patient does have a remote history of a TIA but otherwise is alert and oriented x3 at baseline according to his wife. Currently the patient's speech is garbled and he makes no intelligible words. Patient is unable to follow commands as he appears to be confused. He is still able to move his extremities but is unable to perform tasks due to being confused. No obvious facial droop noticed. Speech is slightly slurred. Patient does not take any blood thinners currently. He does have a history of COPD and diabetes. REVIEW OF SYSTEMS    (2-9 systems for level 4, 10 or more for level 5)     Review of Systems   Constitutional:  Negative for fatigue and fever. HENT:  Negative for congestion and sore throat. Eyes:  Negative for visual disturbance. Respiratory:  Negative for shortness of breath. Cardiovascular:  Negative for chest pain and palpitations. Gastrointestinal:  Negative for abdominal distention. Genitourinary:  Negative for dysuria. Musculoskeletal:  Negative for back pain. Skin:  Negative for rash.

## 2023-06-15 NOTE — ED NOTES
[facility] called back with Dr Miguel Weiss from [facility] on the line, connected call to Dr Melissa Aguirre  06/15/23 [phone]

## 2023-06-16 PROBLEM — R41.82 ALTERED MENTAL STATUS: Status: ACTIVE | Noted: 2023-06-16

## 2023-06-16 PROBLEM — R47.1 DYSARTHRIA: Status: ACTIVE | Noted: 2023-06-16

## 2023-06-16 LAB
ALBUMIN SERPL-MCNC: 2.7 G/DL (ref 3.5–5.2)
ALBUMIN/GLOB SERPL: 0.9 {RATIO} (ref 1–2.5)
ALP SERPL-CCNC: 153 U/L (ref 40–129)
ALT SERPL-CCNC: 41 U/L (ref 5–41)
ANION GAP SERPL CALCULATED.3IONS-SCNC: 11 MMOL/L (ref 9–17)
AST SERPL-CCNC: 36 U/L
BILIRUB DIRECT SERPL-MCNC: 0.1 MG/DL
BILIRUB INDIRECT SERPL-MCNC: 0.2 MG/DL (ref 0–1)
BILIRUB SERPL-MCNC: 0.3 MG/DL (ref 0.3–1.2)
BUN SERPL-MCNC: 17 MG/DL (ref 8–23)
CALCIUM SERPL-MCNC: 8.5 MG/DL (ref 8.6–10.4)
CHLORIDE SERPL-SCNC: 99 MMOL/L (ref 98–107)
CHOLEST SERPL-MCNC: 116 MG/DL
CHOLESTEROL/HDL RATIO: 2.8
CO2 SERPL-SCNC: 20 MMOL/L (ref 20–31)
CREAT SERPL-MCNC: 0.94 MG/DL (ref 0.7–1.2)
EKG ATRIAL RATE: 81 BPM
EKG P AXIS: 42 DEGREES
EKG P-R INTERVAL: 258 MS
EKG Q-T INTERVAL: 372 MS
EKG QRS DURATION: 128 MS
EKG QTC CALCULATION (BAZETT): 432 MS
EKG R AXIS: -34 DEGREES
EKG T AXIS: 88 DEGREES
EKG VENTRICULAR RATE: 81 BPM
ERYTHROCYTE [DISTWIDTH] IN BLOOD BY AUTOMATED COUNT: 14.4 % (ref 11.8–14.4)
EST. AVERAGE GLUCOSE BLD GHB EST-MCNC: 154 MG/DL
FOLATE SERPL-MCNC: 19.2 NG/ML
GFR SERPL CREATININE-BSD FRML MDRD: >60 ML/MIN/1.73M2
GLUCOSE SERPL-MCNC: 112 MG/DL (ref 70–99)
HBA1C MFR BLD: 7 % (ref 4–6)
HCT VFR BLD AUTO: 36.5 % (ref 40.7–50.3)
HDLC SERPL-MCNC: 41 MG/DL
HGB BLD-MCNC: 11 G/DL (ref 13–17)
LDLC SERPL CALC-MCNC: 57 MG/DL (ref 0–130)
MCH RBC QN AUTO: 28.2 PG (ref 25.2–33.5)
MCHC RBC AUTO-ENTMCNC: 30.1 G/DL (ref 28.4–34.8)
MCV RBC AUTO: 93.6 FL (ref 82.6–102.9)
MRSA, DNA, NASAL: NEGATIVE
NRBC AUTOMATED: 0 PER 100 WBC
PLATELET # BLD AUTO: 220 K/UL (ref 138–453)
PMV BLD AUTO: 10.2 FL (ref 8.1–13.5)
POTASSIUM SERPL-SCNC: 4.4 MMOL/L (ref 3.7–5.3)
PROT SERPL-MCNC: 5.8 G/DL (ref 6.4–8.3)
RBC # BLD AUTO: 3.9 M/UL (ref 4.21–5.77)
SODIUM SERPL-SCNC: 130 MMOL/L (ref 135–144)
SPECIMEN DESCRIPTION: NORMAL
T4 FREE SERPL-MCNC: 1.7 NG/DL (ref 0.9–1.7)
TRIGL SERPL-MCNC: 89 MG/DL
VIT B12 SERPL-MCNC: 509 PG/ML (ref 232–1245)
WBC OTHER # BLD: 12.5 K/UL (ref 3.5–11.3)

## 2023-06-16 PROCEDURE — 97530 THERAPEUTIC ACTIVITIES: CPT

## 2023-06-16 PROCEDURE — 6370000000 HC RX 637 (ALT 250 FOR IP): Performed by: NURSE PRACTITIONER

## 2023-06-16 PROCEDURE — 85027 COMPLETE CBC AUTOMATED: CPT

## 2023-06-16 PROCEDURE — 97162 PT EVAL MOD COMPLEX 30 MIN: CPT

## 2023-06-16 PROCEDURE — 83036 HEMOGLOBIN GLYCOSYLATED A1C: CPT

## 2023-06-16 PROCEDURE — 80061 LIPID PANEL: CPT

## 2023-06-16 PROCEDURE — 80076 HEPATIC FUNCTION PANEL: CPT

## 2023-06-16 PROCEDURE — 2000000000 HC ICU R&B

## 2023-06-16 PROCEDURE — 99222 1ST HOSP IP/OBS MODERATE 55: CPT | Performed by: NURSE PRACTITIONER

## 2023-06-16 PROCEDURE — 6370000000 HC RX 637 (ALT 250 FOR IP): Performed by: STUDENT IN AN ORGANIZED HEALTH CARE EDUCATION/TRAINING PROGRAM

## 2023-06-16 PROCEDURE — 6360000002 HC RX W HCPCS: Performed by: STUDENT IN AN ORGANIZED HEALTH CARE EDUCATION/TRAINING PROGRAM

## 2023-06-16 PROCEDURE — 93010 ELECTROCARDIOGRAM REPORT: CPT | Performed by: INTERNAL MEDICINE

## 2023-06-16 PROCEDURE — 80048 BASIC METABOLIC PNL TOTAL CA: CPT

## 2023-06-16 PROCEDURE — 2500000003 HC RX 250 WO HCPCS: Performed by: NURSE PRACTITIONER

## 2023-06-16 PROCEDURE — 2580000003 HC RX 258: Performed by: STUDENT IN AN ORGANIZED HEALTH CARE EDUCATION/TRAINING PROGRAM

## 2023-06-16 PROCEDURE — 99223 1ST HOSP IP/OBS HIGH 75: CPT | Performed by: PSYCHIATRY & NEUROLOGY

## 2023-06-16 PROCEDURE — 92610 EVALUATE SWALLOWING FUNCTION: CPT

## 2023-06-16 PROCEDURE — 92523 SPEECH SOUND LANG COMPREHEN: CPT

## 2023-06-16 PROCEDURE — 36415 COLL VENOUS BLD VENIPUNCTURE: CPT

## 2023-06-16 RX ORDER — ENOXAPARIN SODIUM 100 MG/ML
40 INJECTION SUBCUTANEOUS DAILY
Status: DISCONTINUED | OUTPATIENT
Start: 2023-06-16 | End: 2023-06-21 | Stop reason: HOSPADM

## 2023-06-16 RX ORDER — PRIMIDONE 250 MG/1
250 TABLET ORAL 2 TIMES DAILY
Status: DISCONTINUED | OUTPATIENT
Start: 2023-06-16 | End: 2023-06-16

## 2023-06-16 RX ORDER — LEVOTHYROXINE SODIUM 0.12 MG/1
125 TABLET ORAL DAILY
Status: DISCONTINUED | OUTPATIENT
Start: 2023-06-16 | End: 2023-06-16

## 2023-06-16 RX ORDER — LEVOTHYROXINE SODIUM 137 UG/1
137 TABLET ORAL DAILY
Status: DISCONTINUED | OUTPATIENT
Start: 2023-06-17 | End: 2023-06-21 | Stop reason: HOSPADM

## 2023-06-16 RX ADMIN — ENOXAPARIN SODIUM 40 MG: 40 INJECTION SUBCUTANEOUS at 09:49

## 2023-06-16 RX ADMIN — ROSUVASTATIN CALCIUM 20 MG: 20 TABLET, FILM COATED ORAL at 19:33

## 2023-06-16 RX ADMIN — LEVOTHYROXINE SODIUM 125 MCG: 125 TABLET ORAL at 09:49

## 2023-06-16 RX ADMIN — SODIUM CHLORIDE, PRESERVATIVE FREE 10 ML: 5 INJECTION INTRAVENOUS at 19:43

## 2023-06-16 RX ADMIN — Medication 10 MG: at 19:41

## 2023-06-16 RX ADMIN — ACETAMINOPHEN 650 MG: 325 TABLET ORAL at 06:23

## 2023-06-16 RX ADMIN — ACETAMINOPHEN 650 MG: 325 TABLET ORAL at 19:33

## 2023-06-16 RX ADMIN — PIPERACILLIN AND TAZOBACTAM 3375 MG: 3; .375 INJECTION, POWDER, LYOPHILIZED, FOR SOLUTION INTRAVENOUS at 00:43

## 2023-06-16 RX ADMIN — ASPIRIN 81 MG: 81 TABLET, CHEWABLE ORAL at 09:36

## 2023-06-16 RX ADMIN — PRIMIDONE 250 MG: 250 TABLET ORAL at 09:49

## 2023-06-16 RX ADMIN — CLOPIDOGREL BISULFATE 75 MG: 75 TABLET, FILM COATED ORAL at 09:36

## 2023-06-16 RX ADMIN — SODIUM CHLORIDE, PRESERVATIVE FREE 10 ML: 5 INJECTION INTRAVENOUS at 09:36

## 2023-06-16 ASSESSMENT — PAIN SCALES - GENERAL
PAINLEVEL_OUTOF10: 2
PAINLEVEL_OUTOF10: 3
PAINLEVEL_OUTOF10: 3

## 2023-06-16 ASSESSMENT — PAIN DESCRIPTION - DESCRIPTORS
DESCRIPTORS: DISCOMFORT
DESCRIPTORS: ACHING

## 2023-06-16 ASSESSMENT — PAIN DESCRIPTION - LOCATION
LOCATION: HEAD
LOCATION: HEAD

## 2023-06-17 ENCOUNTER — APPOINTMENT (OUTPATIENT)
Dept: MRI IMAGING | Age: 88
DRG: 064 | End: 2023-06-17
Payer: MEDICARE

## 2023-06-17 LAB
ANION GAP SERPL CALCULATED.3IONS-SCNC: 13 MMOL/L (ref 9–17)
BUN SERPL-MCNC: 13 MG/DL (ref 8–23)
CALCIUM SERPL-MCNC: 8.3 MG/DL (ref 8.6–10.4)
CHLORIDE SERPL-SCNC: 99 MMOL/L (ref 98–107)
CO2 SERPL-SCNC: 18 MMOL/L (ref 20–31)
CREAT SERPL-MCNC: 0.75 MG/DL (ref 0.7–1.2)
EKG ATRIAL RATE: 500 BPM
EKG Q-T INTERVAL: 364 MS
EKG QRS DURATION: 130 MS
EKG QTC CALCULATION (BAZETT): 457 MS
EKG R AXIS: -27 DEGREES
EKG T AXIS: 76 DEGREES
EKG VENTRICULAR RATE: 95 BPM
ERYTHROCYTE [DISTWIDTH] IN BLOOD BY AUTOMATED COUNT: 14 % (ref 11.8–14.4)
GFR SERPL CREATININE-BSD FRML MDRD: >60 ML/MIN/1.73M2
GLUCOSE SERPL-MCNC: 138 MG/DL (ref 70–99)
HCT VFR BLD AUTO: 34.9 % (ref 40.7–50.3)
HGB BLD-MCNC: 11 G/DL (ref 13–17)
MCH RBC QN AUTO: 29.3 PG (ref 25.2–33.5)
MCHC RBC AUTO-ENTMCNC: 31.5 G/DL (ref 28.4–34.8)
MCV RBC AUTO: 92.8 FL (ref 82.6–102.9)
NRBC AUTOMATED: 0 PER 100 WBC
PLATELET # BLD AUTO: 217 K/UL (ref 138–453)
PMV BLD AUTO: 9.9 FL (ref 8.1–13.5)
POTASSIUM SERPL-SCNC: 3.9 MMOL/L (ref 3.7–5.3)
RBC # BLD AUTO: 3.76 M/UL (ref 4.21–5.77)
SODIUM SERPL-SCNC: 130 MMOL/L (ref 135–144)
WBC OTHER # BLD: 10.6 K/UL (ref 3.5–11.3)

## 2023-06-17 PROCEDURE — 6370000000 HC RX 637 (ALT 250 FOR IP): Performed by: STUDENT IN AN ORGANIZED HEALTH CARE EDUCATION/TRAINING PROGRAM

## 2023-06-17 PROCEDURE — 2060000000 HC ICU INTERMEDIATE R&B

## 2023-06-17 PROCEDURE — 85027 COMPLETE CBC AUTOMATED: CPT

## 2023-06-17 PROCEDURE — 2580000003 HC RX 258: Performed by: STUDENT IN AN ORGANIZED HEALTH CARE EDUCATION/TRAINING PROGRAM

## 2023-06-17 PROCEDURE — 97166 OT EVAL MOD COMPLEX 45 MIN: CPT

## 2023-06-17 PROCEDURE — 2580000003 HC RX 258: Performed by: NURSE PRACTITIONER

## 2023-06-17 PROCEDURE — 6360000002 HC RX W HCPCS: Performed by: STUDENT IN AN ORGANIZED HEALTH CARE EDUCATION/TRAINING PROGRAM

## 2023-06-17 PROCEDURE — 36415 COLL VENOUS BLD VENIPUNCTURE: CPT

## 2023-06-17 PROCEDURE — 70551 MRI BRAIN STEM W/O DYE: CPT

## 2023-06-17 PROCEDURE — 6370000000 HC RX 637 (ALT 250 FOR IP): Performed by: NURSE PRACTITIONER

## 2023-06-17 PROCEDURE — 99232 SBSQ HOSP IP/OBS MODERATE 35: CPT | Performed by: PSYCHIATRY & NEUROLOGY

## 2023-06-17 PROCEDURE — 80048 BASIC METABOLIC PNL TOTAL CA: CPT

## 2023-06-17 PROCEDURE — 97535 SELF CARE MNGMENT TRAINING: CPT

## 2023-06-17 RX ADMIN — SODIUM CHLORIDE: 9 INJECTION, SOLUTION INTRAVENOUS at 05:42

## 2023-06-17 RX ADMIN — ROSUVASTATIN CALCIUM 20 MG: 20 TABLET, FILM COATED ORAL at 20:11

## 2023-06-17 RX ADMIN — SODIUM CHLORIDE, PRESERVATIVE FREE 10 ML: 5 INJECTION INTRAVENOUS at 09:00

## 2023-06-17 RX ADMIN — ENOXAPARIN SODIUM 40 MG: 40 INJECTION SUBCUTANEOUS at 07:41

## 2023-06-17 RX ADMIN — ASPIRIN 81 MG: 81 TABLET, CHEWABLE ORAL at 07:40

## 2023-06-17 RX ADMIN — SODIUM CHLORIDE, PRESERVATIVE FREE 10 ML: 5 INJECTION INTRAVENOUS at 20:11

## 2023-06-17 RX ADMIN — LEVOTHYROXINE SODIUM 137 MCG: 137 TABLET ORAL at 07:40

## 2023-06-17 RX ADMIN — CLOPIDOGREL BISULFATE 75 MG: 75 TABLET, FILM COATED ORAL at 07:40

## 2023-06-18 LAB
ANION GAP SERPL CALCULATED.3IONS-SCNC: 9 MMOL/L (ref 9–17)
BUN SERPL-MCNC: 10 MG/DL (ref 8–23)
CALCIUM SERPL-MCNC: 8.2 MG/DL (ref 8.6–10.4)
CHLORIDE SERPL-SCNC: 99 MMOL/L (ref 98–107)
CO2 SERPL-SCNC: 21 MMOL/L (ref 20–31)
CREAT SERPL-MCNC: 0.72 MG/DL (ref 0.7–1.2)
ERYTHROCYTE [DISTWIDTH] IN BLOOD BY AUTOMATED COUNT: 14.1 % (ref 11.8–14.4)
GFR SERPL CREATININE-BSD FRML MDRD: >60 ML/MIN/1.73M2
GLUCOSE SERPL-MCNC: 125 MG/DL (ref 70–99)
HCT VFR BLD AUTO: 34.5 % (ref 40.7–50.3)
HGB BLD-MCNC: 10.9 G/DL (ref 13–17)
MAGNESIUM SERPL-MCNC: 1.7 MG/DL (ref 1.6–2.6)
MCH RBC QN AUTO: 28.8 PG (ref 25.2–33.5)
MCHC RBC AUTO-ENTMCNC: 31.6 G/DL (ref 28.4–34.8)
MCV RBC AUTO: 91.3 FL (ref 82.6–102.9)
NRBC AUTOMATED: 0 PER 100 WBC
PLATELET # BLD AUTO: 213 K/UL (ref 138–453)
PMV BLD AUTO: 10 FL (ref 8.1–13.5)
POTASSIUM SERPL-SCNC: 3.5 MMOL/L (ref 3.7–5.3)
RBC # BLD AUTO: 3.78 M/UL (ref 4.21–5.77)
SODIUM SERPL-SCNC: 129 MMOL/L (ref 135–144)
WBC OTHER # BLD: 10.2 K/UL (ref 3.5–11.3)

## 2023-06-18 PROCEDURE — 6360000002 HC RX W HCPCS: Performed by: STUDENT IN AN ORGANIZED HEALTH CARE EDUCATION/TRAINING PROGRAM

## 2023-06-18 PROCEDURE — 6370000000 HC RX 637 (ALT 250 FOR IP)

## 2023-06-18 PROCEDURE — 6370000000 HC RX 637 (ALT 250 FOR IP): Performed by: NURSE PRACTITIONER

## 2023-06-18 PROCEDURE — 2580000003 HC RX 258: Performed by: STUDENT IN AN ORGANIZED HEALTH CARE EDUCATION/TRAINING PROGRAM

## 2023-06-18 PROCEDURE — 94640 AIRWAY INHALATION TREATMENT: CPT

## 2023-06-18 PROCEDURE — 6370000000 HC RX 637 (ALT 250 FOR IP): Performed by: STUDENT IN AN ORGANIZED HEALTH CARE EDUCATION/TRAINING PROGRAM

## 2023-06-18 PROCEDURE — 83735 ASSAY OF MAGNESIUM: CPT

## 2023-06-18 PROCEDURE — 85027 COMPLETE CBC AUTOMATED: CPT

## 2023-06-18 PROCEDURE — 2060000000 HC ICU INTERMEDIATE R&B

## 2023-06-18 PROCEDURE — 36415 COLL VENOUS BLD VENIPUNCTURE: CPT

## 2023-06-18 PROCEDURE — 80048 BASIC METABOLIC PNL TOTAL CA: CPT

## 2023-06-18 PROCEDURE — 99232 SBSQ HOSP IP/OBS MODERATE 35: CPT | Performed by: PSYCHIATRY & NEUROLOGY

## 2023-06-18 PROCEDURE — 94760 N-INVAS EAR/PLS OXIMETRY 1: CPT

## 2023-06-18 RX ORDER — POTASSIUM CHLORIDE 20 MEQ/1
40 TABLET, EXTENDED RELEASE ORAL ONCE
Status: COMPLETED | OUTPATIENT
Start: 2023-06-18 | End: 2023-06-18

## 2023-06-18 RX ORDER — ALBUTEROL SULFATE 2.5 MG/3ML
2.5 SOLUTION RESPIRATORY (INHALATION) EVERY 4 HOURS PRN
Status: DISCONTINUED | OUTPATIENT
Start: 2023-06-18 | End: 2023-06-21 | Stop reason: HOSPADM

## 2023-06-18 RX ORDER — FLUTICASONE PROPIONATE 50 MCG
2 SPRAY, SUSPENSION (ML) NASAL DAILY
Status: DISCONTINUED | OUTPATIENT
Start: 2023-06-18 | End: 2023-06-21 | Stop reason: HOSPADM

## 2023-06-18 RX ORDER — ALBUTEROL SULFATE 90 UG/1
2 AEROSOL, METERED RESPIRATORY (INHALATION) EVERY 4 HOURS PRN
Status: DISCONTINUED | OUTPATIENT
Start: 2023-06-18 | End: 2023-06-21 | Stop reason: HOSPADM

## 2023-06-18 RX ORDER — BUMETANIDE 1 MG/1
1 TABLET ORAL 2 TIMES DAILY
Status: DISCONTINUED | OUTPATIENT
Start: 2023-06-18 | End: 2023-06-21 | Stop reason: HOSPADM

## 2023-06-18 RX ORDER — OXYBUTYNIN CHLORIDE 5 MG/1
10 TABLET ORAL 2 TIMES DAILY
Status: DISCONTINUED | OUTPATIENT
Start: 2023-06-18 | End: 2023-06-21 | Stop reason: HOSPADM

## 2023-06-18 RX ORDER — SPIRONOLACTONE 25 MG/1
25 TABLET ORAL DAILY
Status: DISCONTINUED | OUTPATIENT
Start: 2023-06-18 | End: 2023-06-21 | Stop reason: HOSPADM

## 2023-06-18 RX ORDER — PRIMIDONE 250 MG/1
250 TABLET ORAL 2 TIMES DAILY
Status: DISCONTINUED | OUTPATIENT
Start: 2023-06-18 | End: 2023-06-21 | Stop reason: HOSPADM

## 2023-06-18 RX ORDER — PROPRANOLOL HCL 60 MG
120 CAPSULE, EXTENDED RELEASE 24HR ORAL 2 TIMES DAILY
Status: DISCONTINUED | OUTPATIENT
Start: 2023-06-18 | End: 2023-06-21 | Stop reason: HOSPADM

## 2023-06-18 RX ADMIN — ROSUVASTATIN CALCIUM 20 MG: 20 TABLET, FILM COATED ORAL at 20:57

## 2023-06-18 RX ADMIN — ACETAMINOPHEN 650 MG: 325 TABLET ORAL at 18:27

## 2023-06-18 RX ADMIN — LEVOTHYROXINE SODIUM 137 MCG: 137 TABLET ORAL at 08:02

## 2023-06-18 RX ADMIN — ENOXAPARIN SODIUM 40 MG: 40 INJECTION SUBCUTANEOUS at 08:02

## 2023-06-18 RX ADMIN — PRIMIDONE 250 MG: 250 TABLET ORAL at 16:46

## 2023-06-18 RX ADMIN — CLOPIDOGREL BISULFATE 75 MG: 75 TABLET, FILM COATED ORAL at 08:02

## 2023-06-18 RX ADMIN — SODIUM CHLORIDE, PRESERVATIVE FREE 10 ML: 5 INJECTION INTRAVENOUS at 20:59

## 2023-06-18 RX ADMIN — TIOTROPIUM BROMIDE INHALATION SPRAY 2 PUFF: 3.12 SPRAY, METERED RESPIRATORY (INHALATION) at 08:16

## 2023-06-18 RX ADMIN — OXYBUTYNIN CHLORIDE 10 MG: 5 TABLET ORAL at 20:59

## 2023-06-18 RX ADMIN — OXYBUTYNIN CHLORIDE 10 MG: 5 TABLET ORAL at 16:46

## 2023-06-18 RX ADMIN — SODIUM CHLORIDE, PRESERVATIVE FREE 10 ML: 5 INJECTION INTRAVENOUS at 08:01

## 2023-06-18 RX ADMIN — ASPIRIN 81 MG: 81 TABLET, CHEWABLE ORAL at 08:02

## 2023-06-18 RX ADMIN — SPIRONOLACTONE 25 MG: 25 TABLET ORAL at 16:48

## 2023-06-18 RX ADMIN — PROPRANOLOL HYDROCHLORIDE 120 MG: 60 CAPSULE, EXTENDED RELEASE ORAL at 16:46

## 2023-06-18 RX ADMIN — FLUTICASONE PROPIONATE 2 SPRAY: 50 SPRAY, METERED NASAL at 16:48

## 2023-06-18 RX ADMIN — POTASSIUM CHLORIDE 40 MEQ: 1500 TABLET, EXTENDED RELEASE ORAL at 11:51

## 2023-06-18 RX ADMIN — BUMETANIDE 1 MG: 1 TABLET ORAL at 16:45

## 2023-06-18 ASSESSMENT — PAIN SCALES - GENERAL: PAINLEVEL_OUTOF10: 5

## 2023-06-18 ASSESSMENT — PAIN DESCRIPTION - LOCATION: LOCATION: NECK

## 2023-06-18 ASSESSMENT — PAIN DESCRIPTION - DESCRIPTORS: DESCRIPTORS: ACHING

## 2023-06-19 LAB
ANION GAP SERPL CALCULATED.3IONS-SCNC: 11 MMOL/L (ref 9–17)
BUN SERPL-MCNC: 11 MG/DL (ref 8–23)
CALCIUM SERPL-MCNC: 8.5 MG/DL (ref 8.6–10.4)
CHLORIDE SERPL-SCNC: 100 MMOL/L (ref 98–107)
CK SERPL-CCNC: 33 U/L (ref 39–308)
CO2 SERPL-SCNC: 21 MMOL/L (ref 20–31)
CREAT SERPL-MCNC: 0.78 MG/DL (ref 0.7–1.2)
ERYTHROCYTE [DISTWIDTH] IN BLOOD BY AUTOMATED COUNT: 14.1 % (ref 11.8–14.4)
GFR SERPL CREATININE-BSD FRML MDRD: >60 ML/MIN/1.73M2
GLUCOSE SERPL-MCNC: 133 MG/DL (ref 70–99)
HCT VFR BLD AUTO: 33.5 % (ref 40.7–50.3)
HGB BLD-MCNC: 10.5 G/DL (ref 13–17)
LV EF: 43 %
LVEF MODALITY: NORMAL
MCH RBC QN AUTO: 28.8 PG (ref 25.2–33.5)
MCHC RBC AUTO-ENTMCNC: 31.3 G/DL (ref 28.4–34.8)
MCV RBC AUTO: 91.8 FL (ref 82.6–102.9)
NRBC AUTOMATED: 0 PER 100 WBC
PLATELET # BLD AUTO: 220 K/UL (ref 138–453)
PMV BLD AUTO: 10.2 FL (ref 8.1–13.5)
POTASSIUM SERPL-SCNC: 4.3 MMOL/L (ref 3.7–5.3)
RBC # BLD AUTO: 3.65 M/UL (ref 4.21–5.77)
SODIUM SERPL-SCNC: 132 MMOL/L (ref 135–144)
WBC OTHER # BLD: 11.4 K/UL (ref 3.5–11.3)

## 2023-06-19 PROCEDURE — 85027 COMPLETE CBC AUTOMATED: CPT

## 2023-06-19 PROCEDURE — 82550 ASSAY OF CK (CPK): CPT

## 2023-06-19 PROCEDURE — 6370000000 HC RX 637 (ALT 250 FOR IP): Performed by: STUDENT IN AN ORGANIZED HEALTH CARE EDUCATION/TRAINING PROGRAM

## 2023-06-19 PROCEDURE — 6370000000 HC RX 637 (ALT 250 FOR IP): Performed by: NURSE PRACTITIONER

## 2023-06-19 PROCEDURE — 6370000000 HC RX 637 (ALT 250 FOR IP)

## 2023-06-19 PROCEDURE — 93306 TTE W/DOPPLER COMPLETE: CPT

## 2023-06-19 PROCEDURE — 99223 1ST HOSP IP/OBS HIGH 75: CPT | Performed by: PHYSICAL MEDICINE & REHABILITATION

## 2023-06-19 PROCEDURE — 97129 THER IVNTJ 1ST 15 MIN: CPT

## 2023-06-19 PROCEDURE — 6360000002 HC RX W HCPCS: Performed by: STUDENT IN AN ORGANIZED HEALTH CARE EDUCATION/TRAINING PROGRAM

## 2023-06-19 PROCEDURE — 2060000000 HC ICU INTERMEDIATE R&B

## 2023-06-19 PROCEDURE — 94640 AIRWAY INHALATION TREATMENT: CPT

## 2023-06-19 PROCEDURE — 99232 SBSQ HOSP IP/OBS MODERATE 35: CPT | Performed by: PSYCHIATRY & NEUROLOGY

## 2023-06-19 PROCEDURE — 2580000003 HC RX 258: Performed by: STUDENT IN AN ORGANIZED HEALTH CARE EDUCATION/TRAINING PROGRAM

## 2023-06-19 PROCEDURE — 80048 BASIC METABOLIC PNL TOTAL CA: CPT

## 2023-06-19 PROCEDURE — 36415 COLL VENOUS BLD VENIPUNCTURE: CPT

## 2023-06-19 RX ADMIN — PROPRANOLOL HYDROCHLORIDE 120 MG: 60 CAPSULE, EXTENDED RELEASE ORAL at 08:02

## 2023-06-19 RX ADMIN — FLUTICASONE PROPIONATE 2 SPRAY: 50 SPRAY, METERED NASAL at 08:03

## 2023-06-19 RX ADMIN — SODIUM CHLORIDE, PRESERVATIVE FREE 10 ML: 5 INJECTION INTRAVENOUS at 20:36

## 2023-06-19 RX ADMIN — BUMETANIDE 1 MG: 1 TABLET ORAL at 08:02

## 2023-06-19 RX ADMIN — ENOXAPARIN SODIUM 40 MG: 40 INJECTION SUBCUTANEOUS at 08:01

## 2023-06-19 RX ADMIN — ASPIRIN 81 MG: 81 TABLET, CHEWABLE ORAL at 08:03

## 2023-06-19 RX ADMIN — SODIUM CHLORIDE, PRESERVATIVE FREE 10 ML: 5 INJECTION INTRAVENOUS at 08:01

## 2023-06-19 RX ADMIN — OXYBUTYNIN CHLORIDE 10 MG: 5 TABLET ORAL at 20:35

## 2023-06-19 RX ADMIN — SPIRONOLACTONE 25 MG: 25 TABLET ORAL at 08:01

## 2023-06-19 RX ADMIN — PROPRANOLOL HYDROCHLORIDE 120 MG: 60 CAPSULE, EXTENDED RELEASE ORAL at 17:51

## 2023-06-19 RX ADMIN — PRIMIDONE 250 MG: 250 TABLET ORAL at 17:51

## 2023-06-19 RX ADMIN — PRIMIDONE 250 MG: 250 TABLET ORAL at 08:02

## 2023-06-19 RX ADMIN — BUMETANIDE 1 MG: 1 TABLET ORAL at 20:35

## 2023-06-19 RX ADMIN — LEVOTHYROXINE SODIUM 137 MCG: 137 TABLET ORAL at 08:02

## 2023-06-19 RX ADMIN — ROSUVASTATIN CALCIUM 20 MG: 20 TABLET, FILM COATED ORAL at 20:35

## 2023-06-19 RX ADMIN — OXYBUTYNIN CHLORIDE 10 MG: 5 TABLET ORAL at 08:02

## 2023-06-19 RX ADMIN — TIOTROPIUM BROMIDE INHALATION SPRAY 2 PUFF: 3.12 SPRAY, METERED RESPIRATORY (INHALATION) at 08:23

## 2023-06-19 RX ADMIN — CLOPIDOGREL BISULFATE 75 MG: 75 TABLET, FILM COATED ORAL at 08:01

## 2023-06-19 ASSESSMENT — PAIN SCALES - GENERAL
PAINLEVEL_OUTOF10: 0
PAINLEVEL_OUTOF10: 0

## 2023-06-19 NOTE — CARE COORDINATION
Received a call from Ed Sim at [facility] they are able to accept patient. I have asked her to please start precert. She has agreed. Called patients wife Richarda Hashimoto and updated her on acceptance and auth . She is very frustrated na wants him close to home. She asks if I can send him to [facility] and wait auth. I told her that that is not a possibility.

## 2023-06-20 LAB
ANION GAP SERPL CALCULATED.3IONS-SCNC: 11 MMOL/L (ref 9–17)
BASOPHILS # BLD: 0.1 K/UL (ref 0–0.2)
BASOPHILS NFR BLD: 1 % (ref 0–2)
BUN SERPL-MCNC: 14 MG/DL (ref 8–23)
CALCIUM SERPL-MCNC: 8.3 MG/DL (ref 8.6–10.4)
CHLORIDE SERPL-SCNC: 96 MMOL/L (ref 98–107)
CO2 SERPL-SCNC: 22 MMOL/L (ref 20–31)
CREAT SERPL-MCNC: 0.85 MG/DL (ref 0.7–1.2)
EOSINOPHIL # BLD: 0.82 K/UL (ref 0–0.4)
EOSINOPHILS RELATIVE PERCENT: 8 % (ref 1–4)
ERYTHROCYTE [DISTWIDTH] IN BLOOD BY AUTOMATED COUNT: 14.3 % (ref 11.8–14.4)
GFR SERPL CREATININE-BSD FRML MDRD: >60 ML/MIN/1.73M2
GLUCOSE SERPL-MCNC: 167 MG/DL (ref 70–99)
HCT VFR BLD AUTO: 36.4 % (ref 40.7–50.3)
HGB BLD-MCNC: 11.6 G/DL (ref 13–17)
IMM GRANULOCYTES # BLD AUTO: 0 K/UL (ref 0–0.3)
IMM GRANULOCYTES NFR BLD: 0 %
LYMPHOCYTES # BLD: 14 % (ref 24–44)
LYMPHOCYTES NFR BLD: 1.44 K/UL (ref 1–4.8)
MAGNESIUM SERPL-MCNC: 1.7 MG/DL (ref 1.6–2.6)
MCH RBC QN AUTO: 28.2 PG (ref 25.2–33.5)
MCHC RBC AUTO-ENTMCNC: 31.9 G/DL (ref 28.4–34.8)
MCV RBC AUTO: 88.3 FL (ref 82.6–102.9)
MICROORGANISM SPEC CULT: NORMAL
MICROORGANISM SPEC CULT: NORMAL
MONOCYTES NFR BLD: 1.13 K/UL (ref 0.1–0.8)
MONOCYTES NFR BLD: 11 % (ref 1–7)
MORPHOLOGY: NORMAL
NEUTROPHILS NFR BLD: 66 % (ref 36–66)
NEUTS SEG NFR BLD: 6.81 K/UL (ref 1.8–7.7)
NRBC AUTOMATED: 0 PER 100 WBC
PLATELET # BLD AUTO: 212 K/UL (ref 138–453)
PMV BLD AUTO: 10.1 FL (ref 8.1–13.5)
POTASSIUM SERPL-SCNC: 3.6 MMOL/L (ref 3.7–5.3)
RBC # BLD AUTO: 4.12 M/UL (ref 4.21–5.77)
SERVICE CMNT-IMP: NORMAL
SERVICE CMNT-IMP: NORMAL
SODIUM SERPL-SCNC: 129 MMOL/L (ref 135–144)
SPECIMEN DESCRIPTION: NORMAL
SPECIMEN DESCRIPTION: NORMAL
WBC OTHER # BLD: 10.3 K/UL (ref 3.5–11.3)

## 2023-06-20 PROCEDURE — 2060000000 HC ICU INTERMEDIATE R&B

## 2023-06-20 PROCEDURE — 97530 THERAPEUTIC ACTIVITIES: CPT

## 2023-06-20 PROCEDURE — 97110 THERAPEUTIC EXERCISES: CPT

## 2023-06-20 PROCEDURE — 99232 SBSQ HOSP IP/OBS MODERATE 35: CPT | Performed by: PSYCHIATRY & NEUROLOGY

## 2023-06-20 PROCEDURE — 36415 COLL VENOUS BLD VENIPUNCTURE: CPT

## 2023-06-20 PROCEDURE — 2580000003 HC RX 258: Performed by: STUDENT IN AN ORGANIZED HEALTH CARE EDUCATION/TRAINING PROGRAM

## 2023-06-20 PROCEDURE — 97116 GAIT TRAINING THERAPY: CPT

## 2023-06-20 PROCEDURE — 6370000000 HC RX 637 (ALT 250 FOR IP)

## 2023-06-20 PROCEDURE — 94640 AIRWAY INHALATION TREATMENT: CPT

## 2023-06-20 PROCEDURE — 6370000000 HC RX 637 (ALT 250 FOR IP): Performed by: STUDENT IN AN ORGANIZED HEALTH CARE EDUCATION/TRAINING PROGRAM

## 2023-06-20 PROCEDURE — 80048 BASIC METABOLIC PNL TOTAL CA: CPT

## 2023-06-20 PROCEDURE — 94761 N-INVAS EAR/PLS OXIMETRY MLT: CPT

## 2023-06-20 PROCEDURE — 6360000002 HC RX W HCPCS

## 2023-06-20 PROCEDURE — 93005 ELECTROCARDIOGRAM TRACING: CPT

## 2023-06-20 PROCEDURE — 6360000002 HC RX W HCPCS: Performed by: STUDENT IN AN ORGANIZED HEALTH CARE EDUCATION/TRAINING PROGRAM

## 2023-06-20 PROCEDURE — 6370000000 HC RX 637 (ALT 250 FOR IP): Performed by: NURSE PRACTITIONER

## 2023-06-20 PROCEDURE — 97535 SELF CARE MNGMENT TRAINING: CPT

## 2023-06-20 PROCEDURE — 83735 ASSAY OF MAGNESIUM: CPT

## 2023-06-20 PROCEDURE — 85027 COMPLETE CBC AUTOMATED: CPT

## 2023-06-20 PROCEDURE — 99232 SBSQ HOSP IP/OBS MODERATE 35: CPT | Performed by: PHYSICAL MEDICINE & REHABILITATION

## 2023-06-20 RX ORDER — PRIMIDONE 250 MG/1
250 TABLET ORAL 2 TIMES DAILY
Qty: 120 TABLET | Refills: 3 | Status: ON HOLD | OUTPATIENT
Start: 2023-06-20

## 2023-06-20 RX ORDER — CLOPIDOGREL BISULFATE 75 MG/1
75 TABLET ORAL DAILY
Qty: 30 TABLET | Refills: 3 | Status: ON HOLD | OUTPATIENT
Start: 2023-06-21

## 2023-06-20 RX ORDER — MAGNESIUM SULFATE IN WATER 40 MG/ML
2000 INJECTION, SOLUTION INTRAVENOUS ONCE
Status: COMPLETED | OUTPATIENT
Start: 2023-06-20 | End: 2023-06-20

## 2023-06-20 RX ORDER — POTASSIUM CHLORIDE 20 MEQ/1
20 TABLET, EXTENDED RELEASE ORAL ONCE
Status: COMPLETED | OUTPATIENT
Start: 2023-06-20 | End: 2023-06-20

## 2023-06-20 RX ADMIN — SODIUM CHLORIDE, PRESERVATIVE FREE 10 ML: 5 INJECTION INTRAVENOUS at 09:56

## 2023-06-20 RX ADMIN — PROPRANOLOL HYDROCHLORIDE 120 MG: 60 CAPSULE, EXTENDED RELEASE ORAL at 18:23

## 2023-06-20 RX ADMIN — OXYBUTYNIN CHLORIDE 10 MG: 5 TABLET ORAL at 09:55

## 2023-06-20 RX ADMIN — ROSUVASTATIN CALCIUM 20 MG: 20 TABLET, FILM COATED ORAL at 20:40

## 2023-06-20 RX ADMIN — MAGNESIUM SULFATE HEPTAHYDRATE 2000 MG: 40 INJECTION, SOLUTION INTRAVENOUS at 15:26

## 2023-06-20 RX ADMIN — PROPRANOLOL HYDROCHLORIDE 120 MG: 60 CAPSULE, EXTENDED RELEASE ORAL at 09:55

## 2023-06-20 RX ADMIN — BUMETANIDE 1 MG: 1 TABLET ORAL at 09:55

## 2023-06-20 RX ADMIN — FLUTICASONE PROPIONATE 2 SPRAY: 50 SPRAY, METERED NASAL at 09:56

## 2023-06-20 RX ADMIN — ACETAMINOPHEN 650 MG: 325 TABLET ORAL at 05:36

## 2023-06-20 RX ADMIN — LEVOTHYROXINE SODIUM 137 MCG: 137 TABLET ORAL at 09:55

## 2023-06-20 RX ADMIN — OXYBUTYNIN CHLORIDE 10 MG: 5 TABLET ORAL at 20:40

## 2023-06-20 RX ADMIN — PRIMIDONE 250 MG: 250 TABLET ORAL at 18:24

## 2023-06-20 RX ADMIN — ENOXAPARIN SODIUM 40 MG: 40 INJECTION SUBCUTANEOUS at 09:56

## 2023-06-20 RX ADMIN — ASPIRIN 81 MG: 81 TABLET, CHEWABLE ORAL at 09:55

## 2023-06-20 RX ADMIN — SPIRONOLACTONE 25 MG: 25 TABLET ORAL at 09:56

## 2023-06-20 RX ADMIN — POTASSIUM CHLORIDE 20 MEQ: 1500 TABLET, EXTENDED RELEASE ORAL at 14:56

## 2023-06-20 RX ADMIN — BUMETANIDE 1 MG: 1 TABLET ORAL at 20:35

## 2023-06-20 RX ADMIN — TIOTROPIUM BROMIDE INHALATION SPRAY 2 PUFF: 3.12 SPRAY, METERED RESPIRATORY (INHALATION) at 09:14

## 2023-06-20 RX ADMIN — PRIMIDONE 250 MG: 250 TABLET ORAL at 09:55

## 2023-06-20 RX ADMIN — CLOPIDOGREL BISULFATE 75 MG: 75 TABLET, FILM COATED ORAL at 12:19

## 2023-06-20 RX ADMIN — SODIUM CHLORIDE, PRESERVATIVE FREE 10 ML: 5 INJECTION INTRAVENOUS at 20:35

## 2023-06-20 ASSESSMENT — PAIN - FUNCTIONAL ASSESSMENT: PAIN_FUNCTIONAL_ASSESSMENT: ACTIVITIES ARE NOT PREVENTED

## 2023-06-20 ASSESSMENT — PAIN DESCRIPTION - DESCRIPTORS: DESCRIPTORS: SORE

## 2023-06-20 ASSESSMENT — PAIN SCALES - GENERAL
PAINLEVEL_OUTOF10: 3
PAINLEVEL_OUTOF10: 5
PAINLEVEL_OUTOF10: 0

## 2023-06-20 ASSESSMENT — PAIN DESCRIPTION - LOCATION: LOCATION: BACK;HEAD;NECK

## 2023-06-20 ASSESSMENT — PAIN DESCRIPTION - ORIENTATION: ORIENTATION: ANTERIOR;POSTERIOR

## 2023-06-20 NOTE — CARE COORDINATION
CM called and left message for inpatient PT/OT notifying that patient needs updated notes STAT for precert to be initiated.

## 2023-06-20 NOTE — CARE COORDINATION
[facility] stating that [facility] have insurance authorization to accept patient today. PS message sent to attending resident questioning if patient is ready to discharge today. Will call transportation request sent to [facility] CM [facility] and [facility] scheduled for 4:30pm today. Discharge order and DANE needed. CM called and left message updating Kevin Andres with [facility] of transportation time. Patient updated and verbalizes being agreeable with [facility] today. 1400- CM updated by RN that cardiology consult has been placed and transportation needs canceled. CM called and updated [facility], [facility], and patient's wife that discharge has been canceled. 1639- Discharge order placed in [facility]. CM called and spoke with Fang Aaron from [facility] and transportation scheduled for 12p tomorrow.

## 2023-06-21 VITALS
SYSTOLIC BLOOD PRESSURE: 145 MMHG | OXYGEN SATURATION: 95 % | TEMPERATURE: 97.8 F | RESPIRATION RATE: 12 BRPM | DIASTOLIC BLOOD PRESSURE: 80 MMHG | HEART RATE: 65 BPM

## 2023-06-21 PROBLEM — I49.3 PVC (PREMATURE VENTRICULAR CONTRACTION): Status: ACTIVE | Noted: 2023-06-21

## 2023-06-21 LAB
ANION GAP SERPL CALCULATED.3IONS-SCNC: 10 MMOL/L (ref 9–17)
BUN SERPL-MCNC: 16 MG/DL (ref 8–23)
CALCIUM SERPL-MCNC: 8.4 MG/DL (ref 8.6–10.4)
CHLORIDE SERPL-SCNC: 97 MMOL/L (ref 98–107)
CO2 SERPL-SCNC: 23 MMOL/L (ref 20–31)
CREAT SERPL-MCNC: 0.99 MG/DL (ref 0.7–1.2)
EKG ATRIAL RATE: 58 BPM
EKG P AXIS: 2 DEGREES
EKG P-R INTERVAL: 266 MS
EKG Q-T INTERVAL: 502 MS
EKG QRS DURATION: 116 MS
EKG QTC CALCULATION (BAZETT): 492 MS
EKG R AXIS: -31 DEGREES
EKG T AXIS: 36 DEGREES
EKG VENTRICULAR RATE: 58 BPM
GFR SERPL CREATININE-BSD FRML MDRD: >60 ML/MIN/1.73M2
GLUCOSE SERPL-MCNC: 144 MG/DL (ref 70–99)
POTASSIUM SERPL-SCNC: 4.5 MMOL/L (ref 3.7–5.3)
SODIUM SERPL-SCNC: 130 MMOL/L (ref 135–144)

## 2023-06-21 PROCEDURE — 94761 N-INVAS EAR/PLS OXIMETRY MLT: CPT

## 2023-06-21 PROCEDURE — 2580000003 HC RX 258: Performed by: STUDENT IN AN ORGANIZED HEALTH CARE EDUCATION/TRAINING PROGRAM

## 2023-06-21 PROCEDURE — 80048 BASIC METABOLIC PNL TOTAL CA: CPT

## 2023-06-21 PROCEDURE — 6370000000 HC RX 637 (ALT 250 FOR IP)

## 2023-06-21 PROCEDURE — 94640 AIRWAY INHALATION TREATMENT: CPT

## 2023-06-21 PROCEDURE — 93010 ELECTROCARDIOGRAM REPORT: CPT | Performed by: INTERNAL MEDICINE

## 2023-06-21 PROCEDURE — 36415 COLL VENOUS BLD VENIPUNCTURE: CPT

## 2023-06-21 PROCEDURE — 6360000002 HC RX W HCPCS: Performed by: STUDENT IN AN ORGANIZED HEALTH CARE EDUCATION/TRAINING PROGRAM

## 2023-06-21 PROCEDURE — 99238 HOSP IP/OBS DSCHRG MGMT 30/<: CPT | Performed by: PSYCHIATRY & NEUROLOGY

## 2023-06-21 PROCEDURE — 6370000000 HC RX 637 (ALT 250 FOR IP): Performed by: STUDENT IN AN ORGANIZED HEALTH CARE EDUCATION/TRAINING PROGRAM

## 2023-06-21 PROCEDURE — 6370000000 HC RX 637 (ALT 250 FOR IP): Performed by: NURSE PRACTITIONER

## 2023-06-21 PROCEDURE — 99222 1ST HOSP IP/OBS MODERATE 55: CPT | Performed by: INTERNAL MEDICINE

## 2023-06-21 RX ORDER — METOPROLOL SUCCINATE 25 MG/1
25 TABLET, EXTENDED RELEASE ORAL DAILY
Qty: 30 TABLET | Refills: 3 | Status: SHIPPED | OUTPATIENT
Start: 2023-06-21 | End: 2023-06-23

## 2023-06-21 RX ORDER — AMLODIPINE BESYLATE 10 MG/1
5 TABLET ORAL ONCE
Status: DISCONTINUED | OUTPATIENT
Start: 2023-06-21 | End: 2023-06-21

## 2023-06-21 RX ADMIN — BUMETANIDE 1 MG: 1 TABLET ORAL at 09:23

## 2023-06-21 RX ADMIN — ASPIRIN 81 MG: 81 TABLET, CHEWABLE ORAL at 09:23

## 2023-06-21 RX ADMIN — CLOPIDOGREL BISULFATE 75 MG: 75 TABLET, FILM COATED ORAL at 09:23

## 2023-06-21 RX ADMIN — ENOXAPARIN SODIUM 40 MG: 40 INJECTION SUBCUTANEOUS at 09:25

## 2023-06-21 RX ADMIN — SODIUM CHLORIDE, PRESERVATIVE FREE 10 ML: 5 INJECTION INTRAVENOUS at 09:35

## 2023-06-21 RX ADMIN — SPIRONOLACTONE 25 MG: 25 TABLET ORAL at 11:07

## 2023-06-21 RX ADMIN — PROPRANOLOL HYDROCHLORIDE 120 MG: 60 CAPSULE, EXTENDED RELEASE ORAL at 09:25

## 2023-06-21 RX ADMIN — OXYBUTYNIN CHLORIDE 10 MG: 5 TABLET ORAL at 09:23

## 2023-06-21 RX ADMIN — FLUTICASONE PROPIONATE 2 SPRAY: 50 SPRAY, METERED NASAL at 09:23

## 2023-06-21 RX ADMIN — PRIMIDONE 250 MG: 250 TABLET ORAL at 09:25

## 2023-06-21 RX ADMIN — LEVOTHYROXINE SODIUM 137 MCG: 137 TABLET ORAL at 09:23

## 2023-06-21 RX ADMIN — TIOTROPIUM BROMIDE INHALATION SPRAY 2 PUFF: 3.12 SPRAY, METERED RESPIRATORY (INHALATION) at 09:26

## 2023-06-21 NOTE — CARE COORDINATION
DIMITRIS called and spoke with Ed Sim from [facility] and she confirms that they are able to accept patient today with 12p transportation. Patient and his wife Richarda Hashimoto updated with transportation time and both verbalize being agreeable to discharging to [facility] today. RN and Physician need to complete DANE prior to discharge. RN to call report to [phone].     Discharge [address] [place] [address] Case Management Department  Written by: Lexi Chand RN    Patient Name: Enoch Lozano  Attending Provider: Scar Breaux DO  Admit Date: 6/15/2023 10:49 AM  MRN: [id number]  Account: [de-identified]                     : 1935  Discharge Date:       Disposition: SNF- [facility]    Lexi Chand RN

## 2023-06-21 NOTE — DISCHARGE SUMMARY
metFORMIN (GLUCOPHAGE) 1000 MG tablet Take 1 tablet by mouth 2 times dailyHistorical Med      acetaminophen (TYLENOL) 500 MG tablet Take 2 tablets by mouth every 6 hours as needed for PainHistorical Med      TAMSULOSIN HCL PO Take 0.8 mg by mouth Daily with supper Historical Med      Multiple Vitamins-Minerals (THERAPEUTIC MULTIVITAMIN-MINERALS) tablet Take 1 tablet by mouth dailyHistorical Med           STOP taking these medications       sacubitril-valsartan (ENTRESTO) 24-26 MG per tablet Comments:   Reason for Stopping:               Activity: activity as tolerated    Restrictions: Driving Yes, Swimming Yes, Operating heavy machinery Yes, Compromising heights Yes    Diet: cardiac diet    Follow-up:    Shamika Schofield MD  [address] [place] [address], [address]  MOB # [id number] [facility]  [phone]    Call in 3 month(s)      Gentry Mckenzie MD  [address] [place] [address], [address]  MOB # [id number] [place] [address]  [phone]    Call in 2 week(s)      [address] [facility]  [address] [place] [address]  [phone]    Call in 10 day(s)      Fabrice Drew MD  [address] [place] [address] 6  [address] [place] [address]  [phone]    Call in 2 week(s)        Follow up labs: None    Follow up imaging: None    Note that over 30 minutes was spent in preparing discharge papers, discussing discharge with patient, medication review, etc.      Babar Sullivan MD  Internal Medicine Resident  Neurology service  [facility]         6/21/2023, 2:31 PM

## 2023-06-21 NOTE — PROGRESS NOTES
Echo calls, states will take patient to get Echo done soon.
It is noted that patient has an allergy to Rosuvastatin, MD resident aware.
[facility]   [facility]    Progress note             Date:   6/19/2023  Patient name:  Yoly Remy  Date of admission:  6/15/2023 10:49 AM  MRN:   [id number]  Account:  [de-identified]  YOB: 1935  PCP:    GENA Langston CNP  Room:   [address] [place] [address]  Code Status:    Full Code    Chief Complaint:     Chief Complaint   Patient presents with    Altered Mental Status       Interval hx: The Patient was seen and examined at bedside  No acute events overnight. Patient is afebrile and hemodynamically stable. Vital signs are stable. Patient states his tremors have gotten better after his home medications propranolol and primidone were resumed yesterday. Denies weakness, sob or other symptoms. Home diuretics were also resumed yesterday. He is afebrile and hemodynamically stable. Potassium back to normal after replacement yesterday. Brief History of Present Illness: The patient is a 82 y.o. Non- / non  male who presents to the hospital as a transfer from [place] [address] ED for the management of stroke like symptoms. Patient has a history of HTN, MI, cardiomyopathy, CHF, hypothyroidism, COPD, essential tremor, DM, CKD who presented to OSH with dysarthria and aphasia. CT head unremarkable. CTA concerning for possible L MCA superior division partial thrombi. He was transferred to [facility] and taken to angio for DSA; there was no thrombus or underlying stenosis found with spontaneous recanalization of the L MCA. MRI brain is pending. ECHO is pending. Patient reports his speech still sounds a little off with his lips feeling \"swollen\" but otherwise is without acute complaint. He is oriented at time of exam but a poor historian.         Past Medical History:     Past Medical History:   Diagnosis Date    COPD (chronic obstructive pulmonary disease) ([facility]     Diabetes mellitus ([facility] [facility]     Hx of echocardiogram 02/24/2017    [facility]
Occupational Therapy  Facility/Department: [address] STEPDOWN  Occupational Daily Treatment Note    Name: Gonzalo Lazar  : 1935  MRN: [id number]  Date of Service: 2023    Discharge Recommendations:  Patient would benefit from continued therapy after discharge Further therapy recommended at discharge. The patient should be able to tolerate at least 3 hours of therapy per day over 5 days or 15 hours over 7 days. This patient may benefit from a Physical Medicine and Rehab consult. Patient Diagnosis(es): The encounter diagnosis was Altered mental status, unspecified altered mental status type. Past Medical History:  has a past medical history of COPD (chronic obstructive pulmonary disease) ([facility] [address] Diabetes mellitus ([facility] [address] Hx of echocardiogram, Hyperlipidemia, Hypothyroidism, MI (myocardial infarction) ([facility] [address] [facility] Stroke ([facility] [facility] Thyroid disease, and Type II or unspecified type diabetes mellitus without mention of complication, not stated as uncontrolled. Past Surgical History:  has a past surgical history that includes hernia repair; Cardiac surgery (2017); fracture surgery; Colonoscopy; and Cerebral angiogram (Bilateral, 06/15/2023). Assessment   Performance deficits / Impairments: Decreased functional mobility ; Decreased endurance;Decreased ADL status; Decreased balance;Decreased high-level IADLs;Decreased coordination;Decreased safe awareness  Prognosis: Good  Activity Tolerance  Activity Tolerance: Patient Tolerated treatment well        Plan   Occupational Therapy Plan  Times Per Week: 3-5x     Restrictions  Restrictions/Precautions  Restrictions/Precautions: Fall Risk, General Precautions, Up as Tolerated  Required Braces or Orthoses?: No  Position Activity Restriction  Other position/activity restrictions: goal -160 mmHg  Pain assessment: Pt denies pain this day  Subjective   General  Patient assessed for rehabilitation services?: Yes  Family / Caregiver Present: No  Diagnosis: AMS,
Physical Medicine & Rehabilitation  Progress Note    6/20/2023 9:59 AM     CC: Ambulatory and ADL dysfunction due to Ischemic CVA of left parietal lobe    Subjective:   No complaints. Feels well. ROS:  Denies fevers, chills, sweats. No chest pain, palpitations, lightheadedness. Denies coughing, wheezing or shortness of breath. Denies abdominal pain, nausea, diarrhea or constipation. No new areas of joint pain. Denies new areas of numbness or weakness. Denies new anxiety or depression issues. No new skin problems. Rehabilitation:   PT:  Restrictions/Precautions: Fall Risk, General Precautions, Up as Tolerated  Other position/activity restrictions: goal -160 mmHg   Transfers  Sit to Stand: Moderate Assistance, 2 Person Assistance  Stand to Sit: Moderate Assistance  Comment: Used RW. Verbal cues given for hand placement during transfers w/ RW. Ambulation  Surface: Level tile  Device: Rolling Walker  Assistance: Minimal assistance  Gait Deviations: Slow Geovanna, Decreased step length  Distance: 3'  Comments: Pt. grossly unsteady this date. No major LOB noted during amb. Verbal cues given to keep RW close to RUBEN w/ fair return. Pt. requires Patricio for balance during amb and to keep RW close to RUBEN.   More Ambulation?: No      OT:  ADL  Feeding: Stand by assistance, Setup, Increased time to complete  Grooming: Setup, Increased time to complete, Stand by assistance  UE Bathing: Setup, Increased time to complete, Contact guard assistance  LE Bathing: Increased time to complete, Setup, Contact guard assistance  UE Dressing: Setup, Increased time to complete, Contact guard assistance  LE Dressing: Setup, Increased time to complete, Contact guard assistance  Toileting: Setup, Increased time to complete, Moderate assistance  Additional Comments: Pt feeding self using RUE upon arrival, food was pureed and pt was demo'ing major intentional tremors at BUE's when attempting to feed self, writer taped fork to a
Physician Progress Note      PATIENTSarath Saravia  CSN #:                  [id number]  :                       1935  ADMIT DATE:       6/15/2023 10:49 AM  DISCH DATE:  RESPONDING  PROVIDER #:        Faith Powers          QUERY TEXT:    Pt admitted with stroke like symptoms. Pt noted to have O2 sat of 88   requiring supplemental oxygen. If possible, please document in the progress   notes and discharge summary if you are evaluating and/or treating any of the   following: The medical record reflects the following:  Risk Factors: COPD  Clinical Indicators: per ED notes \"presented with AMS, on exam patient has   borderline oxygen saturation on 2 L, of 88%, he is minimally tachypneic\", VBG   pH 7.409 pCO2 41.6 pO2 13.6 HCO3, no mention of home O2  Treatment: supplemental oxygen initially 2L NC, now on 4L NC, VBG, ICU   monitoring    Thank you, Josesito Bearden, [facility]  Office hours [phone]  Cell [phone]  Email [email or web address]  Options provided:  -- Acute respiratory failure with hypoxia  -- Acute respiratory failure with hypoxia, now resolved  -- Acute respiratory failure with hypoxia and hypercapnia  -- Acute respiratory failure with hypoxia and hypercapnia now resolved  -- Other - I will add my own diagnosis  -- Disagree - Not applicable / Not valid  -- Disagree - Clinically unable to determine / Unknown  -- Refer to Clinical Documentation Reviewer    PROVIDER RESPONSE TEXT:    This patient is in acute respiratory failure with hypoxia.     Query created by: Cornell Espitia on 2023 8:09 AM      Electronically signed by:  Faith Powers 2023 7:56 AM
[facility] Transported by [facility]. All belonging including glasses, teeth, and cane sent with pt. Report given to [facility], all the questions were answered. Call back number [phone] given for follow up questions.
Speech Language [facility]ology  [address] [facility]    Cognitive Treatment Note    Date: 6/19/2023  Patients Name: Jevon Adair  MRN: [id number]  Patient Active Problem List   Diagnosis Code    Obesity (BMI 30.0-34. 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    Elevated liver enzymes R74.8    Hypothyroidism due to Hashimoto's thyroiditis E03.8, E06.3    Moderate persistent asthma without complication L02.94    Community acquired bacterial pneumonia J15.9    Hypoxia R09.02    Multifocal pneumonia J18.9    Sepsis due to pneumonia ([facility]) J18.9, A41.9    Septicemia ([address] A41.9    ASHD (arteriosclerotic heart disease) I25.10    Dyslipidemia E78.5    History of stroke Z86.73    Iron deficiency anemia D50.9    Nonrheumatic aortic valve stenosis I35.0    Chronic diastolic congestive heart failure ([facility]) I50.32    Essential hypertension I10    Other fluid overload E87.79    Ischemic stroke ([facility]) I63.9    Acute ischemic left middle cerebral artery (MCA) stroke ([facility]) I63.512    Altered mental status R41.82    Dysarthria R47.1       Pain: 0/10    Cognitive Treatment    Treatment time: 8200-5799      Subjective: [x] Alert [x] Cooperative     [] Confused     [] Agitated    [] Lethargic      Objective/Assessment:  Recall: 3/3, 1/3 increased to 2/3 with max verbal cues, 3/3, word list retention, attribute inclusion; 10/12 increased to 12/12 min repetition and min verbal cues    Problem Solving/Reasoning: Making word deductions; 6/8 increased to 7/8 with max verbal cues, concrete category members; 18/22 increased to
[facility]   [facility]    Progress note             Date:   6/21/2023  Patient name:  Toy Burkitt  Date of admission:  6/15/2023 10:49 AM  MRN:   [id number]  Account:  [de-identified]  YOB: 1935  PCP:    GENA Estevez CNP  Room:   [address] [place] [address]  Code Status:    Full Code    Chief Complaint:     Chief Complaint   Patient presents with    Altered Mental Status       Interval hx: The Patient was seen and examined at bedside  No acute events overnight. Patient is afebrile and hemodynamically stable. Cardiology recommends starting enteresto, and replacing propranolol with toprol xl, which we are in agreement. Denies chest pain, palpitations, sob, weakness or other symptoms. Brief History of Present Illness: The patient is a 82 y.o. Non- / non  male who presents to the hospital as a transfer from [address] for the management of stroke like symptoms. Patient has a history of HTN, MI, cardiomyopathy, CHF, hypothyroidism, COPD, essential tremor, DM, CKD who presented to OSH with dysarthria and aphasia. CT head unremarkable. CTA concerning for possible L MCA superior division partial thrombi. He was transferred to [facility] and taken to angio for DSA; there was no thrombus or underlying stenosis found with spontaneous recanalization of the L MCA. MRI brain is pending. ECHO is pending. Patient reports his speech still sounds a little off with his lips feeling \"swollen\" but otherwise is without acute complaint. He is oriented at time of exam but a poor historian. 6/19/23: Patient states his tremors have gotten better after his home medications propranolol and primidone were resumed on 6/18/23.  Home diuretics were also resumed on 6/18/23.    6/20/23: PVCs were noted on monitor today, for which an EKG was taken, BMP, CBC, Mg labs were ordered, cardiology was consulted and magnesium as well as potassium replacements
seconds   6a. Motor left le - no drift; leg holds 30 degree position for full 5 seconds   6b  Motor right le - no drift; leg holds 30 degree position for full 5 seconds   7. Limb Ataxia: 0 - absent   8. Sensory: 0 - normal; no sensory loss   9. Best Language:  0 - no aphasia, normal   10. Dysarthria: 0 - normal   11. Extinction and Inattention: 0 - no abnormality         Total:   0     MRS: 03      LABS:   Reviewed. Lab Results   Component Value Date    HGB 10.5 (L) 2023    WBC 11.4 (H) 2023     2023     (L) 2023    BUN 11 2023    CREATININE 0.78 2023    AST 36 2023    ALT 41 2023    MG 1.7 2023    APTT 27.1 06/15/2023    INR 1.0 06/15/2023      Lab Results   Component Value Date/Time    COVID19 Not Detected 02/10/2023 11:23 AM       RADIOLOGY:   Images were personally reviewed including:   DSA 06/15:   No obvious left MCA M-2 M3 segment occlusion TICI score 03                 ASSESSMENT:   Delphine Orlando is a 82-year-old male with past medical history significant for occipital stroke, essential tremors, diabetes, BPH, COPD presented on 06/15 with acute onset confusion, slurred speech, incomprehensible speech. Patient did not have any focal weakness. Patient's initial NIH stroke scale was 17. CT angiogram head was suspicious for left MCA superior division occlusion. Patient underwent emergent diagnostic cerebral angiogram which showed spontaneous recanalization of the left MCA branches. TICI score 03     This morning patient's neurological examination significantly improved, no focal neurological deficit noted. NIHSS was 0. MRI brain on the 23 shows punctate focus of presumed acute/subacute ischemia in the juxtacortical region of the left parietal lobe. PLAN:   --c/w ASA 81 mg daily   --c/w plavix  --Stroke work up, if there is afib, please start patient on anticoagulant.  If anticoagulant started aspirin can discontinued
safety  Insights: Decreased awareness of deficits  Cognition Comment: Pt ver5y [place], some increased time for processing     Objective       Bed mobility  Supine to Sit: Stand by assistance  Sit to Supine: Unable to assess  Scooting: Stand by assistance  Bed Mobility Comments: HOB elevated. , utalized bed rails  Transfers  Sit to Stand: Contact guard assistance  Stand to Sit: Contact guard assistance  Stand Pivot Transfers: Contact guard assistance  Comment: Used RW. Verbal cues given for hand placement during transfers w/ RW. Ambulation  Surface: Level tile  Device: Rolling Walker  Assistance: Contact guard assistance  Gait Deviations: Slow Geovanna;Decreased step length  Distance: 6ft+ 17ft x2 + 40 ft  Comments: Pt CGA/MIN at times to keep walker close, increased verbal cues for walker managment and posture, grossly steady  More Ambulation?: Yes (17ft x2 + 40 ft)  Stairs/Curb  Stairs?: No     Balance  Posture: Good  Sitting - Static: Good  Sitting - Dynamic: Good  Standing - Static: Fair  Standing - Dynamic: Fair;-  Comments: Assessed w/ RW. Pt. sitting unsupported EOB x 20 min. Pt. standing balance x5min w/ CGA and 6 MIN CGA  Exercise Treatment: Pt performed seated ther ex x15 :Marches, ankle pumps, LAQs and ABD/ADD  Static Standing Balance Exercises: Pt completed Static Standing during toilet transfer for pericare ~ 5minutes total  with no LOB,  2nd stand for urinal use 6 mins, 1 hand on RW, than therapist assisted with urinal with pt both hands on RW             AM-PAC Score  AM-PAC Inpatient Mobility Raw Score : 18 (06/20/23 1218)  AM-PAC Inpatient T-Scale Score : 43.63 (06/20/23 1218)  Mobility Inpatient CMS 0-100% Score: 46.58 (06/20/23 1218)  Mobility Inpatient CMS G-Code Modifier : CK (06/20/23 1218)     Goals  Short Term Goals  Time Frame for Short Term Goals: 14 visits  Short Term Goal 1: Pt. will be independent for bed mobility. Short Term Goal 2: Pt. will be mod I for transfers w/ SPC.   Short Term Goal
with Dr. John Kruse in 2 weeks after discharge and f/up with Dr. Martinez Mtz in 3 months  --Stable for discharge from endovascular team      Case discussed with Dr. Martinez Mtz attending.     Hunter Waters MD  Stroke, [facility] Stroke [facility]  Electronically signed 6/19/2023 at 8:31 AM
Primary Problem  Ischemic stroke [facility])    Active Hospital Problems    Diagnosis Date Noted    Altered mental status [R41.82] 06/16/2023    Dysarthria [R47.1] 06/16/2023    Ischemic stroke ([facility] [address] [I63.9] 06/15/2023    Acute ischemic left middle cerebral artery (MCA) stroke ([facility] [address] [I63.512] 06/15/2023     Downgraded from Neuro ICU. Ramon Barone following and he underwent emergent diagnostic cerebral angiogram which showed spontaneous recanalization of the left MCA branches. NIHSS 0. Repeat MRI brain 6/17/23 shows punctate focus of presumed acute/subacute ischemia in the juxtacortical region of the left parietal lobe. -Dysarthria, improving  -Elevated alk phos and ggt  -Essential tremor  -HTN  - Hypokalemia  - PVCs    Plan:     Continue DAPT, statin  MRI brain 6/17/23 shows Punctate focus of presumed acute/subacute ischemia in the juxtacortical region of the left parietal lobe  Liver US pending  Previous Echo 1/31/23 shows atrial septal aneurysm with left atrial dilation and risk of TIA and strokes. Current Echo 6/19/23 shows that the EF is 43 %. Inferior and anteroseptal wall motion abnormality. Patient also had PVCs on the telemetry. Patient can follow up with cardiology outpatient, if the transport to rehab is booked before inpatient cardiology can see him  Resumed home medications for essential tremor including propranolol and primidone on 6/19/23  Resumed home diuretics including bumex and spironolactone for history of diastolic CHF on 7/56/45  Therapies  DVT prophylaxis;  on Lovenox    Patient can be discharged to rehab with outpatient follow up to neurology, vascular neurology, PCP and cardiology. Follow-up further recommendations after discussing the case with attending  The plan was discussed with the patient, patient's family and the medical staff.    Consultations:   IP CONSULT TO NEUROLOGY  IP CONSULT TO PHYSICAL MEDICINE REHAB  IP CONSULT TO CARDIOLOGY    Patient is admitted as inpatient status because of

## 2023-06-21 NOTE — DISCHARGE INSTRUCTIONS
You presented from [facility] for management of stroke like symptoms as you had speech difficulty. CTA concerning for possible L MCA superior division partial thrombi. On DSA there was no thrombus or underlying stenosis found with spontaneous recanalization of the L MCA. MRI brain 6/17/23 shows punctate focus of presumed acute/subacute ischemia in the juxtacortical region of the left parietal lobe. Previous Echo 1/31/23 shows atrial septal aneurysm with left atrial dilation and risk of TIA and strokes. Current Echo 6/19/23 shows that the EF is 43 %. Inferior and anteroseptal wall motion abnormality. In layman's terms, you had transient stroke due to clots that have likely arose from your heart and caused blockage in the blood vessels supplying your brain. You were seen by neurology, stroke team, endovascular neurology and cardiology while you were here. It was also noted on continuous heart monitoring that you were having irregular heart rate known as PVCs. You have been discharged to rehabilitation initially for therapy with new medications added to your list. You have also been given continuous heart monitor that you need to wear for a month with loop recorder, so that we can catch atrial fibrillation or other irregular heart beating which are most likely the cause of your clots going to brain. These clots have the risk of going to other places in your body as well. Please continue taking blood thinners aspirin and plavix. Continue statin. Start enteresto and toprol xl for your heart. Discontinue propranolol as toprol xl also helps with essential tremors with the same mechanism. Please visit the ER if symptoms worsen or if new symptoms arise.

## 2023-06-21 NOTE — CONSULTS
[facility] Cardiology Cardiology    Consult / H&P               Today's Date: 6/21/2023  Patient Name: Yoly Remy  Date of admission: 6/15/2023 10:49 AM  Patient's age: 82 y. o., 1935  Admission Dx: Acute ischemic left middle cerebral artery (MCA) stroke (HCC) [I63.512]  Altered mental status, unspecified altered mental status type [R41.82]  Ischemic stroke ([facility] [I63.9]    Requesting Physician: Harsh Ibanez, DO    Cardiac Evaluation Reason: Frequent PVCs    History Obtained From: patient and chart review     History of Present Illness: This patient 82y.o. years old with past medical history CAD s/p stent, chronic systolic and diastolic heart failure, previous CVA, hyperlipidemia, hypertension initially admitted to the hospital with strokelike symptoms found to have punctate left cortical ischemic stroke secondary to M2 thrombus with spontaneous recanalization. 2D echo was obtained showed negative bubble study but did show low EF of 45% which is known from prior. Patient was getting discharged but patient was noted to have frequent PVCs that led to the cardiology consultation. Patient denied any chest pain, shortness of breath, orthopnea, PND, lightheadedness or dizziness. Upon my evaluation, patient is sleeping. Hemodynamically stable. No PVCs noted. Heart rate is in low 50s. Past Medical History:   has a past medical history of COPD (chronic obstructive pulmonary disease) ([facility] [address] Diabetes mellitus ([facility] [address] Hx of echocardiogram, Hyperlipidemia, Hypothyroidism, MI (myocardial infarction) ([facility] [address] Stroke ([facility] [address] Thyroid disease, and Type II or unspecified type diabetes mellitus without mention of complication, not stated as uncontrolled. Past Surgical History:   has a past surgical history that includes hernia repair; Cardiac surgery (02/24/2017); fracture surgery; Colonoscopy; and Cerebral angiogram (Bilateral, 06/15/2023).      Home Medications:    Prior to Admission medications    Medication Sig
complete, Setup, Contact guard assistance  UE Dressing: Setup, Increased time to complete, Contact guard assistance  LE Dressing: Setup, Increased time to complete, Contact guard assistance  Toileting: Setup, Increased time to complete, Moderate assistance  Additional Comments: Pt feeding self using RUE upon arrival, food was pureed and pt was demo'ing major intentional tremors at BUE's when attempting to feed self, writer taped fork to a bottle of mouthwash to attempt to increase the weight however not very successful, pt transferred to EOB and donned B/L socks with CGA and increased time, pt struggled to doff/don brief at toilet and sat for successful BM, pt performed slow bottom-care siiting and using RUE, pt is R-handed, pt unsteady after sitting on toilet and returned to bed quickly for safety, pt handed hand-    Toilet Transfer: Maximum assistance (Max A to stand from toilet d/t low toilet, however pt did use R bar)    ST:  Impression  Dysphagia Diagnosis: Swallow function appears [facility]  Dysphagia Outcome Severity Scale: Level 7: Normal in all situations      Patient presents with probable safe swallow for Regular diet with thin liquids as evidenced by no overt s/s of aspiration noted with consistencies tested. Recommend small sips and bites, only feed when alert and awake and upright at 90 degrees for all PO intake. Recommend close monitoring for overt/clinical s/s of aspiration and D/C PO intake and complete Modified Barium Swallow Study should they occur. Results and recommendations reported to RN. Pt presents with mild-severe cognitive deficits characterized by difficulties with recall of 3-5 units, divergent thinking, task insight, and deductive reasoning. Pt. Presents with no dysarthria, no O/M deficits at this time. ST to follow up and provide treatment to address noted deficits. Education provided.           Past Medical History:        Diagnosis Date    COPD (chronic obstructive

## 2023-06-21 NOTE — PLAN OF CARE
BRONCHOSPASM/BRONCHOCONSTRICTION     [x]         IMPROVE AERATION/BREATH SOUNDS  [x]   ADMINISTER BRONCHODILATOR THERAPY AS APPROPRIATE  [x]   ASSESS BREATH SOUNDS  [x]   IMPLEMENT AEROSOL/MDI PROTOCOL  [x]   PATIENT EDUCATION AS NEEDED     Problem: Respiratory - Adult  Goal: Achieves optimal ventilation and oxygenation  6/20/2023 0915 by Noreen Villagomez RCP  Outcome: Progressing
Problem: Discharge Planning  Goal: Discharge to home or other facility with appropriate resources  6/21/2023 0826 by Naga Lezama RN  Outcome: Progressing  6/20/2023 1829 by Remigio Avilez RN  Outcome: Progressing     Problem: Safety - Adult  Goal: Free from fall injury  6/21/2023 0826 by Naga Lezama RN  Outcome: Progressing  Flowsheets (Taken 6/20/2023 1900 by Isabel Alvarez RN)  Free From Fall Injury: Instruct family/caregiver on patient safety  6/20/2023 1829 by Remigio Avilez RN  Outcome: Progressing     Problem: Pain  Goal: Verbalizes/displays adequate comfort level or baseline comfort level  6/21/2023 0826 by Naga Lezama RN  Outcome: Progressing  6/20/2023 1829 by Remigio Avilez RN  Outcome: Progressing     Problem: Chronic Conditions and Co-morbidities  Goal: Patient's chronic conditions and co-morbidity symptoms are monitored and maintained or improved  Outcome: Progressing     Problem: Skin/Tissue Integrity  Goal: Absence of new skin breakdown  Description: 1. Monitor for areas of redness and/or skin breakdown  2. Assess vascular access sites hourly  3. Every 4-6 hours minimum:  Change oxygen saturation probe site  4. Every 4-6 hours:  If on nasal continuous positive airway pressure, respiratory therapy assess nares and determine need for appliance change or resting period.   Outcome: Progressing     Problem: ABCDS Injury Assessment  Goal: Absence of physical injury  Outcome: Progressing  Flowsheets  Taken 6/21/2023 0912 by Naga Lezama RN  Absence of Physical Injury: Implement safety measures based on patient assessment  Taken 6/20/2023 1900 by Isabel Alvarez RN  Absence of Physical Injury: Implement safety measures based on patient assessment     Problem: Respiratory - Adult  Goal: Achieves optimal ventilation and oxygenation  Outcome: Progressing
Problem: Discharge Planning  Goal: Discharge to home or other facility with appropriate resources  6/21/2023 0834 by Padmini Miramontes RN  Outcome: Progressing  6/21/2023 0826 by Padmini Miramontes RN  Outcome: Progressing  6/20/2023 1829 by Elray Sever, RN  Outcome: Progressing     Problem: Safety - Adult  Goal: Free from fall injury  6/21/2023 0834 by Padmini Miramontes RN  Outcome: Progressing  6/21/2023 0826 by Padmini Miramontes RN  Outcome: Progressing  Flowsheets (Taken 6/20/2023 1900 by Celi Betancourt RN)  Free From Fall Injury: Instruct family/caregiver on patient safety  6/20/2023 1829 by Elray Sever, RN  Outcome: Progressing     Problem: Pain  Goal: Verbalizes/displays adequate comfort level or baseline comfort level  6/21/2023 0834 by Padmini Miramontes RN  Outcome: Progressing  6/21/2023 0826 by Padmini Miramontes RN  Outcome: Progressing  6/20/2023 1829 by Elray Sever, RN  Outcome: Progressing     Problem: Chronic Conditions and Co-morbidities  Goal: Patient's chronic conditions and co-morbidity symptoms are monitored and maintained or improved  6/21/2023 0834 by Padmini Miramontes RN  Outcome: Progressing  6/21/2023 0826 by Padmini Miramontes RN  Outcome: Progressing     Problem: Skin/Tissue Integrity  Goal: Absence of new skin breakdown  Description: 1. Monitor for areas of redness and/or skin breakdown  2. Assess vascular access sites hourly  3. Every 4-6 hours minimum:  Change oxygen saturation probe site  4. Every 4-6 hours:  If on nasal continuous positive airway pressure, respiratory therapy assess nares and determine need for appliance change or resting period.   6/21/2023 0834 by Padmini Miramontes RN  Outcome: Progressing  6/21/2023 0826 by Padmini Miramontes RN  Outcome: Progressing     Problem: ABCDS Injury Assessment  Goal: Absence of physical injury  6/21/2023 0834 by Padmini Miramontes RN  Outcome: Progressing  6/21/2023 0826 by Padmini Miramontes RN  Outcome:
Problem: Discharge Planning  Goal: Discharge to home or other facility with appropriate resources  Outcome: Progressing
Problem: Discharge Planning  Goal: Discharge to home or other facility with appropriate resources  Outcome: Progressing  Flowsheets (Taken 6/19/2023 2000)  Discharge to home or other facility with appropriate resources: Identify barriers to discharge with patient and caregiver     Problem: Safety - Adult  Goal: Free from fall injury  Outcome: Progressing  Flowsheets (Taken 6/19/2023 2000)  Free From Fall Injury: Instruct family/caregiver on patient safety     Problem: Pain  Goal: Verbalizes/displays adequate comfort level or baseline comfort level  Outcome: Progressing     Problem: Chronic Conditions and Co-morbidities  Goal: Patient's chronic conditions and co-morbidity symptoms are monitored and maintained or improved  Outcome: Progressing  Flowsheets (Taken 6/19/2023 2000)  Care Plan - Patient's Chronic Conditions and Co-Morbidity Symptoms are Monitored and Maintained or Improved: Monitor and assess patient's chronic conditions and comorbid symptoms for stability, deterioration, or improvement     Problem: Skin/Tissue Integrity  Goal: Absence of new skin breakdown  Description: 1. Monitor for areas of redness and/or skin breakdown  2. Assess vascular access sites hourly  3. Every 4-6 hours minimum:  Change oxygen saturation probe site  4. Every 4-6 hours:  If on nasal continuous positive airway pressure, respiratory therapy assess nares and determine need for appliance change or resting period.   Outcome: Progressing     Problem: ABCDS Injury Assessment  Goal: Absence of physical injury  Outcome: Progressing  Flowsheets (Taken 6/19/2023 2000)  Absence of Physical Injury: Implement safety measures based on patient assessment     Problem: Respiratory - Adult  Goal: Achieves optimal ventilation and oxygenation  Outcome: Progressing  Flowsheets (Taken 6/19/2023 2000)  Achieves optimal ventilation and oxygenation: Assess for changes in respiratory status
Problem: Respiratory - Adult  Goal: Achieves optimal ventilation and oxygenation  6/21/2023 0930 by Lakesha Yanez RCP  Outcome: Progressing
Problem: Respiratory - Adult  Goal: Achieves optimal ventilation and oxygenation  Outcome: Progressing    BRONCHOSPASM/BRONCHOCONSTRICTION     [x]         IMPROVE AERATION/BREATH SOUNDS  [x]   ADMINISTER BRONCHODILATOR THERAPY AS APPROPRIATE  [x]   ASSESS BREATH SOUNDS  [x]   IMPLEMENT AEROSOL/MDI PROTOCOL  [x]   PATIENT EDUCATION AS NEEDED
nares and determine need for appliance change or resting period.   6/20/2023 1569 by Macey Sandoval RN  Outcome: Progressing     Problem: Respiratory - Adult  Goal: Achieves optimal ventilation and oxygenation  6/20/2023 0915 by Bryan Gaming RCP  Outcome: Progressing  6/20/2023 0619 by Macey Sandoval RN  Outcome: Progressing  Flowsheets (Taken 6/19/2023 2000)  Achieves optimal ventilation and oxygenation: Assess for changes in respiratory status

## 2023-06-21 NOTE — DISCHARGE INSTR - COC
Continuity of Care Form    Patient Name: Anita Rodriguez   :  1935  MRN:  [id number]    Admit date:  6/15/2023  Discharge date:  23    Code Status Order: Full Code   Advance Directives:     Admitting Physician:  Shoshana Bell DO  PCP: Johnna Snider, APRN - CNP    Discharging Nurse: ANIBAL [facility] Unit/Room#: 3529/1915-94  Discharging Unit Phone Number: [phone]    Emergency Contact:   Extended Emergency Contact Information  Primary Emergency Contact: Nicole Vargas  Address: [address] [place] [address]  Home Phone: [phone]  Mobile Phone: [phone]  Relation: Spouse   needed? No    Past Surgical History:  Past Surgical History:   Procedure Laterality Date    CARDIAC SURGERY  2017    Stent placed  Dr Diehl Brought Bilateral 06/15/2023    ANGIOGRAM CAROTID CEREBRAL BILATERAL    COLONOSCOPY      FRACTURE SURGERY      right ankle    HERNIA REPAIR         Immunization History:   Immunization History   Administered Date(s) Administered    COVID-19, MODERNA BLUE border, Primary or Immunocompromised, (age 12y+), IM, 100 mcg/0.5mL 2021, 2021, 2021, 2022    Influenza Virus Vaccine 2017, 10/01/2018    Influenza, AFLURIA (age 1 yrs+), FLUZONE, (age 10 mo+), MDV, 0.5mL 2017    Influenza, FLUAD, (age 72 y+), Adjuvanted, 0.5mL 2020, 2022    Influenza, FLUZONE (age 72 y+), High Dose, 0.7mL 10/07/2021    Influenza, High Dose (Fluzone 65 yrs and older) 2015, 10/01/2018, 2019, 10/07/2020    Pneumococcal, PCV-13, PREVNAR 13, (age 6w+), IM, 0.5mL 2015    Pneumococcal, PPSV23, PNEUMOVAX 23, (age 2y+), SC/IM, 0.5mL 2000, 02/15/2006    Tetanus 2003    Zoster Live (Zostavax) 2008, 2012    Zoster Recombinant (Shingrix) 2021, 2021       Active Problems:  Patient Active Problem List   Diagnosis Code    Obesity (BMI 30.0-34. 9) E66.9    Venous (peripheral)

## 2023-06-21 NOTE — DISCHARGE INSTR - DIET

## 2023-06-23 ENCOUNTER — ANESTHESIA EVENT (OUTPATIENT)
Dept: OPERATING ROOM | Age: 88
DRG: 378 | End: 2023-06-23
Payer: MEDICARE

## 2023-06-23 ENCOUNTER — HOSPITAL ENCOUNTER (INPATIENT)
Age: 88
LOS: 5 days | Discharge: NURSING, REHAB OR LONG-TERM CARE | DRG: 378 | End: 2023-06-28
Attending: FAMILY MEDICINE | Admitting: INTERNAL MEDICINE
Payer: MEDICARE

## 2023-06-23 ENCOUNTER — HOSPITAL ENCOUNTER (EMERGENCY)
Age: 88
Discharge: ANOTHER HOSPITAL | End: 2023-06-23
Attending: EMERGENCY MEDICINE
Payer: MEDICARE

## 2023-06-23 ENCOUNTER — ANESTHESIA (OUTPATIENT)
Dept: OPERATING ROOM | Age: 88
DRG: 378 | End: 2023-06-23
Payer: MEDICARE

## 2023-06-23 ENCOUNTER — APPOINTMENT (OUTPATIENT)
Dept: CT IMAGING | Age: 88
DRG: 378 | End: 2023-06-23
Attending: FAMILY MEDICINE
Payer: MEDICARE

## 2023-06-23 VITALS
OXYGEN SATURATION: 98 % | DIASTOLIC BLOOD PRESSURE: 49 MMHG | TEMPERATURE: 97.8 F | SYSTOLIC BLOOD PRESSURE: 151 MMHG | RESPIRATION RATE: 11 BRPM | HEART RATE: 57 BPM

## 2023-06-23 DIAGNOSIS — K62.5 RECTAL BLEEDING: Primary | ICD-10-CM

## 2023-06-23 PROBLEM — K92.2 GI BLEED: Status: ACTIVE | Noted: 2023-06-23

## 2023-06-23 PROBLEM — K57.31 DIVERTICULAR HEMORRHAGE: Status: ACTIVE | Noted: 2023-06-23

## 2023-06-23 PROBLEM — D62 ACUTE BLOOD LOSS ANEMIA: Status: ACTIVE | Noted: 2023-06-23

## 2023-06-23 PROBLEM — K57.30 PANCOLONIC DIVERTICULOSIS: Status: ACTIVE | Noted: 2023-06-23

## 2023-06-23 PROBLEM — D64.9 ANEMIA: Status: ACTIVE | Noted: 2023-06-23

## 2023-06-23 LAB
ABO + RH BLD: NORMAL
ALBUMIN SERPL-MCNC: 3.4 G/DL (ref 3.5–5.2)
ALBUMIN/GLOB SERPL: 1.1 {RATIO} (ref 1–2.5)
ALP SERPL-CCNC: 251 U/L (ref 40–129)
ALT SERPL-CCNC: 68 U/L (ref 5–41)
ANION GAP SERPL CALCULATED.3IONS-SCNC: 11 MMOL/L (ref 9–17)
ARM BAND NUMBER: NORMAL
AST SERPL-CCNC: 54 U/L
BASOPHILS # BLD: 0.05 K/UL (ref 0–0.2)
BASOPHILS NFR BLD: 1 % (ref 0–2)
BILIRUB SERPL-MCNC: 0.3 MG/DL (ref 0.3–1.2)
BLOOD GROUP ANTIBODIES SERPL: NEGATIVE
BUN SERPL-MCNC: 19 MG/DL (ref 8–23)
BUN/CREAT SERPL: 18 (ref 9–20)
CALCIUM SERPL-MCNC: 8.6 MG/DL (ref 8.6–10.4)
CHLORIDE SERPL-SCNC: 94 MMOL/L (ref 98–107)
CO2 SERPL-SCNC: 24 MMOL/L (ref 20–31)
CREAT SERPL-MCNC: 1.05 MG/DL (ref 0.7–1.2)
EOSINOPHIL # BLD: 0.54 K/UL (ref 0–0.44)
EOSINOPHILS RELATIVE PERCENT: 6 % (ref 1–4)
ERYTHROCYTE [DISTWIDTH] IN BLOOD BY AUTOMATED COUNT: 14.2 % (ref 11.8–14.4)
EXPIRATION DATE: NORMAL
FOLATE SERPL-MCNC: 17.1 NG/ML
GFR SERPL CREATININE-BSD FRML MDRD: >60 ML/MIN/1.73M2
GLUCOSE BLD-MCNC: 145 MG/DL (ref 75–110)
GLUCOSE BLD-MCNC: 147 MG/DL (ref 75–110)
GLUCOSE BLD-MCNC: 156 MG/DL (ref 75–110)
GLUCOSE BLD-MCNC: 162 MG/DL (ref 75–110)
GLUCOSE SERPL-MCNC: 157 MG/DL (ref 70–99)
HCT VFR BLD AUTO: 22.9 % (ref 40.7–50.3)
HCT VFR BLD AUTO: 25.5 % (ref 40.7–50.3)
HCT VFR BLD AUTO: 26.1 % (ref 40.7–50.3)
HCT VFR BLD AUTO: 27.8 % (ref 40.7–50.3)
HCT VFR BLD AUTO: 33.5 % (ref 40.7–50.3)
HGB BLD-MCNC: 11 G/DL (ref 13–17)
HGB BLD-MCNC: 7.2 G/DL (ref 13–17)
HGB BLD-MCNC: 8 G/DL (ref 13–17)
HGB BLD-MCNC: 8.1 G/DL (ref 13–17)
HGB BLD-MCNC: 8.6 G/DL (ref 13–17)
IMM GRANULOCYTES # BLD AUTO: 0.1 K/UL (ref 0–0.3)
IMM GRANULOCYTES NFR BLD: 1 %
INR PPP: 1
IRON SATN MFR SERPL: 33 % (ref 20–55)
IRON SERPL-MCNC: 42 UG/DL (ref 59–158)
LACTATE BLDV-SCNC: 1.5 MMOL/L (ref 0.5–2.2)
LYMPHOCYTES # BLD: 18 % (ref 24–43)
LYMPHOCYTES NFR BLD: 1.76 K/UL (ref 1.1–3.7)
MCH RBC QN AUTO: 29.5 PG (ref 25.2–33.5)
MCHC RBC AUTO-ENTMCNC: 32.8 G/DL (ref 28.4–34.8)
MCV RBC AUTO: 89.8 FL (ref 82.6–102.9)
MONOCYTES NFR BLD: 1.26 K/UL (ref 0.1–1.2)
MONOCYTES NFR BLD: 13 % (ref 3–12)
NEUTROPHILS NFR BLD: 61 % (ref 36–65)
NEUTS SEG NFR BLD: 6.06 K/UL (ref 1.5–8.1)
NRBC AUTOMATED: 0 PER 100 WBC
PARTIAL THROMBOPLASTIN TIME: 32.4 SEC (ref 26.8–34.8)
PLATELET # BLD AUTO: 295 K/UL (ref 138–453)
PMV BLD AUTO: 9.9 FL (ref 8.1–13.5)
POTASSIUM SERPL-SCNC: 4.1 MMOL/L (ref 3.7–5.3)
PROT SERPL-MCNC: 6.6 G/DL (ref 6.4–8.3)
PROTHROMBIN TIME: 13.5 SEC (ref 11.9–14.8)
RBC # BLD AUTO: 3.73 M/UL (ref 4.21–5.77)
SODIUM SERPL-SCNC: 129 MMOL/L (ref 135–144)
TIBC SERPL-MCNC: 129 UG/DL (ref 250–450)
UNSATURATED IRON BINDING CAPACITY: 87 UG/DL (ref 112–347)
VIT B12 SERPL-MCNC: 405 PG/ML (ref 232–1245)
WBC OTHER # BLD: 9.8 K/UL (ref 3.5–11.3)

## 2023-06-23 PROCEDURE — 99223 1ST HOSP IP/OBS HIGH 75: CPT | Performed by: INTERNAL MEDICINE

## 2023-06-23 PROCEDURE — 6360000002 HC RX W HCPCS: Performed by: INTERNAL MEDICINE

## 2023-06-23 PROCEDURE — 82746 ASSAY OF FOLIC ACID SERUM: CPT

## 2023-06-23 PROCEDURE — 2500000003 HC RX 250 WO HCPCS: Performed by: NURSE ANESTHETIST, CERTIFIED REGISTERED

## 2023-06-23 PROCEDURE — 86920 COMPATIBILITY TEST SPIN: CPT

## 2023-06-23 PROCEDURE — 2500000003 HC RX 250 WO HCPCS: Performed by: INTERNAL MEDICINE

## 2023-06-23 PROCEDURE — 86901 BLOOD TYPING SEROLOGIC RH(D): CPT

## 2023-06-23 PROCEDURE — 94640 AIRWAY INHALATION TREATMENT: CPT

## 2023-06-23 PROCEDURE — 86900 BLOOD TYPING SEROLOGIC ABO: CPT

## 2023-06-23 PROCEDURE — APPNB30 APP NON BILLABLE TIME 0-30 MINS: Performed by: NURSE PRACTITIONER

## 2023-06-23 PROCEDURE — 85018 HEMOGLOBIN: CPT

## 2023-06-23 PROCEDURE — 94761 N-INVAS EAR/PLS OXIMETRY MLT: CPT

## 2023-06-23 PROCEDURE — 2580000003 HC RX 258: Performed by: EMERGENCY MEDICINE

## 2023-06-23 PROCEDURE — 2709999900 HC NON-CHARGEABLE SUPPLY: Performed by: INTERNAL MEDICINE

## 2023-06-23 PROCEDURE — 83550 IRON BINDING TEST: CPT

## 2023-06-23 PROCEDURE — 36430 TRANSFUSION BLD/BLD COMPNT: CPT

## 2023-06-23 PROCEDURE — 83605 ASSAY OF LACTIC ACID: CPT

## 2023-06-23 PROCEDURE — 2720000010 HC SURG SUPPLY STERILE: Performed by: INTERNAL MEDICINE

## 2023-06-23 PROCEDURE — 45382 COLONOSCOPY W/CONTROL BLEED: CPT | Performed by: INTERNAL MEDICINE

## 2023-06-23 PROCEDURE — 2060000000 HC ICU INTERMEDIATE R&B

## 2023-06-23 PROCEDURE — 96360 HYDRATION IV INFUSION INIT: CPT

## 2023-06-23 PROCEDURE — C1889 IMPLANT/INSERT DEVICE, NOC: HCPCS | Performed by: INTERNAL MEDICINE

## 2023-06-23 PROCEDURE — 86850 RBC ANTIBODY SCREEN: CPT

## 2023-06-23 PROCEDURE — 85610 PROTHROMBIN TIME: CPT

## 2023-06-23 PROCEDURE — 6370000000 HC RX 637 (ALT 250 FOR IP): Performed by: INTERNAL MEDICINE

## 2023-06-23 PROCEDURE — 83540 ASSAY OF IRON: CPT

## 2023-06-23 PROCEDURE — 7100000001 HC PACU RECOVERY - ADDTL 15 MIN: Performed by: INTERNAL MEDICINE

## 2023-06-23 PROCEDURE — P9016 RBC LEUKOCYTES REDUCED: HCPCS

## 2023-06-23 PROCEDURE — 99285 EMERGENCY DEPT VISIT HI MDM: CPT

## 2023-06-23 PROCEDURE — C9113 INJ PANTOPRAZOLE SODIUM, VIA: HCPCS | Performed by: INTERNAL MEDICINE

## 2023-06-23 PROCEDURE — 80053 COMPREHEN METABOLIC PANEL: CPT

## 2023-06-23 PROCEDURE — 3609009900 HC COLONOSCOPY W/CONTROL BLEEDING ANY METHOD: Performed by: INTERNAL MEDICINE

## 2023-06-23 PROCEDURE — 6360000002 HC RX W HCPCS: Performed by: NURSE ANESTHETIST, CERTIFIED REGISTERED

## 2023-06-23 PROCEDURE — 6360000004 HC RX CONTRAST MEDICATION: Performed by: NURSE PRACTITIONER

## 2023-06-23 PROCEDURE — 3700000000 HC ANESTHESIA ATTENDED CARE: Performed by: INTERNAL MEDICINE

## 2023-06-23 PROCEDURE — 0W3P8ZZ CONTROL BLEEDING IN GASTROINTESTINAL TRACT, VIA NATURAL OR ARTIFICIAL OPENING ENDOSCOPIC: ICD-10-PCS | Performed by: INTERNAL MEDICINE

## 2023-06-23 PROCEDURE — 2580000003 HC RX 258: Performed by: INTERNAL MEDICINE

## 2023-06-23 PROCEDURE — 36415 COLL VENOUS BLD VENIPUNCTURE: CPT

## 2023-06-23 PROCEDURE — 85027 COMPLETE CBC AUTOMATED: CPT

## 2023-06-23 PROCEDURE — 74174 CTA ABD&PLVS W/CONTRAST: CPT

## 2023-06-23 PROCEDURE — 85730 THROMBOPLASTIN TIME PARTIAL: CPT

## 2023-06-23 PROCEDURE — 7100000000 HC PACU RECOVERY - FIRST 15 MIN: Performed by: INTERNAL MEDICINE

## 2023-06-23 PROCEDURE — 85014 HEMATOCRIT: CPT

## 2023-06-23 PROCEDURE — 82947 ASSAY GLUCOSE BLOOD QUANT: CPT

## 2023-06-23 PROCEDURE — 82607 VITAMIN B-12: CPT

## 2023-06-23 PROCEDURE — 3700000001 HC ADD 15 MINUTES (ANESTHESIA): Performed by: INTERNAL MEDICINE

## 2023-06-23 PROCEDURE — 99222 1ST HOSP IP/OBS MODERATE 55: CPT | Performed by: NURSE PRACTITIONER

## 2023-06-23 RX ORDER — METOCLOPRAMIDE HYDROCHLORIDE 5 MG/ML
10 INJECTION INTRAMUSCULAR; INTRAVENOUS ONCE
Status: DISCONTINUED | OUTPATIENT
Start: 2023-06-23 | End: 2023-06-23

## 2023-06-23 RX ORDER — ACETAMINOPHEN 325 MG/1
650 TABLET ORAL EVERY 6 HOURS PRN
Status: DISCONTINUED | OUTPATIENT
Start: 2023-06-23 | End: 2023-06-28 | Stop reason: HOSPADM

## 2023-06-23 RX ORDER — 0.9 % SODIUM CHLORIDE 0.9 %
500 INTRAVENOUS SOLUTION INTRAVENOUS ONCE
Status: COMPLETED | OUTPATIENT
Start: 2023-06-23 | End: 2023-06-23

## 2023-06-23 RX ORDER — PHENYLEPHRINE HCL IN 0.9% NACL 0.5 MG/5ML
SYRINGE (ML) INTRAVENOUS PRN
Status: DISCONTINUED | OUTPATIENT
Start: 2023-06-23 | End: 2023-06-23 | Stop reason: SDUPTHER

## 2023-06-23 RX ORDER — ONDANSETRON 2 MG/ML
4 INJECTION INTRAMUSCULAR; INTRAVENOUS
Status: DISCONTINUED | OUTPATIENT
Start: 2023-06-23 | End: 2023-06-23 | Stop reason: HOSPADM

## 2023-06-23 RX ORDER — DEXTROSE MONOHYDRATE 100 MG/ML
INJECTION, SOLUTION INTRAVENOUS CONTINUOUS PRN
Status: DISCONTINUED | OUTPATIENT
Start: 2023-06-23 | End: 2023-06-28 | Stop reason: HOSPADM

## 2023-06-23 RX ORDER — LOPERAMIDE HYDROCHLORIDE 2 MG/1
2 CAPSULE ORAL 4 TIMES DAILY PRN
COMMUNITY

## 2023-06-23 RX ORDER — FUROSEMIDE 10 MG/ML
20 INJECTION INTRAMUSCULAR; INTRAVENOUS ONCE
Status: DISCONTINUED | OUTPATIENT
Start: 2023-06-23 | End: 2023-06-23

## 2023-06-23 RX ORDER — SODIUM CHLORIDE 9 MG/ML
INJECTION, SOLUTION INTRAVENOUS PRN
Status: DISCONTINUED | OUTPATIENT
Start: 2023-06-23 | End: 2023-06-28 | Stop reason: HOSPADM

## 2023-06-23 RX ORDER — SODIUM CHLORIDE 9 MG/ML
INJECTION, SOLUTION INTRAVENOUS PRN
Status: DISCONTINUED | OUTPATIENT
Start: 2023-06-23 | End: 2023-06-23

## 2023-06-23 RX ORDER — ONDANSETRON 4 MG/1
4 TABLET, ORALLY DISINTEGRATING ORAL EVERY 8 HOURS PRN
Status: DISCONTINUED | OUTPATIENT
Start: 2023-06-23 | End: 2023-06-28 | Stop reason: HOSPADM

## 2023-06-23 RX ORDER — LEVOTHYROXINE SODIUM 137 UG/1
137 TABLET ORAL DAILY
COMMUNITY

## 2023-06-23 RX ORDER — MAGNESIUM HYDROXIDE/ALUMINUM HYDROXICE/SIMETHICONE 120; 1200; 1200 MG/30ML; MG/30ML; MG/30ML
15 SUSPENSION ORAL EVERY 6 HOURS PRN
COMMUNITY

## 2023-06-23 RX ORDER — SODIUM CHLORIDE 0.9 % (FLUSH) 0.9 %
5-40 SYRINGE (ML) INJECTION PRN
Status: DISCONTINUED | OUTPATIENT
Start: 2023-06-23 | End: 2023-06-28 | Stop reason: HOSPADM

## 2023-06-23 RX ORDER — INSULIN LISPRO 100 [IU]/ML
0-4 INJECTION, SOLUTION INTRAVENOUS; SUBCUTANEOUS
Status: DISCONTINUED | OUTPATIENT
Start: 2023-06-23 | End: 2023-06-28 | Stop reason: HOSPADM

## 2023-06-23 RX ORDER — PROPOFOL 10 MG/ML
INJECTION, EMULSION INTRAVENOUS CONTINUOUS PRN
Status: DISCONTINUED | OUTPATIENT
Start: 2023-06-23 | End: 2023-06-23 | Stop reason: SDUPTHER

## 2023-06-23 RX ORDER — SODIUM CHLORIDE 0.9 % (FLUSH) 0.9 %
5-40 SYRINGE (ML) INJECTION PRN
Status: DISCONTINUED | OUTPATIENT
Start: 2023-06-23 | End: 2023-06-23 | Stop reason: HOSPADM

## 2023-06-23 RX ORDER — PROPRANOLOL HCL 60 MG
60 CAPSULE, EXTENDED RELEASE 24HR ORAL DAILY
COMMUNITY

## 2023-06-23 RX ORDER — CIPROFLOXACIN 2 MG/ML
400 INJECTION, SOLUTION INTRAVENOUS EVERY 12 HOURS
Status: DISCONTINUED | OUTPATIENT
Start: 2023-06-23 | End: 2023-06-26

## 2023-06-23 RX ORDER — BUDESONIDE AND FORMOTEROL FUMARATE DIHYDRATE 160; 4.5 UG/1; UG/1
2 AEROSOL RESPIRATORY (INHALATION) 2 TIMES DAILY
Status: DISCONTINUED | OUTPATIENT
Start: 2023-06-23 | End: 2023-06-28 | Stop reason: HOSPADM

## 2023-06-23 RX ORDER — NITROGLYCERIN 0.4 MG/1
0.4 TABLET SUBLINGUAL EVERY 5 MIN PRN
COMMUNITY

## 2023-06-23 RX ORDER — LABETALOL HYDROCHLORIDE 5 MG/ML
10 INJECTION, SOLUTION INTRAVENOUS
Status: DISCONTINUED | OUTPATIENT
Start: 2023-06-23 | End: 2023-06-23 | Stop reason: HOSPADM

## 2023-06-23 RX ORDER — ACETAMINOPHEN 650 MG/1
650 SUPPOSITORY RECTAL EVERY 4 HOURS PRN
COMMUNITY

## 2023-06-23 RX ORDER — METRONIDAZOLE 500 MG/100ML
500 INJECTION, SOLUTION INTRAVENOUS EVERY 8 HOURS
Status: COMPLETED | OUTPATIENT
Start: 2023-06-23 | End: 2023-06-26

## 2023-06-23 RX ORDER — SODIUM CHLORIDE 9 MG/ML
INJECTION, SOLUTION INTRAVENOUS CONTINUOUS
Status: DISCONTINUED | OUTPATIENT
Start: 2023-06-23 | End: 2023-06-24

## 2023-06-23 RX ORDER — ACETAMINOPHEN 650 MG/1
650 SUPPOSITORY RECTAL EVERY 6 HOURS PRN
Status: DISCONTINUED | OUTPATIENT
Start: 2023-06-23 | End: 2023-06-28 | Stop reason: HOSPADM

## 2023-06-23 RX ORDER — FLUTICASONE PROPIONATE 50 MCG
2 SPRAY, SUSPENSION (ML) NASAL DAILY
Status: DISCONTINUED | OUTPATIENT
Start: 2023-06-23 | End: 2023-06-28 | Stop reason: HOSPADM

## 2023-06-23 RX ORDER — HYDRALAZINE HYDROCHLORIDE 20 MG/ML
10 INJECTION INTRAMUSCULAR; INTRAVENOUS
Status: DISCONTINUED | OUTPATIENT
Start: 2023-06-23 | End: 2023-06-23 | Stop reason: HOSPADM

## 2023-06-23 RX ORDER — EPINEPHRINE 1 MG/ML(1)
AMPUL (ML) INJECTION PRN
Status: DISCONTINUED | OUTPATIENT
Start: 2023-06-23 | End: 2023-06-23 | Stop reason: HOSPADM

## 2023-06-23 RX ORDER — GLUCAGON 1 MG/ML
1 KIT INJECTION PRN
Status: DISCONTINUED | OUTPATIENT
Start: 2023-06-23 | End: 2023-06-28 | Stop reason: HOSPADM

## 2023-06-23 RX ORDER — SODIUM CHLORIDE 0.9 % (FLUSH) 0.9 %
5-40 SYRINGE (ML) INJECTION EVERY 12 HOURS SCHEDULED
Status: DISCONTINUED | OUTPATIENT
Start: 2023-06-23 | End: 2023-06-23 | Stop reason: HOSPADM

## 2023-06-23 RX ORDER — SODIUM CHLORIDE 9 MG/ML
INJECTION, SOLUTION INTRAVENOUS PRN
Status: DISCONTINUED | OUTPATIENT
Start: 2023-06-23 | End: 2023-06-28

## 2023-06-23 RX ORDER — ONDANSETRON 2 MG/ML
4 INJECTION INTRAMUSCULAR; INTRAVENOUS EVERY 6 HOURS PRN
Status: DISCONTINUED | OUTPATIENT
Start: 2023-06-23 | End: 2023-06-28 | Stop reason: HOSPADM

## 2023-06-23 RX ORDER — BISACODYL 10 MG
10 SUPPOSITORY, RECTAL RECTAL DAILY
Status: ON HOLD | COMMUNITY
End: 2023-06-28 | Stop reason: HOSPADM

## 2023-06-23 RX ORDER — FLUTICASONE PROPIONATE 50 MCG
2 SPRAY, SUSPENSION (ML) NASAL DAILY
Status: DISCONTINUED | OUTPATIENT
Start: 2023-06-23 | End: 2023-06-23 | Stop reason: CLARIF

## 2023-06-23 RX ORDER — SODIUM CHLORIDE 9 MG/ML
INJECTION, SOLUTION INTRAVENOUS PRN
Status: DISCONTINUED | OUTPATIENT
Start: 2023-06-23 | End: 2023-06-23 | Stop reason: HOSPADM

## 2023-06-23 RX ORDER — ENEMA 19; 7 G/133ML; G/133ML
1 ENEMA RECTAL DAILY PRN
Status: ON HOLD | COMMUNITY
End: 2023-06-28 | Stop reason: HOSPADM

## 2023-06-23 RX ORDER — METOCLOPRAMIDE HYDROCHLORIDE 5 MG/ML
10 INJECTION INTRAMUSCULAR; INTRAVENOUS ONCE
Status: DISCONTINUED | OUTPATIENT
Start: 2023-06-23 | End: 2023-06-28 | Stop reason: HOSPADM

## 2023-06-23 RX ORDER — CALCIUM CARBONATE 500 MG/1
1 TABLET, CHEWABLE ORAL EVERY 6 HOURS PRN
COMMUNITY

## 2023-06-23 RX ORDER — ALBUTEROL SULFATE 2.5 MG/3ML
2.5 SOLUTION RESPIRATORY (INHALATION) EVERY 4 HOURS PRN
Status: DISCONTINUED | OUTPATIENT
Start: 2023-06-23 | End: 2023-06-28 | Stop reason: HOSPADM

## 2023-06-23 RX ORDER — LIDOCAINE HYDROCHLORIDE 10 MG/ML
INJECTION, SOLUTION EPIDURAL; INFILTRATION; INTRACAUDAL; PERINEURAL PRN
Status: DISCONTINUED | OUTPATIENT
Start: 2023-06-23 | End: 2023-06-23 | Stop reason: SDUPTHER

## 2023-06-23 RX ORDER — SODIUM CHLORIDE 0.9 % (FLUSH) 0.9 %
5-40 SYRINGE (ML) INJECTION EVERY 12 HOURS SCHEDULED
Status: DISCONTINUED | OUTPATIENT
Start: 2023-06-23 | End: 2023-06-28 | Stop reason: HOSPADM

## 2023-06-23 RX ORDER — INSULIN LISPRO 100 [IU]/ML
0-4 INJECTION, SOLUTION INTRAVENOUS; SUBCUTANEOUS NIGHTLY
Status: DISCONTINUED | OUTPATIENT
Start: 2023-06-23 | End: 2023-06-28 | Stop reason: HOSPADM

## 2023-06-23 RX ADMIN — ALBUTEROL SULFATE 2.5 MG: 2.5 SOLUTION RESPIRATORY (INHALATION) at 18:41

## 2023-06-23 RX ADMIN — Medication 100 MCG: at 12:37

## 2023-06-23 RX ADMIN — Medication 100 MCG: at 12:27

## 2023-06-23 RX ADMIN — Medication 100 MCG: at 13:07

## 2023-06-23 RX ADMIN — IOPAMIDOL 100 ML: 755 INJECTION, SOLUTION INTRAVENOUS at 09:49

## 2023-06-23 RX ADMIN — BUDESONIDE AND FORMOTEROL FUMARATE DIHYDRATE 2 PUFF: 160; 4.5 AEROSOL RESPIRATORY (INHALATION) at 20:00

## 2023-06-23 RX ADMIN — Medication 100 MCG: at 12:47

## 2023-06-23 RX ADMIN — Medication 100 MCG: at 12:06

## 2023-06-23 RX ADMIN — SODIUM CHLORIDE 500 ML: 9 INJECTION, SOLUTION INTRAVENOUS at 03:37

## 2023-06-23 RX ADMIN — Medication 100 MCG: at 12:13

## 2023-06-23 RX ADMIN — Medication 100 MCG: at 12:10

## 2023-06-23 RX ADMIN — SODIUM CHLORIDE, PRESERVATIVE FREE 80 MG: 5 INJECTION INTRAVENOUS at 10:10

## 2023-06-23 RX ADMIN — METRONIDAZOLE 500 MG: 500 INJECTION, SOLUTION INTRAVENOUS at 23:16

## 2023-06-23 RX ADMIN — Medication 100 MCG: at 12:31

## 2023-06-23 RX ADMIN — Medication 100 MCG: at 12:53

## 2023-06-23 RX ADMIN — SODIUM CHLORIDE 500 ML: 0.9 INJECTION, SOLUTION INTRAVENOUS at 08:06

## 2023-06-23 RX ADMIN — PROPOFOL 75 MCG/KG/MIN: 10 INJECTION, EMULSION INTRAVENOUS at 12:04

## 2023-06-23 RX ADMIN — Medication 100 MCG: at 12:21

## 2023-06-23 RX ADMIN — SODIUM CHLORIDE: 9 INJECTION, SOLUTION INTRAVENOUS at 16:53

## 2023-06-23 RX ADMIN — CIPROFLOXACIN 400 MG: 400 INJECTION, SOLUTION INTRAVENOUS at 17:00

## 2023-06-23 RX ADMIN — SODIUM CHLORIDE 8 MG/HR: 9 INJECTION, SOLUTION INTRAVENOUS at 10:18

## 2023-06-23 RX ADMIN — Medication 100 MCG: at 12:17

## 2023-06-23 RX ADMIN — Medication 100 MCG: at 12:55

## 2023-06-23 RX ADMIN — LIDOCAINE HYDROCHLORIDE 50 MG: 10 INJECTION, SOLUTION EPIDURAL; INFILTRATION; INTRACAUDAL; PERINEURAL at 12:04

## 2023-06-23 RX ADMIN — Medication 100 MCG: at 12:07

## 2023-06-23 RX ADMIN — Medication 100 MCG: at 12:40

## 2023-06-23 RX ADMIN — SODIUM CHLORIDE: 9 INJECTION, SOLUTION INTRAVENOUS at 09:21

## 2023-06-23 RX ADMIN — METRONIDAZOLE 500 MG: 500 INJECTION, SOLUTION INTRAVENOUS at 16:56

## 2023-06-23 ASSESSMENT — LIFESTYLE VARIABLES: HOW OFTEN DO YOU HAVE A DRINK CONTAINING ALCOHOL: NEVER

## 2023-06-23 ASSESSMENT — PAIN DESCRIPTION - DESCRIPTORS
DESCRIPTORS: SORE
DESCRIPTORS: ACHING
DESCRIPTORS: CRAMPING;ACHING

## 2023-06-23 ASSESSMENT — PAIN - FUNCTIONAL ASSESSMENT
PAIN_FUNCTIONAL_ASSESSMENT: 0-10
PAIN_FUNCTIONAL_ASSESSMENT: NONE - DENIES PAIN

## 2023-06-23 ASSESSMENT — PAIN SCALES - GENERAL
PAINLEVEL_OUTOF10: 4
PAINLEVEL_OUTOF10: 0

## 2023-06-23 ASSESSMENT — PAIN DESCRIPTION - LOCATION
LOCATION: ABDOMEN
LOCATION: ABDOMEN

## 2023-06-23 NOTE — ED PROVIDER NOTES
[address]      Pt Name: Gonzalo Lazar  MRN: [id number]  [place] 1935  Date of evaluation: 6/23/2023  Provider: Radha Christianson MD    CHIEF COMPLAINT       Chief Complaint   Patient presents with    Rectal Bleeding     Started just PTA after using the bathroom         HISTORY OF PRESENT ILLNESS      Gonzalo Lazar is a 82 y.o. male who presents to the emergency department by EMS from [facility] for evaluation of rectal bleeding which occurred just prior to arrival.  Patient reports that he woke up and felt the need to have a bowel movement. Upon having a bowel movement it was noted that this was predominantly blood. Other than the rectal bleeding he has no other bleeding complaints. Denies any abdominal pain, nausea or vomiting. No chest pain or dyspnea. No other complaints at this time. 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 Was transferred from this facility to [facility]. [facility] at that time. He denies any ongoing deficits from this. At this time he is on Plavix and low-dose aspirin but is otherwise not anticoagulated.         PAST MEDICAL HISTORY     Past Medical History:   Diagnosis Date    COPD (chronic obstructive pulmonary disease) ([facility] [address]     Diabetes mellitus ([facility]     Hx of echocardiogram 02/24/2017    [facility]    Hyperlipidemia     Hypothyroidism     MI (myocardial infarction) [facility])     Stroke [facility])     Thyroid disease     Type II or unspecified type diabetes mellitus without mention of complication, not stated as uncontrolled          SURGICAL HISTORY       Past Surgical History:   Procedure Laterality Date    CARDIAC SURGERY  02/24/2017    Stent placed  Dr José Manuel Vieyra Bilateral 06/15/2023    ANGIOGRAM CAROTID CEREBRAL BILATERAL    COLONOSCOPY      FRACTURE SURGERY      right ankle    HERNIA REPAIR           CURRENT MEDICATIONS       Previous Medications    ACETAMINOPHEN (TYLENOL) 500 MG TABLET    Take 2

## 2023-06-23 NOTE — ED NOTES
[facility] was called and updated on patient's transport to [facility]. Darwin Florez RN  06/23/23 9701

## 2023-06-24 LAB
ANION GAP SERPL CALCULATED.3IONS-SCNC: 11 MMOL/L (ref 9–17)
BUN SERPL-MCNC: 18 MG/DL (ref 8–23)
CALCIUM SERPL-MCNC: 7.7 MG/DL (ref 8.6–10.4)
CHLORIDE SERPL-SCNC: 97 MMOL/L (ref 98–107)
CO2 SERPL-SCNC: 20 MMOL/L (ref 20–31)
CREAT SERPL-MCNC: 0.84 MG/DL (ref 0.7–1.2)
ERYTHROCYTE [DISTWIDTH] IN BLOOD BY AUTOMATED COUNT: 14.4 % (ref 11.8–14.4)
GFR SERPL CREATININE-BSD FRML MDRD: >60 ML/MIN/1.73M2
GLUCOSE BLD-MCNC: 155 MG/DL (ref 75–110)
GLUCOSE BLD-MCNC: 182 MG/DL (ref 75–110)
GLUCOSE BLD-MCNC: 190 MG/DL (ref 75–110)
GLUCOSE BLD-MCNC: 220 MG/DL (ref 75–110)
GLUCOSE SERPL-MCNC: 165 MG/DL (ref 70–99)
HCT VFR BLD AUTO: 23.2 % (ref 40.7–50.3)
HCT VFR BLD AUTO: 23.9 % (ref 40.7–50.3)
HCT VFR BLD AUTO: 25.4 % (ref 40.7–50.3)
HGB BLD-MCNC: 7.3 G/DL (ref 13–17)
HGB BLD-MCNC: 7.5 G/DL (ref 13–17)
HGB BLD-MCNC: 8.1 G/DL (ref 13–17)
INR PPP: 1.2
MCH RBC QN AUTO: 29.1 PG (ref 25.2–33.5)
MCHC RBC AUTO-ENTMCNC: 31.9 G/DL (ref 28.4–34.8)
MCV RBC AUTO: 91.4 FL (ref 82.6–102.9)
NRBC BLD-RTO: 0 PER 100 WBC
PARTIAL THROMBOPLASTIN TIME: 24.7 SEC (ref 23–36.5)
PLATELET # BLD AUTO: 237 K/UL (ref 138–453)
PMV BLD AUTO: 10.3 FL (ref 8.1–13.5)
POTASSIUM SERPL-SCNC: 3.9 MMOL/L (ref 3.7–5.3)
PROTHROMBIN TIME: 15.1 SEC (ref 11.7–14.9)
RBC # BLD AUTO: 2.78 M/UL (ref 4.21–5.77)
SODIUM SERPL-SCNC: 128 MMOL/L (ref 135–144)
WBC OTHER # BLD: 13.3 K/UL (ref 3.5–11.3)

## 2023-06-24 PROCEDURE — 94761 N-INVAS EAR/PLS OXIMETRY MLT: CPT

## 2023-06-24 PROCEDURE — 2500000003 HC RX 250 WO HCPCS: Performed by: INTERNAL MEDICINE

## 2023-06-24 PROCEDURE — 36415 COLL VENOUS BLD VENIPUNCTURE: CPT

## 2023-06-24 PROCEDURE — 6360000002 HC RX W HCPCS: Performed by: INTERNAL MEDICINE

## 2023-06-24 PROCEDURE — 97530 THERAPEUTIC ACTIVITIES: CPT

## 2023-06-24 PROCEDURE — 80048 BASIC METABOLIC PNL TOTAL CA: CPT

## 2023-06-24 PROCEDURE — 94640 AIRWAY INHALATION TREATMENT: CPT

## 2023-06-24 PROCEDURE — 82947 ASSAY GLUCOSE BLOOD QUANT: CPT

## 2023-06-24 PROCEDURE — 6370000000 HC RX 637 (ALT 250 FOR IP): Performed by: INTERNAL MEDICINE

## 2023-06-24 PROCEDURE — 99232 SBSQ HOSP IP/OBS MODERATE 35: CPT | Performed by: INTERNAL MEDICINE

## 2023-06-24 PROCEDURE — 97162 PT EVAL MOD COMPLEX 30 MIN: CPT

## 2023-06-24 PROCEDURE — 2060000000 HC ICU INTERMEDIATE R&B

## 2023-06-24 PROCEDURE — 85027 COMPLETE CBC AUTOMATED: CPT

## 2023-06-24 PROCEDURE — 99497 ADVNCD CARE PLAN 30 MIN: CPT | Performed by: INTERNAL MEDICINE

## 2023-06-24 PROCEDURE — 99222 1ST HOSP IP/OBS MODERATE 55: CPT | Performed by: PSYCHIATRY & NEUROLOGY

## 2023-06-24 PROCEDURE — 85730 THROMBOPLASTIN TIME PARTIAL: CPT

## 2023-06-24 PROCEDURE — 97535 SELF CARE MNGMENT TRAINING: CPT

## 2023-06-24 PROCEDURE — 2580000003 HC RX 258: Performed by: INTERNAL MEDICINE

## 2023-06-24 PROCEDURE — 85610 PROTHROMBIN TIME: CPT

## 2023-06-24 PROCEDURE — 97166 OT EVAL MOD COMPLEX 45 MIN: CPT

## 2023-06-24 PROCEDURE — 85014 HEMATOCRIT: CPT

## 2023-06-24 PROCEDURE — 85018 HEMOGLOBIN: CPT

## 2023-06-24 RX ORDER — LANOLIN ALCOHOL/MO/W.PET/CERES
325 CREAM (GRAM) TOPICAL 2 TIMES DAILY WITH MEALS
Status: DISCONTINUED | OUTPATIENT
Start: 2023-06-24 | End: 2023-06-28 | Stop reason: HOSPADM

## 2023-06-24 RX ORDER — BUMETANIDE 1 MG/1
1 TABLET ORAL 2 TIMES DAILY
Status: DISCONTINUED | OUTPATIENT
Start: 2023-06-24 | End: 2023-06-28 | Stop reason: HOSPADM

## 2023-06-24 RX ORDER — ATORVASTATIN CALCIUM 40 MG/1
40 TABLET, FILM COATED ORAL DAILY
Status: DISCONTINUED | OUTPATIENT
Start: 2023-06-24 | End: 2023-06-28 | Stop reason: HOSPADM

## 2023-06-24 RX ORDER — PROPRANOLOL HCL 60 MG
60 CAPSULE, EXTENDED RELEASE 24HR ORAL DAILY
Status: DISCONTINUED | OUTPATIENT
Start: 2023-06-24 | End: 2023-06-28 | Stop reason: HOSPADM

## 2023-06-24 RX ORDER — M-VIT,TX,IRON,MINS/CALC/FOLIC 27MG-0.4MG
1 TABLET ORAL DAILY
Status: DISCONTINUED | OUTPATIENT
Start: 2023-06-24 | End: 2023-06-28 | Stop reason: HOSPADM

## 2023-06-24 RX ORDER — PANTOPRAZOLE SODIUM 40 MG/1
40 TABLET, DELAYED RELEASE ORAL
Status: DISCONTINUED | OUTPATIENT
Start: 2023-06-24 | End: 2023-06-28 | Stop reason: HOSPADM

## 2023-06-24 RX ORDER — OXYBUTYNIN CHLORIDE 5 MG/1
10 TABLET ORAL 2 TIMES DAILY
Status: DISCONTINUED | OUTPATIENT
Start: 2023-06-24 | End: 2023-06-28 | Stop reason: HOSPADM

## 2023-06-24 RX ORDER — MONTELUKAST SODIUM 10 MG/1
10 TABLET ORAL DAILY
Status: DISCONTINUED | OUTPATIENT
Start: 2023-06-24 | End: 2023-06-28 | Stop reason: HOSPADM

## 2023-06-24 RX ORDER — TAMSULOSIN HYDROCHLORIDE 0.4 MG/1
0.4 CAPSULE ORAL
Status: DISCONTINUED | OUTPATIENT
Start: 2023-06-24 | End: 2023-06-28 | Stop reason: HOSPADM

## 2023-06-24 RX ORDER — SPIRONOLACTONE 25 MG/1
25 TABLET ORAL DAILY
Status: DISCONTINUED | OUTPATIENT
Start: 2023-06-24 | End: 2023-06-28 | Stop reason: HOSPADM

## 2023-06-24 RX ORDER — LEVOTHYROXINE SODIUM 137 UG/1
137 TABLET ORAL DAILY
Status: DISCONTINUED | OUTPATIENT
Start: 2023-06-24 | End: 2023-06-28 | Stop reason: HOSPADM

## 2023-06-24 RX ORDER — PRIMIDONE 250 MG/1
250 TABLET ORAL 2 TIMES DAILY
Status: DISCONTINUED | OUTPATIENT
Start: 2023-06-24 | End: 2023-06-28 | Stop reason: HOSPADM

## 2023-06-24 RX ADMIN — PRIMIDONE 250 MG: 250 TABLET ORAL at 21:01

## 2023-06-24 RX ADMIN — METRONIDAZOLE 500 MG: 500 INJECTION, SOLUTION INTRAVENOUS at 06:30

## 2023-06-24 RX ADMIN — LEVOTHYROXINE SODIUM 137 MCG: 137 TABLET ORAL at 13:59

## 2023-06-24 RX ADMIN — TIOTROPIUM BROMIDE INHALATION SPRAY 2 PUFF: 3.12 SPRAY, METERED RESPIRATORY (INHALATION) at 08:59

## 2023-06-24 RX ADMIN — SPIRONOLACTONE 25 MG: 25 TABLET ORAL at 13:54

## 2023-06-24 RX ADMIN — MONTELUKAST SODIUM 10 MG: 10 TABLET, FILM COATED ORAL at 13:59

## 2023-06-24 RX ADMIN — FERROUS SULFATE TAB EC 325 MG (65 MG FE EQUIVALENT) 325 MG: 325 (65 FE) TABLET DELAYED RESPONSE at 21:02

## 2023-06-24 RX ADMIN — BUDESONIDE AND FORMOTEROL FUMARATE DIHYDRATE 2 PUFF: 160; 4.5 AEROSOL RESPIRATORY (INHALATION) at 08:59

## 2023-06-24 RX ADMIN — CIPROFLOXACIN 400 MG: 400 INJECTION, SOLUTION INTRAVENOUS at 14:12

## 2023-06-24 RX ADMIN — PANTOPRAZOLE SODIUM 40 MG: 40 TABLET, DELAYED RELEASE ORAL at 13:58

## 2023-06-24 RX ADMIN — BUMETANIDE 1 MG: 1 TABLET ORAL at 14:00

## 2023-06-24 RX ADMIN — Medication 1 TABLET: at 13:53

## 2023-06-24 RX ADMIN — PROPRANOLOL HYDROCHLORIDE 60 MG: 60 CAPSULE, EXTENDED RELEASE ORAL at 13:54

## 2023-06-24 RX ADMIN — OXYBUTYNIN CHLORIDE 10 MG: 5 TABLET ORAL at 13:59

## 2023-06-24 RX ADMIN — FLUTICASONE PROPIONATE 2 SPRAY: 50 SPRAY, METERED NASAL at 21:02

## 2023-06-24 RX ADMIN — CIPROFLOXACIN 400 MG: 400 INJECTION, SOLUTION INTRAVENOUS at 02:15

## 2023-06-24 RX ADMIN — METRONIDAZOLE 500 MG: 500 INJECTION, SOLUTION INTRAVENOUS at 23:24

## 2023-06-24 RX ADMIN — SODIUM CHLORIDE, PRESERVATIVE FREE 10 ML: 5 INJECTION INTRAVENOUS at 21:02

## 2023-06-24 RX ADMIN — PRIMIDONE 250 MG: 250 TABLET ORAL at 13:57

## 2023-06-24 RX ADMIN — SODIUM CHLORIDE, PRESERVATIVE FREE 10 ML: 5 INJECTION INTRAVENOUS at 08:43

## 2023-06-24 RX ADMIN — OXYBUTYNIN CHLORIDE 10 MG: 5 TABLET ORAL at 21:01

## 2023-06-24 RX ADMIN — TAMSULOSIN HYDROCHLORIDE 0.4 MG: 0.4 CAPSULE ORAL at 17:32

## 2023-06-24 RX ADMIN — BUDESONIDE AND FORMOTEROL FUMARATE DIHYDRATE 2 PUFF: 160; 4.5 AEROSOL RESPIRATORY (INHALATION) at 21:13

## 2023-06-24 RX ADMIN — BUMETANIDE 1 MG: 1 TABLET ORAL at 21:01

## 2023-06-24 RX ADMIN — DESMOPRESSIN ACETATE 40 MG: 0.2 TABLET ORAL at 14:00

## 2023-06-24 RX ADMIN — FERROUS SULFATE TAB EC 325 MG (65 MG FE EQUIVALENT) 325 MG: 325 (65 FE) TABLET DELAYED RESPONSE at 13:59

## 2023-06-24 RX ADMIN — METRONIDAZOLE 500 MG: 500 INJECTION, SOLUTION INTRAVENOUS at 14:11

## 2023-06-24 ASSESSMENT — PAIN SCALES - GENERAL: PAINLEVEL_OUTOF10: 0

## 2023-06-25 PROBLEM — I63.419 CEREBROVASCULAR ACCIDENT (CVA) DUE TO EMBOLISM OF MIDDLE CEREBRAL ARTERY (HCC): Status: ACTIVE | Noted: 2023-06-25

## 2023-06-25 LAB
ANION GAP SERPL CALCULATED.3IONS-SCNC: 11 MMOL/L (ref 9–17)
BUN SERPL-MCNC: 11 MG/DL (ref 8–23)
CALCIUM SERPL-MCNC: 7.7 MG/DL (ref 8.6–10.4)
CHLORIDE SERPL-SCNC: 96 MMOL/L (ref 98–107)
CO2 SERPL-SCNC: 20 MMOL/L (ref 20–31)
CREAT SERPL-MCNC: 0.8 MG/DL (ref 0.7–1.2)
GFR SERPL CREATININE-BSD FRML MDRD: >60 ML/MIN/1.73M2
GLUCOSE BLD-MCNC: 149 MG/DL (ref 75–110)
GLUCOSE BLD-MCNC: 150 MG/DL (ref 75–110)
GLUCOSE BLD-MCNC: 155 MG/DL (ref 75–110)
GLUCOSE BLD-MCNC: 157 MG/DL (ref 75–110)
GLUCOSE BLD-MCNC: 203 MG/DL (ref 75–110)
GLUCOSE SERPL-MCNC: 167 MG/DL (ref 70–99)
HCT VFR BLD AUTO: 21.5 % (ref 40.7–50.3)
HCT VFR BLD AUTO: 24.4 % (ref 40.7–50.3)
HGB BLD-MCNC: 6.8 G/DL (ref 13–17)
HGB BLD-MCNC: 7.8 G/DL (ref 13–17)
MAGNESIUM SERPL-MCNC: 1.7 MG/DL (ref 1.6–2.6)
POTASSIUM SERPL-SCNC: 3.5 MMOL/L (ref 3.7–5.3)
SODIUM SERPL-SCNC: 127 MMOL/L (ref 135–144)

## 2023-06-25 PROCEDURE — 94761 N-INVAS EAR/PLS OXIMETRY MLT: CPT

## 2023-06-25 PROCEDURE — 80048 BASIC METABOLIC PNL TOTAL CA: CPT

## 2023-06-25 PROCEDURE — 36430 TRANSFUSION BLD/BLD COMPNT: CPT

## 2023-06-25 PROCEDURE — 2500000003 HC RX 250 WO HCPCS: Performed by: INTERNAL MEDICINE

## 2023-06-25 PROCEDURE — 6360000002 HC RX W HCPCS: Performed by: INTERNAL MEDICINE

## 2023-06-25 PROCEDURE — 2060000000 HC ICU INTERMEDIATE R&B

## 2023-06-25 PROCEDURE — 6370000000 HC RX 637 (ALT 250 FOR IP): Performed by: INTERNAL MEDICINE

## 2023-06-25 PROCEDURE — 94640 AIRWAY INHALATION TREATMENT: CPT

## 2023-06-25 PROCEDURE — 83735 ASSAY OF MAGNESIUM: CPT

## 2023-06-25 PROCEDURE — P9016 RBC LEUKOCYTES REDUCED: HCPCS

## 2023-06-25 PROCEDURE — 85014 HEMATOCRIT: CPT

## 2023-06-25 PROCEDURE — 99232 SBSQ HOSP IP/OBS MODERATE 35: CPT | Performed by: INTERNAL MEDICINE

## 2023-06-25 PROCEDURE — 2580000003 HC RX 258: Performed by: INTERNAL MEDICINE

## 2023-06-25 PROCEDURE — 82947 ASSAY GLUCOSE BLOOD QUANT: CPT

## 2023-06-25 PROCEDURE — 86900 BLOOD TYPING SEROLOGIC ABO: CPT

## 2023-06-25 PROCEDURE — 85018 HEMOGLOBIN: CPT

## 2023-06-25 PROCEDURE — 36415 COLL VENOUS BLD VENIPUNCTURE: CPT

## 2023-06-25 RX ORDER — SODIUM CHLORIDE 9 MG/ML
INJECTION, SOLUTION INTRAVENOUS PRN
Status: DISCONTINUED | OUTPATIENT
Start: 2023-06-25 | End: 2023-06-28

## 2023-06-25 RX ORDER — MAGNESIUM SULFATE 1 G/100ML
1000 INJECTION INTRAVENOUS ONCE
Status: COMPLETED | OUTPATIENT
Start: 2023-06-25 | End: 2023-06-25

## 2023-06-25 RX ADMIN — CIPROFLOXACIN 400 MG: 400 INJECTION, SOLUTION INTRAVENOUS at 03:39

## 2023-06-25 RX ADMIN — METRONIDAZOLE 500 MG: 500 INJECTION, SOLUTION INTRAVENOUS at 06:11

## 2023-06-25 RX ADMIN — MONTELUKAST SODIUM 10 MG: 10 TABLET, FILM COATED ORAL at 10:19

## 2023-06-25 RX ADMIN — LEVOTHYROXINE SODIUM 137 MCG: 137 TABLET ORAL at 06:08

## 2023-06-25 RX ADMIN — PANTOPRAZOLE SODIUM 40 MG: 40 TABLET, DELAYED RELEASE ORAL at 06:08

## 2023-06-25 RX ADMIN — BUMETANIDE 1 MG: 1 TABLET ORAL at 10:19

## 2023-06-25 RX ADMIN — BUMETANIDE 1 MG: 1 TABLET ORAL at 20:18

## 2023-06-25 RX ADMIN — METRONIDAZOLE 500 MG: 500 INJECTION, SOLUTION INTRAVENOUS at 23:22

## 2023-06-25 RX ADMIN — TIOTROPIUM BROMIDE INHALATION SPRAY 2 PUFF: 3.12 SPRAY, METERED RESPIRATORY (INHALATION) at 09:00

## 2023-06-25 RX ADMIN — BUDESONIDE AND FORMOTEROL FUMARATE DIHYDRATE 2 PUFF: 160; 4.5 AEROSOL RESPIRATORY (INHALATION) at 09:00

## 2023-06-25 RX ADMIN — MAGNESIUM SULFATE HEPTAHYDRATE 1000 MG: 1 INJECTION, SOLUTION INTRAVENOUS at 18:08

## 2023-06-25 RX ADMIN — OXYBUTYNIN CHLORIDE 10 MG: 5 TABLET ORAL at 20:18

## 2023-06-25 RX ADMIN — SODIUM CHLORIDE, PRESERVATIVE FREE 10 ML: 5 INJECTION INTRAVENOUS at 20:18

## 2023-06-25 RX ADMIN — TAMSULOSIN HYDROCHLORIDE 0.4 MG: 0.4 CAPSULE ORAL at 18:19

## 2023-06-25 RX ADMIN — PRIMIDONE 250 MG: 250 TABLET ORAL at 17:30

## 2023-06-25 RX ADMIN — FLUTICASONE PROPIONATE 2 SPRAY: 50 SPRAY, METERED NASAL at 10:19

## 2023-06-25 RX ADMIN — OXYBUTYNIN CHLORIDE 10 MG: 5 TABLET ORAL at 10:19

## 2023-06-25 RX ADMIN — Medication 1 TABLET: at 10:20

## 2023-06-25 RX ADMIN — FERROUS SULFATE TAB EC 325 MG (65 MG FE EQUIVALENT) 325 MG: 325 (65 FE) TABLET DELAYED RESPONSE at 18:19

## 2023-06-25 RX ADMIN — BUDESONIDE AND FORMOTEROL FUMARATE DIHYDRATE 2 PUFF: 160; 4.5 AEROSOL RESPIRATORY (INHALATION) at 20:28

## 2023-06-25 RX ADMIN — SODIUM CHLORIDE, PRESERVATIVE FREE 10 ML: 5 INJECTION INTRAVENOUS at 10:20

## 2023-06-25 RX ADMIN — PROPRANOLOL HYDROCHLORIDE 60 MG: 60 CAPSULE, EXTENDED RELEASE ORAL at 10:18

## 2023-06-25 RX ADMIN — DESMOPRESSIN ACETATE 40 MG: 0.2 TABLET ORAL at 10:19

## 2023-06-25 RX ADMIN — FERROUS SULFATE TAB EC 325 MG (65 MG FE EQUIVALENT) 325 MG: 325 (65 FE) TABLET DELAYED RESPONSE at 10:19

## 2023-06-25 RX ADMIN — METRONIDAZOLE 500 MG: 500 INJECTION, SOLUTION INTRAVENOUS at 15:23

## 2023-06-25 RX ADMIN — SPIRONOLACTONE 25 MG: 25 TABLET ORAL at 10:18

## 2023-06-25 RX ADMIN — PRIMIDONE 250 MG: 250 TABLET ORAL at 10:18

## 2023-06-25 RX ADMIN — CIPROFLOXACIN 400 MG: 400 INJECTION, SOLUTION INTRAVENOUS at 15:17

## 2023-06-25 ASSESSMENT — PAIN SCALES - GENERAL: PAINLEVEL_OUTOF10: 0

## 2023-06-26 LAB
ABO/RH: NORMAL
ANION GAP SERPL CALCULATED.3IONS-SCNC: 12 MMOL/L (ref 9–17)
ANTIBODY SCREEN: NEGATIVE
ARM BAND NUMBER: NORMAL
BLD PROD TYP BPU: NORMAL
BLD PROD TYP BPU: NORMAL
BLOOD BANK BLOOD PRODUCT EXPIRATION DATE: NORMAL
BLOOD BANK BLOOD PRODUCT EXPIRATION DATE: NORMAL
BLOOD BANK ISBT PRODUCT BLOOD TYPE: 5100
BLOOD BANK ISBT PRODUCT BLOOD TYPE: 5100
BLOOD BANK PRODUCT CODE: NORMAL
BLOOD BANK PRODUCT CODE: NORMAL
BLOOD BANK UNIT TYPE AND RH: NORMAL
BLOOD BANK UNIT TYPE AND RH: NORMAL
BPU ID: NORMAL
BPU ID: NORMAL
BUN SERPL-MCNC: 10 MG/DL (ref 8–23)
CALCIUM SERPL-MCNC: 7.8 MG/DL (ref 8.6–10.4)
CHLORIDE SERPL-SCNC: 94 MMOL/L (ref 98–107)
CO2 SERPL-SCNC: 20 MMOL/L (ref 20–31)
CREAT SERPL-MCNC: 0.82 MG/DL (ref 0.7–1.2)
CROSSMATCH RESULT: NORMAL
CROSSMATCH RESULT: NORMAL
DISPENSE STATUS BLOOD BANK: NORMAL
DISPENSE STATUS BLOOD BANK: NORMAL
ERYTHROCYTE [DISTWIDTH] IN BLOOD BY AUTOMATED COUNT: 14.4 % (ref 11.8–14.4)
EXPIRATION DATE: NORMAL
GFR SERPL CREATININE-BSD FRML MDRD: >60 ML/MIN/1.73M2
GLUCOSE BLD-MCNC: 145 MG/DL (ref 75–110)
GLUCOSE BLD-MCNC: 162 MG/DL (ref 75–110)
GLUCOSE BLD-MCNC: 163 MG/DL (ref 75–110)
GLUCOSE BLD-MCNC: 176 MG/DL (ref 75–110)
GLUCOSE SERPL-MCNC: 139 MG/DL (ref 70–99)
HCT VFR BLD AUTO: 23.3 % (ref 40.7–50.3)
HGB BLD-MCNC: 7.8 G/DL (ref 13–17)
MAGNESIUM SERPL-MCNC: 1.8 MG/DL (ref 1.6–2.6)
MCH RBC QN AUTO: 29.1 PG (ref 25.2–33.5)
MCHC RBC AUTO-ENTMCNC: 33.5 G/DL (ref 28.4–34.8)
MCV RBC AUTO: 86.9 FL (ref 82.6–102.9)
NRBC BLD-RTO: 0 PER 100 WBC
PLATELET # BLD AUTO: 230 K/UL (ref 138–453)
PMV BLD AUTO: 10.2 FL (ref 8.1–13.5)
POTASSIUM SERPL-SCNC: 3.7 MMOL/L (ref 3.7–5.3)
RBC # BLD AUTO: 2.68 M/UL (ref 4.21–5.77)
SODIUM SERPL-SCNC: 126 MMOL/L (ref 135–144)
TRANSFUSION STATUS: NORMAL
TRANSFUSION STATUS: NORMAL
UNIT DIVISION: 0
UNIT DIVISION: 0
UNIT ISSUE DATE/TIME: NORMAL
UNIT ISSUE DATE/TIME: NORMAL
WBC OTHER # BLD: 9.2 K/UL (ref 3.5–11.3)

## 2023-06-26 PROCEDURE — 80048 BASIC METABOLIC PNL TOTAL CA: CPT

## 2023-06-26 PROCEDURE — 99232 SBSQ HOSP IP/OBS MODERATE 35: CPT | Performed by: INTERNAL MEDICINE

## 2023-06-26 PROCEDURE — 85027 COMPLETE CBC AUTOMATED: CPT

## 2023-06-26 PROCEDURE — 6370000000 HC RX 637 (ALT 250 FOR IP): Performed by: INTERNAL MEDICINE

## 2023-06-26 PROCEDURE — 97110 THERAPEUTIC EXERCISES: CPT

## 2023-06-26 PROCEDURE — 36415 COLL VENOUS BLD VENIPUNCTURE: CPT

## 2023-06-26 PROCEDURE — 82947 ASSAY GLUCOSE BLOOD QUANT: CPT

## 2023-06-26 PROCEDURE — 83735 ASSAY OF MAGNESIUM: CPT

## 2023-06-26 PROCEDURE — 97530 THERAPEUTIC ACTIVITIES: CPT

## 2023-06-26 PROCEDURE — 94640 AIRWAY INHALATION TREATMENT: CPT

## 2023-06-26 PROCEDURE — 2580000003 HC RX 258: Performed by: INTERNAL MEDICINE

## 2023-06-26 PROCEDURE — 2500000003 HC RX 250 WO HCPCS: Performed by: INTERNAL MEDICINE

## 2023-06-26 PROCEDURE — 94761 N-INVAS EAR/PLS OXIMETRY MLT: CPT

## 2023-06-26 PROCEDURE — 6360000002 HC RX W HCPCS: Performed by: INTERNAL MEDICINE

## 2023-06-26 PROCEDURE — 1200000000 HC SEMI PRIVATE

## 2023-06-26 RX ORDER — CLOPIDOGREL BISULFATE 75 MG/1
75 TABLET ORAL DAILY
Status: DISCONTINUED | OUTPATIENT
Start: 2023-06-26 | End: 2023-06-28 | Stop reason: HOSPADM

## 2023-06-26 RX ADMIN — Medication 1 TABLET: at 08:44

## 2023-06-26 RX ADMIN — MONTELUKAST SODIUM 10 MG: 10 TABLET, FILM COATED ORAL at 08:44

## 2023-06-26 RX ADMIN — PANTOPRAZOLE SODIUM 40 MG: 40 TABLET, DELAYED RELEASE ORAL at 06:13

## 2023-06-26 RX ADMIN — OXYBUTYNIN CHLORIDE 10 MG: 5 TABLET ORAL at 08:43

## 2023-06-26 RX ADMIN — BUDESONIDE AND FORMOTEROL FUMARATE DIHYDRATE 2 PUFF: 160; 4.5 AEROSOL RESPIRATORY (INHALATION) at 19:55

## 2023-06-26 RX ADMIN — FERROUS SULFATE TAB EC 325 MG (65 MG FE EQUIVALENT) 325 MG: 325 (65 FE) TABLET DELAYED RESPONSE at 08:44

## 2023-06-26 RX ADMIN — FERROUS SULFATE TAB EC 325 MG (65 MG FE EQUIVALENT) 325 MG: 325 (65 FE) TABLET DELAYED RESPONSE at 16:10

## 2023-06-26 RX ADMIN — CLOPIDOGREL BISULFATE 75 MG: 75 TABLET, FILM COATED ORAL at 15:06

## 2023-06-26 RX ADMIN — BUMETANIDE 1 MG: 1 TABLET ORAL at 21:08

## 2023-06-26 RX ADMIN — SODIUM CHLORIDE, PRESERVATIVE FREE 10 ML: 5 INJECTION INTRAVENOUS at 21:08

## 2023-06-26 RX ADMIN — METRONIDAZOLE 500 MG: 500 INJECTION, SOLUTION INTRAVENOUS at 06:10

## 2023-06-26 RX ADMIN — LEVOTHYROXINE SODIUM 137 MCG: 137 TABLET ORAL at 06:13

## 2023-06-26 RX ADMIN — OXYBUTYNIN CHLORIDE 10 MG: 5 TABLET ORAL at 21:08

## 2023-06-26 RX ADMIN — BUDESONIDE AND FORMOTEROL FUMARATE DIHYDRATE 2 PUFF: 160; 4.5 AEROSOL RESPIRATORY (INHALATION) at 09:04

## 2023-06-26 RX ADMIN — TAMSULOSIN HYDROCHLORIDE 0.4 MG: 0.4 CAPSULE ORAL at 16:10

## 2023-06-26 RX ADMIN — PRIMIDONE 250 MG: 250 TABLET ORAL at 08:42

## 2023-06-26 RX ADMIN — PROPRANOLOL HYDROCHLORIDE 60 MG: 60 CAPSULE, EXTENDED RELEASE ORAL at 08:43

## 2023-06-26 RX ADMIN — SODIUM CHLORIDE, PRESERVATIVE FREE 10 ML: 5 INJECTION INTRAVENOUS at 08:45

## 2023-06-26 RX ADMIN — DESMOPRESSIN ACETATE 40 MG: 0.2 TABLET ORAL at 08:44

## 2023-06-26 RX ADMIN — TIOTROPIUM BROMIDE INHALATION SPRAY 2 PUFF: 3.12 SPRAY, METERED RESPIRATORY (INHALATION) at 09:04

## 2023-06-26 RX ADMIN — BUMETANIDE 1 MG: 1 TABLET ORAL at 08:43

## 2023-06-26 RX ADMIN — SPIRONOLACTONE 25 MG: 25 TABLET ORAL at 08:43

## 2023-06-26 RX ADMIN — FLUTICASONE PROPIONATE 2 SPRAY: 50 SPRAY, METERED NASAL at 08:44

## 2023-06-26 RX ADMIN — PRIMIDONE 250 MG: 250 TABLET ORAL at 16:10

## 2023-06-26 RX ADMIN — CIPROFLOXACIN 400 MG: 400 INJECTION, SOLUTION INTRAVENOUS at 02:43

## 2023-06-26 ASSESSMENT — PAIN SCALES - GENERAL: PAINLEVEL_OUTOF10: 0

## 2023-06-27 LAB
ANION GAP SERPL CALCULATED.3IONS-SCNC: 11 MMOL/L (ref 9–17)
BASOPHILS # BLD: 0 K/UL (ref 0–0.2)
BASOPHILS NFR BLD: 0 % (ref 0–2)
BUN SERPL-MCNC: 10 MG/DL (ref 8–23)
CALCIUM SERPL-MCNC: 8.3 MG/DL (ref 8.6–10.4)
CHLORIDE SERPL-SCNC: 94 MMOL/L (ref 98–107)
CO2 SERPL-SCNC: 21 MMOL/L (ref 20–31)
CREAT SERPL-MCNC: 0.93 MG/DL (ref 0.7–1.2)
EOSINOPHIL # BLD: 0.93 K/UL (ref 0–0.4)
EOSINOPHILS RELATIVE PERCENT: 9 % (ref 1–4)
ERYTHROCYTE [DISTWIDTH] IN BLOOD BY AUTOMATED COUNT: 14.6 % (ref 11.8–14.4)
GFR SERPL CREATININE-BSD FRML MDRD: >60 ML/MIN/1.73M2
GLUCOSE BLD-MCNC: 136 MG/DL (ref 75–110)
GLUCOSE BLD-MCNC: 150 MG/DL (ref 75–110)
GLUCOSE BLD-MCNC: 170 MG/DL (ref 75–110)
GLUCOSE BLD-MCNC: 180 MG/DL (ref 75–110)
GLUCOSE SERPL-MCNC: 167 MG/DL (ref 70–99)
HCT VFR BLD AUTO: 27.7 % (ref 40.7–50.3)
HGB BLD-MCNC: 8.6 G/DL (ref 13–17)
IMM GRANULOCYTES # BLD AUTO: 0.21 K/UL (ref 0–0.3)
IMM GRANULOCYTES NFR BLD: 2 %
LYMPHOCYTES # BLD: 22 % (ref 24–44)
LYMPHOCYTES NFR BLD: 2.27 K/UL (ref 1–4.8)
MCH RBC QN AUTO: 29.6 PG (ref 25.2–33.5)
MCHC RBC AUTO-ENTMCNC: 31 G/DL (ref 28.4–34.8)
MCV RBC AUTO: 95.2 FL (ref 82.6–102.9)
MONOCYTES NFR BLD: 0.62 K/UL (ref 0.1–0.8)
MONOCYTES NFR BLD: 6 % (ref 1–7)
MORPHOLOGY: ABNORMAL
MORPHOLOGY: ABNORMAL
NEUTROPHILS NFR BLD: 61 % (ref 36–66)
NEUTS SEG NFR BLD: 6.27 K/UL (ref 1.8–7.7)
NRBC BLD-RTO: 0 PER 100 WBC
PLATELET # BLD AUTO: 318 K/UL (ref 138–453)
PMV BLD AUTO: 10.3 FL (ref 8.1–13.5)
POTASSIUM SERPL-SCNC: 4.1 MMOL/L (ref 3.7–5.3)
RBC # BLD AUTO: 2.91 M/UL (ref 4.21–5.77)
SODIUM SERPL-SCNC: 126 MMOL/L (ref 135–144)
WBC OTHER # BLD: 10.3 K/UL (ref 3.5–11.3)

## 2023-06-27 PROCEDURE — 6370000000 HC RX 637 (ALT 250 FOR IP): Performed by: INTERNAL MEDICINE

## 2023-06-27 PROCEDURE — 82947 ASSAY GLUCOSE BLOOD QUANT: CPT

## 2023-06-27 PROCEDURE — 97530 THERAPEUTIC ACTIVITIES: CPT

## 2023-06-27 PROCEDURE — 85027 COMPLETE CBC AUTOMATED: CPT

## 2023-06-27 PROCEDURE — 97535 SELF CARE MNGMENT TRAINING: CPT

## 2023-06-27 PROCEDURE — 2580000003 HC RX 258: Performed by: INTERNAL MEDICINE

## 2023-06-27 PROCEDURE — 80048 BASIC METABOLIC PNL TOTAL CA: CPT

## 2023-06-27 PROCEDURE — 36415 COLL VENOUS BLD VENIPUNCTURE: CPT

## 2023-06-27 PROCEDURE — 94640 AIRWAY INHALATION TREATMENT: CPT

## 2023-06-27 PROCEDURE — 99232 SBSQ HOSP IP/OBS MODERATE 35: CPT | Performed by: NURSE PRACTITIONER

## 2023-06-27 PROCEDURE — 1200000000 HC SEMI PRIVATE

## 2023-06-27 RX ADMIN — FLUTICASONE PROPIONATE 2 SPRAY: 50 SPRAY, METERED NASAL at 09:40

## 2023-06-27 RX ADMIN — BUMETANIDE 1 MG: 1 TABLET ORAL at 09:41

## 2023-06-27 RX ADMIN — FERROUS SULFATE TAB EC 325 MG (65 MG FE EQUIVALENT) 325 MG: 325 (65 FE) TABLET DELAYED RESPONSE at 09:40

## 2023-06-27 RX ADMIN — PANTOPRAZOLE SODIUM 40 MG: 40 TABLET, DELAYED RELEASE ORAL at 06:09

## 2023-06-27 RX ADMIN — OXYBUTYNIN CHLORIDE 10 MG: 5 TABLET ORAL at 09:40

## 2023-06-27 RX ADMIN — FERROUS SULFATE TAB EC 325 MG (65 MG FE EQUIVALENT) 325 MG: 325 (65 FE) TABLET DELAYED RESPONSE at 17:35

## 2023-06-27 RX ADMIN — MONTELUKAST SODIUM 10 MG: 10 TABLET, FILM COATED ORAL at 09:40

## 2023-06-27 RX ADMIN — LEVOTHYROXINE SODIUM 137 MCG: 137 TABLET ORAL at 06:09

## 2023-06-27 RX ADMIN — CLOPIDOGREL BISULFATE 75 MG: 75 TABLET, FILM COATED ORAL at 09:41

## 2023-06-27 RX ADMIN — PRIMIDONE 250 MG: 250 TABLET ORAL at 17:35

## 2023-06-27 RX ADMIN — OXYBUTYNIN CHLORIDE 10 MG: 5 TABLET ORAL at 20:56

## 2023-06-27 RX ADMIN — SODIUM CHLORIDE, PRESERVATIVE FREE 10 ML: 5 INJECTION INTRAVENOUS at 20:56

## 2023-06-27 RX ADMIN — TIOTROPIUM BROMIDE INHALATION SPRAY 2 PUFF: 3.12 SPRAY, METERED RESPIRATORY (INHALATION) at 09:05

## 2023-06-27 RX ADMIN — PRIMIDONE 250 MG: 250 TABLET ORAL at 09:40

## 2023-06-27 RX ADMIN — TAMSULOSIN HYDROCHLORIDE 0.4 MG: 0.4 CAPSULE ORAL at 17:35

## 2023-06-27 RX ADMIN — BUMETANIDE 1 MG: 1 TABLET ORAL at 20:56

## 2023-06-27 RX ADMIN — DESMOPRESSIN ACETATE 40 MG: 0.2 TABLET ORAL at 20:56

## 2023-06-27 RX ADMIN — SPIRONOLACTONE 25 MG: 25 TABLET ORAL at 09:41

## 2023-06-27 RX ADMIN — BUDESONIDE AND FORMOTEROL FUMARATE DIHYDRATE 2 PUFF: 160; 4.5 AEROSOL RESPIRATORY (INHALATION) at 20:09

## 2023-06-27 RX ADMIN — Medication 1 TABLET: at 09:40

## 2023-06-27 RX ADMIN — PROPRANOLOL HYDROCHLORIDE 60 MG: 60 CAPSULE, EXTENDED RELEASE ORAL at 09:41

## 2023-06-27 RX ADMIN — BUDESONIDE AND FORMOTEROL FUMARATE DIHYDRATE 2 PUFF: 160; 4.5 AEROSOL RESPIRATORY (INHALATION) at 09:04

## 2023-06-27 ASSESSMENT — PAIN SCALES - GENERAL
PAINLEVEL_OUTOF10: 0
PAINLEVEL_OUTOF10: 0

## 2023-06-28 VITALS
OXYGEN SATURATION: 94 % | HEIGHT: 71 IN | DIASTOLIC BLOOD PRESSURE: 63 MMHG | HEART RATE: 67 BPM | TEMPERATURE: 98.6 F | SYSTOLIC BLOOD PRESSURE: 116 MMHG | RESPIRATION RATE: 16 BRPM | BODY MASS INDEX: 33.33 KG/M2 | WEIGHT: 238.1 LBS

## 2023-06-28 LAB
GLUCOSE BLD-MCNC: 157 MG/DL (ref 75–110)
GLUCOSE BLD-MCNC: 220 MG/DL (ref 75–110)

## 2023-06-28 PROCEDURE — 2580000003 HC RX 258: Performed by: INTERNAL MEDICINE

## 2023-06-28 PROCEDURE — 82947 ASSAY GLUCOSE BLOOD QUANT: CPT

## 2023-06-28 PROCEDURE — 6370000000 HC RX 637 (ALT 250 FOR IP): Performed by: INTERNAL MEDICINE

## 2023-06-28 PROCEDURE — 99232 SBSQ HOSP IP/OBS MODERATE 35: CPT | Performed by: NURSE PRACTITIONER

## 2023-06-28 PROCEDURE — 94640 AIRWAY INHALATION TREATMENT: CPT

## 2023-06-28 RX ADMIN — FLUTICASONE PROPIONATE 2 SPRAY: 50 SPRAY, METERED NASAL at 08:57

## 2023-06-28 RX ADMIN — LEVOTHYROXINE SODIUM 137 MCG: 137 TABLET ORAL at 06:44

## 2023-06-28 RX ADMIN — INSULIN LISPRO 1 UNITS: 100 INJECTION, SOLUTION INTRAVENOUS; SUBCUTANEOUS at 12:27

## 2023-06-28 RX ADMIN — PRIMIDONE 250 MG: 250 TABLET ORAL at 08:54

## 2023-06-28 RX ADMIN — SPIRONOLACTONE 25 MG: 25 TABLET ORAL at 08:54

## 2023-06-28 RX ADMIN — DESMOPRESSIN ACETATE 40 MG: 0.2 TABLET ORAL at 08:55

## 2023-06-28 RX ADMIN — BUMETANIDE 1 MG: 1 TABLET ORAL at 08:55

## 2023-06-28 RX ADMIN — TIOTROPIUM BROMIDE INHALATION SPRAY 2 PUFF: 3.12 SPRAY, METERED RESPIRATORY (INHALATION) at 08:46

## 2023-06-28 RX ADMIN — Medication 1 TABLET: at 08:54

## 2023-06-28 RX ADMIN — MONTELUKAST SODIUM 10 MG: 10 TABLET, FILM COATED ORAL at 08:55

## 2023-06-28 RX ADMIN — CLOPIDOGREL BISULFATE 75 MG: 75 TABLET, FILM COATED ORAL at 08:55

## 2023-06-28 RX ADMIN — FERROUS SULFATE TAB EC 325 MG (65 MG FE EQUIVALENT) 325 MG: 325 (65 FE) TABLET DELAYED RESPONSE at 08:55

## 2023-06-28 RX ADMIN — PANTOPRAZOLE SODIUM 40 MG: 40 TABLET, DELAYED RELEASE ORAL at 06:44

## 2023-06-28 RX ADMIN — OXYBUTYNIN CHLORIDE 10 MG: 5 TABLET ORAL at 08:54

## 2023-06-28 RX ADMIN — BUDESONIDE AND FORMOTEROL FUMARATE DIHYDRATE 2 PUFF: 160; 4.5 AEROSOL RESPIRATORY (INHALATION) at 08:46

## 2023-06-28 RX ADMIN — PROPRANOLOL HYDROCHLORIDE 60 MG: 60 CAPSULE, EXTENDED RELEASE ORAL at 08:54

## 2023-06-28 RX ADMIN — SODIUM CHLORIDE, PRESERVATIVE FREE 10 ML: 5 INJECTION INTRAVENOUS at 08:55

## 2023-07-03 ENCOUNTER — HOSPITAL ENCOUNTER (OUTPATIENT)
Age: 88
Setting detail: SPECIMEN
Discharge: HOME | End: 2023-07-03

## 2023-07-03 LAB
ALBUMIN SERPL-MCNC: 2.7 G/DL (ref 3.5–5.2)
ALBUMIN/GLOB SERPL: 1 {RATIO} (ref 1–2.5)
ALP SERPL-CCNC: 139 U/L (ref 40–129)
ALT SERPL-CCNC: 34 U/L (ref 5–41)
ANION GAP SERPL CALCULATED.3IONS-SCNC: 9 MMOL/L (ref 9–17)
AST SERPL-CCNC: 46 U/L
BASOPHILS # BLD: 0 K/UL (ref 0–0.2)
BASOPHILS NFR BLD: 0 % (ref 0–2)
BILIRUB SERPL-MCNC: 0.2 MG/DL (ref 0.3–1.2)
BUN SERPL-MCNC: 13 MG/DL (ref 8–23)
BUN/CREAT SERPL: 15 (ref 9–20)
CALCIUM SERPL-MCNC: 8.1 MG/DL (ref 8.6–10.4)
CHLORIDE SERPL-SCNC: 100 MMOL/L (ref 98–107)
CO2 SERPL-SCNC: 25 MMOL/L (ref 20–31)
CREAT SERPL-MCNC: 0.89 MG/DL (ref 0.7–1.2)
EOSINOPHIL # BLD: 0.3 K/UL (ref 0–0.44)
EOSINOPHILS RELATIVE PERCENT: 5 % (ref 1–4)
ERYTHROCYTE [DISTWIDTH] IN BLOOD BY AUTOMATED COUNT: 15.9 % (ref 11.8–14.4)
GFR SERPL CREATININE-BSD FRML MDRD: >60 ML/MIN/1.73M2
GLUCOSE SERPL-MCNC: 95 MG/DL (ref 70–99)
HCT VFR BLD AUTO: 24.6 % (ref 40.7–50.3)
HGB BLD-MCNC: 7.9 G/DL (ref 13–17)
IMM GRANULOCYTES # BLD AUTO: 0 K/UL (ref 0–0.3)
IMM GRANULOCYTES NFR BLD: 0 %
LYMPHOCYTES # BLD: 24 % (ref 24–43)
LYMPHOCYTES NFR BLD: 1.44 K/UL (ref 1.1–3.7)
MCH RBC QN AUTO: 29.5 PG (ref 25.2–33.5)
MCHC RBC AUTO-ENTMCNC: 32.1 G/DL (ref 28.4–34.8)
MCV RBC AUTO: 91.8 FL (ref 82.6–102.9)
MONOCYTES NFR BLD: 0.96 K/UL (ref 0.1–1.2)
MONOCYTES NFR BLD: 16 % (ref 3–12)
MORPHOLOGY: NORMAL
NEUTROPHILS NFR BLD: 55 % (ref 36–65)
NEUTS SEG NFR BLD: 3.3 K/UL (ref 1.5–8.1)
NRBC BLD-RTO: 0 PER 100 WBC
PLATELET # BLD AUTO: 322 K/UL (ref 138–453)
PMV BLD AUTO: 9.5 FL (ref 8.1–13.5)
POTASSIUM SERPL-SCNC: 3.9 MMOL/L (ref 3.7–5.3)
PROT SERPL-MCNC: 5.5 G/DL (ref 6.4–8.3)
RBC # BLD AUTO: 2.68 M/UL (ref 4.21–5.77)
SODIUM SERPL-SCNC: 134 MMOL/L (ref 135–144)
WBC OTHER # BLD: 6 K/UL (ref 3.5–11.3)

## 2023-07-03 PROCEDURE — 36415 COLL VENOUS BLD VENIPUNCTURE: CPT

## 2023-07-03 PROCEDURE — 80053 COMPREHEN METABOLIC PANEL: CPT

## 2023-07-03 PROCEDURE — 85027 COMPLETE CBC AUTOMATED: CPT

## 2023-07-03 PROCEDURE — P9604 ONE-WAY ALLOW PRORATED TRIP: HCPCS

## 2023-07-10 ENCOUNTER — HOSPITAL ENCOUNTER (OUTPATIENT)
Age: 88
Setting detail: SPECIMEN
Discharge: HOME | End: 2023-07-10
Payer: MEDICARE

## 2023-07-10 LAB
ALBUMIN SERPL-MCNC: 3.2 G/DL (ref 3.5–5.2)
ALBUMIN/GLOB SERPL: 1.1 {RATIO} (ref 1–2.5)
ALP SERPL-CCNC: 164 U/L (ref 40–129)
ALT SERPL-CCNC: 37 U/L (ref 5–41)
ANION GAP SERPL CALCULATED.3IONS-SCNC: 8 MMOL/L (ref 9–17)
AST SERPL-CCNC: 41 U/L
BASOPHILS # BLD: 0.04 K/UL (ref 0–0.2)
BASOPHILS NFR BLD: 1 % (ref 0–2)
BILIRUB SERPL-MCNC: 0.2 MG/DL (ref 0.3–1.2)
BUN SERPL-MCNC: 12 MG/DL (ref 8–23)
BUN/CREAT SERPL: 12 (ref 9–20)
CALCIUM SERPL-MCNC: 8.5 MG/DL (ref 8.6–10.4)
CHLORIDE SERPL-SCNC: 101 MMOL/L (ref 98–107)
CO2 SERPL-SCNC: 25 MMOL/L (ref 20–31)
CREAT SERPL-MCNC: 1 MG/DL (ref 0.7–1.2)
EOSINOPHIL # BLD: 0.24 K/UL (ref 0–0.44)
EOSINOPHILS RELATIVE PERCENT: 4 % (ref 1–4)
ERYTHROCYTE [DISTWIDTH] IN BLOOD BY AUTOMATED COUNT: 15.7 % (ref 11.8–14.4)
FERRITIN SERPL-MCNC: 112 NG/ML (ref 30–400)
GFR SERPL CREATININE-BSD FRML MDRD: >60 ML/MIN/1.73M2
GLUCOSE SERPL-MCNC: 100 MG/DL (ref 70–99)
HCT VFR BLD AUTO: 28.1 % (ref 40.7–50.3)
HGB BLD-MCNC: 9 G/DL (ref 13–17)
IMM GRANULOCYTES # BLD AUTO: 0.06 K/UL (ref 0–0.3)
IMM GRANULOCYTES NFR BLD: 1 %
IRON SATN MFR SERPL: 29 % (ref 20–55)
IRON SERPL-MCNC: 43 UG/DL (ref 59–158)
LYMPHOCYTES # BLD: 17 % (ref 24–43)
LYMPHOCYTES NFR BLD: 1.16 K/UL (ref 1.1–3.7)
MCH RBC QN AUTO: 29 PG (ref 25.2–33.5)
MCHC RBC AUTO-ENTMCNC: 32 G/DL (ref 28.4–34.8)
MCV RBC AUTO: 90.6 FL (ref 82.6–102.9)
MONOCYTES NFR BLD: 1.11 K/UL (ref 0.1–1.2)
MONOCYTES NFR BLD: 16 % (ref 3–12)
NEUTROPHILS NFR BLD: 61 % (ref 36–65)
NEUTS SEG NFR BLD: 4.33 K/UL (ref 1.5–8.1)
NRBC BLD-RTO: 0 PER 100 WBC
PLATELET # BLD AUTO: 264 K/UL (ref 138–453)
PMV BLD AUTO: 9.7 FL (ref 8.1–13.5)
POTASSIUM SERPL-SCNC: 3.8 MMOL/L (ref 3.7–5.3)
PROT SERPL-MCNC: 6.1 G/DL (ref 6.4–8.3)
RBC # BLD AUTO: 3.1 M/UL (ref 4.21–5.77)
SODIUM SERPL-SCNC: 134 MMOL/L (ref 135–144)
TIBC SERPL-MCNC: 150 UG/DL (ref 250–450)
UNSATURATED IRON BINDING CAPACITY: 107 UG/DL (ref 112–347)
WBC OTHER # BLD: 6.9 K/UL (ref 3.5–11.3)

## 2023-07-10 PROCEDURE — 83540 ASSAY OF IRON: CPT

## 2023-07-10 PROCEDURE — 36415 COLL VENOUS BLD VENIPUNCTURE: CPT

## 2023-07-10 PROCEDURE — 85027 COMPLETE CBC AUTOMATED: CPT

## 2023-07-10 PROCEDURE — 80053 COMPREHEN METABOLIC PANEL: CPT

## 2023-07-10 PROCEDURE — 82728 ASSAY OF FERRITIN: CPT

## 2023-07-10 PROCEDURE — 83550 IRON BINDING TEST: CPT

## 2023-07-10 PROCEDURE — P9603 ONE-WAY ALLOW PRORATED MILES: HCPCS

## 2023-07-18 ENCOUNTER — OFFICE VISIT (OUTPATIENT)
Dept: PRIMARY CARE CLINIC | Age: 88
End: 2023-07-18
Payer: MEDICARE

## 2023-07-18 ENCOUNTER — HOSPITAL ENCOUNTER (INPATIENT)
Age: 88
LOS: 3 days | Discharge: HOME | End: 2023-07-21
Attending: EMERGENCY MEDICINE | Admitting: STUDENT IN AN ORGANIZED HEALTH CARE EDUCATION/TRAINING PROGRAM
Payer: MEDICARE

## 2023-07-18 ENCOUNTER — APPOINTMENT (OUTPATIENT)
Dept: GENERAL RADIOLOGY | Age: 88
End: 2023-07-18
Payer: MEDICARE

## 2023-07-18 VITALS
DIASTOLIC BLOOD PRESSURE: 74 MMHG | TEMPERATURE: 98.5 F | WEIGHT: 236.1 LBS | SYSTOLIC BLOOD PRESSURE: 132 MMHG | OXYGEN SATURATION: 97 % | HEART RATE: 72 BPM | BODY MASS INDEX: 32.93 KG/M2 | RESPIRATION RATE: 22 BRPM

## 2023-07-18 DIAGNOSIS — A41.9 SEPSIS DUE TO PNEUMONIA (HCC): ICD-10-CM

## 2023-07-18 DIAGNOSIS — E87.1 HYPONATREMIA: ICD-10-CM

## 2023-07-18 DIAGNOSIS — Z09 HOSPITAL DISCHARGE FOLLOW-UP: ICD-10-CM

## 2023-07-18 DIAGNOSIS — R09.02 HYPOXIA: ICD-10-CM

## 2023-07-18 DIAGNOSIS — J18.9 SEPSIS DUE TO PNEUMONIA (HCC): ICD-10-CM

## 2023-07-18 DIAGNOSIS — E06.3 HYPOTHYROIDISM DUE TO HASHIMOTO'S THYROIDITIS: Chronic | ICD-10-CM

## 2023-07-18 DIAGNOSIS — E78.5 DYSLIPIDEMIA: ICD-10-CM

## 2023-07-18 DIAGNOSIS — E66.9 OBESITY (BMI 30.0-34.9): ICD-10-CM

## 2023-07-18 DIAGNOSIS — Z86.73 HISTORY OF STROKE: ICD-10-CM

## 2023-07-18 DIAGNOSIS — A41.9 SEPSIS, DUE TO UNSPECIFIED ORGANISM, UNSPECIFIED WHETHER ACUTE ORGAN DYSFUNCTION PRESENT (HCC): Primary | ICD-10-CM

## 2023-07-18 DIAGNOSIS — I35.0 NONRHEUMATIC AORTIC VALVE STENOSIS: ICD-10-CM

## 2023-07-18 DIAGNOSIS — J18.9 MULTIFOCAL PNEUMONIA: ICD-10-CM

## 2023-07-18 DIAGNOSIS — A41.9 SEPTICEMIA (HCC): ICD-10-CM

## 2023-07-18 DIAGNOSIS — Z86.711 HX PULMONARY EMBOLISM: ICD-10-CM

## 2023-07-18 DIAGNOSIS — I10 ESSENTIAL HYPERTENSION: ICD-10-CM

## 2023-07-18 DIAGNOSIS — E03.8 HYPOTHYROIDISM DUE TO HASHIMOTO'S THYROIDITIS: Chronic | ICD-10-CM

## 2023-07-18 DIAGNOSIS — E11.8 TYPE 2 DIABETES MELLITUS WITH COMPLICATION, WITHOUT LONG-TERM CURRENT USE OF INSULIN (HCC): Chronic | ICD-10-CM

## 2023-07-18 DIAGNOSIS — I50.32 CHRONIC DIASTOLIC CONGESTIVE HEART FAILURE (HCC): ICD-10-CM

## 2023-07-18 DIAGNOSIS — R74.8 ELEVATED LIVER ENZYMES: Chronic | ICD-10-CM

## 2023-07-18 DIAGNOSIS — I63.512 ACUTE ISCHEMIC LEFT MIDDLE CEREBRAL ARTERY (MCA) STROKE (HCC): Primary | ICD-10-CM

## 2023-07-18 DIAGNOSIS — B34.8 PARAINFLUENZA: ICD-10-CM

## 2023-07-18 DIAGNOSIS — K57.30 PANCOLONIC DIVERTICULOSIS: ICD-10-CM

## 2023-07-18 DIAGNOSIS — I49.3 PVC (PREMATURE VENTRICULAR CONTRACTION): ICD-10-CM

## 2023-07-18 DIAGNOSIS — J15.9 COMMUNITY ACQUIRED BACTERIAL PNEUMONIA: ICD-10-CM

## 2023-07-18 DIAGNOSIS — Z16.12 ESBL (EXTENDED SPECTRUM BETA-LACTAMASE) PRODUCING BACTERIA INFECTION: ICD-10-CM

## 2023-07-18 DIAGNOSIS — I63.419 CEREBROVASCULAR ACCIDENT (CVA) DUE TO EMBOLISM OF MIDDLE CEREBRAL ARTERY, UNSPECIFIED BLOOD VESSEL LATERALITY (HCC): ICD-10-CM

## 2023-07-18 DIAGNOSIS — I63.9 ISCHEMIC STROKE (HCC): ICD-10-CM

## 2023-07-18 DIAGNOSIS — K57.31 DIVERTICULAR HEMORRHAGE: ICD-10-CM

## 2023-07-18 DIAGNOSIS — I87.2 VENOUS (PERIPHERAL) INSUFFICIENCY: ICD-10-CM

## 2023-07-18 DIAGNOSIS — D35.02 ADRENAL ADENOMA, LEFT: ICD-10-CM

## 2023-07-18 DIAGNOSIS — J44.9 CHRONIC OBSTRUCTIVE PULMONARY DISEASE, UNSPECIFIED COPD TYPE (HCC): Chronic | ICD-10-CM

## 2023-07-18 DIAGNOSIS — A49.9 ESBL (EXTENDED SPECTRUM BETA-LACTAMASE) PRODUCING BACTERIA INFECTION: ICD-10-CM

## 2023-07-18 DIAGNOSIS — I25.10 ASHD (ARTERIOSCLEROTIC HEART DISEASE): ICD-10-CM

## 2023-07-18 DIAGNOSIS — R47.1 DYSARTHRIA: ICD-10-CM

## 2023-07-18 DIAGNOSIS — K62.5 RECTAL BLEEDING: ICD-10-CM

## 2023-07-18 DIAGNOSIS — J45.40 MODERATE PERSISTENT ASTHMA WITHOUT COMPLICATION: ICD-10-CM

## 2023-07-18 DIAGNOSIS — K57.91 GASTROINTESTINAL HEMORRHAGE ASSOCIATED WITH INTESTINAL DIVERTICULOSIS: ICD-10-CM

## 2023-07-18 DIAGNOSIS — D62 ACUTE BLOOD LOSS ANEMIA: ICD-10-CM

## 2023-07-18 DIAGNOSIS — I63.512 ACUTE ISCHEMIC LEFT MIDDLE CEREBRAL ARTERY (MCA) STROKE (HCC): ICD-10-CM

## 2023-07-18 DIAGNOSIS — E87.79 OTHER FLUID OVERLOAD: ICD-10-CM

## 2023-07-18 DIAGNOSIS — I50.42 CHRONIC COMBINED SYSTOLIC AND DIASTOLIC CHF, NYHA CLASS 2 (HCC): Chronic | ICD-10-CM

## 2023-07-18 LAB
ALBUMIN SERPL-MCNC: 3.7 G/DL (ref 3.5–5.2)
ALBUMIN/GLOB SERPL: 1.1 {RATIO} (ref 1–2.5)
ALP SERPL-CCNC: 222 U/L (ref 40–129)
ALT SERPL-CCNC: 69 U/L (ref 5–41)
ANION GAP SERPL CALCULATED.3IONS-SCNC: 10 MMOL/L (ref 9–17)
AST SERPL-CCNC: 60 U/L
BACTERIA URNS QL MICRO: ABNORMAL
BASOPHILS # BLD: <0.03 K/UL (ref 0–0.2)
BASOPHILS NFR BLD: 0 % (ref 0–2)
BILIRUB SERPL-MCNC: 0.3 MG/DL (ref 0.3–1.2)
BILIRUB UR QL STRIP: NEGATIVE
BUN SERPL-MCNC: 18 MG/DL (ref 8–23)
BUN/CREAT SERPL: 20 (ref 9–20)
CALCIUM SERPL-MCNC: 9.1 MG/DL (ref 8.6–10.4)
CHLORIDE SERPL-SCNC: 98 MMOL/L (ref 98–107)
CLARITY UR: CLEAR
CO2 SERPL-SCNC: 22 MMOL/L (ref 20–31)
COLOR UR: YELLOW
CREAT SERPL-MCNC: 0.9 MG/DL (ref 0.7–1.2)
EKG ATRIAL RATE: 89 BPM
EKG P-R INTERVAL: 184 MS
EKG Q-T INTERVAL: 362 MS
EKG QRS DURATION: 132 MS
EKG QTC CALCULATION (BAZETT): 440 MS
EKG R AXIS: -19 DEGREES
EKG T AXIS: 87 DEGREES
EKG VENTRICULAR RATE: 89 BPM
EOSINOPHIL # BLD: 0.22 K/UL (ref 0–0.44)
EOSINOPHILS RELATIVE PERCENT: 2 % (ref 1–4)
EPI CELLS #/AREA URNS HPF: ABNORMAL /HPF (ref 0–5)
ERYTHROCYTE [DISTWIDTH] IN BLOOD BY AUTOMATED COUNT: 15.2 % (ref 11.8–14.4)
GFR SERPL CREATININE-BSD FRML MDRD: >60 ML/MIN/1.73M2
GLUCOSE SERPL-MCNC: 143 MG/DL (ref 70–99)
GLUCOSE UR STRIP-MCNC: NEGATIVE MG/DL
HCT VFR BLD AUTO: 31.6 % (ref 40.7–50.3)
HGB BLD-MCNC: 10 G/DL (ref 13–17)
HGB UR QL STRIP.AUTO: NEGATIVE
IMM GRANULOCYTES # BLD AUTO: 0.08 K/UL (ref 0–0.3)
IMM GRANULOCYTES NFR BLD: 1 %
KETONES UR STRIP-MCNC: NEGATIVE MG/DL
LACTATE BLDV-SCNC: 1.3 MMOL/L (ref 0.5–1.9)
LACTATE BLDV-SCNC: 2.3 MMOL/L (ref 0.5–2.2)
LEUKOCYTE ESTERASE UR QL STRIP: NEGATIVE
LYMPHOCYTES # BLD: 6 % (ref 24–43)
LYMPHOCYTES NFR BLD: 0.79 K/UL (ref 1.1–3.7)
MCH RBC QN AUTO: 29.5 PG (ref 25.2–33.5)
MCHC RBC AUTO-ENTMCNC: 31.6 G/DL (ref 28.4–34.8)
MCV RBC AUTO: 93.2 FL (ref 82.6–102.9)
MONOCYTES NFR BLD: 1.28 K/UL (ref 0.1–1.2)
MONOCYTES NFR BLD: 9 % (ref 3–12)
NEUTROPHILS NFR BLD: 82 % (ref 36–65)
NEUTS SEG NFR BLD: 12.02 K/UL (ref 1.5–8.1)
NITRITE UR QL STRIP: NEGATIVE
NRBC BLD-RTO: 0 PER 100 WBC
PH UR STRIP: 7 [PH] (ref 5–9)
PLATELET # BLD AUTO: 319 K/UL (ref 138–453)
PMV BLD AUTO: 9.9 FL (ref 8.1–13.5)
POTASSIUM SERPL-SCNC: 4.6 MMOL/L (ref 3.7–5.3)
PROT SERPL-MCNC: 7.1 G/DL (ref 6.4–8.3)
PROT UR STRIP-MCNC: ABNORMAL MG/DL
RBC # BLD AUTO: 3.39 M/UL (ref 4.21–5.77)
RBC #/AREA URNS HPF: ABNORMAL /HPF (ref 0–2)
SARS-COV-2 RDRP RESP QL NAA+PROBE: NOT DETECTED
SODIUM SERPL-SCNC: 130 MMOL/L (ref 135–144)
SP GR UR STRIP: 1.01 (ref 1.01–1.02)
SPECIMEN DESCRIPTION: NORMAL
TROPONIN I SERPL HS-MCNC: 12 NG/L (ref 0–22)
UROBILINOGEN UR STRIP-ACNC: NORMAL EU/DL (ref 0–1)
WBC #/AREA URNS HPF: ABNORMAL /HPF (ref 0–5)
WBC OTHER # BLD: 14.4 K/UL (ref 3.5–11.3)

## 2023-07-18 PROCEDURE — 85027 COMPLETE CBC AUTOMATED: CPT

## 2023-07-18 PROCEDURE — 6370000000 HC RX 637 (ALT 250 FOR IP): Performed by: INTERNAL MEDICINE

## 2023-07-18 PROCEDURE — 1111F DSCHRG MED/CURRENT MED MERGE: CPT | Performed by: NURSE PRACTITIONER

## 2023-07-18 PROCEDURE — 99285 EMERGENCY DEPT VISIT HI MDM: CPT

## 2023-07-18 PROCEDURE — 94761 N-INVAS EAR/PLS OXIMETRY MLT: CPT

## 2023-07-18 PROCEDURE — 87086 URINE CULTURE/COLONY COUNT: CPT

## 2023-07-18 PROCEDURE — 99214 OFFICE O/P EST MOD 30 MIN: CPT | Performed by: NURSE PRACTITIONER

## 2023-07-18 PROCEDURE — 84484 ASSAY OF TROPONIN QUANT: CPT

## 2023-07-18 PROCEDURE — 96375 TX/PRO/DX INJ NEW DRUG ADDON: CPT

## 2023-07-18 PROCEDURE — 87088 URINE BACTERIA CULTURE: CPT

## 2023-07-18 PROCEDURE — 2700000000 HC OXYGEN THERAPY PER DAY

## 2023-07-18 PROCEDURE — 71046 X-RAY EXAM CHEST 2 VIEWS: CPT

## 2023-07-18 PROCEDURE — 87040 BLOOD CULTURE FOR BACTERIA: CPT

## 2023-07-18 PROCEDURE — 36415 COLL VENOUS BLD VENIPUNCTURE: CPT

## 2023-07-18 PROCEDURE — 1200000000 HC SEMI PRIVATE

## 2023-07-18 PROCEDURE — 2580000003 HC RX 258: Performed by: EMERGENCY MEDICINE

## 2023-07-18 PROCEDURE — 6360000002 HC RX W HCPCS: Performed by: EMERGENCY MEDICINE

## 2023-07-18 PROCEDURE — 83605 ASSAY OF LACTIC ACID: CPT

## 2023-07-18 PROCEDURE — 87635 SARS-COV-2 COVID-19 AMP PRB: CPT

## 2023-07-18 PROCEDURE — 80053 COMPREHEN METABOLIC PANEL: CPT

## 2023-07-18 PROCEDURE — 96365 THER/PROPH/DIAG IV INF INIT: CPT

## 2023-07-18 PROCEDURE — 87186 SC STD MICRODIL/AGAR DIL: CPT

## 2023-07-18 PROCEDURE — 6370000000 HC RX 637 (ALT 250 FOR IP): Performed by: EMERGENCY MEDICINE

## 2023-07-18 PROCEDURE — 3075F SYST BP GE 130 - 139MM HG: CPT | Performed by: NURSE PRACTITIONER

## 2023-07-18 PROCEDURE — 1123F ACP DISCUSS/DSCN MKR DOCD: CPT | Performed by: NURSE PRACTITIONER

## 2023-07-18 PROCEDURE — 93010 ELECTROCARDIOGRAM REPORT: CPT | Performed by: FAMILY MEDICINE

## 2023-07-18 PROCEDURE — 0202U NFCT DS 22 TRGT SARS-COV-2: CPT

## 2023-07-18 PROCEDURE — 3078F DIAST BP <80 MM HG: CPT | Performed by: NURSE PRACTITIONER

## 2023-07-18 PROCEDURE — 93005 ELECTROCARDIOGRAM TRACING: CPT | Performed by: EMERGENCY MEDICINE

## 2023-07-18 PROCEDURE — 81001 URINALYSIS AUTO W/SCOPE: CPT

## 2023-07-18 RX ORDER — FERROUS SULFATE 325(65) MG
325 TABLET ORAL 2 TIMES DAILY WITH MEALS
Status: DISCONTINUED | OUTPATIENT
Start: 2023-07-19 | End: 2023-07-21 | Stop reason: HOSPADM

## 2023-07-18 RX ORDER — OXYBUTYNIN CHLORIDE 5 MG/1
10 TABLET ORAL 2 TIMES DAILY
Status: DISCONTINUED | OUTPATIENT
Start: 2023-07-18 | End: 2023-07-21 | Stop reason: HOSPADM

## 2023-07-18 RX ORDER — ACETAMINOPHEN 500 MG
1000 TABLET ORAL ONCE
Status: COMPLETED | OUTPATIENT
Start: 2023-07-18 | End: 2023-07-18

## 2023-07-18 RX ORDER — SPIRONOLACTONE 25 MG/1
25 TABLET ORAL DAILY
Status: DISCONTINUED | OUTPATIENT
Start: 2023-07-19 | End: 2023-07-21 | Stop reason: HOSPADM

## 2023-07-18 RX ORDER — SODIUM CHLORIDE 0.9 % (FLUSH) 0.9 %
5-40 SYRINGE (ML) INJECTION EVERY 12 HOURS SCHEDULED
Status: DISCONTINUED | OUTPATIENT
Start: 2023-07-18 | End: 2023-07-21 | Stop reason: HOSPADM

## 2023-07-18 RX ORDER — TAMSULOSIN HYDROCHLORIDE 0.4 MG/1
0.8 CAPSULE ORAL DAILY
Qty: 180 CAPSULE | Refills: 1 | Status: SHIPPED | OUTPATIENT
Start: 2023-07-18

## 2023-07-18 RX ORDER — CLOPIDOGREL BISULFATE 75 MG/1
75 TABLET ORAL DAILY
Status: DISCONTINUED | OUTPATIENT
Start: 2023-07-19 | End: 2023-07-21 | Stop reason: HOSPADM

## 2023-07-18 RX ORDER — SODIUM CHLORIDE 0.9 % (FLUSH) 0.9 %
10 SYRINGE (ML) INJECTION PRN
Status: DISCONTINUED | OUTPATIENT
Start: 2023-07-18 | End: 2023-07-21 | Stop reason: HOSPADM

## 2023-07-18 RX ORDER — M-VIT,TX,IRON,MINS/CALC/FOLIC 27MG-0.4MG
1 TABLET ORAL DAILY
Status: DISCONTINUED | OUTPATIENT
Start: 2023-07-19 | End: 2023-07-21 | Stop reason: HOSPADM

## 2023-07-18 RX ORDER — IPRATROPIUM BROMIDE AND ALBUTEROL SULFATE 2.5; .5 MG/3ML; MG/3ML
1 SOLUTION RESPIRATORY (INHALATION)
Status: DISCONTINUED | OUTPATIENT
Start: 2023-07-19 | End: 2023-07-19

## 2023-07-18 RX ORDER — TAMSULOSIN HYDROCHLORIDE 0.4 MG/1
0.8 CAPSULE ORAL DAILY
Status: DISCONTINUED | OUTPATIENT
Start: 2023-07-19 | End: 2023-07-21 | Stop reason: HOSPADM

## 2023-07-18 RX ORDER — CALCIUM CARBONATE 500 MG/1
1 TABLET, CHEWABLE ORAL EVERY 6 HOURS PRN
Status: DISCONTINUED | OUTPATIENT
Start: 2023-07-18 | End: 2023-07-21 | Stop reason: HOSPADM

## 2023-07-18 RX ORDER — BUMETANIDE 1 MG/1
1 TABLET ORAL 2 TIMES DAILY
Status: DISCONTINUED | OUTPATIENT
Start: 2023-07-18 | End: 2023-07-21 | Stop reason: HOSPADM

## 2023-07-18 RX ORDER — 0.9 % SODIUM CHLORIDE 0.9 %
500 INTRAVENOUS SOLUTION INTRAVENOUS ONCE
Status: COMPLETED | OUTPATIENT
Start: 2023-07-18 | End: 2023-07-18

## 2023-07-18 RX ORDER — ACETAMINOPHEN 500 MG
1000 TABLET ORAL EVERY 4 HOURS PRN
Status: DISCONTINUED | OUTPATIENT
Start: 2023-07-18 | End: 2023-07-18 | Stop reason: SDUPTHER

## 2023-07-18 RX ORDER — ATORVASTATIN CALCIUM 40 MG/1
40 TABLET, FILM COATED ORAL DAILY
Status: DISCONTINUED | OUTPATIENT
Start: 2023-07-19 | End: 2023-07-21 | Stop reason: HOSPADM

## 2023-07-18 RX ORDER — ACETAMINOPHEN 650 MG/1
650 SUPPOSITORY RECTAL EVERY 6 HOURS PRN
Status: DISCONTINUED | OUTPATIENT
Start: 2023-07-18 | End: 2023-07-21 | Stop reason: HOSPADM

## 2023-07-18 RX ORDER — ONDANSETRON 4 MG/1
4 TABLET, ORALLY DISINTEGRATING ORAL EVERY 8 HOURS PRN
Status: DISCONTINUED | OUTPATIENT
Start: 2023-07-18 | End: 2023-07-21 | Stop reason: HOSPADM

## 2023-07-18 RX ORDER — MONTELUKAST SODIUM 10 MG/1
10 TABLET ORAL DAILY
Status: DISCONTINUED | OUTPATIENT
Start: 2023-07-19 | End: 2023-07-21 | Stop reason: HOSPADM

## 2023-07-18 RX ORDER — MELOXICAM 7.5 MG/1
7.5 TABLET ORAL DAILY
Status: DISCONTINUED | OUTPATIENT
Start: 2023-07-19 | End: 2023-07-21 | Stop reason: HOSPADM

## 2023-07-18 RX ORDER — PANTOPRAZOLE SODIUM 40 MG/1
40 TABLET, DELAYED RELEASE ORAL
Status: DISCONTINUED | OUTPATIENT
Start: 2023-07-19 | End: 2023-07-21 | Stop reason: HOSPADM

## 2023-07-18 RX ORDER — ONDANSETRON 2 MG/ML
4 INJECTION INTRAMUSCULAR; INTRAVENOUS EVERY 6 HOURS PRN
Status: DISCONTINUED | OUTPATIENT
Start: 2023-07-18 | End: 2023-07-21 | Stop reason: HOSPADM

## 2023-07-18 RX ORDER — POLYETHYLENE GLYCOL 3350 17 G/17G
17 POWDER, FOR SOLUTION ORAL DAILY PRN
Status: DISCONTINUED | OUTPATIENT
Start: 2023-07-18 | End: 2023-07-21 | Stop reason: HOSPADM

## 2023-07-18 RX ORDER — CHOLECALCIFEROL (VITAMIN D3) 125 MCG
5 CAPSULE ORAL NIGHTLY PRN
Status: DISCONTINUED | OUTPATIENT
Start: 2023-07-18 | End: 2023-07-21 | Stop reason: HOSPADM

## 2023-07-18 RX ORDER — ALBUTEROL SULFATE 2.5 MG/3ML
2.5 SOLUTION RESPIRATORY (INHALATION) EVERY 4 HOURS PRN
Status: DISCONTINUED | OUTPATIENT
Start: 2023-07-18 | End: 2023-07-21 | Stop reason: HOSPADM

## 2023-07-18 RX ORDER — PRIMIDONE 250 MG/1
250 TABLET ORAL 2 TIMES DAILY
Status: DISCONTINUED | OUTPATIENT
Start: 2023-07-19 | End: 2023-07-21 | Stop reason: HOSPADM

## 2023-07-18 RX ORDER — ENOXAPARIN SODIUM 100 MG/ML
30 INJECTION SUBCUTANEOUS 2 TIMES DAILY
Status: DISCONTINUED | OUTPATIENT
Start: 2023-07-19 | End: 2023-07-21 | Stop reason: HOSPADM

## 2023-07-18 RX ORDER — ACETAMINOPHEN 325 MG/1
650 TABLET ORAL EVERY 6 HOURS PRN
Status: DISCONTINUED | OUTPATIENT
Start: 2023-07-18 | End: 2023-07-21 | Stop reason: HOSPADM

## 2023-07-18 RX ORDER — PROPRANOLOL HCL 60 MG
60 CAPSULE, EXTENDED RELEASE 24HR ORAL DAILY
Status: DISCONTINUED | OUTPATIENT
Start: 2023-07-19 | End: 2023-07-21 | Stop reason: HOSPADM

## 2023-07-18 RX ORDER — ALBUTEROL SULFATE 90 UG/1
2 AEROSOL, METERED RESPIRATORY (INHALATION) EVERY 4 HOURS PRN
Status: DISCONTINUED | OUTPATIENT
Start: 2023-07-18 | End: 2023-07-21 | Stop reason: HOSPADM

## 2023-07-18 RX ORDER — SODIUM CHLORIDE 9 MG/ML
INJECTION, SOLUTION INTRAVENOUS PRN
Status: DISCONTINUED | OUTPATIENT
Start: 2023-07-18 | End: 2023-07-21 | Stop reason: HOSPADM

## 2023-07-18 RX ORDER — ASPIRIN 81 MG/1
81 TABLET ORAL DAILY
Status: DISCONTINUED | OUTPATIENT
Start: 2023-07-19 | End: 2023-07-21 | Stop reason: HOSPADM

## 2023-07-18 RX ORDER — FLUTICASONE PROPIONATE 50 MCG
2 SPRAY, SUSPENSION (ML) NASAL DAILY
Status: DISCONTINUED | OUTPATIENT
Start: 2023-07-19 | End: 2023-07-21 | Stop reason: HOSPADM

## 2023-07-18 RX ORDER — IPRATROPIUM BROMIDE AND ALBUTEROL SULFATE 2.5; .5 MG/3ML; MG/3ML
1 SOLUTION RESPIRATORY (INHALATION)
Status: DISCONTINUED | OUTPATIENT
Start: 2023-07-18 | End: 2023-07-18

## 2023-07-18 RX ADMIN — OXYBUTYNIN CHLORIDE 10 MG: 5 TABLET ORAL at 22:42

## 2023-07-18 RX ADMIN — METFORMIN HYDROCHLORIDE 1000 MG: 500 TABLET ORAL at 22:42

## 2023-07-18 RX ADMIN — AZITHROMYCIN MONOHYDRATE 500 MG: 500 INJECTION, POWDER, LYOPHILIZED, FOR SOLUTION INTRAVENOUS at 20:57

## 2023-07-18 RX ADMIN — CEFTRIAXONE SODIUM 1000 MG: 1 INJECTION, POWDER, FOR SOLUTION INTRAMUSCULAR; INTRAVENOUS at 20:22

## 2023-07-18 RX ADMIN — SODIUM CHLORIDE 500 ML: 9 INJECTION, SOLUTION INTRAVENOUS at 20:27

## 2023-07-18 RX ADMIN — ACETAMINOPHEN 1000 MG: 500 TABLET, FILM COATED ORAL at 20:22

## 2023-07-18 SDOH — ECONOMIC STABILITY: INCOME INSECURITY: HOW HARD IS IT FOR YOU TO PAY FOR THE VERY BASICS LIKE FOOD, HOUSING, MEDICAL CARE, AND HEATING?: PATIENT DECLINED

## 2023-07-18 SDOH — ECONOMIC STABILITY: FOOD INSECURITY: WITHIN THE PAST 12 MONTHS, YOU WORRIED THAT YOUR FOOD WOULD RUN OUT BEFORE YOU GOT MONEY TO BUY MORE.: PATIENT DECLINED

## 2023-07-18 SDOH — ECONOMIC STABILITY: FOOD INSECURITY: WITHIN THE PAST 12 MONTHS, THE FOOD YOU BOUGHT JUST DIDN'T LAST AND YOU DIDN'T HAVE MONEY TO GET MORE.: PATIENT DECLINED

## 2023-07-18 ASSESSMENT — LIFESTYLE VARIABLES
HOW MANY STANDARD DRINKS CONTAINING ALCOHOL DO YOU HAVE ON A TYPICAL DAY: PATIENT DOES NOT DRINK
HOW OFTEN DO YOU HAVE A DRINK CONTAINING ALCOHOL: NEVER

## 2023-07-18 ASSESSMENT — PATIENT HEALTH QUESTIONNAIRE - PHQ9
2. FEELING DOWN, DEPRESSED OR HOPELESS: 0
SUM OF ALL RESPONSES TO PHQ9 QUESTIONS 1 & 2: 0
SUM OF ALL RESPONSES TO PHQ QUESTIONS 1-9: 0
1. LITTLE INTEREST OR PLEASURE IN DOING THINGS: 0

## 2023-07-18 NOTE — PATIENT INSTRUCTIONS
SURVEY:     You may be receiving a survey from "[employer] regarding your visit today. Please complete the survey to enable us to provide the highest quality of care to you and your family. If you cannot score us a very good on any question, please call the office to discuss how we could have made your experience a very good one.      Thank you,    Walter Snider, APRN-CNP  Ramsey Ngo, APRN-CNP  Edmund Mathews, GELY  [employer], CMA  Grisel Krishnamurthy, CMA  Rika, CMA  Haily, PCA  Vee, PM

## 2023-07-19 ENCOUNTER — APPOINTMENT (OUTPATIENT)
Dept: GENERAL RADIOLOGY | Age: 88
End: 2023-07-19
Payer: MEDICARE

## 2023-07-19 LAB
ALBUMIN SERPL-MCNC: 3 G/DL (ref 3.5–5.2)
ALBUMIN/GLOB SERPL: 0.9 {RATIO} (ref 1–2.5)
ALP SERPL-CCNC: 191 U/L (ref 40–129)
ALT SERPL-CCNC: 67 U/L (ref 5–41)
ANION GAP SERPL CALCULATED.3IONS-SCNC: 9 MMOL/L (ref 9–17)
AST SERPL-CCNC: 61 U/L
B PARAP IS1001 DNA NPH QL NAA+NON-PROBE: NOT DETECTED
B PERT DNA SPEC QL NAA+PROBE: NOT DETECTED
BASOPHILS # BLD: 0.16 K/UL (ref 0–0.2)
BASOPHILS NFR BLD: 1 % (ref 0–2)
BILIRUB SERPL-MCNC: 0.3 MG/DL (ref 0.3–1.2)
BUN SERPL-MCNC: 18 MG/DL (ref 8–23)
BUN/CREAT SERPL: 20 (ref 9–20)
C PNEUM DNA NPH QL NAA+NON-PROBE: NOT DETECTED
CALCIUM SERPL-MCNC: 8.4 MG/DL (ref 8.6–10.4)
CHLORIDE SERPL-SCNC: 101 MMOL/L (ref 98–107)
CO2 SERPL-SCNC: 23 MMOL/L (ref 20–31)
CREAT SERPL-MCNC: 0.9 MG/DL (ref 0.7–1.2)
EOSINOPHIL # BLD: 0 K/UL (ref 0–0.44)
EOSINOPHILS RELATIVE PERCENT: 0 % (ref 1–4)
ERYTHROCYTE [DISTWIDTH] IN BLOOD BY AUTOMATED COUNT: 15.1 % (ref 11.8–14.4)
FLUAV RNA NPH QL NAA+NON-PROBE: NOT DETECTED
FLUBV RNA NPH QL NAA+NON-PROBE: NOT DETECTED
GFR SERPL CREATININE-BSD FRML MDRD: >60 ML/MIN/1.73M2
GLUCOSE SERPL-MCNC: 129 MG/DL (ref 70–99)
HADV DNA NPH QL NAA+NON-PROBE: NOT DETECTED
HCOV 229E RNA NPH QL NAA+NON-PROBE: NOT DETECTED
HCOV HKU1 RNA NPH QL NAA+NON-PROBE: NOT DETECTED
HCOV NL63 RNA NPH QL NAA+NON-PROBE: NOT DETECTED
HCOV OC43 RNA NPH QL NAA+NON-PROBE: NOT DETECTED
HCT VFR BLD AUTO: 28.1 % (ref 40.7–50.3)
HGB BLD-MCNC: 8.8 G/DL (ref 13–17)
HMPV RNA NPH QL NAA+NON-PROBE: NOT DETECTED
HPIV1 RNA NPH QL NAA+NON-PROBE: NOT DETECTED
HPIV2 RNA NPH QL NAA+NON-PROBE: NOT DETECTED
HPIV3 RNA NPH QL NAA+NON-PROBE: DETECTED
HPIV4 RNA NPH QL NAA+NON-PROBE: NOT DETECTED
IMM GRANULOCYTES # BLD AUTO: 0 K/UL (ref 0–0.3)
IMM GRANULOCYTES NFR BLD: 0 %
LYMPHOCYTES # BLD: 7 % (ref 24–43)
LYMPHOCYTES NFR BLD: 1.13 K/UL (ref 1.1–3.7)
M PNEUMO DNA NPH QL NAA+NON-PROBE: NOT DETECTED
MCH RBC QN AUTO: 28.9 PG (ref 25.2–33.5)
MCHC RBC AUTO-ENTMCNC: 31.3 G/DL (ref 28.4–34.8)
MCV RBC AUTO: 92.4 FL (ref 82.6–102.9)
MONOCYTES NFR BLD: 1.78 K/UL (ref 0.1–1.2)
MONOCYTES NFR BLD: 11 % (ref 3–12)
MORPHOLOGY: NORMAL
NEUTROPHILS NFR BLD: 81 % (ref 36–65)
NEUTS SEG NFR BLD: 13.13 K/UL (ref 1.5–8.1)
NRBC BLD-RTO: 0 PER 100 WBC
PLATELET # BLD AUTO: 279 K/UL (ref 138–453)
PMV BLD AUTO: 10.2 FL (ref 8.1–13.5)
POTASSIUM SERPL-SCNC: 4.3 MMOL/L (ref 3.7–5.3)
PROT SERPL-MCNC: 6.3 G/DL (ref 6.4–8.3)
RBC # BLD AUTO: 3.04 M/UL (ref 4.21–5.77)
RSV RNA NPH QL NAA+NON-PROBE: NOT DETECTED
RV+EV RNA NPH QL NAA+NON-PROBE: NOT DETECTED
SARS-COV-2 RNA NPH QL NAA+NON-PROBE: NOT DETECTED
SODIUM SERPL-SCNC: 133 MMOL/L (ref 135–144)
SPECIMEN DESCRIPTION: ABNORMAL
WBC OTHER # BLD: 16.2 K/UL (ref 3.5–11.3)

## 2023-07-19 PROCEDURE — 2580000003 HC RX 258: Performed by: INTERNAL MEDICINE

## 2023-07-19 PROCEDURE — 6360000002 HC RX W HCPCS: Performed by: INTERNAL MEDICINE

## 2023-07-19 PROCEDURE — 1200000000 HC SEMI PRIVATE

## 2023-07-19 PROCEDURE — 94761 N-INVAS EAR/PLS OXIMETRY MLT: CPT

## 2023-07-19 PROCEDURE — 2580000003 HC RX 258: Performed by: STUDENT IN AN ORGANIZED HEALTH CARE EDUCATION/TRAINING PROGRAM

## 2023-07-19 PROCEDURE — 6360000002 HC RX W HCPCS: Performed by: STUDENT IN AN ORGANIZED HEALTH CARE EDUCATION/TRAINING PROGRAM

## 2023-07-19 PROCEDURE — 2700000000 HC OXYGEN THERAPY PER DAY

## 2023-07-19 PROCEDURE — 6370000000 HC RX 637 (ALT 250 FOR IP): Performed by: INTERNAL MEDICINE

## 2023-07-19 PROCEDURE — 85027 COMPLETE CBC AUTOMATED: CPT

## 2023-07-19 PROCEDURE — 94669 MECHANICAL CHEST WALL OSCILL: CPT

## 2023-07-19 PROCEDURE — 94667 MNPJ CHEST WALL 1ST: CPT

## 2023-07-19 PROCEDURE — 94664 DEMO&/EVAL PT USE INHALER: CPT

## 2023-07-19 PROCEDURE — 36415 COLL VENOUS BLD VENIPUNCTURE: CPT

## 2023-07-19 PROCEDURE — 80053 COMPREHEN METABOLIC PANEL: CPT

## 2023-07-19 PROCEDURE — 71045 X-RAY EXAM CHEST 1 VIEW: CPT

## 2023-07-19 PROCEDURE — 94640 AIRWAY INHALATION TREATMENT: CPT

## 2023-07-19 PROCEDURE — 6370000000 HC RX 637 (ALT 250 FOR IP): Performed by: STUDENT IN AN ORGANIZED HEALTH CARE EDUCATION/TRAINING PROGRAM

## 2023-07-19 RX ORDER — IPRATROPIUM BROMIDE AND ALBUTEROL SULFATE 2.5; .5 MG/3ML; MG/3ML
1 SOLUTION RESPIRATORY (INHALATION)
Status: DISCONTINUED | OUTPATIENT
Start: 2023-07-19 | End: 2023-07-21 | Stop reason: HOSPADM

## 2023-07-19 RX ADMIN — MOMETASONE FUROATE AND FORMOTEROL FUMARATE DIHYDRATE 2 PUFF: 200; 5 AEROSOL RESPIRATORY (INHALATION) at 20:33

## 2023-07-19 RX ADMIN — IPRATROPIUM BROMIDE AND ALBUTEROL SULFATE 1 DOSE: .5; 3 SOLUTION RESPIRATORY (INHALATION) at 07:22

## 2023-07-19 RX ADMIN — ATORVASTATIN CALCIUM 40 MG: 40 TABLET, FILM COATED ORAL at 08:06

## 2023-07-19 RX ADMIN — SODIUM CHLORIDE, PRESERVATIVE FREE 10 ML: 5 INJECTION INTRAVENOUS at 08:13

## 2023-07-19 RX ADMIN — BUMETANIDE 1 MG: 1 TABLET ORAL at 21:05

## 2023-07-19 RX ADMIN — ENOXAPARIN SODIUM 30 MG: 100 INJECTION SUBCUTANEOUS at 21:05

## 2023-07-19 RX ADMIN — PRIMIDONE 250 MG: 250 TABLET ORAL at 15:56

## 2023-07-19 RX ADMIN — IPRATROPIUM BROMIDE AND ALBUTEROL SULFATE 1 DOSE: .5; 3 SOLUTION RESPIRATORY (INHALATION) at 14:00

## 2023-07-19 RX ADMIN — ENOXAPARIN SODIUM 30 MG: 100 INJECTION SUBCUTANEOUS at 08:06

## 2023-07-19 RX ADMIN — FERROUS SULFATE TAB 325 MG (65 MG ELEMENTAL FE) 325 MG: 325 (65 FE) TAB at 15:56

## 2023-07-19 RX ADMIN — MULTIPLE VITAMINS W/ MINERALS TAB 1 TABLET: TAB at 08:05

## 2023-07-19 RX ADMIN — OXYBUTYNIN CHLORIDE 10 MG: 5 TABLET ORAL at 08:06

## 2023-07-19 RX ADMIN — AZITHROMYCIN MONOHYDRATE 500 MG: 500 INJECTION, POWDER, LYOPHILIZED, FOR SOLUTION INTRAVENOUS at 21:37

## 2023-07-19 RX ADMIN — FLUTICASONE PROPIONATE 2 SPRAY: 50 SPRAY, METERED NASAL at 08:06

## 2023-07-19 RX ADMIN — CLOPIDOGREL BISULFATE 75 MG: 75 TABLET ORAL at 08:06

## 2023-07-19 RX ADMIN — METFORMIN HYDROCHLORIDE 1000 MG: 500 TABLET ORAL at 08:06

## 2023-07-19 RX ADMIN — MOMETASONE FUROATE AND FORMOTEROL FUMARATE DIHYDRATE 2 PUFF: 200; 5 AEROSOL RESPIRATORY (INHALATION) at 07:22

## 2023-07-19 RX ADMIN — PANTOPRAZOLE SODIUM 40 MG: 40 TABLET, DELAYED RELEASE ORAL at 08:06

## 2023-07-19 RX ADMIN — SPIRONOLACTONE 25 MG: 25 TABLET, FILM COATED ORAL at 08:06

## 2023-07-19 RX ADMIN — CEFTRIAXONE SODIUM 1000 MG: 1 INJECTION, POWDER, FOR SOLUTION INTRAMUSCULAR; INTRAVENOUS at 21:07

## 2023-07-19 RX ADMIN — LEVOTHYROXINE SODIUM 137 MCG: 25 TABLET ORAL at 08:05

## 2023-07-19 RX ADMIN — ASPIRIN 81 MG: 81 TABLET, COATED ORAL at 08:05

## 2023-07-19 RX ADMIN — IPRATROPIUM BROMIDE AND ALBUTEROL SULFATE 1 DOSE: .5; 3 SOLUTION RESPIRATORY (INHALATION) at 20:33

## 2023-07-19 RX ADMIN — TAMSULOSIN HYDROCHLORIDE 0.8 MG: 0.4 CAPSULE ORAL at 08:06

## 2023-07-19 RX ADMIN — FERROUS SULFATE TAB 325 MG (65 MG ELEMENTAL FE) 325 MG: 325 (65 FE) TAB at 08:05

## 2023-07-19 RX ADMIN — MONTELUKAST 10 MG: 10 TABLET, FILM COATED ORAL at 08:06

## 2023-07-19 RX ADMIN — MELOXICAM 7.5 MG: 7.5 TABLET ORAL at 08:06

## 2023-07-19 RX ADMIN — ANTACID TABLETS 500 MG: 500 TABLET, CHEWABLE ORAL at 21:49

## 2023-07-19 RX ADMIN — METFORMIN HYDROCHLORIDE 1000 MG: 500 TABLET ORAL at 21:05

## 2023-07-19 RX ADMIN — SODIUM CHLORIDE, PRESERVATIVE FREE 10 ML: 5 INJECTION INTRAVENOUS at 21:05

## 2023-07-19 RX ADMIN — PROPRANOLOL HYDROCHLORIDE 60 MG: 60 CAPSULE, EXTENDED RELEASE ORAL at 08:05

## 2023-07-19 RX ADMIN — PRIMIDONE 250 MG: 250 TABLET ORAL at 08:05

## 2023-07-19 RX ADMIN — BUMETANIDE 1 MG: 1 TABLET ORAL at 08:05

## 2023-07-19 RX ADMIN — OXYBUTYNIN CHLORIDE 10 MG: 5 TABLET ORAL at 21:05

## 2023-07-19 ASSESSMENT — PAIN SCALES - GENERAL
PAINLEVEL_OUTOF10: 0
PAINLEVEL_OUTOF10: 0

## 2023-07-19 NOTE — PROGRESS NOTES
Patient set the bed alarm off getting up to the bathroom. Extension tubing is on and patient ambulated with a fairly steady gate once moving. Writer did have to ask patient multiple times to stop trying to walk always before writer could get his walker and patient continued to try to shove by writer toward the doorway to the room. Once more awake patient was able to follow commands better. He is back in bed and the bed alarm is on. Call light and bedside table remain within reach. Will continue to monitor and assess.

## 2023-07-19 NOTE — PROGRESS NOTES
No rashes. No skin lesions.    -----------------------------------------------------------------    Diagnostic Data:      Complete Blood Count:   Recent Labs     07/18/23 1943 07/19/23  0509   WBC 14.4* 16.2*   RBC 3.39* 3.04*   HGB 10.0* 8.8*   HCT 31.6* 28.1*   MCV 93.2 92.4   MCH 29.5 28.9   MCHC 31.6 31.3   RDW 15.2* 15.1*    279   MPV 9.9 10.2        Last 3 Blood Glucose:   Recent Labs     07/18/23 1943 07/19/23  0509   GLUCOSE 143* 129*        Comprehensive Metabolic Profile:   Recent Labs     07/18/23 1943 07/19/23  0509   * 133*   K 4.6 4.3   CL 98 101   CO2 22 23   BUN 18 18   CREATININE 0.9 0.9   GLUCOSE 143* 129*   CALCIUM 9.1 8.4*   PROT 7.1 6.3*   LABALBU 3.7 3.0*   BILITOT 0.3 0.3   ALKPHOS 222* 191*   AST 60* 61*   ALT 69* 67*        Urinalysis:   Lab Results   Component Value Date/Time    NITRU NEGATIVE 07/18/2023 08:55 PM    COLORU Yellow 07/18/2023 08:55 PM    PHUR 7.0 07/18/2023 08:55 PM    WBCUA 0 TO 2 07/18/2023 08:55 PM    RBCUA 0 TO 2 07/18/2023 08:55 PM    MUCUS TRACE 01/31/2023 07:45 PM    TRICHOMONAS NOT REPORTED 12/29/2021 11:30 PM    YEAST NOT REPORTED 12/29/2021 11:30 PM    BACTERIA TRACE 07/18/2023 08:55 PM    CLARITYU clear 05/10/2018 02:45 PM    SPECGRAV 1.015 07/18/2023 08:55 PM    LEUKOCYTESUR NEGATIVE 07/18/2023 08:55 PM    UROBILINOGEN Normal 07/18/2023 08:55 PM    BILIRUBINUR NEGATIVE 07/18/2023 08:55 PM    BILIRUBINUR neg 05/10/2018 02:45 PM    BLOODU neg 05/10/2018 02:45 PM    GLUCOSEU NEGATIVE 07/18/2023 08:55 PM    KETUA NEGATIVE 07/18/2023 08:55 PM    AMORPHOUS NOT REPORTED 12/29/2021 11:30 PM       HgBA1c:    Lab Results   Component Value Date/Time    LABA1C 7.0 06/16/2023 06:29 AM       Lactic Acid:   Lab Results   Component Value Date/Time    LACTA 2.3 07/18/2023 07:43 PM    LACTA 1.5 06/23/2023 03:15 AM    LACTA 2.1 06/15/2023 09:16 AM        Troponin: No results for input(s): TROPONINI in the last 72 hours.     CRP:  No results for input(s): CRP in the last 72 hours. Radiology/Imaging:  XR CHEST (2 VW)   Final Result   No acute abnormality identified. XR CHEST PORTABLE    (Results Pending)         ASSESSMENT / PLAN:    MEDICAL DECISION MAKING:    Primary Problem(s): Community acquired bacterial pneumonia  Differential diagnoses: bronchitis, CHF  Condition is an undiagnosed new problem with uncertain prognosis  Condition is stable  Treatment plan: Labs ordered: BMP, CBC, oxygen to keep SpO2 > 90%, acapella  Imaging: CXR ordered  Medications:   IV Rocephin, Zithromax  Duoneb treatments   Medication Monitoring / High Risk Medications: none      Hypertension    Condition is stable  Treatment plan: Labs ordered: BMP, CBC  Imaging: no further imaging studies ordered today  Medications:   Continue Propranolol, Spironolactone      Chronic Combined Systolic and Diastolic CHF, NYHA class 2  Condition is stable  Treatment plan: Labs ordered: BMP, CBC, daily weights,   Imaging: CXR ordered  Medications:   Continue Spironolactone, Bumex  Medication Management/High Risk Medications: none    Moderate Persistent Asthma without complication   Condition is stable  Treatment Plan: CBC, BMP  Imaging: CXR ordered  Medications:   Duoneb, Spiriva, Albuterol PRN      Nutrition status: Well developed, well nourished with no malnutrition  Dietician consult initiated    Hospital Prophylaxis:   DVT: Lovenox   Stress Ulcer: PPI     Disposition:  Shared decision making:  All test results, treatment options and disposition options were discussed with the patient today  Social determinants of health that may impact management: none  Code status: Full Code   Disposition: Discharge plan is home    [address] documentation:  [x] I have confirmed that the patient's Advance Care Plan is present, Code Status is documented, or surrogate decision maker is listed in the patient's medical record  [If \"yes\", STOP HERE]     [] The patient's [address] [place] [address] is NOT present

## 2023-07-19 NOTE — PROGRESS NOTES
Pharmacist Review and Automatic Dose Adjustment of Prophylactic Enoxaparin    Reviewed reason(s) for admission/hospital problem list    The reviewing pharmacist has made an adjustment to the ordered enoxaparin dose or converted to UFH per the approved [facility] protocol and table as identified below. Carlotta Lopez is a 82 y.o. male. Recent Labs     07/18/23 1943   CREATININE 0.9       Estimated Creatinine Clearance: 75 mL/min (based on SCr of 0.9 mg/dL). Recent Labs     07/18/23 1943   HGB 10.0*   HCT 31.6*        No results for input(s): INR in the last 72 hours.     Height:   Ht Readings from Last 1 Encounters:   06/23/23 5' 11\" (1.803 m)     Weight:  Wt Readings from Last 1 Encounters:   07/18/23 239 lb (108.4 kg)               Plan: Based upon the patient's weight and renal function    Ordered: Enoxaparin 40mg SUBQ Daily    Changed/converted to    New Order: Enoxaparin 30mg SUBQ BID      Thank you,  Landon Garcia, [facility]  7/18/2023, 10:18 PM

## 2023-07-19 NOTE — CARE COORDINATION
Readmission Assessment  Number of Days since last admission?: 8-30 days (DC from [facility] 6/28/23 to SNF, home from SNF few days prior to admission)  Previous Disposition: SNF  Who is being Interviewed: Patient  What was the patient's/caregiver's perception as to why they think they needed to return back to the hospital?: Other (Comment) (had sudden onset of fever, chills, SOB)  Did you visit your Primary Care Physician after you left the hospital, before you returned this time?: Yes  Did you see a specialist, such as Cardiac, Pulmonary, Orthopedic Physician, etc. after you left the hospital?: No  Who advised the patient to return to the hospital?: Self-referral  Does the patient report anything that got in the way of taking their medications?: No  In our efforts to provide the best possible care to you and others like you, can you think of anything that we could have done to help you after you left the hospital the first time, so that you might not have needed to return so soon?: Other (Comment) (No comment offered)

## 2023-07-19 NOTE — PROGRESS NOTES
Physician Progress Note      Alex Young  CSN #:                  [id number]  :                       1935  ADMIT DATE:       2023 7:25 PM  [facility] DATE:  RESPONDING  PROVIDER #:        Robin Velazquez MD          QUERY TEXT:    Pt admitted with Community acquired bacterial pneumonia. Pt noted to have   temp- 102.6, WBC 14.4, Lactic Acid 2.3, HR 98, RR 22. If possible, please   document in the progress notes and discharge summary if you are evaluating and   /or treating any of the following: The medical record reflects the following:  Risk Factors: Community acquired bacterial pneumonia  Clinical Indicators: on arrival WBC 16.2, lactic 2.3, temp 102.6, HR 90, RR19  Treatment: Imaging, labs, IV ceftriaxone, azithromycin  Options provided:  -- Sepsis due to Community acquired bacterial pneumonia, present on admission  -- Community acquired bacterial pneumonia without Sepsis  -- Other - I will add my own diagnosis  -- Disagree - Not applicable / Not valid  -- Disagree - Clinically unable to determine / Unknown  -- Refer to Clinical Documentation Reviewer    PROVIDER RESPONSE TEXT:    This patient has sepsis due to Community acquired bacterial pneumonia, which   was present on admission.     Query created by: Alicia Garza on 2023 9:19 AM      Electronically signed by:  Robin Velazquez MD 2023 1:10 PM

## 2023-07-19 NOTE — PROGRESS NOTES
Patient was admitted to the floor at 2200 and is A&O x4 on 2L of oxygen, which he is not on at home. Vitals, assessment, and navigator are complete. Lung sounds are diminished throughout and oxygen was 93% on 2L after getting from the ER be to the med surge bed. Writer walked patient through the doctor's orders and the medications that would be administered tonight. Patient and his wife were educated on the PCR test that would test for respiratory infections. Medications given in ER were also reviewed. Dr. Aida eMdina came in at [phone] to speak with the patient and informed him of their current plan of care. Personal belongings are with patient. Patient was oriented to the room and call light and is aware that the bed alarm is set. Call light and bedside table are within reach. Writer explained how the patient's day would possibly go tomorrow and that a care team would be rounding some time in the morning.

## 2023-07-19 NOTE — PROGRESS NOTES
Reassessment and vitals obtained at this as charted. Patient denies pain at this time. SpO2 99% on 2 L via nasal cannula, oxygen decreased to 1 L. SpO2 remains 98% on 1 L. Patient remains alert and oriented x4. Lungs clear to diminished throughout. Congested cough continues. +2 pitting edema remains present to BLE. Assessment otherwise as charted. Patient is resting in the chair with chair alarm on and call light in reach, denies other needs at this time. Care ongoing.

## 2023-07-19 NOTE — PROGRESS NOTES
Shift assessment and vitals obtained at this time as charted. Vitals WNL, patient denies pain at this time. SpO2 94% on 2 L via nasal cannula. Patient is alert and oriented x4. Expiratory wheezes noted to bilateral upper lung lobes, eng expiratory wheezes noted to middle lung lobes, lower lung lobes diminished. Congested cough noted. Assessment otherwise as charted. Morning medications also given at this time. Patient was also noted to be incontinent of a large amount of urine at this time, patient was assisted to the bathroom and given a bath, as well as a brief and gown change. Patient was then assisted to sit up in the chair. Chair alarm on and call light in reach, patient denies other needs at this time. Care ongoing.

## 2023-07-19 NOTE — PROGRESS NOTES
Physician Progress Note      PATIENTTobias Marin  CSN #:                  [id number]  :                       1935  ADMIT DATE:       2023 7:25 PM  [address] [facility] DATE:  RESPONDING  PROVIDER #:        Patric Abreu MD          QUERY TEXT:    Pt admitted with pneumonia. Pt noted to have hypoxia. If possible, please   document in the progress notes and discharge summary if you are evaluating   and/or treating any of the following: The medical record reflects the following:  Risk Factors: pneumonia  Clinical Indicators: presented with dyspnea, placed on 2 LPM with SpO2 91% and   dyspnea improved per documentation  Treatment: Supplemental oxygen, monitoring, labs, imaging  Options provided:  -- Acute respiratory failure with hypoxia  -- Hypoxia only, no respiratory failure  -- Other - I will add my own diagnosis  -- Disagree - Not applicable / Not valid  -- Disagree - Clinically unable to determine / Unknown  -- Refer to Clinical Documentation Reviewer    PROVIDER RESPONSE TEXT:    This patient is in acute respiratory failure with hypoxia.     Query created by: Elizabeth Guillen on 2023 9:23 AM      Electronically signed by:  Patric Abreu MD 2023 10:20 AM

## 2023-07-19 NOTE — CARE COORDINATION
Case Management Assessment  Initial Evaluation    Date/Time of Evaluation: 7/19/2023 10:04 AM  Assessment Completed by: CORINA Gross    If patient is discharged prior to next notation, then this note serves as note for discharge by case management. Patient Name: Munira Castillo                   YOB: 1935  Diagnosis: Hypoxia [R09.02]  Community acquired bacterial pneumonia [J15.9]  Sepsis, due to unspecified organism, unspecified whether acute organ dysfunction present [facility]) [A41.9]                   Date / Time: 7/18/2023  7:25 PM    Patient Admission Status: Inpatient   Readmission Risk (Low < 19, Mod (19-27), High > 27): Readmission Risk Score: 27.8    Current PCP: GENA Sanchez CNP  PCP verified by CM? Yes    Chart Reviewed: Yes      History Provided by: Patient  Patient Orientation: Alert and Oriented, Person, Place, Situation, Self    Patient Cognition: Alert    Hospitalization in the last 30 days (Readmission):  Yes    If yes, Readmission Assessment in  Navigator will be completed. Advance Directives:      Code Status: Full Code   Patient's Primary Decision Maker is: Legal Next of Kin    Primary Decision Maker (Active): HalleykellNicole han - Spouse - [phone]    Discharge Planning:    Patient lives with: Spouse/Significant Other Type of Home: House  Primary Care Giver: Spouse  Patient Support Systems include: Spouse/Significant Other, Home Care Staff   Current Financial resources: Medicare, [place]  Current community resources: ECF/Home Care  Current services prior to admission: Durable Medical Equipment, Oxygen Therapy, Home Care            Current DME: Oxygen Therapy (Comment), Rushie Hutching            Type of Home Care services:  PT, OT    ADLS  Prior functional level: Assistance with the following:  Current functional level: Assistance with the following:    PT AM-PAC:   /24  OT AM-PAC:   /24    Family can provide assistance at DC:  Yes  Would you like Case Management to discuss the discharge plan with any other family members/significant others, and if so, who? Yes  Plans to Return to Present Housing: Yes  Other Identified Issues/Barriers to RETURNING to current housing: none  Potential Assistance needed at discharge: [facility], Home Care            Potential DME: Hanna Canseco (Comment), Walker  Patient expects to discharge to: [address] [facility] for transportation at discharge: Family    Financial    Payor: Gustavo Sullivan / Plan: Jesse Lombardi PPO / Product Type: Medicare /     Does insurance require precert for SNF: Yes    Potential assistance Purchasing Medications: No  Meds-to-Beds request:  No      [facility] [address] [facility] [phone]  [place] [address]  [place] [address]  Phone: [phone] Fax: [phone]      Notes:    Factors facilitating achievement of predicted outcomes: Family support, Caregiver support, Cooperative, Pleasant, and Has needed Durable Medical Equipment at home    Barriers to discharge: Lower extremity weakness    Additional Case Management Notes: Discussed discharge plans with the patient. Patient is  and lives at home with his wife. He uses a cane ,walker , and oxygen at home. The patient's wife does the household chores. He requires assistance with his ADL's. They both manages his medication. The wife provides the transportation. Patient served in [place]. He has [facility] home care coming into his home. The discharge plan is home with [facility] to resume their services on discharge. The Plan for Transition of Care is related to the following treatment goals of Hypoxia [R09.02]  Community acquired bacterial pneumonia [J15.9]  Sepsis, due to unspecified organism, unspecified whether acute organ dysfunction present ([address]) [A41.9]    Transportation/Food Security/Housekeeping Addressed: No issues identified or concerns.     The

## 2023-07-19 NOTE — PROGRESS NOTES
Comprehensive Nutrition Assessment    Type and Reason for Visit:  Initial    Nutrition Recommendations/Plan:   Encourage oral intakes  Provided Low-Sodium Nutrition Therapy handout, continue diet at home     Malnutrition Assessment:  Malnutrition Status:  No malnutrition (07/19/23 1113)    Context:  Acute Illness     Findings of the 6 clinical characteristics of malnutrition:  Energy Intake:  No significant decrease in energy intake  Weight Loss:  No significant weight loss     Body Fat Loss:  No significant body fat loss     Muscle Mass Loss:  No significant muscle mass loss    Fluid Accumulation:  Moderate to Severe (+2 BLE LLE) Extremities   Strength:  Not Performed    Nutrition Assessment:    Altered nutrition related lab values r/t food and nutrition related knowledge deficit aeb lab values, localized or generalized fluid accumulation. Pt states good appetite and good PO intake at home. Pt taking metformin 2x/day, has been managing diabetes through diet at home. A1c 7.0 in June. Weight has fluctuated due to fluid retention, +2 edema in RLE and LLE. Sodium labs also low r/t fluid retention. Pt states they do not salt foods at home. Discussed sodium and it's role in fluid retention and his weight changes. Encouraged pt to continue to not salt foods, avoid processed foods with excess salt such as chips, and provided Low-Sodium Nutrition Therapy handout for other ways to limit sodium intake. Iron labs low, pt taking iron supplement. Calcium corrected for low albumin is wnl. Nutrition Related Findings:    +2 pitting edema in LLE and RLE Wound Type: None       Current Nutrition Intake & Therapies:    Average Meal Intake: 51-75%  Average Supplements Intake: None Ordered  ADULT DIET; Regular;  No Added Salt (3-4 gm); 2000 ml    Anthropometric Measures:  Height: 5' 11\" (180.3 cm)  Ideal Body Weight (IBW): 172 lbs (78 kg)    Admission Body Weight: 239 lb (108.4 kg)  Current Body Weight: 237 lb (107.5 kg), 137.8 % IBW. Weight Source: Bed Scale  Current BMI (kg/m2): 33.1                          BMI Categories: Obese Class 1 (BMI 30.0-34. 9)    Estimated Daily Nutrient Needs:  Energy Requirements Based On: Kcal/kg  Weight Used for Energy Requirements: Current  Energy (kcal/day): 4492-2279 (18-21)  Weight Used for Protein Requirements: Ideal  Protein (g/day): 102-118 (1.3-1.5)  Method Used for Fluid Requirements: 1 ml/kcal  Fluid (ml/day): 2300    Nutrition Diagnosis:   Altered nutrition-related lab values related to food and nutrition related knowledge deficit as evidenced by lab values, localized or generalized fluid accumulation    Lab Results   Component Value Date     (L) 07/19/2023    K 4.3 07/19/2023     07/19/2023    CO2 23 07/19/2023    BUN 18 07/19/2023    CREATININE 0.9 07/19/2023    GLUCOSE 129 (H) 07/19/2023    CALCIUM 8.4 (L) 07/19/2023    PROT 6.3 (L) 07/19/2023    LABALBU 3.0 (L) 07/19/2023    BILITOT 0.3 07/19/2023    ALKPHOS 191 (H) 07/19/2023    AST 61 (H) 07/19/2023    ALT 67 (H) 07/19/2023    LABGLOM >60 07/19/2023    GFRAA >60 03/14/2022     Hemoglobin A1C   Date Value Ref Range Status   06/16/2023 7.0 (H) 4.0 - 6.0 % Final     No results found for: VITD25        Nutrition Interventions:   Food and/or Nutrient Delivery: Continue Current Diet  Nutrition Education/Counseling: Education initiated  Coordination of Nutrition Care: Continue to monitor while inpatient  Plan of Care discussed with: patient    Goals:     Goals: Meet at least 75% of estimated needs       Nutrition Monitoring and Evaluation:   Behavioral-Environmental Outcomes: None Identified  Food/Nutrient Intake Outcomes: Food and Nutrient Intake, Vitamin/Mineral Intake  Physical Signs/Symptoms Outcomes: Biochemical Data, Fluid Status or Edema, Weight    Discharge Planning:    Continue current diet     [address]: [id number]

## 2023-07-19 NOTE — ED NOTES
Writer attempted to get second blood culture without success. Another RN to attempt. Pt placed on supplemental oxygen at this time as well.  Pt and wife aware of need for urine sample     Chris Springer RN  07/18/23 2010       Chris Springer RN  07/18/23 2011

## 2023-07-19 NOTE — ACP (ADVANCE CARE PLANNING)
Advance Care Planning     Advance Care Planning Activator (Inpatient)  Conversation Note      Date of ACP Conversation: 7/19/2023     Conversation Conducted with: Patient with Decision Making Capacity    ACP Activator: Gregor Spatz, RN        Health Care Decision Maker:     Current Designated Health Care Decision Maker:     Primary Decision Maker (Active): Nicole Vargas - Spouse - [phone]  Care Preferences    Ventilation: \"If you were in your present state of health and suddenly became very ill and were unable to breathe on your own, what would your preference be about the use of a ventilator (breathing machine) if it were available to you? \"      Would the patient desire the use of ventilator (breathing machine)?: no    \"If your health worsens and it becomes clear that your chance of recovery is unlikely, what would your preference be about the use of a ventilator (breathing machine) if it were available to you? \"     Would the patient desire the use of ventilator (breathing machine)?: No      Resuscitation  \"CPR works best to restart the heart when there is a sudden event, like a heart attack, in someone who is otherwise healthy. Unfortunately, CPR does not typically restart the heart for people who have serious health conditions or who are very sick. \"    \"In the event your heart stopped as a result of an underlying serious health condition, would you want attempts to be made to restart your heart (answer \"yes\" for attempt to resuscitate) or would you prefer a natural death (answer \"no\" for do not attempt to resuscitate)? \" no       [x] Yes   [] No   Educated Patient / Sadia Rocha regarding differences between Advance Directives and portable DNR orders.     Length of ACP Conversation in minutes:  15    Conversation Outcomes:  ACP discussion completed and Portable Do Not Resuscitate prepared for Provider review and signature    Follow-up plan:    [] Schedule follow-up conversation to continue

## 2023-07-20 PROBLEM — B34.8 PARAINFLUENZA: Status: ACTIVE | Noted: 2023-07-20

## 2023-07-20 LAB
ALBUMIN SERPL-MCNC: 2.8 G/DL (ref 3.5–5.2)
ALBUMIN/GLOB SERPL: 0.8 {RATIO} (ref 1–2.5)
ALP SERPL-CCNC: 165 U/L (ref 40–129)
ALT SERPL-CCNC: 47 U/L (ref 5–41)
ANION GAP SERPL CALCULATED.3IONS-SCNC: 9 MMOL/L (ref 9–17)
AST SERPL-CCNC: 37 U/L
BASOPHILS # BLD: 0 K/UL (ref 0–0.2)
BASOPHILS NFR BLD: 0 % (ref 0–2)
BILIRUB SERPL-MCNC: 0.2 MG/DL (ref 0.3–1.2)
BUN SERPL-MCNC: 19 MG/DL (ref 8–23)
BUN/CREAT SERPL: 21 (ref 9–20)
CALCIUM SERPL-MCNC: 8.5 MG/DL (ref 8.6–10.4)
CHLORIDE SERPL-SCNC: 99 MMOL/L (ref 98–107)
CO2 SERPL-SCNC: 22 MMOL/L (ref 20–31)
CREAT SERPL-MCNC: 0.9 MG/DL (ref 0.7–1.2)
EOSINOPHIL # BLD: 0.29 K/UL (ref 0–0.44)
EOSINOPHILS RELATIVE PERCENT: 2 % (ref 1–4)
ERYTHROCYTE [DISTWIDTH] IN BLOOD BY AUTOMATED COUNT: 15.1 % (ref 11.8–14.4)
GFR SERPL CREATININE-BSD FRML MDRD: >60 ML/MIN/1.73M2
GLUCOSE SERPL-MCNC: 118 MG/DL (ref 70–99)
HCT VFR BLD AUTO: 27 % (ref 40.7–50.3)
HGB BLD-MCNC: 8.5 G/DL (ref 13–17)
IMM GRANULOCYTES # BLD AUTO: 0 K/UL (ref 0–0.3)
IMM GRANULOCYTES NFR BLD: 0 %
LYMPHOCYTES NFR BLD: 1.14 K/UL (ref 1.1–3.7)
LYMPHOCYTES RELATIVE PERCENT: 8 % (ref 24–43)
MCH RBC QN AUTO: 29 PG (ref 25.2–33.5)
MCHC RBC AUTO-ENTMCNC: 31.5 G/DL (ref 28.4–34.8)
MCV RBC AUTO: 92.2 FL (ref 82.6–102.9)
MICROORGANISM SPEC CULT: ABNORMAL
MONOCYTES NFR BLD: 1.43 K/UL (ref 0.1–1.2)
MONOCYTES NFR BLD: 10 % (ref 3–12)
MORPHOLOGY: NORMAL
NEUTROPHILS NFR BLD: 80 % (ref 36–65)
NEUTS SEG NFR BLD: 11.44 K/UL (ref 1.5–8.1)
NRBC BLD-RTO: 0 PER 100 WBC
PLATELET # BLD AUTO: 269 K/UL (ref 138–453)
PMV BLD AUTO: 10.3 FL (ref 8.1–13.5)
POTASSIUM SERPL-SCNC: 4 MMOL/L (ref 3.7–5.3)
PROCALCITONIN SERPL-MCNC: 0.09 NG/ML
PROT SERPL-MCNC: 6.1 G/DL (ref 6.4–8.3)
RBC # BLD AUTO: 2.93 M/UL (ref 4.21–5.77)
SODIUM SERPL-SCNC: 130 MMOL/L (ref 135–144)
SPECIMEN DESCRIPTION: ABNORMAL
WBC OTHER # BLD: 14.3 K/UL (ref 3.5–11.3)

## 2023-07-20 PROCEDURE — 84145 PROCALCITONIN (PCT): CPT

## 2023-07-20 PROCEDURE — 36415 COLL VENOUS BLD VENIPUNCTURE: CPT

## 2023-07-20 PROCEDURE — 6370000000 HC RX 637 (ALT 250 FOR IP): Performed by: STUDENT IN AN ORGANIZED HEALTH CARE EDUCATION/TRAINING PROGRAM

## 2023-07-20 PROCEDURE — 94664 DEMO&/EVAL PT USE INHALER: CPT

## 2023-07-20 PROCEDURE — 1200000000 HC SEMI PRIVATE

## 2023-07-20 PROCEDURE — 80053 COMPREHEN METABOLIC PANEL: CPT

## 2023-07-20 PROCEDURE — 6360000002 HC RX W HCPCS: Performed by: STUDENT IN AN ORGANIZED HEALTH CARE EDUCATION/TRAINING PROGRAM

## 2023-07-20 PROCEDURE — 94761 N-INVAS EAR/PLS OXIMETRY MLT: CPT

## 2023-07-20 PROCEDURE — 6360000002 HC RX W HCPCS: Performed by: INTERNAL MEDICINE

## 2023-07-20 PROCEDURE — 85027 COMPLETE CBC AUTOMATED: CPT

## 2023-07-20 PROCEDURE — 94669 MECHANICAL CHEST WALL OSCILL: CPT

## 2023-07-20 PROCEDURE — 6370000000 HC RX 637 (ALT 250 FOR IP): Performed by: INTERNAL MEDICINE

## 2023-07-20 PROCEDURE — 2580000003 HC RX 258: Performed by: INTERNAL MEDICINE

## 2023-07-20 PROCEDURE — 97166 OT EVAL MOD COMPLEX 45 MIN: CPT

## 2023-07-20 PROCEDURE — 94640 AIRWAY INHALATION TREATMENT: CPT

## 2023-07-20 PROCEDURE — 97162 PT EVAL MOD COMPLEX 30 MIN: CPT

## 2023-07-20 PROCEDURE — 2580000003 HC RX 258: Performed by: STUDENT IN AN ORGANIZED HEALTH CARE EDUCATION/TRAINING PROGRAM

## 2023-07-20 RX ADMIN — IPRATROPIUM BROMIDE AND ALBUTEROL SULFATE 1 DOSE: .5; 3 SOLUTION RESPIRATORY (INHALATION) at 20:25

## 2023-07-20 RX ADMIN — FERROUS SULFATE TAB 325 MG (65 MG ELEMENTAL FE) 325 MG: 325 (65 FE) TAB at 16:47

## 2023-07-20 RX ADMIN — MOMETASONE FUROATE AND FORMOTEROL FUMARATE DIHYDRATE 2 PUFF: 200; 5 AEROSOL RESPIRATORY (INHALATION) at 20:25

## 2023-07-20 RX ADMIN — AZITHROMYCIN MONOHYDRATE 500 MG: 500 INJECTION, POWDER, LYOPHILIZED, FOR SOLUTION INTRAVENOUS at 21:09

## 2023-07-20 RX ADMIN — PROPRANOLOL HYDROCHLORIDE 60 MG: 60 CAPSULE, EXTENDED RELEASE ORAL at 08:25

## 2023-07-20 RX ADMIN — BUMETANIDE 1 MG: 1 TABLET ORAL at 20:31

## 2023-07-20 RX ADMIN — IPRATROPIUM BROMIDE AND ALBUTEROL SULFATE 1 DOSE: .5; 3 SOLUTION RESPIRATORY (INHALATION) at 10:08

## 2023-07-20 RX ADMIN — PRIMIDONE 250 MG: 250 TABLET ORAL at 16:47

## 2023-07-20 RX ADMIN — MELOXICAM 7.5 MG: 7.5 TABLET ORAL at 08:23

## 2023-07-20 RX ADMIN — SPIRONOLACTONE 25 MG: 25 TABLET, FILM COATED ORAL at 08:25

## 2023-07-20 RX ADMIN — PANTOPRAZOLE SODIUM 40 MG: 40 TABLET, DELAYED RELEASE ORAL at 08:25

## 2023-07-20 RX ADMIN — ASPIRIN 81 MG: 81 TABLET, COATED ORAL at 08:24

## 2023-07-20 RX ADMIN — ATORVASTATIN CALCIUM 40 MG: 40 TABLET, FILM COATED ORAL at 08:25

## 2023-07-20 RX ADMIN — CLOPIDOGREL BISULFATE 75 MG: 75 TABLET ORAL at 08:24

## 2023-07-20 RX ADMIN — METFORMIN HYDROCHLORIDE 1000 MG: 500 TABLET ORAL at 08:25

## 2023-07-20 RX ADMIN — ENOXAPARIN SODIUM 30 MG: 100 INJECTION SUBCUTANEOUS at 08:25

## 2023-07-20 RX ADMIN — OXYBUTYNIN CHLORIDE 10 MG: 5 TABLET ORAL at 20:31

## 2023-07-20 RX ADMIN — IPRATROPIUM BROMIDE AND ALBUTEROL SULFATE 1 DOSE: .5; 3 SOLUTION RESPIRATORY (INHALATION) at 05:45

## 2023-07-20 RX ADMIN — METFORMIN HYDROCHLORIDE 1000 MG: 500 TABLET ORAL at 20:31

## 2023-07-20 RX ADMIN — BUMETANIDE 1 MG: 1 TABLET ORAL at 08:24

## 2023-07-20 RX ADMIN — IPRATROPIUM BROMIDE AND ALBUTEROL SULFATE 1 DOSE: .5; 3 SOLUTION RESPIRATORY (INHALATION) at 15:30

## 2023-07-20 RX ADMIN — SODIUM CHLORIDE, PRESERVATIVE FREE 10 ML: 5 INJECTION INTRAVENOUS at 08:26

## 2023-07-20 RX ADMIN — PRIMIDONE 250 MG: 250 TABLET ORAL at 08:23

## 2023-07-20 RX ADMIN — OXYBUTYNIN CHLORIDE 10 MG: 5 TABLET ORAL at 08:23

## 2023-07-20 RX ADMIN — TIOTROPIUM BROMIDE INHALATION SPRAY 2 PUFF: 3.12 SPRAY, METERED RESPIRATORY (INHALATION) at 10:24

## 2023-07-20 RX ADMIN — CEFTRIAXONE SODIUM 1000 MG: 1 INJECTION, POWDER, FOR SOLUTION INTRAMUSCULAR; INTRAVENOUS at 20:34

## 2023-07-20 RX ADMIN — LEVOTHYROXINE SODIUM 137 MCG: 25 TABLET ORAL at 08:23

## 2023-07-20 RX ADMIN — ENOXAPARIN SODIUM 30 MG: 100 INJECTION SUBCUTANEOUS at 20:31

## 2023-07-20 RX ADMIN — MULTIPLE VITAMINS W/ MINERALS TAB 1 TABLET: TAB at 08:24

## 2023-07-20 RX ADMIN — ACETAMINOPHEN 650 MG: 325 TABLET ORAL at 08:25

## 2023-07-20 RX ADMIN — FERROUS SULFATE TAB 325 MG (65 MG ELEMENTAL FE) 325 MG: 325 (65 FE) TAB at 08:28

## 2023-07-20 RX ADMIN — MONTELUKAST 10 MG: 10 TABLET, FILM COATED ORAL at 08:23

## 2023-07-20 RX ADMIN — MOMETASONE FUROATE AND FORMOTEROL FUMARATE DIHYDRATE 2 PUFF: 200; 5 AEROSOL RESPIRATORY (INHALATION) at 10:08

## 2023-07-20 RX ADMIN — FLUTICASONE PROPIONATE 2 SPRAY: 50 SPRAY, METERED NASAL at 08:28

## 2023-07-20 RX ADMIN — TAMSULOSIN HYDROCHLORIDE 0.8 MG: 0.4 CAPSULE ORAL at 08:25

## 2023-07-20 RX ADMIN — SODIUM CHLORIDE, PRESERVATIVE FREE 10 ML: 5 INJECTION INTRAVENOUS at 20:32

## 2023-07-20 ASSESSMENT — PAIN DESCRIPTION - DESCRIPTORS: DESCRIPTORS: ACHING

## 2023-07-20 ASSESSMENT — PAIN DESCRIPTION - ORIENTATION: ORIENTATION: LEFT

## 2023-07-20 ASSESSMENT — PAIN SCALES - GENERAL: PAINLEVEL_OUTOF10: 5

## 2023-07-20 ASSESSMENT — PAIN DESCRIPTION - LOCATION: LOCATION: HEAD

## 2023-07-20 NOTE — PROGRESS NOTES
Vitals and assessment done at this time. See flow sheet for more details. PT resting in bed at this time. Pt had to use the bathroom. Pt agreed to get washed up for the day, brush his teeth and sit up in the chair for breakfast. Pt A&O x4. Pt remaining on RA. Pt is able to bring some sputum up when coughing. Pt denied any further needs at this time. Call light within reach. Will continue to monitor.

## 2023-07-20 NOTE — PROGRESS NOTES
(PROVENTIL;VENTOLIN;PROAIR) 108 (90 Base) MCG/ACT inhaler 2 puff  2 puff Inhalation Q4H PRN Markel Signs, MD        aspirin EC tablet 81 mg  81 mg Oral Daily Markel Signs, MD   81 mg at 07/19/23 0805    atorvastatin (LIPITOR) tablet 40 mg  40 mg Oral Daily Markel Signs, MD   40 mg at 07/19/23 0806    bumetanide (BUMEX) tablet 1 mg  1 mg Oral BID Markel Signs, MD   1 mg at 07/19/23 2105    calcium carbonate (TUMS) chewable tablet 500 mg  1 tablet Oral Q6H PRN Markel Signs, MD   500 mg at 07/19/23 2149    clopidogrel (PLAVIX) tablet 75 mg  75 mg Oral Daily Markel Signs, MD   75 mg at 07/19/23 0806    ferrous sulfate (IRON 325) tablet 325 mg  325 mg Oral BID WC Markel Signs, MD   325 mg at 07/19/23 1556    fluticasone (FLONASE) 50 MCG/ACT nasal spray 2 spray  2 spray Each Nostril Daily Markel Signs, MD   2 spray at 07/19/23 0806    levothyroxine (SYNTHROID) tablet 137 mcg  137 mcg Oral Daily Markel Signs, MD   137 mcg at 07/19/23 0805    meloxicam (MOBIC) tablet 7.5 mg  7.5 mg Oral Daily Markel Signs, MD   7.5 mg at 07/19/23 0806    metFORMIN (GLUCOPHAGE) tablet 1,000 mg  1,000 mg Oral BID Markel Signs, MD   1,000 mg at 07/19/23 2105    montelukast (SINGULAIR) tablet 10 mg  10 mg Oral Daily Markel Signs, MD   10 mg at 07/19/23 5640    therapeutic multivitamin-minerals 1 tablet  1 tablet Oral Daily Markel Signs, MD   1 tablet at 07/19/23 0805    pantoprazole (PROTONIX) tablet 40 mg  40 mg Oral QAM AC Markel Signs, MD   40 mg at 07/19/23 0806    oxybutynin (DITROPAN) tablet 10 mg  10 mg Oral BID Markel Signs, MD   10 mg at 07/19/23 2105    primidone (MYSOLINE) tablet 250 mg  250 mg Oral BID Markel Signs, MD   250 mg at 07/19/23 1556    propranolol (INDERAL LA) extended release capsule 60 mg  60 mg Oral Daily Markel Signs, MD   60 mg at 07/19/23 0805    spironolactone (ALDACTONE) tablet 25 mg  25 mg Oral Daily Markel Signs, MD   70 further imaging studies ordered today  Medications: Continue current antibiotic: Ceftriaxone/Azithromycin  Medication Monitoring / High Risk Medications: none   Monitor labs, obtain procal, cont. Antibiotics for now  Parainfluenza 3 PCR Not Detected DETECTED Abnormal    Breathing treatments, supplemental O2 as indicated  PT/OT  Dispo pending      DVT prophylaxis:  Lovenox  GI prophylaxis:  PPI    Above plan discussed with the patient who agreed to the above plan     Discussed care plan with nurse after getting their input. Please note that this chart was generated using voice recognition Dragon dictation software. Although every effort was made to ensure the accuracy of this automated transcription, some errors in transcription may have occurred.     Blanquita Brooke MD  7/20/2023 6:56 AM

## 2023-07-20 NOTE — PROGRESS NOTES
Occupational Therapy  Facility/Department: [facility] MED SURG  Occupational Therapy Initial Assessment    Name: Ramu Zavala  : 1935  MRN: [id number]  Date of Service: 2023    Discharge Recommendations:  Continue to assess pending progress          Patient Diagnosis(es): The primary encounter diagnosis was Sepsis, due to unspecified organism, unspecified whether acute organ dysfunction present [facility]). A diagnosis of Hypoxia was also pertinent to this visit. Past Medical History:  has a past medical history of COPD (chronic obstructive pulmonary disease) ([facility]), Diabetes mellitus ([address] Hx of echocardiogram, Hyperlipidemia, Hypothyroidism, MI (myocardial infarction) ([facility]), Stroke ([address] [facility]), Thyroid disease, and Type II or unspecified type diabetes mellitus without mention of complication, not stated as uncontrolled. Past Surgical History:  has a past surgical history that includes hernia repair; Cardiac surgery (2017); fracture surgery; Colonoscopy; Cerebral angiogram (Bilateral, 06/15/2023); sigmoidoscopy (2023); and Colonoscopy (N/A, 2023). Treatment Diagnosis: Weakness      Assessment   Performance deficits / Impairments: Decreased functional mobility ; Decreased ADL status; Decreased strength;Decreased coordination;Decreased balance;Decreased endurance;Decreased safe awareness  Assessment: 81 y/o M admitted to [facility] for PNA. Patient presents with generalized weakness and deconditioning as well as decreased safety and balance, requiring increased need for assist during ADL, mobility and transfers. Patient would benefit from OT services to address to ensure safe and indep return home. Treatment Diagnosis: Weakness  Prognosis: Good  Decision Making: Medium Complexity  REQUIRES OT FOLLOW-UP: Yes        Plan   Occupational Therapy Plan  Times Per Day:  Once a day  Days Per Week: 7 Days  Current Treatment Recommendations: Strengthening, Balance training, Functional mobility training, Endurance training, Safety education & training, Patient/Caregiver education & training, Self-Care / ADL, Equipment evaluation, education, & procurement, Coordination training     Restrictions  Restrictions/Precautions  Restrictions/Precautions: General Precautions    Subjective   Subjective  Subjective: Patient denies pain, agreeable to OT evaluation. Social/Functional History  Social/Functional History  Lives With: Spouse  Type of Home: House  Home Layout: One level  Home Access: Level entry  Bathroom Shower/Tub: Walk-in shower  Bathroom Toilet: Handicap height  Bathroom Equipment: Grab bars in shower, Shower chair  Home Equipment: Ivett Martinis  Has the patient had two or more falls in the past year or any fall with injury in the past year?: Yes  ADL Assistance: Independent  Homemaking Assistance: Needs assistance  Ambulation Assistance: Independent  Transfer Assistance: Independent  Active : No  Patient's  Info: Spouse drives       Objective   Pulse: 87  Heart Rate Source: Monitor; Apical  BP: (!) 147/72  BP Location: Left upper arm  BP Method: Automatic  Patient Position: Up in chair  MAP (Calculated): 97  Respirations: 20  SpO2: 93 %  O2 Device: None (Room air)             Safety Devices  Type of Devices: All fall risk precautions in place;Call light within reach; Chair alarm in place; Left in chair;Nurse notified  Balance  Sitting: Intact  Standing: Impaired  Standing - Static: Fair  Standing - Dynamic: Fair  Gait  Overall Level of Assistance: Contact-guard assistance;Assist X1  Assistive Device: Gait belt;Cane, straight (declined walker)  Toilet Transfers  Toilet - Technique: Ambulating  Equipment Used: Standard toilet  Toilet Transfer: Contact guard assistance  AROM: Within functional limits  PROM: Within functional limits  Strength: Generally decreased, functional  Coordination: Generally decreased, functional  Tone: Normal  Sensation: Intact  ADL  Feeding: Independent  Grooming: Contact guard assistance  UE

## 2023-07-20 NOTE — PROGRESS NOTES
Pt alert and oriented x4 resting comfortably in chair at this time. Vitals and assessment completed as charted. Pt denies any pain or current needs at this time. Call light is within reach, chair alarm on. Care ongoing.

## 2023-07-20 NOTE — PROGRESS NOTES
Physical Therapy  Facility/Department: [facility] AT THE [facility] MED SURG  Physical Therapy Initial Assessment    Name: Hayden Crigler  : 1935  MRN: [id number]  Date of Service: 2023    Discharge Recommendations:  Continue to assess pending progress, Patient would benefit from continued therapy after discharge          Patient Diagnosis(es): The primary encounter diagnosis was Sepsis, due to unspecified organism, unspecified whether acute organ dysfunction present [facility]). A diagnosis of Hypoxia was also pertinent to this visit. Past Medical History:  has a past medical history of COPD (chronic obstructive pulmonary disease) ([address] [facility]), Diabetes mellitus ([address] [facility]), Hx of echocardiogram, Hyperlipidemia, Hypothyroidism, MI (myocardial infarction) ([facility]), Stroke ([facility]), Thyroid disease, and Type II or unspecified type diabetes mellitus without mention of complication, not stated as uncontrolled. Past Surgical History:  has a past surgical history that includes hernia repair; Cardiac surgery (2017); fracture surgery; Colonoscopy; Cerebral angiogram (Bilateral, 06/15/2023); sigmoidoscopy (2023); and Colonoscopy (N/A, 2023). Assessment   Body Structures, Functions, Activity Limitations Requiring Skilled Therapeutic Intervention: Decreased functional mobility ; Decreased strength;Decreased endurance;Decreased balance;Decreased posture  Assessment: Pt referred for difficulty walking. Pt is unsafe at this time with straight cane but declines use of RW. Pt will be advanced as tolerated but it would be best to continue to encourage RW. Pt currently reaching for walls and furniture to steady self with ambulation.   Treatment Diagnosis: difficulty walking  Therapy Prognosis: Good  Decision Making: Medium Complexity  Barriers to Learning: none  Requires PT Follow-Up: Yes  Activity Tolerance  Activity Tolerance: Patient tolerated evaluation without incident     Plan   Physcial Therapy Plan  General Plan: 2 times a day 7 days a week  Current Treatment Recommendations: Strengthening, Balance training, Functional mobility training, Transfer training, Endurance training, Gait training, Home exercise program, Safety education & training, Therapeutic activities  Safety Devices  Type of Devices: Left in bed, Call light within reach, Bed alarm in place, Nurse notified     Restrictions  Restrictions/Precautions  Restrictions/Precautions: General Precautions     Subjective   General  Chart Reviewed: Yes  Subjective  Subjective: Pt wants to use a straight cane and not the RW. He has no complaints of pain. Social/Functional History  Social/Functional History  Lives With: Spouse  Type of Home: House  Home Layout: One level  Home Access: Level entry  Bathroom Shower/Tub: Walk-in shower  Bathroom Toilet: Handicap height  Bathroom Equipment: Grab bars in shower, Shower chair  Home Equipment: Ellene Pair  Has the patient had two or more falls in the past year or any fall with injury in the past year?: Yes  ADL Assistance: Independent  Homemaking Assistance: Needs assistance  Ambulation Assistance: Independent  Transfer Assistance: Independent  Active : No  Patient's  Info: Spouse drives  Additional Comments: Pt would be safer with RW but declines. Vision/Hearing       Cognition   Orientation  Overall Orientation Status: Within Functional Limits     Objective   Pulse: 65  Heart Rate Source: Monitor; Apical  BP: (!) 105/54  BP Location: Left upper arm  BP Method: Automatic  Patient Position: Up in chair  MAP (Calculated): 71  Respirations: 18  SpO2: 97 %  O2 Device: None (Room air)     Observation/Palpation  Observation: pt reaching for objects to steady self at eval, unsteady with straight cane  Gross Assessment  AROM: Within functional limits  Strength: Generally decreased, functional                 Bed Mobility Training  Bed Mobility Training: Yes  Overall Level of Assistance: Minimum assistance;Assist X1  Interventions: Manual cues; Verbal

## 2023-07-20 NOTE — PROGRESS NOTES
[facility]  Inpatient/Observation/Outpatient Rehabilitation    Date: 2023  Patient Name: Abe Shah       [x] Inpatient Acute/Observation       []  Outpatient  : 1935       Plan of Care/Recert ends    [] Pt no showed for scheduled appointment    [x] Pt refused/declined therapy at this time due to:     Pt. Declined therapy d/t wanting to rest and just getting back to bed. [] Pt cancelled due to:  [] No Reason Given   [] Sick/ill   [] Other:    Therapist/Assistant will attempt to see this patient, at our earliest opportunity.        Lorie Murguia, PTA Date: 2023

## 2023-07-20 NOTE — PLAN OF CARE
Problem: Discharge Planning  Goal: Discharge to home or other facility with appropriate resources  Outcome: Progressing  Flowsheets (Taken 7/20/2023 0039)  Discharge to home or other facility with appropriate resources:   Identify barriers to discharge with patient and caregiver   Arrange for needed discharge resources and transportation as appropriate   Refer to discharge planning if patient needs post-hospital services based on physician order or complex needs related to functional status, cognitive ability or social support system     Problem: Safety - Adult  Goal: Free from fall injury  Outcome: Progressing  Flowsheets (Taken 7/20/2023 0039)  Free From Fall Injury: Instruct family/caregiver on patient safety     Problem: ABCDS Injury Assessment  Goal: Absence of physical injury  Outcome: Progressing  Flowsheets (Taken 7/20/2023 0039)  Absence of Physical Injury: Implement safety measures based on patient assessment     Problem: Skin/Tissue Integrity  Goal: Absence of new skin breakdown  Description: 1. Monitor for areas of redness and/or skin breakdown  2. Assess vascular access sites hourly  3. Every 4-6 hours minimum:  Change oxygen saturation probe site  4. Every 4-6 hours:  If on nasal continuous positive airway pressure, respiratory therapy assess nares and determine need for appliance change or resting period. Outcome: Progressing  Note: Claudio scale monitoring per protocol. Inspect skin for breakdown frequently. Encourage pt to make frequent large adjustments in position or assist patient with turning. Document all areas of breakdown.         Problem: Chronic Conditions and Co-morbidities  Goal: Patient's chronic conditions and co-morbidity symptoms are monitored and maintained or improved  Outcome: Progressing  Flowsheets (Taken 7/20/2023 0039)  Care Plan - Patient's Chronic Conditions and Co-Morbidity Symptoms are Monitored and Maintained or Improved:   Monitor and assess patient's chronic conditions and comorbid symptoms for stability, deterioration, or improvement   Collaborate with multidisciplinary team to address chronic and comorbid conditions and prevent exacerbation or deterioration   Update acute care plan with appropriate goals if chronic or comorbid symptoms are exacerbated and prevent overall improvement and discharge

## 2023-07-20 NOTE — PROGRESS NOTES
Writer at bedside at this time to perform initial shift assessment. Patient ambulated to the bathroom and back to bed at this time. Vital signs taken and assessment completed at this time. Patient is alert and oriented and has no complaints of pain at this time. Patient denies any further needs at this time. Call light within reach, bed alarm on for safety, care ongoing.

## 2023-07-21 VITALS
OXYGEN SATURATION: 96 % | DIASTOLIC BLOOD PRESSURE: 67 MMHG | HEIGHT: 71 IN | RESPIRATION RATE: 18 BRPM | BODY MASS INDEX: 32.68 KG/M2 | HEART RATE: 75 BPM | TEMPERATURE: 97.5 F | SYSTOLIC BLOOD PRESSURE: 146 MMHG | WEIGHT: 233.4 LBS

## 2023-07-21 PROBLEM — Z16.12 ESBL (EXTENDED SPECTRUM BETA-LACTAMASE) PRODUCING BACTERIA INFECTION: Status: ACTIVE | Noted: 2023-07-21

## 2023-07-21 PROBLEM — A49.9 ESBL (EXTENDED SPECTRUM BETA-LACTAMASE) PRODUCING BACTERIA INFECTION: Status: ACTIVE | Noted: 2023-07-21

## 2023-07-21 LAB
ALBUMIN SERPL-MCNC: 2.8 G/DL (ref 3.5–5.2)
ALBUMIN/GLOB SERPL: 0.8 {RATIO} (ref 1–2.5)
ALP SERPL-CCNC: 165 U/L (ref 40–129)
ALT SERPL-CCNC: 39 U/L (ref 5–41)
ANION GAP SERPL CALCULATED.3IONS-SCNC: 10 MMOL/L (ref 9–17)
AST SERPL-CCNC: 27 U/L
BASOPHILS # BLD: 0 K/UL (ref 0–0.2)
BASOPHILS NFR BLD: 0 % (ref 0–2)
BILIRUB SERPL-MCNC: 0.2 MG/DL (ref 0.3–1.2)
BUN SERPL-MCNC: 18 MG/DL (ref 8–23)
BUN/CREAT SERPL: 18 (ref 9–20)
CALCIUM SERPL-MCNC: 8.4 MG/DL (ref 8.6–10.4)
CHLORIDE SERPL-SCNC: 100 MMOL/L (ref 98–107)
CO2 SERPL-SCNC: 23 MMOL/L (ref 20–31)
CREAT SERPL-MCNC: 1 MG/DL (ref 0.7–1.2)
EOSINOPHIL # BLD: 0.21 K/UL (ref 0–0.44)
EOSINOPHILS RELATIVE PERCENT: 2 % (ref 1–4)
ERYTHROCYTE [DISTWIDTH] IN BLOOD BY AUTOMATED COUNT: 14.9 % (ref 11.8–14.4)
GFR SERPL CREATININE-BSD FRML MDRD: >60 ML/MIN/1.73M2
GLUCOSE SERPL-MCNC: 112 MG/DL (ref 70–99)
HCT VFR BLD AUTO: 26.2 % (ref 40.7–50.3)
HGB BLD-MCNC: 8.3 G/DL (ref 13–17)
IMM GRANULOCYTES # BLD AUTO: 0 K/UL (ref 0–0.3)
IMM GRANULOCYTES NFR BLD: 0 %
LYMPHOCYTES NFR BLD: 0.96 K/UL (ref 1.1–3.7)
LYMPHOCYTES RELATIVE PERCENT: 9 % (ref 24–43)
MCH RBC QN AUTO: 28.9 PG (ref 25.2–33.5)
MCHC RBC AUTO-ENTMCNC: 31.7 G/DL (ref 28.4–34.8)
MCV RBC AUTO: 91.3 FL (ref 82.6–102.9)
MONOCYTES NFR BLD: 0.86 K/UL (ref 0.1–1.2)
MONOCYTES NFR BLD: 8 % (ref 3–12)
MORPHOLOGY: ABNORMAL
NEUTROPHILS NFR BLD: 81 % (ref 36–65)
NEUTS SEG NFR BLD: 8.67 K/UL (ref 1.5–8.1)
NRBC BLD-RTO: 0 PER 100 WBC
PLATELET # BLD AUTO: 274 K/UL (ref 138–453)
PMV BLD AUTO: 9.8 FL (ref 8.1–13.5)
POTASSIUM SERPL-SCNC: 4 MMOL/L (ref 3.7–5.3)
PROT SERPL-MCNC: 6.3 G/DL (ref 6.4–8.3)
RBC # BLD AUTO: 2.87 M/UL (ref 4.21–5.77)
SODIUM SERPL-SCNC: 133 MMOL/L (ref 135–144)
WBC OTHER # BLD: 10.7 K/UL (ref 3.5–11.3)

## 2023-07-21 PROCEDURE — 94669 MECHANICAL CHEST WALL OSCILL: CPT

## 2023-07-21 PROCEDURE — 6360000002 HC RX W HCPCS: Performed by: INTERNAL MEDICINE

## 2023-07-21 PROCEDURE — 94640 AIRWAY INHALATION TREATMENT: CPT

## 2023-07-21 PROCEDURE — 97116 GAIT TRAINING THERAPY: CPT

## 2023-07-21 PROCEDURE — 80053 COMPREHEN METABOLIC PANEL: CPT

## 2023-07-21 PROCEDURE — 6370000000 HC RX 637 (ALT 250 FOR IP): Performed by: STUDENT IN AN ORGANIZED HEALTH CARE EDUCATION/TRAINING PROGRAM

## 2023-07-21 PROCEDURE — 85027 COMPLETE CBC AUTOMATED: CPT

## 2023-07-21 PROCEDURE — 6370000000 HC RX 637 (ALT 250 FOR IP): Performed by: INTERNAL MEDICINE

## 2023-07-21 PROCEDURE — 94761 N-INVAS EAR/PLS OXIMETRY MLT: CPT

## 2023-07-21 PROCEDURE — 36415 COLL VENOUS BLD VENIPUNCTURE: CPT

## 2023-07-21 PROCEDURE — 2580000003 HC RX 258: Performed by: INTERNAL MEDICINE

## 2023-07-21 PROCEDURE — 97110 THERAPEUTIC EXERCISES: CPT

## 2023-07-21 RX ORDER — GUAIFENESIN/DEXTROMETHORPHAN 100-10MG/5
5 SYRUP ORAL EVERY 4 HOURS PRN
Status: DISCONTINUED | OUTPATIENT
Start: 2023-07-21 | End: 2023-07-21 | Stop reason: HOSPADM

## 2023-07-21 RX ORDER — SULFAMETHOXAZOLE AND TRIMETHOPRIM 800; 160 MG/1; MG/1
1 TABLET ORAL 2 TIMES DAILY
Qty: 20 TABLET | Refills: 0 | Status: SHIPPED | OUTPATIENT
Start: 2023-07-21 | End: 2023-07-31

## 2023-07-21 RX ORDER — IPRATROPIUM BROMIDE AND ALBUTEROL SULFATE 2.5; .5 MG/3ML; MG/3ML
3 SOLUTION RESPIRATORY (INHALATION)
Qty: 360 ML | Refills: 0 | Status: SHIPPED | OUTPATIENT
Start: 2023-07-21 | End: 2023-07-26

## 2023-07-21 RX ORDER — GUAIFENESIN/DEXTROMETHORPHAN 100-10MG/5
5 SYRUP ORAL EVERY 4 HOURS PRN
Qty: 120 ML | Refills: 0 | Status: SHIPPED | OUTPATIENT
Start: 2023-07-21 | End: 2023-07-31

## 2023-07-21 RX ADMIN — MONTELUKAST 10 MG: 10 TABLET, FILM COATED ORAL at 08:35

## 2023-07-21 RX ADMIN — METFORMIN HYDROCHLORIDE 1000 MG: 500 TABLET ORAL at 08:35

## 2023-07-21 RX ADMIN — MOMETASONE FUROATE AND FORMOTEROL FUMARATE DIHYDRATE 2 PUFF: 200; 5 AEROSOL RESPIRATORY (INHALATION) at 09:43

## 2023-07-21 RX ADMIN — IPRATROPIUM BROMIDE AND ALBUTEROL SULFATE 1 DOSE: .5; 3 SOLUTION RESPIRATORY (INHALATION) at 09:43

## 2023-07-21 RX ADMIN — ENOXAPARIN SODIUM 30 MG: 100 INJECTION SUBCUTANEOUS at 08:35

## 2023-07-21 RX ADMIN — SPIRONOLACTONE 25 MG: 25 TABLET, FILM COATED ORAL at 08:35

## 2023-07-21 RX ADMIN — ASPIRIN 81 MG: 81 TABLET, COATED ORAL at 08:35

## 2023-07-21 RX ADMIN — PRIMIDONE 250 MG: 250 TABLET ORAL at 08:35

## 2023-07-21 RX ADMIN — MELOXICAM 7.5 MG: 7.5 TABLET ORAL at 08:35

## 2023-07-21 RX ADMIN — FERROUS SULFATE TAB 325 MG (65 MG ELEMENTAL FE) 325 MG: 325 (65 FE) TAB at 08:35

## 2023-07-21 RX ADMIN — SODIUM CHLORIDE, PRESERVATIVE FREE 10 ML: 5 INJECTION INTRAVENOUS at 08:35

## 2023-07-21 RX ADMIN — LEVOTHYROXINE SODIUM 137 MCG: 25 TABLET ORAL at 08:33

## 2023-07-21 RX ADMIN — BUMETANIDE 1 MG: 1 TABLET ORAL at 08:33

## 2023-07-21 RX ADMIN — GUAIFENESIN AND DEXTROMETHORPHAN 5 ML: 100; 10 SYRUP ORAL at 08:35

## 2023-07-21 RX ADMIN — CLOPIDOGREL BISULFATE 75 MG: 75 TABLET ORAL at 08:33

## 2023-07-21 RX ADMIN — PROPRANOLOL HYDROCHLORIDE 60 MG: 60 CAPSULE, EXTENDED RELEASE ORAL at 08:34

## 2023-07-21 RX ADMIN — ATORVASTATIN CALCIUM 40 MG: 40 TABLET, FILM COATED ORAL at 08:35

## 2023-07-21 RX ADMIN — TAMSULOSIN HYDROCHLORIDE 0.8 MG: 0.4 CAPSULE ORAL at 08:33

## 2023-07-21 RX ADMIN — PANTOPRAZOLE SODIUM 40 MG: 40 TABLET, DELAYED RELEASE ORAL at 08:35

## 2023-07-21 RX ADMIN — OXYBUTYNIN CHLORIDE 10 MG: 5 TABLET ORAL at 08:35

## 2023-07-21 RX ADMIN — MULTIPLE VITAMINS W/ MINERALS TAB 1 TABLET: TAB at 08:33

## 2023-07-21 RX ADMIN — FLUTICASONE PROPIONATE 2 SPRAY: 50 SPRAY, METERED NASAL at 08:36

## 2023-07-21 RX ADMIN — IPRATROPIUM BROMIDE AND ALBUTEROL SULFATE 1 DOSE: .5; 3 SOLUTION RESPIRATORY (INHALATION) at 05:41

## 2023-07-21 NOTE — DISCHARGE INSTR - DIET
Good nutrition is important when healing from an illness, injury, or surgery. Follow any nutrition recommendations given to you during your hospital stay. If you were given an oral nutrition supplement while in the hospital, continue to take this supplement at home. You can take it with meals, in-between meals, and/or before bedtime. These supplements can be purchased at most local grocery stores, pharmacies, and chain [employer]-stores. If you have any questions about your diet or nutrition, call the hospital and ask for the dietitian.   No added salt

## 2023-07-21 NOTE — PROGRESS NOTES
Writer offered to help patient wash up at this time while patient was awake, but patient declines and states \"maybe later in the daytime\". Call light and bedside table remain within reach.

## 2023-07-21 NOTE — RT PROTOCOL NOTE
20 bpm   [x]  SOB w/ exertion or RR greater than 24 bpm []  Access- ory muscle use at rest. Abn.  resp. []  SOB at rest.   2   Bilateral Breath Sounds (BBS) []  Clear [x]  Diminish-ed bases  []  Diminish-ed t/o, or rales   []  Sporadic, scattered wheezes or rhonchi []  Persistentwheezes and, or absent BBS 1   Cough []  Strong, effective, & non-prod. [x]  Effective & prod. Less than 25 ml (2 TBSP) over past 24 hrs []  Ineffective & non-prod to less than 25 ML over past 24 hrs []  Ineffective and, or greater than 25 ml sputum prod. past 24 hrs. []  Nonspon- taneous; Requires suctioning 1   Pulmonary History  (PULM HX) []  No smoking and no chronic pulmonary history []  Former smoker. Quit over 12 mos. ago []  Current smoker or quit w/ in 12 mos []  Pulm. History and, or 20 pk/yr smoking hx [x]  Admitted w/ acute pulm. dx and, or has been admitted w/ pulm. dx 2 or more times over past 12 mos 4   Surgical History this Admit  (SURG HX) [x]  No surgery []  General surgery []  Lower abdominal []  Thoracic or upper abdominal   []  Thoracic w/ pulm. disease 0   Chest X-Ray (CXR)/CT Scan []  Clear or not applicable []  Not available [x]  Atelectasis or pleural effusions []  Localized infiltrate or pulm. edema []  Con-solidated Infiltrates, bilateral, or in more than 1 lobe 2   TOTAL ACUITY: 10       CARE PLAN    If Acuity Level is 2, 3, or 4 in any of the following:    [] BILATERAL BREATH SOUNDS (BBS)     [x] PULMONARY HISTORY (PULM HX)  [] Respiratory Rate  (RR)    Goal: Improve respiratory functions in patients with airway disease and decrease WOB    [x] AEROSOL PROTOCOL    Total Acuity:   14-28  []  Secondary Assessment in 24 hrs Total Acuity:  9-13  [x]  Secondary Assessment in 24 hrs Total Acuity:  4-8  []  Secondary Assessment in 24 hrs Total Acuity:  0-3  []  Secondary Assessment in 48 hrs   HHN AEROSOL THERAPY with  [physician-ordered bronchodilator(s)] q 4 & Albuterol PRN q2 hrs.    Breath-Actuated Neb if BBS Acuity = 4, and pt. can use MP. Notify physician if condition deteriorates. HHN AEROSOL THERAPY with  [physician-ordered bronchodilator(s)]  QID and Albuterol PRN q4 hrs. Breath-Actuated Neb if BBS Acuity = 4, and pt. can use MP. Notify physician if condition deteriorates. MDI THERAPY with  2 actuations of [physician-ordered bronchodilator(s)] via spacer TID Albuterol and PRN q4 hrs. If unable to utilize MDI: HHN [physician-ordered bronchodilator(s)] TID and Albuterol PRN q4 hrs. Notify physician if condition deteriorates. MDI THERAPY with  [physician-ordered bronchodilator(s)] via spacer TID PRN. If unable to utilize MDI: HHN [physician-ordered bronchodilator(s)] TID PRN. Notify physician if condition deteriorates. If Acuity Level is 2, 3, or 4 in any of the following:    [] COUGH     [] SURGICAL HISTORY (SURG HX)  [x] CHEST XRAY (CXR)    Goal: Improvement in sputum mobilization in patients with ineffective airway clearance. Reverse atelectasis. [x] Bronchopulmonary Hygiene Protocol      Total Acuity:   14-28  []  Secondary Assessment in 24 hrs Total Acuity:  9-13  [x]  Secondary Assessment in 24 hrs Total Acuity:  4-8  []  Secondary Assessment in 24 hrs Total Acuity:  0-3  []  Secondary Assessment in 48 hrs   METANEB QID with [physician-ordered bronchodilator(s)] if CXR Acuity = 4; otherwise:  PD&P, Oscillatory Therapy, or Vest QID & PRN AND PEP QID & PRN  NT Sxn PRN for ineffective cough  METANEB QID with [physician-ordered bronchodilator(s)] if CXR Acuity = 4; otherwise:  PD&P, Oscillatory Therapy or Vest QID & PRN AND PEP QID & PRN  NT Sxn PRN for ineffective cough  PD&P, Oscillatory Therapy, or Vest TID & PRN AND PEP TID & PRN   Instruct patient to self-perform IS q1hr WA       If Acuity Level is 2 or above in the following:    [] PULMONARY HISTORY (PULM HX)    Goal: Assist patient in quitting smoking to slow or stop the progression of lung disease.     [] Smoking Cessation Protocol    SMOKING

## 2023-07-21 NOTE — PROGRESS NOTES
Physical Therapy  Facility/Department: [facility] AT THE [facility] MED SURG  Daily Treatment Note  NAME: Karey Persaud  : 1935  MRN: [id number]    Date of Service: 2023    Discharge Recommendations:  Continue to assess pending progress, Patient would benefit from continued therapy after discharge        Patient Diagnosis(es): The primary encounter diagnosis was Sepsis, due to unspecified organism, unspecified whether acute organ dysfunction present [facility]). A diagnosis of Hypoxia was also pertinent to this visit. Assessment   Assessment: Patient progressed gait training with SPC and CGA/SBA 80ft with several directional changes, no LOB noted. Patient did demonstrated mild fatigue with gait training and required 2 short rest breaks during 80ft bout. Transfers completed with CGA/SBA. Supine/reclined B LE therex x 15 in all available planes of motion with short rest breaks throughout for energy conservation. Activity Tolerance: Patient limited by fatigue     Plan    Physcial Therapy Plan  General Plan: 2 times a day 7 days a week (1x per day on weekends.)  Current Treatment Recommendations: Strengthening;Balance training;Functional mobility training;Transfer training; Endurance training;Gait training;Home exercise program;Safety education & training; Therapeutic activities     Restrictions  Restrictions/Precautions  Restrictions/Precautions: General Precautions     Subjective    Subjective  Subjective: pt in chair upon arrival, pleasant and agreeable to therapy  Pain: Pt denies pain  Orientation  Overall Orientation Status: Within Functional Limits     Objective   Vitals     Bed Mobility Training  Bed Mobility Training: No  Balance  Standing - Static: Fair  Standing - Dynamic: Fair  Transfer Training  Transfer Training: Yes  Overall Level of Assistance: Contact-guard assistance;Assist X1;Stand-by assistance  Interventions: Verbal cues;Demonstration (For optimal hand placement and slow positional changes.)  Sit to Stand: Cindi Bland, PTA [id number]

## 2023-07-21 NOTE — PLAN OF CARE
Problem: Discharge Planning  Goal: Discharge to home or other facility with appropriate resources  7/21/2023 1252 by Jessy Dancer, RN  Outcome: Completed  Flowsheets (Taken 7/21/2023 7288 by Kendall Poon RN)  Discharge to home or other facility with appropriate resources: Identify barriers to discharge with patient and caregiver  7/21/2023 0048 by Una Preston RN  Outcome: Progressing  Flowsheets (Taken 7/20/2023 0730 by Kendall Poon RN)  Discharge to home or other facility with appropriate resources: Identify barriers to discharge with patient and caregiver     Problem: Safety - Adult  Goal: Free from fall injury  7/21/2023 1252 by Jessy Dancer, RN  Outcome: Completed  Flowsheets (Taken 7/21/2023 0959 by Kendall Poon RN)  Free From Fall Injury: Instruct family/caregiver on patient safety  7/21/2023 0048 by Una Preston RN  Outcome: Fely Cortez (Taken 7/20/2023 0945 by Kendall Poon RN)  Free From Fall Injury: Instruct family/caregiver on patient safety     Problem: ABCDS Injury Assessment  Goal: Absence of physical injury  7/21/2023 1252 by Jessy Dancer, RN  Outcome: Completed  Flowsheets (Taken 7/21/2023 0959 by Kendall Poon RN)  Absence of Physical Injury: Implement safety measures based on patient assessment  7/21/2023 0048 by Una Preston RN  Outcome: Fely Cortez (Taken 7/20/2023 0945 by Kendall Poon RN)  Absence of Physical Injury: Implement safety measures based on patient assessment     Problem: Skin/Tissue Integrity  Goal: Absence of new skin breakdown  Description: 1. Monitor for areas of redness and/or skin breakdown  2. Assess vascular access sites hourly  3. Every 4-6 hours minimum:  Change oxygen saturation probe site  4. Every 4-6 hours:  If on nasal continuous positive airway pressure, respiratory therapy assess nares and determine need for appliance change or resting period.   7/21/2023 1252 by Jessy Dancer, RN  Outcome: Completed  7/21/2023 0048 by Tashia Mills RN  Outcome: Progressing  Note: Skin assessment performed, no breakdown noted at this time. Patient skin is dry and intact. Will continue to monitor for redness or breakdown.         Problem: Chronic Conditions and Co-morbidities  Goal: Patient's chronic conditions and co-morbidity symptoms are monitored and maintained or improved  7/21/2023 1252 by Cedric Stephen RN  Outcome: Completed  Flowsheets (Taken 7/21/2023 8941 by Saad Garcia RN)  Care Plan - Patient's Chronic Conditions and Co-Morbidity Symptoms are Monitored and Maintained or Improved: Monitor and assess patient's chronic conditions and comorbid symptoms for stability, deterioration, or improvement  7/21/2023 0048 by Tashia Mills RN  Outcome: Progressing     Problem: Nutrition Deficit:  Goal: Optimize nutritional status  7/21/2023 1252 by Cedric Stephen RN  Outcome: Completed  7/21/2023 0048 by Tashia Mills RN  Outcome: Progressing  Flowsheets (Taken 7/19/2023 1142 by Samara Sloan)  Nutrient intake appropriate for improving, restoring, or maintaining nutritional needs:   Monitor oral intake, labs, and treatment plans   Recommend appropriate diets, oral nutritional supplements, and vitamin/mineral supplements

## 2023-07-21 NOTE — PROGRESS NOTES
[facility]  [address] [facility] [place], [address]    Progress Note    Date:   7/21/2023  Patient name:  Crystal Weaver  Date of admission:  7/18/2023  7:25 PM  MRN:   [id number]  YOB: 1935    SUBJECTIVE/Last 24 hours update:     Patient seen and examined at the bed side , no new acute events overnight and no new complains this morning. He is feeling better. He is not feeling tired anymore. Tolerating PO intake well, no n/v/d. Breathing is at his baseline, minimal coughing. VSS, Afebrile on room air. Notes from nursing staff and Consults had been reviewed, and the overnight progress had been checked with the nursing staff as well. REVIEW OF SYSTEMS:      CONSTITUTIONAL:  no fevers, no headcahes  EYES: negative for blury vision  HEENT: No headaches, No nasal congestion, no difficulty swallowing  RESPIRATORY:negative for dyspnea, no wheezing, positive for dry Cough  CARDIOVASCULAR: negative for chest pain, no palpitations  GASTROINTESTINAL: no nausea, no vomiting, no change in bowel habits, no abdominal pain   GENITOURINARY: negative for dysuria, no hematuria   MUSCULOSKELETAL: no joint pains, no muscle aches, no swelling of joints or extremities  NEUROLOGICAL: No  Weakness or numbness      PAST MEDICAL HISTORY:      has a past medical history of COPD (chronic obstructive pulmonary disease) ([address]), Diabetes mellitus ([address]), Hx of echocardiogram, Hyperlipidemia, Hypothyroidism, MI (myocardial infarction) ([address]), Stroke ([address]), Thyroid disease, and Type II or unspecified type diabetes mellitus without mention of complication, not stated as uncontrolled. PAST SURGICAL HISTORY:      has a past surgical history that includes hernia repair; Cardiac surgery (02/24/2017); fracture surgery; Colonoscopy; Cerebral angiogram (Bilateral, 06/15/2023); sigmoidoscopy (06/23/2023); and Colonoscopy (N/A, 6/23/2023). SOCIAL HISTORY:      reports that he quit smoking about 30 years ago.  His Daily Serafin Brennan MD   0.8 mg at 07/20/23 0825    tiotropium (SPIRIVA RESPIMAT) 2.5 MCG/ACT inhaler 2 puff  2 puff Inhalation Daily Serafin Bernnan MD   2 puff at 07/20/23 1024    sodium chloride flush 0.9 % injection 5-40 mL  5-40 mL IntraVENous 2 times per day Serafin Brennan MD   10 mL at 07/20/23 2032    sodium chloride flush 0.9 % injection 10 mL  10 mL IntraVENous PRN Serafin Brennan MD        0.9 % sodium chloride infusion   IntraVENous PRN Serafin Brennan MD        enoxaparin Sodium (LOVENOX) injection 30 mg  30 mg SubCUTAneous BID Serafin Brennan MD   30 mg at 07/20/23 2031    ondansetron (ZOFRAN-ODT) disintegrating tablet 4 mg  4 mg Oral Q8H PRN Serafin Brennan MD        Or    ondansetron [facility]) injection 4 mg  4 mg IntraVENous Q6H PRN Serafin Brennan MD        polyethylene glycol [facility]) packet 17 g  17 g Oral Daily PRN Serafin Brennan MD        acetaminophen (TYLENOL) tablet 650 mg  650 mg Oral Q6H PRN Serafin Brennan MD   650 mg at 07/20/23 0825    Or    acetaminophen (TYLENOL) suppository 650 mg  650 mg Rectal Q6H PRN Serafin Brennan MD        mometasone-formoterol [facility]) 200-5 MCG/ACT inhaler 2 puff  2 puff Inhalation BID Javi Flores MD   2 puff at 07/20/23 2025    melatonin tablet 5 mg  5 mg Oral Nightly PRN Serafin Brennan MD           ASSESSMENT:     Principal Problem:    Community acquired bacterial pneumonia  Active Problems:    Essential hypertension    Type 2 diabetes mellitus with complication, without long-term current use of insulin (HCC)    Chronic combined systolic and diastolic CHF, NYHA class 2 (HCC)    Elevated liver enzymes    Moderate persistent asthma without complication    Parainfluenza    ESBL (extended spectrum beta-lactamase) producing bacteria infection  Resolved Problems:    * No resolved hospital problems.  *      PLAN:     Primary Problem(s): Community acquired bacterial pneumonia  Condition is at treatment goal  Treatment

## 2023-07-21 NOTE — PROGRESS NOTES
Pt assisted to bathroom, urine output recorded. Writer offered to help patient get washed up but patient states he already washed up this morning. Pt back to bed with assistance and all belongings within reach as well as call light. Pt denies any other needs at this time.

## 2023-07-21 NOTE — PROGRESS NOTES
Writer reviewed discharge instructions with patient and wife. Both aware of need to  prescriptions and aware of follow up appointments. Writer reviewed new medications and side effects to monitor for. Patient aware of need for repeat Urine and Chest Xray needed for 7/28/23. Home Health to be coming in on Monday for patient. Writer instructed patient to continue to use [employer] @ home. Writer provided education on Pneumonia along with educational handout. Patient and wife verbalized understanding. Denies questions. Copy of discharge instructions given to patient.

## 2023-07-21 NOTE — PROGRESS NOTES
Occupational Therapy  Facility/Department: [facility] AT THE [facility] MED SURG  Daily Treatment Note  NAME: Lubna Leal  : 1935  MRN: [id number]    Date of Service: 2023    Discharge Recommendations:  Continue to assess pending progress         Patient Diagnosis(es): The primary encounter diagnosis was Sepsis, due to unspecified organism, unspecified whether acute organ dysfunction present [facility]). A diagnosis of Hypoxia was also pertinent to this visit. Assessment    Activity Tolerance: Patient tolerated treatment well  Discharge Recommendations: Continue to assess pending progress      Plan   Occupational Therapy Plan  Times Per Day: Once a day  Days Per Week: 7 Days  Current Treatment Recommendations: Strengthening;Balance training;Functional mobility training; Endurance training; Safety education & training;Patient/Caregiver education & training;Self-Care / ADL;Equipment evaluation, education, & procurement; Coordination training     Restrictions   General fall risk    Subjective   Subjective  Subjective: Pt returning to bed from recliner upon OMALLEY arrival. Pt agreed to supine BUE therex in bed. Pt and clinician discussed d/c planning, AE/DME, safety, compensatory techs, etc for return to PLIF. Pt asked appropriate questions and participated in discussion. Pain: denied  Orientation  Overall Orientation Status: Within Functional Limits  Pain: Pt denies pain           Objective    Vitals     Bed Mobility Training  Bed Mobility Training: No, pt reports no diff enter/exit bed at home, pt edu on leg  for ease of task  Balance    Transfer Training  Transfer Training: Yes  Overall Level of Assistance:Assist X1;Stand-by assistance  Interventions: Verbal cues    Gait Training  Gait Training: No, \"I use a cane at home. \" Pt edu on difference between uni/NILSA support, safety        OT Exercises  Exercise Treatment: Pt participated in BUE therex supine in bed to increase strength/endurance for ease of fxl tasks.  Red

## 2023-07-21 NOTE — PROGRESS NOTES
Made [facility] aware of the discharge to home today, so they can resume services.     Desma Meckel, LSW

## 2023-07-21 NOTE — PROGRESS NOTES
Vitals and assessment done at this time. See flow sheets for more details. Pt resting in bed at this time. Pt denied any shortness of breath, dizziness, light headedness, numbness or tingling in hands or feet. Pt denied any pain at this time. Lungs clear diminished. Pt denied any further needs at this time. Call light within reach, will continue to monitor.

## 2023-07-21 NOTE — DISCHARGE SUMMARY
[facility]  [address] [place], [place],     Discharge Summary      NAME:  Mabel De Los Santos  :  1935  MRN:  [id number]    Admit date:  2023  Discharge date:  23      Admitting Physician:  Pj Valencia MD    Primary Diagnosis on Admission:   Present on Admission:   Community acquired bacterial pneumonia   Type 2 diabetes mellitus with complication, without long-term current use of insulin (HCC)   Chronic combined systolic and diastolic CHF, NYHA class 2 (HCC)   Elevated liver enzymes   Essential hypertension   Moderate persistent asthma without complication   Parainfluenza   ESBL (extended spectrum beta-lactamase) producing bacteria infection      Secondary Diagnoses:  does not have any pertinent problems on file. Admission Condition:  stable     Discharged Condition: good    Hospital Course: The patient was admitted for the management of shortness of breath secondary to suspected acute bacterial pneumonia. He has a history of both CHF and COPD. He was started empirically on IV antibiotics and provided with breathing treatments as well as supplemental O2. Cultures were obtained, positive for ESBL sensitive to Bactrim. Today on day of discharge pt feels better with no further complaints. Vitals and Labs are at pts baseline. He will have a repeat CXR and Urine culture with close OP follow-up. He will continue to have [facility].     Consults:  none    Significant Diagnostic/theraputic interventions: IV Antibiotics, XR imaging    Disposition:   home    Instructions to Patient:      Follow up with GENA Zimmerman CNP in 1-2 weeks    Discharge Medications:       Medication List        START taking these medications      guaiFENesin-dextromethorphan 100-10 MG/5ML syrup  Commonly known as: ROBITUSSIN DM  Take 5 mLs by mouth every 4 hours as needed for Cough     ipratropium 0.5 mg-albuterol 2.5 mg 0.5-2.5 (3) MG/3ML Soln nebulizer solution  Commonly known as: Management and/or   Coordination of care with Consultants (if applicable)   Coordination of care with Receiving Facility Physician (if applicable)  Completion of DME forms (if applicable)  Preparation of Discharge Summary  Preparation of Medication Reconciliation  Preparation of Discharge Prescriptions    Please note that this chart was generated using voice recognition Dragon dictation software. Although every effort was made to ensure the accuracy of this automated transcription, some errors in transcription may have occurred.     Angelina Denver, MD  7/21/2023 12:51 PM

## 2023-07-21 NOTE — PROGRESS NOTES
Jeffy Cedeno was evaluated today and a DME order was entered for a nebulizer compressor in order to administer  Duonebs  due to the diagnosis of COPD. The need for this equipment and treatment was discussed with the patient and he understands and is in agreement.     Electronically signed by Arslan Cunningham MD on 7/21/2023 at 1:01 PM

## 2023-07-21 NOTE — PLAN OF CARE
Problem: Discharge Planning  Goal: Discharge to home or other facility with appropriate resources  Outcome: Progressing  Flowsheets (Taken 7/20/2023 0730 by Hayley Durand, RN)  Discharge to home or other facility with appropriate resources: Identify barriers to discharge with patient and caregiver     Problem: Safety - Adult  Goal: Free from fall injury  Outcome: Progressing  Flowsheets (Taken 7/20/2023 0945 by Hayley Durand, RN)  Free From Fall Injury: Instruct family/caregiver on patient safety     Problem: ABCDS Injury Assessment  Goal: Absence of physical injury  Outcome: Progressing  Flowsheets (Taken 7/20/2023 0945 by Hayley Durand, RN)  Absence of Physical Injury: Implement safety measures based on patient assessment     Problem: Skin/Tissue Integrity  Goal: Absence of new skin breakdown  Description: 1. Monitor for areas of redness and/or skin breakdown  2. Assess vascular access sites hourly  3. Every 4-6 hours minimum:  Change oxygen saturation probe site  4. Every 4-6 hours:  If on nasal continuous positive airway pressure, respiratory therapy assess nares and determine need for appliance change or resting period. Outcome: Progressing  Note: Skin assessment performed, no breakdown noted at this time. Patient skin is dry and intact. Will continue to monitor for redness or breakdown.         Problem: Chronic Conditions and Co-morbidities  Goal: Patient's chronic conditions and co-morbidity symptoms are monitored and maintained or improved  Outcome: Progressing     Problem: Nutrition Deficit:  Goal: Optimize nutritional status  Outcome: Progressing  Flowsheets (Taken 7/19/2023 1142 by Felix Stockton)  Nutrient intake appropriate for improving, restoring, or maintaining nutritional needs:   Monitor oral intake, labs, and treatment plans   Recommend appropriate diets, oral nutritional supplements, and vitamin/mineral supplements

## 2023-07-23 LAB
MICROORGANISM SPEC CULT: NORMAL
MICROORGANISM SPEC CULT: NORMAL
SERVICE CMNT-IMP: NORMAL
SERVICE CMNT-IMP: NORMAL
SPECIMEN DESCRIPTION: NORMAL
SPECIMEN DESCRIPTION: NORMAL

## 2023-07-24 ENCOUNTER — TELEPHONE (OUTPATIENT)
Dept: PRIMARY CARE CLINIC | Age: 88
End: 2023-07-24

## 2023-07-24 ENCOUNTER — CARE COORDINATION (OUTPATIENT)
Dept: CASE MANAGEMENT | Age: 88
End: 2023-07-24

## 2023-07-24 NOTE — TELEPHONE ENCOUNTER
Thom Flower at [facility] to request med change. Mera Ann was recent IP and Dr Aida Solis prescribed Duoneb which  has not been able to get. Mera Ann already has albuterol at home. Thom Flower would like to get script for Ipratropium to use with the albuterol. Will you prescribe Ipratropium?

## 2023-07-24 NOTE — CARE COORDINATION
Care Transitions Outreach Attempt    Call within 2 business days of discharge: Yes   Attempted to reach patient for transitions of care follow up. Unable to reach patient. Patient: Crystal Weaver Patient : 1935 MRN: <[id number]>    Last Discharge [address]       Date Complaint Diagnosis Description Type Department Provider    23 Fever; Shortness of Breath Sepsis, due to unspecified organism, unspecified whether acute organ dysfunction present ([address]) . .. ED to Hosp-Admission (Discharged) (Lottie Oneil) Joshua Palacios MD; Breanna Gray MD        # 1 attempt-  Attempted initial 24 hour hospital follow up call. Left a [employer] compliant message with name and call back information. Requested return call to [phone]. Writer contacted [facility] spoke to [address] [place] [address],Third Floor scheduled for 23    Was this an external facility discharge?  No Discharge Facility: [facility] AT THE [facility]    Noted following upcoming appointments from discharge chart review:   [facility] follow up appointment(s):   Future Appointments   Date Time Provider [address]   2023  3:30 PM Monika Navarrete MD Neuro Endo [facility]   2023  1:20 PM GENA Sharma - CNP Tiff Prim Ca [facility]   2023  3:40 PM Tootie Tatum MD TIFF KID&HYP [facility]   2023  2:00 PM GENA Sharma - CNP Tiff Prim Ca [facility]   2024  2:00 PM Jaun Murray MD TIFF PULM [facility]     Non-BS follow up appointment(s):

## 2023-07-25 ENCOUNTER — CARE COORDINATION (OUTPATIENT)
Dept: CASE MANAGEMENT | Age: 88
End: 2023-07-25

## 2023-07-25 DIAGNOSIS — J18.9 SEPSIS DUE TO PNEUMONIA (HCC): Primary | ICD-10-CM

## 2023-07-25 DIAGNOSIS — A41.9 SEPSIS DUE TO PNEUMONIA (HCC): Primary | ICD-10-CM

## 2023-07-25 PROCEDURE — 1111F DSCHRG MED/CURRENT MED MERGE: CPT

## 2023-07-25 NOTE — CARE COORDINATION
[facility] Care Transitions Initial Follow Up Call    Call within 2 business days of discharge: Yes    Patient Current Location:  Home: [address]  [address] [facility]    Care Transition Nurse contacted the patient by telephone to perform post hospital discharge assessment. Verified name and  with patient as identifiers. Provided introduction to self, and explanation of the Care Transition Nurse role. Patient: Jeffy Cedeno Patient : 1935   MRN: <[id number]>  Reason for Admission:   Discharge Date: 23 RARS: Readmission Risk Score: 31      Last Discharge [address]       Date Complaint Diagnosis Description Type Department Provider    23 Fever; Shortness of Breath Sepsis, due to unspecified organism, unspecified whether acute organ dysfunction present ([address]) . .. ED to Hosp-Admission (Discharged) (Neelima Kennedy) Sujata Roman MD; Jessika Paula MD            Was this an external facility discharge? No Discharge Facility: [address] [place] [address] to be reviewed by the provider   Additional needs identified to be addressed with provider: No  none               Method of communication with provider: none. Writer spoke to patient's wife, patient doing much better, reviewed discharge instructions and medications, 1111F order completed, patient's VN was out today, denies any c/o fever, chills, n/v/d, sob or cp, reviewed s/s of CHF, patient has BLE edema, wears compression stockings every day, discussed RPM, wife going to call insurance company , provided billing codes, reviewed upcoming appointments, labs and tests, explained role of CTN, provided contact information, will follow//JU    Care Transition Nurse reviewed discharge instructions, medical action plan, and red flags with family who verbalized understanding. The family was given an opportunity to ask questions and does not have any further questions or concerns at this time. Were discharge instructions available to patient? Yes.  Reviewed

## 2023-07-26 ENCOUNTER — CARE COORDINATION (OUTPATIENT)
Dept: CARE COORDINATION | Age: 88
End: 2023-07-26

## 2023-07-26 DIAGNOSIS — J44.1 COPD WITH ACUTE EXACERBATION (HCC): Primary | ICD-10-CM

## 2023-07-26 RX ORDER — IPRATROPIUM BROMIDE AND ALBUTEROL SULFATE 2.5; .5 MG/3ML; MG/3ML
3 SOLUTION RESPIRATORY (INHALATION)
Qty: 360 ML | Refills: 0 | OUTPATIENT
Start: 2023-07-26

## 2023-07-26 NOTE — CARE COORDINATION
Referral from CTN, Butch Richard, PHYLICIA-  Dory Espinosaernesto Miller, patient's wife could use some information about diet, thank you//JU     Attempted to reach patient's wife for consult. LVM with contact information. Will   Attempt to reach again within 1 week.    ANGELICA Milner

## 2023-07-27 ENCOUNTER — HOSPITAL ENCOUNTER (OUTPATIENT)
Dept: GENERAL RADIOLOGY | Age: 88
Discharge: HOME | End: 2023-07-29
Payer: MEDICARE

## 2023-07-27 ENCOUNTER — HOSPITAL ENCOUNTER (OUTPATIENT)
Age: 88
Discharge: HOME | End: 2023-07-29
Payer: MEDICARE

## 2023-07-27 DIAGNOSIS — J44.9 CHRONIC OBSTRUCTIVE PULMONARY DISEASE, UNSPECIFIED COPD TYPE (HCC): Chronic | ICD-10-CM

## 2023-07-27 DIAGNOSIS — J15.9 COMMUNITY ACQUIRED BACTERIAL PNEUMONIA: ICD-10-CM

## 2023-07-27 PROCEDURE — 71046 X-RAY EXAM CHEST 2 VIEWS: CPT

## 2023-07-28 ENCOUNTER — TELEPHONE (OUTPATIENT)
Dept: PRIMARY CARE CLINIC | Age: 88
End: 2023-07-28

## 2023-07-28 ENCOUNTER — CARE COORDINATION (OUTPATIENT)
Dept: CASE MANAGEMENT | Age: 88
End: 2023-07-28

## 2023-07-28 LAB
APPEARANCE: CLEAR
BACTERIA: ABNORMAL PER HPF
BILIRUBIN: NEGATIVE
COLOR: YELLOW
EPITHELIAL CELLS: ABNORMAL PER HPF (ref 0–5)
GLUCOSE BLD-MCNC: NEGATIVE MG/DL
HYALINE CASTS: ABNORMAL
KETONES, URINE: NEGATIVE
LEUKOCYTE ESTERASE, URINE: NEGATIVE
NITRITE, URINE: NEGATIVE
OCCULT BLOOD,URINE: ABNORMAL
PH: 7.5 (ref 5–9)
PROTEIN, URINE: NEGATIVE
RBC: ABNORMAL PER HPF (ref 0–5)
SP GRAVITY MISCELLANEOUS: 1.01 (ref 1–1.03)
UROBILINOGEN, URINE: 0.2
WBC: ABNORMAL PER HPF (ref 0–5)

## 2023-07-28 NOTE — TELEPHONE ENCOUNTER
----- Message from [address] [facility] GENA Marquez - CNP sent at 7/27/2023  5:38 PM EDT -----  Chest x-ray is improving.

## 2023-07-28 NOTE — CARE COORDINATION
[facility] Care Transitions Follow Up Call    Patient Current Location:  Home: [facility] Care Coordinator contacted the family by telephone to follow up after admission on 2023. Verified name and  with patient as identifiers. Patient: Karey Persaud  Patient : 1935   MRN: [id number]  Reason for Admission: Community acquired bacterial pneumonia  Discharge Date: 23 RARS: Readmission Risk Score: 31      Needs to be reviewed by the provider   Additional needs identified to be addressed with provider: no  none             Method of communication with provider: none. Writer spoke with patient's wife Jerad Park for care transitions follow up. She reports he is doing good and is feeling well. Denies chest pain,fever,chills,SOB or cough/congestion. Wife reports that she checked his BP a few minutes ago and it was low 96/45, states she also checked hers and it was low  (90's/50') she states patient does not feel dizzy or lightheaded so she thinks there may be an issue with their BP machine. Writer asked if he was wearing his compression stockings, she states he had not yet put them on. Advised to put compression stockings on and to recheck BP in 20-30 minutes, if still low to call writer back or call PCP. Advised to also take BP cuff to PCP appointment on Monday to verify that it is working correctly. She verbalized her understanding. Jerad Park called back to report that BP has gone back up, is now 107/63. Patient still denies lightheadedness/dizziness. Addressed changes since last contact:  none  Discussed follow-up appointments. If no appointment was previously scheduled, appointment scheduling offered: Yes. Is follow up appointment scheduled within 7 days of discharge?  No.    Follow Up  Future Appointments   Date Time Provider [address]   2023  1:20 PM GENA Jones - CNP Tiff Prim Ca [facility]   2023  3:40 PM Nichole Helm MD TIFF [facility]

## 2023-07-31 ENCOUNTER — OFFICE VISIT (OUTPATIENT)
Dept: PRIMARY CARE CLINIC | Age: 88
End: 2023-07-31

## 2023-07-31 VITALS
DIASTOLIC BLOOD PRESSURE: 74 MMHG | TEMPERATURE: 98.5 F | RESPIRATION RATE: 22 BRPM | WEIGHT: 213.7 LBS | OXYGEN SATURATION: 96 % | HEART RATE: 76 BPM | BODY MASS INDEX: 29.81 KG/M2 | SYSTOLIC BLOOD PRESSURE: 118 MMHG

## 2023-07-31 DIAGNOSIS — J18.9 SEPSIS DUE TO PNEUMONIA (HCC): Primary | ICD-10-CM

## 2023-07-31 DIAGNOSIS — Z09 HOSPITAL DISCHARGE FOLLOW-UP: ICD-10-CM

## 2023-07-31 DIAGNOSIS — A41.9 SEPSIS DUE TO PNEUMONIA (HCC): Primary | ICD-10-CM

## 2023-07-31 DIAGNOSIS — N30.01 ACUTE CYSTITIS WITH HEMATURIA: ICD-10-CM

## 2023-07-31 RX ORDER — ATORVASTATIN CALCIUM 40 MG/1
TABLET, FILM COATED ORAL
Qty: 48 TABLET | Refills: 0 | OUTPATIENT
Start: 2023-07-31

## 2023-07-31 SDOH — ECONOMIC STABILITY: FOOD INSECURITY: WITHIN THE PAST 12 MONTHS, YOU WORRIED THAT YOUR FOOD WOULD RUN OUT BEFORE YOU GOT MONEY TO BUY MORE.: PATIENT DECLINED

## 2023-07-31 SDOH — ECONOMIC STABILITY: INCOME INSECURITY: HOW HARD IS IT FOR YOU TO PAY FOR THE VERY BASICS LIKE FOOD, HOUSING, MEDICAL CARE, AND HEATING?: PATIENT DECLINED

## 2023-07-31 SDOH — ECONOMIC STABILITY: FOOD INSECURITY: WITHIN THE PAST 12 MONTHS, THE FOOD YOU BOUGHT JUST DIDN'T LAST AND YOU DIDN'T HAVE MONEY TO GET MORE.: PATIENT DECLINED

## 2023-07-31 ASSESSMENT — PATIENT HEALTH QUESTIONNAIRE - PHQ9
SUM OF ALL RESPONSES TO PHQ QUESTIONS 1-9: 0
SUM OF ALL RESPONSES TO PHQ9 QUESTIONS 1 & 2: 0
SUM OF ALL RESPONSES TO PHQ QUESTIONS 1-9: 0
SUM OF ALL RESPONSES TO PHQ QUESTIONS 1-9: 0
2. FEELING DOWN, DEPRESSED OR HOPELESS: 0
1. LITTLE INTEREST OR PLEASURE IN DOING THINGS: 0
SUM OF ALL RESPONSES TO PHQ QUESTIONS 1-9: 0

## 2023-07-31 NOTE — PROGRESS NOTES
Post-Discharge Transitional Care  Follow Up      Jeffy Cedeno   YOB: 1935    Date of Office Visit:  7/31/2023  Date of Hospital Admission: 7/18/23  Date of Hospital Discharge: 7/21/23  Risk of hospital readmission (high >=14%. Medium >=10%) :Readmission Risk Score: 31      Care management risk score Rising risk (score 2-5) and Complex Care (Scores >=6): No Risk Score On File     Non face to face  following discharge, date last encounter closed (first attempt may have been earlier): 07/25/2023    Call initiated 2 business days of discharge: Yes    ASSESSMENT/PLAN:   Hospital discharge follow-up  -     MO DISCHARGE MEDS RECONCILED W/ CURRENT OUTPATIENT MED LIST  Sepsis due to pneumonia ([address])  Acute cystitis with hematuria  -     Urinalysis with Reflex to Culture; Future      Medical Decision Making: moderate complexity  Return if symptoms worsen or fail to improve. On this date 7/31/2023 I have spent 51 minutes reviewing previous notes, test results and face to face with the patient discussing the diagnosis and importance of compliance with the treatment plan as well as documenting on the day of the visit. Subjective:   HPI:  Follow up of Hospital problems/diagnosis(es):     Hospital Course: The patient was admitted for the management of shortness of breath secondary to suspected acute bacterial pneumonia. He has a history of both CHF and COPD. He was started empirically on IV antibiotics and provided with breathing treatments as well as supplemental O2. Cultures were obtained, positive for ESBL sensitive to Bactrim. Today on day of discharge pt feels better with no further complaints. Vitals and Labs are at pts baseline. He will have a repeat CXR and Urine culture with close OP follow-up. He will [facility] to have [facility]. Inpatient course: Discharge summary reviewed- see chart. Interval history/Current status:     Mary Bowman is here today and feeling much better.   He

## 2023-07-31 NOTE — PATIENT INSTRUCTIONS
SURVEY:     You may be receiving a survey from [employer] regarding your visit today. Please complete the survey to enable us to provide the highest quality of care to you and your family. If you cannot score us a very good on any question, please call the office to discuss how we could have made your experience a very good one.      Thank you,    Ceeclia Snider, APRN-GUERITA Pickett, APRN-CNP  Mery Bustos, LPN  Porfirio Hem, CMA  Hany Sicks, CMA  Rika, CMA  Haily, PCA  Vee, PM

## 2023-08-01 ENCOUNTER — TELEPHONE (OUTPATIENT)
Dept: PRIMARY CARE CLINIC | Age: 88
End: 2023-08-01

## 2023-08-01 LAB
APPEARANCE: CLEAR
BACTERIA: ABNORMAL PER HPF
BILIRUBIN: NEGATIVE
COLOR: YELLOW
EPITHELIAL CELLS: ABNORMAL PER HPF (ref 0–5)
GLUCOSE BLD-MCNC: NEGATIVE MG/DL
HYALINE CASTS: ABNORMAL
KETONES, URINE: NEGATIVE
LEUKOCYTE ESTERASE, URINE: ABNORMAL
NITRITE, URINE: NEGATIVE
OCCULT BLOOD,URINE: NEGATIVE
PH: 6 (ref 5–9)
PROTEIN, URINE: ABNORMAL
RBC: ABNORMAL PER HPF (ref 0–5)
SP GRAVITY MISCELLANEOUS: 1.01 (ref 1–1.03)
UROBILINOGEN, URINE: 0.2
WBC: ABNORMAL PER HPF (ref 0–5)

## 2023-08-01 NOTE — TELEPHONE ENCOUNTER
----- Message from [facility] [facility], APRN - CNP sent at 8/1/2023  7:27 AM EDT -----  Improved, blood no longer present. Thank you.

## 2023-08-02 ENCOUNTER — TELEPHONE (OUTPATIENT)
Dept: PRIMARY CARE CLINIC | Age: 88
End: 2023-08-02

## 2023-08-02 ENCOUNTER — CARE COORDINATION (OUTPATIENT)
Dept: CASE MANAGEMENT | Age: 88
End: 2023-08-02

## 2023-08-02 ENCOUNTER — CARE COORDINATION (OUTPATIENT)
Dept: CARE COORDINATION | Age: 88
End: 2023-08-02

## 2023-08-02 RX ORDER — TRIAMCINOLONE ACETONIDE 0.25 MG/G
CREAM TOPICAL
Qty: 15 G | Refills: 0 | Status: SHIPPED | OUTPATIENT
Start: 2023-08-02

## 2023-08-02 NOTE — CARE COORDINATION
07/21/2023    BUN 18 07/21/2023     (L) 07/21/2023    K 4.0 07/21/2023     07/21/2023    CO2 23 07/21/2023         NUTRITION DIAGNOSIS    #1 Problem  Food and nutrition-related knowledge deficit       Etiology  related to lack of prior nutrition related education       Signs/Symptoms  as evidenced by referral for nutrition education for CHF      NUTRITION INTERVENTION  Nutrition Prescription:    cardiac diet and diabetic diet providing 2000 kcals/day    Estimated daily CHO Needs: 60 gms/meal 15-30gms/snack  Protein needs:77gms/d  Fluid needs:2200cc/day      Patient Arnold Check with patient's wife who wanted information on low sodium/heart healthy   1. Discussed DASH guidelines- lowfat/cholesterol and low sodium. Reviewed reading food labels-what to look for on labels. Encouraged to limit saturated fat, trans fat, cholesterol and sodium intake. 2. Discussed avoiding canned, prepackaged foods, processed meats and cheese. Discussed processed meats in detail to avoid/limit- sausage, johnson, bologna, ham, ect- patient is eating some of these  states will cut back. Goal is <2,300gm of sodium/day. 3. Discussed best ways to cook foods-baking, broiling, grilling or steaming foods. Discussed healthy fats- using olive or canola oil if adding oil and encouraged to use butter/margarine sparingly. Discussed the different types of fat in diet- trans/saturated fat and cholesterol-encouraged to limit. Discussed foods high in each- patient will be sent a list of foods to avoid/limit. 4. Discussed shopping the outer rim of the grocery store where fresher foods are found. Discussed seasonings that can be used that do not contain salt. Patient states stopped adding salt to foods. We also discussed eating out and making healthier choices- encouraged to avoid fast food. Patient states eats at home, does not eat out often.   5. Discussed portion control and using plate method at meals to increase intake of non-starchy

## 2023-08-02 NOTE — TELEPHONE ENCOUNTER
Patient's wife contacted the office in regards to forgetting to mention something at his appt yesterday. Patient wears compression stockings and is starting to get itchy at the top of compression stockings and the skin. Patient's wife wondering if [facility] can send something in to help with the itching. Patient has been trying lotion on the area with no relief. Please advise.

## 2023-08-02 NOTE — CARE COORDINATION
[address] [place] [address] [facility] [address] Care Transitions Follow Up Call    Patient Current Location:  Home: [facility] Care Coordinator contacted the family by telephone to follow up after admission on 2023. Verified name and  with family as identifiers. Patient: Abe Shah  Patient : 1935   MRN: [id number]  Reason for Admission: Community acquired bacterial pneumonia  Discharge Date: 23 RARS: Readmission Risk Score: 31      Needs to be reviewed by the provider   Additional needs identified to be addressed with provider: No  none             Method of communication with provider: none. Writer spoke with patient's wife Estefany Cano for his care transitions follow up call. She reports he is doing good. Is a little fatigued today from his PT yesterday. States they saw his PCP yesterday, no changes were made. Repeat CXR and urine looked good. Denies any SOB,chest pain,dizziness. No cough or urinary frequency/urgency or hematuria noted. BP has been stable. Has not had any more low readings. Patient continues to wear support hose daily. Addressed changes since last contact:   had PCP appt yesterday no changes made-repeat CXR and urine were okay. Discussed follow-up appointments. If no appointment was previously scheduled, appointment scheduling offered: Yes. Is follow up appointment scheduled within 7 days of discharge? Yes. Follow Up  Future Appointments   Date Time Provider [address]   2023  3:40 PM Charly Bocanegra MD TIFF [facility]YP [facility]   2023  3:00 PM Salvador Andrew MD Neuro Endo [facility]   2023  2:00 PM GENA Osorio - CNP Tiff Prim Ca [facility]   2024  2:00 PM Jyotsna Leger MD TIFF PULM [facility]     [facility] [address] [facility] follow up appointment(s): N/A    LPN Care Coordinator reviewed discharge instructions with patient and discussed any barriers to care and/or understanding of plan of care after discharge.  Discussed appropriate site of care based on symptoms

## 2023-08-08 ENCOUNTER — OFFICE VISIT (OUTPATIENT)
Dept: NEPHROLOGY | Age: 88
End: 2023-08-08
Payer: MEDICARE

## 2023-08-08 VITALS
HEIGHT: 71 IN | BODY MASS INDEX: 32.17 KG/M2 | DIASTOLIC BLOOD PRESSURE: 56 MMHG | WEIGHT: 229.8 LBS | SYSTOLIC BLOOD PRESSURE: 113 MMHG | HEART RATE: 64 BPM | RESPIRATION RATE: 18 BRPM | TEMPERATURE: 97.6 F

## 2023-08-08 DIAGNOSIS — E87.1 HYPONATREMIA: Primary | ICD-10-CM

## 2023-08-08 PROCEDURE — 1123F ACP DISCUSS/DSCN MKR DOCD: CPT | Performed by: INTERNAL MEDICINE

## 2023-08-08 PROCEDURE — 3078F DIAST BP <80 MM HG: CPT | Performed by: INTERNAL MEDICINE

## 2023-08-08 PROCEDURE — 99213 OFFICE O/P EST LOW 20 MIN: CPT | Performed by: INTERNAL MEDICINE

## 2023-08-08 PROCEDURE — 3074F SYST BP LT 130 MM HG: CPT | Performed by: INTERNAL MEDICINE

## 2023-08-08 NOTE — PROGRESS NOTES
[facility]        Outpatient Follow-Up note         8/8/2023 2:57 PM    Patient Name:   Nitza Gómez:    1935  Primary Care Physician:  GENA Minaya CNP   [facility] [address] [facility]  [place]  [facility] [address]  Dept: [phone]     Chief Complaint / Reason for follow-up : Follow Up of hyponatremia     Interval History :  Patient seen and examined by me. Was in the hospital for GI bleed     Past History :  Past Medical History:   Diagnosis Date    COPD (chronic obstructive pulmonary disease) ([address])     Diabetes mellitus ([address])     Hx of echocardiogram 02/24/2017    [facility]    Hyperlipidemia     Hypothyroidism     MI (myocardial infarction) [facility])     Stroke [facility])     Thyroid disease     Type II or unspecified type diabetes mellitus without mention of complication, not stated as uncontrolled      Past Surgical History:   Procedure Laterality Date    CARDIAC SURGERY  02/24/2017    Stent placed  Dr Allyn Bryan Bilateral 06/15/2023    ANGIOGRAM CAROTID CEREBRAL BILATERAL    COLONOSCOPY      COLONOSCOPY N/A 6/23/2023    COLONOSCOPY CONTROL HEMORRHAGE performed by Daina Garrett MD at [address]      right ankle    HERNIA REPAIR      SIGMOIDOSCOPY  06/23/2023        Medications :     Outpatient Medications Marked as Taking for the 8/8/23 encounter (Office Visit) with Richard Vazquez MD   Medication Sig Dispense Refill    triamcinolone (KENALOG) 0.025 % cream Apply topically 2 times daily.  15 g 0    tamsulosin (FLOMAX) 0.4 MG capsule Take 2 capsules by mouth daily 180 capsule 1    levothyroxine (SYNTHROID) 137 MCG tablet Take 1 tablet by mouth Daily      propranolol (INDERAL LA) 60 MG extended release capsule Take 1 capsule by mouth daily      calcium carbonate (TUMS) 500 MG chewable tablet Take 1 tablet by

## 2023-08-08 NOTE — PATIENT INSTRUCTIONS
SURVEY:    You may be receiving a survey from [employer] regarding your visit today. Please complete the survey to enable us to provide the highest quality of care to you and your family. If you cannot score us a very good on any question, please call the office to discuss how we could have made your experience a very good one. Thank you.

## 2023-08-14 ENCOUNTER — CARE COORDINATION (OUTPATIENT)
Dept: CASE MANAGEMENT | Age: 88
End: 2023-08-14

## 2023-08-14 NOTE — CARE COORDINATION
Care Transitions Outreach Attempt #1    Call within 2 business days of discharge: Yes   Attempted to reach patient for transitions of care follow up. Unable to reach patient. HIPAA compliant message left on  requesting a return call. Patient: Abe Shah Patient : 1935 MRN: [id number]    Last Discharge [address] [facility] [facility]       Date Complaint Diagnosis Description Type Department Provider    23 Fever; Shortness of Breath Sepsis, due to unspecified organism, unspecified whether acute organ dysfunction present ([facility]) . .. ED to Hosp-Admission (Discharged) (Milly Edwards) Radhika Bernard MD; Todd Shook MD              Was this an external facility discharge?  No Discharge Facility: [facility] AT THE [facility]    Noted following upcoming appointments from discharge chart review:   [address] [place] [address] [facility] [address] follow up appointment(s):   Future Appointments   Date Time Provider [address]   2023  3:00 PM Salvador Andrew MD Neuro Endo [facility]   2023  2:00 PM GENA Roach - CNP Tiff Prim Ca [facility]   2024  2:00 PM Jyotsna Leger MD TIFF PULM [facility]   2024 12:00 PM Charly Bocanegra MD TIFF KID&HYP [facility]        Panfilo Mandujano LPN  Care Transition Nurse  [phone]

## 2023-08-16 ENCOUNTER — CARE COORDINATION (OUTPATIENT)
Dept: CASE MANAGEMENT | Age: 88
End: 2023-08-16

## 2023-08-16 ENCOUNTER — CARE COORDINATION (OUTPATIENT)
Dept: CARE COORDINATION | Age: 88
End: 2023-08-16

## 2023-08-16 NOTE — CARE COORDINATION
After Visit Summary   1/23/2017    Ambreen Andrew    MRN: [id number]           Patient Information     Date Of Birth          2008        Visit Information        Provider Department      1/23/2017 10:30 AM Beth Keller MD [facility]        Today's Diagnoses     Acute suppurative otitis media of both ears without spontaneous rupture of tympanic membranes, recurrence not specified    -  1       Care Instructions    Ear Infection (Otitis Media)   What is an ear infection?   An ear infection is an infection of the middle ear (the space behind the eardrum). It is most often caused by bacteria. It usually is a complication of a cold and starts on the third day of the cold. A cold blocks off the tube that connects the middle ear to the back of the throat (the eustachian tube).  Most children will have at least one ear infection, and over one fourth of these children will have repeated ear infections. Children are most likely to have ear infections between the ages of 6 months and 2 years, but they continue to be a common childhood illness until the age of 8 years.  In 5% to 10% of children, the pressure in the middle ear causes the eardrum to rupture and drain a yellow or cloudy fluid. This small hole usually heals over the next few days.  If the following treatment is carried out your child should be fine. Permanent damage to the ear or to the hearing is very rare.  What are the symptoms?  Your child's ear is painful because trapped, infected fluid puts pressure on the eardrum, causing it to bulge. Other symptoms are irritability and poor sleep. Some children have trouble hearing. A few have dizziness. If the eardrum ruptures (tears), cloudy fluid or pus will drain from the ear canal.  How can I take care of my child?   Antibiotics (For mild ear infections, antibiotics may not be needed.)   Your child needs the antibiotic prescribed by your healthcare  [facility] Care Transitions Follow Up Call    Patient Current Location:  Home: [address] [place] [address] Road  [address]    Care Transition Nurse contacted the patient by telephone to follow up after admission on 23. Verified name and  with patient as identifiers. Patient: Cierra Le  Patient : 1935   MRN: <[id number]>  Reason for Admission:   Discharge Date: 23 RARS: Readmission Risk Score: 31      Needs to be reviewed by the provider   Additional needs identified to be addressed with provider: No  none             Method of communication with provider: none. Writer spoke to patient, he is doing well, no new needs at this time just finished PT yesterday through [facility] therapy, been feeling really good, denied any c/o fever, chills, n//v/d,sob r chest pain, appetite has been good, will continue to follow//JU    Addressed changes since last contact:  none  Discussed follow-up appointments. If no appointment was previously scheduled, appointment scheduling offered: Yes. Is follow up appointment scheduled within 7 days of discharge? Yes.     Follow Up  Future Appointments   Date Time Provider [address] [place] [address] Ct   10/6/2023  1:00 PM Olimpia Burrows MD Neuro Endo [facility]   2023  2:00 PM Rama Snider APRN - CNP Tiff Prim Ca [facility]   2024  2:00 PM Nelida Spear MD TIFF PULM [facility]   2024 12:00 PM Mannie العلي MD TIFF KID&HYP [facility]        Care Transitions Subsequent and Final Call    Subsequent and Final Calls  Do you have any ongoing symptoms?: No  Have your medications changed?: No  Do you have any questions related to your medications?: No  Do you currently have any active services?: Yes  Are you currently active with any services?: Home Health  Do you have any needs or concerns that I can assist you with?: No  Care Transitions Interventions    Physical Therapy: Completed Other Services: Completed     Registered Dietician: Completed     Occupational Therapy: Completed provider. This medicine will kill the bacteria that are causing the ear infection.  Try not to forget any of the doses. If your child goes to school or a , arrange for someone to give the afternoon dose. If the medicine is a liquid, store the antibiotic in the refrigerator and use a measuring spoon to be sure that you give the right amount. Give the medicine until the bottle is empty or all the pills are gone. (Do not save the antibiotic for the next illness because it loses its strength.) Even though your child will feel better in a few days, give the antibiotic until it is completely gone. Finishing the medicine will keep the ear infection from flaring up again.  Pain relief   Acetaminophen or ibuprofen can be used to help with the earache or fever over 102 F (39 C) for a few days until the antibiotic takes effect. These medicines usually control the pain within 1 to 2 hours. Earaches tend to hurt more at bedtime.  To help ease the pain, you can put a cold pack or ice wrapped in a wet washcloth over the ear. This may decrease the swelling and pressure inside. Some providers recommend a heating pad or warm, moist washcloth instead. Remove the cold or heat in 20 minutes to prevent frostbite or a burn.  Restrictions   Your child can go outside and does not need to cover the ears. Swimming is fine as long as there is no perforation (tear) in the eardrum or drainage from the ear. Children with ear infections can travel safely by aircraft if they are taking antibiotics. Also give them a dose of ibuprofen 1 hour before take-off to deal with any discomfort they might have. Most will not have an increase in their ear pain while flying. While coming down in elevation during a airline flight or a trip from the mountains, have your child swallow fluids, suck on a pacifier, or chew gum.  Your child can return to school or day care when he or she is feeling better and the fever is gone. Ear infections are not  contagious.  Ear recheck   Your child should be seen by the healthcare provider in 2 to 3 weeks. At that visit, the eardrum will be checked to make sure that the infection is cleared up and no more treatment is needed. Your healthcare provider may also want to test your child's hearing. Follow-up exams are very important, particularly if the infection has caused a hole in the eardrum.  How can I help prevent ear infections?   If your child has a lot of ear infections, it's time to look at how you can prevent some of them. If some of the following items apply to your child, try to use them or talk to your healthcare provider about them.  Protect your child from second-hand tobacco smoke. Passive smoking increases the frequency and severity of infections. Be sure no one smokes in your home or at day care.   Reduce your child's exposure to colds during the first year of life. Most ear infections start with a cold. Try to delay the use of large day care centers during the first year by using a sitter in your home or a small home-based day care.   Breast-feed your baby during the first 6 to 12 months of life. Antibodies in breast milk reduce the rate of ear infections. If you're breast-feeding, continue. If you're not, consider it with your next child.   Give your child all recommended immunizations. The flu vaccine and the pneumococcal vaccine will protect your child from some ear infections.   Avoid bottle propping. If you bottle-feed, hold your baby with the head higher than the stomach. Feeding in the horizontal position can cause formula to flow back into the eustachian tube. Allowing an infant to hold his own bottle also can cause milk to drain into the middle ear.   Control allergies. If your infant always has a runny nose, a milk allergy may be the problem. This is more likely if your child has other allergies such as eczema.   Check for snoring. If your toddler snores every night or breathes through his mouth,  he may have large adenoids. Large adenoids can lead to ear infections. Talk to your healthcare provider about this.   When should I call my child's healthcare provider?  Call IMMEDIATELY if:  Your child develops a stiff neck.   Your child acts very sick.   Call during office hours if:  The fever or pain is not gone after your child has taken the antibiotic for 48 hours.   You have other questions or concerns.           Follow-ups after your visit        Your next 10 appointments already scheduled     May 17, 2017  9:00 AM   Return Metabolic Visit with FARIDA Morales CNP   Peds Metabolism ([facility])    [facility]  [address]  [address] [place] [id number] [phone]            May 17, 2017  9:30 AM   New Patient Visit with Erasto Crespo PhD LP   Peds Psychology ([facility])    [facility]  [address]  [address] [place] [id number] [phone]              Who to contact     If you have questions or need follow up information about today's clinic visit or your schedule please contact [facility] directly at [phone].  Normal or non-critical lab and imaging results will be communicated to you by MyChart, letter or phone within 4 business days after the clinic has received the results. If you do not hear from us within 7 days, please contact the clinic through [employer] or phone. If you have a critical or abnormal lab result, we will notify you by phone as soon as possible.  Submit refill requests through [employer] or call your pharmacy and they will forward the refill request to us. Please allow 3 business days for your refill to be completed.          Additional Information About Your Visit        [employer] Information     [employer] gives you secure access to your electronic health record. If you see a primary care provider, you can also send messages to your care team and make appointments. If you have  "questions, please call your primary care clinic.  If you do not have a primary care provider, please call [phone] and they will assist you.        Care EveryWhere ID     This is your Care EveryWhere ID. This could be used by other organizations to access your [facility] medical records  [id number]        Your Vitals Were     Pulse Temperature Height BMI (Body Mass Index)          87 98.4  F (36.9  C) (Oral) 3' 9.67\" (1.16 m) 15.91 kg/m2         Blood Pressure from Last 3 Encounters:   01/23/17 110/43   11/07/16 94/54   10/06/16 95/69    Weight from Last 3 Encounters:   01/23/17 47 lb 3.2 oz (21.41 kg) (57.41 %*)   11/07/16 46 lb 9.6 oz (21.138 kg) (60.48 %*)   10/06/16 46 lb 8.3 oz (21.1 kg) (62.59 %*)     * Growth percentiles are based on [facility] 2-20 Years data.              Today, you had the following     No orders found for display         Today's Medication Changes          These changes are accurate as of: 1/23/17 10:54 AM.  If you have any questions, ask your nurse or doctor.               Start taking these medicines.        Dose/Directions    amoxicillin 400 MG/5ML suspension   Commonly known as:  AMOXIL   Used for:  Acute suppurative otitis media of both ears without spontaneous rupture of tympanic membranes, recurrence not specified   Started by:  Beth Keller MD        Dose:  12.5 mL   Take 12.5 mLs (1,000 mg) by mouth 2 times daily for 10 days   Quantity:  250 mL   Refills:  0            Where to get your medicines      These medications were sent to [id number] - [facility] [address] AT [facility] [address], [facility] [address]     Phone:  [phone]    - amoxicillin 400 MG/5ML suspension             Primary Care Provider Office Phone # Fax #    Beth Keller [phone]       [place] [id number] [facility] [address]        Thank you!     Thank you for choosing [facility] " [facility]  for your care. Our goal is always to provide you with excellent care. Hearing back from our patients is one way we can continue to improve our services. Please take a few minutes to complete the written survey that you may receive in the mail after your visit with us. Thank you!             Your Updated Medication List - Protect others around you: Learn how to safely use, store and throw away your medicines at www.disposemymeds.org.          This list is accurate as of: 1/23/17 10:54 AM.  Always use your most recent med list.                   Brand Name Dispense Instructions for use    acetaminophen 160 MG/5ML elixir    TYLENOL    480 mL    Take 8 mLs (256 mg) by mouth every 4 hours as needed for pain (mild)       amoxicillin 400 MG/5ML suspension    AMOXIL    250 mL    Take 12.5 mLs (1,000 mg) by mouth 2 times daily for 10 days       EQL VITAMIN D GUMMIES CHILD PO          ibuprofen 100 MG/5ML suspension    CHILD IBUPROFEN    273 mL    Take 9 mLs (180 mg) by mouth every 4 hours as needed for fever or moderate pain       PKU PERIFLEX PETER PLUS Powd     4340 g    Take 140 g by mouth daily

## 2023-08-16 NOTE — CARE COORDINATION
Nutrition Care Coordinator Follow-Up visit:    Food Recall: eating 2-3 meals/d    Activity Level:  Sedentary:X  Lightly Active: Moderately Active:  Very Active:    Adult BMI:  Underweight (below 18.5)  Normal Weight (18.5-24.9)  Overweight (25-29. 9)  Obese (30-39.9)X  Morbidly Obese (>40)    Plan:  Plan was established with patient:  Increase dietary fiber by consuming whole grains, fruits and vegetables:X  Limit dietary cholesterol to >200mg/day: Increase water intake:  Avoid added sugar:X  Avoid sweetened beverages:X  Choose lean meats:X  Limit sodium intake: X    Monitoring: Will monitor weight:  Will monitor adherence to meal plan:X  Will monitor adherence to exercise plan: Will monitor HGA1c:    Handouts Provided :  Low Carb snacking:  Carb counting /individual meal plan:  Portion Control:  Food Labels:X  Physical Activity:  Low Fat/Cholesterol:  Hypo/Hyperglycemia:  Calorie Controlled Meal Plan:  DASH guidelines: X    Goals: Increase water consumption to 8oz. 6-8 times daily:  Manage blood sugars by consuming 3 meals spaced every 4-5 hours with 2-3 snacks daily:reviewed  Increase fiber and decrease fat intake by consuming 1-2 fruit servings and 2-3 vegetable servings per day. Increase physical activity by:  Consume less than 2,000mg of sodium/day: discussed  Avoid consumption of sweetened beverages and added sugar by reading food labels:  Monitor blood sugars by using meter to check blood glucose before morning meal and 2 hours after a meal daily:  Decrease risk of coronary heart disease by consuming fish that contains omega-3 fatty acids at least twice a week, avoiding partially hydrogenated oil/trans fats and limiting saturated fat intake by reading food labels:discussed    Patient goals set:spoke with patient's wife  1. Reviewed DASH guidelines- lowfat/cholesterol and low sodium. Reviewed reading food labels-what to look for on labels.   Encouraged to limit saturated fat, trans fat, cholesterol

## 2023-08-24 ENCOUNTER — CARE COORDINATION (OUTPATIENT)
Dept: CASE MANAGEMENT | Age: 88
End: 2023-08-24

## 2023-08-24 DIAGNOSIS — E06.3 HYPOTHYROIDISM DUE TO HASHIMOTO'S THYROIDITIS: Primary | ICD-10-CM

## 2023-08-24 DIAGNOSIS — E03.8 HYPOTHYROIDISM DUE TO HASHIMOTO'S THYROIDITIS: Primary | ICD-10-CM

## 2023-08-24 RX ORDER — LEVOTHYROXINE SODIUM 137 UG/1
137 TABLET ORAL DAILY
Qty: 30 TABLET | Refills: 0 | Status: SHIPPED | OUTPATIENT
Start: 2023-08-24 | End: 2023-09-23

## 2023-08-24 NOTE — CARE COORDINATION
[facility] Transitions Follow Up Call    Patient Current Location:  Home: [address] [place] [address] Road  [address] [facility]    Care Transition Nurse contacted the family by telephone to follow up after admission on . Verified name and  with family as identifiers. Patient: Suzanne Gallego  Patient : 1935   MRN: [id number]  Reason for Admission: pna  Discharge Date: 23 RARS: Readmission Risk Score: 31      Needs to be reviewed by the provider   Additional needs identified to be addressed with provider: No  none             Method of communication with provider: none. Conversation:  Spoke with [facility] after 2 IDs. Pt is doing well considering his issues. He is reathing well and does not have an issue to stay above 90%. He still has edema and wears his compression stockings. He has a WOODY diet and educated to watch drinks. Pt drinks diet pepsi. He is also on a 1500 ml liquid limit. No questions on meds. Left contact info. Will close as pt doing well and is past 30 days. Addressed changes since last contact:  none  Discussed follow-up appointments. If no appointment was previously scheduled, appointment scheduling offered: Yes. Is follow up appointment scheduled within 7 days of discharge? Yes. Follow Up  Future Appointments   Date Time Provider [address]   10/6/2023  1:00 PM Roberta Grayson MD Neuro Endo [facility]   2023  2:00 PM Trevor Snider, APRN - CNP Tiff Prim Ca [facility]   2024  2:00 PM Khushbu Stewart MD TIFF PULM [facility]   2024 12:00 PM Nanci Mc MD TIFF KID&HYP [facility]     Non-[facility] follow up appointment(s):     Care Transition Nurse reviewed red flags with family and discussed any barriers to care and/or understanding of plan of care after discharge. Discussed appropriate site of care based on symptoms and resources available to patient including: PCP. The family agrees to contact the PCP office for questions related to their healthcare.      [facility]

## 2023-08-24 NOTE — TELEPHONE ENCOUNTER
Health Maintenance   Topic Date Due    Flu vaccine (1) 08/01/2023    COVID-19 Vaccine (5 - Booster for Early Skeeters series) 11/08/2023 (Originally 9/27/2022)    DTaP/Tdap/Td vaccine (1 - Tdap) 07/18/2024 (Originally 10/15/1954)    Annual Wellness Visit (AWV)  11/17/2023    Lipids  06/16/2024    Depression Screen  07/31/2024    Shingles vaccine  Completed    Pneumococcal 65+ years Vaccine  Completed    Hepatitis A vaccine  Aged Out    Hib vaccine  Aged Out    Meningococcal (ACWY) vaccine  Aged Out             (applicable per patient's age: Cancer Screenings, Depression Screening, Fall Risk Screening, Immunizations)    Hemoglobin A1C (%)   Date Value   06/16/2023 7.0 (H)   06/15/2023 6.9 (H)   02/02/2023 7.2 (H)     LDL Cholesterol (mg/dL)   Date Value   06/16/2023 57     AST (U/L)   Date Value   07/21/2023 27     ALT (U/L)   Date Value   07/21/2023 39     BUN (mg/dL)   Date Value   07/21/2023 18      (goal A1C is < 7)   (goal LDL is <100) need 30-50% reduction from baseline     BP Readings from Last 3 Encounters:   08/08/23 (!) 113/56   07/31/23 118/74   07/21/23 (!) 146/67    (goal /80)      All Future Testing planned in [facility]:  Lab Frequency Next Occurrence   Echo 2D w doppler w color complete Once 10/17/2022   Stress test (Марина Briggs) Once 36/62/5785   Basic Metabolic Panel Once 78/57/1346   Basic Metabolic Panel Once 90/03/7662   Urinalysis with Reflex to Culture Once 07/28/2023   Culture, Urine Once 07/21/2023   Urinalysis with Reflex to Culture Once 07/31/2023   Urinalysis with Microscopic Once 08/06/2024   Microalbumin / Creatinine Urine Ratio Once 08/06/2024   Protein / creatinine ratio, urine Once 08/06/2024   Renal Function Panel Once 08/06/2024       Next Visit Date:  Future Appointments   Date Time Provider [address] [place] [address]   10/6/2023  1:00 PM Benson Swain MD Neuro Endo [facility]   11/17/2023  2:00 PM GENA Riley - CNP Tiff Prim Ca [facility]   5/6/2024  2:00 PM Angel Lewis MD TIFF

## 2023-09-12 ENCOUNTER — CARE COORDINATION (OUTPATIENT)
Dept: CARE COORDINATION | Age: 88
End: 2023-09-12

## 2023-09-12 NOTE — CARE COORDINATION
Nutrition Care Coordinator Follow-Up visit:    Food Recall:eating 2-3 meals/d    Activity Level:  Sedentary:X  Lightly Active: Moderately Active:  Very Active:    Adult BMI:  Underweight (below 18.5)  Normal Weight (18.5-24.9)  Overweight (25-29. 9)  Obese (30-39.9)X  Morbidly Obese (>40)    Plan:  Plan was established with patient:  Increase dietary fiber by consuming whole grains, fruits and vegetables:X  Limit dietary cholesterol to >200mg/day: Increase water intake:  Avoid added sugar:X  Avoid sweetened beverages:X  Choose lean meats:X  Limit sodium intake: X    Monitoring: Will monitor weight:  Will monitor adherence to meal plan:X  Will monitor adherence to exercise plan: Will monitor HGA1c:    Handouts Provided :  Low Carb snacking:  Carb counting /individual meal plan:  Portion Control:  Food Labels:X  Physical Activity:  Low Fat/Cholesterol:  Hypo/Hyperglycemia:  Calorie Controlled Meal Plan:    Goals: Increase water consumption to 8oz. 6-8 times daily:  Manage blood sugars by consuming 3 meals spaced every 4-5 hours with 2-3 snacks daily:reviewed  Increase fiber and decrease fat intake by consuming 1-2 fruit servings and 2-3 vegetable servings per day. Increase physical activity by:  Consume less than 2,000mg of sodium/day: reviewed  Avoid consumption of sweetened beverages and added sugar by reading food labels:reviewed  Monitor blood sugars by using meter to check blood glucose before morning meal and 2 hours after a meal daily:  Decrease risk of coronary heart disease by consuming fish that contains omega-3 fatty acids at least twice a week, avoiding partially hydrogenated oil/trans fats and limiting saturated fat intake by reading food labels:reviewed    Patient goals set: 1. Reviewed DASH guidelines- lowfat/cholesterol and low sodium. Reviewed reading food labels-what to look for on labels. Encouraged to limit saturated fat, trans fat, cholesterol and sodium intake.   2. Reviewed avoiding

## 2023-09-19 DIAGNOSIS — E78.5 HYPERLIPIDEMIA, UNSPECIFIED HYPERLIPIDEMIA TYPE: Primary | ICD-10-CM

## 2023-09-19 RX ORDER — ATORVASTATIN CALCIUM 40 MG/1
40 TABLET, FILM COATED ORAL DAILY
Qty: 90 TABLET | Refills: 1 | Status: SHIPPED | OUTPATIENT
Start: 2023-09-19

## 2023-09-19 RX ORDER — LEVOTHYROXINE SODIUM 137 UG/1
137 TABLET ORAL DAILY
Qty: 90 TABLET | Refills: 1 | Status: SHIPPED | OUTPATIENT
Start: 2023-09-19

## 2023-09-19 NOTE — TELEPHONE ENCOUNTER
Health Maintenance   Topic Date Due    Hepatitis B vaccine (1 of 3 - Risk 3-dose series) Never done    Flu vaccine (1) 08/01/2023    COVID-19 Vaccine (5 - Moderna series) 11/08/2023 (Originally 9/27/2022)    DTaP/Tdap/Td vaccine (1 - Tdap) 07/18/2024 (Originally 10/15/1954)    Annual Wellness Visit (AWV)  11/17/2023    Lipids  06/16/2024    Depression Screen  07/31/2024    Shingles vaccine  Completed    Pneumococcal 65+ years Vaccine  Completed    Hepatitis A vaccine  Aged Out    Hib vaccine  Aged Out    Meningococcal (ACWY) vaccine  Aged Out             (applicable per patient's age: Cancer Screenings, Depression Screening, Fall Risk Screening, Immunizations)    Hemoglobin A1C (%)   Date Value   06/16/2023 7.0 (H)   06/15/2023 6.9 (H)   02/02/2023 7.2 (H)     LDL Cholesterol (mg/dL)   Date Value   06/16/2023 57     AST (U/L)   Date Value   07/21/2023 27     ALT (U/L)   Date Value   07/21/2023 39     BUN (mg/dL)   Date Value   07/21/2023 18      (goal A1C is < 7)   (goal LDL is <100) need 30-50% reduction from baseline     BP Readings from Last 3 Encounters:   08/08/23 (!) 113/56   07/31/23 118/74   07/21/23 (!) 146/67    (goal /80)      All Future Testing planned in [facility]:  Lab Frequency Next Occurrence   Echo 2D w doppler w color complete Once 10/17/2022   Stress test (Renuka Ranks) Once 21/46/9614   Basic Metabolic Panel Once 91/37/0231   Basic Metabolic Panel Once 27/89/6621   Urinalysis with Reflex to Culture Once 07/28/2023   Culture, Urine Once 07/21/2023   Urinalysis with Reflex to Culture Once 07/31/2023   Urinalysis with Microscopic Once 08/06/2024   Microalbumin / Creatinine Urine Ratio Once 08/06/2024   Protein / creatinine ratio, urine Once 08/06/2024   Renal Function Panel Once 08/06/2024   TSH With Reflex Ft4 Once 08/24/2023       Next Visit Date:  Future Appointments   Date Time Provider [address]   10/6/2023  1:00 PM Christal Hodgkins, MD Neuro Endo [id number]   11/17/2023  2:00 PM Benjamin Buchanan

## 2023-09-29 ENCOUNTER — CARE COORDINATION (OUTPATIENT)
Dept: CARE COORDINATION | Age: 88
End: 2023-09-29

## 2023-09-29 NOTE — CARE COORDINATION
Nutrition Care Coordinator Follow-Up visit:    Food Recall:eating 2-3 meals/d    Activity Level:  Sedentary:X  Lightly Active: Moderately Active:  Very Active:    Adult BMI:  Underweight (below 18.5)  Normal Weight (18.5-24.9)  Overweight (25-29. 9)  Obese (30-39.9)X  Morbidly Obese (>40)    Plan:  Plan was established with patient:  Increase dietary fiber by consuming whole grains, fruits and vegetables:X  Limit dietary cholesterol to >200mg/day: Increase water intake:  Avoid added sugar:  Avoid sweetened beverages:  Choose lean meats:X  Limit sodium intake: X    Monitoring: Will monitor weight:  Will monitor adherence to meal plan:X  Will monitor adherence to exercise plan: Will monitor HGA1c:    Handouts Provided :  Low Carb snacking:  Carb counting /individual meal plan:  Portion Control:  Food Labels:X  Physical Activity:  Low Fat/Cholesterol:  Hypo/Hyperglycemia:  Calorie Controlled Meal Plan:    Goals: Increase water consumption to 8oz. 6-8 times daily:  Manage blood sugars by consuming 3 meals spaced every 4-5 hours with 2-3 snacks daily:  Increase fiber and decrease fat intake by consuming 1-2 fruit servings and 2-3 vegetable servings per day. discussed  Increase physical activity by:  Consume less than 2,000mg of sodium/day: reviewed  Avoid consumption of sweetened beverages and added sugar by reading food labels:  Monitor blood sugars by using meter to check blood glucose before morning meal and 2 hours after a meal daily:  Decrease risk of coronary heart disease by consuming fish that contains omega-3 fatty acids at least twice a week, avoiding partially hydrogenated oil/trans fats and limiting saturated fat intake by reading food labels:reviewed    Patient goals set: 1. Reviewed DASH guidelines- lowfat/cholesterol and low sodium. Reviewed reading food labels-what to look for on labels. Encouraged to limit saturated fat, trans fat, cholesterol and sodium intake.   2. Reviewed avoiding canned,

## 2023-10-02 ENCOUNTER — OFFICE VISIT (OUTPATIENT)
Dept: CARDIOLOGY | Age: 88
End: 2023-10-02
Payer: MEDICARE

## 2023-10-02 ENCOUNTER — HOSPITAL ENCOUNTER (OUTPATIENT)
Age: 88
Discharge: HOME | End: 2023-10-02
Payer: MEDICARE

## 2023-10-02 VITALS
DIASTOLIC BLOOD PRESSURE: 60 MMHG | RESPIRATION RATE: 18 BRPM | WEIGHT: 237 LBS | HEART RATE: 65 BPM | BODY MASS INDEX: 33.18 KG/M2 | HEIGHT: 71 IN | SYSTOLIC BLOOD PRESSURE: 108 MMHG | OXYGEN SATURATION: 99 %

## 2023-10-02 DIAGNOSIS — I50.42 CHRONIC COMBINED SYSTOLIC AND DIASTOLIC CHF, NYHA CLASS 2 (HCC): Primary | Chronic | ICD-10-CM

## 2023-10-02 DIAGNOSIS — I10 ESSENTIAL HYPERTENSION: ICD-10-CM

## 2023-10-02 DIAGNOSIS — E66.9 CLASS 1 OBESITY WITH BODY MASS INDEX (BMI) OF 33.0 TO 33.9 IN ADULT, UNSPECIFIED OBESITY TYPE, UNSPECIFIED WHETHER SERIOUS COMORBIDITY PRESENT: ICD-10-CM

## 2023-10-02 DIAGNOSIS — Z95.820 S/P ANGIOPLASTY WITH STENT: ICD-10-CM

## 2023-10-02 DIAGNOSIS — I25.10 CORONARY ARTERY DISEASE INVOLVING NATIVE CORONARY ARTERY OF NATIVE HEART WITHOUT ANGINA PECTORIS: ICD-10-CM

## 2023-10-02 DIAGNOSIS — Z86.73 HISTORY OF STROKE: ICD-10-CM

## 2023-10-02 DIAGNOSIS — R06.02 SOB (SHORTNESS OF BREATH): ICD-10-CM

## 2023-10-02 DIAGNOSIS — E78.2 MIXED HYPERLIPIDEMIA: ICD-10-CM

## 2023-10-02 DIAGNOSIS — I50.42 CHRONIC COMBINED SYSTOLIC AND DIASTOLIC CHF, NYHA CLASS 2 (HCC): Chronic | ICD-10-CM

## 2023-10-02 LAB
ANION GAP SERPL CALCULATED.3IONS-SCNC: 9 MMOL/L (ref 9–17)
BNP SERPL-MCNC: 339 PG/ML
BUN SERPL-MCNC: 24 MG/DL (ref 8–23)
BUN/CREAT SERPL: 18 (ref 9–20)
CALCIUM SERPL-MCNC: 8.9 MG/DL (ref 8.6–10.4)
CHLORIDE SERPL-SCNC: 100 MMOL/L (ref 98–107)
CO2 SERPL-SCNC: 28 MMOL/L (ref 20–31)
CREAT SERPL-MCNC: 1.3 MG/DL (ref 0.7–1.2)
GFR SERPL CREATININE-BSD FRML MDRD: 54 ML/MIN/1.73M2
GLUCOSE SERPL-MCNC: 114 MG/DL (ref 70–99)
POTASSIUM SERPL-SCNC: 4.5 MMOL/L (ref 3.7–5.3)
SODIUM SERPL-SCNC: 137 MMOL/L (ref 135–144)

## 2023-10-02 PROCEDURE — 3074F SYST BP LT 130 MM HG: CPT | Performed by: PHYSICIAN ASSISTANT

## 2023-10-02 PROCEDURE — 36415 COLL VENOUS BLD VENIPUNCTURE: CPT

## 2023-10-02 PROCEDURE — 99214 OFFICE O/P EST MOD 30 MIN: CPT | Performed by: PHYSICIAN ASSISTANT

## 2023-10-02 PROCEDURE — 1123F ACP DISCUSS/DSCN MKR DOCD: CPT | Performed by: PHYSICIAN ASSISTANT

## 2023-10-02 PROCEDURE — 3078F DIAST BP <80 MM HG: CPT | Performed by: PHYSICIAN ASSISTANT

## 2023-10-02 PROCEDURE — 83880 ASSAY OF NATRIURETIC PEPTIDE: CPT

## 2023-10-02 PROCEDURE — 80048 BASIC METABOLIC PNL TOTAL CA: CPT

## 2023-10-03 ENCOUNTER — TELEPHONE (OUTPATIENT)
Dept: CARDIOLOGY | Age: 88
End: 2023-10-03

## 2023-10-03 NOTE — TELEPHONE ENCOUNTER
----- Message from Aliya Mitchell PA-C sent at 10/3/2023  1:40 PM EDT -----  Please notify patient that their lab results are okay. It shows BNP elevated, kidney function is slightly worse. I discussed with Dr Gómez Alanis, he agrees with starting Jardiance. Please stop by the office for samples/1 month free card. We will get blood work in 5-7 days. Please monitor weight and let us know his weight in 1 week. His kidney function is slightly worsening, his A1C was 7. He is a good candidate for starting the medication. Therefore I took the liberty of starting him on 10 mg QAM. Although rare, I also mentioned the potential risk of developing a UTI but told them to call  if this develops. Please continue current treatment and follow up.

## 2023-10-03 NOTE — RESULT ENCOUNTER NOTE
Please notify patient that their lab results are okay. It shows BNP elevated, kidney function is slightly worse. I discussed with Dr Ravi Montero, he agrees with starting Jardiance. Please stop by the office for samples/1 month free card. We will get blood work in 5-7 days. Please monitor weight and let us know his weight in 1 week. His kidney function is slightly worsening, his A1C was 7. He is a good candidate for starting the medication. Therefore I took the liberty of starting him on 10 mg QAM. Although rare, I also mentioned the potential risk of developing a UTI but told them to call  if this develops. Please continue current treatment and follow up.

## 2023-10-04 DIAGNOSIS — I25.10 ASHD (ARTERIOSCLEROTIC HEART DISEASE): ICD-10-CM

## 2023-10-04 DIAGNOSIS — Z86.73 HISTORY OF STROKE: ICD-10-CM

## 2023-10-04 DIAGNOSIS — R06.02 SOB (SHORTNESS OF BREATH): ICD-10-CM

## 2023-10-04 DIAGNOSIS — I10 PRIMARY HYPERTENSION: ICD-10-CM

## 2023-10-04 DIAGNOSIS — I50.33 ACUTE ON CHRONIC DIASTOLIC HEART FAILURE (HCC): ICD-10-CM

## 2023-10-04 DIAGNOSIS — Z95.820 S/P ANGIOPLASTY WITH STENT: ICD-10-CM

## 2023-10-04 DIAGNOSIS — E78.2 MIXED HYPERLIPIDEMIA: ICD-10-CM

## 2023-10-05 RX ORDER — SPIRONOLACTONE 25 MG/1
25 TABLET ORAL DAILY
Qty: 90 TABLET | Refills: 3 | Status: SHIPPED | OUTPATIENT
Start: 2023-10-05

## 2023-10-05 RX ORDER — BUMETANIDE 1 MG/1
1 TABLET ORAL 2 TIMES DAILY
Qty: 180 TABLET | Refills: 3 | Status: SHIPPED | OUTPATIENT
Start: 2023-10-05

## 2023-10-08 DIAGNOSIS — I50.42 CHRONIC COMBINED SYSTOLIC AND DIASTOLIC CHF (CONGESTIVE HEART FAILURE) (HCC): ICD-10-CM

## 2023-10-08 DIAGNOSIS — I25.10 ASHD (ARTERIOSCLEROTIC HEART DISEASE): Primary | ICD-10-CM

## 2023-10-11 DIAGNOSIS — E11.8 TYPE 2 DIABETES MELLITUS WITH COMPLICATION, WITHOUT LONG-TERM CURRENT USE OF INSULIN (HCC): Primary | Chronic | ICD-10-CM

## 2023-10-11 NOTE — TELEPHONE ENCOUNTER
Tello Moe has been unable to get his Metformin 1000 mg  twice daily prescription filled via the [place]. He is completely out. He would like a script sent to [facility].

## 2023-10-23 ENCOUNTER — CARE COORDINATION (OUTPATIENT)
Dept: CARE COORDINATION | Age: 88
End: 2023-10-23

## 2023-10-23 NOTE — CARE COORDINATION
Goals Reviewed-  1. Reviewed DASH guidelines- lowfat/cholesterol and low sodium. Reviewed reading food labels-what to look for on labels. Encouraged to limit saturated fat, trans fat, cholesterol and sodium intake. 2. Reviewed avoiding canned, prepackaged foods, processed meats and cheese. Discussed processed meats in detail to avoid/limit- sausage, johnson, bologna, ham, ect- patient is eating some of these  states will cut back. Goal is <2,300gm of sodium/day. 3. Discussed best ways to cook foods-baking, broiling, grilling or steaming foods. Discussed healthy fats- using olive or canola oil if adding oil and encouraged to use butter/margarine sparingly. Discussed the different types of fat in diet- trans/saturated fat and cholesterol-encouraged to limit. Discussed foods high in each- patient will be sent a list of foods to avoid/limit. 4. Reviewed shopping the outer rim of the grocery store where fresher foods are found. Discussed seasonings that can be used that do not contain salt. Patient states stopped adding salt to foods. We also discussed eating out and making healthier choices- encouraged to avoid fast food. Patient states eats at home, does not eat out often. 5. Reviewed portion control and using plate method at meals to increase intake of non-starchy vegetables. Goal is 1/2 of plate to be non-starchy vegetables at meals, 1/4 lean protein, 1/4 carbs. 6. Reviewed avoiding caffeine. Encouraged water and caffeine free beverages. Spoke with patient who relays doing well. He and his wife are reading food   Labels and he is limiting sodium intake, they do not add salt to foods. Encouraged  To avoid processed meats and canned goods. Patient still has nutrition information  On DASH guidelines. They have no further nutrition questions. Provided with  Contact information to call with questions. Removed from panel.   ANGELICA Milner

## 2023-10-24 ENCOUNTER — APPOINTMENT (OUTPATIENT)
Dept: CT IMAGING | Age: 88
DRG: 379 | End: 2023-10-24
Payer: MEDICARE

## 2023-10-24 ENCOUNTER — HOSPITAL ENCOUNTER (INPATIENT)
Age: 88
LOS: 1 days | Discharge: ANOTHER HOSPITAL | DRG: 379 | End: 2023-10-24
Attending: EMERGENCY MEDICINE | Admitting: INTERNAL MEDICINE
Payer: MEDICARE

## 2023-10-24 ENCOUNTER — HOSPITAL ENCOUNTER (INPATIENT)
Age: 88
LOS: 4 days | Discharge: HOME HEALTH | End: 2023-10-28
Attending: INTERNAL MEDICINE
Payer: MEDICARE

## 2023-10-24 VITALS
RESPIRATION RATE: 18 BRPM | DIASTOLIC BLOOD PRESSURE: 63 MMHG | WEIGHT: 231.6 LBS | BODY MASS INDEX: 32.42 KG/M2 | OXYGEN SATURATION: 96 % | HEIGHT: 71 IN | HEART RATE: 98 BPM | SYSTOLIC BLOOD PRESSURE: 92 MMHG | TEMPERATURE: 98.4 F

## 2023-10-24 DIAGNOSIS — K62.5 RECTAL BLEEDING: Primary | ICD-10-CM

## 2023-10-24 PROBLEM — K92.2 ACUTE LOWER GI BLEEDING: Status: ACTIVE | Noted: 2023-10-24

## 2023-10-24 LAB
ALBUMIN SERPL-MCNC: 3.7 G/DL (ref 3.5–5.2)
ALBUMIN/GLOB SERPL: 1.3 {RATIO} (ref 1–2.5)
ALP SERPL-CCNC: 187 U/L (ref 40–129)
ALT SERPL-CCNC: 69 U/L (ref 5–41)
ANION GAP SERPL CALCULATED.3IONS-SCNC: 13 MMOL/L (ref 9–17)
AST SERPL-CCNC: 49 U/L
BASOPHILS # BLD: 0.06 K/UL (ref 0–0.2)
BASOPHILS NFR BLD: 1 % (ref 0–2)
BILIRUB SERPL-MCNC: 0.2 MG/DL (ref 0.3–1.2)
BUN SERPL-MCNC: 31 MG/DL (ref 8–23)
BUN/CREAT SERPL: 24 (ref 9–20)
CALCIUM SERPL-MCNC: 8.7 MG/DL (ref 8.6–10.4)
CHLORIDE SERPL-SCNC: 102 MMOL/L (ref 98–107)
CO2 SERPL-SCNC: 22 MMOL/L (ref 20–31)
CREAT SERPL-MCNC: 1.3 MG/DL (ref 0.7–1.2)
EKG ATRIAL RATE: 65 BPM
EKG P AXIS: 13 DEGREES
EKG P-R INTERVAL: 242 MS
EKG Q-T INTERVAL: 440 MS
EKG QRS DURATION: 118 MS
EKG QTC CALCULATION (BAZETT): 457 MS
EKG R AXIS: -37 DEGREES
EKG T AXIS: 72 DEGREES
EKG VENTRICULAR RATE: 65 BPM
EOSINOPHIL # BLD: 0.65 K/UL (ref 0–0.44)
EOSINOPHILS RELATIVE PERCENT: 6 % (ref 1–4)
ERYTHROCYTE [DISTWIDTH] IN BLOOD BY AUTOMATED COUNT: 16.1 % (ref 11.8–14.4)
GFR SERPL CREATININE-BSD FRML MDRD: 54 ML/MIN/1.73M2
GLUCOSE BLD-MCNC: 136 MG/DL (ref 74–100)
GLUCOSE BLD-MCNC: 161 MG/DL (ref 74–100)
GLUCOSE SERPL-MCNC: 227 MG/DL (ref 70–99)
HCT VFR BLD AUTO: 25.4 % (ref 40.7–50.3)
HCT VFR BLD AUTO: 29.9 % (ref 40.7–50.3)
HCT VFR BLD AUTO: 33.1 % (ref 40.7–50.3)
HGB BLD-MCNC: 10.4 G/DL (ref 13–17)
HGB BLD-MCNC: 8.3 G/DL (ref 13–17)
HGB BLD-MCNC: 9.7 G/DL (ref 13–17)
IMM GRANULOCYTES # BLD AUTO: 0.1 K/UL (ref 0–0.3)
IMM GRANULOCYTES NFR BLD: 1 %
INR PPP: 1.1
IRON SATN MFR SERPL: 50 % (ref 20–55)
IRON SERPL-MCNC: 88 UG/DL (ref 59–158)
LYMPHOCYTES NFR BLD: 2.79 K/UL (ref 1.1–3.7)
LYMPHOCYTES RELATIVE PERCENT: 25 % (ref 24–43)
MCH RBC QN AUTO: 28.1 PG (ref 25.2–33.5)
MCHC RBC AUTO-ENTMCNC: 31.4 G/DL (ref 28.4–34.8)
MCV RBC AUTO: 89.5 FL (ref 82.6–102.9)
MONOCYTES NFR BLD: 0.78 K/UL (ref 0.1–1.2)
MONOCYTES NFR BLD: 7 % (ref 3–12)
NEUTROPHILS NFR BLD: 60 % (ref 36–65)
NEUTS SEG NFR BLD: 6.82 K/UL (ref 1.5–8.1)
NRBC BLD-RTO: 0 PER 100 WBC
PLATELET # BLD AUTO: 240 K/UL (ref 138–453)
PMV BLD AUTO: 10.4 FL (ref 8.1–13.5)
POTASSIUM SERPL-SCNC: 5 MMOL/L (ref 3.7–5.3)
PROT SERPL-MCNC: 6.5 G/DL (ref 6.4–8.3)
PROTHROMBIN TIME: 14 SEC (ref 11.9–14.8)
RBC # BLD AUTO: 3.7 M/UL (ref 4.21–5.77)
SODIUM SERPL-SCNC: 137 MMOL/L (ref 135–144)
TIBC SERPL-MCNC: 175 UG/DL (ref 250–450)
UNSATURATED IRON BINDING CAPACITY: 87 UG/DL (ref 112–347)
WBC OTHER # BLD: 11.2 K/UL (ref 3.5–11.3)

## 2023-10-24 PROCEDURE — 80053 COMPREHEN METABOLIC PANEL: CPT

## 2023-10-24 PROCEDURE — 82947 ASSAY GLUCOSE BLOOD QUANT: CPT

## 2023-10-24 PROCEDURE — 6370000000 HC RX 637 (ALT 250 FOR IP): Performed by: NURSE PRACTITIONER

## 2023-10-24 PROCEDURE — 36430 TRANSFUSION BLD/BLD COMPNT: CPT

## 2023-10-24 PROCEDURE — 83550 IRON BINDING TEST: CPT

## 2023-10-24 PROCEDURE — 6360000004 HC RX CONTRAST MEDICATION: Performed by: EMERGENCY MEDICINE

## 2023-10-24 PROCEDURE — P9016 RBC LEUKOCYTES REDUCED: HCPCS

## 2023-10-24 PROCEDURE — 93005 ELECTROCARDIOGRAM TRACING: CPT | Performed by: EMERGENCY MEDICINE

## 2023-10-24 PROCEDURE — 6360000002 HC RX W HCPCS: Performed by: NURSE PRACTITIONER

## 2023-10-24 PROCEDURE — 2000000000 HC ICU R&B

## 2023-10-24 PROCEDURE — 2580000003 HC RX 258: Performed by: EMERGENCY MEDICINE

## 2023-10-24 PROCEDURE — C9113 INJ PANTOPRAZOLE SODIUM, VIA: HCPCS | Performed by: NURSE PRACTITIONER

## 2023-10-24 PROCEDURE — 85018 HEMOGLOBIN: CPT

## 2023-10-24 PROCEDURE — 85610 PROTHROMBIN TIME: CPT

## 2023-10-24 PROCEDURE — 94761 N-INVAS EAR/PLS OXIMETRY MLT: CPT

## 2023-10-24 PROCEDURE — 93005 ELECTROCARDIOGRAM TRACING: CPT

## 2023-10-24 PROCEDURE — 83540 ASSAY OF IRON: CPT

## 2023-10-24 PROCEDURE — 36415 COLL VENOUS BLD VENIPUNCTURE: CPT

## 2023-10-24 PROCEDURE — A4216 STERILE WATER/SALINE, 10 ML: HCPCS | Performed by: NURSE PRACTITIONER

## 2023-10-24 PROCEDURE — 99285 EMERGENCY DEPT VISIT HI MDM: CPT

## 2023-10-24 PROCEDURE — 96360 HYDRATION IV INFUSION INIT: CPT

## 2023-10-24 PROCEDURE — 74177 CT ABD & PELVIS W/CONTRAST: CPT

## 2023-10-24 PROCEDURE — 85014 HEMATOCRIT: CPT

## 2023-10-24 PROCEDURE — 94664 DEMO&/EVAL PT USE INHALER: CPT

## 2023-10-24 PROCEDURE — 1200000000 HC SEMI PRIVATE

## 2023-10-24 PROCEDURE — 86920 COMPATIBILITY TEST SPIN: CPT

## 2023-10-24 PROCEDURE — 86900 BLOOD TYPING SEROLOGIC ABO: CPT

## 2023-10-24 PROCEDURE — 93010 ELECTROCARDIOGRAM REPORT: CPT | Performed by: INTERNAL MEDICINE

## 2023-10-24 PROCEDURE — 85025 COMPLETE CBC W/AUTO DIFF WBC: CPT

## 2023-10-24 PROCEDURE — 2580000003 HC RX 258: Performed by: NURSE PRACTITIONER

## 2023-10-24 PROCEDURE — 86850 RBC ANTIBODY SCREEN: CPT

## 2023-10-24 PROCEDURE — 86901 BLOOD TYPING SEROLOGIC RH(D): CPT

## 2023-10-24 RX ORDER — SODIUM CHLORIDE 9 MG/ML
INJECTION, SOLUTION INTRAVENOUS PRN
Status: DISCONTINUED | OUTPATIENT
Start: 2023-10-24 | End: 2023-10-28 | Stop reason: HOSPADM

## 2023-10-24 RX ORDER — PRIMIDONE 250 MG/1
250 TABLET ORAL 2 TIMES DAILY
Status: DISCONTINUED | OUTPATIENT
Start: 2023-10-25 | End: 2023-10-28 | Stop reason: HOSPADM

## 2023-10-24 RX ORDER — FERROUS SULFATE 325(65) MG
325 TABLET ORAL 2 TIMES DAILY WITH MEALS
Status: DISCONTINUED | OUTPATIENT
Start: 2023-10-24 | End: 2023-10-24 | Stop reason: HOSPADM

## 2023-10-24 RX ORDER — ACETAMINOPHEN 325 MG/1
650 TABLET ORAL EVERY 6 HOURS PRN
Status: DISCONTINUED | OUTPATIENT
Start: 2023-10-24 | End: 2023-10-24 | Stop reason: HOSPADM

## 2023-10-24 RX ORDER — OXYBUTYNIN CHLORIDE 5 MG/1
10 TABLET ORAL 2 TIMES DAILY
Status: DISCONTINUED | OUTPATIENT
Start: 2023-10-25 | End: 2023-10-28 | Stop reason: HOSPADM

## 2023-10-24 RX ORDER — SODIUM CHLORIDE 9 MG/ML
INJECTION, SOLUTION INTRAVENOUS PRN
Status: DISCONTINUED | OUTPATIENT
Start: 2023-10-24 | End: 2023-10-24 | Stop reason: HOSPADM

## 2023-10-24 RX ORDER — SODIUM CHLORIDE 0.9 % (FLUSH) 0.9 %
5-40 SYRINGE (ML) INJECTION EVERY 12 HOURS SCHEDULED
Status: DISCONTINUED | OUTPATIENT
Start: 2023-10-24 | End: 2023-10-24 | Stop reason: HOSPADM

## 2023-10-24 RX ORDER — SODIUM CHLORIDE 0.9 % (FLUSH) 0.9 %
5-40 SYRINGE (ML) INJECTION PRN
Status: DISCONTINUED | OUTPATIENT
Start: 2023-10-24 | End: 2023-10-24 | Stop reason: HOSPADM

## 2023-10-24 RX ORDER — ONDANSETRON 2 MG/ML
4 INJECTION INTRAMUSCULAR; INTRAVENOUS EVERY 6 HOURS PRN
Status: DISCONTINUED | OUTPATIENT
Start: 2023-10-24 | End: 2023-10-24 | Stop reason: HOSPADM

## 2023-10-24 RX ORDER — LEVOTHYROXINE SODIUM 137 UG/1
137 TABLET ORAL DAILY
Status: DISCONTINUED | OUTPATIENT
Start: 2023-10-25 | End: 2023-10-28 | Stop reason: HOSPADM

## 2023-10-24 RX ORDER — SODIUM CHLORIDE 9 MG/ML
INJECTION, SOLUTION INTRAVENOUS CONTINUOUS
Status: DISCONTINUED | OUTPATIENT
Start: 2023-10-24 | End: 2023-10-28 | Stop reason: HOSPADM

## 2023-10-24 RX ORDER — CALCIUM CARBONATE 500 MG/1
1 TABLET, CHEWABLE ORAL EVERY 6 HOURS PRN
Status: DISCONTINUED | OUTPATIENT
Start: 2023-10-24 | End: 2023-10-28 | Stop reason: HOSPADM

## 2023-10-24 RX ORDER — TAMSULOSIN HYDROCHLORIDE 0.4 MG/1
0.8 CAPSULE ORAL DAILY
Status: DISCONTINUED | OUTPATIENT
Start: 2023-10-24 | End: 2023-10-24 | Stop reason: HOSPADM

## 2023-10-24 RX ORDER — ACETAMINOPHEN 325 MG/1
650 TABLET ORAL EVERY 6 HOURS PRN
Status: DISCONTINUED | OUTPATIENT
Start: 2023-10-24 | End: 2023-10-28 | Stop reason: HOSPADM

## 2023-10-24 RX ORDER — ATORVASTATIN CALCIUM 40 MG/1
40 TABLET, FILM COATED ORAL DAILY
Status: DISCONTINUED | OUTPATIENT
Start: 2023-10-25 | End: 2023-10-28 | Stop reason: HOSPADM

## 2023-10-24 RX ORDER — OXYBUTYNIN CHLORIDE 5 MG/1
10 TABLET ORAL 2 TIMES DAILY
Status: DISCONTINUED | OUTPATIENT
Start: 2023-10-24 | End: 2023-10-24 | Stop reason: HOSPADM

## 2023-10-24 RX ORDER — POLYETHYLENE GLYCOL 3350 17 G/17G
17 POWDER, FOR SOLUTION ORAL DAILY PRN
Status: DISCONTINUED | OUTPATIENT
Start: 2023-10-24 | End: 2023-10-28 | Stop reason: HOSPADM

## 2023-10-24 RX ORDER — PANTOPRAZOLE SODIUM 40 MG/1
40 TABLET, DELAYED RELEASE ORAL
Status: DISCONTINUED | OUTPATIENT
Start: 2023-10-25 | End: 2023-10-25

## 2023-10-24 RX ORDER — ACETAMINOPHEN 650 MG/1
650 SUPPOSITORY RECTAL EVERY 6 HOURS PRN
Status: DISCONTINUED | OUTPATIENT
Start: 2023-10-24 | End: 2023-10-28 | Stop reason: HOSPADM

## 2023-10-24 RX ORDER — SODIUM CHLORIDE 0.9 % (FLUSH) 0.9 %
5-40 SYRINGE (ML) INJECTION PRN
Status: DISCONTINUED | OUTPATIENT
Start: 2023-10-24 | End: 2023-10-28 | Stop reason: HOSPADM

## 2023-10-24 RX ORDER — ACETAMINOPHEN 650 MG/1
650 SUPPOSITORY RECTAL EVERY 6 HOURS PRN
Status: DISCONTINUED | OUTPATIENT
Start: 2023-10-24 | End: 2023-10-24 | Stop reason: HOSPADM

## 2023-10-24 RX ORDER — ONDANSETRON 2 MG/ML
4 INJECTION INTRAMUSCULAR; INTRAVENOUS EVERY 6 HOURS PRN
Status: DISCONTINUED | OUTPATIENT
Start: 2023-10-24 | End: 2023-10-28 | Stop reason: HOSPADM

## 2023-10-24 RX ORDER — MONTELUKAST SODIUM 10 MG/1
10 TABLET ORAL DAILY
Status: DISCONTINUED | OUTPATIENT
Start: 2023-10-25 | End: 2023-10-28 | Stop reason: HOSPADM

## 2023-10-24 RX ORDER — 0.9 % SODIUM CHLORIDE 0.9 %
1000 INTRAVENOUS SOLUTION INTRAVENOUS ONCE
Status: COMPLETED | OUTPATIENT
Start: 2023-10-24 | End: 2023-10-24

## 2023-10-24 RX ORDER — ONDANSETRON 4 MG/1
4 TABLET, ORALLY DISINTEGRATING ORAL EVERY 8 HOURS PRN
Status: DISCONTINUED | OUTPATIENT
Start: 2023-10-24 | End: 2023-10-28 | Stop reason: HOSPADM

## 2023-10-24 RX ORDER — ALBUTEROL SULFATE 2.5 MG/3ML
2.5 SOLUTION RESPIRATORY (INHALATION) EVERY 4 HOURS PRN
Status: DISCONTINUED | OUTPATIENT
Start: 2023-10-24 | End: 2023-10-24 | Stop reason: HOSPADM

## 2023-10-24 RX ORDER — IPRATROPIUM BROMIDE AND ALBUTEROL SULFATE 2.5; .5 MG/3ML; MG/3ML
1 SOLUTION RESPIRATORY (INHALATION)
Status: DISCONTINUED | OUTPATIENT
Start: 2023-10-24 | End: 2023-10-25

## 2023-10-24 RX ORDER — FLUTICASONE PROPIONATE 50 MCG
2 SPRAY, SUSPENSION (ML) NASAL DAILY
Status: DISCONTINUED | OUTPATIENT
Start: 2023-10-24 | End: 2023-10-24 | Stop reason: HOSPADM

## 2023-10-24 RX ORDER — NITROGLYCERIN 0.4 MG/1
0.4 TABLET SUBLINGUAL EVERY 5 MIN PRN
Status: DISCONTINUED | OUTPATIENT
Start: 2023-10-24 | End: 2023-10-24 | Stop reason: HOSPADM

## 2023-10-24 RX ORDER — PROPRANOLOL HCL 60 MG
60 CAPSULE, EXTENDED RELEASE 24HR ORAL DAILY
Status: DISCONTINUED | OUTPATIENT
Start: 2023-10-24 | End: 2023-10-24 | Stop reason: HOSPADM

## 2023-10-24 RX ORDER — ONDANSETRON 4 MG/1
4 TABLET, ORALLY DISINTEGRATING ORAL EVERY 8 HOURS PRN
Status: DISCONTINUED | OUTPATIENT
Start: 2023-10-24 | End: 2023-10-24 | Stop reason: HOSPADM

## 2023-10-24 RX ORDER — SODIUM CHLORIDE 0.9 % (FLUSH) 0.9 %
5-40 SYRINGE (ML) INJECTION EVERY 12 HOURS SCHEDULED
Status: DISCONTINUED | OUTPATIENT
Start: 2023-10-24 | End: 2023-10-28 | Stop reason: HOSPADM

## 2023-10-24 RX ORDER — PRIMIDONE 250 MG/1
250 TABLET ORAL 2 TIMES DAILY
Status: DISCONTINUED | OUTPATIENT
Start: 2023-10-24 | End: 2023-10-24 | Stop reason: HOSPADM

## 2023-10-24 RX ORDER — SODIUM CHLORIDE 9 MG/ML
INJECTION, SOLUTION INTRAVENOUS CONTINUOUS
Status: DISCONTINUED | OUTPATIENT
Start: 2023-10-24 | End: 2023-10-24 | Stop reason: HOSPADM

## 2023-10-24 RX ORDER — DEXTROSE MONOHYDRATE 100 MG/ML
INJECTION, SOLUTION INTRAVENOUS CONTINUOUS PRN
Status: DISCONTINUED | OUTPATIENT
Start: 2023-10-24 | End: 2023-10-24 | Stop reason: HOSPADM

## 2023-10-24 RX ORDER — MONTELUKAST SODIUM 10 MG/1
10 TABLET ORAL DAILY
Status: DISCONTINUED | OUTPATIENT
Start: 2023-10-24 | End: 2023-10-24 | Stop reason: HOSPADM

## 2023-10-24 RX ORDER — FLUTICASONE PROPIONATE 50 MCG
2 SPRAY, SUSPENSION (ML) NASAL DAILY
Status: DISCONTINUED | OUTPATIENT
Start: 2023-10-25 | End: 2023-10-28 | Stop reason: HOSPADM

## 2023-10-24 RX ORDER — GLUCAGON 1 MG/ML
1 KIT INJECTION PRN
Status: DISCONTINUED | OUTPATIENT
Start: 2023-10-24 | End: 2023-10-24 | Stop reason: HOSPADM

## 2023-10-24 RX ADMIN — FLUTICASONE PROPIONATE 2 SPRAY: 50 SPRAY, METERED NASAL at 14:37

## 2023-10-24 RX ADMIN — PROPRANOLOL HYDROCHLORIDE 60 MG: 60 CAPSULE, EXTENDED RELEASE ORAL at 14:37

## 2023-10-24 RX ADMIN — MOMETASONE FUROATE AND FORMOTEROL FUMARATE DIHYDRATE 2 PUFF: 200; 5 AEROSOL RESPIRATORY (INHALATION) at 21:00

## 2023-10-24 RX ADMIN — PRIMIDONE 250 MG: 250 TABLET ORAL at 17:12

## 2023-10-24 RX ADMIN — SODIUM CHLORIDE: 9 INJECTION, SOLUTION INTRAVENOUS at 14:48

## 2023-10-24 RX ADMIN — FERROUS SULFATE TAB 325 MG (65 MG ELEMENTAL FE) 325 MG: 325 (65 FE) TAB at 17:12

## 2023-10-24 RX ADMIN — PANTOPRAZOLE SODIUM 40 MG: 40 INJECTION, POWDER, FOR SOLUTION INTRAVENOUS at 14:37

## 2023-10-24 RX ADMIN — LEVOTHYROXINE SODIUM 137 MCG: 25 TABLET ORAL at 14:37

## 2023-10-24 RX ADMIN — IOPAMIDOL 75 ML: 755 INJECTION, SOLUTION INTRAVENOUS at 08:12

## 2023-10-24 RX ADMIN — MONTELUKAST 10 MG: 10 TABLET, FILM COATED ORAL at 14:37

## 2023-10-24 RX ADMIN — OXYBUTYNIN CHLORIDE 10 MG: 5 TABLET ORAL at 14:37

## 2023-10-24 RX ADMIN — TAMSULOSIN HYDROCHLORIDE 0.8 MG: 0.4 CAPSULE ORAL at 14:37

## 2023-10-24 RX ADMIN — SODIUM CHLORIDE 1000 ML: 9 INJECTION, SOLUTION INTRAVENOUS at 07:50

## 2023-10-24 ASSESSMENT — PAIN DESCRIPTION - DESCRIPTORS: DESCRIPTORS: DISCOMFORT

## 2023-10-24 ASSESSMENT — ENCOUNTER SYMPTOMS
BLOOD IN STOOL: 1
ABDOMINAL DISTENTION: 0
SORE THROAT: 0
ABDOMINAL PAIN: 1
VOMITING: 0
NAUSEA: 0
SHORTNESS OF BREATH: 0
DIARRHEA: 0
BACK PAIN: 0
ANAL BLEEDING: 1
RECTAL PAIN: 0

## 2023-10-24 ASSESSMENT — PAIN DESCRIPTION - LOCATION: LOCATION: ABDOMEN

## 2023-10-24 ASSESSMENT — PAIN - FUNCTIONAL ASSESSMENT: PAIN_FUNCTIONAL_ASSESSMENT: 0-10

## 2023-10-24 ASSESSMENT — PAIN SCALES - GENERAL: PAINLEVEL_OUTOF10: 4

## 2023-10-24 NOTE — PROGRESS NOTES
Ambulated pt to bathroom via 2 assist. Pt is very weak and light-headed. Had BM. Will show next nurse then it will be measured and dumped.  Large amount of dark red \"currant jelly like\"

## 2023-10-24 NOTE — PROGRESS NOTES
CNP saw H&H said to  keep the blood available incase his BP starts to tank etc but he does not need it at this time. Called lab and let them know.

## 2023-10-24 NOTE — PROGRESS NOTES
Spoke to braden DEXTER about if pt needed 2nd unit of blood, writer was unaware he needed a 2nd unit, it was miscommunication- writer thought when told blood finished that it was 2nd unit. Told  cnp that H&H was drawn, she said to see what that looks like and we will go from there. Also told her about him wearing zeke hose at home, ordered those. Also told her he no longer takes jaurdiance and said to \"get rid of it\".   Pt normally takes priolesc PRN, he will get protonix IV while here and that he \"never did the cream\"

## 2023-10-24 NOTE — PROGRESS NOTES
Pt arrived to floor another nurse assisted ER to transfer over to bed. Weight and VS obtained. Was told that blood finished \"about 5 minutes ago\" changed time of H&H to reflect 1hr post infusion. Pt was on 2L NC, with good oxygen saturation. Pt does not wear oxygen at home, asked if he would be willing to try off oxygen while writer in room, he agreed. Wife is at bedside. Pt wears glasses and both upper and lower dentures they are at home currently. Also wears zeke hose at home, wife will bring in what he wears. Went through admit navigator with pt and wife. Will speak to CNP about meds they report as different. Oriented to Call light, whiteboard, staff and bed alarm system.

## 2023-10-24 NOTE — ED NOTES
Nurse to nurse report called to [facility].       Aleja Carreon RN  10/24/23 [id number]

## 2023-10-24 NOTE — H&P
tablet Take 1 tablet by mouth once daily 5/28/23   Cristina Snider APRN - CNP   fluticasone (FLONASE) 50 MCG/ACT nasal spray 2 sprays by Each Nostril route daily 4/20/23   GENA Sharp CNP   albuterol (PROVENTIL) (2.5 MG/3ML) 0.083% nebulizer solution Take 3 mLs by nebulization every 4 hours as needed for Wheezing or Shortness of Breath 3/31/23   Cristina Snider APRN - CNP   oxybutynin (DITROPAN) 5 MG tablet Take 2 tablets by mouth 2 times daily 3/14/23   Cristina Snider APRN - CNP   ferrous sulfate (IRON 325) 325 (65 Fe) MG tablet Take 1 tablet by mouth 2 times daily (with meals) 2/10/23   Lucy Phelps APRN - CNP   montelukast (SINGULAIR) 10 MG tablet Take 1 tablet by mouth daily 11/16/22   Cristina Snider APRN - CNP   fluticasone-salmeterol (ADVAIR) 250-50 MCG/DOSE AEPB Inhale 1 puff into the lungs in the morning and 1 puff in the evening. ProviderIsmael MD   omeprazole (PRILOSEC) 20 MG delayed release capsule Take 1 capsule by mouth daily as needed    Ismael Tejeda MD   blood glucose monitor strips accu check 7/18/18   Benoit Boyce APRN - CNP   aspirin 81 MG tablet Take 1 tablet by mouth daily    Ismael Tejeda MD   albuterol sulfate  (90 Base) MCG/ACT inhaler Inhale 2 puffs into the lungs every 4 hours as needed for Wheezing or Shortness of Breath 4/13/18   Benoit Boyce APRN - CNP   acetaminophen (TYLENOL) 500 MG tablet Take 2 tablets by mouth every 4 hours as needed for Pain    Ismael Tejeda MD   Multiple Vitamins-Minerals (THERAPEUTIC MULTIVITAMIN-MINERALS) tablet Take 1 tablet by mouth daily    ProvidersImael MD       Allergies:  Rosuvastatin    Social History:   TOBACCO:   reports that he quit smoking about 30 years ago. His smoking use included cigarettes. He has never used smokeless tobacco.  ETOH:   reports current alcohol use of about 1.0 standard drink of alcohol per week.     Family History:       Problem Relation Age of

## 2023-10-24 NOTE — ED PROVIDER NOTES
[address] [facility] ED  EMERGENCY DEPARTMENT ENCOUNTER      Pt Name: Zenia Barthel  MRN: [id number]  [address] [facility] 1935  Date of evaluation: 10/24/2023  Provider: Mukesh Steward       Chief Complaint   Patient presents with    Rectal Bleeding     C/o 3 bloody stools since 3a. HISTORY OF PRESENT ILLNESS   (Location/Symptom, Timing/Onset, Context/Setting, Quality, Duration, Modifying Factors, Severity)  Note limiting factors. Zenia Barthel is a 82 y.o. male who presents to the emergency department with rectal bleeding since 3 AM this morning. Patient states that he woke up around 3 AM and has had 3 bloody stools since then. Patient has a history of rectal bleeding back in June 2023 where he was at [facility] and ended up receiving blood products. He had a colonoscopy done at that time which showed a diverticular bleed which was clipped. Patient states that he has some generalized abdominal pain. He denies any nausea or vomiting. Patient does take aspirin and Plavix and is also on Mobic. He denies any syncopal episodes although does feel weak and appears pale on exam.  Patient blood pressure upon arrival was 77/49. He denies any chest pain or shortness of breath. REVIEW OF SYSTEMS    (2-9 systems for level 4, 10 or more for level 5)     Review of Systems   Constitutional:  Negative for fatigue and fever. HENT:  Negative for congestion and sore throat. Eyes:  Negative for visual disturbance. Respiratory:  Negative for shortness of breath. Cardiovascular:  Negative for chest pain and palpitations. Gastrointestinal:  Positive for abdominal pain, anal bleeding and blood in stool. Negative for abdominal distention, diarrhea, nausea, rectal pain and vomiting. Genitourinary:  Negative for dysuria. Musculoskeletal:  Negative for back pain. Skin:  Negative for rash. Neurological:  Positive for weakness. Negative for syncope.    Psychiatric/Behavioral:

## 2023-10-24 NOTE — PROGRESS NOTES
Updated pts wife on hemoglobin and plan to hold the second unit unless his hemoglobin drops drastically, BP drops drastically again and that he would need transferred since GI is out right now.  She stated he does not want to go to [facility]. V

## 2023-10-24 NOTE — PROGRESS NOTES
Writer entered room to obtain FSBS. FSBS 161 at this time. Pt stated he needed to have a bowel movement. Writer assisted pt to dangle on side of bed to use bedside commode. When attempting to stand, pt fell backward onto the bed and became unresponsive to voice, but responsive to sternal rub. Pt was incontinent of bladder at this time. Olimpia RN and RN Supervisor Scott Camp called to room at this time. Pt became more alert and is answering orientation questions appropriately. Vitals taken, labs drawn, 2nd IV started and patient placed on bedpan at this time.

## 2023-10-24 NOTE — ACP (ADVANCE CARE PLANNING)
Advance Care Planning     Advance Care Planning Activator (Inpatient)  Conversation Note      Date of ACP Conversation: 10/24/2023     Conversation Conducted with: Patient with Decision Making Capacity    ACP Activator: Bruna Dey RN        Health Care Decision Maker:     Current Designated Health Care Decision Maker:     Primary Decision Maker (Active): Nicole Vargas - Spouse - [phone]    Care Preferences    Ventilation: \"If you were in your present state of health and suddenly became very ill and were unable to breathe on your own, what would your preference be about the use of a ventilator (breathing machine) if it were available to you? \"      Would the patient desire the use of ventilator (breathing machine)?: no    \"If your health worsens and it becomes clear that your chance of recovery is unlikely, what would your preference be about the use of a ventilator (breathing machine) if it were available to you? \"     Would the patient desire the use of ventilator (breathing machine)?: No      Resuscitation  \"CPR works best to restart the heart when there is a sudden event, like a heart attack, in someone who is otherwise healthy. Unfortunately, CPR does not typically restart the heart for people who have serious health conditions or who are very sick. \"    \"In the event your heart stopped as a result of an underlying serious health condition, would you want attempts to be made to restart your heart (answer \"yes\" for attempt to resuscitate) or would you prefer a natural death (answer \"no\" for do not attempt to resuscitate)? \" no       [x] Yes   [] No   Educated Patient / Yazmin Brown regarding differences between Advance Directives and portable DNR orders.     Length of ACP Conversation in minutes:  15    Conversation Outcomes:  ACP discussion completed    Follow-up plan:    [] Schedule follow-up conversation to continue planning  [x] Referred individual to Provider for additional

## 2023-10-25 ENCOUNTER — APPOINTMENT (OUTPATIENT)
Dept: GENERAL RADIOLOGY | Age: 88
End: 2023-10-25
Attending: INTERNAL MEDICINE
Payer: MEDICARE

## 2023-10-25 ENCOUNTER — APPOINTMENT (OUTPATIENT)
Dept: NUCLEAR MEDICINE | Age: 88
End: 2023-10-25
Attending: INTERNAL MEDICINE
Payer: MEDICARE

## 2023-10-25 PROBLEM — K92.1 HEMATOCHEZIA: Status: ACTIVE | Noted: 2023-10-25

## 2023-10-25 PROBLEM — K31.819 AVM (ARTERIOVENOUS MALFORMATION) OF STOMACH, ACQUIRED: Status: ACTIVE | Noted: 2023-10-25

## 2023-10-25 LAB
ABO/RH: NORMAL
ALBUMIN SERPL-MCNC: 2.8 G/DL (ref 3.5–5.2)
ALBUMIN/GLOB SERPL: 1.1 {RATIO} (ref 1–2.5)
ALP SERPL-CCNC: 122 U/L (ref 40–129)
ALT SERPL-CCNC: 42 U/L (ref 5–41)
ANION GAP SERPL CALCULATED.3IONS-SCNC: 11 MMOL/L (ref 9–17)
ANION GAP SERPL CALCULATED.3IONS-SCNC: 8 MMOL/L (ref 9–17)
ANTIBODY SCREEN: NEGATIVE
ARM BAND NUMBER: NORMAL
AST SERPL-CCNC: 27 U/L
BASOPHILS # BLD: 0.04 K/UL (ref 0–0.2)
BASOPHILS # BLD: <0.03 K/UL (ref 0–0.2)
BASOPHILS NFR BLD: 0 % (ref 0–2)
BASOPHILS NFR BLD: 0 % (ref 0–2)
BILIRUB SERPL-MCNC: 0.4 MG/DL (ref 0.3–1.2)
BLOOD BANK BLOOD PRODUCT EXPIRATION DATE: NORMAL
BLOOD BANK BLOOD PRODUCT EXPIRATION DATE: NORMAL
BLOOD BANK DISPENSE STATUS: NORMAL
BLOOD BANK DISPENSE STATUS: NORMAL
BLOOD BANK ISBT PRODUCT BLOOD TYPE: 5100
BLOOD BANK ISBT PRODUCT BLOOD TYPE: 5100
BLOOD BANK PRODUCT CODE: NORMAL
BLOOD BANK SAMPLE EXPIRATION: NORMAL
BLOOD BANK UNIT TYPE AND RH: NORMAL
BLOOD BANK UNIT TYPE AND RH: NORMAL
BNP SERPL-MCNC: 266 PG/ML
BODY TEMPERATURE: 37
BPU ID: NORMAL
BPU ID: NORMAL
BUN SERPL-MCNC: 29 MG/DL (ref 8–23)
BUN SERPL-MCNC: 32 MG/DL (ref 8–23)
CALCIUM SERPL-MCNC: 7.4 MG/DL (ref 8.6–10.4)
CALCIUM SERPL-MCNC: 7.8 MG/DL (ref 8.6–10.4)
CHLORIDE SERPL-SCNC: 102 MMOL/L (ref 98–107)
CHLORIDE SERPL-SCNC: 106 MMOL/L (ref 98–107)
CO2 SERPL-SCNC: 21 MMOL/L (ref 20–31)
CO2 SERPL-SCNC: 22 MMOL/L (ref 20–31)
COHGB MFR BLD: 1.4 % (ref 0–5)
COMPONENT: NORMAL
COMPONENT: NORMAL
CREAT SERPL-MCNC: 1.1 MG/DL (ref 0.7–1.2)
CREAT SERPL-MCNC: 1.1 MG/DL (ref 0.7–1.2)
CROSSMATCH RESULT: NORMAL
CROSSMATCH RESULT: NORMAL
CRP SERPL HS-MCNC: 28 MG/L (ref 0–5)
EKG ATRIAL RATE: 88 BPM
EKG P AXIS: 18 DEGREES
EKG P-R INTERVAL: 252 MS
EKG Q-T INTERVAL: 378 MS
EKG QRS DURATION: 110 MS
EKG QTC CALCULATION (BAZETT): 457 MS
EKG R AXIS: -30 DEGREES
EKG T AXIS: 84 DEGREES
EKG VENTRICULAR RATE: 88 BPM
EOSINOPHIL # BLD: 0.19 K/UL (ref 0–0.44)
EOSINOPHIL # BLD: 0.26 K/UL (ref 0–0.44)
EOSINOPHILS RELATIVE PERCENT: 2 % (ref 1–4)
EOSINOPHILS RELATIVE PERCENT: 3 % (ref 1–4)
ERYTHROCYTE [DISTWIDTH] IN BLOOD BY AUTOMATED COUNT: 15.9 % (ref 11.8–14.4)
ERYTHROCYTE [DISTWIDTH] IN BLOOD BY AUTOMATED COUNT: 16.1 % (ref 11.8–14.4)
FIO2 ON VENT: ABNORMAL %
GFR SERPL CREATININE-BSD FRML MDRD: >60 ML/MIN/1.73M2
GFR SERPL CREATININE-BSD FRML MDRD: >60 ML/MIN/1.73M2
GLUCOSE BLD-MCNC: 182 MG/DL (ref 75–110)
GLUCOSE SERPL-MCNC: 150 MG/DL (ref 70–99)
GLUCOSE SERPL-MCNC: 187 MG/DL (ref 70–99)
HCO3 VENOUS: 22 MMOL/L (ref 24–30)
HCT VFR BLD AUTO: 22.5 % (ref 40.7–50.3)
HCT VFR BLD AUTO: 23.9 % (ref 40.7–50.3)
HCT VFR BLD AUTO: 25.2 % (ref 40.7–50.3)
HCT VFR BLD AUTO: 29.4 % (ref 40.7–50.3)
HGB BLD-MCNC: 7.3 G/DL (ref 13–17)
HGB BLD-MCNC: 8 G/DL (ref 13–17)
HGB BLD-MCNC: 8.1 G/DL (ref 13–17)
HGB BLD-MCNC: 9.1 G/DL (ref 13–17)
IMM GRANULOCYTES # BLD AUTO: 0.09 K/UL (ref 0–0.3)
IMM GRANULOCYTES # BLD AUTO: 0.17 K/UL (ref 0–0.3)
IMM GRANULOCYTES NFR BLD: 1 %
IMM GRANULOCYTES NFR BLD: 2 %
INR PPP: 1.3
LACTIC ACID, WHOLE BLOOD: 1.7 MMOL/L (ref 0.7–2.1)
LYMPHOCYTES NFR BLD: 2.11 K/UL (ref 1.1–3.7)
LYMPHOCYTES NFR BLD: 2.42 K/UL (ref 1.1–3.7)
LYMPHOCYTES RELATIVE PERCENT: 22 % (ref 24–43)
LYMPHOCYTES RELATIVE PERCENT: 27 % (ref 24–43)
MCH RBC QN AUTO: 28.3 PG (ref 25.2–33.5)
MCH RBC QN AUTO: 28.5 PG (ref 25.2–33.5)
MCHC RBC AUTO-ENTMCNC: 31 G/DL (ref 28.4–34.8)
MCHC RBC AUTO-ENTMCNC: 32.1 G/DL (ref 28.4–34.8)
MCV RBC AUTO: 88.7 FL (ref 82.6–102.9)
MCV RBC AUTO: 91.6 FL (ref 82.6–102.9)
MONOCYTES NFR BLD: 1.11 K/UL (ref 0.1–1.2)
MONOCYTES NFR BLD: 1.23 K/UL (ref 0.1–1.2)
MONOCYTES NFR BLD: 12 % (ref 3–12)
MONOCYTES NFR BLD: 14 % (ref 3–12)
NEGATIVE BASE EXCESS, VEN: 2.6 MMOL/L (ref 0–2)
NEUTROPHILS NFR BLD: 55 % (ref 36–65)
NEUTROPHILS NFR BLD: 62 % (ref 36–65)
NEUTS SEG NFR BLD: 5 K/UL (ref 1.5–8.1)
NEUTS SEG NFR BLD: 5.94 K/UL (ref 1.5–8.1)
NRBC BLD-RTO: 0 PER 100 WBC
NRBC BLD-RTO: 0 PER 100 WBC
O2 SAT, VEN: 32.4 % (ref 60–85)
PCO2, VEN: 39.4 MM HG (ref 39–55)
PH VENOUS: 7.37 (ref 7.32–7.42)
PLATELET # BLD AUTO: 158 K/UL (ref 138–453)
PLATELET # BLD AUTO: 174 K/UL (ref 138–453)
PMV BLD AUTO: 10.6 FL (ref 8.1–13.5)
PMV BLD AUTO: 10.6 FL (ref 8.1–13.5)
PO2, VEN: 21.7 MM HG (ref 30–50)
POTASSIUM SERPL-SCNC: 4.2 MMOL/L (ref 3.7–5.3)
POTASSIUM SERPL-SCNC: 4.5 MMOL/L (ref 3.7–5.3)
PROT SERPL-MCNC: 5.3 G/DL (ref 6.4–8.3)
PROTHROMBIN TIME: 15.7 SEC (ref 11.7–14.9)
RBC # BLD AUTO: 2.84 M/UL (ref 4.21–5.77)
RBC # BLD AUTO: 3.21 M/UL (ref 4.21–5.77)
RBC # BLD: ABNORMAL 10*6/UL
RBC # BLD: ABNORMAL 10*6/UL
SODIUM SERPL-SCNC: 134 MMOL/L (ref 135–144)
SODIUM SERPL-SCNC: 136 MMOL/L (ref 135–144)
TRANSFUSION STATUS: NORMAL
TRANSFUSION STATUS: NORMAL
UNIT DIVISION: 0
UNIT DIVISION: 0
UNIT ISSUE DATE/TIME: NORMAL
UNIT ISSUE DATE/TIME: NORMAL
WBC OTHER # BLD: 9 K/UL (ref 3.5–11.3)
WBC OTHER # BLD: 9.6 K/UL (ref 3.5–11.3)

## 2023-10-25 PROCEDURE — 85018 HEMOGLOBIN: CPT

## 2023-10-25 PROCEDURE — 2700000000 HC OXYGEN THERAPY PER DAY

## 2023-10-25 PROCEDURE — 2580000003 HC RX 258: Performed by: NURSE PRACTITIONER

## 2023-10-25 PROCEDURE — 2580000003 HC RX 258

## 2023-10-25 PROCEDURE — 83605 ASSAY OF LACTIC ACID: CPT

## 2023-10-25 PROCEDURE — A4216 STERILE WATER/SALINE, 10 ML: HCPCS

## 2023-10-25 PROCEDURE — 83880 ASSAY OF NATRIURETIC PEPTIDE: CPT

## 2023-10-25 PROCEDURE — 85610 PROTHROMBIN TIME: CPT

## 2023-10-25 PROCEDURE — 93010 ELECTROCARDIOGRAM REPORT: CPT | Performed by: INTERNAL MEDICINE

## 2023-10-25 PROCEDURE — 6360000002 HC RX W HCPCS

## 2023-10-25 PROCEDURE — 1200000000 HC SEMI PRIVATE

## 2023-10-25 PROCEDURE — 86850 RBC ANTIBODY SCREEN: CPT

## 2023-10-25 PROCEDURE — 3609013000 HC EGD TRANSORAL CONTROL BLEEDING ANY METHOD: Performed by: INTERNAL MEDICINE

## 2023-10-25 PROCEDURE — 71045 X-RAY EXAM CHEST 1 VIEW: CPT

## 2023-10-25 PROCEDURE — 86900 BLOOD TYPING SEROLOGIC ABO: CPT

## 2023-10-25 PROCEDURE — 85025 COMPLETE CBC W/AUTO DIFF WBC: CPT

## 2023-10-25 PROCEDURE — A4216 STERILE WATER/SALINE, 10 ML: HCPCS | Performed by: NURSE PRACTITIONER

## 2023-10-25 PROCEDURE — 86140 C-REACTIVE PROTEIN: CPT

## 2023-10-25 PROCEDURE — G0500 MOD SEDAT ENDO SERVICE >5YRS: HCPCS | Performed by: INTERNAL MEDICINE

## 2023-10-25 PROCEDURE — 85014 HEMATOCRIT: CPT

## 2023-10-25 PROCEDURE — 0W3P8ZZ CONTROL BLEEDING IN GASTROINTESTINAL TRACT, VIA NATURAL OR ARTIFICIAL OPENING ENDOSCOPIC: ICD-10-PCS | Performed by: INTERNAL MEDICINE

## 2023-10-25 PROCEDURE — 6370000000 HC RX 637 (ALT 250 FOR IP)

## 2023-10-25 PROCEDURE — 86901 BLOOD TYPING SEROLOGIC RH(D): CPT

## 2023-10-25 PROCEDURE — 86920 COMPATIBILITY TEST SPIN: CPT

## 2023-10-25 PROCEDURE — 82805 BLOOD GASES W/O2 SATURATION: CPT

## 2023-10-25 PROCEDURE — 43255 EGD CONTROL BLEEDING ANY: CPT | Performed by: INTERNAL MEDICINE

## 2023-10-25 PROCEDURE — 94761 N-INVAS EAR/PLS OXIMETRY MLT: CPT

## 2023-10-25 PROCEDURE — 99291 CRITICAL CARE FIRST HOUR: CPT | Performed by: INTERNAL MEDICINE

## 2023-10-25 PROCEDURE — 2720000010 HC SURG SUPPLY STERILE: Performed by: INTERNAL MEDICINE

## 2023-10-25 PROCEDURE — 80048 BASIC METABOLIC PNL TOTAL CA: CPT

## 2023-10-25 PROCEDURE — 82947 ASSAY GLUCOSE BLOOD QUANT: CPT

## 2023-10-25 PROCEDURE — C9113 INJ PANTOPRAZOLE SODIUM, VIA: HCPCS

## 2023-10-25 PROCEDURE — 99222 1ST HOSP IP/OBS MODERATE 55: CPT | Performed by: SURGERY

## 2023-10-25 PROCEDURE — C9113 INJ PANTOPRAZOLE SODIUM, VIA: HCPCS | Performed by: NURSE PRACTITIONER

## 2023-10-25 PROCEDURE — 6360000002 HC RX W HCPCS: Performed by: NURSE PRACTITIONER

## 2023-10-25 PROCEDURE — 80053 COMPREHEN METABOLIC PANEL: CPT

## 2023-10-25 PROCEDURE — 36415 COLL VENOUS BLD VENIPUNCTURE: CPT

## 2023-10-25 RX ORDER — MIDAZOLAM HYDROCHLORIDE 1 MG/ML
INJECTION INTRAMUSCULAR; INTRAVENOUS
Status: COMPLETED
Start: 2023-10-25 | End: 2023-10-25

## 2023-10-25 RX ORDER — ALBUTEROL SULFATE 2.5 MG/3ML
2.5 SOLUTION RESPIRATORY (INHALATION) EVERY 4 HOURS PRN
Status: DISCONTINUED | OUTPATIENT
Start: 2023-10-25 | End: 2023-10-28 | Stop reason: HOSPADM

## 2023-10-25 RX ORDER — FENTANYL CITRATE 50 UG/ML
INJECTION, SOLUTION INTRAMUSCULAR; INTRAVENOUS
Status: COMPLETED
Start: 2023-10-25 | End: 2023-10-25

## 2023-10-25 RX ORDER — CALCIUM GLUCONATE 20 MG/ML
2000 INJECTION, SOLUTION INTRAVENOUS ONCE
Status: COMPLETED | OUTPATIENT
Start: 2023-10-25 | End: 2023-10-25

## 2023-10-25 RX ORDER — PANTOPRAZOLE SODIUM 40 MG/1
40 TABLET, DELAYED RELEASE ORAL
Status: DISCONTINUED | OUTPATIENT
Start: 2023-10-26 | End: 2023-10-28 | Stop reason: HOSPADM

## 2023-10-25 RX ORDER — IPRATROPIUM BROMIDE AND ALBUTEROL SULFATE 2.5; .5 MG/3ML; MG/3ML
1 SOLUTION RESPIRATORY (INHALATION) EVERY 4 HOURS PRN
Status: DISCONTINUED | OUTPATIENT
Start: 2023-10-25 | End: 2023-10-28 | Stop reason: HOSPADM

## 2023-10-25 RX ORDER — TAMSULOSIN HYDROCHLORIDE 0.4 MG/1
0.4 CAPSULE ORAL DAILY
Status: DISCONTINUED | OUTPATIENT
Start: 2023-10-25 | End: 2023-10-27

## 2023-10-25 RX ADMIN — FENTANYL CITRATE 50 MCG: 50 INJECTION, SOLUTION INTRAMUSCULAR; INTRAVENOUS at 10:28

## 2023-10-25 RX ADMIN — PRIMIDONE 250 MG: 250 TABLET ORAL at 17:34

## 2023-10-25 RX ADMIN — SODIUM CHLORIDE: 9 INJECTION, SOLUTION INTRAVENOUS at 06:11

## 2023-10-25 RX ADMIN — SODIUM CHLORIDE, PRESERVATIVE FREE 10 ML: 5 INJECTION INTRAVENOUS at 00:01

## 2023-10-25 RX ADMIN — SODIUM CHLORIDE, PRESERVATIVE FREE 10 ML: 5 INJECTION INTRAVENOUS at 07:55

## 2023-10-25 RX ADMIN — PANTOPRAZOLE SODIUM 40 MG: 40 INJECTION, POWDER, FOR SOLUTION INTRAVENOUS at 08:09

## 2023-10-25 RX ADMIN — FLUTICASONE PROPIONATE 2 SPRAY: 50 SPRAY, METERED NASAL at 08:08

## 2023-10-25 RX ADMIN — SODIUM CHLORIDE, PRESERVATIVE FREE 10 ML: 5 INJECTION INTRAVENOUS at 21:41

## 2023-10-25 RX ADMIN — SODIUM CHLORIDE: 9 INJECTION, SOLUTION INTRAVENOUS at 22:22

## 2023-10-25 RX ADMIN — MIDAZOLAM HYDROCHLORIDE 2 MG: 1 INJECTION, SOLUTION INTRAMUSCULAR; INTRAVENOUS at 10:27

## 2023-10-25 RX ADMIN — TAMSULOSIN HYDROCHLORIDE 0.4 MG: 0.4 CAPSULE ORAL at 08:08

## 2023-10-25 RX ADMIN — OXYBUTYNIN CHLORIDE 10 MG: 5 TABLET ORAL at 21:41

## 2023-10-25 RX ADMIN — MONTELUKAST SODIUM 10 MG: 10 TABLET, FILM COATED ORAL at 08:08

## 2023-10-25 RX ADMIN — PRIMIDONE 250 MG: 250 TABLET ORAL at 08:08

## 2023-10-25 RX ADMIN — DESMOPRESSIN ACETATE 40 MG: 0.2 TABLET ORAL at 08:08

## 2023-10-25 RX ADMIN — OXYBUTYNIN CHLORIDE 10 MG: 5 TABLET ORAL at 08:08

## 2023-10-25 RX ADMIN — PANTOPRAZOLE SODIUM 40 MG: 40 INJECTION, POWDER, FOR SOLUTION INTRAVENOUS at 11:06

## 2023-10-25 RX ADMIN — PANTOPRAZOLE SODIUM 8 MG/HR: 40 INJECTION, POWDER, FOR SOLUTION INTRAVENOUS at 11:04

## 2023-10-25 RX ADMIN — SODIUM CHLORIDE: 9 INJECTION, SOLUTION INTRAVENOUS at 00:01

## 2023-10-25 RX ADMIN — LEVOTHYROXINE SODIUM 137 MCG: 0.14 TABLET ORAL at 06:30

## 2023-10-25 RX ADMIN — OXYBUTYNIN CHLORIDE 10 MG: 5 TABLET ORAL at 00:30

## 2023-10-25 RX ADMIN — CALCIUM GLUCONATE 2000 MG: 20 INJECTION, SOLUTION INTRAVENOUS at 06:13

## 2023-10-25 RX ADMIN — SODIUM CHLORIDE: 9 INJECTION, SOLUTION INTRAVENOUS at 11:08

## 2023-10-25 RX ADMIN — ACETAMINOPHEN 650 MG: 325 TABLET ORAL at 08:08

## 2023-10-25 ASSESSMENT — PAIN DESCRIPTION - LOCATION: LOCATION: KNEE

## 2023-10-25 ASSESSMENT — PAIN SCALES - GENERAL
PAINLEVEL_OUTOF10: 0
PAINLEVEL_OUTOF10: 4
PAINLEVEL_OUTOF10: 0

## 2023-10-25 ASSESSMENT — PAIN DESCRIPTION - DESCRIPTORS: DESCRIPTORS: DISCOMFORT

## 2023-10-25 NOTE — PROGRESS NOTES
Spoke with Dr. Kwame Piper who states no new orders at this time and to infuse blood if patient H & H drops below 6 after next recheck. Dr. Kwame Piper aware of amount of blood patient is having in stools, blood pressures, passing out, and change in condition.

## 2023-10-25 NOTE — PROGRESS NOTES
Post Acute Facility/Agency List     Provided patient with the following list, the list includes the overall star ratings obtained from CMS per the Medicare Web site (www.Medicare.gov):     [] [address]  [] Acute Inpatient Rehabilitation Facilities  [] Skilled Nursing Facilities  [x] Home Care    Provided verbal instructions on how to utilize the QR Code to obtain additional detailed star ratings from www. Medicare. gov     offered to print and provide the detailed list:    []Accepted   [x]Declined

## 2023-10-25 NOTE — DISCHARGE INSTR - COC
Continuity of Care Form    Patient Name: Abe Shah   :  1935  MRN:  [id number]    Admit date:  10/24/2023  Discharge date:  10/28/23    Code Status Order: DNR-CCA   Advance Directives:     Admitting Physician:  Yulia Morris MD  PCP: Kailyn Snider, GENA Gibson CNP    Discharging Nurse: [facility] Unit/Room#: 3016/3016-01  Discharging Unit Phone Number: [phone]    Emergency Contact:   Extended Emergency Contact Information  Primary Emergency Contact: Nicole Vargas  Address: [address] [place] [address] Phone: [phone]  Mobile Phone: [phone]  Relation: Spouse   needed?  No    Past Surgical History:  Past Surgical History:   Procedure Laterality Date    CARDIAC SURGERY  2017    Stent placed  Dr Darlin Colin Bilateral 06/15/2023    ANGIOGRAM CAROTID CEREBRAL BILATERAL    COLONOSCOPY      COLONOSCOPY N/A 2023    COLONOSCOPY CONTROL HEMORRHAGE performed by Eulalio Case MD at [address] [facility]      right ankle    HERNIA REPAIR      SIGMOIDOSCOPY  2023       Immunization History:   Immunization History   Administered Date(s) Administered    COVID-19, MODERNA BLUE border, Primary or Immunocompromised, (age 12y+), IM, 100 mcg/0.5mL 2021, 2021, 2021, 2022    Influenza Virus Vaccine 2017, 10/01/2018    Influenza, AFLURIA (age 1 yrs+), FLUZONE, (age 10 mo+), MDV, 0.5mL 2017    Influenza, FLUAD, (age 72 y+), Adjuvanted, 0.5mL 2020, 2022    Influenza, FLUZONE (age 72 y+), High Dose, 0.7mL 10/07/2021    Influenza, High Dose (Fluzone 65 yrs and older) 2015, 10/01/2018, 2019, 10/07/2020    Pneumococcal, PCV-13, PREVNAR 13, (age 6w+), IM, 0.5mL 2015    Pneumococcal, PPSV23, PNEUMOVAX 23, (age 2y+), SC/IM, 0.5mL 2000, 02/15/2006    Tetanus 2003    Zoster Live (Zostavax) 2008, 2012    Zoster Recombinant (Shingrix)

## 2023-10-25 NOTE — PROGRESS NOTES
Writer gave nurse to nurse report to [facility]. PARVIN's. Writer spoke with Maricruz Cortez RN.

## 2023-10-25 NOTE — FLOWSHEET NOTE
1 unit of Blood from [facility] stopped at 2385.       Electronically signed by Franck Gorman RN on 10/24/2023 at 11:50 PM  `

## 2023-10-25 NOTE — PROGRESS NOTES
Patient is lying in bed on the bedpan and continuously having loose, bloody bowel movement with blood clots visibly present. Patient states he does not feel well at all and states he is feeling light headed. Patient in and out of sleep in between conversation. Patient appears very pale, and bowel sounds are audible and hyper-active. Dr. Carole Holloway was paged regarding blood pressures and change of condition. Labs were drawn and sent down. Nasal cannula was applied for comfort and for drop in oxygen saturation.

## 2023-10-25 NOTE — PROGRESS NOTES
Writer called pt wife again to update pt will be going by [facility] to [facility]. 's ICU.

## 2023-10-25 NOTE — PLAN OF CARE
Problem: Safety - Adult  Goal: Free from fall injury  Outcome: Progressing     Problem: Discharge Planning  Goal: Discharge to home or other facility with appropriate resources  Outcome: Progressing     Problem: Skin/Tissue Integrity  Goal: Absence of new skin breakdown  Description: 1. Monitor for areas of redness and/or skin breakdown  2. Assess vascular access sites hourly  3. Every 4-6 hours minimum:  Change oxygen saturation probe site  4. Every 4-6 hours:  If on nasal continuous positive airway pressure, respiratory therapy assess nares and determine need for appliance change or resting period.   Outcome: Progressing     Problem: ABCDS Injury Assessment  Goal: Absence of physical injury  Outcome: Progressing     Electronically signed by Debora Rodriguez RN on 10/24/2023 at 11:41 PM

## 2023-10-25 NOTE — OP NOTE
Operative Note      Patient: Florinda Hartman  YOB: 1935  MRN: [id number]    Date of Procedure: 10/25/2023    Pre-Op Diagnosis Codes:     * Acute GI bleeding [K92.2]    Post-Op Diagnosis: Same  Small nonbleeding gastric body AVM. Treated with cautery and clipped x1. Otherwise normal exam         Procedure(s):  EGD CONTROL HEMORRHAGE    Surgeon(s):  Radha Salcedo MD    Assistant:   First Assistant: Lakia Bush RN    Anesthesia: Moderate sedation - IV Versed 2 mg and IV Fentanyl 50 mcg administered by ICU nurse under my supervision with close monitoring of vitals every 3 minutes and continuous pulse oximetry monitoring. Total time spent on sedation was 10 minutes. Estimated Blood Loss (mL): Minimal    Complications: None    Specimens:   * No specimens in log *    Implants:  * No implants in log *      Drains: * No LDAs found *    Findings:   Esophagus was mildly tortuous. Otherwise normal.  A 2 mm nonbleeding AVM was found in the gastric body. This was treated with bipolar cautery and 1 Hemoclip was placed. The stomach mucosa otherwise appeared normal on forward and retroflexed views. The examined duodenum appeared normal.  The above findings did not contribute to patient's GI bleed. Recommendations  Discontinue IV Protonix. Change to once a day oral Protonix 40 mg once daily. Ideally patient will require colonoscopy however did not yield for managing diverticular bleed is low. At this time the wife does not want the patient to go through another colonoscopy. Wife wants to know what is the risk of patient having recurrence and diverticular bleeding and the risk can be minimized. I explained to her that the risk is marginally reduced by following a high-fiber diet and avoiding blood thinners. She is stating patient is 82years old worried that this may be a reaction from to minimize the risk. I explained to her that the risk is higher while patient is taking Plavix.   I had a long

## 2023-10-25 NOTE — PROGRESS NOTES
Comprehensive Nutrition Assessment    Type and Reason for Visit:  Initial, Positive Nutrition Screen (Unsure amount of weight loss; Poor intakes/Appetite)    Nutrition Recommendations/Plan:   Continue NPO. Monitor plans for nutrition and plan of care. Will monitor labs, weights, and plan of care. Malnutrition Assessment:  Malnutrition Status: At risk for malnutrition (10/25/23 1451)    Context:  Acute Illness     Findings of the 6 clinical characteristics of malnutrition:  Energy Intake:  Mild decrease in energy intake   Weight Loss:  No significant weight loss     Body Fat Loss:  No significant body fat loss   Muscle Mass Loss:  No significant muscle mass loss  Fluid Accumulation:  Mild (to Severe) Extremities   Strength:  Not Performed    Nutrition Assessment:    Admitted with c/o rectal bleeding. PMH: COPD, DM, HLD, CVA. S/p EGD today. Pt NPO and sleeping at attempted visit. Spoke with family who reports decreased appetite/PO intakes. H/o weight loss noted per chart review. Weight loss of 9.6% x 11 months. Will monitor plans for start of oral diet and provide ONS as/if needed. Labs reviewed: Glucose 150-187 mg/dL. Meds reviewed. Nutrition Related Findings:    Labs/Meds reviewed. Wound Type: None       Current Nutrition Intake & Therapies:    Average Meal Intake: NPO  Average Supplements Intake: NPO  ADULT DIET; Clear Liquid  Additional Calorie Sources:  None    Anthropometric Measures:  Height: 180.3 cm (5' 10.98\")  Ideal Body Weight (IBW): 172 lbs (78 kg)    Admission Body Weight: 105.2 kg (231 lb 14.8 oz)  Current Body Weight: 105.2 kg (231 lb 14.8 oz), 134.8 % IBW. Weight Source: Bed Scale  Current BMI (kg/m2): 32.4  Usual Body Weight: 116.4 kg (256 lb 11 oz) (11/8/22 bed scale per chart review)  % Weight Change (Calculated): -9.6                    BMI Categories: Obese Class 1 (BMI 30.0-34. 9)    Estimated Daily Nutrient Needs:  Energy Requirements Based On: Formula  Weight Used for

## 2023-10-25 NOTE — CARE COORDINATION
10/25/23 1447   Readmission Assessment   Number of Days since last admission? 1-7 days   Previous Disposition Other (comment)  (Transfer from [facility])   Who is being Interviewed Patient   What was the patient's/caregiver's perception as to why they think they needed to return back to the hospital? Other (Comment)  (Transfer from [facility])   Did you visit your Primary Care Physician after you left the hospital, before you returned this time? No   Why weren't you able to visit your PCP? Other (Comment)  (Transfer from [facility])   Did you see a specialist, such as Cardiac, Pulmonary, Orthopedic Physician, etc. after you left the hospital? No   Who advised the patient to return to the hospital? Other (Comment)  (Transfer from [facility])   Does the patient report anything that got in the way of taking their medications? No   In our efforts to provide the best possible care to you and others like you, can you think of anything that we could have done to help you after you left the hospital the first time, so that you might not have needed to return so soon?  Other (Comment)  (Transfer from [facility]) Patient calling to discuss increased vaginal bleeding and possibly getting a refill on prescription norethindrone (AYGESTIN) 5 MG tablet. Please call to discuss.           Pharmacy noted.

## 2023-10-25 NOTE — PROGRESS NOTES
Patient left with life flight at this time and report was given. Patient stable and wife was at bedside at the time patient left.

## 2023-10-25 NOTE — PLAN OF CARE
Problem: Safety - Adult  Goal: Free from fall injury  10/25/2023 1147 by Nir Ansari RN  Outcome: Progressing     Problem: Discharge Planning  Goal: Discharge to home or other facility with appropriate resources  10/25/2023 1147 by Nir Ansari RN  Outcome: Progressing     Problem: Skin/Tissue Integrity  Goal: Absence of new skin breakdown  Description: 1. Monitor for areas of redness and/or skin breakdown  2. Assess vascular access sites hourly  3. Every 4-6 hours minimum:  Change oxygen saturation probe site  4. Every 4-6 hours:  If on nasal continuous positive airway pressure, respiratory therapy assess nares and determine need for appliance change or resting period.   10/25/2023 1147 by Nir Ansari RN  Outcome: Progressing     Problem: Pain  Goal: Verbalizes/displays adequate comfort level or baseline comfort level  Outcome: Progressing

## 2023-10-25 NOTE — CARE COORDINATION
Case Management Assessment  Initial Evaluation    Date/Time of Evaluation: 10/25/2023 2:51 PM  Assessment Completed by: Sherie Otoole    If patient is discharged prior to next notation, then this note serves as note for discharge by case management. Patient Name: Abe Shah                   YOB: 1935  Diagnosis: GI bleed [K92.2]  Acute lower GI bleeding [K92.2]                   Date / Time: 10/24/2023 11:09 PM    Patient Admission Status: Inpatient   Readmission Risk (Low < 19, Mod (19-27), High > 27): Readmission Risk Score: 22    Current PCP: Kailyn Snider APRN - CNP  PCP verified by CM? (P) Yes    Chart Reviewed: Yes      History Provided by: (P) Patient  Patient Orientation: (P) Alert and Oriented    Patient Cognition: (P) Alert    Hospitalization in the last 30 days (Readmission):  No    If yes, Readmission Assessment in CM Navigator will be completed.     Advance Directives:      Code Status: DNR-CCA   Patient's Primary Decision Maker is: (P) Legal Next of Kin    Primary Decision Maker (Active): Nicole Vargas - Spouse - [phone]    Discharge Planning:    Patient lives with: (P) Spouse/Significant Other Type of Home: (P) House  Primary Care Giver: (P) Self  Patient Support Systems include: (P) Spouse/Significant Other   Current Financial resources: (P) Other (Comment) ([employer] Medicare)  Current community resources: (P) None  Current services prior to admission: (P) Durable Medical Equipment            Current DME: (P) Walker, Cane            Type of Home Care services:  (P) None    ADLS  Prior functional level: (P) Independent in ADLs/IADLs  Current functional level: (P) Independent in ADLs/IADLs    PT AM-PAC:   /24  OT AM-PAC:   /24    Family can provide assistance at DC: (P) Yes  Would you like Case Management to discuss the discharge plan with any other family members/significant others, and if so, who? (P) Yes (wife)  Plans to Return to Present Housing: (P) Yes  Other Identified Issues/Barriers to RETURNING to current housing: none  Potential Assistance needed at discharge: N/A            Potential DME:    Patient expects to discharge to: (P) [id number] [facility] for transportation at discharge: (P) Other (see comment) (Wife)    Financial    Payor: Samara Phillips / Plan: Mynor Neff PPO / Product Type: Medicare /     Does insurance require precert for SNF: Yes    Potential assistance Purchasing Medications: (P) No  Meds-to-Beds request: Yes      [address] [place] [address], [address] [place] [address] [phone]  [address] [facility]  Phone: [phone] Fax: [phone]      Notes:    Factors facilitating achievement of predicted outcomes: Family support, Motivated, Cooperative, and Pleasant    Barriers to discharge: Medical complications    Additional Case Management Notes: Met with patient and wife to discuss transitional plan. Direct admit from [facility]. Plan is home. Wife would like [facility] for PT/OT for strengthening. [facility] list with star ratings and QR code was given to wife/patient. Choice [address]  E Referral placed. Verified address, emergency contact and insurance. The Plan for Transition of Care is related to the following treatment goals of GI bleed [K92.2]  Acute lower GI bleeding [H76.9]    IF APPLICABLE: The Patient and/or patient representative Dre Suarez and his family were provided with a choice of provider and agrees with the discharge plan. Freedom of choice list with basic dialogue that supports the patient's individualized plan of care/goals and shares the quality data associated with the providers was provided to: (P) Patient   Patient Representative Name:       The Patient and/or Patient Representative Agree with the Discharge Plan? (P) Miranda Amaya  Case Management Department  Ph: [phone] Fax:         [phone] called accepting patient from [facility], [address] [place] [address].

## 2023-10-25 NOTE — PROGRESS NOTES
Dr. Adeola Mratinez called back. Patient will be transferred to ICU and then will be transferred to [facility]. V's. New orders to start blood. Dr. Adeola Martinez updated on new set of vitals. Nursing supervisor Eusebio Deal RN is aware.

## 2023-10-25 NOTE — PLAN OF CARE
The patient is hemodynamically normal and has not had any additional bloody bowel movements. Recommend hold anticoagulation and antiplatelet. General Surgery service will sign off at this time. Thank you for the consult. Please call/page us if you have any questions/concerns. We appreciate being involved in the care of your patient.       Nu Sanchez MD  PGY-4 General Surgery  12:14 PM 10/25/23

## 2023-10-25 NOTE — H&P
Critical Care - History and Physical Examination    Patient's name:  Karey Persaud  Medical Record Number: [id number]  Patient's account/billing number: [de-identified]  Patient's YOB: 1935  Age: 82 y.o. Date of Admission: 10/24/2023 11:09 PM  Reason of ICU admission:   Date of History and Physical Examination: 10/25/2023      Primary Care Physician: Sri Snider APRN - CNP  Attending Physician:    Code Status: DNR-CCA    Chief complaint:     Reason for ICU admission: gi bleed       History Of Present Illness:   Briefly patient is an 56-year-old male who initially presented to [facility] ED with concern for rectal bleeding since 3 AM this morning. Patient woke up from sleep and had 3 bloody stools. Patient underwent colonoscopy in the past June 2023, found to have diverticular bleed which was clipped. Subsequently patient was discharged on aspirin Plavix and also Mobic. Patient stated he has had no further issues with bleeding since that time until today. On initial evaluation to the ED, patient hypotensive systolics of 77 did not have any other significant symptoms except for dizziness, abdominal pain and blood in stool. He appeared pale, weak and tired. He denied any syncopal episode. He underwent CT scan, negative for any acute evidence of bleeding. [facility] He was given 1 unit of PRBC and IV fluids which seems to have stabilized the blood pressures. Initial hemoglobin was 10.4 BUN and creatinine was 31 and 1.3. Patient was initiated 1 unit of blood due to concern of active GI bleed. Patient continued to have large amount of dark red current jelly like stools , patient had episode of fall when he was using bedside commode and stood up, he fell backward onto the bed and became unresponsive to voice, was also incontinent of bladder at this time. However he subsequently became more alert. he was having Continuously having loose, bloody

## 2023-10-26 PROBLEM — J44.9 COPD WITHOUT EXACERBATION (HCC): Status: ACTIVE | Noted: 2017-11-15

## 2023-10-26 LAB
ANION GAP SERPL CALCULATED.3IONS-SCNC: 6 MMOL/L (ref 9–17)
BASOPHILS # BLD: 0.03 K/UL (ref 0–0.2)
BASOPHILS NFR BLD: 0 % (ref 0–2)
BUN SERPL-MCNC: 19 MG/DL (ref 8–23)
CALCIUM SERPL-MCNC: 7.5 MG/DL (ref 8.6–10.4)
CHLORIDE SERPL-SCNC: 110 MMOL/L (ref 98–107)
CO2 SERPL-SCNC: 20 MMOL/L (ref 20–31)
CREAT SERPL-MCNC: 0.9 MG/DL (ref 0.7–1.2)
EOSINOPHIL # BLD: 0.42 K/UL (ref 0–0.44)
EOSINOPHILS RELATIVE PERCENT: 5 % (ref 1–4)
ERYTHROCYTE [DISTWIDTH] IN BLOOD BY AUTOMATED COUNT: 15.8 % (ref 11.8–14.4)
GFR SERPL CREATININE-BSD FRML MDRD: >60 ML/MIN/1.73M2
GLUCOSE SERPL-MCNC: 126 MG/DL (ref 70–99)
HCT VFR BLD AUTO: 21.7 % (ref 40.7–50.3)
HCT VFR BLD AUTO: 23.9 % (ref 40.7–50.3)
HGB BLD-MCNC: 6.6 G/DL (ref 13–17)
HGB BLD-MCNC: 7.5 G/DL (ref 13–17)
IMM GRANULOCYTES # BLD AUTO: 0.08 K/UL (ref 0–0.3)
IMM GRANULOCYTES NFR BLD: 1 %
LYMPHOCYTES NFR BLD: 2.01 K/UL (ref 1.1–3.7)
LYMPHOCYTES RELATIVE PERCENT: 24 % (ref 24–43)
MCH RBC QN AUTO: 29.2 PG (ref 25.2–33.5)
MCHC RBC AUTO-ENTMCNC: 31.4 G/DL (ref 28.4–34.8)
MCV RBC AUTO: 93 FL (ref 82.6–102.9)
MONOCYTES NFR BLD: 1.08 K/UL (ref 0.1–1.2)
MONOCYTES NFR BLD: 13 % (ref 3–12)
NEUTROPHILS NFR BLD: 57 % (ref 36–65)
NEUTS SEG NFR BLD: 4.78 K/UL (ref 1.5–8.1)
NRBC BLD-RTO: 0 PER 100 WBC
PLATELET # BLD AUTO: 140 K/UL (ref 138–453)
PMV BLD AUTO: 10.4 FL (ref 8.1–13.5)
POTASSIUM SERPL-SCNC: 3.8 MMOL/L (ref 3.7–5.3)
RBC # BLD AUTO: 2.57 M/UL (ref 4.21–5.77)
SODIUM SERPL-SCNC: 136 MMOL/L (ref 135–144)
WBC OTHER # BLD: 8.7 K/UL (ref 3.5–11.3)

## 2023-10-26 PROCEDURE — 85025 COMPLETE CBC W/AUTO DIFF WBC: CPT

## 2023-10-26 PROCEDURE — P9016 RBC LEUKOCYTES REDUCED: HCPCS

## 2023-10-26 PROCEDURE — 97116 GAIT TRAINING THERAPY: CPT

## 2023-10-26 PROCEDURE — 97166 OT EVAL MOD COMPLEX 45 MIN: CPT

## 2023-10-26 PROCEDURE — 85018 HEMOGLOBIN: CPT

## 2023-10-26 PROCEDURE — 2580000003 HC RX 258

## 2023-10-26 PROCEDURE — 97530 THERAPEUTIC ACTIVITIES: CPT

## 2023-10-26 PROCEDURE — 6370000000 HC RX 637 (ALT 250 FOR IP)

## 2023-10-26 PROCEDURE — 36415 COLL VENOUS BLD VENIPUNCTURE: CPT

## 2023-10-26 PROCEDURE — 6360000002 HC RX W HCPCS: Performed by: INTERNAL MEDICINE

## 2023-10-26 PROCEDURE — 99222 1ST HOSP IP/OBS MODERATE 55: CPT | Performed by: INTERNAL MEDICINE

## 2023-10-26 PROCEDURE — 6370000000 HC RX 637 (ALT 250 FOR IP): Performed by: NURSE PRACTITIONER

## 2023-10-26 PROCEDURE — 1200000000 HC SEMI PRIVATE

## 2023-10-26 PROCEDURE — 97162 PT EVAL MOD COMPLEX 30 MIN: CPT

## 2023-10-26 PROCEDURE — 36430 TRANSFUSION BLD/BLD COMPNT: CPT

## 2023-10-26 PROCEDURE — 2580000003 HC RX 258: Performed by: INTERNAL MEDICINE

## 2023-10-26 PROCEDURE — 80048 BASIC METABOLIC PNL TOTAL CA: CPT

## 2023-10-26 PROCEDURE — 85014 HEMATOCRIT: CPT

## 2023-10-26 PROCEDURE — 97535 SELF CARE MNGMENT TRAINING: CPT

## 2023-10-26 RX ORDER — MINERAL OIL AND WHITE PETROLATUM 150; 830 MG/G; MG/G
OINTMENT OPHTHALMIC PRN
Status: DISCONTINUED | OUTPATIENT
Start: 2023-10-26 | End: 2023-10-26 | Stop reason: SDUPTHER

## 2023-10-26 RX ORDER — POLYVINYL ALCOHOL 14 MG/ML
1 SOLUTION/ DROPS OPHTHALMIC PRN
Status: DISCONTINUED | OUTPATIENT
Start: 2023-10-26 | End: 2023-10-28 | Stop reason: HOSPADM

## 2023-10-26 RX ORDER — SODIUM CHLORIDE 9 MG/ML
INJECTION, SOLUTION INTRAVENOUS PRN
Status: DISCONTINUED | OUTPATIENT
Start: 2023-10-26 | End: 2023-10-28 | Stop reason: HOSPADM

## 2023-10-26 RX ADMIN — IRON SUCROSE 200 MG: 20 INJECTION, SOLUTION INTRAVENOUS at 14:55

## 2023-10-26 RX ADMIN — SODIUM CHLORIDE: 9 INJECTION, SOLUTION INTRAVENOUS at 14:53

## 2023-10-26 RX ADMIN — MONTELUKAST SODIUM 10 MG: 10 TABLET, FILM COATED ORAL at 08:50

## 2023-10-26 RX ADMIN — POLYVINYL ALCOHOL 1 DROP: 14 SOLUTION/ DROPS OPHTHALMIC at 20:59

## 2023-10-26 RX ADMIN — PANTOPRAZOLE SODIUM 40 MG: 40 TABLET, DELAYED RELEASE ORAL at 06:17

## 2023-10-26 RX ADMIN — OXYBUTYNIN CHLORIDE 10 MG: 5 TABLET ORAL at 20:58

## 2023-10-26 RX ADMIN — PRIMIDONE 250 MG: 250 TABLET ORAL at 08:51

## 2023-10-26 RX ADMIN — TAMSULOSIN HYDROCHLORIDE 0.4 MG: 0.4 CAPSULE ORAL at 08:51

## 2023-10-26 RX ADMIN — OXYBUTYNIN CHLORIDE 10 MG: 5 TABLET ORAL at 08:50

## 2023-10-26 RX ADMIN — SODIUM CHLORIDE, PRESERVATIVE FREE 10 ML: 5 INJECTION INTRAVENOUS at 08:52

## 2023-10-26 RX ADMIN — PRIMIDONE 250 MG: 250 TABLET ORAL at 18:53

## 2023-10-26 RX ADMIN — LEVOTHYROXINE SODIUM 137 MCG: 0.14 TABLET ORAL at 06:17

## 2023-10-26 RX ADMIN — DESMOPRESSIN ACETATE 40 MG: 0.2 TABLET ORAL at 08:51

## 2023-10-26 RX ADMIN — FLUTICASONE PROPIONATE 2 SPRAY: 50 SPRAY, METERED NASAL at 08:50

## 2023-10-26 ASSESSMENT — ENCOUNTER SYMPTOMS
ABDOMINAL PAIN: 0
BLOOD IN STOOL: 0
COUGH: 0
VOMITING: 0
DIARRHEA: 0
SHORTNESS OF BREATH: 0
WHEEZING: 0

## 2023-10-26 NOTE — H&P
[facility]  Office: [id number]  1826, DO,  Ba, DO, Marisel Ro, DO, Lb Montoya Blood, DO, Shelia Arnold MD, Severo Lopes, MD, Alejandra Barton MD, Sally Costa MD,  Nayeli Seymour MD, Massimo Tucker MD, Chiquis Hyde MD,  Adela Elmore MD, Rah Hassan MD, Anuja Watters DO, Clair Andrew MD,  Santa Pierson DO, Purnima Mckeon MD, Yessenia Barney MD, Mery Bowles MD, Gianni Pedraza MD,  Lazaro Soliman MD, Kirby Gaytan MD, Max Ron MD, Yamil Snow MD, Celia Mortimer, MD, Alex Borges DO, Smitha Hilario DO, Cristina Cronin MD,  Nic Prabhakar MD, Ethan Dumont, CNP,  Ananda Maloney, CNP, Uriel Billings, CNP,  Robbin Gunter [facility], Judson Brown [facility], Roe Jaffe CNP, Seth Lamar CNP, Catarina Rubio, CNP, Luther Brock, CNP, Giuliana Peterson, CNP, Quincy Rodriguez, CNS, Ricky Pablo [facility], Codi Elmore, [address] [place] [address]    HISTORY AND PHYSICAL EXAMINATION            Date:   10/26/2023  Patient name:  Suzanne Gallego  Date of admission:  10/24/2023 11:09 PM  MRN:   [id number]  Account:  [de-identified]  YOB: 1935  PCP:    GENA Kaur CNP  Room:   2218/7145-68  Code Status:    DNR-CCA    Chief Complaint:     Rectal bleeding    History Obtained From:     patient    History of Present Illness:     Suzanne Gallego is a 82 y.o. Non- / non  male who presents with No chief complaint on file. and is admitted to the hospital for the management of GI bleed. This is an 80-year-old male that presents to [facility] as a transfer from [facility] where his initial eval for gastrointestinal bleeding. He had sudden onset of abdominal cramping with urge to have a BM and had a large volume of blood with a BM. He had similar episode approximately 4 months ago associate with diverticular bleeding.   He was admitted Laboratory Testing:  Recent Results (from the past 24 hour(s))   Hemoglobin and Hematocrit    Collection Time: 10/25/23  5:48 PM   Result Value Ref Range    Hemoglobin 7.3 (L) 13.0 - 17.0 g/dL    Hematocrit 22.5 (L) 40.7 - 50.3 %   Hemoglobin and Hematocrit    Collection Time: 10/26/23 12:33 AM   Result Value Ref Range    Hemoglobin 6.6 (LL) 13.0 - 17.0 g/dL    Hematocrit 21.7 (L) 40.7 - 50.3 %   Basic Metabolic Panel w/ Reflex to MG    Collection Time: 10/26/23  7:16 AM   Result Value Ref Range    Sodium 136 135 - 144 mmol/L    Potassium 3.8 3.7 - 5.3 mmol/L    Chloride 110 (H) 98 - 107 mmol/L    CO2 20 20 - 31 mmol/L    Anion Gap 6 (L) 9 - 17 mmol/L    Glucose 126 (H) 70 - 99 mg/dL    BUN 19 8 - 23 mg/dL    Creatinine 0.9 0.7 - 1.2 mg/dL    Est, Glom Filt Rate >60 >60 mL/min/1.73m2    Calcium 7.5 (L) 8.6 - 10.4 mg/dL   CBC with Auto Differential    Collection Time: 10/26/23  7:16 AM   Result Value Ref Range    WBC 8.7 3.5 - 11.3 k/uL    RBC 2.57 (L) 4.21 - 5.77 m/uL    Hemoglobin 7.5 (L) 13.0 - 17.0 g/dL    Hematocrit 23.9 (L) 40.7 - 50.3 %    MCV 93.0 82.6 - 102.9 fL    MCH 29.2 25.2 - 33.5 pg    MCHC 31.4 28.4 - 34.8 g/dL    RDW 15.8 (H) 11.8 - 14.4 %    Platelets 763 823 - 563 k/uL    MPV 10.4 8.1 - 13.5 fL    NRBC Automated 0.0 0.0 per 100 WBC    Neutrophils % 57 36 - 65 %    Lymphocytes % 24 24 - 43 %    Monocytes % 13 (H) 3 - 12 %    Eosinophils % 5 (H) 1 - 4 %    Basophils % 0 0 - 2 %    Immature Granulocytes 1 (H) 0 %    Neutrophils Absolute 4.78 1.50 - 8.10 k/uL    Lymphocytes Absolute 2.01 1.10 - 3.70 k/uL    Monocytes Absolute 1.08 0.10 - 1.20 k/uL    Eosinophils Absolute 0.42 0.00 - 0.44 k/uL    Basophils Absolute 0.03 0.00 - 0.20 k/uL    Absolute Immature Granulocyte 0.08 0.00 - 0.30 k/uL       Imaging/Diagnostics:  XR CHEST PORTABLE    Result Date: 10/25/2023  No acute airspace disease identified.      CT ABDOMEN PELVIS W IV CONTRAST Additional Contrast? None    Result Date:

## 2023-10-26 NOTE — PLAN OF CARE
Problem: Safety - Adult  Goal: Free from fall injury  Outcome: Progressing     Problem: Discharge Planning  Goal: Discharge to home or other facility with appropriate resources  Outcome: Progressing  Flowsheets (Taken 10/25/2023 2000 by Mark Laird RN)  Discharge to home or other facility with appropriate resources:   Identify barriers to discharge with patient and caregiver   Arrange for needed discharge resources and transportation as appropriate   Identify discharge learning needs (meds, wound care, etc)     Problem: Skin/Tissue Integrity  Goal: Absence of new skin breakdown  Description: 1. Monitor for areas of redness and/or skin breakdown  2. Assess vascular access sites hourly  3. Every 4-6 hours minimum:  Change oxygen saturation probe site  4. Every 4-6 hours:  If on nasal continuous positive airway pressure, respiratory therapy assess nares and determine need for appliance change or resting period.   Outcome: Progressing     Problem: ABCDS Injury Assessment  Goal: Absence of physical injury  Outcome: Progressing     Problem: Pain  Goal: Verbalizes/displays adequate comfort level or baseline comfort level  Outcome: Progressing  Flowsheets (Taken 10/25/2023 1600 by Stephen King RN)  Verbalizes/displays adequate comfort level or baseline comfort level:   Encourage patient to monitor pain and request assistance   Assess pain using appropriate pain scale     Problem: Chronic Conditions and Co-morbidities  Goal: Patient's chronic conditions and co-morbidity symptoms are monitored and maintained or improved  Outcome: Progressing  Flowsheets (Taken 10/25/2023 2000 by Mark Laird RN)  Care Plan - Patient's Chronic Conditions and Co-Morbidity Symptoms are Monitored and Maintained or Improved:   Monitor and assess patient's chronic conditions and comorbid symptoms for stability, deterioration, or improvement   Collaborate with multidisciplinary team to address chronic and comorbid conditions and

## 2023-10-26 NOTE — PROGRESS NOTES
Demonstration  Barriers to Learning: None  Education Outcome: Verbalized understanding      Therapy Time   Individual Concurrent Group Co-treatment   Time In 1450         Time Out 1538         Minutes 48         Timed Code Treatment Minutes: Danyn Garcia, PT, DPT

## 2023-10-26 NOTE — ACP (ADVANCE CARE PLANNING)
Advance Care Planning     Advance Care Planning Activator (Inpatient)  Conversation Note      Date of ACP Conversation: 10/26/2023     Conversation Conducted with: Patient with Decision Making Capacity    ACP Activator: Go Moreno RN    Health Care Decision Maker:     Current Designated Health Care Decision Maker:     Primary Decision Maker (Active): ELMIRA Zazueta - [phone]    Care Preferences    Ventilation: \"If you were in your present state of health and suddenly became very ill and were unable to breathe on your own, what would your preference be about the use of a ventilator (breathing machine) if it were available to you? \"      Would the patient desire the use of ventilator (breathing machine)?: yes    \"If your health worsens and it becomes clear that your chance of recovery is unlikely, what would your preference be about the use of a ventilator (breathing machine) if it were available to you? \"     Would the patient desire the use of ventilator (breathing machine)?: No      Resuscitation  \"CPR works best to restart the heart when there is a sudden event, like a heart attack, in someone who is otherwise healthy. Unfortunately, CPR does not typically restart the heart for people who have serious health conditions or who are very sick. \"    \"In the event your heart stopped as a result of an underlying serious health condition, would you want attempts to be made to restart your heart (answer \"yes\" for attempt to resuscitate) or would you prefer a natural death (answer \"no\" for do not attempt to resuscitate)? \" no

## 2023-10-26 NOTE — PROGRESS NOTES
Congestive Heart Failure Education completed and charted. CHF booklet given. Patient was receptive to education. Discussed the  importance of medication compliance. Discussed the importance of a heart healthy diet. Discussed 2000 mg sodium-restricted daily diet. Patient instructed to limit fluid intake to  1.5 to 2 liters per day. Patient instructed to weigh self at the same time of each day each morning, reinforced teaching to monitor for 3-5 lb weight increase over 1-2 days notify physician if change noted. Signs and symptoms of CHF discussed with patient, such as feeling more tired than normal, feeling short of breath, coughing that increases when lying down, sudden weight gain, swelling of the feet, legs or belly. Patient verbalized understanding to notify physician office if these symptoms occur.   EF 43%   Pt resides in [place], [place]

## 2023-10-26 NOTE — PROGRESS NOTES
primidone  250 mg Oral BID    oxybutynin  10 mg Oral BID    montelukast  10 mg Oral Daily    levothyroxine  137 mcg Oral Daily    fluticasone  2 spray Each Nostril Daily     Continuous Infusions:   sodium chloride      sodium chloride Stopped (10/25/23 3298)    sodium chloride 50 mL/hr at 10/26/23 1453       INPUT/OUTPUT:  In: 1956.9 [I.V.:1577.2; Blood:312]  Out: 6973 [CA]  Date 10/26/23 0000 - 10/26/23 2359   Shift 1298-2963 4823-1415 8019-0194 24 Hour Total   INTAKE   I.V.(mL/kg) 537. 9(5.1)   537. 9(5.1)   Blood(mL/kg) 312(3)   312(3)   Shift Total(mL/kg) 849. 9(8.1)   849. 9(8.1)   OUTPUT   Urine(mL/kg/hr) 650(0.8)   650   Shift Total(mL/kg) 650(6.2)   650(6.2)   Weight (kg) 105.2 105.2 105.2 105.2        LABS:  ABGs:   No results for input(s): \"POCPH\", \"POCPCO2\", \"POCPO2\", \"POCHCO3\", \"[id number]\" in the last 72 hours. CBC:   Recent Labs     10/24/23  0730 10/24/23  1344 10/25/23  0020 10/25/23  0509 10/25/23  1259 10/25/23  1748 10/26/23  0033 10/26/23  0716   WBC 11.2  --  9.6 9.0  --   --   --  8.7   HGB 10.4*   < > 9.1* 8.1* 8.0* 7.3* 6.6* 7.5*   HCT 33.1*   < > 29.4* 25.2* 23.9* 22.5* 21.7* 23.9*   MCV 89.5  --  91.6 88.7  --   --   --  93.0     --  174 158  --   --   --  140   LYMPHOPCT 25  --  22* 27  --   --   --  24   RBC 3.70*  --  3.21* 2.84*  --   --   --  2.57*   MCH 28.1  --  28.3 28.5  --   --   --  29.2   MCHC 31.4  --  31.0 32.1  --   --   --  31.4   RDW 16.1*  --  15.9* 16.1*  --   --   --  15.8*    < > = values in this interval not displayed. CRP:   Recent Labs     10/25/23  002   CRP 28.0*     LDH:   No results for input(s): \"LDH\" in the last 72 hours.   BMP:   Recent Labs     10/24/23  0730 10/25/23  0020 10/25/23  0509 10/26/23  0716    134* 136 136   K 5.0 4.5 4.2 3.8    102 106 110*   CO2 22 21 22 20   BUN 31* 32* 29* 19   CREATININE 1.3* 1.1 1.1 0.9   GLUCOSE 227* 187* 150* 126*     Liver Function Test:   Recent Labs     10/24/23  0730 10/25/23  0020   PROT 6.5 5.3*   LABALBU 3.7 2.8*   ALT 69* 42*   AST 49* 27   ALKPHOS 187* 122   BILITOT 0.2* 0.4     Coagulation Profile:   Recent Labs     10/24/23  0730 10/25/23  0020   INR 1.1 1.3   PROTIME 14.0 15.7*     D-Dimer:  No results for input(s): \"DDIMER\" in the last 72 hours. Lactic Acid:  No results for input(s): \"LACTA\" in the last 72 hours. Cardiac Enzymes:  No results for input(s): \"CKTOTAL\", \"CKMB\", \"CKMBINDEX\", \"TROPONINI\" in the last 72 hours. Invalid input(s): \"TROPONIN\", \"HSTROP\"  BNP/ProBNP:   Recent Labs     10/25/23  0020   PROBNP 266     Triglycerides:  No results for input(s): \"TRIG\" in the last 72 hours. Microbiology:  Urine Culture:  No components found for: \"CURINE\"  Blood Culture:  No components found for: \"CBLOOD\", \"CFUNGUSBL\"  Sputum Culture:  No components found for: \"CSPUTUM\"  No results for input(s): \"SPECDESC\", \"SPECIAL\", \"CULTURE\", \"STATUS\", \"ORG\", \"CDIFFTOXPCR\", \"CAMPYLOBPCR\", \"SALMONELLAPC\", \"SHIGAPCR\", \"SHIGELLAPCR\", \"MPNEUG\", \"MPNEUM\", \"LACTOQL\" in the last 72 hours. No results for input(s): \"SPUTUM\", \"SPECDESC\", \"SPECIAL\", \"CULTURE\", \"STATUS\", \"ORG\", \"CDIFFTOXPCR\", \"MPNEUM\", \"MPNEUG\" in the last 72 hours. Invalid input(s): \"CURINE\", \"CBLOOD\", \"CFUNGUSBL\"     Pathology:    Radiology Reports:  XR CHEST PORTABLE   Final Result   No acute airspace disease identified. Echocardiogram:   No results found for this or any previous visit. 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 Currently saturating well on room air. Not in apparent distress.   Continue with breathing treatments, DuoNeb  Continue home inhaler Incruse Ellipta  Oxygen as needed, keep SPO2 greater than 92%  Outpatient follow-up with

## 2023-10-26 NOTE — FLOWSHEET NOTE
2134: Report called to [address] [place] [address] on 4C. All questions answered.     Electronically signed by Ivonne Lagos RN on 10/25/2023 at 9:36 PM

## 2023-10-26 NOTE — PROGRESS NOTES
Occupational Therapy  Facility/Department: [facility]  Occupational Therapy Initial Assessment    Name: Carlotta Lopez  : 1935  MRN: [id number]  Date of Service: 10/26/2023  Obtained from medical chart:   \" patient is an 82-year-old male who initially presented to [facility] ED with concern for rectal bleeding since 3 AM this morning. Patient woke up from sleep and had 3 bloody stools. Patient underwent colonoscopy in the past 2023, found to have diverticular bleed which was clipped. Subsequently patient was discharged on aspirin Plavix and also Mobic. Patient stated he has had no further issues with bleeding since that time until today. On initial evaluation to the ED, patient hypotensive systolics of 79 did not have any other significant symptoms except for dizziness, abdominal pain and blood in stool. He appeared pale, weak and tired. He denied any syncopal episode. He underwent CT scan, negative for any acute evidence of bleeding. [facility] \"    Discharge Recommendations:  Patient would benefit from continued therapy after discharge     Patient Diagnosis(es): There were no encounter diagnoses. Past Medical History:  has a past medical history of COPD (chronic obstructive pulmonary disease) ([address]), Diabetes mellitus ([address]), Hx of echocardiogram, Hyperlipidemia, Hypothyroidism, MI (myocardial infarction) ([address]), Stroke ([address]), Thyroid disease, and Type II or unspecified type diabetes mellitus without mention of complication, not stated as uncontrolled. Past Surgical History:  has a past surgical history that includes hernia repair; Cardiac surgery (2017); fracture surgery; Colonoscopy; Cerebral angiogram (Bilateral, 06/15/2023); sigmoidoscopy (2023); and Colonoscopy (N/A, 2023). Assessment   Performance deficits / Impairments: Decreased functional mobility ; Decreased ADL status; Decreased strength;Decreased safe

## 2023-10-26 NOTE — FLOWSHEET NOTE
2200: Pt transported to . No belongings to transport. Bedside report given to [facility]. All questions answered.     Electronically signed by Kirby Flynn RN on 10/25/2023 at 11:13 PM

## 2023-10-27 PROBLEM — K57.31 DIVERTICULOSIS OF COLON WITH HEMORRHAGE: Status: ACTIVE | Noted: 2023-10-27

## 2023-10-27 LAB
ABO/RH: NORMAL
ANION GAP SERPL CALCULATED.3IONS-SCNC: 7 MMOL/L (ref 9–17)
ANTIBODY SCREEN: NEGATIVE
ARM BAND NUMBER: NORMAL
BASOPHILS # BLD: 0.03 K/UL (ref 0–0.2)
BASOPHILS NFR BLD: 0 % (ref 0–2)
BLOOD BANK BLOOD PRODUCT EXPIRATION DATE: NORMAL
BLOOD BANK DISPENSE STATUS: NORMAL
BLOOD BANK ISBT PRODUCT BLOOD TYPE: 5100
BLOOD BANK PRODUCT CODE: NORMAL
BLOOD BANK SAMPLE EXPIRATION: NORMAL
BLOOD BANK UNIT TYPE AND RH: NORMAL
BPU ID: NORMAL
BUN SERPL-MCNC: 13 MG/DL (ref 8–23)
CALCIUM SERPL-MCNC: 7.5 MG/DL (ref 8.6–10.4)
CHLORIDE SERPL-SCNC: 109 MMOL/L (ref 98–107)
CO2 SERPL-SCNC: 21 MMOL/L (ref 20–31)
COMPONENT: NORMAL
CREAT SERPL-MCNC: 1 MG/DL (ref 0.7–1.2)
CROSSMATCH RESULT: NORMAL
EOSINOPHIL # BLD: 0.41 K/UL (ref 0–0.44)
EOSINOPHILS RELATIVE PERCENT: 5 % (ref 1–4)
ERYTHROCYTE [DISTWIDTH] IN BLOOD BY AUTOMATED COUNT: 15.9 % (ref 11.8–14.4)
GFR SERPL CREATININE-BSD FRML MDRD: >60 ML/MIN/1.73M2
GLUCOSE SERPL-MCNC: 121 MG/DL (ref 70–99)
HCT VFR BLD AUTO: 23.7 % (ref 40.7–50.3)
HGB BLD-MCNC: 7.5 G/DL (ref 13–17)
IMM GRANULOCYTES # BLD AUTO: 0.17 K/UL (ref 0–0.3)
IMM GRANULOCYTES NFR BLD: 2 %
LYMPHOCYTES NFR BLD: 1.98 K/UL (ref 1.1–3.7)
LYMPHOCYTES RELATIVE PERCENT: 22 % (ref 24–43)
MCH RBC QN AUTO: 29.5 PG (ref 25.2–33.5)
MCHC RBC AUTO-ENTMCNC: 31.6 G/DL (ref 28.4–34.8)
MCV RBC AUTO: 93.3 FL (ref 82.6–102.9)
MONOCYTES NFR BLD: 1.19 K/UL (ref 0.1–1.2)
MONOCYTES NFR BLD: 13 % (ref 3–12)
NEUTROPHILS NFR BLD: 58 % (ref 36–65)
NEUTS SEG NFR BLD: 5.11 K/UL (ref 1.5–8.1)
NRBC BLD-RTO: 0 PER 100 WBC
PLATELET # BLD AUTO: 156 K/UL (ref 138–453)
PMV BLD AUTO: 10.1 FL (ref 8.1–13.5)
POTASSIUM SERPL-SCNC: 3.7 MMOL/L (ref 3.7–5.3)
RBC # BLD AUTO: 2.54 M/UL (ref 4.21–5.77)
RBC # BLD: ABNORMAL 10*6/UL
SODIUM SERPL-SCNC: 137 MMOL/L (ref 135–144)
TRANSFUSION STATUS: NORMAL
UNIT DIVISION: 0
UNIT ISSUE DATE/TIME: NORMAL
WBC OTHER # BLD: 8.9 K/UL (ref 3.5–11.3)

## 2023-10-27 PROCEDURE — 6370000000 HC RX 637 (ALT 250 FOR IP): Performed by: INTERNAL MEDICINE

## 2023-10-27 PROCEDURE — 2580000003 HC RX 258

## 2023-10-27 PROCEDURE — 99232 SBSQ HOSP IP/OBS MODERATE 35: CPT | Performed by: INTERNAL MEDICINE

## 2023-10-27 PROCEDURE — 97116 GAIT TRAINING THERAPY: CPT

## 2023-10-27 PROCEDURE — 6360000002 HC RX W HCPCS: Performed by: INTERNAL MEDICINE

## 2023-10-27 PROCEDURE — 1200000000 HC SEMI PRIVATE

## 2023-10-27 PROCEDURE — 2580000003 HC RX 258: Performed by: INTERNAL MEDICINE

## 2023-10-27 PROCEDURE — 97530 THERAPEUTIC ACTIVITIES: CPT

## 2023-10-27 PROCEDURE — 80048 BASIC METABOLIC PNL TOTAL CA: CPT

## 2023-10-27 PROCEDURE — 6370000000 HC RX 637 (ALT 250 FOR IP)

## 2023-10-27 PROCEDURE — 85025 COMPLETE CBC W/AUTO DIFF WBC: CPT

## 2023-10-27 PROCEDURE — 36415 COLL VENOUS BLD VENIPUNCTURE: CPT

## 2023-10-27 PROCEDURE — 6370000000 HC RX 637 (ALT 250 FOR IP): Performed by: NURSE PRACTITIONER

## 2023-10-27 RX ORDER — TAMSULOSIN HYDROCHLORIDE 0.4 MG/1
0.8 CAPSULE ORAL DAILY
Status: DISCONTINUED | OUTPATIENT
Start: 2023-10-28 | End: 2023-10-28 | Stop reason: HOSPADM

## 2023-10-27 RX ORDER — PANTOPRAZOLE SODIUM 40 MG/1
40 TABLET, DELAYED RELEASE ORAL
Qty: 30 TABLET | Refills: 3 | Status: SHIPPED | OUTPATIENT
Start: 2023-10-28

## 2023-10-27 RX ADMIN — PRIMIDONE 250 MG: 250 TABLET ORAL at 18:30

## 2023-10-27 RX ADMIN — PANTOPRAZOLE SODIUM 40 MG: 40 TABLET, DELAYED RELEASE ORAL at 05:25

## 2023-10-27 RX ADMIN — OXYBUTYNIN CHLORIDE 10 MG: 5 TABLET ORAL at 19:56

## 2023-10-27 RX ADMIN — IPRATROPIUM BROMIDE AND ALBUTEROL SULFATE 1 DOSE: 2.5; .5 SOLUTION RESPIRATORY (INHALATION) at 14:51

## 2023-10-27 RX ADMIN — TAMSULOSIN HYDROCHLORIDE 0.4 MG: 0.4 CAPSULE ORAL at 08:32

## 2023-10-27 RX ADMIN — PRIMIDONE 250 MG: 250 TABLET ORAL at 08:32

## 2023-10-27 RX ADMIN — SODIUM CHLORIDE: 9 INJECTION, SOLUTION INTRAVENOUS at 11:23

## 2023-10-27 RX ADMIN — POLYVINYL ALCOHOL 1 DROP: 14 SOLUTION/ DROPS OPHTHALMIC at 05:28

## 2023-10-27 RX ADMIN — MONTELUKAST SODIUM 10 MG: 10 TABLET, FILM COATED ORAL at 08:31

## 2023-10-27 RX ADMIN — FLUTICASONE PROPIONATE 2 SPRAY: 50 SPRAY, METERED NASAL at 08:34

## 2023-10-27 RX ADMIN — LEVOTHYROXINE SODIUM 137 MCG: 0.14 TABLET ORAL at 05:25

## 2023-10-27 RX ADMIN — IRON SUCROSE 200 MG: 20 INJECTION, SOLUTION INTRAVENOUS at 14:51

## 2023-10-27 RX ADMIN — DESMOPRESSIN ACETATE 40 MG: 0.2 TABLET ORAL at 08:32

## 2023-10-27 RX ADMIN — OXYBUTYNIN CHLORIDE 10 MG: 5 TABLET ORAL at 08:31

## 2023-10-27 RX ADMIN — SODIUM CHLORIDE, PRESERVATIVE FREE 10 ML: 5 INJECTION INTRAVENOUS at 19:56

## 2023-10-27 ASSESSMENT — ENCOUNTER SYMPTOMS
DIARRHEA: 0
NAUSEA: 0
BLOOD IN STOOL: 0
VOMITING: 0
ABDOMINAL DISTENTION: 0
ABDOMINAL PAIN: 0
SHORTNESS OF BREATH: 0
COUGH: 0

## 2023-10-27 NOTE — PLAN OF CARE
Pt upset, refuses cab home, stating \" I'm not going home with a stranger two hours in the dark! \" Pt's spouse cannot return until am as she does not feel safe driving in the dark 2 hours here and 2 hours back. Pt has no other means of discharge home.   Dr. Mallorie Bullock informed

## 2023-10-27 NOTE — PLAN OF CARE
Writer perfect served GI, awaiting confirmation from them that pt is ok for discharge  ( not stated in their note)

## 2023-10-27 NOTE — PLAN OF CARE
Problem: Safety - Adult  Goal: Free from fall injury  10/27/2023 0608 by Lucy Zapata RN  Outcome: Progressing     Problem: Discharge Planning  Goal: Discharge to home or other facility with appropriate resources  10/27/2023 0608 by Lucy Zapata RN  Outcome: Progressing     Problem: Skin/Tissue Integrity  Goal: Absence of new skin breakdown  Description: 1. Monitor for areas of redness and/or skin breakdown  2. Assess vascular access sites hourly  3. Every 4-6 hours minimum:  Change oxygen saturation probe site  4. Every 4-6 hours:  If on nasal continuous positive airway pressure, respiratory therapy assess nares and determine need for appliance change or resting period.   10/27/2023 0608 by Lucy Zapata RN  Outcome: Progressing     Problem: ABCDS Injury Assessment  Goal: Absence of physical injury  10/27/2023 0608 by Lucy Zapata RN  Outcome: Progressing     Problem: Pain  Goal: Verbalizes/displays adequate comfort level or baseline comfort level  10/27/2023 0608 by Lucy Zapata RN  Outcome: Progressing     Problem: Chronic Conditions and Co-morbidities  Goal: Patient's chronic conditions and co-morbidity symptoms are monitored and maintained or improved  10/27/2023 0608 by Lucy Zapata RN  Outcome: Progressing     Problem: Nutrition Deficit:  Goal: Optimize nutritional status  10/27/2023 0608 by Lucy Zapata RN  Outcome: Progressing

## 2023-10-27 NOTE — PLAN OF CARE
( Still waiting for conformation from GI) Pt informed writer only ride home is his wife who cannot safely drive in the dark, pt residence is [place], case mgmt informed

## 2023-10-27 NOTE — PROGRESS NOTES
Physical Therapy  Facility/Department: [facility]  Physical Therapy Daily Treatment Note    Name: Roly Darby  : 1935  MRN: [id number]  Date of Service: 10/27/2023    Discharge Recommendations:  Patient would benefit from continued therapy after discharge, Therapy recommended at discharge   PT Equipment Recommendations  Equipment Needed: No  Other: Pt has cane and walker at home      Patient Diagnosis(es): There were no encounter diagnoses. Past Medical History:  has a past medical history of COPD (chronic obstructive pulmonary disease) ([address]), Diabetes mellitus ([address]), Hx of echocardiogram, Hyperlipidemia, Hypothyroidism, MI (myocardial infarction) ([address]), Stroke ([address]), Thyroid disease, and Type II or unspecified type diabetes mellitus without mention of complication, not stated as uncontrolled. Past Surgical History:  has a past surgical history that includes hernia repair; Cardiac surgery (2017); fracture surgery; Colonoscopy; Cerebral angiogram (Bilateral, 06/15/2023); sigmoidoscopy (2023); Colonoscopy (N/A, 2023); and Upper gastrointestinal endoscopy (10/25/2023). Assessment   Body Structures, Functions, Activity Limitations Requiring Skilled Therapeutic Intervention: Decreased safe awareness;Decreased functional mobility ; Decreased endurance;Decreased balance;Decreased body mechanics; Decreased tolerance to work activity; Decreased strength  Assessment: Pt amb 140' CGA RW, 80' Patricio SPC. Pt appears unsteady w/ SPC, pt vocalizes feeling unsteady w/ SPC, intends to use RW at home upon discharge to reduce fall risk. Pt would benefit from continued acute PT to address deficits.   Therapy Prognosis: Good  Decision Making: Medium Complexity  Requires PT Follow-Up: Yes  Activity Tolerance  Activity Tolerance: Patient tolerated evaluation without incident     Plan   Physcial Therapy Plan  General Plan:  (5-6x/wk)  Current Treatment Recommendations: Strengthening, Balance training,

## 2023-10-27 NOTE — CARE COORDINATION
Writer called and confirmed acceptance to [facility]. Bashir Heller from [address] [facility] states patient is accepted and will be seen Sunday. Patient is notified. 38 patient's wife has gone home and cannot come back to pick him up tonight because it is unsafe for her to drive after dark. Patient does not have cab money or any family members to assist.  Patient is offered a [employer] cab home. Patient states that he feels unsafe to go home tonight and would feel better if his spouse can pick him up in the morning.   RN is notified

## 2023-10-27 NOTE — PROGRESS NOTES
Comprehensive Nutrition Assessment    Type and Reason for Visit:  Reassess    Nutrition Recommendations/Plan:   Continue current diet. Encourage intakes and monitor need for ONS. Will monitor labs, weights, and plan of care. Malnutrition Assessment:  Malnutrition Status: At risk for malnutrition (10/25/23 2053)    Context:  Acute Illness     Findings of the 6 clinical characteristics of malnutrition:  Energy Intake:  Mild decrease in energy intake   Weight Loss:  No significant weight loss     Body Fat Loss:  No significant body fat loss   Muscle Mass Loss:  No significant muscle mass loss  Fluid Accumulation:  Mild (to Severe) Extremities   Strength:  Not Performed    Nutrition Assessment:    Pt has been started on an oral diet. Therapy working with pt at attempted visit. Pt ate 50% of lunch tray. Will continue to monitor PO intakes and provide ONS as/if needed. Labs/Meds reviewed. Nutrition Related Findings:    Labs/Meds reviewed. Wound Type: None       Current Nutrition Intake & Therapies:    Average Meal Intake: 51-75%  Average Supplements Intake: None Ordered  ADULT DIET; Regular; High Fiber    Anthropometric Measures:  Height: 180.3 cm (5' 10.98\")  Ideal Body Weight (IBW): 172 lbs (78 kg)    Admission Body Weight: 105.2 kg (231 lb 14.8 oz)  Current Body Weight: 105.2 kg (231 lb 14.8 oz), 134.8 % IBW. Weight Source: Bed Scale  Current BMI (kg/m2): 32.4  Usual Body Weight: 116.4 kg (256 lb 11 oz) (11/8/22 bed scale per chart review)  % Weight Change (Calculated): -9.6                    BMI Categories: Obese Class 1 (BMI 30.0-34. 9)    Estimated Daily Nutrient Needs:  Energy Requirements Based On: Formula  Weight Used for Energy Requirements: Admission  Energy (kcal/day): 5691-0951 kcals/day  Weight Used for Protein Requirements: Ideal  Protein (g/day):  gm pro/day  Method Used for Fluid Requirements: Other (Comment)  Fluid (ml/day): per MD    Nutrition Diagnosis:   Inadequate oral intake

## 2023-10-27 NOTE — PLAN OF CARE
Problem: Safety - Adult  Goal: Free from fall injury  Outcome: Progressing     Problem: Discharge Planning  Goal: Discharge to home or other facility with appropriate resources  Outcome: Progressing     Problem: Skin/Tissue Integrity  Goal: Absence of new skin breakdown  Description: 1. Monitor for areas of redness and/or skin breakdown  2. Assess vascular access sites hourly  3. Every 4-6 hours minimum:  Change oxygen saturation probe site  4. Every 4-6 hours:  If on nasal continuous positive airway pressure, respiratory therapy assess nares and determine need for appliance change or resting period.   Outcome: Progressing     Problem: ABCDS Injury Assessment  Goal: Absence of physical injury  Outcome: Progressing     Problem: Pain  Goal: Verbalizes/displays adequate comfort level or baseline comfort level  Outcome: Progressing     Problem: Chronic Conditions and Co-morbidities  Goal: Patient's chronic conditions and co-morbidity symptoms are monitored and maintained or improved  Outcome: Progressing     Problem: Nutrition Deficit:  Goal: Optimize nutritional status  Outcome: Progressing

## 2023-10-27 NOTE — PROGRESS NOTES
Home Oxygen Evaluation    Home Oxygen Evaluation completed. Patient is on room air.   Resting SpO2 on room air = 94%    SpO2 on room air with exercise = 90%    Jong Montana RCP  6:12 PM

## 2023-10-27 NOTE — PLAN OF CARE
Dr. Raciel Dickinson and I had a long discussion with the patient and his wife. It was explained to the patient that he was on dual antiplatelet therapy because of his prior history of CAD s/p stent placement in 2017. Ever since then, he has been on Aspirin and Plavix for the prevention of future episodes of MI and stroke. Because of his recent hospitalizations for the GI bleed on this therapy, he was explained the risk vs benefit of stopping anticoagulation. He was given the choice to choose, whether he wants to stay with the antiplatelet therapy which increases the risk of bleeding in him or he wants to stop it. After thorough discussion with the patient and his wife, it was mutually decided to stop anticoagulation after factoring the risk versus benefits of stopping the therapy.

## 2023-10-27 NOTE — PROGRESS NOTES
[facility]  Office: [phone]  Mitzi Quinonez DO, Claudean Ajiton Blood, DO, Cyndie Reddy MD, Vj Houston MD, Justine Cotton MD, Devin Song MD,  Diaz Tineo MD, Tootie Miller MD, Simon Carreon MD,  Og Capps MD, Cabrera Haddad MD, Will Loja DO, Parisa Mc MD,  Micaela Castro DO, Nando Grullon MD, Brian Marina MD, Paxton Garcia MD, Henrietta Shaw MD,  Sly Oakley MD, Addie Aguilar MD, Bernardo Godwin MD, Minh Craven MD, Arti Vale MD, Monie Horn, DO, Nataliia Fishman DO, Kip Olguin MD,  Ermias Almeida MD, Elder Arceo, [employer],  Neal Corey, CNP, Terry Collins, CNP,  Emily Marquis, [facility], Sonny Pereira, [employer], Sloane Miguel CNP, Jerry Marshall, CNP, Tonny Diallo, CNP, Maricruz Vegas, CNP, Neela Ovalle, CNP, Blas Morgan, CNS, Dl Geronimo CNP, [facility]    Progress Note    10/27/2023    3:49 PM    Name:   Cierra Le  MRN:     [id number]     Acct:      [de-identified]   Room:   [address] [place] [address] Day:  3  Admit Date:  10/24/2023 11:09 PM    PCP:   GENA Sequeira CNP  Code Status:  DNR-CCA    Subjective:     C/C:   GI bleed    Interval History Status:   Comfortable  No further abdominal cramping or pain  No BM  No bleeding noted  Status post transfusion  Feels a little stronger  Doing okay with diet    Data Base Updates:  WBC8.9k/uL RBC2.54 Low m/uL Hemoglobin7. 5 Low      [id number] High      Brief History:     As documented in the medical record: \"This is an 82-year-old male that presents to [facility] as a transfer from [facility] where his initial eval for gastrointestinal bleeding. He had sudden onset of abdominal cramping with urge to have a BM and had a large volume of blood with a BM. He had similar episode approximately 4 months ago associate with diverticular bleeding.   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.819] 10/25/2023    Acute lower GI bleeding [K92.2] 10/24/2023    Diverticular bleed  [K92.2] 06/23/2023    Acute blood loss anemia [D62] 06/23/2023    COPD without exacerbation ([address]) [J44.9] 11/15/2017    Type 2 diabetes mellitus with complication, without long-term current use of insulin ([address]) [E11.8] 02/05/2015       Plan:        Monitor H&H  Transfuse as needed  GI eval & f/u as scheduled   Mobic on hold  Aspirin on hold  Plavix on hold  Glycemic contol - Monitor and control blood sugars  Diet as tolerated  Activity as tolerated  Oxygen as needed  Respiratory therapy  Pulmonary eval & f/u as scheduled   PT/OT eval  Discharge planning initiated  Med rec done  DANE done  Home care orders placed  Patient hoping to return home with his wife today  Will discharge when arrangements complete and ok with other services.   Follow-up with PCP in one week, Sylvia Snider, GENA - CNP  Notify PCP of discharge   DCP 35 min+    IP CONSULT TO GI  IP CONSULT TO [address] [place] [address]  10/27/2023  3:49 PM

## 2023-10-28 VITALS
WEIGHT: 231.92 LBS | BODY MASS INDEX: 32.47 KG/M2 | RESPIRATION RATE: 16 BRPM | SYSTOLIC BLOOD PRESSURE: 121 MMHG | HEIGHT: 71 IN | DIASTOLIC BLOOD PRESSURE: 70 MMHG | HEART RATE: 86 BPM | OXYGEN SATURATION: 93 % | TEMPERATURE: 98.1 F

## 2023-10-28 PROBLEM — E83.51 HYPOCALCEMIA: Status: ACTIVE | Noted: 2023-10-28

## 2023-10-28 LAB
ANION GAP SERPL CALCULATED.3IONS-SCNC: 8 MMOL/L (ref 9–17)
BASOPHILS # BLD: 0.04 K/UL (ref 0–0.2)
BASOPHILS NFR BLD: 1 % (ref 0–2)
BUN SERPL-MCNC: 10 MG/DL (ref 8–23)
CALCIUM SERPL-MCNC: 7.7 MG/DL (ref 8.6–10.4)
CHLORIDE SERPL-SCNC: 108 MMOL/L (ref 98–107)
CO2 SERPL-SCNC: 23 MMOL/L (ref 20–31)
CREAT SERPL-MCNC: 0.9 MG/DL (ref 0.7–1.2)
EOSINOPHIL # BLD: 0.47 K/UL (ref 0–0.44)
EOSINOPHILS RELATIVE PERCENT: 6 % (ref 1–4)
ERYTHROCYTE [DISTWIDTH] IN BLOOD BY AUTOMATED COUNT: 16.2 % (ref 11.8–14.4)
GFR SERPL CREATININE-BSD FRML MDRD: >60 ML/MIN/1.73M2
GLUCOSE SERPL-MCNC: 123 MG/DL (ref 70–99)
HCT VFR BLD AUTO: 24.1 % (ref 40.7–50.3)
HGB BLD-MCNC: 7.6 G/DL (ref 13–17)
IMM GRANULOCYTES # BLD AUTO: 0.25 K/UL (ref 0–0.3)
IMM GRANULOCYTES NFR BLD: 3 %
LYMPHOCYTES NFR BLD: 1.93 K/UL (ref 1.1–3.7)
LYMPHOCYTES RELATIVE PERCENT: 23 % (ref 24–43)
MCH RBC QN AUTO: 29 PG (ref 25.2–33.5)
MCHC RBC AUTO-ENTMCNC: 31.5 G/DL (ref 28.4–34.8)
MCV RBC AUTO: 92 FL (ref 82.6–102.9)
MONOCYTES NFR BLD: 1.24 K/UL (ref 0.1–1.2)
MONOCYTES NFR BLD: 15 % (ref 3–12)
NEUTROPHILS NFR BLD: 53 % (ref 36–65)
NEUTS SEG NFR BLD: 4.34 K/UL (ref 1.5–8.1)
NRBC BLD-RTO: 0.2 PER 100 WBC
PLATELET # BLD AUTO: 193 K/UL (ref 138–453)
PMV BLD AUTO: 10.2 FL (ref 8.1–13.5)
POTASSIUM SERPL-SCNC: 4 MMOL/L (ref 3.7–5.3)
RBC # BLD AUTO: 2.62 M/UL (ref 4.21–5.77)
RBC # BLD: ABNORMAL 10*6/UL
SODIUM SERPL-SCNC: 139 MMOL/L (ref 135–144)
WBC OTHER # BLD: 8.3 K/UL (ref 3.5–11.3)

## 2023-10-28 PROCEDURE — 85025 COMPLETE CBC W/AUTO DIFF WBC: CPT

## 2023-10-28 PROCEDURE — 99239 HOSP IP/OBS DSCHRG MGMT >30: CPT | Performed by: INTERNAL MEDICINE

## 2023-10-28 PROCEDURE — 2580000003 HC RX 258

## 2023-10-28 PROCEDURE — 36415 COLL VENOUS BLD VENIPUNCTURE: CPT

## 2023-10-28 PROCEDURE — 80048 BASIC METABOLIC PNL TOTAL CA: CPT

## 2023-10-28 PROCEDURE — 6370000000 HC RX 637 (ALT 250 FOR IP)

## 2023-10-28 PROCEDURE — 6370000000 HC RX 637 (ALT 250 FOR IP): Performed by: NURSE PRACTITIONER

## 2023-10-28 PROCEDURE — 6370000000 HC RX 637 (ALT 250 FOR IP): Performed by: INTERNAL MEDICINE

## 2023-10-28 RX ADMIN — TAMSULOSIN HYDROCHLORIDE 0.8 MG: 0.4 CAPSULE ORAL at 08:45

## 2023-10-28 RX ADMIN — LEVOTHYROXINE SODIUM 137 MCG: 0.14 TABLET ORAL at 06:16

## 2023-10-28 RX ADMIN — PANTOPRAZOLE SODIUM 40 MG: 40 TABLET, DELAYED RELEASE ORAL at 06:16

## 2023-10-28 RX ADMIN — PRIMIDONE 250 MG: 250 TABLET ORAL at 08:56

## 2023-10-28 RX ADMIN — FLUTICASONE PROPIONATE 2 SPRAY: 50 SPRAY, METERED NASAL at 08:45

## 2023-10-28 RX ADMIN — SODIUM CHLORIDE, PRESERVATIVE FREE 10 ML: 5 INJECTION INTRAVENOUS at 08:45

## 2023-10-28 RX ADMIN — MONTELUKAST SODIUM 10 MG: 10 TABLET, FILM COATED ORAL at 08:45

## 2023-10-28 RX ADMIN — OXYBUTYNIN CHLORIDE 10 MG: 5 TABLET ORAL at 08:45

## 2023-10-28 ASSESSMENT — ENCOUNTER SYMPTOMS
DIARRHEA: 0
VOMITING: 0
ABDOMINAL PAIN: 0
COUGH: 0
BLOOD IN STOOL: 0
ABDOMINAL DISTENTION: 0
NAUSEA: 0
SHORTNESS OF BREATH: 0

## 2023-10-28 NOTE — PROGRESS NOTES
CLINICAL PHARMACY NOTE: MEDS TO BEDS    Total # of Prescriptions Filled: 1   The following medications were delivered to the patient:  Pantoprazole 40mg tabs    Additional Documentation:  Delivered to pt + 1 in room 449 on 10/28 at 10:45A.  Copay was $15.00 paid with [email or web address]

## 2023-10-28 NOTE — CARE COORDINATION
Transitional planning      Call to [facility], spoke with Minor Foots, updated on patient discharging.

## 2023-10-28 NOTE — PROGRESS NOTES
[facility]  Office: [phone]  Janet Jerad Proctor, DO, Marty Valladares MD, Anna Zuniga MD, Mae Brown MD, Kenyatta Donohue MD,  Radha Robb MD, Denae Ny MD, Yinka Ontiveros MD,  Sahra Garcia MD, Leonila New MD, Delbert Reddy, DO, Katerin Wayne MD,  Jacinda Valadez, DO, Ronal Ortiz MD, Jose Abad MD, Kristy Yu MD, Jennifer Cole MD,  Marlen Gonzales MD, Ishaan Koenig MD, Jaswinder Valentino MD, Evens Beasley MD, Adryan Andres MD, Nikia Martinez DO, Viki Barton DO, Rupal Martínez MD,  Alexandria Puentes MD, Barbara Cortez, CNP,  Daniel Phillips, CNP, Denice Cushing, CNP,  Sebastian Nicholas, [facility], Mikaela Putnam, CNP, Lacey Casas, CNP, Miguel Renee, CNP, Gerard Godinez, CNP, Kirk Logan, CNP, Rah Lange, CNP, Hebert Malik, CNS, Julianna Lebron, CNP, Nickolas [facility]    Progress Note    10/28/2023    9:50 AM    Name:   Jeffy Cedeno  MRN:     [id number]     Acct:      [de-identified]   Room:   [facility]   Day:  4  Admit Date:  10/24/2023 11:09 PM    PCP:   GENA Dimas CNP  Code Status:  DNR-CCA    Subjective:     C/C:   GI bleed    Interval History Status:   Comfortable  No abdominal pain  Has not had a BM yet  Ambulating  Awaiting transportation for discharge home    Data Base Updates:  Hemoglobin7. [facility]. 7 Low        Brief History:     As documented in the medical record: \"This is an 61-year-old male that presents to [facility] as a transfer from [facility] where his initial eval for gastrointestinal bleeding. He had sudden onset of abdominal cramping with urge to have a BM and had a large volume of blood with a BM. He had similar episode approximately 4 months ago associate with diverticular bleeding.   He was admitted here without past episode underwent

## 2023-10-28 NOTE — DISCHARGE SUMMARY
Discussed with the patient and all questioned fully answered. He will call me if any problems arise. IV removed. Meds to Beds delivered. Patient wheeled off unit by aide. Wife is transporting home.

## 2023-10-28 NOTE — PLAN OF CARE
Problem: Safety - Adult  Goal: Free from fall injury  Outcome: Adequate for Discharge     Problem: Discharge Planning  Goal: Discharge to home or other facility with appropriate resources  Outcome: Adequate for Discharge     Problem: Skin/Tissue Integrity  Goal: Absence of new skin breakdown  Description: 1. Monitor for areas of redness and/or skin breakdown  2. Assess vascular access sites hourly  3. Every 4-6 hours minimum:  Change oxygen saturation probe site  4. Every 4-6 hours:  If on nasal continuous positive airway pressure, respiratory therapy assess nares and determine need for appliance change or resting period.   Outcome: Adequate for Discharge     Problem: ABCDS Injury Assessment  Goal: Absence of physical injury  Outcome: Adequate for Discharge     Problem: Pain  Goal: Verbalizes/displays adequate comfort level or baseline comfort level  Outcome: Adequate for Discharge     Problem: Chronic Conditions and Co-morbidities  Goal: Patient's chronic conditions and co-morbidity symptoms are monitored and maintained or improved  Outcome: Adequate for Discharge     Problem: Nutrition Deficit:  Goal: Optimize nutritional status  Outcome: Adequate for Discharge

## 2023-10-28 NOTE — DISCHARGE SUMMARY
Called patient and spoke with Jani Zavala regarding moving dialysis days. Sofía Trivedi moved patient dialysis to Monday.  So patient is set to have his procedure Tuesday 9/1/20 at [facility]  Office: [phone]  1826, DO, Lul Sheriff, DO, Gómez Irving, DO, Conor Crystal Proctor, DO, Sanjeev Herrera MD, Eleno Cheung MD, Amina Calloway MD, Shayna Koo MD,  Rupesh Sparrow MD, Stefani Tillman MD, Kartik Stephenson MD,  Susu Bo MD, Ed Dean MD, Roberto Mock DO, Jame Molina MD,  Kiley Roper DO, Braeden Payan MD, Ju Hall MD, Josesito Cochran MD, Mark Allred MD,  Liban Robles MD, Jamel Lewis MD, Sung Tavarez MD, Fei Flores MD, Segundo Nolan MD, Sherri Crenshaw DO, Ashly Garay DO, Oitlia Cardenas MD,  Ulises Grove MD, Ping Sánchez, CNP,  Kelsea Salazar, CNP, Lynda Gottron, CNP,  Otoniel Cavanaugh, [facility], Lashaun Connor, CNP, Charline Perez, CNP, Kiarra Slaughter, CNP, Osiel Gallardo, CNP, Roderick Martínez, CNP, Claudina Scheuermann, CNP, Sarbjit Jurado, [facility], Hortencia Christy, CNP, Trish Mohs, [facility]    Discharge Summary    Patient ID: Jared Mckeon  :  1935   MRN: [id number]     ACCOUNT:  [de-identified]   Patient's PCP: GENA Corbett CNP  Admit Date: 10/24/2023   Discharge Date: 10/28/2023    Discharge Physician: Conrad Membreno DO     The patient was seen and examined on day of discharge and this discharge summary is in conjunction with any daily progress note from day of discharge.     Active Discharge Diagnoses:     Primary Problem  Diverticulosis of colon with hemorrhage      Hospital Problems  Active Hospital Problems    Diagnosis Date Noted    Chronic diastolic congestive heart failure ([address]) [I50.32] 2023     Priority: Medium    Dyslipidemia [E78.5] 2023     Priority: Medium    Hypocalcemia [E83.51] 10/28/2023 times daily     calcium carbonate 500 MG chewable tablet  Commonly known as: TUMS     ferrous sulfate 325 (65 Fe) MG tablet  Commonly known as: IRON 325  Take 1 tablet by mouth 2 times daily (with meals)     fluticasone 50 MCG/ACT nasal spray  Commonly known as: FLONASE  2 sprays by Each Nostril route daily     fluticasone-salmeterol 250-50 MCG/DOSE Aepb  Commonly known as: ADVAIR     Incruse Ellipta 62.5 MCG/ACT inhaler  Generic drug: umeclidinium bromide  Inhale 1 puff into the lungs daily     levothyroxine 137 MCG tablet  Commonly known as: SYNTHROID  Take 1 tablet by mouth Daily     metFORMIN 1000 MG tablet  Commonly known as: GLUCOPHAGE  Take 1 tablet by mouth 2 times daily     montelukast 10 MG tablet  Commonly known as: SINGULAIR  Take 1 tablet by mouth daily     oxybutynin 5 MG tablet  Commonly known as: DITROPAN  Take 2 tablets by mouth 2 times daily     primidone 250 MG tablet  Commonly known as: MYSOLINE  Take 1 tablet by mouth in the morning and 1 tablet in the evening. propranolol 60 MG extended release capsule  Commonly known as: INDERAL LA     spironolactone 25 MG tablet  Commonly known as: ALDACTONE  Take 1 tablet by mouth daily     tamsulosin 0.4 MG capsule  Commonly known as: FLOMAX  Take 2 capsules by mouth daily     therapeutic multivitamin-minerals tablet     tiotropium 2.5 MCG/ACT Aers inhaler  Commonly known as: Spiriva Respimat  Inhale 2 puffs into the lungs daily           * This list has 2 medication(s) that are the same as other medications prescribed for you. Read the directions carefully, and ask your doctor or other care provider to review them with you.                 STOP taking these medications      aspirin 81 MG tablet     clopidogrel 75 MG tablet  Commonly known as: PLAVIX     meloxicam 7.5 MG tablet  Commonly known as: MOBIC     omeprazole 20 MG delayed release capsule  Commonly known as: PRILOSEC               Where to Get Your Medications        These medications were sent

## 2023-10-30 ENCOUNTER — CARE COORDINATION (OUTPATIENT)
Dept: CASE MANAGEMENT | Age: 88
End: 2023-10-30

## 2023-10-30 DIAGNOSIS — K92.2 ACUTE LOWER GI BLEEDING: Primary | ICD-10-CM

## 2023-10-30 PROCEDURE — 1111F DSCHRG MED/CURRENT MED MERGE: CPT

## 2023-10-30 NOTE — CARE COORDINATION
Care Transitions Initial Follow Up Call    Call within 2 business days of discharge: Yes    Patient Current Location:  Home: [address] [place] [address] Road  [address] [facility]    Care Transition Nurse contacted the patient by telephone to perform post hospital discharge assessment. Verified name and  with patient and family as identifiers. Provided introduction to self, and explanation of the Care Transition Nurse role. Patient: Suzanne Gallego Patient : 1935   MRN: <[id number]>  Reason for Admission:   Discharge Date: 10/28/23 RARS: Readmission Risk Score: 21.8      Last Discharge Facility       Date Complaint Diagnosis Description Type Department Provider    10/24/23   Admission (Discharged) [facility] ONC Jacy Dolan, DO    10/24/23 Rectal Bleeding Rectal bleeding ED to Hosp-[facility]; Markel Payne. .. Was this an external facility discharge? No Discharge Facility: [facility]    Challenges to be reviewed by the provider   Additional needs identified to be addressed with provider: No    Writer scheduled HFU appointment on 23, that was soonest available appt, please check to see if there is any sooner appointments with office ty//JU               Method of communication with provider: none.     Writer spoke to patient, he is doing ok, no bleeding at this time, denied any c/o pain, fever, chills, n/v/d, sob or chest pain at this time, stated he is a little tired, reviewed discharge instructions and medications, 1111F order completed, has [facility] with [facility], spoke to Laura she stated PT will be starting on 23, writer was able to get HFU appt scheduled with pcp but couldn't get appointment until 23, patient has appt with cardiology on 23, patient stated he is eating and drinking, explained role of cTN, provided contact information, will follow//JU    Care Transition Nurse reviewed discharge instructions, medical action plan, and

## 2023-11-06 ENCOUNTER — OFFICE VISIT (OUTPATIENT)
Dept: PRIMARY CARE CLINIC | Age: 88
End: 2023-11-06

## 2023-11-06 VITALS
HEART RATE: 74 BPM | RESPIRATION RATE: 18 BRPM | TEMPERATURE: 97.4 F | SYSTOLIC BLOOD PRESSURE: 118 MMHG | BODY MASS INDEX: 33.73 KG/M2 | DIASTOLIC BLOOD PRESSURE: 68 MMHG | WEIGHT: 241.7 LBS

## 2023-11-06 DIAGNOSIS — Z09 HOSPITAL DISCHARGE FOLLOW-UP: ICD-10-CM

## 2023-11-06 DIAGNOSIS — D50.0 IRON DEFICIENCY ANEMIA DUE TO CHRONIC BLOOD LOSS: ICD-10-CM

## 2023-11-06 DIAGNOSIS — K92.2 ACUTE LOWER GI BLEEDING: Primary | ICD-10-CM

## 2023-11-06 NOTE — PROGRESS NOTES
morning and 1 tablet in the evening. propranolol 60 MG extended release capsule  Commonly known as: INDERAL LA     spironolactone 25 MG tablet  Commonly known as: ALDACTONE  Take 1 tablet by mouth daily     tamsulosin 0.4 MG capsule  Commonly known as: FLOMAX  Take 2 capsules by mouth daily     therapeutic multivitamin-minerals tablet     tiotropium 2.5 MCG/ACT Aers inhaler  Commonly known as: Spiriva Respimat  Inhale 2 puffs into the lungs daily           * This list has 2 medication(s) that are the same as other medications prescribed for you. Read the directions carefully, and ask your doctor or other care provider to review them with you. Medications marked \"taking\" at this time  Outpatient Medications Marked as Taking for the 11/6/23 encounter (Office Visit) with Sherin Snider APRN - CNP   Medication Sig Dispense Refill    pantoprazole (PROTONIX) 40 MG tablet Take 1 tablet by mouth every morning (before breakfast) 30 tablet 3    metFORMIN (GLUCOPHAGE) 1000 MG tablet Take 1 tablet by mouth 2 times daily 180 tablet 3    spironolactone (ALDACTONE) 25 MG tablet Take 1 tablet by mouth daily 90 tablet 3    bumetanide (BUMEX) 1 MG tablet Take 1 tablet by mouth 2 times daily 180 tablet 3    atorvastatin (LIPITOR) 40 MG tablet Take 1 tablet by mouth daily 90 tablet 1    levothyroxine (SYNTHROID) 137 MCG tablet Take 1 tablet by mouth Daily 90 tablet 1    tamsulosin (FLOMAX) 0.4 MG capsule Take 2 capsules by mouth daily 180 capsule 1    propranolol (INDERAL LA) 60 MG extended release capsule Take 1 capsule by mouth in the morning and at bedtime      calcium carbonate (TUMS) 500 MG chewable tablet Take 1 tablet by mouth every 6 hours as needed for Heartburn      primidone (MYSOLINE) 250 MG tablet Take 1 tablet by mouth in the morning and 1 tablet in the evening.  120 tablet 3    umeclidinium bromide (INCRUSE ELLIPTA) 62.5 MCG/ACT inhaler Inhale 1 puff into the lungs daily 1 each 5    tiotropium

## 2023-11-06 NOTE — PATIENT INSTRUCTIONS
SURVEY:     You may be receiving a survey from [email or web address] regarding your visit today. Please complete the survey to enable us to provide the highest quality of care to you and your family. If you cannot score us a very good on any question, please call the office to discuss how we could have made your experience a very good one.      Thank you,    Faith Snider, APRN-CNP  Gina Del Real, APRN-CNP  Ramesh Mcghee, SHIVAN  Sherri Snow, MUKUND Mohamud, MUKUND Meléndez, CMA  Haily, PCA  Vee, PM

## 2023-11-07 ENCOUNTER — CARE COORDINATION (OUTPATIENT)
Dept: CASE MANAGEMENT | Age: 88
End: 2023-11-07

## 2023-11-07 RX ORDER — BUMETANIDE 1 MG/1
1 TABLET ORAL 2 TIMES DAILY
Qty: 90 TABLET | Refills: 0 | OUTPATIENT
Start: 2023-11-07

## 2023-11-07 NOTE — CARE COORDINATION
Care Transitions Follow Up Call    Patient Current Location:  Home: [facility] Care Coordinator contacted the family by telephone to follow up after admission on 10/24/2023. Verified name and  with patient as identifiers. Patient: Marta Blanchard  Patient : 1935   MRN: [id number]  Reason for Admission: Diverticulosis of colon with hemorrhage  Discharge Date: 10/28/23 RARS: Readmission Risk Score: 21.8      Needs to be reviewed by the provider   Additional needs identified to be addressed with provider: No  none             Method of communication with provider: none. Writer spoke with patient's wife Ita Starkey for a follow up care transitions call-therapist is currently there for PT. Wife states he is doing well. No abdominal pain,diarrhea or rectal bleeding. States PT seems to be helping him regain strength. He had his PCP follow up yesterday and no changes were made, he has an upcoming cardiology appointment on 2023.n Denies any further needs at this time. Addressed changes since last contact:  Had PCP follow up-no changes made  Discussed follow-up appointments. If no appointment was previously scheduled, appointment scheduling offered: Yes. Is follow up appointment scheduled within 7 days of discharge? Yes. Follow Up  Future Appointments   Date Time Provider [address]   2023  2:20 PM [facility]   2023  2:00 PM Nadia Snider APRN - CNP [facility]   2024  2:00 PM Angel Lewis MD TIFF PULM [facility]   2024 12:00 PM Gala Duvall MD [facility]     External follow up appointment(s): N/A    LPN Care Coordinator reviewed red flags with family and discussed any barriers to care and/or understanding of plan of care after discharge. Discussed appropriate site of care based on symptoms and resources available to patient including: PCP  Specialist  [employer].  The family agrees

## 2023-11-13 ENCOUNTER — HOSPITAL ENCOUNTER (OUTPATIENT)
Age: 88
Setting detail: SPECIMEN
Discharge: HOME | End: 2023-11-13
Payer: MEDICARE

## 2023-11-13 ENCOUNTER — TELEPHONE (OUTPATIENT)
Dept: PRIMARY CARE CLINIC | Age: 88
End: 2023-11-13

## 2023-11-13 LAB
BASOPHILS # BLD: 0.03 K/UL (ref 0–0.2)
BASOPHILS NFR BLD: 0 % (ref 0–2)
EOSINOPHIL # BLD: 0.32 K/UL (ref 0–0.44)
EOSINOPHILS RELATIVE PERCENT: 5 % (ref 1–4)
ERYTHROCYTE [DISTWIDTH] IN BLOOD BY AUTOMATED COUNT: 17.2 % (ref 11.8–14.4)
HCT VFR BLD AUTO: 29.3 % (ref 40.7–50.3)
HGB BLD-MCNC: 9 G/DL (ref 13–17)
IMM GRANULOCYTES # BLD AUTO: 0.1 K/UL (ref 0–0.3)
IMM GRANULOCYTES NFR BLD: 2 %
LYMPHOCYTES NFR BLD: 1.81 K/UL (ref 1.1–3.7)
LYMPHOCYTES RELATIVE PERCENT: 26 % (ref 24–43)
MCH RBC QN AUTO: 28.8 PG (ref 25.2–33.5)
MCHC RBC AUTO-ENTMCNC: 30.7 G/DL (ref 28.4–34.8)
MCV RBC AUTO: 93.9 FL (ref 82.6–102.9)
MONOCYTES NFR BLD: 1.02 K/UL (ref 0.1–1.2)
MONOCYTES NFR BLD: 15 % (ref 3–12)
NEUTROPHILS NFR BLD: 52 % (ref 36–65)
NEUTS SEG NFR BLD: 3.61 K/UL (ref 1.5–8.1)
NRBC BLD-RTO: 0 PER 100 WBC
PLATELET # BLD AUTO: 286 K/UL (ref 138–453)
PMV BLD AUTO: 10.1 FL (ref 8.1–13.5)
RBC # BLD AUTO: 3.12 M/UL (ref 4.21–5.77)
WBC OTHER # BLD: 6.9 K/UL (ref 3.5–11.3)

## 2023-11-13 PROCEDURE — 85025 COMPLETE CBC W/AUTO DIFF WBC: CPT

## 2023-11-13 NOTE — TELEPHONE ENCOUNTER
----- Message from [address] [facility] GENA Marquez - CNP sent at 11/13/2023  1:39 PM EST -----  Hemoglobin improving

## 2023-11-14 ENCOUNTER — HOSPITAL ENCOUNTER (OUTPATIENT)
Age: 88
Discharge: HOME | End: 2023-11-14
Attending: INTERNAL MEDICINE
Payer: MEDICARE

## 2023-11-14 ENCOUNTER — HOSPITAL ENCOUNTER (OUTPATIENT)
Age: 88
Discharge: HOME | End: 2023-11-16
Attending: INTERNAL MEDICINE
Payer: MEDICARE

## 2023-11-14 ENCOUNTER — OFFICE VISIT (OUTPATIENT)
Dept: CARDIOLOGY | Age: 88
End: 2023-11-14
Payer: MEDICARE

## 2023-11-14 VITALS
SYSTOLIC BLOOD PRESSURE: 129 MMHG | BODY MASS INDEX: 33.15 KG/M2 | HEART RATE: 52 BPM | WEIGHT: 236.8 LBS | HEIGHT: 71 IN | RESPIRATION RATE: 18 BRPM | DIASTOLIC BLOOD PRESSURE: 68 MMHG | OXYGEN SATURATION: 95 %

## 2023-11-14 VITALS
BODY MASS INDEX: 32.31 KG/M2 | DIASTOLIC BLOOD PRESSURE: 68 MMHG | SYSTOLIC BLOOD PRESSURE: 129 MMHG | HEIGHT: 71 IN | WEIGHT: 230.82 LBS

## 2023-11-14 DIAGNOSIS — D64.9 ACUTE ON CHRONIC ANEMIA: ICD-10-CM

## 2023-11-14 DIAGNOSIS — I25.10 ASHD (ARTERIOSCLEROTIC HEART DISEASE): ICD-10-CM

## 2023-11-14 DIAGNOSIS — D50.0 IRON DEFICIENCY ANEMIA DUE TO CHRONIC BLOOD LOSS: ICD-10-CM

## 2023-11-14 DIAGNOSIS — I50.42 CHRONIC COMBINED SYSTOLIC AND DIASTOLIC CHF, NYHA CLASS 2 (HCC): ICD-10-CM

## 2023-11-14 DIAGNOSIS — E78.2 MIXED HYPERLIPIDEMIA: ICD-10-CM

## 2023-11-14 DIAGNOSIS — Z87.19 HISTORY OF LOWER GI BLEEDING: ICD-10-CM

## 2023-11-14 DIAGNOSIS — D64.9 CHRONIC ANEMIA: ICD-10-CM

## 2023-11-14 DIAGNOSIS — I50.42 CHRONIC COMBINED SYSTOLIC AND DIASTOLIC CHF, NYHA CLASS 2 (HCC): Primary | ICD-10-CM

## 2023-11-14 DIAGNOSIS — I10 PRIMARY HYPERTENSION: ICD-10-CM

## 2023-11-14 DIAGNOSIS — Z86.73 HISTORY OF STROKE: ICD-10-CM

## 2023-11-14 DIAGNOSIS — Z95.820 S/P ANGIOPLASTY WITH STENT: ICD-10-CM

## 2023-11-14 DIAGNOSIS — K92.2 ACUTE LOWER GI BLEEDING: ICD-10-CM

## 2023-11-14 LAB
ANION GAP SERPL CALCULATED.3IONS-SCNC: 10 MMOL/L (ref 9–17)
BASOPHILS # BLD: 0.04 K/UL (ref 0–0.2)
BASOPHILS NFR BLD: 1 % (ref 0–2)
BNP SERPL-MCNC: 337 PG/ML
BUN SERPL-MCNC: 17 MG/DL (ref 8–23)
BUN/CREAT SERPL: 15 (ref 9–20)
CALCIUM SERPL-MCNC: 8.9 MG/DL (ref 8.6–10.4)
CHLORIDE SERPL-SCNC: 100 MMOL/L (ref 98–107)
CO2 SERPL-SCNC: 26 MMOL/L (ref 20–31)
CREAT SERPL-MCNC: 1.1 MG/DL (ref 0.7–1.2)
ECHO AO ROOT DIAM: 3.3 CM
ECHO AO ROOT INDEX: 1.47 CM/M2
ECHO AV ACCELERATION TIME: 110.5 MS
ECHO AV CUSP MM: 1.6 CM
ECHO AV MEAN GRADIENT: 28 MMHG
ECHO AV MEAN VELOCITY: 2.6 M/S
ECHO AV PEAK VELOCITY: 3.2 M/S
ECHO AV VTI: 89.4 CM
ECHO BSA: 2.29 M2
ECHO EST RA PRESSURE: 3 MMHG
ECHO LA DIAMETER INDEX: 2.01 CM/M2
ECHO LA DIAMETER: 4.5 CM
ECHO LA MAJOR AXIS: 5.9 CM
ECHO LA TO AORTIC ROOT RATIO: 1.36
ECHO LA VOL BP: 108 ML (ref 18–58)
ECHO LA VOL MOD A2C: 113 ML (ref 18–58)
ECHO LA VOL MOD A4C: 94 ML (ref 18–58)
ECHO LA VOL/BSA BIPLANE: 48 ML/M2 (ref 16–34)
ECHO LA VOLUME AREA LENGTH: 117 ML
ECHO LA VOLUME INDEX AREA LENGTH: 52 ML/M2 (ref 16–34)
ECHO LA VOLUME INDEX MOD A2C: 50 ML/M2 (ref 16–34)
ECHO LA VOLUME INDEX MOD A4C: 42 ML/M2 (ref 16–34)
ECHO LV CARDIAC INDEX: 2 L/MIN/M2
ECHO LV E' LATERAL VELOCITY: 9 CM/S
ECHO LV EDV A2C: 46 ML
ECHO LV EDV A4C: 89 ML
ECHO LV EDV BP: 64 ML (ref 67–155)
ECHO LV EDV INDEX A4C: 40 ML/M2
ECHO LV EDV INDEX BP: 29 ML/M2
ECHO LV EDV NDEX A2C: 21 ML/M2
ECHO LV EJECTION FRACTION BIPLANE: 50 % (ref 55–100)
ECHO LV ESV A2C: 21 ML
ECHO LV ESV A4C: 44 ML
ECHO LV ESV BP: 32 ML (ref 22–58)
ECHO LV ESV INDEX A2C: 9 ML/M2
ECHO LV ESV INDEX A4C: 20 ML/M2
ECHO LV ESV INDEX BP: 14 ML/M2
ECHO LV FRACTIONAL SHORTENING: 18 % (ref 28–44)
ECHO LV INTERNAL DIMENSION DIASTOLE INDEX: 1.79 CM/M2
ECHO LV INTERNAL DIMENSION DIASTOLIC: 4 CM (ref 4.2–5.9)
ECHO LV INTERNAL DIMENSION SYSTOLIC INDEX: 1.47 CM/M2
ECHO LV INTERNAL DIMENSION SYSTOLIC: 3.3 CM
ECHO LV IVSD: 1.1 CM (ref 0.6–1)
ECHO LV IVSS: 1.1 CM
ECHO LV MASS 2D: 145.6 G (ref 88–224)
ECHO LV MASS INDEX 2D: 65 G/M2 (ref 49–115)
ECHO LV POSTERIOR WALL DIASTOLIC: 1.1 CM (ref 0.6–1)
ECHO LV POSTERIOR WALL SYSTOLIC: 1.1 CM
ECHO LV RELATIVE WALL THICKNESS RATIO: 0.55
ECHO LVOT AREA: 3.8 CM2
ECHO LVOT AV VTI INDEX: 0.25
ECHO LVOT CARDIAC OUTPUT: 4.4 L/MIN
ECHO LVOT DIAM: 2.2 CM
ECHO LVOT MEAN GRADIENT: 1 MMHG
ECHO LVOT STROKE VOLUME INDEX: 38.2 ML/M2
ECHO LVOT SV: 85.5 ML
ECHO LVOT VTI: 22.5 CM
ECHO MV A VELOCITY: 0.93 M/S
ECHO MV E DECELERATION TIME (DT): 307.6 MS
ECHO MV E VELOCITY: 0.65 M/S
ECHO MV E/A RATIO: 0.7
ECHO MV E/E' LATERAL: 7.22
ECHO PV MAX VELOCITY: 0.9 M/S
ECHO RIGHT VENTRICULAR SYSTOLIC PRESSURE (RVSP): 43 MMHG
ECHO RV INTERNAL DIMENSION: 3.7 CM
ECHO TV REGURGITANT MAX VELOCITY: 3.15 M/S
EOSINOPHIL # BLD: 0.32 K/UL (ref 0–0.44)
EOSINOPHILS RELATIVE PERCENT: 4 % (ref 1–4)
ERYTHROCYTE [DISTWIDTH] IN BLOOD BY AUTOMATED COUNT: 17 % (ref 11.8–14.4)
GFR SERPL CREATININE-BSD FRML MDRD: >60 ML/MIN/1.73M2
GLUCOSE SERPL-MCNC: 107 MG/DL (ref 70–99)
HAPTOGLOB SERPL-MCNC: 156 MG/DL (ref 30–200)
HCT VFR BLD AUTO: 30.8 % (ref 40.7–50.3)
HGB BLD-MCNC: 9.5 G/DL (ref 13–17)
IMM GRANULOCYTES # BLD AUTO: 0.08 K/UL (ref 0–0.3)
IMM GRANULOCYTES NFR BLD: 1 %
IMM RETICS NFR: 11.1 % (ref 2.7–18.3)
LYMPHOCYTES NFR BLD: 1.8 K/UL (ref 1.1–3.7)
LYMPHOCYTES RELATIVE PERCENT: 23 % (ref 24–43)
MCH RBC QN AUTO: 29 PG (ref 25.2–33.5)
MCHC RBC AUTO-ENTMCNC: 30.8 G/DL (ref 28.4–34.8)
MCV RBC AUTO: 93.9 FL (ref 82.6–102.9)
MONOCYTES NFR BLD: 0.95 K/UL (ref 0.1–1.2)
MONOCYTES NFR BLD: 12 % (ref 3–12)
NEUTROPHILS NFR BLD: 59 % (ref 36–65)
NEUTS SEG NFR BLD: 4.6 K/UL (ref 1.5–8.1)
NRBC BLD-RTO: 0 PER 100 WBC
PLATELET # BLD AUTO: 291 K/UL (ref 138–453)
PMV BLD AUTO: 9.8 FL (ref 8.1–13.5)
POTASSIUM SERPL-SCNC: 4.3 MMOL/L (ref 3.7–5.3)
RBC # BLD AUTO: 3.28 M/UL (ref 4.21–5.77)
RETIC HEMOGLOBIN: 31.1 PG (ref 28.2–35.7)
RETICS # AUTO: 0.07 M/UL (ref 0.03–0.08)
RETICS/RBC NFR AUTO: 2.3 % (ref 0.5–1.9)
SODIUM SERPL-SCNC: 136 MMOL/L (ref 135–144)
WBC OTHER # BLD: 7.8 K/UL (ref 3.5–11.3)

## 2023-11-14 PROCEDURE — 93306 TTE W/DOPPLER COMPLETE: CPT

## 2023-11-14 PROCEDURE — 85025 COMPLETE CBC W/AUTO DIFF WBC: CPT

## 2023-11-14 PROCEDURE — 83010 ASSAY OF HAPTOGLOBIN QUANT: CPT

## 2023-11-14 PROCEDURE — 85045 AUTOMATED RETICULOCYTE COUNT: CPT

## 2023-11-14 PROCEDURE — 99214 OFFICE O/P EST MOD 30 MIN: CPT | Performed by: INTERNAL MEDICINE

## 2023-11-14 PROCEDURE — 82728 ASSAY OF FERRITIN: CPT

## 2023-11-14 PROCEDURE — 82746 ASSAY OF FOLIC ACID SERUM: CPT

## 2023-11-14 PROCEDURE — 82607 VITAMIN B-12: CPT

## 2023-11-14 PROCEDURE — 80048 BASIC METABOLIC PNL TOTAL CA: CPT

## 2023-11-14 PROCEDURE — 83880 ASSAY OF NATRIURETIC PEPTIDE: CPT

## 2023-11-14 PROCEDURE — 83550 IRON BINDING TEST: CPT

## 2023-11-14 PROCEDURE — 83540 ASSAY OF IRON: CPT

## 2023-11-14 PROCEDURE — 1123F ACP DISCUSS/DSCN MKR DOCD: CPT | Performed by: INTERNAL MEDICINE

## 2023-11-14 PROCEDURE — 36415 COLL VENOUS BLD VENIPUNCTURE: CPT

## 2023-11-14 NOTE — PATIENT INSTRUCTIONS
SURVEY:    You may be receiving a survey from [employer] regarding your visit today. Please complete the survey to enable us to provide the highest quality of care to you and your family. If you cannot score us a very good on any question, please call the office to discuss how we could have made your experience a very good one. Thank you.

## 2023-11-14 NOTE — PROGRESS NOTES
10/28/2023       Lab Results   Component Value Date    WBC 6.9 11/13/2023    HGB 9.0 (L) 11/13/2023    HCT 29.3 (L) 11/13/2023    MCV 93.9 11/13/2023     11/13/2023       Lab Results   Component Value Date    TRIG 89 06/16/2023    TRIG 114 02/02/2023    TRIG 134 03/14/2022     Lab Results   Component Value Date    HDL 41 06/16/2023    HDL 44 02/02/2023    HDL 52 03/14/2022     Lab Results   Component Value Date    LDLCHOLESTEROL 57 06/16/2023    LDLCHOLESTEROL 45 02/02/2023    LDLCHOLESTEROL 65 03/14/2022         Assessment:     1. Chronic combined systolic and diastolic CHF, NYHA class 2 ([address])    2. ASHD (arteriosclerotic heart disease)    3. S/P angioplasty with stent    4. History of stroke    5. Primary hypertension    6. Mixed hyperlipidemia    7. History of lower GI bleeding    8. Acute on chronic anemia    9. Chronic anemia      Plan:     Chronic Diastolic Heart Failure: Weight is up about three pounds however his breathing is fairly stable despite his leg swelling. Echo done on 6/19/2023 showed an EF of 43%. Beta Blocker: Continue Propranolol 60 mg BID  Diuretics: Continue bumetinide (Bumex) 1 mg 2 times daily. Diuretics: Continue Spironolactone (Aldactone) 25 mg daily. I also discussed the potential side effects of this medication including lightheadedness and dizziness and instructed them to stop the medication of this occurs and call our office if this occurs. Nonpharmacologic management of Heart Failure: I told him to continue wearing lower extremity compression stockings and I advised him to try and keep his legs up whenever possible and to limit salt in his diet. I took the liberty of ordering a BMP in 5-7 days to assess their potassium and renal function.  I told them that they could get their lab work performed at the location of their choosing, unfortunately, if the lab work was not performed at a [facility]) facility I could not guarantee my ability to follow up with them on their

## 2023-11-15 LAB
FERRITIN SERPL-MCNC: 98 NG/ML (ref 30–400)
FOLATE SERPL-MCNC: 15.1 NG/ML (ref 4.8–24.2)
IRON SATN MFR SERPL: 35 % (ref 20–55)
IRON SERPL-MCNC: 65 UG/DL (ref 59–158)
TIBC SERPL-MCNC: 188 UG/DL (ref 250–450)
UNSATURATED IRON BINDING CAPACITY: 123 UG/DL (ref 112–347)
VIT B12 SERPL-MCNC: 410 PG/ML (ref 232–1245)

## 2023-11-16 ENCOUNTER — CARE COORDINATION (OUTPATIENT)
Dept: CASE MANAGEMENT | Age: 88
End: 2023-11-16

## 2023-11-16 NOTE — CARE COORDINATION
Care Transitions Outreach Attempt #1    Attempted to reach patient for transitions of care follow up. Unable to reach patient. HIPAA compliant message left on  requesting a return call. Patient: Delvis Srinivasan Patient : 1935 MRN: [id number]    Last Discharge Facility       Date Complaint Diagnosis Description Type Department Provider    10/24/23   Admission (Discharged) [facility]C ONC Sarah Gaviota, DO    10/24/23 Rectal Bleeding Rectal bleeding ED to Hosp-Admission (Discharged) (ADMITTED) [facility] Batsheva Hoang MD; Og Llanos. .. Was this an external facility discharge?  No Discharge Facility: [facility]    Noted following upcoming appointments from discharge chart review:   [facility] follow up appointment(s):   Future Appointments   Date Time Provider [facility] [address]   2023  1:00 PM Nimisha Snider April, APRN - CNP [facility]   2024  2:20 PM Frances Plascencia MD TIFF CARD [facility]   2024  2:00 PM Damon Borrego MD TIFF PULM [facility]   2024 12:00 PM Slade Duarte MD TIFF KID&HYP [facility]        [place] LPN  Care Transition Nurse

## 2023-11-17 ENCOUNTER — OFFICE VISIT (OUTPATIENT)
Dept: PRIMARY CARE CLINIC | Age: 88
End: 2023-11-17

## 2023-11-17 VITALS
BODY MASS INDEX: 33.21 KG/M2 | WEIGHT: 237.2 LBS | TEMPERATURE: 98.5 F | HEIGHT: 71 IN | DIASTOLIC BLOOD PRESSURE: 62 MMHG | HEART RATE: 68 BPM | SYSTOLIC BLOOD PRESSURE: 118 MMHG | RESPIRATION RATE: 18 BRPM | OXYGEN SATURATION: 98 %

## 2023-11-17 DIAGNOSIS — Z00.00 MEDICARE ANNUAL WELLNESS VISIT, SUBSEQUENT: Primary | ICD-10-CM

## 2023-11-17 DIAGNOSIS — E11.8 TYPE 2 DIABETES MELLITUS WITH COMPLICATION, WITHOUT LONG-TERM CURRENT USE OF INSULIN (HCC): ICD-10-CM

## 2023-11-17 DIAGNOSIS — I50.32 CHRONIC DIASTOLIC CONGESTIVE HEART FAILURE (HCC): ICD-10-CM

## 2023-11-17 RX ORDER — PANTOPRAZOLE SODIUM 40 MG/1
40 TABLET, DELAYED RELEASE ORAL
Qty: 90 TABLET | Refills: 3 | Status: SHIPPED | OUTPATIENT
Start: 2023-11-17

## 2023-11-17 ASSESSMENT — LIFESTYLE VARIABLES
HOW OFTEN DO YOU HAVE A DRINK CONTAINING ALCOHOL: NEVER
HOW MANY STANDARD DRINKS CONTAINING ALCOHOL DO YOU HAVE ON A TYPICAL DAY: PATIENT DOES NOT DRINK

## 2023-11-17 NOTE — PROGRESS NOTES
Medicare Annual Wellness Visit    Salena Lynn is here for Medicare AWV (AWV) and Diabetes (Routine check. C/O right side lower quad pain, normal bowel movements. No blood. No bruising, started while [facility].)    Assessment & Plan   Medicare annual wellness visit, subsequent  Type 2 diabetes mellitus with complication, without long-term current use of insulin ([address])  -     Basic Metabolic Panel; Future  -     ALT; Future  -     AST; Future  -     Microalbumin, Ur; Future  -     Lipid Panel; Future  -     Hemoglobin A1C; Future  -     CBC with Auto Differential; Future  Chronic diastolic congestive heart failure ([address])    Recommendations for Preventive Services Due: see orders and patient instructions/AVS.  Recommended screening schedule for the next 5-10 years is provided to the patient in written form: see Patient Instructions/AVS.     Return in 6 months (on 5/17/2024). Subjective   The following acute and/or chronic problems were also addressed today:  Manisha Monte is here today for a Medicare annual wellness visit and routine office visit. Diabetes  He presents for his follow-up diabetic visit. He has type 2 diabetes mellitus. Onset time: YEARS. His disease course has been stable. There are no hypoglycemic associated symptoms. Pertinent negatives for hypoglycemia include no dizziness, headaches or sweats. Pertinent negatives for diabetes include no blurred vision, no chest pain, no fatigue, no polydipsia, no polyphagia and no polyuria. There are no hypoglycemic complications. Symptoms are stable. Diabetic complications include heart disease and nephropathy. Pertinent negatives for diabetic complications include no CVA or peripheral neuropathy. Risk factors for coronary artery disease include diabetes mellitus, male sex and sedentary lifestyle. Current diabetic treatment includes oral agent (monotherapy). He is compliant with treatment all of the time. His weight is stable.  He is following a generally healthy

## 2023-11-19 ASSESSMENT — ENCOUNTER SYMPTOMS
BLURRED VISION: 0
WHEEZING: 0
COUGH: 0
DIARRHEA: 0
SORE THROAT: 0
SHORTNESS OF BREATH: 1
TROUBLE SWALLOWING: 0
ABDOMINAL PAIN: 0
NAUSEA: 0
VOMITING: 0
CONSTIPATION: 0
RHINORRHEA: 0

## 2023-11-20 ENCOUNTER — CARE COORDINATION (OUTPATIENT)
Dept: CASE MANAGEMENT | Age: 88
End: 2023-11-20

## 2023-11-20 ENCOUNTER — TELEPHONE (OUTPATIENT)
Dept: CARDIOLOGY | Age: 88
End: 2023-11-20

## 2023-11-20 DIAGNOSIS — J44.9 CHRONIC OBSTRUCTIVE PULMONARY DISEASE, UNSPECIFIED COPD TYPE (HCC): ICD-10-CM

## 2023-11-20 RX ORDER — OXYBUTYNIN CHLORIDE 5 MG/1
10 TABLET ORAL 2 TIMES DAILY
Qty: 360 TABLET | Refills: 1 | Status: ON HOLD | OUTPATIENT
Start: 2023-11-20

## 2023-11-20 RX ORDER — MONTELUKAST SODIUM 10 MG/1
10 TABLET ORAL DAILY
Qty: 90 TABLET | Refills: 1 | Status: ON HOLD | OUTPATIENT
Start: 2023-11-20

## 2023-11-20 RX ORDER — BUMETANIDE 1 MG/1
1 TABLET ORAL 2 TIMES DAILY
Qty: 180 TABLET | Refills: 3 | Status: ON HOLD | OUTPATIENT
Start: 2023-11-20

## 2023-11-20 NOTE — TELEPHONE ENCOUNTER
----- Message from Meir Flower MD sent at 11/18/2023  2:35 PM EST -----  No change from prior study back in June 2023. Continue current therapy and follow-up. Please call with questions/or concerns. Thank you.

## 2023-11-20 NOTE — CARE COORDINATION
Care Transitions Follow Up Call    Patient Current Location:  Home: [facility]    Care Transition Nurse contacted the spouse/partner by telephone to follow up after admission on 10/24/23. Verified name and  with patient as identifiers. Patient: Roly Darby  Patient : 1935   MRN: [id number]  Reason for Admission: rectal bleed  Discharge Date: 10/28/23 RARS: Readmission Risk Score: 21.8      Needs to be reviewed by the provider   Additional needs identified to be addressed with provider: No  none             Method of communication with provider: none. Spoke to pt's wife for final transitions call. Pt went to PCP last week, completed labs prior to visit. Reviewed recent labs with pt's wife, noted labs improved. Pt had ECHO completed last week and received call from Cardiology today, no changes from previous ECHO. Told to continue current therapy. Pt is doing informed of final transitions call. CTN sign off for transitions. Addressed changes since last contact:   Completed ECHO, labs, PCP AWV  Discussed follow-up appointments. Follow Up  Future Appointments   Date Time Provider Department Center   2024  2:20 PM Chelsea Luciano MD TIFF CARD [facility]   2024  2:00 PM Jody Welch MD TIFF PULM [facility]   2024  2:00 PM Nasim Snider APRN - CNP [facility]   2024 12:00 PM Jyoti Mitchell MD TIFF [facility]         Care Transition Nurse reviewed discharge instructions with spouse/partner and discussed any barriers to care and/or understanding of plan of care after discharge. Discussed appropriate site of care based on symptoms and resources available to patient including: PCP  Specialist  When to call Nicolas Lo. The spouse/partner agrees to contact the PCP office for questions related to their healthcare. Advance Care Planning:   reviewed and current.      Patients top risk factors for readmission: medical

## 2023-11-21 ENCOUNTER — HOSPITAL ENCOUNTER (INPATIENT)
Age: 88
LOS: 10 days | Discharge: NURSING, REHAB OR LONG-TERM CARE | End: 2023-12-01
Attending: STUDENT IN AN ORGANIZED HEALTH CARE EDUCATION/TRAINING PROGRAM
Payer: MEDICARE

## 2023-11-21 ENCOUNTER — APPOINTMENT (OUTPATIENT)
Dept: GENERAL RADIOLOGY | Age: 88
End: 2023-11-21
Payer: MEDICARE

## 2023-11-21 DIAGNOSIS — J18.9 COMMUNITY ACQUIRED PNEUMONIA OF RIGHT LOWER LOBE OF LUNG: Primary | ICD-10-CM

## 2023-11-21 LAB
ALBUMIN SERPL-MCNC: 4 G/DL (ref 3.5–5.2)
ALBUMIN/GLOB SERPL: 1.3 {RATIO} (ref 1–2.5)
ALP SERPL-CCNC: 167 U/L (ref 40–129)
ALT SERPL-CCNC: 49 U/L (ref 5–41)
ANION GAP SERPL CALCULATED.3IONS-SCNC: 12 MMOL/L (ref 9–17)
AST SERPL-CCNC: 38 U/L
BASOPHILS # BLD: 0.03 K/UL (ref 0–0.2)
BASOPHILS NFR BLD: 0 % (ref 0–2)
BILIRUB SERPL-MCNC: 0.3 MG/DL (ref 0.3–1.2)
BILIRUB UR QL STRIP: NEGATIVE
BUN SERPL-MCNC: 18 MG/DL (ref 8–23)
BUN/CREAT SERPL: 16 (ref 9–20)
CALCIUM SERPL-MCNC: 9.2 MG/DL (ref 8.6–10.4)
CHLORIDE SERPL-SCNC: 95 MMOL/L (ref 98–107)
CLARITY UR: CLEAR
CO2 SERPL-SCNC: 24 MMOL/L (ref 20–31)
COLOR UR: YELLOW
CREAT SERPL-MCNC: 1.1 MG/DL (ref 0.7–1.2)
CRP SERPL HS-MCNC: 50 MG/L (ref 0–5)
EOSINOPHIL # BLD: 0.12 K/UL (ref 0–0.44)
EOSINOPHILS RELATIVE PERCENT: 1 % (ref 1–4)
EPI CELLS #/AREA URNS HPF: ABNORMAL /HPF (ref 0–5)
ERYTHROCYTE [DISTWIDTH] IN BLOOD BY AUTOMATED COUNT: 16.5 % (ref 11.8–14.4)
ERYTHROCYTE [SEDIMENTATION RATE] IN BLOOD BY PHOTOMETRIC METHOD: 14 MM/HR (ref 0–20)
FLUAV AG SPEC QL: NEGATIVE
FLUBV AG SPEC QL: NEGATIVE
GFR SERPL CREATININE-BSD FRML MDRD: >60 ML/MIN/1.73M2
GLUCOSE SERPL-MCNC: 152 MG/DL (ref 70–99)
GLUCOSE UR STRIP-MCNC: NEGATIVE MG/DL
HCT VFR BLD AUTO: 34.2 % (ref 40.7–50.3)
HGB BLD-MCNC: 10.4 G/DL (ref 13–17)
HGB UR QL STRIP.AUTO: NEGATIVE
IMM GRANULOCYTES # BLD AUTO: 0.06 K/UL (ref 0–0.3)
IMM GRANULOCYTES NFR BLD: 1 %
KETONES UR STRIP-MCNC: NEGATIVE MG/DL
LACTATE BLDV-SCNC: 2.1 MMOL/L (ref 0.5–2.2)
LACTATE BLDV-SCNC: 2.6 MMOL/L (ref 0.5–2.2)
LEUKOCYTE ESTERASE UR QL STRIP: NEGATIVE
LYMPHOCYTES NFR BLD: 1.52 K/UL (ref 1.1–3.7)
LYMPHOCYTES RELATIVE PERCENT: 12 % (ref 24–43)
MAGNESIUM SERPL-MCNC: 1.6 MG/DL (ref 1.6–2.6)
MCH RBC QN AUTO: 28.5 PG (ref 25.2–33.5)
MCHC RBC AUTO-ENTMCNC: 30.4 G/DL (ref 28.4–34.8)
MCV RBC AUTO: 93.7 FL (ref 82.6–102.9)
MONOCYTES NFR BLD: 0.66 K/UL (ref 0.1–1.2)
MONOCYTES NFR BLD: 5 % (ref 3–12)
NEUTROPHILS NFR BLD: 81 % (ref 36–65)
NEUTS SEG NFR BLD: 10.46 K/UL (ref 1.5–8.1)
NITRITE UR QL STRIP: NEGATIVE
NRBC BLD-RTO: 0 PER 100 WBC
PH UR STRIP: 7.5 [PH] (ref 5–9)
PLATELET # BLD AUTO: 283 K/UL (ref 138–453)
PMV BLD AUTO: 9.8 FL (ref 8.1–13.5)
POTASSIUM SERPL-SCNC: 4.1 MMOL/L (ref 3.7–5.3)
PROT SERPL-MCNC: 7.1 G/DL (ref 6.4–8.3)
PROT UR STRIP-MCNC: ABNORMAL MG/DL
RBC # BLD AUTO: 3.65 M/UL (ref 4.21–5.77)
RBC #/AREA URNS HPF: ABNORMAL /HPF (ref 0–2)
SARS-COV-2 RDRP RESP QL NAA+PROBE: NOT DETECTED
SODIUM SERPL-SCNC: 131 MMOL/L (ref 135–144)
SP GR UR STRIP: 1.02 (ref 1.01–1.02)
SPECIMEN DESCRIPTION: NORMAL
TROPONIN I SERPL HS-MCNC: 14 NG/L (ref 0–22)
UROBILINOGEN UR STRIP-ACNC: NORMAL EU/DL (ref 0–1)
WBC #/AREA URNS HPF: ABNORMAL /HPF (ref 0–5)
WBC OTHER # BLD: 12.9 K/UL (ref 3.5–11.3)

## 2023-11-21 PROCEDURE — 2700000000 HC OXYGEN THERAPY PER DAY

## 2023-11-21 PROCEDURE — C9803 HOPD COVID-19 SPEC COLLECT: HCPCS

## 2023-11-21 PROCEDURE — 87804 INFLUENZA ASSAY W/OPTIC: CPT

## 2023-11-21 PROCEDURE — 87635 SARS-COV-2 COVID-19 AMP PRB: CPT

## 2023-11-21 PROCEDURE — 85652 RBC SED RATE AUTOMATED: CPT

## 2023-11-21 PROCEDURE — 6370000000 HC RX 637 (ALT 250 FOR IP): Performed by: STUDENT IN AN ORGANIZED HEALTH CARE EDUCATION/TRAINING PROGRAM

## 2023-11-21 PROCEDURE — 83605 ASSAY OF LACTIC ACID: CPT

## 2023-11-21 PROCEDURE — 6360000002 HC RX W HCPCS: Performed by: STUDENT IN AN ORGANIZED HEALTH CARE EDUCATION/TRAINING PROGRAM

## 2023-11-21 PROCEDURE — 84484 ASSAY OF TROPONIN QUANT: CPT

## 2023-11-21 PROCEDURE — 96365 THER/PROPH/DIAG IV INF INIT: CPT

## 2023-11-21 PROCEDURE — 87040 BLOOD CULTURE FOR BACTERIA: CPT

## 2023-11-21 PROCEDURE — 80053 COMPREHEN METABOLIC PANEL: CPT

## 2023-11-21 PROCEDURE — 83735 ASSAY OF MAGNESIUM: CPT

## 2023-11-21 PROCEDURE — 81001 URINALYSIS AUTO W/SCOPE: CPT

## 2023-11-21 PROCEDURE — 84145 PROCALCITONIN (PCT): CPT

## 2023-11-21 PROCEDURE — 86140 C-REACTIVE PROTEIN: CPT

## 2023-11-21 PROCEDURE — 94761 N-INVAS EAR/PLS OXIMETRY MLT: CPT

## 2023-11-21 PROCEDURE — 85025 COMPLETE CBC W/AUTO DIFF WBC: CPT

## 2023-11-21 PROCEDURE — 1200000000 HC SEMI PRIVATE

## 2023-11-21 PROCEDURE — 99285 EMERGENCY DEPT VISIT HI MDM: CPT

## 2023-11-21 PROCEDURE — 87086 URINE CULTURE/COLONY COUNT: CPT

## 2023-11-21 PROCEDURE — 2580000003 HC RX 258: Performed by: STUDENT IN AN ORGANIZED HEALTH CARE EDUCATION/TRAINING PROGRAM

## 2023-11-21 PROCEDURE — 71045 X-RAY EXAM CHEST 1 VIEW: CPT

## 2023-11-21 PROCEDURE — 36415 COLL VENOUS BLD VENIPUNCTURE: CPT

## 2023-11-21 PROCEDURE — 96361 HYDRATE IV INFUSION ADD-ON: CPT

## 2023-11-21 RX ORDER — BUMETANIDE 1 MG/1
1 TABLET ORAL 2 TIMES DAILY
Status: DISCONTINUED | OUTPATIENT
Start: 2023-11-22 | End: 2023-12-01 | Stop reason: HOSPADM

## 2023-11-21 RX ORDER — ACETAMINOPHEN 325 MG/1
650 TABLET ORAL EVERY 6 HOURS PRN
Status: DISCONTINUED | OUTPATIENT
Start: 2023-11-21 | End: 2023-12-01 | Stop reason: HOSPADM

## 2023-11-21 RX ORDER — ACETAMINOPHEN 650 MG/1
650 SUPPOSITORY RECTAL EVERY 6 HOURS PRN
Status: DISCONTINUED | OUTPATIENT
Start: 2023-11-21 | End: 2023-12-01 | Stop reason: HOSPADM

## 2023-11-21 RX ORDER — POLYETHYLENE GLYCOL 3350 17 G/17G
17 POWDER, FOR SOLUTION ORAL DAILY PRN
Status: DISCONTINUED | OUTPATIENT
Start: 2023-11-21 | End: 2023-12-01 | Stop reason: HOSPADM

## 2023-11-21 RX ORDER — ONDANSETRON 4 MG/1
4 TABLET, ORALLY DISINTEGRATING ORAL EVERY 8 HOURS PRN
Status: DISCONTINUED | OUTPATIENT
Start: 2023-11-21 | End: 2023-12-01 | Stop reason: HOSPADM

## 2023-11-21 RX ORDER — SODIUM CHLORIDE 9 MG/ML
INJECTION, SOLUTION INTRAVENOUS CONTINUOUS
Status: DISCONTINUED | OUTPATIENT
Start: 2023-11-22 | End: 2023-11-22

## 2023-11-21 RX ORDER — M-VIT,TX,IRON,MINS/CALC/FOLIC 27MG-0.4MG
1 TABLET ORAL DAILY
Status: DISCONTINUED | OUTPATIENT
Start: 2023-11-22 | End: 2023-12-01 | Stop reason: HOSPADM

## 2023-11-21 RX ORDER — PROPRANOLOL HCL 60 MG
60 CAPSULE, EXTENDED RELEASE 24HR ORAL 2 TIMES DAILY
Status: DISCONTINUED | OUTPATIENT
Start: 2023-11-22 | End: 2023-12-01 | Stop reason: HOSPADM

## 2023-11-21 RX ORDER — SODIUM CHLORIDE 0.9 % (FLUSH) 0.9 %
5-40 SYRINGE (ML) INJECTION EVERY 12 HOURS SCHEDULED
Status: DISCONTINUED | OUTPATIENT
Start: 2023-11-22 | End: 2023-12-01 | Stop reason: HOSPADM

## 2023-11-21 RX ORDER — FLUTICASONE PROPIONATE 50 MCG
2 SPRAY, SUSPENSION (ML) NASAL DAILY
Status: DISCONTINUED | OUTPATIENT
Start: 2023-11-22 | End: 2023-12-01 | Stop reason: HOSPADM

## 2023-11-21 RX ORDER — TAMSULOSIN HYDROCHLORIDE 0.4 MG/1
0.8 CAPSULE ORAL DAILY
Status: DISCONTINUED | OUTPATIENT
Start: 2023-11-22 | End: 2023-12-01 | Stop reason: HOSPADM

## 2023-11-21 RX ORDER — PANTOPRAZOLE SODIUM 40 MG/1
40 TABLET, DELAYED RELEASE ORAL
Status: DISCONTINUED | OUTPATIENT
Start: 2023-11-22 | End: 2023-12-01 | Stop reason: HOSPADM

## 2023-11-21 RX ORDER — FERROUS SULFATE 325(65) MG
325 TABLET ORAL
Status: DISCONTINUED | OUTPATIENT
Start: 2023-11-22 | End: 2023-11-29

## 2023-11-21 RX ORDER — PRIMIDONE 250 MG/1
250 TABLET ORAL 2 TIMES DAILY
Status: DISCONTINUED | OUTPATIENT
Start: 2023-11-22 | End: 2023-12-01 | Stop reason: HOSPADM

## 2023-11-21 RX ORDER — ENOXAPARIN SODIUM 100 MG/ML
30 INJECTION SUBCUTANEOUS 2 TIMES DAILY
Status: DISCONTINUED | OUTPATIENT
Start: 2023-11-22 | End: 2023-12-01 | Stop reason: HOSPADM

## 2023-11-21 RX ORDER — OXYBUTYNIN CHLORIDE 5 MG/1
10 TABLET ORAL 2 TIMES DAILY
Status: DISCONTINUED | OUTPATIENT
Start: 2023-11-22 | End: 2023-12-01 | Stop reason: HOSPADM

## 2023-11-21 RX ORDER — ONDANSETRON 2 MG/ML
4 INJECTION INTRAMUSCULAR; INTRAVENOUS EVERY 6 HOURS PRN
Status: DISCONTINUED | OUTPATIENT
Start: 2023-11-21 | End: 2023-12-01 | Stop reason: HOSPADM

## 2023-11-21 RX ORDER — AZITHROMYCIN 250 MG/1
500 TABLET, FILM COATED ORAL EVERY 24 HOURS
Status: COMPLETED | OUTPATIENT
Start: 2023-11-22 | End: 2023-11-24

## 2023-11-21 RX ORDER — ATORVASTATIN CALCIUM 40 MG/1
40 TABLET, FILM COATED ORAL DAILY
Status: DISCONTINUED | OUTPATIENT
Start: 2023-11-22 | End: 2023-12-01 | Stop reason: HOSPADM

## 2023-11-21 RX ORDER — ACETAMINOPHEN 500 MG
1000 TABLET ORAL ONCE
Status: COMPLETED | OUTPATIENT
Start: 2023-11-21 | End: 2023-11-21

## 2023-11-21 RX ORDER — SODIUM CHLORIDE 9 MG/ML
INJECTION, SOLUTION INTRAVENOUS PRN
Status: DISCONTINUED | OUTPATIENT
Start: 2023-11-21 | End: 2023-12-01 | Stop reason: HOSPADM

## 2023-11-21 RX ORDER — ACETAMINOPHEN 500 MG
1000 TABLET ORAL EVERY 6 HOURS PRN
Status: DISCONTINUED | OUTPATIENT
Start: 2023-11-21 | End: 2023-11-21

## 2023-11-21 RX ORDER — SODIUM CHLORIDE 0.9 % (FLUSH) 0.9 %
5-40 SYRINGE (ML) INJECTION PRN
Status: DISCONTINUED | OUTPATIENT
Start: 2023-11-21 | End: 2023-12-01 | Stop reason: HOSPADM

## 2023-11-21 RX ORDER — SPIRONOLACTONE 25 MG/1
25 TABLET ORAL DAILY
Status: DISCONTINUED | OUTPATIENT
Start: 2023-11-22 | End: 2023-11-28

## 2023-11-21 RX ORDER — ALBUTEROL SULFATE 2.5 MG/3ML
2.5 SOLUTION RESPIRATORY (INHALATION) EVERY 4 HOURS PRN
Status: DISCONTINUED | OUTPATIENT
Start: 2023-11-21 | End: 2023-11-22

## 2023-11-21 RX ORDER — 0.9 % SODIUM CHLORIDE 0.9 %
1000 INTRAVENOUS SOLUTION INTRAVENOUS ONCE
Status: COMPLETED | OUTPATIENT
Start: 2023-11-21 | End: 2023-11-21

## 2023-11-21 RX ORDER — MONTELUKAST SODIUM 10 MG/1
10 TABLET ORAL DAILY
Status: DISCONTINUED | OUTPATIENT
Start: 2023-11-22 | End: 2023-12-01 | Stop reason: HOSPADM

## 2023-11-21 RX ADMIN — AZITHROMYCIN MONOHYDRATE 500 MG: 500 INJECTION, POWDER, LYOPHILIZED, FOR SOLUTION INTRAVENOUS at 22:22

## 2023-11-21 RX ADMIN — CEFTRIAXONE SODIUM 1000 MG: 1 INJECTION, POWDER, FOR SOLUTION INTRAMUSCULAR; INTRAVENOUS at 22:19

## 2023-11-21 RX ADMIN — ACETAMINOPHEN 1000 MG: 500 TABLET ORAL at 21:24

## 2023-11-21 RX ADMIN — SODIUM CHLORIDE 1000 ML: 9 INJECTION, SOLUTION INTRAVENOUS at 21:16

## 2023-11-21 ASSESSMENT — PAIN - FUNCTIONAL ASSESSMENT: PAIN_FUNCTIONAL_ASSESSMENT: NONE - DENIES PAIN

## 2023-11-21 ASSESSMENT — ENCOUNTER SYMPTOMS
TROUBLE SWALLOWING: 0
VOICE CHANGE: 0

## 2023-11-21 ASSESSMENT — PAIN SCALES - GENERAL: PAINLEVEL_OUTOF10: 0

## 2023-11-22 PROBLEM — G92.8 TOXIC METABOLIC ENCEPHALOPATHY: Status: ACTIVE | Noted: 2023-11-22

## 2023-11-22 LAB
ANION GAP SERPL CALCULATED.3IONS-SCNC: 14 MMOL/L (ref 9–17)
BASOPHILS # BLD: 0 K/UL (ref 0–0.2)
BASOPHILS NFR BLD: 0 % (ref 0–2)
BNP SERPL-MCNC: 7658 PG/ML
BUN SERPL-MCNC: 22 MG/DL (ref 8–23)
BUN/CREAT SERPL: 16 (ref 9–20)
CALCIUM SERPL-MCNC: 8.4 MG/DL (ref 8.6–10.4)
CHLORIDE SERPL-SCNC: 100 MMOL/L (ref 98–107)
CO2 SERPL-SCNC: 21 MMOL/L (ref 20–31)
CREAT SERPL-MCNC: 1.4 MG/DL (ref 0.7–1.2)
EOSINOPHIL # BLD: 0 K/UL (ref 0–0.44)
EOSINOPHILS RELATIVE PERCENT: 0 % (ref 1–4)
ERYTHROCYTE [DISTWIDTH] IN BLOOD BY AUTOMATED COUNT: 16.6 % (ref 11.8–14.4)
GFR SERPL CREATININE-BSD FRML MDRD: 49 ML/MIN/1.73M2
GLUCOSE SERPL-MCNC: 127 MG/DL (ref 70–99)
HCT VFR BLD AUTO: 30.2 % (ref 40.7–50.3)
HGB BLD-MCNC: 9.7 G/DL (ref 13–17)
IMM GRANULOCYTES # BLD AUTO: 0 K/UL (ref 0–0.3)
IMM GRANULOCYTES NFR BLD: 0 %
LYMPHOCYTES NFR BLD: 0.73 K/UL (ref 1.1–3.7)
LYMPHOCYTES RELATIVE PERCENT: 11 % (ref 24–43)
MCH RBC QN AUTO: 29.2 PG (ref 25.2–33.5)
MCHC RBC AUTO-ENTMCNC: 32.1 G/DL (ref 28.4–34.8)
MCV RBC AUTO: 91 FL (ref 82.6–102.9)
MONOCYTES NFR BLD: 0.13 K/UL (ref 0.1–1.2)
MONOCYTES NFR BLD: 2 % (ref 3–12)
MORPHOLOGY: ABNORMAL
NEUTROPHILS NFR BLD: 87 % (ref 36–65)
NEUTS SEG NFR BLD: 5.74 K/UL (ref 1.5–8.1)
NRBC BLD-RTO: 0 PER 100 WBC
PLATELET # BLD AUTO: 215 K/UL (ref 138–453)
PMV BLD AUTO: 10.2 FL (ref 8.1–13.5)
POTASSIUM SERPL-SCNC: 4.2 MMOL/L (ref 3.7–5.3)
PROCALCITONIN SERPL-MCNC: 0.05 NG/ML
RBC # BLD AUTO: 3.32 M/UL (ref 4.21–5.77)
SODIUM SERPL-SCNC: 135 MMOL/L (ref 135–144)
WBC OTHER # BLD: 6.6 K/UL (ref 3.5–11.3)

## 2023-11-22 PROCEDURE — 6370000000 HC RX 637 (ALT 250 FOR IP)

## 2023-11-22 PROCEDURE — 99222 1ST HOSP IP/OBS MODERATE 55: CPT | Performed by: PHYSICIAN ASSISTANT

## 2023-11-22 PROCEDURE — 2580000003 HC RX 258

## 2023-11-22 PROCEDURE — 6370000000 HC RX 637 (ALT 250 FOR IP): Performed by: PHYSICIAN ASSISTANT

## 2023-11-22 PROCEDURE — 6360000002 HC RX W HCPCS: Performed by: STUDENT IN AN ORGANIZED HEALTH CARE EDUCATION/TRAINING PROGRAM

## 2023-11-22 PROCEDURE — 97110 THERAPEUTIC EXERCISES: CPT

## 2023-11-22 PROCEDURE — 94640 AIRWAY INHALATION TREATMENT: CPT

## 2023-11-22 PROCEDURE — 2580000003 HC RX 258: Performed by: STUDENT IN AN ORGANIZED HEALTH CARE EDUCATION/TRAINING PROGRAM

## 2023-11-22 PROCEDURE — 94761 N-INVAS EAR/PLS OXIMETRY MLT: CPT

## 2023-11-22 PROCEDURE — 83880 ASSAY OF NATRIURETIC PEPTIDE: CPT

## 2023-11-22 PROCEDURE — 6370000000 HC RX 637 (ALT 250 FOR IP): Performed by: STUDENT IN AN ORGANIZED HEALTH CARE EDUCATION/TRAINING PROGRAM

## 2023-11-22 PROCEDURE — 94669 MECHANICAL CHEST WALL OSCILL: CPT

## 2023-11-22 PROCEDURE — 6360000002 HC RX W HCPCS

## 2023-11-22 PROCEDURE — 97163 PT EVAL HIGH COMPLEX 45 MIN: CPT

## 2023-11-22 PROCEDURE — 94664 DEMO&/EVAL PT USE INHALER: CPT

## 2023-11-22 PROCEDURE — 85025 COMPLETE CBC W/AUTO DIFF WBC: CPT

## 2023-11-22 PROCEDURE — 80048 BASIC METABOLIC PNL TOTAL CA: CPT

## 2023-11-22 PROCEDURE — 2700000000 HC OXYGEN THERAPY PER DAY

## 2023-11-22 PROCEDURE — 1200000000 HC SEMI PRIVATE

## 2023-11-22 PROCEDURE — 97116 GAIT TRAINING THERAPY: CPT

## 2023-11-22 PROCEDURE — 36415 COLL VENOUS BLD VENIPUNCTURE: CPT

## 2023-11-22 RX ORDER — KETOROLAC TROMETHAMINE 15 MG/ML
15 INJECTION, SOLUTION INTRAMUSCULAR; INTRAVENOUS ONCE
Status: COMPLETED | OUTPATIENT
Start: 2023-11-22 | End: 2023-11-22

## 2023-11-22 RX ORDER — MIDODRINE HYDROCHLORIDE 5 MG/1
10 TABLET ORAL
Status: DISCONTINUED | OUTPATIENT
Start: 2023-11-22 | End: 2023-12-01 | Stop reason: HOSPADM

## 2023-11-22 RX ORDER — MIDODRINE HYDROCHLORIDE 5 MG/1
5 TABLET ORAL
Status: DISCONTINUED | OUTPATIENT
Start: 2023-11-22 | End: 2023-11-22

## 2023-11-22 RX ORDER — 0.9 % SODIUM CHLORIDE 0.9 %
500 INTRAVENOUS SOLUTION INTRAVENOUS ONCE
Status: COMPLETED | OUTPATIENT
Start: 2023-11-22 | End: 2023-11-22

## 2023-11-22 RX ORDER — ALBUTEROL SULFATE 2.5 MG/3ML
2.5 SOLUTION RESPIRATORY (INHALATION)
Status: DISCONTINUED | OUTPATIENT
Start: 2023-11-22 | End: 2023-12-01 | Stop reason: HOSPADM

## 2023-11-22 RX ORDER — ALBUTEROL SULFATE 2.5 MG/3ML
2.5 SOLUTION RESPIRATORY (INHALATION) EVERY 4 HOURS PRN
Status: DISCONTINUED | OUTPATIENT
Start: 2023-11-22 | End: 2023-12-01 | Stop reason: HOSPADM

## 2023-11-22 RX ADMIN — LEVOTHYROXINE SODIUM 137 MCG: 25 TABLET ORAL at 07:56

## 2023-11-22 RX ADMIN — AZITHROMYCIN DIHYDRATE 500 MG: 250 TABLET ORAL at 22:14

## 2023-11-22 RX ADMIN — TAMSULOSIN HYDROCHLORIDE 0.8 MG: 0.4 CAPSULE ORAL at 09:38

## 2023-11-22 RX ADMIN — PANTOPRAZOLE SODIUM 40 MG: 40 TABLET, DELAYED RELEASE ORAL at 07:56

## 2023-11-22 RX ADMIN — OXYBUTYNIN CHLORIDE 10 MG: 5 TABLET ORAL at 22:14

## 2023-11-22 RX ADMIN — GUAIFENESIN, DEXTROMETHORPHAN HBR 1 TABLET: 600; 30 TABLET ORAL at 09:37

## 2023-11-22 RX ADMIN — METFORMIN HYDROCHLORIDE 1000 MG: 500 TABLET ORAL at 09:37

## 2023-11-22 RX ADMIN — MIDODRINE HYDROCHLORIDE 5 MG: 5 TABLET ORAL at 09:59

## 2023-11-22 RX ADMIN — SODIUM CHLORIDE: 9 INJECTION, SOLUTION INTRAVENOUS at 00:35

## 2023-11-22 RX ADMIN — ALBUTEROL SULFATE 2.5 MG: 2.5 SOLUTION RESPIRATORY (INHALATION) at 20:03

## 2023-11-22 RX ADMIN — CEFTRIAXONE SODIUM 1000 MG: 1 INJECTION, POWDER, FOR SOLUTION INTRAMUSCULAR; INTRAVENOUS at 22:13

## 2023-11-22 RX ADMIN — ALBUTEROL SULFATE 2.5 MG: 2.5 SOLUTION RESPIRATORY (INHALATION) at 10:10

## 2023-11-22 RX ADMIN — BUMETANIDE 1 MG: 1 TABLET ORAL at 17:32

## 2023-11-22 RX ADMIN — FLUTICASONE PROPIONATE 2 SPRAY: 50 SPRAY, METERED NASAL at 09:39

## 2023-11-22 RX ADMIN — BUMETANIDE 1 MG: 1 TABLET ORAL at 09:37

## 2023-11-22 RX ADMIN — SODIUM CHLORIDE 500 ML: 9 INJECTION, SOLUTION INTRAVENOUS at 09:51

## 2023-11-22 RX ADMIN — ALBUTEROL SULFATE 2.5 MG: 2.5 SOLUTION RESPIRATORY (INHALATION) at 14:41

## 2023-11-22 RX ADMIN — PRIMIDONE 250 MG: 250 TABLET ORAL at 09:37

## 2023-11-22 RX ADMIN — GUAIFENESIN, DEXTROMETHORPHAN HBR 1 TABLET: 600; 30 TABLET ORAL at 22:13

## 2023-11-22 RX ADMIN — SODIUM CHLORIDE, PRESERVATIVE FREE 10 ML: 5 INJECTION INTRAVENOUS at 22:17

## 2023-11-22 RX ADMIN — TIOTROPIUM BROMIDE INHALATION SPRAY 2 PUFF: 3.12 SPRAY, METERED RESPIRATORY (INHALATION) at 10:18

## 2023-11-22 RX ADMIN — KETOROLAC TROMETHAMINE 15 MG: 15 INJECTION, SOLUTION INTRAMUSCULAR; INTRAVENOUS at 00:35

## 2023-11-22 RX ADMIN — PRIMIDONE 250 MG: 250 TABLET ORAL at 17:32

## 2023-11-22 RX ADMIN — ALBUTEROL SULFATE 2.5 MG: 2.5 SOLUTION RESPIRATORY (INHALATION) at 05:34

## 2023-11-22 RX ADMIN — MONTELUKAST 10 MG: 10 TABLET, FILM COATED ORAL at 09:37

## 2023-11-22 RX ADMIN — OXYBUTYNIN CHLORIDE 10 MG: 5 TABLET ORAL at 09:37

## 2023-11-22 RX ADMIN — SPIRONOLACTONE 25 MG: 25 TABLET, FILM COATED ORAL at 09:37

## 2023-11-22 RX ADMIN — ENOXAPARIN SODIUM 30 MG: 100 INJECTION SUBCUTANEOUS at 22:13

## 2023-11-22 RX ADMIN — MOMETASONE FUROATE AND FORMOTEROL FUMARATE DIHYDRATE 2 PUFF: 200; 5 AEROSOL RESPIRATORY (INHALATION) at 10:17

## 2023-11-22 RX ADMIN — ENOXAPARIN SODIUM 30 MG: 100 INJECTION SUBCUTANEOUS at 09:40

## 2023-11-22 RX ADMIN — MIDODRINE HYDROCHLORIDE 10 MG: 5 TABLET ORAL at 17:32

## 2023-11-22 RX ADMIN — METFORMIN HYDROCHLORIDE 1000 MG: 500 TABLET ORAL at 22:13

## 2023-11-22 RX ADMIN — ATORVASTATIN CALCIUM 40 MG: 40 TABLET, FILM COATED ORAL at 09:38

## 2023-11-22 RX ADMIN — MOMETASONE FUROATE AND FORMOTEROL FUMARATE DIHYDRATE 2 PUFF: 200; 5 AEROSOL RESPIRATORY (INHALATION) at 20:03

## 2023-11-22 RX ADMIN — FERROUS SULFATE TAB 325 MG (65 MG ELEMENTAL FE) 325 MG: 325 (65 FE) TAB at 09:37

## 2023-11-22 RX ADMIN — ALBUTEROL SULFATE 2.5 MG: 2.5 SOLUTION RESPIRATORY (INHALATION) at 00:52

## 2023-11-22 RX ADMIN — MULTIPLE VITAMINS W/ MINERALS TAB 1 TABLET: TAB at 09:37

## 2023-11-22 ASSESSMENT — PAIN SCALES - GENERAL
PAINLEVEL_OUTOF10: 0

## 2023-11-22 ASSESSMENT — PAIN - FUNCTIONAL ASSESSMENT: PAIN_FUNCTIONAL_ASSESSMENT: ACTIVITIES ARE NOT PREVENTED

## 2023-11-22 NOTE — PLAN OF CARE
Problem: Discharge Planning  Goal: Discharge to home or other facility with appropriate resources  Outcome: Progressing     Problem: Safety - Adult  Goal: Free from fall injury  Outcome: Progressing     Problem: Skin/Tissue Integrity  Goal: Absence of new skin breakdown  Description: 1. Monitor for areas of redness and/or skin breakdown  2. Assess vascular access sites hourly  3. Every 4-6 hours minimum:  Change oxygen saturation probe site  4. Every 4-6 hours:  If on nasal continuous positive airway pressure, respiratory therapy assess nares and determine need for appliance change or resting period.   Outcome: Progressing     Problem: ABCDS Injury Assessment  Goal: Absence of physical injury  Outcome: Progressing     Problem: Nutrition Deficit:  Goal: Optimize nutritional status  11/22/2023 1857 by Kylie Kasper RN  Outcome: Progressing

## 2023-11-22 NOTE — PROGRESS NOTES
[facility]  [address] [place], [place], [address]    Progress Note    Date:   11/22/2023  Patient name:  Hiwot Barlow  Date of admission:  11/21/2023  9:07 PM  MRN:   [id number]  YOB: 1935    SUBJECTIVE/Last 24 hours update:     Patient seen and examined at the bed side , no new acute events overnight and no new complains. Notes from nursing staff and Consults had been reviewed, and the overnight progress had been checked with the nursing staff as well. He was noted to have some episodes of acute delirium overnight which is new compared to his known baseline. He feels confused. He denies any shortness of breath. REVIEW OF SYSTEMS:      CONSTITUTIONAL:  no fevers, no headaches, positive for fevers  EYES: negative for blury vision  HEENT: No headaches, No nasal congestion, no difficulty swallowing  RESPIRATORY:negative for dyspnea, no wheezing, no Cough  CARDIOVASCULAR: negative for chest pain, no palpitations  GASTROINTESTINAL: no nausea, no vomiting, no change in bowel habits, no abdominal pain   GENITOURINARY: negative for dysuria, no hematuria   MUSCULOSKELETAL: no joint pains, no muscle aches, no swelling of joints or extremities  NEUROLOGICAL: No  Weakness or numbness      PAST MEDICAL HISTORY:      has a past medical history of COPD (chronic obstructive pulmonary disease) ([address]), Diabetes mellitus ([address]), Hx of echocardiogram, Hyperlipidemia, Hypothyroidism, MI (myocardial infarction) ([address]), Stroke ([address]), Thyroid disease, and Type II or unspecified type diabetes mellitus without mention of complication, not stated as uncontrolled. PAST SURGICAL HISTORY:      has a past surgical history that includes hernia repair; Cardiac surgery (02/24/2017); fracture surgery; Colonoscopy; Cerebral angiogram (Bilateral, 06/15/2023); sigmoidoscopy (06/23/2023); Colonoscopy (N/A, 6/23/2023); and Upper gastrointestinal endoscopy (10/25/2023).        SOCIAL HISTORY:      reports that he quit smoking about 30 years ago. His smoking use included cigarettes. He has never used smokeless tobacco. He reports current alcohol use of about 1.0 standard drink of alcohol per week. He reports that he does not use drugs. TOBACCO:   reports that he quit smoking about 30 years ago. His smoking use included cigarettes. He has never used smokeless tobacco.  ETOH:   reports current alcohol use of about 1.0 standard drink of alcohol per week. Reviewed and non-contributory or as noted above and/or in the HPI    FAMILY HISTORY:     family history includes Cancer (age of onset: 47) in his mother; Diabetes in his father; Heart Disease in his father. Problem Relation Age of Onset    Cancer Mother 47        liver ca    Diabetes Father     Heart Disease Father      Reviewed and non-contributory or as noted above and/or in the HPI    HOME MEDICATIONS:      Prior to Admission medications    Medication Sig Start Date End Date Taking?  Authorizing Provider   montelukast (SINGULAIR) 10 MG tablet Take 1 tablet by mouth once daily 11/20/23   MightSri APRN - CNP   bumetanide (BUMEX) 1 MG tablet Take 1 tablet by mouth 2 times daily 11/20/23   Nia Valente PA-C   oxybutynin (DITROPAN) 5 MG tablet Take 2 tablets by mouth 2 times daily 11/20/23   MightSri APRN - CNP   pantoprazole (PROTONIX) 40 MG tablet Take 1 tablet by mouth every morning (before breakfast) 11/17/23   MightSri APRN - CNP   metFORMIN (GLUCOPHAGE) 1000 MG tablet Take 1 tablet by mouth 2 times daily 10/11/23   MightSri APRN - CNP   spironolactone (ALDACTONE) 25 MG tablet Take 1 tablet by mouth daily 10/5/23   Nia Valente PA-C   atorvastatin (LIPITOR) 40 MG tablet Take 1 tablet by mouth daily 9/19/23   Sri Snider APRN - CNP   levothyroxine (SYNTHROID) 137 MCG tablet Take 1 tablet by mouth Daily 9/19/23   Sri Snider APRN - CNP   tamsulosin (FLOMAX) 0.4 MG capsule Take 2 capsules by mouth daily 7/18/23   Mariangel Snider

## 2023-11-22 NOTE — PROGRESS NOTES
Physical Therapy  Facility/Department: [facility] MED SURG  Daily Treatment Note  NAME: Trung Phillips  : 1935  MRN: [id number]    Date of Service: 2023    Discharge Recommendations:  Continue to assess pending progress, Subacute/Skilled Nursing Facility, Patient would benefit from continued therapy after discharge     Patient Diagnosis(es): The encounter diagnosis was Community acquired pneumonia of right lower lobe of lung. Assessment   Assessment: Transfers: Min- Mod A x2 for safety. Pt AMB 5 ft with RW and Min- Mod A x2. Reclined and seated B LE exercises x10- 15 reps. Frequent RB d/t SOB. Able to stand at [facility] with Min A x2 for safety while pericare is performed after commode use. Activity Tolerance: Patient limited by endurance; Patient limited by fatigue     Plan    Physical Therapy Plan  General Plan: 2 times a day 7 days a week  Specific Instructions for Next Treatment: With exception of weekends, daily. Current Treatment Recommendations: Strengthening;Balance training;Functional mobility training;Transfer training; Endurance training;Gait training;Stair training;Neuromuscular re-education;Home exercise program;Safety education & training; Therapeutic activities     Restrictions  Restrictions/Precautions  Restrictions/Precautions: Fall Risk, Contact Precautions     Subjective    Subjective  Pain: denies  Orientation  Overall Orientation Status: Impaired  Orientation Level: Oriented to person     Objective   Bed Mobility Training  Bed Mobility Training: No  Overall Level of Assistance: Maximum assistance;Assist X2  Interventions: Verbal cues; Safety awareness training  Supine to Sit: Maximum assistance;Assist X2  Scooting: Maximum assistance;Assist X2  Balance  Sitting: Impaired  Sitting - Static: Fair (occasional)  Sitting - Dynamic: Poor (constant support)  Standing: Impaired  Standing - Static: Poor  Standing - Dynamic: Poor  Transfer Training  Transfer Training: Yes  Overall Level of Assistance: Moderate assistance;Assist X2;Additional time;Minimum assistance (x2 for safety)  Interventions: Safety awareness training;Verbal cues  Sit to Stand: Minimum assistance; Moderate assistance;Assist X2;Additional time  Stand to Sit: Minimum assistance; Moderate assistance;Assist X2;Additional time  Bed to Chair: Maximum assistance;Assist X2  Gait Training  Gait Training: Yes  Right Side Weight Bearing: Full  Left Side Weight Bearing: Full  Gait  Overall Level of Assistance: Minimum assistance; Moderate assistance;Assist X2;Additional time  Distance (ft): 5 Feet  Assistive Device: Gait belt;Walker, rolling  Interventions: Safety awareness training;Verbal cues; Tactile cues  Base of Support: Widened  Speed/Geovanna: Slow  Step Length: Left shortened;Right shortened  Gait Abnormalities: Shuffling gait     PT Exercises  Exercise Treatment: Reclined and seated B LE exercises x10- 15 reps with RB as needed d/t SOB. Other Specialty Interventions  Other Treatments/Modalities: Commode use and transfer. Able to stand at [facility] with Min A x2 for safety while pericare is performed. Safety Devices  Type of Devices: Call light within reach; Left in chair;Chair alarm in place;Nurse notified;Gait belt       Goals  Short Term Goals  Time Frame for Short Term Goals: 10 days  Short Term Goal 1: Initiate HEP and progress as tolerated for strength and mobility  Short Term Goal 2: Pt will be SBA +1 for bed mobility with bed flat and no railings to allow him to get up and down once discharged. Short Term Goal 3: Pt will be CGA +1 with transfers with appropriate device with good balance to reduce fall risk. Short Term Goal 4: Pt will be CGA +1 with ambulation with appropriate device for up to 150 feet to enable ease with getting in and out of the home. Short Term Goal 5: Pt will be initiated on steps, if needed to enter and exit his home.   Patient Goals   Patient Goals : unable to state    Education  Patient Education  Education Given

## 2023-11-22 NOTE — PROGRESS NOTES
Pt is currently confused, I will wait for his wife to come in to complete ACP conversation.   Tom Anton and [address] Nurse Coordinator  11/22/2023 11:12 AM

## 2023-11-22 NOTE — PROGRESS NOTES
Physical Therapy  Facility/Department: [facility] AT THE [facility] MED SURG  Physical Therapy Initial Assessment    Name: Jeffy Cedeno  : 1935  MRN: [id number]  Date of Service: 2023    Discharge Recommendations:  Continue to assess pending progress, Subacute/Skilled Nursing Facility, Patient would benefit from continued therapy after discharge          Patient Diagnosis(es): The encounter diagnosis was Community acquired pneumonia of right lower lobe of lung. Past Medical History:  has a past medical history of COPD (chronic obstructive pulmonary disease) ([address]), Diabetes mellitus ([address]), Hx of echocardiogram, Hyperlipidemia, Hypothyroidism, MI (myocardial infarction) ([address]), Stroke ([address]), Thyroid disease, and Type II or unspecified type diabetes mellitus without mention of complication, not stated as uncontrolled. Past Surgical History:  has a past surgical history that includes hernia repair; Cardiac surgery (2017); fracture surgery; Colonoscopy; Cerebral angiogram (Bilateral, 06/15/2023); sigmoidoscopy (2023); Colonoscopy (N/A, 2023); and Upper gastrointestinal endoscopy (10/25/2023). Assessment   Body Structures, Functions, Activity Limitations Requiring Skilled Therapeutic Intervention: Decreased functional mobility ; Decreased strength;Decreased safe awareness;Decreased balance;Decreased endurance;Decreased cognition;Decreased posture  Assessment: Pt was referred for general debility. Pt is currently max assist +2 with mobility and bed mobility is max +2 assist, requiring multiple verbal cues for direction. Pt should be progressed as tolerated.   Treatment Diagnosis: difficulty walking  Therapy Prognosis: Good  Decision Making: High Complexity  Barriers to Learning: cognition  Requires PT Follow-Up: Yes  Activity Tolerance  Activity Tolerance: Patient tolerated evaluation without incident     Plan   Physical Therapy Plan  General Plan: 2 times a day 7 days a week (with exception of weekends,

## 2023-11-22 NOTE — PROGRESS NOTES
Patient experienced several instances of disorientation through the night and has been unable to use call light. Patient attempted to leave bed several times, sounding off bed alarm. Nurses have been able to intervene before patient leaves bed. Patient is near nurse station and is high fall risk. Supervisors aware.

## 2023-11-22 NOTE — PROGRESS NOTES
Pt blood pressure was checked at this time and was 77/42. Patient denies dizziness or sob at this time. Temp 99.3 oral and pulse 90. Spoke Dr Lexus Denton and received orders for midodrine and 500 ml bolus of NS.

## 2023-11-22 NOTE — CARE COORDINATION
Case Management Assessment  Initial Evaluation    Date/Time of Evaluation: 11/22/2023 12:00 PM  Assessment Completed by: CORINA Garza    If patient is discharged prior to next notation, then this note serves as note for discharge by case management. Patient Name: Tiana Butler                   YOB: 1935  Diagnosis: Community acquired pneumonia of both lungs [J18.9]                   Date / Time: 11/21/2023  9:07 PM    Patient Admission Status: Inpatient   Readmission Risk (Low < 19, Mod (19-27), High > 27): Readmission Risk Score: 29.3    Current PCP: Geovani Snider APRN - CNP  PCP verified by CM? Yes    Chart Reviewed: Yes      History Provided by: Patient, Medical Record  Patient Orientation: Alert and Oriented, Person, Place, Situation, Self    Patient Cognition: Alert    Hospitalization in the last 30 days (Readmission):  Yes    If yes, Readmission Assessment in  Navigator will be completed. Advance Directives:      Code Status: DNR-CCA   Patient's Primary Decision Maker is: Legal Next of Kin    Primary Decision Maker (Active): Nicole Vargas - Spouse - [phone]    Discharge Planning:    Patient lives with: Spouse/Significant Other Type of Home: House  Primary Care Giver: Self  Patient Support Systems include: Spouse/Significant Other, Family Members, Friends/Neighbors   Current Financial resources: Medicare  Current community resources: Other (Comment) (home health)  Current services prior to admission: Durable Medical Equipment            Current DME: Bonita Dominguez            Type of Home Care services:  PT    ADLS  Prior functional level: Assistance with the following:, Cooking, Housework, Mobility  Current functional level: Assistance with the following:, Cooking, Housework, Mobility    PT AM-PAC: 6 /24  OT AM-PAC:   /24    Family can provide assistance at DC:  Yes  Would you like Case Management to discuss the discharge plan with any other family

## 2023-11-22 NOTE — PLAN OF CARE
Problem: Discharge Planning  Goal: Discharge to home or other facility with appropriate resources  Outcome: Progressing  Flowsheets (Taken 11/21/2023 3089)  Discharge to home or other facility with appropriate resources:   Identify barriers to discharge with patient and caregiver   Arrange for needed discharge resources and transportation as appropriate   Identify discharge learning needs (meds, wound care, etc)     Problem: Safety - Adult  Goal: Free from fall injury  Outcome: Progressing  Flowsheets (Taken 11/22/2023 0318)  Free From Fall Injury: Instruct family/caregiver on patient safety     Problem: Skin/Tissue Integrity  Goal: Absence of new skin breakdown  Description: 1. Monitor for areas of redness and/or skin breakdown  2. Assess vascular access sites hourly  3. Every 4-6 hours minimum:  Change oxygen saturation probe site  4. Every 4-6 hours:  If on nasal continuous positive airway pressure, respiratory therapy assess nares and determine need for appliance change or resting period.   Outcome: Progressing     Problem: ABCDS Injury Assessment  Goal: Absence of physical injury  Outcome: Progressing  Flowsheets (Taken 11/22/2023 0318)  Absence of Physical Injury: Implement safety measures based on patient assessment

## 2023-11-22 NOTE — H&P
History and Physical    Patient:  Tera Dixon  MRN: [id number]    Chief Complaint: Fever     History Obtained From:  patient, electronic medical record    PCP: Karolina Snider APRN - CNP    History of Present Illness: The patient is a 82 y.o. male who presents with fever. Temperature was 102.6 at home. Patient was actually at the [place] today for an appointment with his physician and was feeling well at that time. Temperature developed after he got home. Patient did have chills with it. Slight nonproductive cough. Denies any sick exposure. Patient did have recent hospitalization last month for GI bleeding. Past medical history includes COPD, diabetes, MI, stroke and sepsis in July. Was tachycardic upon arrival to the emergency department. SPO2 was 99% on room air. Upon arrival to the floor patient's SPO2 was 88% on room air. Patient was placed on 2 L of oxygen per nasal cannula. Denies dyspnea or chest pain at present. States has been eating and drinking well. He has been weaker than normal.  Initial lactic 2.6 and 2.1, sodium 131, chloride 95, WBC 12.9 with left shift. CRP 50. Chronic combined CHF chronic combined CHF chest x-ray shows mild bibasilar airspace opacities.     Past Medical History:        Diagnosis Date    COPD (chronic obstructive pulmonary disease) ([address])     Diabetes mellitus ([address])     Hx of echocardiogram 02/24/2017    [facility]    Hyperlipidemia     Hypothyroidism     MI (myocardial infarction) [facility])     Stroke [facility])     Thyroid disease     Type II or unspecified type diabetes mellitus without mention of complication, not stated as uncontrolled        Past Surgical History:        Procedure Laterality Date    CARDIAC SURGERY  02/24/2017    Stent placed  Dr Tovar School Bilateral 06/15/2023    ANGIOGRAM CAROTID CEREBRAL BILATERAL    COLONOSCOPY      COLONOSCOPY N/A 6/23/2023    COLONOSCOPY CONTROL HEMORRHAGE performed by Marita Bejarano MD at [facility] (DITROPAN) 5 MG tablet Take 2 tablets by mouth 2 times daily 11/20/23   Geovani Snider APRN - CNP   pantoprazole (PROTONIX) 40 MG tablet Take 1 tablet by mouth every morning (before breakfast) 11/17/23   Geovani Snider APRN - CNP   metFORMIN (GLUCOPHAGE) 1000 MG tablet Take 1 tablet by mouth 2 times daily 10/11/23   Geovani Snider APRN - CNP   spironolactone (ALDACTONE) 25 MG tablet Take 1 tablet by mouth daily 10/5/23   Aliya Mitchell PA-C   atorvastatin (LIPITOR) 40 MG tablet Take 1 tablet by mouth daily 9/19/23   Geovani Snider APRN - CNP   levothyroxine (SYNTHROID) 137 MCG tablet Take 1 tablet by mouth Daily 9/19/23   Geovani Snider APRN - CNP   tamsulosin (FLOMAX) 0.4 MG capsule Take 2 capsules by mouth daily 7/18/23   Geovani Snider APRN - CNP   propranolol (INDERAL LA) 60 MG extended release capsule Take 1 capsule by mouth in the morning and at bedtime    Provider, MD Ismael   primidone (MYSOLINE) 250 MG tablet Take 1 tablet by mouth in the morning and 1 tablet in the evening. 6/20/23   Radha Barnes MD   umeclidinium bromide (INCRUSE ELLIPTA) 62.5 MCG/ACT inhaler Inhale 1 puff into the lungs daily 6/6/23   Umu Flynn MD   tiotropium (SPIRIVA RESPIMAT) 2.5 MCG/ACT AERS inhaler Inhale 2 puffs into the lungs daily 6/2/23   Umu Flynn MD   fluticasone Lise Cough) 50 MCG/ACT nasal spray 2 sprays by Each Nostril route daily 4/20/23   Hector MaterGENA CNP   albuterol (PROVENTIL) (2.5 MG/3ML) 0.083% nebulizer solution Take 3 mLs by nebulization every 4 hours as needed for Wheezing or Shortness of Breath 3/31/23   Geovani Snider APRN - CNP   ferrous sulfate (IRON 325) 325 (65 Fe) MG tablet Take 1 tablet by mouth 2 times daily (with meals)  Patient taking differently: Take 1 tablet by mouth daily (with breakfast) 2/10/23   Nehemias Phelps, APRN - CNP   fluticasone-salmeterol (ADVAIR) 250-50 MCG/DOSE AEPB Inhale 1 puff into the lungs in the morning and 1 puff in

## 2023-11-22 NOTE — CARE COORDINATION
Readmission Assessment  Number of Days since last admission?: 8-30 days  Previous Disposition: Home with Home Health  Who is being Interviewed: Patient (and chart review)  Did you visit your Primary Care Physician after you left the hospital, before you returned this time?: Yes  Did you see a specialist, such as Cardiac, Pulmonary, Orthopedic Physician, etc. after you left the hospital?: Yes  Who advised the patient to return to the hospital?: Self-referral  Does the patient report anything that got in the way of taking their medications?: No  In our efforts to provide the best possible care to you and others like you, can you think of anything that we could have done to help you after you left the hospital the first time, so that you might not have needed to return so soon?: Other (Comment) (no comment)

## 2023-11-22 NOTE — PROGRESS NOTES
Spiritual Services Interventions  0330/0330-01   11/22/2023  Rosario Slaughter    Bisi Gil Chamaren  82y.o. year old male    Encounter Summary  Encounter Overview/Reason : (P) Initial Encounter  Service Provided For[de-identified] (P) Patient  Referral/Consult From[de-identified] (P) Rounding  Support System: (P) Spouse  Last Encounter : (P) 11/22/23  Complexity of Encounter: (P) Moderate  Begin Time: (P) 1205  End Time : (P) 1220  Total Time Calculated: (P) 15 min     Spiritual/Emotional needs  Type: (P) Spiritual Support                    Assessment/Intervention/Outcome  Assessment: (P) Calm  Intervention: (P) Active listening, Prayer (assurance of)/[place], Nurtured Hope  Outcome: (P) Engaged in conversation, Encouraged

## 2023-11-22 NOTE — PROGRESS NOTES
Bedside report given by Sherrin Schaumann ED-RN; denies chest pain, SOB, chest tightness, numbness, syncope, or headache. PT SpO2 87 on RA, applied 2LO2 and SPO2 increased see vitals, fever present. ..vitals otherwise WNL. Chest sounds clear in upper bases, diminished in lower bases, fine crackles in LL base lung. Reviewed medications, belongings, and social determinants with wife and patient. Patient denies any further needs, call light within reach. Care ongoing.

## 2023-11-22 NOTE — PROGRESS NOTES
Comprehensive Nutrition Assessment    Type and Reason for Visit:  Initial    Nutrition Recommendations/Plan:   Add Glucerna tid with questionable PO adequacy. Malnutrition Assessment:  Malnutrition Status: At risk for malnutrition (Comment) (11/22/23 1047)    Context:  Acute Illness     Findings of the 6 clinical characteristics of malnutrition:  Energy Intake:  Mild decrease in energy intake (Comment) (at least acutely)  Weight Loss:  No significant weight loss     Body Fat Loss:  No significant body fat loss     Muscle Mass Loss:  No significant muscle mass loss    Fluid Accumulation:  Moderate to Severe Generalized, Extremities   Strength:  Not Performed    Nutrition Assessment:    Predicted suboptimal nutrient intakes r/t altered respiratory status, AEB PO <50%. While no significant weight changes current edema may be masking losses. Well controlled diabetes per A1C at 7.0. Lacks confidence in answers to questions about his recent eating appetite etc.  Recently hospitalized in [facility] where PO was lacking too. Will proactively add Glucerna supplement. Nutrition Related Findings:    +1-2 BUE, BLE edema and generalized edema. + b/s and flatus. Wound Type: None       Current Nutrition Intake & Therapies:    Average Meal Intake: 26-50%  Average Supplements Intake: None Ordered  ADULT DIET; Regular; 4 carb choices (60 gm/meal)  ADULT ORAL NUTRITION SUPPLEMENT; Breakfast, Lunch, Dinner; Diabetic Oral Supplement    Anthropometric Measures:  Height: 180.3 cm (5' 11\")  Ideal Body Weight (IBW): 172 lbs (78 kg)    Admission Body Weight: 107.2 kg (236 lb 6.4 oz)  Current Body Weight: 107.2 kg (236 lb 6.4 oz), 137.4 % IBW. Weight Source: Bed Scale  Current BMI (kg/m2): 33  Usual Body Weight: 104.7 kg (230 lb 12.8 oz) (230-237# in last month)  % Weight Change (Calculated): 2.4  Weight Adjustment For: No Adjustment                 BMI Categories: Obese Class 1 (BMI 30.0-34. 9)    Estimated Daily Nutrient

## 2023-11-22 NOTE — PROGRESS NOTES
[facility]  Inpatient/Observation/Outpatient Rehabilitation    Date: 2023  Patient Name: Ophelia Villegas       [] Inpatient Acute/Observation       []  Outpatient  : 1935         [] Pt no showed for scheduled appointment    [] Pt refused/declined therapy at this time due to:           [x] Pt cancelled due to:  [] No Reason Given   [] Sick/ill   [x] Other: Attempted OT eval x 2 this date. Per RN, patient with low BP and requests to wait until later time. Therapist/Assistant will attempt to see this patient, at our earliest opportunity.        Jerrell Friedman OTR/L Date: 2023

## 2023-11-22 NOTE — PROGRESS NOTES
Pharmacist Review and Automatic Dose Adjustment of Prophylactic Enoxaparin    Reviewed reason(s) for admission/hospital problem list    The reviewing pharmacist has made an adjustment to the ordered enoxaparin dose or converted to UFH per the approved [facility] protocol and table as identified below. Carlotta Lopez is a 82 y.o. male. Recent Labs     11/21/23 2113   CREATININE 1.1       Estimated Creatinine Clearance: 60 mL/min (based on SCr of 1.1 mg/dL). Recent Labs     11/21/23 2113   HGB 10.4*   HCT 34.2*        No results for input(s): \"INR\" in the last 72 hours.     Height:   Ht Readings from Last 1 Encounters:   11/17/23 1.8 m (5' 10.87\")     Weight:  Wt Readings from Last 1 Encounters:   11/17/23 107.6 kg (237 lb 3.2 oz)               Plan: Based upon the patient's weight and renal function    Ordered: Enoxaparin 40mg SUBQ Daily    Changed/converted to    New Order: Enoxaparin 30mg SUBQ BID      Thank you,  Landon Garcia, [address] [place] [address]  11/21/2023, 11:50 PM

## 2023-11-22 NOTE — PROGRESS NOTES
Physician Progress Note      PATIENTEmily Flanagan  CSN #:                  [id number]  :                       1935  ADMIT DATE:       2023 9:07 PM  [facility] DATE:  RESPONDING  PROVIDER #:        Luba Iyer MD          QUERY TEXT:    Pt admitted with CAP. Pt noted to have WBC 12.9, and temperature 102.4 Orally. If possible, please document in the progress notes and discharge summary if   you are evaluating and /or treating any of the following: The medical record reflects the following:  Risk Factors: CAP  Clinical Indicators: H&P  \"Community acquired pneumonia of both lungs\"  WBC 12.9, CRP 50, LA 2.6,  Temperature 102.4 Oral  HR 99, 102, 101  Treatment: IV Rocephin, IV Zithromax    Thank you,  Adelia Cortes [email or web address]  Options provided:  -- Sepsis due to Community acquired pneumonia, present on admission  -- Community acquired pneumonia without Sepsis  -- Other - I will add my own diagnosis  -- Disagree - Not applicable / Not valid  -- Disagree - Clinically unable to determine / Unknown  -- Refer to Clinical Documentation Reviewer    PROVIDER RESPONSE TEXT:    This patient has sepsis due to Community acquired pneumonia, which was present   on admission.     Query created by: Adelia Cortes on 2023 9:56 AM      Electronically signed by:  Luba Iyer MD 2023 10:01 AM

## 2023-11-23 ENCOUNTER — APPOINTMENT (OUTPATIENT)
Dept: CT IMAGING | Age: 88
End: 2023-11-23
Payer: MEDICARE

## 2023-11-23 LAB
ANION GAP SERPL CALCULATED.3IONS-SCNC: 12 MMOL/L (ref 9–17)
BASOPHILS # BLD: 0 K/UL (ref 0–0.2)
BASOPHILS NFR BLD: 0 % (ref 0–2)
BNP SERPL-MCNC: ABNORMAL PG/ML
BUN SERPL-MCNC: 33 MG/DL (ref 8–23)
BUN/CREAT SERPL: 21 (ref 9–20)
CALCIUM SERPL-MCNC: 8.4 MG/DL (ref 8.6–10.4)
CHLORIDE SERPL-SCNC: 97 MMOL/L (ref 98–107)
CO2 SERPL-SCNC: 21 MMOL/L (ref 20–31)
CREAT SERPL-MCNC: 1.6 MG/DL (ref 0.7–1.2)
CRP SERPL HS-MCNC: 397.7 MG/L (ref 0–5)
EOSINOPHIL # BLD: 0 K/UL (ref 0–0.44)
EOSINOPHILS RELATIVE PERCENT: 0 % (ref 1–4)
ERYTHROCYTE [DISTWIDTH] IN BLOOD BY AUTOMATED COUNT: 16.7 % (ref 11.8–14.4)
GFR SERPL CREATININE-BSD FRML MDRD: 42 ML/MIN/1.73M2
GLUCOSE SERPL-MCNC: 104 MG/DL (ref 70–99)
HCT VFR BLD AUTO: 27.3 % (ref 40.7–50.3)
HGB BLD-MCNC: 8.6 G/DL (ref 13–17)
IMM GRANULOCYTES # BLD AUTO: 0.18 K/UL (ref 0–0.3)
IMM GRANULOCYTES NFR BLD: 1 %
LYMPHOCYTES NFR BLD: 1.42 K/UL (ref 1.1–3.7)
LYMPHOCYTES RELATIVE PERCENT: 8 % (ref 24–43)
MCH RBC QN AUTO: 29.3 PG (ref 25.2–33.5)
MCHC RBC AUTO-ENTMCNC: 31.5 G/DL (ref 28.4–34.8)
MCV RBC AUTO: 92.9 FL (ref 82.6–102.9)
MICROORGANISM SPEC CULT: NO GROWTH
MONOCYTES NFR BLD: 0.89 K/UL (ref 0.1–1.2)
MONOCYTES NFR BLD: 5 % (ref 3–12)
MORPHOLOGY: ABNORMAL
NEUTROPHILS NFR BLD: 86 % (ref 36–65)
NEUTS SEG NFR BLD: 15.21 K/UL (ref 1.5–8.1)
NRBC BLD-RTO: 0 PER 100 WBC
PLATELET # BLD AUTO: 192 K/UL (ref 138–453)
PMV BLD AUTO: 10.1 FL (ref 8.1–13.5)
POTASSIUM SERPL-SCNC: 4.6 MMOL/L (ref 3.7–5.3)
RBC # BLD AUTO: 2.94 M/UL (ref 4.21–5.77)
SODIUM SERPL-SCNC: 130 MMOL/L (ref 135–144)
SPECIMEN DESCRIPTION: NORMAL
WBC OTHER # BLD: 17.7 K/UL (ref 3.5–11.3)

## 2023-11-23 PROCEDURE — 2580000003 HC RX 258

## 2023-11-23 PROCEDURE — 6360000002 HC RX W HCPCS: Performed by: STUDENT IN AN ORGANIZED HEALTH CARE EDUCATION/TRAINING PROGRAM

## 2023-11-23 PROCEDURE — 6360000002 HC RX W HCPCS

## 2023-11-23 PROCEDURE — 2580000003 HC RX 258: Performed by: STUDENT IN AN ORGANIZED HEALTH CARE EDUCATION/TRAINING PROGRAM

## 2023-11-23 PROCEDURE — 1200000000 HC SEMI PRIVATE

## 2023-11-23 PROCEDURE — 36415 COLL VENOUS BLD VENIPUNCTURE: CPT

## 2023-11-23 PROCEDURE — 83880 ASSAY OF NATRIURETIC PEPTIDE: CPT

## 2023-11-23 PROCEDURE — 71250 CT THORAX DX C-: CPT

## 2023-11-23 PROCEDURE — 36600 WITHDRAWAL OF ARTERIAL BLOOD: CPT

## 2023-11-23 PROCEDURE — 85025 COMPLETE CBC W/AUTO DIFF WBC: CPT

## 2023-11-23 PROCEDURE — 97116 GAIT TRAINING THERAPY: CPT

## 2023-11-23 PROCEDURE — 94761 N-INVAS EAR/PLS OXIMETRY MLT: CPT

## 2023-11-23 PROCEDURE — 86140 C-REACTIVE PROTEIN: CPT

## 2023-11-23 PROCEDURE — 6370000000 HC RX 637 (ALT 250 FOR IP): Performed by: PHYSICIAN ASSISTANT

## 2023-11-23 PROCEDURE — 94669 MECHANICAL CHEST WALL OSCILL: CPT

## 2023-11-23 PROCEDURE — 80048 BASIC METABOLIC PNL TOTAL CA: CPT

## 2023-11-23 PROCEDURE — 94664 DEMO&/EVAL PT USE INHALER: CPT

## 2023-11-23 PROCEDURE — 2700000000 HC OXYGEN THERAPY PER DAY

## 2023-11-23 PROCEDURE — 82805 BLOOD GASES W/O2 SATURATION: CPT

## 2023-11-23 PROCEDURE — 94640 AIRWAY INHALATION TREATMENT: CPT

## 2023-11-23 PROCEDURE — 6370000000 HC RX 637 (ALT 250 FOR IP)

## 2023-11-23 PROCEDURE — 6370000000 HC RX 637 (ALT 250 FOR IP): Performed by: STUDENT IN AN ORGANIZED HEALTH CARE EDUCATION/TRAINING PROGRAM

## 2023-11-23 PROCEDURE — 97110 THERAPEUTIC EXERCISES: CPT

## 2023-11-23 PROCEDURE — 97166 OT EVAL MOD COMPLEX 45 MIN: CPT

## 2023-11-23 RX ORDER — WATER 10 ML/10ML
INJECTION INTRAMUSCULAR; INTRAVENOUS; SUBCUTANEOUS
Status: COMPLETED
Start: 2023-11-23 | End: 2023-11-23

## 2023-11-23 RX ORDER — METHYLPREDNISOLONE SODIUM SUCCINATE 40 MG/ML
40 INJECTION, POWDER, LYOPHILIZED, FOR SOLUTION INTRAMUSCULAR; INTRAVENOUS EVERY 12 HOURS
Status: DISCONTINUED | OUTPATIENT
Start: 2023-11-23 | End: 2023-12-01 | Stop reason: HOSPADM

## 2023-11-23 RX ORDER — FUROSEMIDE 10 MG/ML
20 INJECTION INTRAMUSCULAR; INTRAVENOUS ONCE
Status: COMPLETED | OUTPATIENT
Start: 2023-11-23 | End: 2023-11-23

## 2023-11-23 RX ADMIN — LEVOTHYROXINE SODIUM 137 MCG: 25 TABLET ORAL at 07:02

## 2023-11-23 RX ADMIN — MOMETASONE FUROATE AND FORMOTEROL FUMARATE DIHYDRATE 2 PUFF: 200; 5 AEROSOL RESPIRATORY (INHALATION) at 11:59

## 2023-11-23 RX ADMIN — MIDODRINE HYDROCHLORIDE 10 MG: 5 TABLET ORAL at 08:23

## 2023-11-23 RX ADMIN — OXYBUTYNIN CHLORIDE 10 MG: 5 TABLET ORAL at 08:23

## 2023-11-23 RX ADMIN — SODIUM CHLORIDE, PRESERVATIVE FREE 10 ML: 5 INJECTION INTRAVENOUS at 08:23

## 2023-11-23 RX ADMIN — SODIUM CHLORIDE, PRESERVATIVE FREE 10 ML: 5 INJECTION INTRAVENOUS at 21:15

## 2023-11-23 RX ADMIN — MONTELUKAST 10 MG: 10 TABLET, FILM COATED ORAL at 08:23

## 2023-11-23 RX ADMIN — METFORMIN HYDROCHLORIDE 500 MG: 500 TABLET ORAL at 17:33

## 2023-11-23 RX ADMIN — ATORVASTATIN CALCIUM 40 MG: 40 TABLET, FILM COATED ORAL at 08:22

## 2023-11-23 RX ADMIN — METHYLPREDNISOLONE SODIUM SUCCINATE 40 MG: 40 INJECTION, POWDER, LYOPHILIZED, FOR SOLUTION INTRAMUSCULAR; INTRAVENOUS at 21:04

## 2023-11-23 RX ADMIN — OXYBUTYNIN CHLORIDE 10 MG: 5 TABLET ORAL at 21:05

## 2023-11-23 RX ADMIN — ENOXAPARIN SODIUM 30 MG: 100 INJECTION SUBCUTANEOUS at 08:23

## 2023-11-23 RX ADMIN — ALBUTEROL SULFATE 2.5 MG: 2.5 SOLUTION RESPIRATORY (INHALATION) at 16:15

## 2023-11-23 RX ADMIN — GUAIFENESIN, DEXTROMETHORPHAN HBR 1 TABLET: 600; 30 TABLET ORAL at 08:22

## 2023-11-23 RX ADMIN — BUMETANIDE 1 MG: 1 TABLET ORAL at 17:33

## 2023-11-23 RX ADMIN — SODIUM CHLORIDE: 9 INJECTION, SOLUTION INTRAVENOUS at 09:58

## 2023-11-23 RX ADMIN — FUROSEMIDE 20 MG: 10 INJECTION, SOLUTION INTRAMUSCULAR; INTRAVENOUS at 08:23

## 2023-11-23 RX ADMIN — ALBUTEROL SULFATE 2.5 MG: 2.5 SOLUTION RESPIRATORY (INHALATION) at 05:05

## 2023-11-23 RX ADMIN — METHYLPREDNISOLONE SODIUM SUCCINATE 40 MG: 40 INJECTION, POWDER, LYOPHILIZED, FOR SOLUTION INTRAMUSCULAR; INTRAVENOUS at 09:52

## 2023-11-23 RX ADMIN — PRIMIDONE 250 MG: 250 TABLET ORAL at 08:23

## 2023-11-23 RX ADMIN — ENOXAPARIN SODIUM 30 MG: 100 INJECTION SUBCUTANEOUS at 21:04

## 2023-11-23 RX ADMIN — SPIRONOLACTONE 25 MG: 25 TABLET, FILM COATED ORAL at 08:22

## 2023-11-23 RX ADMIN — TIOTROPIUM BROMIDE INHALATION SPRAY 2 PUFF: 3.12 SPRAY, METERED RESPIRATORY (INHALATION) at 11:59

## 2023-11-23 RX ADMIN — MOMETASONE FUROATE AND FORMOTEROL FUMARATE DIHYDRATE 2 PUFF: 200; 5 AEROSOL RESPIRATORY (INHALATION) at 21:02

## 2023-11-23 RX ADMIN — MIDODRINE HYDROCHLORIDE 10 MG: 5 TABLET ORAL at 13:17

## 2023-11-23 RX ADMIN — PANTOPRAZOLE SODIUM 40 MG: 40 TABLET, DELAYED RELEASE ORAL at 07:02

## 2023-11-23 RX ADMIN — ALBUTEROL SULFATE 2.5 MG: 2.5 SOLUTION RESPIRATORY (INHALATION) at 11:54

## 2023-11-23 RX ADMIN — FLUTICASONE PROPIONATE 2 SPRAY: 50 SPRAY, METERED NASAL at 08:27

## 2023-11-23 RX ADMIN — FERROUS SULFATE TAB 325 MG (65 MG ELEMENTAL FE) 325 MG: 325 (65 FE) TAB at 08:23

## 2023-11-23 RX ADMIN — WATER 1 ML: 1 INJECTION INTRAMUSCULAR; INTRAVENOUS; SUBCUTANEOUS at 09:52

## 2023-11-23 RX ADMIN — BUMETANIDE 1 MG: 1 TABLET ORAL at 08:22

## 2023-11-23 RX ADMIN — CEFEPIME 2000 MG: 2 INJECTION, POWDER, FOR SOLUTION INTRAVENOUS at 09:59

## 2023-11-23 RX ADMIN — AZITHROMYCIN DIHYDRATE 500 MG: 250 TABLET ORAL at 21:05

## 2023-11-23 RX ADMIN — SODIUM CHLORIDE, PRESERVATIVE FREE 10 ML: 5 INJECTION INTRAVENOUS at 21:06

## 2023-11-23 RX ADMIN — GUAIFENESIN, DEXTROMETHORPHAN HBR 1 TABLET: 600; 30 TABLET ORAL at 21:05

## 2023-11-23 RX ADMIN — ALBUTEROL SULFATE 2.5 MG: 2.5 SOLUTION RESPIRATORY (INHALATION) at 20:50

## 2023-11-23 RX ADMIN — CEFEPIME 2000 MG: 2 INJECTION, POWDER, FOR SOLUTION INTRAVENOUS at 21:12

## 2023-11-23 RX ADMIN — TAMSULOSIN HYDROCHLORIDE 0.8 MG: 0.4 CAPSULE ORAL at 08:23

## 2023-11-23 RX ADMIN — METFORMIN HYDROCHLORIDE 1000 MG: 500 TABLET ORAL at 08:23

## 2023-11-23 RX ADMIN — MULTIPLE VITAMINS W/ MINERALS TAB 1 TABLET: TAB at 08:23

## 2023-11-23 RX ADMIN — MIDODRINE HYDROCHLORIDE 10 MG: 5 TABLET ORAL at 17:33

## 2023-11-23 RX ADMIN — PRIMIDONE 250 MG: 250 TABLET ORAL at 17:33

## 2023-11-23 ASSESSMENT — PAIN SCALES - GENERAL
PAINLEVEL_OUTOF10: 0
PAINLEVEL_OUTOF10: 0

## 2023-11-23 NOTE — PROGRESS NOTES
Spiritual Services Interventions  0330/0330-01   11/23/2023  Wen Schafer Child Sam  82y.o. year old male    Encounter Summary  Encounter Overview/Reason : (P) Spiritual/Emotional Needs  Service Provided For[de-identified] (P) Patient  Referral/Consult From[de-identified] (P) Rounding  Support System: (P) Spouse, Family members  Last Encounter : (P) 11/23/23  Complexity of Encounter: (P) Moderate  Begin Time: (P) 1115  End Time : (P) 1130  Total Time Calculated: (P) 15 min     Spiritual/Emotional needs  Type: (P) Spiritual Support                    Assessment/Intervention/Outcome  Assessment: (P) Calm, Passive  Intervention: (P) Explored/Affirmed feelings, thoughts, concerns, Nurtured Hope, Prayer (assurance of)/[place]  Outcome: (P) Engaged in conversation, Coping

## 2023-11-23 NOTE — PROGRESS NOTES
Writer notified Dr. Stacey Bullock of patient's weight gain and that patient was reapplied on oxygen throughout the night.

## 2023-11-23 NOTE — PROGRESS NOTES
Reassessment obtained at this time as charted. Vitals were obtained at [phone] as charted. Patient is alert and oriented x4 at this time. Patient remains slightly tachypneic. End expiratory wheezes noted to bilateral upper lobes, fine crackles noted to bilateral lower lobes. Congested cough still noted. Assessment otherwise as charted, see flowsheets. Patient was also assisted to bedside commode at this time and then was assisted back to the chair. Patient is resting in the chair with chair alarm on, call light in reach, and family at bedside. Care ongoing.

## 2023-11-23 NOTE — PROGRESS NOTES
[facility]          Department of Pharmacy   Pharmacy Renal Adjustment Note    Carlotta Lopez is a 82 y.o. male. Pharmacist assessment of renally cleared medications. Recent Labs     11/21/23  2113 11/22/23  0550 11/23/23  0620   CREATININE 1.1 1.4* 1.6*     Estimated Creatinine Clearance: 42 mL/min (A) (based on SCr of 1.6 mg/dL (H)).     Height:   Ht Readings from Last 1 Encounters:   11/22/23 1.803 m (5' 11\")     Weight:  Wt Readings from Last 1 Encounters:   11/23/23 109.6 kg (241 lb 9.6 oz)       The following medication(s) have been adjusted based upon renal function:             Metformin 1000 mg BID decreased to Metformin 500 mg BID for eGFR ~ 42 ml/min/m2    Thank you,  Shalom Mallory, PharmD, 11/23/2023 11:20 AM

## 2023-11-23 NOTE — PROGRESS NOTES
session  Family / Caregiver Present: No  Diagnosis: pneumonia both lungs     Social/Functional History  Social/Functional History  Lives With: Spouse  Home Layout: One level  Home Access: Stairs to enter with rails  Entrance Stairs - Number of Steps: 2  Entrance Stairs - Rails: Both  Bathroom Shower/Tub: Walk-in shower  Bathroom Toilet: Handicap height  Bathroom Equipment: Grab bars in shower, Shower chair  Bathroom Accessibility: Accessible  Home Equipment: Sorin Lux, teresa, Cane  Has the patient had two or more falls in the past year or any fall with injury in the past year?: No  Receives Help From: Family  ADL Assistance: [address] Independent  Homemaking Responsibilities: No  Ambulation Assistance: Independent  Transfer Assistance: Independent  Active : No  Patient's  Info: Pt. hasn't driven since stroke this past February as visual cut right eye. Wife provides transportation. Occupation: Retired  Leisure & Hobbies: Watch sports. Additional Comments: Unable to get accurate information for abilities with mobility       Objective   Pulse: 95  Heart Rate Source: Apical;Monitor  BP: (!) 101/53  BP Location: Right upper arm  BP Method: Automatic  Patient Position: Semi fowlers  MAP (Calculated): 69  Respirations: 22  SpO2: 91 %  O2 Device: Nasal cannula             Safety Devices  Type of Devices: Bed alarm in place;Call light within reach           ADL  Feeding: Independent  Grooming: Independent  UE Bathing: Minimal assistance  LE Bathing: Moderate assistance  UE Dressing: Minimal assistance  LE Dressing: Moderate assistance  Toileting:  Moderate assistance           Transfers  Stand Pivot Transfers: 2 Person assistance  Vision  Vision: Impaired  Vision Exceptions: Wears glasses at all times (Pt. has visual field cut right eye from stroke in Feb.)  Hearing  Hearing: Within functional limits  Orientation  Overall Orientation Status: Within Functional Limits  Orientation Level: Oriented X4                  Education Given To: Patient  Education Provided: Role of Therapy;Plan of Care  Education Method: Verbal  Barriers to Learning: None  Education Outcome: Verbalized understanding                        G-Code     OutComes Score                                                  AM-PAC Score             Tinneti Score       Goals  Short Term Goals  Time Frame for Short Term Goals: 1 week  Short Term Goal 1: Pt. will demonstrate good safety as relates to ADL's. Short Term Goal 2: Pt. will tolerate 15-30 min. functional activity to improve strength for ADL's. Short Term Goal 3: Pt. will engage in self-care to increase Ind. Long Term Goals  Time Frame for Long Term Goals : 4 weeks  Long Term Goal 1: Pt. will return to PTA ADL status.   Patient Goals   Patient goals : Get stronger and walk better       Therapy Time   Individual Concurrent Group Co-treatment   Time In 0715         Time Out 0745         Minutes 30         Timed Code Treatment Minutes: 0 Minutes       Carito Koo, OT

## 2023-11-23 NOTE — PROGRESS NOTES
[facility]  [address] [place], [place], [address]    Progress Note    Date:   11/23/2023  Patient name:  Hayden Crigler  Date of admission:  11/21/2023  9:07 PM  MRN:   [id number]  YOB: 1935    SUBJECTIVE/Last 24 hours update:     Patient seen and examined at the bed side , no new acute events overnight except that he needed some supplemental O2, had some weight gain and no new complains this morning. Notes from nursing staff and Consults had been reviewed, and the overnight progress had been checked with the nursing staff as well. REVIEW OF SYSTEMS:      CONSTITUTIONAL:  no fevers, no headaches, positive for fevers  EYES: negative for blury vision  HEENT: No headaches, No nasal congestion, no difficulty swallowing  RESPIRATORY: positive for dyspnea, wheezing, Cough  CARDIOVASCULAR: negative for chest pain, no palpitations  GASTROINTESTINAL: no nausea, no vomiting, no change in bowel habits, no abdominal pain   GENITOURINARY: negative for dysuria, no hematuria   MUSCULOSKELETAL: no joint pains, no muscle aches, no swelling of joints or extremities  NEUROLOGICAL: No  Weakness or numbness      PAST MEDICAL HISTORY:      has a past medical history of COPD (chronic obstructive pulmonary disease) ([address]), Diabetes mellitus ([address]), Hx of echocardiogram, Hyperlipidemia, Hypothyroidism, MI (myocardial infarction) ([address]), Stroke ([address]), Thyroid disease, and Type II or unspecified type diabetes mellitus without mention of complication, not stated as uncontrolled. PAST SURGICAL HISTORY:      has a past surgical history that includes hernia repair; Cardiac surgery (02/24/2017); fracture surgery; Colonoscopy; Cerebral angiogram (Bilateral, 06/15/2023); sigmoidoscopy (06/23/2023); Colonoscopy (N/A, 6/23/2023); and Upper gastrointestinal endoscopy (10/25/2023). SOCIAL HISTORY:      reports that he quit smoking about 30 years ago. His smoking use included cigarettes.  He has never used Although every effort was made to ensure the accuracy of this automated transcription, some errors in transcription may have occurred.     Tee Youssef MD  11/23/2023 8:37 AM

## 2023-11-23 NOTE — PROGRESS NOTES
Physical Therapy  Facility/Department: [address] [place] [address] MED SURG  Daily Treatment Note  NAME: Marta Blanchard  : 1935  MRN: [id number]    Date of Service: 2023    Discharge Recommendations:  Continue to assess pending progress, Subacute/Skilled Nursing Facility, Patient would benefit from continued therapy after discharge        Patient Diagnosis(es): The encounter diagnosis was Community acquired pneumonia of right lower lobe of lung. Assessment   Assessment: Gait with RW and CGA 10ft with RW, transfers with CGA. sp02 dropped to 87% with gait, increased to 93% with instruction on pursed lip breathing and seated rest break ~2.5 minutes. Seated B LE therex x 15 in all available planes of motion. Will continue to progress as tolerated. Activity Tolerance: Patient limited by endurance; Patient limited by fatigue     Plan    Physical Therapy Plan  General Plan: 2 times a day 7 days a week (1x per day on weekends.)  Current Treatment Recommendations: Strengthening;Balance training;Functional mobility training;Transfer training; Endurance training;Gait training;Stair training;Neuromuscular re-education;Home exercise program;Safety education & training; Therapeutic activities     Restrictions  Restrictions/Precautions  Restrictions/Precautions: Fall Risk, Contact Precautions     Subjective    Subjective  Subjective: pt getting washed up with STNA upon PTA's arrival, pleasant and agreeable to therapy  Pain: denies  Orientation  Overall Orientation Status: Within Functional Limits  Orientation Level: Oriented X4     Objective   Vitals     Bed Mobility Training  Bed Mobility Training: No  Balance  Standing - Static: Fair  Standing - Dynamic: Fair  Transfer Training  Transfer Training: Yes  Overall Level of Assistance: Assist X1;Contact-guard assistance  Interventions: Safety awareness training;Verbal cues  Sit to Stand: Assist X1;Contact-guard assistance  Stand to Sit: Assist X1;Contact-guard assistance  Stand Pivot Transfers: Assist X1;Contact-guard assistance  Gait Training  Gait Training: Yes  Gait  Overall Level of Assistance: Assist X1;Contact-guard assistance  Distance (ft): 10 Feet  Assistive Device: Gait belt;Walker, rolling  Interventions: Safety awareness training;Verbal cues; Tactile cues  Base of Support: Widened  Speed/Geovanna: Slow  Step Length: Left shortened;Right shortened  Gait Abnormalities: Decreased step clearance     PT Exercises  Exercise Treatment: Seated B LE therex x 15 in all available planes of motion. Safety Devices  Type of Devices: All fall risk precautions in place;Call light within reach; Chair alarm in place;Gait belt;Patient at risk for falls; Left in chair;Nurse notified       Goals  Short Term Goals  Time Frame for Short Term Goals: 10 days  Short Term Goal 1: Initiate HEP and progress as tolerated for strength and mobility  Short Term Goal 2: Pt will be SBA +1 for bed mobility with bed flat and no railings to allow him to get up and down once discharged. Short Term Goal 3: Pt will be CGA +1 with transfers with appropriate device with good balance to reduce fall risk. Short Term Goal 4: Pt will be CGA +1 with ambulation with appropriate device for up to 150 feet to enable ease with getting in and out of the home. Short Term Goal 5: Pt will be initiated on steps, if needed to enter and exit his home. Patient Goals   Patient Goals : unable to state    Education  Patient Education  Education Given To: Patient  Education Provided: Fall Prevention Strategies;Transfer Training  Education Provided Comments: Educated on proper breathing technique.   Education Method: Verbal;Demonstration  Barriers to Learning: None  Education Outcome: Verbalized understanding;Continued education needed    Therapy Time   Individual Concurrent Group Co-treatment   Time In [address]         Time Out 0951         Minutes [facility], [address] [facility]

## 2023-11-23 NOTE — PROGRESS NOTES
Patient leaving floor at this time via [facility] transportation. Belongings sent with patient. Report also called to nurse at [facility] at this time.

## 2023-11-23 NOTE — PROGRESS NOTES
Cefepime Extended Interval Interchange    The following ordered dose of Cefepime has been changed to optimize its pharmacodynamic profile as approved by the Patient Care Review Committee. Ordered Dose    __ 1 gm IV every 8-12 hrs  (30 minute infusion)      _x_ 2 gm  IV every 8-12 hrs (30 minute infusion)      Recent Labs     11/21/23  2113 11/22/23  0550 11/23/23  0620   CREATININE 1.1 1.4* 1.6*     Estimated Creatinine Clearance: 42 mL/min (A) (based on SCr of 1.6 mg/dL (H)). CrCl Dose   >60 1-2 gm q8-12hrs over 4 hours   30-59 or CRRT 1-2gm [id number] over 4 hours   <30 500mg-2gm q24hrs over 4 hours or 1gm q12hrs over 4 hours   <10 or HD 500mg-1gm q24hrs over 4 hours       New Dose    Cefepime   2 gm  IV q 12 hrs  over 4 hours. Pharmacists should be contacted for issues concerning drug compatibility with multiple IV medications. All doses will be prepared using 50ml bag to be infused over 4-hours at a rate of 12.5ml/hr.     Thank Brody Dale PharmD, 11/23/2023 8:44 AM

## 2023-11-23 NOTE — PROGRESS NOTES
Situational. Declining referral for therapy and medication at present, preferring self care. Low risk self harm. Advised F[employer] Self [employer]ass[facility]. Normalized experience in pandemic. [facility] door to follow up otherwise will revisit in 3 months. Writer spoke with Dr. Claude Flynn at this time regarding lower blood pressure this morning and diuretic medications administration. Propranolol held but Dr. Claude Flynn states to give lasix, bumex, and spironalactone. Medications given. Care ongoing.

## 2023-11-23 NOTE — PROGRESS NOTES
Patient resting comfortably at this time. Patient denies any pain and denies any other needs at this time. Patient denies being dizzy or lightheaded but is having dyspnea with exertion after patient was trying to get up unassisted from chair to bed. Patient alert & oriented x4 at this time and able to answer all questions appropriately and follow commands but does have intermittent confusion and appears forgetful when asked questions. Assessment and vitals as charted. Bed alarm on, bed locked, gripper socks on, call light within reach and able to use appropriately. Will continue to monitor this shift.

## 2023-11-23 NOTE — PLAN OF CARE
Problem: Safety - Adult  Goal: Free from fall injury  11/22/2023 2009 by Romulo Russo RN  Outcome: Progressing  Note: Bed alarm on, bed locked, side rails up, gripper socks on, call light within reach and able to use appropriately. Will continue to monitor this shift. Problem: Skin/Tissue Integrity  Goal: Absence of new skin breakdown  Description: 1. Monitor for areas of redness and/or skin breakdown  2. Assess vascular access sites hourly  3. Every 4-6 hours minimum:  Change oxygen saturation probe site  4. Every 4-6 hours:  If on nasal continuous positive airway pressure, respiratory therapy assess nares and determine need for appliance change or resting period. 11/22/2023 2009 by Romulo Russo RN  Outcome: Progressing     Problem: ABCDS Injury Assessment  Goal: Absence of physical injury  11/22/2023 2009 by Romulo Russo RN  Outcome: Progressing  Note: Patient is encouraged to reposition and assessed for any injuries. Will continue to monitor this shift. Problem: Nutrition Deficit:  Goal: Optimize nutritional status  11/22/2023 2009 by Romulo Russo RN  Outcome: Progressing  Flowsheets (Taken 11/22/2023 2009)  Nutrient intake appropriate for improving, restoring, or maintaining nutritional needs: Assess nutritional status and recommend course of action  Note: Encouraging foods and liquids as tolerated. Will continue to monitor this shift.

## 2023-11-23 NOTE — PROGRESS NOTES
Shift assessment and vitals obtained at this time as charted. Blood pressure is slightly low, vitals otherwise WNL. Patient denies pain at this time. SpO2 91% on 2 L via nasal cannula. Patient is alert and oriented x4 at this time. Patient is slightly tachypneic with shallow respirations. Fine crackles noted to bilateral lower lobes, bilateral upper lobes are diminished. Congested cough noted. +2 pitting edema noted to BLE. Assessment otherwise as charted, see flowsheets. Patient also changed at this time for incontinence of urine. Patient is resting in the bed with bed alarm on and call light in reach, denies other needs at this time. Care ongoing.

## 2023-11-24 LAB
ANION GAP SERPL CALCULATED.3IONS-SCNC: 6 MMOL/L (ref 9–17)
BASOPHILS # BLD: 0 K/UL (ref 0–0.2)
BASOPHILS NFR BLD: 0 % (ref 0–2)
BNP SERPL-MCNC: ABNORMAL PG/ML
BODY TEMPERATURE: 37
BUN SERPL-MCNC: 41 MG/DL (ref 8–23)
BUN/CREAT SERPL: 27 (ref 9–20)
CALCIUM SERPL-MCNC: 8.2 MG/DL (ref 8.6–10.4)
CHLORIDE SERPL-SCNC: 99 MMOL/L (ref 98–107)
CO2 SERPL-SCNC: 21 MMOL/L (ref 20–31)
CREAT SERPL-MCNC: 1.5 MG/DL (ref 0.7–1.2)
CRP SERPL HS-MCNC: 400.3 MG/L (ref 0–5)
EOSINOPHIL # BLD: 0 K/UL (ref 0–0.44)
EOSINOPHILS RELATIVE PERCENT: 0 % (ref 1–4)
ERYTHROCYTE [DISTWIDTH] IN BLOOD BY AUTOMATED COUNT: 16.3 % (ref 11.8–14.4)
GFR SERPL CREATININE-BSD FRML MDRD: 45 ML/MIN/1.73M2
GLUCOSE SERPL-MCNC: 165 MG/DL (ref 70–99)
HCO3 ARTERIAL: 21.1 MMOL/L (ref 22–26)
HCT VFR BLD AUTO: 24.2 % (ref 40.7–50.3)
HGB BLD-MCNC: 7.8 G/DL (ref 13–17)
IMM GRANULOCYTES # BLD AUTO: 0 K/UL (ref 0–0.3)
IMM GRANULOCYTES NFR BLD: 0 %
LYMPHOCYTES NFR BLD: 0.89 K/UL (ref 1.1–3.7)
LYMPHOCYTES RELATIVE PERCENT: 5 % (ref 24–43)
MCH RBC QN AUTO: 29.4 PG (ref 25.2–33.5)
MCHC RBC AUTO-ENTMCNC: 32.2 G/DL (ref 28.4–34.8)
MCV RBC AUTO: 91.3 FL (ref 82.6–102.9)
MONOCYTES NFR BLD: 1.24 K/UL (ref 0.1–1.2)
MONOCYTES NFR BLD: 7 % (ref 3–12)
MORPHOLOGY: NORMAL
NEGATIVE BASE EXCESS, ART: 2.5 MMOL/L (ref 0–2)
NEUTROPHILS NFR BLD: 88 % (ref 36–65)
NEUTS SEG NFR BLD: 15.57 K/UL (ref 1.5–8.1)
NRBC BLD-RTO: 0 PER 100 WBC
O2 SAT, ARTERIAL: 87.7 % (ref 95–98)
PCO2 ARTERIAL: 33.5 MMHG (ref 35–45)
PH ARTERIAL: 7.42 (ref 7.35–7.45)
PLATELET # BLD AUTO: 173 K/UL (ref 138–453)
PMV BLD AUTO: 10.8 FL (ref 8.1–13.5)
PO2 ARTERIAL: 52 MMHG (ref 80–100)
POTASSIUM SERPL-SCNC: 4.4 MMOL/L (ref 3.7–5.3)
RBC # BLD AUTO: 2.65 M/UL (ref 4.21–5.77)
SODIUM SERPL-SCNC: 126 MMOL/L (ref 135–144)
WBC OTHER # BLD: 17.7 K/UL (ref 3.5–11.3)

## 2023-11-24 PROCEDURE — 97530 THERAPEUTIC ACTIVITIES: CPT

## 2023-11-24 PROCEDURE — 97116 GAIT TRAINING THERAPY: CPT

## 2023-11-24 PROCEDURE — 2580000003 HC RX 258

## 2023-11-24 PROCEDURE — 83880 ASSAY OF NATRIURETIC PEPTIDE: CPT

## 2023-11-24 PROCEDURE — 6360000002 HC RX W HCPCS: Performed by: STUDENT IN AN ORGANIZED HEALTH CARE EDUCATION/TRAINING PROGRAM

## 2023-11-24 PROCEDURE — 6370000000 HC RX 637 (ALT 250 FOR IP): Performed by: STUDENT IN AN ORGANIZED HEALTH CARE EDUCATION/TRAINING PROGRAM

## 2023-11-24 PROCEDURE — 80048 BASIC METABOLIC PNL TOTAL CA: CPT

## 2023-11-24 PROCEDURE — 94640 AIRWAY INHALATION TREATMENT: CPT

## 2023-11-24 PROCEDURE — 97110 THERAPEUTIC EXERCISES: CPT

## 2023-11-24 PROCEDURE — 6370000000 HC RX 637 (ALT 250 FOR IP): Performed by: PHYSICIAN ASSISTANT

## 2023-11-24 PROCEDURE — 94669 MECHANICAL CHEST WALL OSCILL: CPT

## 2023-11-24 PROCEDURE — 94761 N-INVAS EAR/PLS OXIMETRY MLT: CPT

## 2023-11-24 PROCEDURE — 2580000003 HC RX 258: Performed by: STUDENT IN AN ORGANIZED HEALTH CARE EDUCATION/TRAINING PROGRAM

## 2023-11-24 PROCEDURE — 2700000000 HC OXYGEN THERAPY PER DAY

## 2023-11-24 PROCEDURE — 86140 C-REACTIVE PROTEIN: CPT

## 2023-11-24 PROCEDURE — 6370000000 HC RX 637 (ALT 250 FOR IP)

## 2023-11-24 PROCEDURE — 36415 COLL VENOUS BLD VENIPUNCTURE: CPT

## 2023-11-24 PROCEDURE — 1200000000 HC SEMI PRIVATE

## 2023-11-24 PROCEDURE — 6360000002 HC RX W HCPCS

## 2023-11-24 PROCEDURE — 97535 SELF CARE MNGMENT TRAINING: CPT

## 2023-11-24 PROCEDURE — 85025 COMPLETE CBC W/AUTO DIFF WBC: CPT

## 2023-11-24 PROCEDURE — 94664 DEMO&/EVAL PT USE INHALER: CPT

## 2023-11-24 RX ORDER — WATER 10 ML/10ML
INJECTION INTRAMUSCULAR; INTRAVENOUS; SUBCUTANEOUS
Status: COMPLETED
Start: 2023-11-24 | End: 2023-11-24

## 2023-11-24 RX ORDER — CODEINE PHOSPHATE AND GUAIFENESIN 10; 100 MG/5ML; MG/5ML
5 SOLUTION ORAL EVERY 4 HOURS PRN
Status: DISCONTINUED | OUTPATIENT
Start: 2023-11-24 | End: 2023-12-01 | Stop reason: HOSPADM

## 2023-11-24 RX ORDER — FUROSEMIDE 10 MG/ML
20 INJECTION INTRAMUSCULAR; INTRAVENOUS ONCE
Status: COMPLETED | OUTPATIENT
Start: 2023-11-24 | End: 2023-11-24

## 2023-11-24 RX ORDER — BENZONATATE 100 MG/1
100 CAPSULE ORAL 3 TIMES DAILY PRN
Status: DISCONTINUED | OUTPATIENT
Start: 2023-11-24 | End: 2023-12-01 | Stop reason: HOSPADM

## 2023-11-24 RX ADMIN — MIDODRINE HYDROCHLORIDE 10 MG: 5 TABLET ORAL at 17:58

## 2023-11-24 RX ADMIN — SODIUM CHLORIDE, PRESERVATIVE FREE 10 ML: 5 INJECTION INTRAVENOUS at 08:52

## 2023-11-24 RX ADMIN — METHYLPREDNISOLONE SODIUM SUCCINATE 40 MG: 40 INJECTION, POWDER, LYOPHILIZED, FOR SOLUTION INTRAMUSCULAR; INTRAVENOUS at 08:58

## 2023-11-24 RX ADMIN — SODIUM CHLORIDE, PRESERVATIVE FREE 10 ML: 5 INJECTION INTRAVENOUS at 21:01

## 2023-11-24 RX ADMIN — MONTELUKAST 10 MG: 10 TABLET, FILM COATED ORAL at 08:40

## 2023-11-24 RX ADMIN — PRIMIDONE 250 MG: 250 TABLET ORAL at 08:39

## 2023-11-24 RX ADMIN — MOMETASONE FUROATE AND FORMOTEROL FUMARATE DIHYDRATE 2 PUFF: 200; 5 AEROSOL RESPIRATORY (INHALATION) at 11:18

## 2023-11-24 RX ADMIN — CEFEPIME 2000 MG: 2 INJECTION, POWDER, FOR SOLUTION INTRAVENOUS at 09:13

## 2023-11-24 RX ADMIN — ALBUTEROL SULFATE 2.5 MG: 2.5 SOLUTION RESPIRATORY (INHALATION) at 20:39

## 2023-11-24 RX ADMIN — CEFEPIME 2000 MG: 2 INJECTION, POWDER, FOR SOLUTION INTRAVENOUS at 21:05

## 2023-11-24 RX ADMIN — PRIMIDONE 250 MG: 250 TABLET ORAL at 17:57

## 2023-11-24 RX ADMIN — BUMETANIDE 1 MG: 1 TABLET ORAL at 08:50

## 2023-11-24 RX ADMIN — FLUTICASONE PROPIONATE 2 SPRAY: 50 SPRAY, METERED NASAL at 09:09

## 2023-11-24 RX ADMIN — PANTOPRAZOLE SODIUM 40 MG: 40 TABLET, DELAYED RELEASE ORAL at 08:40

## 2023-11-24 RX ADMIN — TIOTROPIUM BROMIDE INHALATION SPRAY 2 PUFF: 3.12 SPRAY, METERED RESPIRATORY (INHALATION) at 11:22

## 2023-11-24 RX ADMIN — FERROUS SULFATE TAB 325 MG (65 MG ELEMENTAL FE) 325 MG: 325 (65 FE) TAB at 08:39

## 2023-11-24 RX ADMIN — ALBUTEROL SULFATE 2.5 MG: 2.5 SOLUTION RESPIRATORY (INHALATION) at 11:18

## 2023-11-24 RX ADMIN — ALBUTEROL SULFATE 2.5 MG: 2.5 SOLUTION RESPIRATORY (INHALATION) at 05:06

## 2023-11-24 RX ADMIN — MIDODRINE HYDROCHLORIDE 10 MG: 5 TABLET ORAL at 08:42

## 2023-11-24 RX ADMIN — METHYLPREDNISOLONE SODIUM SUCCINATE 40 MG: 40 INJECTION, POWDER, LYOPHILIZED, FOR SOLUTION INTRAMUSCULAR; INTRAVENOUS at 21:01

## 2023-11-24 RX ADMIN — GUAIFENESIN, DEXTROMETHORPHAN HBR 1 TABLET: 600; 30 TABLET ORAL at 08:41

## 2023-11-24 RX ADMIN — OXYBUTYNIN CHLORIDE 10 MG: 5 TABLET ORAL at 21:01

## 2023-11-24 RX ADMIN — PROPRANOLOL HYDROCHLORIDE 60 MG: 60 CAPSULE, EXTENDED RELEASE ORAL at 21:01

## 2023-11-24 RX ADMIN — ALBUTEROL SULFATE 2.5 MG: 2.5 SOLUTION RESPIRATORY (INHALATION) at 15:56

## 2023-11-24 RX ADMIN — GUAIFENESIN, DEXTROMETHORPHAN HBR 1 TABLET: 600; 30 TABLET ORAL at 21:01

## 2023-11-24 RX ADMIN — WATER 1 ML: 1 INJECTION INTRAMUSCULAR; INTRAVENOUS; SUBCUTANEOUS at 08:58

## 2023-11-24 RX ADMIN — OXYBUTYNIN CHLORIDE 10 MG: 5 TABLET ORAL at 08:40

## 2023-11-24 RX ADMIN — ATORVASTATIN CALCIUM 40 MG: 40 TABLET, FILM COATED ORAL at 08:39

## 2023-11-24 RX ADMIN — MULTIPLE VITAMINS W/ MINERALS TAB 1 TABLET: TAB at 08:40

## 2023-11-24 RX ADMIN — MOMETASONE FUROATE AND FORMOTEROL FUMARATE DIHYDRATE 2 PUFF: 200; 5 AEROSOL RESPIRATORY (INHALATION) at 20:39

## 2023-11-24 RX ADMIN — TAMSULOSIN HYDROCHLORIDE 0.8 MG: 0.4 CAPSULE ORAL at 08:40

## 2023-11-24 RX ADMIN — AZITHROMYCIN DIHYDRATE 500 MG: 250 TABLET ORAL at 21:01

## 2023-11-24 RX ADMIN — METFORMIN HYDROCHLORIDE 500 MG: 500 TABLET ORAL at 17:57

## 2023-11-24 RX ADMIN — ENOXAPARIN SODIUM 30 MG: 100 INJECTION SUBCUTANEOUS at 08:46

## 2023-11-24 RX ADMIN — METFORMIN HYDROCHLORIDE 500 MG: 500 TABLET ORAL at 08:39

## 2023-11-24 RX ADMIN — WATER 1 ML: 1 INJECTION INTRAMUSCULAR; INTRAVENOUS; SUBCUTANEOUS at 21:01

## 2023-11-24 RX ADMIN — BUMETANIDE 1 MG: 1 TABLET ORAL at 17:57

## 2023-11-24 RX ADMIN — PROPRANOLOL HYDROCHLORIDE 60 MG: 60 CAPSULE, EXTENDED RELEASE ORAL at 08:40

## 2023-11-24 RX ADMIN — MIDODRINE HYDROCHLORIDE 10 MG: 5 TABLET ORAL at 11:59

## 2023-11-24 RX ADMIN — FUROSEMIDE 20 MG: 10 INJECTION, SOLUTION INTRAMUSCULAR; INTRAVENOUS at 08:54

## 2023-11-24 RX ADMIN — SPIRONOLACTONE 25 MG: 25 TABLET, FILM COATED ORAL at 08:39

## 2023-11-24 RX ADMIN — LEVOTHYROXINE SODIUM 137 MCG: 25 TABLET ORAL at 08:50

## 2023-11-24 ASSESSMENT — PAIN SCALES - GENERAL: PAINLEVEL_OUTOF10: 0

## 2023-11-24 NOTE — PROGRESS NOTES
Physical Therapy  Facility/Department: [facility] AT THE [facility] MED SURG  Daily Treatment Note  NAME: Munira Castillo  : 1935  MRN: [id number]    Date of Service: 2023    Discharge Recommendations:  Continue to assess pending progress, Subacute/Skilled Nursing Facility, Patient would benefit from continued therapy after discharge        Patient Diagnosis(es): The encounter diagnosis was Community acquired pneumonia of right lower lobe of lung. Assessment   Assessment: THer-ex: Seated BLE x15 in all available planes of motion, STS x10. Static standing for several trials during [facility] usage and hygeine care ~2 min standing tolerance before pt required a rb, pt demoed mild instability, no LB noted. Transfers: CGA x1. Activity Tolerance: Patient tolerated treatment well;Patient limited by fatigue     Plan    Physical Therapy Plan  General Plan: 2 times a day 7 days a week (1x/day on weekends and holidays)  Current Treatment Recommendations: Strengthening;Balance training;Functional mobility training;Transfer training; Endurance training;Gait training;Stair training;Neuromuscular re-education;Home exercise program;Safety education & training; Therapeutic activities     Restrictions  Restrictions/Precautions  Restrictions/Precautions: Fall Risk, Contact Precautions     Subjective    Subjective  Subjective: Pt in BSC upon arrival, agreeable to therapy after getting cleaned up.   Pain: denied pain complaint  Orientation  Overall Orientation Status: Within Functional Limits     Objective     Bed Mobility Training  Bed Mobility Training: No  Balance  Sitting: Impaired  Sitting - Static: Fair (occasional)  Standing: Impaired (very shaky throughout all standing)  Standing - Static: Fair (very shaky)  Standing - Dynamic: Fair  Transfer Training  Transfer Training: Yes  Overall Level of Assistance: Assist X1;Contact-guard assistance  Interventions: Safety awareness training;Verbal cues  Sit to Stand: Assist X1;Contact-guard

## 2023-11-24 NOTE — PROGRESS NOTES
Writer to bedside to complete morning assessment. Upon entry to room, pt awake and lying in bed, respirations even and unlabored while on 2 L/min. Vitals obtained and assessment completed, see flow sheet for details. Assisted pt with eating due to him having difficulty with his tremors to get the food on the fork and the fork to his mouth. Pt denies needs from writer at this time. Call light in reach. Care ongoing.

## 2023-11-24 NOTE — PROGRESS NOTES
Occupational Therapy  Facility/Department: [facility] AT THE [facility] MED SURG  Daily Treatment Note  NAME: Tera Dixon  : 1935  MRN: [id number]    Date of Service: 2023    Discharge Recommendations:  Home with assist PRN         Patient Diagnosis(es): The encounter diagnosis was Community acquired pneumonia of right lower lobe of lung. Assessment    Activity Tolerance: Patient tolerated treatment well;Patient limited by fatigue  Discharge Recommendations: Home with assist PRN      Plan   Occupational Therapy Plan  Times Per Day: Once a day  Current Treatment Recommendations: Strengthening; Safety education & training;Self-Care / ADL; Functional mobility training     Restrictions       Subjective   Subjective  Subjective: Pt seated in chair upon OT arrival, pleasant and agreeable to OT tx. Denies any pain today. Orientation  Overall Orientation Status: Within Functional Limits  Orientation Level: Oriented X4  Pain: no c/o pain. Cognition  Overall Cognitive Status: WFL        Objective    Vitals          ADL  UE Dressing: Minimal assistance  UE Dressing Skilled Clinical Factors: don/doff gown  LE Dressing: Moderate assistance  LE Dressing Skilled Clinical Factors: to don/doff brief  Toileting: Minimal assistance  Toileting Skilled Clinical Factors: mina care and clothing mgt  Additional Comments: Pt competed dressing and toileting routine this date to change gown and brief. Pt required min-mod A for all due to limited ROM and weakness. Mod VC for task initiation. OT Exercises  Exercise Treatment: Pt participated in BUE therex seated in chair to increase strength and endurance for ease with fxl tasks. Body weight UE exercises x12 reps in all UB joints/planes of motion for a total of ~10 min with moderate fatigue and SOB with exertion. Required 2 RB throughout ax. Safety Devices  Type of Devices: All fall risk precautions in place;Call light within reach;Gait belt;Patient at risk for falls;Nurse notified; Bed

## 2023-11-24 NOTE — PROGRESS NOTES
Writer received a phone call from [address] [place] [address] in pharmacy stating that the pt has an order for Lovenox BID and she said his Hgb was 104. On admission (11/21/23) and today it was 7.8. Writer spoke with Dr. Romana Gayer and he is going to hold the Lovenox and initiate SCDs. Care ongoing. none

## 2023-11-24 NOTE — PLAN OF CARE
Problem: Discharge Planning  Goal: Discharge to home or other facility with appropriate resources  Outcome: Progressing  Flowsheets (Taken 11/23/2023 1922)  Discharge to home or other facility with appropriate resources: Identify barriers to discharge with patient and caregiver     Problem: Safety - Adult  Goal: Free from fall injury  Outcome: Progressing     Problem: Skin/Tissue Integrity  Goal: Absence of new skin breakdown  Description: 1. Monitor for areas of redness and/or skin breakdown  2. Assess vascular access sites hourly  3. Every 4-6 hours minimum:  Change oxygen saturation probe site  4. Every 4-6 hours:  If on nasal continuous positive airway pressure, respiratory therapy assess nares and determine need for appliance change or resting period.   Outcome: Progressing     Problem: ABCDS Injury Assessment  Goal: Absence of physical injury  Outcome: Progressing     Problem: Nutrition Deficit:  Goal: Optimize nutritional status  Outcome: Progressing

## 2023-11-24 NOTE — PROGRESS NOTES
Physical Therapy  Facility/Department: [facility] AT THE [facility] MED SURG  Daily Treatment Note    NAME: Hiwot Barlow  : 1935  MRN: [id number]    Date of Service: 2023    Discharge Recommendations:  Continue to assess pending progress, Subacute/Skilled Nursing Facility, Patient would benefit from continued therapy after discharge        Patient Diagnosis(es): The encounter diagnosis was Community acquired pneumonia of right lower lobe of lung. Assessment   Assessment: Patient required extra encouragement for participation. After patient completed STS from chair writer noticed patient chair was soiled with urine, patient and chair was cleaned up and nursing notified end of treatment. Bed mobs Min A sitting EOB to supine. Transfers CGA from raised toilet and Min A from chair due to lower surface height. Patient completed dynamic standing balance with single UE assist ~ 2 minutes during toilet transfer, patient able to perform pericare and adjust his brief, no LOB CGA x1, with only minimal assist provided in completing proper pericare. Gait 10' x2 with 2WW, CGA x1, slow shuffled gait. Activity Tolerance: Patient tolerated treatment well;Patient limited by fatigue     Plan    Physical Therapy Plan  General Plan: 2 times a day 7 days a week (1x/day on weekends and holidays)  Current Treatment Recommendations: Strengthening;Balance training;Functional mobility training;Transfer training; Endurance training;Gait training;Stair training;Neuromuscular re-education;Home exercise program;Safety education & training; Therapeutic activities     Restrictions  Restrictions/Precautions  Restrictions/Precautions: Fall Risk, Contact Precautions     Subjective    Subjective  Subjective: Pt in chair upon arrival with OMALLEY. Pt agreeable to therapy with encouragement. Pain: no c/o pain.   Orientation  Overall Orientation Status: Within Functional Limits  Orientation Level: Oriented X4  Cognition  Overall Cognitive Status: [facility]

## 2023-11-24 NOTE — PROGRESS NOTES
[facility]  [address] [place], [place], [address]    Progress Note    Date:   11/24/2023  Patient name:  Ralph Weeks  Date of admission:  11/21/2023  9:07 PM  MRN:   [id number]  YOB: 1935    SUBJECTIVE/Last 24 hours update:     Patient seen and examined at the bed side , no new acute events overnight and no new complains. He continues to have leg swelling and some shortness of breath compared to his baseline. He is feeling better. He has been coughing more than prior. Notes from nursing staff and Consults had been reviewed, and the overnight progress had been checked with the nursing staff as well. REVIEW OF SYSTEMS:      CONSTITUTIONAL:  no fevers, no headaches, positive for fevers  EYES: negative for blury vision  HEENT: No headaches, No nasal congestion, no difficulty swallowing  RESPIRATORY: positive for dyspnea, wheezing, Cough  CARDIOVASCULAR: negative for chest pain, no palpitations  GASTROINTESTINAL: no nausea, no vomiting, no change in bowel habits, no abdominal pain   GENITOURINARY: negative for dysuria, no hematuria   MUSCULOSKELETAL: no joint pains, no muscle aches, no swelling of joints or extremities  NEUROLOGICAL: No  Weakness or numbness      PAST MEDICAL HISTORY:      has a past medical history of COPD (chronic obstructive pulmonary disease) ([address]), Diabetes mellitus ([address]), Hx of echocardiogram, Hyperlipidemia, Hypothyroidism, MI (myocardial infarction) ([address]), Stroke ([address]), Thyroid disease, and Type II or unspecified type diabetes mellitus without mention of complication, not stated as uncontrolled. PAST SURGICAL HISTORY:      has a past surgical history that includes hernia repair; Cardiac surgery (02/24/2017); fracture surgery; Colonoscopy; Cerebral angiogram (Bilateral, 06/15/2023); sigmoidoscopy (06/23/2023); Colonoscopy (N/A, 6/23/2023); and Upper gastrointestinal endoscopy (10/25/2023).        SOCIAL HISTORY:      reports that he quit smoking about

## 2023-11-25 LAB
ANION GAP SERPL CALCULATED.3IONS-SCNC: 10 MMOL/L (ref 9–17)
BASOPHILS # BLD: 0.03 K/UL (ref 0–0.2)
BASOPHILS NFR BLD: 0 % (ref 0–2)
BNP SERPL-MCNC: ABNORMAL PG/ML
BUN SERPL-MCNC: 42 MG/DL (ref 8–23)
BUN/CREAT SERPL: 30 (ref 9–20)
CALCIUM SERPL-MCNC: 8.8 MG/DL (ref 8.6–10.4)
CHLORIDE SERPL-SCNC: 95 MMOL/L (ref 98–107)
CO2 SERPL-SCNC: 23 MMOL/L (ref 20–31)
CREAT SERPL-MCNC: 1.4 MG/DL (ref 0.7–1.2)
CRP SERPL HS-MCNC: 211 MG/L (ref 0–5)
EOSINOPHIL # BLD: 0.05 K/UL (ref 0–0.44)
EOSINOPHILS RELATIVE PERCENT: 0 % (ref 1–4)
ERYTHROCYTE [DISTWIDTH] IN BLOOD BY AUTOMATED COUNT: 16.2 % (ref 11.8–14.4)
GFR SERPL CREATININE-BSD FRML MDRD: 49 ML/MIN/1.73M2
GLUCOSE SERPL-MCNC: 154 MG/DL (ref 70–99)
HCT VFR BLD AUTO: 25.3 % (ref 40.7–50.3)
HGB BLD-MCNC: 8 G/DL (ref 13–17)
IMM GRANULOCYTES # BLD AUTO: 0.14 K/UL (ref 0–0.3)
IMM GRANULOCYTES NFR BLD: 1 %
LYMPHOCYTES NFR BLD: 1.24 K/UL (ref 1.1–3.7)
LYMPHOCYTES RELATIVE PERCENT: 8 % (ref 24–43)
MCH RBC QN AUTO: 28.7 PG (ref 25.2–33.5)
MCHC RBC AUTO-ENTMCNC: 31.6 G/DL (ref 28.4–34.8)
MCV RBC AUTO: 90.7 FL (ref 82.6–102.9)
MONOCYTES NFR BLD: 1.14 K/UL (ref 0.1–1.2)
MONOCYTES NFR BLD: 7 % (ref 3–12)
NEUTROPHILS NFR BLD: 84 % (ref 36–65)
NEUTS SEG NFR BLD: 12.71 K/UL (ref 1.5–8.1)
NRBC BLD-RTO: 0.1 PER 100 WBC
PLATELET # BLD AUTO: 206 K/UL (ref 138–453)
PMV BLD AUTO: 10.6 FL (ref 8.1–13.5)
POTASSIUM SERPL-SCNC: 3.9 MMOL/L (ref 3.7–5.3)
RBC # BLD AUTO: 2.79 M/UL (ref 4.21–5.77)
SODIUM SERPL-SCNC: 128 MMOL/L (ref 135–144)
WBC OTHER # BLD: 15.3 K/UL (ref 3.5–11.3)

## 2023-11-25 PROCEDURE — 94640 AIRWAY INHALATION TREATMENT: CPT

## 2023-11-25 PROCEDURE — 6370000000 HC RX 637 (ALT 250 FOR IP)

## 2023-11-25 PROCEDURE — 6360000002 HC RX W HCPCS: Performed by: STUDENT IN AN ORGANIZED HEALTH CARE EDUCATION/TRAINING PROGRAM

## 2023-11-25 PROCEDURE — 1200000000 HC SEMI PRIVATE

## 2023-11-25 PROCEDURE — 6370000000 HC RX 637 (ALT 250 FOR IP): Performed by: STUDENT IN AN ORGANIZED HEALTH CARE EDUCATION/TRAINING PROGRAM

## 2023-11-25 PROCEDURE — 94664 DEMO&/EVAL PT USE INHALER: CPT

## 2023-11-25 PROCEDURE — 97110 THERAPEUTIC EXERCISES: CPT

## 2023-11-25 PROCEDURE — 2580000003 HC RX 258: Performed by: STUDENT IN AN ORGANIZED HEALTH CARE EDUCATION/TRAINING PROGRAM

## 2023-11-25 PROCEDURE — 36415 COLL VENOUS BLD VENIPUNCTURE: CPT

## 2023-11-25 PROCEDURE — 80048 BASIC METABOLIC PNL TOTAL CA: CPT

## 2023-11-25 PROCEDURE — 94761 N-INVAS EAR/PLS OXIMETRY MLT: CPT

## 2023-11-25 PROCEDURE — 93005 ELECTROCARDIOGRAM TRACING: CPT | Performed by: INTERNAL MEDICINE

## 2023-11-25 PROCEDURE — 2580000003 HC RX 258

## 2023-11-25 PROCEDURE — 94669 MECHANICAL CHEST WALL OSCILL: CPT

## 2023-11-25 PROCEDURE — 86140 C-REACTIVE PROTEIN: CPT

## 2023-11-25 PROCEDURE — 2700000000 HC OXYGEN THERAPY PER DAY

## 2023-11-25 PROCEDURE — 6370000000 HC RX 637 (ALT 250 FOR IP): Performed by: PHYSICIAN ASSISTANT

## 2023-11-25 PROCEDURE — 85025 COMPLETE CBC W/AUTO DIFF WBC: CPT

## 2023-11-25 PROCEDURE — 83880 ASSAY OF NATRIURETIC PEPTIDE: CPT

## 2023-11-25 RX ORDER — FUROSEMIDE 10 MG/ML
20 INJECTION INTRAMUSCULAR; INTRAVENOUS ONCE
Status: COMPLETED | OUTPATIENT
Start: 2023-11-25 | End: 2023-11-25

## 2023-11-25 RX ORDER — WATER 10 ML/10ML
INJECTION INTRAMUSCULAR; INTRAVENOUS; SUBCUTANEOUS
Status: COMPLETED
Start: 2023-11-25 | End: 2023-11-25

## 2023-11-25 RX ADMIN — METFORMIN HYDROCHLORIDE 500 MG: 500 TABLET ORAL at 17:05

## 2023-11-25 RX ADMIN — OXYBUTYNIN CHLORIDE 10 MG: 5 TABLET ORAL at 20:37

## 2023-11-25 RX ADMIN — SPIRONOLACTONE 25 MG: 25 TABLET, FILM COATED ORAL at 07:58

## 2023-11-25 RX ADMIN — METHYLPREDNISOLONE SODIUM SUCCINATE 40 MG: 40 INJECTION, POWDER, LYOPHILIZED, FOR SOLUTION INTRAMUSCULAR; INTRAVENOUS at 07:59

## 2023-11-25 RX ADMIN — CEFEPIME 2000 MG: 2 INJECTION, POWDER, FOR SOLUTION INTRAVENOUS at 20:43

## 2023-11-25 RX ADMIN — METHYLPREDNISOLONE SODIUM SUCCINATE 40 MG: 40 INJECTION, POWDER, LYOPHILIZED, FOR SOLUTION INTRAMUSCULAR; INTRAVENOUS at 20:36

## 2023-11-25 RX ADMIN — MULTIPLE VITAMINS W/ MINERALS TAB 1 TABLET: TAB at 07:53

## 2023-11-25 RX ADMIN — WATER 10 ML: 1 INJECTION INTRAMUSCULAR; INTRAVENOUS; SUBCUTANEOUS at 20:36

## 2023-11-25 RX ADMIN — PROPRANOLOL HYDROCHLORIDE 60 MG: 60 CAPSULE, EXTENDED RELEASE ORAL at 07:59

## 2023-11-25 RX ADMIN — ATORVASTATIN CALCIUM 40 MG: 40 TABLET, FILM COATED ORAL at 07:56

## 2023-11-25 RX ADMIN — GUAIFENESIN, DEXTROMETHORPHAN HBR 1 TABLET: 600; 30 TABLET ORAL at 20:36

## 2023-11-25 RX ADMIN — TAMSULOSIN HYDROCHLORIDE 0.8 MG: 0.4 CAPSULE ORAL at 07:55

## 2023-11-25 RX ADMIN — SODIUM CHLORIDE, PRESERVATIVE FREE 10 ML: 5 INJECTION INTRAVENOUS at 07:46

## 2023-11-25 RX ADMIN — CEFEPIME 2000 MG: 2 INJECTION, POWDER, FOR SOLUTION INTRAVENOUS at 07:48

## 2023-11-25 RX ADMIN — ACETAMINOPHEN 650 MG: 325 TABLET ORAL at 07:57

## 2023-11-25 RX ADMIN — PRIMIDONE 250 MG: 250 TABLET ORAL at 17:05

## 2023-11-25 RX ADMIN — MIDODRINE HYDROCHLORIDE 10 MG: 5 TABLET ORAL at 12:03

## 2023-11-25 RX ADMIN — GUAIFENESIN, DEXTROMETHORPHAN HBR 1 TABLET: 600; 30 TABLET ORAL at 07:54

## 2023-11-25 RX ADMIN — TIOTROPIUM BROMIDE INHALATION SPRAY 2 PUFF: 3.12 SPRAY, METERED RESPIRATORY (INHALATION) at 07:49

## 2023-11-25 RX ADMIN — GUAIFENESIN AND CODEINE PHOSPHATE 5 ML: 100; 10 SOLUTION ORAL at 07:53

## 2023-11-25 RX ADMIN — ALBUTEROL SULFATE 2.5 MG: 2.5 SOLUTION RESPIRATORY (INHALATION) at 16:30

## 2023-11-25 RX ADMIN — MIDODRINE HYDROCHLORIDE 10 MG: 5 TABLET ORAL at 07:53

## 2023-11-25 RX ADMIN — ALBUTEROL SULFATE 2.5 MG: 2.5 SOLUTION RESPIRATORY (INHALATION) at 05:55

## 2023-11-25 RX ADMIN — ALBUTEROL SULFATE 2.5 MG: 2.5 SOLUTION RESPIRATORY (INHALATION) at 21:00

## 2023-11-25 RX ADMIN — PROPRANOLOL HYDROCHLORIDE 60 MG: 60 CAPSULE, EXTENDED RELEASE ORAL at 20:36

## 2023-11-25 RX ADMIN — PANTOPRAZOLE SODIUM 40 MG: 40 TABLET, DELAYED RELEASE ORAL at 07:57

## 2023-11-25 RX ADMIN — MONTELUKAST 10 MG: 10 TABLET, FILM COATED ORAL at 07:55

## 2023-11-25 RX ADMIN — MIDODRINE HYDROCHLORIDE 10 MG: 5 TABLET ORAL at 17:05

## 2023-11-25 RX ADMIN — METFORMIN HYDROCHLORIDE 500 MG: 500 TABLET ORAL at 07:55

## 2023-11-25 RX ADMIN — BENZONATATE 100 MG: 100 CAPSULE ORAL at 00:15

## 2023-11-25 RX ADMIN — BUMETANIDE 1 MG: 1 TABLET ORAL at 18:32

## 2023-11-25 RX ADMIN — BENZONATATE 100 MG: 100 CAPSULE ORAL at 07:54

## 2023-11-25 RX ADMIN — PRIMIDONE 250 MG: 250 TABLET ORAL at 07:56

## 2023-11-25 RX ADMIN — MOMETASONE FUROATE AND FORMOTEROL FUMARATE DIHYDRATE 2 PUFF: 200; 5 AEROSOL RESPIRATORY (INHALATION) at 07:59

## 2023-11-25 RX ADMIN — FERROUS SULFATE TAB 325 MG (65 MG ELEMENTAL FE) 325 MG: 325 (65 FE) TAB at 07:55

## 2023-11-25 RX ADMIN — OXYBUTYNIN CHLORIDE 10 MG: 5 TABLET ORAL at 07:56

## 2023-11-25 RX ADMIN — MOMETASONE FUROATE AND FORMOTEROL FUMARATE DIHYDRATE 2 PUFF: 200; 5 AEROSOL RESPIRATORY (INHALATION) at 21:05

## 2023-11-25 RX ADMIN — ALBUTEROL SULFATE 2.5 MG: 2.5 SOLUTION RESPIRATORY (INHALATION) at 11:23

## 2023-11-25 RX ADMIN — LEVOTHYROXINE SODIUM 137 MCG: 25 TABLET ORAL at 07:52

## 2023-11-25 RX ADMIN — FLUTICASONE PROPIONATE 2 SPRAY: 50 SPRAY, METERED NASAL at 07:49

## 2023-11-25 RX ADMIN — FUROSEMIDE 20 MG: 10 INJECTION, SOLUTION INTRAMUSCULAR; INTRAVENOUS at 10:47

## 2023-11-25 RX ADMIN — SODIUM CHLORIDE, PRESERVATIVE FREE 10 ML: 5 INJECTION INTRAVENOUS at 20:37

## 2023-11-25 RX ADMIN — BUMETANIDE 1 MG: 1 TABLET ORAL at 07:53

## 2023-11-25 ASSESSMENT — PAIN - FUNCTIONAL ASSESSMENT: PAIN_FUNCTIONAL_ASSESSMENT: ACTIVITIES ARE NOT PREVENTED

## 2023-11-25 ASSESSMENT — PAIN DESCRIPTION - DESCRIPTORS: DESCRIPTORS: DISCOMFORT

## 2023-11-25 ASSESSMENT — PAIN SCALES - GENERAL
PAINLEVEL_OUTOF10: 2
PAINLEVEL_OUTOF10: 3

## 2023-11-25 ASSESSMENT — PAIN DESCRIPTION - LOCATION: LOCATION: BACK

## 2023-11-25 NOTE — PLAN OF CARE
Problem: Respiratory - Adult  Goal: Achieves optimal ventilation and oxygenation  Flowsheets (Taken 11/25/2023 0502)  Achieves optimal ventilation and oxygenation:   Assess for changes in respiratory status   Position to facilitate oxygenation and minimize respiratory effort   Respiratory therapy support as indicated   Assess for changes in mentation and behavior   Oxygen supplementation based on oxygen saturation or arterial blood gases   Encourage broncho-pulmonary hygiene including cough, deep breathe, incentive spirometry   Assess and instruct to report shortness of breath or any respiratory difficulty  Note: Pulse ox WNL. O2 on @ 2 L per nasal canula. Pt denies any discomfort. Will continue to assess.

## 2023-11-25 NOTE — PROGRESS NOTES
Physical Therapy  Facility/Department: [facility] AT THE [facility] MED SURG  Daily Treatment Note  NAME: Ophelia Villegas  : 1935  MRN: [id number]    Date of Service: 2023    Discharge Recommendations:  Continue to assess pending progress, Subacute/Skilled Nursing Facility, Patient would benefit from continued therapy after discharge   PT Equipment Recommendations  Equipment Needed: No    Patient Diagnosis(es): The encounter diagnosis was Community acquired pneumonia of right lower lobe of lung. Assessment   Assessment: Patient up with nursing to bathroom upon therapist arrival. Pt contact guard for ambulating with front wheeled walker back to chair. Pt required encouragement for completion of bilateral lower extremity exercises for endurance and strengthening. Pt left in chair, chair alarm on, call light in reach, all needs met. Activity Tolerance: Patient tolerated treatment well;Patient limited by fatigue  Equipment Needed: No     Plan    Physical Therapy Plan  General Plan: 2 times a day 7 days a week (1x daily on weekends)  Specific Instructions for Next Treatment: With exception of weekends, daily. Current Treatment Recommendations: Strengthening;Balance training;Functional mobility training;Transfer training; Endurance training;Gait training;Stair training;Neuromuscular re-education;Home exercise program;Safety education & training; Therapeutic activities     Restrictions  Restrictions/Precautions  Restrictions/Precautions: Fall Risk, Contact Precautions     Subjective    Subjective  Subjective: Pt up with nursing upon therapist. Pt agreeable to therapy with encouragement.  No complaints of pain  Orientation  Overall Orientation Status: Within Functional Limits  Cognition  Overall Cognitive Status: WFL     Objective   Vitals     Bed Mobility Training  Bed Mobility Training: No  Balance  Sitting: With support  Sitting - Static: Fair (occasional)  Sitting - Dynamic: Fair (occasional)  Standing: With

## 2023-11-25 NOTE — PROGRESS NOTES
5-40 mL  5-40 mL IntraVENous 2 times per day Naina Epperson, APRN - CNP   10 mL at 11/25/23 0746    sodium chloride flush 0.9 % injection 5-40 mL  5-40 mL IntraVENous PRN Rosa Epperson APRN - CNP   10 mL at 11/23/23 2106    0.9 % sodium chloride infusion   IntraVENous PRN Nevin Deal APRN -  mL/hr at 11/23/23 0958 New Bag at 11/23/23 0958    [Held by provider] enoxaparin Sodium (LOVENOX) injection 30 mg  30 mg SubCUTAneous BID Naina Epperson, APRN - CNP   30 mg at 11/24/23 0846    ondansetron (ZOFRAN-ODT) disintegrating tablet 4 mg  4 mg Oral Q8H PRN Naina Epperson, APRN - CNP        Or    ondansetron (ZOFRAN) injection 4 mg  4 mg IntraVENous Q6H PRN Naina Epperson, APRN - CNP        polyethylene glycol (GLYCOLAX) packet 17 g  17 g Oral Daily PRN Chip Eppersonn L, APRN - CNP        acetaminophen (TYLENOL) tablet 650 mg  650 mg Oral Q6H PRN Chip Eppersonn L, APRN - CNP   650 mg at 11/25/23 0757    Or    acetaminophen (TYLENOL) suppository 650 mg  650 mg Rectal Q6H PRN Naina Epperson L, APRN - CNP           ASSESSMENT:     Principal Problem:    Community acquired pneumonia of both lungs  Active Problems:    Essential hypertension    Type 2 diabetes mellitus with complication, without long-term current use of insulin (formerly [facility])    Chronic combined systolic and diastolic CHF, NYHA class 2 ([address])    COPD without exacerbation ([address])    Toxic metabolic encephalopathy  Resolved Problems:    * No resolved hospital problems.  *      PLAN:     Primary Problem(s): Community acquired pneumonia of both lungs  Condition is at treatment goal  Treatment plan: Continue current treatment  Imaging:  No further imaging today  Medications: Cefepime/Azithromycin d3  Medication Monitoring / High Risk Medications: none   Cardiology consultation, case discussed  Supplemental O2 as indicated, wean as tolerated  Solumedrol to be continued  Consideration for IV Lasix  Continue w/ midodrine, if pressures remain low may need pressure support. Breathing treatments  Monitor labs, CBC/BMP  Resumed home meds, plan for some diuresis  TEDs  Dispo pending/ clinical status    DVT prophylaxis:  Lovenox  GI prophylaxis:  PPI    Above plan discussed with the patient who agreed to the above plan     Discussed care plan with nurse after getting their input. Please note that this chart was generated using voice recognition Dragon dictation software. Although every effort was made to ensure the accuracy of this automated transcription, some errors in transcription may have occurred.     Oleksandr Washington MD  11/25/2023 9:54 AM

## 2023-11-25 NOTE — PROGRESS NOTES
Occupational Therapy  Facility/Department: [facility] AT THE [facility] MED SURG  Daily Treatment Note  NAME: Yessenia Menon  : 1935  MRN: [id number]    Date of Service: 2023    Discharge Recommendations:  Home with assist PRN         Patient Diagnosis(es): The encounter diagnosis was Community acquired pneumonia of right lower lobe of lung. Assessment    Activity Tolerance: Patient tolerated treatment well;Patient limited by fatigue  Discharge Recommendations: Home with assist PRN      Plan   Occupational Therapy Plan  Times Per Day: Once a day  Current Treatment Recommendations: Strengthening; Safety education & training;Self-Care / ADL; Functional mobility training     Restrictions  Restrictions/Precautions  Restrictions/Precautions: Fall Risk;Contact Precautions    Subjective   Subjective  Subjective: Pt in the bathroom with Aide present upon arrival. Pt agreed to participate in therapy session. Pain: Pt had no complaints of pain. Objective    Vitals           ADL  Grooming: Stand by assistance  Grooming Skilled Clinical Factors: SBA while standing at sink. Toileting: Contact guard assistance  Additional Comments: CGA to complete toilet transfer and clothing mgmt. CGA to complete func mob with RW.  OT Exercises  Exercise Treatment: Pt completed BUE ther ex x 5 planes x 15 reps x 1 set to increase UE strength and endurance in order to ease completion of ADL tasks. Pt required RBs as needed secondary to fatigue. Safety Devices  Type of Devices: All fall risk precautions in place;Call light within reach; Chair alarm in place;Gait belt;Left in chair;Nurse notified     Patient Education  Education Given To: Patient  Education Provided: Role of Therapy;Plan of Care  Education Method: Verbal  Barriers to Learning: None  Education Outcome: Verbalized understanding;Continued education needed    Goals  Short Term Goals  Time Frame for Short Term Goals: 1 week  Short Term Goal 1: Pt. will demonstrate good safety

## 2023-11-25 NOTE — PROGRESS NOTES
Pt resting in bed quietly watching TV. Assessment and vital signs as charted. Pt denies pain at this time. Cardiac monitor on and shows NSR. Continuous pulse ox on and WNL. Pt uses the urinal but is incontinent in his brief at times. Nasal canula on at 2 L. Pt denies any other needs at this time. Call light in reach. Bed alarm on. Will continue to monitor.

## 2023-11-26 LAB
ANION GAP SERPL CALCULATED.3IONS-SCNC: 11 MMOL/L (ref 9–17)
BASOPHILS # BLD: 0.05 K/UL (ref 0–0.2)
BASOPHILS NFR BLD: 0 % (ref 0–2)
BNP SERPL-MCNC: ABNORMAL PG/ML
BUN SERPL-MCNC: 42 MG/DL (ref 8–23)
BUN/CREAT SERPL: 28 (ref 9–20)
CALCIUM SERPL-MCNC: 9.1 MG/DL (ref 8.6–10.4)
CHLORIDE SERPL-SCNC: 97 MMOL/L (ref 98–107)
CO2 SERPL-SCNC: 25 MMOL/L (ref 20–31)
CREAT SERPL-MCNC: 1.5 MG/DL (ref 0.7–1.2)
CRP SERPL HS-MCNC: 136.7 MG/L (ref 0–5)
EKG ATRIAL RATE: 85 BPM
EKG Q-T INTERVAL: 376 MS
EKG QRS DURATION: 128 MS
EKG QTC CALCULATION (BAZETT): 447 MS
EKG R AXIS: -10 DEGREES
EKG T AXIS: 83 DEGREES
EKG VENTRICULAR RATE: 85 BPM
EOSINOPHIL # BLD: 0.04 K/UL (ref 0–0.44)
EOSINOPHILS RELATIVE PERCENT: 0 % (ref 1–4)
ERYTHROCYTE [DISTWIDTH] IN BLOOD BY AUTOMATED COUNT: 16.6 % (ref 11.8–14.4)
GFR SERPL CREATININE-BSD FRML MDRD: 45 ML/MIN/1.73M2
GLUCOSE SERPL-MCNC: 165 MG/DL (ref 70–99)
HCT VFR BLD AUTO: 27.8 % (ref 40.7–50.3)
HGB BLD-MCNC: 8.8 G/DL (ref 13–17)
IMM GRANULOCYTES # BLD AUTO: 0.46 K/UL (ref 0–0.3)
IMM GRANULOCYTES NFR BLD: 3 %
LYMPHOCYTES NFR BLD: 1.35 K/UL (ref 1.1–3.7)
LYMPHOCYTES RELATIVE PERCENT: 9 % (ref 24–43)
MCH RBC QN AUTO: 28.8 PG (ref 25.2–33.5)
MCHC RBC AUTO-ENTMCNC: 31.7 G/DL (ref 28.4–34.8)
MCV RBC AUTO: 90.8 FL (ref 82.6–102.9)
MICROORGANISM SPEC CULT: NORMAL
MICROORGANISM SPEC CULT: NORMAL
MONOCYTES NFR BLD: 1.33 K/UL (ref 0.1–1.2)
MONOCYTES NFR BLD: 9 % (ref 3–12)
NEUTROPHILS NFR BLD: 79 % (ref 36–65)
NEUTS SEG NFR BLD: 11.69 K/UL (ref 1.5–8.1)
NRBC BLD-RTO: 0.3 PER 100 WBC
PLATELET # BLD AUTO: 235 K/UL (ref 138–453)
PMV BLD AUTO: 11.1 FL (ref 8.1–13.5)
POTASSIUM SERPL-SCNC: 4.4 MMOL/L (ref 3.7–5.3)
RBC # BLD AUTO: 3.06 M/UL (ref 4.21–5.77)
SERVICE CMNT-IMP: NORMAL
SERVICE CMNT-IMP: NORMAL
SODIUM SERPL-SCNC: 133 MMOL/L (ref 135–144)
SPECIMEN DESCRIPTION: NORMAL
SPECIMEN DESCRIPTION: NORMAL
WBC OTHER # BLD: 14.9 K/UL (ref 3.5–11.3)

## 2023-11-26 PROCEDURE — 94640 AIRWAY INHALATION TREATMENT: CPT

## 2023-11-26 PROCEDURE — 6370000000 HC RX 637 (ALT 250 FOR IP): Performed by: PHYSICIAN ASSISTANT

## 2023-11-26 PROCEDURE — 6360000002 HC RX W HCPCS: Performed by: STUDENT IN AN ORGANIZED HEALTH CARE EDUCATION/TRAINING PROGRAM

## 2023-11-26 PROCEDURE — 1200000000 HC SEMI PRIVATE

## 2023-11-26 PROCEDURE — 80048 BASIC METABOLIC PNL TOTAL CA: CPT

## 2023-11-26 PROCEDURE — 93010 ELECTROCARDIOGRAM REPORT: CPT | Performed by: FAMILY MEDICINE

## 2023-11-26 PROCEDURE — 94761 N-INVAS EAR/PLS OXIMETRY MLT: CPT

## 2023-11-26 PROCEDURE — 2580000003 HC RX 258: Performed by: STUDENT IN AN ORGANIZED HEALTH CARE EDUCATION/TRAINING PROGRAM

## 2023-11-26 PROCEDURE — 85025 COMPLETE CBC W/AUTO DIFF WBC: CPT

## 2023-11-26 PROCEDURE — 2580000003 HC RX 258

## 2023-11-26 PROCEDURE — 94664 DEMO&/EVAL PT USE INHALER: CPT

## 2023-11-26 PROCEDURE — 94669 MECHANICAL CHEST WALL OSCILL: CPT

## 2023-11-26 PROCEDURE — 86140 C-REACTIVE PROTEIN: CPT

## 2023-11-26 PROCEDURE — 83880 ASSAY OF NATRIURETIC PEPTIDE: CPT

## 2023-11-26 PROCEDURE — 2700000000 HC OXYGEN THERAPY PER DAY

## 2023-11-26 PROCEDURE — 97110 THERAPEUTIC EXERCISES: CPT

## 2023-11-26 PROCEDURE — 97530 THERAPEUTIC ACTIVITIES: CPT

## 2023-11-26 PROCEDURE — 36415 COLL VENOUS BLD VENIPUNCTURE: CPT

## 2023-11-26 PROCEDURE — 6370000000 HC RX 637 (ALT 250 FOR IP)

## 2023-11-26 PROCEDURE — 6370000000 HC RX 637 (ALT 250 FOR IP): Performed by: STUDENT IN AN ORGANIZED HEALTH CARE EDUCATION/TRAINING PROGRAM

## 2023-11-26 RX ORDER — WATER 10 ML/10ML
INJECTION INTRAMUSCULAR; INTRAVENOUS; SUBCUTANEOUS
Status: COMPLETED
Start: 2023-11-26 | End: 2023-11-26

## 2023-11-26 RX ADMIN — PANTOPRAZOLE SODIUM 40 MG: 40 TABLET, DELAYED RELEASE ORAL at 07:38

## 2023-11-26 RX ADMIN — METFORMIN HYDROCHLORIDE 500 MG: 500 TABLET ORAL at 10:49

## 2023-11-26 RX ADMIN — TAMSULOSIN HYDROCHLORIDE 0.8 MG: 0.4 CAPSULE ORAL at 10:42

## 2023-11-26 RX ADMIN — METHYLPREDNISOLONE SODIUM SUCCINATE 40 MG: 40 INJECTION, POWDER, LYOPHILIZED, FOR SOLUTION INTRAMUSCULAR; INTRAVENOUS at 10:42

## 2023-11-26 RX ADMIN — MIDODRINE HYDROCHLORIDE 10 MG: 5 TABLET ORAL at 10:42

## 2023-11-26 RX ADMIN — MULTIPLE VITAMINS W/ MINERALS TAB 1 TABLET: TAB at 10:42

## 2023-11-26 RX ADMIN — ALBUTEROL SULFATE 2.5 MG: 2.5 SOLUTION RESPIRATORY (INHALATION) at 11:05

## 2023-11-26 RX ADMIN — METHYLPREDNISOLONE SODIUM SUCCINATE 40 MG: 40 INJECTION, POWDER, LYOPHILIZED, FOR SOLUTION INTRAMUSCULAR; INTRAVENOUS at 21:09

## 2023-11-26 RX ADMIN — ALBUTEROL SULFATE 2.5 MG: 2.5 SOLUTION RESPIRATORY (INHALATION) at 16:08

## 2023-11-26 RX ADMIN — CEFEPIME 2000 MG: 2 INJECTION, POWDER, FOR SOLUTION INTRAVENOUS at 10:38

## 2023-11-26 RX ADMIN — SODIUM CHLORIDE, PRESERVATIVE FREE 10 ML: 5 INJECTION INTRAVENOUS at 21:09

## 2023-11-26 RX ADMIN — LEVOTHYROXINE SODIUM 137 MCG: 25 TABLET ORAL at 07:38

## 2023-11-26 RX ADMIN — SODIUM CHLORIDE, PRESERVATIVE FREE 10 ML: 5 INJECTION INTRAVENOUS at 10:52

## 2023-11-26 RX ADMIN — BUMETANIDE 1 MG: 1 TABLET ORAL at 10:43

## 2023-11-26 RX ADMIN — WATER 10 ML: 1 INJECTION INTRAMUSCULAR; INTRAVENOUS; SUBCUTANEOUS at 10:42

## 2023-11-26 RX ADMIN — MOMETASONE FUROATE AND FORMOTEROL FUMARATE DIHYDRATE 2 PUFF: 200; 5 AEROSOL RESPIRATORY (INHALATION) at 11:07

## 2023-11-26 RX ADMIN — PROPRANOLOL HYDROCHLORIDE 60 MG: 60 CAPSULE, EXTENDED RELEASE ORAL at 10:54

## 2023-11-26 RX ADMIN — FERROUS SULFATE TAB 325 MG (65 MG ELEMENTAL FE) 325 MG: 325 (65 FE) TAB at 10:49

## 2023-11-26 RX ADMIN — MONTELUKAST 10 MG: 10 TABLET, FILM COATED ORAL at 10:42

## 2023-11-26 RX ADMIN — MIDODRINE HYDROCHLORIDE 10 MG: 5 TABLET ORAL at 17:25

## 2023-11-26 RX ADMIN — OXYBUTYNIN CHLORIDE 10 MG: 5 TABLET ORAL at 21:21

## 2023-11-26 RX ADMIN — PROPRANOLOL HYDROCHLORIDE 60 MG: 60 CAPSULE, EXTENDED RELEASE ORAL at 21:21

## 2023-11-26 RX ADMIN — BUMETANIDE 1 MG: 1 TABLET ORAL at 17:25

## 2023-11-26 RX ADMIN — CEFEPIME 2000 MG: 2 INJECTION, POWDER, FOR SOLUTION INTRAVENOUS at 21:20

## 2023-11-26 RX ADMIN — ATORVASTATIN CALCIUM 40 MG: 40 TABLET, FILM COATED ORAL at 10:42

## 2023-11-26 RX ADMIN — METFORMIN HYDROCHLORIDE 500 MG: 500 TABLET ORAL at 17:25

## 2023-11-26 RX ADMIN — GUAIFENESIN, DEXTROMETHORPHAN HBR 1 TABLET: 600; 30 TABLET ORAL at 10:42

## 2023-11-26 RX ADMIN — WATER 10 ML: 1 INJECTION INTRAMUSCULAR; INTRAVENOUS; SUBCUTANEOUS at 21:09

## 2023-11-26 RX ADMIN — ALBUTEROL SULFATE 2.5 MG: 2.5 SOLUTION RESPIRATORY (INHALATION) at 20:51

## 2023-11-26 RX ADMIN — OXYBUTYNIN CHLORIDE 10 MG: 5 TABLET ORAL at 10:42

## 2023-11-26 RX ADMIN — SODIUM CHLORIDE: 9 INJECTION, SOLUTION INTRAVENOUS at 21:16

## 2023-11-26 RX ADMIN — MOMETASONE FUROATE AND FORMOTEROL FUMARATE DIHYDRATE 2 PUFF: 200; 5 AEROSOL RESPIRATORY (INHALATION) at 21:04

## 2023-11-26 RX ADMIN — ALBUTEROL SULFATE 2.5 MG: 2.5 SOLUTION RESPIRATORY (INHALATION) at 05:50

## 2023-11-26 RX ADMIN — TIOTROPIUM BROMIDE INHALATION SPRAY 2 PUFF: 3.12 SPRAY, METERED RESPIRATORY (INHALATION) at 11:07

## 2023-11-26 RX ADMIN — PRIMIDONE 250 MG: 250 TABLET ORAL at 17:25

## 2023-11-26 RX ADMIN — GUAIFENESIN, DEXTROMETHORPHAN HBR 1 TABLET: 600; 30 TABLET ORAL at 21:21

## 2023-11-26 RX ADMIN — SPIRONOLACTONE 25 MG: 25 TABLET, FILM COATED ORAL at 10:43

## 2023-11-26 RX ADMIN — PRIMIDONE 250 MG: 250 TABLET ORAL at 10:49

## 2023-11-26 NOTE — PROGRESS NOTES
Occupational Therapy  Facility/Department: [facility] AT THE [facility] MED SURG  Daily Treatment Note  NAME: Liana Shaw  : 1935  MRN: [id number]    Date of Service: 2023    Discharge Recommendations:  Home with assist PRN         Patient Diagnosis(es): The encounter diagnosis was Community acquired pneumonia of right lower lobe of lung. Assessment    Assessment: Pt had eyes closed throughout most of therapy session. Activity Tolerance: Patient limited by fatigue;Patient limited by endurance  Discharge Recommendations: Home with assist PRN      Plan   Occupational Therapy Plan  Times Per Day: Once a day  Current Treatment Recommendations: Strengthening; Safety education & training;Self-Care / ADL; Functional mobility training     Restrictions  Restrictions/Precautions  Restrictions/Precautions: Fall Risk;Contact Precautions    Subjective   Subjective  Subjective: Pt sitting up in bedside chair upon arrival. pt agreed to participate in therapy session. Pain: Pt had no complaints of pain. Objective                  OT Exercises  Exercise Treatment: Pt completed BUE ther ex x 7 planes x 15 reps x 1 set to increase UE strength and endurance in order to ease completion of ADL tasks. Pt required RBs as needed secondary to fatigue. Safety Devices  Type of Devices: All fall risk precautions in place;Call light within reach; Chair alarm in place;Gait belt;Left in chair;Nurse notified     Patient Education  Education Given To: Patient  Education Provided: Role of Therapy;Plan of Care  Education Method: Verbal  Barriers to Learning: None  Education Outcome: Verbalized understanding;Continued education needed    Goals  Short Term Goals  Time Frame for Short Term Goals: 1 week  Short Term Goal 1: Pt. will demonstrate good safety as relates to ADL's. Short Term Goal 2: Pt. will tolerate 15-30 min. functional activity to improve strength for ADL's.   Short Term Goal 3: Pt. will engage in self-care to increase Ind.  Long Term Goals  Time Frame for Long Term Goals : 4 weeks  Long Term Goal 1: Pt. will return to PTA ADL status.   Patient Goals   Patient goals : Get stronger and walk better       Therapy Time   Individual Concurrent Group Co-treatment   Time In 0735         Time Out 0750         Minutes 15                 SHAHRAM Rodriguez/MIAN

## 2023-11-26 NOTE — PROGRESS NOTES
Writer noticed on patients cardiac monitor that it looked like he was in A-Fib. EKG completed and reading A-Fib. Writer called and updated , no new orders.

## 2023-11-26 NOTE — PLAN OF CARE
Problem: Safety - Adult  Goal: Free from fall injury  Outcome: Progressing  Note: Bed in low position. Wheels locked. Bed alarm on. 2/4 side rails are up. Fall band on. Call light within reach. Problem: Respiratory - Adult  Goal: Achieves optimal ventilation and oxygenation  Outcome: Progressing  Flowsheets (Taken 11/25/2023 0800 by Deyanira Moore RN)  Achieves optimal ventilation and oxygenation:   Assess for changes in respiratory status   Assess for changes in mentation and behavior   Position to facilitate oxygenation and minimize respiratory effort   Oxygen supplementation based on oxygen saturation or arterial blood gases   Assess the need for suctioning and aspirate as needed   Encourage broncho-pulmonary hygiene including cough, deep breathe, incentive spirometry  Note: Lungs are clear with crackles. SpO2 was 92% on 1L NC, will continue to monitor. Problem: Pain  Goal: Verbalizes/displays adequate comfort level or baseline comfort level  Outcome: Progressing  Note: Pt is able to verbalize when in pain. Pt denies pain at this time. Will continue to monitor.

## 2023-11-26 NOTE — PROGRESS NOTES
Pt laying in bed with eyes closed. Pt awakens easily. Vitals and assessment as charted. Pt denies pain. Pt denies any further needs at this time. Call light within reach. Bed alarm on.

## 2023-11-26 NOTE — PROGRESS NOTES
[facility]  [address] [place], [place], [address]    Progress Note    Date:   11/26/2023  Patient name:  Hiwot Barlow  Date of admission:  11/21/2023  9:07 PM  MRN:   [id number]  YOB: 1935    SUBJECTIVE/Last 24 hours update:     Patient seen and examined at the bed side , no new acute events overnight and no new complains. He is feeling better than prior. Breathing is improving daily and he is being weaned off the O2. He was noted to have some possible Cardiac arrhythmia/Afib overnight. Notes from nursing staff and Consults had been reviewed, and the overnight progress had been checked with the nursing staff as well. REVIEW OF SYSTEMS:      CONSTITUTIONAL:  no fevers, no headaches, positive for fevers  EYES: negative for blury vision  HEENT: No headaches, No nasal congestion, no difficulty swallowing  RESPIRATORY: positive for dyspnea, wheezing, Cough  CARDIOVASCULAR: negative for chest pain, no palpitations  GASTROINTESTINAL: no nausea, no vomiting, no change in bowel habits, no abdominal pain   GENITOURINARY: negative for dysuria, no hematuria   MUSCULOSKELETAL: no joint pains, no muscle aches, bilateral leg edema  NEUROLOGICAL: No  Weakness or numbness      PAST MEDICAL HISTORY:      has a past medical history of A-fib ([address]), COPD (chronic obstructive pulmonary disease) ([address]), Diabetes mellitus ([address]), Hx of echocardiogram, Hyperlipidemia, Hypothyroidism, MI (myocardial infarction) ([address]), Stroke ([address]), Thyroid disease, and Type II or unspecified type diabetes mellitus without mention of complication, not stated as uncontrolled. PAST SURGICAL HISTORY:      has a past surgical history that includes hernia repair; Cardiac surgery (02/24/2017); fracture surgery; Colonoscopy; Cerebral angiogram (Bilateral, 06/15/2023); sigmoidoscopy (06/23/2023); Colonoscopy (N/A, 6/23/2023); and Upper gastrointestinal endoscopy (10/25/2023).        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 6/20/23   Thania Barnes MD   umeclidinium bromide (INCRUSE ELLIPTA) 62.5 MCG/ACT inhaler Inhale 1 puff into the lungs daily 6/6/23   Shelly Franks MD   tiotropium (SPIRIVA RESPIMAT) 2.5 MCG/ACT AERS inhaler Inhale 2 puffs into the lungs daily 6/2/23   Shelly Franks MD   fluticasone Elise Frank) 50 MCG/ACT nasal spray 2 sprays by Each Nostril route daily 4/20/23   GENA Rosa CNP   albuterol (PROVENTIL) (2.5 MG/3ML) 0.083% nebulizer solution Take 3 mLs by nebulization every 4 hours as needed for Wheezing or Shortness of Breath 3/31/23   Brenda Snider APRN - CNP   ferrous sulfate (IRON 325) 325 (65 Fe) MG tablet Take 1 tablet by mouth 2 times daily (with meals)  Patient taking differently: Take 1 tablet by mouth daily (with breakfast) 2/10/23   Gómez Phelps APRN - CNP   fluticasone-salmeterol (ADVAIR) 250-50 MCG/DOSE AEPB Inhale 1 puff into the lungs in the morning and 1 puff in the evening.     Provider, MD Ismael   blood glucose monitor strips accu check 7/18/18   Deanne Boyce APRN - CNP   albuterol sulfate  (90 Base) MCG/ACT inhaler Inhale 2 puffs into the lungs every 4 hours as needed for Wheezing or Shortness of Breath 4/13/18   Deanne Boyce APRN - CNP   acetaminophen (TYLENOL) 500 MG tablet Take 2 tablets by mouth every 4 hours as needed for Pain    ProviderIsmael MD   Multiple Vitamins-Minerals (THERAPEUTIC MULTIVITAMIN-MINERALS) tablet Take 1 tablet by mouth daily    Provider, MD Ismael       ALLERGIES:     Rosuvastatin      OBJECTIVE:       Vitals:    11/26/23 0300 11/26/23 0345 11/26/23 0400 11/26/23 0500   BP:       Pulse: 77  95 87   Resp:       Temp:       TempSrc:       SpO2: 93%

## 2023-11-26 NOTE — PROGRESS NOTES
slight posterior lean at times)  Sitting - Static: Fair (occasional)  Sitting - Dynamic: Fair (occasional)  Standing: With support  Standing - Static: Fair;Occasional  Standing - Dynamic: Fair;Constant support  Transfer Training  Transfer Training: Yes  Overall Level of Assistance: Contact-guard assistance;Assist X2 (Nurse assisted this date as he wanted to see how the patient moved this morning)  Interventions: Safety awareness training;Verbal cues  Sit to Stand: Contact-guard assistance;Assist X2 (Nurse assisted this date as he wanted to see how the patient moved this morning.)  Stand to Sit: Contact-guard assistance;Assist X1  Gait Training  Gait Training: Yes  Gait  Overall Level of Assistance: Contact-guard assistance;Assist X2 (Nurse assisted this date to see how the patient could move)  Distance (ft): 5 Feet  Assistive Device: Trisha Sprinkles, rolling;Gait belt  Interventions: Safety awareness training;Verbal cues  Base of Support: Widened  Speed/Geovanna: Slow;Shuffled  Step Length: Left shortened;Right shortened  Gait Abnormalities: Shuffling gait; Other (comment) (Mild FF posture)     PT Exercises  Exercise Treatment: Supine LE ther ex- APs, quad sets, heel slides, hip abd, SAQs, SLR all 10-15x each with frequent rest breaks and VCs to remain awake, required some AAROM and VCs for technique with fair carryover. Safety Devices  Type of Devices: All fall risk precautions in place;Call light within reach; Chair alarm in place;Gait belt;Left in chair;Nurse notified       Goals  Short Term Goals  Time Frame for Short Term Goals: 10 days  Short Term Goal 1: Initiate HEP and progress as tolerated for strength and mobility  Short Term Goal 2: Pt will be SBA +1 for bed mobility with bed flat and no railings to allow him to get up and down once discharged. Short Term Goal 3: Pt will be CGA +1 with transfers with appropriate device with good balance to reduce fall risk.   Short Term Goal 4: Pt will be CGA +1 with ambulation with appropriate device for up to 150 feet to enable ease with getting in and out of the home. Short Term Goal 5: Pt will be initiated on steps, if needed to enter and exit his home. Patient Goals   Patient Goals : unable to state    Education  Patient Education  Education Given To: Patient  Education Provided: Fall Prevention Strategies;Transfer Training  Education Provided Comments: Educated patient on proper hand placement for transfers for safety.   Education Method: Verbal  Barriers to Learning: None  Education Outcome: Verbalized understanding;Continued education needed    Therapy Time   Individual Concurrent Group Co-treatment   Time In 0709         Time Out 7898         Minutes [place], [place]

## 2023-11-27 LAB
ALBUMIN SERPL-MCNC: 2.9 G/DL (ref 3.5–5.2)
ALBUMIN/GLOB SERPL: 0.9 {RATIO} (ref 1–2.5)
ALP SERPL-CCNC: 174 U/L (ref 40–129)
ALT SERPL-CCNC: 44 U/L (ref 5–41)
AMMONIA PLAS-SCNC: 16 UMOL/L (ref 16–60)
ANION GAP SERPL CALCULATED.3IONS-SCNC: 12 MMOL/L (ref 9–17)
ARTERIAL PATENCY WRIST A: ABNORMAL
AST SERPL-CCNC: 41 U/L
BASOPHILS # BLD: 0 K/UL (ref 0–0.2)
BASOPHILS NFR BLD: 0 % (ref 0–2)
BDY SITE: ABNORMAL
BILIRUB SERPL-MCNC: 0.2 MG/DL (ref 0.3–1.2)
BNP SERPL-MCNC: ABNORMAL PG/ML
BNP SERPL-MCNC: ABNORMAL PG/ML
BODY TEMPERATURE: 37
BUN SERPL-MCNC: 43 MG/DL (ref 8–23)
BUN/CREAT SERPL: 31 (ref 9–20)
CALCIUM SERPL-MCNC: 8.9 MG/DL (ref 8.6–10.4)
CHLORIDE SERPL-SCNC: 100 MMOL/L (ref 98–107)
CO2 SERPL-SCNC: 24 MMOL/L (ref 20–31)
CREAT SERPL-MCNC: 1.4 MG/DL (ref 0.7–1.2)
CRP SERPL HS-MCNC: 155.8 MG/L (ref 0–5)
EOSINOPHIL # BLD: 0 K/UL (ref 0–0.44)
EOSINOPHILS RELATIVE PERCENT: 0 % (ref 1–4)
ERYTHROCYTE [DISTWIDTH] IN BLOOD BY AUTOMATED COUNT: 16.6 % (ref 11.8–14.4)
FIO2 ON VENT: 24 %
GAS FLOW.O2 O2 DELIVERY SYS: ABNORMAL L/MIN
GFR SERPL CREATININE-BSD FRML MDRD: 49 ML/MIN/1.73M2
GLUCOSE SERPL-MCNC: 169 MG/DL (ref 70–99)
HCO3 ARTERIAL: 22.1 MMOL/L (ref 22–26)
HCT VFR BLD AUTO: 26.2 % (ref 40.7–50.3)
HGB BLD-MCNC: 8.3 G/DL (ref 13–17)
IMM GRANULOCYTES # BLD AUTO: 0.84 K/UL (ref 0–0.3)
IMM GRANULOCYTES NFR BLD: 6 %
LYMPHOCYTES NFR BLD: 1.68 K/UL (ref 1.1–3.7)
LYMPHOCYTES RELATIVE PERCENT: 12 % (ref 24–43)
MCH RBC QN AUTO: 28.7 PG (ref 25.2–33.5)
MCHC RBC AUTO-ENTMCNC: 31.7 G/DL (ref 28.4–34.8)
MCV RBC AUTO: 90.7 FL (ref 82.6–102.9)
MONOCYTES NFR BLD: 0.98 K/UL (ref 0.1–1.2)
MONOCYTES NFR BLD: 7 % (ref 3–12)
MORPHOLOGY: NORMAL
NEGATIVE BASE EXCESS, ART: 1.6 MMOL/L (ref 0–2)
NEUTROPHILS NFR BLD: 75 % (ref 36–65)
NEUTS SEG NFR BLD: 10.5 K/UL (ref 1.5–8.1)
NRBC BLD-RTO: 0 PER 100 WBC
O2 SAT, ARTERIAL: 95.6 % (ref 95–98)
PCO2 ARTERIAL: 34.3 MMHG (ref 35–45)
PH ARTERIAL: 7.43 (ref 7.35–7.45)
PLATELET # BLD AUTO: 265 K/UL (ref 138–453)
PMV BLD AUTO: 11 FL (ref 8.1–13.5)
PO2 ARTERIAL: 75.8 MMHG (ref 80–100)
POTASSIUM SERPL-SCNC: 3.8 MMOL/L (ref 3.7–5.3)
PROT SERPL-MCNC: 6.1 G/DL (ref 6.4–8.3)
PT. POSITION: ABNORMAL
RBC # BLD AUTO: 2.89 M/UL (ref 4.21–5.77)
RESPIRATORY RATE: 16
SODIUM SERPL-SCNC: 136 MMOL/L (ref 135–144)
WBC OTHER # BLD: 14 K/UL (ref 3.5–11.3)

## 2023-11-27 PROCEDURE — 94664 DEMO&/EVAL PT USE INHALER: CPT

## 2023-11-27 PROCEDURE — 83550 IRON BINDING TEST: CPT

## 2023-11-27 PROCEDURE — 6370000000 HC RX 637 (ALT 250 FOR IP): Performed by: STUDENT IN AN ORGANIZED HEALTH CARE EDUCATION/TRAINING PROGRAM

## 2023-11-27 PROCEDURE — 82728 ASSAY OF FERRITIN: CPT

## 2023-11-27 PROCEDURE — 6360000002 HC RX W HCPCS: Performed by: NURSE PRACTITIONER

## 2023-11-27 PROCEDURE — 6370000000 HC RX 637 (ALT 250 FOR IP): Performed by: PHYSICIAN ASSISTANT

## 2023-11-27 PROCEDURE — 83880 ASSAY OF NATRIURETIC PEPTIDE: CPT

## 2023-11-27 PROCEDURE — 97110 THERAPEUTIC EXERCISES: CPT

## 2023-11-27 PROCEDURE — 36415 COLL VENOUS BLD VENIPUNCTURE: CPT

## 2023-11-27 PROCEDURE — 85025 COMPLETE CBC W/AUTO DIFF WBC: CPT

## 2023-11-27 PROCEDURE — 1200000000 HC SEMI PRIVATE

## 2023-11-27 PROCEDURE — 6360000002 HC RX W HCPCS: Performed by: STUDENT IN AN ORGANIZED HEALTH CARE EDUCATION/TRAINING PROGRAM

## 2023-11-27 PROCEDURE — 86140 C-REACTIVE PROTEIN: CPT

## 2023-11-27 PROCEDURE — 97535 SELF CARE MNGMENT TRAINING: CPT

## 2023-11-27 PROCEDURE — 36600 WITHDRAWAL OF ARTERIAL BLOOD: CPT

## 2023-11-27 PROCEDURE — 2580000003 HC RX 258

## 2023-11-27 PROCEDURE — 82805 BLOOD GASES W/O2 SATURATION: CPT

## 2023-11-27 PROCEDURE — 6370000000 HC RX 637 (ALT 250 FOR IP)

## 2023-11-27 PROCEDURE — 82140 ASSAY OF AMMONIA: CPT

## 2023-11-27 PROCEDURE — 94640 AIRWAY INHALATION TREATMENT: CPT

## 2023-11-27 PROCEDURE — 94761 N-INVAS EAR/PLS OXIMETRY MLT: CPT

## 2023-11-27 PROCEDURE — 80053 COMPREHEN METABOLIC PANEL: CPT

## 2023-11-27 PROCEDURE — 83540 ASSAY OF IRON: CPT

## 2023-11-27 PROCEDURE — 97116 GAIT TRAINING THERAPY: CPT

## 2023-11-27 PROCEDURE — 2700000000 HC OXYGEN THERAPY PER DAY

## 2023-11-27 PROCEDURE — 2580000003 HC RX 258: Performed by: STUDENT IN AN ORGANIZED HEALTH CARE EDUCATION/TRAINING PROGRAM

## 2023-11-27 PROCEDURE — 84145 PROCALCITONIN (PCT): CPT

## 2023-11-27 PROCEDURE — 84466 ASSAY OF TRANSFERRIN: CPT

## 2023-11-27 PROCEDURE — 94669 MECHANICAL CHEST WALL OSCILL: CPT

## 2023-11-27 PROCEDURE — 99222 1ST HOSP IP/OBS MODERATE 55: CPT | Performed by: INTERNAL MEDICINE

## 2023-11-27 RX ORDER — WATER 10 ML/10ML
INJECTION INTRAMUSCULAR; INTRAVENOUS; SUBCUTANEOUS
Status: COMPLETED
Start: 2023-11-27 | End: 2023-11-27

## 2023-11-27 RX ORDER — FUROSEMIDE 10 MG/ML
40 INJECTION INTRAMUSCULAR; INTRAVENOUS ONCE
Status: COMPLETED | OUTPATIENT
Start: 2023-11-27 | End: 2023-11-27

## 2023-11-27 RX ADMIN — SODIUM CHLORIDE, PRESERVATIVE FREE 10 ML: 5 INJECTION INTRAVENOUS at 20:53

## 2023-11-27 RX ADMIN — LEVOTHYROXINE SODIUM 137 MCG: 25 TABLET ORAL at 08:21

## 2023-11-27 RX ADMIN — ALBUTEROL SULFATE 2.5 MG: 2.5 SOLUTION RESPIRATORY (INHALATION) at 06:12

## 2023-11-27 RX ADMIN — METFORMIN HYDROCHLORIDE 500 MG: 500 TABLET ORAL at 08:22

## 2023-11-27 RX ADMIN — TAMSULOSIN HYDROCHLORIDE 0.8 MG: 0.4 CAPSULE ORAL at 08:21

## 2023-11-27 RX ADMIN — BUMETANIDE 1 MG: 1 TABLET ORAL at 17:31

## 2023-11-27 RX ADMIN — FUROSEMIDE 40 MG: 10 INJECTION, SOLUTION INTRAMUSCULAR; INTRAVENOUS at 08:54

## 2023-11-27 RX ADMIN — SPIRONOLACTONE 25 MG: 25 TABLET, FILM COATED ORAL at 08:22

## 2023-11-27 RX ADMIN — PROPRANOLOL HYDROCHLORIDE 60 MG: 60 CAPSULE, EXTENDED RELEASE ORAL at 20:54

## 2023-11-27 RX ADMIN — MOMETASONE FUROATE AND FORMOTEROL FUMARATE DIHYDRATE 2 PUFF: 200; 5 AEROSOL RESPIRATORY (INHALATION) at 11:40

## 2023-11-27 RX ADMIN — BUMETANIDE 1 MG: 1 TABLET ORAL at 08:22

## 2023-11-27 RX ADMIN — ALBUTEROL SULFATE 2.5 MG: 2.5 SOLUTION RESPIRATORY (INHALATION) at 22:00

## 2023-11-27 RX ADMIN — MULTIPLE VITAMINS W/ MINERALS TAB 1 TABLET: TAB at 08:22

## 2023-11-27 RX ADMIN — GUAIFENESIN, DEXTROMETHORPHAN HBR 1 TABLET: 600; 30 TABLET ORAL at 20:53

## 2023-11-27 RX ADMIN — FLUTICASONE PROPIONATE 2 SPRAY: 50 SPRAY, METERED NASAL at 08:41

## 2023-11-27 RX ADMIN — ACETAMINOPHEN 650 MG: 325 TABLET ORAL at 17:31

## 2023-11-27 RX ADMIN — METHYLPREDNISOLONE SODIUM SUCCINATE 40 MG: 40 INJECTION, POWDER, LYOPHILIZED, FOR SOLUTION INTRAMUSCULAR; INTRAVENOUS at 20:53

## 2023-11-27 RX ADMIN — OXYBUTYNIN CHLORIDE 10 MG: 5 TABLET ORAL at 20:54

## 2023-11-27 RX ADMIN — GUAIFENESIN, DEXTROMETHORPHAN HBR 1 TABLET: 600; 30 TABLET ORAL at 08:21

## 2023-11-27 RX ADMIN — PROPRANOLOL HYDROCHLORIDE 60 MG: 60 CAPSULE, EXTENDED RELEASE ORAL at 08:54

## 2023-11-27 RX ADMIN — WATER 10 ML: 1 INJECTION INTRAMUSCULAR; INTRAVENOUS; SUBCUTANEOUS at 20:53

## 2023-11-27 RX ADMIN — ATORVASTATIN CALCIUM 40 MG: 40 TABLET, FILM COATED ORAL at 08:21

## 2023-11-27 RX ADMIN — FERROUS SULFATE TAB 325 MG (65 MG ELEMENTAL FE) 325 MG: 325 (65 FE) TAB at 08:22

## 2023-11-27 RX ADMIN — ACETAMINOPHEN 650 MG: 325 TABLET ORAL at 10:48

## 2023-11-27 RX ADMIN — ACETAMINOPHEN 650 MG: 325 TABLET ORAL at 03:07

## 2023-11-27 RX ADMIN — OXYBUTYNIN CHLORIDE 10 MG: 5 TABLET ORAL at 08:55

## 2023-11-27 RX ADMIN — PRIMIDONE 250 MG: 250 TABLET ORAL at 17:31

## 2023-11-27 RX ADMIN — SODIUM CHLORIDE, PRESERVATIVE FREE 10 ML: 5 INJECTION INTRAVENOUS at 08:41

## 2023-11-27 RX ADMIN — PRIMIDONE 250 MG: 250 TABLET ORAL at 08:22

## 2023-11-27 RX ADMIN — PANTOPRAZOLE SODIUM 40 MG: 40 TABLET, DELAYED RELEASE ORAL at 08:21

## 2023-11-27 RX ADMIN — ALBUTEROL SULFATE 2.5 MG: 2.5 SOLUTION RESPIRATORY (INHALATION) at 11:40

## 2023-11-27 RX ADMIN — CEFEPIME 2000 MG: 2 INJECTION, POWDER, FOR SOLUTION INTRAVENOUS at 21:11

## 2023-11-27 RX ADMIN — METHYLPREDNISOLONE SODIUM SUCCINATE 40 MG: 40 INJECTION, POWDER, LYOPHILIZED, FOR SOLUTION INTRAMUSCULAR; INTRAVENOUS at 08:55

## 2023-11-27 RX ADMIN — MIDODRINE HYDROCHLORIDE 10 MG: 5 TABLET ORAL at 08:22

## 2023-11-27 RX ADMIN — METFORMIN HYDROCHLORIDE 500 MG: 500 TABLET ORAL at 17:31

## 2023-11-27 RX ADMIN — ALBUTEROL SULFATE 2.5 MG: 2.5 SOLUTION RESPIRATORY (INHALATION) at 17:03

## 2023-11-27 RX ADMIN — POLYETHYLENE GLYCOL 3350 17 G: 17 POWDER, FOR SOLUTION ORAL at 10:48

## 2023-11-27 RX ADMIN — MIDODRINE HYDROCHLORIDE 10 MG: 5 TABLET ORAL at 12:01

## 2023-11-27 RX ADMIN — CEFEPIME 2000 MG: 2 INJECTION, POWDER, FOR SOLUTION INTRAVENOUS at 09:06

## 2023-11-27 RX ADMIN — MOMETASONE FUROATE AND FORMOTEROL FUMARATE DIHYDRATE 2 PUFF: 200; 5 AEROSOL RESPIRATORY (INHALATION) at 22:00

## 2023-11-27 RX ADMIN — MONTELUKAST 10 MG: 10 TABLET, FILM COATED ORAL at 08:22

## 2023-11-27 ASSESSMENT — PAIN SCALES - GENERAL
PAINLEVEL_OUTOF10: 3
PAINLEVEL_OUTOF10: 8
PAINLEVEL_OUTOF10: 5
PAINLEVEL_OUTOF10: 5
PAINLEVEL_OUTOF10: 4

## 2023-11-27 ASSESSMENT — PAIN DESCRIPTION - ORIENTATION
ORIENTATION: LEFT

## 2023-11-27 ASSESSMENT — PAIN DESCRIPTION - PAIN TYPE
TYPE: CHRONIC PAIN
TYPE: ACUTE PAIN
TYPE: CHRONIC PAIN

## 2023-11-27 ASSESSMENT — PAIN DESCRIPTION - ONSET
ONSET: GRADUAL

## 2023-11-27 ASSESSMENT — PAIN DESCRIPTION - LOCATION
LOCATION: KNEE

## 2023-11-27 ASSESSMENT — PAIN DESCRIPTION - FREQUENCY
FREQUENCY: INTERMITTENT

## 2023-11-27 ASSESSMENT — PAIN DESCRIPTION - DESCRIPTORS
DESCRIPTORS: ACHING

## 2023-11-27 ASSESSMENT — PAIN - FUNCTIONAL ASSESSMENT
PAIN_FUNCTIONAL_ASSESSMENT: ACTIVITIES ARE NOT PREVENTED

## 2023-11-27 NOTE — PLAN OF CARE
Problem: Discharge Planning  Goal: Discharge to home or other facility with appropriate resources  Outcome: Progressing     Problem: Safety - Adult  Goal: Free from fall injury  Outcome: Progressing     Problem: Skin/Tissue Integrity  Goal: Absence of new skin breakdown  Description: 1. Monitor for areas of redness and/or skin breakdown  2. Assess vascular access sites hourly  3. Every 4-6 hours minimum:  Change oxygen saturation probe site  4. Every 4-6 hours:  If on nasal continuous positive airway pressure, respiratory therapy assess nares and determine need for appliance change or resting period.   Outcome: Progressing     Problem: ABCDS Injury Assessment  Goal: Absence of physical injury  Outcome: Progressing     Problem: Nutrition Deficit:  Goal: Optimize nutritional status  11/27/2023 1746 by Eligio Zhu RN  Outcome: Progressing     Problem: Respiratory - Adult  Goal: Achieves optimal ventilation and oxygenation  Outcome: Progressing     Problem: Pain  Goal: Verbalizes/displays adequate comfort level or baseline comfort level  Outcome: Progressing

## 2023-11-27 NOTE — PROGRESS NOTES
Occupational Therapy  Facility/Department: [facility] AT THE [facility] MED SURG  Daily Treatment Note  NAME: Hayden Crigler  : 1935  MRN: [id number]    Date of Service: 2023    Discharge Recommendations:  Home with assist PRN         Patient Diagnosis(es): The encounter diagnosis was Community acquired pneumonia of right lower lobe of lung. Assessment    Activity Tolerance: Patient limited by fatigue;Patient limited by endurance  Discharge Recommendations: Home with assist PRN      Plan   Occupational Therapy Plan  Times Per Day: Once a day  Current Treatment Recommendations: Strengthening; Safety education & training;Self-Care / ADL; Functional mobility training     Restrictions  Restrictions/Precautions  Restrictions/Precautions: Fall Risk;Contact Precautions    Subjective   Subjective  Subjective: Pt lying in bed upon arrival with nurse present. Pt agreed to participate in therapy session however difficult to arouse. Pain: Pt had no complaints of pain. Pain: unable to deny pain           Objective    Vitals          ADL  Additional Comments: Mod A x 2 to complete supine to sit EOB, Mod A x 2 to complete sit to stand from EOB to RW. Min A x 2 to complete func mob. Safety Devices  Type of Devices: All fall risk precautions in place;Call light within reach; Chair alarm in place;Gait belt;Left in chair;Nurse notified     Patient Education  Education Given To: Patient  Education Provided: Role of Therapy;Plan of Care  Education Method: Verbal  Barriers to Learning: None  Education Outcome: Verbalized understanding;Continued education needed    Goals  Short Term Goals  Time Frame for Short Term Goals: 1 week  Short Term Goal 1: Pt. will demonstrate good safety as relates to ADL's. Short Term Goal 2: Pt. will tolerate 15-30 min. functional activity to improve strength for ADL's. Short Term Goal 3: Pt. will engage in self-care to increase Ind.   Long Term Goals  Time Frame for Long Term Goals : 4 weeks  Long Term

## 2023-11-27 NOTE — PROGRESS NOTES
Physical Therapy  Facility/Department: [facility] AT THE [facility] MED SURG  Daily Treatment Note  NAME: Halley Alvarado  : 1935  MRN: [id number]    Date of Service: 2023    Discharge Recommendations:  Continue to assess pending progress, Subacute/Skilled Nursing Facility, Patient would benefit from continued therapy after discharge        Patient Diagnosis(es): The encounter diagnosis was Community acquired pneumonia of right lower lobe of lung. Assessment   Assessment: Patient asleep in bed upon arrival with nurse present in room. Patient required max verbal and tactile cues to arouse this date. Patient ambulates 5ft with FWW and William x2, no LOB noted. Bed Mobility: ModAx2. Transfers: ModAx2 (3rd for safety)  Activity Tolerance: Patient limited by fatigue;Patient limited by endurance     Plan    Physical Therapy Plan  General Plan: 2 times a day 7 days a week (1x/day on weekends)     Restrictions  Restrictions/Precautions  Restrictions/Precautions: Fall Risk, Contact Precautions     Subjective    Subjective  Subjective: Patient in bed upon arrival, max verbal and tactile cues to arouse patient this date. Pain: unable to deny pain     Objective   Vitals     Bed Mobility Training  Bed Mobility Training: Yes  Overall Level of Assistance: Assist X2;Moderate assistance  Interventions: Verbal cues; Safety awareness training  Rolling: Moderate assistance;Assist X2  Supine to Sit: Moderate assistance;Assist X2  Sit to Supine: Moderate assistance;Assist X2  Scooting: Moderate assistance;Assist X2  Balance  Sitting: With support  Sitting - Static: Fair (occasional)  Sitting - Dynamic: Fair (occasional)  Standing: With support  Standing - Static: Fair;Occasional  Standing - Dynamic: Fair;Constant support  Transfer Training  Transfer Training: Yes  Overall Level of Assistance: Moderate assistance;Assist X2;Other (comment) (3rd for safety)  Interventions: Verbal cues; Tactile cues; Safety awareness training  Sit to Stand: Assist

## 2023-11-27 NOTE — PLAN OF CARE
Problem: Discharge Planning  Goal: Discharge to home or other facility with appropriate resources  Outcome: Progressing  Flowsheets (Taken 11/25/2023 0800 by Grace Mott, RN)  Discharge to home or other facility with appropriate resources:   Identify barriers to discharge with patient and caregiver   Arrange for needed discharge resources and transportation as appropriate   Identify discharge learning needs (meds, wound care, etc)   Arrange for interpreters to assist at discharge as needed     Problem: Safety - Adult  Goal: Free from fall injury  Outcome: Progressing  Flowsheets (Taken 11/24/2023 0041 by Charli Leggett RN)  Free From Fall Injury: Instruct family/caregiver on patient safety     Problem: Skin/Tissue Integrity  Goal: Absence of new skin breakdown  Description: 1. Monitor for areas of redness and/or skin breakdown  2. Assess vascular access sites hourly  3. Every 4-6 hours minimum:  Change oxygen saturation probe site  4. Every 4-6 hours:  If on nasal continuous positive airway pressure, respiratory therapy assess nares and determine need for appliance change or resting period.   Outcome: Progressing     Problem: ABCDS Injury Assessment  Goal: Absence of physical injury  Outcome: Progressing  Flowsheets (Taken 11/24/2023 0041 by Charli Leggett RN)  Absence of Physical Injury: Implement safety measures based on patient assessment     Problem: Respiratory - Adult  Goal: Achieves optimal ventilation and oxygenation  Outcome: Progressing  Flowsheets (Taken 11/25/2023 0800 by Grace Mott RN)  Achieves optimal ventilation and oxygenation:   Assess for changes in respiratory status   Assess for changes in mentation and behavior   Position to facilitate oxygenation and minimize respiratory effort   Oxygen supplementation based on oxygen saturation or arterial blood gases   Assess the need for suctioning and aspirate as needed   Encourage broncho-pulmonary hygiene including cough, deep breathe, incentive spirometry     Problem: Pain  Goal: Verbalizes/displays adequate comfort level or baseline comfort level  Outcome: Progressing  Flowsheets (Taken 11/25/2023 0700 by Ruthie Riddle RN)  Verbalizes/displays adequate comfort level or baseline comfort level:   Encourage patient to monitor pain and request assistance   Assess pain using appropriate pain scale   Administer analgesics based on type and severity of pain and evaluate response   Implement non-pharmacological measures as appropriate and evaluate response   Consider cultural and social influences on pain and pain management

## 2023-11-27 NOTE — PROGRESS NOTES
Attempted to speak with Holly Ferrell at the bedside. He is pulling at his pulse ox monitor and states that he does not know what it is. Reoriented. At this time is he confused to the date. Able to state the president but when asked if he knows where he is he states \"[place]\" and begins to pull at his pulse ox again. Repositioned for comfort. Will continue discussion with is wife when she arrives.      [address] Nurse Coordinator  11/27/2023 3:54 PM

## 2023-11-27 NOTE — PROGRESS NOTES
Writer to bedside to complete morning assessment. Upon entry to room, pt resting in bed, respirations even  while on room air. Vitals obtained and assessment completed, see flow sheet for details. Pt denies needs from writer at this time. Call light in reach. Care ongoing.

## 2023-11-27 NOTE — PROGRESS NOTES
Spoke with wife re:  Patient discharge plans. Wife states that she would like for him to go to [facility] for strengthening prior to discharge home. Referral made to [facility], Wendi Coombs, at this time.     [address] [place] [address]  11/27/2023

## 2023-11-27 NOTE — PROGRESS NOTES
Physical Therapy  Facility/Department: [facility] AT THE [facility] MED SURG  Daily Treatment Note  NAME: Morenita Tello  : 1935  MRN: [id number]    Date of Service: 2023    Discharge Recommendations:  Continue to assess pending progress, Subacute/Skilled Nursing Facility, Patient would benefit from continued therapy after discharge        Patient Diagnosis(es): The encounter diagnosis was Community acquired pneumonia of right lower lobe of lung. Assessment   Assessment: Patient in chair upon arrival, pleasant and awake at this time. Patient required max encouragement to particpate in ambulation but requests to use urinal and states \"I can't walk'. After patient attempted to use urinal for ~10 minutes with no success, patient stood to begin ambulation however pateint became incontince and required hygiene care. Patient completed static standing for several trials d/t having to incontience and requiring hygiene care ~ 2 minutes. Patient ambulated 20ft with FWW and William x2 (3rd for safety). Transfers: ModAx2  Activity Tolerance: Patient limited by fatigue;Patient limited by endurance     Plan    Physical Therapy Plan  General Plan: 2 times a day 7 days a week (1x/day on weekends)     Restrictions  Restrictions/Precautions  Restrictions/Precautions: Fall Risk, Contact Precautions     Subjective    Subjective  Subjective: Patient in chair upon arrival, pleasant and awake at this time. Pain: denies     Objective   Vitals     Bed Mobility Training  Bed Mobility Training: No  Overall Level of Assistance: Assist X2;Moderate assistance  Interventions: Verbal cues; Safety awareness training  Rolling: Moderate assistance;Assist X2  Supine to Sit: Moderate assistance;Assist X2  Sit to Supine: Moderate assistance;Assist X2  Scooting:  Moderate assistance;Assist X2  Balance  Sitting: With support  Sitting - Static: Fair (occasional)  Sitting - Dynamic: Fair (occasional)  Standing: With support  Standing - Static:

## 2023-11-27 NOTE — ACP (ADVANCE CARE PLANNING)
ACP conversation completed. See below note. Pretty Schmidt RN  Registered Nurse  Palliative Care  ACP (Advance Care Planning)      Signed  Date of Service:  10/24/2023  1:57 PM     Signed                                                                                                                                                                                              Advance Care Planning      Advance Care Planning Activator (Inpatient)  Conversation Note        Date of ACP Conversation: 10/24/2023      Conversation Conducted with: Patient with Decision Making Capacity     ACP Activator: Elizabeth Gutierres RN           Health Care Decision Maker:      Current Designated Health Care Decision Maker:     Primary Decision Maker (Active): HalleykellemelyNicole - [facility] - [phone]     Care Preferences     Ventilation: \"If you were in your present state of health and suddenly became very ill and were unable to breathe on your own, what would your preference be about the use of a ventilator (breathing machine) if it were available to you? \"       Would the patient desire the use of ventilator (breathing machine)?: no     \"If your health worsens and it becomes clear that your chance of recovery is unlikely, what would your preference be about the use of a ventilator (breathing machine) if it were available to you? \"      Would the patient desire the use of ventilator (breathing machine)?: No        Resuscitation  \"CPR works best to restart the heart when there is a sudden event, like a heart attack, in someone who is otherwise healthy. Unfortunately, CPR does not typically restart the heart for people who have serious health conditions or who are very sick. \"     \"In the event your heart stopped as a result of an underlying serious health condition, would you want attempts to be made to restart your heart (answer \"yes\" for attempt to resuscitate) or would you prefer a natural death (answer \"no\" for

## 2023-11-27 NOTE — PROGRESS NOTES
Comprehensive Nutrition Assessment    Type and Reason for Visit:  Reassess    Nutrition Recommendations/Plan:   Encourage protein foods  Culturelle recommended. Malnutrition Assessment:  Malnutrition Status: At risk for malnutrition (Comment) (11/22/23 1047)    Context:  Acute Illness     Findings of the 6 clinical characteristics of malnutrition:  Energy Intake:  Mild decrease in energy intake (Comment) (at least acutely)  Weight Loss:  No significant weight loss     Body Fat Loss:  No significant body fat loss     Muscle Mass Loss:  No significant muscle mass loss    Fluid Accumulation:  Moderate to Severe Generalized, Extremities   Strength:  Not Performed    Nutrition Assessment:    Continued suboptimal nutrient intakes with smaller meals chosen without good protein source. Is taking Glucerna supplement well to improve nutrient intakes. Stable renal indices. Fair glycemia with steroid influence. Weight is up with increased edema. Encouraged protein foods upon visit. Recommend adding probiotic for loose bm on antibiotic. Nutrition Related Findings:    +2 BLE edema. +2 generalized edema. loose bm. Wound Type: None       Current Nutrition Intake & Therapies:    Average Meal Intake: % (of smaller meals.)  Average Supplements Intake: %  ADULT ORAL NUTRITION SUPPLEMENT; Breakfast, Lunch, Dinner; Diabetic Oral Supplement  ADULT DIET; Regular; 4 carb choices (60 gm/meal); Low Sodium (2 gm); 1800 ml    Anthropometric Measures:  Height: 180.3 cm (5' 11\")  Ideal Body Weight (IBW): 172 lbs (78 kg)    Admission Body Weight: 107.2 kg (236 lb 6.4 oz)  Current Body Weight: 109 kg (240 lb 4.8 oz), 137.4 % IBW. Weight Source: Bed Scale  Current BMI (kg/m2): 33.5  Usual Body Weight: 104.7 kg (230 lb 12.8 oz)  % Weight Change (Calculated): 4.1  Weight Adjustment For: No Adjustment                 BMI Categories: Obese Class 1 (BMI 30.0-34. 9)    Estimated Daily Nutrient Needs:  Energy Requirements Based On:

## 2023-11-27 NOTE — PROGRESS NOTES
Vitals and assessment complete, see flow sheet. Patient sleeping but easily awakens to verbal stimuli. Patient wife is at bedside. Patient oriented to person, place, and situation; disoriented to time. Patient denies shortness of breath or lightheadedness. Patient denies pain. Patient repositioned for comfort. Call light within reach. Will continue to monitor.

## 2023-11-28 LAB
ALBUMIN SERPL-MCNC: 2.7 G/DL (ref 3.5–5.2)
ALBUMIN/GLOB SERPL: 0.8 {RATIO} (ref 1–2.5)
ALP SERPL-CCNC: 147 U/L (ref 40–129)
ALT SERPL-CCNC: 44 U/L (ref 5–41)
ANION GAP SERPL CALCULATED.3IONS-SCNC: 15 MMOL/L (ref 9–17)
AST SERPL-CCNC: 42 U/L
BASOPHILS # BLD: 0 K/UL (ref 0–0.2)
BASOPHILS NFR BLD: 0 % (ref 0–2)
BILIRUB SERPL-MCNC: 0.2 MG/DL (ref 0.3–1.2)
BUN SERPL-MCNC: 44 MG/DL (ref 8–23)
BUN/CREAT SERPL: 29 (ref 9–20)
CALCIUM SERPL-MCNC: 8.9 MG/DL (ref 8.6–10.4)
CHLORIDE SERPL-SCNC: 96 MMOL/L (ref 98–107)
CO2 SERPL-SCNC: 23 MMOL/L (ref 20–31)
CREAT SERPL-MCNC: 1.5 MG/DL (ref 0.7–1.2)
CRP SERPL HS-MCNC: 148.7 MG/L (ref 0–5)
EOSINOPHIL # BLD: 0 K/UL (ref 0–0.44)
EOSINOPHILS RELATIVE PERCENT: 0 % (ref 1–4)
ERYTHROCYTE [DISTWIDTH] IN BLOOD BY AUTOMATED COUNT: 16.7 % (ref 11.8–14.4)
FERRITIN SERPL-MCNC: 160 NG/ML (ref 30–400)
GFR SERPL CREATININE-BSD FRML MDRD: 45 ML/MIN/1.73M2
GLUCOSE SERPL-MCNC: 148 MG/DL (ref 70–99)
HCT VFR BLD AUTO: 27.5 % (ref 40.7–50.3)
HGB BLD-MCNC: 8.6 G/DL (ref 13–17)
IMM GRANULOCYTES # BLD AUTO: 0.82 K/UL (ref 0–0.3)
IMM GRANULOCYTES NFR BLD: 6 %
IRON SATN MFR SERPL: 16 % (ref 20–55)
IRON SERPL-MCNC: 19 UG/DL (ref 59–158)
LYMPHOCYTES NFR BLD: 1.77 K/UL (ref 1.1–3.7)
LYMPHOCYTES RELATIVE PERCENT: 13 % (ref 24–43)
MCH RBC QN AUTO: 28.6 PG (ref 25.2–33.5)
MCHC RBC AUTO-ENTMCNC: 31.3 G/DL (ref 28.4–34.8)
MCV RBC AUTO: 91.4 FL (ref 82.6–102.9)
MONOCYTES NFR BLD: 0.68 K/UL (ref 0.1–1.2)
MONOCYTES NFR BLD: 5 % (ref 3–12)
MORPHOLOGY: NORMAL
NEUTROPHILS NFR BLD: 76 % (ref 36–65)
NEUTS SEG NFR BLD: 10.33 K/UL (ref 1.5–8.1)
NRBC BLD-RTO: 0 PER 100 WBC
PLATELET # BLD AUTO: 150 K/UL (ref 138–453)
PMV BLD AUTO: 11 FL (ref 8.1–13.5)
POTASSIUM SERPL-SCNC: 4.2 MMOL/L (ref 3.7–5.3)
PROCALCITONIN SERPL-MCNC: 1.12 NG/ML (ref 0–0.09)
PROT SERPL-MCNC: 6 G/DL (ref 6.4–8.3)
RBC # BLD AUTO: 3.01 M/UL (ref 4.21–5.77)
SODIUM SERPL-SCNC: 134 MMOL/L (ref 135–144)
TIBC SERPL-MCNC: 121 UG/DL (ref 250–450)
TRANSFERRIN SERPL-MCNC: 122 MG/DL (ref 200–360)
UNSATURATED IRON BINDING CAPACITY: 102 UG/DL (ref 112–347)
WBC OTHER # BLD: 13.6 K/UL (ref 3.5–11.3)

## 2023-11-28 PROCEDURE — 85025 COMPLETE CBC W/AUTO DIFF WBC: CPT

## 2023-11-28 PROCEDURE — 94669 MECHANICAL CHEST WALL OSCILL: CPT

## 2023-11-28 PROCEDURE — 97110 THERAPEUTIC EXERCISES: CPT

## 2023-11-28 PROCEDURE — 2580000003 HC RX 258

## 2023-11-28 PROCEDURE — 6370000000 HC RX 637 (ALT 250 FOR IP): Performed by: PHYSICIAN ASSISTANT

## 2023-11-28 PROCEDURE — 94664 DEMO&/EVAL PT USE INHALER: CPT

## 2023-11-28 PROCEDURE — 80053 COMPREHEN METABOLIC PANEL: CPT

## 2023-11-28 PROCEDURE — 6370000000 HC RX 637 (ALT 250 FOR IP): Performed by: INTERNAL MEDICINE

## 2023-11-28 PROCEDURE — 97530 THERAPEUTIC ACTIVITIES: CPT

## 2023-11-28 PROCEDURE — 2580000003 HC RX 258: Performed by: STUDENT IN AN ORGANIZED HEALTH CARE EDUCATION/TRAINING PROGRAM

## 2023-11-28 PROCEDURE — 6370000000 HC RX 637 (ALT 250 FOR IP): Performed by: NURSE PRACTITIONER

## 2023-11-28 PROCEDURE — 6360000002 HC RX W HCPCS: Performed by: STUDENT IN AN ORGANIZED HEALTH CARE EDUCATION/TRAINING PROGRAM

## 2023-11-28 PROCEDURE — 94640 AIRWAY INHALATION TREATMENT: CPT

## 2023-11-28 PROCEDURE — 6370000000 HC RX 637 (ALT 250 FOR IP)

## 2023-11-28 PROCEDURE — 1200000000 HC SEMI PRIVATE

## 2023-11-28 PROCEDURE — 94761 N-INVAS EAR/PLS OXIMETRY MLT: CPT

## 2023-11-28 PROCEDURE — 97116 GAIT TRAINING THERAPY: CPT

## 2023-11-28 PROCEDURE — 97535 SELF CARE MNGMENT TRAINING: CPT

## 2023-11-28 PROCEDURE — 86140 C-REACTIVE PROTEIN: CPT

## 2023-11-28 PROCEDURE — 6370000000 HC RX 637 (ALT 250 FOR IP): Performed by: STUDENT IN AN ORGANIZED HEALTH CARE EDUCATION/TRAINING PROGRAM

## 2023-11-28 PROCEDURE — 36415 COLL VENOUS BLD VENIPUNCTURE: CPT

## 2023-11-28 PROCEDURE — 2700000000 HC OXYGEN THERAPY PER DAY

## 2023-11-28 RX ORDER — SPIRONOLACTONE 25 MG/1
50 TABLET ORAL DAILY
Status: DISCONTINUED | OUTPATIENT
Start: 2023-11-28 | End: 2023-12-01 | Stop reason: HOSPADM

## 2023-11-28 RX ORDER — WATER 10 ML/10ML
INJECTION INTRAMUSCULAR; INTRAVENOUS; SUBCUTANEOUS
Status: COMPLETED
Start: 2023-11-28 | End: 2023-11-28

## 2023-11-28 RX ADMIN — MOMETASONE FUROATE AND FORMOTEROL FUMARATE DIHYDRATE 2 PUFF: 200; 5 AEROSOL RESPIRATORY (INHALATION) at 11:09

## 2023-11-28 RX ADMIN — WATER 10 ML: 1 INJECTION INTRAMUSCULAR; INTRAVENOUS; SUBCUTANEOUS at 09:31

## 2023-11-28 RX ADMIN — FERROUS SULFATE TAB 325 MG (65 MG ELEMENTAL FE) 325 MG: 325 (65 FE) TAB at 09:30

## 2023-11-28 RX ADMIN — ALBUTEROL SULFATE 2.5 MG: 2.5 SOLUTION RESPIRATORY (INHALATION) at 05:48

## 2023-11-28 RX ADMIN — ALBUTEROL SULFATE 2.5 MG: 2.5 SOLUTION RESPIRATORY (INHALATION) at 15:20

## 2023-11-28 RX ADMIN — PANTOPRAZOLE SODIUM 40 MG: 40 TABLET, DELAYED RELEASE ORAL at 09:31

## 2023-11-28 RX ADMIN — ALBUTEROL SULFATE 2.5 MG: 2.5 SOLUTION RESPIRATORY (INHALATION) at 21:09

## 2023-11-28 RX ADMIN — BUMETANIDE 1 MG: 1 TABLET ORAL at 09:57

## 2023-11-28 RX ADMIN — ATORVASTATIN CALCIUM 40 MG: 40 TABLET, FILM COATED ORAL at 09:29

## 2023-11-28 RX ADMIN — GUAIFENESIN, DEXTROMETHORPHAN HBR 1 TABLET: 600; 30 TABLET ORAL at 09:30

## 2023-11-28 RX ADMIN — PRIMIDONE 250 MG: 250 TABLET ORAL at 09:30

## 2023-11-28 RX ADMIN — PROPRANOLOL HYDROCHLORIDE 60 MG: 60 CAPSULE, EXTENDED RELEASE ORAL at 09:57

## 2023-11-28 RX ADMIN — PROPRANOLOL HYDROCHLORIDE 60 MG: 60 CAPSULE, EXTENDED RELEASE ORAL at 20:27

## 2023-11-28 RX ADMIN — ALBUTEROL SULFATE 2.5 MG: 2.5 SOLUTION RESPIRATORY (INHALATION) at 11:09

## 2023-11-28 RX ADMIN — METFORMIN HYDROCHLORIDE 500 MG: 500 TABLET ORAL at 16:56

## 2023-11-28 RX ADMIN — LEVOTHYROXINE SODIUM 137 MCG: 25 TABLET ORAL at 09:30

## 2023-11-28 RX ADMIN — METHYLPREDNISOLONE SODIUM SUCCINATE 40 MG: 40 INJECTION, POWDER, LYOPHILIZED, FOR SOLUTION INTRAMUSCULAR; INTRAVENOUS at 20:31

## 2023-11-28 RX ADMIN — CEFEPIME 2000 MG: 2 INJECTION, POWDER, FOR SOLUTION INTRAVENOUS at 20:37

## 2023-11-28 RX ADMIN — METHYLPREDNISOLONE SODIUM SUCCINATE 40 MG: 40 INJECTION, POWDER, LYOPHILIZED, FOR SOLUTION INTRAMUSCULAR; INTRAVENOUS at 09:31

## 2023-11-28 RX ADMIN — OXYBUTYNIN CHLORIDE 10 MG: 5 TABLET ORAL at 20:27

## 2023-11-28 RX ADMIN — SODIUM CHLORIDE: 9 INJECTION, SOLUTION INTRAVENOUS at 20:34

## 2023-11-28 RX ADMIN — OXYBUTYNIN CHLORIDE 10 MG: 5 TABLET ORAL at 09:29

## 2023-11-28 RX ADMIN — SPIRONOLACTONE 50 MG: 25 TABLET, FILM COATED ORAL at 09:30

## 2023-11-28 RX ADMIN — BUMETANIDE 1 MG: 1 TABLET ORAL at 16:56

## 2023-11-28 RX ADMIN — CEFEPIME 2000 MG: 2 INJECTION, POWDER, FOR SOLUTION INTRAVENOUS at 09:51

## 2023-11-28 RX ADMIN — PRIMIDONE 250 MG: 250 TABLET ORAL at 16:56

## 2023-11-28 RX ADMIN — MULTIPLE VITAMINS W/ MINERALS TAB 1 TABLET: TAB at 09:29

## 2023-11-28 RX ADMIN — ACETAMINOPHEN 650 MG: 325 TABLET ORAL at 09:57

## 2023-11-28 RX ADMIN — FLUTICASONE PROPIONATE 2 SPRAY: 50 SPRAY, METERED NASAL at 09:33

## 2023-11-28 RX ADMIN — METFORMIN HYDROCHLORIDE 500 MG: 500 TABLET ORAL at 09:29

## 2023-11-28 RX ADMIN — DICLOFENAC 2 G: 10 GEL TOPICAL at 16:58

## 2023-11-28 RX ADMIN — MIDODRINE HYDROCHLORIDE 10 MG: 5 TABLET ORAL at 12:35

## 2023-11-28 RX ADMIN — WATER 1 ML: 1 INJECTION INTRAMUSCULAR; INTRAVENOUS; SUBCUTANEOUS at 20:31

## 2023-11-28 RX ADMIN — SODIUM CHLORIDE, PRESERVATIVE FREE 10 ML: 5 INJECTION INTRAVENOUS at 09:32

## 2023-11-28 RX ADMIN — GUAIFENESIN, DEXTROMETHORPHAN HBR 1 TABLET: 600; 30 TABLET ORAL at 20:27

## 2023-11-28 RX ADMIN — MOMETASONE FUROATE AND FORMOTEROL FUMARATE DIHYDRATE 2 PUFF: 200; 5 AEROSOL RESPIRATORY (INHALATION) at 21:25

## 2023-11-28 RX ADMIN — MONTELUKAST 10 MG: 10 TABLET, FILM COATED ORAL at 09:30

## 2023-11-28 RX ADMIN — TAMSULOSIN HYDROCHLORIDE 0.8 MG: 0.4 CAPSULE ORAL at 09:31

## 2023-11-28 RX ADMIN — MIDODRINE HYDROCHLORIDE 10 MG: 5 TABLET ORAL at 09:29

## 2023-11-28 RX ADMIN — SODIUM CHLORIDE, PRESERVATIVE FREE 10 ML: 5 INJECTION INTRAVENOUS at 20:31

## 2023-11-28 RX ADMIN — TIOTROPIUM BROMIDE INHALATION SPRAY 2 PUFF: 3.12 SPRAY, METERED RESPIRATORY (INHALATION) at 11:15

## 2023-11-28 ASSESSMENT — PAIN SCALES - GENERAL
PAINLEVEL_OUTOF10: 5
PAINLEVEL_OUTOF10: 2

## 2023-11-28 ASSESSMENT — PAIN DESCRIPTION - DESCRIPTORS: DESCRIPTORS: ACHING

## 2023-11-28 ASSESSMENT — PAIN DESCRIPTION - LOCATION: LOCATION: KNEE

## 2023-11-28 ASSESSMENT — PAIN DESCRIPTION - ORIENTATION: ORIENTATION: LEFT

## 2023-11-28 ASSESSMENT — PAIN - FUNCTIONAL ASSESSMENT: PAIN_FUNCTIONAL_ASSESSMENT: ACTIVITIES ARE NOT PREVENTED

## 2023-11-28 NOTE — PROGRESS NOTES
X1  Scooting: Stand-by assistance;Assist X1;Other (comment) (scooting to EOB once upright.)  Transfer Training  Transfer Training: Yes  Overall Level of Assistance: Moderate assistance;Minimum assistance;Assist X1;Assist X2  Interventions: Safety awareness training;Verbal cues  Sit to Stand: Minimum assistance; Moderate assistance;Assist X1;Assist X2  Stand to Sit: Contact-guard assistance;Assist X1  Stand Pivot Transfers: Contact-guard assistance;Assist X1  Toilet Transfer: Minimum assistance;Assist X1;Contact-guard assistance  Gait Training  Gait Training: Yes  Gait  Overall Level of Assistance: Contact-guard assistance;Assist X1;Assist X2  Distance (ft): 10 Feet  Assistive Device: Walker, rolling;Gait belt  Base of Support: Widened  Speed/Geovanna: Slow;Shuffled  Step Length: Left shortened;Right shortened  Gait Abnormalities: Shuffling gait  Neuromuscular Education  Neuromuscular Education: No  PT Exercises  Exercise Treatment: No ther ex completed this session due to increased time for toileting. Static Standing Balance Exercises: Patient performed static standing during toileting ~ 1 minute with B UE support and no LOB     Safety Devices  Type of Devices: Call light within reach;Nurse notified; Left in chair;Gait belt; Chair alarm in place (Sanchez Garvin stayed with patient during treatment nursing and aide not available at time to relieve.)       Goals  Short Term Goals  Time Frame for Short Term Goals: 10 days  Short Term Goal 1: Initiate HEP and progress as tolerated for strength and mobility  Short Term Goal 2: Pt will be SBA +1 for bed mobility with bed flat and no railings to allow him to get up and down once discharged. Short Term Goal 3: Pt will be CGA +1 with transfers with appropriate device with good balance to reduce fall risk. Short Term Goal 4: Pt will be CGA +1 with ambulation with appropriate device for up to 150 feet to enable ease with getting in and out of the home.   Short Term Goal 5: Pt will be initiated on steps, if needed to enter and exit his home. Patient Goals   Patient Goals : unable to state    Education  Patient Education  Education Given To: Patient  Education Provided Comments: Continued ed during transfers.   Education Method: Verbal;Demonstration  Barriers to Learning: None  Education Outcome: Verbalized understanding;Continued education needed    Therapy Time   Individual Concurrent Group Co-treatment   Time In 0823         Time Out 0908         Minutes [address] [place], [place]

## 2023-11-28 NOTE — PROGRESS NOTES
Occupational Therapy  Facility/Department: [facility] AT THE [facility] MED SURG  Daily Treatment Note  NAME: Marta Blanchard  : 1935  MRN: [id number]    Date of Service: 2023    Discharge Recommendations:  Home with assist PRN         Patient Diagnosis(es): The encounter diagnosis was Community acquired pneumonia of right lower lobe of lung. Assessment    Activity Tolerance: Patient limited by fatigue;Patient limited by endurance; Patient tolerated treatment well  Discharge Recommendations: Home with assist PRN      Plan   Occupational Therapy Plan  Times Per Day: Once a day  Current Treatment Recommendations: Strengthening; Safety education & training;Self-Care / ADL; Functional mobility training     Restrictions  Restrictions/Precautions  Restrictions/Precautions: Fall Risk;Contact Precautions    Subjective   Subjective  Subjective: Pt lying in bed upon arrival. Pt agreed to participate in therapy session. Pain: Pt reported pain in L knee this date, did not rate pain. Objective    Vitals       ADL  Toileting: Minimal assistance  Toileting Skilled Clinical Factors: mina care and clothing mgt  Additional Comments: Min A to complete bed mob, Min/Mod A x 1-2 to complete sit to stand transfers from EOB and toilet. CGA x 1-2 to complete func mob with RW. Pt required increased time on toilet d/t difficulty having bowel movement. Safety Devices  Type of Devices: Call light within reach;Nurse notified (Pt left in bathroom with call light and nurse present.)     Patient Education  Education Given To: Patient  Education Provided: Role of Therapy;Plan of Care  Education Method: Verbal  Barriers to Learning: None  Education Outcome: Verbalized understanding;Continued education needed    Goals  Short Term Goals  Time Frame for Short Term Goals: 1 week  Short Term Goal 1: Pt. will demonstrate good safety as relates to ADL's. Short Term Goal 2: Pt. will tolerate 15-30 min.  functional activity to improve strength for ADL's.  Short Term Goal 3: Pt. will engage in self-care to increase Ind. Long Term Goals  Time Frame for Long Term Goals : 4 weeks  Long Term Goal 1: Pt. will return to PTA ADL status.   Patient Goals   Patient goals : Get stronger and walk better       Therapy Time   Individual Concurrent Group Co-treatment   Time In 0823         Time Out 0852         Minutes 29                 SHAHRAM Rodriguez/L

## 2023-11-28 NOTE — PLAN OF CARE
Problem: Discharge Planning  Goal: Discharge to home or other facility with appropriate resources  11/28/2023 1359 by Sammy Dean RN  Outcome: Progressing     Problem: Safety - Adult  Goal: Free from fall injury  11/28/2023 1359 by Sammy Dean RN  Outcome: Progressing     Problem: Skin/Tissue Integrity  Goal: Absence of new skin breakdown  Description: 1. Monitor for areas of redness and/or skin breakdown  2. Assess vascular access sites hourly  3. Every 4-6 hours minimum:  Change oxygen saturation probe site  4. Every 4-6 hours:  If on nasal continuous positive airway pressure, respiratory therapy assess nares and determine need for appliance change or resting period.   11/28/2023 1359 by Sammy Dean RN  Outcome: Progressing     Problem: ABCDS Injury Assessment  Goal: Absence of physical injury  11/28/2023 1359 by Sammy Dean RN  Outcome: Progressing     Problem: Nutrition Deficit:  Goal: Optimize nutritional status  11/28/2023 1359 by Sammy Dean RN  Outcome: Progressing     Problem: Respiratory - Adult  Goal: Achieves optimal ventilation and oxygenation  11/28/2023 1359 by Sammy Dean RN  Outcome: Progressing     Problem: Pain  Goal: Verbalizes/displays adequate comfort level or baseline comfort level  11/28/2023 1359 by Sammy Dean RN  Outcome: Progressing

## 2023-11-28 NOTE — PROGRESS NOTES
Physical Therapy  Facility/Department: [facility] AT THE [facility] MED SURG  Daily Treatment Note    NAME: Yessenia Menon  : 1935  MRN: [id number]    Date of Service: 2023    Discharge Recommendations:  Continue to assess pending progress, Subacute/Skilled Nursing Facility, Patient would benefit from continued therapy after discharge        Patient Diagnosis(es): The encounter diagnosis was Community acquired pneumonia of right lower lobe of lung. Assessment   Assessment: Bed mobs Min A x1. Transfers CGA/Min A x1. Patient ambulated EOB <-> Bathroom 2x 10' with 2WW, CGA with slow shuffled gait, no LOB. Patient performed Static and Dynamic standing balance during toilet transfer ~ 1 minute with single to B UE assist, CGA/Min A x1, no LOB. Patient demoed delayed responses at times and attmepted to sit early during toilet transfer requiring brief change. Nursing notified post session of output. Supine and seated EOB B LE ther ex in all available planes of motion. Patient limited this afternoon in L LE due to pain and patient demoes periods of delayed processing requiring frequent cues. Activity Tolerance: Patient limited by fatigue;Patient limited by endurance; Patient limited by pain; Patient tolerated treatment well     Plan    Physical Therapy Plan  General Plan: 2 times a day 7 days a week (1x/day on weekends and holidays)  Current Treatment Recommendations: Strengthening;Balance training;Functional mobility training;Transfer training; Endurance training;Gait training;Stair training;Neuromuscular re-education;Home exercise program;Safety education & training; Therapeutic activities     Restrictions  Restrictions/Precautions  Restrictions/Precautions: Fall Risk, Contact Precautions     Subjective    Subjective  Subjective: Pt in bed upon arrival pleasant and agreeable to therapy, pt spouse present this visit.   Pain: no c/o pain at rest but reports L knee pain with movement, patient spouse reports he had Tylenol at 10am

## 2023-11-28 NOTE — PROGRESS NOTES
Assessment and vitals obtained at this time as charted. Pt is oriented to person only. Pt is resting in the chair at this time visiting with his wife. Pt denies any further needs at this time. Call light within reach, care ongoing.

## 2023-11-28 NOTE — PLAN OF CARE
Problem: Discharge Planning  Goal: Discharge to home or other facility with appropriate resources  11/28/2023 0314 by Joe Garcia RN  Outcome: Progressing  11/27/2023 1746 by Ernesto Benson RN  Outcome: Progressing     Problem: Safety - Adult  Goal: Free from fall injury  11/28/2023 0314 by Joe Garcia RN  Outcome: Progressing  11/27/2023 1746 by Ernesto Benson RN  Outcome: Progressing     Problem: Skin/Tissue Integrity  Goal: Absence of new skin breakdown  Description: 1. Monitor for areas of redness and/or skin breakdown  2. Assess vascular access sites hourly  3. Every 4-6 hours minimum:  Change oxygen saturation probe site  4. Every 4-6 hours:  If on nasal continuous positive airway pressure, respiratory therapy assess nares and determine need for appliance change or resting period.   11/28/2023 0314 by Joe Garcia RN  Outcome: Progressing  11/27/2023 1746 by Ernesto Benson RN  Outcome: Progressing     Problem: ABCDS Injury Assessment  Goal: Absence of physical injury  11/28/2023 0314 by Joe Garcia RN  Outcome: Progressing  11/27/2023 1746 by Ernesto Benson RN  Outcome: Progressing     Problem: Nutrition Deficit:  Goal: Optimize nutritional status  11/28/2023 0314 by Joe Garcia RN  Outcome: Progressing  11/27/2023 1746 by Ernesto Benson RN  Outcome: Progressing  11/27/2023 1316 by Alfredo Ibarra RD, LD  Outcome: Progressing  Flowsheets  Taken 11/27/2023 1316  Nutrient intake appropriate for improving, restoring, or maintaining nutritional needs:   Monitor oral intake, labs, and treatment plans   Recommend appropriate diets, oral nutritional supplements, and vitamin/mineral supplements  Taken 11/27/2023 1240  Nutrient intake appropriate for improving, restoring, or maintaining nutritional needs: Monitor oral intake, labs, and treatment plans  Note: Nutrition Problem #1: Predicted inadequate energy intake  Intervention: Food and/or Nutrient Delivery: Continue Oral

## 2023-11-28 NOTE — PROGRESS NOTES
Writer to bedside to complete morning assessment. Upon entry to room, pt resting in bed, respirations even while on 1.5L nasal cannula. Vitals obtained and assessment completed, see flow sheet for details. Pt denies needs from writer at this time. Call light in reach. Care ongoing.

## 2023-11-29 LAB
ALBUMIN SERPL-MCNC: 2.4 G/DL (ref 3.5–5.2)
ALBUMIN/GLOB SERPL: 0.7 {RATIO} (ref 1–2.5)
ALP SERPL-CCNC: 161 U/L (ref 40–129)
ALT SERPL-CCNC: 39 U/L (ref 5–41)
ANION GAP SERPL CALCULATED.3IONS-SCNC: 10 MMOL/L (ref 9–17)
AST SERPL-CCNC: 33 U/L
BASOPHILS # BLD: 0 K/UL (ref 0–0.2)
BASOPHILS NFR BLD: 0 % (ref 0–2)
BILIRUB SERPL-MCNC: 0.2 MG/DL (ref 0.3–1.2)
BUN SERPL-MCNC: 45 MG/DL (ref 8–23)
BUN/CREAT SERPL: 28 (ref 9–20)
CALCIUM SERPL-MCNC: 8.5 MG/DL (ref 8.6–10.4)
CHLORIDE SERPL-SCNC: 100 MMOL/L (ref 98–107)
CO2 SERPL-SCNC: 23 MMOL/L (ref 20–31)
CREAT SERPL-MCNC: 1.6 MG/DL (ref 0.7–1.2)
CRP SERPL HS-MCNC: 140.1 MG/L (ref 0–5)
DATE, STOOL #1: NORMAL
EOSINOPHIL # BLD: 0.15 K/UL (ref 0–0.44)
EOSINOPHILS RELATIVE PERCENT: 1 % (ref 1–4)
ERYTHROCYTE [DISTWIDTH] IN BLOOD BY AUTOMATED COUNT: 16.6 % (ref 11.8–14.4)
GFR SERPL CREATININE-BSD FRML MDRD: 42 ML/MIN/1.73M2
GLUCOSE SERPL-MCNC: 165 MG/DL (ref 70–99)
HCT VFR BLD AUTO: 25 % (ref 40.7–50.3)
HEMOCCULT SP1 STL QL: NEGATIVE
HGB BLD-MCNC: 7.9 G/DL (ref 13–17)
IMM GRANULOCYTES # BLD AUTO: 0.6 K/UL (ref 0–0.3)
IMM GRANULOCYTES NFR BLD: 4 %
LYMPHOCYTES NFR BLD: 2.4 K/UL (ref 1.1–3.7)
LYMPHOCYTES RELATIVE PERCENT: 16 % (ref 24–43)
MCH RBC QN AUTO: 28.5 PG (ref 25.2–33.5)
MCHC RBC AUTO-ENTMCNC: 31.6 G/DL (ref 28.4–34.8)
MCV RBC AUTO: 90.3 FL (ref 82.6–102.9)
MONOCYTES NFR BLD: 1.2 K/UL (ref 0.1–1.2)
MONOCYTES NFR BLD: 8 % (ref 3–12)
MORPHOLOGY: NORMAL
NEUTROPHILS NFR BLD: 71 % (ref 36–65)
NEUTS SEG NFR BLD: 10.65 K/UL (ref 1.5–8.1)
NRBC BLD-RTO: 0.1 PER 100 WBC
PLATELET # BLD AUTO: 347 K/UL (ref 138–453)
PMV BLD AUTO: 10.3 FL (ref 8.1–13.5)
POTASSIUM SERPL-SCNC: 3.7 MMOL/L (ref 3.7–5.3)
PROT SERPL-MCNC: 5.9 G/DL (ref 6.4–8.3)
RBC # BLD AUTO: 2.77 M/UL (ref 4.21–5.77)
SODIUM SERPL-SCNC: 133 MMOL/L (ref 135–144)
TIME, STOOL #1: 1555
WBC OTHER # BLD: 15 K/UL (ref 3.5–11.3)

## 2023-11-29 PROCEDURE — 85025 COMPLETE CBC W/AUTO DIFF WBC: CPT

## 2023-11-29 PROCEDURE — 94669 MECHANICAL CHEST WALL OSCILL: CPT

## 2023-11-29 PROCEDURE — 94761 N-INVAS EAR/PLS OXIMETRY MLT: CPT

## 2023-11-29 PROCEDURE — 6370000000 HC RX 637 (ALT 250 FOR IP): Performed by: INTERNAL MEDICINE

## 2023-11-29 PROCEDURE — 6370000000 HC RX 637 (ALT 250 FOR IP)

## 2023-11-29 PROCEDURE — 2700000000 HC OXYGEN THERAPY PER DAY

## 2023-11-29 PROCEDURE — 6370000000 HC RX 637 (ALT 250 FOR IP): Performed by: NURSE PRACTITIONER

## 2023-11-29 PROCEDURE — 97110 THERAPEUTIC EXERCISES: CPT

## 2023-11-29 PROCEDURE — 80053 COMPREHEN METABOLIC PANEL: CPT

## 2023-11-29 PROCEDURE — 86140 C-REACTIVE PROTEIN: CPT

## 2023-11-29 PROCEDURE — 97530 THERAPEUTIC ACTIVITIES: CPT

## 2023-11-29 PROCEDURE — 6360000002 HC RX W HCPCS: Performed by: STUDENT IN AN ORGANIZED HEALTH CARE EDUCATION/TRAINING PROGRAM

## 2023-11-29 PROCEDURE — 94640 AIRWAY INHALATION TREATMENT: CPT

## 2023-11-29 PROCEDURE — 1200000000 HC SEMI PRIVATE

## 2023-11-29 PROCEDURE — 2580000003 HC RX 258

## 2023-11-29 PROCEDURE — 36415 COLL VENOUS BLD VENIPUNCTURE: CPT

## 2023-11-29 PROCEDURE — 6370000000 HC RX 637 (ALT 250 FOR IP): Performed by: PHYSICIAN ASSISTANT

## 2023-11-29 PROCEDURE — 2580000003 HC RX 258: Performed by: STUDENT IN AN ORGANIZED HEALTH CARE EDUCATION/TRAINING PROGRAM

## 2023-11-29 PROCEDURE — 6370000000 HC RX 637 (ALT 250 FOR IP): Performed by: STUDENT IN AN ORGANIZED HEALTH CARE EDUCATION/TRAINING PROGRAM

## 2023-11-29 PROCEDURE — 94664 DEMO&/EVAL PT USE INHALER: CPT

## 2023-11-29 PROCEDURE — 97116 GAIT TRAINING THERAPY: CPT

## 2023-11-29 PROCEDURE — 82272 OCCULT BLD FECES 1-3 TESTS: CPT

## 2023-11-29 RX ORDER — LACTOBACILLUS RHAMNOSUS GG 10B CELL
1 CAPSULE ORAL
Status: DISCONTINUED | OUTPATIENT
Start: 2023-11-30 | End: 2023-12-01 | Stop reason: HOSPADM

## 2023-11-29 RX ORDER — WATER 10 ML/10ML
INJECTION INTRAMUSCULAR; INTRAVENOUS; SUBCUTANEOUS
Status: COMPLETED
Start: 2023-11-29 | End: 2023-11-29

## 2023-11-29 RX ORDER — FERROUS SULFATE 325(65) MG
325 TABLET ORAL 2 TIMES DAILY WITH MEALS
Status: DISCONTINUED | OUTPATIENT
Start: 2023-11-29 | End: 2023-12-01 | Stop reason: HOSPADM

## 2023-11-29 RX ADMIN — DICLOFENAC 2 G: 10 GEL TOPICAL at 09:00

## 2023-11-29 RX ADMIN — METHYLPREDNISOLONE SODIUM SUCCINATE 40 MG: 40 INJECTION, POWDER, LYOPHILIZED, FOR SOLUTION INTRAMUSCULAR; INTRAVENOUS at 21:30

## 2023-11-29 RX ADMIN — SODIUM CHLORIDE, PRESERVATIVE FREE 10 ML: 5 INJECTION INTRAVENOUS at 09:15

## 2023-11-29 RX ADMIN — MIDODRINE HYDROCHLORIDE 10 MG: 5 TABLET ORAL at 17:22

## 2023-11-29 RX ADMIN — PROPRANOLOL HYDROCHLORIDE 60 MG: 60 CAPSULE, EXTENDED RELEASE ORAL at 10:15

## 2023-11-29 RX ADMIN — GUAIFENESIN, DEXTROMETHORPHAN HBR 1 TABLET: 600; 30 TABLET ORAL at 21:31

## 2023-11-29 RX ADMIN — WATER 10 ML: 1 INJECTION INTRAMUSCULAR; INTRAVENOUS; SUBCUTANEOUS at 21:30

## 2023-11-29 RX ADMIN — FERROUS SULFATE TAB 325 MG (65 MG ELEMENTAL FE) 325 MG: 325 (65 FE) TAB at 13:14

## 2023-11-29 RX ADMIN — OXYBUTYNIN CHLORIDE 10 MG: 5 TABLET ORAL at 21:31

## 2023-11-29 RX ADMIN — BUMETANIDE 1 MG: 1 TABLET ORAL at 09:00

## 2023-11-29 RX ADMIN — METHYLPREDNISOLONE SODIUM SUCCINATE 40 MG: 40 INJECTION, POWDER, LYOPHILIZED, FOR SOLUTION INTRAMUSCULAR; INTRAVENOUS at 09:00

## 2023-11-29 RX ADMIN — LEVOTHYROXINE SODIUM 137 MCG: 25 TABLET ORAL at 06:52

## 2023-11-29 RX ADMIN — TIOTROPIUM BROMIDE INHALATION SPRAY 2 PUFF: 3.12 SPRAY, METERED RESPIRATORY (INHALATION) at 11:19

## 2023-11-29 RX ADMIN — FLUTICASONE PROPIONATE 2 SPRAY: 50 SPRAY, METERED NASAL at 09:00

## 2023-11-29 RX ADMIN — ALBUTEROL SULFATE 2.5 MG: 2.5 SOLUTION RESPIRATORY (INHALATION) at 11:16

## 2023-11-29 RX ADMIN — ACETAMINOPHEN 650 MG: 325 TABLET ORAL at 11:03

## 2023-11-29 RX ADMIN — METFORMIN HYDROCHLORIDE 500 MG: 500 TABLET ORAL at 09:00

## 2023-11-29 RX ADMIN — GUAIFENESIN, DEXTROMETHORPHAN HBR 1 TABLET: 600; 30 TABLET ORAL at 09:00

## 2023-11-29 RX ADMIN — CEFEPIME 2000 MG: 2 INJECTION, POWDER, FOR SOLUTION INTRAVENOUS at 21:36

## 2023-11-29 RX ADMIN — MULTIPLE VITAMINS W/ MINERALS TAB 1 TABLET: TAB at 09:00

## 2023-11-29 RX ADMIN — MIDODRINE HYDROCHLORIDE 10 MG: 5 TABLET ORAL at 09:00

## 2023-11-29 RX ADMIN — PROPRANOLOL HYDROCHLORIDE 60 MG: 60 CAPSULE, EXTENDED RELEASE ORAL at 21:31

## 2023-11-29 RX ADMIN — TAMSULOSIN HYDROCHLORIDE 0.8 MG: 0.4 CAPSULE ORAL at 09:00

## 2023-11-29 RX ADMIN — PRIMIDONE 250 MG: 250 TABLET ORAL at 17:22

## 2023-11-29 RX ADMIN — MIDODRINE HYDROCHLORIDE 10 MG: 5 TABLET ORAL at 13:14

## 2023-11-29 RX ADMIN — OXYBUTYNIN CHLORIDE 10 MG: 5 TABLET ORAL at 09:00

## 2023-11-29 RX ADMIN — MOMETASONE FUROATE AND FORMOTEROL FUMARATE DIHYDRATE 2 PUFF: 200; 5 AEROSOL RESPIRATORY (INHALATION) at 11:19

## 2023-11-29 RX ADMIN — ALBUTEROL SULFATE 2.5 MG: 2.5 SOLUTION RESPIRATORY (INHALATION) at 05:40

## 2023-11-29 RX ADMIN — SODIUM CHLORIDE, PRESERVATIVE FREE 10 ML: 5 INJECTION INTRAVENOUS at 21:31

## 2023-11-29 RX ADMIN — METFORMIN HYDROCHLORIDE 500 MG: 500 TABLET ORAL at 17:22

## 2023-11-29 RX ADMIN — MONTELUKAST 10 MG: 10 TABLET, FILM COATED ORAL at 09:00

## 2023-11-29 RX ADMIN — ALBUTEROL SULFATE 2.5 MG: 2.5 SOLUTION RESPIRATORY (INHALATION) at 15:39

## 2023-11-29 RX ADMIN — BUMETANIDE 1 MG: 1 TABLET ORAL at 17:22

## 2023-11-29 RX ADMIN — CEFEPIME 2000 MG: 2 INJECTION, POWDER, FOR SOLUTION INTRAVENOUS at 10:27

## 2023-11-29 RX ADMIN — ATORVASTATIN CALCIUM 40 MG: 40 TABLET, FILM COATED ORAL at 09:00

## 2023-11-29 RX ADMIN — ALBUTEROL SULFATE 2.5 MG: 2.5 SOLUTION RESPIRATORY (INHALATION) at 20:53

## 2023-11-29 RX ADMIN — SPIRONOLACTONE 50 MG: 25 TABLET, FILM COATED ORAL at 09:00

## 2023-11-29 RX ADMIN — PANTOPRAZOLE SODIUM 40 MG: 40 TABLET, DELAYED RELEASE ORAL at 06:52

## 2023-11-29 RX ADMIN — PRIMIDONE 250 MG: 250 TABLET ORAL at 09:00

## 2023-11-29 RX ADMIN — FERROUS SULFATE TAB 325 MG (65 MG ELEMENTAL FE) 325 MG: 325 (65 FE) TAB at 17:22

## 2023-11-29 RX ADMIN — MOMETASONE FUROATE AND FORMOTEROL FUMARATE DIHYDRATE 2 PUFF: 200; 5 AEROSOL RESPIRATORY (INHALATION) at 21:05

## 2023-11-29 ASSESSMENT — PAIN DESCRIPTION - DESCRIPTORS: DESCRIPTORS: ACHING

## 2023-11-29 ASSESSMENT — PAIN DESCRIPTION - FREQUENCY: FREQUENCY: INTERMITTENT

## 2023-11-29 ASSESSMENT — PAIN DESCRIPTION - LOCATION: LOCATION: KNEE;LEG

## 2023-11-29 ASSESSMENT — PAIN SCALES - GENERAL
PAINLEVEL_OUTOF10: 8
PAINLEVEL_OUTOF10: 0

## 2023-11-29 ASSESSMENT — PAIN - FUNCTIONAL ASSESSMENT: PAIN_FUNCTIONAL_ASSESSMENT: ACTIVITIES ARE NOT PREVENTED

## 2023-11-29 ASSESSMENT — PAIN DESCRIPTION - ONSET: ONSET: GRADUAL

## 2023-11-29 ASSESSMENT — PAIN DESCRIPTION - PAIN TYPE: TYPE: ACUTE PAIN;CHRONIC PAIN

## 2023-11-29 ASSESSMENT — PAIN DESCRIPTION - ORIENTATION: ORIENTATION: LEFT;LOWER

## 2023-11-29 NOTE — PROGRESS NOTES
Writer at bedside at this time to perform shift assessment. Patient is lying in bed at this time. Vital signs taken and assessment completed at this time. Patient is alert and oriented and has no complaints of pain. Patient denies any further needs at this time. Call light within reach, bed alarm on for safety, care ongoing.

## 2023-11-29 NOTE — PROGRESS NOTES
Oxygen removed at 1845. Patient remains >96%. Will spot check throughout the night. Patient's wife remains in room with patient. Denies questions at this time. Vitals and assessment are complete at this time. Writer walked patient through the medications that would be administered tonight and encouraged patient to ask questions about the medications and therapies. Patient and family deny questions. Call light is within reach and bed alarm set. Patient denies needs at this time. Care ongoing.

## 2023-11-29 NOTE — PROGRESS NOTES
Physical Therapy  Facility/Department: [facility] AT THE [facility] MED SURG  Daily Treatment Note    NAME: Yessenia Menon  : 1935  MRN: [id number]    Date of Service: 2023    Discharge Recommendations:  Continue to assess pending progress, Subacute/Skilled Nursing Facility, Patient would benefit from continued therapy after discharge        Patient Diagnosis(es): The encounter diagnosis was Community acquired pneumonia of right lower lobe of lung. Assessment   Assessment: Upon arrival patient gown and bed was soiled with urine. 2nd assist was called for improved safety during patient hygiene due to increased fatigue from patient. Bed mobs Min A x1 with additionl time today. Transfers varied Min/Mod A x 1-2 for increased safety. Patient ambulated 5' bed to chair with 2WW, CGA/Min A x1-2 with slow shuffled gait, and FF posture today no LOB but required additional time and frequent cues. Patient completed static and dynamic standing at EOB for pericare with single to B UE assist with very FF posture, patient fatigued quickly in standing today. 2nd assist called in for safety to finish hygiene/pericare, Min/Mod A x 1-2 for safety. Patient demoed increased  L knee pain during standing and gait. Nursing notified post session. Activity Tolerance: Patient limited by fatigue;Patient limited by pain; Patient limited by endurance     Plan    Physical Therapy Plan  General Plan: 2 times a day 7 days a week (1x/day on weekends and holidays)  Current Treatment Recommendations: Strengthening;Balance training;Functional mobility training;Transfer training; Endurance training;Gait training;Stair training;Neuromuscular re-education;Home exercise program;Safety education & training; Therapeutic activities     Restrictions  Restrictions/Precautions  Restrictions/Precautions: Fall Risk, Contact Precautions     Subjective    Subjective  Subjective: Pt in bed upon arrival and agreeable to therapy.   Pain: no c/o pain at this

## 2023-11-29 NOTE — PROGRESS NOTES
Occupational Therapy  Facility/Department: [facility] AT THE [facility] MED SURG  Daily Treatment Note  NAME: Halley Alvarado  : 1935  MRN: [id number]    Date of Service: 2023    Discharge Recommendations:  Home with assist PRN         Patient Diagnosis(es): The encounter diagnosis was Community acquired pneumonia of right lower lobe of lung. Assessment    Activity Tolerance: Patient limited by fatigue;Patient limited by endurance  Discharge Recommendations: Home with assist PRN      Plan   Occupational Therapy Plan  Times Per Day: Once a day  Days Per Week: 7 Days  Current Treatment Recommendations: Strengthening; Safety education & training;Self-Care / ADL; Functional mobility training     Restrictions   Contact, general fall precautions    Subjective   Subjective  Subjective: Pt seated in recliner, agreed to BUE therex. Pain: Pt reported pain in L knee this date, did not rate pain. Orientation  Overall Orientation Status: Within Functional Limits  Pain: Patient reports L knee pain with movement, no rating provided. Nursing applied volataren gel this morning according to his wife and spouse reports patient knee pain radiates down distal leg/foot, no c/o back pain. Cognition  Cognition Comment: Patient continues to demoe delayed processing and response times. Objective    Vitals     Pt declined need for ADLs at this time. OT Exercises  Exercise Treatment: BUE therex to increase strength/endurance for ease of fxl tasks. Green digiflex x 20 yellow flex bar x 20 and 1# free weights x 10 reps x all planes while seated for a total of ~ 20 min therex. Pt required Mod RBs for recovery. Safety Devices  Type of Devices: All fall risk precautions in place;Call light within reach; Chair alarm in place; Left in chair     Patient Education  Education Given To: Patient  Education Provided: Role of Therapy;Plan of Care;Home Exercise Program  Education Method: Verbal;Demonstration  Barriers to Learning: None  Education

## 2023-11-29 NOTE — PROGRESS NOTES
Physical Therapy  Facility/Department: [facility] AT THE [facility] MED SURG  Daily Treatment Note    NAME: Yessenia Menon  : 1935  MRN: [id number]    Date of Service: 2023    Discharge Recommendations:  Continue to assess pending progress, Subacute/Skilled Nursing Facility, Patient would benefit from continued therapy after discharge        Patient Diagnosis(es): The encounter diagnosis was Community acquired pneumonia of right lower lobe of lung. Assessment   Assessment: Nursing in during part of treat to assist with transfers/toileting. Transfers Mod/Max A x2 from chair and BSC. Patient ambuated 5' from recliner <-> [facility] with 2WW, CGA/Min A x2. Slow shuffled gait with delays and LE shakiness due to increased pain in L LE. Seated B LE ther ex completed sitting in chair in all planes with limited ROM in L knee extension and L hip flexion with AAROM to PROM provided for increased ROM. Patient left in recliner with LE's elevated and ice pack donned to L knee. Activity Tolerance: Patient limited by fatigue;Patient limited by pain; Patient limited by endurance     Plan    Physical Therapy Plan  General Plan: 2 times a day 7 days a week (1x/day on weekends and holidays)  Current Treatment Recommendations: Strengthening;Balance training;Functional mobility training;Transfer training; Endurance training;Gait training;Stair training;Neuromuscular re-education;Home exercise program;Safety education & training; Therapeutic activities     Restrictions  Restrictions/Precautions  Restrictions/Precautions: Fall Risk, Contact Precautions     Subjective    Subjective  Subjective: Pt in recliner upon arrival with spouse present. Pt agreeable to therapy  Pain: Patient reports L knee pain with movement, no rating provided. Nursing applied volataren gel this morning according to his wife and spouse reports patient knee pain radiates down distal leg/foot, no c/o back pain.   Orientation  Overall Orientation Status: Within Functional of Devices: Call light within reach;Nurse notified;Gait belt; All fall risk precautions in place; Left in chair;Chair alarm in place       Goals  Short Term Goals  Time Frame for Short Term Goals: 10 days  Short Term Goal 1: Initiate HEP and progress as tolerated for strength and mobility  Short Term Goal 2: Pt will be SBA +1 for bed mobility with bed flat and no railings to allow him to get up and down once discharged. Short Term Goal 3: Pt will be CGA +1 with transfers with appropriate device with good balance to reduce fall risk. Short Term Goal 4: Pt will be CGA +1 with ambulation with appropriate device for up to 150 feet to enable ease with getting in and out of the home. Short Term Goal 5: Pt will be initiated on steps, if needed to enter and exit his home. Patient Goals   Patient Goals : unable to state    Education  Patient Education  Education Given To: Patient  Education Provided Comments: Education on benefits of therapy to reduce pain and edema in LE's.   Education Method: Verbal;Demonstration  Barriers to Learning: None (Delayed processing and response time)  Education Outcome: Verbalized understanding;Continued education needed    Therapy Time   Individual Concurrent Group Co-treatment   Time In 1153         Time Out 5228         Minutes Mary Bosch

## 2023-11-29 NOTE — PLAN OF CARE
appropriate for improving, restoring, or maintaining nutritional needs:   Assess nutritional status and recommend course of action   Recommend appropriate diets, oral nutritional supplements, and vitamin/mineral supplements   Monitor oral intake, labs, and treatment plans       Problem: Respiratory - Adult  Goal: Achieves optimal ventilation and oxygenation  11/29/2023 1136 by Mani Islas RN  Outcome: Progressing  Flowsheets (Taken 11/29/2023 1136)  Achieves optimal ventilation and oxygenation:   Assess for changes in respiratory status   Assess for changes in mentation and behavior   Oxygen supplementation based on oxygen saturation or arterial blood gases   Position to facilitate oxygenation and minimize respiratory effort       Problem: Pain  Goal: Verbalizes/displays adequate comfort level or baseline comfort level  11/29/2023 1136 by Mani Islas RN  Outcome: Progressing  Flowsheets (Taken 11/29/2023 1136)  Verbalizes/displays adequate comfort level or baseline comfort level:   Encourage patient to monitor pain and request assistance   Administer analgesics based on type and severity of pain and evaluate response   Assess pain using appropriate pain scale   Implement non-pharmacological measures as appropriate and evaluate response       Problem: Chronic Conditions and Co-morbidities  Goal: Patient's chronic conditions and co-morbidity symptoms are monitored and maintained or improved  11/29/2023 1136 by Mani Islas RN  Outcome: Progressing  Flowsheets (Taken 11/29/2023 1136)  Care Plan - Patient's Chronic Conditions and Co-Morbidity Symptoms are Monitored and Maintained or Improved:   Monitor and assess patient's chronic conditions and comorbid symptoms for stability, deterioration, or improvement   Collaborate with multidisciplinary team to address chronic and comorbid conditions and prevent exacerbation or deterioration   Update acute care plan with appropriate goals if chronic or comorbid symptoms are exacerbated and prevent overall improvement and discharge

## 2023-11-29 NOTE — PLAN OF CARE
Problem: Discharge Planning  Goal: Discharge to home or other facility with appropriate resources  11/29/2023 0044 by Brea Rodriguez RN  Outcome: Progressing  Flowsheets (Taken 11/29/2023 0044)  Discharge to home or other facility with appropriate resources:   Identify barriers to discharge with patient and caregiver   Identify discharge learning needs (meds, wound care, etc)   Arrange for needed discharge resources and transportation as appropriate     Problem: Safety - Adult  Goal: Free from fall injury  11/29/2023 0044 by Brea Rodriguez RN  Outcome: Progressing  Flowsheets (Taken 11/29/2023 0044)  Free From Fall Injury: Instruct family/caregiver on patient safety     Problem: Skin/Tissue Integrity  Goal: Absence of new skin breakdown  Description: 1. Monitor for areas of redness and/or skin breakdown  2. Assess vascular access sites hourly  3. Every 4-6 hours minimum:  Change oxygen saturation probe site  4. Every 4-6 hours:  If on nasal continuous positive airway pressure, respiratory therapy assess nares and determine need for appliance change or resting period. 11/29/2023 0044 by Brea Rodriguez RN  Outcome: Progressing  Note: Claudio scale monitoring per protocol. Inspect skin for breakdown frequently. Encourage pt to make frequent large adjustments in position or assist patient with turning. Document all areas of breakdown.         Problem: ABCDS Injury Assessment  Goal: Absence of physical injury  11/29/2023 0044 by Brea Rodriguez RN  Outcome: Progressing  Flowsheets (Taken 11/29/2023 0044)  Absence of Physical Injury: Implement safety measures based on patient assessment     Problem: Respiratory - Adult  Goal: Achieves optimal ventilation and oxygenation  11/29/2023 0044 by Brea Rodriguez RN  Outcome: Progressing  Flowsheets (Taken 11/29/2023 0044)  Achieves optimal ventilation and oxygenation:   Assess for changes in respiratory status   Assess for changes in mentation and behavior

## 2023-11-29 NOTE — PROGRESS NOTES
Writer at bedside at this time to perform reassessment. When writer entered room it was noted that patient's Nasal Cannula was hanging around, nasal cannula placed back in patient's nostrils at this time with SpO2 being 96%. Patient is lying in bed at this time. Vital signs taken and assessment completed at this time. Patient is alert and oriented and has no complaints of pain at this time. Incontinence care completed and new brief placed on patient at this time. Patient denies any further needs at this time. Call light within reach, bed alarm on for safety, care ongoing.

## 2023-11-29 NOTE — PROGRESS NOTES
Insurance approval has been received by [facility] per Paulino Matt at [employer] at this time. Approval # [id number]. Will pass on information in report and have social work notified in the morning.

## 2023-11-30 ENCOUNTER — APPOINTMENT (OUTPATIENT)
Dept: GENERAL RADIOLOGY | Age: 88
End: 2023-11-30
Payer: MEDICARE

## 2023-11-30 ENCOUNTER — APPOINTMENT (OUTPATIENT)
Dept: MRI IMAGING | Age: 88
End: 2023-11-30
Payer: MEDICARE

## 2023-11-30 LAB
ALBUMIN SERPL-MCNC: 2.6 G/DL (ref 3.5–5.2)
ALBUMIN/GLOB SERPL: 0.7 {RATIO} (ref 1–2.5)
ALP SERPL-CCNC: 169 U/L (ref 40–129)
ALT SERPL-CCNC: 34 U/L (ref 5–41)
ANION GAP SERPL CALCULATED.3IONS-SCNC: 10 MMOL/L (ref 9–17)
AST SERPL-CCNC: 31 U/L
BASOPHILS # BLD: 0 K/UL (ref 0–0.2)
BASOPHILS NFR BLD: 0 % (ref 0–2)
BILIRUB SERPL-MCNC: 0.2 MG/DL (ref 0.3–1.2)
BNP SERPL-MCNC: 8109 PG/ML
BUN SERPL-MCNC: 44 MG/DL (ref 8–23)
BUN/CREAT SERPL: 24 (ref 9–20)
CALCIUM SERPL-MCNC: 8.8 MG/DL (ref 8.6–10.4)
CHLORIDE SERPL-SCNC: 102 MMOL/L (ref 98–107)
CO2 SERPL-SCNC: 23 MMOL/L (ref 20–31)
CREAT SERPL-MCNC: 1.8 MG/DL (ref 0.7–1.2)
CRP SERPL HS-MCNC: 164.3 MG/L (ref 0–5)
EOSINOPHIL # BLD: 0.16 K/UL (ref 0–0.44)
EOSINOPHILS RELATIVE PERCENT: 1 % (ref 1–4)
ERYTHROCYTE [DISTWIDTH] IN BLOOD BY AUTOMATED COUNT: 16.6 % (ref 11.8–14.4)
GFR SERPL CREATININE-BSD FRML MDRD: 36 ML/MIN/1.73M2
GLUCOSE SERPL-MCNC: 163 MG/DL (ref 70–99)
HCT VFR BLD AUTO: 25.5 % (ref 40.7–50.3)
HGB BLD-MCNC: 8.1 G/DL (ref 13–17)
IMM GRANULOCYTES # BLD AUTO: 0.49 K/UL (ref 0–0.3)
IMM GRANULOCYTES NFR BLD: 3 %
LYMPHOCYTES NFR BLD: 0.66 K/UL (ref 1.1–3.7)
LYMPHOCYTES RELATIVE PERCENT: 4 % (ref 24–43)
MCH RBC QN AUTO: 28.6 PG (ref 25.2–33.5)
MCHC RBC AUTO-ENTMCNC: 31.8 G/DL (ref 28.4–34.8)
MCV RBC AUTO: 90.1 FL (ref 82.6–102.9)
MONOCYTES NFR BLD: 0.66 K/UL (ref 0.1–1.2)
MONOCYTES NFR BLD: 4 % (ref 3–12)
MORPHOLOGY: NORMAL
NEUTROPHILS NFR BLD: 88 % (ref 36–65)
NEUTS SEG NFR BLD: 14.43 K/UL (ref 1.5–8.1)
NRBC BLD-RTO: 0 PER 100 WBC
PLATELET # BLD AUTO: 387 K/UL (ref 138–453)
PMV BLD AUTO: 10.1 FL (ref 8.1–13.5)
POTASSIUM SERPL-SCNC: 3.6 MMOL/L (ref 3.7–5.3)
PROT SERPL-MCNC: 6.2 G/DL (ref 6.4–8.3)
RBC # BLD AUTO: 2.83 M/UL (ref 4.21–5.77)
SODIUM SERPL-SCNC: 135 MMOL/L (ref 135–144)
WBC OTHER # BLD: 16.4 K/UL (ref 3.5–11.3)

## 2023-11-30 PROCEDURE — 2580000003 HC RX 258

## 2023-11-30 PROCEDURE — 6370000000 HC RX 637 (ALT 250 FOR IP): Performed by: INTERNAL MEDICINE

## 2023-11-30 PROCEDURE — 99222 1ST HOSP IP/OBS MODERATE 55: CPT | Performed by: PSYCHIATRY & NEUROLOGY

## 2023-11-30 PROCEDURE — 4A03X5D MEASUREMENT OF ARTERIAL FLOW, INTRACRANIAL, EXTERNAL APPROACH: ICD-10-PCS | Performed by: STUDENT IN AN ORGANIZED HEALTH CARE EDUCATION/TRAINING PROGRAM

## 2023-11-30 PROCEDURE — 94669 MECHANICAL CHEST WALL OSCILL: CPT

## 2023-11-30 PROCEDURE — 97116 GAIT TRAINING THERAPY: CPT

## 2023-11-30 PROCEDURE — 6370000000 HC RX 637 (ALT 250 FOR IP)

## 2023-11-30 PROCEDURE — 86140 C-REACTIVE PROTEIN: CPT

## 2023-11-30 PROCEDURE — 80053 COMPREHEN METABOLIC PANEL: CPT

## 2023-11-30 PROCEDURE — 94664 DEMO&/EVAL PT USE INHALER: CPT

## 2023-11-30 PROCEDURE — 94761 N-INVAS EAR/PLS OXIMETRY MLT: CPT

## 2023-11-30 PROCEDURE — 97110 THERAPEUTIC EXERCISES: CPT

## 2023-11-30 PROCEDURE — 94640 AIRWAY INHALATION TREATMENT: CPT

## 2023-11-30 PROCEDURE — 70551 MRI BRAIN STEM W/O DYE: CPT

## 2023-11-30 PROCEDURE — 6360000002 HC RX W HCPCS: Performed by: STUDENT IN AN ORGANIZED HEALTH CARE EDUCATION/TRAINING PROGRAM

## 2023-11-30 PROCEDURE — 83880 ASSAY OF NATRIURETIC PEPTIDE: CPT

## 2023-11-30 PROCEDURE — 36415 COLL VENOUS BLD VENIPUNCTURE: CPT

## 2023-11-30 PROCEDURE — 6370000000 HC RX 637 (ALT 250 FOR IP): Performed by: STUDENT IN AN ORGANIZED HEALTH CARE EDUCATION/TRAINING PROGRAM

## 2023-11-30 PROCEDURE — 6370000000 HC RX 637 (ALT 250 FOR IP): Performed by: NURSE PRACTITIONER

## 2023-11-30 PROCEDURE — 71045 X-RAY EXAM CHEST 1 VIEW: CPT

## 2023-11-30 PROCEDURE — 1200000000 HC SEMI PRIVATE

## 2023-11-30 PROCEDURE — 6370000000 HC RX 637 (ALT 250 FOR IP): Performed by: PHYSICIAN ASSISTANT

## 2023-11-30 PROCEDURE — 85025 COMPLETE CBC W/AUTO DIFF WBC: CPT

## 2023-11-30 RX ORDER — ASPIRIN 81 MG/1
81 TABLET, CHEWABLE ORAL DAILY
Status: DISCONTINUED | OUTPATIENT
Start: 2023-11-30 | End: 2023-11-30

## 2023-11-30 RX ORDER — ASPIRIN 81 MG/1
81 TABLET, CHEWABLE ORAL DAILY
Status: DISCONTINUED | OUTPATIENT
Start: 2023-11-30 | End: 2023-12-01 | Stop reason: HOSPADM

## 2023-11-30 RX ORDER — WATER 10 ML/10ML
INJECTION INTRAMUSCULAR; INTRAVENOUS; SUBCUTANEOUS
Status: COMPLETED
Start: 2023-11-30 | End: 2023-11-30

## 2023-11-30 RX ADMIN — FLUTICASONE PROPIONATE 2 SPRAY: 50 SPRAY, METERED NASAL at 12:12

## 2023-11-30 RX ADMIN — GUAIFENESIN, DEXTROMETHORPHAN HBR 1 TABLET: 600; 30 TABLET ORAL at 11:00

## 2023-11-30 RX ADMIN — METHYLPREDNISOLONE SODIUM SUCCINATE 40 MG: 40 INJECTION, POWDER, LYOPHILIZED, FOR SOLUTION INTRAMUSCULAR; INTRAVENOUS at 21:30

## 2023-11-30 RX ADMIN — METHYLPREDNISOLONE SODIUM SUCCINATE 40 MG: 40 INJECTION, POWDER, LYOPHILIZED, FOR SOLUTION INTRAMUSCULAR; INTRAVENOUS at 11:00

## 2023-11-30 RX ADMIN — PRIMIDONE 250 MG: 250 TABLET ORAL at 17:31

## 2023-11-30 RX ADMIN — TIOTROPIUM BROMIDE INHALATION SPRAY 2 PUFF: 3.12 SPRAY, METERED RESPIRATORY (INHALATION) at 11:19

## 2023-11-30 RX ADMIN — Medication 1 CAPSULE: at 12:09

## 2023-11-30 RX ADMIN — ALBUTEROL SULFATE 2.5 MG: 2.5 SOLUTION RESPIRATORY (INHALATION) at 11:18

## 2023-11-30 RX ADMIN — DICLOFENAC 2 G: 10 GEL TOPICAL at 10:50

## 2023-11-30 RX ADMIN — BUMETANIDE 1 MG: 1 TABLET ORAL at 11:00

## 2023-11-30 RX ADMIN — MIDODRINE HYDROCHLORIDE 10 MG: 5 TABLET ORAL at 17:30

## 2023-11-30 RX ADMIN — MIDODRINE HYDROCHLORIDE 10 MG: 5 TABLET ORAL at 11:00

## 2023-11-30 RX ADMIN — BUMETANIDE 1 MG: 1 TABLET ORAL at 17:30

## 2023-11-30 RX ADMIN — ASPIRIN 81 MG: 81 TABLET, CHEWABLE ORAL at 10:45

## 2023-11-30 RX ADMIN — PRIMIDONE 250 MG: 250 TABLET ORAL at 12:09

## 2023-11-30 RX ADMIN — ACETAMINOPHEN 650 MG: 325 TABLET ORAL at 10:50

## 2023-11-30 RX ADMIN — SODIUM CHLORIDE, PRESERVATIVE FREE 10 ML: 5 INJECTION INTRAVENOUS at 21:33

## 2023-11-30 RX ADMIN — OXYBUTYNIN CHLORIDE 10 MG: 5 TABLET ORAL at 11:00

## 2023-11-30 RX ADMIN — DICLOFENAC 2 G: 10 GEL TOPICAL at 15:26

## 2023-11-30 RX ADMIN — ATORVASTATIN CALCIUM 40 MG: 40 TABLET, FILM COATED ORAL at 11:00

## 2023-11-30 RX ADMIN — ALBUTEROL SULFATE 2.5 MG: 2.5 SOLUTION RESPIRATORY (INHALATION) at 05:55

## 2023-11-30 RX ADMIN — MOMETASONE FUROATE AND FORMOTEROL FUMARATE DIHYDRATE 2 PUFF: 200; 5 AEROSOL RESPIRATORY (INHALATION) at 11:18

## 2023-11-30 RX ADMIN — MULTIPLE VITAMINS W/ MINERALS TAB 1 TABLET: TAB at 11:00

## 2023-11-30 RX ADMIN — PROPRANOLOL HYDROCHLORIDE 60 MG: 60 CAPSULE, EXTENDED RELEASE ORAL at 11:00

## 2023-11-30 RX ADMIN — SODIUM CHLORIDE, PRESERVATIVE FREE 10 ML: 5 INJECTION INTRAVENOUS at 11:00

## 2023-11-30 RX ADMIN — MONTELUKAST 10 MG: 10 TABLET, FILM COATED ORAL at 11:00

## 2023-11-30 RX ADMIN — LEVOTHYROXINE SODIUM 137 MCG: 25 TABLET ORAL at 07:30

## 2023-11-30 RX ADMIN — METFORMIN HYDROCHLORIDE 500 MG: 500 TABLET ORAL at 17:31

## 2023-11-30 RX ADMIN — FERROUS SULFATE TAB 325 MG (65 MG ELEMENTAL FE) 325 MG: 325 (65 FE) TAB at 11:00

## 2023-11-30 RX ADMIN — TAMSULOSIN HYDROCHLORIDE 0.8 MG: 0.4 CAPSULE ORAL at 11:00

## 2023-11-30 RX ADMIN — METFORMIN HYDROCHLORIDE 500 MG: 500 TABLET ORAL at 12:09

## 2023-11-30 RX ADMIN — POLYETHYLENE GLYCOL 3350 17 G: 17 POWDER, FOR SOLUTION ORAL at 11:00

## 2023-11-30 RX ADMIN — FERROUS SULFATE TAB 325 MG (65 MG ELEMENTAL FE) 325 MG: 325 (65 FE) TAB at 17:31

## 2023-11-30 RX ADMIN — ALBUTEROL SULFATE 2.5 MG: 2.5 SOLUTION RESPIRATORY (INHALATION) at 16:10

## 2023-11-30 RX ADMIN — PANTOPRAZOLE SODIUM 40 MG: 40 TABLET, DELAYED RELEASE ORAL at 07:30

## 2023-11-30 RX ADMIN — WATER 10 ML: 1 INJECTION INTRAMUSCULAR; INTRAVENOUS; SUBCUTANEOUS at 21:30

## 2023-11-30 RX ADMIN — SPIRONOLACTONE 50 MG: 25 TABLET, FILM COATED ORAL at 11:00

## 2023-11-30 RX ADMIN — ALBUTEROL SULFATE 2.5 MG: 2.5 SOLUTION RESPIRATORY (INHALATION) at 20:40

## 2023-11-30 ASSESSMENT — PAIN DESCRIPTION - PAIN TYPE
TYPE: ACUTE PAIN;CHRONIC PAIN
TYPE: CHRONIC PAIN
TYPE: CHRONIC PAIN

## 2023-11-30 ASSESSMENT — PAIN - FUNCTIONAL ASSESSMENT
PAIN_FUNCTIONAL_ASSESSMENT: ACTIVITIES ARE NOT PREVENTED

## 2023-11-30 ASSESSMENT — PAIN SCALES - GENERAL
PAINLEVEL_OUTOF10: 0
PAINLEVEL_OUTOF10: 5
PAINLEVEL_OUTOF10: 2
PAINLEVEL_OUTOF10: 5

## 2023-11-30 ASSESSMENT — PAIN DESCRIPTION - ONSET
ONSET: ON-GOING
ONSET: GRADUAL
ONSET: GRADUAL

## 2023-11-30 ASSESSMENT — PAIN DESCRIPTION - ORIENTATION
ORIENTATION: LEFT

## 2023-11-30 ASSESSMENT — PAIN DESCRIPTION - LOCATION
LOCATION: KNEE

## 2023-11-30 ASSESSMENT — PAIN DESCRIPTION - FREQUENCY
FREQUENCY: INTERMITTENT

## 2023-11-30 ASSESSMENT — PAIN DESCRIPTION - DESCRIPTORS
DESCRIPTORS: ACHING

## 2023-11-30 NOTE — PROGRESS NOTES
Physical Therapy  Facility/Department: [facility] AT THE [facility] MED SURG  Daily Treatment Note    NAME: Trung Phillips  : 1935  MRN: [id number]    Date of Service: 2023    Discharge Recommendations:  Continue to assess pending progress, Subacute/Skilled Nursing Facility, Patient would benefit from continued therapy after discharge        Patient Diagnosis(es): The encounter diagnosis was Community acquired pneumonia of right lower lobe of lung. Assessment   Assessment: Writer entered room with nursing this afternoon to don patients thigh compression stockings to B LE's, stockings were donned however not appropriate fit and were removed with nursing exiting to return after therapy to don appropriate fitting stockings. Patient not appropriate this afternoon for transfers/gait due to increaesed fatigue and inability to remain awake. Seated and reclined B LE ther ex in all planes with limited knee extension in L. Frequent cues for continued participation with patient closing his eyes frequently. Patient didn't verbally rate knee pain but did wince with donning compression stockings. Activity Tolerance: Patient limited by fatigue;Patient limited by endurance     Plan    Physical Therapy Plan  General Plan: 2 times a day 7 days a week (1x/day on weekends and holidays)  Current Treatment Recommendations: Strengthening;Balance training;Functional mobility training;Transfer training; Endurance training;Gait training;Stair training;Neuromuscular re-education;Home exercise program;Safety education & training; Therapeutic activities     Restrictions  Restrictions/Precautions  Restrictions/Precautions: Fall Risk, Contact Precautions     Subjective    Subjective  Subjective: Pt in recliner sleeping upon arrival with nursing to don thigh compression stockings, patient agreed to therapy  Pain: no rating provided this visit but patient did wince when attempting to don compression stocking to L LE.   Orientation  Overall Orientation place; Left in chair;Nurse notified; Heels elevated for pressure relief       Goals  Short Term Goals  Time Frame for Short Term Goals: 10 days  Short Term Goal 1: Initiate HEP and progress as tolerated for strength and mobility  Short Term Goal 2: Pt will be SBA +1 for bed mobility with bed flat and no railings to allow him to get up and down once discharged. Short Term Goal 3: Pt will be CGA +1 with transfers with appropriate device with good balance to reduce fall risk. Short Term Goal 4: Pt will be CGA +1 with ambulation with appropriate device for up to 150 feet to enable ease with getting in and out of the home. Short Term Goal 5: Pt will be initiated on steps, if needed to enter and exit his home. Patient Goals   Patient Goals : unable to state    Education  Patient Education  Education Given To: Patient  Education Provided Comments: Education during seated ther ex with tactile and verbal cues for technique and continued participation.   Education Method: Verbal;Demonstration  Barriers to Learning: Cognition  Education Outcome: Verbalized understanding;Continued education needed    Therapy Time   Individual Concurrent Group Co-treatment   Time In 1404         Time Out 1430         Minutes [address] [place] [address]

## 2023-11-30 NOTE — PROGRESS NOTES
Writer came into room at 0730 to pt lethargic but opening eyes to voice. Pt gripped writer's hands but would not follow further commands. Pt only answered \"I don't know\" to all questions. Pt continued to repeat \"I don't know\" without being asked questions for several minutes. Pt not opening eyes at 0745, not following commands, mumbling and bobbing head but not waking up any further. Writer called for help, other RN's came to bedside. Vitals were taken, see flowsheets. Rufus Handley CNP, notified. CNP at bedside at 0750. Orders to follow. Writer asked CNP whether pt was candidate for TPA, CNP states \"no, he's not. \" Pt spouse, Gwen Juares, called at this time and notified of status and plan and states \"OK. I'm leaving the house in five minutes, I'll be there soon. \" Will continue to monitor.

## 2023-11-30 NOTE — PROGRESS NOTES
Writer spoke with Dr. Mariam Valencia regarding neurology consult. MD did not have a specific neurologist in mind and just wants to wait to do the consult until tomorrow. Will pass on in report and continue to monitor.

## 2023-11-30 NOTE — PROGRESS NOTES
Pt spouse called at this time to confirm no changes to pt status from previous MRI. Lev Nicholas from radiology notified.

## 2023-11-30 NOTE — PLAN OF CARE
Problem: Safety - Adult  Goal: Free from fall injury  11/29/2023 2242 by Nikki Barros RN  Outcome: Progressing  Note: Bed in low position. Wheels locked. Bed alarm on. 2/4 side rails are up. Fall band on. Call light within reach. Problem: Respiratory - Adult  Goal: Achieves optimal ventilation and oxygenation  11/29/2023 2242 by Nikki Barros RN  Outcome: Progressing  Note: SpO2 was 93% on room air, will continue to monitor. Problem: Pain  Goal: Verbalizes/displays adequate comfort level or baseline comfort level  11/29/2023 2242 by Nikki Barros RN  Outcome: Progressing  Note: Pt is able to verbalize when in pain. Pt denies pain at this time. Will continue to monitor.

## 2023-11-30 NOTE — PROGRESS NOTES
Occupational Therapy  Facility/Department: [facility] AT THE [facility] MED SURG  Daily Treatment Note  NAME: Stefania Tate  : 1935  MRN: [id number]    Date of Service: 2023    Discharge Recommendations:  Home with assist PRN         Patient Diagnosis(es): The encounter diagnosis was Community acquired pneumonia of right lower lobe of lung. Assessment    Activity Tolerance: Patient limited by fatigue;Patient limited by endurance  Discharge Recommendations: Home with assist PRN      Plan   Occupational Therapy Plan  Times Per Day: Once a day  Days Per Week: 7 Days  Current Treatment Recommendations: Strengthening; Safety education & training;Self-Care / ADL; Functional mobility training     Restrictions   General fall risk    Subjective   Subjective  Subjective: Pt seated in recliner, agreed to BUE therex. Pain: Pt reported pain in L knee this date, did not rate pain. Clinician provided ice pack. Orientation  Overall Orientation Status: Within Functional Limits             Objective    Vitals     Pt declined ADLs at this time. OT Exercises  Exercise Treatment: BUE therex to increase strength/endurance for ease of fxl tasks. Green digiflex x 20 yellow flex bar x 20 and 1# free weights x 10-20 reps x all planes while seated for a total of ~ 20 min therex. Pt required Mod RBs for recovery. Pt dropped 1# free weight x 2 during therex in R hand. Slight incoordination noted during NILSA therex. Safety Devices  Type of Devices: All fall risk precautions in place;Call light within reach; Chair alarm in place; Left in chair     Patient Education  Education Given To: Patient  Education Provided: Role of Therapy;Plan of Care;Home Exercise Program  Education Method: Demonstration;Verbal  Barriers to Learning: None  Education Outcome: Demonstrated understanding    Goals  Short Term Goals  Time Frame for Short Term Goals: 1 week  Short Term Goal 1: Pt. will demonstrate good safety as relates to ADL's.   Short Term Goal 2:

## 2023-11-30 NOTE — PROGRESS NOTES
no railings to allow him to get up and down once discharged. Short Term Goal 3: Pt will be CGA +1 with transfers with appropriate device with good balance to reduce fall risk. Short Term Goal 4: Pt will be CGA +1 with ambulation with appropriate device for up to 150 feet to enable ease with getting in and out of the home. Short Term Goal 5: Pt will be initiated on steps, if needed to enter and exit his home. Patient Goals   Patient Goals : unable to state    Education  Patient Education  Education Given To: Patient  Education Provided Comments: Education during bed mobs and transfers for sequencing.   Education Method: Verbal;Demonstration  Barriers to Learning: None (continued delayed processing for commands.)  Education Outcome: Verbalized understanding;Continued education needed      Therapy Time   Individual Concurrent Group Co-treatment   Time In [phone])         Time Out 1048 ([id number])         Minutes [address] [place] [address]

## 2023-11-30 NOTE — PROGRESS NOTES
greater than 24 bpm []  Access- ory muscle use at rest. Abn.  resp. []  SOB at rest.   2   Bilateral Breath Sounds (BBS) []  Clear []  Diminish-ed bases  [x]  Diminish-ed t/o, or rales   []  Sporadic, scattered wheezes or rhonchi []  Persistentwheezes and, or absent BBS 2   Cough []  Strong, effective, & non-prod. [x]  Effective & prod. Less than 25 ml (2 TBSP) over past 24 hrs []  Ineffective & non-prod to less than 25 ML over past 24 hrs []  Ineffective and, or greater than 25 ml sputum prod. past 24 hrs. []  Nonspon- taneous; Requires suctioning 1   Pulmonary History  (PULM HX) []  No smoking and no chronic pulmonary history []  Former smoker. Quit over 12 mos. ago []  Current smoker or quit w/ in 12 mos []  Pulm. History and, or 20 pk/yr smoking hx [x]  Admitted w/ acute pulm. dx and, or has been admitted w/ pulm. dx 2 or more times over past 12 mos 4   Surgical History this Admit  (SURG HX) [x]  No surgery []  General surgery []  Lower abdominal []  Thoracic or upper abdominal   []  Thoracic w/ pulm. disease 0   Chest X-Ray (CXR)/CT Scan []  Clear or not applicable []  Not available []  Atelectasis or pleural effusions []  Localized infiltrate or pulm. edema [x]  Con-solidated Infiltrates, bilateral, or in more than 1 lobe 4   TOTAL ACUITY: 13       CARE PLAN    If Acuity Level is 2, 3, or 4 in any of the following:    [x] BILATERAL BREATH SOUNDS (BBS)     [x] PULMONARY HISTORY (PULM HX)  [x] Respiratory Rate  (RR)    Goal: Improve respiratory functions in patients with airway disease and decrease WOB    [x] AEROSOL PROTOCOL    Total Acuity:   14-28  []  Secondary Assessment in 24 hrs Total Acuity:  9-13  [x]  Secondary Assessment in 24 hrs Total Acuity:  4-8  []  Secondary Assessment in 24 hrs Total Acuity:  0-3  []  Secondary Assessment in 48 hrs   [facility] AEROSOL THERAPY with  [physician-ordered bronchodilator(s)] q 4 & Albuterol PRN q2 hrs. Breath-Actuated Neb if BBS Acuity = 4, and pt. can use MP.  Notify according to policy VZ_876: (karson with an X)  ____Yes    ____ No     ____ NA    Smoking Cessation Booklet given:  ____Yes  ____No ____Patient Cruz Tay

## 2023-12-01 VITALS
WEIGHT: 238 LBS | BODY MASS INDEX: 34.07 KG/M2 | TEMPERATURE: 98.1 F | HEART RATE: 72 BPM | HEIGHT: 70 IN | OXYGEN SATURATION: 90 % | RESPIRATION RATE: 14 BRPM | DIASTOLIC BLOOD PRESSURE: 71 MMHG | SYSTOLIC BLOOD PRESSURE: 110 MMHG

## 2023-12-01 LAB
ALBUMIN SERPL-MCNC: 2.6 G/DL (ref 3.5–5.2)
ALBUMIN/GLOB SERPL: 0.7 {RATIO} (ref 1–2.5)
ALP SERPL-CCNC: 175 U/L (ref 40–129)
ALT SERPL-CCNC: 38 U/L (ref 5–41)
ANION GAP SERPL CALCULATED.3IONS-SCNC: 10 MMOL/L (ref 9–17)
AST SERPL-CCNC: 35 U/L
BASOPHILS # BLD: 0 K/UL (ref 0–0.2)
BASOPHILS NFR BLD: 0 % (ref 0–2)
BILIRUB SERPL-MCNC: 0.2 MG/DL (ref 0.3–1.2)
BNP SERPL-MCNC: 8188 PG/ML
BUN SERPL-MCNC: 41 MG/DL (ref 8–23)
BUN/CREAT SERPL: 26 (ref 9–20)
CALCIUM SERPL-MCNC: 8.7 MG/DL (ref 8.6–10.4)
CHLORIDE SERPL-SCNC: 100 MMOL/L (ref 98–107)
CO2 SERPL-SCNC: 24 MMOL/L (ref 20–31)
CREAT SERPL-MCNC: 1.6 MG/DL (ref 0.7–1.2)
CRP SERPL HS-MCNC: 150.2 MG/L (ref 0–5)
EOSINOPHIL # BLD: 0 K/UL (ref 0–0.44)
EOSINOPHILS RELATIVE PERCENT: 0 % (ref 1–4)
ERYTHROCYTE [DISTWIDTH] IN BLOOD BY AUTOMATED COUNT: 16.7 % (ref 11.8–14.4)
GFR SERPL CREATININE-BSD FRML MDRD: 42 ML/MIN/1.73M2
GLUCOSE SERPL-MCNC: 160 MG/DL (ref 70–99)
HCT VFR BLD AUTO: 25.8 % (ref 40.7–50.3)
HGB BLD-MCNC: 8.1 G/DL (ref 13–17)
IMM GRANULOCYTES # BLD AUTO: 0.59 K/UL (ref 0–0.3)
IMM GRANULOCYTES NFR BLD: 4 %
LYMPHOCYTES NFR BLD: 1.04 K/UL (ref 1.1–3.7)
LYMPHOCYTES RELATIVE PERCENT: 7 % (ref 24–43)
MCH RBC QN AUTO: 28.3 PG (ref 25.2–33.5)
MCHC RBC AUTO-ENTMCNC: 31.4 G/DL (ref 28.4–34.8)
MCV RBC AUTO: 90.2 FL (ref 82.6–102.9)
MONOCYTES NFR BLD: 1.18 K/UL (ref 0.1–1.2)
MONOCYTES NFR BLD: 8 % (ref 3–12)
MORPHOLOGY: NORMAL
NEUTROPHILS NFR BLD: 81 % (ref 36–65)
NEUTS SEG NFR BLD: 11.99 K/UL (ref 1.5–8.1)
NRBC BLD-RTO: 0 PER 100 WBC
PLATELET # BLD AUTO: 389 K/UL (ref 138–453)
PMV BLD AUTO: 10.1 FL (ref 8.1–13.5)
POTASSIUM SERPL-SCNC: 3.8 MMOL/L (ref 3.7–5.3)
PROT SERPL-MCNC: 6.2 G/DL (ref 6.4–8.3)
RBC # BLD AUTO: 2.86 M/UL (ref 4.21–5.77)
SODIUM SERPL-SCNC: 134 MMOL/L (ref 135–144)
WBC OTHER # BLD: 14.8 K/UL (ref 3.5–11.3)

## 2023-12-01 PROCEDURE — 6370000000 HC RX 637 (ALT 250 FOR IP): Performed by: NURSE PRACTITIONER

## 2023-12-01 PROCEDURE — 6370000000 HC RX 637 (ALT 250 FOR IP): Performed by: STUDENT IN AN ORGANIZED HEALTH CARE EDUCATION/TRAINING PROGRAM

## 2023-12-01 PROCEDURE — 97110 THERAPEUTIC EXERCISES: CPT

## 2023-12-01 PROCEDURE — 2580000003 HC RX 258

## 2023-12-01 PROCEDURE — 94640 AIRWAY INHALATION TREATMENT: CPT

## 2023-12-01 PROCEDURE — 6370000000 HC RX 637 (ALT 250 FOR IP)

## 2023-12-01 PROCEDURE — 94664 DEMO&/EVAL PT USE INHALER: CPT

## 2023-12-01 PROCEDURE — 85025 COMPLETE CBC W/AUTO DIFF WBC: CPT

## 2023-12-01 PROCEDURE — 6360000002 HC RX W HCPCS: Performed by: STUDENT IN AN ORGANIZED HEALTH CARE EDUCATION/TRAINING PROGRAM

## 2023-12-01 PROCEDURE — 97535 SELF CARE MNGMENT TRAINING: CPT

## 2023-12-01 PROCEDURE — 86140 C-REACTIVE PROTEIN: CPT

## 2023-12-01 PROCEDURE — 97530 THERAPEUTIC ACTIVITIES: CPT

## 2023-12-01 PROCEDURE — 80053 COMPREHEN METABOLIC PANEL: CPT

## 2023-12-01 PROCEDURE — 83880 ASSAY OF NATRIURETIC PEPTIDE: CPT

## 2023-12-01 PROCEDURE — 6370000000 HC RX 637 (ALT 250 FOR IP): Performed by: PHYSICIAN ASSISTANT

## 2023-12-01 PROCEDURE — 36415 COLL VENOUS BLD VENIPUNCTURE: CPT

## 2023-12-01 PROCEDURE — 94761 N-INVAS EAR/PLS OXIMETRY MLT: CPT

## 2023-12-01 PROCEDURE — 94669 MECHANICAL CHEST WALL OSCILL: CPT

## 2023-12-01 PROCEDURE — 6370000000 HC RX 637 (ALT 250 FOR IP): Performed by: INTERNAL MEDICINE

## 2023-12-01 RX ORDER — WATER 10 ML/10ML
INJECTION INTRAMUSCULAR; INTRAVENOUS; SUBCUTANEOUS
Status: COMPLETED
Start: 2023-12-01 | End: 2023-12-01

## 2023-12-01 RX ORDER — ONDANSETRON 4 MG/1
4 TABLET, ORALLY DISINTEGRATING ORAL EVERY 8 HOURS PRN
DISCHARGE
Start: 2023-12-01

## 2023-12-01 RX ORDER — SPIRONOLACTONE 50 MG/1
50 TABLET, FILM COATED ORAL DAILY
Qty: 30 TABLET | Refills: 3 | DISCHARGE
Start: 2023-12-02

## 2023-12-01 RX ORDER — POLYETHYLENE GLYCOL 3350 17 G/17G
17 POWDER, FOR SOLUTION ORAL DAILY PRN
Qty: 527 G | Refills: 1 | DISCHARGE
Start: 2023-12-01 | End: 2023-12-31

## 2023-12-01 RX ORDER — ASPIRIN 81 MG/1
81 TABLET, CHEWABLE ORAL DAILY
Qty: 30 TABLET | Refills: 3 | DISCHARGE
Start: 2023-12-02

## 2023-12-01 RX ORDER — MIDODRINE HYDROCHLORIDE 10 MG/1
10 TABLET ORAL
Qty: 90 TABLET | Refills: 3 | DISCHARGE
Start: 2023-12-01

## 2023-12-01 RX ORDER — LACTOBACILLUS RHAMNOSUS GG 10B CELL
1 CAPSULE ORAL
DISCHARGE
Start: 2023-12-02

## 2023-12-01 RX ADMIN — PROPRANOLOL HYDROCHLORIDE 60 MG: 60 CAPSULE, EXTENDED RELEASE ORAL at 10:42

## 2023-12-01 RX ADMIN — Medication 1 CAPSULE: at 10:43

## 2023-12-01 RX ADMIN — MULTIPLE VITAMINS W/ MINERALS TAB 1 TABLET: TAB at 10:43

## 2023-12-01 RX ADMIN — MOMETASONE FUROATE AND FORMOTEROL FUMARATE DIHYDRATE 2 PUFF: 200; 5 AEROSOL RESPIRATORY (INHALATION) at 11:04

## 2023-12-01 RX ADMIN — WATER 10 ML: 1 INJECTION INTRAMUSCULAR; INTRAVENOUS; SUBCUTANEOUS at 10:43

## 2023-12-01 RX ADMIN — OXYBUTYNIN CHLORIDE 10 MG: 5 TABLET ORAL at 10:43

## 2023-12-01 RX ADMIN — TAMSULOSIN HYDROCHLORIDE 0.8 MG: 0.4 CAPSULE ORAL at 10:43

## 2023-12-01 RX ADMIN — MONTELUKAST 10 MG: 10 TABLET, FILM COATED ORAL at 10:43

## 2023-12-01 RX ADMIN — TIOTROPIUM BROMIDE INHALATION SPRAY 2 PUFF: 3.12 SPRAY, METERED RESPIRATORY (INHALATION) at 11:04

## 2023-12-01 RX ADMIN — LEVOTHYROXINE SODIUM 137 MCG: 25 TABLET ORAL at 07:49

## 2023-12-01 RX ADMIN — METHYLPREDNISOLONE SODIUM SUCCINATE 40 MG: 40 INJECTION, POWDER, LYOPHILIZED, FOR SOLUTION INTRAMUSCULAR; INTRAVENOUS at 10:46

## 2023-12-01 RX ADMIN — GUAIFENESIN, DEXTROMETHORPHAN HBR 1 TABLET: 600; 30 TABLET ORAL at 10:43

## 2023-12-01 RX ADMIN — ASPIRIN 81 MG: 81 TABLET, CHEWABLE ORAL at 10:43

## 2023-12-01 RX ADMIN — MIDODRINE HYDROCHLORIDE 10 MG: 5 TABLET ORAL at 10:42

## 2023-12-01 RX ADMIN — SODIUM CHLORIDE, PRESERVATIVE FREE 10 ML: 5 INJECTION INTRAVENOUS at 10:44

## 2023-12-01 RX ADMIN — FERROUS SULFATE TAB 325 MG (65 MG ELEMENTAL FE) 325 MG: 325 (65 FE) TAB at 12:56

## 2023-12-01 RX ADMIN — ATORVASTATIN CALCIUM 40 MG: 40 TABLET, FILM COATED ORAL at 10:43

## 2023-12-01 RX ADMIN — ALBUTEROL SULFATE 2.5 MG: 2.5 SOLUTION RESPIRATORY (INHALATION) at 05:19

## 2023-12-01 RX ADMIN — PRIMIDONE 250 MG: 250 TABLET ORAL at 10:43

## 2023-12-01 RX ADMIN — SPIRONOLACTONE 50 MG: 25 TABLET, FILM COATED ORAL at 10:43

## 2023-12-01 RX ADMIN — FLUTICASONE PROPIONATE 2 SPRAY: 50 SPRAY, METERED NASAL at 10:44

## 2023-12-01 RX ADMIN — METFORMIN HYDROCHLORIDE 500 MG: 500 TABLET ORAL at 10:43

## 2023-12-01 RX ADMIN — PANTOPRAZOLE SODIUM 40 MG: 40 TABLET, DELAYED RELEASE ORAL at 07:49

## 2023-12-01 RX ADMIN — BUMETANIDE 1 MG: 1 TABLET ORAL at 10:43

## 2023-12-01 RX ADMIN — ALBUTEROL SULFATE 2.5 MG: 2.5 SOLUTION RESPIRATORY (INHALATION) at 11:04

## 2023-12-01 ASSESSMENT — PAIN SCALES - GENERAL
PAINLEVEL_OUTOF10: 0

## 2023-12-01 NOTE — PROGRESS NOTES
Writer to bedside to complete morning assessment. Upon entry to room, pt in bed eyes closed, respirations even and unlabored while on room air. Vitals obtained and assessment completed, see flow sheet for details. Pt is A&Ox4, but can be disoriented at times. Pt denies any pain. Lung sounds are clear with rhonchi and fine crackles present. Bilateral lower extremities are +4 pitting edema. Pt updated on plan of care and whiteboard updated. Pt denies further needs from writer at this time. Call light in reach. Care ongoing.

## 2023-12-01 NOTE — PROGRESS NOTES
Pt taken down via wheelchair to [facility] for discharge at this time. Pt belongings in hand.

## 2023-12-01 NOTE — PROGRESS NOTES
Writer was able to change patient in the bed without second staff. Patient was able to follow commands. He was oriented to self and place but not time or situation. Patient shows no new signs of neurological deficits and was able to use his right and left arms equally to grab and roll himself using the bed rail. Patient's responses remain delayed but appropriate to the questions. Once patient was washed up, he was given a new brief, bed pad, and tucked back into bed for comfort. Call light and bedside table remain within reach. Care ongoing.

## 2023-12-01 NOTE — PROGRESS NOTES
Vitals and assessment are complete at this time. Writer walked patient through the medications that would be administered tonight and encouraged patient to ask questions about the medications and therapies. Patient nor patient's wife had questions. Patient was incontinent of urine and also urinated in the urinal. Patient was washed up and given a new gown and complete new linens. Patient tolerated fairly well getting up from the chair to the bed with 3 staff assist.   Patient is more alert and answering questions appropriately. Neuros are complete, See flowsheet. Patient reports that he is happy to be going to rehab tomorrow. Call light is within reach and bed alarm set. Patient denies needs at this time. Care ongoing.

## 2023-12-01 NOTE — PROGRESS NOTES
[facility] at [facility] time and gave nursing staff report on pt and updated on plan of arrival to facility.

## 2023-12-01 NOTE — PLAN OF CARE
Problem: Discharge Planning  Goal: Discharge to home or other facility with appropriate resources  Outcome: Progressing  Flowsheets (Taken 11/30/2023 1907)  Discharge to home or other facility with appropriate resources:   Identify barriers to discharge with patient and caregiver   Arrange for needed discharge resources and transportation as appropriate   Identify discharge learning needs (meds, wound care, etc)   Refer to discharge planning if patient needs post-hospital services based on physician order or complex needs related to functional status, cognitive ability or social support system     Problem: Safety - Adult  Goal: Free from fall injury  Outcome: Progressing  Flowsheets (Taken 12/1/2023 0340)  Free From Fall Injury:   Instruct family/caregiver on patient safety   Based on caregiver fall risk screen, instruct family/caregiver to ask for assistance with transferring infant if caregiver noted to have fall risk factors     Problem: Skin/Tissue Integrity  Goal: Absence of new skin breakdown  Description: 1. Monitor for areas of redness and/or skin breakdown  2. Assess vascular access sites hourly  3. Every 4-6 hours minimum:  Change oxygen saturation probe site  4. Every 4-6 hours:  If on nasal continuous positive airway pressure, respiratory therapy assess nares and determine need for appliance change or resting period.   Outcome: Progressing     Problem: ABCDS Injury Assessment  Goal: Absence of physical injury  Outcome: Progressing  Flowsheets (Taken 12/1/2023 0340)  Absence of Physical Injury: Implement safety measures based on patient assessment     Problem: Nutrition Deficit:  Goal: Optimize nutritional status  Outcome: Progressing  Flowsheets (Taken 12/1/2023 0340)  Nutrient intake appropriate for improving, restoring, or maintaining nutritional needs: Monitor oral intake, labs, and treatment plans     Problem: Respiratory - Adult  Goal: Achieves optimal ventilation and oxygenation  Outcome: 8830)  Achieves maximal functionality and self care:   Monitor swallowing and airway patency with patient fatigue and changes in neurological status   Encourage and assist patient to increase activity and self care with guidance from physical therapy/occupational therapy     Problem: Cardiovascular - Adult  Goal: Maintains optimal cardiac output and hemodynamic stability  Outcome: Progressing  Goal: Absence of cardiac dysrhythmias or at baseline  Outcome: Progressing     Problem: Musculoskeletal - Adult  Goal: Return mobility to safest level of function  Outcome: Progressing  Flowsheets (Taken 12/1/2023 2560)  Return Mobility to Safest Level of Function:   Assess patient stability and activity tolerance for standing, transferring and ambulating with or without assistive devices   Assist with transfers and ambulation using safe patient handling equipment as needed   Obtain physical therapy/occupational therapy consults as needed   Instruct patient/family in ordered activity level  Goal: Maintain proper alignment of affected body part  Outcome: Progressing  Flowsheets (Taken 12/1/2023 0340)  Maintain proper alignment of affected body part: Support and protect limb and body alignment per provider's orders  Goal: Return ADL status to a safe level of function  Outcome: Progressing

## 2023-12-01 NOTE — PROGRESS NOTES
Occupational Therapy  Facility/Department: [facility] THE [facility] SURG  Daily Treatment Note  NAME: Mabel De Los Santos  : 1935  MRN: [id number]    Date of Service: 2023    Discharge Recommendations:  Home with assist PRN         Patient Diagnosis(es): The encounter diagnosis was Community acquired pneumonia of right lower lobe of lung. Assessment    Activity Tolerance: Patient limited by fatigue;Patient limited by endurance  Discharge Recommendations: Home with assist PRN      Plan   Occupational Therapy Plan  Times Per Day: Once a day  Days Per Week: 7 Days  Current Treatment Recommendations: Strengthening; Safety education & training;Self-Care / ADL; Functional mobility training     Restrictions   General fall risk    Subjective   Subjective  Subjective: Pt in bed, agreed to transfer to recliner. Pt requested urinal and was incontinent in brief. Edema in BLEs. Pain: Pt reported pain in L knee this date, did not rate pain. Clinician provided ice pack. Orientation  Overall Orientation Status: Impaired           Objective    Vitals       ADL  Toileting: Maximum assistance  Toileting Skilled Clinical Factors: Max A mina care and clothing mgt to change brief  Additional Comments: Min A to complete bed mob, Min/Mod A x 1-2 to complete sit to stand transfers from EOB to recliner. Increased time for all. OT Exercises  Exercise Treatment: BUE therex to increase strength/endurance for ease of fxl tasks. Green digiflex x 20 yellow flex bar x 20. Pt too fatigued to comtinue and d/c'd therex. Safety Devices  Type of Devices: All fall risk precautions in place;Call light within reach; Chair alarm in place; Left in chair     Patient Education  Education Given To: Patient  Education Provided: Role of Therapy;Plan of Care;Home Exercise Program;Transfer Training;ADL Adaptive Strategies  Education Method: Demonstration;Verbal  Barriers to Learning: None  Education Outcome: Demonstrated understanding    Goals  Short Term

## 2023-12-01 NOTE — DISCHARGE SUMMARY
Discharge Summary    Suzanne Gallego  :  1935  MRN:  [id number]    Admit date:  2023      Discharge date: 2023     Admitting Physician:  No admitting provider for patient encounter. Discharge Diagnoses:    Principal Problem:    Community acquired pneumonia of both lungs  Active Problems:    Essential hypertension    Type 2 diabetes mellitus with complication, without long-term current use of insulin (HCC)    Chronic combined systolic and diastolic CHF, NYHA class 2 (HCC)    COPD without exacerbation (HCC)    Toxic metabolic encephalopathy  Resolved Problems:    * No resolved hospital problems. *      Hospital Course:   Suzanne Gallego is a 82 y.o. male admitted with community-acquired pneumonia. He presented to the emergency room for fever. Tmax 102.6 at home. He complained of chills and nonproductive cough. He denied ill contacts. He had recent hospitalization 1 month prior for GI bleeding. He does have history of COPD, diabetes, MI, CVA, CVA, sepsis in July and CHF. During patient's admission cardiology was consulted Labs were monitored electrolytes replaced. He was placed on IV antibiotics provided steroids, nebulizer treatments and Mucinex DM. Patient's LFTs have been elevated since 2023 and BNP was considerably elevated as high as 30,571 but trending downward at this time. Echocardiogram showed EF 45 to 50%. Basal septal hypertrophy without significant left ventricular outflow tract obstruction. Moderate aortic stenosis with mean gradient of 28 mmHg. Mild mitral and tricuspid regurgitation. Left atrial dilatation. Atrial septal aneurysm without evidence of anterior atrial communication with mild diastolic dysfunction and Aldactone was increased to 50 mg daily. He was continued on Mucinex and had intermittent doses of Lasix while hospitalized. Patient hemodynamically is stable.   He did have an episode of unresponsiveness during his hospitalization and an MRI of the documentation  [] The patient has a history of heart transplant or Left Ventricular Assist Device (LVAD).   [If yes, STOP HERE] - MIPS PERFORMANCE EXCLUSION    [x] The patient has a current or prior documentation of left ventricular ejection fraction (LVEF) less than or equal to 40%, or moderate or severely depressed left ventricular systolic function [if \"no\", STOP HERE]    ACE / ARB:  [] The patient was prescribed or is already taking and ACE or ARB  [] Reason why ACE or ARB was not prescirbed / taking: contraindicated due to CKD    BETA-BLOCKERS:  [] The patient was prescribed or is already taking a Beta-blocker  [] Reason why Beta-blocker not prescribed / taking:  N/A

## 2023-12-01 NOTE — PROGRESS NOTES
Physical Therapy  Facility/Department: [facility] AT THE [facility] MED SURG  Daily Treatment Note  NAME: Stefania Tate  : 1935  MRN: [id number]    Date of Service: 2023    Discharge Recommendations:  Continue to assess pending progress, Subacute/Skilled Nursing Facility, Patient would benefit from continued therapy after discharge        Patient Diagnosis(es): The encounter diagnosis was Community acquired pneumonia of right lower lobe of lung. Assessment   Assessment: Bed mobility with modA x 2, stand pivot transfer with Patricio-modA x 2. Standing static ~3.5 minutes at RW with B UE's support during hygiene care. B knee's began to buckle with fatigue. Continue to progress as tolerated for continued activity tolerance improvement. Seated and reclined B LE with AAROM as needed and frequent cues to stay awake and for technique. Activity Tolerance: Patient limited by fatigue;Patient limited by endurance     Plan    Physical Therapy Plan  General Plan: 2 times a day 7 days a week (1x per day on weekends.)  Current Treatment Recommendations: Strengthening;Balance training;Functional mobility training;Transfer training; Endurance training;Gait training;Stair training;Neuromuscular re-education;Home exercise program;Safety education & training; Therapeutic activities     Restrictions  Restrictions/Precautions  Restrictions/Precautions: Fall Risk, Contact Precautions     Subjective    Subjective  Subjective: pt in bed upon arrival, pleasant and agreeable to therapy. Delayed response with mumbling responses. Pain: no pain reported. Orientation  Overall Orientation Status: Impaired     Objective      Bed Mobility Training  Bed Mobility Training: Yes  Overall Level of Assistance: Assist X2;Moderate assistance  Interventions: Verbal cues; Safety awareness training  Rolling:  Moderate assistance;Assist X2  Supine to Sit: Assist X2;Moderate assistance  Scooting: Assist X2;Moderate assistance  Balance  Standing - Static: Fair  Standing - Dynamic: Fair  Transfer Training  Transfer Training: Yes  Overall Level of Assistance: Assist X2;Moderate assistance;Minimum assistance  Interventions: Safety awareness training;Verbal cues;Demonstration  Sit to Stand: Assist X2;Moderate assistance;Minimum assistance  Stand to Sit: Assist X2;Moderate assistance;Minimum assistance  Stand Pivot Transfers: Assist X2;Moderate assistance;Minimum assistance  Gait Training  Gait Training: No     PT Exercises  Exercise Treatment: Seated and reclined B LE with AAROM as needed and frequent cues to stay awake and for technique. Dynamic Standing Balance Exercises: ~3.5 minutes at RW with B UE's support during hygiene care. B knee's began to buckle with fatigue. Continue to progress as tolerated for continued activity tolerance improvement. Safety Devices  Type of Devices: All fall risk precautions in place;Call light within reach; Chair alarm in place; Left in chair;Patient at risk for falls;Gait belt       Goals  Short Term Goals  Time Frame for Short Term Goals: 10 days  Short Term Goal 1: Initiate HEP and progress as tolerated for strength and mobility  Short Term Goal 2: Pt will be SBA +1 for bed mobility with bed flat and no railings to allow him to get up and down once discharged. Short Term Goal 3: Pt will be CGA +1 with transfers with appropriate device with good balance to reduce fall risk. Short Term Goal 4: Pt will be CGA +1 with ambulation with appropriate device for up to 150 feet to enable ease with getting in and out of the home. Short Term Goal 5: Pt will be initiated on steps, if needed to enter and exit his home. Patient Goals   Patient Goals : unable to state    Education  Patient Education  Education Given To: Patient  Education Provided: Transfer Training; Fall Prevention Strategies  Education Provided Comments: Technique with transfers. Importance of therex.   Education Method: Verbal;Demonstration  Barriers to Learning: [place]

## 2023-12-01 NOTE — PROGRESS NOTES
Patient was still unable to tell writer his name but he was able to answer questions regarding his comfort. Patient is resting in bed with eyes closed. Call light and bedside table remain within reach. Care ongoing.

## 2023-12-01 NOTE — PROGRESS NOTES
Around 2030, Herman Brian went into patient's room to administer his nightly medications. Respiratory was in the room with patient, who would not answer any questions being asked, nor would patient follow commands. Patient continued to repeat \"Get rid of the tapes, burn them all. Get rid of the tapes. \" Patient would not answer any questions. He did lift both arms as asked, but was not able to follow any commands for respiratory regarding breathing exercises. Writer assessed vitals, which were stable. BP was elevated slightly but patient was coughing and holding his arm up while the pressure was being taken. Patient's oxygen was 92% on room air and heart rate 65 and stable on telemetry. Writer updated Dr. Ralph Zambrano of patient's change. No new orders received. Call light and bedside table remain within reach. Care ongoing.

## 2023-12-01 NOTE — PROGRESS NOTES
[facility]. [facility] transport arranged for 1430  at main entrance. Pt and spouse notified of discharge and time. Facility notified of time and information faxed. Packet provided to nursing and notified of time. No further needs identified.  Bella Medina MSW LSW 12/1/2023

## 2023-12-01 NOTE — DISCHARGE INSTR - COC
Continuity of Care Form    Patient Name: Hayden Crigler   :  1935  MRN:  [id number]    Admit date:  2023  Discharge date:  2023    Code Status Order: DNR-CCA   Advance Directives:     Admitting Physician:  No admitting provider for patient encounter. PCP: Sylvia Snider, APRIKE - CNP    Discharging Nurse: Johnna Calvo, [facility] Unit/Room#: 6613/8665-22  Discharging Unit Phone Number: [phone]    Emergency Contact:   Extended Emergency Contact Information  Primary Emergency Contact: Nicole Vargas  Address: [address] [place] [address]  Home Phone: [phone]  Mobile Phone: [phone]  Relation: Spouse   needed?  No    Past Surgical History:  Past Surgical History:   Procedure Laterality Date    CARDIAC SURGERY  2017    Stent placed  Dr Clara Rajput Bilateral 06/15/2023    ANGIOGRAM CAROTID CEREBRAL BILATERAL    COLONOSCOPY      COLONOSCOPY N/A 2023    COLONOSCOPY CONTROL HEMORRHAGE performed by Flavia Grissom MD at [facility]      right ankle    HERNIA REPAIR      SIGMOIDOSCOPY  2023    UPPER GASTROINTESTINAL ENDOSCOPY  10/25/2023    EGD CONTROL HEMORRHAGE performed by Flavia Grissom MD at [facility]       Immunization History:   Immunization History   Administered Date(s) Administered    COVID-19, MODERNA BLUE border, Primary or Immunocompromised, (age 12y+), IM, 100 mcg/0.5mL 2021, 2021, 2021, 2022    COVID-19, MODERNA, ( formula), (age 12y+), IM, 50mcg/0.5mL 10/18/2023    Influenza Virus Vaccine 2017, 10/01/2018    Influenza, AFLURIA (age 1 yrs+), FLUZONE, (age 10 mo+), MDV, 0.5mL 2017    Influenza, FLUAD, (age 72 y+), Adjuvanted, 0.5mL 2020, 2022, 10/18/2023    Influenza, FLUZONE (age 72 y+), High Dose, 0.7mL 10/07/2021    Influenza, High Dose (Fluzone 65 yrs and older) 2015, 10/01/2018, 2019, 10/07/2020    Pneumococcal, no  Last Modified Barium Swallow with Video (Video Swallowing Test): not done    Treatments at the Time of Hospital Discharge:   Respiratory Treatments: see MAR  Oxygen Therapy:  is not on home oxygen therapy. Ventilator:    - No ventilator support    Rehab Therapies: Physical Therapy and Occupational Therapy  Weight Bearing Status/Restrictions: No weight bearing restrictions  Other Medical Equipment (for information only, NOT a DME order):  wheelchair and walker  Other Treatments: none    Patient's personal belongings (please select all that are sent with patient):  Glasses, Dentures upper and lower    RN SIGNATURE:  Electronically signed by Roberto Carlos Bruno RN on 12/1/23 at 12:09 PM EST    CASE MANAGEMENT/SOCIAL WORK SECTION    Inpatient Status Date: 11/21/2023    Readmission Risk Assessment Score:  Readmission Risk              Risk of Unplanned Readmission:  52           Discharging to Facility/ Agency   Name: [facility]  Address: [address] [place] [address], [address] [place] [address] Road  [facility]:[phone]  YD:[phone]    Dialysis Facility (if applicable)   Name:  Address:  Dialysis Schedule:  Phone:  Fax:    / signature: Electronically signed by CORINA Amado on 12/1/23 at 12:13 PM EST    PHYSICIAN SECTION    Prognosis: Fair    Condition at Discharge: Stable    Rehab Potential (if transferring to Rehab): Fair    Recommended Labs or Other Treatments After Discharge: CMP and CBC with differential on December 5    Physician Certification: I certify the above information and transfer of Florinda Hartman  is necessary for the continuing treatment of the diagnosis listed and that he requires [address] for greater 30 days.      Update Admission H&P: No change in H&P    PHYSICIAN SIGNATURE:  Electronically signed by GENA Cornejo CNP on 12/1/23 at 11:42 AM EST11/21/2023

## 2023-12-01 NOTE — PROGRESS NOTES
Comprehensive Nutrition Assessment    Type and Reason for Visit:  Reassess    Nutrition Recommendations/Plan:   Continue current diet. Continue current ONS. Malnutrition Assessment:  Malnutrition Status: At risk for malnutrition (Comment) (11/22/23 1047)    Context:  Acute Illness     Findings of the 6 clinical characteristics of malnutrition:  Energy Intake:  Mild decrease in energy intake (Comment) (at least acutely)  Weight Loss:  No significant weight loss     Body Fat Loss:  No significant body fat loss     Muscle Mass Loss:  No significant muscle mass loss    Fluid Accumulation:  Moderate to Severe Generalized, Extremities   Strength:  Not Performed    Nutrition Assessment:    Improving suboptimal oral intakes aeb PO % of meals, no supplement acceptance noted. Culturelle was added as recommended. Glucose 160, Na 134, renal indices elevated. Pt asleep at attempted visit. Nutrition Related Findings:    + 4 pitting facial and RLE Wound Type: None       Current Nutrition Intake & Therapies:    Average Meal Intake: % (of smaller meals.)  Average Supplements Intake: %  ADULT ORAL NUTRITION SUPPLEMENT; Breakfast, Lunch, Dinner; Diabetic Oral Supplement  ADULT DIET; Regular; 4 carb choices (60 gm/meal); Low Sodium (2 gm); 1800 ml    Anthropometric Measures:  Height: 177.8 cm (5' 10\")  Ideal Body Weight (IBW): 166 lbs (75 kg)    Admission Body Weight: 107.2 kg (236 lb 6.4 oz)  Current Body Weight: 108 kg (238 lb), 137.4 % IBW. Weight Source: Bed Scale  Current BMI (kg/m2): 34.1  Usual Body Weight: 104.7 kg (230 lb 12.8 oz)  % Weight Change (Calculated): 4.1  Weight Adjustment For: No Adjustment                 BMI Categories: Obese Class 1 (BMI 30.0-34. 9)    Estimated Daily Nutrient Needs:  Energy Requirements Based On: Kcal/kg  Weight Used for Energy Requirements: Current  Energy (kcal/day): 0976-5143 (15-18)  Weight Used for Protein Requirements: Ideal  Protein (g/day):

## 2023-12-04 ENCOUNTER — HOSPITAL ENCOUNTER (OUTPATIENT)
Age: 88
Setting detail: SPECIMEN
Discharge: HOME | End: 2023-12-04

## 2023-12-04 LAB
ALBUMIN SERPL-MCNC: 2.9 G/DL (ref 3.5–5.2)
ALBUMIN/GLOB SERPL: 0.8 {RATIO} (ref 1–2.5)
ALP SERPL-CCNC: 193 U/L (ref 40–129)
ALT SERPL-CCNC: 46 U/L (ref 5–41)
ANION GAP SERPL CALCULATED.3IONS-SCNC: 10 MMOL/L (ref 9–17)
AST SERPL-CCNC: 39 U/L
BASOPHILS # BLD: 0 K/UL (ref 0–0.2)
BASOPHILS NFR BLD: 0 % (ref 0–2)
BILIRUB SERPL-MCNC: 0.2 MG/DL (ref 0.3–1.2)
BNP SERPL-MCNC: 6397 PG/ML
BUN SERPL-MCNC: 36 MG/DL (ref 8–23)
BUN/CREAT SERPL: 26 (ref 9–20)
CALCIUM SERPL-MCNC: 9 MG/DL (ref 8.6–10.4)
CHLORIDE SERPL-SCNC: 100 MMOL/L (ref 98–107)
CO2 SERPL-SCNC: 24 MMOL/L (ref 20–31)
CREAT SERPL-MCNC: 1.4 MG/DL (ref 0.7–1.2)
EOSINOPHIL # BLD: 0.41 K/UL (ref 0–0.44)
EOSINOPHILS RELATIVE PERCENT: 3 % (ref 1–4)
ERYTHROCYTE [DISTWIDTH] IN BLOOD BY AUTOMATED COUNT: 16.6 % (ref 11.8–14.4)
GFR SERPL CREATININE-BSD FRML MDRD: 49 ML/MIN/1.73M2
GLUCOSE SERPL-MCNC: 144 MG/DL (ref 70–99)
HCT VFR BLD AUTO: 28.7 % (ref 40.7–50.3)
HGB BLD-MCNC: 8.9 G/DL (ref 13–17)
IMM GRANULOCYTES # BLD AUTO: 0.27 K/UL (ref 0–0.3)
IMM GRANULOCYTES NFR BLD: 2 %
LYMPHOCYTES NFR BLD: 1.49 K/UL (ref 1.1–3.7)
LYMPHOCYTES RELATIVE PERCENT: 11 % (ref 24–43)
MCH RBC QN AUTO: 28 PG (ref 25.2–33.5)
MCHC RBC AUTO-ENTMCNC: 31 G/DL (ref 28.4–34.8)
MCV RBC AUTO: 90.3 FL (ref 82.6–102.9)
MONOCYTES NFR BLD: 1.62 K/UL (ref 0.1–1.2)
MONOCYTES NFR BLD: 12 % (ref 3–12)
MORPHOLOGY: ABNORMAL
MORPHOLOGY: ABNORMAL
NEUTROPHILS NFR BLD: 72 % (ref 36–65)
NEUTS SEG NFR BLD: 9.71 K/UL (ref 1.5–8.1)
NRBC BLD-RTO: 0 PER 100 WBC
PLATELET # BLD AUTO: 464 K/UL (ref 138–453)
PMV BLD AUTO: 10 FL (ref 8.1–13.5)
POTASSIUM SERPL-SCNC: 4.1 MMOL/L (ref 3.7–5.3)
PROT SERPL-MCNC: 6.5 G/DL (ref 6.4–8.3)
RBC # BLD AUTO: 3.18 M/UL (ref 4.21–5.77)
SODIUM SERPL-SCNC: 134 MMOL/L (ref 135–144)
WBC OTHER # BLD: 13.5 K/UL (ref 3.5–11.3)

## 2023-12-04 PROCEDURE — 85025 COMPLETE CBC W/AUTO DIFF WBC: CPT

## 2023-12-04 PROCEDURE — 80053 COMPREHEN METABOLIC PANEL: CPT

## 2023-12-04 PROCEDURE — P9603 ONE-WAY ALLOW PRORATED MILES: HCPCS

## 2023-12-04 PROCEDURE — 36415 COLL VENOUS BLD VENIPUNCTURE: CPT

## 2023-12-04 PROCEDURE — 83880 ASSAY OF NATRIURETIC PEPTIDE: CPT

## 2023-12-11 ENCOUNTER — HOSPITAL ENCOUNTER (OUTPATIENT)
Age: 88
Setting detail: SPECIMEN
Discharge: HOME | End: 2023-12-11

## 2023-12-11 LAB
ALBUMIN SERPL-MCNC: 2.9 G/DL (ref 3.5–5.2)
ALBUMIN/GLOB SERPL: 0.7 {RATIO} (ref 1–2.5)
ALP SERPL-CCNC: 187 U/L (ref 40–129)
ALT SERPL-CCNC: 35 U/L (ref 5–41)
ANION GAP SERPL CALCULATED.3IONS-SCNC: 9 MMOL/L (ref 9–17)
AST SERPL-CCNC: 32 U/L
BASOPHILS # BLD: 0.06 K/UL (ref 0–0.2)
BASOPHILS NFR BLD: 1 % (ref 0–2)
BILIRUB SERPL-MCNC: 0.2 MG/DL (ref 0.3–1.2)
BUN SERPL-MCNC: 34 MG/DL (ref 8–23)
BUN/CREAT SERPL: 26 (ref 9–20)
CALCIUM SERPL-MCNC: 8.4 MG/DL (ref 8.6–10.4)
CHLORIDE SERPL-SCNC: 101 MMOL/L (ref 98–107)
CO2 SERPL-SCNC: 24 MMOL/L (ref 20–31)
CREAT SERPL-MCNC: 1.3 MG/DL (ref 0.7–1.2)
EOSINOPHIL # BLD: 0.17 K/UL (ref 0–0.44)
EOSINOPHILS RELATIVE PERCENT: 2 % (ref 1–4)
ERYTHROCYTE [DISTWIDTH] IN BLOOD BY AUTOMATED COUNT: 16.8 % (ref 11.8–14.4)
GFR SERPL CREATININE-BSD FRML MDRD: 54 ML/MIN/1.73M2
GLUCOSE SERPL-MCNC: 100 MG/DL (ref 70–99)
HCT VFR BLD AUTO: 29.1 % (ref 40.7–50.3)
HGB BLD-MCNC: 9.1 G/DL (ref 13–17)
IMM GRANULOCYTES # BLD AUTO: 0.06 K/UL (ref 0–0.3)
IMM GRANULOCYTES NFR BLD: 1 %
LYMPHOCYTES NFR BLD: 1.45 K/UL (ref 1.1–3.7)
LYMPHOCYTES RELATIVE PERCENT: 20 % (ref 24–43)
MCH RBC QN AUTO: 28.5 PG (ref 25.2–33.5)
MCHC RBC AUTO-ENTMCNC: 31.3 G/DL (ref 28.4–34.8)
MCV RBC AUTO: 91.2 FL (ref 82.6–102.9)
MONOCYTES NFR BLD: 1.03 K/UL (ref 0.1–1.2)
MONOCYTES NFR BLD: 14 % (ref 3–12)
NEUTROPHILS NFR BLD: 62 % (ref 36–65)
NEUTS SEG NFR BLD: 4.6 K/UL (ref 1.5–8.1)
NRBC BLD-RTO: 0 PER 100 WBC
PLATELET # BLD AUTO: 383 K/UL (ref 138–453)
PMV BLD AUTO: 10.2 FL (ref 8.1–13.5)
POTASSIUM SERPL-SCNC: 3.8 MMOL/L (ref 3.7–5.3)
PROT SERPL-MCNC: 6.8 G/DL (ref 6.4–8.3)
RBC # BLD AUTO: 3.19 M/UL (ref 4.21–5.77)
SODIUM SERPL-SCNC: 134 MMOL/L (ref 135–144)
WBC OTHER # BLD: 7.4 K/UL (ref 3.5–11.3)

## 2023-12-11 PROCEDURE — 80053 COMPREHEN METABOLIC PANEL: CPT

## 2023-12-11 PROCEDURE — 85025 COMPLETE CBC W/AUTO DIFF WBC: CPT

## 2023-12-11 PROCEDURE — P9603 ONE-WAY ALLOW PRORATED MILES: HCPCS

## 2023-12-11 PROCEDURE — 36415 COLL VENOUS BLD VENIPUNCTURE: CPT

## 2024-01-04 ENCOUNTER — CARE COORDINATION (OUTPATIENT)
Dept: CARE COORDINATION | Age: 89
End: 2024-01-04

## 2024-01-04 NOTE — CARE COORDINATION
Ambulatory Care Coordination Note  1/4/2024    CC outreach call placed to patient per handoff from care transition nurse. Unable to reach South. Left a voicemail message requesting a return phone call back to this AC when patient is able to [phone], office hours given.       Future Appointments   Date Time Provider Department Center   1/11/2024  2:20 PM Flaco Snider APRN - CNP [facility]   2/20/2024  2:20 PM Paulino Marin MD [facility] CARD [facility]   5/6/2024  2:00 PM Hussain Ribeiro MD [facility] PULM [facility]   5/17/2024  2:00 PM Flaco Snider APRN - CNP [facility]   8/13/2024 12:00 PM Javier Duvall MD [facility] KID&HYP [facility]

## 2024-01-11 ENCOUNTER — OFFICE VISIT (OUTPATIENT)
Dept: PRIMARY CARE CLINIC | Age: 89
End: 2024-01-11

## 2024-01-11 VITALS
DIASTOLIC BLOOD PRESSURE: 70 MMHG | HEART RATE: 66 BPM | OXYGEN SATURATION: 98 % | WEIGHT: 236.3 LBS | SYSTOLIC BLOOD PRESSURE: 122 MMHG | BODY MASS INDEX: 33.91 KG/M2 | RESPIRATION RATE: 22 BRPM | TEMPERATURE: 98.5 F

## 2024-01-11 DIAGNOSIS — J18.9 SEPSIS DUE TO PNEUMONIA (HCC): Primary | ICD-10-CM

## 2024-01-11 DIAGNOSIS — A41.9 SEPSIS DUE TO PNEUMONIA (HCC): Primary | ICD-10-CM

## 2024-01-11 SDOH — ECONOMIC STABILITY: FOOD INSECURITY: WITHIN THE PAST 12 MONTHS, YOU WORRIED THAT YOUR FOOD WOULD RUN OUT BEFORE YOU GOT MONEY TO BUY MORE.: PATIENT DECLINED

## 2024-01-11 SDOH — ECONOMIC STABILITY: FOOD INSECURITY: WITHIN THE PAST 12 MONTHS, THE FOOD YOU BOUGHT JUST DIDN'T LAST AND YOU DIDN'T HAVE MONEY TO GET MORE.: PATIENT DECLINED

## 2024-01-11 SDOH — ECONOMIC STABILITY: HOUSING INSECURITY
IN THE LAST 12 MONTHS, WAS THERE A TIME WHEN YOU DID NOT HAVE A STEADY PLACE TO SLEEP OR SLEPT IN A SHELTER (INCLUDING NOW)?: PATIENT DECLINED

## 2024-01-11 SDOH — ECONOMIC STABILITY: INCOME INSECURITY: HOW HARD IS IT FOR YOU TO PAY FOR THE VERY BASICS LIKE FOOD, HOUSING, MEDICAL CARE, AND HEATING?: PATIENT DECLINED

## 2024-01-11 ASSESSMENT — PATIENT HEALTH QUESTIONNAIRE - PHQ9
1. LITTLE INTEREST OR PLEASURE IN DOING THINGS: 0
SUM OF ALL RESPONSES TO PHQ QUESTIONS 1-9: 0
SUM OF ALL RESPONSES TO PHQ QUESTIONS 1-9: 0
SUM OF ALL RESPONSES TO PHQ9 QUESTIONS 1 & 2: 0
2. FEELING DOWN, DEPRESSED OR HOPELESS: 0
SUM OF ALL RESPONSES TO PHQ QUESTIONS 1-9: 0
SUM OF ALL RESPONSES TO PHQ QUESTIONS 1-9: 0

## 2024-01-11 NOTE — PROGRESS NOTES
Orders   1. Sepsis due to pneumonia (HCC)          Continue all current medications.     Follow up with specialists as planned.     We will see him back at next scheduled visit, sooner if any issues.     1.  South received counseling on the following healthy behaviors: nutrition, exercise, and medication adherence  2.  Patient given educational materials - see patient instructions  3.  Was a self-tracking handout given in paper form or via [employer]? No  If yes, see orders or list here.  4.  Discussed use, benefit, and side effects of prescribed medications.  Barriers to medication compliance addressed.  All patient questions answered.Pt voiced understanding.   5.  Reviewed prior labs and health maintenance  6.  Continue current medications, diet and exercise.    Completed Refills   Requested Prescriptions      No prescriptions requested or ordered in this encounter         Return if symptoms worsen or fail to improve.

## 2024-01-11 NOTE — PATIENT INSTRUCTIONS
SURVEY:     You may be receiving a survey from [facility] [employer] regarding your visit today.     Please complete the survey to enable us to provide the highest quality of care to you and your family.     If you cannot score us a very good on any question, please call the office to discuss how we could have made your experience a very good one.     Thank you,    Flaco Snider, APRN-CNP  Shruthi Owens, APRN-CNP  Kanika, GELY Marquez, MUKUND Mantilla, CMA  Rika, CMA  Haily, PCA  Vee, PM

## 2024-01-12 ENCOUNTER — TELEPHONE (OUTPATIENT)
Dept: PRIMARY CARE CLINIC | Age: 89
End: 2024-01-12

## 2024-01-12 RX ORDER — OXYBUTYNIN CHLORIDE 5 MG/1
2 TABLET ORAL 2 TIMES DAILY
COMMUNITY

## 2024-01-12 NOTE — TELEPHONE ENCOUNTER
Patients wife contacted the office and stated that there was a concern about South's medication list yesterday at his visit. She stated he is taking the Oxybutynin 5mg 2 tablets 2 times daily. I added medication to his list.   HILDA

## 2024-01-13 ASSESSMENT — ENCOUNTER SYMPTOMS
SORE THROAT: 0
WHEEZING: 0
DIFFICULTY BREATHING: 0
VOMITING: 0
CHEST TIGHTNESS: 0
COUGH: 0
ABDOMINAL PAIN: 0
HEMOPTYSIS: 0
TROUBLE SWALLOWING: 0
CONSTIPATION: 0
HOARSE VOICE: 0
NAUSEA: 0
FREQUENT THROAT CLEARING: 0
SHORTNESS OF BREATH: 1
SPUTUM PRODUCTION: 0
DIARRHEA: 0
RHINORRHEA: 0

## 2024-01-17 RX ORDER — SPIRONOLACTONE 50 MG/1
50 TABLET, FILM COATED ORAL DAILY
Qty: 90 TABLET | Refills: 3 | Status: SHIPPED | OUTPATIENT
Start: 2024-01-17

## 2024-02-12 ENCOUNTER — TELEPHONE (OUTPATIENT)
Dept: CARDIOLOGY | Age: 89
End: 2024-02-12

## 2024-02-12 NOTE — TELEPHONE ENCOUNTER
Patients wife contacted office stating that Star ankles are still swelling and questioning if there is anything that they can do at home to help the swelling. Patient has an appointment on 2/20/24.    Please advise.

## 2024-02-13 ENCOUNTER — HOSPITAL ENCOUNTER (OUTPATIENT)
Age: 89
Discharge: HOME | End: 2024-02-13
Payer: MEDICARE

## 2024-02-13 ENCOUNTER — OFFICE VISIT (OUTPATIENT)
Dept: CARDIOLOGY | Age: 89
End: 2024-02-13
Payer: MEDICARE

## 2024-02-13 VITALS
RESPIRATION RATE: 18 BRPM | HEART RATE: 59 BPM | OXYGEN SATURATION: 99 % | WEIGHT: 235 LBS | DIASTOLIC BLOOD PRESSURE: 54 MMHG | BODY MASS INDEX: 33.64 KG/M2 | HEIGHT: 70 IN | SYSTOLIC BLOOD PRESSURE: 119 MMHG

## 2024-02-13 DIAGNOSIS — I25.10 ASHD (ARTERIOSCLEROTIC HEART DISEASE): Primary | ICD-10-CM

## 2024-02-13 DIAGNOSIS — I10 PRIMARY HYPERTENSION: ICD-10-CM

## 2024-02-13 DIAGNOSIS — I50.42 CHRONIC COMBINED SYSTOLIC AND DIASTOLIC CHF, NYHA CLASS 2 (HCC): ICD-10-CM

## 2024-02-13 DIAGNOSIS — I89.0 ACQUIRED LYMPHEDEMA: ICD-10-CM

## 2024-02-13 DIAGNOSIS — E78.2 MIXED HYPERLIPIDEMIA: ICD-10-CM

## 2024-02-13 DIAGNOSIS — I25.10 ASHD (ARTERIOSCLEROTIC HEART DISEASE): ICD-10-CM

## 2024-02-13 DIAGNOSIS — E66.9 OBESITY (BMI 30.0-34.9): ICD-10-CM

## 2024-02-13 DIAGNOSIS — Z87.19 HISTORY OF LOWER GI BLEEDING: ICD-10-CM

## 2024-02-13 DIAGNOSIS — D64.9 CHRONIC ANEMIA: ICD-10-CM

## 2024-02-13 DIAGNOSIS — Z95.820 S/P ANGIOPLASTY WITH STENT: ICD-10-CM

## 2024-02-13 DIAGNOSIS — Z86.73 HISTORY OF STROKE: ICD-10-CM

## 2024-02-13 DIAGNOSIS — E11.69 TYPE 2 DIABETES MELLITUS WITH OTHER SPECIFIED COMPLICATION, UNSPECIFIED WHETHER LONG TERM INSULIN USE (HCC): ICD-10-CM

## 2024-02-13 LAB
ANION GAP SERPL CALCULATED.3IONS-SCNC: 15 MMOL/L (ref 9–17)
BNP SERPL-MCNC: 858 PG/ML
BUN SERPL-MCNC: 33 MG/DL (ref 8–23)
BUN/CREAT SERPL: 25 (ref 9–20)
CALCIUM SERPL-MCNC: 8.7 MG/DL (ref 8.6–10.4)
CHLORIDE SERPL-SCNC: 101 MMOL/L (ref 98–107)
CO2 SERPL-SCNC: 21 MMOL/L (ref 20–31)
CREAT SERPL-MCNC: 1.3 MG/DL (ref 0.7–1.2)
ERYTHROCYTE [DISTWIDTH] IN BLOOD BY AUTOMATED COUNT: 16 % (ref 11.8–14.4)
EST. AVERAGE GLUCOSE BLD GHB EST-MCNC: 137 MG/DL
GFR SERPL CREATININE-BSD FRML MDRD: 54 ML/MIN/1.73M2
GLUCOSE SERPL-MCNC: 147 MG/DL (ref 70–99)
HBA1C MFR BLD: 6.4 % (ref 4–6)
HCT VFR BLD AUTO: 33.3 % (ref 40.7–50.3)
HGB BLD-MCNC: 10.3 G/DL (ref 13–17)
MCH RBC QN AUTO: 28.1 PG (ref 25.2–33.5)
MCHC RBC AUTO-ENTMCNC: 30.9 G/DL (ref 28.4–34.8)
MCV RBC AUTO: 90.7 FL (ref 82.6–102.9)
NRBC BLD-RTO: 0 PER 100 WBC
PLATELET # BLD AUTO: 283 K/UL (ref 138–453)
PMV BLD AUTO: 10 FL (ref 8.1–13.5)
POTASSIUM SERPL-SCNC: 4.2 MMOL/L (ref 3.7–5.3)
RBC # BLD AUTO: 3.67 M/UL (ref 4.21–5.77)
SODIUM SERPL-SCNC: 137 MMOL/L (ref 135–144)
WBC OTHER # BLD: 6.4 K/UL (ref 3.5–11.3)

## 2024-02-13 PROCEDURE — 1123F ACP DISCUSS/DSCN MKR DOCD: CPT | Performed by: INTERNAL MEDICINE

## 2024-02-13 PROCEDURE — 80048 BASIC METABOLIC PNL TOTAL CA: CPT

## 2024-02-13 PROCEDURE — 83880 ASSAY OF NATRIURETIC PEPTIDE: CPT

## 2024-02-13 PROCEDURE — 3044F HG A1C LEVEL LT 7.0%: CPT | Performed by: INTERNAL MEDICINE

## 2024-02-13 PROCEDURE — 83036 HEMOGLOBIN GLYCOSYLATED A1C: CPT

## 2024-02-13 PROCEDURE — 99214 OFFICE O/P EST MOD 30 MIN: CPT | Performed by: INTERNAL MEDICINE

## 2024-02-13 PROCEDURE — 36415 COLL VENOUS BLD VENIPUNCTURE: CPT

## 2024-02-13 PROCEDURE — 85027 COMPLETE CBC AUTOMATED: CPT

## 2024-02-13 RX ORDER — TAMSULOSIN HYDROCHLORIDE 0.4 MG/1
0.8 CAPSULE ORAL DAILY
Qty: 180 CAPSULE | Refills: 1 | Status: SHIPPED | OUTPATIENT
Start: 2024-02-13

## 2024-02-13 RX ORDER — BUMETANIDE 2 MG/1
TABLET ORAL
Qty: 90 TABLET | Refills: 3 | Status: SHIPPED | OUTPATIENT
Start: 2024-02-13

## 2024-02-13 NOTE — PROGRESS NOTES
I, Mercedes Hernandez am scribing for and in the presence of Paulino Marin MD, F.A.C.C..    Subjective:     CHIEF COMPLAINT / HPI:    Chief Complaint   Patient presents with    Congestive Heart Failure     HX: CHF, CAD S/P stent, stroke, HTN, HLD. Pt is here to follow up on his leg swelling. He reports his leg swelling are worse than when he was in the hospital. He does have a little bit of SOB. Denies palps, CP, light headed/dizziness.        South Vargas is 88 y.o. male who presents today for follow-up.    1-He has history of coronary artery disease, myocardial infarction and primary PCI in 2/2017 done at [facility].  He also has history of ischemic cardiomyopathy and chronic systolic CHF, NYHA class III.    2-An admission to [facility] on 9/19/2017 with COPD exacerbation, during this admission his lisinopril changed to Cozaar because of chronic cough.    3-Another visit to the emergency room on 10/3/2017 with cough, shortness of breath and wheezing.      4- He saw Dr. Salmon at [facility] in November 2018, no changes in medication done.    5-An admission to [facility] on 4/6/2018 with acute on chronic CHF.    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    7- EKG done in office (8/20/2019)- Sinus Rhythm with 1st degree AV block. 71 bpm.    8- EKG done in office on 7/14/2020.  No acute ischemic changes.    9-  Echocardiogram on 4/6/2021: Global left ventricular systolic function appears preserved with an estimated ejection fraction of 55%. Mildly increased left ventricular wall thickness with a normal left ventricular cavity size. The patient has a sigmoid interventricular septum. The inferoseptal wall is abnormal in its motion which is not unusual status post open heart surgery. Mild aortic stenosis. Mild mitral and tricuspid regurgitation. Evidence of mild diastolic dysfunction is seen. Atrial septal aneurysm cannot rule-out shunt. A

## 2024-02-13 NOTE — TELEPHONE ENCOUNTER
Microalbumin, Ur Once 03/15/2024   Lipid Panel Once 03/15/2024   Hemoglobin A1C Once 03/15/2024   CBC with Auto Differential Once 03/15/2024   Comprehensive Metabolic Panel Once 12/05/2023   CBC with Auto Differential Once 12/05/2023       Next Visit Date:  Future Appointments   Date Time Provider Department Center   2/13/2024  3:40 PM Paulino Marin MD [facility]   2/20/2024  2:20 PM Paulino Marin MD [facility]   5/17/2024  2:00 PM Flaco Snider APRN - CNP [facility]   5/20/2024  2:30 PM Hussain Ribeiro MD [facility]   8/13/2024 12:00 PM Javier Duvall MD [facility]YP [facility]            Patient Active Problem List:     Obesity (BMI 30.0-34.9)     Venous (peripheral) insufficiency     Hx pulmonary embolism     Hearing impaired     Edema     Type 2 diabetes mellitus with complication, without long-term current use of insulin (HCC)     Knee osteoarthritis     Chronic combined systolic and diastolic CHF, NYHA class 2 (HCC)     COPD without exacerbation (HCC)     Hyponatremia     Adrenal adenoma, left     Elevated liver enzymes     Hypothyroidism due to Hashimoto's thyroiditis     Moderate persistent asthma without complication     Community acquired bacterial pneumonia     Hypoxia     Multifocal pneumonia     Sepsis due to pneumonia (HCC)     Septicemia (HCC)     ASHD (arteriosclerotic heart disease)     Dyslipidemia     History of stroke     Iron deficiency anemia     Nonrheumatic aortic valve stenosis     Chronic diastolic congestive heart failure (HCC)     Essential hypertension     Other fluid overload     Ischemic stroke (HCC)     Acute ischemic left middle cerebral artery (MCA) stroke (HCC)     Altered mental status     Dysarthria     PVC (premature ventricular contraction)     Diverticular bleed      Rectal bleeding     Anemia     Diverticular hemorrhage     Acute blood loss anemia     Pancolonic diverticulosis     Cerebrovascular accident (CVA) due to embolism of middle

## 2024-02-14 ENCOUNTER — TELEPHONE (OUTPATIENT)
Dept: CARDIOLOGY | Age: 89
End: 2024-02-14

## 2024-02-23 DIAGNOSIS — J44.1 COPD EXACERBATION (HCC): ICD-10-CM

## 2024-02-23 RX ORDER — ALBUTEROL SULFATE 2.5 MG/3ML
2.5 SOLUTION RESPIRATORY (INHALATION) EVERY 4 HOURS PRN
Qty: 120 EACH | Refills: 3 | Status: SHIPPED | OUTPATIENT
Start: 2024-02-23

## 2024-02-23 NOTE — TELEPHONE ENCOUNTER
Health Maintenance   Topic Date Due    Annual Wellness Visit (Medicare Advantage)  01/01/2024    DTaP/Tdap/Td vaccine (1 - Tdap) 07/18/2024 (Originally 10/15/1954)    Lipids  06/16/2024    Depression Screen  01/11/2025    Flu vaccine  Completed    Shingles vaccine  Completed    Pneumococcal 65+ years Vaccine  Completed    COVID-19 Vaccine  Completed    Respiratory Syncytial Virus (RSV) Pregnant or age 60 yrs+  Completed    Hepatitis A vaccine  Aged Out    Hepatitis B vaccine  Aged Out    Hib vaccine  Aged Out    Polio vaccine  Aged Out    Meningococcal (ACWY) vaccine  Aged Out             (applicable per patient's age: Cancer Screenings, Depression Screening, Fall Risk Screening, Immunizations)    Hemoglobin A1C (%)   Date Value   02/13/2024 6.4 (H)   06/16/2023 7.0 (H)   06/15/2023 6.9 (H)     LDL Cholesterol (mg/dL)   Date Value   06/16/2023 57     AST (U/L)   Date Value   12/18/2023 30     ALT (U/L)   Date Value   12/18/2023 31     BUN (mg/dL)   Date Value   02/13/2024 33 (H)      (goal A1C is < 7)   (goal LDL is <100) need 30-50% reduction from baseline     BP Readings from Last 3 Encounters:   02/13/24 (!) 119/54   01/11/24 122/70   12/01/23 110/71    (goal /80)      All Future Testing planned in [facility]:  Lab Frequency Next Occurrence   Basic Metabolic Panel Once 08/27/2023   Basic Metabolic Panel Once 06/16/2023   Urinalysis with Reflex to Culture Once 07/28/2023   Culture, Urine Once 07/21/2023   Urinalysis with Reflex to Culture Once 07/31/2023   Urinalysis with Microscopic Once 08/06/2024   Microalbumin / Creatinine Urine Ratio Once 08/06/2024   Protein / creatinine ratio, urine Once 08/06/2024   Renal Function Panel Once 08/06/2024   TSH With Reflex Ft4 Once 08/24/2023   Basic Metabolic Panel Once 10/10/2023   Echo (TTE) complete (PRN contrast/bubble/strain/3D) Once 10/08/2023   XR CHEST STANDARD (2 VW) Once 10/08/2023   Basic Metabolic Panel Once 03/15/2024   ALT Once 03/15/2024   AST Once

## 2024-03-12 NOTE — TELEPHONE ENCOUNTER
----- Message from Paulino Marin MD sent at 2/13/2024  9:12 PM EST -----  Blood work is good.  Continue therapy as discussed in the office today.  Hemoglobin is better compared to prior.  Thank you   Appt scheduled via

## 2024-03-14 ENCOUNTER — OFFICE VISIT (OUTPATIENT)
Dept: CARDIOLOGY | Age: 89
End: 2024-03-14

## 2024-03-14 VITALS
WEIGHT: 230 LBS | SYSTOLIC BLOOD PRESSURE: 119 MMHG | BODY MASS INDEX: 32.93 KG/M2 | DIASTOLIC BLOOD PRESSURE: 66 MMHG | HEART RATE: 63 BPM | RESPIRATION RATE: 18 BRPM | HEIGHT: 70 IN

## 2024-03-14 DIAGNOSIS — I89.0 ACQUIRED LYMPHEDEMA: ICD-10-CM

## 2024-03-14 DIAGNOSIS — I10 PRIMARY HYPERTENSION: ICD-10-CM

## 2024-03-14 DIAGNOSIS — E11.69 TYPE 2 DIABETES MELLITUS WITH OTHER SPECIFIED COMPLICATION, UNSPECIFIED WHETHER LONG TERM INSULIN USE (HCC): ICD-10-CM

## 2024-03-14 DIAGNOSIS — E66.9 OBESITY (BMI 30.0-34.9): ICD-10-CM

## 2024-03-14 DIAGNOSIS — I25.10 ASHD (ARTERIOSCLEROTIC HEART DISEASE): ICD-10-CM

## 2024-03-14 DIAGNOSIS — Z95.820 S/P ANGIOPLASTY WITH STENT: ICD-10-CM

## 2024-03-14 DIAGNOSIS — Z86.73 HISTORY OF STROKE: ICD-10-CM

## 2024-03-14 DIAGNOSIS — E78.2 MIXED HYPERLIPIDEMIA: ICD-10-CM

## 2024-03-14 DIAGNOSIS — I50.32 CHRONIC DIASTOLIC CONGESTIVE HEART FAILURE (HCC): Primary | ICD-10-CM

## 2024-03-14 RX ORDER — DAPAGLIFLOZIN 10 MG/1
10 TABLET, FILM COATED ORAL EVERY MORNING
Qty: 30 TABLET | Refills: 0 | Status: SHIPPED | OUTPATIENT
Start: 2024-03-14

## 2024-03-14 NOTE — PROGRESS NOTES
performed and cannot rule out interatrial communication.    10-EKG done in office 7/6/2021- no acute ischemic changes    11- Admission to [facility] on 12/31/2021-1/1/2022: Admitted for Pneumonia    12-ER visit on 8/2022: related to cough    13-EKG on 10/17/2022: No ischemic changes from prior ECG    14- Echocardiogram done on 1/31/2023: EF of >55%. Left ventricular wall thickness is moderately increased. The patient has a sigmoid interventricular septum without evidence of outflow tract obstruction. The left atrium is mildly dilated (29-33) with a left atrial volume index of 29 ml/m2. 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 The clinical significant of this finding is unknown, but has been associated with an increased risk of TIA's and strokes. The left atrium is mildly dilated (29-33) with a left atrial volume index of 29 ml/m2. The mean aortic valve gradient is 24 mmHg consistent with moderate aortic stenosis. Mild to moderate tricuspid regurgitation. Moderate pulmonary hypertension with an estimated right ventricular systolic pressure of 44 mmHg. Evidence of mild (grade I) diastolic dysfunction is seen.    15 - IP on 10/24/2023 due to rectal bleed.     16- IP on 11/21/23 pneumonia     17- Echo done on 11/14/2023- EF 45-50%  Normal left ventricular cavity size with mildly increased left ventricular wall thickness. Basal septal hypertrophy noted without significant left ventricular outflow tract obstruction at rest. Moderate aortic stenosis with a mean gradient across the aortic valve of 28 mmHg. Mild mitral and tricuspid regurgitation. Left atrial dilation. Atrial septal aneurysm noted without evidence of interatrial communication by color Doppler. Mild diastolic dysfunction noted.     Mr. Vargas is here today for two week follow up. He states he is doing well although he continues to have swelling in legs and feet. No chest pain, pressure or tightness. He has

## 2024-03-20 ENCOUNTER — HOSPITAL ENCOUNTER (OUTPATIENT)
Dept: PHYSICAL THERAPY | Age: 89
Setting detail: THERAPIES SERIES
Discharge: HOME | End: 2024-03-20
Payer: MEDICARE

## 2024-03-20 PROCEDURE — 97162 PT EVAL MOD COMPLEX 30 MIN: CPT

## 2024-03-20 PROCEDURE — 97110 THERAPEUTIC EXERCISES: CPT

## 2024-03-21 NOTE — PROGRESS NOTES
Fair        Decision Making: Medium Complexity    Patient Education  Patient Education: HEP and POC  Pt verbalized/demonstrated good understanding:     [x] Yes         [] No, pt required further clarification.         Goals  Short Term Goals  Time Frame for Short Term Goals: 2 weeks  Short Term Goal 1: Pt will be educated on and initiate HEP for lower extremity strengthening - met  Short Term Goal 2: Pt will initiate pump protocol in order to reduce BLE girth measurements    Long Term Goals  Time Frame for Long Term Goals : 4 weeks  Long Term Goal 1: Pt will be independent and compliant with advanced HEP in order to self manage symptoms upon discharge  Long Term Goal 2: Pt will improve lower extremity strength to at least 5-/5 in order to facilitate stability with transfers and walking  Long Term Goal 3: Pt will tolerate 15 mintues of standing activity in order to facilitate endurance and stability as needed for daily tasks  Long Term Goal 4: Pt will demonstrate a 1-2cm decrease in BLE girth measurements in order to increase walking tolerance  Long Term Goal 5: Pt will be fitted for an at home pump in order to be independent with her lymphedema management               Minutes Tracking:  Time In: 1020  Time Out: 1105  Minutes: 45  Timed Code Treatment Minutes: 44 Minutes    Whitney Arellano PT, DPT   Date: 3/20/2024  Electronically signed by Whitney Arellano PT on 3/21/2024 at 1:02 PM

## 2024-03-21 NOTE — PLAN OF CARE
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    Rehab (Treatment) Diagnosis:  weakness               Attendance  Total # of Visits to Date: 1 No Show: 0 Canceled Appointment: 0    Assessment  Body Structures, Functions, Activity Limitations Requiring Skilled Therapeutic Intervention: Decreased functional mobility , Decreased body mechanics, Decreased strength, Decreased ADL status, Decreased high-level IADLs, Increased pain, Decreased balance, Decreased tolerance to work activity, Decreased endurance  Assessment: Pt is a 88 year old male being evaluated for skilled outpatient physical therapy for complaints of lower extremity swelling and weakness. Pt with history CHF and other comorbidities that impact his baseline mobility function. Pt with bilateral lower extremity weakness and presents with difficulty with transfers and ambulation. Pt with bilateral swelling, left greater than right. Noted bilateral fluid filled sacs non-pitting in nature on dorsum of foot. Therapist educated pt on treatment plan. Pt and wife verbalized understanding. Pt to benefit from skilled outpatient physical therapy in order to faciltiate swelling reduction and strengthening as needed for improved participation in daily tasks.    Objective measures    R LE Pre Girth Measurement (cm)  Lateral malleolus: 23.2  Calf (cm): 30.6  Mid Knee (cm): 39  Thigh (cm): 45.4  Groin (cm): 52  Total Girth (cm): 190.2    L LE Pre Girth Measurement (cm)  Lateral malleolus: 25.1  Calf (cm): 32.3  Mid Knee (cm):

## 2024-03-22 ENCOUNTER — HOSPITAL ENCOUNTER (OUTPATIENT)
Age: 89
Discharge: HOME | End: 2024-03-22
Payer: MEDICARE

## 2024-03-22 DIAGNOSIS — Z86.73 HISTORY OF STROKE: ICD-10-CM

## 2024-03-22 DIAGNOSIS — I50.32 CHRONIC DIASTOLIC CONGESTIVE HEART FAILURE (HCC): ICD-10-CM

## 2024-03-22 DIAGNOSIS — E87.1 HYPONATREMIA: ICD-10-CM

## 2024-03-22 DIAGNOSIS — I10 PRIMARY HYPERTENSION: ICD-10-CM

## 2024-03-22 DIAGNOSIS — I25.10 ASHD (ARTERIOSCLEROTIC HEART DISEASE): ICD-10-CM

## 2024-03-22 DIAGNOSIS — Z95.820 S/P ANGIOPLASTY WITH STENT: ICD-10-CM

## 2024-03-22 DIAGNOSIS — I50.33 ACUTE ON CHRONIC DIASTOLIC HEART FAILURE (HCC): ICD-10-CM

## 2024-03-22 DIAGNOSIS — E11.69 TYPE 2 DIABETES MELLITUS WITH OTHER SPECIFIED COMPLICATION, UNSPECIFIED WHETHER LONG TERM INSULIN USE (HCC): ICD-10-CM

## 2024-03-22 DIAGNOSIS — E66.9 OBESITY (BMI 30.0-34.9): ICD-10-CM

## 2024-03-22 DIAGNOSIS — E78.2 MIXED HYPERLIPIDEMIA: ICD-10-CM

## 2024-03-22 DIAGNOSIS — R06.02 SOB (SHORTNESS OF BREATH): ICD-10-CM

## 2024-03-22 LAB
ANION GAP SERPL CALCULATED.3IONS-SCNC: 12 MMOL/L (ref 9–17)
BNP SERPL-MCNC: 632 PG/ML
BUN SERPL-MCNC: 54 MG/DL (ref 8–23)
BUN/CREAT SERPL: 27 (ref 9–20)
CALCIUM SERPL-MCNC: 8.9 MG/DL (ref 8.6–10.4)
CHLORIDE SERPL-SCNC: 99 MMOL/L (ref 98–107)
CO2 SERPL-SCNC: 25 MMOL/L (ref 20–31)
CREAT SERPL-MCNC: 2 MG/DL (ref 0.7–1.2)
ERYTHROCYTE [DISTWIDTH] IN BLOOD BY AUTOMATED COUNT: 16.3 % (ref 11.8–14.4)
GFR SERPL CREATININE-BSD FRML MDRD: 32 ML/MIN/1.73M2
GLUCOSE SERPL-MCNC: 90 MG/DL (ref 70–99)
HCT VFR BLD AUTO: 32 % (ref 40.7–50.3)
HGB BLD-MCNC: 10.4 G/DL (ref 13–17)
MCH RBC QN AUTO: 29.1 PG (ref 25.2–33.5)
MCHC RBC AUTO-ENTMCNC: 32.5 G/DL (ref 28.4–34.8)
MCV RBC AUTO: 89.4 FL (ref 82.6–102.9)
NRBC BLD-RTO: 0 PER 100 WBC
PLATELET # BLD AUTO: 235 K/UL (ref 138–453)
PMV BLD AUTO: 10.5 FL (ref 8.1–13.5)
POTASSIUM SERPL-SCNC: 5.1 MMOL/L (ref 3.7–5.3)
RBC # BLD AUTO: 3.58 M/UL (ref 4.21–5.77)
SODIUM SERPL-SCNC: 136 MMOL/L (ref 135–144)
WBC OTHER # BLD: 7.5 K/UL (ref 3.5–11.3)

## 2024-03-22 PROCEDURE — 80048 BASIC METABOLIC PNL TOTAL CA: CPT

## 2024-03-22 PROCEDURE — 36415 COLL VENOUS BLD VENIPUNCTURE: CPT

## 2024-03-22 PROCEDURE — 83880 ASSAY OF NATRIURETIC PEPTIDE: CPT

## 2024-03-22 PROCEDURE — 85027 COMPLETE CBC AUTOMATED: CPT

## 2024-03-25 ENCOUNTER — TELEPHONE (OUTPATIENT)
Dept: NEPHROLOGY | Age: 89
End: 2024-03-25

## 2024-03-25 ENCOUNTER — APPOINTMENT (OUTPATIENT)
Dept: PHYSICAL THERAPY | Age: 89
End: 2024-03-25
Payer: MEDICARE

## 2024-03-25 DIAGNOSIS — E87.1 HYPONATREMIA: Primary | ICD-10-CM

## 2024-03-25 RX ORDER — OXYBUTYNIN CHLORIDE 5 MG/1
10 TABLET ORAL 2 TIMES DAILY
Qty: 120 TABLET | Refills: 0 | Status: SHIPPED | OUTPATIENT
Start: 2024-03-25

## 2024-03-25 NOTE — TELEPHONE ENCOUNTER
Patient's creatinine is slightly worse please check if there is any change in the medications especially any diuretics however with his weight feeling and what is his swelling       Only new medication is farxiga. He is feeling fine. He is having increased swelling bilateral ankles and feet. He is scheduled to get nusrat boots on bilateral tomorrow to help with the swelling. Denies SOB

## 2024-03-25 NOTE — TELEPHONE ENCOUNTER
Health Maintenance   Topic Date Due    Annual Wellness Visit (Medicare Advantage)  01/01/2024    DTaP/Tdap/Td vaccine (1 - Tdap) 07/18/2024 (Originally 10/15/1954)    Lipids  06/16/2024    Depression Screen  01/11/2025    Flu vaccine  Completed    Shingles vaccine  Completed    Pneumococcal 65+ years Vaccine  Completed    COVID-19 Vaccine  Completed    Respiratory Syncytial Virus (RSV) Pregnant or age 60 yrs+  Completed    Hepatitis A vaccine  Aged Out    Hepatitis B vaccine  Aged Out    Hib vaccine  Aged Out    Polio vaccine  Aged Out    Meningococcal (ACWY) vaccine  Aged Out             (applicable per patient's age: Cancer Screenings, Depression Screening, Fall Risk Screening, Immunizations)    Hemoglobin A1C (%)   Date Value   02/13/2024 6.4 (H)   06/16/2023 7.0 (H)   06/15/2023 6.9 (H)     LDL Cholesterol (mg/dL)   Date Value   06/16/2023 57     AST (U/L)   Date Value   12/18/2023 30     ALT (U/L)   Date Value   12/18/2023 31     BUN (mg/dL)   Date Value   03/22/2024 54 (H)      (goal A1C is < 7)   (goal LDL is <100) need 30-50% reduction from baseline     BP Readings from Last 3 Encounters:   03/14/24 119/66   02/13/24 (!) 119/54   01/11/24 122/70    (goal /80)      All Future Testing planned in [facility]:  Lab Frequency Next Occurrence   Urinalysis with Reflex to Culture Once 07/28/2023   Culture, Urine Once 07/21/2023   Urinalysis with Reflex to Culture Once 07/31/2023   Urinalysis with Microscopic Once 08/06/2024   Microalbumin / Creatinine Urine Ratio Once 08/06/2024   Protein / creatinine ratio, urine Once 08/06/2024   Renal Function Panel Once 08/06/2024   TSH With Reflex Ft4 Once 08/24/2023   Basic Metabolic Panel Once 10/10/2023   Echo (TTE) complete (PRN contrast/bubble/strain/3D) Once 10/08/2023   XR CHEST STANDARD (2 VW) Once 10/08/2023   Basic Metabolic Panel Once 03/15/2024   ALT Once 03/15/2024   AST Once 03/15/2024   Microalbumin, Ur Once 03/15/2024   Lipid Panel Once 03/15/2024

## 2024-03-26 ENCOUNTER — HOSPITAL ENCOUNTER (OUTPATIENT)
Dept: PHYSICAL THERAPY | Age: 89
Setting detail: THERAPIES SERIES
Discharge: HOME | End: 2024-03-26
Payer: MEDICARE

## 2024-03-26 PROCEDURE — 97016 VASOPNEUMATIC DEVICE THERAPY: CPT

## 2024-03-26 PROCEDURE — 97140 MANUAL THERAPY 1/> REGIONS: CPT

## 2024-03-26 PROCEDURE — 97110 THERAPEUTIC EXERCISES: CPT

## 2024-03-26 NOTE — TELEPHONE ENCOUNTER
Okay please advise the patient to drink at least 60 ounces of liquids per day if he is already drinking that amount increase by 20 ounces per day  Recheck another BUN/creatinine in 2 weeks

## 2024-03-26 NOTE — TELEPHONE ENCOUNTER
Spoke with patient and his wife. He is taking in approximately 50 ounces of fluids daily. Instructed him to increase to approximately 70 ounces daily. Explained Dr. Duvall would like new labs in two weeks.   Wife is concerned with the swelling, especially in his feet. I explained Chrissy already informed Dr. Duvall so he is aware.   Labs printed and mailed to patient.

## 2024-03-27 NOTE — PROGRESS NOTES
endurance and stability as needed for daily tasks  Long Term Goal 4: Pt will demonstrate a 1-2cm decrease in BLE girth measurements in order to increase walking tolerance  Long Term Goal 5: Pt will be fitted for an at home pump in order to be independent with her lymphedema management      Minutes Tracking:  Time In: 1348  Time Out: 1451  Minutes: 63       Naina Dietz PTA,    Date: 3/26/2024  Electronically signed by Naina Dietz PTA on 3/27/2024 at 11:28 AM

## 2024-03-28 ENCOUNTER — APPOINTMENT (OUTPATIENT)
Dept: PHYSICAL THERAPY | Age: 89
End: 2024-03-28
Payer: MEDICARE

## 2024-03-28 ENCOUNTER — HOSPITAL ENCOUNTER (OUTPATIENT)
Dept: PHYSICAL THERAPY | Age: 89
Setting detail: THERAPIES SERIES
Discharge: HOME | End: 2024-03-28
Payer: MEDICARE

## 2024-03-28 NOTE — PROGRESS NOTES
Physical Therapy  [facility]  Inpatient/Observation/Outpatient Rehabilitation    Date: 3/28/2024  Patient Name: South Vargas       [] Inpatient Acute/Observation       [x]  Outpatient  : 1935     Plan of Care/Recert ends    [] Pt no showed for scheduled appointment    [] Pt refused/declined therapy at this time due to:           [x] Pt cancelled due to:  [] No Reason Given   [] Sick/ill   [] Other: Wife is sick and she drives him    [] Evaluation held by RN/Provider due to:    [] High Heart Rate   [] High Blood Pressure   [] Orthopedic Consult   [] Hgb < 7   [] Other:    [] Pt does not require skilled services due to:      Therapist/Assistant will attempt to see this patient, at our earliest opportunity.       Evie Rodgers Date: 3/28/2024

## 2024-04-03 ENCOUNTER — APPOINTMENT (OUTPATIENT)
Dept: PHYSICAL THERAPY | Age: 89
End: 2024-04-03
Payer: MEDICARE

## 2024-04-05 ENCOUNTER — OFFICE VISIT (OUTPATIENT)
Dept: PRIMARY CARE CLINIC | Age: 89
End: 2024-04-05

## 2024-04-05 ENCOUNTER — APPOINTMENT (OUTPATIENT)
Dept: PHYSICAL THERAPY | Age: 89
End: 2024-04-05
Payer: MEDICARE

## 2024-04-05 VITALS
BODY MASS INDEX: 33.45 KG/M2 | TEMPERATURE: 98.5 F | OXYGEN SATURATION: 97 % | HEART RATE: 68 BPM | DIASTOLIC BLOOD PRESSURE: 72 MMHG | SYSTOLIC BLOOD PRESSURE: 120 MMHG | WEIGHT: 233.1 LBS | RESPIRATION RATE: 22 BRPM

## 2024-04-05 DIAGNOSIS — M70.22 OLECRANON BURSITIS OF LEFT ELBOW: Primary | ICD-10-CM

## 2024-04-05 RX ORDER — DOXYCYCLINE HYCLATE 100 MG
100 TABLET ORAL 2 TIMES DAILY
Qty: 20 TABLET | Refills: 0 | Status: SHIPPED | OUTPATIENT
Start: 2024-04-05 | End: 2024-04-15

## 2024-04-05 RX ORDER — AMOXICILLIN 500 MG/1
500 CAPSULE ORAL 2 TIMES DAILY
Qty: 20 CAPSULE | Refills: 0 | Status: SHIPPED | OUTPATIENT
Start: 2024-04-05 | End: 2024-04-15

## 2024-04-05 ASSESSMENT — PATIENT HEALTH QUESTIONNAIRE - PHQ9
SUM OF ALL RESPONSES TO PHQ QUESTIONS 1-9: 0
1. LITTLE INTEREST OR PLEASURE IN DOING THINGS: NOT AT ALL
2. FEELING DOWN, DEPRESSED OR HOPELESS: NOT AT ALL
SUM OF ALL RESPONSES TO PHQ QUESTIONS 1-9: 0
SUM OF ALL RESPONSES TO PHQ QUESTIONS 1-9: 0
SUM OF ALL RESPONSES TO PHQ9 QUESTIONS 1 & 2: 0
SUM OF ALL RESPONSES TO PHQ QUESTIONS 1-9: 0

## 2024-04-05 NOTE — PATIENT INSTRUCTIONS
SURVEY:     You may be receiving a survey from [facility] [employer] regarding your visit today.     Please complete the survey to enable us to provide the highest quality of care to you and your family.     If you cannot score us a very good on any question, please call the office to discuss how we could have made your experience a very good one.     Thank you,    Flaco Snider, APRN-CNP  Shruthi Owens, APRN-CNP  Kanika, LPN  Alma, CMA  Jose Rafael, CMA  Rika, CMA  Haily, PCA  Eive, CMA  Vee, PM

## 2024-04-05 NOTE — PROGRESS NOTES
Name: South Vargas  : 1935         Chief Complaint:     Chief Complaint   Patient presents with    Joint Swelling     Left elbow X 2 weeks, denies injury.       History of Present Illness:      South Vargas is a 88 y.o.  male who presents with Joint Swelling (Left elbow X 2 weeks, denies injury.)      South is here today for a routine office visit.    South began with elbow swelling on the left side approximately 1 to 2 weeks ago.  He states it is becoming a little worse and slightly tender.  There is some warmth to the area.  He denies any injury.    Arm Pain   The incident occurred more than 1 week ago. The incident occurred at home. There was no injury mechanism. The pain is present in the left elbow. The quality of the pain is described as aching. The pain does not radiate. The pain is at a severity of 1/10. The pain is mild. The pain has been Constant since the incident. Pertinent negatives include no chest pain, muscle weakness, numbness or tingling. Nothing aggravates the symptoms. He has tried nothing for the symptoms. The treatment provided no relief.         Past Medical History:     Past Medical History:   Diagnosis Date    A-fib (HCC)     COPD (chronic obstructive pulmonary disease) (HCC)     Diabetes mellitus (HCC)     Hx of echocardiogram 2017    [facility]    Hyperlipidemia     Hypothyroidism     MI (myocardial infarction) ([facility])     Stroke (HCC)     Thyroid disease     Type II or unspecified type diabetes mellitus without mention of complication, not stated as uncontrolled       Reviewed all health maintenance requirements and ordered appropriate tests  Health Maintenance Due   Topic Date Due    Annual Wellness Visit (Medicare Advantage)  2024       Past Surgical History:     Past Surgical History:   Procedure Laterality Date    CARDIAC SURGERY  2017    Stent placed  Dr [facility]    CEREBRAL ANGIOGRAM Bilateral 06/15/2023    ANGIOGRAM CAROTID CEREBRAL

## 2024-04-09 ENCOUNTER — APPOINTMENT (OUTPATIENT)
Dept: PHYSICAL THERAPY | Age: 89
End: 2024-04-09
Payer: MEDICARE

## 2024-04-10 ENCOUNTER — HOSPITAL ENCOUNTER (OUTPATIENT)
Dept: PHYSICAL THERAPY | Age: 89
Setting detail: THERAPIES SERIES
Discharge: HOME | End: 2024-04-10
Payer: MEDICARE

## 2024-04-10 PROCEDURE — 97016 VASOPNEUMATIC DEVICE THERAPY: CPT

## 2024-04-10 PROCEDURE — 97110 THERAPEUTIC EXERCISES: CPT

## 2024-04-10 PROCEDURE — 97140 MANUAL THERAPY 1/> REGIONS: CPT

## 2024-04-10 NOTE — PROGRESS NOTES
Exercises:  Exercise 1: HEP: glute sets, quad sets, heel slides, ankle pumps, long arc quad  Exercise 3: Seated exercises B LE x15, GTB x15    Manual:  Soft Tissue Mobilizaton: MLD to B LEs     Skin Integumentary  Skin Condition/Temp: Dry  Turgor: Shiney/hard  RLE Complete Decongestion Therapy  Scale: Stage 2  Lymph Drainage Pattern: Lower extremity  Compression Technique: Vaso-pneumatic pump  Force (mmHg): 40 mmHg  Duration: 30 minutes  LLE Complete Decongestion Therapy  Scale: Stage 2  Lymph Drainage Pattern: Lower extremity  Compression Technique: Vaso-pneumatic pump  Force (mmHg): 40 mmHg  Duration : 30 minutes  Other Decongestion Therapy  Force (mmHg): 40 mmHg  Duration : 30 minutes  Skin Integumentary  Skin Condition/Temp: Dry  Turgor: Shiney/hard  Other Decongestion Therapy  Force (mmHg): 40 mmHg  Duration : 30 minutes      Assessment  Assessment: Progressed ther ex with GTB, good tolerance. Pt required CGA/minAx2 to stand from wheelchair and EOM. Measurements taken, pt making progress towards goals. MLD to B LE followed by compression pumps at 40 mmHg for 30 min. Continue to progress as pt tolerates.  Therapy Prognosis: Fair        Decision Making: Medium Complexity    Patient Education  Patient Education: HEP  Pt verbalized/demonstrated good understanding:     [x] Yes         [] No, pt required further clarification.         Goals  Short Term Goals  Time Frame for Short Term Goals: 2 weeks  Short Term Goal 1: Pt will be educated on and initiate HEP for lower extremity strengthening - met  Short Term Goal 2: Pt will initiate pump protocol in order to reduce BLE girth measurements-met    Long Term Goals  Time Frame for Long Term Goals : 4 weeks  Long Term Goal 1: Pt will be independent and compliant with advanced HEP in order to self manage symptoms upon discharge-progressing  Long Term Goal 2: Pt will improve lower extremity strength to at least 5-/5 in order to facilitate stability with transfers and

## 2024-04-11 ENCOUNTER — APPOINTMENT (OUTPATIENT)
Dept: PHYSICAL THERAPY | Age: 89
End: 2024-04-11
Payer: MEDICARE

## 2024-04-11 LAB
BUN BLDV-MCNC: 45 MG/DL (ref 8–23)
CREAT SERPL-MCNC: 2.03 MG/DL (ref 0.8–1.4)
EGFR IF NONAFRICAN AMERICAN: 31 ML/MIN/1.73

## 2024-04-12 ENCOUNTER — HOSPITAL ENCOUNTER (OUTPATIENT)
Dept: PHYSICAL THERAPY | Age: 89
Setting detail: THERAPIES SERIES
Discharge: HOME | End: 2024-04-12
Payer: MEDICARE

## 2024-04-12 RX ORDER — LEVOTHYROXINE SODIUM 137 UG/1
137 TABLET ORAL DAILY
Qty: 90 TABLET | Refills: 1 | Status: SHIPPED | OUTPATIENT
Start: 2024-04-12

## 2024-04-12 NOTE — TELEPHONE ENCOUNTER
Health Maintenance   Topic Date Due    Annual Wellness Visit (Medicare Advantage)  01/01/2024    DTaP/Tdap/Td vaccine (1 - Tdap) 07/18/2024 (Originally 10/15/1954)    Lipids  06/16/2024    Depression Screen  04/05/2025    Flu vaccine  Completed    Shingles vaccine  Completed    Pneumococcal 65+ years Vaccine  Completed    COVID-19 Vaccine  Completed    Respiratory Syncytial Virus (RSV) Pregnant or age 60 yrs+  Completed    Hepatitis A vaccine  Aged Out    Hepatitis B vaccine  Aged Out    Hib vaccine  Aged Out    Polio vaccine  Aged Out    Meningococcal (ACWY) vaccine  Aged Out             (applicable per patient's age: Cancer Screenings, Depression Screening, Fall Risk Screening, Immunizations)    Hemoglobin A1C (%)   Date Value   02/13/2024 6.4 (H)   06/16/2023 7.0 (H)   06/15/2023 6.9 (H)     LDL Cholesterol (mg/dL)   Date Value   06/16/2023 57     AST (U/L)   Date Value   12/18/2023 30     ALT (U/L)   Date Value   12/18/2023 31     BUN (mg/dL)   Date Value   04/10/2024 45 (H)      (goal A1C is < 7)   (goal LDL is <100) need 30-50% reduction from baseline     BP Readings from Last 3 Encounters:   04/05/24 120/72   03/14/24 119/66   02/13/24 (!) 119/54    (goal /80)      All Future Testing planned in [facility]:  Lab Frequency Next Occurrence   Urinalysis with Reflex to Culture Once 07/28/2023   Culture, Urine Once 07/21/2023   Urinalysis with Reflex to Culture Once 07/31/2023   Urinalysis with Microscopic Once 08/06/2024   Microalbumin / Creatinine Urine Ratio Once 08/06/2024   Protein / creatinine ratio, urine Once 08/06/2024   Renal Function Panel Once 08/06/2024   TSH With Reflex Ft4 Once 08/24/2023   Basic Metabolic Panel Once 10/10/2023   Echo (TTE) complete (PRN contrast/bubble/strain/3D) Once 10/08/2023   XR CHEST STANDARD (2 VW) Once 10/08/2023   Basic Metabolic Panel Once 03/15/2024   ALT Once 03/15/2024   AST Once 03/15/2024   Microalbumin, Ur Once 03/15/2024   Lipid Panel Once 03/15/2024

## 2024-04-12 NOTE — PROGRESS NOTES
Physical Therapy  [facility]  Inpatient/Observation/Outpatient Rehabilitation    Date: 2024  Patient Name: South Vargas       [] Inpatient Acute/Observation       [x]  Outpatient  : 1935     Plan of Care/Recert ends    [] Pt no showed for scheduled appointment    [] Pt refused/declined therapy at this time due to:           [x] Pt cancelled due to:  [x] No Reason Given   [] Sick/ill   [] Other:    [] Evaluation held by RN/Provider due to:    [] High Heart Rate   [] High Blood Pressure   [] Orthopedic Consult   [] Hgb < 7   [] Other:    [] Pt does not require skilled services due to:      Therapist/Assistant will attempt to see this patient, at our earliest opportunity.       Evie Rodgers Date: 2024

## 2024-04-15 ENCOUNTER — HOSPITAL ENCOUNTER (OUTPATIENT)
Dept: PHYSICAL THERAPY | Age: 89
Setting detail: THERAPIES SERIES
Discharge: HOME | End: 2024-04-15
Payer: MEDICARE

## 2024-04-15 PROCEDURE — 97110 THERAPEUTIC EXERCISES: CPT

## 2024-04-15 PROCEDURE — 97016 VASOPNEUMATIC DEVICE THERAPY: CPT

## 2024-04-15 PROCEDURE — 97140 MANUAL THERAPY 1/> REGIONS: CPT

## 2024-04-17 ENCOUNTER — APPOINTMENT (OUTPATIENT)
Dept: PHYSICAL THERAPY | Age: 89
End: 2024-04-17
Payer: MEDICARE

## 2024-04-17 ENCOUNTER — HOSPITAL ENCOUNTER (OUTPATIENT)
Dept: PHYSICAL THERAPY | Age: 89
Setting detail: THERAPIES SERIES
Discharge: HOME | End: 2024-04-17
Payer: MEDICARE

## 2024-04-17 PROCEDURE — 97530 THERAPEUTIC ACTIVITIES: CPT

## 2024-04-17 PROCEDURE — 97110 THERAPEUTIC EXERCISES: CPT

## 2024-04-17 PROCEDURE — 97016 VASOPNEUMATIC DEVICE THERAPY: CPT

## 2024-04-18 NOTE — PROGRESS NOTES
(cm): 194.4         Exercises:  Exercise 1: HEP: glute sets, quad sets, heel slides, ankle pumps, long arc quad  Exercise 3: Seated exercises B LE x15, GTB x15    Manual:  Soft Tissue Mobilizaton: MLD to B LEs  Other: Measurements     Skin Integumentary  Skin Condition/Temp: Dry  Turgor: Shiney/hard  RLE Complete Decongestion Therapy  Scale: Stage 2  Lymph Drainage Pattern: Lower extremity  Compression Technique: Vaso-pneumatic pump  Force (mmHg): 45 mmHg  Duration: 30 minutes  LLE Complete Decongestion Therapy  Scale: Stage 2  Lymph Drainage Pattern: Lower extremity  Compression Technique: Vaso-pneumatic pump  Force (mmHg): 45 mmHg  Duration : 30 minutes  Other Decongestion Therapy  Force (mmHg): 45 mmHg  Duration : 30 minutes  Skin Integumentary  Skin Condition/Temp: Dry  Turgor: Shiney/hard  Other Decongestion Therapy  Force (mmHg): 45 mmHg  Duration : 30 minutes      Assessment  Assessment: Pt completed pump trial this visit, educated pt on pump protocol, use and precautions, good understanding. Measurements taken, pt making progress towards goals. Pt tolerates about 3-5 min of standing activity before requiring seated rest break d/t fatigue. Compression pumps at 45 mmHg for 30 min. Patient presents with Lymphedema (i89.0) and (hyperkeratosis) due to the accumulation of the chronic edema of the BLE. Over the last 4+ WEEKS the patient has tried daily elevation, exercise in the form of walking, and compression in the form of 20-30mmHg stockings/ Velcro/tubigrips. Despite regular compliance with these modalities the pt continues to present with edema of the B LE and would benefit from a vasopneumatic pump to help bring edematous fluids back up to the healthy lymphatics at the inguinal nodes and better manage patients chronic condition.  Therapy Prognosis: Fair        Decision Making: Medium Complexity    Patient Education  Patient Education: Pump protocol  Pt verbalized/demonstrated good understanding:     [x] Yes

## 2024-04-19 ENCOUNTER — APPOINTMENT (OUTPATIENT)
Dept: PHYSICAL THERAPY | Age: 89
End: 2024-04-19
Payer: MEDICARE

## 2024-04-22 ENCOUNTER — HOSPITAL ENCOUNTER (OUTPATIENT)
Dept: PHYSICAL THERAPY | Age: 89
Setting detail: THERAPIES SERIES
Discharge: HOME | End: 2024-04-22
Payer: MEDICARE

## 2024-04-22 PROCEDURE — 97140 MANUAL THERAPY 1/> REGIONS: CPT

## 2024-04-22 PROCEDURE — 97110 THERAPEUTIC EXERCISES: CPT

## 2024-04-22 PROCEDURE — 97016 VASOPNEUMATIC DEVICE THERAPY: CPT

## 2024-04-22 NOTE — PROGRESS NOTES
1315  Time Out: 1437  Minutes: 82       Freida Jensen PTA   Date: 4/22/2024  Electronically signed by Freida Jensen PTA on 4/22/2024 at 2:40 PM

## 2024-04-24 ENCOUNTER — HOSPITAL ENCOUNTER (OUTPATIENT)
Dept: PHYSICAL THERAPY | Age: 89
Setting detail: THERAPIES SERIES
Discharge: HOME | End: 2024-04-24
Payer: MEDICARE

## 2024-04-24 PROCEDURE — 97016 VASOPNEUMATIC DEVICE THERAPY: CPT

## 2024-04-24 PROCEDURE — 97140 MANUAL THERAPY 1/> REGIONS: CPT

## 2024-04-24 PROCEDURE — 97110 THERAPEUTIC EXERCISES: CPT

## 2024-04-24 NOTE — PROGRESS NOTES
home pump in order to be independent with her lymphedema management-met           Minutes Tracking:  Time In: 1348  Time Out: 1509  Minutes: 81     Freida Jensen PTA      Date: 4/24/2024  Electronically signed by Freida Jensen PTA on 4/24/2024 at 4:45 PM

## 2024-04-26 ENCOUNTER — TELEPHONE (OUTPATIENT)
Dept: PHARMACY | Facility: CLINIC | Age: 89
End: 2024-04-26

## 2024-04-26 NOTE — TELEPHONE ENCOUNTER
[facility] CLINICAL PHARMACY: ADHERENCE REVIEW  Identified care gap per [employer]: fills at [facility]: Diabetes adherence    Patient also appears to be prescribed: Statin    ASSESSMENT  DIABETES ADHERENCE    Insurance Records claims through 24 (Prior Year PDC = 14% - FAILED; YTD PDC = 81%; Potential Fail Date: 06.10.24):   FARXIGA TAB 10MG  last filled on 24 for 30 day supply. Next refill due: 24    Prescribed si tablet/capsule daily    Per Insurer Portal: last filled on same as above    Per [facility]: last picked up on 03.15.24 for 30 day supply. 0 refills remaining.    Lab Results   Component Value Date    LABA1C 6.4 (H) 2024    LABA1C 7.0 (H) 2023    LABA1C 6.9 (H) 06/15/2023     NOTE: A1c <9%      The following are interventions that have been identified:   Patient OVERDUE refilling Farxiga and active on home medication list.   Patient NEEDS REFILLS for Farxiga  Patient eligible for 90 ds    Called Pt spoke with Wife she stated she handles his meds and picks them up. She said it was way to expensive. He took for 30 ds no refills. They will speak with PCP about a lower cost alternative.    Verified medication instructions     Last Visit: 24  Next Visit: 24        Vee Muse CPhT  [facility] Clinical   [facility]  Toll Free: [phone] Option 1    For Pharmacy Admin Tracking Only    Program: [employer]  CPA in place:  No  Recommendation Provided To: Patient/Caregiver: 1 via Telephone  Gap Closed?: Yes   Time Spent (min): 15

## 2024-04-29 ENCOUNTER — HOSPITAL ENCOUNTER (OUTPATIENT)
Dept: PHYSICAL THERAPY | Age: 89
Setting detail: THERAPIES SERIES
Discharge: HOME | End: 2024-04-29
Payer: MEDICARE

## 2024-04-29 ENCOUNTER — TELEPHONE (OUTPATIENT)
Dept: PRIMARY CARE CLINIC | Age: 89
End: 2024-04-29

## 2024-04-29 DIAGNOSIS — M70.22 OLECRANON BURSITIS OF LEFT ELBOW: Primary | ICD-10-CM

## 2024-04-29 NOTE — PROGRESS NOTES
Physical Therapy  [facility]  Inpatient/Observation/Outpatient Rehabilitation    Date: 2024  Patient Name: South Vargas       [] Inpatient Acute/Observation       [x]  Outpatient  : 1935     Plan of Care/Recert ends    [] Pt no showed for scheduled appointment    [] Pt refused/declined therapy at this time due to:           [x] Pt cancelled due to:  [] No Reason Given   [x] Sick/ill   [] Other:    [] Evaluation held by RN/Provider due to:    [] High Heart Rate   [] High Blood Pressure   [] Orthopedic Consult   [] Hgb < 7   [] Other:    [] Pt does not require skilled services due to:      Therapist/Assistant will attempt to see this patient, at our earliest opportunity.       Evie Rodgers Date: 2024

## 2024-04-29 NOTE — TELEPHONE ENCOUNTER
No he doesn't need to come in. I placed a referral to [facility] ([facility]). Whoever can see him soonest. Thank you.

## 2024-04-29 NOTE — TELEPHONE ENCOUNTER
Bursitis has not gotten any better. 4/5/24 OV states ortho referral if no improvement.    Do you want to refer to ortho or see in office first?

## 2024-05-01 ENCOUNTER — HOSPITAL ENCOUNTER (OUTPATIENT)
Dept: PHYSICAL THERAPY | Age: 89
Setting detail: THERAPIES SERIES
Discharge: HOME | End: 2024-05-01
Payer: MEDICARE

## 2024-05-01 PROCEDURE — 97140 MANUAL THERAPY 1/> REGIONS: CPT

## 2024-05-01 PROCEDURE — 97110 THERAPEUTIC EXERCISES: CPT

## 2024-05-01 PROCEDURE — 97016 VASOPNEUMATIC DEVICE THERAPY: CPT

## 2024-05-01 NOTE — PROGRESS NOTES
51.1  Thigh (cm): 53.4            Exercises:  Exercise 1: HEP: glute sets, quad sets, heel slides, ankle pumps, long arc quad  Exercise 4: SciFit x10 min   Exercise 5: Sit to stand 2x5   Exercise 6: Standing sink ex x10   Exercise 7: 6\" sonja forward/lateral step overs x10 ea      Manual:  Soft Tissue Mobilizaton: MLD to B LEs  Other: Measurements  Skin Integumentary  Skin Condition/Temp: Dry  Turgor: Shiney/hard  RLE Complete Decongestion Therapy  Scale: Stage 2  Lymph Drainage Pattern: Lower extremity  Compression Technique: Vaso-pneumatic pump  Force (mmHg): 50 mmHg  Duration: 30 minutes  LLE Complete Decongestion Therapy  Scale: Stage 2  Lymph Drainage Pattern: Lower extremity  Compression Technique: Vaso-pneumatic pump  Force (mmHg): 50 mmHg  Duration : 30 minutes  Other Decongestion Therapy  Force (mmHg): 50 mmHg  Duration : 30 minutes  Skin Integumentary  Skin Condition/Temp: Dry  Turgor: Shiney/hard  Other Decongestion Therapy  Force (mmHg): 50 mmHg  Duration : 30 minutes        Assessment  Assessment: Pt with good tolerance to exercise this visit. MLD to B LE followed by compression pumps at 50 mmHg for 30 min. Continue to progress as pt tolerates.   Therapy Prognosis: Fair  Decision Making: Medium Complexity     Patient Education  Patient Education: Pump protocol  Pt verbalized/demonstrated good understanding:     [x] Yes         [] No, pt required further clarification.        Goals  Short Term Goals  Time Frame for Short Term Goals: 2 weeks  Short Term Goal 1: Pt will be educated on and initiate HEP for lower extremity strengthening - met  Short Term Goal 2: Pt will initiate pump protocol in order to reduce BLE girth measurements-met     Long Term Goals  Time Frame for Long Term Goals : 4 weeks  Long Term Goal 1: Pt will be independent and compliant with advanced HEP in order to self manage symptoms upon discharge-progressing  Long Term Goal 2: Pt will improve lower extremity strength to at least 5-/5

## 2024-05-06 ENCOUNTER — TELEPHONE (OUTPATIENT)
Dept: PRIMARY CARE CLINIC | Age: 89
End: 2024-05-06

## 2024-05-06 ENCOUNTER — APPOINTMENT (OUTPATIENT)
Dept: PHYSICAL THERAPY | Age: 89
End: 2024-05-06
Payer: MEDICARE

## 2024-05-06 DIAGNOSIS — J44.9 CHRONIC OBSTRUCTIVE PULMONARY DISEASE, UNSPECIFIED COPD TYPE (HCC): Primary | ICD-10-CM

## 2024-05-06 RX ORDER — OXYBUTYNIN CHLORIDE 5 MG/1
10 TABLET ORAL 2 TIMES DAILY
Qty: 120 TABLET | Refills: 5 | Status: SHIPPED | OUTPATIENT
Start: 2024-05-06

## 2024-05-06 RX ORDER — NEBULIZER ACCESSORIES
1 KIT MISCELLANEOUS DAILY
Qty: 1 KIT | Refills: 0 | Status: SHIPPED | OUTPATIENT
Start: 2024-05-06

## 2024-05-06 NOTE — TELEPHONE ENCOUNTER
South's wife called to request an order for a new nebulizer mouth piece.  South's current one is worn out.    Can you place order?

## 2024-05-06 NOTE — TELEPHONE ENCOUNTER
Parainfluenza     ESBL (extended spectrum beta-lactamase) producing bacteria infection     Acute lower GI bleeding     Hematochezia     AVM (arteriovenous malformation) of stomach, acquired     Diverticulosis of colon with hemorrhage     Hypocalcemia     Community acquired pneumonia of both lungs     Toxic metabolic encephalopathy

## 2024-05-08 ENCOUNTER — HOSPITAL ENCOUNTER (OUTPATIENT)
Dept: PHYSICAL THERAPY | Age: 89
Setting detail: THERAPIES SERIES
Discharge: HOME | End: 2024-05-08
Payer: MEDICARE

## 2024-05-08 PROCEDURE — 97016 VASOPNEUMATIC DEVICE THERAPY: CPT

## 2024-05-08 PROCEDURE — 97110 THERAPEUTIC EXERCISES: CPT

## 2024-05-08 PROCEDURE — 97140 MANUAL THERAPY 1/> REGIONS: CPT

## 2024-05-08 NOTE — PROGRESS NOTES
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 reports doing well but has been having pain in L knee. Wife stated she feels LEs are a little more swollen this date.    Exercises:  Exercise 1: HEP: glute sets, quad sets, heel slides, ankle pumps, long arc quad  Exercise 4: SciFit x10 min  Exercise 5: Sit to stand x10  Exercise 6: Standing sink ex x10  Exercise 7: 6\" sonja forward/lateral step overs x10 ea    Manual:  Soft Tissue Mobilizaton: MLD to B LEs  Other: Measurements    Assessment  Body Structures, Functions, Activity Limitations Requiring Skilled Therapeutic Intervention: Decreased functional mobility , Decreased body mechanics, Decreased strength, Decreased ADL status, Decreased high-level IADLs, Increased pain, Decreased balance, Decreased tolerance to work activity, Decreased endurance  Assessment: Pt with good tolerance to exercise this visit. MLD to B LE followed by compression pumps at 50 mmHg for 30 min. Pt. has recieved at home pumps and has been scheduled for a 1-month follow up. Educated pt. and pts  wife on taking daily weight to help visually see increased in LE fluid, verbalized understanding. Measurements taken and will progress per pt. tolerance.    Activity Tolerance  Activity Tolerance: Patient tolerated treatment well    Patient Education  Patient Education: HEP, daily weight

## 2024-05-08 NOTE — PROGRESS NOTES
Phone: [phone]                       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 reports doing well but has been having pain in L knee. Wife stated she feels LEs are a little more swollen this date.  Additional Pertinent Hx: kidney or bladder control problems, heart problems, stroke, arthritis, lung disease, hearing problems, asthma, diabtes    Skin Integumentary:   Pitting Scale Area 1: 2+  Pitting Additional Areas: Yes  Skin Color: Appropriate for ethnicity  Skin Texture: Hyperkeratosis  Skin Condition/Temp: Dry  Turgor: Shiney/hard  Edema Rebound: Slow  Stemmer Sign: Negative  Scars Present: No    Signs of Constriction (if applicable):   Skin Breakdown: No  Fistulas / Abnormal Passageway: No  Papilloma (benign tumor arising from an epithelial layer): No  Fibrotic Areas: No  Lymphorrhea: No  Warts: No  Ulcers: No    Stage of Lymphedema: Stage 2: Spontaneously Irreversible Stage  Description: Bilateral dorsum of foot fluid fild sacs not pitting in nature.    Lymphedema Classification:   Type: Secondary Lymphedema  Left: Moderate     Right: Moderate    Measurements:        Right Measurements Left Measurements   R LE Pre Girth Measurement (cm)  5th Tuberosity (cm): 26.6  Lateral malleolus: 34.1  Calf (cm): 41  Mid Knee (cm): 48.6  Thigh (cm): 55.7  Total Girth (cm): 206 L LE Pre Girth Measurement (cm)  5th Tuberosity (cm): 25.6  Lateral malleolus: 32.5  Calf (cm): 38.4  Mid Knee (cm):  Physical Medicine & Rehabilitation  Progress Note        Admitting Physician: No att. providers found    Primary Care Provider: AFSANEH Rosa CNP     Chief Complaint: Back pain    Brief History: This is a follow up to the initial consult on MsLuzma Kelley is a 63 y.o. right handed female who was admitted to [facility] on 12/23/2021 with Back Pain    Patient with AMS and R upper back pain. She is being seen by psychiatry for emotional lability. Psychiatry consult reviewed noting Generalized Anxiety Disorder and Major Depressive Disorder with recommendation to continue current medications and follow up as outpatient. Subjective: The patient is seen in hemodialysis today and is resting comfortably. The patient's mobility is unchanged. She feels that her AMS has improved. She is however concerned about her impaired coordination affecting BUEs. ROS:  Constitutional: negative for anorexia, chills, fatigue, fevers, sweats and weight loss  Eyes: negative for redness and visual disturbance  Ears, nose, mouth, throat, and face: negative for earaches, epistaxis, sore throat and tinnitus  Respiratory: negative for cough and shortness of breath  Cardiovascular: negative for chest pain, dyspnea and palpitations  Gastrointestinal: negative for abdominal pain, change in bowel habits, constipation, nausea and vomiting  Genitourinary:negative for dysuria, frequency, hesitancy and urinary incontinence  Integument/breast: negative for pruritus and rash  Musculoskeletal:negative for stiff joints  Neurological: negative for dizziness, headaches   Behavioral/Psych: negative for decreased appetite, depression and fatigue    Rehabilitation:   Progressing in therapies.     PT:  Restrictions/Precautions: General Precautions,Fall Risk   Transfers  Sit to Stand: Contact guard assistance  Stand to sit: Contact guard assistance  Bed to Chair: Contact guard assistance  Stand Pivot Transfers: Contact guard assistance  Comment: standing with RW  Ambulation 1  Surface: level tile  Device: Rolling Walker  Other Apparatus: O2  Assistance: Contact guard assistance,Minimal assistance (Gigi as pt begins to feel unsteady)  Quality of Gait: Steady, no LOB. No buckling of knees this date. Pt fatigues quickly  Gait Deviations: Increased ELISABET,Decreased step length,Decreased step height  Distance: 15ft x 2  Comments: VCs for pt to stay within ELISABET of RW. Pt amb from recliner to room door and back to recliner    Transfers  Sit to Stand: Contact guard assistance  Stand to sit: Contact guard assistance  Bed to Chair: Contact guard assistance  Stand Pivot Transfers: Contact guard assistance  Comment: standing with RW  Ambulation  Ambulation?: Yes  Ambulation 1  Surface: level tile  Device: Rolling Walker  Other Apparatus: O2  Assistance: Contact guard assistance,Minimal assistance (Gigi as pt begins to feel unsteady)  Quality of Gait: Steady, no LOB. No buckling of knees this date. Pt fatigues quickly  Gait Deviations: Increased ELISABET,Decreased step length,Decreased step height  Distance: 15ft x 2  Comments: VCs for pt to stay within ELISABET of RW. Pt amb from recliner to room door and back to recliner    Surface: level tile  Ambulation 1  Surface: level tile  Device: Rolling Walker  Other Apparatus: O2  Assistance: Contact guard assistance,Minimal assistance (Gigi as pt begins to feel unsteady)  Quality of Gait: Steady, no LOB. No buckling of knees this date. Pt fatigues quickly  Gait Deviations: Increased ELISABET,Decreased step length,Decreased step height  Distance: 15ft x 2  Comments: VCs for pt to stay within ELISABET of RW. Pt amb from recliner to room door and back to recliner    OT:  ADL  Feeding: Setup,Adaptive utensils (comment)  Grooming: Setup  UE Bathing: Minimal assistance  LE Bathing: Moderate assistance  UE Dressing: Minimal assistance  LE Dressing: Moderate assistance  Toileting:  Moderate assistance  Additional Comments: ADL scores based on clinical reasoning and skilled observation unless otherwise noted. Pt currrently limited due to decreased strength, balance, activity, and increased pain impacting safety and independence with self care tasks. Balance  Sitting Balance: Stand by assistance (sitting in recliner)  Standing Balance: Unable to assess(comment) (standing deferred due to increased )   Standing Balance  Time: 1 minute  Activity: Small steps from EOB to recliner chair  Comment: with RW. Pt completed stand/transfer with min A x 2 with PTA providing stabilization to L knee due. Functional Mobility  Functional - Mobility Device: Rolling Walker  Activity: Other (small steps from bed to recliner chiar)  Assist Level: Minimal assistance (x2)  Functional Mobility Comments: Functional mobility completed with 1 person A supporting LLE. Verbal cues for hand placement and safety. Bed mobility  Rolling to Left: Supervision  Rolling to Right: Supervision  Supine to Sit: Minimal assistance  Sit to Supine: Unable to assess  Scooting: Stand by assistance  Comment: Bed mobility completed with HOB elevated  Transfers  Sit to stand: 2 Person assistance (Min A x 1 and CGA x 1 at L knee)  Stand to sit: 2 Person assistance (Min A x 1 and CGA x 1 at L knee)  Transfer Comments: Verbal cues for hand placement and safety. Pt educated on pursed lip breathing and relaxation techniques to assist with main management during transfers. SLP:      Objective:  BP (!) 117/51   Pulse 59   Temp 97.6 °F (36.4 °C) (Oral)   Resp 18   Ht 5' 5\" (1.651 m)   Wt 262 lb 5.6 oz (119 kg)   SpO2 95%   BMI 43.66 kg/m²       GEN: well developed, well nourished, in NAD  HEENT: NCAT, PERRL, EOMI, mucous membranes pink and moist  CV: RRR, no murmurs, rubs or gallops  PULM: CTAB, no rales or rhonchi. Respirations WNL and unlabored  ABD: soft, NT, ND, BS+ and equal  NEURO: A&O x3. Sensation intact to light touch. DTRs 2+.  Axterixis BUEs with impaired coordination. MSK: Functional ROM BUE and BLEs but with impaired coordination due to asterixis . Strength 4/5 key muscles all extremities. EXTREMITIES: No calf tenderness to palpation bilaterally. No edema BLEs  SKIN: warm dry and intact with good turgor  PSYCH: appropriately interactive. Affect WNL.      Current Medications:   Current Facility-Administered Medications: epoetin raphael-epbx (RETACRIT) injection 3,000 Units, 3,000 Units, SubCUTAneous, Once per day on Mon Wed Fri  sodium bicarbonate tablet 650 mg, 650 mg, Oral, BID  carvedilol (COREG) tablet 3.125 mg, 3.125 mg, Oral, BID WC  anticoagulant sodium citrate 4 % injection 1.9 mL, 1.9 mL, IntraCATHeter, PRN  anticoagulant sodium citrate 4 % injection 1.9 mL, 1.9 mL, IntraCATHeter, PRN  tiZANidine (ZANAFLEX) tablet 4 mg, 4 mg, Oral, Q6H PRN  tiZANidine (ZANAFLEX) tablet 2 mg, 2 mg, Oral, TID  lidocaine 4 % external patch 1 patch, 1 patch, TransDERmal, Daily  aspirin chewable tablet 81 mg, 81 mg, Oral, Daily  atorvastatin (LIPITOR) tablet 80 mg, 80 mg, Oral, Daily  busPIRone (BUSPAR) tablet 7.5 mg, 7.5 mg, Oral, TID  docusate sodium (COLACE) capsule 100 mg, 100 mg, Oral, BID  apixaban (ELIQUIS) tablet 5 mg, 5 mg, Oral, BID  febuxostat (ULORIC) tablet 40 mg, 40 mg, Oral, Daily  gabapentin (NEURONTIN) capsule 300 mg, 300 mg, Oral, BID  insulin glargine (LANTUS) injection vial 73 Units, 73 Units, SubCUTAneous, BID  pantoprazole (PROTONIX) tablet 40 mg, 40 mg, Oral, QAM AC  polyethylene glycol (GLYCOLAX) packet 17 g, 17 g, Oral, Daily  ranolazine (RANEXA) extended release tablet 1,000 mg, 1,000 mg, Oral, BID  senna (SENOKOT) tablet 17.2 mg, 2 tablet, Oral, Daily PRN  tamsulosin (FLOMAX) capsule 0.4 mg, 0.4 mg, Oral, Daily  traZODone (DESYREL) tablet 75 mg, 75 mg, Oral, Nightly  glucose (GLUTOSE) 40 % oral gel 15 g, 15 g, Oral, PRN  dextrose 50 % IV solution, 12.5 g, IntraVENous, PRN  glucagon (rDNA) injection 1 mg, 1 mg, IntraMUSCular, PRN  dextrose 5 % solution, 100 mL/hr, IntraVENous, PRN  sodium chloride flush 0.9 % injection 5-40 mL, 5-40 mL, IntraVENous, 2 times per day  sodium chloride flush 0.9 % injection 5-40 mL, 5-40 mL, IntraVENous, PRN  0.9 % sodium chloride infusion, 25 mL, IntraVENous, PRN  ondansetron (ZOFRAN-ODT) disintegrating tablet 4 mg, 4 mg, Oral, Q8H PRN **OR** ondansetron (ZOFRAN) injection 4 mg, 4 mg, IntraVENous, Q6H PRN  polyethylene glycol (GLYCOLAX) packet 17 g, 17 g, Oral, Daily PRN  acetaminophen (TYLENOL) tablet 650 mg, 650 mg, Oral, Q6H PRN **OR** acetaminophen (TYLENOL) suppository 650 mg, 650 mg, Rectal, Q6H PRN  insulin lispro (HUMALOG) injection vial 0-18 Units, 0-18 Units, SubCUTAneous, TID WC  insulin lispro (HUMALOG) injection vial 0-9 Units, 0-9 Units, SubCUTAneous, Nightly  HYDROcodone-acetaminophen (NORCO) 5-325 MG per tablet 1 tablet, 1 tablet, Oral, Q6H PRN      Diagnostics:     CBC: No results for input(s): WBC, RBC, HGB, HCT, MCV, RDW, PLT in the last 72 hours. BMP: Recent Labs     01/01/22  0717 01/02/22  0700 01/03/22  0631   * 131* 132*   K 4.0 4.3 4.7   CL 93* 93* 93*   CO2 26 22 25   BUN 22 33* 40*   CREATININE 3.12* 4.41* 4.89*     BNP: No results for input(s): BNP in the last 72 hours. PT/INR: No results for input(s): PROTIME, INR in the last 72 hours. APTT: No results for input(s): APTT in the last 72 hours. CARDIAC ENZYMES: No results for input(s): CKMB, CKMBINDEX, TROPONINT in the last 72 hours. Invalid input(s): CKTOTAL;3 troponins   FASTING LIPID PANEL:  Lab Results   Component Value Date    CHOL 105 04/09/2015    HDL 43 04/09/2015    TRIG 168 04/09/2015     LIVER PROFILE: No results for input(s): AST, ALT, ALB, BILIDIR, BILITOT, ALKPHOS in the last 72 hours. Results for Linus Dietrich (MRN [id number]) as of 1/3/2022 21:37   Ref. Range 12/26/2021 20:12   Ammonia Latest Ref Range: 11 - 51 umol/L <10 (L)       Impression/Plan:    1. Metabolic encephalopathy: Resolved  2.  ESRD: on HD MWF, nephrology managing  3. DM II: on Lantus and sliding scale  4. Normocytic anemia of chronic disease: Hb low but stable on epogen TIW  5. Asterixis: MRI brain negative for acute findings, ammonia level WNL, EEG recommended in neuro consult 12/26- no results available. Last EEG 11/12/21 was negative for epileptiform activity. 6. HTN/Chronic diastolic heart failure: on carvedilol  7. Acute Thoracic back pain: resolved. 8. 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 Major depressive disorder, Generalized anxiety disorder: psychiatry consult pending. On Buspar, Trazodone  11. Gout: on febuxostat for prophylaxis  12. Urine retention: on flomax  13. Hx ischemic CVA    Recommendation:  1. The patient will benefit from acute inpatient rehabilitation once medically stable per primary and consulting services. Anticipate she will be able to tolerate 3 hours of therapy per day or 900 minutes per week in rehabilitation. The patient requires multidisciplinary rehabilitation treatment including medical management by a PM&R physician, 24 hour rehabilitation nursing, Physical/Occupational therapy, rehabilitation social work, and nutrition services. Patient and family also require education in post-hospital precautions and home exercise routine, adaptive techniques and deficit compensation strategies, strengthening and conditioning, equipment prescription and instructions in use. 2. DVT Prophylaxis: on Eliquis        Electronically signed by Mabel Rodriguez MD on 1/3/2022 at 9:21 AM       This note is created with the assistance of a speech recognition program.  While intending to generate a document that actually reflects the content of the visit, the document can still have some errors including those of syntax and sound a like substitutions which may escape proof reading.   In such instances, actual meaning can be extrapolated by contextual diversion.

## 2024-05-09 ENCOUNTER — APPOINTMENT (OUTPATIENT)
Dept: PHYSICAL THERAPY | Age: 89
End: 2024-05-09
Payer: MEDICARE

## 2024-05-13 ENCOUNTER — APPOINTMENT (OUTPATIENT)
Dept: PHYSICAL THERAPY | Age: 89
End: 2024-05-13
Payer: MEDICARE

## 2024-05-14 ENCOUNTER — APPOINTMENT (OUTPATIENT)
Dept: PHYSICAL THERAPY | Age: 89
End: 2024-05-14
Payer: MEDICARE

## 2024-05-15 ENCOUNTER — APPOINTMENT (OUTPATIENT)
Dept: PHYSICAL THERAPY | Age: 89
End: 2024-05-15
Payer: MEDICARE

## 2024-05-16 ENCOUNTER — APPOINTMENT (OUTPATIENT)
Dept: PHYSICAL THERAPY | Age: 89
End: 2024-05-16
Payer: MEDICARE

## 2024-05-17 ENCOUNTER — OFFICE VISIT (OUTPATIENT)
Dept: PRIMARY CARE CLINIC | Age: 89
End: 2024-05-17
Payer: MEDICARE

## 2024-05-17 VITALS
TEMPERATURE: 98.6 F | BODY MASS INDEX: 34.06 KG/M2 | SYSTOLIC BLOOD PRESSURE: 120 MMHG | WEIGHT: 237.9 LBS | RESPIRATION RATE: 16 BRPM | DIASTOLIC BLOOD PRESSURE: 62 MMHG | HEIGHT: 70 IN | HEART RATE: 86 BPM | OXYGEN SATURATION: 95 %

## 2024-05-17 DIAGNOSIS — Z00.00 MEDICARE ANNUAL WELLNESS VISIT, SUBSEQUENT: Primary | ICD-10-CM

## 2024-05-17 DIAGNOSIS — N18.31 CHRONIC KIDNEY DISEASE, STAGE 3A (HCC): ICD-10-CM

## 2024-05-17 DIAGNOSIS — E03.1 CONGENITAL HYPOTHYROIDISM: ICD-10-CM

## 2024-05-17 DIAGNOSIS — I50.32 CHRONIC DIASTOLIC CONGESTIVE HEART FAILURE (HCC): ICD-10-CM

## 2024-05-17 DIAGNOSIS — I25.10 ASHD (ARTERIOSCLEROTIC HEART DISEASE): ICD-10-CM

## 2024-05-17 DIAGNOSIS — J44.9 CHRONIC OBSTRUCTIVE PULMONARY DISEASE, UNSPECIFIED COPD TYPE (HCC): ICD-10-CM

## 2024-05-17 DIAGNOSIS — E11.8 TYPE 2 DIABETES MELLITUS WITH COMPLICATION, WITHOUT LONG-TERM CURRENT USE OF INSULIN (HCC): ICD-10-CM

## 2024-05-17 PROBLEM — I50.43 ACUTE ON CHRONIC COMBINED SYSTOLIC (CONGESTIVE) AND DIASTOLIC (CONGESTIVE) HEART FAILURE (HCC): Status: RESOLVED | Noted: 2017-09-21 | Resolved: 2024-05-17

## 2024-05-17 PROBLEM — I48.11 LONGSTANDING PERSISTENT ATRIAL FIBRILLATION (HCC): Status: ACTIVE | Noted: 2024-05-17

## 2024-05-17 PROBLEM — I48.11 LONGSTANDING PERSISTENT ATRIAL FIBRILLATION (HCC): Status: RESOLVED | Noted: 2024-05-17 | Resolved: 2024-05-17

## 2024-05-17 PROBLEM — I50.43 ACUTE ON CHRONIC COMBINED SYSTOLIC (CONGESTIVE) AND DIASTOLIC (CONGESTIVE) HEART FAILURE (HCC): Status: ACTIVE | Noted: 2017-09-21

## 2024-05-17 LAB — HBA1C MFR BLD: 6.3 %

## 2024-05-17 PROCEDURE — 3044F HG A1C LEVEL LT 7.0%: CPT | Performed by: NURSE PRACTITIONER

## 2024-05-17 PROCEDURE — G0439 PPPS, SUBSEQ VISIT: HCPCS | Performed by: NURSE PRACTITIONER

## 2024-05-17 PROCEDURE — 1123F ACP DISCUSS/DSCN MKR DOCD: CPT | Performed by: NURSE PRACTITIONER

## 2024-05-17 PROCEDURE — 83036 HEMOGLOBIN GLYCOSYLATED A1C: CPT | Performed by: NURSE PRACTITIONER

## 2024-05-17 RX ORDER — DAPAGLIFLOZIN 10 MG/1
10 TABLET, FILM COATED ORAL EVERY MORNING
Qty: 90 TABLET | Refills: 3 | Status: SHIPPED | OUTPATIENT
Start: 2024-05-17 | End: 2024-05-17 | Stop reason: CLARIF

## 2024-05-17 ASSESSMENT — LIFESTYLE VARIABLES
HOW OFTEN DO YOU HAVE A DRINK CONTAINING ALCOHOL: 2-3 TIMES A WEEK
HOW MANY STANDARD DRINKS CONTAINING ALCOHOL DO YOU HAVE ON A TYPICAL DAY: 1 OR 2

## 2024-05-17 ASSESSMENT — PATIENT HEALTH QUESTIONNAIRE - PHQ9
1. LITTLE INTEREST OR PLEASURE IN DOING THINGS: NOT AT ALL
SUM OF ALL RESPONSES TO PHQ QUESTIONS 1-9: 0
SUM OF ALL RESPONSES TO PHQ9 QUESTIONS 1 & 2: 0
SUM OF ALL RESPONSES TO PHQ QUESTIONS 1-9: 0
2. FEELING DOWN, DEPRESSED OR HOPELESS: NOT AT ALL

## 2024-05-17 NOTE — PATIENT INSTRUCTIONS
you can do to protect your heart. It is never too late to quit. Try to avoid secondhand smoke too.     Stay at a weight that's healthy for you. Talk to your doctor if you need help losing weight.     Try to get 7 to 9 hours of sleep each night.     Limit alcohol to 2 drinks a day for men and 1 drink a day for women. Too much alcohol can cause health problems.     Manage other health problems such as diabetes, high blood pressure, and high cholesterol. If you think you may have a problem with alcohol or drug use, talk to your doctor.   Medicines    Take your medicines exactly as prescribed. Call your doctor if you think you are having a problem with your medicine.     If your doctor recommends aspirin, take the amount directed each day. Make sure you take aspirin and not another kind of pain reliever, such as acetaminophen (Tylenol).   When should you call for help?   Call 911 if you have symptoms of a heart attack. These may include:    Chest pain or pressure, or a strange feeling in the chest.     Sweating.     Shortness of breath.     Pain, pressure, or a strange feeling in the back, neck, jaw, or upper belly or in one or both shoulders or arms.     Lightheadedness or sudden weakness.     A fast or irregular heartbeat.   After you call 911, the  may tell you to chew 1 adult-strength or 2 to 4 low-dose aspirin. Wait for an ambulance. Do not try to drive yourself.  Watch closely for changes in your health, and be sure to contact your doctor if you have any problems.  Where can you learn more?  Go to https://www.Connectbright.net/patientEd and enter F075 to learn more about \"A Healthy Heart: Care Instructions.\"  Current as of: June 24, 2023               Content Version: 14.0  © 6720-4376 [employer].   Care instructions adapted under license by [employer]. If you have questions about a medical condition or this instruction, always ask your healthcare professional. [employer]

## 2024-05-17 NOTE — PROGRESS NOTES
postnasal drip, rhinorrhea, sneezing, sore throat, sweats, trouble swallowing or weight loss. His symptoms are aggravated by URI, strenuous activity, climbing stairs and change in weather. His symptoms are alleviated by beta-agonist, steroid inhaler, rest and oral steroids. He reports significant improvement on treatment. His symptoms are not alleviated by rest. His past medical history is significant for asthma, bronchitis, COPD and pneumonia.   Coronary Artery Disease  Presents for follow-up visit. Symptoms include leg swelling and shortness of breath (with exertion). Pertinent negatives include no chest pain, chest pressure, chest tightness, dizziness, muscle weakness, palpitations or weight gain. The symptoms have been stable. Compliance with diet is good. Compliance with exercise is poor. Compliance with medications is good.   Thyroid Problem  Presents for follow-up visit. Symptoms include leg swelling. Patient reports no anxiety, cold intolerance, constipation, depressed mood, diaphoresis, diarrhea, dry skin, fatigue, hair loss, heat intolerance, hoarse voice, nail problem, palpitations, tremors, visual change, weight gain or weight loss. The symptoms have been stable.     Review of Systems   Constitutional:  Negative for appetite change, diaphoresis, fatigue, fever, malaise/fatigue, weight gain and weight loss.   HENT:  Negative for ear pain, hoarse voice, postnasal drip, rhinorrhea, sneezing, sore throat and trouble swallowing.    Eyes:  Negative for blurred vision.   Respiratory:  Positive for cough, sputum production and shortness of breath (with exertion). Negative for hemoptysis, chest tightness and wheezing.    Cardiovascular:  Positive for dyspnea on exertion and leg swelling. Negative for chest pain, palpitations and PND.   Gastrointestinal:  Negative for abdominal pain, constipation, diarrhea and heartburn.   Endocrine: Negative for cold intolerance, heat intolerance, polydipsia, polyphagia and

## 2024-05-20 ASSESSMENT — ENCOUNTER SYMPTOMS
FREQUENT THROAT CLEARING: 0
VISUAL CHANGE: 0
DIARRHEA: 0
HOARSE VOICE: 0
COUGH: 1
HAIR LOSS: 0
HEMOPTYSIS: 0
SHORTNESS OF BREATH: 1
WHEEZING: 0
RHINORRHEA: 0
DIFFICULTY BREATHING: 0
SPUTUM PRODUCTION: 1
ABDOMINAL PAIN: 0
TROUBLE SWALLOWING: 0
CONSTIPATION: 0
CHEST TIGHTNESS: 0
BLURRED VISION: 0
HEARTBURN: 0
SORE THROAT: 0

## 2024-05-20 ASSESSMENT — COPD QUESTIONNAIRES: COPD: 1

## 2024-05-22 ENCOUNTER — TELEPHONE (OUTPATIENT)
Dept: PHARMACY | Facility: CLINIC | Age: 89
End: 2024-05-22

## 2024-05-22 NOTE — TELEPHONE ENCOUNTER
[facility] CLINICAL PHARMACY: ADHERENCE REVIEW  Identified care gap per [employer]: fills at [facility]: Diabetes adherence    Patient also appears to be prescribed: Statin    ASSESSMENT  DIABETES ADHERENCE    Insurance Records claims through 24 (Prior Year PDC = 14% - FAILED; YTD PDC = 50%; Potential Fail Date: 06.10.24):   FARXIGA TAB 10MG  last filled on 24 for 30 day supply. Next refill due: 24    Prescribed si tablet/capsule daily    Per Insurer Portal: last filled on same      Lab Results   Component Value Date    LABA1C 6.3 2024    LABA1C 6.4 (H) 2024    LABA1C 7.0 (H) 2023     NOTE: A1c <9%    The following are interventions that have been identified:   Patient overdue refilling Farxiga. Unsure if patient is still prescribed this medication.     Reached patient to review. Patient is no longer taking Farxiga and would like removed from medication list. Discontinued 24-formulary change.    Verified medication instructions and Education provided.      Last Visit: 24  Next Visit: none  Vee Muse CPhT  [facility]   [facility]  Toll Free: [phone] Option 1    For Pharmacy Admin Tracking Only    Program: Next audience  CPA in place:  No  Recommendation Provided To: Patient/Caregiver: 1 via Telephone  Intervention Detail: Adherence Monitorin  Intervention Accepted By: Patient/Caregiver: 1  Gap Closed?: Yes   Time Spent (min): 10

## 2024-05-30 ENCOUNTER — TELEPHONE (OUTPATIENT)
Dept: PRIMARY CARE CLINIC | Age: 89
End: 2024-05-30

## 2024-05-30 DIAGNOSIS — R39.9 LOWER URINARY TRACT SYMPTOMS (LUTS): Primary | ICD-10-CM

## 2024-05-30 NOTE — TELEPHONE ENCOUNTER
Informed wife of 6/7/2024 @ 8:00 AM. Wife stated \"too early.\" Phone number provided to patient wife to reschedule. Informed wife needs to be seen ASAP. Verbalizes understanding.

## 2024-05-30 NOTE — TELEPHONE ENCOUNTER
Patient called office,Flomax 0.4 mg 1 capsule BID, is not working. Patient reported is taking 1 capsule BID.  Please advise.

## 2024-06-07 ENCOUNTER — OFFICE VISIT (OUTPATIENT)
Dept: UROLOGY | Age: 89
End: 2024-06-07

## 2024-06-07 VITALS
TEMPERATURE: 96.9 F | DIASTOLIC BLOOD PRESSURE: 66 MMHG | SYSTOLIC BLOOD PRESSURE: 112 MMHG | WEIGHT: 229 LBS | BODY MASS INDEX: 32.86 KG/M2

## 2024-06-07 DIAGNOSIS — N40.1 BPH WITH OBSTRUCTION/LOWER URINARY TRACT SYMPTOMS: ICD-10-CM

## 2024-06-07 DIAGNOSIS — N39.46 MIXED INCONTINENCE: Primary | ICD-10-CM

## 2024-06-07 DIAGNOSIS — N13.8 BPH WITH OBSTRUCTION/LOWER URINARY TRACT SYMPTOMS: ICD-10-CM

## 2024-06-07 DIAGNOSIS — K59.00 CONSTIPATION, UNSPECIFIED CONSTIPATION TYPE: ICD-10-CM

## 2024-06-07 PROBLEM — I50.22 CHRONIC SYSTOLIC (CONGESTIVE) HEART FAILURE (HCC): Status: ACTIVE | Noted: 2024-06-07

## 2024-06-07 RX ORDER — OXYBUTYNIN CHLORIDE 15 MG/1
15 TABLET, EXTENDED RELEASE ORAL DAILY
Qty: 30 TABLET | Refills: 3 | Status: SHIPPED | OUTPATIENT
Start: 2024-06-07

## 2024-06-07 NOTE — PROGRESS NOTES
Bladderscan performed in office today:  Pt voided - 100 mL, PVR - 144 mL    
103.9 kg (229 lb)   BMI 32.86 kg/m²       PHYSICAL EXAM:  Constitutional: Patient in no acute distress;   Neuro: alert and oriented to person place and time.    Psych: Mood and affect normal.  Skin: Normal  Lungs: Respiratory effort normal  Cardiovascular:  Normal peripheral pulses  Abdomen: Soft, non-tender, non-distended with no CVA, flank pain  Bladder non-tender and not distended.  Lymphatics: no palpable lymphadenopathy  Penis normal  Urethral meatus normal  Scrotal exam normal  Testicles normal bilaterally  Epididymis normal bilaterally  No evidence of inguinal hernia      Lab Results   Component Value Date    BUN 45 (H) 04/10/2024     Lab Results   Component Value Date    CREATININE 2.03 (H) 04/10/2024     No results found for: \"PSA\"    ASSESSMENT:  This is a 88 y.o. male with the following diagnoses:   Diagnosis Orders   1. Mixed incontinence        2. 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  I will also have him do MiraLAX.  I did inform him that his urinary symptoms will be difficult to control with his Bumex on board.  If he comes back and is still not controlled we can consider adding Myrbetriq versus considering cystoscopy.

## 2024-06-10 ENCOUNTER — HOSPITAL ENCOUNTER (OUTPATIENT)
Dept: PHYSICAL THERAPY | Age: 89
Setting detail: THERAPIES SERIES
Discharge: HOME | End: 2024-06-10
Payer: MEDICARE

## 2024-06-10 PROCEDURE — 97140 MANUAL THERAPY 1/> REGIONS: CPT

## 2024-06-11 NOTE — PROGRESS NOTES
mobility , Decreased body mechanics, Decreased strength, Decreased ADL status, Decreased high-level IADLs, Increased pain, Decreased balance, Decreased tolerance to work activity, Decreased endurance  Assessment: Pt is managing pumps well per their report. His measurements to date are located under girth measurements for specifics. Pt is ambulating with st. cane when home but enters in wheelchair.  Therapy Prognosis: Fair             Patient Education  Patient Education: Discussed pumps and useage, frequency  Pt verbalized/demonstrated good understanding:     [x] Yes         [] No, pt required further clarification.         Goals  Short Term Goals  Time Frame for Short Term Goals: 2 weeks  Short Term Goal 1: Pt will be educated on and initiate HEP for lower extremity strengthening - met  Short Term Goal 2: Pt will initiate pump protocol in order to reduce BLE girth measurements-met    Long Term Goals  Time Frame for Long Term Goals : 4 weeks  Long Term Goal 1: Pt will be independent and compliant with advanced HEP in order to self manage symptoms upon discharge-progressing  Long Term Goal 2: Pt will improve lower extremity strength to at least 5-/5 in order to facilitate stability with transfers and walking-progressing  Long Term Goal 3: Pt will tolerate 15 mintues of standing activity in order to facilitate endurance and stability as needed for daily tasks-progressing  Long Term Goal 4: Pt will demonstrate a 1-2cm decrease in BLE girth measurements in order to increase walking tolerance-progressing  Long Term Goal 5: Pt will be fitted for an at home pump in order to be independent with her lymphedema management-met      Minutes Tracking:  Time In: 1255  Time Out: 1312  Minutes: 17       Wen Cummings PT, DPT    Date: 6/10/2024

## 2024-06-17 ENCOUNTER — TELEPHONE (OUTPATIENT)
Dept: PHARMACY | Facility: CLINIC | Age: 89
End: 2024-06-17

## 2024-06-17 NOTE — TELEPHONE ENCOUNTER
[facility] CLINICAL PHARMACY: ADHERENCE REVIEW  Identified care gap per [employer]: fills at [facility]: Statin adherence    Patient also appears to be prescribed: Diabetes    ASSESSMENT  STATIN ADHERENCE    Insurance Records claims through 24 (Prior Year PDC = 98% - PASSED ; YTD PDC = 67%; Potential Fail Date: 24):   ATORVASTATIN TAB 40MG  last filled on 24 for 90 day supply. Next refill due: 24    Prescribed si tablet/capsule daily    Per Reconcile Dispense History and Insurer Portal: last filled on same      Lab Results   Component Value Date    CHOL 116 2023    TRIG 89 2023    HDL 41 2023     Lab Results   Component Value Date    LDL 57 2023      ALT   Date Value Ref Range Status   2023 31 5 - 41 U/L Final     AST   Date Value Ref Range Status   2023 30 <40 U/L Final     The ASCVD Risk score (Bill DK, et al., 2019) failed to calculate for the following reasons:    The 2019 ASCVD risk score is only valid for ages 40 to 79    The patient has a prior MI or stroke diagnosis         The following are interventions that have been identified:   Patient OVERDUE refilling Atorvastatin and active on home medication list.   Reached pt spoke with his wife who handles his medication. She says he is still taking. She says there is about 20-25 pills left. She said he was in the hospital several times  and  which is why there is extra she thanked us for calling and checking up.She will call [facility] for a refill once the extras are gone.    Verified medication instructions and Education provided.      Last Visit: 24  Next Visit: 24        Vee Muse CPhT  [facility] Clinical   [facility]  Toll Free: [phone] Option 1    For Pharmacy Admin Tracking Only    Program: [place]  CPA in place:  No  Recommendation Provided To: Patient/Caregiver: 1 via Telephone  Intervention Detail:

## 2024-06-27 ENCOUNTER — OFFICE VISIT (OUTPATIENT)
Dept: CARDIOLOGY | Age: 89
End: 2024-06-27
Payer: MEDICARE

## 2024-06-27 ENCOUNTER — HOSPITAL ENCOUNTER (OUTPATIENT)
Age: 89
Discharge: HOME | End: 2024-06-27
Payer: MEDICARE

## 2024-06-27 VITALS
DIASTOLIC BLOOD PRESSURE: 50 MMHG | WEIGHT: 231 LBS | OXYGEN SATURATION: 98 % | RESPIRATION RATE: 18 BRPM | BODY MASS INDEX: 33.07 KG/M2 | HEIGHT: 70 IN | SYSTOLIC BLOOD PRESSURE: 93 MMHG | HEART RATE: 56 BPM

## 2024-06-27 DIAGNOSIS — E66.9 OBESITY (BMI 30.0-34.9): ICD-10-CM

## 2024-06-27 DIAGNOSIS — Z86.73 HISTORY OF STROKE: ICD-10-CM

## 2024-06-27 DIAGNOSIS — Z95.820 S/P ANGIOPLASTY WITH STENT: ICD-10-CM

## 2024-06-27 DIAGNOSIS — I50.32 CHRONIC DIASTOLIC CONGESTIVE HEART FAILURE (HCC): ICD-10-CM

## 2024-06-27 DIAGNOSIS — I35.0 NONRHEUMATIC AORTIC VALVE STENOSIS: ICD-10-CM

## 2024-06-27 DIAGNOSIS — I25.10 ASHD (ARTERIOSCLEROTIC HEART DISEASE): ICD-10-CM

## 2024-06-27 DIAGNOSIS — I42.9 CARDIOMYOPATHY, UNSPECIFIED TYPE (HCC): ICD-10-CM

## 2024-06-27 DIAGNOSIS — I10 ESSENTIAL HYPERTENSION: ICD-10-CM

## 2024-06-27 DIAGNOSIS — I25.10 ASHD (ARTERIOSCLEROTIC HEART DISEASE): Primary | ICD-10-CM

## 2024-06-27 DIAGNOSIS — E11.8 TYPE 2 DIABETES MELLITUS WITH COMPLICATION, WITHOUT LONG-TERM CURRENT USE OF INSULIN (HCC): Chronic | ICD-10-CM

## 2024-06-27 DIAGNOSIS — E78.2 MIXED HYPERLIPIDEMIA: ICD-10-CM

## 2024-06-27 DIAGNOSIS — N18.4 STAGE 4 CHRONIC KIDNEY DISEASE (HCC): ICD-10-CM

## 2024-06-27 LAB
ALT SERPL-CCNC: 52 U/L (ref 5–41)
ANION GAP SERPL CALCULATED.3IONS-SCNC: 12 MMOL/L (ref 9–17)
AST SERPL-CCNC: 34 U/L
BNP SERPL-MCNC: 680 PG/ML
BUN SERPL-MCNC: 52 MG/DL (ref 8–23)
BUN/CREAT SERPL: 25 (ref 9–20)
CALCIUM SERPL-MCNC: 9 MG/DL (ref 8.6–10.4)
CHLORIDE SERPL-SCNC: 99 MMOL/L (ref 98–107)
CO2 SERPL-SCNC: 27 MMOL/L (ref 20–31)
CREAT SERPL-MCNC: 2.1 MG/DL (ref 0.7–1.2)
ERYTHROCYTE [DISTWIDTH] IN BLOOD BY AUTOMATED COUNT: 14.2 % (ref 11.8–14.4)
GFR, ESTIMATED: 30 ML/MIN/1.73M2
GLUCOSE SERPL-MCNC: 134 MG/DL (ref 70–99)
HCT VFR BLD AUTO: 34.8 % (ref 40.7–50.3)
HGB BLD-MCNC: 10.8 G/DL (ref 13–17)
MCH RBC QN AUTO: 29.7 PG (ref 25.2–33.5)
MCHC RBC AUTO-ENTMCNC: 31 G/DL (ref 28.4–34.8)
MCV RBC AUTO: 95.6 FL (ref 82.6–102.9)
NRBC BLD-RTO: 0 PER 100 WBC
PLATELET # BLD AUTO: 240 K/UL (ref 138–453)
PMV BLD AUTO: 10.8 FL (ref 8.1–13.5)
POTASSIUM SERPL-SCNC: 4.2 MMOL/L (ref 3.7–5.3)
RBC # BLD AUTO: 3.64 M/UL (ref 4.21–5.77)
SODIUM SERPL-SCNC: 138 MMOL/L (ref 135–144)
WBC OTHER # BLD: 8.1 K/UL (ref 3.5–11.3)

## 2024-06-27 PROCEDURE — 36415 COLL VENOUS BLD VENIPUNCTURE: CPT

## 2024-06-27 PROCEDURE — 84460 ALANINE AMINO (ALT) (SGPT): CPT

## 2024-06-27 PROCEDURE — 80061 LIPID PANEL: CPT

## 2024-06-27 PROCEDURE — 85027 COMPLETE CBC AUTOMATED: CPT

## 2024-06-27 PROCEDURE — 84450 TRANSFERASE (AST) (SGOT): CPT

## 2024-06-27 PROCEDURE — 80048 BASIC METABOLIC PNL TOTAL CA: CPT

## 2024-06-27 PROCEDURE — 3044F HG A1C LEVEL LT 7.0%: CPT | Performed by: INTERNAL MEDICINE

## 2024-06-27 PROCEDURE — 99214 OFFICE O/P EST MOD 30 MIN: CPT | Performed by: INTERNAL MEDICINE

## 2024-06-27 PROCEDURE — 1123F ACP DISCUSS/DSCN MKR DOCD: CPT | Performed by: INTERNAL MEDICINE

## 2024-06-27 PROCEDURE — 83880 ASSAY OF NATRIURETIC PEPTIDE: CPT

## 2024-06-27 NOTE — PROGRESS NOTES
I, Dorcas Chandler am scribing for and in the presence of Paulino Marin MD, F.A.C.C..    Subjective:     CHIEF COMPLAINT / HPI:    Chief Complaint   Patient presents with    Coronary Artery Disease     Hx: ASHD, S/P Stent, Stroke, HTN,HLD, CHF, Anemia, Obese. 3 month follow up.     He has been feeling good.     Denies: CP, SOB, Lightheaded/dizziness, Palpitations.      Dear Flaco Snider W, APRN - CNP     South Vargas is 88 y.o. male who presents today for follow-up.    1-He has history of coronary artery disease, myocardial infarction and primary PCI in 2/2017 done at [facility].  He also has history of ischemic cardiomyopathy and chronic systolic CHF, NYHA class III.    2-An admission to [facility] on 9/19/2017 with COPD exacerbation, during this admission his lisinopril changed to Cozaar because of chronic cough.    3-Another visit to the emergency room on 10/3/2017 with cough, shortness of breath and wheezing.      4- He saw Dr. Salmon at [facility] in November 2018, no changes in medication done.    5-An admission to [facility] on 4/6/2018 with acute on chronic CHF.    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    7- EKG done in office (8/20/2019)- Sinus Rhythm with 1st degree AV block. 71 bpm.    8- EKG done in office on 7/14/2020.  No acute ischemic changes.    9-  Echocardiogram on 4/6/2021: Global left ventricular systolic function appears preserved with an estimated ejection fraction of 55%. Mildly increased left ventricular wall thickness with a normal left ventricular cavity size. The patient has a sigmoid interventricular septum. The inferoseptal wall is abnormal in its motion which is not unusual status post open heart surgery. Mild aortic stenosis. Mild mitral and tricuspid regurgitation. Evidence of mild diastolic dysfunction is seen. Atrial septal aneurysm cannot rule-out shunt. A saline contrast study was performed and

## 2024-06-28 LAB
CHOLEST SERPL-MCNC: 132 MG/DL (ref 0–199)
CHOLESTEROL/HDL RATIO: 3
HDLC SERPL-MCNC: 51 MG/DL
LDLC SERPL CALC-MCNC: 49 MG/DL (ref 0–100)
TRIGL SERPL-MCNC: 159 MG/DL
VLDLC SERPL CALC-MCNC: 32 MG/DL

## 2024-06-29 DIAGNOSIS — E78.5 HYPERLIPIDEMIA, UNSPECIFIED HYPERLIPIDEMIA TYPE: ICD-10-CM

## 2024-07-01 RX ORDER — ATORVASTATIN CALCIUM 40 MG/1
40 TABLET, FILM COATED ORAL DAILY
Qty: 90 TABLET | Refills: 1 | Status: SHIPPED | OUTPATIENT
Start: 2024-07-01

## 2024-07-01 NOTE — TELEPHONE ENCOUNTER
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

## 2024-07-08 ENCOUNTER — HOSPITAL ENCOUNTER (OUTPATIENT)
Dept: PHYSICAL THERAPY | Age: 89
Setting detail: THERAPIES SERIES
Discharge: HOME | End: 2024-07-08
Payer: MEDICARE

## 2024-07-08 PROCEDURE — 97140 MANUAL THERAPY 1/> REGIONS: CPT

## 2024-07-08 NOTE — PROGRESS NOTES
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 in for 1 month follow up, has recievedpump upgrade and doing well.  Additional Pertinent Hx: kidney or bladder control problems, heart problems, stroke, arthritis, lung disease, hearing problems, asthma, diabtes      Measurements:    Right Measurements Left Measurements   R LE Pre Girth Measurement (cm)  5th Tuberosity (cm): 25.2  Lateral malleolus: 35.5  Calf (cm): 49.3  Mid Knee (cm): 45.8  Thigh (cm): 52.7  Total Girth (cm): 208.5 L LE Pre Girth Measurement (cm)  5th Tuberosity (cm): 24  Lateral malleolus: 34  Calf (cm): 37.1  Mid Knee (cm): 47.8  Thigh (cm): 49.8  Total Girth (cm): 192.7       Assessment  Body Structures, Functions, Activity Limitations Requiring Skilled Therapeutic Intervention: Decreased functional mobility , Decreased body mechanics, Decreased strength, Decreased ADL status, Decreased high-level IADLs, Increased pain, Decreased balance, Decreased tolerance to work activity, Decreased endurance  Assessment: PT. in for 1 motnh follow up. Measurements taken this date. PT. and spouse report being copmliant with compression pump to B LEs 2x daily. Pt. stated he is not as compliant with HEP and wlaking as he should. Reviewed HEP with encouragament to complete HEP 2x daily. Will see pt. back for 3 month follow up.  Therapy Prognosis: Fair      Patient Education  Patient

## 2024-08-02 ENCOUNTER — HOSPITAL ENCOUNTER (OUTPATIENT)
Age: 89
End: 2024-08-02
Attending: INTERNAL MEDICINE
Payer: MEDICARE

## 2024-08-02 VITALS
WEIGHT: 231.04 LBS | SYSTOLIC BLOOD PRESSURE: 93 MMHG | HEIGHT: 70 IN | DIASTOLIC BLOOD PRESSURE: 50 MMHG | BODY MASS INDEX: 33.08 KG/M2

## 2024-08-02 DIAGNOSIS — I25.10 ASHD (ARTERIOSCLEROTIC HEART DISEASE): ICD-10-CM

## 2024-08-02 DIAGNOSIS — Z95.820 S/P ANGIOPLASTY WITH STENT: ICD-10-CM

## 2024-08-02 DIAGNOSIS — I50.32 CHRONIC DIASTOLIC CONGESTIVE HEART FAILURE (HCC): ICD-10-CM

## 2024-08-02 DIAGNOSIS — I35.0 NONRHEUMATIC AORTIC VALVE STENOSIS: ICD-10-CM

## 2024-08-02 DIAGNOSIS — I42.9 CARDIOMYOPATHY, UNSPECIFIED TYPE (HCC): ICD-10-CM

## 2024-08-02 LAB
ECHO AO ROOT DIAM: 2.1 CM
ECHO AO ROOT INDEX: 0.95 CM/M2
ECHO AO SINUS VALSALVA DIAM: 3.6 CM
ECHO AO SINUS VALSALVA INDEX: 1.62 CM/M2
ECHO AO ST JNCT DIAM: 2.4 CM
ECHO AV AREA PEAK VELOCITY: 2 CM2
ECHO AV AREA/BSA PEAK VELOCITY: 0.9 CM2/M2
ECHO AV CUSP MM: 1.3 CM
ECHO AV MEAN GRADIENT: 28 MMHG
ECHO AV PEAK GRADIENT: 40 MMHG
ECHO AV PEAK VELOCITY: 3.2 M/S
ECHO AV VELOCITY RATIO: 0.63
ECHO BSA: 2.27 M2
ECHO EST RA PRESSURE: 3 MMHG
ECHO LA AREA 2C: 29 CM2
ECHO LA AREA 4C: 26.9 CM2
ECHO LA MAJOR AXIS: 6 CM
ECHO LA MINOR AXIS: 6.2 CM
ECHO LA VOL BP: 104 ML (ref 18–58)
ECHO LA VOL MOD A2C: 110 ML (ref 18–58)
ECHO LA VOL MOD A4C: 96 ML (ref 18–58)
ECHO LA VOL/BSA BIPLANE: 47 ML/M2 (ref 16–34)
ECHO LA VOLUME INDEX MOD A2C: 50 ML/M2 (ref 16–34)
ECHO LA VOLUME INDEX MOD A4C: 43 ML/M2 (ref 16–34)
ECHO LV E' LATERAL VELOCITY: 8 CM/S
ECHO LV EDV A2C: 114 ML
ECHO LV EDV A4C: 156 ML
ECHO LV EDV INDEX A4C: 70 ML/M2
ECHO LV EDV NDEX A2C: 51 ML/M2
ECHO LV EJECTION FRACTION A2C: 55 %
ECHO LV EJECTION FRACTION A4C: 54 %
ECHO LV EJECTION FRACTION BIPLANE: 54 % (ref 55–100)
ECHO LV ESV A2C: 52 ML
ECHO LV ESV A4C: 71 ML
ECHO LV ESV INDEX A2C: 23 ML/M2
ECHO LV ESV INDEX A4C: 32 ML/M2
ECHO LV FRACTIONAL SHORTENING: 29 % (ref 28–44)
ECHO LV INTERNAL DIMENSION DIASTOLE INDEX: 2.16 CM/M2
ECHO LV INTERNAL DIMENSION DIASTOLIC: 4.8 CM (ref 4.2–5.9)
ECHO LV INTERNAL DIMENSION SYSTOLIC INDEX: 1.53 CM/M2
ECHO LV INTERNAL DIMENSION SYSTOLIC: 3.4 CM
ECHO LV IVSD: 1.1 CM (ref 0.6–1)
ECHO LV MASS 2D: 194 G (ref 88–224)
ECHO LV MASS INDEX 2D: 87.4 G/M2 (ref 49–115)
ECHO LV POSTERIOR WALL DIASTOLIC: 1.1 CM (ref 0.6–1)
ECHO LV RELATIVE WALL THICKNESS RATIO: 0.46
ECHO LVOT AREA: 3.1 CM2
ECHO LVOT DIAM: 2 CM
ECHO LVOT MEAN GRADIENT: 15 MMHG
ECHO LVOT PEAK GRADIENT: 17 MMHG
ECHO LVOT PEAK VELOCITY: 2 M/S
ECHO LVOT STROKE VOLUME INDEX: 79.3 ML/M2
ECHO LVOT SV: 176.2 ML
ECHO LVOT VTI: 56.1 CM
ECHO MV A VELOCITY: 1.03 M/S
ECHO MV E DECELERATION TIME (DT): 208 MS
ECHO MV E VELOCITY: 0.83 M/S
ECHO MV E/A RATIO: 0.81
ECHO MV E/E' LATERAL: 10.38
ECHO PULMONARY ARTERY END DIASTOLIC PRESSURE: 3 MMHG
ECHO PV MAX VELOCITY: 1.2 M/S
ECHO PV PEAK GRADIENT: 5 MMHG
ECHO PV REGURGITANT MAX VELOCITY: 0.9 M/S
ECHO RIGHT VENTRICULAR SYSTOLIC PRESSURE (RVSP): 48 MMHG
ECHO TV REGURGITANT MAX VELOCITY: 3.37 M/S
ECHO TV REGURGITANT PEAK GRADIENT: 45 MMHG

## 2024-08-02 PROCEDURE — 93306 TTE W/DOPPLER COMPLETE: CPT | Performed by: INTERNAL MEDICINE

## 2024-08-02 PROCEDURE — 93306 TTE W/DOPPLER COMPLETE: CPT

## 2024-08-05 ENCOUNTER — TELEPHONE (OUTPATIENT)
Dept: CARDIOLOGY | Age: 89
End: 2024-08-05

## 2024-08-05 NOTE — TELEPHONE ENCOUNTER
----- Message from Paulino Marin MD sent at 8/4/2024  9:05 AM EDT -----  Echo didn't change from before.

## 2024-08-07 ENCOUNTER — HOSPITAL ENCOUNTER (OUTPATIENT)
Age: 89
Setting detail: SPECIMEN
Discharge: HOME | End: 2024-08-07
Payer: MEDICARE

## 2024-08-07 ENCOUNTER — TELEPHONE (OUTPATIENT)
Dept: UROLOGY | Age: 89
End: 2024-08-07

## 2024-08-07 DIAGNOSIS — N39.46 MIXED INCONTINENCE: ICD-10-CM

## 2024-08-07 DIAGNOSIS — N39.46 MIXED INCONTINENCE: Primary | ICD-10-CM

## 2024-08-07 PROCEDURE — 87086 URINE CULTURE/COLONY COUNT: CPT

## 2024-08-07 RX ORDER — TAMSULOSIN HYDROCHLORIDE 0.4 MG/1
0.8 CAPSULE ORAL DAILY
Qty: 180 CAPSULE | Refills: 1 | Status: SHIPPED | OUTPATIENT
Start: 2024-08-07

## 2024-08-07 NOTE — TELEPHONE ENCOUNTER
Please call back, they did not show up to his scheduled appointment on 8/2/2024 to discuss efficacy or issues of medication change.    Please make patient an appointment in 2 to 3 weeks for reevaluation    Please have him drop off a urine culture to the lab to make sure an infection is not the cause of his worsening symptoms

## 2024-08-07 NOTE — TELEPHONE ENCOUNTER
artery (HCC)     Parainfluenza     ESBL (extended spectrum beta-lactamase) producing bacteria infection     Acute lower GI bleeding     Hematochezia     AVM (arteriovenous malformation) of stomach, acquired     Diverticulosis of colon with hemorrhage     Hypocalcemia     Community acquired pneumonia of both lungs     Toxic metabolic encephalopathy     Chronic kidney disease, stage 3a (HCC)     Chronic systolic (congestive) heart failure     Constipation     BPH with obstruction/lower urinary tract symptoms

## 2024-08-07 NOTE — TELEPHONE ENCOUNTER
Patient's spouse Nicole called office. She states since Ditropan changed patient had increase in incontinence. Patient is not able to control urine. He is using night time depends during the day. He uses 2 to 3 a day. Nocturia 2x. He is having full saturation episode about 2 times a week. Patient denies other bothersome concerns at this time. Please Advise

## 2024-08-07 NOTE — TELEPHONE ENCOUNTER
Writer called Nicole back advised her of provider response. Nicole didn't recall patient missing appointment, she was agreeable to reschedule for 8/28/24 1:15. Patient will either come to hospital to give urine or she will drop off a sample.

## 2024-08-08 LAB
MICROORGANISM SPEC CULT: NORMAL
SERVICE CMNT-IMP: NORMAL
SPECIMEN DESCRIPTION: NORMAL

## 2024-08-09 ENCOUNTER — TELEPHONE (OUTPATIENT)
Dept: UROLOGY | Age: 89
End: 2024-08-09

## 2024-08-09 NOTE — TELEPHONE ENCOUNTER
----- Message from Otoniel Disla PA-C sent at 8/9/2024  9:23 AM EDT -----  Please add him know urine culture was negative for UTI

## 2024-08-13 ENCOUNTER — HOSPITAL ENCOUNTER (OUTPATIENT)
Age: 89
Discharge: HOME | End: 2024-08-13
Payer: MEDICARE

## 2024-08-13 ENCOUNTER — OFFICE VISIT (OUTPATIENT)
Dept: NEPHROLOGY | Age: 89
End: 2024-08-13
Payer: MEDICARE

## 2024-08-13 VITALS
RESPIRATION RATE: 18 BRPM | HEIGHT: 70 IN | HEART RATE: 70 BPM | WEIGHT: 235.6 LBS | SYSTOLIC BLOOD PRESSURE: 110 MMHG | TEMPERATURE: 97.9 F | BODY MASS INDEX: 33.73 KG/M2 | DIASTOLIC BLOOD PRESSURE: 57 MMHG

## 2024-08-13 DIAGNOSIS — N17.8 ACUTE RENAL FAILURE WITH OTHER SPECIFIED PATHOLOGICAL LESION IN KIDNEY (HCC): ICD-10-CM

## 2024-08-13 DIAGNOSIS — I10 ESSENTIAL HYPERTENSION: ICD-10-CM

## 2024-08-13 DIAGNOSIS — E87.1 HYPONATREMIA: ICD-10-CM

## 2024-08-13 DIAGNOSIS — E87.1 HYPONATREMIA: Primary | ICD-10-CM

## 2024-08-13 LAB
ANION GAP SERPL CALCULATED.3IONS-SCNC: 9 MMOL/L (ref 9–17)
BUN SERPL-MCNC: 39 MG/DL (ref 8–23)
BUN/CREAT SERPL: 21 (ref 9–20)
CALCIUM SERPL-MCNC: 8.7 MG/DL (ref 8.6–10.4)
CHLORIDE SERPL-SCNC: 99 MMOL/L (ref 98–107)
CO2 SERPL-SCNC: 27 MMOL/L (ref 20–31)
CREAT SERPL-MCNC: 1.9 MG/DL (ref 0.7–1.2)
GFR, ESTIMATED: 34 ML/MIN/1.73M2
GLUCOSE SERPL-MCNC: 158 MG/DL (ref 70–99)
POTASSIUM SERPL-SCNC: 4.9 MMOL/L (ref 3.7–5.3)
SODIUM SERPL-SCNC: 135 MMOL/L (ref 135–144)

## 2024-08-13 PROCEDURE — 99214 OFFICE O/P EST MOD 30 MIN: CPT | Performed by: INTERNAL MEDICINE

## 2024-08-13 PROCEDURE — 36415 COLL VENOUS BLD VENIPUNCTURE: CPT

## 2024-08-13 PROCEDURE — 80048 BASIC METABOLIC PNL TOTAL CA: CPT

## 2024-08-13 PROCEDURE — 1123F ACP DISCUSS/DSCN MKR DOCD: CPT | Performed by: INTERNAL MEDICINE

## 2024-08-13 NOTE — PATIENT INSTRUCTIONS
SURVEY:    Thank you for allowing us to care for you today.    You may be receiving a survey from [facility] regarding your visit today- electronically or via mail.      Please help us by completing the survey as this will provide the needed feedback to ensure we are providing the very best care for you and your family.    If you cannot score us a very good on any question, please call the office to discuss how we could have made your experience a very good one.    Thank you.       STAFF:    Trisha Melendez, Marlene Marquez, Samantha Lake      CLINICAL STAFF:    Noemy Levine LPN, Jennifer Trejo LPN, Cady Mckeon LPN, Danita Winslow CMA

## 2024-08-13 NOTE — PROGRESS NOTES
[facility]        Outpatient Follow-Up note         8/13/2024 12:21 PM    Patient Name:   South Vargas  YOB: 1935  Primary Care Physician:  Flaco Snider, GENA - CNP   [facility]  [address] [address]  Dept: [phone]     Chief Complaint / Reason for follow-up : Follow Up of hyponatremia     Interval History :  Patient seen and examined by me.   Patient apparently was in the hospital multiple times in the last year  Currently his Bumex has been increased, his Aldactone has been increased he was also started on Jardiance.     Past History :  Past Medical History:   Diagnosis Date    A-fib (HCC)     COPD (chronic obstructive pulmonary disease) (HCC)     Diabetes mellitus (HCC)     Hx of echocardiogram 02/24/2017    [facility]    Hyperlipidemia     Hypothyroidism     MI (myocardial infarction) ([facility])     Stroke (HCC)     Thyroid disease     Type II or unspecified type diabetes mellitus without mention of complication, not stated as uncontrolled      Past Surgical History:   Procedure Laterality Date    CARDIAC SURGERY  02/24/2017    Stent placed  Dr Platt [facility]    CEREBRAL ANGIOGRAM Bilateral 06/15/2023    ANGIOGRAM CAROTID CEREBRAL BILATERAL    COLONOSCOPY      COLONOSCOPY N/A 6/23/2023    COLONOSCOPY CONTROL HEMORRHAGE performed by Nura Becerra MD at [facility] OR    FRACTURE SURGERY      right ankle    HERNIA REPAIR      SIGMOIDOSCOPY  06/23/2023    UPPER GASTROINTESTINAL ENDOSCOPY  10/25/2023    EGD CONTROL HEMORRHAGE performed by Nura Becerra MD at [facility]        Medications :     Outpatient Medications Marked as Taking for the 8/13/24 encounter (Office Visit) with Javier Duvall MD   Medication Sig Dispense Refill    tamsulosin (FLOMAX) 0.4 MG capsule Take 2 capsules by mouth daily 180 capsule 1    atorvastatin

## 2024-09-24 DIAGNOSIS — N40.1 BPH WITH OBSTRUCTION/LOWER URINARY TRACT SYMPTOMS: ICD-10-CM

## 2024-09-24 DIAGNOSIS — N39.46 MIXED INCONTINENCE: ICD-10-CM

## 2024-09-24 DIAGNOSIS — K59.00 CONSTIPATION, UNSPECIFIED CONSTIPATION TYPE: ICD-10-CM

## 2024-09-24 DIAGNOSIS — N13.8 BPH WITH OBSTRUCTION/LOWER URINARY TRACT SYMPTOMS: ICD-10-CM

## 2024-09-24 LAB
ALBUMIN: 3.6 G/DL
BUN / CREAT RATIO: NORMAL
BUN BLDV-MCNC: 45 MG/DL
CALCIUM SERPL-MCNC: 8.5 MG/DL
CHLORIDE BLD-SCNC: 101 MMOL/L
CO2: 23 MMOL/L
CREAT SERPL-MCNC: 1.93 MG/DL
CREATININE URINE: 59.6 MG/DL
GFR, ESTIMATED: 33
GLUCOSE: 161
MICROALBUMIN/CREAT 24H UR: 39.6 MG/DL
MICROALBUMIN/CREAT UR-RTO: 66 MG/G
MICROSCOPIC URINE: NORMAL
PHOSPHORUS: 3.9 MG/DL
POTASSIUM SERPL-SCNC: 4.2 MMOL/L
SODIUM BLD-SCNC: 140 MMOL/L

## 2024-09-24 RX ORDER — OXYBUTYNIN CHLORIDE 15 MG/1
15 TABLET, EXTENDED RELEASE ORAL DAILY
Qty: 90 TABLET | Refills: 0 | OUTPATIENT
Start: 2024-09-24

## 2024-09-25 ENCOUNTER — OFFICE VISIT (OUTPATIENT)
Dept: UROLOGY | Age: 89
End: 2024-09-25
Payer: MEDICARE

## 2024-09-25 VITALS
BODY MASS INDEX: 33.64 KG/M2 | WEIGHT: 235 LBS | DIASTOLIC BLOOD PRESSURE: 63 MMHG | TEMPERATURE: 97.8 F | SYSTOLIC BLOOD PRESSURE: 99 MMHG | HEIGHT: 70 IN | HEART RATE: 64 BPM

## 2024-09-25 DIAGNOSIS — K59.00 CONSTIPATION, UNSPECIFIED CONSTIPATION TYPE: ICD-10-CM

## 2024-09-25 DIAGNOSIS — N40.1 BPH WITH OBSTRUCTION/LOWER URINARY TRACT SYMPTOMS: Primary | ICD-10-CM

## 2024-09-25 DIAGNOSIS — N13.8 BPH WITH OBSTRUCTION/LOWER URINARY TRACT SYMPTOMS: Primary | ICD-10-CM

## 2024-09-25 DIAGNOSIS — N39.46 MIXED INCONTINENCE: ICD-10-CM

## 2024-09-25 PROCEDURE — 99214 OFFICE O/P EST MOD 30 MIN: CPT | Performed by: PHYSICIAN ASSISTANT

## 2024-09-25 PROCEDURE — 1123F ACP DISCUSS/DSCN MKR DOCD: CPT | Performed by: PHYSICIAN ASSISTANT

## 2024-09-25 RX ORDER — MIRABEGRON 50 MG/1
50 TABLET, EXTENDED RELEASE ORAL DAILY
Qty: 56 TABLET | Refills: 0 | Status: SHIPPED | COMMUNITY
Start: 2024-09-25

## 2024-09-29 RX ORDER — LEVOTHYROXINE SODIUM 137 UG/1
137 TABLET ORAL DAILY
Qty: 90 TABLET | Refills: 1 | Status: SHIPPED | OUTPATIENT
Start: 2024-09-29

## 2024-10-01 RX ORDER — BUMETANIDE 2 MG/1
TABLET ORAL
Qty: 90 TABLET | Refills: 3 | Status: SHIPPED | OUTPATIENT
Start: 2024-10-01

## 2024-10-07 ENCOUNTER — HOSPITAL ENCOUNTER (OUTPATIENT)
Dept: PHYSICAL THERAPY | Age: 89
Setting detail: THERAPIES SERIES
Discharge: HOME | End: 2024-10-07
Payer: MEDICARE

## 2024-10-07 PROCEDURE — 97110 THERAPEUTIC EXERCISES: CPT

## 2024-10-07 NOTE — DISCHARGE SUMMARY
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  Treatment Diagnosis: weakness      Date Treatment Initiated: 3/20/24  Date of Last Treatment: 10/7/24      PT Visit Information  Onset Date: 24  PT Insurance Information: [employer] Medicare  Total # of Visits Approved: 14  Total # of Visits to Date: 13  Plan of Care/Certification Expiration Date: 10/11/24  No Show: 0  Canceled Appointment: 3  Referring Provider (secondary): Paulino Marin MD      Frequency/Duration  Plan Frequency: 2 visits times per week  Plan weeks: 4 weeks weeks      Treatment Received  [] HP/CP      [] Electrical Stim   [x] Therapeutic Exercise      [x] Gait Training  [] Aquatics   [] Ultrasound         [x] Patient Education/HEP   [x] Manual Therapy  [] Traction    [x] Neuro-duyen        [x] Soft Tissue Mobs            [x] Therapeutic Act  [] Iontophoresis    [x] Vasopneumatic compression      [] Dry Needling    Assessment  Assessment: Patient and spouse report using compression pump about 1x/daily, they have trouble remembering to do it 2x/daily. Pt is compliant with wearing compression socks. Spouse asked if swelling would ever come down a lot: emphasized need to use pumps 2x/day to see decrease in swelling. Also recommended they reach out to the pump rep about setting up a \"focus\" on the foot to assist with top of foot swelling. Will dc patient at this time d/t independent and safe with lymphedema management and all questions answered.       Goals  Short Term Goals  Time Frame for Short Term Goals: 2 weeks  Short Term Goal 1: Pt will be educated on and initiate HEP for lower extremity strengthening - met  Short Term Goal 2: Pt

## 2024-10-07 NOTE — PROGRESS NOTES
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 Pt reports major decrease in swelling after pump use but continues to have lingering edema on the top of foot.  Additional Pertinent Hx: kidney or bladder control problems, heart problems, stroke, arthritis, lung disease, hearing problems, asthma, diabtes    Lymph Assessment    Skin Integumentary:   Pitting Scale Area 1: 2+  Pitting Additional Areas: Yes  Skin Color: Appropriate for ethnicity  Skin Texture: Hyperkeratosis  Skin Condition/Temp: Dry  Turgor: Epidermis thin w/ loss of subcut tissue  Hair Growth: Absent  Conditions to nail beds: Swollen, Thick  Edema Rebound: Slow  Stemmer Sign: Negative  Scars Present: No    Signs of Constriction (if applicable):   Skin Breakdown: No  Fistulas / Abnormal Passageway: No  Papilloma (benign tumor arising from an epithelial layer): No  Fibrotic Areas: No  Lymphorrhea: No  Warts: No  Ulcers: No    Stage of Lymphedema: Stage 2: Spontaneously Irreversible Stage  Description: Bilateral dorsum of foot fluid fild sacs not pitting in nature.    Lymphedema Classification:   Type: Secondary Lymphedema  Left: Moderate     Right: Moderate    Measurements:    Right Measurements Left Measurements   R LE Pre Girth Measurement (cm)  5th Tuberosity

## 2024-10-08 ENCOUNTER — OFFICE VISIT (OUTPATIENT)
Dept: NEPHROLOGY | Age: 89
End: 2024-10-08
Payer: MEDICARE

## 2024-10-08 VITALS
HEIGHT: 70 IN | HEART RATE: 58 BPM | RESPIRATION RATE: 18 BRPM | TEMPERATURE: 97.2 F | WEIGHT: 234.7 LBS | BODY MASS INDEX: 33.6 KG/M2 | SYSTOLIC BLOOD PRESSURE: 128 MMHG | DIASTOLIC BLOOD PRESSURE: 58 MMHG

## 2024-10-08 DIAGNOSIS — I10 ESSENTIAL HYPERTENSION: ICD-10-CM

## 2024-10-08 DIAGNOSIS — E87.1 HYPONATREMIA: Primary | ICD-10-CM

## 2024-10-08 DIAGNOSIS — N18.32 STAGE 3B CHRONIC KIDNEY DISEASE (HCC): ICD-10-CM

## 2024-10-08 DIAGNOSIS — N17.8 ACUTE RENAL FAILURE WITH OTHER SPECIFIED PATHOLOGICAL LESION IN KIDNEY (HCC): ICD-10-CM

## 2024-10-08 PROCEDURE — 99214 OFFICE O/P EST MOD 30 MIN: CPT | Performed by: INTERNAL MEDICINE

## 2024-10-08 PROCEDURE — 1123F ACP DISCUSS/DSCN MKR DOCD: CPT | Performed by: INTERNAL MEDICINE

## 2024-10-08 NOTE — PATIENT INSTRUCTIONS
Stop the Jardiance  Bumex 2 mg in the morning daily and take Bumex 1 mg in the evening if starts to gain weight or having some shortness of breath  Check blood work in 2 weeks  Follow-up with me in 3 months with blood work again prior to that      SURVEY:    Thank you for allowing us to care for you today.    You may be receiving a survey from [facility] regarding your visit today- electronically or via mail.      Please help us by completing the survey as this will provide the needed feedback to ensure we are providing the very best care for you and your family.    If you cannot score us a very good on any question, please call the office to discuss how we could have made your experience a very good one.    Thank you.       STAFF:    Trisha Melendez, Marlene Marquez, Samantha Lake      CLINICAL STAFF:    Noemy Levine LPN, Jennifer Trejo LPN, Cady Mckeon LPN, Danita Winslow CMA

## 2024-10-08 NOTE — PROGRESS NOTES
[facility]        Outpatient Follow-Up note         10/8/2024 12:09 PM    Patient Name:   South Vargas  YOB: 1935  Primary Care Physician:  Flaco Snider, GENA - CNP   [facility]  [address] [address]  Dept: [phone]     Chief Complaint / Reason for follow-up : Follow Up of hyponatremia     Interval History :  Patient seen and examined by me.   Overall feels well denies any complaints feels slightly tired  Urine status looks well     Past History :  Past Medical History:   Diagnosis Date    A-fib (HCC)     COPD (chronic obstructive pulmonary disease) (HCC)     Diabetes mellitus (HCC)     Hx of echocardiogram 02/24/2017    [facility]    Hyperlipidemia     Hypothyroidism     MI (myocardial infarction) (HCC)     Stroke (HCC)     Thyroid disease     Type II or unspecified type diabetes mellitus without mention of complication, not stated as uncontrolled      Past Surgical History:   Procedure Laterality Date    CARDIAC SURGERY  02/24/2017    Stent placed  Dr [facility]    CEREBRAL ANGIOGRAM Bilateral 06/15/2023    ANGIOGRAM CAROTID CEREBRAL BILATERAL    COLONOSCOPY      COLONOSCOPY N/A 6/23/2023    COLONOSCOPY CONTROL HEMORRHAGE performed by Nura Becerra MD at [facility] OR    FRACTURE SURGERY      right ankle    HERNIA REPAIR      SIGMOIDOSCOPY  06/23/2023    UPPER GASTROINTESTINAL ENDOSCOPY  10/25/2023    EGD CONTROL HEMORRHAGE performed by Nura Becerra MD at [facility] Endoscopy        Medications :     Outpatient Medications Marked as Taking for the 10/8/24 encounter (Office Visit) with Javier Duvall MD   Medication Sig Dispense Refill    bumetanide (BUMEX) 2 MG tablet TAKE 1 TABLET BY MOUTH IN THE MORNING AND 1/2 (ONE-HALF) IN THE EVENING FOR  A  TOTAL  OF  3  MG  DAILY 90 tablet 3    levothyroxine (SYNTHROID) 137 MCG tablet

## 2024-10-14 RX ORDER — PROPRANOLOL HCL 60 MG
60 CAPSULE, EXTENDED RELEASE 24HR ORAL 2 TIMES DAILY
Qty: 30 CAPSULE | OUTPATIENT
Start: 2024-10-14

## 2024-10-14 RX ORDER — PRIMIDONE 250 MG/1
250 TABLET ORAL 2 TIMES DAILY
Qty: 120 TABLET | Refills: 3 | OUTPATIENT
Start: 2024-10-14

## 2024-10-14 NOTE — TELEPHONE ENCOUNTER
Health Maintenance   Topic Date Due    DTaP/Tdap/Td vaccine (1 - Tdap) Never done    Flu vaccine (1) 08/01/2024    COVID-19 Vaccine (6 - 2023-24 season) 09/01/2024    Depression Screen  05/17/2025    Lipids  06/27/2025    Annual Wellness Visit (Medicare Advantage)  Completed    Shingles vaccine  Completed    Pneumococcal 65+ years Vaccine  Completed    Respiratory Syncytial Virus (RSV) Pregnant or age 60 yrs+  Completed    Hepatitis A vaccine  Aged Out    Hepatitis B vaccine  Aged Out    Hib vaccine  Aged Out    Polio vaccine  Aged Out    Meningococcal (ACWY) vaccine  Aged Out             (applicable per patient's age: Cancer Screenings, Depression Screening, Fall Risk Screening, Immunizations)    Hemoglobin A1C (%)   Date Value   05/17/2024 6.3   02/13/2024 6.4 (H)   06/16/2023 7.0 (H)     AST (U/L)   Date Value   06/27/2024 34     ALT (U/L)   Date Value   06/27/2024 52 (H)     BUN (mg/dL)   Date Value   09/24/2024 45      (goal A1C is < 7)   (goal LDL is <100) need 30-50% reduction from baseline     BP Readings from Last 3 Encounters:   10/08/24 (!) 128/58   09/25/24 99/63   08/13/24 (!) 110/57    (goal /80)      All Future Testing planned in [facility]:  Lab Frequency Next Occurrence   Basic Metabolic Panel Once 03/15/2024   ALT Once 03/15/2024   AST Once 03/15/2024   Microalbumin, Ur Once 03/15/2024   Lipid Panel Once 03/15/2024   Hemoglobin A1C Once 03/15/2024   CBC with Auto Differential Once 03/15/2024   Comprehensive Metabolic Panel Once 12/05/2023   CBC with Auto Differential Once 12/05/2023   Basic Metabolic Panel Once 11/17/2024   Hemoglobin A1C Once 11/13/2024   Lipid Panel Once 11/13/2024   Microalbumin, Ur Once 11/13/2024   ALT Once 11/17/2024   AST Once 11/17/2024   CBC with Auto Differential Once 11/13/2024   T4, Free Once 11/13/2024   TSH Once 11/13/2024   Basic Metabolic Panel Once 10/22/2024   Vitamin D 25 Hydroxy Once 01/08/2025   PTH, Intact Once 01/08/2025   Phosphorus Once 01/08/2025   CBC

## 2024-10-23 LAB
BUN BLDV-MCNC: 43 MG/DL
CALCIUM SERPL-MCNC: 8.9 MG/DL
CHLORIDE BLD-SCNC: 98 MMOL/L
CO2: 24 MMOL/L
CREAT SERPL-MCNC: 2.02 MG/DL
EGFR: 31
GLUCOSE BLD-MCNC: 195 MG/DL
POTASSIUM SERPL-SCNC: 4.5 MMOL/L
SODIUM BLD-SCNC: 138 MMOL/L

## 2024-11-11 ENCOUNTER — TELEPHONE (OUTPATIENT)
Dept: PRIMARY CARE CLINIC | Age: 89
End: 2024-11-11

## 2024-11-15 LAB
ALT SERPL-CCNC: 50 U/L (ref 5–41)
AST SERPL-CCNC: 36 U/L (ref 9–50)
BASOPHILS ABSOLUTE: 0.05 K/UL (ref 0–0.2)
BASOPHILS RELATIVE PERCENT: 0.5 % (ref 0–2)
CHOLESTEROL, TOTAL: 148 MG/DL (ref 100–199)
CHOLESTEROL/HDL RATIO: 2.4 (ref 2–4.5)
EOSINOPHILS ABSOLUTE: 0.48 K/UL (ref 0–0.8)
EOSINOPHILS RELATIVE PERCENT: 5.2 % (ref 0–5)
ESTIMATED AVERAGE GLUCOSE: 171 MG/DL
HBA1C MFR BLD: 7.6 %
HCT VFR BLD CALC: 34.7 % (ref 39–52)
HDLC SERPL-MCNC: 62 MG/DL
HEMOGLOBIN: 11.4 G/DL (ref 13–18)
IMMATURE GRANS (ABS): 0.09 K/UL (ref 0–0.06)
IMMATURE GRANULOCYTES %: 1 % (ref 0–2)
LDL CHOLESTEROL: 64 MG/DL
LDL/HDL RATIO: 1
LYMPHOCYTES ABSOLUTE: 1.55 K/UL (ref 0.9–5.2)
LYMPHOCYTES RELATIVE PERCENT: 16.9 % (ref 20–45)
MCH RBC QN AUTO: 29.3 PG (ref 26–32)
MCHC RBC AUTO-ENTMCNC: 32.9 G/DL (ref 32–35)
MCV RBC AUTO: 89 FL (ref 75–100)
MONOCYTES ABSOLUTE: 1.2 K/UL (ref 0.1–1)
MONOCYTES RELATIVE PERCENT: 13.1 % (ref 0–13)
NEUTROPHILS ABSOLUTE: 5.8 K/UL (ref 1.9–8)
NEUTROPHILS RELATIVE PERCENT: 63.3 % (ref 45–75)
PDW BLD-RTO: 12.7 % (ref 11.2–14.8)
PLATELET # BLD: 198 THOUS/CMM (ref 140–440)
RBC # BLD: 3.89 MILL/CMM (ref 4.4–6.1)
T4 FREE: 1.61 NG/DL (ref 0.8–1.9)
TRIGL SERPL-MCNC: 110 MG/DL (ref 20–149)
TSH SERPL DL<=0.05 MIU/L-ACNC: 5.56 UIU/ML (ref 0.27–4.2)
VLDLC SERPL CALC-MCNC: 22 MG/DL
WBC # BLD: 9.2 THDS/CMM (ref 3.6–11)

## 2024-11-15 RX ORDER — PANTOPRAZOLE SODIUM 40 MG/1
TABLET, DELAYED RELEASE ORAL
Qty: 90 TABLET | Refills: 1 | Status: SHIPPED | OUTPATIENT
Start: 2024-11-15

## 2024-11-16 LAB — MICROALBUMIN/CREAT 24H UR: 35 MG/L

## 2024-11-19 ENCOUNTER — OFFICE VISIT (OUTPATIENT)
Dept: PRIMARY CARE CLINIC | Age: 89
End: 2024-11-19

## 2024-11-19 VITALS
OXYGEN SATURATION: 86 % | HEART RATE: 85 BPM | WEIGHT: 233 LBS | BODY MASS INDEX: 33.43 KG/M2 | DIASTOLIC BLOOD PRESSURE: 62 MMHG | SYSTOLIC BLOOD PRESSURE: 118 MMHG | TEMPERATURE: 98.5 F

## 2024-11-19 DIAGNOSIS — I50.32 CHRONIC DIASTOLIC CONGESTIVE HEART FAILURE (HCC): ICD-10-CM

## 2024-11-19 DIAGNOSIS — E03.1 CONGENITAL HYPOTHYROIDISM: ICD-10-CM

## 2024-11-19 DIAGNOSIS — E11.8 TYPE 2 DIABETES MELLITUS WITH COMPLICATION, WITHOUT LONG-TERM CURRENT USE OF INSULIN (HCC): Primary | Chronic | ICD-10-CM

## 2024-11-19 ASSESSMENT — ENCOUNTER SYMPTOMS
HOARSE VOICE: 0
VISUAL CHANGE: 0
CONSTIPATION: 0
TROUBLE SWALLOWING: 0
ABDOMINAL PAIN: 0
DIARRHEA: 0
WHEEZING: 0
HAIR LOSS: 0
SORE THROAT: 0
NAUSEA: 0
SHORTNESS OF BREATH: 1
BLURRED VISION: 0
RHINORRHEA: 0
VOMITING: 0
COUGH: 0

## 2024-11-19 ASSESSMENT — PATIENT HEALTH QUESTIONNAIRE - PHQ9
SUM OF ALL RESPONSES TO PHQ QUESTIONS 1-9: 0
SUM OF ALL RESPONSES TO PHQ9 QUESTIONS 1 & 2: 0
SUM OF ALL RESPONSES TO PHQ QUESTIONS 1-9: 0
2. FEELING DOWN, DEPRESSED OR HOPELESS: NOT AT ALL
SUM OF ALL RESPONSES TO PHQ QUESTIONS 1-9: 0
1. LITTLE INTEREST OR PLEASURE IN DOING THINGS: NOT AT ALL
SUM OF ALL RESPONSES TO PHQ QUESTIONS 1-9: 0

## 2024-11-19 NOTE — PATIENT INSTRUCTIONS
SURVEY:     You may be receiving a survey from [facility] regarding your visit today.     Please complete the survey to enable us to provide the highest quality of care to you and your family.     If you cannot score us a very good on any question, please call the office to discuss how we could have made your experience a very good one.     Thank you,    Flaco Snider, APRN-CNP  Shruthi Owens, APRN-CNP  Kanika, LPN  Alma, CMA  Jose Rafael, CMA  Rika, CMA  Haily, PCA  Evie, CMA  Vee, PM

## 2024-11-19 NOTE — PROGRESS NOTES
Patient to get flu vaccine at pharmacy.  
weight gain and weight loss.   HENT:  Negative for congestion, hoarse voice, rhinorrhea, sneezing, sore throat and trouble swallowing.    Eyes:  Negative for blurred vision and visual disturbance.   Respiratory:  Positive for shortness of breath (with exertion). Negative for cough and wheezing.    Cardiovascular:  Positive for leg swelling (intermittent). Negative for chest pain and palpitations.   Gastrointestinal:  Negative for abdominal pain, constipation, diarrhea, nausea and vomiting.   Endocrine: Negative for cold intolerance, heat intolerance, polydipsia, polyphagia and polyuria.   Genitourinary:  Negative for difficulty urinating and dysuria.   Musculoskeletal:  Positive for gait problem (chronic). Negative for neck pain and neck stiffness.   Skin:  Negative for rash.   Neurological:  Negative for dizziness, tremors, syncope and headaches.   Psychiatric/Behavioral:  The patient is not nervous/anxious.        Physical Exam:   Vitals:  /62 (Site: Left Upper Arm, Position: Sitting, Cuff Size: Large Adult)   Pulse 85   Temp 98.5 °F (36.9 °C)   Wt 105.7 kg (233 lb)   SpO2 (!) 86%   BMI 33.43 kg/m²     Physical Exam  Vitals and nursing note reviewed.   Constitutional:       General: He is not in acute distress.     Appearance: He is well-developed. He is obese.   HENT:      Mouth/Throat:      Mouth: Mucous membranes are moist.   Eyes:      General: No scleral icterus.     Conjunctiva/sclera: Conjunctivae normal.   Cardiovascular:      Rate and Rhythm: Normal rate and regular rhythm.      Heart sounds: Murmur heard.   Pulmonary:      Effort: Pulmonary effort is normal.      Breath sounds: Decreased breath sounds (throughout) present. No wheezing or rales.   Abdominal:      General: Bowel sounds are normal. There is no distension.      Palpations: Abdomen is soft.      Tenderness: There is no abdominal tenderness.   Musculoskeletal:      Cervical back: Normal range of motion and neck supple.      Right

## 2024-11-27 RX ORDER — SPIRONOLACTONE 50 MG/1
50 TABLET, FILM COATED ORAL DAILY
Qty: 90 TABLET | Refills: 0 | Status: SHIPPED | OUTPATIENT
Start: 2024-11-27

## 2024-12-09 ENCOUNTER — TELEPHONE (OUTPATIENT)
Dept: PRIMARY CARE CLINIC | Age: 89
End: 2024-12-09

## 2024-12-09 DIAGNOSIS — J20.9 COPD WITH ACUTE BRONCHITIS (HCC): Primary | ICD-10-CM

## 2024-12-09 DIAGNOSIS — J44.0 COPD WITH ACUTE BRONCHITIS (HCC): Primary | ICD-10-CM

## 2024-12-09 RX ORDER — DOXYCYCLINE HYCLATE 100 MG
100 TABLET ORAL 2 TIMES DAILY
Qty: 20 TABLET | Refills: 0 | Status: SHIPPED | OUTPATIENT
Start: 2024-12-09 | End: 2024-12-19

## 2024-12-09 RX ORDER — PREDNISONE 20 MG/1
20 TABLET ORAL 2 TIMES DAILY
Qty: 10 TABLET | Refills: 0 | Status: SHIPPED | OUTPATIENT
Start: 2024-12-09 | End: 2024-12-14

## 2024-12-09 NOTE — TELEPHONE ENCOUNTER
Patients wife contacted the office and stated that she was diagnosed with bronchitis. South is now having cough and congestion no fever. Wife is requesting for antibiotic to be sent to the pharmacy for them since they are both sick. I offered for him to be seen and she stated since they are both sick if something could just be sent into the pharmacy for him?    Please advise thank you

## 2024-12-30 ENCOUNTER — TELEPHONE (OUTPATIENT)
Dept: UROLOGY | Age: 89
End: 2024-12-30

## 2025-01-27 ENCOUNTER — OFFICE VISIT (OUTPATIENT)
Dept: UROLOGY | Age: 89
End: 2025-01-27
Payer: MEDICARE

## 2025-01-27 VITALS
SYSTOLIC BLOOD PRESSURE: 118 MMHG | WEIGHT: 237 LBS | BODY MASS INDEX: 34.01 KG/M2 | DIASTOLIC BLOOD PRESSURE: 62 MMHG | OXYGEN SATURATION: 90 % | HEART RATE: 73 BPM | TEMPERATURE: 97.3 F

## 2025-01-27 DIAGNOSIS — K59.00 CONSTIPATION, UNSPECIFIED CONSTIPATION TYPE: ICD-10-CM

## 2025-01-27 DIAGNOSIS — N39.46 MIXED INCONTINENCE: ICD-10-CM

## 2025-01-27 DIAGNOSIS — N40.1 BPH WITH OBSTRUCTION/LOWER URINARY TRACT SYMPTOMS: Primary | ICD-10-CM

## 2025-01-27 DIAGNOSIS — N13.8 BPH WITH OBSTRUCTION/LOWER URINARY TRACT SYMPTOMS: Primary | ICD-10-CM

## 2025-01-27 PROCEDURE — 51798 US URINE CAPACITY MEASURE: CPT | Performed by: PHYSICIAN ASSISTANT

## 2025-01-27 PROCEDURE — 1159F MED LIST DOCD IN RCRD: CPT | Performed by: PHYSICIAN ASSISTANT

## 2025-01-27 PROCEDURE — 1123F ACP DISCUSS/DSCN MKR DOCD: CPT | Performed by: PHYSICIAN ASSISTANT

## 2025-01-27 PROCEDURE — 99214 OFFICE O/P EST MOD 30 MIN: CPT | Performed by: PHYSICIAN ASSISTANT

## 2025-01-27 RX ORDER — OXYBUTYNIN CHLORIDE 15 MG/1
15 TABLET, EXTENDED RELEASE ORAL DAILY
Qty: 30 TABLET | Refills: 3 | Status: SHIPPED | OUTPATIENT
Start: 2025-01-27

## 2025-01-27 NOTE — PROGRESS NOTES
HPI:      Patient is a 89 y.o. male in no acute distress.  He is alert and oriented to person, place, and time.       Patient being seen here today as a new patient.  Patient was referred to us by Flaco AVERY.  Patient was referred here for lower urinary tract symptoms.  Patient is currently taking Flomax twice daily.  He is also taking Ditropan IR 5 mg.  He does not feel that this has helped his urinary incontinence.  Patient does have issues when he goes from sitting to standing.  He does wear 2-3 briefs per day.  Patient does also take 3 mg of daily Bumex.  Patient is currently taking immediate release Ditropan 5 mg 2 tablets twice daily.  Patient has never seen urology in the past.  Patient does have significant bilateral lower extremity lymphedema.  This is why he is taking such a large diuretic dose.   Ditropan increased to 15 mg    Started Myrbetriq    Today:  Patient is here today for follow-up incontinence and BPH.  Patient takes Flomax twice a day.  At last visit we stopped Ditropan 15 mg XL and started Myrbetriq.  Patient states he has not noticed any significant difference in his urgency and incontinence.  Again patient and is on the high dose Bumex.  He states that he notices increase in urgency and frequency after he takes this medication.  He does admit that later on in the evening his urination does slow down.  Patient does have bilateral lower extremity lymphedema.  Patient does have nocturia x 2.  He states that sometimes he has saturated brief.  He said he wears 2-3 briefs a day.  He has bowel movements most daily.  He is accompanied by his wife today.  He feels as though the Ditropan 15 mg XL was more effective in his symptom management.  He does not do timed voiding.  He does not stop liquids before bedtime.  Bladder scan performed today:  Void- 70 ml PVR- 23ml.      Past Medical History:   Diagnosis Date    A-fib (HCC)     COPD (chronic obstructive pulmonary disease) (HCC)     Diabetes

## 2025-01-31 DIAGNOSIS — E78.5 HYPERLIPIDEMIA, UNSPECIFIED HYPERLIPIDEMIA TYPE: ICD-10-CM

## 2025-01-31 RX ORDER — ATORVASTATIN CALCIUM 40 MG/1
40 TABLET, FILM COATED ORAL DAILY
Qty: 90 TABLET | Refills: 1 | Status: SHIPPED | OUTPATIENT
Start: 2025-01-31

## 2025-01-31 NOTE — TELEPHONE ENCOUNTER
Health Maintenance   Topic Date Due    Annual Wellness Visit (Medicare Advantage)  01/01/2025    Lipids  11/14/2025    Depression Screen  11/19/2025    DTaP/Tdap/Td vaccine (2 - Td or Tdap) 04/26/2028    Flu vaccine  Completed    Shingles vaccine  Completed    Pneumococcal 65+ years Vaccine  Completed    COVID-19 Vaccine  Completed    Respiratory Syncytial Virus (RSV) Pregnant or age 60 yrs+  Completed    Hepatitis A vaccine  Aged Out    Hepatitis B vaccine  Aged Out    Hib vaccine  Aged Out    Polio vaccine  Aged Out    Meningococcal (ACWY) vaccine  Aged Out             (applicable per patient's age: Cancer Screenings, Depression Screening, Fall Risk Screening, Immunizations)    Hemoglobin A1C (%)   Date Value   11/14/2024 7.6 (H)   05/17/2024 6.3   02/13/2024 6.4 (H)     AST (U/L)   Date Value   11/14/2024 36     ALT (U/L)   Date Value   11/14/2024 50 (H)     BUN (mg/dL)   Date Value   10/23/2024 43      (goal A1C is < 7)   (goal LDL is <100) need 30-50% reduction from baseline     BP Readings from Last 3 Encounters:   01/27/25 118/62   11/19/24 118/62   10/08/24 (!) 128/58    (goal /80)      All Future Testing planned in [facility]:  Lab Frequency Next Occurrence   Basic Metabolic Panel Once 11/17/2024   Hemoglobin A1C Once 11/13/2024   Lipid Panel Once 11/13/2024   Albumin/Creatinine Ratio, Urine Once 11/13/2024   ALT Once 11/17/2024   AST Once 11/17/2024   CBC with Auto Differential Once 11/13/2024   T4, Free Once 11/13/2024   TSH Once 11/13/2024   Basic Metabolic Panel Once 10/22/2024   Vitamin D 25 Hydroxy Once 01/08/2025   PTH, Intact Once 01/08/2025   Phosphorus Once 01/08/2025   CBC Once 01/08/2025   Comprehensive Metabolic Panel Once 01/08/2025       Next Visit Date:  Future Appointments   Date Time Provider Department Center   2/7/2025  2:40 PM Paulino Marin MD [facility]   2/11/2025  1:00 PM Javier Duvall MD [facility]   2/19/2025  3:00 PM Flaco Snider, APRN - CNP [facility]

## 2025-02-07 ENCOUNTER — OFFICE VISIT (OUTPATIENT)
Dept: CARDIOLOGY | Age: 89
End: 2025-02-07
Payer: MEDICARE

## 2025-02-07 VITALS
SYSTOLIC BLOOD PRESSURE: 126 MMHG | RESPIRATION RATE: 18 BRPM | BODY MASS INDEX: 34.9 KG/M2 | HEART RATE: 60 BPM | WEIGHT: 243.8 LBS | HEIGHT: 70 IN | OXYGEN SATURATION: 94 % | DIASTOLIC BLOOD PRESSURE: 61 MMHG

## 2025-02-07 DIAGNOSIS — I50.42 CHRONIC COMBINED SYSTOLIC AND DIASTOLIC CHF, NYHA CLASS 2 (HCC): Primary | ICD-10-CM

## 2025-02-07 DIAGNOSIS — E66.811 OBESITY (BMI 30.0-34.9): ICD-10-CM

## 2025-02-07 DIAGNOSIS — N18.4 STAGE 4 CHRONIC KIDNEY DISEASE (HCC): ICD-10-CM

## 2025-02-07 DIAGNOSIS — E78.2 MIXED HYPERLIPIDEMIA: ICD-10-CM

## 2025-02-07 DIAGNOSIS — Z95.820 S/P ANGIOPLASTY WITH STENT: ICD-10-CM

## 2025-02-07 DIAGNOSIS — I10 ESSENTIAL HYPERTENSION: ICD-10-CM

## 2025-02-07 DIAGNOSIS — E11.8 TYPE 2 DIABETES MELLITUS WITH COMPLICATION, WITHOUT LONG-TERM CURRENT USE OF INSULIN (HCC): ICD-10-CM

## 2025-02-07 DIAGNOSIS — Z86.73 HISTORY OF STROKE: ICD-10-CM

## 2025-02-07 DIAGNOSIS — I25.10 ASHD (ARTERIOSCLEROTIC HEART DISEASE): ICD-10-CM

## 2025-02-07 PROCEDURE — 1159F MED LIST DOCD IN RCRD: CPT | Performed by: PHYSICIAN ASSISTANT

## 2025-02-07 PROCEDURE — 1123F ACP DISCUSS/DSCN MKR DOCD: CPT | Performed by: PHYSICIAN ASSISTANT

## 2025-02-07 PROCEDURE — 93000 ELECTROCARDIOGRAM COMPLETE: CPT | Performed by: PHYSICIAN ASSISTANT

## 2025-02-07 PROCEDURE — 99214 OFFICE O/P EST MOD 30 MIN: CPT | Performed by: PHYSICIAN ASSISTANT

## 2025-02-07 NOTE — PROGRESS NOTES
Subjective:     CHIEF COMPLAINT / HPI:    Chief Complaint   Patient presents with    Coronary Artery Disease     HX:CAD, SOB, CHF PT is here for 6 month follow up he states he is doing ok, sob unchanged  Denies;CP, lightheaded/dizziness,palp      Dear Flaco Snider, APRN - CNP     South Vargas is 89 y.o. male who presents today for follow-up.    1-He has history of coronary artery disease, myocardial infarction and primary PCI in 2/2017 done at [facility].  He also has history of ischemic cardiomyopathy and chronic systolic CHF, NYHA class III.    2-An admission to [facility] on 9/19/2017 with COPD exacerbation, during this admission his lisinopril changed to Cozaar because of chronic cough.    3-Another visit to the emergency room on 10/3/2017 with cough, shortness of breath and wheezing.      4- He saw Dr. Salmon at [facility] in November 2018, no changes in medication done.    5-An admission to [facility] on 4/6/2018 with acute on chronic CHF.    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    7- EKG done in office (8/20/2019)- Sinus Rhythm with 1st degree AV block. 71 bpm.    8- EKG done in office on 7/14/2020.  No acute ischemic changes.    9-  Echocardiogram on 4/6/2021: Global left ventricular systolic function appears preserved with an estimated ejection fraction of 55%. Mildly increased left ventricular wall thickness with a normal left ventricular cavity size. The patient has a sigmoid interventricular septum. The inferoseptal wall is abnormal in its motion which is not unusual status post open heart surgery. Mild aortic stenosis. Mild mitral and tricuspid regurgitation. Evidence of mild diastolic dysfunction is seen. Atrial septal aneurysm cannot rule-out shunt. A saline contrast study was performed and cannot rule out interatrial communication.    10-EKG done in office 7/6/2021- no acute ischemic changes    11-

## 2025-02-11 ENCOUNTER — OFFICE VISIT (OUTPATIENT)
Dept: NEPHROLOGY | Age: 89
End: 2025-02-11
Payer: MEDICARE

## 2025-02-11 VITALS
HEIGHT: 70 IN | BODY MASS INDEX: 34.36 KG/M2 | HEART RATE: 60 BPM | TEMPERATURE: 97.7 F | DIASTOLIC BLOOD PRESSURE: 54 MMHG | SYSTOLIC BLOOD PRESSURE: 106 MMHG | WEIGHT: 240 LBS | RESPIRATION RATE: 18 BRPM

## 2025-02-11 DIAGNOSIS — E87.79 OTHER FLUID OVERLOAD: ICD-10-CM

## 2025-02-11 DIAGNOSIS — N18.32 STAGE 3B CHRONIC KIDNEY DISEASE (HCC): Primary | ICD-10-CM

## 2025-02-11 DIAGNOSIS — N18.32 STAGE 3B CHRONIC KIDNEY DISEASE (HCC): ICD-10-CM

## 2025-02-11 DIAGNOSIS — E11.69 TYPE 2 DIABETES MELLITUS WITH OTHER SPECIFIED COMPLICATION, UNSPECIFIED WHETHER LONG TERM INSULIN USE (HCC): ICD-10-CM

## 2025-02-11 DIAGNOSIS — E87.1 HYPONATREMIA: ICD-10-CM

## 2025-02-11 DIAGNOSIS — I10 ESSENTIAL HYPERTENSION: ICD-10-CM

## 2025-02-11 DIAGNOSIS — N17.8 ACUTE RENAL FAILURE WITH OTHER SPECIFIED PATHOLOGICAL LESION IN KIDNEY: ICD-10-CM

## 2025-02-11 PROCEDURE — 1123F ACP DISCUSS/DSCN MKR DOCD: CPT | Performed by: INTERNAL MEDICINE

## 2025-02-11 PROCEDURE — G2211 COMPLEX E/M VISIT ADD ON: HCPCS | Performed by: INTERNAL MEDICINE

## 2025-02-11 PROCEDURE — 99213 OFFICE O/P EST LOW 20 MIN: CPT | Performed by: INTERNAL MEDICINE

## 2025-02-11 PROCEDURE — 1159F MED LIST DOCD IN RCRD: CPT | Performed by: INTERNAL MEDICINE

## 2025-02-11 NOTE — PATIENT INSTRUCTIONS
SURVEY:    Thank you for allowing us to care for you today.    You may be receiving a survey from [facility] regarding your visit today- electronically or via mail.      Please help us by completing the survey as this will provide the needed feedback to ensure we are providing the very best care for you and your family.    If you cannot score us a very good on any question, please call the office to discuss how we could have made your experience a very good one.    Thank you.       STAFF:    Kami Rosario, Samantha DUVAL      CLINICAL STAFF:    Noemy HONG, Trisha DUVAL, Danita DUVAL, Jennifer HONG

## 2025-02-11 NOTE — PROGRESS NOTES
(ALDACTONE) 50 MG tablet Take 1 tablet by mouth once daily 90 tablet 0    pantoprazole (PROTONIX) 40 MG tablet TAKE 1 TABLET BY MOUTH ONCE DAILY IN THE MORNING BEFORE BREAKFAST 90 tablet 1    bumetanide (BUMEX) 2 MG tablet TAKE 1 TABLET BY MOUTH IN THE MORNING AND 1/2 (ONE-HALF) IN THE EVENING FOR  A  TOTAL  OF  3  MG  DAILY 90 tablet 3    levothyroxine (SYNTHROID) 137 MCG tablet Take 1 tablet by mouth once daily 90 tablet 1    tamsulosin (FLOMAX) 0.4 MG capsule Take 2 capsules by mouth daily 180 capsule 1    Respiratory Therapy Supplies (NEBULIZER/TUBING/MOUTHPIECE) KIT 1 kit by Does not apply route daily 1 kit 0    albuterol (PROVENTIL) (2.5 MG/3ML) 0.083% nebulizer solution Take 3 mLs by nebulization every 4 hours as needed for Wheezing or Shortness of Breath 120 each 3    OMEPRAZOLE PO Take 20 mg by mouth daily      aspirin 81 MG chewable tablet Take 1 tablet by mouth daily 30 tablet 3    montelukast (SINGULAIR) 10 MG tablet Take 1 tablet by mouth once daily 90 tablet 1    propranolol (INDERAL LA) 60 MG extended release capsule Take 1 capsule by mouth in the morning and at bedtime      primidone (MYSOLINE) 250 MG tablet Take 1 tablet by mouth in the morning and 1 tablet in the evening. 120 tablet 3    umeclidinium bromide (INCRUSE ELLIPTA) 62.5 MCG/ACT inhaler Inhale 1 puff into the lungs daily 1 each 5    tiotropium (SPIRIVA RESPIMAT) 2.5 MCG/ACT AERS inhaler Inhale 2 puffs into the lungs daily 1 each 5    fluticasone (FLONASE) 50 MCG/ACT nasal spray 2 sprays by Each Nostril route daily 48 g 1    ferrous sulfate (IRON 325) 325 (65 Fe) MG tablet Take 1 tablet by mouth 2 times daily (with meals) 30 tablet 3    fluticasone-salmeterol (ADVAIR) 250-50 MCG/DOSE AEPB Inhale 1 puff into the lungs in the morning and 1 puff in the evening.      blood glucose monitor strips accu check 100 strip 3    albuterol sulfate  (90 Base) MCG/ACT inhaler Inhale 2 puffs into the lungs every 4 hours as needed for Wheezing or

## 2025-02-12 LAB — B-TYPE NATRIURETIC PEPTIDE: 211 PG/ML (ref 0–100)

## 2025-02-12 RX ORDER — TAMSULOSIN HYDROCHLORIDE 0.4 MG/1
0.8 CAPSULE ORAL DAILY
Qty: 180 CAPSULE | Refills: 3 | Status: SHIPPED | OUTPATIENT
Start: 2025-02-12

## 2025-02-14 ENCOUNTER — TELEPHONE (OUTPATIENT)
Dept: CARDIOLOGY | Age: 89
End: 2025-02-14

## 2025-02-14 NOTE — TELEPHONE ENCOUNTER
----- Message from Mehnaz Macias PA-C sent at 2/12/2025  4:19 PM EST -----  Please notify patient that their lab results are normal.   Please continue current treatment and follow up.

## 2025-02-19 ENCOUNTER — OFFICE VISIT (OUTPATIENT)
Dept: PRIMARY CARE CLINIC | Age: 89
End: 2025-02-19

## 2025-02-19 VITALS
WEIGHT: 246.9 LBS | TEMPERATURE: 98.1 F | SYSTOLIC BLOOD PRESSURE: 124 MMHG | BODY MASS INDEX: 35.43 KG/M2 | HEART RATE: 62 BPM | DIASTOLIC BLOOD PRESSURE: 66 MMHG

## 2025-02-19 DIAGNOSIS — J44.9 CHRONIC OBSTRUCTIVE PULMONARY DISEASE, UNSPECIFIED COPD TYPE (HCC): ICD-10-CM

## 2025-02-19 DIAGNOSIS — E11.8 TYPE 2 DIABETES MELLITUS WITH COMPLICATION, WITHOUT LONG-TERM CURRENT USE OF INSULIN (HCC): Primary | ICD-10-CM

## 2025-02-19 LAB — HBA1C MFR BLD: 7.9 %

## 2025-02-19 SDOH — ECONOMIC STABILITY: FOOD INSECURITY: WITHIN THE PAST 12 MONTHS, THE FOOD YOU BOUGHT JUST DIDN'T LAST AND YOU DIDN'T HAVE MONEY TO GET MORE.: NEVER TRUE

## 2025-02-19 SDOH — ECONOMIC STABILITY: FOOD INSECURITY: WITHIN THE PAST 12 MONTHS, YOU WORRIED THAT YOUR FOOD WOULD RUN OUT BEFORE YOU GOT MONEY TO BUY MORE.: NEVER TRUE

## 2025-02-19 ASSESSMENT — ENCOUNTER SYMPTOMS
VISUAL CHANGE: 0
COUGH: 0
NAUSEA: 0
FREQUENT THROAT CLEARING: 0
SPUTUM PRODUCTION: 1
HEMOPTYSIS: 0
RHINORRHEA: 0
CHEST TIGHTNESS: 0
BLURRED VISION: 0
SHORTNESS OF BREATH: 1
WHEEZING: 0
HEARTBURN: 0
HOARSE VOICE: 0
ABDOMINAL PAIN: 0
DIARRHEA: 0
CONSTIPATION: 0
SORE THROAT: 0
VOMITING: 0
DIFFICULTY BREATHING: 0
TROUBLE SWALLOWING: 0

## 2025-02-19 ASSESSMENT — PATIENT HEALTH QUESTIONNAIRE - PHQ9
SUM OF ALL RESPONSES TO PHQ9 QUESTIONS 1 & 2: 0
SUM OF ALL RESPONSES TO PHQ QUESTIONS 1-9: 0
1. LITTLE INTEREST OR PLEASURE IN DOING THINGS: NOT AT ALL
2. FEELING DOWN, DEPRESSED OR HOPELESS: NOT AT ALL
SUM OF ALL RESPONSES TO PHQ QUESTIONS 1-9: 0

## 2025-02-19 ASSESSMENT — COPD QUESTIONNAIRES: COPD: 1

## 2025-02-19 NOTE — PROGRESS NOTES
Name: South Vargas  : 1935         Chief Complaint:     Chief Complaint   Patient presents with    Diabetes     3 month. Discuss wound on left hand happened after fall a few months ago.    Thyroid Problem    Congestive Heart Failure       History of Present Illness:      South Vargas is a 89 y.o.  male who presents with Diabetes (3 month. Discuss wound on left hand happened after fall a few months ago.), Thyroid Problem, and Congestive Heart Failure      South is here today for a routine office visit.    Overall South states he is feeling pretty well.  He has gained a little weight since last visit and A1c is up to 7.9.  Otherwise labs are stable.  He has been continuing to follow with all specialties including cardiology, nephrology, and urology.  See below for further comments.    Diabetes  He presents for his follow-up diabetic visit. He has type 2 diabetes mellitus. Onset time: YEARS. His disease course has been stable. There are no hypoglycemic associated symptoms. Pertinent negatives for hypoglycemia include no dizziness, headaches, nervousness/anxiousness, sweats or tremors. Pertinent negatives for diabetes include no blurred vision, no chest pain, no fatigue, no polydipsia, no polyphagia, no polyuria, no visual change and no weight loss. There are no hypoglycemic complications. Symptoms are stable. Diabetic complications include heart disease and nephropathy. Pertinent negatives for diabetic complications include no CVA or peripheral neuropathy. Risk factors for coronary artery disease include diabetes mellitus, male sex and sedentary lifestyle. Current diabetic treatment includes oral agent (monotherapy). He is compliant with treatment all of the time. His weight is stable. He is following a generally healthy diet. When asked about meal planning, he reported none. He has had a previous visit with a dietitian. He rarely participates in exercise. There is no change in his home blood glucose

## 2025-02-19 NOTE — PATIENT INSTRUCTIONS
SURVEY:     You may be receiving a survey from [facility] regarding your visit today.     Please complete the survey to enable us to provide the highest quality of care to you and your family.     If you cannot score us a very good on any question, please call the office to discuss how we could have made your experience a very good one.     Thank you,    Flaco Snider, APRN-CNP  Shruthi Owens, APRN-CNP  Kanika, LPN  Alma, CMA  Jose Rafael, CMA  Rika, CMA  Haily, PCA  Evie, CMA  Vee, PM

## 2025-03-24 DIAGNOSIS — I10 ESSENTIAL HYPERTENSION: ICD-10-CM

## 2025-03-24 DIAGNOSIS — N18.4 STAGE 4 CHRONIC KIDNEY DISEASE (HCC): ICD-10-CM

## 2025-03-24 DIAGNOSIS — Z95.820 S/P ANGIOPLASTY WITH STENT: ICD-10-CM

## 2025-03-24 DIAGNOSIS — I25.10 ASHD (ARTERIOSCLEROTIC HEART DISEASE): ICD-10-CM

## 2025-03-24 DIAGNOSIS — I50.42 CHRONIC COMBINED SYSTOLIC AND DIASTOLIC CHF, NYHA CLASS 2 (HCC): Primary | ICD-10-CM

## 2025-03-24 DIAGNOSIS — E78.2 MIXED HYPERLIPIDEMIA: ICD-10-CM

## 2025-03-24 DIAGNOSIS — Z86.73 HISTORY OF STROKE: ICD-10-CM

## 2025-03-24 RX ORDER — SPIRONOLACTONE 50 MG/1
50 TABLET, FILM COATED ORAL DAILY
Qty: 90 TABLET | Refills: 3 | Status: SHIPPED | OUTPATIENT
Start: 2025-03-24

## 2025-03-26 RX ORDER — LEVOTHYROXINE SODIUM 137 UG/1
137 TABLET ORAL DAILY
Qty: 90 TABLET | Refills: 3 | Status: SHIPPED | OUTPATIENT
Start: 2025-03-26

## 2025-04-03 ENCOUNTER — TELEPHONE (OUTPATIENT)
Dept: PRIMARY CARE CLINIC | Age: 89
End: 2025-04-03

## 2025-04-03 DIAGNOSIS — R26.89 DECREASED MOBILITY: Primary | ICD-10-CM

## 2025-04-03 DIAGNOSIS — R53.81 PHYSICAL DECONDITIONING: ICD-10-CM

## 2025-04-03 NOTE — TELEPHONE ENCOUNTER
Called to let patients wife know that Flaco has gotten PT ordered for him as requested. No answer and vm was not set up.

## 2025-04-03 NOTE — TELEPHONE ENCOUNTER
Patients wife Nicole contacted the office and wanted  to know if you could order PT for patient? He was doing it previously and she thinks he is in need of more as he is getting weaker and weaker and hardly able too walk. I have pended the referral did you want to sign?    Please advise thank you

## 2025-04-06 ENCOUNTER — APPOINTMENT (OUTPATIENT)
Dept: GENERAL RADIOLOGY | Age: 89
DRG: 871 | End: 2025-04-06
Payer: MEDICARE

## 2025-04-06 ENCOUNTER — HOSPITAL ENCOUNTER (INPATIENT)
Age: 89
LOS: 1 days | Discharge: HOME | DRG: 871 | End: 2025-04-07
Attending: EMERGENCY MEDICINE | Admitting: STUDENT IN AN ORGANIZED HEALTH CARE EDUCATION/TRAINING PROGRAM
Payer: MEDICARE

## 2025-04-06 DIAGNOSIS — R09.02 HYPOXEMIA: Primary | ICD-10-CM

## 2025-04-06 DIAGNOSIS — J18.9 COMMUNITY ACQUIRED PNEUMONIA, UNSPECIFIED LATERALITY: ICD-10-CM

## 2025-04-06 PROBLEM — J96.01 ACUTE RESPIRATORY FAILURE WITH HYPOXIA: Status: ACTIVE | Noted: 2025-04-06

## 2025-04-06 LAB
ALBUMIN SERPL-MCNC: 3.1 G/DL (ref 3.5–5.2)
ALBUMIN SERPL-MCNC: 3.5 G/DL (ref 3.5–5.2)
ALBUMIN/GLOB SERPL: 1.2 {RATIO} (ref 1–2.5)
ALBUMIN/GLOB SERPL: 1.5 {RATIO} (ref 1–2.5)
ALP SERPL-CCNC: 125 U/L (ref 40–129)
ALP SERPL-CCNC: 97 U/L (ref 40–129)
ALT SERPL-CCNC: 61 U/L (ref 10–50)
ALT SERPL-CCNC: 62 U/L (ref 10–50)
ANION GAP SERPL CALCULATED.3IONS-SCNC: 11 MMOL/L (ref 9–16)
ANION GAP SERPL CALCULATED.3IONS-SCNC: 16 MMOL/L (ref 9–16)
ARTERIAL PATENCY WRIST A: YES
AST SERPL-CCNC: 52 U/L (ref 10–50)
AST SERPL-CCNC: 59 U/L (ref 10–50)
B PARAP IS1001 DNA NPH QL NAA+NON-PROBE: NOT DETECTED
B PERT DNA SPEC QL NAA+PROBE: NOT DETECTED
BASOPHILS # BLD: <0.03 K/UL (ref 0–0.2)
BASOPHILS # BLD: <0.03 K/UL (ref 0–0.2)
BASOPHILS NFR BLD: 0 % (ref 0–2)
BASOPHILS NFR BLD: 0 % (ref 0–2)
BILIRUB SERPL-MCNC: 0.2 MG/DL (ref 0–1.2)
BILIRUB SERPL-MCNC: 0.3 MG/DL (ref 0–1.2)
BILIRUB UR QL STRIP: NEGATIVE
BNP SERPL-MCNC: 1011 PG/ML (ref 0–450)
BODY TEMPERATURE: 37
BUN SERPL-MCNC: 36 MG/DL (ref 8–23)
BUN SERPL-MCNC: 36 MG/DL (ref 8–23)
BUN/CREAT SERPL: 23 (ref 9–20)
BUN/CREAT SERPL: 23 (ref 9–20)
C PNEUM DNA NPH QL NAA+NON-PROBE: NOT DETECTED
CALCIUM SERPL-MCNC: 7.5 MG/DL (ref 8.6–10.4)
CALCIUM SERPL-MCNC: 8.5 MG/DL (ref 8.6–10.4)
CHLORIDE SERPL-SCNC: 105 MMOL/L (ref 98–107)
CHLORIDE SERPL-SCNC: 99 MMOL/L (ref 98–107)
CLARITY UR: CLEAR
CO2 SERPL-SCNC: 20 MMOL/L (ref 20–31)
CO2 SERPL-SCNC: 21 MMOL/L (ref 20–31)
COLOR UR: YELLOW
CREAT SERPL-MCNC: 1.6 MG/DL (ref 0.7–1.2)
CREAT SERPL-MCNC: 1.6 MG/DL (ref 0.7–1.2)
EOSINOPHIL # BLD: 0.21 K/UL (ref 0–0.44)
EOSINOPHIL # BLD: <0.03 K/UL (ref 0–0.44)
EOSINOPHILS RELATIVE PERCENT: 0 % (ref 1–4)
EOSINOPHILS RELATIVE PERCENT: 2 % (ref 1–4)
ERYTHROCYTE [DISTWIDTH] IN BLOOD BY AUTOMATED COUNT: 13.8 % (ref 11.8–14.4)
ERYTHROCYTE [DISTWIDTH] IN BLOOD BY AUTOMATED COUNT: 13.9 % (ref 11.8–14.4)
FIO2 ON VENT: 28 %
FLUAV AG SPEC QL: NEGATIVE
FLUAV RNA NPH QL NAA+NON-PROBE: NOT DETECTED
FLUBV AG SPEC QL: NEGATIVE
FLUBV RNA NPH QL NAA+NON-PROBE: NOT DETECTED
GAS FLOW.O2 O2 DELIVERY SYS: ABNORMAL L/MIN
GFR, ESTIMATED: 42 ML/MIN/1.73M2
GFR, ESTIMATED: 43 ML/MIN/1.73M2
GLUCOSE BLD-MCNC: 159 MG/DL (ref 74–100)
GLUCOSE SERPL-MCNC: 179 MG/DL (ref 74–99)
GLUCOSE SERPL-MCNC: 211 MG/DL (ref 74–99)
GLUCOSE UR STRIP-MCNC: NEGATIVE MG/DL
HADV DNA NPH QL NAA+NON-PROBE: NOT DETECTED
HCO3 VENOUS: 22.8 MMOL/L (ref 24–30)
HCOV 229E RNA NPH QL NAA+NON-PROBE: NOT DETECTED
HCOV HKU1 RNA NPH QL NAA+NON-PROBE: NOT DETECTED
HCOV NL63 RNA NPH QL NAA+NON-PROBE: NOT DETECTED
HCOV OC43 RNA NPH QL NAA+NON-PROBE: NOT DETECTED
HCT VFR BLD AUTO: 31.9 % (ref 40.7–50.3)
HCT VFR BLD AUTO: 36.7 % (ref 40.7–50.3)
HGB BLD-MCNC: 10.6 G/DL (ref 13–17)
HGB BLD-MCNC: 12 G/DL (ref 13–17)
HGB UR QL STRIP.AUTO: NEGATIVE
HMPV RNA NPH QL NAA+NON-PROBE: NOT DETECTED
HPIV1 RNA NPH QL NAA+NON-PROBE: NOT DETECTED
HPIV2 RNA NPH QL NAA+NON-PROBE: NOT DETECTED
HPIV3 RNA NPH QL NAA+NON-PROBE: NOT DETECTED
HPIV4 RNA NPH QL NAA+NON-PROBE: NOT DETECTED
IMM GRANULOCYTES # BLD AUTO: 0.06 K/UL (ref 0–0.3)
IMM GRANULOCYTES # BLD AUTO: 0.1 K/UL (ref 0–0.3)
IMM GRANULOCYTES NFR BLD: 0 %
IMM GRANULOCYTES NFR BLD: 1 %
KETONES UR STRIP-MCNC: NEGATIVE MG/DL
LACTATE BLDV-SCNC: 2.4 MMOL/L (ref 0.5–2.2)
LACTATE BLDV-SCNC: 3.9 MMOL/L (ref 0.5–2.2)
LEUKOCYTE ESTERASE UR QL STRIP: NEGATIVE
LYMPHOCYTES NFR BLD: 0.62 K/UL (ref 1.1–3.7)
LYMPHOCYTES NFR BLD: 0.73 K/UL (ref 1.1–3.7)
LYMPHOCYTES RELATIVE PERCENT: 4 % (ref 24–43)
LYMPHOCYTES RELATIVE PERCENT: 5 % (ref 24–43)
M PNEUMO DNA NPH QL NAA+NON-PROBE: NOT DETECTED
MAGNESIUM SERPL-MCNC: 1.8 MG/DL (ref 1.6–2.4)
MCH RBC QN AUTO: 29.9 PG (ref 25.2–33.5)
MCH RBC QN AUTO: 30.4 PG (ref 25.2–33.5)
MCHC RBC AUTO-ENTMCNC: 32.7 G/DL (ref 28.4–34.8)
MCHC RBC AUTO-ENTMCNC: 33.2 G/DL (ref 28.4–34.8)
MCV RBC AUTO: 91.4 FL (ref 82.6–102.9)
MCV RBC AUTO: 91.5 FL (ref 82.6–102.9)
MONOCYTES NFR BLD: 0.78 K/UL (ref 0.1–1.2)
MONOCYTES NFR BLD: 1.01 K/UL (ref 0.1–1.2)
MONOCYTES NFR BLD: 6 % (ref 3–12)
MONOCYTES NFR BLD: 7 % (ref 3–12)
NEGATIVE BASE EXCESS, VEN: 1.8 MMOL/L (ref 0–2)
NEUTROPHILS NFR BLD: 87 % (ref 36–65)
NEUTROPHILS NFR BLD: 88 % (ref 36–65)
NEUTS SEG NFR BLD: 11.65 K/UL (ref 1.5–8.1)
NEUTS SEG NFR BLD: 13.09 K/UL (ref 1.5–8.1)
NITRITE UR QL STRIP: NEGATIVE
NRBC BLD-RTO: 0 PER 100 WBC
NRBC BLD-RTO: 0 PER 100 WBC
O2 SAT, VEN: 50.7 % (ref 60–85)
PCO2 VENOUS: 38.4 MM HG (ref 39–55)
PCO2, VEN, TEMP ADJ: 38.4 MMHG (ref 39–55)
PH UR STRIP: 6 [PH] (ref 5–9)
PH VENOUS: 7.39 (ref 7.32–7.42)
PH, VEN, TEMP ADJ: 7.39 (ref 7.32–7.42)
PLATELET # BLD AUTO: 151 K/UL (ref 138–453)
PLATELET # BLD AUTO: 186 K/UL (ref 138–453)
PMV BLD AUTO: 10.3 FL (ref 8.1–13.5)
PMV BLD AUTO: 10.4 FL (ref 8.1–13.5)
PO2 VENOUS: 27.3 MM HG (ref 30–50)
PO2, VEN, TEMP ADJ: 27.3 MMHG (ref 30–50)
POTASSIUM SERPL-SCNC: 4 MMOL/L (ref 3.7–5.3)
POTASSIUM SERPL-SCNC: 4.2 MMOL/L (ref 3.7–5.3)
PROCALCITONIN SERPL-MCNC: 0.56 NG/ML (ref 0–0.09)
PROT SERPL-MCNC: 5.2 G/DL (ref 6.6–8.7)
PROT SERPL-MCNC: 6.4 G/DL (ref 6.6–8.7)
PROT UR STRIP-MCNC: NEGATIVE MG/DL
RBC # BLD AUTO: 3.49 M/UL (ref 4.21–5.77)
RBC # BLD AUTO: 4.01 M/UL (ref 4.21–5.77)
RSV RNA NPH QL NAA+NON-PROBE: NOT DETECTED
RV+EV RNA NPH QL NAA+NON-PROBE: NOT DETECTED
SARS-COV-2 RDRP RESP QL NAA+PROBE: NOT DETECTED
SARS-COV-2 RNA NPH QL NAA+NON-PROBE: NOT DETECTED
SODIUM SERPL-SCNC: 135 MMOL/L (ref 136–145)
SODIUM SERPL-SCNC: 137 MMOL/L (ref 136–145)
SP GR UR STRIP: 1.01 (ref 1.01–1.02)
SPECIMEN DESCRIPTION: NORMAL
SPECIMEN DESCRIPTION: NORMAL
TROPONIN I SERPL HS-MCNC: 16 NG/L (ref 0–22)
UROBILINOGEN UR STRIP-ACNC: NORMAL EU/DL (ref 0–1)
WBC OTHER # BLD: 13.5 K/UL (ref 3.5–11.3)
WBC OTHER # BLD: 14.9 K/UL (ref 3.5–11.3)

## 2025-04-06 PROCEDURE — 87635 SARS-COV-2 COVID-19 AMP PRB: CPT

## 2025-04-06 PROCEDURE — 83735 ASSAY OF MAGNESIUM: CPT

## 2025-04-06 PROCEDURE — 82805 BLOOD GASES W/O2 SATURATION: CPT

## 2025-04-06 PROCEDURE — 94761 N-INVAS EAR/PLS OXIMETRY MLT: CPT

## 2025-04-06 PROCEDURE — 6360000002 HC RX W HCPCS: Performed by: STUDENT IN AN ORGANIZED HEALTH CARE EDUCATION/TRAINING PROGRAM

## 2025-04-06 PROCEDURE — 2700000000 HC OXYGEN THERAPY PER DAY

## 2025-04-06 PROCEDURE — 2580000003 HC RX 258: Performed by: STUDENT IN AN ORGANIZED HEALTH CARE EDUCATION/TRAINING PROGRAM

## 2025-04-06 PROCEDURE — 6360000002 HC RX W HCPCS: Performed by: EMERGENCY MEDICINE

## 2025-04-06 PROCEDURE — 94667 MNPJ CHEST WALL 1ST: CPT

## 2025-04-06 PROCEDURE — 94664 DEMO&/EVAL PT USE INHALER: CPT

## 2025-04-06 PROCEDURE — 93005 ELECTROCARDIOGRAM TRACING: CPT | Performed by: EMERGENCY MEDICINE

## 2025-04-06 PROCEDURE — 1200000000 HC SEMI PRIVATE

## 2025-04-06 PROCEDURE — 87804 INFLUENZA ASSAY W/OPTIC: CPT

## 2025-04-06 PROCEDURE — 82947 ASSAY GLUCOSE BLOOD QUANT: CPT

## 2025-04-06 PROCEDURE — 94640 AIRWAY INHALATION TREATMENT: CPT

## 2025-04-06 PROCEDURE — 2580000003 HC RX 258: Performed by: EMERGENCY MEDICINE

## 2025-04-06 PROCEDURE — 6370000000 HC RX 637 (ALT 250 FOR IP): Performed by: STUDENT IN AN ORGANIZED HEALTH CARE EDUCATION/TRAINING PROGRAM

## 2025-04-06 PROCEDURE — 81003 URINALYSIS AUTO W/O SCOPE: CPT

## 2025-04-06 PROCEDURE — 2500000003 HC RX 250 WO HCPCS: Performed by: EMERGENCY MEDICINE

## 2025-04-06 PROCEDURE — 96374 THER/PROPH/DIAG INJ IV PUSH: CPT

## 2025-04-06 PROCEDURE — 85025 COMPLETE CBC W/AUTO DIFF WBC: CPT

## 2025-04-06 PROCEDURE — 94669 MECHANICAL CHEST WALL OSCILL: CPT

## 2025-04-06 PROCEDURE — 71045 X-RAY EXAM CHEST 1 VIEW: CPT

## 2025-04-06 PROCEDURE — 80053 COMPREHEN METABOLIC PANEL: CPT

## 2025-04-06 PROCEDURE — 96375 TX/PRO/DX INJ NEW DRUG ADDON: CPT

## 2025-04-06 PROCEDURE — 84484 ASSAY OF TROPONIN QUANT: CPT

## 2025-04-06 PROCEDURE — 87040 BLOOD CULTURE FOR BACTERIA: CPT

## 2025-04-06 PROCEDURE — 84145 PROCALCITONIN (PCT): CPT

## 2025-04-06 PROCEDURE — 0202U NFCT DS 22 TRGT SARS-COV-2: CPT

## 2025-04-06 PROCEDURE — 83880 ASSAY OF NATRIURETIC PEPTIDE: CPT

## 2025-04-06 PROCEDURE — 2500000003 HC RX 250 WO HCPCS: Performed by: STUDENT IN AN ORGANIZED HEALTH CARE EDUCATION/TRAINING PROGRAM

## 2025-04-06 PROCEDURE — 94150 VITAL CAPACITY TEST: CPT

## 2025-04-06 PROCEDURE — 83605 ASSAY OF LACTIC ACID: CPT

## 2025-04-06 PROCEDURE — 36415 COLL VENOUS BLD VENIPUNCTURE: CPT

## 2025-04-06 PROCEDURE — 99285 EMERGENCY DEPT VISIT HI MDM: CPT

## 2025-04-06 RX ORDER — IPRATROPIUM BROMIDE AND ALBUTEROL SULFATE 2.5; .5 MG/3ML; MG/3ML
1 SOLUTION RESPIRATORY (INHALATION)
Status: DISCONTINUED | OUTPATIENT
Start: 2025-04-06 | End: 2025-04-07 | Stop reason: HOSPADM

## 2025-04-06 RX ORDER — ENOXAPARIN SODIUM 100 MG/ML
30 INJECTION SUBCUTANEOUS 2 TIMES DAILY
Status: DISCONTINUED | OUTPATIENT
Start: 2025-04-06 | End: 2025-04-07 | Stop reason: HOSPADM

## 2025-04-06 RX ORDER — POLYETHYLENE GLYCOL 3350 17 G/17G
17 POWDER, FOR SOLUTION ORAL DAILY PRN
Status: DISCONTINUED | OUTPATIENT
Start: 2025-04-06 | End: 2025-04-07 | Stop reason: HOSPADM

## 2025-04-06 RX ORDER — ONDANSETRON 2 MG/ML
4 INJECTION INTRAMUSCULAR; INTRAVENOUS EVERY 6 HOURS PRN
Status: DISCONTINUED | OUTPATIENT
Start: 2025-04-06 | End: 2025-04-07 | Stop reason: HOSPADM

## 2025-04-06 RX ORDER — SODIUM CHLORIDE 0.9 % (FLUSH) 0.9 %
5-40 SYRINGE (ML) INJECTION EVERY 12 HOURS SCHEDULED
Status: DISCONTINUED | OUTPATIENT
Start: 2025-04-06 | End: 2025-04-07 | Stop reason: HOSPADM

## 2025-04-06 RX ORDER — ACETAMINOPHEN 500 MG
1000 TABLET ORAL EVERY 4 HOURS PRN
Status: DISCONTINUED | OUTPATIENT
Start: 2025-04-06 | End: 2025-04-06 | Stop reason: SDUPTHER

## 2025-04-06 RX ORDER — PROPRANOLOL HYDROCHLORIDE 60 MG/1
60 CAPSULE, EXTENDED RELEASE ORAL 2 TIMES DAILY
Status: DISCONTINUED | OUTPATIENT
Start: 2025-04-06 | End: 2025-04-07 | Stop reason: HOSPADM

## 2025-04-06 RX ORDER — ATORVASTATIN CALCIUM 40 MG/1
40 TABLET, FILM COATED ORAL DAILY
Status: DISCONTINUED | OUTPATIENT
Start: 2025-04-06 | End: 2025-04-07 | Stop reason: HOSPADM

## 2025-04-06 RX ORDER — FERROUS SULFATE 325(65) MG
325 TABLET ORAL 2 TIMES DAILY WITH MEALS
Status: DISCONTINUED | OUTPATIENT
Start: 2025-04-06 | End: 2025-04-07 | Stop reason: HOSPADM

## 2025-04-06 RX ORDER — FLUTICASONE PROPIONATE 50 MCG
2 SPRAY, SUSPENSION (ML) NASAL DAILY PRN
Status: DISCONTINUED | OUTPATIENT
Start: 2025-04-06 | End: 2025-04-07 | Stop reason: HOSPADM

## 2025-04-06 RX ORDER — BUMETANIDE 1 MG/1
1 TABLET ORAL 2 TIMES DAILY
Status: DISCONTINUED | OUTPATIENT
Start: 2025-04-06 | End: 2025-04-07 | Stop reason: HOSPADM

## 2025-04-06 RX ORDER — SPIRONOLACTONE 25 MG/1
50 TABLET ORAL DAILY
Status: DISCONTINUED | OUTPATIENT
Start: 2025-04-06 | End: 2025-04-07 | Stop reason: HOSPADM

## 2025-04-06 RX ORDER — PRIMIDONE 250 MG/1
250 TABLET ORAL 2 TIMES DAILY
Status: DISCONTINUED | OUTPATIENT
Start: 2025-04-06 | End: 2025-04-07 | Stop reason: HOSPADM

## 2025-04-06 RX ORDER — IPRATROPIUM BROMIDE AND ALBUTEROL SULFATE 2.5; .5 MG/3ML; MG/3ML
1 SOLUTION RESPIRATORY (INHALATION) EVERY 4 HOURS PRN
Status: DISCONTINUED | OUTPATIENT
Start: 2025-04-06 | End: 2025-04-06

## 2025-04-06 RX ORDER — GUAIFENESIN 600 MG/1
600 TABLET, EXTENDED RELEASE ORAL 2 TIMES DAILY
Status: DISCONTINUED | OUTPATIENT
Start: 2025-04-06 | End: 2025-04-07 | Stop reason: HOSPADM

## 2025-04-06 RX ORDER — ACETAMINOPHEN 650 MG/1
650 SUPPOSITORY RECTAL EVERY 6 HOURS PRN
Status: DISCONTINUED | OUTPATIENT
Start: 2025-04-06 | End: 2025-04-07 | Stop reason: HOSPADM

## 2025-04-06 RX ORDER — M-VIT,TX,IRON,MINS/CALC/FOLIC 27MG-0.4MG
1 TABLET ORAL DAILY
Status: DISCONTINUED | OUTPATIENT
Start: 2025-04-06 | End: 2025-04-07 | Stop reason: HOSPADM

## 2025-04-06 RX ORDER — BUDESONIDE AND FORMOTEROL FUMARATE DIHYDRATE 160; 4.5 UG/1; UG/1
2 AEROSOL RESPIRATORY (INHALATION)
Status: DISCONTINUED | OUTPATIENT
Start: 2025-04-06 | End: 2025-04-07 | Stop reason: HOSPADM

## 2025-04-06 RX ORDER — SODIUM CHLORIDE 9 MG/ML
INJECTION, SOLUTION INTRAVENOUS PRN
Status: DISCONTINUED | OUTPATIENT
Start: 2025-04-06 | End: 2025-04-07 | Stop reason: HOSPADM

## 2025-04-06 RX ORDER — PANTOPRAZOLE SODIUM 40 MG/1
40 TABLET, DELAYED RELEASE ORAL
Status: DISCONTINUED | OUTPATIENT
Start: 2025-04-06 | End: 2025-04-07 | Stop reason: HOSPADM

## 2025-04-06 RX ORDER — ASPIRIN 81 MG/1
81 TABLET, CHEWABLE ORAL DAILY
Status: DISCONTINUED | OUTPATIENT
Start: 2025-04-06 | End: 2025-04-07 | Stop reason: HOSPADM

## 2025-04-06 RX ORDER — TAMSULOSIN HYDROCHLORIDE 0.4 MG/1
0.8 CAPSULE ORAL DAILY
Status: DISCONTINUED | OUTPATIENT
Start: 2025-04-06 | End: 2025-04-07 | Stop reason: HOSPADM

## 2025-04-06 RX ORDER — ENOXAPARIN SODIUM 100 MG/ML
40 INJECTION SUBCUTANEOUS DAILY
Status: DISCONTINUED | OUTPATIENT
Start: 2025-04-06 | End: 2025-04-06

## 2025-04-06 RX ORDER — BENZONATATE 100 MG/1
100 CAPSULE ORAL 3 TIMES DAILY PRN
Status: DISCONTINUED | OUTPATIENT
Start: 2025-04-06 | End: 2025-04-07 | Stop reason: HOSPADM

## 2025-04-06 RX ORDER — 0.9 % SODIUM CHLORIDE 0.9 %
1000 INTRAVENOUS SOLUTION INTRAVENOUS ONCE
Status: COMPLETED | OUTPATIENT
Start: 2025-04-06 | End: 2025-04-06

## 2025-04-06 RX ORDER — MONTELUKAST SODIUM 10 MG/1
10 TABLET ORAL DAILY
Status: DISCONTINUED | OUTPATIENT
Start: 2025-04-06 | End: 2025-04-07 | Stop reason: HOSPADM

## 2025-04-06 RX ORDER — ALBUTEROL SULFATE 0.83 MG/ML
2.5 SOLUTION RESPIRATORY (INHALATION)
Status: DISCONTINUED | OUTPATIENT
Start: 2025-04-06 | End: 2025-04-06 | Stop reason: SDUPTHER

## 2025-04-06 RX ORDER — SODIUM CHLORIDE 9 MG/ML
INJECTION, SOLUTION INTRAVENOUS CONTINUOUS
Status: ACTIVE | OUTPATIENT
Start: 2025-04-06 | End: 2025-04-07

## 2025-04-06 RX ORDER — GUAIFENESIN/DEXTROMETHORPHAN 100-10MG/5
5 SYRUP ORAL EVERY 4 HOURS PRN
Status: DISCONTINUED | OUTPATIENT
Start: 2025-04-06 | End: 2025-04-07 | Stop reason: HOSPADM

## 2025-04-06 RX ORDER — DEXAMETHASONE SODIUM PHOSPHATE 10 MG/ML
6 INJECTION, SOLUTION INTRA-ARTICULAR; INTRALESIONAL; INTRAMUSCULAR; INTRAVENOUS; SOFT TISSUE ONCE
Status: COMPLETED | OUTPATIENT
Start: 2025-04-06 | End: 2025-04-06

## 2025-04-06 RX ORDER — SODIUM CHLORIDE 0.9 % (FLUSH) 0.9 %
5-40 SYRINGE (ML) INJECTION PRN
Status: DISCONTINUED | OUTPATIENT
Start: 2025-04-06 | End: 2025-04-07 | Stop reason: HOSPADM

## 2025-04-06 RX ORDER — ACETAMINOPHEN 325 MG/1
650 TABLET ORAL EVERY 6 HOURS PRN
Status: DISCONTINUED | OUTPATIENT
Start: 2025-04-06 | End: 2025-04-07 | Stop reason: HOSPADM

## 2025-04-06 RX ORDER — ONDANSETRON 4 MG/1
4 TABLET, ORALLY DISINTEGRATING ORAL EVERY 8 HOURS PRN
Status: DISCONTINUED | OUTPATIENT
Start: 2025-04-06 | End: 2025-04-07 | Stop reason: HOSPADM

## 2025-04-06 RX ORDER — ALBUTEROL SULFATE 90 UG/1
2 INHALANT RESPIRATORY (INHALATION) EVERY 4 HOURS PRN
Status: DISCONTINUED | OUTPATIENT
Start: 2025-04-06 | End: 2025-04-07 | Stop reason: HOSPADM

## 2025-04-06 RX ORDER — ALBUTEROL SULFATE 0.83 MG/ML
2.5 SOLUTION RESPIRATORY (INHALATION) EVERY 4 HOURS PRN
Status: DISCONTINUED | OUTPATIENT
Start: 2025-04-06 | End: 2025-04-07 | Stop reason: HOSPADM

## 2025-04-06 RX ORDER — ALBUTEROL SULFATE 0.83 MG/ML
2.5 SOLUTION RESPIRATORY (INHALATION)
Status: DISCONTINUED | OUTPATIENT
Start: 2025-04-06 | End: 2025-04-06

## 2025-04-06 RX ORDER — OXYBUTYNIN CHLORIDE 5 MG/1
15 TABLET, EXTENDED RELEASE ORAL DAILY
Status: DISCONTINUED | OUTPATIENT
Start: 2025-04-06 | End: 2025-04-07 | Stop reason: HOSPADM

## 2025-04-06 RX ADMIN — BUMETANIDE 1 MG: 1 TABLET ORAL at 09:12

## 2025-04-06 RX ADMIN — SODIUM CHLORIDE: 0.9 INJECTION, SOLUTION INTRAVENOUS at 16:46

## 2025-04-06 RX ADMIN — SODIUM CHLORIDE, PRESERVATIVE FREE 10 ML: 5 INJECTION INTRAVENOUS at 21:12

## 2025-04-06 RX ADMIN — FERROUS SULFATE TAB 325 MG (65 MG ELEMENTAL FE) 325 MG: 325 (65 FE) TAB at 09:12

## 2025-04-06 RX ADMIN — DEXAMETHASONE SODIUM PHOSPHATE 6 MG: 10 INJECTION INTRAMUSCULAR; INTRAVENOUS at 00:17

## 2025-04-06 RX ADMIN — AZITHROMYCIN MONOHYDRATE 500 MG: 500 INJECTION, POWDER, LYOPHILIZED, FOR SOLUTION INTRAVENOUS at 01:04

## 2025-04-06 RX ADMIN — ACETAMINOPHEN 650 MG: 325 TABLET ORAL at 02:54

## 2025-04-06 RX ADMIN — IPRATROPIUM BROMIDE AND ALBUTEROL SULFATE 1 DOSE: .5; 2.5 SOLUTION RESPIRATORY (INHALATION) at 10:49

## 2025-04-06 RX ADMIN — PRIMIDONE 250 MG: 250 TABLET ORAL at 16:43

## 2025-04-06 RX ADMIN — Medication 1 TABLET: at 09:12

## 2025-04-06 RX ADMIN — ENOXAPARIN SODIUM 30 MG: 100 INJECTION SUBCUTANEOUS at 09:13

## 2025-04-06 RX ADMIN — IPRATROPIUM BROMIDE AND ALBUTEROL SULFATE 1 DOSE: .5; 2.5 SOLUTION RESPIRATORY (INHALATION) at 20:01

## 2025-04-06 RX ADMIN — GUAIFENESIN 600 MG: 600 TABLET ORAL at 21:09

## 2025-04-06 RX ADMIN — SODIUM CHLORIDE 1000 ML: 0.9 INJECTION, SOLUTION INTRAVENOUS at 00:59

## 2025-04-06 RX ADMIN — ASPIRIN 81 MG: 81 TABLET, CHEWABLE ORAL at 09:13

## 2025-04-06 RX ADMIN — BUDESONIDE AND FORMOTEROL FUMARATE DIHYDRATE 2 PUFF: 160; 4.5 AEROSOL RESPIRATORY (INHALATION) at 10:49

## 2025-04-06 RX ADMIN — SODIUM CHLORIDE: 0.9 INJECTION, SOLUTION INTRAVENOUS at 02:47

## 2025-04-06 RX ADMIN — TAMSULOSIN HYDROCHLORIDE 0.8 MG: 0.4 CAPSULE ORAL at 09:12

## 2025-04-06 RX ADMIN — ENOXAPARIN SODIUM 30 MG: 100 INJECTION SUBCUTANEOUS at 21:08

## 2025-04-06 RX ADMIN — FERROUS SULFATE TAB 325 MG (65 MG ELEMENTAL FE) 325 MG: 325 (65 FE) TAB at 16:43

## 2025-04-06 RX ADMIN — GUAIFENESIN 600 MG: 600 TABLET ORAL at 09:12

## 2025-04-06 RX ADMIN — SODIUM CHLORIDE 1000 ML: 9 INJECTION, SOLUTION INTRAVENOUS at 01:06

## 2025-04-06 RX ADMIN — BUMETANIDE 1 MG: 1 TABLET ORAL at 16:43

## 2025-04-06 RX ADMIN — WATER 2000 MG: 1 INJECTION INTRAMUSCULAR; INTRAVENOUS; SUBCUTANEOUS at 01:00

## 2025-04-06 RX ADMIN — PROPRANOLOL HYDROCHLORIDE 60 MG: 60 CAPSULE, EXTENDED RELEASE ORAL at 09:12

## 2025-04-06 RX ADMIN — SPIRONOLACTONE 50 MG: 25 TABLET ORAL at 09:12

## 2025-04-06 RX ADMIN — LEVOTHYROXINE SODIUM 137 MCG: 25 TABLET ORAL at 09:12

## 2025-04-06 RX ADMIN — MONTELUKAST 10 MG: 10 TABLET, FILM COATED ORAL at 09:12

## 2025-04-06 RX ADMIN — ATORVASTATIN CALCIUM 40 MG: 40 TABLET, FILM COATED ORAL at 09:12

## 2025-04-06 RX ADMIN — OXYBUTYNIN CHLORIDE 15 MG: 5 TABLET, EXTENDED RELEASE ORAL at 09:13

## 2025-04-06 RX ADMIN — PANTOPRAZOLE SODIUM 40 MG: 40 TABLET, DELAYED RELEASE ORAL at 07:47

## 2025-04-06 RX ADMIN — SODIUM CHLORIDE, PRESERVATIVE FREE 10 ML: 5 INJECTION INTRAVENOUS at 09:13

## 2025-04-06 RX ADMIN — BUDESONIDE AND FORMOTEROL FUMARATE DIHYDRATE 2 PUFF: 160; 4.5 AEROSOL RESPIRATORY (INHALATION) at 20:01

## 2025-04-06 RX ADMIN — PROPRANOLOL HYDROCHLORIDE 60 MG: 60 CAPSULE, EXTENDED RELEASE ORAL at 21:09

## 2025-04-06 RX ADMIN — PRIMIDONE 250 MG: 250 TABLET ORAL at 09:13

## 2025-04-06 RX ADMIN — IPRATROPIUM BROMIDE AND ALBUTEROL SULFATE 1 DOSE: .5; 2.5 SOLUTION RESPIRATORY (INHALATION) at 15:31

## 2025-04-06 ASSESSMENT — PAIN - FUNCTIONAL ASSESSMENT: PAIN_FUNCTIONAL_ASSESSMENT: NONE - DENIES PAIN

## 2025-04-06 NOTE — H&P
[address] , [place], [place], [address]    History & Physical Examination Note              Date:   4/6/2025  Patient name:  South Vargas  Date of admission:  4/6/2025 12:13 AM  MRN:   [id number]  YOB: 1935    CHIEF COMPLAINT:       Chief Complaint   Patient presents with    Fever     Patient noted to have a fever at home, patient wife states that \"normally when he gets a fever he gets pneumonia\".        History Obtained From:  Patient and chart review.    HPI:     The patient is a 89 y.o.  male who presented with concerns for a fever chills body aches cough that has been productive of sputum for several days.  Not have any chest pain pressure or discomfort.  He has no known history of any sick contacts.  He denies any GI or  symptoms.  He has had prior episodes in the past.  He has had prior episodes of pneumonia but felt very similar to this.  He did contacted EMS which noted that he was hypoxic at that time.  He required supplemental oxygen which she does not wear at home.  In the ED, labs and imaging were obtained and were reviewed. The case was discussed with the ED Provider prior to admission.  Imaging was concerning for pneumonia    PAST MEDICAL HISTORY:        has a past medical history of A-fib (HCC), COPD (chronic obstructive pulmonary disease) (HCC), Diabetes mellitus (HCC), Hx of echocardiogram, Hyperlipidemia, Hypothyroidism, MI (myocardial infarction) (HCC), Stroke (HCC), Thyroid disease, and Type II or unspecified type diabetes mellitus without mention of complication, not stated as uncontrolled.      Diagnosis Date    A-fib (HCC)     COPD (chronic obstructive pulmonary disease) (HCC)     Diabetes mellitus (HCC)     Hx of echocardiogram 02/24/2017    [facility]    Hyperlipidemia     Hypothyroidism     MI (myocardial infarction) (HCC)     Stroke (HCC)     Thyroid disease     Type II or unspecified type diabetes mellitus without mention

## 2025-04-06 NOTE — ED PROVIDER NOTES
[facility] EMERGENCY DEPARTMENT  EMERGENCY DEPARTMENT ENCOUNTER      Pt Name: South Vargas  MRN: [id number]  Birthdate 1935  Date of evaluation: 4/6/2025  Provider: Brice Ortiz MD    CHIEF COMPLAINT       Chief Complaint   Patient presents with    Fever     Patient noted to have a fever at home, patient wife states that \"normally when he gets a fever he gets pneumonia\".          HISTORY OF PRESENT ILLNESS   (Location/Symptom, Timing/Onset, Context/Setting, Quality, Duration, Modifying Factors, Severity)  Note limiting factors.   South Vargas is a 89 y.o. male who presents to the emergency department the patient comes from home with fever chills body aches he has had a cough productive of sputum he states that he gets his way when he has pneumonia.  He states that he does not wear oxygen at home for EMS his pulse ox was 82% on room air.  He does not have COPD    HPI    Nursing Notes were reviewed.    REVIEW OF SYSTEMS    (2-9 systems for level 4, 10 or more for level 5)     Review of Systems   Constitutional:  Positive for fatigue and fever.   Respiratory:  Positive for cough and shortness of breath.    All other systems reviewed and are negative.      Except as noted above the remainder of the review of systems was reviewed and negative.       PAST MEDICAL HISTORY     Past Medical History:   Diagnosis Date    A-fib (HCC)     COPD (chronic obstructive pulmonary disease) (HCC)     Diabetes mellitus (HCC)     Hx of echocardiogram 02/24/2017    [facility]    Hyperlipidemia     Hypothyroidism     MI (myocardial infarction) ([facility])     Stroke (HCC)     Thyroid disease     Type II or unspecified type diabetes mellitus without mention of complication, not stated as uncontrolled          SURGICAL HISTORY       Past Surgical History:   Procedure Laterality Date    CARDIAC SURGERY  02/24/2017    Stent placed  Dr Platt [facility]    CEREBRAL ANGIOGRAM Bilateral 06/15/2023    ANGIOGRAM CAROTID CEREBRAL  were negative his lactic acid was elevated so I I have ordered 2 L of normal saline    I spoke with the hospitalist and he will admit the patient as a full admit to a telemetry bed due to the elevated fever and lactic acid level I started the patient based on his chest x-ray showing right lower lobe pneumonia on Rocephin and Zithromax.             Amount and/or Complexity of Data Reviewed  Clinical lab tests: reviewed  Tests in the radiology section of CPT®: reviewed  Tests in the medicine section of CPT®: reviewed        MIPS       REASSESSMENT          CRITICAL CARE TIME   Total Critical Care time was  minutes, excluding separately reportable procedures.  There was a high probability of clinically significant/life threatening deterioration in the patient's condition which required my urgent intervention.      CONSULTS:  None    PROCEDURES:  Unless otherwise noted below, none     Procedures        FINAL IMPRESSION      1. Hypoxemia    2. Community acquired pneumonia, unspecified laterality          DISPOSITION/PLAN   DISPOSITION Admitted 04/06/2025 01:06:35 AM      PATIENT REFERRED TO:  No follow-up provider specified.    DISCHARGE MEDICATIONS:  New Prescriptions    No medications on file     Controlled Substances Monitoring:          No data to display                (Please note that portions of this note were completed with a voice recognition program.  Efforts were made to edit the dictations but occasionally words are mis-transcribed.)    Brice Ortiz MD (electronically signed)  Attending Emergency Physician             Brice Ortiz MD  04/06/25 0101       Brice Ortiz MD  04/06/25 0125

## 2025-04-06 NOTE — PROGRESS NOTES
[facility]  Inpatient/Observation/Outpatient Rehabilitation    Date: 2025  Patient Name: South Vargas       [x] Inpatient Acute/Observation       []  Outpatient  : 1935       [] Pt refused/declined therapy at this time due to:           [x] Pt cancelled due to:  [] No Reason Given   [] Sick/ill   [x] Other:  Not feeling well. Agreeable to PT eval tomorrow.     [] Evaluation held by RN/Provider/Physical Therapist due to:    [] High Heart Rate   [] High Blood Pressure   [] Orthopedic Consult   [] Hgb < 7   [] Other:    [] Pt ordered brace per physician request:  [] Proper fit will be completed and education for wearing/skin checks    [] Pt does not require skilled services due to:      Therapist/Assistant will attempt to see this patient, at our earliest opportunity.       Jefe Campos, PT, DPT Date: 2025

## 2025-04-06 NOTE — RT PROTOCOL NOTE
RESPIRATORY ASSESSMENT PROTOCOL                                                                                              Patient Name: South Vargas Room#: 0303/0303-01 : 1935     Admitting diagnosis: Hypoxemia [R09.02]  Acute respiratory failure with hypoxia [J96.01]  Community acquired pneumonia, unspecified laterality [J18.9]       Medical History:   Past Medical History:   Diagnosis Date    A-fib (HCC)     COPD (chronic obstructive pulmonary disease) (HCC)     Diabetes mellitus (HCC)     Hx of echocardiogram 2017    [facility]    Hyperlipidemia     Hypothyroidism     MI (myocardial infarction) (HCC)     Stroke (HCC)     Thyroid disease     Type II or unspecified type diabetes mellitus without mention of complication, not stated as uncontrolled        PATIENT ASSESSMENT    LABORATORY DATA  Hematology:   Lab Results   Component Value Date/Time    WBC 14.9 2025 05:00 AM    RBC 3.49 2025 05:00 AM    RBC 0-2 2023 02:37 PM    HGB 10.6 2025 05:00 AM    HCT 31.9 2025 05:00 AM     2025 05:00 AM     Chemistry:    Lab Results   Component Value Date/Time    PHART 7.426 2023 05:25 PM    UCJ7JGK 34.3 2023 05:25 PM    PO2ART 75.8 2023 05:25 PM    S5HCMHUV 95.6 2023 05:25 PM    EAU3QFU 22.1 2023 05:25 PM    PBEA 1.4 2017 11:25 PM    NBEA 1.6 2023 05:25 PM       VITALS  Pulse: 88   Respirations: 20  BP: (!) 106/56  SpO2: 95 % O2 Device: Nasal cannula  Temp: 100 °F (37.8 °C)    SKIN COLOR  [] Normal  [] Pale  [] Dusky  [] Cyanotic    RESPIRATORY PATTERN  [] Normal  [] Dyspnea  [] Cheyne-Veras  [] Kussmaul  [] Biots    AMBULATORY  [] Yes  [] No  [] With Assistance    PEAK FLOW  Predicted:    Personal Best:            Patient Acuity 0 1 2 3 4 Score   Level of Consciousness (LOC) [x]  Alert & Oriented or Pt normal LOC []  Confused;follows directions []  Confused & uncooper-ative []  Obtunded []  Comatose 0  quitting smoking to slow or stop the progression of lung disease.    [] Smoking Cessation Protocol    SMOKING CESSATION EDUCATION provided according to policy RT_201: (karson with an X)  ____Yes    ____ No     ____ NA    Smoking Cessation Booklet given:  ____Yes  ____No ____Patient Refused

## 2025-04-06 NOTE — PLAN OF CARE
Problem: Chronic Conditions and Co-morbidities  Goal: Patient's chronic conditions and co-morbidity symptoms are monitored and maintained or improved  Outcome: Progressing  Flowsheets (Taken 4/6/2025 0353)  Care Plan - Patient's Chronic Conditions and Co-Morbidity Symptoms are Monitored and Maintained or Improved: Monitor and assess patient's chronic conditions and comorbid symptoms for stability, deterioration, or improvement     Problem: Discharge Planning  Goal: Discharge to home or other facility with appropriate resources  Outcome: Progressing  Flowsheets (Taken 4/6/2025 0353)  Discharge to home or other facility with appropriate resources:   Identify barriers to discharge with patient and caregiver   Identify discharge learning needs (meds, wound care, etc)     Problem: Safety - Adult  Goal: Free from fall injury  Outcome: Progressing  Flowsheets (Taken 4/6/2025 0353)  Free From Fall Injury: Instruct family/caregiver on patient safety     Problem: ABCDS Injury Assessment  Goal: Absence of physical injury  Outcome: Progressing  Flowsheets (Taken 4/6/2025 0353)  Absence of Physical Injury: Implement safety measures based on patient assessment

## 2025-04-06 NOTE — PROGRESS NOTES
Pharmacist Review and Automatic Dose Adjustment of Prophylactic Enoxaparin    Reviewed reason(s) for admission/hospital problem list    The reviewing pharmacist has made an adjustment to the ordered enoxaparin dose or converted to UFH per the approved [facility] protocol and table as identified below.        South Vargas is a 89 y.o. male.     Recent Labs     04/06/25  0013   CREATININE 1.6*       Estimated Creatinine Clearance: 42 mL/min (A) (based on SCr of 1.6 mg/dL (H)).    Recent Labs     04/06/25  0013   HGB 12.0*   HCT 36.7*        No results for input(s): \"INR\" in the last 72 hours.    Height:   Ht Readings from Last 1 Encounters:   04/06/25 1.803 m (5' 11\")     Weight:  Wt Readings from Last 1 Encounters:   04/06/25 116.7 kg (257 lb 4.8 oz)               Plan: Based upon the patient's weight and renal function    Ordered: Enoxaparin 40mg SUBQ Daily    Changed/converted to    New Order: Enoxaparin 30mg SUBQ BID      Thank you,  Aristeo White, PharmD, [facility]  4/6/2025 2:25 AM

## 2025-04-06 NOTE — PROGRESS NOTES
Writer at bedside to complete admission assessment, navigator and vital signs. Pt arrived to floor via ER bed. Pt transfered with assistance. Shift assessment, vital signs and addmission navigator complete, see flow sheet for details. Pt stated pain level is 0/10. Orders completed at this time. Pt denies any other needs at this time. Call light within reach. Care is ongoing.

## 2025-04-06 NOTE — ED NOTES
Upon arrival from EMS, pt noted to be 88% on RA. Pt placed on 2L NC. Pt states no home O2 use. Pt SpO2 95% on 2L NC

## 2025-04-06 NOTE — ED NOTES
Assisted pt with urinal.  Pt was incontinent of bowels and bladder.  Pt was cleaned and placed on dry linen.  Urinals were provided for ease of elimination.  Pt declined use of a male external catheter.

## 2025-04-06 NOTE — PROGRESS NOTES
Occupational Therapy  [facility]  Inpatient/Observation/Outpatient Rehabilitation    Date: 2025  Patient Name: South Vargas       [x] Inpatient Acute/Observation       []  Outpatient  : 1935     [x] Pt refused/declined therapy at this time due to: not feeling well, requests therapy to check back tomorrow.       [] Pt cancelled due to:  [] No Reason Given   [] Sick/ill   [] Other:    [] Evaluation held by RN/Provider/Physical Therapist due to:    [] High Heart Rate   [] High Blood Pressure   [] Orthopedic Consult   [] Hgb < 7   [] Other:    [] Pt ordered brace per physician request:  [] Proper fit will be completed and education for wearing/skin checks    [] Pt does not require skilled services due to:      Therapist/Assistant will attempt to see this patient, at our earliest opportunity.       Christina Spears OT Date: 2025

## 2025-04-07 ENCOUNTER — HOSPITAL ENCOUNTER (OUTPATIENT)
Dept: PHYSICAL THERAPY | Age: 89
Setting detail: THERAPIES SERIES
Discharge: HOME | End: 2025-04-07

## 2025-04-07 VITALS
DIASTOLIC BLOOD PRESSURE: 65 MMHG | BODY MASS INDEX: 36.54 KG/M2 | SYSTOLIC BLOOD PRESSURE: 129 MMHG | RESPIRATION RATE: 20 BRPM | OXYGEN SATURATION: 90 % | HEART RATE: 70 BPM | HEIGHT: 71 IN | WEIGHT: 261 LBS | TEMPERATURE: 98.8 F

## 2025-04-07 LAB
ALBUMIN SERPL-MCNC: 3.1 G/DL (ref 3.5–5.2)
ALBUMIN/GLOB SERPL: 1.2 {RATIO} (ref 1–2.5)
ALP SERPL-CCNC: 110 U/L (ref 40–129)
ALT SERPL-CCNC: 41 U/L (ref 10–50)
ANION GAP SERPL CALCULATED.3IONS-SCNC: 10 MMOL/L (ref 9–16)
AST SERPL-CCNC: 30 U/L (ref 10–50)
BASOPHILS # BLD: 0.04 K/UL (ref 0–0.2)
BASOPHILS NFR BLD: 0 % (ref 0–2)
BILIRUB SERPL-MCNC: 0.4 MG/DL (ref 0–1.2)
BUN SERPL-MCNC: 31 MG/DL (ref 8–23)
BUN/CREAT SERPL: 19 (ref 9–20)
CALCIUM SERPL-MCNC: 8.2 MG/DL (ref 8.6–10.4)
CHLORIDE SERPL-SCNC: 102 MMOL/L (ref 98–107)
CO2 SERPL-SCNC: 22 MMOL/L (ref 20–31)
CREAT SERPL-MCNC: 1.6 MG/DL (ref 0.7–1.2)
CRP SERPL HS-MCNC: 274 MG/L (ref 0–5)
EKG ATRIAL RATE: 82 BPM
EKG P-R INTERVAL: 288 MS
EKG Q-T INTERVAL: 386 MS
EKG QRS DURATION: 128 MS
EKG QTC CALCULATION (BAZETT): 450 MS
EKG R AXIS: -14 DEGREES
EKG T AXIS: 87 DEGREES
EKG VENTRICULAR RATE: 82 BPM
EOSINOPHIL # BLD: 0.09 K/UL (ref 0–0.44)
EOSINOPHILS RELATIVE PERCENT: 1 % (ref 1–4)
ERYTHROCYTE [DISTWIDTH] IN BLOOD BY AUTOMATED COUNT: 14.3 % (ref 11.8–14.4)
GFR, ESTIMATED: 42 ML/MIN/1.73M2
GLUCOSE SERPL-MCNC: 158 MG/DL (ref 74–99)
HCT VFR BLD AUTO: 31 % (ref 40.7–50.3)
HGB BLD-MCNC: 10.1 G/DL (ref 13–17)
IMM GRANULOCYTES # BLD AUTO: 0.13 K/UL (ref 0–0.3)
IMM GRANULOCYTES NFR BLD: 1 %
LYMPHOCYTES NFR BLD: 1.32 K/UL (ref 1.1–3.7)
LYMPHOCYTES RELATIVE PERCENT: 8 % (ref 24–43)
MCH RBC QN AUTO: 30.2 PG (ref 25.2–33.5)
MCHC RBC AUTO-ENTMCNC: 32.6 G/DL (ref 28.4–34.8)
MCV RBC AUTO: 92.8 FL (ref 82.6–102.9)
MONOCYTES NFR BLD: 1.42 K/UL (ref 0.1–1.2)
MONOCYTES NFR BLD: 9 % (ref 3–12)
NEUTROPHILS NFR BLD: 81 % (ref 36–65)
NEUTS SEG NFR BLD: 12.64 K/UL (ref 1.5–8.1)
NRBC BLD-RTO: 0 PER 100 WBC
PLATELET # BLD AUTO: 140 K/UL (ref 138–453)
PMV BLD AUTO: 10.7 FL (ref 8.1–13.5)
POTASSIUM SERPL-SCNC: 3.9 MMOL/L (ref 3.7–5.3)
PROT SERPL-MCNC: 5.7 G/DL (ref 6.6–8.7)
RBC # BLD AUTO: 3.34 M/UL (ref 4.21–5.77)
SODIUM SERPL-SCNC: 134 MMOL/L (ref 136–145)
WBC OTHER # BLD: 15.6 K/UL (ref 3.5–11.3)

## 2025-04-07 PROCEDURE — 6360000002 HC RX W HCPCS: Performed by: STUDENT IN AN ORGANIZED HEALTH CARE EDUCATION/TRAINING PROGRAM

## 2025-04-07 PROCEDURE — 94761 N-INVAS EAR/PLS OXIMETRY MLT: CPT

## 2025-04-07 PROCEDURE — 80053 COMPREHEN METABOLIC PANEL: CPT

## 2025-04-07 PROCEDURE — 2500000003 HC RX 250 WO HCPCS: Performed by: STUDENT IN AN ORGANIZED HEALTH CARE EDUCATION/TRAINING PROGRAM

## 2025-04-07 PROCEDURE — 6370000000 HC RX 637 (ALT 250 FOR IP): Performed by: STUDENT IN AN ORGANIZED HEALTH CARE EDUCATION/TRAINING PROGRAM

## 2025-04-07 PROCEDURE — 2580000003 HC RX 258: Performed by: STUDENT IN AN ORGANIZED HEALTH CARE EDUCATION/TRAINING PROGRAM

## 2025-04-07 PROCEDURE — 97161 PT EVAL LOW COMPLEX 20 MIN: CPT

## 2025-04-07 PROCEDURE — 86140 C-REACTIVE PROTEIN: CPT

## 2025-04-07 PROCEDURE — 85025 COMPLETE CBC W/AUTO DIFF WBC: CPT

## 2025-04-07 PROCEDURE — 36415 COLL VENOUS BLD VENIPUNCTURE: CPT

## 2025-04-07 PROCEDURE — 94640 AIRWAY INHALATION TREATMENT: CPT

## 2025-04-07 PROCEDURE — 94669 MECHANICAL CHEST WALL OSCILL: CPT

## 2025-04-07 PROCEDURE — 93010 ELECTROCARDIOGRAM REPORT: CPT | Performed by: FAMILY MEDICINE

## 2025-04-07 RX ORDER — BENZONATATE 100 MG/1
100 CAPSULE ORAL 3 TIMES DAILY PRN
Qty: 20 CAPSULE | Refills: 0 | Status: SHIPPED | OUTPATIENT
Start: 2025-04-07 | End: 2025-04-14

## 2025-04-07 RX ORDER — GUAIFENESIN 600 MG/1
600 TABLET, EXTENDED RELEASE ORAL 2 TIMES DAILY
Qty: 14 TABLET | Refills: 0 | Status: SHIPPED | OUTPATIENT
Start: 2025-04-07 | End: 2025-04-14

## 2025-04-07 RX ADMIN — ENOXAPARIN SODIUM 30 MG: 100 INJECTION SUBCUTANEOUS at 08:52

## 2025-04-07 RX ADMIN — PROPRANOLOL HYDROCHLORIDE 60 MG: 60 CAPSULE, EXTENDED RELEASE ORAL at 08:55

## 2025-04-07 RX ADMIN — SPIRONOLACTONE 50 MG: 25 TABLET ORAL at 08:52

## 2025-04-07 RX ADMIN — PANTOPRAZOLE SODIUM 40 MG: 40 TABLET, DELAYED RELEASE ORAL at 08:52

## 2025-04-07 RX ADMIN — PRIMIDONE 250 MG: 250 TABLET ORAL at 08:52

## 2025-04-07 RX ADMIN — BUMETANIDE 1 MG: 1 TABLET ORAL at 08:51

## 2025-04-07 RX ADMIN — ATORVASTATIN CALCIUM 40 MG: 40 TABLET, FILM COATED ORAL at 08:51

## 2025-04-07 RX ADMIN — ASPIRIN 81 MG: 81 TABLET, CHEWABLE ORAL at 08:51

## 2025-04-07 RX ADMIN — TAMSULOSIN HYDROCHLORIDE 0.8 MG: 0.4 CAPSULE ORAL at 08:51

## 2025-04-07 RX ADMIN — MONTELUKAST 10 MG: 10 TABLET, FILM COATED ORAL at 08:52

## 2025-04-07 RX ADMIN — BUDESONIDE AND FORMOTEROL FUMARATE DIHYDRATE 2 PUFF: 160; 4.5 AEROSOL RESPIRATORY (INHALATION) at 10:30

## 2025-04-07 RX ADMIN — IPRATROPIUM BROMIDE AND ALBUTEROL SULFATE 1 DOSE: .5; 2.5 SOLUTION RESPIRATORY (INHALATION) at 10:25

## 2025-04-07 RX ADMIN — GUAIFENESIN 600 MG: 600 TABLET ORAL at 08:51

## 2025-04-07 RX ADMIN — SODIUM CHLORIDE, PRESERVATIVE FREE 10 ML: 5 INJECTION INTRAVENOUS at 08:53

## 2025-04-07 RX ADMIN — FERROUS SULFATE TAB 325 MG (65 MG ELEMENTAL FE) 325 MG: 325 (65 FE) TAB at 08:52

## 2025-04-07 RX ADMIN — OXYBUTYNIN CHLORIDE 15 MG: 5 TABLET, EXTENDED RELEASE ORAL at 08:52

## 2025-04-07 RX ADMIN — LEVOTHYROXINE SODIUM 137 MCG: 25 TABLET ORAL at 08:51

## 2025-04-07 RX ADMIN — WATER 1000 MG: 1 INJECTION INTRAMUSCULAR; INTRAVENOUS; SUBCUTANEOUS at 01:58

## 2025-04-07 RX ADMIN — AZITHROMYCIN MONOHYDRATE 500 MG: 500 INJECTION, POWDER, LYOPHILIZED, FOR SOLUTION INTRAVENOUS at 02:02

## 2025-04-07 RX ADMIN — Medication 1 TABLET: at 08:51

## 2025-04-07 RX ADMIN — IPRATROPIUM BROMIDE AND ALBUTEROL SULFATE 1 DOSE: .5; 2.5 SOLUTION RESPIRATORY (INHALATION) at 05:36

## 2025-04-07 NOTE — PROGRESS NOTES
Discussion with South at the bedside. He wishes to complete ACP documents but would like to wait until his wife arrives. Discussed with primary nurse.     Nickie Durham RN  [facility]   Palliative Care Nurse Coordinator  4/7/2025 8:56 AM

## 2025-04-07 NOTE — CONSULTS
Palliative Care Inpatient    Patient: South Vargas  Room: 0303/0303-01    Reason For Consult   Goals of care evaluation  Distress management  Guidance and support  Facilitate communications  Assistance in coordinating care    Code Status: DNR-CCA      Impression: South Vargas is a 89 y.o. year old male  has a past medical history of A-fib (HCC), COPD (chronic obstructive pulmonary disease) (HCC), Diabetes mellitus (HCC), Hx of echocardiogram, Hyperlipidemia, Hypothyroidism, MI (myocardial infarction) (HCC), Stroke (HCC), Thyroid disease, and Type II or unspecified type diabetes mellitus without mention of complication, not stated as uncontrolled..  Currently hospitalized for the management of Respiratory failure.  The Palliative Care Team is following to assist with ACP.    Vital Signs  /65   Pulse 70   Temp 98.8 °F (37.1 °C) (Temporal)   Resp 20   Ht 1.803 m (5' 11\")   Wt 118.4 kg (261 lb)   SpO2 90%   BMI 36.40 kg/m²     Patient Active Problem List   Diagnosis    Obesity (BMI 30.0-34.9)    Venous (peripheral) insufficiency    Hx pulmonary embolism    Hearing impaired    Edema    Type 2 diabetes mellitus with other specified complication, unspecified whether long term insulin use (HCC)    Knee osteoarthritis    Congenital hypothyroidism    COPD without exacerbation (HCC)    Hyponatremia    Adrenal adenoma, left    Elevated liver enzymes    Hypothyroidism due to Hashimoto's thyroiditis    Moderate persistent asthma without complication    Community acquired bacterial pneumonia    Hypoxia    Multifocal pneumonia    Sepsis due to pneumonia (HCC)    Septicemia (HCC)    ASHD (arteriosclerotic heart disease)    Dyslipidemia    History of stroke    Iron deficiency anemia    Nonrheumatic aortic valve stenosis    Chronic diastolic congestive heart failure (HCC)    Essential hypertension    Other fluid overload    Ischemic stroke (HCC)    Acute ischemic left middle cerebral artery (MCA) stroke (HCC)     Altered mental status    Dysarthria    PVC (premature ventricular contraction)    Diverticular bleed     Rectal bleeding    Anemia    Diverticular hemorrhage    Acute blood loss anemia    Pancolonic diverticulosis    Cerebrovascular accident (CVA) due to embolism of middle cerebral artery (HCC)    Parainfluenza    ESBL (extended spectrum beta-lactamase) producing bacteria infection    Acute lower GI bleeding    Hematochezia    AVM (arteriovenous malformation) of stomach, acquired    Diverticulosis of colon with hemorrhage    Hypocalcemia    Community acquired pneumonia of both lungs    Toxic metabolic encephalopathy    Chronic kidney disease, stage 3a (HCC)    Chronic systolic (congestive) heart failure    Constipation    BPH with obstruction/lower urinary tract symptoms    Acute respiratory failure with hypoxia       Palliative Interaction: South is resting in the bed. His wife is at the bedside. ACP documents completed with his wife followed by his son South II then his daughter Danuta listed as the decision makers. Documents scanned into EHR. Original and several copies returned to South and his wife. South has a DNR-CCA on file that remains current with his wishes. Both deny further needs or concerns at this time.    Goals/Plan of care  Education/support to family  Education/support to patient  Advanced directive completed Health care power of  and living will   Continue with current plan of care    Electronically signed by   Nickie Durham RN  Palliative Care Team  on 4/7/2025 at 12:47 PM    Palliative care office: [phone]

## 2025-04-07 NOTE — PROGRESS NOTES
Reviewed discharge instructions with patient and wife.  Aware of need to  new prescriptions.   Reviewed new medications and side effects to monitor for.  Reviewed home medications and when next dose is due.  Patient aware of date/time of follow up appointment.   Informed of need for repeat labs for 4/14/25.   Lab order form given to patient.   Instructed to ambulate with walker/cane.  Keep legs elevated when up in chair.   Educated patient to continue to use incentive spirometer and/or Acapella frequently during the day.   Both devices sent home with patient.   Educational handouts given on Respiratory Failure and Pneumonia.   Questions answered.   Verbalizes understanding.   Copy of discharge instructions given to patient.

## 2025-04-07 NOTE — DISCHARGE SUMMARY
Discharge Summary    South Vargas  :  1935  MRN:  [id number]    Admit date:  2025      Discharge date: 2025     Admitting Physician:  Saeed Savage MD    Discharge Diagnoses:    Principal Problem:    Acute respiratory failure with hypoxia  Active Problems:    PVC (premature ventricular contraction)    Multifocal pneumonia    Sepsis due to pneumonia (HCC)    Septicemia (HCC)    ASHD (arteriosclerotic heart disease)    Dyslipidemia    Iron deficiency anemia    Essential hypertension    Other fluid overload    Obesity (BMI 30.0-34.9)    Type 2 diabetes mellitus with other specified complication, unspecified whether long term insulin use (HCC)    Knee osteoarthritis    Congenital hypothyroidism    Hypothyroidism due to Hashimoto's thyroiditis    Moderate persistent asthma without complication    Ischemic stroke (HCC)    Anemia    Pancolonic diverticulosis    Parainfluenza    Hypocalcemia  Resolved Problems:    * No resolved hospital problems. *      Hospital Course:   South Vargas is a 89 y.o. male admitted with acute respiratory failure with hypoxia.  He presented with complaints of fever chills and myalgias.  He complained of productive cough for several days.  He denied any ill contacts.  He denied any GI or  symptoms.  During patient's initial workup he was found to be hypoxic and placed on supplemental oxygen.  He does have history of COPD and atrial fibrillation as well as diabetes.  Patient was found to have multifocal pneumonia and admitted.  During patient's evaluation labs were monitored electrolytes replaced.  Respiratory panel negative.  Blood cultures negative.  Hypoxia resolved.  Patient overall is feeling better and feels well to go home.  Plan will be to discharge home on Augmentin and he will follow-up with primary care with labs in a week.    Consultants:  none    Procedures: none    Complications: none    Discharge Condition: fair    Exam:  GEN:    Awake, alert and oriented x3.        Patient Instructions:   Activity: activity as tolerated  Diet: encourage fluids  Wound Care: none needed  Other: None    Disposition:   Discharge to Home    Follow up:  Patient will be followed by Flaco Snider APRN - CNP in 1-2 weeks    CORE MEASURES on Discharge (if applicable)  ACE/ARB in CHF: NA  Statin in MI: NA  ASA in MI: NA  Statin in CVA: NA  Antiplatelet in CVA: NA    Total time spent on discharge services: 40 minutes    Including the following activities:  Evaluation and Management of patient  Discussion with patient and/or surrogate about current care plan  Coordination with Case Management and/or   Coordination of care with Consultants (if applicable)   Coordination of care with Receiving Facility Physician (if applicable)  Completion of DME forms (if applicable)  Preparation of Discharge Summary  Preparation of Medication Reconciliation  Preparation of Discharge Prescriptions    Signed:  GENA Gross CNP, GENA, NP-C  4/7/2025, 10:21 AM      Please note that this chart was generated using voice recognition Dragon dictation software. Although every effort was made to ensure the accuracy of this automated transcription, some errors in transcription may have occurred.

## 2025-04-07 NOTE — PROGRESS NOTES
[facility]  Inpatient/Observation/Outpatient Rehabilitation    Date: 2025  Patient Name: South Vargas       [] Inpatient Acute/Observation       []  Outpatient  : 1935         [] Pt refused/declined therapy at this time due to:           [x] Pt cancelled due to:  [] No Reason Given   [] Sick/ill   [x] Other:  admitted to hospital    [] Evaluation held by RN/Provider/Physical Therapist due to:    [] High Heart Rate   [] High Blood Pressure   [] Orthopedic Consult   [] Hgb < 7   [] Other:    [] Pt ordered brace per physician request:  [] Proper fit will be completed and education for wearing/skin checks    [] Pt does not require skilled services due to:      Therapist/Assistant will attempt to see this patient, at our earliest opportunity.       Agnieszka Mcdonough Date: 2025

## 2025-04-07 NOTE — ACP (ADVANCE CARE PLANNING)
Advance Care Planning     Palliative Team Advance Care Planning (ACP) Conversation    Date of Conversation: 04/07/25    Individuals present for the conversation: Patient with decision making capacity and Spouse Nicole     ACP documents on file prior to discussion:  -Portable DNR    Previously completed document/s not on file: None    Healthcare Decision Maker:    Primary Decision Maker (Active): HalleymarenJose ManuelNicole - Spouse - [phone]    Secondary Decision Maker: South Vargas II - Child - [phone]    Supplemental (Other) Decision Maker: Danuta Vargas - Child - [phone]     Conversation Summary:  New health care power of  and living will completed at the bedside.     Resuscitation Status:   Code Status: DNR-CCA     Documentation Completed:  -Power of  for Healthcare and -Living Will    I spent 25 minutes with the patient and/or surrogate decision maker discussing the patient's wishes and goals.      Nickie Durham, RN

## 2025-04-07 NOTE — PROGRESS NOTES
Comprehensive Nutrition Assessment    Type and Reason for Visit:  Initial    Nutrition Recommendations/Plan:   Encourage regular and consistent meals  Continue to avoid salt in home diet     Malnutrition Assessment:  Malnutrition Status:  No malnutrition (04/07/25 0656)    Context:  Acute Illness     Findings of the 6 clinical characteristics of malnutrition:  Energy Intake:  No decrease in energy intake  Weight Loss:  No weight loss     Body Fat Loss:  No body fat loss     Muscle Mass Loss:  No muscle mass loss    Fluid Accumulation:  Moderate to Severe Extremities   Strength:  Not Performed    Nutrition Assessment:    Predicted suboptimal nutrient intakes r/t altered respiratory status, AEB hypoxemia at admission.  Breathing better and denies any ingestion issues.  Denies decreases in appetite and weight has been on increase in last 3 months. States not using salt in home diet but no particular eating pattern otherwise (known diabetes with A1C 7.9). Declines any education, stating \"why start now\".    Nutrition Related Findings:    active b/s. +3 BLE edema. Wound Type: None       Current Nutrition Intake & Therapies:    Average Meal Intake: 51-75%  Average Supplements Intake: None Ordered  ADULT DIET; Regular    Anthropometric Measures:  Height: 180.3 cm (5' 11\")  Ideal Body Weight (IBW): 172 lbs (78 kg)    Admission Body Weight: 116.7 kg (257 lb 4.8 oz)  Current Body Weight: 118.4 kg (261 lb), 151.7 % IBW. Weight Source: Bed scale  Current BMI (kg/m2): 36.4  Usual Body Weight: 107.5 kg (237 lb) (3 months ago)     % Weight Change (Calculated): 10.1  Weight Adjustment For: No Adjustment                 BMI Categories: Obese Class 2 (BMI 35.0 -39.9)    Estimated Daily Nutrient Needs:  Energy Requirements Based On: Kcal/kg  Weight Used for Energy Requirements: Current  Energy (kcal/day): 0011-2355 (15-18)  Weight Used for Protein Requirements: Ideal  Protein (g/day):  (1.2-1.4)  Method Used for Fluid  Requirements: 1 ml/kcal  Fluid (ml/day): 2100+    Nutrition Diagnosis:   Predicted inadequate energy intake related to impaired respiratory function as evidenced by other (hypoxemia at admission)    Lab Results   Component Value Date     (L) 04/07/2025    K 3.9 04/07/2025     04/07/2025    CO2 22 04/07/2025    BUN 31 (H) 04/07/2025    CREATININE 1.6 (H) 04/07/2025    GLUCOSE 158 (H) 04/07/2025    CALCIUM 8.2 (L) 04/07/2025    BILITOT 0.4 04/07/2025    ALKPHOS 110 04/07/2025    AST 30 04/07/2025    ALT 41 04/07/2025    LABGLOM 42 (L) 04/07/2025    GFRAA >60 03/14/2022     Hemoglobin A1C   Date Value Ref Range Status   02/19/2025 7.9 % Final     No results found for: \"VITD25\"    Nutrition Interventions:   Food and/or Nutrient Delivery: Continue Current Diet  Nutrition Education/Counseling: Education/Counseling declined  Coordination of Nutrition Care: Continue to monitor while inpatient  Plan of Care discussed with: patient    Goals:  Goals: Meet at least 75% of estimated needs  Type of Goal: New goal  Previous Goal Met: New Goal    Nutrition Monitoring and Evaluation:   Behavioral-Environmental Outcomes: Readiness for Change  Food/Nutrient Intake Outcomes: Food and Nutrient Intake  Physical Signs/Symptoms Outcomes: Biochemical Data, Fluid Status or Edema, Weight    Discharge Planning:          Agus Foss RD, LD  Contact: [id number]

## 2025-04-07 NOTE — PROGRESS NOTES
Physical Therapy  Facility/Department: [facility] MED SURG  Physical Therapy Initial Assessment    Name: South Vargas  : 1935  MRN: [id number]  Date of Service: 2025    Discharge Recommendations:  Continue to assess pending progress, Home with assist PRN, Home independently, Home with Home health PT          Patient Diagnosis(es): The primary encounter diagnosis was Hypoxemia. A diagnosis of Community acquired pneumonia, unspecified laterality was also pertinent to this visit.  Past Medical History:  has a past medical history of A-fib (HCC), COPD (chronic obstructive pulmonary disease) (HCC), Diabetes mellitus (HCC), Hx of echocardiogram, Hyperlipidemia, Hypothyroidism, MI (myocardial infarction) (HCC), Stroke (HCC), Thyroid disease, and Type II or unspecified type diabetes mellitus without mention of complication, not stated as uncontrolled.  Past Surgical History:  has a past surgical history that includes hernia repair; Cardiac surgery (2017); fracture surgery; Colonoscopy; Cerebral angiogram (Bilateral, 06/15/2023); sigmoidoscopy (2023); Colonoscopy (N/A, 2023); and Upper gastrointestinal endoscopy (10/25/2023).    Assessment  Assessment: Pt is a 89 y.o. male who requires CGA/Min A for bed mobility and CGA for trasnfers/ambulation. Pt ambulated 15' to restroom and 15' to bedside chair with standard cane and CGA, slowed mildly unsteady gait. Pt adopting wide RUBEN with ambulation and requires verbal cueing to properly place cane on ground with ambultion. B LE weakness demonstrated as well. Pt would benefit from skilled therapy to address these defecits and improve function to prior level.  Treatment Diagnosis: generalized weakness  Specific Instructions for Next Treatment: once per day on weekends and holidays  Therapy Prognosis: Good  Decision Making: Low Complexity  Requires PT Follow-Up: Yes  Activity Tolerance  Activity Tolerance: Patient tolerated evaluation without  mobility  Rolling to Right: Contact guard assistance  Supine to Sit: Minimal assistance  Sit to Supine: Minimal assistance  Scooting: Minimal assistance  Transfers  Sit to Stand: Contact guard assistance  Stand to Sit: Contact guard assistance  Bed to Chair: Contact guard assistance  Ambulation  WB Status: fwb  Ambulation  Surface: Level tile  Device: Single point cane  Assistance: Contact guard assistance  Quality of Gait: Pt ambulated 15' to restroom and 15' to bedside chair with standard cane and CGA, slowed mildly unsteady gait.     Balance  Posture: Fair  Sitting - Static: Good  Sitting - Dynamic: Good  Standing - Static: Good;-  Standing - Dynamic: Good;-              AM-PAC - Mobility         AM-PAC Mobility without Stair Climbing Inpatient   How much difficulty turning over in bed?: A Little  How much difficulty sitting down on / standing up from a chair with arms?: A Little  How much difficulty moving from lying on back to sitting on side of bed?: A Little  How much help from another person moving to and from a bed to a chair?: A Little  How much help from another person needed to walk in hospital room?: A Little  AM-PAC Inpatient Mobility without Stair Climbing Raw Score : 15  AM-PAC Inpatient without Stair Climbing T-Scale Score : 43.03  Mobility Inpatient CMS 0-100% Score: 47.43  Mobility Inpatient without Stair CMS G-Code Modifier : CK      Goals  Short Term Goals  Time Frame for Short Term Goals: 20 days  Short Term Goal 1: Pt to tolerate 20 minutes of ther ex to facilitate return to PLOF.  Short Term Goal 2: Pt to ambulate 60' with standard cane and SBA to increase ambulatory capacity for d/c  Short Term Goal 3: Pt to complete transfers with standard cane and SBA to demonstrate increased independacne for safety with d/c.  Patient Goals   Patient Goals : Pt goal to return home       Education  Patient Education  Education Given To: Patient  Education Provided Comments: Pt edu: PT POC      Therapy

## 2025-04-07 NOTE — CARE COORDINATION
Case Management Assessment  Initial Evaluation    Date/Time of Evaluation: 4/7/2025 12:09 PM  Assessment Completed by: CORINA Warner    If patient is discharged prior to next notation, then this note serves as note for discharge by case management.    Patient Name: South Vargas                   YOB: 1935  Diagnosis: Hypoxemia [R09.02]  Acute respiratory failure with hypoxia [J96.01]  Community acquired pneumonia, unspecified laterality [J18.9]                   Date / Time: 4/6/2025 12:13 AM    Patient Admission Status: Inpatient   Readmission Risk (Low < 19, Mod (19-27), High > 27): Readmission Risk Score: 19.6    Current PCP: Flaco Snider APRN - CNP  PCP verified by CM? Yes    Chart Reviewed: Yes      History Provided by: Patient  Patient Orientation: Alert and Oriented, Person, Place, Situation, Self    Patient Cognition: Alert    Hospitalization in the last 30 days (Readmission):  No    If yes, Readmission Assessment in CM Navigator will be completed.    Advance Directives:      Code Status: DNR-CCA   Patient's Primary Decision Maker is: Legal Next of Kin    Primary Decision Maker (Active): Nicole Vargas - Spouse - [phone]    Discharge Planning:    Patient lives with: Spouse/Significant Other Type of Home: House  Primary Care Giver: Self  Patient Support Systems include: Spouse/Significant Other, Family Members   Current Financial resources: Medicare  Current community resources: None  Current services prior to admission: Durable Medical Equipment            Current DME:              Type of Home Care services:       ADLS  Prior functional level: Assistance with the following:, Bathing, Dressing, Cooking, Housework  Current functional level: Assistance with the following:, Bathing, Dressing, Housework, Cooking    PT AM-PAC:   /24  OT AM-PAC:   /24    Family can provide assistance at DC: Yes  Would you like Case Management to discuss the discharge plan with any other  family members/significant others, and if so, who? Yes  Plans to Return to Present Housing: Yes  Other Identified Issues/Barriers to RETURNING to current housing: none  Potential Assistance needed at discharge:              Potential DME:    Patient expects to discharge to:    Plan for transportation at discharge:      Financial    Payor: [employer] MEDICARE / Plan: [employer] MEDICARE-ADVANTAGE PPO / Product Type: Medicare /     Does insurance require precert for SNF: Yes    Potential assistance Purchasing Medications:    Meds-to-Beds request:        [facility] [address] [place] [address] - [address] - P [phone] - F [phone]  [address]  [facility] [address]  Phone: [phone] Fax: [phone]      Notes:    Factors facilitating achievement of predicted outcomes: Family support, Cooperative, Pleasant, and Has needed Durable Medical Equipment at home    Barriers to discharge: Limited insight into deficits and Decreased endurance    Additional Case Management Notes:  Patient is  and lives at home with his wife. He uses a cane at home. The patient's wife does the household chores. He requires assistance with his ADL's. They both manages his medication. The wife provides the transportation.  Patient served in the [employer].   Gave him written information on mobile meals.  The plan is home with no services at this time.    The Plan for Transition of Care is related to the following treatment goals of Hypoxemia [R09.02]  Acute respiratory failure with hypoxia [J96.01]  Community acquired pneumonia, unspecified laterality [J18.9]    Transportation/Food Security/Housekeeping Addressed: No issues identified or concerns.    The Patient and/or Patient Representative Agree with the Discharge Plan?  yes    CORINA Warner  Case Management Department  Ph: [phone]

## 2025-04-07 NOTE — PROGRESS NOTES
Progress Note    SUBJECTIVE:    Patient seen for f/u of Acute respiratory failure with hypoxia.  He resting in bed no distress. Feels good. Off oxygen since yesterday.  Phlegm production.  No fevers     ROS:   Constitutional: negative  for fevers, and negative for chills.  Respiratory: negative for shortness of breath, positive for cough, and negative for wheezing  Cardiovascular: negative for chest pain, and negative for palpitations  Gastrointestinal: negative for abdominal pain, negative for nausea,negative for vomiting, negative for diarrhea, and negative for constipation     All other systems were reviewed with the patient and are negative unless otherwise stated in HPI      OBJECTIVE:      Vitals:   Vitals:    04/07/25 0712   BP: 129/65   Pulse: 70   Resp: 20   Temp: 98.8 °F (37.1 °C)   SpO2: 90%     Weight - Scale: 118.4 kg (261 lb)   Height: 180.3 cm (5' 11\")     Weight  Wt Readings from Last 3 Encounters:   04/07/25 118.4 kg (261 lb)   02/19/25 112 kg (246 lb 14.4 oz)   02/11/25 108.9 kg (240 lb)     Body mass index is 36.4 kg/m².    24HR INTAKE/OUTPUT:      Intake/Output Summary (Last 24 hours) at 4/7/2025 1018  Last data filed at 4/7/2025 0859  Gross per 24 hour   Intake 1340 ml   Output 1100 ml   Net 240 ml     -----------------------------------------------------------------  Exam:    GEN:    Awake, alert and oriented x3.   EYES:  EOMI, pupils equal   NECK: Supple. No lymphadenopathy.  No carotid bruit  CVS:    regular rate and rhythm, no audible murmur  PULM:  CTA, no wheezes, rales or rhonchi, no acute respiratory distress  ABD:    Bowels sounds normal.  Abdomen is soft.  No distention.  no tenderness to palpation.   EXT:  lymph edema bilaterally .  No calf tenderness.   NEURO: Moves all extremities.  Motor and sensory are grossly intact  SKIN:  No rashes.  No skin lesions.    -----------------------------------------------------------------    Diagnostic Data:      Complete Blood Count:   Recent Labs

## 2025-04-07 NOTE — DISCHARGE INSTR - DIET

## 2025-04-07 NOTE — PROGRESS NOTES
Patient shift assessment and vitals completed at this time as charted. Patient is alert and oriented x4 and currently denies pain. Patient is currently on room air and denies SOB. Patient is accompanied by his wife at bedside. Patient states no current needs, call light is in reach, plan of care is ongoing.

## 2025-04-07 NOTE — RT PROTOCOL NOTE
RESPIRATORY ASSESSMENT PROTOCOL                                                                                              Patient Name: South Vargas Room#: 0303/0303-01 : 1935     Admitting diagnosis: Hypoxemia [R09.02]  Acute respiratory failure with hypoxia [J96.01]  Community acquired pneumonia, unspecified laterality [J18.9]       Medical History:   Past Medical History:   Diagnosis Date    A-fib (HCC)     COPD (chronic obstructive pulmonary disease) (HCC)     Diabetes mellitus (HCC)     Hx of echocardiogram 2017    [facility]    Hyperlipidemia     Hypothyroidism     MI (myocardial infarction) (HCC)     Stroke (HCC)     Thyroid disease     Type II or unspecified type diabetes mellitus without mention of complication, not stated as uncontrolled        PATIENT ASSESSMENT    LABORATORY DATA  Hematology:   Lab Results   Component Value Date/Time    WBC 15.6 2025 06:00 AM    RBC 3.34 2025 06:00 AM    RBC 0-2 2023 02:37 PM    HGB 10.1 2025 06:00 AM    HCT 31.0 2025 06:00 AM     2025 06:00 AM     Chemistry:    Lab Results   Component Value Date/Time    PHART 7.426 2023 05:25 PM    UBJ0FGK 34.3 2023 05:25 PM    PO2ART 75.8 2023 05:25 PM    O3VPVCEE 95.6 2023 05:25 PM    NLH8IWN 22.1 2023 05:25 PM    PBEA 1.4 2017 11:25 PM    NBEA 1.6 2023 05:25 PM       VITALS  Pulse: 70   Respirations: 20  BP: 129/65  SpO2: 90 % O2 Device: None (Room air)  Temp: 98.8 °F (37.1 °C)    SKIN COLOR  [x] Normal  [] Pale  [] Dusky  [] Cyanotic    RESPIRATORY PATTERN  [x] Normal  [] Dyspnea  [] Cheyne-Veras  [] Kussmaul  [] Biots    AMBULATORY  [] Yes  [] No  [] With Assistance    PEAK FLOW  Predicted:     Personal Best:            Patient Acuity 0 1 2 3 4 Score   Level of Consciousness (LOC) [x]  Alert & Oriented or Pt normal LOC []  Confused;follows directions []  Confused & uncooper-ative []  Obtunded []  Comatose 0

## 2025-04-07 NOTE — FLOWSHEET NOTE
Awake, resting in bed. Vitals checked and assessment completed. Alert and oriented x 4. Denies pain this morning. C/O weakness in BLE.  3+ edema to BLE . No needs at present time. Call light within reach. Care ongoing.

## 2025-04-08 ENCOUNTER — CARE COORDINATION (OUTPATIENT)
Dept: CARE COORDINATION | Age: 89
End: 2025-04-08

## 2025-04-08 DIAGNOSIS — J96.01 ACUTE RESPIRATORY FAILURE WITH HYPOXIA: Primary | ICD-10-CM

## 2025-04-08 PROCEDURE — 1111F DSCHRG MED/CURRENT MED MERGE: CPT | Performed by: NURSE PRACTITIONER

## 2025-04-08 NOTE — CARE COORDINATION
MIKA KNIGHTN - CNP Primary Care [phone]    5/19/2025 3:00 PM Flaco Snider, APRN - CNP [facility] [phone]    8/11/2025 2:40 PM Paulino Marin MD Cardiology [phone]    9/8/2025 1:20 PM Javier Duvall MD Nephrology [phone]            Care Transition Nurse provided contact information.  Plan for follow-up call in 2-5 days based on severity of symptoms and risk factors.  Plan for next call: symptom management-follow up on respiratory status, fevers, and if doing aerosols       KIM HOLCOMB RN

## 2025-04-09 ENCOUNTER — TELEPHONE (OUTPATIENT)
Dept: PULMONOLOGY | Age: 89
End: 2025-04-09

## 2025-04-09 NOTE — TELEPHONE ENCOUNTER
Patient's wife Nicole called in and left a voicemail that he was having issues with his nebulizer and wanted to know who they need to contact to get it fixed. I called and spoke with Nicole and she stated that they figured out the problem. I did mention to her that the patient had not been seen since 2023. Patient scheduled with Dr Ribeiro for follow up.

## 2025-04-11 ENCOUNTER — CARE COORDINATION (OUTPATIENT)
Dept: CARE COORDINATION | Age: 89
End: 2025-04-11

## 2025-04-11 NOTE — CARE COORDINATION
Care Transitions Note    Follow Up Call     Patient Current Location:  Home: [address] [place] [address]    Care Transition Nurse contacted the patient by telephone. Verified name and  as identifiers.    Additional needs identified to be addressed with provider   No needs identified                 Method of communication with provider: none.    Care Summary Note: Attempted to contact South for transition, but his wife Nicole answered the phone.  She stated that South was doing all right.  She said that he breathing was all right, but no hat his base line; he has an occasional cough and is getting up sputum; and he has no fevers.  Reviewed doing the aerosol treatments and to have labs drawn prior to appointment with PCP.  She had no further questions or concerns.     Plan of care updates since last contact:  Review of patient management of conditions/medications:         Advance Care Planning:   Does patient have an Advance Directive: reviewed during previous call, see note. .    Medication Review:  No changes since last call.     Remote Patient Monitoring:  Offered patient enrollment in the Remote Patient Monitoring (RPM) program for in-home monitoring: will offer at next outreach    Assessments:  Care Transitions Subsequent and Final Call    Subsequent and Final Calls  Do you have any ongoing symptoms?: No  Have your medications changed?: No  Do you have any questions related to your medications?: No  Do you currently have any active services?: No  Are you currently active with any services?: Home Health  Do you have any needs or concerns that I can assist you with?: No  Care Transitions Interventions    Physical Therapy: Completed Other Services: Completed     Registered Dietician: Completed     Occupational Therapy: Completed     Disease Association: Completed      Other Interventions:              Follow Up Appointment:   Reviewed upcoming appointment(s).  Future Appointments         Provider

## 2025-04-15 ENCOUNTER — OFFICE VISIT (OUTPATIENT)
Dept: PRIMARY CARE CLINIC | Age: 89
End: 2025-04-15

## 2025-04-15 VITALS
TEMPERATURE: 98 F | BODY MASS INDEX: 36.04 KG/M2 | HEART RATE: 60 BPM | OXYGEN SATURATION: 97 % | SYSTOLIC BLOOD PRESSURE: 122 MMHG | WEIGHT: 258.4 LBS | DIASTOLIC BLOOD PRESSURE: 64 MMHG

## 2025-04-15 DIAGNOSIS — J96.01 ACUTE RESPIRATORY FAILURE WITH HYPOXIA: Primary | ICD-10-CM

## 2025-04-15 DIAGNOSIS — Z09 HOSPITAL DISCHARGE FOLLOW-UP: ICD-10-CM

## 2025-04-15 RX ORDER — BENZONATATE 200 MG/1
200 CAPSULE ORAL 3 TIMES DAILY PRN
Qty: 90 CAPSULE | Refills: 0 | Status: SHIPPED | OUTPATIENT
Start: 2025-04-15

## 2025-04-15 RX ORDER — DAPAGLIFLOZIN 10 MG/1
10 TABLET, FILM COATED ORAL EVERY MORNING
Qty: 90 TABLET | Refills: 3 | Status: SHIPPED | OUTPATIENT
Start: 2025-04-15 | End: 2025-04-16 | Stop reason: CLARIF

## 2025-04-15 SDOH — ECONOMIC STABILITY: FOOD INSECURITY: WITHIN THE PAST 12 MONTHS, THE FOOD YOU BOUGHT JUST DIDN'T LAST AND YOU DIDN'T HAVE MONEY TO GET MORE.: NEVER TRUE

## 2025-04-15 SDOH — ECONOMIC STABILITY: FOOD INSECURITY: WITHIN THE PAST 12 MONTHS, YOU WORRIED THAT YOUR FOOD WOULD RUN OUT BEFORE YOU GOT MONEY TO BUY MORE.: NEVER TRUE

## 2025-04-15 ASSESSMENT — PATIENT HEALTH QUESTIONNAIRE - PHQ9
SUM OF ALL RESPONSES TO PHQ QUESTIONS 1-9: 0
1. LITTLE INTEREST OR PLEASURE IN DOING THINGS: NOT AT ALL
SUM OF ALL RESPONSES TO PHQ QUESTIONS 1-9: 0
2. FEELING DOWN, DEPRESSED OR HOPELESS: NOT AT ALL
SUM OF ALL RESPONSES TO PHQ QUESTIONS 1-9: 0
SUM OF ALL RESPONSES TO PHQ QUESTIONS 1-9: 0

## 2025-04-15 NOTE — PATIENT INSTRUCTIONS
SURVEY:     You may be receiving a survey from [facility] [employer] regarding your visit today.     Please complete the survey to enable us to provide the highest quality of care to you and your family.     If you cannot score us a very good on any question, please call the office to discuss how we could have made your experience a very good one.     Thank you,    Flaco Snider, APRN-CNP  Shruthi Owens, APRN-CNP  Kanika, LPN  Alma, CMA  Jose Rafael, CMA  Rika, CMA  Haily, PCA  Evie, CMA  Vee, PM

## 2025-04-15 NOTE — PROGRESS NOTES
Post-Discharge Transitional Care  Follow Up      South Vargas   YOB: 1935    Date of Office Visit:  4/15/2025  Date of Hospital Admission: 4/6/25  Date of Hospital Discharge: 4/7/25  Risk of hospital readmission (high >=14%. Medium >=10%) :Readmission Risk Score: 19.6      Care management risk score Rising risk (score 2-5) and Complex Care (Scores >=6): No Risk Score On File     Non face to face  following discharge, date last encounter closed (first attempt may have been earlier): 04/08/2025    Call initiated 2 business days of discharge: Yes    ASSESSMENT/PLAN:   Acute respiratory failure with hypoxia  Hospital discharge follow-up  -     [place] DISCHARGE MEDS RECONCILED W/ CURRENT OUTPATIENT MED LIST      Medical Decision Making: moderate complexity  Return if symptoms worsen or fail to improve.           Subjective:   HPI:  Follow up of Hospital problems/diagnosis(es):     Hospital Course:   South Vargas is a 89 y.o. male admitted with acute respiratory failure with hypoxia.  He presented with complaints of fever chills and myalgias.  He complained of productive cough for several days.  He denied any ill contacts.  He denied any GI or  symptoms.  During patient's initial workup he was found to be hypoxic and placed on supplemental oxygen.  He does have history of COPD and atrial fibrillation as well as diabetes.  Patient was found to have multifocal pneumonia and admitted.  During patient's evaluation labs were monitored electrolytes replaced.  Respiratory panel negative.  Blood cultures negative.  Hypoxia resolved.  Patient overall is feeling better and feels well to go home.  Plan will be to discharge home on Augmentin and he will follow-up with primary care with labs in a week.     Inpatient course: Discharge summary reviewed- see chart.    Interval history/Current status:     South states he is feeling better, he does think he was discharged a day or so too early.  He is still having

## 2025-04-16 ENCOUNTER — CARE COORDINATION (OUTPATIENT)
Dept: CARE COORDINATION | Age: 89
End: 2025-04-16

## 2025-04-16 RX ORDER — DAPAGLIFLOZIN 10 MG/1
10 TABLET, FILM COATED ORAL EVERY MORNING
Qty: 90 TABLET | Refills: 3 | Status: SHIPPED | OUTPATIENT
Start: 2025-04-16

## 2025-04-16 NOTE — CARE COORDINATION
Care Transitions Note    Follow Up Call     Patient Current Location:  Home: [address] [place] [address]    Care Transition Nurse contacted the spouse/partner  by telephone. Verified name and  as identifiers.    Additional needs identified to be addressed with provider   No needs identified                 Method of communication with provider: none.    Care Summary Note: Attempted to contact South for transitional outreach.  She said that South was doing well. She said that at his appointment with PCP , his lungs were clear, but she said that he still has a cough.  She said that South is ok during the day, but when he lays down at night, he coughs a lot.  She said that he still has swelling in his legs, but again he has lymphedema and does us lymphapress boots.  She denied him having fever/chills and is eating good.  Tessalon was re ordered for his cough and Farxiga was ordered.  She said that they will be picking up the medications today.  She had no further questions/concerns.     Plan of care updates since last contact:  Review of patient management of conditions/medications:         Advance Care Planning:   Does patient have an Advance Directive: reviewed during previous call, see note. .    Medication Review:  Medications changed since last call, reviewed today.       Assessments:  Care Transitions Subsequent and Final Call    Subsequent and Final Calls  Do you have any ongoing symptoms?: Yes  Onset of Patient-reported symptoms: In the past 7 days  Have your medications changed?: Yes  Patient Reports: reordered Tessalon perles, added Farxiga  Do you have any questions related to your medications?: No  Do you currently have any active services?: No  Are you currently active with any services?: Home Health  Do you have any needs or concerns that I can assist you with?: No  Care Transitions Interventions    Physical Therapy: Completed Other Services: Completed     Registered Dietician: Completed

## 2025-04-16 NOTE — TELEPHONE ENCOUNTER
Brand name Farxiga is to expensive.  Wife is requesting generic to be called in to [facility] so they can see how much that will cost.  Order pended.

## 2025-04-18 ENCOUNTER — TELEPHONE (OUTPATIENT)
Dept: PRIMARY CARE CLINIC | Age: 89
End: 2025-04-18

## 2025-04-18 DIAGNOSIS — R05.2 SUBACUTE COUGH: Primary | ICD-10-CM

## 2025-04-18 RX ORDER — CODEINE PHOSPHATE AND GUAIFENESIN 10; 100 MG/5ML; MG/5ML
10 SOLUTION ORAL 3 TIMES DAILY PRN
Qty: 120 ML | Refills: 0 | Status: SHIPPED | OUTPATIENT
Start: 2025-04-18 | End: 2025-04-22

## 2025-04-18 NOTE — TELEPHONE ENCOUNTER
Patient wife contacted the office in regards to patient having a lingering cough from appt on 4/15/25. Patient wife mentioned that the Tessalon pearls are not helping and he was up half the night. Wife wondering if there is a cough syrup that Flaco could call into the pharmacy to help.    Please advise.

## 2025-04-23 ENCOUNTER — CARE COORDINATION (OUTPATIENT)
Dept: CARE COORDINATION | Age: 89
End: 2025-04-23

## 2025-04-23 NOTE — CARE COORDINATION
Care Transitions Note    Follow Up Call     Patient Current Location:  Home: [address] [place] [address]    [facility] Care Coordinator contacted the patient by telephone. Verified name and  as identifiers.    Additional needs identified to be addressed with provider   No needs identified                 Method of communication with provider: none.    Care Summary Note: Writer spoke with South's wife Nicole for a follow up care transitions call. She states he is doing okay. He continues to have a cough at night when laying down. She reports his PCP ordered cough medicine for him that seems to be working a little better than the Tessalon did. She states he is not having any SOB,chest pain or palpitations. No fever or chills. He has started sleeping wit 2 pillows which has also helped. Discussed that sometime you can have a lingering cough for several weeks after pneumonia but that it should be getting better with time. To continue to monitor for now and to notify PCP if it becomes worse or does not improve or is accompanied by new SOB or fever/chills. She verbalized her understanding and thanked writer for calling.     Plan of care updates since last contact:  Review of patient management of conditions/medications:         Advance Care Planning:   Does patient have an Advance Directive: reviewed and current.    Medication Review:  Medications changed since last call, reviewed today.     Remote Patient Monitoring:  Offered patient enrollment in the Remote Patient Monitoring (RPM) program for in-home monitoring: Patient is not eligible for RPM program because: affiliate provider.    Assessments:  Care Transitions Subsequent and Final Call    Subsequent and Final Calls  Do you have any ongoing symptoms?: No  Have your medications changed?: No  Do you have any questions related to your medications?: No  Do you currently have any active services?: No  Are you currently active with any services?: Home Health  Do you

## 2025-04-23 NOTE — PROGRESS NOTES
Physician Progress Note      PATIENT:               JULI PIÑA  [facility] #:                  [id number]  :                       1935  ADMIT DATE:       2025 12:13 AM  DISCH DATE:        2025 1:11 PM  RESPONDING  PROVIDER #:        ANDRA Gibson CNP          QUERY TEXT:    Sepsis is documented in the medical record Progress Notes 2025. Please   provide additional clinical indicators supportive of your documentation. Or   please document if the diagnosis of sepsis has been ruled out after study.    The clinical indicators include:  age 89yr, resp failure, pneumonia, elevated WBC    discharge summary 25 by Andra Phelps  89 y.o. male admitted with acute respiratory failure with hypoxia.  He   presented with complaints of fever chills and myalgias.  He complained of   productive cough for several days.  -  Patient was found to have multifocal pneumonia and admitted    Progress Notes 2025 by Andra Phelps  Sepsis due to pneumonia    XR CHEST PORTABLE 2025  differential would include pneumonia.    Lab values-  25- WBC- 13.5, 14.9, temp- 100, lactic acid- 3.9, CRP- 274.0   procalcitonin- 0.56  25- WBC-15.6 lactic acid- 2.4    IV dexa, Iv xone, XR chest, monitoring lab values, O2 nasal    Thank you  Cherie Weir S CDS  Options provided:  -- Sepsis present as evidenced by, Please document evidence.  -- Sepsis was ruled out after study  -- Other - I will add my own diagnosis  -- Disagree - Not applicable / Not valid  -- Disagree - Clinically unable to determine / Unknown  -- Refer to Clinical Documentation Reviewer    PROVIDER RESPONSE TEXT:    Sepsis was ruled out after study.    Query created by: Lila Weir on 2025 5:35 AM      Electronically signed by:  ANDRA Gibson CNP 2025 6:17 AM

## 2025-04-24 ENCOUNTER — HOSPITAL ENCOUNTER (OUTPATIENT)
Dept: GENERAL RADIOLOGY | Age: 89
Discharge: HOME | End: 2025-04-26
Payer: MEDICARE

## 2025-04-24 ENCOUNTER — CARE COORDINATION (OUTPATIENT)
Dept: CARE COORDINATION | Age: 89
End: 2025-04-24

## 2025-04-24 ENCOUNTER — TELEPHONE (OUTPATIENT)
Dept: PRIMARY CARE CLINIC | Age: 89
End: 2025-04-24

## 2025-04-24 DIAGNOSIS — R05.2 SUBACUTE COUGH: Primary | ICD-10-CM

## 2025-04-24 DIAGNOSIS — R05.2 SUBACUTE COUGH: ICD-10-CM

## 2025-04-24 PROCEDURE — 71046 X-RAY EXAM CHEST 2 VIEWS: CPT

## 2025-04-24 NOTE — CARE COORDINATION
Care Transitions Note    Follow Up Call     Patient Current Location:  Home: [address] [place] [address]    [facility] Care Coordinator contacted the spouse/partner  by telephone. Verified name and  as identifiers.    Additional needs identified to be addressed with provider   No needs identified                 Method of communication with provider: none.    Care Summary Note: Writer called to speak to South for follow up transitional care call. He was unavailable spouse answered the phone. Writer asked how is he doing she states that his cough is better today than it was yesterday, otherwise she states he is doing good. She voiced no other needs or concerns thanked writer for calling.     Plan of care updates since last contact:  Cough better today        Advance Care Planning:   Does patient have an Advance Directive: reviewed during previous call, see note. .    Medication Review:  No changes since last call.     Remote Patient Monitoring:  Offered patient enrollment in the Remote Patient Monitoring (RPM) program for in-home monitoring: Yes, but did not enroll at this time: did not speak to pt  .    Assessments:  Care Transitions Subsequent and Final Call    Subsequent and Final Calls  Are you currently active with any services?: Home Health  Care Transitions Interventions    Physical Therapy: Completed Other Services: Completed     Registered Dietician: Completed     Occupational Therapy: Completed     Disease Association: Completed      Other Interventions:              Follow Up Appointment:   Reviewed upcoming appointment(s).  Future Appointments         Provider Specialty Dept Phone    2025 1:15 PM Hussain Ribeiro MD Pulmonology [phone]    2025 3:00 PM Flaco Snider, GENA - CNP Primary Care [phone]    2025 2:40 PM Paulino Marin MD Cardiology [phone]    2025 1:20 PM Javier Duvall MD Nephrology [phone]            [facility] Care Coordinator provided contact

## 2025-04-24 NOTE — TELEPHONE ENCOUNTER
Patient wife contacted the office in regards to patient's cough not getting any better. Patient is close to finishing the cough syrup that was sent on 4/18/25. Wife wondering if there is something else or if he should get more cough syrup.     Please advise.

## 2025-04-24 NOTE — PROGRESS NOTES
Physician Progress Note      PATIENT:               JULI PIÑA  CSN #:                  [id number]  :                       1935  ADMIT DATE:       2025 12:13 AM  DISCH DATE:        2025 1:11 PM  RESPONDING  PROVIDER #:        ANDRA MCNEIL APRN - CNP          QUERY TEXT:    Acute respiratory failure with hypoxia is documented in the medical record   Annette in discharge summary 25 by Andra Griffin. Please provide   additional clinical indicators supportive of your documentation. Or please   document if the diagnosis of acute respiratory failure with hypoxia has been   ruled out after study.    The clinical indicators include:  -age 89yr, COPD exacerbation, pneumonia    -no wheezes, rales or rhonchi, no acute respiratory distress, no accessory   muscle use,    discharge summary 25  - 89 y.o. male admitted with acute respiratory failure with hypoxia.  He   presented with complaints of fever chills and myalgias.  He complained of   productive cough for several days.    -Patient was found to have multifocal pneumonia and admitted.  During   patient's evaluation labs were monitored electrolytes replaced.  Respiratory   panel negative.  Blood cultures negative.  Hypoxia resolved.  -During patient's initial workup he was found to be hypoxic and placed on   supplemental oxygen.  He does have history of COPD    H&P 2025  -Acute respiratory failure with hypoxia  -Positive for dyspnea,    -XR CHEST PORTABLE 2025 -  Opacity in the right base. If patient has clinical symptoms of infection,  differential would include pneumonia    Lab value- VBG  25- POC2-38.4, PO2- 27.3, PO2 brenton temp-27.3,  sat- 50.7  SPO2- 25- 91,97,95,94,93,92  25-90,91,92  3L nasal canula    - IV dexa, IV Rocephin, Zithromax XR chest, monitoring lab values, O2 3L   nasal, Symbicort, DuoNeb    Thank you  KIRAN Loco CDS  Options provided:  -- Acute Respiratory Failure with hypoxia as

## 2025-04-25 ENCOUNTER — RESULTS FOLLOW-UP (OUTPATIENT)
Dept: PRIMARY CARE CLINIC | Age: 89
End: 2025-04-25

## 2025-04-25 DIAGNOSIS — J44.9 CHRONIC OBSTRUCTIVE PULMONARY DISEASE, UNSPECIFIED COPD TYPE (HCC): Chronic | ICD-10-CM

## 2025-04-25 RX ORDER — DOXYCYCLINE HYCLATE 100 MG
100 TABLET ORAL 2 TIMES DAILY
Qty: 20 TABLET | Refills: 0 | Status: SHIPPED | OUTPATIENT
Start: 2025-04-25 | End: 2025-05-05

## 2025-04-25 RX ORDER — ALBUTEROL SULFATE 90 UG/1
2 INHALANT RESPIRATORY (INHALATION) EVERY 4 HOURS PRN
Qty: 1 EACH | Refills: 2 | Status: SHIPPED | OUTPATIENT
Start: 2025-04-25

## 2025-04-25 NOTE — TELEPHONE ENCOUNTER
----- Message from GENA Couch CNP sent at 4/25/2025 10:35 AM EDT -----  It appears that the pneumonia has not completely resolved.  I am going to have him start doxycycline twice daily and albuterol as needed.  Continue to watch closely.  If any worsening have him go to ER.

## 2025-05-01 ENCOUNTER — CARE COORDINATION (OUTPATIENT)
Dept: CARE COORDINATION | Age: 89
End: 2025-05-01

## 2025-05-01 NOTE — CARE COORDINATION
Care Transitions Note    Final Call     Patient Current Location:  [address]: [address] [place] [address]    Care Transition Nurse contacted the patient by telephone. Verified name and  as identifiers.    Patient graduated from the Care Transitions program on 25.  Patient/family progressing towards self-management. .      Advance Care Planning:   Does patient have an Advance Directive: reviewed during previous call, see note. .    Handoff:   Patient was not referred to the ACM team due to no additional needs identified.       Care Summary Note: Attempted to contact South for final outreach, but his wife, Nicole answered the phone.  She said that South was doing well, except for the cough.  She said that he does cough about 10 x per day and there is clear sputum at times.   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     Assessments:  Care Transitions Subsequent and Final Call    Subsequent and Final Calls  Do you have any ongoing symptoms?: No  Have your medications changed?: No  Do you have any questions related to your medications?: No  Do you currently have any active services?: No  Are you currently active with any services?: Home Health  Do you have any needs or concerns that I can assist you with?: No  Care Transitions Interventions    Physical Therapy: Completed Other Services: Completed     Registered Dietician: Completed     Occupational Therapy: Completed     Disease Association: Completed      Other Interventions:              Upcoming Appointments:    Future Appointments         Provider Specialty Dept Phone    2025 1:15 PM Hussain Ribeiro MD Pulmonology [phone]    2025 3:00 PM lFaco Snider, APRN - CNP Primary Care [phone]    2025 2:40 PM Paulino Marin MD Cardiology [phone]    2025 1:20 PM Javier Duvall MD Nephrology [phone]            Patient has agreed to contact

## 2025-05-02 PROBLEM — A41.9 SEPSIS DUE TO PNEUMONIA (HCC): Status: RESOLVED | Noted: 2023-01-31 | Resolved: 2025-05-02

## 2025-05-02 PROBLEM — J15.9 COMMUNITY ACQUIRED BACTERIAL PNEUMONIA: Status: RESOLVED | Noted: 2021-12-30 | Resolved: 2025-05-02

## 2025-05-02 PROBLEM — E66.9 OBESITY (BMI 30-39.9): Status: ACTIVE | Noted: 2025-04-15

## 2025-05-02 PROBLEM — B34.8 PARAINFLUENZA: Status: RESOLVED | Noted: 2023-07-20 | Resolved: 2025-05-02

## 2025-05-02 PROBLEM — N18.32 CKD STAGE 3B, GFR 30-44 ML/MIN (HCC): Status: ACTIVE | Noted: 2025-05-02

## 2025-05-02 PROBLEM — I63.419 CEREBROVASCULAR ACCIDENT (CVA) DUE TO EMBOLISM OF MIDDLE CEREBRAL ARTERY (HCC): Status: RESOLVED | Noted: 2023-06-25 | Resolved: 2025-05-02

## 2025-05-02 PROBLEM — K62.5 RECTAL BLEEDING: Status: RESOLVED | Noted: 2023-06-23 | Resolved: 2025-05-02

## 2025-05-02 PROBLEM — I63.9 ISCHEMIC STROKE (HCC): Status: RESOLVED | Noted: 2023-06-15 | Resolved: 2025-05-02

## 2025-05-02 PROBLEM — R74.8 ELEVATED LIVER ENZYMES: Chronic | Status: RESOLVED | Noted: 2018-11-02 | Resolved: 2025-05-02

## 2025-05-02 PROBLEM — N18.32 CKD STAGE 3B, GFR 30-44 ML/MIN (HCC): Status: ACTIVE | Noted: 2025-04-07

## 2025-05-02 PROBLEM — E87.79 OTHER FLUID OVERLOAD: Status: RESOLVED | Noted: 2023-02-06 | Resolved: 2025-05-02

## 2025-05-02 PROBLEM — J18.9 COMMUNITY ACQUIRED PNEUMONIA OF BOTH LUNGS: Status: RESOLVED | Noted: 2023-11-21 | Resolved: 2025-05-02

## 2025-05-02 PROBLEM — J96.01 ACUTE RESPIRATORY FAILURE WITH HYPOXIA (HCC): Status: RESOLVED | Noted: 2025-04-06 | Resolved: 2025-05-02

## 2025-05-02 PROBLEM — D64.9 ANEMIA: Status: RESOLVED | Noted: 2023-06-23 | Resolved: 2025-05-02

## 2025-05-02 PROBLEM — A49.9 ESBL (EXTENDED SPECTRUM BETA-LACTAMASE) PRODUCING BACTERIA INFECTION: Status: RESOLVED | Noted: 2023-07-21 | Resolved: 2025-05-02

## 2025-05-02 PROBLEM — G92.8 TOXIC METABOLIC ENCEPHALOPATHY: Status: RESOLVED | Noted: 2023-11-22 | Resolved: 2025-05-02

## 2025-05-02 PROBLEM — K92.1 HEMATOCHEZIA: Status: RESOLVED | Noted: 2023-10-25 | Resolved: 2025-05-02

## 2025-05-02 PROBLEM — K92.2 GI BLEED: Status: RESOLVED | Noted: 2023-06-23 | Resolved: 2025-05-02

## 2025-05-02 PROBLEM — I63.512 ACUTE ISCHEMIC LEFT MIDDLE CEREBRAL ARTERY (MCA) STROKE (HCC): Status: RESOLVED | Noted: 2023-06-15 | Resolved: 2025-05-02

## 2025-05-02 PROBLEM — Z16.12 ESBL (EXTENDED SPECTRUM BETA-LACTAMASE) PRODUCING BACTERIA INFECTION: Status: RESOLVED | Noted: 2023-07-21 | Resolved: 2025-05-02

## 2025-05-02 PROBLEM — D62 ACUTE BLOOD LOSS ANEMIA: Status: RESOLVED | Noted: 2023-06-23 | Resolved: 2025-05-02

## 2025-05-02 PROBLEM — R41.82 ALTERED MENTAL STATUS: Status: RESOLVED | Noted: 2023-06-16 | Resolved: 2025-05-02

## 2025-05-02 PROBLEM — A41.9 SEPTICEMIA (HCC): Status: RESOLVED | Noted: 2023-02-02 | Resolved: 2025-05-02

## 2025-05-02 PROBLEM — J18.9 MULTIFOCAL PNEUMONIA: Status: RESOLVED | Noted: 2022-12-02 | Resolved: 2025-05-02

## 2025-05-02 PROBLEM — K57.31 DIVERTICULAR HEMORRHAGE: Status: RESOLVED | Noted: 2023-06-23 | Resolved: 2025-05-02

## 2025-05-02 PROBLEM — K57.31 DIVERTICULOSIS OF COLON WITH HEMORRHAGE: Status: RESOLVED | Noted: 2023-10-27 | Resolved: 2025-05-02

## 2025-05-02 PROBLEM — J18.9 SEPSIS DUE TO PNEUMONIA (HCC): Status: RESOLVED | Noted: 2023-01-31 | Resolved: 2025-05-02

## 2025-05-02 PROBLEM — K92.2 ACUTE LOWER GI BLEEDING: Status: RESOLVED | Noted: 2023-10-24 | Resolved: 2025-05-02

## 2025-05-02 PROBLEM — K59.00 CONSTIPATION: Status: RESOLVED | Noted: 2024-06-07 | Resolved: 2025-05-02

## 2025-05-02 PROBLEM — Z86.73 HISTORY OF STROKE: Status: RESOLVED | Noted: 2023-02-02 | Resolved: 2025-05-02

## 2025-05-02 PROBLEM — R09.02 HYPOXIA: Status: RESOLVED | Noted: 2021-12-30 | Resolved: 2025-05-02

## 2025-05-07 ENCOUNTER — TELEPHONE (OUTPATIENT)
Dept: PRIMARY CARE CLINIC | Age: 89
End: 2025-05-07

## 2025-05-07 NOTE — TELEPHONE ENCOUNTER
The medication was discontinued by the hospitalist on 05/17/2024.  I had him start Farxiga or dapagliflozin last month.  Is he still taking this medication?

## 2025-05-07 NOTE — TELEPHONE ENCOUNTER
South is calling wanting to know when you took him off of metformin and what he is taking in replace of it?    Please advise, thank you!

## 2025-05-07 NOTE — TELEPHONE ENCOUNTER
Spoke to South's wife.  He is taking Farxiga currently.  She is questioning if it is helping as he has had a couple spikes in BS recently.  Yesterday it was 182.  South has an AWV appt 5/19 and they will discuss it with you then.  Unless it spikes again, in which Mrs Vargas will call.

## 2025-05-12 RX ORDER — PANTOPRAZOLE SODIUM 40 MG/1
TABLET, DELAYED RELEASE ORAL
Qty: 90 TABLET | Refills: 1 | Status: SHIPPED | OUTPATIENT
Start: 2025-05-12

## 2025-05-19 ENCOUNTER — OFFICE VISIT (OUTPATIENT)
Dept: PRIMARY CARE CLINIC | Age: 89
End: 2025-05-19
Payer: MEDICARE

## 2025-05-19 VITALS
OXYGEN SATURATION: 98 % | TEMPERATURE: 97.9 F | DIASTOLIC BLOOD PRESSURE: 64 MMHG | WEIGHT: 237 LBS | HEIGHT: 71 IN | BODY MASS INDEX: 33.18 KG/M2 | HEART RATE: 64 BPM | SYSTOLIC BLOOD PRESSURE: 120 MMHG

## 2025-05-19 DIAGNOSIS — Z00.00 MEDICARE ANNUAL WELLNESS VISIT, SUBSEQUENT: Primary | ICD-10-CM

## 2025-05-19 DIAGNOSIS — E11.8 TYPE 2 DIABETES MELLITUS WITH COMPLICATION, WITHOUT LONG-TERM CURRENT USE OF INSULIN (HCC): ICD-10-CM

## 2025-05-19 DIAGNOSIS — I50.32 CHRONIC DIASTOLIC CONGESTIVE HEART FAILURE (HCC): ICD-10-CM

## 2025-05-19 LAB — HBA1C MFR BLD: 7.3 %

## 2025-05-19 PROCEDURE — G0439 PPPS, SUBSEQ VISIT: HCPCS | Performed by: NURSE PRACTITIONER

## 2025-05-19 PROCEDURE — 1159F MED LIST DOCD IN RCRD: CPT | Performed by: NURSE PRACTITIONER

## 2025-05-19 PROCEDURE — 1123F ACP DISCUSS/DSCN MKR DOCD: CPT | Performed by: NURSE PRACTITIONER

## 2025-05-19 PROCEDURE — 1160F RVW MEDS BY RX/DR IN RCRD: CPT | Performed by: NURSE PRACTITIONER

## 2025-05-19 PROCEDURE — 3051F HG A1C>EQUAL 7.0%<8.0%: CPT | Performed by: NURSE PRACTITIONER

## 2025-05-19 SDOH — ECONOMIC STABILITY: FOOD INSECURITY: WITHIN THE PAST 12 MONTHS, THE FOOD YOU BOUGHT JUST DIDN'T LAST AND YOU DIDN'T HAVE MONEY TO GET MORE.: NEVER TRUE

## 2025-05-19 SDOH — ECONOMIC STABILITY: FOOD INSECURITY: WITHIN THE PAST 12 MONTHS, YOU WORRIED THAT YOUR FOOD WOULD RUN OUT BEFORE YOU GOT MONEY TO BUY MORE.: NEVER TRUE

## 2025-05-19 ASSESSMENT — PATIENT HEALTH QUESTIONNAIRE - PHQ9
SUM OF ALL RESPONSES TO PHQ QUESTIONS 1-9: 0
SUM OF ALL RESPONSES TO PHQ QUESTIONS 1-9: 0
1. LITTLE INTEREST OR PLEASURE IN DOING THINGS: NOT AT ALL
SUM OF ALL RESPONSES TO PHQ QUESTIONS 1-9: 0
2. FEELING DOWN, DEPRESSED OR HOPELESS: NOT AT ALL
SUM OF ALL RESPONSES TO PHQ QUESTIONS 1-9: 0

## 2025-05-19 ASSESSMENT — ENCOUNTER SYMPTOMS
WHEEZING: 0
SORE THROAT: 0
COUGH: 1
SHORTNESS OF BREATH: 1
BLURRED VISION: 0
ABDOMINAL PAIN: 0
CHEST TIGHTNESS: 0
TROUBLE SWALLOWING: 0
RHINORRHEA: 0

## 2025-05-19 ASSESSMENT — LIFESTYLE VARIABLES
HOW MANY STANDARD DRINKS CONTAINING ALCOHOL DO YOU HAVE ON A TYPICAL DAY: 1 OR 2
HOW OFTEN DO YOU HAVE A DRINK CONTAINING ALCOHOL: MONTHLY OR LESS

## 2025-05-19 ASSESSMENT — COPD QUESTIONNAIRES: COPD: 1

## 2025-05-19 NOTE — PATIENT INSTRUCTIONS
can help you develop a safe and effective exercise program.  A counselor or psychiatrist can help you cope with issues such as depression, anxiety, or family problems that can make it hard to focus on weight loss.  Consider joining a support group for people who are trying to lose weight. Your doctor can suggest groups in your area.  Where can you learn more?  Go to https://www.[employer].net/patientEd and enter U357 to learn more about \"Starting a Weight-Loss Plan: Care Instructions.\"  Current as of: April 30, 2024  Content Version: 14.4  © 4694-5835 [employer].   Care instructions adapted under license by [employer]. If you have questions about a medical condition or this instruction, always ask your healthcare professional. [employer], disclaims any warranty or liability for your use of this information.         Advance Directives: Care Instructions  Overview  An advance directive is a legal way to state your wishes at the end of your life. It tells your family and your doctor what to do if you can't say what you want.  There are two main types of advance directives. You can change them any time your wishes change.  Living will.  This form tells your family and your doctor your wishes about life support and other treatment. The form is also called a declaration.  Medical power of .  This form lets you name a person to make treatment decisions for you when you can't speak for yourself. This person is called a health care agent (health care proxy, health care surrogate). The form is also called a durable power of  for health care.  If you do not have an advance directive, decisions about your medical care may be made by a family member, or by a doctor or a  who doesn't know you.  It may help to think of an advance directive as a gift to the people who care for you. If you have one, they won't have to make tough decisions by themselves.  For more information,

## 2025-05-19 NOTE — PROGRESS NOTES
for further evaluation and treatment          Vision Screen:  Do you have difficulty driving, watching TV, or doing any of your daily activities because of your eyesight?: No  Have you had an eye exam within the past year?: (!) No  Interventions:   Patient encouraged to make appointment with their eye specialist      Advanced Directives:  Do you have a Living Will?: (!) No    Intervention:  has NO advanced directive - not interested in additional information                     Objective   Vitals:    05/19/25 1447   BP: 120/64   BP Site: Left Upper Arm   Patient Position: Sitting   BP Cuff Size: Large Adult   Pulse: 64   Temp: 97.9 °F (36.6 °C)   SpO2: 98%   Weight: 107.5 kg (237 lb)   Height: 1.803 m (5' 11\")      Body mass index is 33.05 kg/m².      General Appearance: alert and oriented to person, place and time, well-developed and well nourished, and in no acute distress  Skin: warm and dry  Head: normocephalic and atraumatic  Eyes: conjunctivae normal and sclera anicteric  ENT: oropharynx clear and moist with normal mucous membranes  Neck: neck supple and non tender without mass   Pulmonary/Chest: decreased breath sounds noted- clear  Cardiovascular: normal rate, regular rhythm, and no carotid bruits  Abdomen: soft, non-tender  Extremities: 1+  edema-  bilateral lower legs  Musculoskeletal: no swollen joints  Neurologic: speech normal and no new findings            Allergies   Allergen Reactions    Rosuvastatin      Leg pain     Prior to Visit Medications    Medication Sig Taking? Authorizing Provider   empagliflozin (JARDIANCE) 25 MG tablet Take 1 tablet by mouth daily Yes Flaco Snider APRN - CNP   pantoprazole (PROTONIX) 40 MG tablet TAKE 1 TABLET BY MOUTH ONCE DAILY IN THE MORNING BEFORE BREAKFAST Yes Flaco Snider APRN - CNP   albuterol sulfate HFA (PROVENTIL;VENTOLIN;PROAIR) 108 (90 Base) MCG/ACT inhaler Inhale 2 puffs into the lungs every 4 hours as needed for Wheezing or Shortness of Breath Yes

## 2025-05-22 RX ORDER — OXYBUTYNIN CHLORIDE 15 MG/1
15 TABLET, EXTENDED RELEASE ORAL DAILY
Qty: 30 TABLET | Refills: 0 | OUTPATIENT
Start: 2025-05-22

## 2025-05-22 NOTE — TELEPHONE ENCOUNTER
Phone call to the patient to schedule a follow up appointment as he had cancelled his last 2 appointments.  The patient states he does not want to reschedule his appointment and he does not want to refill this medication.    The patient states he is taking the Oxybutynin, but he is not going to refill it or come in for a follow up.      This nurse advised the patient that his urinary symptoms may return if he stops taking Oxybutynin.  The patient verbalizes understanding, but he refuses to schedule a follow up.

## 2025-05-22 NOTE — TELEPHONE ENCOUNTER
Patient cancelled his last appointment on 04/02/2025.    Last appt   1/27/2025   Next appt   Visit date not found     Medication is active on current medication list.

## 2025-07-01 ENCOUNTER — TELEPHONE (OUTPATIENT)
Dept: CARDIOLOGY | Age: 89
End: 2025-07-01

## 2025-07-01 DIAGNOSIS — I25.10 CORONARY ARTERY DISEASE INVOLVING NATIVE CORONARY ARTERY OF NATIVE HEART WITHOUT ANGINA PECTORIS: ICD-10-CM

## 2025-07-01 DIAGNOSIS — E11.8 TYPE 2 DIABETES MELLITUS WITH COMPLICATION, WITHOUT LONG-TERM CURRENT USE OF INSULIN (HCC): ICD-10-CM

## 2025-07-01 DIAGNOSIS — R06.02 SOB (SHORTNESS OF BREATH): ICD-10-CM

## 2025-07-01 DIAGNOSIS — I35.0 NONRHEUMATIC AORTIC VALVE STENOSIS: ICD-10-CM

## 2025-07-01 DIAGNOSIS — I42.9 CARDIOMYOPATHY, UNSPECIFIED TYPE (HCC): ICD-10-CM

## 2025-07-01 DIAGNOSIS — I10 PRIMARY HYPERTENSION: ICD-10-CM

## 2025-07-01 DIAGNOSIS — Z95.820 S/P ANGIOPLASTY WITH STENT: ICD-10-CM

## 2025-07-01 DIAGNOSIS — I25.10 ASHD (ARTERIOSCLEROTIC HEART DISEASE): ICD-10-CM

## 2025-07-01 DIAGNOSIS — E11.69 TYPE 2 DIABETES MELLITUS WITH OTHER SPECIFIED COMPLICATION, UNSPECIFIED WHETHER LONG TERM INSULIN USE (HCC): ICD-10-CM

## 2025-07-01 DIAGNOSIS — E66.811 OBESITY (BMI 30.0-34.9): ICD-10-CM

## 2025-07-01 DIAGNOSIS — Z86.73 HISTORY OF STROKE: ICD-10-CM

## 2025-07-01 DIAGNOSIS — N18.4 STAGE 4 CHRONIC KIDNEY DISEASE (HCC): ICD-10-CM

## 2025-07-01 DIAGNOSIS — I50.33 ACUTE ON CHRONIC DIASTOLIC HEART FAILURE (HCC): ICD-10-CM

## 2025-07-01 DIAGNOSIS — I50.32 CHRONIC DIASTOLIC CONGESTIVE HEART FAILURE (HCC): ICD-10-CM

## 2025-07-01 DIAGNOSIS — D64.9 ACUTE ON CHRONIC ANEMIA: ICD-10-CM

## 2025-07-01 DIAGNOSIS — E78.2 MIXED HYPERLIPIDEMIA: ICD-10-CM

## 2025-07-01 DIAGNOSIS — I10 ESSENTIAL HYPERTENSION: ICD-10-CM

## 2025-07-01 DIAGNOSIS — D64.9 CHRONIC ANEMIA: ICD-10-CM

## 2025-07-01 DIAGNOSIS — I89.0 ACQUIRED LYMPHEDEMA: ICD-10-CM

## 2025-07-01 DIAGNOSIS — I50.42 CHRONIC COMBINED SYSTOLIC AND DIASTOLIC CHF, NYHA CLASS 2 (HCC): Primary | ICD-10-CM

## 2025-07-01 NOTE — TELEPHONE ENCOUNTER
Patient's wife called and left voicemail stating that patient uses lymphadema pumps for his legs and they don't seem to be working well anymore, his legs are swelling a lot. She wants to know if there is anything else you suggest? Please advise.

## 2025-07-02 RX ORDER — OXYBUTYNIN CHLORIDE 15 MG/1
15 TABLET, EXTENDED RELEASE ORAL DAILY
Qty: 90 TABLET | Refills: 0 | Status: SHIPPED | OUTPATIENT
Start: 2025-07-02

## 2025-07-03 NOTE — TELEPHONE ENCOUNTER
Please send a referral to the heart failure clinic for evaluation and to establish care.  I will be more than happy to see him and re-evaluate him anytime if needed Thank you

## 2025-07-07 ENCOUNTER — TELEPHONE (OUTPATIENT)
Dept: PRIMARY CARE CLINIC | Age: 89
End: 2025-07-07

## 2025-07-07 RX ORDER — OXYBUTYNIN CHLORIDE 15 MG/1
15 TABLET, EXTENDED RELEASE ORAL DAILY
Qty: 30 TABLET | Refills: 0 | OUTPATIENT
Start: 2025-07-07

## 2025-07-07 RX ORDER — BUMETANIDE 2 MG/1
TABLET ORAL
Qty: 90 TABLET | Refills: 3 | Status: SHIPPED | OUTPATIENT
Start: 2025-07-07

## 2025-07-07 NOTE — TELEPHONE ENCOUNTER
It is used for asthma and allergies.  He does not necessarily have to take it if he is not having any issues.

## 2025-07-07 NOTE — TELEPHONE ENCOUNTER
South's wife, Eliane, was reviewing his meds at home when she discovered montelukast 10 mg on his [facility] list of meds.  She does not have this medication at home.    South's chart shows the last refill was 11/20/23.      Eliane would like to know what medication montelukast was replaced with or if he should still be taking montelukast?    Please advise, thank you!

## 2025-07-09 ENCOUNTER — PHARMACY VISIT (OUTPATIENT)
Age: 89
End: 2025-07-09
Payer: MEDICARE

## 2025-07-09 ENCOUNTER — HOSPITAL ENCOUNTER (EMERGENCY)
Age: 89
Discharge: HOME | End: 2025-07-09
Attending: EMERGENCY MEDICINE
Payer: MEDICARE

## 2025-07-09 VITALS
TEMPERATURE: 97.8 F | WEIGHT: 249 LBS | HEIGHT: 71 IN | HEART RATE: 58 BPM | SYSTOLIC BLOOD PRESSURE: 134 MMHG | RESPIRATION RATE: 19 BRPM | OXYGEN SATURATION: 96 % | BODY MASS INDEX: 34.86 KG/M2 | DIASTOLIC BLOOD PRESSURE: 70 MMHG

## 2025-07-09 VITALS
BODY MASS INDEX: 34.73 KG/M2 | WEIGHT: 249 LBS | DIASTOLIC BLOOD PRESSURE: 46 MMHG | HEART RATE: 64 BPM | SYSTOLIC BLOOD PRESSURE: 87 MMHG

## 2025-07-09 DIAGNOSIS — N28.9 RENAL INSUFFICIENCY: Primary | ICD-10-CM

## 2025-07-09 DIAGNOSIS — I50.22 CHRONIC SYSTOLIC (CONGESTIVE) HEART FAILURE (HCC): Primary | ICD-10-CM

## 2025-07-09 PROBLEM — I50.42: Status: ACTIVE | Noted: 2024-06-07

## 2025-07-09 PROBLEM — Z95.820 S/P ANGIOPLASTY WITH STENT: Status: ACTIVE | Noted: 2025-07-09

## 2025-07-09 LAB
ANION GAP SERPL CALCULATED.3IONS-SCNC: 13 MMOL/L (ref 9–16)
BASOPHILS # BLD: 0.04 K/UL (ref 0–0.2)
BASOPHILS NFR BLD: 1 % (ref 0–2)
BUN SERPL-MCNC: 33 MG/DL (ref 8–23)
BUN/CREAT SERPL: 21 (ref 9–20)
CALCIUM SERPL-MCNC: 8.6 MG/DL (ref 8.6–10.4)
CHLORIDE SERPL-SCNC: 102 MMOL/L (ref 98–107)
CO2 SERPL-SCNC: 22 MMOL/L (ref 20–31)
CREAT SERPL-MCNC: 1.6 MG/DL (ref 0.7–1.2)
EOSINOPHIL # BLD: 0.33 K/UL (ref 0–0.44)
EOSINOPHILS RELATIVE PERCENT: 5 % (ref 1–4)
ERYTHROCYTE [DISTWIDTH] IN BLOOD BY AUTOMATED COUNT: 14.5 % (ref 11.8–14.4)
GFR, ESTIMATED: 43 ML/MIN/1.73M2
GLUCOSE SERPL-MCNC: 174 MG/DL (ref 74–99)
HCT VFR BLD AUTO: 37.4 % (ref 40.7–50.3)
HGB BLD-MCNC: 12.1 G/DL (ref 13–17)
IMM GRANULOCYTES # BLD AUTO: 0.05 K/UL (ref 0–0.3)
IMM GRANULOCYTES NFR BLD: 1 %
LYMPHOCYTES NFR BLD: 1.25 K/UL (ref 1.1–3.7)
LYMPHOCYTES RELATIVE PERCENT: 18 % (ref 24–43)
MCH RBC QN AUTO: 30 PG (ref 25.2–33.5)
MCHC RBC AUTO-ENTMCNC: 32.4 G/DL (ref 28.4–34.8)
MCV RBC AUTO: 92.6 FL (ref 82.6–102.9)
MONOCYTES NFR BLD: 1.21 K/UL (ref 0.1–1.2)
MONOCYTES NFR BLD: 18 % (ref 3–12)
NEUTROPHILS NFR BLD: 57 % (ref 36–65)
NEUTS SEG NFR BLD: 3.97 K/UL (ref 1.5–8.1)
NRBC BLD-RTO: 0 PER 100 WBC
PLATELET # BLD AUTO: 208 K/UL (ref 138–453)
PMV BLD AUTO: 10.1 FL (ref 8.1–13.5)
POTASSIUM SERPL-SCNC: 4.4 MMOL/L (ref 3.7–5.3)
RBC # BLD AUTO: 4.04 M/UL (ref 4.21–5.77)
SODIUM SERPL-SCNC: 137 MMOL/L (ref 136–145)
WBC OTHER # BLD: 6.9 K/UL (ref 3.5–11.3)

## 2025-07-09 PROCEDURE — 80048 BASIC METABOLIC PNL TOTAL CA: CPT

## 2025-07-09 PROCEDURE — 99203 OFFICE O/P NEW LOW 30 MIN: CPT | Performed by: COUNSELOR

## 2025-07-09 PROCEDURE — 93005 ELECTROCARDIOGRAM TRACING: CPT | Performed by: EMERGENCY MEDICINE

## 2025-07-09 PROCEDURE — 85025 COMPLETE CBC W/AUTO DIFF WBC: CPT

## 2025-07-09 PROCEDURE — 99222 1ST HOSP IP/OBS MODERATE 55: CPT | Performed by: INTERNAL MEDICINE

## 2025-07-09 PROCEDURE — 99284 EMERGENCY DEPT VISIT MOD MDM: CPT

## 2025-07-09 ASSESSMENT — PAIN SCALES - GENERAL
PAINLEVEL_OUTOF10: 0
PAINLEVEL_OUTOF10: 0

## 2025-07-09 ASSESSMENT — PAIN - FUNCTIONAL ASSESSMENT
PAIN_FUNCTIONAL_ASSESSMENT: NONE - DENIES PAIN
PAIN_FUNCTIONAL_ASSESSMENT: 0-10

## 2025-07-09 NOTE — PROGRESS NOTES
Spironolactone (Aldactone)  50 mg daily.    Beta Blocker: Continue Propranolol 60 mg BID      Hyperlipidemia: Mixed - Last LDL on 2024 was 64 mg/dL  Cholesterol Reduction Therapy: Continue Atorvastatin (Lipitor) 40 mg daily.       Chronic Kidney Disease: Stage 4 - follow with Dr. Duvall    Patient Education/Instructions: I did spend about 15 minutes with patient going over heart failure, including dietary guidelines, the signs and symptoms to watch for including shortness of breath, weight gain, lightheadedness/dizziness. I asked patient to call the clinic if develops persistent or worsening shortness of breath or weight gain of more than 3 pounds in 1-7 days. Patient verbalized understanding.     Medication changes since last visit:  25 Decrease Jardiance to 10 mg po daily per verbal order Dr. Marin    Patient is hypotensive today.  Patient does not want to go to the lab today for BMP - renal function monitoring. Patient's wife states that he has outpatient lab orders from Dr. Duvall that are due in August and he likes to go to [facility] to get his labs drawn.        I spoke to Dr Marin who would like patient to go to ER for evaluation. I called patient at home and spoke to his wife and requested that he come to ER.  Patient agreed.       Thank you for the referral,    Joya Gaviria [facility]     For Pharmacy Admin Tracking Only    Program: Medical Group  CPA in place:  Yes  Recommendation Provided To: Provider: 2 via Note to Provider and Verbally to provider and Patient/Caregiver: 2 via In person  Intervention Detail: Adherence Monitorin, CMR/TIP Completed, and Referral to Other Provider  Intervention Accepted By: Provider: 2 and Patient/Caregiver: 3  Gap Closed?: No   Time Spent (min): 90

## 2025-07-09 NOTE — ED PROVIDER NOTES
[facility]  EMERGENCY DEPARTMENT ENCOUNTER      Pt Name: South Vargas  MRN: [id number]  Birthdate 1935  Date of evaluation: 7/9/2025  Provider: Claude Greenwood MD  Time Note started  3:47 PM EDT  7/9/25           NURSING NOTES REVIEWED     Pt evaluated in a timely manner      CURRENT MEDICATIONS       Discharge Medication List as of 7/9/2025  6:18 PM        CONTINUE these medications which have NOT CHANGED    Details   empagliflozin (JARDIANCE) 10 MG tablet Take 1 tablet by mouth daily 7/9/25 Decrease Jardiance to 10 mg po daily per verbal order Dr. Thmopson [facility]      bumetanide (BUMEX) 2 MG tablet TAKE 1 TABLET BY MOUTH IN THE MORNING AND 1/2 (ONE-HALF) IN THE EVENING FOR  A  TOTAL  OF  3  MG  DAILY, Disp-90 tablet, R-3Normal      oxyBUTYnin (DITROPAN XL) 15 MG extended release tablet Take 1 tablet by mouth once daily, Disp-90 tablet, R-0Normal      pantoprazole (PROTONIX) 40 MG tablet TAKE 1 TABLET BY MOUTH ONCE DAILY IN THE MORNING BEFORE BREAKFAST, Disp-90 tablet, R-1Normal      albuterol sulfate HFA (PROVENTIL;VENTOLIN;PROAIR) 108 (90 Base) MCG/ACT inhaler Inhale 2 puffs into the lungs every 4 hours as needed for Wheezing or Shortness of Breath, Disp-1 each, R-2Normal      levothyroxine (SYNTHROID) 137 MCG tablet Take 1 tablet by mouth once daily, Disp-90 tablet, R-3Normal      spironolactone (ALDACTONE) 50 MG tablet Take 1 tablet by mouth daily, Disp-90 tablet, R-3Normal      tamsulosin (FLOMAX) 0.4 MG capsule Take 2 capsules by mouth once daily, Disp-180 capsule, R-3Normal      atorvastatin (LIPITOR) 40 MG tablet Take 1 tablet by mouth daily, Disp-90 tablet, R-1Normal      Respiratory Therapy Supplies (NEBULIZER/TUBING/MOUTHPIECE) KIT DAILY Starting Mon 5/6/2024, Disp-1 kit, R-0, Print      albuterol (PROVENTIL) (2.5 MG/3ML) 0.083% nebulizer solution Take 3 mLs by nebulization every 4 hours as needed for Wheezing or Shortness of Breath, Disp-120 each, R-3Normal     be included in the SEP-1 Core Measure due to severe sepsis or septic shock?   No   Exclusion criteria - the patient is NOT to be included for SEP-1 Core Measure due to:  2+ SIRS criteria are not met          See more data below for the lab and radiology tests and orders.    Treatment and Disposition    ED Triage Vitals   BP Systolic BP Percentile Diastolic BP Percentile Temp Temp Source Pulse Respirations SpO2   07/09/25 1533 -- -- 07/09/25 1535 07/09/25 1535 07/09/25 1533 07/09/25 1533 07/09/25 1533   (!) 96/52   97.8 °F (36.6 °C) Oral 59 19 96 %      Height Weight - Scale         07/09/25 1533 07/09/25 1533         1.803 m (5' 11\") 112.9 kg (249 lb)               Emergency Department Course     Medical Decision Making  89-year-old patient presents emergency department and coming up with his wife.  The patient endorses being at the medical clinic and was told that his blood pressure was \"low.  I do not have the exact readings from the medical clinic himself.  Patient relayed to me that he feels fine.  Denies any chest pain or shortness of breath.  Denies any fever or chills.  The patient was under the impression that he was going to meet Dr. Marin cardiologist here in the emergency department      Will check CBC Chem-7.EKG  Performed orthostatic blood pressures negative objectively and subjectively    Patient does have mild renal insufficiency similar to previous laboratory testing today's BUN is 33 creatinine 1.6    EKG no STEMI    Patient remains asymptomatic during emergency department course    Patient was evaluated by his cardiologist Dr. Marin in the emergency department who also recommends discharge as the patient does concur    Patient knowledges discharge diagnosis importance timely follow-up having no additional questions or concerns was released stable condition    Amount and/or Complexity of Data Reviewed  Labs: ordered. Decision-making details documented in ED Course.  ECG/medicine tests:

## 2025-07-09 NOTE — PATIENT INSTRUCTIONS
Please go to [facility] ER for evaluation hypotension/kidney function monitoring.     HEART FAILURE:    With heart failure you must follow up with your Cardiologist, your PCP and other physicians as appropriate - especially 7 days after a hospitalization and then as directed.     Please call right away with any signs or symptoms of heart failure or other medical conditions that need attention or with any questions at all. Please call your Cardiologist or your PCP or the Outpatient Heart Failure Clinic at [phone].  We can help manage these symptoms and often prevent a visit to the Emergency Room or hospitalization.       * Know your dry weight (your weight without any fluid on board)    * Call any time you have questions about anything. Do not wait.    * It is very important to take your medications as prescribed, do not miss any doses.   Call for refills before you run out. Typically when you have one week left.   If you have trouble getting your medications for any reason, call us at [phone].    * Avoid NSAIDs (non steroidal anti inflammatory drugs) like Aleve (naproxen), Advil and Motrin (ibuprofen), Mobic (diclofenac), Celebrex (celecoxib) and aspirin. A daily aspirin is okay if recommended by your physician.      It is okay to take Tylenol (acetaminophen) unless your physician has told you otherwise.    * Limit fluid intake.    Typically this is no more than 1,500-2,000 milliliters (mL) per day.     This is a liter and a half to two liters.               This is equal to approximately 48-64 ounces (oz) per day    * Limit sodium intake.   Typically this is no more than 2,000-2,400 milligrams (mg) per day.   You will need to add up the sodium content found on the nutrition label for the foods you eat each day.    * Weigh yourself every day. Weigh yourself before breakfast and after you go to the restroom.  Wear the same thing each time you weigh yourself.   Keep a log and bring it to each visit.   Call if you

## 2025-07-09 NOTE — CONSULTS
Subjective:     CHIEF COMPLAINT / HPI:    Chief Complaint   Patient presents with    Hypotension     Patient seen at medical management, sent by Dr. Marin for eval of hypotension. Pateint with no complaints     Dear Flaco Snider APRN - GUER[place]     [place] is 89 y.o. male who presents today for follow-up.    1-He has history of coronary artery disease, myocardial infarction and primary PCI in 2/2017 done at [facility],  [facility].  He also has history of ischemic cardiomyopathy and chronic systolic CHF, NYHA class III.    2-An admission to [facility] on 9/19/2017 with COPD exacerbation, during this admission his lisinopril changed to Cozaar because of chronic cough.    3-Another visit to the emergency room on 10/3/2017 with cough, shortness of breath and wheezing.      4- He saw Dr. Salmon at [facility] in November 2018, no changes in medication done.    5-An admission to [facility] on 4/6/2018 with acute on chronic CHF.    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    7- EKG done in office (8/20/2019)- Sinus Rhythm with 1st degree AV block. 71 bpm.    8- EKG done in office on 7/14/2020.  No acute ischemic changes.    9-  Echocardiogram on 4/6/2021: Global left ventricular systolic function appears preserved with an estimated ejection fraction of 55%. Mildly increased left ventricular wall thickness with a normal left ventricular cavity size. The patient has a sigmoid interventricular septum. The inferoseptal wall is abnormal in its motion which is not unusual status post open heart surgery. Mild aortic stenosis. Mild mitral and tricuspid regurgitation. Evidence of mild diastolic dysfunction is seen. Atrial septal aneurysm cannot rule-out shunt. A saline contrast study was performed and cannot rule out interatrial communication.    10-EKG done in office 7/6/2021- no acute ischemic changes    11- Admission to [facility] on         DATA:    Lab Results   Component Value Date    ALT 41 04/07/2025    AST 30 04/07/2025     (H) 06/15/2023    ALKPHOS 110 04/07/2025    BILITOT 0.4 04/07/2025     Lab Results   Component Value Date    CREATININE 1.6 (H) 04/07/2025    BUN 31 (H) 04/07/2025     (L) 04/07/2025    K 3.9 04/07/2025     04/07/2025    CO2 22 04/07/2025       Lab Results   Component Value Date    WBC 15.6 (H) 04/07/2025    HGB 10.1 (L) 04/07/2025    HCT 31.0 (L) 04/07/2025    MCV 92.8 04/07/2025     04/07/2025       Lab Results   Component Value Date    TRIG 110 11/14/2024    TRIG 159 (H) 06/27/2024    TRIG 89 06/16/2023     Lab Results   Component Value Date    HDL 62 11/14/2024    HDL 51 06/27/2024    HDL 41 06/16/2023     No components found for: \"LDLCHOLESTEROL\"    Assessment:     1. Renal insufficiency    2.  Chronic combined CHF, NYHA class III  3. Chronic CAD, S/P PCI in 2017.  4. Essential tremors.  5. History of ischemic stroke.  6. Dyslipidemia    Plan:   Patient with extensive medical history as outlined in HPI.  Was sent from the heart failure management clinic because of hypotension.  He does have chronic combined CHF and lymphedema.    When he came to the emergency room, blood pressure was good.  Blood work reviewed, creatinine is at baseline indicating no significant hypotension.  He has gained 11 pounds but he said this happened because he did not refill his Bumex and just restarted his diuretics 3 days ago.    Currently asymptomatic, no dizziness or lightheadedness.  No chest pain, pressure or tightness.  No worsening shortness of breath.  Bilateral lower extremity edema probably a little more than before but he is wearing his compression stockings.    Chronic combined  Heart Failure:   Has gained 11 pounds since last visit, he attributes this to not having the Bumex for a while.  Restarted Bumex 3 days ago.  Diuretic regimen is consistently adjusted by his nephrologist.  Denies any chest pain

## 2025-07-10 ENCOUNTER — CARE COORDINATION (OUTPATIENT)
Dept: CARE COORDINATION | Age: 89
End: 2025-07-10

## 2025-07-10 LAB
EKG ATRIAL RATE: 63 BPM
EKG P AXIS: 10 DEGREES
EKG P-R INTERVAL: 290 MS
EKG Q-T INTERVAL: 448 MS
EKG QRS DURATION: 118 MS
EKG QTC CALCULATION (BAZETT): 458 MS
EKG R AXIS: -31 DEGREES
EKG T AXIS: 54 DEGREES
EKG VENTRICULAR RATE: 63 BPM

## 2025-07-10 PROCEDURE — 93010 ELECTROCARDIOGRAM REPORT: CPT | Performed by: INTERNAL MEDICINE

## 2025-07-10 NOTE — CARE COORDINATION
Ambulatory Care Coordination Note     7/10/2025      Patient Current Location:  Home: [address] [place] [address]     This patient was received as a referral from [facility] health report .    ACM contacted the spouse/partner by telephone. Verified name and  with spouse/partner as identifiers. Provided introduction to self, and explanation of the ACM role.   Spouse/Partner declined care management services at this time.      Future Appointments   Date Time Provider Department Center   2025  1:45 PM Asha Baptiste APRN - CNP [facility] TP   2025  1:40 PM Paulino Marin MD [facility]   2025  1:20 PM Javier Duvall MD [facility]   2025  2:20 PM Flaco Snider APRN - CNP [facility]   2026  2:40 PM Flaco Snider APRN - CNP [facility] DEP

## 2025-07-15 ENCOUNTER — TELEPHONE (OUTPATIENT)
Dept: PRIMARY CARE CLINIC | Age: 89
End: 2025-07-15

## 2025-07-15 NOTE — TELEPHONE ENCOUNTER
Patient was seen in the ER for low blood pressure and Dr. Marin lowered his Jardiance dose.  Patients wife just noticed the change and wanted to make sure it was ok and was asking if they can cut the Jardiance 25 mg in half.

## 2025-07-15 NOTE — TELEPHONE ENCOUNTER
Noted.  I do see where Dr. Marin decreased the dose to 10 mg.  I do not believe Jardiance can be cut in half.  She could also check with the pharmacy.  Thank you.

## 2025-08-05 DIAGNOSIS — E78.5 HYPERLIPIDEMIA, UNSPECIFIED HYPERLIPIDEMIA TYPE: ICD-10-CM

## 2025-08-05 RX ORDER — ATORVASTATIN CALCIUM 40 MG/1
40 TABLET, FILM COATED ORAL DAILY
Qty: 90 TABLET | Refills: 3 | Status: SHIPPED | OUTPATIENT
Start: 2025-08-05

## 2025-08-11 ENCOUNTER — OFFICE VISIT (OUTPATIENT)
Dept: CARDIOLOGY | Age: 89
End: 2025-08-11
Payer: MEDICARE

## 2025-08-11 VITALS
SYSTOLIC BLOOD PRESSURE: 110 MMHG | HEART RATE: 62 BPM | BODY MASS INDEX: 34.8 KG/M2 | OXYGEN SATURATION: 90 % | WEIGHT: 248.6 LBS | RESPIRATION RATE: 18 BRPM | HEIGHT: 71 IN | DIASTOLIC BLOOD PRESSURE: 65 MMHG

## 2025-08-11 DIAGNOSIS — I35.0 NONRHEUMATIC AORTIC VALVE STENOSIS: ICD-10-CM

## 2025-08-11 DIAGNOSIS — E78.2 MIXED HYPERLIPIDEMIA: ICD-10-CM

## 2025-08-11 DIAGNOSIS — I25.10 ASHD (ARTERIOSCLEROTIC HEART DISEASE): ICD-10-CM

## 2025-08-11 DIAGNOSIS — Z86.73 HISTORY OF STROKE: ICD-10-CM

## 2025-08-11 DIAGNOSIS — I10 ESSENTIAL HYPERTENSION: ICD-10-CM

## 2025-08-11 DIAGNOSIS — N18.4 STAGE 4 CHRONIC KIDNEY DISEASE (HCC): ICD-10-CM

## 2025-08-11 DIAGNOSIS — Z95.820 S/P ANGIOPLASTY WITH STENT: ICD-10-CM

## 2025-08-11 DIAGNOSIS — I50.42 CHRONIC COMBINED SYSTOLIC AND DIASTOLIC CHF, NYHA CLASS 2 (HCC): Primary | ICD-10-CM

## 2025-08-11 PROCEDURE — 1159F MED LIST DOCD IN RCRD: CPT | Performed by: INTERNAL MEDICINE

## 2025-08-11 PROCEDURE — 1123F ACP DISCUSS/DSCN MKR DOCD: CPT | Performed by: INTERNAL MEDICINE

## 2025-08-11 PROCEDURE — 99214 OFFICE O/P EST MOD 30 MIN: CPT | Performed by: INTERNAL MEDICINE

## 2025-08-11 RX ORDER — METOLAZONE 2.5 MG/1
2.5 TABLET ORAL SEE ADMIN INSTRUCTIONS
Qty: 30 TABLET | Refills: 0 | Status: SHIPPED | OUTPATIENT
Start: 2025-08-11

## 2025-08-13 ENCOUNTER — OFFICE VISIT (OUTPATIENT)
Dept: UROLOGY | Age: 89
End: 2025-08-13
Payer: MEDICARE

## 2025-08-13 VITALS
WEIGHT: 248 LBS | DIASTOLIC BLOOD PRESSURE: 58 MMHG | HEIGHT: 71 IN | SYSTOLIC BLOOD PRESSURE: 108 MMHG | BODY MASS INDEX: 34.72 KG/M2

## 2025-08-13 DIAGNOSIS — N40.1 BPH WITH OBSTRUCTION/LOWER URINARY TRACT SYMPTOMS: Primary | ICD-10-CM

## 2025-08-13 DIAGNOSIS — K59.00 CONSTIPATION, UNSPECIFIED CONSTIPATION TYPE: ICD-10-CM

## 2025-08-13 DIAGNOSIS — N13.8 BPH WITH OBSTRUCTION/LOWER URINARY TRACT SYMPTOMS: Primary | ICD-10-CM

## 2025-08-13 DIAGNOSIS — N39.46 MIXED INCONTINENCE: ICD-10-CM

## 2025-08-13 PROCEDURE — 51798 US URINE CAPACITY MEASURE: CPT | Performed by: PHYSICIAN ASSISTANT

## 2025-08-13 PROCEDURE — 1123F ACP DISCUSS/DSCN MKR DOCD: CPT | Performed by: PHYSICIAN ASSISTANT

## 2025-08-13 PROCEDURE — 1159F MED LIST DOCD IN RCRD: CPT | Performed by: PHYSICIAN ASSISTANT

## 2025-08-13 PROCEDURE — 99214 OFFICE O/P EST MOD 30 MIN: CPT | Performed by: PHYSICIAN ASSISTANT

## 2025-08-21 ENCOUNTER — TELEPHONE (OUTPATIENT)
Dept: CARDIOLOGY | Age: 89
End: 2025-08-21

## 2025-08-21 ENCOUNTER — HOSPITAL ENCOUNTER (OUTPATIENT)
Age: 89
Discharge: HOME | End: 2025-08-23
Attending: INTERNAL MEDICINE
Payer: MEDICARE

## 2025-08-21 VITALS
WEIGHT: 248 LBS | HEIGHT: 71 IN | DIASTOLIC BLOOD PRESSURE: 58 MMHG | SYSTOLIC BLOOD PRESSURE: 108 MMHG | BODY MASS INDEX: 34.72 KG/M2

## 2025-08-21 DIAGNOSIS — I50.42 CHRONIC COMBINED SYSTOLIC AND DIASTOLIC CHF, NYHA CLASS 2 (HCC): ICD-10-CM

## 2025-08-21 LAB
ECHO AO SINUS VALSALVA DIAM: 3.8 CM
ECHO AO SINUS VALSALVA INDEX: 1.65 CM/M2
ECHO AV AREA PEAK VELOCITY: 0.6 CM2
ECHO AV AREA VTI: 0.6 CM2
ECHO AV AREA/BSA PEAK VELOCITY: 0.3 CM2/M2
ECHO AV AREA/BSA VTI: 0.3 CM2/M2
ECHO AV CUSP MM: 1.6 CM
ECHO AV MEAN GRADIENT: 28 MMHG
ECHO AV MEAN VELOCITY: 2.5 M/S
ECHO AV PEAK GRADIENT: 45 MMHG
ECHO AV PEAK VELOCITY: 3.4 M/S
ECHO AV VELOCITY RATIO: 0.21
ECHO AV VTI: 84.5 CM
ECHO BSA: 2.37 M2
ECHO EST RA PRESSURE: 3 MMHG
ECHO LA AREA 2C: 22.2 CM2
ECHO LA AREA 4C: 21.3 CM2
ECHO LA MAJOR AXIS: 5.2 CM
ECHO LA MINOR AXIS: 6.4 CM
ECHO LA VOL BP: 72 ML (ref 18–58)
ECHO LA VOL MOD A2C: 65 ML (ref 18–58)
ECHO LA VOL MOD A4C: 67 ML (ref 18–58)
ECHO LA VOL/BSA BIPLANE: 31 ML/M2 (ref 16–34)
ECHO LA VOLUME INDEX MOD A2C: 28 ML/M2 (ref 16–34)
ECHO LA VOLUME INDEX MOD A4C: 29 ML/M2 (ref 16–34)
ECHO LV E' LATERAL VELOCITY: 8.27 CM/S
ECHO LV EDV A2C: 109 ML
ECHO LV EDV A4C: 111 ML
ECHO LV EDV INDEX A4C: 48 ML/M2
ECHO LV EDV NDEX A2C: 47 ML/M2
ECHO LV EF PHYSICIAN: 55 %
ECHO LV EJECTION FRACTION A2C: 59 %
ECHO LV EJECTION FRACTION A4C: 52 %
ECHO LV EJECTION FRACTION BIPLANE: 54 % (ref 55–100)
ECHO LV ESV A2C: 45 ML
ECHO LV ESV A4C: 54 ML
ECHO LV ESV INDEX A2C: 19 ML/M2
ECHO LV ESV INDEX A4C: 23 ML/M2
ECHO LV FRACTIONAL SHORTENING: 34 % (ref 28–44)
ECHO LV INTERNAL DIMENSION DIASTOLE INDEX: 1.77 CM/M2
ECHO LV INTERNAL DIMENSION DIASTOLIC: 4.1 CM (ref 4.2–5.9)
ECHO LV INTERNAL DIMENSION SYSTOLIC INDEX: 1.17 CM/M2
ECHO LV INTERNAL DIMENSION SYSTOLIC: 2.7 CM
ECHO LV IVSD: 1.6 CM (ref 0.6–1)
ECHO LV MASS 2D: 182.5 G (ref 88–224)
ECHO LV MASS INDEX 2D: 79 G/M2 (ref 49–115)
ECHO LV POSTERIOR WALL DIASTOLIC: 0.9 CM (ref 0.6–1)
ECHO LV RELATIVE WALL THICKNESS RATIO: 0.44
ECHO LVOT AREA: 3.1 CM2
ECHO LVOT AV VTI INDEX: 0.2
ECHO LVOT DIAM: 2 CM
ECHO LVOT MEAN GRADIENT: 1 MMHG
ECHO LVOT PEAK GRADIENT: 2 MMHG
ECHO LVOT PEAK VELOCITY: 0.7 M/S
ECHO LVOT STROKE VOLUME INDEX: 23.5 ML/M2
ECHO LVOT SV: 54.3 ML
ECHO LVOT VTI: 17.3 CM
ECHO MV A VELOCITY: 0.84 M/S
ECHO MV E DECELERATION TIME (DT): 359 MS
ECHO MV E VELOCITY: 0.47 M/S
ECHO MV E/A RATIO: 0.56
ECHO MV E/E' LATERAL: 5.68
ECHO PV MAX VELOCITY: 1.6 M/S
ECHO PV PEAK GRADIENT: 10 MMHG
ECHO RIGHT VENTRICULAR SYSTOLIC PRESSURE (RVSP): 34 MMHG
ECHO RV BASAL DIMENSION: 3.4 CM
ECHO RV TAPSE: 1.8 CM (ref 1.7–?)
ECHO TV REGURGITANT MAX VELOCITY: 2.78 M/S
ECHO TV REGURGITANT PEAK GRADIENT: 31 MMHG

## 2025-08-21 PROCEDURE — 93306 TTE W/DOPPLER COMPLETE: CPT

## 2025-08-21 PROCEDURE — 93306 TTE W/DOPPLER COMPLETE: CPT | Performed by: FAMILY MEDICINE

## 2025-08-25 DIAGNOSIS — J18.9 COMMUNITY ACQUIRED PNEUMONIA, UNSPECIFIED LATERALITY: ICD-10-CM

## 2025-08-25 DIAGNOSIS — N18.32 STAGE 3B CHRONIC KIDNEY DISEASE (HCC): ICD-10-CM

## 2025-08-25 DIAGNOSIS — E87.79 OTHER FLUID OVERLOAD: ICD-10-CM

## 2025-08-25 DIAGNOSIS — I10 ESSENTIAL HYPERTENSION: ICD-10-CM

## 2025-08-26 ENCOUNTER — OFFICE VISIT (OUTPATIENT)
Dept: CARDIOLOGY | Age: 89
End: 2025-08-26
Payer: MEDICARE

## 2025-08-26 VITALS
HEART RATE: 57 BPM | RESPIRATION RATE: 18 BRPM | SYSTOLIC BLOOD PRESSURE: 131 MMHG | HEIGHT: 70 IN | WEIGHT: 239 LBS | BODY MASS INDEX: 34.22 KG/M2 | OXYGEN SATURATION: 98 % | DIASTOLIC BLOOD PRESSURE: 65 MMHG

## 2025-08-26 DIAGNOSIS — N18.4 STAGE 4 CHRONIC KIDNEY DISEASE (HCC): ICD-10-CM

## 2025-08-26 DIAGNOSIS — E66.811 OBESITY (BMI 30.0-34.9): ICD-10-CM

## 2025-08-26 DIAGNOSIS — I25.10 ASHD (ARTERIOSCLEROTIC HEART DISEASE): ICD-10-CM

## 2025-08-26 DIAGNOSIS — E11.8 TYPE 2 DIABETES MELLITUS WITH COMPLICATION, WITHOUT LONG-TERM CURRENT USE OF INSULIN (HCC): ICD-10-CM

## 2025-08-26 DIAGNOSIS — I10 ESSENTIAL HYPERTENSION: ICD-10-CM

## 2025-08-26 DIAGNOSIS — Z95.820 S/P ANGIOPLASTY WITH STENT: ICD-10-CM

## 2025-08-26 DIAGNOSIS — E78.2 MIXED HYPERLIPIDEMIA: ICD-10-CM

## 2025-08-26 DIAGNOSIS — I35.0 SEVERE AORTIC STENOSIS: Primary | ICD-10-CM

## 2025-08-26 DIAGNOSIS — Z86.73 HISTORY OF STROKE: ICD-10-CM

## 2025-08-26 DIAGNOSIS — I50.42 CHRONIC COMBINED SYSTOLIC AND DIASTOLIC CHF, NYHA CLASS 2 (HCC): ICD-10-CM

## 2025-08-26 PROCEDURE — 99214 OFFICE O/P EST MOD 30 MIN: CPT | Performed by: INTERNAL MEDICINE

## 2025-08-26 PROCEDURE — 3051F HG A1C>EQUAL 7.0%<8.0%: CPT | Performed by: INTERNAL MEDICINE

## 2025-08-26 PROCEDURE — 1123F ACP DISCUSS/DSCN MKR DOCD: CPT | Performed by: INTERNAL MEDICINE

## 2025-08-26 PROCEDURE — 1159F MED LIST DOCD IN RCRD: CPT | Performed by: INTERNAL MEDICINE

## 2025-08-29 ENCOUNTER — TELEPHONE (OUTPATIENT)
Dept: CARDIOLOGY | Age: 89
End: 2025-08-29

## (undated) DEVICE — Device: Brand: SPOT EX ENDOSCOPIC TATTOO

## (undated) DEVICE — CLIP LIG L235CM RESOL 360 BX/20

## (undated) DEVICE — NEEDLE SCLERO 25GA L240CM OD0.51MM ID0.24MM EXTN L4MM SHTH

## (undated) DEVICE — CLIP: Brand: RESOLUTION 360™ ULTRA CLIP

## (undated) DEVICE — BIPOLAR ELECTROHEMOSTASIS CATHETER: Brand: GOLD PROBE

## (undated) DEVICE — DISPOSABLE DISTAL ATTACHMENT: Brand: DISPOSABLE DISTAL ATTACHMENT

## (undated) DEVICE — CLIPPING DEVICE: Brand: RESOLUTION CLIP